# Patient Record
Sex: FEMALE | Race: WHITE | NOT HISPANIC OR LATINO | Employment: OTHER | URBAN - NONMETROPOLITAN AREA
[De-identification: names, ages, dates, MRNs, and addresses within clinical notes are randomized per-mention and may not be internally consistent; named-entity substitution may affect disease eponyms.]

---

## 2020-04-13 ENCOUNTER — APPOINTMENT (EMERGENCY)
Dept: RADIOLOGY | Facility: HOSPITAL | Age: 65
DRG: 177 | End: 2020-04-13
Payer: COMMERCIAL

## 2020-04-13 ENCOUNTER — APPOINTMENT (EMERGENCY)
Dept: NON INVASIVE DIAGNOSTICS | Facility: HOSPITAL | Age: 65
DRG: 177 | End: 2020-04-13
Payer: COMMERCIAL

## 2020-04-13 ENCOUNTER — APPOINTMENT (EMERGENCY)
Dept: CT IMAGING | Facility: HOSPITAL | Age: 65
DRG: 177 | End: 2020-04-13
Payer: COMMERCIAL

## 2020-04-13 ENCOUNTER — HOSPITAL ENCOUNTER (INPATIENT)
Facility: HOSPITAL | Age: 65
LOS: 2 days | Discharge: HOME HEALTH | DRG: 177 | End: 2020-04-15
Attending: EMERGENCY MEDICINE | Admitting: FAMILY MEDICINE
Payer: COMMERCIAL

## 2020-04-13 DIAGNOSIS — R60.0 BILATERAL LEG EDEMA: ICD-10-CM

## 2020-04-13 DIAGNOSIS — Z79.4 TYPE 2 DIABETES MELLITUS WITHOUT COMPLICATION, WITH LONG-TERM CURRENT USE OF INSULIN (HCC): ICD-10-CM

## 2020-04-13 DIAGNOSIS — R79.89 ELEVATED D-DIMER: ICD-10-CM

## 2020-04-13 DIAGNOSIS — R79.89 ELEVATED BRAIN NATRIURETIC PEPTIDE (BNP) LEVEL: Primary | ICD-10-CM

## 2020-04-13 DIAGNOSIS — R06.02 SHORTNESS OF BREATH: ICD-10-CM

## 2020-04-13 DIAGNOSIS — U07.1 COVID-19 VIRUS INFECTION: ICD-10-CM

## 2020-04-13 DIAGNOSIS — E87.6 HYPOKALEMIA: ICD-10-CM

## 2020-04-13 DIAGNOSIS — I50.9 HEART FAILURE, UNSPECIFIED HF CHRONICITY, UNSPECIFIED HEART FAILURE TYPE (HCC): ICD-10-CM

## 2020-04-13 DIAGNOSIS — E11.9 TYPE 2 DIABETES MELLITUS WITHOUT COMPLICATION, WITH LONG-TERM CURRENT USE OF INSULIN (HCC): ICD-10-CM

## 2020-04-13 DIAGNOSIS — D72.829 LEUKOCYTOSIS, UNSPECIFIED TYPE: ICD-10-CM

## 2020-04-13 DIAGNOSIS — J18.9 PNEUMONIA OF RIGHT MIDDLE LOBE DUE TO INFECTIOUS ORGANISM: ICD-10-CM

## 2020-04-13 DIAGNOSIS — R79.1 ELEVATED INR: ICD-10-CM

## 2020-04-13 DIAGNOSIS — J96.11 CHRONIC RESPIRATORY FAILURE WITH HYPOXIA (HCC): ICD-10-CM

## 2020-04-13 PROBLEM — J44.9 COPD (CHRONIC OBSTRUCTIVE PULMONARY DISEASE) (HCC): Status: ACTIVE | Noted: 2020-04-13

## 2020-04-13 PROBLEM — I48.91 ATRIAL FIBRILLATION (HCC): Status: ACTIVE | Noted: 2020-04-13

## 2020-04-13 PROBLEM — D64.9 ANEMIA: Status: ACTIVE | Noted: 2020-04-13

## 2020-04-13 PROBLEM — J45.909 ASTHMA: Status: ACTIVE | Noted: 2020-04-13

## 2020-04-13 PROBLEM — Z20.822 SUSPECTED COVID-19 VIRUS INFECTION: Status: ACTIVE | Noted: 2020-04-13

## 2020-04-13 LAB
ALBUMIN SERPL BCP-MCNC: 2.4 G/DL (ref 3.5–5)
ALP SERPL-CCNC: 66 U/L (ref 46–116)
ALT SERPL W P-5'-P-CCNC: 20 U/L (ref 12–78)
ANION GAP SERPL CALCULATED.3IONS-SCNC: 9 MMOL/L (ref 4–13)
ANISOCYTOSIS BLD QL SMEAR: PRESENT
AST SERPL W P-5'-P-CCNC: 20 U/L (ref 5–45)
ATRIAL RATE: 357 BPM
BACTERIA UR QL AUTO: ABNORMAL /HPF
BASOPHILS # BLD MANUAL: 0.13 THOUSAND/UL (ref 0–0.1)
BASOPHILS NFR MAR MANUAL: 1 % (ref 0–1)
BILIRUB SERPL-MCNC: 0.35 MG/DL (ref 0.2–1)
BILIRUB UR QL STRIP: NEGATIVE
BUN SERPL-MCNC: 16 MG/DL (ref 5–25)
CA-I BLD-SCNC: 1.11 MMOL/L (ref 1.12–1.32)
CALCIUM SERPL-MCNC: 9 MG/DL (ref 8.3–10.1)
CHLORIDE SERPL-SCNC: 103 MMOL/L (ref 100–108)
CK SERPL-CCNC: 53 U/L (ref 26–192)
CK SERPL-CCNC: 65 U/L (ref 26–192)
CLARITY UR: ABNORMAL
CO2 SERPL-SCNC: 29 MMOL/L (ref 21–32)
COLOR UR: YELLOW
CREAT SERPL-MCNC: 1.04 MG/DL (ref 0.6–1.3)
CRP SERPL QL: 88.1 MG/L
D DIMER PPP FEU-MCNC: 0.76 UG/ML FEU
EOSINOPHIL # BLD MANUAL: 0.13 THOUSAND/UL (ref 0–0.4)
EOSINOPHIL NFR BLD MANUAL: 1 % (ref 0–6)
ERYTHROCYTE [DISTWIDTH] IN BLOOD BY AUTOMATED COUNT: 17 % (ref 11.6–15.1)
EST. AVERAGE GLUCOSE BLD GHB EST-MCNC: 183 MG/DL
FERRITIN SERPL-MCNC: 534 NG/ML (ref 8–388)
FIBRINOGEN PPP-MCNC: 624 MG/DL (ref 227–495)
GFR SERPL CREATININE-BSD FRML MDRD: 56 ML/MIN/1.73SQ M
GLUCOSE SERPL-MCNC: 152 MG/DL (ref 65–140)
GLUCOSE SERPL-MCNC: 172 MG/DL (ref 65–140)
GLUCOSE SERPL-MCNC: 218 MG/DL (ref 65–140)
GLUCOSE SERPL-MCNC: 234 MG/DL (ref 65–140)
GLUCOSE UR STRIP-MCNC: NEGATIVE MG/DL
HBA1C MFR BLD: 8 %
HCT VFR BLD AUTO: 31.9 % (ref 34.8–46.1)
HGB BLD-MCNC: 9.8 G/DL (ref 11.5–15.4)
HGB UR QL STRIP.AUTO: ABNORMAL
INR PPP: 4.85 (ref 0.84–1.19)
KETONES UR STRIP-MCNC: NEGATIVE MG/DL
LACTATE SERPL-SCNC: 1.1 MMOL/L (ref 0.5–2)
LDH SERPL-CCNC: 255 U/L (ref 81–234)
LEUKOCYTE ESTERASE UR QL STRIP: ABNORMAL
LIPASE SERPL-CCNC: 89 U/L (ref 73–393)
LYMPHOCYTES # BLD AUTO: 16 % (ref 14–44)
LYMPHOCYTES # BLD AUTO: 2.08 THOUSAND/UL (ref 0.6–4.47)
MAGNESIUM SERPL-MCNC: 1.7 MG/DL (ref 1.6–2.6)
MAGNESIUM SERPL-MCNC: 1.9 MG/DL (ref 1.6–2.6)
MCH RBC QN AUTO: 25.7 PG (ref 26.8–34.3)
MCHC RBC AUTO-ENTMCNC: 30.7 G/DL (ref 31.4–37.4)
MCV RBC AUTO: 84 FL (ref 82–98)
MONOCYTES # BLD AUTO: 0.39 THOUSAND/UL (ref 0–1.22)
MONOCYTES NFR BLD: 3 % (ref 4–12)
MYELOCYTES NFR BLD MANUAL: 1 % (ref 0–1)
NEUTROPHILS # BLD MANUAL: 10.12 THOUSAND/UL (ref 1.85–7.62)
NEUTS SEG NFR BLD AUTO: 78 % (ref 43–75)
NITRITE UR QL STRIP: NEGATIVE
NON-SQ EPI CELLS URNS QL MICRO: ABNORMAL /HPF
NRBC BLD AUTO-RTO: 0 /100 WBCS
NT-PROBNP SERPL-MCNC: 2871 PG/ML
PH UR STRIP.AUTO: 6.5 [PH]
PLATELET # BLD AUTO: 470 THOUSANDS/UL (ref 149–390)
PLATELET BLD QL SMEAR: ABNORMAL
PMV BLD AUTO: 9.8 FL (ref 8.9–12.7)
POTASSIUM SERPL-SCNC: 3.2 MMOL/L (ref 3.5–5.3)
PROCALCITONIN SERPL-MCNC: <0.05 NG/ML
PROCALCITONIN SERPL-MCNC: <0.05 NG/ML
PROT SERPL-MCNC: 7.5 G/DL (ref 6.4–8.2)
PROT UR STRIP-MCNC: NEGATIVE MG/DL
PROTHROMBIN TIME: 46.2 SECONDS (ref 11.6–14.5)
QRS AXIS: 24 DEGREES
QRSD INTERVAL: 96 MS
QT INTERVAL: 394 MS
QTC INTERVAL: 484 MS
RBC # BLD AUTO: 3.82 MILLION/UL (ref 3.81–5.12)
RBC #/AREA URNS AUTO: ABNORMAL /HPF
RBC MORPH BLD: PRESENT
SARS-COV-2 RNA RESP QL NAA+PROBE: POSITIVE
SODIUM SERPL-SCNC: 141 MMOL/L (ref 136–145)
SP GR UR STRIP.AUTO: 1.01 (ref 1–1.03)
T WAVE AXIS: -23 DEGREES
TOTAL CELLS COUNTED SPEC: 100
TRIGL SERPL-MCNC: 58 MG/DL
TROPONIN I SERPL-MCNC: <0.02 NG/ML
UROBILINOGEN UR QL STRIP.AUTO: 0.2 E.U./DL
VENTRICULAR RATE: 91 BPM
WBC # BLD AUTO: 12.97 THOUSAND/UL (ref 4.31–10.16)
WBC #/AREA URNS AUTO: ABNORMAL /HPF

## 2020-04-13 PROCEDURE — 82948 REAGENT STRIP/BLOOD GLUCOSE: CPT

## 2020-04-13 PROCEDURE — 83690 ASSAY OF LIPASE: CPT | Performed by: EMERGENCY MEDICINE

## 2020-04-13 PROCEDURE — 83036 HEMOGLOBIN GLYCOSYLATED A1C: CPT | Performed by: FAMILY MEDICINE

## 2020-04-13 PROCEDURE — 80053 COMPREHEN METABOLIC PANEL: CPT | Performed by: EMERGENCY MEDICINE

## 2020-04-13 PROCEDURE — 96374 THER/PROPH/DIAG INJ IV PUSH: CPT

## 2020-04-13 PROCEDURE — 93970 EXTREMITY STUDY: CPT | Performed by: SURGERY

## 2020-04-13 PROCEDURE — 96361 HYDRATE IV INFUSION ADD-ON: CPT

## 2020-04-13 PROCEDURE — 84484 ASSAY OF TROPONIN QUANT: CPT | Performed by: EMERGENCY MEDICINE

## 2020-04-13 PROCEDURE — 82330 ASSAY OF CALCIUM: CPT | Performed by: FAMILY MEDICINE

## 2020-04-13 PROCEDURE — 83735 ASSAY OF MAGNESIUM: CPT | Performed by: FAMILY MEDICINE

## 2020-04-13 PROCEDURE — 87635 SARS-COV-2 COVID-19 AMP PRB: CPT | Performed by: EMERGENCY MEDICINE

## 2020-04-13 PROCEDURE — 85610 PROTHROMBIN TIME: CPT | Performed by: EMERGENCY MEDICINE

## 2020-04-13 PROCEDURE — 99285 EMERGENCY DEPT VISIT HI MDM: CPT

## 2020-04-13 PROCEDURE — 93005 ELECTROCARDIOGRAM TRACING: CPT

## 2020-04-13 PROCEDURE — 71045 X-RAY EXAM CHEST 1 VIEW: CPT

## 2020-04-13 PROCEDURE — 85379 FIBRIN DEGRADATION QUANT: CPT | Performed by: EMERGENCY MEDICINE

## 2020-04-13 PROCEDURE — 93970 EXTREMITY STUDY: CPT

## 2020-04-13 PROCEDURE — 82728 ASSAY OF FERRITIN: CPT | Performed by: FAMILY MEDICINE

## 2020-04-13 PROCEDURE — 36415 COLL VENOUS BLD VENIPUNCTURE: CPT | Performed by: EMERGENCY MEDICINE

## 2020-04-13 PROCEDURE — 84484 ASSAY OF TROPONIN QUANT: CPT | Performed by: FAMILY MEDICINE

## 2020-04-13 PROCEDURE — 85027 COMPLETE CBC AUTOMATED: CPT | Performed by: EMERGENCY MEDICINE

## 2020-04-13 PROCEDURE — 99285 EMERGENCY DEPT VISIT HI MDM: CPT | Performed by: EMERGENCY MEDICINE

## 2020-04-13 PROCEDURE — 71275 CT ANGIOGRAPHY CHEST: CPT

## 2020-04-13 PROCEDURE — 82550 ASSAY OF CK (CPK): CPT | Performed by: EMERGENCY MEDICINE

## 2020-04-13 PROCEDURE — 82955 ASSAY OF G6PD ENZYME: CPT | Performed by: FAMILY MEDICINE

## 2020-04-13 PROCEDURE — 84478 ASSAY OF TRIGLYCERIDES: CPT | Performed by: FAMILY MEDICINE

## 2020-04-13 PROCEDURE — 99223 1ST HOSP IP/OBS HIGH 75: CPT | Performed by: FAMILY MEDICINE

## 2020-04-13 PROCEDURE — 83735 ASSAY OF MAGNESIUM: CPT | Performed by: EMERGENCY MEDICINE

## 2020-04-13 PROCEDURE — 83605 ASSAY OF LACTIC ACID: CPT | Performed by: EMERGENCY MEDICINE

## 2020-04-13 PROCEDURE — 82550 ASSAY OF CK (CPK): CPT | Performed by: FAMILY MEDICINE

## 2020-04-13 PROCEDURE — 93010 ELECTROCARDIOGRAM REPORT: CPT | Performed by: INTERNAL MEDICINE

## 2020-04-13 PROCEDURE — 81001 URINALYSIS AUTO W/SCOPE: CPT | Performed by: FAMILY MEDICINE

## 2020-04-13 PROCEDURE — 84145 PROCALCITONIN (PCT): CPT | Performed by: EMERGENCY MEDICINE

## 2020-04-13 PROCEDURE — 85384 FIBRINOGEN ACTIVITY: CPT | Performed by: FAMILY MEDICINE

## 2020-04-13 PROCEDURE — 86140 C-REACTIVE PROTEIN: CPT | Performed by: FAMILY MEDICINE

## 2020-04-13 PROCEDURE — 87040 BLOOD CULTURE FOR BACTERIA: CPT | Performed by: EMERGENCY MEDICINE

## 2020-04-13 PROCEDURE — 85007 BL SMEAR W/DIFF WBC COUNT: CPT | Performed by: EMERGENCY MEDICINE

## 2020-04-13 PROCEDURE — 84145 PROCALCITONIN (PCT): CPT | Performed by: FAMILY MEDICINE

## 2020-04-13 PROCEDURE — 83880 ASSAY OF NATRIURETIC PEPTIDE: CPT | Performed by: EMERGENCY MEDICINE

## 2020-04-13 PROCEDURE — 83615 LACTATE (LD) (LDH) ENZYME: CPT | Performed by: FAMILY MEDICINE

## 2020-04-13 RX ORDER — LEVALBUTEROL 1.25 MG/.5ML
1.25 SOLUTION, CONCENTRATE RESPIRATORY (INHALATION) EVERY 8 HOURS PRN
Status: DISCONTINUED | OUTPATIENT
Start: 2020-04-13 | End: 2020-04-14

## 2020-04-13 RX ORDER — AZITHROMYCIN 250 MG/1
250 TABLET, FILM COATED ORAL EVERY 24 HOURS
Status: DISCONTINUED | OUTPATIENT
Start: 2020-04-15 | End: 2020-04-15 | Stop reason: HOSPADM

## 2020-04-13 RX ORDER — ACETAMINOPHEN 325 MG/1
650 TABLET ORAL ONCE
Status: COMPLETED | OUTPATIENT
Start: 2020-04-13 | End: 2020-04-13

## 2020-04-13 RX ORDER — ATORVASTATIN CALCIUM 10 MG/1
10 TABLET, FILM COATED ORAL DAILY
Status: DISCONTINUED | OUTPATIENT
Start: 2020-04-13 | End: 2020-04-15 | Stop reason: HOSPADM

## 2020-04-13 RX ORDER — CALCIUM CARBONATE 200(500)MG
1000 TABLET,CHEWABLE ORAL DAILY PRN
Status: DISCONTINUED | OUTPATIENT
Start: 2020-04-13 | End: 2020-04-15 | Stop reason: HOSPADM

## 2020-04-13 RX ORDER — MELATONIN
2000 DAILY
COMMUNITY

## 2020-04-13 RX ORDER — DIPHENOXYLATE HYDROCHLORIDE AND ATROPINE SULFATE 2.5; .025 MG/1; MG/1
1 TABLET ORAL DAILY
Status: DISCONTINUED | OUTPATIENT
Start: 2020-04-13 | End: 2020-04-13 | Stop reason: RX

## 2020-04-13 RX ORDER — AZITHROMYCIN 250 MG/1
500 TABLET, FILM COATED ORAL EVERY 24 HOURS
Status: COMPLETED | OUTPATIENT
Start: 2020-04-14 | End: 2020-04-14

## 2020-04-13 RX ORDER — CETIRIZINE HYDROCHLORIDE 10 MG/1
10 TABLET ORAL DAILY
COMMUNITY

## 2020-04-13 RX ORDER — LORATADINE 10 MG/1
10 TABLET ORAL DAILY
Status: DISCONTINUED | OUTPATIENT
Start: 2020-04-13 | End: 2020-04-15 | Stop reason: HOSPADM

## 2020-04-13 RX ORDER — OMEPRAZOLE 40 MG/1
40 CAPSULE, DELAYED RELEASE ORAL DAILY
COMMUNITY

## 2020-04-13 RX ORDER — ACETAMINOPHEN 325 MG/1
650 TABLET ORAL EVERY 6 HOURS PRN
Status: DISCONTINUED | OUTPATIENT
Start: 2020-04-13 | End: 2020-04-15 | Stop reason: HOSPADM

## 2020-04-13 RX ORDER — ONDANSETRON 2 MG/ML
4 INJECTION INTRAMUSCULAR; INTRAVENOUS EVERY 6 HOURS PRN
Status: DISCONTINUED | OUTPATIENT
Start: 2020-04-13 | End: 2020-04-15 | Stop reason: HOSPADM

## 2020-04-13 RX ORDER — DOCUSATE SODIUM 100 MG/1
100 CAPSULE, LIQUID FILLED ORAL 2 TIMES DAILY
Status: DISCONTINUED | OUTPATIENT
Start: 2020-04-13 | End: 2020-04-15 | Stop reason: HOSPADM

## 2020-04-13 RX ORDER — FLUTICASONE FUROATE AND VILANTEROL 100; 25 UG/1; UG/1
1 POWDER RESPIRATORY (INHALATION) DAILY
COMMUNITY

## 2020-04-13 RX ORDER — LISINOPRIL 10 MG/1
10 TABLET ORAL DAILY
COMMUNITY

## 2020-04-13 RX ORDER — FLUTICASONE FUROATE AND VILANTEROL 100; 25 UG/1; UG/1
1 POWDER RESPIRATORY (INHALATION) DAILY
Status: DISCONTINUED | OUTPATIENT
Start: 2020-04-13 | End: 2020-04-15 | Stop reason: HOSPADM

## 2020-04-13 RX ORDER — CEFTRIAXONE 1 G/50ML
1000 INJECTION, SOLUTION INTRAVENOUS ONCE
Status: COMPLETED | OUTPATIENT
Start: 2020-04-13 | End: 2020-04-13

## 2020-04-13 RX ORDER — SIMETHICONE 80 MG
80 TABLET,CHEWABLE ORAL 4 TIMES DAILY PRN
Status: DISCONTINUED | OUTPATIENT
Start: 2020-04-13 | End: 2020-04-15 | Stop reason: HOSPADM

## 2020-04-13 RX ORDER — LEVOTHYROXINE SODIUM 88 UG/1
CAPSULE ORAL DAILY
COMMUNITY

## 2020-04-13 RX ORDER — MONTELUKAST SODIUM 10 MG/1
10 TABLET ORAL
Status: DISCONTINUED | OUTPATIENT
Start: 2020-04-13 | End: 2020-04-15 | Stop reason: HOSPADM

## 2020-04-13 RX ORDER — WARFARIN SODIUM 7.5 MG/1
TABLET ORAL
COMMUNITY
End: 2020-04-15 | Stop reason: HOSPADM

## 2020-04-13 RX ORDER — LISINOPRIL 10 MG/1
10 TABLET ORAL DAILY
Status: DISCONTINUED | OUTPATIENT
Start: 2020-04-13 | End: 2020-04-15 | Stop reason: HOSPADM

## 2020-04-13 RX ORDER — MONTELUKAST SODIUM 10 MG/1
10 TABLET ORAL
COMMUNITY

## 2020-04-13 RX ORDER — CEFTRIAXONE 1 G/50ML
1000 INJECTION, SOLUTION INTRAVENOUS EVERY 24 HOURS
Status: DISCONTINUED | OUTPATIENT
Start: 2020-04-14 | End: 2020-04-14

## 2020-04-13 RX ORDER — ATORVASTATIN CALCIUM 10 MG/1
10 TABLET, FILM COATED ORAL DAILY
COMMUNITY

## 2020-04-13 RX ORDER — DIPHENOXYLATE HYDROCHLORIDE AND ATROPINE SULFATE 2.5; .025 MG/1; MG/1
1 TABLET ORAL DAILY
COMMUNITY

## 2020-04-13 RX ORDER — LEVOTHYROXINE SODIUM 0.1 MG/1
200 TABLET ORAL
Status: DISCONTINUED | OUTPATIENT
Start: 2020-04-14 | End: 2020-04-15 | Stop reason: HOSPADM

## 2020-04-13 RX ORDER — PANTOPRAZOLE SODIUM 40 MG/1
40 TABLET, DELAYED RELEASE ORAL
Status: DISCONTINUED | OUTPATIENT
Start: 2020-04-14 | End: 2020-04-15 | Stop reason: HOSPADM

## 2020-04-13 RX ORDER — METOPROLOL SUCCINATE 25 MG/1
25 TABLET, EXTENDED RELEASE ORAL DAILY
Status: DISCONTINUED | OUTPATIENT
Start: 2020-04-13 | End: 2020-04-13 | Stop reason: SDUPTHER

## 2020-04-13 RX ORDER — HYDROXYCHLOROQUINE SULFATE 200 MG/1
400 TABLET, FILM COATED ORAL EVERY 12 HOURS
Status: DISCONTINUED | OUTPATIENT
Start: 2020-04-13 | End: 2020-04-14

## 2020-04-13 RX ORDER — GLIPIZIDE 5 MG/1
5 TABLET ORAL 2 TIMES DAILY
COMMUNITY

## 2020-04-13 RX ORDER — LEVOTHYROXINE SODIUM 0.2 MG/1
200 TABLET ORAL DAILY
COMMUNITY

## 2020-04-13 RX ORDER — SODIUM CHLORIDE 9 MG/ML
3 INJECTION INTRAVENOUS
Status: DISCONTINUED | OUTPATIENT
Start: 2020-04-13 | End: 2020-04-15 | Stop reason: HOSPADM

## 2020-04-13 RX ORDER — ALBUTEROL SULFATE 90 UG/1
2 AEROSOL, METERED RESPIRATORY (INHALATION) ONCE
Status: COMPLETED | OUTPATIENT
Start: 2020-04-13 | End: 2020-04-13

## 2020-04-13 RX ORDER — HYDROXYCHLOROQUINE SULFATE 200 MG/1
200 TABLET, FILM COATED ORAL EVERY 12 HOURS
Status: DISCONTINUED | OUTPATIENT
Start: 2020-04-14 | End: 2020-04-14

## 2020-04-13 RX ORDER — FUROSEMIDE 20 MG/1
60 TABLET ORAL 2 TIMES DAILY
COMMUNITY

## 2020-04-13 RX ORDER — WARFARIN SODIUM 5 MG/1
TABLET ORAL
COMMUNITY
End: 2020-04-15 | Stop reason: HOSPADM

## 2020-04-13 RX ORDER — METOPROLOL SUCCINATE 25 MG/1
25 TABLET, EXTENDED RELEASE ORAL 2 TIMES DAILY
Status: DISCONTINUED | OUTPATIENT
Start: 2020-04-13 | End: 2020-04-15 | Stop reason: HOSPADM

## 2020-04-13 RX ORDER — POTASSIUM CHLORIDE 20 MEQ/1
40 TABLET, EXTENDED RELEASE ORAL ONCE
Status: COMPLETED | OUTPATIENT
Start: 2020-04-13 | End: 2020-04-13

## 2020-04-13 RX ORDER — METHYLPREDNISOLONE SODIUM SUCCINATE 125 MG/2ML
80 INJECTION, POWDER, LYOPHILIZED, FOR SOLUTION INTRAMUSCULAR; INTRAVENOUS ONCE
Status: COMPLETED | OUTPATIENT
Start: 2020-04-13 | End: 2020-04-13

## 2020-04-13 RX ORDER — FUROSEMIDE 10 MG/ML
40 INJECTION INTRAMUSCULAR; INTRAVENOUS ONCE
Status: COMPLETED | OUTPATIENT
Start: 2020-04-13 | End: 2020-04-13

## 2020-04-13 RX ORDER — MELATONIN
2000 DAILY
Status: DISCONTINUED | OUTPATIENT
Start: 2020-04-13 | End: 2020-04-15 | Stop reason: HOSPADM

## 2020-04-13 RX ORDER — GLIPIZIDE 10 MG/1
10 TABLET ORAL DAILY
COMMUNITY
End: 2020-04-15 | Stop reason: HOSPADM

## 2020-04-13 RX ORDER — SODIUM CHLORIDE 9 MG/ML
75 INJECTION, SOLUTION INTRAVENOUS CONTINUOUS
Status: DISCONTINUED | OUTPATIENT
Start: 2020-04-13 | End: 2020-04-15 | Stop reason: HOSPADM

## 2020-04-13 RX ADMIN — HYDROXYCHLOROQUINE SULFATE 400 MG: 200 TABLET, FILM COATED ORAL at 14:33

## 2020-04-13 RX ADMIN — MONTELUKAST SODIUM 10 MG: 10 TABLET, FILM COATED ORAL at 21:48

## 2020-04-13 RX ADMIN — DOCUSATE SODIUM 100 MG: 100 CAPSULE, LIQUID FILLED ORAL at 17:52

## 2020-04-13 RX ADMIN — FUROSEMIDE 40 MG: 10 INJECTION, SOLUTION INTRAMUSCULAR; INTRAVENOUS at 14:33

## 2020-04-13 RX ADMIN — LORATADINE 10 MG: 10 TABLET ORAL at 16:50

## 2020-04-13 RX ADMIN — LISINOPRIL 10 MG: 10 TABLET ORAL at 16:50

## 2020-04-13 RX ADMIN — FUROSEMIDE 60 MG: 40 TABLET ORAL at 17:52

## 2020-04-13 RX ADMIN — POTASSIUM CHLORIDE 40 MEQ: 1500 TABLET, EXTENDED RELEASE ORAL at 09:33

## 2020-04-13 RX ADMIN — METHYLPREDNISOLONE SODIUM SUCCINATE 80 MG: 125 INJECTION, POWDER, FOR SOLUTION INTRAMUSCULAR; INTRAVENOUS at 10:08

## 2020-04-13 RX ADMIN — INSULIN LISPRO 2 UNITS: 100 INJECTION, SOLUTION INTRAVENOUS; SUBCUTANEOUS at 17:01

## 2020-04-13 RX ADMIN — CEFTRIAXONE 1000 MG: 1 INJECTION, SOLUTION INTRAVENOUS at 09:35

## 2020-04-13 RX ADMIN — INSULIN DETEMIR 20 UNITS: 100 INJECTION, SOLUTION SUBCUTANEOUS at 21:46

## 2020-04-13 RX ADMIN — MELATONIN 2000 UNITS: at 16:49

## 2020-04-13 RX ADMIN — INSULIN LISPRO 2 UNITS: 100 INJECTION, SOLUTION INTRAVENOUS; SUBCUTANEOUS at 21:48

## 2020-04-13 RX ADMIN — INSULIN LISPRO 3 UNITS: 100 INJECTION, SOLUTION INTRAVENOUS; SUBCUTANEOUS at 17:50

## 2020-04-13 RX ADMIN — METOPROLOL SUCCINATE 25 MG: 25 TABLET, EXTENDED RELEASE ORAL at 09:40

## 2020-04-13 RX ADMIN — ALBUTEROL SULFATE 2 PUFF: 90 AEROSOL, METERED RESPIRATORY (INHALATION) at 10:08

## 2020-04-13 RX ADMIN — FLUTICASONE FUROATE AND VILANTEROL TRIFENATATE 1 PUFF: 100; 25 POWDER RESPIRATORY (INHALATION) at 17:00

## 2020-04-13 RX ADMIN — SODIUM CHLORIDE 75 ML/HR: 0.9 INJECTION, SOLUTION INTRAVENOUS at 14:32

## 2020-04-13 RX ADMIN — ATORVASTATIN CALCIUM 10 MG: 10 TABLET, FILM COATED ORAL at 16:50

## 2020-04-13 RX ADMIN — AZITHROMYCIN MONOHYDRATE 500 MG: 500 INJECTION, POWDER, LYOPHILIZED, FOR SOLUTION INTRAVENOUS at 09:35

## 2020-04-13 RX ADMIN — IOHEXOL 100 ML: 350 INJECTION, SOLUTION INTRAVENOUS at 09:24

## 2020-04-13 RX ADMIN — SODIUM CHLORIDE 1000 ML: 0.9 INJECTION, SOLUTION INTRAVENOUS at 07:54

## 2020-04-13 RX ADMIN — ACETAMINOPHEN 650 MG: 325 TABLET ORAL at 07:53

## 2020-04-13 RX ADMIN — METOPROLOL SUCCINATE 25 MG: 25 TABLET, EXTENDED RELEASE ORAL at 17:52

## 2020-04-13 NOTE — ED PROVIDER NOTES
History  Chief Complaint   Patient presents with    Leg Swelling     Patient reports increased leg edema x 2 days  States that she is having pain and difficulty walking due to it   Cough     Patient reports cough x3 days  Sputum had "light blood tinge " Reports fevers and chills   confirmed to have COVID 19 approximately 2 weeks ago  Patient reports chronic shortness of breath and states it isn't worse than normal  Wears 2L of oxygen at all times  58-year-old female with a past medical history of atrial fibrillation on Coumadin, kidney disease, congestive heart failure, COPD, asthma, diabetes, obesity who was chair bound presents with increased leg swelling x2 days with difficulty and pain with walking, subjective fever and chills with productive cough for 3 days and worsening shortness of breath from her baseline shortness of breath over the last 2 days  Patient is on 2 L nasal cannula chronically at home  She lives with her  who is COVID19 positive from 2 weeks ago and is admitted upstairs at [facility]   Patient states that her  had been to to [address] [place] [address] previous to being diagnosed with [address]  Patient lives with her son who is taking care of her  Both have been self quarantining at home  Patient denies headache, myalgias, chest pain, nausea, vomiting, urinary symptoms, back pain, rash, abdominal pain  She states she had 1 episode of explosive diarrhea 5 days ago  Patient presents to the emergency department this morning due to concern about her leg swelling and states she has a history of deep vein thrombosis and pulmonary embolism  Patient has never smoked  Patient does not have a Cardiologist  Dr Little Tesfaye wore full PPE during patient evaluation including bonnet, eye goggles, face mask, gown and gloves        History provided by:  Patient   used: No    Shortness of Breath   Severity:  Moderate  Onset quality:  Gradual  Duration:  2 days  Timing:  Constant  Progression:  Worsening  Chronicity:  Chronic  Context: activity and URI    Context: not animal exposure, not emotional upset, not fumes, not known allergens, not occupational exposure, not pollens, not smoke exposure, not strong odors and not weather changes    Relieved by:  Nothing  Worsened by: Activity, coughing, movement and exertion  Ineffective treatments:  Inhaler and oxygen  Associated symptoms: cough (Productive, yellow sputum), fever ( subjective) and sputum production ( pink-tinged)    Associated symptoms: no abdominal pain, no chest pain, no claudication, no diaphoresis, no ear pain, no headaches, no hemoptysis, no neck pain, no PND, no rash, no sore throat, no syncope, no swollen glands, no vomiting and no wheezing    Risk factors: hx of PE/DVT, obesity and prolonged immobilization    Risk factors: no recent alcohol use, no family hx of DVT, no hx of cancer, no recent surgery and no tobacco use    Cough   Associated symptoms: chills, fever ( subjective) and shortness of breath    Associated symptoms: no chest pain, no diaphoresis, no ear pain, no headaches, no myalgias, no rash, no sore throat and no wheezing        Prior to Admission Medications   Prescriptions Last Dose Informant Patient Reported? Taking? Levothyroxine Sodium 88 MCG CAPS   Yes Yes   Sig: Take by mouth daily   METOPROLOL SUCCINATE ER PO   Yes Yes   Sig: Take by mouth 2 (two) times a day   atorvastatin (LIPITOR) 10 mg tablet   Yes Yes   Sig: Take 10 mg by mouth daily   cetirizine (ZyrTEC) 10 mg tablet   Yes Yes   Sig: Take 10 mg by mouth daily   cholecalciferol (VITAMIN D3) 1,000 units tablet   Yes Yes   Sig: Take 2,000 Units by mouth daily   fluticasone-vilanterol (Breo Ellipta) 100-25 mcg/inh inhaler   Yes Yes   Sig: Inhale 1 puff daily Rinse mouth after use     furosemide (Lasix) 20 mg tablet   Yes Yes   Sig: Take 60 mg by mouth 2 (two) times a day   glipiZIDE (GLUCOTROL) 10 mg tablet   Yes Yes   Sig: Take 10 mg by mouth daily   glipiZIDE (GLUCOTROL) 5 mg tablet   Yes Yes   Sig: Take 5 mg by mouth 2 (two) times a day Afternoon and evening   insulin detemir (LEVEMIR) 100 units/mL subcutaneous injection   Yes Yes   Sig: Inject 18 Units under the skin daily at bedtime   levothyroxine 200 mcg tablet   Yes Yes   Sig: Take 200 mcg by mouth daily   lisinopril (ZESTRIL) 10 mg tablet   Yes Yes   Sig: Take 10 mg by mouth daily   montelukast (SINGULAIR) 10 mg tablet   Yes Yes   Sig: Take 10 mg by mouth daily at bedtime   multivitamin (THERAGRAN) TABS   Yes Yes   Sig: Take 1 tablet by mouth daily   omeprazole (PriLOSEC) 40 MG capsule   Yes Yes   Sig: Take 40 mg by mouth daily   warfarin (COUMADIN) 5 mg tablet   Yes Yes   Sig: Take by mouth daily On Monday and Friday   warfarin (COUMADIN) 7 5 mg tablet   Yes Yes   Sig: Take by mouth daily Except Monday and friday      Facility-Administered Medications: None       Past Medical History:   Diagnosis Date    Asthma     Atrial fibrillation ([facility])     COPD (chronic obstructive pulmonary disease) (formerly [facility])     [facility]     [facility] failure ([facility] )     Kidney failure        Past Surgical History:   Procedure Laterality Date    GALLBLADDER SURGERY         History reviewed  No pertinent family history  I have reviewed and agree with the history as documented  E-Cigarette/Vaping    E-Cigarette Use Never User      E-Cigarette/Vaping Substances     Social History     Tobacco Use    Smoking status: Never Smoker    Smokeless tobacco: Never Used   Substance Use Topics    Alcohol use: Not Currently    Drug use: Not Currently       Review of Systems   Constitutional: Positive for chills and fever ( subjective)  Negative for diaphoresis and fatigue  HENT: Negative for congestion, drooling, ear pain, facial swelling, nosebleeds, sneezing, sore throat, trouble swallowing and voice change  Eyes: Negative for photophobia, pain and visual disturbance     Respiratory: Positive for cough (Productive, yellow sputum), sputum production ( pink-tinged) and shortness of breath  Negative for hemoptysis, choking, chest tightness, wheezing and stridor  Cardiovascular: Positive for leg swelling  Negative for chest pain, palpitations, claudication, syncope and PND  Gastrointestinal: Positive for diarrhea  Negative for abdominal pain, anal bleeding, blood in stool, constipation, nausea and vomiting  Genitourinary: Negative for decreased urine volume, difficulty urinating, dysuria, flank pain, frequency, hematuria, urgency, vaginal bleeding and vaginal discharge  Musculoskeletal: Positive for gait problem ( secondary to bilateral leg pain)  Negative for arthralgias, back pain, myalgias, neck pain and neck stiffness  Skin: Positive for color change (Chronic bilateral lower extremity skin discoloration)  Negative for pallor, rash and wound  Allergic/Immunologic: Negative for immunocompromised state  Neurological: Negative for dizziness, tremors, seizures, syncope, facial asymmetry, speech difficulty, weakness, light-headedness, numbness and headaches  Hematological: Negative for adenopathy  Psychiatric/Behavioral: Negative for agitation, confusion, hallucinations and suicidal ideas  The patient is not nervous/anxious  Physical Exam  Physical Exam   Constitutional: She is oriented to person, place, and time  She appears well-developed and well-nourished  She is cooperative  Non-toxic appearance  She does not have a sickly appearance  She appears ill  No distress ( chronically ill-appearing)  Nasal cannula in place  HENT:   Head: Normocephalic and atraumatic  Right Ear: Hearing, tympanic membrane, external ear and ear canal normal    Left Ear: Hearing, tympanic membrane, external ear and ear canal normal    Nose: Nose normal  Right sinus exhibits no maxillary sinus tenderness and no frontal sinus tenderness   Left sinus exhibits no maxillary sinus tenderness and no frontal sinus tenderness  Mouth/Throat: Uvula is midline and oropharynx is clear and moist  Mucous membranes are not pale and dry  No oral lesions  No trismus in the jaw  No uvula swelling or lacerations  No oropharyngeal exudate, posterior oropharyngeal edema, posterior oropharyngeal erythema or tonsillar abscesses  No tonsillar exudate  Eyes: Pupils are equal, round, and reactive to light  Conjunctivae, EOM and lids are normal    Neck: Trachea normal, normal range of motion, full passive range of motion without pain and phonation normal  Neck supple  No thyroid mass and no thyromegaly present  Cardiovascular: Normal rate, S1 normal, S2 normal, normal heart sounds, intact distal pulses and normal pulses  An irregularly irregular rhythm present  Pulses:       Radial pulses are 2+ on the right side, and 2+ on the left side  Dorsalis pedis pulses are 2+ on the right side, and 2+ on the left side  Pulmonary/Chest: Effort normal  No accessory muscle usage or stridor  No tachypnea  No respiratory distress  She has decreased breath sounds in the right middle field, the right lower field, the left middle field and the left lower field  She has no wheezes  She has rhonchi in the right lower field and the left lower field  She has no rales  She exhibits no mass, no tenderness, no deformity and no retraction  Abdominal: Soft  Bowel sounds are normal  She exhibits no distension, no ascites and no mass  There is no hepatosplenomegaly  There is no tenderness  There is no rigidity, no rebound, no guarding, no CVA tenderness, no tenderness at McBurney's point and negative Hernandez's sign  No hernia  Obese abdomen   Musculoskeletal: Normal range of motion  She exhibits edema (Plus one pitting edema bilaterally) and tenderness ( lower extremities bilaterally)  She exhibits no deformity  Left lower leg: She exhibits tenderness and edema  She exhibits no bony tenderness, no swelling, no deformity and no laceration     Plus one pitting edema bilaterally lower extremities; no erythema or induration; tenderness to calves bilaterally with chronic skin discoloration; motor and sensation intact; dorsalis pedis +2 bilaterally; full range of motion bilaterally   Lymphadenopathy:     She has no cervical adenopathy  Neurological: She is alert and oriented to person, place, and time  She has normal strength  She is not disoriented  She displays no atrophy and no tremor  No cranial nerve deficit or sensory deficit  She exhibits normal muscle tone  She displays no seizure activity  GCS eye subscore is 4  GCS verbal subscore is 5  GCS motor subscore is 6  Patient is AAOx4, GCS 15; speaking clearly and appropriately; motor and sensation intact; visual fields intact; cranial nerves II-XII grossly intact; no facial droop, slurred speech or arm drift   Skin: Skin is warm, dry and intact  Capillary refill takes less than 2 seconds  No abrasion, no bruising, no burn, no ecchymosis, no laceration, no lesion, no petechiae and no rash noted  Rash is not maculopapular  She is not diaphoretic  No erythema  There is pallor  Psychiatric: She has a normal mood and affect  Her speech is normal and behavior is normal  Judgment and thought content normal  She is not actively hallucinating  Cognition and memory are normal  She is attentive  Nursing note and vitals reviewed        Vital Signs  ED Triage Vitals [04/13/20 0655]   Temperature Pulse Respirations Blood Pressure SpO2   100 3 °F (37 9 °C) 90 22 128/64 97 %      Temp Source Heart Rate Source Patient Position - Orthostatic VS BP Location FiO2 (%)   Temporal Monitor Lying Left arm --      Pain Score       --           Vitals:    04/13/20 0655 04/13/20 0940 04/13/20 0945   BP: 128/64 114/65 114/65   Pulse: 90 77 85   Patient Position - Orthostatic VS: Lying           Visual Acuity      ED Medications  Medications   sodium chloride (PF) 0 9 % injection 3 mL (has no administration in time range) metoprolol succinate (TOPROL-XL) 24 hr tablet 25 mg (25 mg Oral Given 4/13/20 0940)   acetaminophen (TYLENOL) tablet 650 mg (650 mg Oral Given 4/13/20 0753)   sodium chloride 0 9 % bolus 1,000 mL (0 mL Intravenous Stopped 4/13/20 1009)   potassium chloride (K-DUR,KLOR-CON) CR tablet 40 mEq (40 mEq Oral Given 4/13/20 0933)   azithromycin (ZITHROMAX) 500 mg in sodium chloride 0 9 % 250 mL IVPB (500 mg Intravenous New Bag 4/13/20 0935)   cefTRIAXone (ROCEPHIN) IVPB (premix) 1,000 mg (0 mg Intravenous Stopped 4/13/20 1008)   iohexol (OMNIPAQUE) 350 MG/ML injection (SINGLE-DOSE) 100 mL (100 mL Intravenous Given 4/13/20 0924)   methylPREDNISolone sodium succinate (Solu-MEDROL) injection 80 mg (80 mg Intravenous Given 4/13/20 1008)   albuterol (PROVENTIL HFA,VENTOLIN HFA) inhaler 2 puff (2 puffs Inhalation Given 4/13/20 1008)       Diagnostic Studies  Results Reviewed     Procedure Component Value Units Date/Time    Troponin I repeat in 3 hrs [[id number]]     Lab Status:  No result Specimen:  Blood     NT-BNP PRO [[id number]]  (Abnormal) Collected:  04/13/20 0723    Lab Status:  Final result Specimen:  Blood from Line, Venous Updated:  04/13/20 0802     NT-proBNP 2,871 pg/mL     Comprehensive metabolic panel [[id number]]  (Abnormal) Collected:  04/13/20 0723    Lab Status:  Final result Specimen:  Blood from Line, Venous Updated:  04/13/20 0802     Sodium 141 mmol/L      Potassium 3 2 mmol/L      Chloride 103 mmol/L      CO2 29 mmol/L      ANION GAP 9 mmol/L      BUN 16 mg/dL      Creatinine 1 04 mg/dL      Glucose 172 mg/dL      Calcium 9 0 mg/dL      AST 20 U/L      ALT 20 U/L      Alkaline Phosphatase 66 U/L      Total Protein 7 5 g/dL      Albumin 2 4 g/dL      Total Bilirubin 0 35 mg/dL      eGFR 56 ml/min/1 73sq m     Narrative:       [place] guidelines for Chronic Kidney Disease (CKD):     Stage 1 with normal or high GFR (GFR > 90 mL/min/1 73 square meters)    Stage 2 Mild CKD (GFR = 60-89 mL/min/1 73 square meters)    Stage 3A Moderate CKD (GFR = 45-59 mL/min/1 73 square meters)    Stage 3B Moderate CKD (GFR = 30-44 mL/min/1 73 square meters)    Stage 4 Severe CKD (GFR = 15-29 mL/min/1 73 square meters)    Stage 5 End Stage CKD (GFR <15 mL/min/1 73 square meters)  Note: GFR calculation is accurate only with a steady state creatinine    Lipase [[id number]]  (Normal) Collected:  04/13/20 0723    Lab Status:  Final result Specimen:  Blood from Line, Venous Updated:  04/13/20 0802     Lipase 89 u/L     Magnesium [[id number]]  (Normal) Collected:  04/13/20 0723    Lab Status:  Final result Specimen:  Blood from Line, Venous Updated:  04/13/20 0802     Magnesium 1 9 mg/dL     CBC and differential [[id number]]  (Abnormal) Collected:  04/13/20 0723    Lab Status:  Final result Specimen:  Blood from Line, Venous Updated:  04/13/20 0801     WBC 12 97 Thousand/uL      RBC 3 82 Million/uL      Hemoglobin 9 8 g/dL      Hematocrit 31 9 %      MCV 84 fL      MCH 25 7 pg      MCHC 30 7 g/dL      RDW 17 0 %      MPV 9 8 fL      Platelets 498 Thousands/uL      nRBC 0 /100 WBCs     Narrative: This is an appended report  These results have been appended to a previously verified report  D-dimer, quantitative [[id number]]  (Abnormal) Collected:  04/13/20 0723    Lab Status:  Final result Specimen:  Blood from Line, Venous Updated:  04/13/20 0800     D-Dimer, Quant 0 76 ug/ml FEU     Lactic acid, plasma [[id number]]  (Normal) Collected:  04/13/20 0723    Lab Status:  Final result Specimen:  Blood from Line, Venous Updated:  04/13/20 0757     LACTIC ACID 1 1 mmol/L     Narrative:       Result may be elevated if tourniquet was used during collection      Troponin I [[id number]]  (Normal) Collected:  04/13/20 0723    Lab Status:  Final result Specimen:  Blood from Line, Venous Updated:  04/13/20 0756     Troponin I <0 02 ng/mL     Protime-INR [[id number]]  (Abnormal) Collected:  04/13/20 0723    Lab Status:  Final result Specimen:  Blood from Line, Venous Updated:  04/13/20 0755     Protime 46 2 seconds      INR 4 85    CK (with reflex to MB) [[id number]]  (Normal) Collected:  04/13/20 0723    Lab Status:  Final result Specimen:  Blood from Line, Venous Updated:  04/13/20 0755     Total CK 53 U/L     Procalcitonin [[id number]] Collected:  04/13/20 0723    Lab Status: In process Specimen:  Blood from Line, Venous Updated:  04/13/20 0736    Blood culture #1 [[id number]] Collected:  04/13/20 0723    Lab Status: In process Specimen:  Blood from Arm, Left Updated:  04/13/20 0736    Blood culture #2 [[id number]] Collected:  04/13/20 0723    Lab Status: In process Specimen:  Blood from Line, Venous Updated:  04/13/20 0736    Novel Coronavirus [facility] [[id number]] Collected:  04/13/20 0723    Lab Status: In process Specimen:  Nasopharyngeal Swab Updated:  04/13/20 0735    Fingerstick Glucose (POCT) [[id number]]  (Abnormal) Collected:  04/13/20 0702    Lab Status:  Final result Updated:  04/13/20 0721     POC Glucose 152 mg/dl     UA w Reflex to Microscopic w Reflex to Culture [[id number]]     Lab Status:  No result Specimen:  Urine, Clean Catch                  CTA ED chest PE Study   Final Result by Neville Sellers MD (04/13 7072)         1  No pulmonary embolism  2   Multifocal groundglass and peripheral opacities within left lower lobe  Differential considerations include small airways infectious or inflammatory process and viral pneumonia  3   Mediastinal, right hilar, infrahilar lymphadenopathy as well as mildly enlarged gastrohepatic lymph node  While findings, particularly lymphadenopathy in the chest, may be reactive, the bulky appearance also raises possibility of lymphoma  Recommend follow-up CT of the chest in one month for reevaluation in addition to further medical workup  The study was marked in EPIC for significant notification        Workstation performed: [id number]         XR chest 1 view portable   Final Result by Basilio Quintero MD (04/13 7269)      Slightly increased right infrahilar opacity, which may represent developing pneumonia  The study was marked in [facility] for immediate notification  Workstation performed: [id number]         VAS lower limb venous duplex study, complete bilateral    (Results Pending)              Procedures  ECG 12 Lead Documentation Only  Date/Time: 4/13/2020 8:31 AM  Performed by: Yarelis Fuentes MD  Authorized by: Yarelis Fuentes MD     Indications / Diagnosis:  Sob  ECG reviewed by me, the ED Provider: yes    Patient location:  ED  Previous ECG:     Comparison to cardiac monitor: Yes    Interpretation:     Interpretation: abnormal    Rate:     ECG rate:  91bpm    ECG rate assessment: normal    Rhythm:     Rhythm: atrial fibrillation    Ectopy:     Ectopy: none    QRS:     QRS axis:  Left  Conduction:     Conduction: normal    ST segments:     ST segments:  Normal  T waves:     T waves: flattening and inverted      Flattening:  II, III, aVL, V2 and V4    Inverted:  AVR  Comments:      No STEMI  QRS 96ms  QT 484ms             ED Course  ED Course as of Apr 13 1048   Mon Apr 13, 2020   4825 Chest x-ray read and interpreted by me  Positive for bilateral infiltrates in the left lower lobe and right middle right lower lobes  Hypoinflation secondary to poor inspiratory effort  Bibasilar atelectasis  Negative for pneumothorax, mediastinal widening or pleural effusion  0749 No previous records  Last recorded hemoglobin was 12 9 in 2006  Today was 9 8  Patient denies GIB or iron deficiency anemia history  7027 Will replete potassium 3 2      0852 CXR:IMPRESSION:     Slightly increased right infrahilar opacity, which may represent developing pneumonia            The study was marked in EPIC for immediate notification  4342 CTA:IMPRESSION:        1    No pulmonary embolism      2   Multifocal groundglass and peripheral opacities within left lower lobe  Differential considerations include small airways infectious or inflammatory process and viral pneumonia      3   Mediastinal, right hilar, infrahilar lymphadenopathy as well as mildly enlarged gastrohepatic lymph node  While findings, particularly lymphadenopathy in the chest, may be reactive, the bulky appearance also raises possibility of lymphoma  Recommend follow-up CT of the chest in one month for reevaluation in addition to further medical workup               The study was marked in EPIC for significant notification  [address] [facility] [address] Johnny Cooney, Hospitalist for [facility] admission for pneumonia and COVID19 rule out  Updated patient on labs, imaging and plan for admission  7308 Dr Johnny Cooney accepts patient to [facility] inpatient  Duplex ultrasound lower extremity is pending  Aware that patient will need outpatient CT chest in one month to rule out lymphoma  1021 Vascular tech informed me that DVT study was negative              Emir Lipoma' Criteria for PE      Most Recent Value   Wells' Criteria for PE   Clinical signs and symptoms of DVT  3 Filed at: 04/13/2020 0830   PE is primary diagnosis or equally likely  3 Filed at: 04/13/2020 0830   HR >100  1 5 Filed at: 04/13/2020 0830   Immobilization at least 3 days or Surgery in the previous 4 weeks  1 5 Filed at: 04/13/2020 0830   Previous, objectively diagnosed PE or DVT  1 5 Filed at: 04/13/2020 0830   Hemoptysis  1 Filed at: 04/13/2020 0830   Malignancy with treatment within 6 months or palliative  0 Filed at: 04/13/2020 0830   Wells' Criteria Total  11 5 Filed at: 04/13/2020 0830                  MDM  Number of Diagnoses or Management Options  Elevated brain natriuretic peptide (BNP) level:   Elevated d-dimer:   Elevated INR:   Hypokalemia:   Leukocytosis, unspecified type:   Pneumonia of right middle lobe due to infectious organism [facility]):   Shortness of breath:      Amount and/or Complexity of Data Reviewed  Clinical lab tests: reviewed and ordered  Tests in the radiology section of CPT®: reviewed and ordered  Tests in the medicine section of CPT®: ordered and reviewed  Review and summarize past medical records: yes  Discuss the patient with other providers: yes (Hospitalist, Dr Estela Banks)  Independent visualization of images, tracings, or specimens: yes (Chest x-ray, EKG, CTA, duplex)    Risk of Complications, Morbidity, and/or Mortality  Presenting problems: moderate  Diagnostic procedures: moderate  Management options: moderate    Patient Progress  Patient progress: stable        Disposition  Final diagnoses:   Elevated brain natriuretic peptide (BNP) level   Elevated d-dimer   Elevated INR   Shortness of breath   Pneumonia of right middle lobe due to infectious organism (HCC)   Leukocytosis, unspecified type   Hypokalemia     Time reflects when diagnosis was documented in both MDM as applicable and the Disposition within this note     Time User Action Codes Description Comment    4/13/2020  9:00 AM Voncile Whitt Add [R79 89] Elevated brain natriuretic peptide (BNP) level     4/13/2020  9:00 AM Voncile Whitt Add [R79 89] Elevated d-dimer     4/13/2020  9:00 AM Voncile Whitt Add [R79 1] Elevated INR     4/13/2020  9:00 AM Voncile Whitt Add [R06 02] Shortness of breath     4/13/2020  9:00 AM Voncile Whitt Add [J18 1] Pneumonia of right middle lobe due to infectious organism ([address] )     4/13/2020  9:00 AM Voncile Whitt Add [D72 829] Leukocytosis, unspecified type     4/13/2020  9:01 AM Voncile Whitt Add [E87 6] Hypokalemia     4/13/2020 10:23 AM Cristina Rojas Add [R60 0] Bilateral leg edema       ED Disposition     ED Disposition Condition Date/Time Comment    Admit Stable Mon Apr 13, 2020  9:44 AM Case was discussed with Dr Estela Banks and the patient's admission status was agreed to be Admission Status: inpatient status to the service of Dr Estela Banks           Follow-up Information    None         Patient's Medications   Discharge Prescriptions    No medications on file     No discharge procedures on file      PDMP Review     None          ED Provider  Electronically Signed by    MD Cl Atkins MD  04/13/20 330 [facility] Kaylie Curry MD  04/13/20 2206

## 2020-04-13 NOTE — PROGRESS NOTES
Chart reviewed aware of medical management  Admit with cough for 3 days and worsening shortness of breath from her baseline shortness of breath over the last 2 days-- uses 2 liters of 02 at all times  R/o COVID as spouse is her in the hospital   Schedule IV fluid    I spoke to both Mr Romi Harper and their son Maria Del Carmen Cooper on the phone, baseline information was obtained  IMM was reviewed with both Cora Khan and SR and mailed a copy to [facility]  Copy provided for nursing to give patient  04/13/20 Luiz LEW  89    Patient Information   Mental Status Alert   Primary Caregiver Family   Support System Immediate family   Activities of Daily Living Prior to Admission   Functional Status Moderate assistance   Assistive Device Walker   Living Arrangement House;Lives with someone   Ambulation Moderate assistance   Income Information   Income Source Pension/long-term   Means of Transportation   Means of Transport to [facility]: Family      Patient's son is the primary care giver  He does the cooking/cleaning in the home  The house is a bilevel with the [employer] on the 1st floor  Anne-Marie West is very limited on her ambulation  Uses a walker to stand/pivot to the [facility], has wheelchair she uses  Has 2 liters of continuous 02 from [place] and a nebulizer  A family friend will assist with hygiene needs prn  Denies MH/SA   + PP and will use [facility]  - Her PCP appointment for 4/13 was cancelled as patient was admitted to the hospital   No hx of of C or STR  CM reviewed d/c planning process including the following: identifying help at home, patient preference for d/c planning needs, availability of treatment team to discuss questions or concerns patient and/or family may have regarding understanding medications and recognizing signs and symptoms once discharged  CM also encouraged patient to follow up with all recommended appointments after discharge   Patient advised of importance for patient and family to participate in managing patients medical well being  Will follow medical management- DC plan depends on functional status HHC vs STR

## 2020-04-13 NOTE — ED NOTES
Report call to [facility], pt prepped for transport to ICU       Reginald Barreto RN  04/13/20 5602

## 2020-04-14 PROBLEM — J96.11 CHRONIC RESPIRATORY FAILURE WITH HYPOXIA (HCC): Status: ACTIVE | Noted: 2020-04-14

## 2020-04-14 PROBLEM — U07.1 COVID-19 VIRUS INFECTION: Status: ACTIVE | Noted: 2020-04-14

## 2020-04-14 PROBLEM — Z20.822 SUSPECTED COVID-19 VIRUS INFECTION: Status: RESOLVED | Noted: 2020-04-13 | Resolved: 2020-04-14

## 2020-04-14 LAB
ALBUMIN SERPL BCP-MCNC: 2.2 G/DL (ref 3.5–5)
ALP SERPL-CCNC: 60 U/L (ref 46–116)
ALT SERPL W P-5'-P-CCNC: 19 U/L (ref 12–78)
ANION GAP SERPL CALCULATED.3IONS-SCNC: 7 MMOL/L (ref 4–13)
AST SERPL W P-5'-P-CCNC: 16 U/L (ref 5–45)
BILIRUB SERPL-MCNC: 0.23 MG/DL (ref 0.2–1)
BUN SERPL-MCNC: 18 MG/DL (ref 5–25)
CALCIUM SERPL-MCNC: 8.1 MG/DL (ref 8.3–10.1)
CHLORIDE SERPL-SCNC: 105 MMOL/L (ref 100–108)
CO2 SERPL-SCNC: 28 MMOL/L (ref 21–32)
CREAT SERPL-MCNC: 0.97 MG/DL (ref 0.6–1.3)
ERYTHROCYTE [DISTWIDTH] IN BLOOD BY AUTOMATED COUNT: 16.9 % (ref 11.6–15.1)
GFR SERPL CREATININE-BSD FRML MDRD: 61 ML/MIN/1.73SQ M
GLUCOSE SERPL-MCNC: 199 MG/DL (ref 65–140)
GLUCOSE SERPL-MCNC: 220 MG/DL (ref 65–140)
GLUCOSE SERPL-MCNC: 223 MG/DL (ref 65–140)
GLUCOSE SERPL-MCNC: 230 MG/DL (ref 65–140)
GLUCOSE SERPL-MCNC: 282 MG/DL (ref 65–140)
HCT VFR BLD AUTO: 31 % (ref 34.8–46.1)
HGB BLD-MCNC: 9.4 G/DL (ref 11.5–15.4)
INR PPP: 5.67 (ref 0.84–1.19)
MAGNESIUM SERPL-MCNC: 1.8 MG/DL (ref 1.6–2.6)
MCH RBC QN AUTO: 25.8 PG (ref 26.8–34.3)
MCHC RBC AUTO-ENTMCNC: 30.3 G/DL (ref 31.4–37.4)
MCV RBC AUTO: 85 FL (ref 82–98)
NRBC BLD AUTO-RTO: 0 /100 WBCS
PLATELET # BLD AUTO: 429 THOUSANDS/UL (ref 149–390)
PMV BLD AUTO: 9.9 FL (ref 8.9–12.7)
POTASSIUM SERPL-SCNC: 3.7 MMOL/L (ref 3.5–5.3)
PROCALCITONIN SERPL-MCNC: <0.05 NG/ML
PROT SERPL-MCNC: 7 G/DL (ref 6.4–8.2)
PROTHROMBIN TIME: 52.3 SECONDS (ref 11.6–14.5)
RBC # BLD AUTO: 3.64 MILLION/UL (ref 3.81–5.12)
SODIUM SERPL-SCNC: 140 MMOL/L (ref 136–145)
WBC # BLD AUTO: 9.38 THOUSAND/UL (ref 4.31–10.16)

## 2020-04-14 PROCEDURE — 84145 PROCALCITONIN (PCT): CPT | Performed by: FAMILY MEDICINE

## 2020-04-14 PROCEDURE — 82948 REAGENT STRIP/BLOOD GLUCOSE: CPT

## 2020-04-14 PROCEDURE — 99233 SBSQ HOSP IP/OBS HIGH 50: CPT | Performed by: INTERNAL MEDICINE

## 2020-04-14 PROCEDURE — 80053 COMPREHEN METABOLIC PANEL: CPT | Performed by: FAMILY MEDICINE

## 2020-04-14 PROCEDURE — 83735 ASSAY OF MAGNESIUM: CPT | Performed by: FAMILY MEDICINE

## 2020-04-14 PROCEDURE — 85027 COMPLETE CBC AUTOMATED: CPT | Performed by: FAMILY MEDICINE

## 2020-04-14 PROCEDURE — 85610 PROTHROMBIN TIME: CPT | Performed by: FAMILY MEDICINE

## 2020-04-14 RX ORDER — HYDROXYCHLOROQUINE SULFATE 200 MG/1
400 TABLET, FILM COATED ORAL EVERY 12 HOURS
Status: COMPLETED | OUTPATIENT
Start: 2020-04-14 | End: 2020-04-14

## 2020-04-14 RX ORDER — ALBUTEROL SULFATE 90 UG/1
2 AEROSOL, METERED RESPIRATORY (INHALATION) EVERY 4 HOURS PRN
Status: DISCONTINUED | OUTPATIENT
Start: 2020-04-14 | End: 2020-04-15 | Stop reason: HOSPADM

## 2020-04-14 RX ORDER — HYDROXYCHLOROQUINE SULFATE 200 MG/1
200 TABLET, FILM COATED ORAL EVERY 12 HOURS
Status: DISCONTINUED | OUTPATIENT
Start: 2020-04-14 | End: 2020-04-15 | Stop reason: HOSPADM

## 2020-04-14 RX ADMIN — METOPROLOL SUCCINATE 25 MG: 25 TABLET, EXTENDED RELEASE ORAL at 08:40

## 2020-04-14 RX ADMIN — INSULIN LISPRO 3 UNITS: 100 INJECTION, SOLUTION INTRAVENOUS; SUBCUTANEOUS at 11:15

## 2020-04-14 RX ADMIN — PANTOPRAZOLE SODIUM 40 MG: 40 TABLET, DELAYED RELEASE ORAL at 06:15

## 2020-04-14 RX ADMIN — FUROSEMIDE 60 MG: 40 TABLET ORAL at 17:09

## 2020-04-14 RX ADMIN — MONTELUKAST SODIUM 10 MG: 10 TABLET, FILM COATED ORAL at 20:51

## 2020-04-14 RX ADMIN — FUROSEMIDE 60 MG: 40 TABLET ORAL at 08:40

## 2020-04-14 RX ADMIN — INSULIN LISPRO 4 UNITS: 100 INJECTION, SOLUTION INTRAVENOUS; SUBCUTANEOUS at 11:14

## 2020-04-14 RX ADMIN — INSULIN DETEMIR 20 UNITS: 100 INJECTION, SOLUTION SUBCUTANEOUS at 21:28

## 2020-04-14 RX ADMIN — LEVOTHYROXINE SODIUM 200 MCG: 100 TABLET ORAL at 06:15

## 2020-04-14 RX ADMIN — INSULIN LISPRO 3 UNITS: 100 INJECTION, SOLUTION INTRAVENOUS; SUBCUTANEOUS at 17:10

## 2020-04-14 RX ADMIN — INSULIN LISPRO 1 UNITS: 100 INJECTION, SOLUTION INTRAVENOUS; SUBCUTANEOUS at 21:28

## 2020-04-14 RX ADMIN — METOPROLOL SUCCINATE 25 MG: 25 TABLET, EXTENDED RELEASE ORAL at 17:09

## 2020-04-14 RX ADMIN — HYDROXYCHLOROQUINE SULFATE 200 MG: 200 TABLET, FILM COATED ORAL at 20:51

## 2020-04-14 RX ADMIN — DOCUSATE SODIUM 100 MG: 100 CAPSULE, LIQUID FILLED ORAL at 08:40

## 2020-04-14 RX ADMIN — MELATONIN 2000 UNITS: at 08:39

## 2020-04-14 RX ADMIN — INSULIN LISPRO 3 UNITS: 100 INJECTION, SOLUTION INTRAVENOUS; SUBCUTANEOUS at 08:42

## 2020-04-14 RX ADMIN — LORATADINE 10 MG: 10 TABLET ORAL at 08:40

## 2020-04-14 RX ADMIN — SODIUM CHLORIDE 75 ML/HR: 0.9 INJECTION, SOLUTION INTRAVENOUS at 16:42

## 2020-04-14 RX ADMIN — LISINOPRIL 10 MG: 10 TABLET ORAL at 08:40

## 2020-04-14 RX ADMIN — HYDROXYCHLOROQUINE SULFATE 400 MG: 200 TABLET, FILM COATED ORAL at 08:41

## 2020-04-14 RX ADMIN — ATORVASTATIN CALCIUM 10 MG: 10 TABLET, FILM COATED ORAL at 08:39

## 2020-04-14 RX ADMIN — AZITHROMYCIN 500 MG: 250 TABLET, FILM COATED ORAL at 14:12

## 2020-04-14 RX ADMIN — SODIUM CHLORIDE 75 ML/HR: 0.9 INJECTION, SOLUTION INTRAVENOUS at 03:14

## 2020-04-14 RX ADMIN — FLUTICASONE FUROATE AND VILANTEROL TRIFENATATE 1 PUFF: 100; 25 POWDER RESPIRATORY (INHALATION) at 08:41

## 2020-04-14 RX ADMIN — CEFTRIAXONE 1000 MG: 1 INJECTION, SOLUTION INTRAVENOUS at 08:42

## 2020-04-14 RX ADMIN — INSULIN LISPRO 2 UNITS: 100 INJECTION, SOLUTION INTRAVENOUS; SUBCUTANEOUS at 17:10

## 2020-04-14 RX ADMIN — DOCUSATE SODIUM 100 MG: 100 CAPSULE, LIQUID FILLED ORAL at 17:09

## 2020-04-14 RX ADMIN — INSULIN LISPRO 2 UNITS: 100 INJECTION, SOLUTION INTRAVENOUS; SUBCUTANEOUS at 08:43

## 2020-04-14 NOTE — ASSESSMENT & PLAN NOTE
Wt Readings from Last 3 Encounters:   04/13/20 (!) 162 kg (356 lb 0 7 oz)   Given 1 dose of IV Lasix on admission and home medication continued  Symptomatically improved, continue current diuresis and follow-up with cardiology as an outpatient

## 2020-04-14 NOTE — ASSESSMENT & PLAN NOTE
· Patient came to the hospital with worsening shortness of breath    Patient also had low-grade fever at home  · Patient's  is currently admitted to the hospital with the COVID  · CT chest concerning for multifocal ground-glass opacities  · Continue with plaquenil  · Inflammatory markers pending  · Continue with ceftriaxone and azithromycin  · Hold initiation of steroids for now

## 2020-04-14 NOTE — ASSESSMENT & PLAN NOTE
Lab Results   Component Value Date    HGBA1C 8 0 (H) 04/13/2020       Recent Labs     04/13/20  1545 04/13/20  2056 04/14/20  0752 04/14/20  1113   POCGLU 218* 234* 223* 282*       Blood Sugar Average: Last 72 hrs:  (P) 221 8   Patient is on Levemir and also on oral hypoglycemic agent as an outpatient  Continue with the Levemir    Hold oral hypoglycemic agent  At sliding scale of insulin

## 2020-04-14 NOTE — UTILIZATION REVIEW
Initial Clinical Review    Admission: Date/Time/Statement: Admission Orders (From admission, onward)     Ordered        04/13/20 0945  Inpatient Admission (expected length of stay for this patient Order details is greater than two midnights)  Once                   Orders Placed This Encounter   Procedures    Inpatient Admission (expected length of stay for this patient Order details is greater than two midnights)     Standing Status:   Standing     Number of Occurrences:   1     Order Specific Question:   Admitting Physician     Answer:   Yung Daigle [[id number]]     Order Specific Question:   Level of Care     Answer:   Med Surg [[id number]]     Order Specific Question:   Estimated length of stay     Answer:   More than 2 Midnights     Order Specific Question:   Certification     Answer:   I certify that inpatient services are medically necessary for this patient for a duration of greater than two midnights  See H&P and MD Progress Notes for additional information about the patient's course of treatment  ED Arrival Information     Expected Arrival Acuity Means of Arrival Escorted By Service Admission Type    4/13/2020 4/13/2020 06:53 Urgent Amb[facility] [address] [address] Hospitalist Urgent    Arrival Complaint    SOB        Chief Complaint   Patient presents with    Leg Swelling     Patient reports increased leg edema x 2 days  States that she is having pain and difficulty walking due to it   Cough     Patient reports cough x3 days  Sputum had "light blood tinge " Reports fevers and chills   confirmed to have COVID 19 approximately 2 weeks ago  Patient reports chronic shortness of breath and states it isn't worse than normal  Wears 2L of oxygen at all times  Assessment/Plan: 77year old female, presented to the ED from home via EMS  Admitted as Inpatient due to COVID 19 / Rhonda Torres  Patient's  is currently admitted to the hospital with the COVID 19    Presents with worsening SOB over the last 2 days, cough, chills, fatigue, fever, leg swelling, diarrhea, gait problem, essentially bed/wheelchair bound at baseline  Chest CT reviewed-multifocal ground-glass opacities  Rule out COVID, Plaquenil azithromycin and ceftriaxone  Follow-up on the inflammatory markers  Given 1 dose of steroids  Continue with vitamin-D replacement  Patient on O2 @ 2L via [place] baseline        ED Triage Vitals   Temperature Pulse Respirations Blood Pressure SpO2   04/13/20 0655 04/13/20 0655 04/13/20 0655 04/13/20 0655 04/13/20 0655   100 3 °F (37 9 °C) 90 22 128/64 97 %      Temp Source Heart Rate Source Patient Position - Orthostatic VS BP Location FiO2 (%)   04/13/20 0655 04/13/20 0655 04/13/20 0655 04/13/20 0655 --   Temporal Monitor Lying Left arm       Pain Score       04/13/20 1254       No Pain        Wt Readings from Last 1 Encounters:   04/13/20 (!) 162 kg (356 lb 0 7 oz)     Additional Vital Signs:   Date/Time  Temp  Pulse  Resp  BP  MAP (mmHg)  SpO2  O2 Device  Patient Position - Orthostatic VS   04/14/20 0800    87  25Abnormal   111/69  86  95 %       04/14/20 0754    55  20  111/69  86  95 %  Nasal cannula  Lying   04/14/20 0300  98 1 °F (36 7 °C)  51Abnormal   20  121/75  89  95 %  Nasal cannula  Lying   04/14/20 0000    69  22  106/54  75  94 %  Nasal cannula  Lying   04/13/20 1900  97 8 °F (36 6 °C)  84  18  122/58  83  95 %  Nasal cannula  Lying   04/13/20 1800    91  32Abnormal       91 %       04/13/20 1600  97 3 °F (36 3 °C)Abnormal   83  18  120/70  89  93 %    Lying   04/13/20 1551    89  26Abnormal   139/61  87  90 %  Nasal cannula  Lying   04/13/20 1254  97 4 °F (36 3 °C)Abnormal   78  28Abnormal   130/64    96 %  None (Room air)  Lying   04/13/20 1130    74  20  124/60  86         04/13/20 1100    74  18  108/54  78         04/13/20 1030    83  18  116/66  83  95 %  Nasal cannula  Lying   04/13/20 0945    85  19  114/65  85  97 %       04/13/20 0940    77    114/65         20 0855  97 4 °F (36 3 °C)Abnormal                    2020 @ 8977  CTA chest:  1   No pulmonary embolism  2   Multifocal groundglass and peripheral opacities within left lower lobe   Differential considerations include small airways infectious or inflammatory process and viral pneumonia  3   Mediastinal, right hilar, infrahilar lymphadenopathy as well as mildly enlarged gastrohepatic lymph node   While findings, particularly lymphadenopathy in the chest, may be reactive, the bulky appearance also raises possibility of lymphoma     Recommend follow-up CT of the chest in one month for reevaluation in addition to further medical workup     2020 @ 0745  Chest X:  Slightly increased right infrahilar opacity, which may represent developing pneumonia     2020 @ 0701  EC, A Fib,  Nonspecific ST and T wave abnormality      Pertinent Labs/Diagnostic Test Results:   Results from last 7 days   Lab Units 20  0723   SARS-COV-2  Positive*     Results from last 7 days   Lab Units 20  0616 20  0723   WBC Thousand/uL 9 38 12 97*   HEMOGLOBIN g/dL 9 4* 9 8*   HEMATOCRIT % 31 0* 31 9*   PLATELETS Thousands/uL 429* 470*     Results from last 7 days   Lab Units 20  0616 20  1602 20  0723   SODIUM mmol/L 140  --  141   POTASSIUM mmol/L 3 7  --  3 2*   CHLORIDE mmol/L 105  --  103   CO2 mmol/L 28  --  29   ANION GAP mmol/L 7  --  9   BUN mg/dL 18  --  16   CREATININE mg/dL 0 97  --  1 04   EGFR ml/min/1 73sq m 61  --  56   CALCIUM mg/dL 8 1*  --  9 0   CALCIUM, IONIZED mmol/L  --  1 11*  --    MAGNESIUM mg/dL 1 8 1 7 1 9     Results from last 7 days   Lab Units 20  0616 20  0723   AST U/L 16 20   ALT U/L 19 20   ALK PHOS U/L 60 66   TOTAL PROTEIN g/dL 7 0 7 5   ALBUMIN g/dL 2 2* 2 4*   TOTAL BILIRUBIN mg/dL 0 23 0 35     Results from last 7 days   Lab Units 20  0752 20  2056 20  1545 20  0702   POC GLUCOSE mg/dl 223* 234* 218* 152*     Results from last 7 days   Lab Units 04/14/20  0616 04/13/20  0723   GLUCOSE RANDOM mg/dL 230* 172*     Results from last 7 days   Lab Units 04/13/20  1602   HEMOGLOBIN A1C % 8 0*   EAG mg/dl 183     Results from last 7 days   Lab Units 04/13/20  1602 04/13/20  0723   CK TOTAL U/L 65 53     Results from last 7 days   Lab Units 04/13/20  1602 04/13/20  1149 04/13/20  0723   TROPONIN I ng/mL <0 02 <0 02 <0 02     Results from last 7 days   Lab Units 04/13/20  0723   D-DIMER QUANTITATIVE ug/ml FEU 0 76*     Results from last 7 days   Lab Units 04/14/20  0616 04/13/20  0723   PROTIME seconds 52 3* 46 2*   INR  5 67* 4 85*     Results from last 7 days   Lab Units 04/13/20  1602 04/13/20  0723   PROCALCITONIN ng/ml <0 05 <0 05     Results from last 7 days   Lab Units 04/13/20  0723   LACTIC ACID mmol/L 1 1     Results from last 7 days   Lab Units 04/13/20  0723   NT-PRO BNP pg/mL 2,871*     Results from last 7 days   Lab Units 04/13/20  1602   FERRITIN ng/mL 534*     Results from last 7 days   Lab Units 04/13/20  0723   LIPASE u/L 89     Results from last 7 days   Lab Units 04/13/20  1602   CRP mg/L 88 1*     Results from last 7 days   Lab Units 04/13/20  1811   CLARITY UA  Slightly Cloudy   COLOR UA  Yellow   SPEC GRAV UA  1 010   PH UA  6 5   GLUCOSE UA mg/dl Negative   KETONES UA mg/dl Negative   BLOOD UA  Moderate*   PROTEIN UA mg/dl Negative   NITRITE UA  Negative   BILIRUBIN UA  Negative   UROBILINOGEN UA E U /dl 0 2   LEUKOCYTES UA  Small*   WBC UA /hpf 4-10*   RBC UA /hpf 20-30*   BACTERIA UA /hpf Moderate*   EPITHELIAL CELLS WET PREP /hpf None Seen     Results from last 7 days   Lab Units 04/13/20  0723   BLOOD CULTURE  Received in Microbiology Lab  Culture in Progress  Received in Microbiology Lab  Culture in Progress       Results from last 7 days   Lab Units 04/13/20  0723   TOTAL COUNTED  100     ED Treatment:   Medication Administration from 04/13/2020 0653 to 04/13/2020 1320       Date/Time Order Dose Route Action     04/13/2020 0753 acetaminophen (TYLENOL) tablet 650 mg 650 mg Oral Given     04/13/2020 0754 sodium chloride 0 9 % bolus 1,000 mL 1,000 mL Intravenous New Bag     04/13/2020 0933 potassium chloride (K-DUR,KLOR-CON) CR tablet 40 mEq 40 mEq Oral Given     04/13/2020 0940 metoprolol succinate (TOPROL-XL) 24 hr tablet 25 mg 25 mg Oral Given     04/13/2020 0935 azithromycin (ZITHROMAX) 500 mg in sodium chloride 0 9 % 250 mL IVPB 500 mg Intravenous New Bag     04/13/2020 0935 cefTRIAXone (ROCEPHIN) IVPB (premix) 1,000 mg 1,000 mg Intravenous New Bag     04/13/2020 0924 iohexol (OMNIPAQUE) 350 MG/ML injection (SINGLE-DOSE) 100 mL 100 mL Intravenous Given     04/13/2020 1008 methylPREDNISolone sodium succinate (Solu-MEDROL) injection 80 mg 80 mg Intravenous Given     04/13/2020 1008 albuterol (PROVENTIL HFA,VENTOLIN HFA) inhaler 2 puff 2 puff Inhalation Given        Past Medical History:   Diagnosis Date    Asthma     Atrial fibrillation (HCC)     [facility]ch[facility] obstructive [facility] disease[facility]     [facility] mellitus [facility] )     Disease of thyroid gland     Heart failure ([facility] )     Kidney failure      Admitting Diagnosis: Shortness of breath [R06 02]  Cough [R05]  Hypokalemia [E87 6]  Leg swelling [M79 89]  Elevated INR [R79 1]  Bilateral leg edema [R60 0]  Elevated brain natriuretic peptide (BNP) level [R79 89]  Elevated d-dimer [R79 89]  Pneumonia of right middle lobe due to infectious organism (HCC) [J18 1]  Leukocytosis, unspecified type [D72 829]  Age/Sex: 77 y o  female  Admission Orders:  Scheduled Medications:  Medications:  atorvastatin 10 mg Oral Daily   azithromycin 500 mg Oral Q24H   Followed by      Patrick Fernandes ON 4/15/2020] azithromycin 250 mg Oral Q24H   cefTRIAXone 1,000 mg Intravenous Q24H   cholecalciferol 2,000 Units Oral Daily   docusate sodium 100 mg Oral BID   fluticasone-vilanterol 1 puff Inhalation Daily   furosemide 60 mg Oral BID   hydroxychloroquine 200 mg Oral Q12H   insulin detemir 20 Units Subcutaneous HS   insulin lispro 1-5 Units Subcutaneous HS   insulin lispro 1-6 Units Subcutaneous TID AC   insulin lispro 3 Units Subcutaneous TID With Meals   levothyroxine 200 mcg Oral Early Morning   lisinopril 10 mg Oral Daily   loratadine 10 mg Oral Daily   metoprolol succinate 25 mg Oral BID   montelukast 10 mg Oral HS   pantoprazole 40 mg Oral Early Morning   Continuous IV Infusions:  sodium chloride 75 mL/hr Intravenous Continuous   PRN Meds:  acetaminophen 650 mg Oral Q6H PRN   albuterol 2 puff Inhalation Q4H PRN   calcium carbonate 1,000 mg Oral Daily PRN   ondansetron 4 mg Intravenous Q6H PRN   simethicone 80 mg Oral 4x Daily PRN   sodium chloride (PF) 3 mL Intravenous Q1H PRN     Telemetry  Roderick SCDs  O2 @ 2L [facility]      Network Utilization Review Department  [email or web address]  ATTENTION: Please call with any questions or concerns to [phone] and carefully listen to the prompts so that you are directed to the right person  All voicemails are confidential   Beverly Isidro all requests for admission clinical reviews, approved or denied determinations and any other requests to dedicated fax number below belonging to the campus where the patient is receiving treatment   List of dedicated fax numbers for the Facilities:  [address] [place] [address] DENIALS (Administrative/Medical Necessity) [phone]   [address] (Maternity/NICU/Pediatrics) [phone]   Lei Haq [phone]   [facility] [phone]   [facility] [phone]   [facility] [phone]   [address] [facility] [address] [facility] [phone]   [facility]  [phone]   [address] [facility] [address]  [address] [facility] [address] [facility] [phone]

## 2020-04-14 NOTE — PROGRESS NOTES
Progress Note - Bayron Brewster 1955, 77 y o  female MRN: [id number]    Unit/Bed#: -01 Encounter: [id number]    Primary Care Provider: No primary care provider on file  Date and time admitted to hospital: 4/13/2020  6:54 AM    * COVID-19 virus infection  Assessment & Plan  Admitted for worsening respiratory distress  COVID positive  Continue with Plaquenil, azithromycin, vitamin-D  Supportive care and close vital sign monitoring  Patient at baseline with oxygen requirement on 2 L saturating 92-96%  Check inflammatory markers every other day  Keep patient on telemetry and check QT daily  Ceftriaxone discontinued for procalcitonin twice negative  Chronic respiratory failure with hypoxia (HCC)  Assessment & Plan  Clock history of COPD with chronic respiratory failure on 2 L oxygen 24 hours  At baseline now  COPD (chronic obstructive pulmonary disease) ([facility] )  Assessment & Plan  · Continue home medication  Patient stable at baseline  Heart failure (HCC)  Assessment & Plan  Wt Readings from Last 3 Encounters:   04/13/20 (!) 162 kg (356 lb 0 7 oz)   Given 1 dose of IV Lasix on admission and home medication continued  Symptomatically improved, continue current diuresis and follow-up with cardiology as an outpatient  Diabetes mellitus [facility])  Assessment & Plan  Lab Results   Component Value Date    HGBA1C 8 0 (H) 04/13/2020       Recent Labs     04/13/20  1545 04/13/20  2056 04/14/20  0752 04/14/20  1113   POCGLU 218* 234* 223* 282*     Blood Sugar Average: Last 72 hrs:  (P) 221 8   Patient is on Levemir and also on oral hypoglycemic agent as an outpatient  Continue with the Levemir  Hold oral hypoglycemic agent  At sliding scale of insulin    Atrial fibrillation (HCC)  Assessment & Plan  · AFib rate controlled on warfarin anticoagulation  Supratherapeutic INR warfarin on hold for now  Continue to monitor INR and slowly start warfarin dose      VTE Pharmacologic Prophylaxis: Pharmacologic: Warfarin (Coumadin)    Patient Centered Rounds: I have performed bedside rounds with nursing staff today    Discussions with Specialists or Other Care Team Provider:     Education and Discussions with Family / Patient:     Current Length of Stay: 1 day(s)    Current Patient Status: Inpatient   Certification Statement: The patient will continue to require additional inpatient hospital stay due to Viral pneumonia    Discharge Plan:  Probably 1-2 days inpatient management    Code Status: Level 2 - DNAR: but accepts endotracheal intubation      Subjective:   Patient progressively improving, said shortness of breath has significantly improved  Denies fever, abdominal pain, diarrhea, shortness of breath  Objective:     Vitals:   Temp (24hrs), Av 7 °F (36 5 °C), Min:97 3 °F (36 3 °C), Max:98 1 °F (36 7 °C)    Temp:  [97 3 °F (36 3 °C)-98 1 °F (36 7 °C)] 98 1 °F (36 7 °C)  HR:  [51-91] 87  Resp:  [18-32] 25  BP: (106-139)/(54-75) 111/69  SpO2:  [90 %-96 %] 95 %  There is no height or weight on file to calculate BMI  Input and Output Summary (last 24 hours): Intake/Output Summary (Last 24 hours) at 2020 1200  Last data filed at 2020 1005  Gross per 24 hour   Intake 1965 ml   Output 1235 ml   Net 730 ml       Physical Exam:     General appearance: alert  Head: Normocephalic, without obvious abnormality, atraumatic  Lungs: clear to auscultation bilaterally  Heart: regular rate and rhythm  Abdomen: soft, non-tender, positive bowel sounds   Back: negative  Extremities: extremities atraumatic, no cyanosis or edema  Neurologic: Alert and oriented X 3, normal strength and tone  Normal symmetric reflexes   Normal coordination and gait    Additional Data:     Labs:    Results from last 7 days   Lab Units 20  0616 20  0723   WBC Thousand/uL 9 38 12 97*   HEMOGLOBIN g/dL 9 4* 9 8*   HEMATOCRIT % 31 0* 31 9*   PLATELETS Thousands/uL 429* 470*   LYMPHO PCT %  --  16   MONO PCT %  -- 3*   EOS PCT %  --  1     Results from last 7 days   Lab Units 04/14/20  0616   SODIUM mmol/L 140   POTASSIUM mmol/L 3 7   CHLORIDE mmol/L 105   CO2 mmol/L 28   BUN mg/dL 18   CREATININE mg/dL 0 97   ANION GAP mmol/L 7   CALCIUM mg/dL 8 1*   ALBUMIN g/dL 2 2*   TOTAL BILIRUBIN mg/dL 0 23   ALK PHOS U/L 60   ALT U/L 19   AST U/L 16   GLUCOSE RANDOM mg/dL 230*     Results from last 7 days   Lab Units 04/14/20  0616   INR  5 67*     Results from last 7 days   Lab Units 04/14/20  1113 04/14/20  0752 04/13/20  2056 04/13/20  1545 04/13/20  0702   POC GLUCOSE mg/dl 282* 223* 234* 218* 152*     Results from last 7 days   Lab Units 04/13/20  1602   HEMOGLOBIN A1C % 8 0*     Results from last 7 days   Lab Units 04/14/20  0616 04/13/20  1602 04/13/20  0723   LACTIC ACID mmol/L  --   --  1 1   PROCALCITONIN ng/ml <0 05 <0 05 <0 05           * I Have Reviewed All Lab Data Listed Above  * Additional Pertinent Lab Tests Reviewed: [facility] [address] Admission Reviewed    Imaging:    Imaging Reports Reviewed Today Include: images reviewed    Recent Cultures (last 7 days):     Results from last 7 days   Lab Units 04/13/20  0723   BLOOD CULTURE  Received in Microbiology Lab  Culture in Progress  Received in Microbiology Lab  Culture in Progress         Last 24 Hours Medication List:     Current Facility-Administered Medications:  acetaminophen 650 mg Oral Q6H PRN Dario Jackson MD    albuterol 2 puff Inhalation Q4H PRN Boni Cockayne, MD    atorvastatin 10 mg Oral Daily Dario Jackson MD    azithromycin 500 mg Oral Q24H Dario Jackson MD    Followed by        Kojo Amaro ON 4/15/2020] azithromycin 250 mg Oral Q24H Dario Jackson MD    calcium carbonate 1,000 mg Oral Daily PRN Dario Jackson MD    cholecalciferol 2,000 Units Oral Daily Dario Jackson MD    docusate sodium 100 mg Oral BID Dario Jackson MD    fluticasone-vilanterol 1 puff Inhalation Daily Dario Jackson MD    furosemide 60 mg Oral BID Dario Jackson MD hydroxychloroquine 200 mg Oral Q12H CAMPBELL Munoz    insulin detemir 20 Units Subcutaneous HS Antonia Treviño MD    insulin lispro 1-5 Units Subcutaneous HS Antonia Treviño MD    insulin lispro 1-6 Units Subcutaneous TID [facility] Antonia Treviño MD    insulin lispro 3 Units Subcutaneous TID With Meals Antonia Treviño MD    levothyroxine 200 mcg Oral Early Morning Antonia Treviño MD    lisinopril 10 mg Oral Daily Antonia Treviño MD    loratadine 10 mg Oral Daily Antonia Treviño MD    metoprolol succinate 25 mg Oral BID Antonia Treviño MD    montelukast 10 mg Oral HS Antonia Treviño MD    ondansetron 4 mg Intravenous Q6H PRN Antonia Treviño MD    pantoprazole 40 mg Oral Early Morning Antonia Treviño MD    simethicone 80 mg Oral 4x Daily PRN Antonia Treviño MD    sodium chloride (PF) 3 mL Intravenous Q1H PRN Antonia Treviño MD    sodium chloride 75 mL/hr Intravenous Continuous Tangela Miranda MD Last Rate: 75 mL/hr (04/14/20 0314)        Today, Patient Was Seen By: Kuldeep Mendenhall MD    ** Please Note: Dictation voice to text software may have been used in the creation of this document   **

## 2020-04-14 NOTE — ASSESSMENT & PLAN NOTE
· Patient with known history of atrial fibrillation  · Heart rate controlled  · Supratherapeutic INR-hold Coumadin for now

## 2020-04-14 NOTE — ASSESSMENT & PLAN NOTE
No results found for: HGBA1C    Recent Labs     04/13/20  0702 04/13/20  1545   POCGLU 152* 218*       Blood Sugar Average: Last 72 hrs:  (P) 185   Patient is on Levemir and also on oral hypoglycemic agent as an outpatient  Continue with the Levemir    Hold oral hypoglycemic agent  At sliding scale of insulin

## 2020-04-14 NOTE — ASSESSMENT & PLAN NOTE
· AFib rate controlled on warfarin anticoagulation  Supratherapeutic INR warfarin on hold for now  Continue to monitor INR and slowly start warfarin dose

## 2020-04-14 NOTE — ASSESSMENT & PLAN NOTE
· Patient with known history of COPD and on 2 L nasal cannula continuously at home  · Respiratory protocol  · Given 1 time dose of Solu-Medrol in the emergency room  · No wheezing on examination  · Hold for the steroids  · Incentive spirometry

## 2020-04-14 NOTE — ASSESSMENT & PLAN NOTE
Wt Readings from Last 3 Encounters:   04/13/20 (!) 162 kg (356 lb 0 7 oz)     Patient is on Lasix as an outpatient  Will give 1 time dose of IV Lasix and monitor intake and output  Continue with p o   Lasix  May need bolus dosing depending upon the fluid status

## 2020-04-15 VITALS
RESPIRATION RATE: 18 BRPM | HEART RATE: 89 BPM | WEIGHT: 293 LBS | SYSTOLIC BLOOD PRESSURE: 118 MMHG | BODY MASS INDEX: 51.91 KG/M2 | TEMPERATURE: 97.8 F | HEIGHT: 63 IN | DIASTOLIC BLOOD PRESSURE: 67 MMHG | OXYGEN SATURATION: 97 %

## 2020-04-15 PROBLEM — R79.1 SUPRATHERAPEUTIC INR: Status: ACTIVE | Noted: 2020-04-15

## 2020-04-15 LAB
ATRIAL RATE: 82 BPM
CRP SERPL QL: 30.3 MG/L
FERRITIN SERPL-MCNC: 416 NG/ML (ref 8–388)
FIBRINOGEN PPP-MCNC: 511 MG/DL (ref 227–495)
G6PD BLD QN: 277 U/10E12 RBC (ref 146–376)
GLUCOSE SERPL-MCNC: 100 MG/DL (ref 65–140)
GLUCOSE SERPL-MCNC: 115 MG/DL (ref 65–140)
INR PPP: 5.28 (ref 0.84–1.19)
LDH SERPL-CCNC: 256 U/L (ref 81–234)
PROTHROMBIN TIME: 49.4 SECONDS (ref 11.6–14.5)
QRS AXIS: 19 DEGREES
QRSD INTERVAL: 92 MS
QT INTERVAL: 422 MS
QTC INTERVAL: 483 MS
RBC # BLD AUTO: 3.47 X10E6/UL (ref 3.77–5.28)
T WAVE AXIS: 30 DEGREES
VENTRICULAR RATE: 79 BPM

## 2020-04-15 PROCEDURE — 99239 HOSP IP/OBS DSCHRG MGMT >30: CPT | Performed by: INTERNAL MEDICINE

## 2020-04-15 PROCEDURE — 83615 LACTATE (LD) (LDH) ENZYME: CPT | Performed by: INTERNAL MEDICINE

## 2020-04-15 PROCEDURE — 93010 ELECTROCARDIOGRAM REPORT: CPT | Performed by: INTERNAL MEDICINE

## 2020-04-15 PROCEDURE — 82948 REAGENT STRIP/BLOOD GLUCOSE: CPT

## 2020-04-15 PROCEDURE — 82728 ASSAY OF FERRITIN: CPT | Performed by: INTERNAL MEDICINE

## 2020-04-15 PROCEDURE — 86140 C-REACTIVE PROTEIN: CPT | Performed by: INTERNAL MEDICINE

## 2020-04-15 PROCEDURE — 85610 PROTHROMBIN TIME: CPT | Performed by: INTERNAL MEDICINE

## 2020-04-15 PROCEDURE — 85384 FIBRINOGEN ACTIVITY: CPT | Performed by: INTERNAL MEDICINE

## 2020-04-15 RX ORDER — AZITHROMYCIN 250 MG/1
250 TABLET, FILM COATED ORAL EVERY 24 HOURS
Qty: 4 TABLET | Refills: 0 | Status: SHIPPED | OUTPATIENT
Start: 2020-04-15 | End: 2020-04-19

## 2020-04-15 RX ORDER — HYDROXYCHLOROQUINE SULFATE 200 MG/1
200 TABLET, FILM COATED ORAL EVERY 12 HOURS
Qty: 6 TABLET | Refills: 0 | Status: SHIPPED | OUTPATIENT
Start: 2020-04-15 | End: 2020-04-18

## 2020-04-15 RX ORDER — ACETAMINOPHEN 325 MG/1
650 TABLET ORAL EVERY 6 HOURS PRN
Qty: 30 TABLET | Refills: 0 | Status: SHIPPED | OUTPATIENT
Start: 2020-04-15

## 2020-04-15 RX ADMIN — FUROSEMIDE 60 MG: 40 TABLET ORAL at 08:08

## 2020-04-15 RX ADMIN — DOCUSATE SODIUM 100 MG: 100 CAPSULE, LIQUID FILLED ORAL at 08:07

## 2020-04-15 RX ADMIN — LISINOPRIL 10 MG: 10 TABLET ORAL at 08:08

## 2020-04-15 RX ADMIN — FLUTICASONE FUROATE AND VILANTEROL TRIFENATATE 1 PUFF: 100; 25 POWDER RESPIRATORY (INHALATION) at 08:08

## 2020-04-15 RX ADMIN — MELATONIN 2000 UNITS: at 08:08

## 2020-04-15 RX ADMIN — ATORVASTATIN CALCIUM 10 MG: 10 TABLET, FILM COATED ORAL at 08:08

## 2020-04-15 RX ADMIN — HYDROXYCHLOROQUINE SULFATE 200 MG: 200 TABLET, FILM COATED ORAL at 08:08

## 2020-04-15 RX ADMIN — PANTOPRAZOLE SODIUM 40 MG: 40 TABLET, DELAYED RELEASE ORAL at 05:44

## 2020-04-15 RX ADMIN — INSULIN LISPRO 3 UNITS: 100 INJECTION, SOLUTION INTRAVENOUS; SUBCUTANEOUS at 08:09

## 2020-04-15 RX ADMIN — METOPROLOL SUCCINATE 25 MG: 25 TABLET, EXTENDED RELEASE ORAL at 08:07

## 2020-04-15 RX ADMIN — SODIUM CHLORIDE 75 ML/HR: 0.9 INJECTION, SOLUTION INTRAVENOUS at 05:49

## 2020-04-15 RX ADMIN — LORATADINE 10 MG: 10 TABLET ORAL at 08:08

## 2020-04-15 RX ADMIN — LEVOTHYROXINE SODIUM 200 MCG: 100 TABLET ORAL at 05:44

## 2020-04-15 RX ADMIN — INSULIN LISPRO 3 UNITS: 100 INJECTION, SOLUTION INTRAVENOUS; SUBCUTANEOUS at 11:36

## 2020-04-15 NOTE — ASSESSMENT & PLAN NOTE
Admitted for worsening respiratory distress  COVID positive  Continue with Plaquenil, azithromycin, vitamin-D  Progressively improving  Patient at baseline with oxygen requirement on 2 L saturating 92-96%  inflammatory markers trending down  Patient is medically stable for discharge  Advised isolation discontinued shin as per protocol and attached to this discharge  Patient advised to return to ER for worsening shortness of breath, fever, unremitting fatigue, nausea, vomiting

## 2020-04-15 NOTE — PLAN OF CARE
Problem: Potential for Falls  Goal: Patient will remain free of falls  Description  INTERVENTIONS:  - Assess patient frequently for physical needs  -  Identify cognitive and physical deficits and behaviors that affect risk of falls    -  [place] fall precautions as indicated by assessment   - Educate patient/family on patient safety including physical limitations  - Instruct patient to call for assistance with activity based on assessment  - Modify environment to reduce risk of injury  - Consider OT/PT consult to assist with strengthening/mobility  Outcome: Progressing     Problem: PAIN - ADULT  Goal: Verbalizes/displays adequate comfort level or baseline comfort level  Description  Interventions:  - Encourage patient to monitor pain and request assistance  - Assess pain using appropriate pain scale  - Administer analgesics based on type and severity of pain and evaluate response  - Implement non-pharmacological measures as appropriate and evaluate response  - Consider cultural and social influences on pain and pain management  - Notify physician/advanced practitioner if interventions unsuccessful or patient reports new pain  Outcome: Progressing     Problem: INFECTION - ADULT  Goal: Absence or prevention of progression during hospitalization  Description  INTERVENTIONS:  - Assess and monitor for signs and symptoms of infection  - Monitor lab/diagnostic results  - Monitor all insertion sites, i e  indwelling lines, tubes, and drains  - Monitor endotracheal if appropriate and nasal secretions for changes in amount and color  - [place] appropriate cooling/warming therapies per order  - Administer medications as ordered  - Instruct and encourage patient and family to use good hand hygiene technique  - Identify and instruct in appropriate isolation precautions for identified infection/condition  Outcome: Progressing  Goal: Absence of fever/infection during neutropenic period  Description  INTERVENTIONS:  - Monitor WBC    Outcome: Progressing     Problem: SAFETY ADULT  Goal: Maintain or return to baseline ADL function  Description  INTERVENTIONS:  -  Assess patient's ability to carry out ADLs; assess patient's baseline for ADL function and identify physical deficits which impact ability to perform ADLs (bathing, care of mouth/teeth, toileting, grooming, dressing, etc )  - Assess/evaluate cause of self-care deficits   - Assess range of motion  - Assess patient's mobility; develop plan if impaired  - Assess patient's need for assistive devices and provide as appropriate  - Encourage maximum independence but intervene and supervise when necessary  - Involve family in performance of ADLs  - Assess for home care needs following discharge   - Consider OT consult to assist with ADL evaluation and planning for discharge  - Provide patient education as appropriate  Outcome: Progressing  Goal: Maintain or return mobility status to optimal level  Description  INTERVENTIONS:  - Assess patient's baseline mobility status (ambulation, transfers, stairs, etc )    - Identify cognitive and physical deficits and behaviors that affect mobility  - Identify mobility aids required to assist with transfers and/or ambulation (gait belt, sit-to-stand, lift, walker, cane, etc )  - [place] fall precautions as indicated by assessment  - Record patient progress and toleration of activity level on Mobility SBAR; progress patient to next Phase/Stage  - Instruct patient to call for assistance with activity based on assessment  - Consider rehabilitation consult to assist with strengthening/weightbearing, etc   Outcome: Progressing     Problem: DISCHARGE PLANNING  Goal: Discharge to home or other facility with appropriate resources  Description  INTERVENTIONS:  - Identify barriers to discharge w/patient and caregiver  - Arrange for needed discharge resources and transportation as appropriate  - Identify discharge learning needs (meds, wound care, etc )  - Arrange for interpretive services to assist at discharge as needed  - Refer to Case Management Department for coordinating discharge planning if the patient needs post-hospital services based on physician/advanced practitioner order or complex needs related to functional status, cognitive ability, or social support system  Outcome: Progressing     Problem: Knowledge Deficit  Goal: Patient/family/caregiver demonstrates understanding of disease process, treatment plan, medications, and discharge instructions  Description  Complete learning assessment and assess knowledge base    Interventions:  - Provide teaching at level of understanding  - Provide teaching via preferred learning methods  Outcome: Progressing     Problem: Prexisting or High Potential for Compromised Skin Integrity  Goal: Skin integrity is maintained or improved  Description  INTERVENTIONS:  - Identify patients at risk for skin breakdown  - Assess and monitor skin integrity  - Assess and monitor nutrition and hydration status  - Monitor labs   - Assess for incontinence   - Turn and reposition patient  - Assist with mobility/ambulation  - Relieve pressure over bony prominences  - Avoid friction and shearing  - Provide appropriate hygiene as needed including keeping skin clean and dry  - Evaluate need for skin moisturizer/barrier cream  - Collaborate with interdisciplinary team   - Patient/family teaching  - Consider wound care consult   Outcome: Progressing

## 2020-04-15 NOTE — PROGRESS NOTES
04/15/20 0000   Charting Type   Charting Type Reassessment   HEENT   HEENT (WDL) X   R Eye Mildly impaired vision   L Eye Mildly impaired vision   Respiratory   Respiratory Pattern Dyspnea with exertion   Chest Assessment Chest expansion symmetrical   Bilateral Breath Sounds Diminished   Oxygen Therapy   SpO2 96 %   SpO2 Activity At Rest   O2 Device Nasal cannula   O2 Flow Rate (L/min) 2 L/min   Pulse Oximetry Type Continuous   Cardiac   Cardiac (WDL) X   Telemetry/Cardiac Monitor Yes   Cardiac Rhythm Atrial fibrillation   Ectopy Premature ventricular contractions   Cardiac Regularity Irregular   Heart Sounds S1, S2   Alarms Audible No   Alarms Set No   Pacemaker/ICD No   Pain Assessment   Pain Assessment Tool Pain Assessment not indicated - pt denies pain   Pain Score No Pain   Peripheral Vascular   Peripheral Vascular (WDL) X   Pulses R radial;L radial;R pedal;L pedal   Edema Right lower extremity; Left lower extremity   Generalized Edema +2   RLE Edema +2   LLE Edema +2   RUE Neurovascular Assessment   R Radial Pulse +2   LUE Neurovascular Assessment   L Radial Pulse +2   RLE Neurovascular Assessment   R Pedal Pulse +1   LLE Neurovascular Assessment   L Pedal Pulse +1   Integumentary   Integumentary (WDL) X   Skin Integrity Excoriation;Scaling; Redness   Skin Location BLE, Rt groin   Musculoskeletal   Musculoskeletal (WDL) X   RLE Limited movement   LLE Limited movement   Gastrointestinal   Gastrointestinal (WDL) X   Abdomen Inspection Rounded;Obese   Bowel Sounds (All Quadrants) Normoactive   Genitourinary   Genitourinary (WDL) X   Urine Assessment   Urinary Incontinence Yes   Urine Color Yellow/straw   Urine Appearance Clear   Urine Odor No odor   Anal/Rectal   Anal/Rectal (WDL) WDL   Psychosocial   Psychosocial (WDL) WDL   Cough   Cough Congested;Non-productive

## 2020-04-15 NOTE — PROGRESS NOTES
04/14/20 2000   Charting Type   Charting Type Shift assessment   HEENT   HEENT (WDL) X   R Eye Mildly impaired vision   L Eye Mildly impaired vision   Respiratory   Respiratory Pattern Dyspnea with exertion   Chest Assessment Chest expansion symmetrical   Bilateral Breath Sounds Diminished   Respiratory Interventions   Respiratory Interventions Incentive spirometry   Oxygen Therapy   SpO2 97 %   SpO2 Activity At Rest   O2 Device Nasal cannula   Safety Instructions Yes (Comment)   O2 Flow Rate (L/min) 2 L/min   Pulse Oximetry Type Continuous   Cardiac   Cardiac (WDL) X   Telemetry/Cardiac Monitor Yes   Cardiac Rhythm Atrial fibrillation   Ectopy Premature ventricular contractions   Cardiac Regularity Irregular   Heart Sounds S1, S2   Alarms Audible No   Alarms Set No   Cardiac Symptoms None   Chest Pain Present No   Pacemaker/ICD No   Pain Assessment   Pain Assessment Tool Pain Assessment not indicated - pt denies pain   Pain Score No Pain   Peripheral Vascular   Peripheral Vascular (WDL) X   Pulses R radial;L radial;R pedal;L pedal   Edema Right lower extremity; Left lower extremity   Generalized Edema +2   RLE Edema +2   LLE Edema +2   RUE Neurovascular Assessment   R Radial Pulse +2   LUE Neurovascular Assessment   L Radial Pulse +2   RLE Neurovascular Assessment   R Pedal Pulse +1   LLE Neurovascular Assessment   L Pedal Pulse +1   Temperature Regulation    Temperature (!) 96 8 °F (36 °C)   Temp Source Oral   Integumentary   Integumentary (WDL) X   Skin Integrity Excoriation;Scaling; Redness   Skin Location BLE, Rt groin   Mino Scale   Sensory Perceptions 3   Moisture 3   Activity 3   Mobility 2   Nutrition 3   Friction and Shear 3   Mino Scale Score 17   Musculoskeletal   Musculoskeletal (WDL) X   Level of Assistance Maximum assist, patient does 25-49%   RLE Limited movement   LLE Limited movement   Gastrointestinal   Gastrointestinal (WDL) X   Abdomen Inspection Rounded;Obese   Bowel Sounds (All Quadrants) Normoactive   Genitourinary   Genitourinary (WDL) X   Urine Assessment   Urinary Incontinence Yes   Urine Color Yellow/straw   Urine Appearance Clear   Urine Odor No odor   Anal/Rectal   Anal/Rectal (WDL) WDL   Psychosocial   Psychosocial (WDL) WDL   Cough   Cough Congested;Non-productive

## 2020-04-15 NOTE — DISCHARGE SUMMARY
Discharge- Andrea Gonzalez 1955, 77 y o  female MRN: [id number]    Unit/Bed#: -01 Encounter: [id number]    Primary Care Provider: Denny Galan MD   Date and time admitted to hospital: 4/13/2020  6:54 AM    * COVID-19 virus infection  Assessment & Plan  Admitted for worsening respiratory distress  COVID positive  Continue with Plaquenil, azithromycin, vitamin-D  Progressively improving  Patient at baseline with oxygen requirement on 2 L saturating 92-96%  inflammatory markers trending down  Patient is medically stable for discharge  Advised isolation discontinued shin as per protocol and attached to this discharge  Patient advised to return to ER for worsening shortness of breath, fever, unremitting fatigue, nausea, vomiting  Chronic respiratory failure with hypoxia (HCC)  Assessment & Plan  Clock history of COPD with chronic respiratory failure on 2 L oxygen 24 hours  At baseline now  [facility]  Assessment & Plan  · Continue home medication  Patient stable at baseline  Heart failure (HCC)  Assessment & Plan  Wt Readings from Last 3 Encounters:   04/13/20 (!) 162 kg (356 lb 0 7 oz)   Given 1 dose of IV Lasix on admission and home medication continued  Symptomatically improved, continue current diuresis and follow-up with cardiology as an outpatient  Supratherapeutic INR  Assessment & Plan  INR supratherapeutic  Warfarin on hold  Check INR in 2-3 days and restart warfarin  Please continue to follow with primary care physician as an outpatient to adjust warfarin dose  Atrial fibrillation [facility] )  Assessment & Plan  · AFib rate controlled on warfarin anticoagulation  Supratherapeutic INR warfarin on hold for now  Continue to monitor INR and slowly start warfarin dose      Morbid obesity  Follow with PCP for weight loss management    Asthma  Assessment & Plan  Continue home medication    Discharging Physician / Practitioner: Amos Peña MD  PCP: Rajesh Abdi MD  Admission Date:   Admission Orders (From admission, onward)     Ordered        04/13/20 0945  Inpatient Admission (expected length of stay for this patient Order details is greater than two midnights)  Once                   Discharge Date: 04/15/20    Disposition:      Other:  home      For Discharges to [facility] SNF:   · Not Applicable to this Patient - Not Applicable to this Patient    Reason for Admission: COVID infection    Discharge Diagnoses:     Please see assessment and plan section above for further details regarding discharge diagnoses  Resolved Problems  Date Reviewed: 4/13/2020          Resolved    Suspected Covid-19 Virus Infection 4/14/2020     Resolved by  Zaynab Hampton MD          Consultations During Hospital Stay:  None     Procedures Performed:   * No surgery found *      Xr Chest 1 View Portable    Result Date: 4/13/2020  Narrative: CHEST INDICATION:   Cough, fever  COMPARISON:  12/28/2015 EXAM PERFORMED/VIEWS:  XR CHEST PORTABLE FINDINGS: Cardiomediastinal silhouette appears unremarkable  Slightly increased right infrahilar opacity is present  Osseous structures appear within normal limits for patient age  Impression: Slightly increased right infrahilar opacity, which may represent developing pneumonia  The study was marked in [facility] for immediate notification  Workstation performed: [id number]     Cta Ed Chest Pe Study    Result Date: 4/13/2020  Narrative: CTA - CHEST WITH IV CONTRAST - PULMONARY ANGIOGRAM INDICATION:   PE suspected, intermediate prob, positive D-dimer  COMPARISON: None  TECHNIQUE: CTA examination of the chest was performed using angiographic technique according to a protocol specifically tailored to evaluate for pulmonary embolism  Axial, sagittal, and coronal 2D reformatted images were created from the source data and  submitted for interpretation    In addition, coronal 3D MIP postprocessing was performed on the acquisition scanner  Radiation dose length product (DLP) for this visit:  [id number] mGy-cm   This examination, like all CT scans performed in the [facility], was performed utilizing techniques to minimize radiation dose exposure, including the use of iterative reconstruction and automated exposure control  IV Contrast:  100 mL of iohexol (OMNIPAQUE)  FINDINGS: PULMONARY ARTERIAL TREE:  No pulmonary embolus is seen  LUNGS:  Multifocal groundglass is present  There are few peripheral opacities within the left lower lobe  Cyst is present within the right middle lobe  PLEURA:  Unremarkable  HEART/GREAT VESSELS:  Unremarkable for patient's age  MEDIASTINUM AND JIMMY:  Mediastinal and right hilar and bilateral infrahilar lymphadenopathy is present  For reference, an enlarged prevascular lymph node measures 1 5 cm in short axis (see series 2 image 59)  Right infrahilar lymph node measures 2 1 cm  in short axis (series 2 image 1:15)  CHEST WALL AND LOWER NECK:   Unremarkable  VISUALIZED STRUCTURES IN THE UPPER ABDOMEN:  The gallbladder is absent  Soft tissue nodules are present within the left upper quadrant (series 2 images 186, 205, 213) and have attenuation similar to the spleen, suggesting splenules  Enlarged gastrohepatic lymph node measuring 1 2 cm in short axis is present (series 2 image 194)  OSSEOUS STRUCTURES:  No acute fracture or destructive osseous lesion  Impression: 1  No pulmonary embolism  2   Multifocal groundglass and peripheral opacities within left lower lobe  Differential considerations include small airways infectious or inflammatory process and viral pneumonia  3   Mediastinal, right hilar, infrahilar lymphadenopathy as well as mildly enlarged gastrohepatic lymph node  While findings, particularly lymphadenopathy in the chest, may be reactive, the bulky appearance also raises possibility of lymphoma    Recommend follow-up CT of the chest in one month for reevaluation in addition to further medical workup  The study was marked in [employer] for significant notification  Workstation performed: [id number]     Vas Lower Limb Venous Duplex Study, Complete Bilateral    Result Date: 4/13/2020  Narrative:  [facility] REPORT CLINICAL: Indications: Patient presents with bilateral lower extremity edema x 3 days  Operative History: Denies cardiovascular intervention Risk Factors The patient has history of CKD, COPD, CHF, PE, A-fib and DVT  FINDINGS:  Segment  Right            Left              Impression       Impression       CFV      Normal (Patent)  Normal (Patent)     CONCLUSION: Impression: RIGHT LOWER LIMB: No evidence of acute or chronic deep vein thrombosis  No evidence of superficial thrombophlebitis noted  Doppler evaluation shows a normal response to augmentation maneuvers  LEFT LOWER LIMB: No evidence of acute or chronic deep vein thrombosis  No evidence of superficial thrombophlebitis noted  Doppler evaluation shows a normal response to augmentation maneuvers  Technically difficult/limited study to body habitus, pitting edema and poor visualization of calf veins  Technical findings were given to Dr Alan Etta: Electronically Signed by: Graciela Riggins on 2020-04-13 12:22:59 PM       Medication Adjustments and Discharge Medications:  · Summary of Medication Adjustments made as a result of this hospitalization:  Plaquenil and Z-Cornelio  · Medication Dosing Tapers - Please refer to Discharge Medication List for details on any medication dosing tapers (if applicable to patient)  · Discharge Medication List: See after visit summary for reconciled discharge medications  Wound Care Recommendations:  When applicable, please see wound care section of After Visit Summary  Diet Recommendations at Discharge:  Diet -        Diet Orders   (From admission, onward)             Start     Ordered    04/13/20 [address] [place]  Diet Cardiovascular;  Sodium 2 GM; Consistent Carbohydrate Diet Level 3 (6 carb servings/90 grams CHO/meal)  Diet effective now     Question Answer Comment   Diet Type Cardiovascular    Cardiac Sodium 2 GM    Other Restriction(s): Consistent Carbohydrate Diet Level 3 (6 carb servings/90 grams CHO/meal)    Special Instructions Disposable Meal    RD to adjust diet per protocol? Yes        04/13/20 0634                  Incidental Findings:   · None     Test Results Pending at Discharge (will require follow up): · None         Hospital Course:     Britton Everett is a 77 y o  female patient who originally presented to the hospital on 4/13/2020 due to COVID infection  Patent with history of chronic respiratory failure on 2 L of oxygen 24 hours secondary to COPD presented to the hospital for worsening shortness of breath and cough  COVID positive  Patient stayed stable and progressively improving  Patient claims her cough has been there for the last 2 weeks before coming to the ER however what is concerning to her was lower extremity swelling  Given diuresis and responded very well  Resumed her home medication including Lasix and tolerated very well  Treated with Plaquenil and Z-Cornelio  Patient medically stable for discharge  Since admission patient had supratherapeutic INR for which warfarin was on hold  Warfarin on hold on discharge and needs INR check within 2-3 days post discharge to resume warfarin  Patient strongly advised to follow with her primary care physician to resume warfarin and she showed understanding  All other home medications to continue      Condition at Discharge: stable     Discharge Day Visit / Exam:     Subjective:  No complaints  Vitals: Blood Pressure: 130/59 (04/15/20 0700)  Pulse: 66 (04/15/20 0700)  Temperature: 97 8 °F (36 6 °C) (04/15/20 0700)  Temp Source: Oral (04/15/20 0700)  Respirations: 18 (04/15/20 0700)  Height: 5' 2 5" (158 8 cm)(per pt report) (04/15/20 0835)  Weight - Scale: (!) 162 kg (356 lb 0 7 oz) (04/13/20 0655)  SpO2: 97 % (04/15/20 0700)  Exam:     General appearance: alert  Head: Normocephalic, without obvious abnormality, atraumatic  Lungs: clear to auscultation bilaterally  Heart: regular rate and rhythm  Abdomen: soft, non-tender, positive bowel sounds   Back: negative  Extremities: extremities atraumatic, no cyanosis or edema  Neurologic: Grossly normal      Discharge instructions/Information to patient and family:   See after visit summary section titled Discharge Instructions for information provided to patient and family  Planned Readmission:  No      Discharge Statement:  I spent 35 minutes discharging the patient  This time was spent on the day of discharge  I had direct contact with the patient on the day of discharge  Greater than 50% of the total time was spent examining patient, answering all patient questions, arranging and discussing plan of care with patient as well as directly providing post-discharge instructions  Additional time then spent on discharge activities      ** Please Note: This note has been constructed using a voice recognition system **

## 2020-04-15 NOTE — PROGRESS NOTES
Pt presently on CCD 3 and cardiac 2 gm Na, elevated POC BS levels noted  Will maintain current diet order to meet estimated needs and address fluid status  Pt reports allergy to all sugar substitutes in food and sweetener packets causing anaphylaxis, not listed in allergies list at this time, will speak with RN as available  Pt reports she has been able to order adequate options given this allergy from CCD  Will maintain at this time, will follow up to ensure adequate options available and adjust diet if necessary

## 2020-04-15 NOTE — MALNUTRITION/BMI
This medical record reflects one or more clinical indicators suggestive of morbid obesity  BMI Findings:  BMI Classifications: Morbid Obesity 60-69 9     Body mass index is 64 08 kg/m²  See Nutrition note dated 4/15/20 for additional details  Completed nutrition assessment is viewable in the nutrition documentation

## 2020-04-15 NOTE — PROGRESS NOTES
04/15/20 0800   Charting Type   Charting Type Shift assessment   HEENT   HEENT (WDL) X   R Eye Mildly impaired vision   L Eye Mildly impaired vision   Respiratory   Respiratory Pattern Dyspnea with exertion   Chest Assessment Chest expansion symmetrical   Bilateral Breath Sounds Diminished   Incentive Spirometry   IS level of assistance Needs encouragement   Frequency q1hr W/A   Incentive Spirometry Goal (mL) 1500 mL   Incentive Spirometry Achieved (mL) 750 mL   Cardiac   Cardiac (WDL) X   Telemetry/Cardiac Monitor Yes   Cardiac Rhythm Atrial fibrillation   Ectopy Premature ventricular contractions   Cardiac Regularity Irregular   Heart Sounds S1, S2   Alarms Audible No   Alarms Set No   Pacemaker/ICD No   Peripheral Vascular   Peripheral Vascular (WDL) X   Pulses R radial;L radial;R pedal;L pedal   Edema Right lower extremity; Left lower extremity   Generalized Edema +2   RLE Edema +2   LLE Edema +2   RUE Neurovascular Assessment   R Radial Pulse +2   LUE Neurovascular Assessment   L Radial Pulse +2   RLE Neurovascular Assessment   R Pedal Pulse +1   LLE Neurovascular Assessment   L Pedal Pulse +1   Integumentary   Integumentary (WDL) X   Skin Integrity Excoriation;Scaling; Redness   Skin Location BLE, Rt groin   Mino Scale   Sensory Perceptions 3   Moisture 3   Activity 2   Mobility 2   Nutrition 3   Friction and Shear 2   Mino Scale Score 15   Musculoskeletal   Musculoskeletal (WDL) X   Level of Assistance Maximum assist, patient does 25-49%   RLE Limited movement   LLE Limited movement   Gastrointestinal   Gastrointestinal (WDL) X   Abdomen Inspection Rounded;Obese   Bowel Sounds (All Quadrants) Normoactive   Genitourinary   Genitourinary (WDL) X   Urine Assessment   Urinary Incontinence Yes   Urine Color Yellow/straw   Urine Appearance Clear   Urine Odor No odor   Anal/Rectal   Anal/Rectal (WDL) WDL   Psychosocial   Psychosocial (WDL) WDL   Cough   Cough Congested;Non-productive

## 2020-04-15 NOTE — PROGRESS NOTES
04/14/20 0100   Charting Type   Charting Type Shift assessment   HEENT   HEENT (WDL) X   R Eye Mildly impaired vision   L Eye Mildly impaired vision   Teeth Missing teeth   Respiratory   Respiratory Pattern Dyspnea with exertion   Bilateral Breath Sounds Diminished;Coarse   Respiratory Interventions   Respiratory Interventions Incentive spirometry;Cough and deep breathe   Cardiac   Cardiac (WDL) X   Telemetry/Cardiac Monitor Yes   Cardiac Rhythm Atrial fibrillation   Cardiac Regularity Irregular   Alarms Audible Yes   Alarms Set Yes   Cardiac Symptoms None   Peripheral Vascular   Peripheral Vascular (WDL) X   Pulses R radial;L radial;R pedal;L pedal   Edema Right lower extremity; Left lower extremity   Generalized Edema +2   RLE Edema +2   LLE Edema +2   RUE Neurovascular Assessment   R Radial Pulse +2   LUE Neurovascular Assessment   L Radial Pulse +2   RLE Neurovascular Assessment   R Pedal Pulse +1   LLE Neurovascular Assessment   L Pedal Pulse +1   Integumentary   Integumentary (WDL) X   Skin Integrity Excoriation;Scaling; Redness   Skin Location BLE, Rt groin   Mino Scale   Sensory Perceptions 3   Moisture 3   Activity 3   Mobility 3   Nutrition 3   Friction and Shear 3   Mino Scale Score 18   Musculoskeletal   Musculoskeletal (WDL) X   Level of Assistance Maximum assist, patient does 25-49%   RLE Limited movement   LLE Limited movement   Gastrointestinal   Gastrointestinal (WDL) X   Abdomen Inspection Rounded;Obese   Genitourinary   Genitourinary (WDL) X   Genitourinary Symptoms   (purwick)   Urine Assessment   Urinary Incontinence Yes   Urine Color Yellow/straw   Urine Appearance Clear   Anal/Rectal   Anal/Rectal (WDL) WDL   Psychosocial   Psychosocial (WDL) WDL   Cough   Cough Congested;Non-productive

## 2020-04-15 NOTE — ASSESSMENT & PLAN NOTE
INR supratherapeutic  Warfarin on hold  Check INR in 2-3 days and restart warfarin  Please continue to follow with primary care physician as an outpatient to adjust warfarin dose

## 2020-04-15 NOTE — NURSING NOTE
Pt masked and transported via wheelchair to [facility] on 2L NC without incident

## 2020-04-15 NOTE — PLAN OF CARE
Problem: Potential for Falls  Goal: Patient will remain free of falls  Description  INTERVENTIONS:  - Assess patient frequently for physical needs  -  Identify cognitive and physical deficits and behaviors that affect risk of falls    -  [place] fall precautions as indicated by assessment   - Educate patient/family on patient safety including physical limitations  - Instruct patient to call for assistance with activity based on assessment  - Modify environment to reduce risk of injury  - Consider OT/PT consult to assist with strengthening/mobility  Outcome: Adequate for Discharge     Problem: PAIN - ADULT  Goal: Verbalizes/displays adequate comfort level or baseline comfort level  Description  Interventions:  - Encourage patient to monitor pain and request assistance  - Assess pain using appropriate pain scale  - Administer analgesics based on type and severity of pain and evaluate response  - Implement non-pharmacological measures as appropriate and evaluate response  - Consider cultural and social influences on pain and pain management  - Notify physician/advanced practitioner if interventions unsuccessful or patient reports new pain  Outcome: Adequate for Discharge     Problem: INFECTION - ADULT  Goal: Absence or prevention of progression during hospitalization  Description  INTERVENTIONS:  - Assess and monitor for signs and symptoms of infection  - Monitor lab/diagnostic results  - Monitor all insertion sites, i e  indwelling lines, tubes, and drains  - Monitor endotracheal if appropriate and nasal secretions for changes in amount and color  - [place] appropriate cooling/warming therapies per order  - Administer medications as ordered  - Instruct and encourage patient and family to use good hand hygiene technique  - Identify and instruct in appropriate isolation precautions for identified infection/condition  Outcome: Adequate for Discharge  Goal: Absence of fever/infection during neutropenic period  Description  INTERVENTIONS:  - Monitor WBC    Outcome: Adequate for Discharge     Problem: SAFETY ADULT  Goal: Maintain or return to baseline ADL function  Description  INTERVENTIONS:  -  Assess patient's ability to carry out ADLs; assess patient's baseline for ADL function and identify physical deficits which impact ability to perform ADLs (bathing, care of mouth/teeth, toileting, grooming, dressing, etc )  - Assess/evaluate cause of self-care deficits   - Assess range of motion  - Assess patient's mobility; develop plan if impaired  - Assess patient's need for assistive devices and provide as appropriate  - Encourage maximum independence but intervene and supervise when necessary  - Involve family in performance of ADLs  - Assess for home care needs following discharge   - Consider OT consult to assist with ADL evaluation and planning for discharge  - Provide patient education as appropriate  Outcome: Adequate for Discharge  Goal: Maintain or return mobility status to optimal level  Description  INTERVENTIONS:  - Assess patient's baseline mobility status (ambulation, transfers, stairs, etc )    - Identify cognitive and physical deficits and behaviors that affect mobility  - Identify mobility aids required to assist with transfers and/or ambulation (gait belt, sit-to-stand, lift, walker, cane, etc )  - [place] fall precautions as indicated by assessment  - Record patient progress and toleration of activity level on Mobility SBAR; progress patient to next Phase/Stage  - Instruct patient to call for assistance with activity based on assessment  - Consider rehabilitation consult to assist with strengthening/weightbearing, etc   Outcome: Adequate for Discharge     Problem: DISCHARGE PLANNING  Goal: Discharge to home or other facility with appropriate resources  Description  INTERVENTIONS:  - Identify barriers to discharge w/patient and caregiver  - Arrange for needed discharge resources and transportation as appropriate  - Identify discharge learning needs (meds, wound care, etc )  - Arrange for interpretive services to assist at discharge as needed  - Refer to Case Management Department for coordinating discharge planning if the patient needs post-hospital services based on physician/advanced practitioner order or complex needs related to functional status, cognitive ability, or social support system  Outcome: Adequate for Discharge     Problem: Knowledge Deficit  Goal: Patient/family/caregiver demonstrates understanding of disease process, treatment plan, medications, and discharge instructions  Description  Complete learning assessment and assess knowledge base    Interventions:  - Provide teaching at level of understanding  - Provide teaching via preferred learning methods  Outcome: Adequate for Discharge     Problem: Prexisting or High Potential for Compromised Skin Integrity  Goal: Skin integrity is maintained or improved  Description  INTERVENTIONS:  - Identify patients at risk for skin breakdown  - Assess and monitor skin integrity  - Assess and monitor nutrition and hydration status  - Monitor labs   - Assess for incontinence   - Turn and reposition patient  - Assist with mobility/ambulation  - Relieve pressure over bony prominences  - Avoid friction and shearing  - Provide appropriate hygiene as needed including keeping skin clean and dry  - Evaluate need for skin moisturizer/barrier cream  - Collaborate with interdisciplinary team   - Patient/family teaching  - Consider wound care consult   Outcome: Adequate for Discharge

## 2020-04-15 NOTE — CASE MANAGEMENT
Aware patient is going home today  Patient's spouse is also going to be dc today  Spouse is going home with [facility] which was arranged from the [place]  Choice is to have home care and they are agreeable to have [facility]  Referral was placed to [facility] for Nursing/PT and OT  CM contacted PCP office of needing post dc appointment  Office will be contacting patient themselves  Spoke to pt's spouse about the [facility]-  He was agreeable and a referral was made to [facility]  Plan is to have the [facility] the PA see patient tomorrow at 1:30 and Nurse Marlen Butler 4/20 at 2:30  CM called son Nacho Allen, reviewed the dc planning piece with his, medications have been e scribed to the [facility]- He is aware of both [address] Teran will be coming in to see his Mother      Information on AVS

## 2020-04-15 NOTE — PLAN OF CARE
Problem: Potential for Falls  Goal: Patient will remain free of falls  Description  INTERVENTIONS:  - Assess patient frequently for physical needs  -  Identify cognitive and physical deficits and behaviors that affect risk of falls    -  [place] fall precautions as indicated by assessment   - Educate patient/family on patient safety including physical limitations  - Instruct patient to call for assistance with activity based on assessment  - Modify environment to reduce risk of injury  - Consider OT/PT consult to assist with strengthening/mobility  4/15/2020 1339 by Marvin Ellis RN  Outcome: Completed  4/15/2020 0726 by Marvin Ellis RN  Outcome: Adequate for Discharge     Problem: PAIN - ADULT  Goal: Verbalizes/displays adequate comfort level or baseline comfort level  Description  Interventions:  - Encourage patient to monitor pain and request assistance  - Assess pain using appropriate pain scale  - Administer analgesics based on type and severity of pain and evaluate response  - Implement non-pharmacological measures as appropriate and evaluate response  - Consider cultural and social influences on pain and pain management  - Notify physician/advanced practitioner if interventions unsuccessful or patient reports new pain  4/15/2020 1339 by Marvin Ellis RN  Outcome: Completed  4/15/2020 0726 by Marvin Ellis RN  Outcome: Adequate for Discharge     Problem: INFECTION - ADULT  Goal: Absence or prevention of progression during hospitalization  Description  INTERVENTIONS:  - Assess and monitor for signs and symptoms of infection  - Monitor lab/diagnostic results  - Monitor all insertion sites, i e  indwelling lines, tubes, and drains  - Monitor endotracheal if appropriate and nasal secretions for changes in amount and color  - [place] appropriate cooling/warming therapies per order  - Administer medications as ordered  - Instruct and encourage patient and family to use good hand hygiene technique  - Identify and instruct in appropriate isolation precautions for identified infection/condition  4/15/2020 1339 by Juma Cheng RN  Outcome: Completed  4/15/2020 0726 by Juma Cheng RN  Outcome: Adequate for Discharge  Goal: Absence of fever/infection during neutropenic period  Description  INTERVENTIONS:  - Monitor WBC    4/15/2020 1339 by Juma Cheng RN  Outcome: Completed  4/15/2020 0726 by Juma Cheng RN  Outcome: Adequate for Discharge     Problem: SAFETY ADULT  Goal: Maintain or return to baseline ADL function  Description  INTERVENTIONS:  -  Assess patient's ability to carry out ADLs; assess patient's baseline for ADL function and identify physical deficits which impact ability to perform ADLs (bathing, care of mouth/teeth, toileting, grooming, dressing, etc )  - Assess/evaluate cause of self-care deficits   - Assess range of motion  - Assess patient's mobility; develop plan if impaired  - Assess patient's need for assistive devices and provide as appropriate  - Encourage maximum independence but intervene and supervise when necessary  - Involve family in performance of ADLs  - Assess for home care needs following discharge   - Consider OT consult to assist with ADL evaluation and planning for discharge  - Provide patient education as appropriate  4/15/2020 1339 by Juma Cheng RN  Outcome: Completed  4/15/2020 0726 by Juma Cheng RN  Outcome: Adequate for Discharge  Goal: Maintain or return mobility status to optimal level  Description  INTERVENTIONS:  - Assess patient's baseline mobility status (ambulation, transfers, stairs, etc )    - Identify cognitive and physical deficits and behaviors that affect mobility  - Identify mobility aids required to assist with transfers and/or ambulation (gait belt, sit-to-stand, lift, walker, cane, etc )  - [place] fall precautions as indicated by assessment  - Record patient progress and toleration of activity level on Mobility SBAR; progress patient to next Phase/Stage  - Instruct patient to call for assistance with activity based on assessment  - Consider rehabilitation consult to assist with strengthening/weightbearing, etc   4/15/2020 1339 by Debra Gilmore RN  Outcome: Completed  4/15/2020 0726 by Debra Gilmore RN  Outcome: Adequate for Discharge     Problem: DISCHARGE PLANNING  Goal: Discharge to home or other facility with appropriate resources  Description  INTERVENTIONS:  - Identify barriers to discharge w/patient and caregiver  - Arrange for needed discharge resources and transportation as appropriate  - Identify discharge learning needs (meds, wound care, etc )  - Arrange for interpretive services to assist at discharge as needed  - Refer to Case Management Department for coordinating discharge planning if the patient needs post-hospital services based on physician/advanced practitioner order or complex needs related to functional status, cognitive ability, or social support system  4/15/2020 1339 by Debra Gilmore RN  Outcome: Completed  4/15/2020 0726 by Debra Gilmore RN  Outcome: Adequate for Discharge     Problem: Knowledge Deficit  Goal: Patient/family/caregiver demonstrates understanding of disease process, treatment plan, medications, and discharge instructions  Description  Complete learning assessment and assess knowledge base    Interventions:  - Provide teaching at level of understanding  - Provide teaching via preferred learning methods  4/15/2020 1339 by Debra Gilmore RN  Outcome: Completed  4/15/2020 0726 by Debra Gilmore RN  Outcome: Adequate for Discharge     Problem: Prexisting or High Potential for Compromised Skin Integrity  Goal: Skin integrity is maintained or improved  Description  INTERVENTIONS:  - Identify patients at risk for skin breakdown  - Assess and monitor skin integrity  - Assess and monitor nutrition and hydration status  - Monitor labs   - Assess for incontinence - Turn and reposition patient  - Assist with mobility/ambulation  - Relieve pressure over bony prominences  - Avoid friction and shearing  - Provide appropriate hygiene as needed including keeping skin clean and dry  - Evaluate need for skin moisturizer/barrier cream  - Collaborate with interdisciplinary team   - Patient/family teaching  - Consider wound care consult   4/15/2020 1339 by Lubna Dacosta RN  Outcome: Completed  4/15/2020 0726 by Lubna Dacosta RN  Outcome: Adequate for Discharge     Problem: Nutrition/Hydration-ADULT  Goal: Nutrient/Hydration intake appropriate for improving, restoring or maintaining nutritional needs  Description  Monitor and assess patient's nutrition/hydration status for malnutrition  Collaborate with interdisciplinary team and initiate plan and interventions as ordered  Monitor patient's weight and dietary intake as ordered or per policy  Utilize nutrition screening tool and intervene as necessary  Determine patient's food preferences and provide high-protein, high-caloric foods as appropriate       INTERVENTIONS:  - Monitor oral intake, urinary output, labs, and treatment plans  - Assess nutrition and hydration status and recommend course of action  - Evaluate amount of meals eaten  - Assist patient with eating if necessary   - Allow adequate time for meals  - Recommend/ encourage appropriate diets, oral nutritional supplements, and vitamin/mineral supplements  - Order, calculate, and assess calorie counts as needed  - Recommend, monitor, and adjust tube feedings and TPN/PPN based on assessed needs  - Assess need for intravenous fluids  - Provide specific nutrition/hydration education as appropriate  - Include patient/family/caregiver in decisions related to nutrition  Outcome: Completed

## 2020-04-15 NOTE — DISCHARGE INSTRUCTIONS
Because your INR level is high we stopped your warfarin  INR should be checked within 2-3 after discharge and warfarin needs to be resumed  Please check with your primary care physician for INR check and follow-up with your warfarin dose adjustment  [address]    Your healthcare provider and/or public health staff have evaluated you and have determined that you do not need to remain in the hospital at this time  At this time you can be isolated at home where you will be monitored by staff from your local or state health department  You should carefully follow the prevention and isolation steps below until a healthcare provider or local or state health department says that you can return to your normal activities  Stay home except to get medical care    People who are mildly ill with COVID-19 are able to isolate at home during their illness  You should restrict activities outside your home, except for getting medical care  Do not go to work, school, or public areas  Avoid using public transportation, ride-sharing, or taxis  Separate yourself from other people and animals in your home    People: As much as possible, you should stay in a specific room and away from other people in your home  Also, you should use a separate bathroom, if available  Animals: You should restrict contact with pets and other animals while you are sick with COVID-19, just like you would around other people  Although there have not been reports of pets or other animals becoming sick with COVID-19, it is still recommended that people sick with COVID-19 limit contact with animals until more information is known about the virus  When possible, have another member of your household care for your animals while you are sick  If you are sick with COVID-19, avoid contact with your pet, including petting, snuggling, being kissed or licked, and sharing food   If you must care for your pet or be around animals while you are sick, wash your hands before and after you interact with pets and wear a facemask  See COVID-19 and Animals for more information  Call ahead before visiting your doctor    If you have a medical appointment, call the healthcare provider and tell them that you have or may have COVID-19  This will help the healthcare providers office take steps to keep other people from getting infected or exposed  Wear a facemask    You should wear a facemask when you are around other people (e g , sharing a room or vehicle) or pets and before you enter a healthcare providers office  If you are not able to wear a facemask (for example, because it causes trouble breathing), then people who live with you should not stay in the same room with you, or they should wear a facemask if they enter your room  Cover your coughs and sneezes    Cover your mouth and nose with a tissue when you cough or sneeze  Throw used tissues in a lined trash can  Immediately wash your hands with soap and water for at least 20 seconds or, if soap and water are not available, clean your hands with an alcohol-based hand  that contains at least 60% alcohol  Clean your hands often    Wash your hands often with soap and water for at least 20 seconds, especially after blowing your nose, coughing, or sneezing; going to the bathroom; and before eating or preparing food  If soap and water are not readily available, use an alcohol-based hand  with at least 60% alcohol, covering all surfaces of your hands and rubbing them together until they feel dry  Soap and water are the best option if hands are visibly dirty  Avoid touching your eyes, nose, and mouth with unwashed hands  Avoid sharing personal household items    You should not share dishes, drinking glasses, cups, eating utensils, towels, or bedding with other people or pets in your home  After using these items, they should be washed thoroughly with soap and water      Clean all high-touch surfaces everyday    High touch surfaces include counters, tabletops, doorknobs, bathroom fixtures, toilets, phones, keyboards, tablets, and bedside tables  Also, clean any surfaces that may have blood, stool, or body fluids on them  Use a household cleaning spray or wipe, according to the label instructions  Labels contain instructions for safe and effective use of the cleaning product including precautions you should take when applying the product, such as wearing gloves and making sure you have good ventilation during use of the product  Monitor your symptoms    Seek prompt medical attention if your illness is worsening (e g , difficulty breathing)  Before seeking care, call your healthcare provider and tell them that you have, or are being evaluated for, COVID-19  Put on a facemask before you enter the facility  These steps will help the healthcare providers office to keep other people in the office or waiting room from getting infected or exposed  Ask your healthcare provider to call the local or [facility] health department  Persons who are placed under active monitoring or facilitated self-monitoring should follow instructions provided by their local health department or occupational health professionals, as appropriate  If you have a medical emergency and need to call 911, notify the dispatch personnel that you have, or are being evaluated for COVID-19  If possible, put on a facemask before emergency medical services arrive      Discontinuing home isolation    Patients with confirmed COVID-19 should remain under home isolation precautions until the following conditions are met:   - They have had no fever for at least 72 hours (that is three full days of no fever without the use medicine that reduces fevers)  AND  - other symptoms have improved (for example, when their cough or shortness of breath have improved)  AND  - at least 7 days have passed since their symptoms first appeared  Patients with confirmed COVID-19 should also notify close contacts (including their workplace) and ask that they self-quarantine  Currently, close contact is defined as being within 6 feet for 10 minutes or more from the period 48 hours before symptom onset to the time at which the patient went into isolation  Close contacts of patients diagnosed with COVID-19 should be instructed by the patient to self-quarantine for 14 days from the last time of their last contact with the patient  Source: [employer]    COVID-19 Convalescent Plasma Donation      You were treated for Novel Coronavirus (COVID-19)  Please schedule follow-up with your Primary Care Physician as outlined in your discharge paperwork  Once you have fully recovered from COVID-19, you may be able to help patients currently fighting the infection by donating plasma  This is the same process as donating blood  As you recover from the infection, your body is making antibodies targeting COVID-19  When a person becomes ill with COVID-19, it can take some time to develop the antibodies needed to combat the disease  In patients who become seriously ill, they may not make antibodies quickly enough to fight the disease  By collecting plasma from recovered patients and giving it to seriously ill patients, we hope we can provide a boost to the sick patients' immune systems and help stimulate recovery  This is particularly important in patients who may already have a suppressed immune system  In order to be a donor, your symptoms must be completely resolved for 14 - 28 days  To learn more about convalescent plasma donation, please visit [facility]'s website at [email or web address]    If you are willing to be a donor, please contact Pretty Cobos at [facility] ([phone]) to schedule a brief telephone call   A member of our team will also reach out to you in a few weeks to answer any questions you may have related to convalescent plasma donation

## 2020-04-16 NOTE — UTILIZATION REVIEW
Notification of Discharge  This is a Notification of Discharge from our facility [address]  Please be advised that this patient has been discharge from our facility  Below you will find the admission and discharge date and time including the patients disposition  PRESENTATION DATE: 4/13/2020  6:54 AM  OBS ADMISSION DATE:   IP ADMISSION DATE: 4/13/20 0945   DISCHARGE DATE: 4/15/2020  2:15 PM  DIS[facility]ITION[facility]   Admission Orders listed below:  Admission Orders (From admission, onward)     Ordered        04/13/20 0945  Inpatient Admission (expected length of stay for this patient Order details is greater than two midnights)  Once                   Please contact the UR Department if additional information is required to close this patient's authorization/case  [address] [employer] Utilization Review Department  Main: [phone] x carefully listen to the prompts  All voicemails are confidential   [email or web address]  Send all requests for admission clinical reviews, approved or denied determinations and any other requests to dedicated fax number below belonging to the campus where the patient is receiving treatment   List of dedicated fax numbers:  [address] DENIALS (Administrative/Medical Necessity) [phone]   [address] (Maternity/NICU/Pediatrics) [phone] 5400 [facility] [phone]   [facility] [phone]   [facility] [phone]   [facility] [address] [place] [address] [phone]   [facility]  [phone]   [address] [facility]  2401 [facility] 1000 [address] [facility] [phone]

## 2020-04-18 LAB
BACTERIA BLD CULT: NORMAL
BACTERIA BLD CULT: NORMAL

## 2020-06-02 DIAGNOSIS — R31.9 HEMATURIA, UNSPECIFIED: ICD-10-CM

## 2020-06-02 DIAGNOSIS — R91.8 OTHER NONSPECIFIC ABNORMAL FINDING OF LUNG FIELD: ICD-10-CM

## 2020-06-02 DIAGNOSIS — J22 UNSPECIFIED ACUTE LOWER RESPIRATORY INFECTION: ICD-10-CM

## 2020-06-02 DIAGNOSIS — U07.1 COVID-19: ICD-10-CM

## 2021-07-22 NOTE — ASSESSMENT & PLAN NOTE
[id number]: Pt AOX4, VSS on RA. C/o persistent migraine, but much better than yesterday. Q4 neuros intact. Trialed 1X dose 50mg Topamax. Provided repeat dose of IV dexamethasone with plan for 5-day course of prednisone. Repeat MRI scan in one month per neuro. RPIV SL. Voiding WDL, no reported BM. Regular diet, good appetite. Calls/makes needs known. Will continue to monitor/follow POC.    Admitted for worsening respiratory distress  COVID positive  Continue with Plaquenil, azithromycin, vitamin-D  Supportive care and close vital sign monitoring  Patient at baseline with oxygen requirement on 2 L saturating 92-96%  Check inflammatory markers every other day  Keep patient on telemetry and check QT daily  Ceftriaxone discontinued for procalcitonin twice negative

## 2022-09-21 ENCOUNTER — APPOINTMENT (INPATIENT)
Dept: NON INVASIVE DIAGNOSTICS | Facility: HOSPITAL | Age: 67
DRG: 292 | End: 2022-09-21
Payer: COMMERCIAL

## 2022-09-21 ENCOUNTER — HOSPITAL ENCOUNTER (INPATIENT)
Facility: HOSPITAL | Age: 67
LOS: 12 days | Discharge: NURSING, REHAB OR LONG-TERM CARE | DRG: 292 | End: 2022-10-03
Attending: EMERGENCY MEDICINE | Admitting: FAMILY MEDICINE
Payer: COMMERCIAL

## 2022-09-21 ENCOUNTER — APPOINTMENT (OUTPATIENT)
Dept: RADIOLOGY | Facility: HOSPITAL | Age: 67
DRG: 292 | End: 2022-09-21
Payer: COMMERCIAL

## 2022-09-21 DIAGNOSIS — I48.91 ATRIAL FIBRILLATION, UNSPECIFIED TYPE (HCC): ICD-10-CM

## 2022-09-21 DIAGNOSIS — E66.01 MORBID OBESITY (HCC): ICD-10-CM

## 2022-09-21 DIAGNOSIS — E11.9 TYPE 2 DIABETES MELLITUS WITHOUT COMPLICATION, WITH LONG-TERM CURRENT USE OF INSULIN (HCC): ICD-10-CM

## 2022-09-21 DIAGNOSIS — I50.9 ACUTE ON CHRONIC CONGESTIVE HEART FAILURE, UNSPECIFIED HEART FAILURE TYPE (HCC): Primary | ICD-10-CM

## 2022-09-21 DIAGNOSIS — Z79.4 TYPE 2 DIABETES MELLITUS WITHOUT COMPLICATION, WITH LONG-TERM CURRENT USE OF INSULIN (HCC): ICD-10-CM

## 2022-09-21 PROBLEM — N17.9 AKI (ACUTE KIDNEY INJURY) (HCC): Status: ACTIVE | Noted: 2022-09-21

## 2022-09-21 PROBLEM — E03.9 HYPOTHYROIDISM: Status: ACTIVE | Noted: 2022-09-21

## 2022-09-21 PROBLEM — I50.33 ACUTE ON CHRONIC DIASTOLIC CONGESTIVE HEART FAILURE (HCC): Status: ACTIVE | Noted: 2022-09-21

## 2022-09-21 LAB
2HR DELTA HS TROPONIN: -3 NG/L
4HR DELTA HS TROPONIN: -1 NG/L
ALBUMIN SERPL BCP-MCNC: 3 G/DL (ref 3.5–5)
ALP SERPL-CCNC: 148 U/L (ref 46–116)
ALT SERPL W P-5'-P-CCNC: 16 U/L (ref 12–78)
ANION GAP SERPL CALCULATED.3IONS-SCNC: 7 MMOL/L (ref 4–13)
AORTIC ROOT: 2.8 CM
AORTIC VALVE MEAN VELOCITY: 6 M/S
APTT PPP: 50 SECONDS (ref 23–37)
ASCENDING AORTA: 2.5 CM
AST SERPL W P-5'-P-CCNC: 27 U/L (ref 5–45)
AV AREA BY CONTINUOUS VTI: 1 CM2
AV AREA PEAK VELOCITY: 1.1 CM2
AV LVOT MEAN GRADIENT: 0 MMHG
AV LVOT PEAK GRADIENT: 1 MMHG
AV MEAN GRADIENT: 2 MMHG
AV PEAK GRADIENT: 4 MMHG
AV VALVE AREA: 1.02 CM2
AV VELOCITY RATIO: 0.38
BASOPHILS # BLD AUTO: 0.05 THOUSANDS/ΜL (ref 0–0.1)
BASOPHILS NFR BLD AUTO: 1 % (ref 0–1)
BILIRUB SERPL-MCNC: 0.84 MG/DL (ref 0.2–1)
BUN SERPL-MCNC: 39 MG/DL (ref 5–25)
CALCIUM ALBUM COR SERPL-MCNC: 9.8 MG/DL (ref 8.3–10.1)
CALCIUM SERPL-MCNC: 9 MG/DL (ref 8.3–10.1)
CARDIAC TROPONIN I PNL SERPL HS: 23 NG/L
CARDIAC TROPONIN I PNL SERPL HS: 25 NG/L
CARDIAC TROPONIN I PNL SERPL HS: 26 NG/L
CHLORIDE SERPL-SCNC: 103 MMOL/L (ref 96–108)
CO2 SERPL-SCNC: 34 MMOL/L (ref 21–32)
CREAT SERPL-MCNC: 1.37 MG/DL (ref 0.6–1.3)
DOP CALC AO PEAK VEL: 1.04 M/S
DOP CALC AO VTI: 18.04 CM
DOP CALC LVOT AREA: 2.83 CM2
DOP CALC LVOT DIAMETER: 1.9 CM
DOP CALC LVOT PEAK VEL VTI: 6.48 CM
DOP CALC LVOT PEAK VEL: 0.39 M/S
DOP CALC LVOT STROKE INDEX: 6.9 ML/M2
DOP CALC LVOT STROKE VOLUME: 18.36
EOSINOPHIL # BLD AUTO: 0.19 THOUSAND/ΜL (ref 0–0.61)
EOSINOPHIL NFR BLD AUTO: 3 % (ref 0–6)
ERYTHROCYTE [DISTWIDTH] IN BLOOD BY AUTOMATED COUNT: 18.6 % (ref 11.6–15.1)
FLUAV RNA RESP QL NAA+PROBE: NEGATIVE
FLUBV RNA RESP QL NAA+PROBE: NEGATIVE
FRACTIONAL SHORTENING: 12 (ref 28–44)
GFR SERPL CREATININE-BSD FRML MDRD: 39 ML/MIN/1.73SQ M
GLUCOSE SERPL-MCNC: 109 MG/DL (ref 65–140)
GLUCOSE SERPL-MCNC: 151 MG/DL (ref 65–140)
GLUCOSE SERPL-MCNC: 156 MG/DL (ref 65–140)
HCT VFR BLD AUTO: 35.6 % (ref 34.8–46.1)
HGB BLD-MCNC: 10.9 G/DL (ref 11.5–15.4)
IMM GRANULOCYTES # BLD AUTO: 0.03 THOUSAND/UL (ref 0–0.2)
IMM GRANULOCYTES NFR BLD AUTO: 1 % (ref 0–2)
INR PPP: 3.65 (ref 0.84–1.19)
INTERVENTRICULAR SEPTUM IN DIASTOLE (PARASTERNAL SHORT AXIS VIEW): 1 CM
INTERVENTRICULAR SEPTUM: 1 CM (ref 0.6–1.1)
IVC: 28 MM
LACTATE SERPL-SCNC: 1.5 MMOL/L (ref 0.5–2)
LEFT ATRIUM SIZE: 2.3 CM
LEFT INTERNAL DIMENSION IN SYSTOLE: 3.8 CM (ref 2.1–4)
LEFT VENTRICULAR INTERNAL DIMENSION IN DIASTOLE: 4.3 CM (ref 3.5–6)
LEFT VENTRICULAR POSTERIOR WALL IN END DIASTOLE: 1.1 CM
LEFT VENTRICULAR STROKE VOLUME: 21 ML
LIPASE SERPL-CCNC: 114 U/L (ref 73–393)
LVSV (TEICH): 21 ML
LYMPHOCYTES # BLD AUTO: 1.27 THOUSANDS/ΜL (ref 0.6–4.47)
LYMPHOCYTES NFR BLD AUTO: 21 % (ref 14–44)
MCH RBC QN AUTO: 28.4 PG (ref 26.8–34.3)
MCHC RBC AUTO-ENTMCNC: 30.6 G/DL (ref 31.4–37.4)
MCV RBC AUTO: 93 FL (ref 82–98)
MITRAL REGURGITATION PEAK VELOCITY: 4.63 M/S
MITRAL VALVE MEAN INFLOW VELOCITY: 3.52 M/S
MITRAL VALVE REGURGITANT PEAK GRADIENT: 86 MMHG
MONOCYTES # BLD AUTO: 0.66 THOUSAND/ΜL (ref 0.17–1.22)
MONOCYTES NFR BLD AUTO: 11 % (ref 4–12)
MV E'TISSUE VEL-LAT: 7 CM/S
MV E'TISSUE VEL-SEP: 7 CM/S
MV STENOSIS PRESSURE HALF TIME: 47 MS
MV VALVE AREA P 1/2 METHOD: 4.68
NEUTROPHILS # BLD AUTO: 3.79 THOUSANDS/ΜL (ref 1.85–7.62)
NEUTS SEG NFR BLD AUTO: 63 % (ref 43–75)
NRBC BLD AUTO-RTO: 0 /100 WBCS
NT-PROBNP SERPL-MCNC: 2209 PG/ML
PISA MRMAX VEL: 0.3 M/S
PISA RADIUS: 0.7 CM
PLATELET # BLD AUTO: 204 THOUSANDS/UL (ref 149–390)
PMV BLD AUTO: 10.7 FL (ref 8.9–12.7)
POTASSIUM SERPL-SCNC: 4.2 MMOL/L (ref 3.5–5.3)
PROT SERPL-MCNC: 7.7 G/DL (ref 6.4–8.4)
PROTHROMBIN TIME: 36.3 SECONDS (ref 11.6–14.5)
PV PEAK GRADIENT: 2 MMHG
QRS AXIS: 8 DEGREES
QRSD INTERVAL: 110 MS
QT INTERVAL: 426 MS
QTC INTERVAL: 497 MS
RA PRESSURE ESTIMATED: 15 MMHG
RBC # BLD AUTO: 3.84 MILLION/UL (ref 3.81–5.12)
RIGHT VENTRICLE ID DIMENSION: 6 CM
RSV RNA RESP QL NAA+PROBE: NEGATIVE
RV PSP: 57 MMHG
SARS-COV-2 RNA RESP QL NAA+PROBE: NEGATIVE
SL CV DOP CALC MV VTI RETROGRADE: 142 CM
SL CV LV EF: 5559
SL CV MV MEAN GRADIENT RETROGRADE: 55 MMHG
SL CV PED ECHO LEFT VENTRICLE DIASTOLIC VOLUME (MOD BIPLANE) 2D: 83 ML
SL CV PED ECHO LEFT VENTRICLE SYSTOLIC VOLUME (MOD BIPLANE) 2D: 62 ML
SODIUM 24H UR-SCNC: 100 MOL/L
SODIUM SERPL-SCNC: 144 MMOL/L (ref 135–147)
T WAVE AXIS: 174 DEGREES
TR MAX PG: 42 MMHG
TR PEAK VELOCITY: 3.3 M/S
TRICUSPID VALVE PEAK REGURGITATION VELOCITY: 3.25 M/S
UUN 24H UR-MCNC: 282 MG/DL
VENTRICULAR RATE: 82 BPM
WBC # BLD AUTO: 5.99 THOUSAND/UL (ref 4.31–10.16)

## 2022-09-21 PROCEDURE — 85610 PROTHROMBIN TIME: CPT | Performed by: EMERGENCY MEDICINE

## 2022-09-21 PROCEDURE — 96374 THER/PROPH/DIAG INJ IV PUSH: CPT

## 2022-09-21 PROCEDURE — 85730 THROMBOPLASTIN TIME PARTIAL: CPT | Performed by: EMERGENCY MEDICINE

## 2022-09-21 PROCEDURE — 0241U HB NFCT DS VIR RESP RNA 4 TRGT: CPT | Performed by: EMERGENCY MEDICINE

## 2022-09-21 PROCEDURE — 93306 TTE W/DOPPLER COMPLETE: CPT

## 2022-09-21 PROCEDURE — 36415 COLL VENOUS BLD VENIPUNCTURE: CPT | Performed by: EMERGENCY MEDICINE

## 2022-09-21 PROCEDURE — 82948 REAGENT STRIP/BLOOD GLUCOSE: CPT

## 2022-09-21 PROCEDURE — 83605 ASSAY OF LACTIC ACID: CPT | Performed by: EMERGENCY MEDICINE

## 2022-09-21 PROCEDURE — 99223 1ST HOSP IP/OBS HIGH 75: CPT | Performed by: FAMILY MEDICINE

## 2022-09-21 PROCEDURE — 71045 X-RAY EXAM CHEST 1 VIEW: CPT

## 2022-09-21 PROCEDURE — 93005 ELECTROCARDIOGRAM TRACING: CPT

## 2022-09-21 PROCEDURE — 80053 COMPREHEN METABOLIC PANEL: CPT | Performed by: EMERGENCY MEDICINE

## 2022-09-21 PROCEDURE — 83880 ASSAY OF NATRIURETIC PEPTIDE: CPT | Performed by: EMERGENCY MEDICINE

## 2022-09-21 PROCEDURE — 99285 EMERGENCY DEPT VISIT HI MDM: CPT

## 2022-09-21 PROCEDURE — 85025 COMPLETE CBC W/AUTO DIFF WBC: CPT | Performed by: EMERGENCY MEDICINE

## 2022-09-21 PROCEDURE — 84300 ASSAY OF URINE SODIUM: CPT | Performed by: FAMILY MEDICINE

## 2022-09-21 PROCEDURE — 84484 ASSAY OF TROPONIN QUANT: CPT | Performed by: EMERGENCY MEDICINE

## 2022-09-21 PROCEDURE — 94660 CPAP INITIATION&MGMT: CPT

## 2022-09-21 PROCEDURE — 84540 ASSAY OF URINE/UREA-N: CPT | Performed by: FAMILY MEDICINE

## 2022-09-21 PROCEDURE — 94002 VENT MGMT INPAT INIT DAY: CPT

## 2022-09-21 PROCEDURE — 99285 EMERGENCY DEPT VISIT HI MDM: CPT | Performed by: EMERGENCY MEDICINE

## 2022-09-21 PROCEDURE — 83690 ASSAY OF LIPASE: CPT | Performed by: EMERGENCY MEDICINE

## 2022-09-21 RX ORDER — MELATONIN
2000 DAILY
Status: DISCONTINUED | OUTPATIENT
Start: 2022-09-21 | End: 2022-10-03 | Stop reason: HOSPADM

## 2022-09-21 RX ORDER — LEVOTHYROXINE SODIUM 88 UG/1
88 TABLET ORAL
Status: DISCONTINUED | OUTPATIENT
Start: 2022-09-22 | End: 2022-10-03 | Stop reason: HOSPADM

## 2022-09-21 RX ORDER — NYSTATIN 100000 [USP'U]/G
POWDER TOPICAL 2 TIMES DAILY
Status: DISCONTINUED | OUTPATIENT
Start: 2022-09-21 | End: 2022-10-03 | Stop reason: HOSPADM

## 2022-09-21 RX ORDER — INSULIN ASPART 100 [IU]/ML
5 INJECTION, SOLUTION INTRAVENOUS; SUBCUTANEOUS SEE ADMIN INSTRUCTIONS
Status: ON HOLD | COMMUNITY
End: 2022-10-03 | Stop reason: SDUPTHER

## 2022-09-21 RX ORDER — FUROSEMIDE 10 MG/ML
80 INJECTION INTRAMUSCULAR; INTRAVENOUS ONCE
Status: COMPLETED | OUTPATIENT
Start: 2022-09-21 | End: 2022-09-21

## 2022-09-21 RX ORDER — FERROUS SULFATE 325(65) MG
325 TABLET ORAL
Status: DISCONTINUED | OUTPATIENT
Start: 2022-09-22 | End: 2022-10-03 | Stop reason: HOSPADM

## 2022-09-21 RX ORDER — ATORVASTATIN CALCIUM 10 MG/1
10 TABLET, FILM COATED ORAL DAILY
Status: DISCONTINUED | OUTPATIENT
Start: 2022-09-21 | End: 2022-10-03 | Stop reason: HOSPADM

## 2022-09-21 RX ORDER — GLIPIZIDE 10 MG/1
10 TABLET ORAL EVERY MORNING
COMMUNITY
End: 2022-10-03

## 2022-09-21 RX ORDER — PANTOPRAZOLE SODIUM 40 MG/1
40 TABLET, DELAYED RELEASE ORAL
Status: DISCONTINUED | OUTPATIENT
Start: 2022-09-22 | End: 2022-10-03 | Stop reason: HOSPADM

## 2022-09-21 RX ORDER — INSULIN LISPRO 100 [IU]/ML
4-20 INJECTION, SOLUTION INTRAVENOUS; SUBCUTANEOUS
Status: DISCONTINUED | OUTPATIENT
Start: 2022-09-21 | End: 2022-09-26

## 2022-09-21 RX ORDER — METOPROLOL SUCCINATE 100 MG/1
100 TABLET, EXTENDED RELEASE ORAL 2 TIMES DAILY
Status: DISCONTINUED | OUTPATIENT
Start: 2022-09-21 | End: 2022-10-03 | Stop reason: HOSPADM

## 2022-09-21 RX ORDER — FUROSEMIDE 10 MG/ML
80 INJECTION INTRAMUSCULAR; INTRAVENOUS 2 TIMES DAILY
Status: DISCONTINUED | OUTPATIENT
Start: 2022-09-21 | End: 2022-09-23

## 2022-09-21 RX ORDER — FERROUS SULFATE 325(65) MG
325 TABLET ORAL
COMMUNITY

## 2022-09-21 RX ORDER — WARFARIN SODIUM 7.5 MG/1
7.5 TABLET ORAL
Status: ON HOLD | COMMUNITY
End: 2022-10-03 | Stop reason: SDUPTHER

## 2022-09-21 RX ORDER — LEVOTHYROXINE SODIUM 0.1 MG/1
200 TABLET ORAL DAILY
Status: DISCONTINUED | OUTPATIENT
Start: 2022-09-21 | End: 2022-10-03 | Stop reason: HOSPADM

## 2022-09-21 RX ORDER — LORATADINE 10 MG/1
10 TABLET ORAL DAILY
Status: DISCONTINUED | OUTPATIENT
Start: 2022-09-21 | End: 2022-10-03 | Stop reason: HOSPADM

## 2022-09-21 RX ORDER — MONTELUKAST SODIUM 10 MG/1
10 TABLET ORAL
Status: DISCONTINUED | OUTPATIENT
Start: 2022-09-21 | End: 2022-10-03 | Stop reason: HOSPADM

## 2022-09-21 RX ORDER — ACETAMINOPHEN 325 MG/1
650 TABLET ORAL EVERY 6 HOURS PRN
Status: DISCONTINUED | OUTPATIENT
Start: 2022-09-21 | End: 2022-09-22

## 2022-09-21 RX ORDER — ALLOPURINOL 100 MG/1
100 TABLET ORAL DAILY
Status: DISCONTINUED | OUTPATIENT
Start: 2022-09-21 | End: 2022-10-03 | Stop reason: HOSPADM

## 2022-09-21 RX ORDER — ALLOPURINOL 100 MG/1
100 TABLET ORAL DAILY
COMMUNITY

## 2022-09-21 RX ORDER — AMMONIUM LACTATE 12 G/100G
LOTION TOPICAL 2 TIMES DAILY PRN
Status: DISCONTINUED | OUTPATIENT
Start: 2022-09-21 | End: 2022-10-03 | Stop reason: HOSPADM

## 2022-09-21 RX ADMIN — ATORVASTATIN CALCIUM 10 MG: 10 TABLET, FILM COATED ORAL at 16:24

## 2022-09-21 RX ADMIN — Medication 2000 UNITS: at 16:23

## 2022-09-21 RX ADMIN — ALLOPURINOL 100 MG: 100 TABLET ORAL at 16:24

## 2022-09-21 RX ADMIN — FUROSEMIDE 80 MG: 10 INJECTION, SOLUTION INTRAMUSCULAR; INTRAVENOUS at 13:22

## 2022-09-21 RX ADMIN — ACETAMINOPHEN 650 MG: 325 TABLET ORAL at 16:24

## 2022-09-21 RX ADMIN — MONTELUKAST 10 MG: 10 TABLET, FILM COATED ORAL at 23:02

## 2022-09-21 RX ADMIN — INSULIN DETEMIR 26 UNITS: 100 INJECTION, SOLUTION SUBCUTANEOUS at 22:59

## 2022-09-21 RX ADMIN — LORATADINE 10 MG: 10 TABLET ORAL at 16:24

## 2022-09-21 RX ADMIN — NYSTATIN: 100000 POWDER TOPICAL at 23:03

## 2022-09-21 NOTE — ASSESSMENT & PLAN NOTE
· Persistent rate controlled will hold warfarin as INR supratherapeutic    Will restart once 3 continue Toprol XL

## 2022-09-21 NOTE — PLAN OF CARE
Problem: MOBILITY - ADULT  Goal: Maintain or return to baseline ADL function  Description: INTERVENTIONS:  -  Assess patient's ability to carry out ADLs; assess patient's baseline for ADL function and identify physical deficits which impact ability to perform ADLs (bathing, care of mouth/teeth, toileting, grooming, dressing, etc )  - Assess/evaluate cause of self-care deficits   - Assess range of motion  - Assess patient's mobility; develop plan if impaired  - Assess patient's need for assistive devices and provide as appropriate  - Encourage maximum independence but intervene and supervise when necessary  - Involve family in performance of ADLs  - Assess for home care needs following discharge   - Consider OT consult to assist with ADL evaluation and planning for discharge  - Provide patient education as appropriate  Outcome: Progressing  Goal: Maintains/Returns to pre admission functional level  Description: INTERVENTIONS:  - Perform BMAT or MOVE assessment daily    - Set and communicate daily mobility goal to care team and patient/family/caregiver     - Collaborate with rehabilitation services on mobility goals if consulted  - Out of bed for toileting  - Record patient progress and toleration of activity level   Outcome: Progressing     Problem: Potential for Falls  Goal: Patient will remain free of falls  Description: INTERVENTIONS:  - Educate patient/family on patient safety including physical limitations  - Instruct patient to call for assistance with activity   - Consult OT/PT to assist with strengthening/mobility   - Keep Call bell within reach  - Keep bed low and locked with side rails adjusted as appropriate  - Keep care items and personal belongings within reach  - Initiate and maintain comfort rounds  - Make Fall Risk Sign visible to staff  - Apply yellow socks and bracelet for high fall risk patients  - Consider moving patient to room near nurses station  Outcome: Progressing     Problem: Prexisting or High Potential for Compromised Skin Integrity  Goal: Skin integrity is maintained or improved  Description: INTERVENTIONS:  - Identify patients at risk for skin breakdown  - Assess and monitor skin integrity  - Assess and monitor nutrition and hydration status  - Monitor labs   - Assess for incontinence   - Turn and reposition patient  - Assist with mobility/ambulation  - Relieve pressure over bony prominences  - Avoid friction and shearing  - Provide appropriate hygiene as needed including keeping skin clean and dry  - Evaluate need for skin moisturizer/barrier cream  - Collaborate with interdisciplinary team   - Patient/family teaching  - Consider wound care consult   Outcome: Progressing     Problem: PAIN - ADULT  Goal: Verbalizes/displays adequate comfort level or baseline comfort level  Description: Interventions:  - Encourage patient to monitor pain and request assistance  - Assess pain using appropriate pain scale  - Administer analgesics based on type and severity of pain and evaluate response  - Implement non-pharmacological measures as appropriate and evaluate response  - Consider cultural and social influences on pain and pain management  - Notify physician/advanced practitioner if interventions unsuccessful or patient reports new pain  Outcome: Progressing     Problem: INFECTION - ADULT  Goal: Absence or prevention of progression during hospitalization  Description: INTERVENTIONS:  - Assess and monitor for signs and symptoms of infection  - Monitor lab/diagnostic results  - Monitor all insertion sites, i e  indwelling lines, tubes, and drains  - Monitor endotracheal if appropriate and nasal secretions for changes in amount and color  - [place] appropriate cooling/warming therapies per order  - Administer medications as ordered  - Instruct and encourage patient and family to use good hand hygiene technique  - Identify and instruct in appropriate isolation precautions for identified infection/condition  Outcome: Progressing  Goal: Absence of fever/infection during neutropenic period  Description: INTERVENTIONS:  - Monitor WBC    Outcome: Progressing     Problem: SAFETY ADULT  Goal: Maintain or return to baseline ADL function  Description: INTERVENTIONS:  -  Assess patient's ability to carry out ADLs; assess patient's baseline for ADL function and identify physical deficits which impact ability to perform ADLs (bathing, care of mouth/teeth, toileting, grooming, dressing, etc )  - Assess/evaluate cause of self-care deficits   - Assess range of motion  - Assess patient's mobility; develop plan if impaired  - Assess patient's need for assistive devices and provide as appropriate  - Encourage maximum independence but intervene and supervise when necessary  - Involve family in performance of ADLs  - Assess for home care needs following discharge   - Consider OT consult to assist with ADL evaluation and planning for discharge  - Provide patient education as appropriate  Outcome: Progressing  Goal: Maintains/Returns to pre admission functional level  Description: INTERVENTIONS:  - Perform BMAT or MOVE assessment daily    - Set and communicate daily mobility goal to care team and patient/family/caregiver     - Collaborate with rehabilitation services on mobility goals if consulted  - Out of bed for toileting  - Record patient progress and toleration of activity level   Outcome: Progressing  Goal: Patient will remain free of falls  Description: INTERVENTIONS:  - Educate patient/family on patient safety including physical limitations  - Instruct patient to call for assistance with activity   - Consult OT/PT to assist with strengthening/mobility   - Keep Call bell within reach  - Keep bed low and locked with side rails adjusted as appropriate  - Keep care items and personal belongings within reach  - Initiate and maintain comfort rounds  - Make Fall Risk Sign visible to staff  - Apply yellow socks and bracelet for high fall risk patients  - Consider moving patient to room near nurses station  Outcome: Progressing     Problem: DISCHARGE PLANNING  Goal: Discharge to home or other facility with appropriate resources  Description: INTERVENTIONS:  - Identify barriers to discharge w/patient and caregiver  - Arrange for needed discharge resources and transportation as appropriate  - Identify discharge learning needs (meds, wound care, etc )  - Arrange for interpretive services to assist at discharge as needed  - Refer to Case Management Department for coordinating discharge planning if the patient needs post-hospital services based on physician/advanced practitioner order or complex needs related to functional status, cognitive ability, or social support system  Outcome: Progressing     Problem: Knowledge Deficit  Goal: Patient/family/caregiver demonstrates understanding of disease process, treatment plan, medications, and discharge instructions  Description: Complete learning assessment and assess knowledge base    Interventions:  - Provide teaching at level of understanding  - Provide teaching via preferred learning methods  Outcome: Progressing

## 2022-09-21 NOTE — ASSESSMENT & PLAN NOTE
Lab Results   Component Value Date    HGBA1C 8 0 (H) 04/13/2020       No results for input(s): POCGLU in the last 72 hours  Blood Sugar Average: Last 72 hrs:  ·  hold p o  medication resume Levemir 26 units at night    Place on sliding scale and observe how she is doing current regimen

## 2022-09-21 NOTE — ASSESSMENT & PLAN NOTE
· Creatinine baseline is 0 9 suspect secondary to cardiorenal   Will do urine sodium and urine urea she is on Lasix to find out monitor urine output

## 2022-09-21 NOTE — ED PROVIDER NOTES
History  Chief Complaint   Patient presents with    Shortness of Breath     Filling up with fluid  Patient on 4 L nasal cannula at home all the time  Does have a history of atrial fibrillation  On Coumadin  Has history of CHF  Complaining of increasing weight gain and edema for the past 2 weeks  Now unable to even walk  Has dyspnea on exertion  Has orthopnea  Has PND  Having trouble sleeping because she short of breath  Does not know her baseline weight  Her Lasix was increased yesterday 80 mg twice a day without any change  No chest pain  Seen by [facility] home health today and sent in for evaluation and admission  History provided by:  Patient   used: No    Shortness of Breath  Severity:  Moderate  Onset quality:  Gradual  Duration:  2 weeks  Timing:  Constant  Progression:  Worsening  Chronicity:  Recurrent  Context: not animal exposure and not occupational exposure    Relieved by:  Nothing  Worsened by:  Exertion  Ineffective treatments:  Diuretics  Associated symptoms: no abdominal pain, no chest pain, no cough, no diaphoresis, no ear pain, no fever, no headaches, no neck pain, no PND, no rash, no sore throat, no sputum production, no vomiting and no wheezing        Prior to Admission Medications   Prescriptions Last Dose Informant Patient Reported? Taking?    Levothyroxine Sodium 88 MCG CAPS   Yes No   Sig: Take by mouth daily   METOPROLOL SUCCINATE ER PO   Yes No   Sig: Take by mouth 2 (two) times a day   acetaminophen (TYLENOL) 325 mg tablet   No No   Sig: Take 2 tablets (650 mg total) by mouth every 6 (six) hours as needed for fever   atorvastatin (LIPITOR) 10 mg tablet   Yes No   Sig: Take 10 mg by mouth daily   cetirizine (ZyrTEC) 10 mg tablet   Yes No   Sig: Take 10 mg by mouth daily   cholecalciferol (VITAMIN D3) 1,000 units tablet   Yes No   Sig: Take 2,000 Units by mouth daily   fluticasone-vilanterol (Breo Ellipta) 100-25 mcg/inh inhaler   Yes No   Sig: Inhale 1 puff daily Rinse mouth after use     furosemide (Lasix) 20 mg tablet   Yes No   Sig: Take 60 mg by mouth 2 (two) times a day   glipiZIDE (GLUCOTROL) 5 mg tablet   Yes No   Sig: Take 5 mg by mouth 2 (two) times a day Afternoon and evening   hydroxychloroquine (PLAQUENIL) 200 mg tablet   No No   Sig: Take 1 tablet (200 mg total) by mouth every 12 (twelve) hours for 6 doses   insulin detemir (LEVEMIR) 100 units/mL subcutaneous injection   Yes No   Sig: Inject 18 Units under the skin daily at bedtime   levothyroxine 200 mcg tablet   Yes No   Sig: Take 200 mcg by mouth daily   lisinopril (ZESTRIL) 10 mg tablet   Yes No   Sig: Take 10 mg by mouth daily   montelukast (SINGULAIR) 10 mg tablet   Yes No   Sig: Take 10 mg by mouth daily at bedtime   multivitamin (THERAGRAN) TABS   Yes No   Sig: Take 1 tablet by mouth daily   omeprazole (PriLOSEC) 40 MG capsule   Yes No   Sig: Take 40 mg by mouth daily      Facility-Administered Medications: None       Past Medical History:   Diagnosis Date    Asthma     Atrial fibrillation (HCC)     COPD (chronic obstructive pulmonary disease) (HCC)     Diabetes mellitus (HCC)     Disease of thyroid gland     Heart failure ([facility] [address] )     Kidney failure        Past Surgical History:   Procedure Laterality Date    CARDIOVERSION N/A     GALLBLADDER SURGERY      HERNIA REPAIR         Family History   Problem Relation Age of Onset    Arthritis Mother     Hearing loss Mother     Heart disease Mother     Hypertension Mother     Stroke Mother     Alcohol abuse Father     Cancer Father     Diabetes Father     Arthritis Sister     Cancer Sister     Alcohol abuse Brother     Diabetes Son     Arthritis Daughter     Drug abuse Daughter     Heart disease Maternal Grandmother     Hypertension Maternal Grandmother     Stroke Maternal Grandmother     Arthritis Maternal Aunt     Asthma Neg Hx     Birth defects Neg Hx     COPD Neg Hx     Depression Neg Hx     Early death Neg Hx     Hyperlipidemia Neg Hx     Kidney disease Neg Hx     Learning disabilities Neg Hx     Mental illness Neg Hx     Mental retardation Neg Hx     Miscarriages / Stillbirths Neg Hx     Vision loss Neg Hx      I have reviewed and agree with the history as documented  E-Cigarette/Vaping    E-Cigarette Use Never User      E-Cigarette/Vaping Substances     Social History     Tobacco Use    Smoking status: Never Smoker    Smokeless tobacco: Never Used   Vaping Use    Vaping Use: Never used   Substance Use Topics    Alcohol use: Never    Drug use: Never       Review of Systems   Constitutional: Negative for chills, diaphoresis and fever  HENT: Negative for ear pain, hearing loss, sore throat, trouble swallowing and voice change  Eyes: Negative for pain and discharge  Respiratory: Positive for shortness of breath  Negative for cough, sputum production and wheezing  Cardiovascular: Negative for chest pain, palpitations and PND  Gastrointestinal: Negative for abdominal pain, blood in stool, constipation, diarrhea, nausea and vomiting  Genitourinary: Negative for dysuria, flank pain, frequency and hematuria  Musculoskeletal: Negative for joint swelling, neck pain and neck stiffness  Skin: Negative for rash and wound  Neurological: Negative for dizziness, seizures, syncope, facial asymmetry and headaches  Psychiatric/Behavioral: Negative for hallucinations, self-injury and suicidal ideas  All other systems reviewed and are negative  Physical Exam  Physical Exam  Vitals and nursing note reviewed  Constitutional:       General: She is not in acute distress  Comments: Morbidly obese   HENT:      Head: Normocephalic and atraumatic  Right Ear: External ear normal       Left Ear: External ear normal    Eyes:      General: No scleral icterus  Right eye: No discharge  Left eye: No discharge  Extraocular Movements: Extraocular movements intact  Conjunctiva/sclera: Conjunctivae normal    Cardiovascular:      Rate and Rhythm: Normal rate and regular rhythm  Heart sounds: Normal heart sounds  No murmur heard  Pulmonary:      Effort: Pulmonary effort is normal       Breath sounds: Normal breath sounds  No wheezing or rales  Abdominal:      General: Bowel sounds are normal  There is no distension  Palpations: Abdomen is soft  Tenderness: There is no abdominal tenderness  There is no guarding or rebound  Musculoskeletal:         General: No deformity  Normal range of motion  Cervical back: Normal range of motion and neck supple  Right lower leg: Tenderness present  Left lower leg: Tenderness present  Skin:     General: Skin is warm and dry  Findings: No rash  Neurological:      General: No focal deficit present  Mental Status: She is alert and oriented to person, place, and time  Cranial Nerves: No cranial nerve deficit  Psychiatric:         Mood and Affect: Mood normal          Behavior: Behavior normal          Thought Content:  Thought content normal          Judgment: Judgment normal          Vital Signs  ED Triage Vitals [09/21/22 1306]   Temperature Pulse Respirations Blood Pressure SpO2   97 5 °F (36 4 °C) 74 18 123/70 96 %      Temp Source Heart Rate Source Patient Position - Orthostatic VS BP Location FiO2 (%)   Temporal Monitor Lying Left arm --      Pain Score       No Pain           Vitals:    09/21/22 1306 09/21/22 1315 09/21/22 1330 09/21/22 1345   BP: 123/70 123/70     Pulse: 74 76 85 76   Patient Position - Orthostatic VS: Lying            Visual Acuity      ED Medications  Medications   furosemide (LASIX) injection 80 mg (80 mg Intravenous Given 9/21/22 1322)       Diagnostic Studies  Results Reviewed     Procedure Component Value Units Date/Time    HS Troponin I 2hr [[id number]]     Lab Status: No result Specimen: Blood     HS Troponin 0hr (reflex protocol) [[id number]]  (Normal) Collected: 09/21/22 1319    Lab Status: Final result Specimen: Blood from Arm, Right Updated: 09/21/22 1355     hs TnI 0hr 26 ng/L     Lipase [[id number]]  (Normal) Collected: 09/21/22 1319    Lab Status: Final result Specimen: Blood from Arm, Right Updated: 09/21/22 1355     Lipase 114 u/L     NT-BNP PRO [[id number]]  (Abnormal) Collected: 09/21/22 1319    Lab Status: Final result Specimen: Blood from Arm, Right Updated: 09/21/22 1355     NT-proBNP 2,209 pg/mL     Comprehensive metabolic panel [[id number]]  (Abnormal) Collected: 09/21/22 1319    Lab Status: Final result Specimen: Blood from Arm, Right Updated: 09/21/22 1353     Sodium 144 mmol/L      Potassium 4 2 mmol/L      Chloride 103 mmol/L      CO2 34 mmol/L      ANION GAP 7 mmol/L      BUN 39 mg/dL      Creatinine 1 37 mg/dL      Glucose 151 mg/dL      Calcium 9 0 mg/dL      Corrected Calcium 9 8 mg/dL      AST 27 U/L      ALT 16 U/L      Alkaline Phosphatase 148 U/L      Total Protein 7 7 g/dL      Albumin 3 0 g/dL      Total Bilirubin 0 84 mg/dL      eGFR 39 ml/min/1 73sq m     Narrative:      [place] guidelines for Chronic Kidney Disease (CKD):     Stage 1 with normal or high GFR (GFR > 90 mL/min/1 73 square meters)    Stage 2 Mild CKD (GFR = 60-89 mL/min/1 73 square meters)    Stage 3A Moderate CKD (GFR = 45-59 mL/min/1 73 square meters)    Stage 3B Moderate CKD (GFR = 30-44 mL/min/1 73 square meters)    Stage 4 Severe CKD (GFR = 15-29 mL/min/1 73 square meters)    Stage 5 End Stage CKD (GFR <15 mL/min/1 73 square meters)  Note: GFR calculation is accurate only with a steady state creatinine    Lactic acid [[id number]]  (Normal) Collected: 09/21/22 1319    Lab Status: Final result Specimen: Blood from Arm, Right Updated: 09/21/22 1351     LACTIC ACID 1 5 mmol/L     Narrative:      Result may be elevated if tourniquet was used during collection      Protime-INR [[id number]]  (Abnormal) Collected: 09/21/22 1319    Lab Status: Final result Specimen: Blood from Arm, Right Updated: 09/21/22 1348     Protime 36 3 seconds      INR 3 65    APTT [[id number]]  (Abnormal) Collected: 09/21/22 1319    Lab Status: Final result Specimen: Blood from Arm, Right Updated: 09/21/22 1348     PTT 50 seconds     CBC and differential [[id number]]  (Abnormal) Collected: 09/21/22 1319    Lab Status: Final result Specimen: Blood from Arm, Right Updated: 09/21/22 1331     WBC 5 99 Thousand/uL      RBC 3 84 Million/uL      Hemoglobin 10 9 g/dL      Hematocrit 35 6 %      MCV 93 fL      MCH 28 4 pg      MCHC 30 6 g/dL      RDW 18 6 %      MPV 10 7 fL      Platelets 902 Thousands/uL      nRBC 0 /100 WBCs      Neutrophils Relative 63 %      Immat GRANS % 1 %      Lymphocytes Relative 21 %      Monocytes Relative 11 %      Eosinophils Relative 3 %      Basophils Relative 1 %      Neutrophils Absolute 3 79 Thousands/µL      Immature Grans Absolute 0 03 Thousand/uL      Lymphocytes Absolute 1 27 Thousands/µL      Monocytes Absolute 0 66 Thousand/µL      Eosinophils Absolute 0 19 Thousand/µL      Basophils Absolute 0 05 Thousands/µL     FLU/RSV/COVID - if FLU/RSV clinically relevant [[id number]] Collected: 09/21/22 1319    Lab Status: In process Specimen: Nares from Nose Updated: 09/21/22 1329                 XR chest 1 view portable    (Results Pending)              Procedures  ECG 12 Lead Documentation Only    Date/Time: 9/21/2022 1:23 PM  Performed by: Poly Marlow MD  Authorized by: Poly Marlow MD     ECG reviewed by me, the ED Provider: yes    Patient location:  ED  Previous ECG:     Previous ECG:  Unavailable  Rate:     ECG rate:  80  Rhythm:     Rhythm: atrial fibrillation               ED Course  ED Course as of 09/21/22 1401   Wed Sep 21, 2022   1313 On last admission the patient was 162 kilos  Today the patient is 186 kilos                                               MDM    Disposition  Final diagnoses:   Acute on chronic congestive heart failure, unspecified heart failure type [facility])     Time reflects when diagnosis was documented in both MDM as applicable and the Disposition within this note     Time User Action Codes Description Comment    9/21/2022  2:01 PM Joselo Triana Add [I50 9] Acute on chronic congestive heart failure, unspecified heart failure type [facility])       ED Disposition     ED Disposition   Admit    Condition   Stable    Date/Time   Wed Sep 21, 2022  2:01 PM    Comment   Case was discussed with Dr Aneta Goncalves and the patient's admission status was agreed to be  to the service of Dr Amarilis Walters   Follow-up Information    None         Patient's Medications   Discharge Prescriptions    No medications on file       No discharge procedures on file      PDMP Review     None          ED Provider  Electronically Signed by           Lazarus Christine, MD  09/21/22 7920

## 2022-09-21 NOTE — ED NOTES
Unit called to make aware that pt will be brought up  PT to be transported to unit by TPs  No s/s of distress, VS stable, A&Ox4        Yane Arias RN  09/21/22 3014

## 2022-09-21 NOTE — ASSESSMENT & PLAN NOTE
Wt Readings from Last 3 Encounters:   09/21/22 (!) 185 kg (406 lb 15 5 oz)   04/13/20 (!) 162 kg (356 lb 0 7 oz)     · No previous echo in the system suspect diastolic will do a 2D echo now    Shortness of breath leg edema back in 2021 she was admitted she was 356 lb now 406 chest x-ray compliant with CHF  · Was given Lasix 80 mg IV x1 as she was placed on Lasix 80 mg p o  B i d  Starting on 09/20 will continue Lasix 80 mg IV b i d   · I's and O daily weights 1500 fluid restriction  · Troponin neg   · Check tsh

## 2022-09-21 NOTE — H&P
[address]  H&P- Becky Pointer 1955, 69 y o  female MRN: [id number]  Unit/Bed#: ED 11 Encounter: [id number]  Primary Care Provider: Boris Boeck, MD   Date and time admitted to hospital: 9/21/2022  1:02 PM    * Acute on chronic diastolic congestive heart failure [facility])  Assessment & Plan  Wt Readings from Last 3 Encounters:   09/21/22 (!) 185 kg (406 lb 15 5 oz)   04/13/20 (!) 162 kg (356 lb 0 7 oz)     · No previous echo in the system suspect diastolic will do a 2D echo now  Shortness of breath leg edema back in 2021 she was admitted she was 356 lb now 406 chest x-ray compliant with CHF  · Was given Lasix 80 mg IV x1 as she was placed on Lasix 80 mg p o  B i d  Starting on 09/20 will continue Lasix 80 mg IV b i d   · I's and O daily weights 1500 fluid restriction  · Troponin neg   · Check tsh           Hypothyroidism  Assessment & Plan  · Continue Synthroid check a TSH    Morbid obesity ([facility] [address] )  Assessment & Plan  · Nutrition for weight loss counseling  Will get a PT OT to evaluate ambulatory status    IMTIAZ (acute kidney injury) ([facility] [address] )  Assessment & Plan  · Creatinine baseline is 0 9 suspect secondary to cardiorenal   Will do urine sodium and urine urea she is on Lasix to find out monitor urine output  Supratherapeutic INR  Assessment & Plan  · Hold Coumadin recheck INR tomorrow    Chronic respiratory failure with hypoxia (HCC)  Assessment & Plan  · Chronically on CPAP at night and 4 L of oxygen during the day secondary to COPD most likely NISHI    Anemia  Assessment & Plan  · Chronic anemia hemoglobin is stable continue iron    Diabetes mellitus ([facility] [address] )  Assessment & Plan  Lab Results   Component Value Date    HGBA1C 8 0 (H) 04/13/2020       No results for input(s): POCGLU in the last 72 hours  Blood Sugar Average: Last 72 hrs:  ·  hold p o  medication resume Levemir 26 units at night    Place on sliding scale and observe how she is doing current regimen    COPD (chronic obstructive pulmonary disease) ([facility] [address] )  Assessment & Plan  · Not in exacerbation continue Breo    Atrial fibrillation [facility])  Assessment & Plan  · Persistent rate controlled will hold warfarin as INR supratherapeutic  Will restart once 3 continue Toprol XL    VTE Pharmacologic Prophylaxis: VTE Score: 4 Moderate Risk (Score 3-4) - Pharmacological DVT Prophylaxis Contraindicated  Sequential Compression Devices Ordered  Code Status: Level 1 - Full Code   Discussion with family: patient    Anticipated Length of Stay: Patient will be admitted on an inpatient basis with an anticipated length of stay of greater than 2 midnights secondary to CHF exacerbation  Total Time for Visit, including Counseling / Coordination of Care: 60 minutes Greater than 50% of this total time spent on direct patient counseling and coordination of care  Chief Complaint: sob    History of Present Illness:  Alejandro Thomas is a 69 y o  female with a PMH of afib who presents with worsening  Sob when had uti 2 weeks ago, with exertion  increased leg swelling  Taking lasix 80 mg  Po bid yesterday boosted to this dose  Positive for orthopenia   peeing ok   Thirsty - has been drinking about 48oz a day  No cp walks to potty chair , poor ambulation  hasnt walked well since knee fracture 6 years ago  Nodizziness  Weight gain uncertain amount  Abdominal distention   No diarrhea  Or constipation   No dysuria  Positive pnd     Review of Systems:  Review of Systems   Constitutional: Positive for unexpected weight change  Negative for chills and fever  HENT: Negative for ear pain and sore throat  Eyes: Negative for pain and visual disturbance  Respiratory: Positive for shortness of breath  Negative for cough  Cardiovascular: Positive for leg swelling  Negative for chest pain and palpitations  Gastrointestinal: Negative for abdominal pain and vomiting  Genitourinary: Negative for dysuria and hematuria     Musculoskeletal: Negative for arthralgias and back pain  Skin: Negative for color change and rash  Neurological: Negative for seizures and syncope  All other systems reviewed and are negative  Past Medical and Surgical History:   Past Medical History:   Diagnosis Date    Asthma     Atrial fibrillation ([place] [address] )     [facility])     Diabetes mellitus ([place] [address] )     Disease of thyroid gland     Heart failure ([place] [address] )     Kidney failure        Past Surgical History:   Procedure Laterality Date    CARDIOVERSION N/A     GALLBLADDER SURGERY      HERNIA REPAIR         Meds/Allergies:  Prior to Admission medications    Medication Sig Start Date End Date Taking? Authorizing Provider   acetaminophen (TYLENOL) 325 mg tablet Take 2 tablets (650 mg total) by mouth every 6 (six) hours as needed for fever 4/15/20   Dave Jj MD   atorvastatin (LIPITOR) 10 mg tablet Take 10 mg by mouth daily    Historical Provider, MD   cetirizine (ZyrTEC) 10 mg tablet Take 10 mg by mouth daily    Historical Provider, MD   cholecalciferol (VITAMIN D3) 1,000 units tablet Take 2,000 Units by mouth daily    Historical Provider, MD   fluticasone-vilanterol (Breo Ellipta) 100-25 mcg/inh inhaler Inhale 1 puff daily Rinse mouth after use      Historical Provider, MD   furosemide (Lasix) 20 mg tablet Take 60 mg by mouth 2 (two) times a day    Historical Provider, MD   glipiZIDE (GLUCOTROL) 5 mg tablet Take 5 mg by mouth 2 (two) times a day Afternoon and evening    Historical Provider, MD   hydroxychloroquine (PLAQUENIL) 200 mg tablet Take 1 tablet (200 mg total) by mouth every 12 (twelve) hours for 6 doses 4/15/20 4/18/20  Dave Jj MD   insulin detemir (LEVEMIR) 100 units/mL subcutaneous injection Inject 18 Units under the skin daily at bedtime    Historical Provider, MD   levothyroxine 200 mcg tablet Take 200 mcg by mouth daily    Historical Provider, MD   Levothyroxine Sodium 88 MCG CAPS Take by mouth daily    Historical Provider, MD lisinopril (ZESTRIL) 10 mg tablet Take 10 mg by mouth daily    Historical Provider, MD   METOPROLOL SUCCINATE ER PO Take by mouth 2 (two) times a day    Historical Provider, MD   montelukast (SINGULAIR) 10 mg tablet Take 10 mg by mouth daily at bedtime    Historical Provider, MD   multivitamin (THERAGRAN) TABS Take 1 tablet by mouth daily    Historical Provider, MD   omeprazole (PriLOSEC) 40 MG capsule Take 40 mg by mouth daily    Historical Provider, MD     I have reviewed home medications with patient personally  Allergies:    Allergies   Allergen Reactions    Acesulfame Potassium - Food Allergy Anaphylaxis    Aspartame - Food Allergy Anaphylaxis    Saccharin - Food Allergy Anaphylaxis    Sucralose - Food Allergy Anaphylaxis    Metformin GI Intolerance       Social History:  Marital Status: /Civil Union   Substance Use History:   Social History     Substance and Sexual Activity   Alcohol Use Never     Social History     Tobacco Use   Smoking Status Never Smoker   Smokeless Tobacco Never Used     Social History     Substance and Sexual Activity   Drug Use Never       Family History:  Family History   Problem Relation Age of Onset    Arthritis Mother     Hearing loss Mother     Heart disease Mother     Hypertension Mother     Stroke Mother     Alcohol abuse Father     Cancer Father     Diabetes Father     Arthritis Sister     Cancer Sister     Alcohol abuse Brother     Diabetes Son     Arthritis Daughter     Drug abuse Daughter     Heart disease Maternal Grandmother     Hypertension Maternal Grandmother     Stroke Maternal Grandmother     Arthritis Maternal Aunt     Asthma Neg Hx     Birth defects Neg Hx     COPD Neg Hx     Depression Neg Hx     Early death Neg Hx     Hyperlipidemia Neg Hx     Kidney disease Neg Hx     Learning disabilities Neg Hx     Mental illness Neg Hx     Mental retardation Neg Hx     Miscarriages / Stillbirths Neg Hx     Vision loss Neg Hx Physical Exam:     Vitals:   Blood Pressure: 99/62 (09/21/22 1445)  Pulse: 78 (09/21/22 1445)  Temperature: 97 5 °F (36 4 °C) (09/21/22 1306)  Temp Source: Temporal (09/21/22 1306)  Respirations: 19 (09/21/22 1445)  Weight - Scale: (!) 185 kg (406 lb 15 5 oz) (09/21/22 1306)  SpO2: 100 % (09/21/22 1445)    Physical Exam  Vitals and nursing note reviewed  Constitutional:       General: She is not in acute distress  Appearance: She is well-developed  She is obese  HENT:      Head: Normocephalic and atraumatic  Eyes:      Conjunctiva/sclera: Conjunctivae normal    Cardiovascular:      Rate and Rhythm: Normal rate  Rhythm irregular  Heart sounds: No murmur heard  Pulmonary:      Effort: Pulmonary effort is normal  No respiratory distress  Breath sounds: Rales present  Abdominal:      General: There is no distension  Palpations: Abdomen is soft  Tenderness: There is no abdominal tenderness  Musculoskeletal:         General: Swelling present  Cervical back: Neck supple  Comments: Inner legs dry elegatyor skin    Skin:     General: Skin is warm and dry  Neurological:      Mental Status: She is alert and oriented to person, place, and time           Additional Data:     Lab Results:  Results from last 7 days   Lab Units 09/21/22  1319   WBC Thousand/uL 5 99   HEMOGLOBIN g/dL 10 9*   HEMATOCRIT % 35 6   PLATELETS Thousands/uL 204   NEUTROS PCT % 63   LYMPHS PCT % 21   MONOS PCT % 11   EOS PCT % 3     Results from last 7 days   Lab Units 09/21/22  1319   SODIUM mmol/L 144   POTASSIUM mmol/L 4 2   CHLORIDE mmol/L 103   CO2 mmol/L 34*   BUN mg/dL 39*   CREATININE mg/dL 1 37*   ANION GAP mmol/L 7   CALCIUM mg/dL 9 0   ALBUMIN g/dL 3 0*   TOTAL BILIRUBIN mg/dL 0 84   ALK PHOS U/L 148*   ALT U/L 16   AST U/L 27   GLUCOSE RANDOM mg/dL 151*     Results from last 7 days   Lab Units 09/21/22  1319   INR  3 65*             Results from last 7 days   Lab Units 09/21/22  1319   LACTIC ACID mmol/L 1 5       Imaging: Reviewed radiology reports from this admission including: chest xray  XR chest 1 view portable   Final Result by Mirela Gabriel MD (09/21 1422)   New moderate hepatomegaly  Correlate with echo for pericardial effusion  Mild congestive changes  Workstation performed: [id number]             EKG and Other Studies Reviewed on Admission:   · EKG: Atrial fibrillation  HR 80     ** Please Note: This note has been constructed using a voice recognition system   **

## 2022-09-22 LAB
ALBUMIN SERPL BCP-MCNC: 2.8 G/DL (ref 3.5–5)
ALP SERPL-CCNC: 129 U/L (ref 46–116)
ALT SERPL W P-5'-P-CCNC: 17 U/L (ref 12–78)
ANION GAP SERPL CALCULATED.3IONS-SCNC: 3 MMOL/L (ref 4–13)
AST SERPL W P-5'-P-CCNC: 26 U/L (ref 5–45)
BASOPHILS # BLD AUTO: 0.04 THOUSANDS/ΜL (ref 0–0.1)
BASOPHILS NFR BLD AUTO: 1 % (ref 0–1)
BILIRUB SERPL-MCNC: 1 MG/DL (ref 0.2–1)
BUN SERPL-MCNC: 36 MG/DL (ref 5–25)
CALCIUM ALBUM COR SERPL-MCNC: 10.1 MG/DL (ref 8.3–10.1)
CALCIUM SERPL-MCNC: 9.1 MG/DL (ref 8.3–10.1)
CHLORIDE SERPL-SCNC: 105 MMOL/L (ref 96–108)
CO2 SERPL-SCNC: 37 MMOL/L (ref 21–32)
CREAT SERPL-MCNC: 1.19 MG/DL (ref 0.6–1.3)
EOSINOPHIL # BLD AUTO: 0.23 THOUSAND/ΜL (ref 0–0.61)
EOSINOPHIL NFR BLD AUTO: 4 % (ref 0–6)
ERYTHROCYTE [DISTWIDTH] IN BLOOD BY AUTOMATED COUNT: 18.8 % (ref 11.6–15.1)
GFR SERPL CREATININE-BSD FRML MDRD: 46 ML/MIN/1.73SQ M
GLUCOSE SERPL-MCNC: 113 MG/DL (ref 65–140)
GLUCOSE SERPL-MCNC: 132 MG/DL (ref 65–140)
GLUCOSE SERPL-MCNC: 141 MG/DL (ref 65–140)
GLUCOSE SERPL-MCNC: 76 MG/DL (ref 65–140)
GLUCOSE SERPL-MCNC: 93 MG/DL (ref 65–140)
HCT VFR BLD AUTO: 35.2 % (ref 34.8–46.1)
HGB BLD-MCNC: 10.7 G/DL (ref 11.5–15.4)
IMM GRANULOCYTES # BLD AUTO: 0.02 THOUSAND/UL (ref 0–0.2)
IMM GRANULOCYTES NFR BLD AUTO: 0 % (ref 0–2)
INR PPP: 3.55 (ref 0.84–1.19)
LYMPHOCYTES # BLD AUTO: 1.17 THOUSANDS/ΜL (ref 0.6–4.47)
LYMPHOCYTES NFR BLD AUTO: 20 % (ref 14–44)
MAGNESIUM SERPL-MCNC: 2.1 MG/DL (ref 1.6–2.6)
MCH RBC QN AUTO: 28.4 PG (ref 26.8–34.3)
MCHC RBC AUTO-ENTMCNC: 30.4 G/DL (ref 31.4–37.4)
MCV RBC AUTO: 93 FL (ref 82–98)
MONOCYTES # BLD AUTO: 0.78 THOUSAND/ΜL (ref 0.17–1.22)
MONOCYTES NFR BLD AUTO: 13 % (ref 4–12)
NEUTROPHILS # BLD AUTO: 3.65 THOUSANDS/ΜL (ref 1.85–7.62)
NEUTS SEG NFR BLD AUTO: 62 % (ref 43–75)
NRBC BLD AUTO-RTO: 0 /100 WBCS
PLATELET # BLD AUTO: 206 THOUSANDS/UL (ref 149–390)
PMV BLD AUTO: 10.6 FL (ref 8.9–12.7)
POTASSIUM SERPL-SCNC: 4.2 MMOL/L (ref 3.5–5.3)
PROT SERPL-MCNC: 7.4 G/DL (ref 6.4–8.4)
PROTHROMBIN TIME: 35.5 SECONDS (ref 11.6–14.5)
RBC # BLD AUTO: 3.77 MILLION/UL (ref 3.81–5.12)
SODIUM SERPL-SCNC: 145 MMOL/L (ref 135–147)
TSH SERPL DL<=0.05 MIU/L-ACNC: 1.96 UIU/ML (ref 0.45–4.5)
WBC # BLD AUTO: 5.89 THOUSAND/UL (ref 4.31–10.16)

## 2022-09-22 PROCEDURE — 83735 ASSAY OF MAGNESIUM: CPT | Performed by: FAMILY MEDICINE

## 2022-09-22 PROCEDURE — 82948 REAGENT STRIP/BLOOD GLUCOSE: CPT

## 2022-09-22 PROCEDURE — 85025 COMPLETE CBC W/AUTO DIFF WBC: CPT | Performed by: FAMILY MEDICINE

## 2022-09-22 PROCEDURE — 99232 SBSQ HOSP IP/OBS MODERATE 35: CPT | Performed by: FAMILY MEDICINE

## 2022-09-22 PROCEDURE — 80053 COMPREHEN METABOLIC PANEL: CPT | Performed by: FAMILY MEDICINE

## 2022-09-22 PROCEDURE — 85610 PROTHROMBIN TIME: CPT | Performed by: FAMILY MEDICINE

## 2022-09-22 PROCEDURE — 97163 PT EVAL HIGH COMPLEX 45 MIN: CPT

## 2022-09-22 PROCEDURE — 97167 OT EVAL HIGH COMPLEX 60 MIN: CPT

## 2022-09-22 PROCEDURE — 84443 ASSAY THYROID STIM HORMONE: CPT | Performed by: FAMILY MEDICINE

## 2022-09-22 RX ORDER — OXYCODONE HYDROCHLORIDE 5 MG/1
2.5 TABLET ORAL ONCE
Status: COMPLETED | OUTPATIENT
Start: 2022-09-22 | End: 2022-09-22

## 2022-09-22 RX ORDER — METOLAZONE 5 MG/1
2.5 TABLET ORAL ONCE
Status: COMPLETED | OUTPATIENT
Start: 2022-09-22 | End: 2022-09-22

## 2022-09-22 RX ORDER — ACETAMINOPHEN 325 MG/1
650 TABLET ORAL EVERY 6 HOURS PRN
Status: DISCONTINUED | OUTPATIENT
Start: 2022-09-22 | End: 2022-10-03 | Stop reason: HOSPADM

## 2022-09-22 RX ADMIN — LEVOTHYROXINE SODIUM 200 MCG: 100 TABLET ORAL at 08:47

## 2022-09-22 RX ADMIN — MONTELUKAST 10 MG: 10 TABLET, FILM COATED ORAL at 21:52

## 2022-09-22 RX ADMIN — LEVOTHYROXINE SODIUM 88 MCG: 100 TABLET ORAL at 05:48

## 2022-09-22 RX ADMIN — ALLOPURINOL 100 MG: 100 TABLET ORAL at 08:48

## 2022-09-22 RX ADMIN — METOPROLOL SUCCINATE 100 MG: 100 TABLET, FILM COATED, EXTENDED RELEASE ORAL at 08:48

## 2022-09-22 RX ADMIN — FUROSEMIDE 80 MG: 10 INJECTION, SOLUTION INTRAMUSCULAR; INTRAVENOUS at 17:35

## 2022-09-22 RX ADMIN — METOPROLOL SUCCINATE 100 MG: 100 TABLET, FILM COATED, EXTENDED RELEASE ORAL at 17:35

## 2022-09-22 RX ADMIN — Medication 2000 UNITS: at 08:47

## 2022-09-22 RX ADMIN — ATORVASTATIN CALCIUM 10 MG: 10 TABLET, FILM COATED ORAL at 08:48

## 2022-09-22 RX ADMIN — FLUTICASONE FUROATE AND VILANTEROL TRIFENATATE 1 PUFF: 100; 25 POWDER RESPIRATORY (INHALATION) at 08:53

## 2022-09-22 RX ADMIN — OXYCODONE HYDROCHLORIDE 2.5 MG: 5 TABLET ORAL at 16:44

## 2022-09-22 RX ADMIN — ACETAMINOPHEN 650 MG: 325 TABLET ORAL at 19:57

## 2022-09-22 RX ADMIN — FUROSEMIDE 80 MG: 10 INJECTION, SOLUTION INTRAMUSCULAR; INTRAVENOUS at 08:47

## 2022-09-22 RX ADMIN — FERROUS SULFATE TAB 325 MG (65 MG ELEMENTAL FE) 325 MG: 325 (65 FE) TAB at 08:47

## 2022-09-22 RX ADMIN — ACETAMINOPHEN 650 MG: 325 TABLET ORAL at 12:12

## 2022-09-22 RX ADMIN — METOLAZONE 2.5 MG: 5 TABLET ORAL at 16:43

## 2022-09-22 RX ADMIN — PANTOPRAZOLE SODIUM 40 MG: 40 TABLET, DELAYED RELEASE ORAL at 05:47

## 2022-09-22 RX ADMIN — INSULIN DETEMIR 20 UNITS: 100 INJECTION, SOLUTION SUBCUTANEOUS at 21:52

## 2022-09-22 RX ADMIN — NYSTATIN: 100000 POWDER TOPICAL at 08:53

## 2022-09-22 RX ADMIN — LORATADINE 10 MG: 10 TABLET ORAL at 08:48

## 2022-09-22 NOTE — PROGRESS NOTES
[address]  Progress Note Yasmine Corona 1955, 69 y o  female MRN: [id number]  Unit/Bed#: -01 Encounter: [id number]  Primary Care Provider: Genna Lebron MD   Date and time admitted to hospital: 9/21/2022  1:02 PM    * Acute on chronic diastolic congestive heart failure [facility])  Assessment & Plan  Wt Readings from Last 3 Encounters:   09/22/22 (!) 184 kg (405 lb 3 3 oz)   04/13/20 (!) 162 kg (356 lb 0 7 oz)     · No previous echo in the system suspect diastolic will do a 2D echo now  Shortness of breath leg edema back in 2021 she was admitted she was 356 lb now 406 chest x-ray compliant with CHF  · Was given Lasix 80 mg IV x1 as she was placed on Lasix 80 mg p o  B i d , with Lasix 80 mg IV b i d  shin lost a lb 1 4 L out still quite volume overloaded will give metolazone 2 5 mg p o  X1 prior to the next dose I's and O daily weights 1500 fluid restriction  · Troponin neg  · TSH is normal  · Would want at least 2 L per day diuresis for now  If continues to not make enough progress wanted to place on Lasix drip creatinine has improved with diuresis          Hypothyroidism  Assessment & Plan  · Continue Synthroid TSH normal    Morbid obesity ([facility] [address] )  Assessment & Plan  · Nutrition for weight loss counseling    Will get a PT OT to evaluate ambulatory status    IMTIAZ (acute kidney injury) ([facility] [address] )  Assessment & Plan  · Creatinine baseline is 0 9 suspect secondary to cardiorenal   Improved with diuresis    Supratherapeutic INR  Assessment & Plan  · Hold Coumadin recheck INR tomorrow although coming down    Chronic respiratory failure with hypoxia (HCC)  Assessment & Plan  · Chronically on CPAP at night and 4 L of oxygen during the day secondary to COPD most likely NISHI    Anemia  Assessment & Plan  · Chronic anemia hemoglobin is stable continue iron    Diabetes mellitus [facility])  Assessment & Plan  Lab Results   Component Value Date    HGBA1C 8 0 (H) 04/13/2020       Recent Labs 22  1657 22  2129 22  0736 22  1056   POCGLU 109 156* 76 132       Blood Sugar Average: Last 72 hrs:  · (P) 118 25 hold p o  medication fasting was on the low side  Reduce Levemir to 20 units at night continue sliding scale pre meals are controlled    COPD (chronic obstructive pulmonary disease) ([facility])  Assessment & Plan  · Not in exacerbation continue Breo    Atrial fibrillation [facility])  Assessment & Plan  · Persistent rate controlled will hold warfarin as INR supratherapeutic  Will restart once 3 continue Toprol XL        VTE Pharmacologic Prophylaxis: VTE Score: 4 Moderate Risk (Score 3-4) - Pharmacological DVT Prophylaxis Contraindicated  Sequential Compression Devices Ordered  Patient Centered Rounds: I performed bedside rounds with nursing staff today  Discussions with Specialists or Other Care Team Provider: cm    Education and Discussions with Family / Patient: patient will update    Time Spent for Care: 30 minutes  More than 50% of total time spent on counseling and coordination of care as described above  Current Length of Stay: 1 day(s)  Current Patient Status: Inpatient   Certification Statement: The patient will continue to require additional inpatient hospital stay due to chf  Discharge Plan: Anticipate discharge in 48-72 hrs to discharge location to be determined pending rehab evaluations  Code Status: Level 1 - Full Code    Subjective:   Seen and examined, sob slight better     Objective:     Vitals:   Temp (24hrs), Av °F (36 7 °C), Min:97 5 °F (36 4 °C), Max:98 4 °F (36 9 °C)    Temp:  [97 5 °F (36 4 °C)-98 4 °F (36 9 °C)] 98 2 °F (36 8 °C)  HR:  [69-85] 82  Resp:  [16-23] 16  BP: ()/(52-74) 127/52  SpO2:  [93 %-100 %] 100 %  Body mass index is 74 11 kg/m²  Input and Output Summary (last 24 hours):      Intake/Output Summary (Last 24 hours) at 2022 1248  Last data filed at 2022 1058  Gross per 24 hour   Intake 600 ml   Output 1400 ml Net -800 ml       Physical Exam:   Physical Exam  Vitals and nursing note reviewed  Constitutional:       General: She is not in acute distress  Appearance: She is well-developed  HENT:      Head: Normocephalic and atraumatic  Eyes:      Conjunctiva/sclera: Conjunctivae normal    Cardiovascular:      Rate and Rhythm: Normal rate  Rhythm irregular  Heart sounds: No murmur heard  Pulmonary:      Effort: Pulmonary effort is normal  No respiratory distress  Breath sounds: Rales present  Abdominal:      Palpations: Abdomen is soft  Tenderness: There is no abdominal tenderness  Musculoskeletal:         General: Swelling present  Cervical back: Neck supple  Skin:     General: Skin is warm and dry  Neurological:      Mental Status: She is alert and oriented to person, place, and time           Additional Data:     Labs:  Results from last 7 days   Lab Units 09/22/22  0600   WBC Thousand/uL 5 89   HEMOGLOBIN g/dL 10 7*   HEMATOCRIT % 35 2   PLATELETS Thousands/uL 206   NEUTROS PCT % 62   LYMPHS PCT % 20   MONOS PCT % 13*   EOS PCT % 4     Results from last 7 days   Lab Units 09/22/22  0600   SODIUM mmol/L 145   POTASSIUM mmol/L 4 2   CHLORIDE mmol/L 105   CO2 mmol/L 37*   BUN mg/dL 36*   CREATININE mg/dL 1 19   ANION GAP mmol/L 3*   CALCIUM mg/dL 9 1   ALBUMIN g/dL 2 8*   TOTAL BILIRUBIN mg/dL 1 00   ALK PHOS U/L 129*   ALT U/L 17   AST U/L 26   GLUCOSE RANDOM mg/dL 93     Results from last 7 days   Lab Units 09/22/22  0600   INR  3 55*     Results from last 7 days   Lab Units 09/22/22  1056 09/22/22  0736 09/21/22  2129 09/21/22  1657   POC GLUCOSE mg/dl 132 76 156* 109         Results from last 7 days   Lab Units 09/21/22  1319   LACTIC ACID mmol/L 1 5       Lines/Drains:  Invasive Devices  Report    Peripheral Intravenous Line  Duration           Peripheral IV 09/21/22 Right Antecubital <1 day          Drain  Duration           External Urinary Catheter 891 days Imaging: Reviewed radiology reports from this admission including: chest xray    Recent Cultures (last 7 days):         Last 24 Hours Medication List:   Current Facility-Administered Medications   Medication Dose Route Frequency Provider Last Rate    acetaminophen  650 mg Oral Q6H PRN Navjot Pittman MD      allopurinol  100 mg Oral Daily Navjot Pittman MD      ammonium lactate   Topical BID PRN Navjot Pittman MD      atorvastatin  10 mg Oral Daily Navjot Pittman MD      cholecalciferol  2,000 Units Oral Daily Navjot Pittman MD      ferrous sulfate  325 mg Oral Daily With Breakfast Navjot Pittman MD      fluticasone-vilanterol  1 puff Inhalation Daily Navjot Pittman MD      furosemide  80 mg Intravenous BID Navjot Pittman MD      insulin detemir  20 Units Subcutaneous HS Navjot Pittman MD      insulin lispro  4-20 Units Subcutaneous TID [facility] Navjot Pittman MD      levothyroxine  200 mcg Oral Daily Navjot Pittman MD      levothyroxine  88 mcg Oral Early Morning Navjot Pittman MD      loratadine  10 mg Oral Daily Navjot Pittman MD      metolazone  2 5 mg Oral Once Navjot Pittman MD      metoprolol succinate  100 mg Oral BID Navjot Pittman MD      montelukast  10 mg Oral HS Navjot Pittman MD      nystatin   Topical BID Navjot Pittman MD      pantoprazole  40 mg Oral Early Morning Navjot Pittman MD          Today, Patient Was Seen By: Navjot Pittman MD    **Please Note: This note may have been constructed using a voice recognition system  **

## 2022-09-22 NOTE — NURSING NOTE
Unable to get patient weight this AM due to the bed not being zeroed prior to patient being admitted  Hovermat was utilized to transfer patient  Patient unable to stand or move to attempt standing weight

## 2022-09-22 NOTE — ASSESSMENT & PLAN NOTE
Wt Readings from Last 3 Encounters:   09/22/22 (!) 184 kg (405 lb 3 3 oz)   04/13/20 (!) 162 kg (356 lb 0 7 oz)     · No previous echo in the system suspect diastolic will do a 2D echo now  Shortness of breath leg edema back in 2021 she was admitted she was 356 lb now 406 chest x-ray compliant with CHF  · Was given Lasix 80 mg IV x1 as she was placed on Lasix 80 mg p o  B i d , with Lasix 80 mg IV b i d  shin lost a lb 1 4 L out still quite volume overloaded will give metolazone 2 5 mg p o  X1 prior to the next dose I's and O daily weights 1500 fluid restriction  · Troponin neg  · TSH is normal  · Would want at least 2 L per day diuresis for now    If continues to not make enough progress wanted to place on Lasix drip creatinine has improved with diuresis

## 2022-09-22 NOTE — PLAN OF CARE
Problem: MOBILITY - ADULT  Goal: Maintain or return to baseline ADL function  Description: INTERVENTIONS:  -  Assess patient's ability to carry out ADLs; assess patient's baseline for ADL function and identify physical deficits which impact ability to perform ADLs (bathing, care of mouth/teeth, toileting, grooming, dressing, etc )  - Assess/evaluate cause of self-care deficits   - Assess range of motion  - Assess patient's mobility; develop plan if impaired  - Assess patient's need for assistive devices and provide as appropriate  - Encourage maximum independence but intervene and supervise when necessary  - Involve family in performance of ADLs  - Assess for home care needs following discharge   - Consider OT consult to assist with ADL evaluation and planning for discharge  - Provide patient education as appropriate  Outcome: Progressing  Goal: Maintains/Returns to pre admission functional level  Description: INTERVENTIONS:  - Perform BMAT or MOVE assessment daily    - Set and communicate daily mobility goal to care team and patient/family/caregiver  - Collaborate with rehabilitation services on mobility goals if consulted  - Perform Range of Motion - times a day  - Reposition patient every - hours    - Dangle patient - times a day  - Stand patient - times a day  - Ambulate patient - times a day  - Out of bed to chair - times a day   - Out of bed for meals - times a day  - Out of bed for toileting  - Record patient progress and toleration of activity level   Outcome: Progressing     Problem: Potential for Falls  Goal: Patient will remain free of falls  Description: INTERVENTIONS:  - Educate patient/family on patient safety including physical limitations  - Instruct patient to call for assistance with activity   - Consult OT/PT to assist with strengthening/mobility   - Keep Call bell within reach  - Keep bed low and locked with side rails adjusted as appropriate  - Keep care items and personal belongings within reach  - Initiate and maintain comfort rounds  - Make Fall Risk Sign visible to staff  - Offer Toileting every 2 Hours, in advance of need  - Initiate/Maintain - alarm  - Obtain necessary fall risk management equipment  - Apply yellow socks and bracelet for high fall risk patients  - Consider moving patient to room near nurses station  Outcome: Progressing     Problem: Prexisting or High Potential for Compromised Skin Integrity  Goal: Skin integrity is maintained or improved  Description: INTERVENTIONS:  - Identify patients at risk for skin breakdown  - Assess and monitor skin integrity  - Assess and monitor nutrition and hydration status  - Monitor labs   - Assess for incontinence   - Turn and reposition patient  - Assist with mobility/ambulation  - Relieve pressure over bony prominences  - Avoid friction and shearing  - Provide appropriate hygiene as needed including keeping skin clean and dry  - Evaluate need for skin moisturizer/barrier cream  - Collaborate with interdisciplinary team   - Patient/family teaching  - Consider wound care consult   Outcome: Progressing     Problem: PAIN - ADULT  Goal: Verbalizes/displays adequate comfort level or baseline comfort level  Description: Interventions:  - Encourage patient to monitor pain and request assistance  - Assess pain using appropriate pain scale  - Administer analgesics based on type and severity of pain and evaluate response  - Implement non-pharmacological measures as appropriate and evaluate response  - Consider cultural and social influences on pain and pain management  - Notify physician/advanced practitioner if interventions unsuccessful or patient reports new pain  Outcome: Progressing     Problem: PAIN - ADULT  Goal: Verbalizes/displays adequate comfort level or baseline comfort level  Description: Interventions:  - Encourage patient to monitor pain and request assistance  - Assess pain using appropriate pain scale  - Administer analgesics based on type and severity of pain and evaluate response  - Implement non-pharmacological measures as appropriate and evaluate response  - Consider cultural and social influences on pain and pain management  - Notify physician/advanced practitioner if interventions unsuccessful or patient reports new pain  Outcome: Progressing     Problem: INFECTION - ADULT  Goal: Absence or prevention of progression during hospitalization  Description: INTERVENTIONS:  - Assess and monitor for signs and symptoms of infection  - Monitor lab/diagnostic results  - Monitor all insertion sites, i e  indwelling lines, tubes, and drains  - Monitor endotracheal if appropriate and nasal secretions for changes in amount and color  - [place] appropriate cooling/warming therapies per order  - Administer medications as ordered  - Instruct and encourage patient and family to use good hand hygiene technique  - Identify and instruct in appropriate isolation precautions for identified infection/condition  Outcome: Progressing  Goal: Absence of fever/infection during neutropenic period  Description: INTERVENTIONS:  - Monitor WBC    Outcome: Progressing     Problem: SAFETY ADULT  Goal: Maintain or return to baseline ADL function  Description: INTERVENTIONS:  -  Assess patient's ability to carry out ADLs; assess patient's baseline for ADL function and identify physical deficits which impact ability to perform ADLs (bathing, care of mouth/teeth, toileting, grooming, dressing, etc )  - Assess/evaluate cause of self-care deficits   - Assess range of motion  - Assess patient's mobility; develop plan if impaired  - Assess patient's need for assistive devices and provide as appropriate  - Encourage maximum independence but intervene and supervise when necessary  - Involve family in performance of ADLs  - Assess for home care needs following discharge   - Consider OT consult to assist with ADL evaluation and planning for discharge  - Provide patient education as appropriate  Outcome: Progressing  Goal: Maintains/Returns to pre admission functional level  Description: INTERVENTIONS:  - Perform BMAT or MOVE assessment daily    - Set and communicate daily mobility goal to care team and patient/family/caregiver  - Collaborate with rehabilitation services on mobility goals if consulted  - Perform Range of Motion - times a day  - Reposition patient every - hours    - Dangle patient - times a day  - Stand patient - times a day  - Ambulate patient - times a day  - Out of bed to chair - times a day   - Out of bed for meals - times a day  - Out of bed for toileting  - Record patient progress and toleration of activity level   Outcome: Progressing  Goal: Patient will remain free of falls  Description: INTERVENTIONS:  - Educate patient/family on patient safety including physical limitations  - Instruct patient to call for assistance with activity   - Consult OT/PT to assist with strengthening/mobility   - Keep Call bell within reach  - Keep bed low and locked with side rails adjusted as appropriate  - Keep care items and personal belongings within reach  - Initiate and maintain comfort rounds  - Make Fall Risk Sign visible to staff  - Offer Toileting every 2 Hours, in advance of need  - Initiate/Maintain alarm  - Obtain necessary fall risk management equipment  - Apply yellow socks and bracelet for high fall risk patients  - Consider moving patient to room near nurses station  Outcome: Progressing     Problem: DISCHARGE PLANNING  Goal: Discharge to home or other facility with appropriate resources  Description: INTERVENTIONS:  - Identify barriers to discharge w/patient and caregiver  - Arrange for needed discharge resources and transportation as appropriate  - Identify discharge learning needs (meds, wound care, etc )  - Arrange for interpretive services to assist at discharge as needed  - Refer to Case Management Department for coordinating discharge planning if the patient needs post-hospital services based on physician/advanced practitioner order or complex needs related to functional status, cognitive ability, or social support system  Outcome: Progressing     Problem: Knowledge Deficit  Goal: Patient/family/caregiver demonstrates understanding of disease process, treatment plan, medications, and discharge instructions  Description: Complete learning assessment and assess knowledge base    Interventions:  - Provide teaching at level of understanding  - Provide teaching via preferred learning methods  Outcome: Progressing

## 2022-09-22 NOTE — PLAN OF CARE
Problem: PHYSICAL THERAPY ADULT  Goal: Performs mobility at highest level of function for planned discharge setting  See evaluation for individualized goals  Description: Treatment/Interventions: Functional transfer training, LE strengthening/ROM, Therapeutic exercise, Endurance training, Patient/family training, Equipment eval/education, Bed mobility, Gait training, Compensatory technique education, Spoke to nursing, OT  Equipment Recommended:  (TBD by rehab)       See flowsheet documentation for full assessment, interventions and recommendations  Note: Prognosis: Guarded  Problem List: Decreased strength, Decreased mobility, Decreased endurance, Impaired balance, Impaired judgement, Decreased safety awareness, Obesity, Pain, Decreased skin integrity  Assessment: Pt is a 69 y o  female seen for PT evaluation s/p admission to [address] [place] [address] on 9/21/2022 with Acute on chronic diastolic [facility]  Order placed for PT services  Upon evaluation: Pt is presenting with impaired functional mobility due to pain, decreased strength, decreased endurance, impaired balance, decreased safety awareness, impaired judgment, fall risk, and impaired skin integrity requiring  max x 3-4 assistance for bed mobility and max x 2 assistance for transfers  Pt's clinical presentation is currently unstable/unpredictable given the functional mobility deficits above coupled with fall risks as indicated by AM-PAC 6-Clicks: 4/89 as well as impaired balance, polypharmacy, impaired judgement, and decreased safety awareness and combined with medical complications of abnormal renal lab values, abnormal H&H, abnormal CO2 values and need for input for mobility technique/safety  Pt's PMHx and comorbidities that may affect physical performance and progress include: CHF, COPD, A fib, DM, asthma and obesity   Personal factors affecting pt at time of IE include: inaccessible home environment, past experience, behavioral pattern, inability to perform IADLs, inability to perform ADLs, inability to navigate level surfaces without external assistance, inability to ambulate household distances, inability to navigate community distances, limited insight into impairments and decreased initiation and engagement  Pt will benefit from continued skilled PT services to address deficits as defined above and to maximize level of functional mobility to facilitate return toward PLOF and improved QOL  From PT/mobility standpoint, recommendation at time of d/c would be Short term rehab pending progress in order to reduce fall risk and maximize pt's functional independence and consistency with mobility in order to facilitate return to PLOF  Recommend trial with walker next 1-2 sessions, ther ex next 1-2 sessions and mechanical conveyance from nursing standpoint for OOB mobility  Barriers to Discharge: Inaccessible home environment, Decreased caregiver support  Barriers to Discharge Comments: Pt is A x 2-4 for all mobility  PT Discharge Recommendation: Post acute rehabilitation services    See flowsheet documentation for full assessment

## 2022-09-22 NOTE — UTILIZATION REVIEW
Initial Clinical Review    Admission: Date/Time/Statement:   Admission Orders (From admission, onward)     Ordered        09/21/22 1401  INPATIENT ADMISSION  Once                      Orders Placed This Encounter   Procedures    INPATIENT ADMISSION     Standing Status:   Standing     Number of Occurrences:   1     Order Specific Question:   Level of Care     Answer:   Med Surg [16]     Order Specific Question:   Estimated length of stay     Answer:   More than 2 Midnights     Order Specific Question:   Certification     Answer:   I certify that inpatient services are medically necessary for this patient for a duration of greater than two midnights  See H&P and MD Progress Notes for additional information about the patient's course of treatment  ED Arrival Information     Expected   -    Arrival   9/21/2022 13:02    Acuity   Urgent            Means of arrival   Ambulance    Escorted by   [email or web address]   Hospitalist    Admission type   Urgent            Arrival complaint   -           Chief Complaint   Patient presents with    Shortness of Breath     Filling up with fluid  Initial Presentation: 69 y o  female W/PMHX: Afib, COPD, DM2, Chronic Respiratory failure with hypoxia CPAP HS, AND 4 L NC during the day, to ED from home via ems, admitted as inpatient, due to Acute on Chronic CHF, Supra therapeutic INR  Presented with worsening SOB, had UTI 2 weeks ago, with exertion increased leg swelling, taking po lasix 80mg bid yesterday increased dose on her own,  Exam: Obese, irregular cardiac rhythm, + orthopnea, breath sounds with rales,  poor ambulation, able to walk to potty chair, has been drinking about 48 oz daily, poor ambulation x 6 yrs  B/L lower leg edema, inner legs dry with alligator skin, ED work up reveals: CXR: new moderate hepatomegaly, pericardial effusions, mild congestive changes   Elevated BNP,  IV lasix 80 mg given, IMTIAZ 2/2 to cardiorenal , supra therapeutic INR, Plan: IV Lasix 80 mg BID, I/o, daily wt, 1500 ml fluid restriction, urine studies pending, hold coumadin,   OT/PT eval ambulatory status, C/W CPAP HS and 4 L NC day time, C/W Breo,  ACCU CK with SSI coverage, trend serial labs with repletion as needed, DVT PPX  Date: 9/22/22   Day 2: Acute on chronic CHF, on 80 mg IV lasix lost 1 lb and diuresed 1 4 L, still volume overloaded, start Metolazone 2 5 mg po x 1 prior to next dose of lasix, Goal of 2 L diuresis daily, if not progressing toward that goal with 80 mg BID will consider Lasix drip, CR improved, Troponin neg, InR coming down, c/w trending and holding Coumadin, TSH wnl, The patient will continue to require additional inpatient hospital stay due to chf,  discharge location to be determined pending rehab evaluations  Afib, bibasilar rales, lower extremity edema, c/w trending serial labs with repletion as needed, DVT PPX        Intake/Output Summary (Last 24 hours) at 9/22/2022 1248  Last data filed at 9/22/2022 1058      Gross per 24 hour   Intake 600 ml   Output 1400 ml   Net -800 ml           ED Triage Vitals [09/21/22 1306]   Temperature Pulse Respirations Blood Pressure SpO2   97 5 °F (36 4 °C) 74 18 123/70 96 %      Temp Source Heart Rate Source Patient Position - Orthostatic VS BP Location FiO2 (%)   Temporal Monitor Lying Left arm --      Pain Score       No Pain          Wt Readings from Last 1 Encounters:   09/22/22 (!) 184 kg (405 lb 3 3 oz)     09/21/22 1306 185 kg (406 lb 15 5 oz) Abnormal  Bed scale       Additional Vital Signs:   Calculated FIO2 (%) - Nasal Cannula Nasal Cannula O2 Flow Rate (L/min) O2 Device O2 Interface Device Patient Position - Orthostatic VS          09/22/22 15:42:32 98 1 °F (36 7 °C) 82 -- 114/56 75 96 % -- -- -- -- --   09/22/22 0900 -- -- -- -- -- -- 36 4 L/min Nasal cannula -- --   09/22/22 07:35:52 98 2 °F (36 8 °C) 82 16 127/52 77 100 % -- -- -- -- --   09/21/22 21:32:20 97 9 °F (36 6 °C) 77 20 111/67 82 100 % -- -- -- -- --   09/21/22 2115 -- -- -- -- -- 98 % 36 4 L/min Nasal cannula -- --   09/21/22 2108 -- -- -- -- -- 98 % -- -- -- Face mask --   09/21/22 1756 -- 69 -- 90/52 -- -- -- -- -- -- --   09/21/22 1545 -- -- -- -- -- 94 % 36 4 L/min Nasal cannula -- --   09/21/22 15:41:46 98 4 °F (36 9 °C) 81 21 112/68 83 93 % -- -- -- -- --   09/21/22 1519 -- 84 -- 110/74 -- -- -- -- -- -- --   09/21/22 1500 -- 76 23 Abnormal  110/74 89 100 % -- -- -- -- --   09/21/22 1445 -- 78 19 99/62 76 100 % -- -- -- -- --   09/21/22 1430 -- 77 17 116/66 86 94 % -- -- -- -- --   09/21/22 1415 -- 76 -- 105/57 78 100 % -- -- -- -- --   09/21/22 1400 -- 76 -- -- -- 100 % -- -- -- -- --   09/21/22 1345 -- 76 -- -- -- 100 % -- -- -- -- --   09/21/22 1330 -- 85 -- -- -- 99 % -- -- -- -- --   09/21/22 1329 -- -- -- -- -- -- -- -- Nasal cannula -- --   09/21/22 1315 -- 76 -- 123/70 90 99 % -- -- -- -- --   09/21/22 1311 -- -- -- -- -- -- -- -- Nasal cannula -- --     Pertinent Labs/Diagnostic Test Results:   9/21/22 ECHO:  Left Ventricle: Left ventricular cavity size is normal  Wall thickness is normal  The left ventricular ejection fraction is 55-59%  Systolic function is normal  Wall motion is grossly normal   Not all segments are well visualized    IVS: There is both systolic and diastolic flattening of the interventricular septum consistent with right ventricle pressure and volume overload    Right Ventricle: Right ventricular cavity size is severely dilated  Systolic function is mildly to moderately reduced    Mitral Valve: Mitral regurgitation is detected, not precisely quantified, possibly severe,  with an eccentric, wall impinging, posteriorly directed jet    Tricuspid Valve: There is severe torrential regurgitation    IVC/SVC: The inferior vena cava size is 28 0 mm  The right atrial pressure is estimated at 15 0 mmHg  The inferior vena cava is dilated  Respirophasic changes were blunted (less than 50% variation)      9/21/22 EKG: Atrial fibrillation  Incomplete right bundle branch block  Nonspecific ST and T wave abnormality  Low voltage QRS  Abnormal ECG  When compared with ECG of 13-APR-2020 16:56,  No significant change was found  XR chest 1 view portable   Final Result by Charlene Brunner, MD (09/21 1422)   New moderate hepatomegaly  Correlate with echo for pericardial effusion  Mild congestive changes                    Workstation performed: [id number]           Results from last 7 days   Lab Units 09/21/22  1319   SARS-COV-2  Negative     Results from last 7 days   Lab Units 09/22/22  0600 09/21/22  1319   WBC Thousand/uL 5 89 5 99   HEMOGLOBIN g/dL 10 7* 10 9*   HEMATOCRIT % 35 2 35 6   PLATELETS Thousands/uL 206 204   NEUTROS ABS Thousands/µL 3 65 3 79         Results from last 7 days   Lab Units 09/22/22  0600 09/21/22  1319   SODIUM mmol/L 145 144   POTASSIUM mmol/L 4 2 4 2   CHLORIDE mmol/L 105 103   CO2 mmol/L 37* 34*   ANION GAP mmol/L 3* 7   BUN mg/dL 36* 39*   CREATININE mg/dL 1 19 1 37*   EGFR ml/min/1 73sq m 46 39   CALCIUM mg/dL 9 1 9 0   MAGNESIUM mg/dL 2 1  --      Results from last 7 days   Lab Units 09/22/22  0600 09/21/22  1319   AST U/L 26 27   ALT U/L 17 16   ALK PHOS U/L 129* 148*   TOTAL PROTEIN g/dL 7 4 7 7   ALBUMIN g/dL 2 8* 3 0*   TOTAL BILIRUBIN mg/dL 1 00 0 84     Results from last 7 days   Lab Units 09/22/22  1056 09/22/22  0736 09/21/22  2129 09/21/22  1657   POC GLUCOSE mg/dl 132 76 156* 109     Results from last 7 days   Lab Units 09/22/22  0600 09/21/22  1319   GLUCOSE RANDOM mg/dL 93 151*             Results from last 7 days   Lab Units 09/21/22  1756 09/21/22  1605 09/21/22  1319   HS TNI 0HR ng/L  --   --  26   HS TNI 2HR ng/L  --  23  --    HSTNI D2 ng/L  --  -3  --    HS TNI 4HR ng/L 25  --   --    HSTNI D4 ng/L -1  --   --          Results from last 7 days   Lab Units 09/22/22  0600 09/21/22  1319   PROTIME seconds 35 5* 36 3*   INR  3 55* 3 65*   PTT seconds  --  50*     Results from last 7 days Lab Units 09/22/22  0600   TSH 3RD GENERATON uIU/mL 1 959         Results from last 7 days   Lab Units 09/21/22  1319   LACTIC ACID mmol/L 1 5             Results from last 7 days   Lab Units 09/21/22  1319   NT-PRO BNP pg/mL 2,209*             Results from last 7 days   Lab Units 09/21/22  1319   LIPASE u/L 114                 Results from last 7 days   Lab Units 09/21/22  1607   SODIUM UR  100     Results from last 7 days   Lab Units 09/21/22  1319   INFLUENZA A PCR  Negative   INFLUENZA B PCR  Negative   RSV PCR  Negative       ED Treatment:   Medication Administration from 09/21/2022 1227 to 09/21/2022 1525       Date/Time Order Dose Route Action     09/21/2022 1322 furosemide (LASIX) injection 80 mg 80 mg Intravenous Given        Past Medical History:   Diagnosis Date    Asthma     Atrial fibrillation (HCC)     COPD [facility]chronic obstructive pulmonary disease) ([facility] )     Diabetes mellitus ([place] [address] )     Disease of thyroid gland     Heart failure ([place] [address] )     Kidney failure      Present on Admission:   Anemia   Atrial fibrillation ([facility])   Chronic respiratory failure with hypoxia ([facility])   Diabetes mellitus (HCC)   COPD (chronic obstructive pulmonary disease) ([facility])   Supratherapeutic INR      Admitting Diagnosis: SOB (shortness of breath) [R06 02]  Acute on chronic congestive heart failure, unspecified heart failure type ([place] [address] ) [I50 9]  Age/Sex: 69 y o  female  Admission Orders:wound care consult, daily wts, I/o, scd  Scheduled Medications:  allopurinol, 100 mg, Oral, Daily  atorvastatin, 10 mg, Oral, Daily  cholecalciferol, 2,000 Units, Oral, Daily  ferrous sulfate, 325 mg, Oral, Daily With Breakfast  fluticasone-vilanterol, 1 puff, Inhalation, Daily  furosemide, 80 mg, Intravenous, BID  insulin detemir, 20 Units, Subcutaneous, HS  insulin lispro, 4-20 Units, Subcutaneous, TID AC  levothyroxine, 200 mcg, Oral, Daily  levothyroxine, 88 mcg, Oral, Early Morning  loratadine, 10 mg, Oral, Daily  metolazone, 2 5 mg, Oral, Once  metoprolol succinate, 100 mg, Oral, BID  montelukast, 10 mg, Oral, HS  nystatin, , Topical, BID  pantoprazole, 40 mg, Oral, Early Morning      Continuous IV Infusions:none     PRN Meds:  acetaminophen, 650 mg, Oral, Q6H PRN  ammonium lactate, , Topical, BID PRN    Network Utilization Review Department  ATTENTION: Please call with any questions or concerns to [phone] and carefully listen to the prompts so that you are directed to the right person  All voicemails are confidential   Anjelica Fisher all requests for admission clinical reviews, approved or denied determinations and any other requests to dedicated fax number below belonging to the campus where the patient is receiving treatment   List of dedicated fax numbers for the Facilities:  [address] DENIALS (Administrative/Medical Necessity) [phone]   [address] (Maternity/NICU/Pediatrics) [phone]   [address]  [address] [place] [id number] [address] [facility] [phone]

## 2022-09-22 NOTE — PLAN OF CARE
Problem: OCCUPATIONAL THERAPY ADULT  Goal: Performs self-care activities at highest level of function for planned discharge setting  See evaluation for individualized goals  Description: Treatment Interventions: ADL retraining, Functional transfer training, Endurance training, UE strengthening/ROM, Cognitive reorientation, Patient/family training, Equipment evaluation/education, Neuromuscular reeducation, Compensatory technique education, Continued evaluation, Energy conservation, Activityengagement          See flowsheet documentation for full assessment, interventions and recommendations  Note: Limitation: Decreased ADL status, Decreased UE strength, Decreased endurance, Decreased self-care trans, Decreased high-level ADLs  Prognosis: Fair  Assessment: Pt is a 69 y o  female, admitted to [facility] 9/21/2022 d/t experiencing increased SOB  Dx: acute on chronic diastolic congestive heart failure  Pt with PMHx impacting their performance during ADL tasks, including: asthma, A-Fib, COPD, DM, heart/kidney failure  Prior to admission to the hospital Pt was performing ADLs with physical assistance  IADLs with physical assistance  Functional transfers/ambulation without physical assistance  Cognitive status was PTA was Intact  OT order placed to assess Pt's ADLs, cognitive status, and performance during functional tasks in order to maximize safety and independence while making most appropriate d/c recommendations   Pt's clinical presentation is currently evolving given new onset deficits that effect Pt's occupational performance and ability to safely return to OF including decrease activity tolerance, decrease standing balance, decrease performance during ADL tasks, decrease UB MS, increased pain, decrease generalized strength, decrease activity engagement, decrease performance during functional transfers and high fall risk combined with medical complications of pain impacting overall mobility status, abnormal renal lab values, abnormal H&H, abnormal CBC, abnormal CO2 values, wounds, decreased skin integrity and need for input for mobility technique/safety  Pt stable on 4lpm throughout session  Personal factors affecting Pt at time of initial evaluation include: multi-level environment, inability to perform IADLs, inability to perform ADLs and inability to ambulate household distances  PT/OT co-evaluation completed at this time d/t significant mobility deficits and safety concerns  Pt will benefit from continued skilled OT services to address deficits as defined above and to maximize level independence/participation during ADLs and functional tasks to facilitate return toward PLOF and improved quality of life  From an occupational therapy standpoint, recommendation at time of d/c would be post acute rehabilitation services       OT Discharge Recommendation: Post acute rehabilitation services

## 2022-09-22 NOTE — ASSESSMENT & PLAN NOTE
Lab Results   Component Value Date    HGBA1C 8 0 (H) 04/13/2020       Recent Labs     09/21/22  1657 09/21/22  2129 09/22/22  0736 09/22/22  1056   POCGLU 109 156* 76 132       Blood Sugar Average: Last 72 hrs:  · (P) 118 25 hold p o  medication fasting was on the low side    Reduce Levemir to 20 units at night continue sliding scale pre meals are controlled

## 2022-09-22 NOTE — UTILIZATION REVIEW
Inpatient Admission Authorization Request   NOTIFICATION OF INPATIENT ADMISSION/INPATIENT AUTHORIZATION REQUEST   SERVICING FACILITY:   [address] [place] [address]  Wayne Aguila [facility], [address]  Tax ID: [id number]  NPI: [id number]  Place of Service: Inpatient [facility]  [address]  Place of Service Code: 24     ATTENDING PROVIDER:  Attending Name and NPI#: Reagan Lucas, Arsalan Omaras Rosalind [[id number]]  Address: Wayne Aguila [address] [place] [address], [facility] [address]  Phone: [phone]     UTILIZATION REVIEW CONTACT:  Jaspreet Coto Utilization   Network Utilization Review Department  Phone: [phone]  Fax [phone]  Email: [email or web address]     PHYSICIAN ADVISORY SERVICES:  FOR VBSW-PO-DQZW REVIEW - MEDICAL NECESSITY DENIAL  Phone: [phone]  Fax: [phone]  Email: [email or web address]     TYPE OF REQUEST:  Inpatient Status     ADMISSION INFORMATION:  ADMISSION DATE/TIME: 9/21/22  2:01 PM  PATIENT DIAGNOSIS CODE/DESCRIPTION:  SOB (shortness of breath) [R06 02]  Acute on chronic congestive heart failure, unspecified heart failure type ([facility] [address] ) [I50 9]  DISCHARGE DATE/TIME: No discharge date for patient encounter  IMPORTANT INFORMATION:  Please contact Mirela Perkins directly with any questions or concerns regarding this request  Department voicemails are confidential     Send requests for admission clinical reviews, concurrent reviews, approvals, and administrative denials due to lack of clinical to fax [phone]

## 2022-09-22 NOTE — PHYSICAL THERAPY NOTE
PHYSICAL THERAPY EVALUATION  NAME:  Jose Luis Conley  DATE: 09/22/22    AGE:   69 y o   Mrn:   [id number]  ADMIT DX:  SOB (shortness of breath) [R06 02]  Acute on chronic congestive heart failure, unspecified heart failure type ([facility] ) [I50 9]    Past Medical History:   Diagnosis Date    Asthma     Atrial fibrillation ([facility] )     COPD (chronic obstructive pulmonary disease) ([place] [id number] )     Diabetes mellitus ([place] [address] )     Disease of thyroid gland     Heart failure ([place] [address] )     Kidney failure      Length Of Stay: 1  Performed at least 2 patient identifiers during session: Name and Birthday  PHYSICAL THERAPY EVALUATION :        09/22/22 1010   Note Type   Note type Evaluation   Pain Assessment   Pain Assessment Tool 0-10   Pain Score 5   Pain Location/Orientation Location: Back; Location: Shoulder;Orientation: Bilateral   Restrictions/Precautions   Other Precautions Bed Alarm; Fall Risk;O2;Pain;Multiple lines  (4 lpm)   Home Living   Type of Home House  (0 IZAIAH)   Home Layout Two level;Performs ADLs on one level; Able to live on main level with bedroom/bathroom   Bathroom Shower/Tub   (sponge bathes)   Bathroom Toilet   (pt uses [facility])   [address]; Wheelchair-manual   Additional Comments Pt reports living in 2 SH with spouse and son with first floor setup  Pt reports sleeping in recliner and primarily SPT recliner <> commode, reports she does not use wheelchair as it does not fit through her home   Prior Function   Level of [place] Needs assistance with IADLs; Needs assistance with ADLs and functional mobility   Lives With Spouse; Son   Prabhakar Help From Family;Friend(s)   ADL Assistance Needs assistance   IADLs Needs assistance   Falls in the last 6 months 0   Comments STS transportation   Cognition   Overall Cognitive Status WFL   Orientation Level Oriented X4   Following Commands Follows one step commands without difficulty   RLE Assessment   RLE Assessment WFL  (grossly assessed at least 3+/5 strength)   LLE Assessment   LLE Assessment WFL  (grossly assessed at least 3+/5 strength)   Light Touch   RLE Light Touch Grossly intact   LLE Light Touch Grossly intact   Bed Mobility   Rolling R 2  Maximal assistance   Additional items Assist x 3; Increased time required;Verbal cues;LE management   Supine to Sit 2  Maximal assistance   Additional items Assist x 3; Increased time required;Verbal cues;LE management  (trunk management)   Sit to Supine 1  Dependent   Additional items Increased time required;LE management  (A x 4, trunk management)   Additional Comments Max x 3 for rolling R for positioning and supine to sit with trunk and LE management  Dependent x 4 for sit > supine with pt providing slight effort   Transfers   Sit to Stand 2  Maximal assistance   Additional items Assist x 2; Increased time required;Verbal cues   Stand to Sit 2  Maximal assistance   Additional items Assist x 2; Increased time required;Verbal cues   Additional Comments Max x 2 with RW for STS, able to complete x 30s prior to needing seated rest break  Pt with increased hip flexion leanding with forearms on RW, unable to obtain fully upright trunk despite max VC  Deferred gait this session as pt unable to progress LEs   Ambulation/Elevation   Gait pattern Not appropriate   Balance   Static Sitting Fair   Dynamic Sitting Fair -   Static Standing Poor   Dynamic Standing Poor -   Endurance Deficit   Endurance Deficit Yes   Endurance Deficit Description fatigue, strength deficits   Activity Tolerance   Activity Tolerance Patient limited by fatigue   Medical Staff Made Aware Zechariah DE   Nurse Made Aware RNElizabeth   Assessment   Prognosis Guarded   Problem List Decreased strength;Decreased mobility; Decreased endurance; Impaired balance; Impaired judgement;Decreased safety awareness; Obesity;Pain;Decreased skin integrity   Barriers to Discharge Inaccessible home environment;Decreased caregiver support   Barriers to Discharge Comments Pt is A x 2-4 for all mobility   Goals   Patient Goals Get stronger   STG Expiration Date 10/06/22   Plan   Treatment/Interventions Functional transfer training;LE strengthening/ROM; Therapeutic exercise; Endurance training;Patient/family training;Equipment eval/education; Bed mobility;Gait training; Compensatory technique education;Spoke to nursing;OT   PT Frequency 3-5x/wk   Recommendation   PT Discharge Recommendation Post acute rehabilitation services   Equipment Recommended   (TBD by rehab)   AM-PAC Basic Mobility Inpatient   Turning in Bed Without Bedrails 1   Lying on Back to Sitting on Edge of Flat Bed 1   Moving Bed to Chair 1   Standing Up From Chair 1   Walk in Room 1   Climb 3-5 Stairs 1   Basic Mobility Inpatient Raw Score 6   Turning Head Towards Sound 4   Follow Simple Instructions 4   Low Function Basic Mobility Raw Score 14   Low Function Basic Mobility Standardized Score 22 01   Highest Level Of Mobility   JH-HLM Goal 2: Bed activities/Dependent transfer   JH-HLM Achieved 3: Sit at edge of bed   End of Consult   Patient Position at End of Consult Supine;Bed/Chair alarm activated; All needs within reach     The patient's AM-PAC Basic Mobility Inpatient Short Form Raw Score is 6    A Raw score of less than or equal to 16 suggests the patient may benefit from discharge to post-acute rehabilitation services  Please also refer to the recommendation of the Physical Therapist for safe discharge planning  (Please find full objective findings from PT assessment regarding body systems outlined above)  Assessment: Pt is a 69 y o  female seen for PT evaluation s/p admission to [address] [place] [address] on 9/21/2022 with Acute on chronic diastolic congestive heart failure ([facility] [address] )  Order placed for PT services    Upon evaluation: Pt is presenting with impaired functional mobility due to pain, decreased strength, decreased endurance, impaired balance, decreased safety awareness, impaired judgment, fall risk, and impaired skin integrity requiring  max x 3-4 assistance for bed mobility and max x 2 assistance for transfers  Pt's clinical presentation is currently unstable/unpredictable given the functional mobility deficits above coupled with fall risks as indicated by AM-PAC 6-Clicks: 5/43 as well as impaired balance, polypharmacy, impaired judgement, and decreased safety awareness and combined with medical complications of abnormal renal lab values, abnormal H&H, abnormal CO2 values and need for input for mobility technique/safety  Pt's PMHx and comorbidities that may affect physical performance and progress include: CHF, COPD, A fib, DM, asthma and obesity  Personal factors affecting pt at time of IE include: inaccessible home environment, past experience, behavioral pattern, inability to perform IADLs, inability to perform ADLs, inability to navigate level surfaces without external assistance, inability to ambulate household distances, inability to navigate community distances, limited insight into impairments and decreased initiation and engagement  Pt will benefit from continued skilled PT services to address deficits as defined above and to maximize level of functional mobility to facilitate return toward PLOF and improved QOL  From PT/mobility standpoint, recommendation at time of d/c would be Short term rehab pending progress in order to reduce fall risk and maximize pt's functional independence and consistency with mobility in order to facilitate return to PLOF  Recommend trial with walker next 1-2 sessions, ther ex next 1-2 sessions and mechanical conveyance from nursing standpoint for OOB mobility  Goals: Pt will: Perform bed mobility tasks with consistent min A of 1 to reposition in bed and prepare for transfers   Pt will perform transfers with consistent min A of 1 to increase Indep in home environment and prepare for ambulation Increase bilateral LE strength 1/2 grade to facilitate independent mobility and Increase all balance 1/2 grade to decrease risk for falls          Elyssa Bennett, PT,DPT

## 2022-09-22 NOTE — OCCUPATIONAL THERAPY NOTE
Occupational Therapy Evaluation     Patient Name: Denny Leyden  MRMRM'D Date: 9/22/2022  Problem List  Principal Problem:    Acute on chronic diastolic congestive heart failure ([facility] [address] )  Active Problems:    Atrial fibrillation (HCC)    COPD (chronic obstructive pulmonary disease) ([facility] )    Diabetes mellitus ([facility] )    Anemia    Chronic respiratory failure with hypoxia (HCC)    Supratherapeutic INR    IMTIAZ (acute kidney injury) ([facility] [address] )    Morbid obesity ([facility] [address] )    Hypothyroidism    Past Medical History  Past Medical History:   Diagnosis Date    Asthma     [facility] ([facility] )     COPD (chronic obstructive pulmonary disease) ([facility] )     Diabetes mellitus ([facility] )     Disease of thyroid gland     Heart failure ([facility] )     Kidney failure      Past Surgical History  Past Surgical History:   Procedure Laterality Date    CARDIOVERSION N/A     GALLBLADDER SURGERY      HERNIA REPAIR          09/22/22 1011   Note Type   Note type Evaluation   Restrictions/Precautions   Other Precautions Bed Alarm; Fall Risk;O2;Pain;Multiple lines  (4lpm)   Pain Assessment   Pain Assessment Tool 0-10   Pain Score 5   Pain Location/Orientation Location: Back;Orientation: Bilateral;Location: Shoulder   Home Living   Type of Home House  (0 IZAIAH)   Home Layout Two level;Performs ADLs on one level; Able to live on main level with bedroom/bathroom   Bathroom Shower/Tub   (sponge bathes)   Bathroom Toilet   [facility])   [address]; Wheelchair-manual   Additional Comments Pt reports living in a [address] [place] [address] with her  and son  Pt reports sleeping in recliner chair and completing SPT <> BSC at baseline  Prior Function   Level of [place] Needs assistance with ADLs and functional mobility; Needs assistance with IADLs   Lives With Spouse; Son   Zaida Allen Help From Family;Friend(s)   ADL Assistance Needs assistance   IADLs Needs assistance   Falls in the last 6 months 0   Comments Pt report she has a friend who is an RN who comes over to assist with ADLs 1x/wk but she hasn't been coming lately  Spouse assists with ADLs and son with IADLs, assist with community mobility from STS bus  ADL   UB Dressing Assistance 3  Moderate Assistance   UB Dressing Deficit Setup; Increased time to complete;Pull over head;Pull down in back;Pull around back   LB Dressing Assistance 1  Total Assistance   LB Dressing Deficit Setup; Requires assistive device for steadying;Verbal cueing;Supervision/safety; Increased time to complete   Additional Comments UB ADLs @ Mod A d/t increased body habitus  LB ADLs @ Total A, pt required assist to don socks while seated at EOB  Bed Mobility   Rolling R 2  Maximal assistance   Additional items Assist x 3; Increased time required;Verbal cues;LE management   Supine to Sit 2  Maximal assistance   Additional items Assist x 3; Increased time required;Verbal cues;LE management  (trunk management)   Sit to Supine 1  Dependent   Additional items Assist x 3; Increased time required;Verbal cues;LE management   Additional Comments Pt supine in bed on 4lpm at beginning of session  Supine to sit @ Max A x3  Pt able to maintain seated at EOB with Fair balance for approximately 5 min in between standing trials  Sit to supine @ Total A x4  Pt able to roll in bed to situate @ Max A x3  Pt supine in bed at end of session with bed alarm intact, call bell within reach and all needs met  Transfers   Sit to Stand 2  Maximal assistance   Additional items Assist x 2; Increased time required;Verbal cues   Stand to Sit 2  Maximal assistance   Additional items Assist x 2; Increased time required;Verbal cues   Additional Comments Two attempts at STS from EOB to RW @ Max A x2  Pt able to stand for approximately 30 seconds each trial with significant trunk flexion, unable to push up on RW for upright posture     Balance   Static Sitting Fair   Dynamic Sitting Fair -   Static Standing Poor   Dynamic Standing Poor -   Activity Tolerance   Activity Tolerance Patient limited by fatigue   Medical Staff Made Aware Spoke with PT Abena   Nurse Made Aware Spoke with RN Allyn Rondon Assessment   RUE Assessment WFL   LUE Assessment   LUE Assessment WFL   Hand Function   Gross Motor Coordination Functional   Fine Motor Coordination Functional   Sensation   Light Touch No apparent deficits   Cognition   Overall Cognitive Status WFL   Arousal/Participation Alert; Responsive; Cooperative   Attention Within functional limits   Orientation Level Oriented X4   Memory Within functional limits   Following Commands Follows one step commands without difficulty   Assessment   Limitation Decreased ADL status; Decreased UE strength;Decreased endurance;Decreased self-care trans;Decreased high-level ADLs   Prognosis Fair   Assessment Pt is a 69 y o  female, admitted to [address] [place] [address] 9/21/2022 d/t experiencing increased SOB  Dx: acute on chronic diastolic congestive heart failure  Pt with PMHx impacting their performance during ADL tasks, including: asthma, A-Fib, COPD, DM, heart/kidney failure  Prior to admission to the hospital Pt was performing ADLs with physical assistance  IADLs with physical assistance  Functional transfers/ambulation without physical assistance  Cognitive status was PTA was Intact  OT order placed to assess Pt's ADLs, cognitive status, and performance during functional tasks in order to maximize safety and independence while making most appropriate d/c recommendations   Pt's clinical presentation is currently evolving given new onset deficits that effect Pt's occupational performance and ability to safely return to OF including decrease activity tolerance, decrease standing balance, decrease performance during ADL tasks, decrease UB MS, increased pain, decrease generalized strength, decrease activity engagement, decrease performance during functional transfers and high fall risk combined with medical complications of pain impacting overall mobility status, abnormal renal lab values, abnormal H&H, abnormal CBC, abnormal CO2 values, wounds, decreased skin integrity and need for input for mobility technique/safety  Pt stable on 4lpm throughout session  Personal factors affecting Pt at time of initial evaluation include: multi-level environment, inability to perform IADLs, inability to perform ADLs and inability to ambulate household distances  PT/OT co-evaluation completed at this time d/t significant mobility deficits and safety concerns  Pt will benefit from continued skilled OT services to address deficits as defined above and to maximize level independence/participation during ADLs and functional tasks to facilitate return toward PLOF and improved quality of life  From an occupational therapy standpoint, recommendation at time of d/c would be post acute rehabilitation services  Plan   Treatment Interventions ADL retraining;Functional transfer training; Endurance training;UE strengthening/ROM; Cognitive reorientation;Patient/family training;Equipment evaluation/education; Neuromuscular reeducation; Compensatory technique education;Continued evaluation; Energy conservation; Activityengagement   Goal Expiration Date 10/06/22   OT Frequency 3-5x/wk   Recommendation   OT Discharge Recommendation Post acute rehabilitation services   AM-[facility] Daily Activity Inpatient   Lower Body Dressing 1   Bathing 2   Toileting 1   Upper Body Dressing 2   Grooming 3   Eating 3   Daily Activity Raw Score 12   Daily Activity Standardized Score (Calc for Raw Score >=11) 30 6   AM-[facility] Applied Cognition Inpatient   Following a Speech/Presentation 4   Understanding Ordinary Conversation 4   Taking Medications 4   Remembering Where Things Are Placed or Put Away 4   Remembering List of 4-5 Errands 4   Taking Care of Complicated Tasks 4   Applied Cognition Raw Score 24   Applied Cognition Standardized Score 62 21     The patient's raw score on the AM-PAC Daily Activity inpatient short form is 12, standardized score is 30 6, less than 39 4  Patients at this level are likely to benefit from DC to post-acute rehabilitation services  Please refer to the recommendation of the Occupational Therapist for safe DC planning  Pt goals to be met by 10/6/2022    Pt will demonstrate ability to complete grooming/hygiene tasks @ Mod I after set-up  Pt will demonstrate ability to complete UB ADLs including washing/dressing @ Min A in order to increase performance and participation during meaningful tasks  Pt will demonstrate ability to complete LB dressing @ Mod A in order to increase safety and independence during meaningful tasks  Pt will demonstrate ability to complete toileting tasks including CM and pericare @ Supervision in order to increase safety and independence during meaningful tasks  Pt will demonstrate ability to complete EOB, chair, toilet/commode transfers @ Supervision in order to increase performance and participation during functional tasks  Pt will demonstrate ability to stand for 1-2 minutes while maintaining Fair + balance with use of RW for UB support PRN  Pt will demonstrate ability to tolerate 30-35 minute OT session with no vc'ing for deep breathing or use of energy conservation techniques in order to increase activity tolerance during functional tasks  Pt will demonstrate Good carryover of use of energy conservation/compensatory strategies during ADLs and functional tasks in order to increase safety and reduce risk for falls  Pt will identify 3 areas of interest/hobbies and 1 intervention on how to incorporate into daily life in order to increase interaction with environment and peers as well as increase participation in meaningful tasks  Pt will demonstrate 100% carryover of BUE HEP in order to increase BUE MS and increase performance during functional tasks upon d/c home      Bri Cooper OTR/GENET

## 2022-09-23 LAB
ANION GAP SERPL CALCULATED.3IONS-SCNC: 5 MMOL/L (ref 4–13)
BUN SERPL-MCNC: 38 MG/DL (ref 5–25)
CALCIUM SERPL-MCNC: 9.4 MG/DL (ref 8.3–10.1)
CHLORIDE SERPL-SCNC: 102 MMOL/L (ref 96–108)
CO2 SERPL-SCNC: 38 MMOL/L (ref 21–32)
CREAT SERPL-MCNC: 1.27 MG/DL (ref 0.6–1.3)
GFR SERPL CREATININE-BSD FRML MDRD: 43 ML/MIN/1.73SQ M
GLUCOSE SERPL-MCNC: 118 MG/DL (ref 65–140)
GLUCOSE SERPL-MCNC: 118 MG/DL (ref 65–140)
GLUCOSE SERPL-MCNC: 145 MG/DL (ref 65–140)
GLUCOSE SERPL-MCNC: 149 MG/DL (ref 65–140)
GLUCOSE SERPL-MCNC: 154 MG/DL (ref 65–140)
INR PPP: 3.32 (ref 0.84–1.19)
MAGNESIUM SERPL-MCNC: 2 MG/DL (ref 1.6–2.6)
POTASSIUM SERPL-SCNC: 3.6 MMOL/L (ref 3.5–5.3)
PROTHROMBIN TIME: 33.7 SECONDS (ref 11.6–14.5)
SODIUM SERPL-SCNC: 145 MMOL/L (ref 135–147)

## 2022-09-23 PROCEDURE — 97535 SELF CARE MNGMENT TRAINING: CPT

## 2022-09-23 PROCEDURE — 82948 REAGENT STRIP/BLOOD GLUCOSE: CPT

## 2022-09-23 PROCEDURE — 80048 BASIC METABOLIC PNL TOTAL CA: CPT | Performed by: FAMILY MEDICINE

## 2022-09-23 PROCEDURE — 97530 THERAPEUTIC ACTIVITIES: CPT

## 2022-09-23 PROCEDURE — 83735 ASSAY OF MAGNESIUM: CPT | Performed by: FAMILY MEDICINE

## 2022-09-23 PROCEDURE — 85610 PROTHROMBIN TIME: CPT | Performed by: FAMILY MEDICINE

## 2022-09-23 PROCEDURE — 99232 SBSQ HOSP IP/OBS MODERATE 35: CPT | Performed by: FAMILY MEDICINE

## 2022-09-23 RX ORDER — FUROSEMIDE 10 MG/ML
10 SYRINGE (ML) INJECTION CONTINUOUS
Status: DISCONTINUED | OUTPATIENT
Start: 2022-09-23 | End: 2022-09-26

## 2022-09-23 RX ORDER — TRAMADOL HYDROCHLORIDE 50 MG/1
50 TABLET ORAL ONCE
Status: COMPLETED | OUTPATIENT
Start: 2022-09-23 | End: 2022-09-23

## 2022-09-23 RX ORDER — TRAMADOL HYDROCHLORIDE 50 MG/1
50 TABLET ORAL EVERY 6 HOURS PRN
Status: DISCONTINUED | OUTPATIENT
Start: 2022-09-23 | End: 2022-10-03 | Stop reason: HOSPADM

## 2022-09-23 RX ORDER — POTASSIUM CHLORIDE 20 MEQ/1
40 TABLET, EXTENDED RELEASE ORAL 2 TIMES DAILY
Status: DISCONTINUED | OUTPATIENT
Start: 2022-09-23 | End: 2022-09-25

## 2022-09-23 RX ORDER — MIDODRINE HYDROCHLORIDE 5 MG/1
2.5 TABLET ORAL
Status: DISCONTINUED | OUTPATIENT
Start: 2022-09-23 | End: 2022-09-26

## 2022-09-23 RX ADMIN — ALLOPURINOL 100 MG: 100 TABLET ORAL at 08:47

## 2022-09-23 RX ADMIN — ACETAMINOPHEN 650 MG: 325 TABLET ORAL at 06:47

## 2022-09-23 RX ADMIN — POTASSIUM CHLORIDE 40 MEQ: 1500 TABLET, EXTENDED RELEASE ORAL at 17:10

## 2022-09-23 RX ADMIN — ACETAMINOPHEN 650 MG: 325 TABLET ORAL at 14:32

## 2022-09-23 RX ADMIN — FERROUS SULFATE TAB 325 MG (65 MG ELEMENTAL FE) 325 MG: 325 (65 FE) TAB at 06:47

## 2022-09-23 RX ADMIN — FLUTICASONE FUROATE AND VILANTEROL TRIFENATATE 1 PUFF: 100; 25 POWDER RESPIRATORY (INHALATION) at 08:46

## 2022-09-23 RX ADMIN — TRAMADOL HYDROCHLORIDE 50 MG: 50 TABLET ORAL at 12:46

## 2022-09-23 RX ADMIN — TRAMADOL HYDROCHLORIDE 50 MG: 50 TABLET ORAL at 21:38

## 2022-09-23 RX ADMIN — PANTOPRAZOLE SODIUM 40 MG: 40 TABLET, DELAYED RELEASE ORAL at 05:50

## 2022-09-23 RX ADMIN — LEVOTHYROXINE SODIUM 200 MCG: 100 TABLET ORAL at 06:06

## 2022-09-23 RX ADMIN — POTASSIUM CHLORIDE 40 MEQ: 1500 TABLET, EXTENDED RELEASE ORAL at 09:39

## 2022-09-23 RX ADMIN — METOPROLOL SUCCINATE 100 MG: 100 TABLET, FILM COATED, EXTENDED RELEASE ORAL at 17:10

## 2022-09-23 RX ADMIN — MONTELUKAST 10 MG: 10 TABLET, FILM COATED ORAL at 21:38

## 2022-09-23 RX ADMIN — Medication 2000 UNITS: at 08:47

## 2022-09-23 RX ADMIN — ATORVASTATIN CALCIUM 10 MG: 10 TABLET, FILM COATED ORAL at 08:47

## 2022-09-23 RX ADMIN — INSULIN LISPRO 4 UNITS: 100 INJECTION, SOLUTION INTRAVENOUS; SUBCUTANEOUS at 12:34

## 2022-09-23 RX ADMIN — INSULIN DETEMIR 20 UNITS: 100 INJECTION, SOLUTION SUBCUTANEOUS at 21:38

## 2022-09-23 RX ADMIN — LEVOTHYROXINE SODIUM 88 MCG: 100 TABLET ORAL at 05:49

## 2022-09-23 RX ADMIN — ACETAMINOPHEN 650 MG: 325 TABLET ORAL at 23:07

## 2022-09-23 RX ADMIN — Medication 15 MG/HR: at 10:26

## 2022-09-23 RX ADMIN — LORATADINE 10 MG: 10 TABLET ORAL at 08:47

## 2022-09-23 NOTE — PHYSICAL THERAPY NOTE
PHYSICAL THERAPY TREATMENT NOTE  NAME:  Yanna Marquis  DATE: 09/23/22    Length Of Stay: 2  Performed at least 2 patient identifiers during session: Name and Birthday  Treatment time: 1026-1112  Treatment length: 46 min       09/23/22 1026   Note Type   Note Type Treatment   Pain Assessment   Pain Assessment Tool FLACC   Pain Rating: FLACC (Rest) - Face 0   Pain Rating: FLACC (Rest) - Legs 0   Pain Rating: FLACC (Rest) - Activity 0   Pain Rating: FLACC (Rest) - Cry 0   Pain Rating: FLACC (Rest) - Consolability 0   Score: FLACC (Rest) 0   Pain Rating: FLACC (Activity) - Face 1   Pain Rating: FLACC (Activity) - Legs 0   Pain Rating: FLACC (Activity) - Activity 0   Pain Rating: FLACC (Activity) - Cry 1   Pain Rating: FLACC (Activity) - Consolability 0   Score: FLACC (Activity) 2   Restrictions/Precautions   Other Precautions Bed Alarm;O2;Fall Risk;Pain;Multiple lines  (4 lpm start)   General   Chart Reviewed Yes   Response to Previous Treatment Patient with no complaints from previous session  Cognition   Overall Cognitive Status WFL   Orientation Level Oriented X4   Following Commands Follows one step commands without difficulty   Bed Mobility   Rolling R 2  Maximal assistance   Additional items Assist x 3; Increased time required;Verbal cues;LE management   Rolling L 2  Maximal assistance   Additional items Assist x 3; Increased time required;Verbal cues;LE management   Supine to Sit 2  Maximal assistance   Additional items Assist x 3; Increased time required;Verbal cues;LE management  (trunk management)   Sit to Supine 1  Dependent   Additional items Increased time required;Verbal cues;LE management  (A x 4, trunk management)   Additional Comments Pt supine in bed at start and end of session; completed rolling for hygiene and wound care RN present to provide care; able to maintain static sidelying position with min-max x 1 to maintain x 3-5 min prior to needing elevated head position   Completed second trial with rolling requiring A x 3 however only able to maintain x 1-2 min prior to needing upright sitting position  Max x 3 for supine > sit, able to maintain sitting EOB x ~5 min with BUE support  O2 90-92% on 4 lpm throughout session however during session O2 tubing became disconnected with O2 at 79% on RA, returned pt to 8 lpm returning to 92-94% with RN present and aware at end of session   Transfers   Sit to Stand 2  Maximal assistance   Additional items Assist x 2; Increased time required;Verbal cues   Stand to Sit 2  Maximal assistance   Additional items Assist x 2; Increased time required;Verbal cues   Additional Comments Pt completed STS trial with max A x 2 however only able to complete 1/2 stand minimal clearance  Pt attempted to complete sit pivot with one trial able to shift hips minimally  Limited by L knee pain and pt declining to attempt additional trials, pt returned to supine in bed   Ambulation/Elevation   Gait pattern Not appropriate   Balance   Static Sitting Fair   Dynamic Sitting Fair -   Static Standing Zero   Endurance Deficit   Endurance Deficit Yes   Endurance Deficit Description fatigue, pain, strength deficits   Activity Tolerance   Activity Tolerance Patient limited by fatigue;Patient limited by pain   Medical Staff Made Aware OT, Floridalma Noriega 12   Nurse Made Aware RNs, Jaxon   Assessment   Prognosis Guarded   Problem List Decreased strength;Decreased endurance; Impaired balance;Decreased mobility; Impaired judgement;Decreased safety awareness; Obesity; Decreased skin integrity;Pain   Barriers to Discharge Inaccessible home environment;Decreased caregiver support   Barriers to Discharge Comments Pt is A x 2-4 for mobility   Goals   PT Treatment Day 1   Plan   Treatment/Interventions Functional transfer training;LE strengthening/ROM; Therapeutic exercise; Endurance training;Patient/family training;Equipment eval/education; Bed mobility;Gait training; Compensatory technique education;Spoke to nursing;OT   Progress Slow progress, decreased activity tolerance   PT Frequency 3-5x/wk   Recommendation   PT Discharge Recommendation Post acute rehabilitation services   Equipment Recommended   (TBD by rehab)   AM-PAC Basic Mobility Inpatient   Turning in Bed Without Bedrails 1   Lying on Back to Sitting on Edge of Flat Bed 1   Moving Bed to Chair 1   Standing Up From Chair 1   Walk in Room 1   Climb 3-5 Stairs 1   Basic Mobility Inpatient Raw Score 6   Turning Head Towards Sound 4   Follow Simple Instructions 4   Low Function Basic Mobility Raw Score 14   Low Function Basic Mobility Standardized Score 22 01   Highest Level Of Mobility   JH-HLM Goal 2: Bed activities/Dependent transfer   JH-HLM Achieved 3: Sit at edge of bed   Education   Education Provided Mobility training   Patient Demonstrates acceptance/verbal understanding   End of Consult   Patient Position at End of Consult Supine;Bed/Chair alarm activated; All needs within reach     The patient's AM-PAC Basic Mobility Inpatient Short Form Raw Score is 6  A Raw score of less than or equal to 16 suggests the patient may benefit from discharge to post-acute rehabilitation services  Please also refer to the recommendation of the Physical Therapist for safe discharge planning  Assessment: Pt seen for PT treatment session this date with interventions consisting of therapeutic activity consisting of training: bed mobility, supine<>sit transfers, and sit<>stand transfers  Pt agreeable to PT treatment session upon arrival, pt found supine in bed w/ HOB elevated, in no apparent distress  In comparison to previous session, pt with no improvements as evidenced by pt unable to complete STS this session due to L knee pain continues to require A x 3-4 for bed mobility    Post session: pt returned BTB, bed alarm engaged, all needs in reach, and RN notified of session findings/recommendations Continue to recommend STR at time of d/c in order to maximize pt's functional independence and safety w/ mobility  Pt continues to be functioning below baseline level, and remains limited 2* factors listed above and including decreased strength, balance, mobility, and activity tolearnce  PT will continue to see pt while here in order to address the deficits listed above and provide interventions consistent w/ POC in effort to achieve STGs       Ambika Lima, PT,DPT

## 2022-09-23 NOTE — ASSESSMENT & PLAN NOTE
Wt Readings from Last 3 Encounters:   09/23/22 (!) 184 kg (405 lb 3 3 oz)   04/13/20 (!) 162 kg (356 lb 0 7 oz)     · No previous echo in the system suspect diastolic will do a 2D echo now  Shortness of breath leg edema back in 2021 she was admitted she was 356 lb now 406 chest x-ray compliant with CHF  · Lasix and metolazone are not working patient given metolazone yesterday and only put out 1 6 L no weight changes still quite overloaded place on Lasix drip 15 milligrams/hour continue fluid restriction monitor her output re-evaluate tomorrow start potassium supplementation  · Troponin neg  · TSH is normal  Would want at least 2 L per day diuresis for now    Sometimes blood pressures are soft will place on midodrine as needed to allow diuresis  Might be a dose of Diamox tomorrow if bicarb also above 40

## 2022-09-23 NOTE — PLAN OF CARE
Problem: PHYSICAL THERAPY ADULT  Goal: Performs mobility at highest level of function for planned discharge setting  See evaluation for individualized goals  Description: Treatment/Interventions: Functional transfer training, LE strengthening/ROM, Therapeutic exercise, Endurance training, Patient/family training, Equipment eval/education, Bed mobility, Gait training, Compensatory technique education, Spoke to nursing, OT  Equipment Recommended:  (TBD by rehab)       See flowsheet documentation for full assessment, interventions and recommendations  Outcome: Not Progressing  Note: Prognosis: Guarded  Problem List: Decreased strength, Decreased endurance, Impaired balance, Decreased mobility, Impaired judgement, Decreased safety awareness, Obesity, Decreased skin integrity, Pain  Assessment: Pt seen for PT treatment session this date with interventions consisting of therapeutic activity consisting of training: bed mobility, supine<>sit transfers, and sit<>stand transfers  Pt agreeable to PT treatment session upon arrival, pt found supine in bed w/ HOB elevated, in no apparent distress  In comparison to previous session, pt with no improvements as evidenced by pt unable to complete STS this session due to L knee pain continues to require A x 3-4 for bed mobility   Post session: pt returned BTB, bed alarm engaged, all needs in reach, and RN notified of session findings/recommendations Continue to recommend STR at time of d/c in order to maximize pt's functional independence and safety w/ mobility  Pt continues to be functioning below baseline level, and remains limited 2* factors listed above and including decreased strength, balance, mobility, and activity tolearnce  PT will continue to see pt while here in order to address the deficits listed above and provide interventions consistent w/ POC in effort to achieve STGs    Barriers to Discharge: Inaccessible home environment, Decreased caregiver support  Barriers to Discharge Comments: Pt is A x 2-4 for mobility  PT Discharge Recommendation: Post acute rehabilitation services    See flowsheet documentation for full assessment

## 2022-09-23 NOTE — DISCHARGE INSTR - OTHER ORDERS
Skin care plans:  1-Cleanse sacro-buttocks deep between buttocks and deep upper gluteal cleft with soap and water  Apply TRIAD paste to open areas BID and prn  2-Hydraguard to bilateral heel BID and PRN  3-Elevate heels to offload pressure  4-Ehob cushion when out of bed  5-Turn/repoisiton q2h or when medically stable for pressure re-distribution on skin  6-Moisturize skin daily with skin nourishing cream  7-Bariatric alternating air loss mattress  (on)   8-Apply Allevyn foam to left heel lateral foot wound , yunier with a  "T" for treatment and  Change every 3 days and as needed  Check skin beneath foam every shift  9-Cleanse skin folds of both legs and abdomen with soap and water, pat dry  Apply maxorb rope to areas of partial thickness skin loss on posterior right knee fold and mid abdomen and left lateral abdominal fold   Change daily and prn soil or dislodgment

## 2022-09-23 NOTE — PLAN OF CARE
Problem: OCCUPATIONAL THERAPY ADULT  Goal: Performs self-care activities at highest level of function for planned discharge setting  See evaluation for individualized goals  Description: Treatment Interventions: ADL retraining, Functional transfer training, Endurance training, UE strengthening/ROM, Cognitive reorientation, Patient/family training, Equipment evaluation/education, Neuromuscular reeducation, Compensatory technique education, Continued evaluation, Energy conservation, Activityengagement          See flowsheet documentation for full assessment, interventions and recommendations  Note: Limitation: Decreased ADL status, Decreased UE strength, Decreased endurance, Decreased self-care trans, Decreased high-level ADLs  Prognosis: Fair  Assessment: Pt completed OT tx session #1 focused on ADL performance and functional mobility  Pt pleasant and agreeable to participate  This session pt requiring increased assistance for transferring and unable to complete full stand d/t increased L knee pain  Pt currently completing UB ADLs @ Max A and LB ADLs @ Total A d/t body habitus, and functional mobility @ Max A x3  Pt required increased O2 from 4L to 8L during bed mobility at end of session d/t positioning  Pt demonstrating Good participation and Fair potential to achieve goals and is currently demonstrating deficits in bed mobility, sitting tolerance, standing tolerance/balance, functional mobility, overall strength, self-care, activity tolerance and pulmonary tolerance  Continue to recommend post acute rehabilitation services at this time to increase safety and independence in ADL tasks and functional mobility       OT Discharge Recommendation: Post acute rehabilitation services

## 2022-09-23 NOTE — PLAN OF CARE
Problem: MOBILITY - ADULT  Goal: Maintain or return to baseline ADL function  Description: INTERVENTIONS:  -  Assess patient's ability to carry out ADLs; assess patient's baseline for ADL function and identify physical deficits which impact ability to perform ADLs (bathing, care of mouth/teeth, toileting, grooming, dressing, etc )  - Assess/evaluate cause of self-care deficits   - Assess range of motion  - Assess patient's mobility; develop plan if impaired  - Assess patient's need for assistive devices and provide as appropriate  - Encourage maximum independence but intervene and supervise when necessary  - Involve family in performance of ADLs  - Assess for home care needs following discharge   - Consider OT consult to assist with ADL evaluation and planning for discharge  - Provide patient education as appropriate  Outcome: Progressing  Goal: Maintains/Returns to pre admission functional level  Description: INTERVENTIONS:  - Perform BMAT or MOVE assessment daily    - Set and communicate daily mobility goal to care team and patient/family/caregiver  - Collaborate with rehabilitation services on mobility goals if consulted  - Perform Range of Motion 2 times a day  - Reposition patient every 2 hours    - Dangle patient 2 times a day  - Stand patient 2 times a day  - Ambulate patient 2 times a day  - Out of bed to chair 2 times a day   - Out of bed for meals 2 times a day  - Out of bed for toileting  - Record patient progress and toleration of activity level   Outcome: Progressing     Problem: Potential for Falls  Goal: Patient will remain free of falls  Description: INTERVENTIONS:  - Educate patient/family on patient safety including physical limitations  - Instruct patient to call for assistance with activity   - Consult OT/PT to assist with strengthening/mobility   - Keep Call bell within reach  - Keep bed low and locked with side rails adjusted as appropriate  - Keep care items and personal belongings within reach  - Initiate and maintain comfort rounds  - Make Fall Risk Sign visible to staff  - Offer Toileting every 2 Hours, in advance of need  - Initiate/Maintain bed alarm  - Obtain necessary fall risk management equipment:   - Apply yellow socks and bracelet for high fall risk patients  - Consider moving patient to room near nurses station  Outcome: Progressing     Problem: Prexisting or High Potential for Compromised Skin Integrity  Goal: Skin integrity is maintained or improved  Description: INTERVENTIONS:  - Identify patients at risk for skin breakdown  - Assess and monitor skin integrity  - Assess and monitor nutrition and hydration status  - Monitor labs   - Assess for incontinence   - Turn and reposition patient  - Assist with mobility/ambulation  - Relieve pressure over bony prominences  - Avoid friction and shearing  - Provide appropriate hygiene as needed including keeping skin clean and dry  - Evaluate need for skin moisturizer/barrier cream  - Collaborate with interdisciplinary team   - Patient/family teaching  - Consider wound care consult   Outcome: Progressing     Problem: PAIN - ADULT  Goal: Verbalizes/displays adequate comfort level or baseline comfort level  Description: Interventions:  - Encourage patient to monitor pain and request assistance  - Assess pain using appropriate pain scale  - Administer analgesics based on type and severity of pain and evaluate response  - Implement non-pharmacological measures as appropriate and evaluate response  - Consider cultural and social influences on pain and pain management  - Notify physician/advanced practitioner if interventions unsuccessful or patient reports new pain  Outcome: Progressing     Problem: INFECTION - ADULT  Goal: Absence or prevention of progression during hospitalization  Description: INTERVENTIONS:  - Assess and monitor for signs and symptoms of infection  - Monitor lab/diagnostic results  - Monitor all insertion sites, i e  indwelling lines, tubes, and drains  - Monitor endotracheal if appropriate and nasal secretions for changes in amount and color  - [place] appropriate cooling/warming therapies per order  - Administer medications as ordered  - Instruct and encourage patient and family to use good hand hygiene technique  - Identify and instruct in appropriate isolation precautions for identified infection/condition  Outcome: Progressing  Goal: Absence of fever/infection during neutropenic period  Description: INTERVENTIONS:  - Monitor WBC    Outcome: Progressing     Problem: SAFETY ADULT  Goal: Maintain or return to baseline ADL function  Description: INTERVENTIONS:  -  Assess patient's ability to carry out ADLs; assess patient's baseline for ADL function and identify physical deficits which impact ability to perform ADLs (bathing, care of mouth/teeth, toileting, grooming, dressing, etc )  - Assess/evaluate cause of self-care deficits   - Assess range of motion  - Assess patient's mobility; develop plan if impaired  - Assess patient's need for assistive devices and provide as appropriate  - Encourage maximum independence but intervene and supervise when necessary  - Involve family in performance of ADLs  - Assess for home care needs following discharge   - Consider OT consult to assist with ADL evaluation and planning for discharge  - Provide patient education as appropriate  Outcome: Progressing  Goal: Maintains/Returns to pre admission functional level  Description: INTERVENTIONS:  - Perform BMAT or MOVE assessment daily    - Set and communicate daily mobility goal to care team and patient/family/caregiver  - Collaborate with rehabilitation services on mobility goals if consulted  - Perform Range of Motion 2 times a day  - Reposition patient every 2 hours    - Dangle patient 2 times a day  - Stand patient 2 times a day  - Ambulate patient 2 times a day  - Out of bed to chair 2 times a day   - Out of bed for meals 2 times a day  - Out of bed for toileting  - Record patient progress and toleration of activity level   Outcome: Progressing  Goal: Patient will remain free of falls  Description: INTERVENTIONS:  - Educate patient/family on patient safety including physical limitations  - Instruct patient to call for assistance with activity   - Consult OT/PT to assist with strengthening/mobility   - Keep Call bell within reach  - Keep bed low and locked with side rails adjusted as appropriate  - Keep care items and personal belongings within reach  - Initiate and maintain comfort rounds  - Make Fall Risk Sign visible to staff  - Offer Toileting every 2 Hours, in advance of need  - Initiate/Maintain bed alarm  - Obtain necessary fall risk management equipment:   - Apply yellow socks and bracelet for high fall risk patients  - Consider moving patient to room near nurses station  Outcome: Progressing     Problem: DISCHARGE PLANNING  Goal: Discharge to home or other facility with appropriate resources  Description: INTERVENTIONS:  - Identify barriers to discharge w/patient and caregiver  - Arrange for needed discharge resources and transportation as appropriate  - Identify discharge learning needs (meds, wound care, etc )  - Arrange for interpretive services to assist at discharge as needed  - Refer to Case Management Department for coordinating discharge planning if the patient needs post-hospital services based on physician/advanced practitioner order or complex needs related to functional status, cognitive ability, or social support system  Outcome: Progressing

## 2022-09-23 NOTE — PROGRESS NOTES
[address]  Progress Note Avinash Chua 1955, 69 y o  female MRN: [id number]  Unit/Bed#: -01 Encounter: [id number]  Primary Care Provider: Vanna Bueno MD   Date and time admitted to hospital: 9/21/2022  1:02 PM    * Acute on chronic diastolic congestive heart failure [facility])  Assessment & Plan  Wt Readings from Last 3 Encounters:   09/23/22 (!) 184 kg (405 lb 3 3 oz)   04/13/20 (!) 162 kg (356 lb 0 7 oz)     · No previous echo in the system suspect diastolic will do a 2D echo now  Shortness of breath leg edema back in 2021 she was admitted she was 356 lb now 406 chest x-ray compliant with CHF  · Lasix and metolazone are not working patient given metolazone yesterday and only put out 1 6 L no weight changes still quite overloaded place on Lasix drip 15 milligrams/hour continue fluid restriction monitor her output re-evaluate tomorrow start potassium supplementation  · Troponin neg  · TSH is normal  Would want at least 2 L per day diuresis for now  Sometimes blood pressures are soft will place on midodrine as needed to allow diuresis  Might be a dose of Diamox tomorrow if bicarb also above 40        Hypothyroidism  Assessment & Plan  · Continue Synthroid TSH normal    Morbid obesity ([facility] )  Assessment & Plan  · Nutrition for weight loss counseling    Will get a PT OT to evaluate ambulatory status    IMTIAZ (acute kidney injury) ([facility] [address] )  Assessment & Plan  · Creatinine baseline is 0 9 suspect secondary to cardiorenal   Improved with diuresis    Supratherapeutic INR  Assessment & Plan  · Hold Coumadin recheck INR tomorrow although coming down    Chronic respiratory failure with hypoxia (HCC)  Assessment & Plan  · Chronically on CPAP at night and 4 L of oxygen during the day secondary to COPD most likely NISHI    Anemia  Assessment & Plan  · Chronic anemia hemoglobin is stable continue iron    Diabetes mellitus [facility])  Assessment & Plan  Lab Results   Component Value Date HGBA1C 8 0 (H) 2020       Recent Labs     22  1611 22  2110 22  0737 22  1135   POCGLU 113 141* 118 154*       Blood Sugar Average: Last 72 hrs:  · (P) 124 875 hold p o  medication fasting was on the low side  Since reduction of Levemir to 20 units at night doing much better    COPD (chronic obstructive pulmonary disease) ([facility] [address] )  Assessment & Plan  · Not in exacerbation continue Breo    Atrial fibrillation [facility])  Assessment & Plan  · Persistent rate controlled will hold warfarin as INR supratherapeutic  Will restart once 3 continue Toprol XL        VTE Pharmacologic Prophylaxis: VTE Score: 4 Moderate Risk (Score 3-4) - Pharmacological DVT Prophylaxis Contraindicated  Sequential Compression Devices Ordered  Patient Centered Rounds: I performed bedside rounds with nursing staff today  Discussions with Specialists or Other Care Team Provider: cm    Education and Discussions with Family / Patient: patient will update  -   Time Spent for Care: 30 minutes  More than 50% of total time spent on counseling and coordination of care as described above  Current Length of Stay: 2 day(s)  Current Patient Status: Inpatient   Certification Statement: The patient will continue to require additional inpatient hospital stay due to chf  Discharge Plan: Anticipate discharge in >72 hrs to rehab facility  Code Status: Level 1 - Full Code    Subjective:   Seen and examined no cp sob improved  Objective:     Vitals:   Temp (24hrs), Av °F (36 7 °C), Min:97 9 °F (36 6 °C), Max:98 1 °F (36 7 °C)    Temp:  [97 9 °F (36 6 °C)-98 1 °F (36 7 °C)] 98 1 °F (36 7 °C)  HR:  [71-89] 83  Resp:  [18-20] 18  BP: ()/(55-73) 119/73  SpO2:  [93 %-96 %] 96 %  Body mass index is 74 11 kg/m²  Input and Output Summary (last 24 hours):      Intake/Output Summary (Last 24 hours) at 2022 1303  Last data filed at 2022 1236  Gross per 24 hour   Intake 1000 ml   Output 3000 ml   Net -2000 ml Physical Exam:   Physical Exam  Vitals and nursing note reviewed  Constitutional:       General: She is not in acute distress  Appearance: She is well-developed  She is obese  HENT:      Head: Normocephalic and atraumatic  Eyes:      Conjunctiva/sclera: Conjunctivae normal    Cardiovascular:      Rate and Rhythm: Normal rate and regular rhythm  Heart sounds: No murmur heard  Pulmonary:      Effort: Pulmonary effort is normal  No respiratory distress  Breath sounds: Wheezing and rales present  Abdominal:      Palpations: Abdomen is soft  Tenderness: There is no abdominal tenderness  Musculoskeletal:         General: Swelling present  Cervical back: Neck supple  Skin:     General: Skin is warm and dry  Neurological:      Mental Status: She is alert and oriented to person, place, and time           Additional Data:     Labs:  Results from last 7 days   Lab Units 09/22/22  0600   WBC Thousand/uL 5 89   HEMOGLOBIN g/dL 10 7*   HEMATOCRIT % 35 2   PLATELETS Thousands/uL 206   NEUTROS PCT % 62   LYMPHS PCT % 20   MONOS PCT % 13*   EOS PCT % 4     Results from last 7 days   Lab Units 09/23/22  0600 09/22/22  0600   SODIUM mmol/L 145 145   POTASSIUM mmol/L 3 6 4 2   CHLORIDE mmol/L 102 105   CO2 mmol/L 38* 37*   BUN mg/dL 38* 36*   CREATININE mg/dL 1 27 1 19   ANION GAP mmol/L 5 3*   CALCIUM mg/dL 9 4 9 1   ALBUMIN g/dL  --  2 8*   TOTAL BILIRUBIN mg/dL  --  1 00   ALK PHOS U/L  --  129*   ALT U/L  --  17   AST U/L  --  26   GLUCOSE RANDOM mg/dL 118 93     Results from last 7 days   Lab Units 09/23/22  0643   INR  3 32*     Results from last 7 days   Lab Units 09/23/22  1135 09/23/22  0737 09/22/22  2110 09/22/22  1611 09/22/22  1056 09/22/22  0736 09/21/22  2129 09/21/22  1657   POC GLUCOSE mg/dl 154* 118 141* 113 132 76 156* 109         Results from last 7 days   Lab Units 09/21/22  1319   LACTIC ACID mmol/L 1 5       Lines/Drains:  Invasive Devices  Report    Peripheral Intravenous Line  Duration           Peripheral IV 09/21/22 Right Antecubital 1 day          Drain  Duration           External Urinary Catheter <1 day                  Telemetry:  Telemetry Orders (From admission, onward)             48 Hour Telemetry Monitoring  Continuous x 48 hours        References:    Telemetry Guidelines   Question:  Reason for 48 Hour Telemetry  Answer:  Acute Decompensated CHF (continuous diuretic infusion or total diuretic dose > 200 mg daily, associated electrolyte derangement, ionotropic drip, history of ventricular arrhythmia, or new EF <35%)                 Telemetry Reviewed: Normal Sinus Rhythm  Indication for Continued Telemetry Use: Acute CHF on >200 mg lasix/day or equivalent dose or with new reduced EF                Imaging: Reviewed radiology reports from this admission including: chest xray    Recent Cultures (last 7 days):         Last 24 Hours Medication List:   Current Facility-Administered Medications   Medication Dose Route Frequency Provider Last Rate    acetaminophen  650 mg Oral Q6H PRN Jessica Schafer MD      allopurinol  100 mg Oral Daily Jessica Schafer MD      ammonium lactate   Topical BID PRN Jessica Schafer MD      atorvastatin  10 mg Oral Daily Jessica Schafer MD      cholecalciferol  2,000 Units Oral Daily Jessica Schafer MD      ferrous sulfate  325 mg Oral Daily With Breakfast Jessica Schafer MD      fluticasone-vilanterol  1 puff Inhalation Daily Jessica Schafer MD      furosemide  15 mg/hr Intravenous Continuous Jessica Schafer MD 15 mg/hr (09/23/22 1036)    insulin detemir  20 Units Subcutaneous HS Jessica Schafer MD      insulin lispro  4-20 Units Subcutaneous TID AC Jessica Schafer MD      levothyroxine  200 mcg Oral Daily Jessica Schafer MD      levothyroxine  88 mcg Oral Early Morning Jessica Schafer MD      loratadine  10 mg Oral Daily Jessica Schafer MD      metoprolol succinate  100 mg Oral BID MD Camille Maharaj midodrine  2 5 mg Oral TID AC Sarkis Garcia MD      montelukast  10 mg Oral HS Sarkis Garcia MD      nystatin   Topical BID Sarkis Garcia MD      pantoprazole  40 mg Oral Early Morning Sarkis Garcia MD      potassium chloride  40 mEq Oral BID Sarkis Garcia MD      traMADol  50 mg Oral Q6H PRN Sarkis Garcia MD          Today, Patient Was Seen By: Sarkis Garcia MD    **Please Note: This note may have been constructed using a voice recognition system  **

## 2022-09-23 NOTE — CASE MANAGEMENT
Case Management Assessment & Discharge Planning Note    Patient name Pedro Medel  Location /-01 MRN [id number]  : 1955 Date 2022       Current Admission Date: 2022  Current Admission Diagnosis:Acute on chronic diastolic congestive heart failure [facility])   Patient Active Problem List    Diagnosis Date Noted    Acute on chronic diastolic congestive heart failure ([facility] ) 2022    IMTIAZ (acute kidney injury) ([facility] ) 2022    Morbid obesity ([place] [address] ) 2022    Hypothyroidism 2022    Supratherapeutic INR 04/15/2020    COVID-19 virus infection 2020    Chronic respiratory failure with hypoxia ([place] [address] ) 2020    Asthma 2020    Atrial fibrillation ([facility] ) 2020    COPD (chronic obstructive pulmonary disease) ([place] [address] ) 2020    Diabetes mellitus ([place] [address] ) 2020    Heart failure ([place] [address] ) 2020    Shortness of breath 2020    Anemia 2020      LOS (days): 2  Geometric Mean LOS (GMLOS) (days): 3 00  Days to GMLOS:1     OBJECTIVE:    Risk of Unplanned Readmission Score: 17 27         Current admission status: Inpatient       Preferred Pharmacy:   [address] [facility] [place] [address]  Phone: [phone] Fax: [phone]    Primary Care Provider: Edwina Banks MD    Primary Insurance: THE [facility]  Secondary Insurance:     ASSESSMENT:  Beatriz 26 Proxies    There are no active Health Care Proxies on file  DISCHARGE DETAILS:        STR recommended for pt at time of discharge, pt is agreeable  Pt wants referral placed to [facility], pt has no other choices at this time  Pts  sts "they will take her because I was there before and I loved them there, they have to take her "  As per request, referral placed in [facility]  A post acute care recommendation was made by your care team for STR    Discussed Freedom of Choice with both patient and caregiver  List of facilities given to both patient and caregiver via in person  both patient and caregiver aware the list is custom filtered for them by preference  and that [facility] post acute providers are designated

## 2022-09-23 NOTE — CASE MANAGEMENT
Case Management Assessment & Discharge Planning Note    Patient name Zionrivi Cover  Location /-01 MRN [id number]  : 1955 Date 2022       Current Admission Date: 2022  Current Admission Diagnosis:Acute on chronic diastolic congestive heart failure [facility])   Patient Active Problem List    Diagnosis Date Noted    Acute on chronic diastolic congestive heart failure ([facility] ) 2022    IMTIAZ (acute kidney injury) ([place] [address] ) 2022    Morbid obesity ([place] [address] ) 2022    Hypothyroidism 2022    Supratherapeutic INR 04/15/2020    COVID-19 virus infection 2020    Chronic respiratory failure with hypoxia ([place] [address] ) 2020    Asthma 2020    Atrial fibrillation ([place] [address] ) 2020    COPD (chronic obstructive pulmonary disease) ([place] [address] ) 2020    Diabetes mellitus ([place] [address] ) 2020    Heart failure ([place] [address] ) 2020    Shortness of breath 2020    Anemia 2020      LOS (days): 2  Geometric Mean LOS (GMLOS) (days): 3 00  Days to GMLOS:0 9     OBJECTIVE:    Risk of Unplanned Readmission Score: 17 49         Current admission status: Inpatient       Preferred Pharmacy:   [place] [address]  Phone: [phone] Fax: [phone]    Primary Care Provider: Cheng Gomez MD    Primary Insurance: THE [facility]  Secondary Insurance:     ASSESSMENT:  Beatriz Scott Proxies    There are no active Health Care Proxies on file                        Patient Information  Admitted from[de-identified] Home  Mental Status: Alert  During Assessment patient was accompanied by: Spouse  Assessment information provided by[de-identified] Patient  Primary Caregiver: Family  Caregiver's Name[de-identified]  and son, Shira Chiang and Mary Buckley Relationship to Patient[de-identified] Family Member  Support Systems: Spouse/significant other, Children, Family members, [address] of Residence: [address] [place] [address] access options   Select all that apply : No steps to enter home  Type of Current Residence: 2 story home  Upon entering residence, is there a bedroom on the main floor (no further steps)?: No  A bedroom is located on the following floor levels of residence (select all that apply):: 2nd Floor  Upon entering residence, is there a bathroom on the main floor (no further steps)?: Yes  Number of steps to 2nd floor from main floor: One Flight  In the last 12 months, was there a time when you were not able to pay the mortgage or rent on time?: No  In the last 12 months, how many places have you lived?: 1  In the last 12 months, was there a time when you did not have a steady place to sleep or slept in a shelter (including now)?: No  Living Arrangements: Lives w/ Spouse/significant other    Activities of Daily Living Prior to Admission  Functional Status: Assistance  Completes ADLs independently?: No  Level of ADL dependence: Assistance  Ambulates independently?: No  Level of ambulatory dependence: Assistance (pt ambulates very little at baseline )  Does patient use assisted devices?: Yes  Assisted Devices (DME) used: Home Oxygen concentrator, Portable Oxygen tanks, Wheelchair, Bedside Commode  DME Company Name (respiratory supplies): [employer]  O2 Rate(s): 4L at all times  Does patient currently own DME?: Yes  What DME does the patient currently own?: Bedside Commode, Home Oxygen concentrator, Portable Oxygen tanks, Wheelchair  Does patient have a history of Outpatient Therapy (PT/OT)?: No  Does the patient have a history of Short-Term Rehab?: No  Does patient have a history of HHC?: Yes ([facility])  Does patient currently have [address]?: Yes ([facility])    Current [address]  Type of Current Home Care Services: Nurse visit  [address] [place] [address][de-identified] Other (please enter name in comment) ([facility])  [facility] Provider[de-identified] PCP    Patient Information Continued  Does patient have prescription coverage?: Yes  Within the past 12 months, you worried that your food would run out before you got the money to buy more : Never true  Within the past 12 months, the food you bought just didn't last and you didn't have money to get more : Never true  Food insecurity resource given?: No  Does patient receive dialysis treatments?: No  Does patient have a history of substance abuse?: No  Does patient have a history of Mental Health Diagnosis?: No         Means of Transportation  Means of Transport to Appts[de-identified] Family transport  In the past 12 months, has lack of transportation kept you from medical appointments or from getting medications?: No  In the past 12 months, has lack of transportation kept you from meetings, work, or from getting things needed for daily living?: No        DISCHARGE DETAILS:           pt lives at home with her  and son, who are her main care takers  Pt sts she sleeps on the first floor in her recliner  Pt sts "I go from the lift chair recliner to the bed side commode and back to the chair "  Pt sts at baseline she does not ambulate very much  Pt sts she has a WC but is only if she goes to appointments, which "isnt often"  Pt sts her  and son assist with everyday needs and her ADL's and she has "many friends that will come to my side at a snap of my fingers, [employer] friends"

## 2022-09-23 NOTE — ASSESSMENT & PLAN NOTE
Lab Results   Component Value Date    HGBA1C 8 0 (H) 04/13/2020       Recent Labs     09/22/22  1611 09/22/22  2110 09/23/22  0737 09/23/22  1135   POCGLU 113 141* 118 154*       Blood Sugar Average: Last 72 hrs:  · (P) 124 875 hold p o  medication fasting was on the low side    Since reduction of Levemir to 20 units at night doing much better

## 2022-09-23 NOTE — OCCUPATIONAL THERAPY NOTE
Occupational Therapy Progress Note     Patient Name: Rita LOCKHART Date: 9/23/2022  Problem List  Principal Problem:    Acute on chronic diastolic congestive heart failure (HCC)  Active Problems:    Atrial fibrillation (HCC)    COPD (chronic obstructive pulmonary disease) ([facility] )    Diabetes mellitus ([facility] )    Anemia    Chronic respiratory failure with hypoxia (HCC)    Supratherapeutic INR    IMTIAZ (acute kidney injury) ([facility] )    Morbid obesity ([facility] )    Hypothyroidism       09/23/22 1025   Note Type   Note Type Treatment   Restrictions/Precautions   Other Precautions Bed Alarm; Fall Risk;Multiple lines;O2;Pain  (4L -> 8L)   Pain Assessment   Pain Assessment Tool FLACC   Pain Location/Orientation Orientation: Left; Location: Knee   Pain Rating: FLACC (Rest) - Face 0   Pain Rating: FLACC (Rest) - Legs 0   Pain Rating: FLACC (Rest) - Activity 0   Pain Rating: FLACC (Rest) - Cry 0   Pain Rating: FLACC (Rest) - Consolability 0   Score: FLACC (Rest) 0   Pain Rating: FLACC (Activity) - Face 1   Pain Rating: FLACC (Activity) - Legs 0   Pain Rating: FLACC (Activity) - Activity 0   Pain Rating: FLACC (Activity) - Cry 1   Pain Rating: FLACC (Activity) - Consolability 0   Score: FLACC (Activity) 2   ADL   Where Assessed Supine, bed   UB Bathing Assistance 2  Maximal Assistance   UB Bathing Deficit Right arm;Left arm; Abdomen   LB Bathing Assistance 1  Total Assistance   LB Bathing Deficit Left lower leg including foot;Right lower leg including foot; Left upper leg;Right upper leg;Perineal area; Buttocks   UB Dressing Assistance 2  Maximal Assistance   UB Dressing Deficit Thread RUE; Thread LUE;Pull around back;Pull over head;Pull down in back   Bed Mobility   Rolling R 2  Maximal assistance   Additional items Assist x 3; Increased time required;Verbal cues   Rolling L 2  Maximal assistance   Additional items Assist x 3; Increased time required;Verbal cues   Supine to Sit 2  Maximal assistance   Additional items Assist x 3;Increased time required;Verbal cues   Sit to Supine 1  Dependent   Additional items Assist x 3; Increased time required;Verbal cues   Additional Comments Pt supine in bed at beginning of session on 4lpm O2  Pt able to roll L and R @ Max A x3 to each side during wound care and UB/LB ADLs  Pt initially able to maintain lying on one side for 3-5 min each side @ Min-Max A  Pt then transferring from supine to sit @ Max A x3  Pt able to maintain seated at EOB for approximately 3 minutes  After attempts to transfer, sit to supine @ Total A x4  Once pt supine in bed, O2 line became disconnected and O2 decreased to 79%  O2 increased to 8lpm and pt resituated in bed for optimal breathing and oxygen intake  Pt supine in bed at end of session with PCA/RN present  All needs met  Transfers   Sit to Stand 2  Maximal assistance   Additional items Assist x 2; Increased time required;Verbal cues   Stand to Sit 2  Maximal assistance   Additional items Assist x 2; Increased time required;Verbal cues   Stand pivot Unable to assess   Sit pivot Unable to assess   Additional Comments Pt with two failed attempted of STS from EOB, pt able to complete 1/2 @ Max A x2 with RW but unable to continue further d/t c/o L knee pain  Attempted squat pivot transfer from EOB to recliner chair, able to progress minimally and pt with request to decline further functional mobility  Cognition   Overall Cognitive Status WFL   Arousal/Participation Alert; Responsive; Cooperative   Attention Within functional limits   Orientation Level Oriented X4   Memory Within functional limits   Following Commands Follows all commands and directions without difficulty   Activity Tolerance   Activity Tolerance Patient limited by fatigue;Patient limited by pain   Medical Staff Made Aware Spoke with RN Leona/Elisa, PCA Marisol France and PT Abena   Assessment   Assessment Pt completed OT tx session #1 focused on ADL performance and functional mobility   Pt pleasant and agreeable to participate  This session pt requiring increased assistance for transferring and unable to complete full stand d/t increased L knee pain  Pt currently completing UB ADLs @ Max A and LB ADLs @ Total A d/t body habitus, and functional mobility @ Max A x3  Pt required increased O2 from 4L to 8L during bed mobility at end of session d/t positioning  Pt demonstrating Good participation and Fair potential to achieve goals and is currently demonstrating deficits in bed mobility, sitting tolerance, standing tolerance/balance, functional mobility, overall strength, self-care, activity tolerance and pulmonary tolerance  Continue to recommend post acute rehabilitation services at this time to increase safety and independence in ADL tasks and functional mobility  Plan   Treatment Interventions ADL retraining;Functional transfer training   Goal Expiration Date 10/06/22   OT Treatment Day 1   OT Frequency 3-5x/wk   Recommendation   OT Discharge Recommendation Post acute rehabilitation services   AM-PAC Daily Activity Inpatient   Lower Body Dressing 1   Bathing 2   Toileting 1   Upper Body Dressing 2   Grooming 3   Eating 3   Daily Activity Raw Score 12   Daily Activity Standardized Score (Calc for Raw Score >=11) 30 6   AM-PAC Applied Cognition Inpatient   Following a Speech/Presentation 4   Understanding Ordinary Conversation 4   Taking Medications 4   Remembering Where Things Are Placed or Put Away 4   Remembering List of 4-5 Errands 4   Taking Care of Complicated Tasks 4   Applied Cognition Raw Score 24   Applied Cognition Standardized Score 62 21     Pt goals to be met by 10/6/2022     1  Pt will demonstrate ability to complete grooming/hygiene tasks @ Mod I after set-up  2  Pt will demonstrate ability to complete UB ADLs including washing/dressing @ Min A in order to increase performance and participation during meaningful tasks  3   Pt will demonstrate ability to complete LB dressing @ Mod A in order to increase safety and independence during meaningful tasks  4  Pt will demonstrate ability to complete toileting tasks including CM and pericare @ Supervision in order to increase safety and independence during meaningful tasks  5  Pt will demonstrate ability to complete EOB, chair, toilet/commode transfers @ Supervision in order to increase performance and participation during functional tasks  6  Pt will demonstrate ability to stand for 1-2 minutes while maintaining Fair + balance with use of RW for UB support PRN  7  Pt will demonstrate ability to tolerate 30-35 minute OT session with no vc'ing for deep breathing or use of energy conservation techniques in order to increase activity tolerance during functional tasks  8  Pt will demonstrate Good carryover of use of energy conservation/compensatory strategies during ADLs and functional tasks in order to increase safety and reduce risk for falls  9  Pt will identify 3 areas of interest/hobbies and 1 intervention on how to incorporate into daily life in order to increase interaction with environment and peers as well as increase participation in meaningful tasks  10  Pt will demonstrate 100% carryover of BUE HEP in order to increase BUE MS and increase performance during functional tasks upon d/c home      Charmayne Auer, OTR/GENET

## 2022-09-24 ENCOUNTER — APPOINTMENT (OUTPATIENT)
Dept: RADIOLOGY | Facility: HOSPITAL | Age: 67
DRG: 292 | End: 2022-09-24
Payer: COMMERCIAL

## 2022-09-24 LAB
ANION GAP SERPL CALCULATED.3IONS-SCNC: 1 MMOL/L (ref 4–13)
BUN SERPL-MCNC: 38 MG/DL (ref 5–25)
CALCIUM SERPL-MCNC: 9.6 MG/DL (ref 8.3–10.1)
CHLORIDE SERPL-SCNC: 101 MMOL/L (ref 96–108)
CO2 SERPL-SCNC: 38 MMOL/L (ref 21–32)
CREAT SERPL-MCNC: 1.14 MG/DL (ref 0.6–1.3)
GFR SERPL CREATININE-BSD FRML MDRD: 49 ML/MIN/1.73SQ M
GLUCOSE SERPL-MCNC: 118 MG/DL (ref 65–140)
GLUCOSE SERPL-MCNC: 131 MG/DL (ref 65–140)
GLUCOSE SERPL-MCNC: 150 MG/DL (ref 65–140)
GLUCOSE SERPL-MCNC: 154 MG/DL (ref 65–140)
GLUCOSE SERPL-MCNC: 155 MG/DL (ref 65–140)
INR PPP: 2.9 (ref 0.84–1.19)
MAGNESIUM SERPL-MCNC: 1.9 MG/DL (ref 1.6–2.6)
POTASSIUM SERPL-SCNC: 4.1 MMOL/L (ref 3.5–5.3)
PROTHROMBIN TIME: 30.3 SECONDS (ref 11.6–14.5)
SODIUM SERPL-SCNC: 140 MMOL/L (ref 135–147)

## 2022-09-24 PROCEDURE — 99232 SBSQ HOSP IP/OBS MODERATE 35: CPT | Performed by: FAMILY MEDICINE

## 2022-09-24 PROCEDURE — 85610 PROTHROMBIN TIME: CPT | Performed by: FAMILY MEDICINE

## 2022-09-24 PROCEDURE — 71045 X-RAY EXAM CHEST 1 VIEW: CPT

## 2022-09-24 PROCEDURE — 83735 ASSAY OF MAGNESIUM: CPT | Performed by: FAMILY MEDICINE

## 2022-09-24 PROCEDURE — 80048 BASIC METABOLIC PNL TOTAL CA: CPT | Performed by: FAMILY MEDICINE

## 2022-09-24 PROCEDURE — 82948 REAGENT STRIP/BLOOD GLUCOSE: CPT

## 2022-09-24 RX ORDER — WARFARIN SODIUM 3 MG/1
6 TABLET ORAL
Status: DISCONTINUED | OUTPATIENT
Start: 2022-09-24 | End: 2022-10-03 | Stop reason: HOSPADM

## 2022-09-24 RX ORDER — MAGNESIUM SULFATE 1 G/100ML
1 INJECTION INTRAVENOUS ONCE
Status: COMPLETED | OUTPATIENT
Start: 2022-09-24 | End: 2022-09-24

## 2022-09-24 RX ADMIN — Medication 2000 UNITS: at 08:06

## 2022-09-24 RX ADMIN — NYSTATIN: 100000 POWDER TOPICAL at 17:00

## 2022-09-24 RX ADMIN — Medication 15 MG/HR: at 00:16

## 2022-09-24 RX ADMIN — MAGNESIUM SULFATE HEPTAHYDRATE 1 G: 1 INJECTION, SOLUTION INTRAVENOUS at 11:01

## 2022-09-24 RX ADMIN — LEVOTHYROXINE SODIUM 88 MCG: 100 TABLET ORAL at 04:33

## 2022-09-24 RX ADMIN — POTASSIUM CHLORIDE 40 MEQ: 1500 TABLET, EXTENDED RELEASE ORAL at 08:07

## 2022-09-24 RX ADMIN — METOPROLOL SUCCINATE 100 MG: 100 TABLET, FILM COATED, EXTENDED RELEASE ORAL at 08:07

## 2022-09-24 RX ADMIN — ACETAMINOPHEN 650 MG: 325 TABLET ORAL at 08:17

## 2022-09-24 RX ADMIN — LEVOTHYROXINE SODIUM 200 MCG: 100 TABLET ORAL at 04:33

## 2022-09-24 RX ADMIN — TRAMADOL HYDROCHLORIDE 50 MG: 50 TABLET ORAL at 04:28

## 2022-09-24 RX ADMIN — MIDODRINE HYDROCHLORIDE 2.5 MG: 5 TABLET ORAL at 08:16

## 2022-09-24 RX ADMIN — FLUTICASONE FUROATE AND VILANTEROL TRIFENATATE 1 PUFF: 100; 25 POWDER RESPIRATORY (INHALATION) at 08:09

## 2022-09-24 RX ADMIN — INSULIN LISPRO 4 UNITS: 100 INJECTION, SOLUTION INTRAVENOUS; SUBCUTANEOUS at 16:58

## 2022-09-24 RX ADMIN — MONTELUKAST 10 MG: 10 TABLET, FILM COATED ORAL at 21:03

## 2022-09-24 RX ADMIN — METOPROLOL SUCCINATE 100 MG: 100 TABLET, FILM COATED, EXTENDED RELEASE ORAL at 16:57

## 2022-09-24 RX ADMIN — LORATADINE 10 MG: 10 TABLET ORAL at 08:07

## 2022-09-24 RX ADMIN — WARFARIN SODIUM 6 MG: 3 TABLET ORAL at 16:57

## 2022-09-24 RX ADMIN — POTASSIUM CHLORIDE 40 MEQ: 1500 TABLET, EXTENDED RELEASE ORAL at 16:57

## 2022-09-24 RX ADMIN — PANTOPRAZOLE SODIUM 40 MG: 40 TABLET, DELAYED RELEASE ORAL at 04:37

## 2022-09-24 RX ADMIN — TRAMADOL HYDROCHLORIDE 50 MG: 50 TABLET ORAL at 14:16

## 2022-09-24 RX ADMIN — NYSTATIN: 100000 POWDER TOPICAL at 08:08

## 2022-09-24 RX ADMIN — FERROUS SULFATE TAB 325 MG (65 MG ELEMENTAL FE) 325 MG: 325 (65 FE) TAB at 08:07

## 2022-09-24 RX ADMIN — ATORVASTATIN CALCIUM 10 MG: 10 TABLET, FILM COATED ORAL at 08:07

## 2022-09-24 RX ADMIN — ALLOPURINOL 100 MG: 100 TABLET ORAL at 08:07

## 2022-09-24 RX ADMIN — INSULIN DETEMIR 20 UNITS: 100 INJECTION, SOLUTION SUBCUTANEOUS at 21:03

## 2022-09-24 NOTE — PROGRESS NOTES
[address]  Progress Note Ezequiel Hoffman 1955, 69 y o  female MRN: [id number]  Unit/Bed#: -01 Encounter: [id number]  Primary Care Provider: Nevaeh Godfrey MD   Date and time admitted to hospital: 9/21/2022  1:02 PM    * Acute on chronic diastolic congestive heart failure [facility])  Assessment & Plan  Wt Readings from Last 3 Encounters:   09/24/22 (!) 179 kg (393 lb 8 3 oz)   04/13/20 (!) 162 kg (356 lb 0 7 oz)     · No previous echo in the system suspect diastolic will do a 2D echo now  Shortness of breath leg edema back in 2021 she was admitted she was 356 lb now 406 chest x-ray compliant with CHF  · Lasix and metolazone are not working patient given metolazone yesterday and only put out 1 6 L no weight changes still quite overloaded place on Lasix drip 15 milligrams/hour continue fluid restriction monitor her output re-evaluate tomorrow start potassium supplementation  · Troponin neg  · TSH is normal  Would want at least 2 L per day diuresis for now  Sometimes blood pressures are soft will place on midodrine as needed to allow diuresis  Might be a dose of Diamox tomorrow if bicarb also above 40  9/24-under Lasix drip patient actually lost 12 lb, - >4 since yesterday and now - 8 will decrease Lasix down to 10 milligrams/hour from 15 creatinine is stable electrolytes are stable bicarb is stable        Hypothyroidism  Assessment & Plan  · Continue Synthroid TSH normal    Morbid obesity ([facility] [address] )  Assessment & Plan  · Nutrition for weight loss counseling    Will get a PT OT to evaluate ambulatory status    IMTIAZ (acute kidney injury) ([facility] [address] )  Assessment & Plan  · Creatinine baseline is 0 9 suspect secondary to cardiorenal   Improved with diuresis    Supratherapeutic INR  Assessment & Plan  · Resolved    Chronic respiratory failure with hypoxia (HCC)  Assessment & Plan  · Chronically on CPAP at night and 4 L of oxygen during the day secondary to COPD most likely NISHI    Anemia  Assessment & Plan  · Chronic anemia hemoglobin is stable continue iron    Diabetes mellitus [facility])  Assessment & Plan  Lab Results   Component Value Date    HGBA1C 8 0 (H) 2020       Recent Labs     22  1631 22  2110 22  0700 22  1054   POCGLU 149* 145* 118 131       Blood Sugar Average: Last 72 hrs:  · (P) 128 5 hold p o  medication fasting was on the low side  Since reduction of Levemir to 20 units at night doing much better    COPD (chronic obstructive pulmonary disease) ([facility] [address] )  Assessment & Plan  · Not in exacerbation continue Breo    Atrial fibrillation [facility])  Assessment & Plan  · Persistent rate controlled will hold warfarin as INR supratherapeutic  INR is 2 9  Restart Coumadin but at 6 mg daily continue Toprol XL        VTE Pharmacologic Prophylaxis: VTE Score: 4 Moderate Risk (Score 3-4) - Pharmacological DVT Prophylaxis Ordered: warfarin (Coumadin)  Patient Centered Rounds: I performed bedside rounds with nursing staff today  Discussions with Specialists or Other Care Team Provider: cm    Education and Discussions with Family / Patient: Updated  () via phone  Time Spent for Care: 30 minutes  More than 50% of total time spent on counseling and coordination of care as described above  Current Length of Stay: 3 day(s)  Current Patient Status: Inpatient   Certification Statement: The patient will continue to require additional inpatient hospital stay due to chf  Discharge Plan: Anticipate discharge in >72 hrs to rehab facility  Code Status: Level 1 - Full Code    Subjective:   Seen and examined, sob improved     Objective:     Vitals:   Temp (24hrs), Av 7 °F (36 5 °C), Min:97 2 °F (36 2 °C), Max:98 1 °F (36 7 °C)    Temp:  [97 2 °F (36 2 °C)-98 1 °F (36 7 °C)] 97 7 °F (36 5 °C)  HR:  [75-85] 76  Resp:  [17-20] 17  BP: (104-119)/(58-73) 113/69  SpO2:  [92 %-99 %] 97 %  Body mass index is 71 98 kg/m²       Input and Output Summary (last 24 hours): Intake/Output Summary (Last 24 hours) at 9/24/2022 1156  Last data filed at 9/24/2022 1055  Gross per 24 hour   Intake 777 ml   Output 6458 ml   Net -5681 ml       Physical Exam:   Physical Exam  Vitals and nursing note reviewed  Constitutional:       General: She is not in acute distress  Appearance: She is well-developed  HENT:      Head: Normocephalic and atraumatic  Eyes:      Conjunctiva/sclera: Conjunctivae normal    Cardiovascular:      Rate and Rhythm: Normal rate and regular rhythm  Heart sounds: No murmur heard  Pulmonary:      Effort: Pulmonary effort is normal  No respiratory distress  Breath sounds: Rales present  Abdominal:      Palpations: Abdomen is soft  Tenderness: There is no abdominal tenderness  Musculoskeletal:         General: Swelling present  Cervical back: Neck supple  Skin:     General: Skin is warm and dry  Neurological:      Mental Status: She is alert  Additional Data:     Labs:  Results from last 7 days   Lab Units 09/22/22  0600   WBC Thousand/uL 5 89   HEMOGLOBIN g/dL 10 7*   HEMATOCRIT % 35 2   PLATELETS Thousands/uL 206   NEUTROS PCT % 62   LYMPHS PCT % 20   MONOS PCT % 13*   EOS PCT % 4     Results from last 7 days   Lab Units 09/24/22  0446 09/23/22  0600 09/22/22  0600   SODIUM mmol/L 140   < > 145   POTASSIUM mmol/L 4 1   < > 4 2   CHLORIDE mmol/L 101   < > 105   CO2 mmol/L 38*   < > 37*   BUN mg/dL 38*   < > 36*   CREATININE mg/dL 1 14   < > 1 19   ANION GAP mmol/L 1*   < > 3*   CALCIUM mg/dL 9 6   < > 9 1   ALBUMIN g/dL  --   --  2 8*   TOTAL BILIRUBIN mg/dL  --   --  1 00   ALK PHOS U/L  --   --  129*   ALT U/L  --   --  17   AST U/L  --   --  26   GLUCOSE RANDOM mg/dL 154*   < > 93    < > = values in this interval not displayed       Results from last 7 days   Lab Units 09/24/22  0518   INR  2 90*     Results from last 7 days   Lab Units 09/24/22  1054 09/24/22  0700 09/23/22  2110 09/23/22  1631 09/23/22  1135 09/23/22  0737 09/22/22  2110 09/22/22  1611 09/22/22  1056 09/22/22  0736 09/21/22  2129 09/21/22  1657   POC GLUCOSE mg/dl 131 118 145* 149* 154* 118 141* 113 132 76 156* 109         Results from last 7 days   Lab Units 09/21/22  1319   LACTIC ACID mmol/L 1 5       Lines/Drains:  Invasive Devices  Report    Peripheral Intravenous Line  Duration           Peripheral IV 09/24/22 Dorsal (posterior); Right Hand <1 day    Peripheral IV 09/24/22 Proximal;Right;Ventral (anterior) Forearm <1 day          Drain  Duration           External Urinary Catheter 1 day                  Telemetry:  Telemetry Orders (From admission, onward)             48 Hour Telemetry Monitoring  Continuous x 48 hours        References:    Telemetry Guidelines   Question:  Reason for 48 Hour Telemetry  Answer:  Acute Decompensated CHF (continuous diuretic infusion or total diuretic dose > 200 mg daily, associated electrolyte derangement, ionotropic drip, history of ventricular arrhythmia, or new EF <35%)                 Telemetry Reviewed: Atrial fibrillation  HR averaging 90  Indication for Continued Telemetry Use: Acute CHF on >200 mg lasix/day or equivalent dose or with new reduced EF                Imaging: Reviewed radiology reports from this admission including: chest xray    Recent Cultures (last 7 days):         Last 24 Hours Medication List:   Current Facility-Administered Medications   Medication Dose Route Frequency Provider Last Rate    acetaminophen  650 mg Oral Q6H PRN Mark Livingston MD      allopurinol  100 mg Oral Daily Mark Livingston MD      ammonium lactate   Topical BID PRN Mark Livingston MD      atorvastatin  10 mg Oral Daily Mark Livingston MD      cholecalciferol  2,000 Units Oral Daily Mark Livingston MD      ferrous sulfate  325 mg Oral Daily With Breakfast Mark Livingston MD      fluticasone-vilanterol  1 puff Inhalation Daily Mrak Livingston MD      furosemide  10 mg/hr Intravenous Continuous Hill Rubin MD 15 mg/hr (09/24/22 0016)    insulin detemir  20 Units Subcutaneous HS Hill Rubin MD      insulin lispro  4-20 Units Subcutaneous TID AC Hill Rubin MD      levothyroxine  200 mcg Oral Daily Hill Rubin MD      levothyroxine  88 mcg Oral Early Morning Hill Rubin, MD      loratadine  10 mg Oral Daily Hill Rubin MD      magnesium sulfate  1 g Intravenous Once Hill Rubin MD      metoprolol succinate  100 mg Oral BID Hill Rubin MD      midodrine  2 5 mg Oral TID [facility] Hill Rubin MD      montelukast  10 mg Oral HS Hill Rubin MD      nystatin   Topical BID Hill Rubin, MD      pantoprazole  40 mg Oral Early Morning Hill Rubin MD      potassium chloride  40 mEq Oral BID Hill Rubin MD      traMADol  50 mg Oral Q6H PRN Hill Rubin MD      warfarin  6 mg Oral Daily (warfarin) Hill Rubin MD          Today, Patient Was Seen By: Hill Rubin MD    **Please Note: This note may have been constructed using a voice recognition system  **

## 2022-09-24 NOTE — ASSESSMENT & PLAN NOTE
· Persistent rate controlled will hold warfarin as INR supratherapeutic  INR is 2 9    Restart Coumadin but at 6 mg daily continue Toprol XL

## 2022-09-24 NOTE — ASSESSMENT & PLAN NOTE
Wt Readings from Last 3 Encounters:   09/24/22 (!) 179 kg (393 lb 8 3 oz)   04/13/20 (!) 162 kg (356 lb 0 7 oz)     · No previous echo in the system suspect diastolic will do a 2D echo now  Shortness of breath leg edema back in 2021 she was admitted she was 356 lb now 406 chest x-ray compliant with CHF  · Lasix and metolazone are not working patient given metolazone yesterday and only put out 1 6 L no weight changes still quite overloaded place on Lasix drip 15 milligrams/hour continue fluid restriction monitor her output re-evaluate tomorrow start potassium supplementation  · Troponin neg  · TSH is normal  Would want at least 2 L per day diuresis for now    Sometimes blood pressures are soft will place on midodrine as needed to allow diuresis  Might be a dose of Diamox tomorrow if bicarb also above 40  9/24-under Lasix drip patient actually lost 12 lb, - >4 since yesterday and now - 8 will decrease Lasix down to 10 milligrams/hour from 15 creatinine is stable electrolytes are stable bicarb is stable

## 2022-09-24 NOTE — ASSESSMENT & PLAN NOTE
Lab Results   Component Value Date    HGBA1C 8 0 (H) 04/13/2020       Recent Labs     09/23/22  1631 09/23/22  2110 09/24/22  0700 09/24/22  1054   POCGLU 149* 145* 118 131       Blood Sugar Average: Last 72 hrs:  · (P) 128 5 hold p o  medication fasting was on the low side    Since reduction of Levemir to 20 units at night doing much better

## 2022-09-24 NOTE — PLAN OF CARE
Problem: MOBILITY - ADULT  Goal: Maintain or return to baseline ADL function  Description: INTERVENTIONS:  -  Assess patient's ability to carry out ADLs; assess patient's baseline for ADL function and identify physical deficits which impact ability to perform ADLs (bathing, care of mouth/teeth, toileting, grooming, dressing, etc )  - Assess/evaluate cause of self-care deficits   - Assess range of motion  - Assess patient's mobility; develop plan if impaired  - Assess patient's need for assistive devices and provide as appropriate  - Encourage maximum independence but intervene and supervise when necessary  - Involve family in performance of ADLs  - Assess for home care needs following discharge   - Consider OT consult to assist with ADL evaluation and planning for discharge  - Provide patient education as appropriate  Outcome: Progressing  Goal: Maintains/Returns to pre admission functional level  Description: INTERVENTIONS:  - Perform BMAT or MOVE assessment daily    - Set and communicate daily mobility goal to care team and patient/family/caregiver  - Collaborate with rehabilitation services on mobility goals if consulted  - Perform Range of Motion 3 times a day  - Reposition patient every 2 hours    - Dangle patient 3 times a day  - Stand patient 3 times a day  - Ambulate patient 3 times a day  - Out of bed to chair 3 times a day   - Out of bed for meals 3 times a day  - Out of bed for toileting  - Record patient progress and toleration of activity level   Outcome: Progressing     Problem: Potential for Falls  Goal: Patient will remain free of falls  Description: INTERVENTIONS:  - Educate patient/family on patient safety including physical limitations  - Instruct patient to call for assistance with activity   - Consult OT/PT to assist with strengthening/mobility   - Keep Call bell within reach  - Keep bed low and locked with side rails adjusted as appropriate  - Keep care items and personal belongings within reach  - Initiate and maintain comfort rounds  - Make Fall Risk Sign visible to staff  - Offer Toileting every 2 Hours, in advance of need  - Initiate/Maintain bed/chair alarm  - Obtain necessary fall risk management equipment:   - Apply yellow socks and bracelet for high fall risk patients  - Consider moving patient to room near nurses station  Outcome: Progressing     Problem: Prexisting or High Potential for Compromised Skin Integrity  Goal: Skin integrity is maintained or improved  Description: INTERVENTIONS:  - Identify patients at risk for skin breakdown  - Assess and monitor skin integrity  - Assess and monitor nutrition and hydration status  - Monitor labs   - Assess for incontinence   - Turn and reposition patient  - Assist with mobility/ambulation  - Relieve pressure over bony prominences  - Avoid friction and shearing  - Provide appropriate hygiene as needed including keeping skin clean and dry  - Evaluate need for skin moisturizer/barrier cream  - Collaborate with interdisciplinary team   - Patient/family teaching  - Consider wound care consult   Outcome: Progressing     Problem: PAIN - ADULT  Goal: Verbalizes/displays adequate comfort level or baseline comfort level  Description: Interventions:  - Encourage patient to monitor pain and request assistance  - Assess pain using appropriate pain scale  - Administer analgesics based on type and severity of pain and evaluate response  - Implement non-pharmacological measures as appropriate and evaluate response  - Consider cultural and social influences on pain and pain management  - Notify physician/advanced practitioner if interventions unsuccessful or patient reports new pain  Outcome: Progressing     Problem: INFECTION - ADULT  Goal: Absence or prevention of progression during hospitalization  Description: INTERVENTIONS:  - Assess and monitor for signs and symptoms of infection  - Monitor lab/diagnostic results  - Monitor all insertion sites, i e  indwelling lines, tubes, and drains  - Monitor endotracheal if appropriate and nasal secretions for changes in amount and color  - Harrison appropriate cooling/warming therapies per order  - Administer medications as ordered  - Instruct and encourage patient and family to use good hand hygiene technique  - Identify and instruct in appropriate isolation precautions for identified infection/condition  Outcome: Progressing  Goal: Absence of fever/infection during neutropenic period  Description: INTERVENTIONS:  - Monitor WBC    Outcome: Progressing     Problem: SAFETY ADULT  Goal: Maintain or return to baseline ADL function  Description: INTERVENTIONS:  -  Assess patient's ability to carry out ADLs; assess patient's baseline for ADL function and identify physical deficits which impact ability to perform ADLs (bathing, care of mouth/teeth, toileting, grooming, dressing, etc )  - Assess/evaluate cause of self-care deficits   - Assess range of motion  - Assess patient's mobility; develop plan if impaired  - Assess patient's need for assistive devices and provide as appropriate  - Encourage maximum independence but intervene and supervise when necessary  - Involve family in performance of ADLs  - Assess for home care needs following discharge   - Consider OT consult to assist with ADL evaluation and planning for discharge  - Provide patient education as appropriate  Outcome: Progressing  Goal: Maintains/Returns to pre admission functional level  Description: INTERVENTIONS:  - Perform BMAT or MOVE assessment daily    - Set and communicate daily mobility goal to care team and patient/family/caregiver  - Collaborate with rehabilitation services on mobility goals if consulted  - Perform Range of Motion 3 times a day  - Reposition patient every 2 hours    - Dangle patient 3 times a day  - Stand patient 3 times a day  - Ambulate patient 3 times a day  - Out of bed to chair 3 times a day   - Out of bed for meals 3 times a day  - Out of bed for toileting  - Record patient progress and toleration of activity level   Outcome: Progressing  Goal: Patient will remain free of falls  Description: INTERVENTIONS:  - Educate patient/family on patient safety including physical limitations  - Instruct patient to call for assistance with activity   - Consult OT/PT to assist with strengthening/mobility   - Keep Call bell within reach  - Keep bed low and locked with side rails adjusted as appropriate  - Keep care items and personal belongings within reach  - Initiate and maintain comfort rounds  - Make Fall Risk Sign visible to staff  - Offer Toileting every 2 Hours, in advance of need  - Initiate/Maintain bed/chair alarm  - Obtain necessary fall risk management equipment:   - Apply yellow socks and bracelet for high fall risk patients  - Consider moving patient to room near nurses station  Outcome: Progressing     Problem: DISCHARGE PLANNING  Goal: Discharge to home or other facility with appropriate resources  Description: INTERVENTIONS:  - Identify barriers to discharge w/patient and caregiver  - Arrange for needed discharge resources and transportation as appropriate  - Identify discharge learning needs (meds, wound care, etc )  - Arrange for interpretive services to assist at discharge as needed  - Refer to Case Management Department for coordinating discharge planning if the patient needs post-hospital services based on physician/advanced practitioner order or complex needs related to functional status, cognitive ability, or social support system  Outcome: Progressing     Problem: Knowledge Deficit  Goal: Patient/family/caregiver demonstrates understanding of disease process, treatment plan, medications, and discharge instructions  Description: Complete learning assessment and assess knowledge base    Interventions:  - Provide teaching at level of understanding  - Provide teaching via preferred learning methods  Outcome: Progressing

## 2022-09-25 LAB
ANION GAP SERPL CALCULATED.3IONS-SCNC: -1 MMOL/L (ref 4–13)
BUN SERPL-MCNC: 36 MG/DL (ref 5–25)
CALCIUM SERPL-MCNC: 9.8 MG/DL (ref 8.3–10.1)
CHLORIDE SERPL-SCNC: 99 MMOL/L (ref 96–108)
CO2 SERPL-SCNC: 42 MMOL/L (ref 21–32)
CREAT SERPL-MCNC: 1.28 MG/DL (ref 0.6–1.3)
GFR SERPL CREATININE-BSD FRML MDRD: 42 ML/MIN/1.73SQ M
GLUCOSE SERPL-MCNC: 126 MG/DL (ref 65–140)
GLUCOSE SERPL-MCNC: 135 MG/DL (ref 65–140)
GLUCOSE SERPL-MCNC: 147 MG/DL (ref 65–140)
GLUCOSE SERPL-MCNC: 159 MG/DL (ref 65–140)
GLUCOSE SERPL-MCNC: 198 MG/DL (ref 65–140)
INR PPP: 2.48 (ref 0.84–1.19)
MAGNESIUM SERPL-MCNC: 1.8 MG/DL (ref 1.6–2.6)
POTASSIUM SERPL-SCNC: 4.4 MMOL/L (ref 3.5–5.3)
PROTHROMBIN TIME: 26.9 SECONDS (ref 11.6–14.5)
SODIUM SERPL-SCNC: 140 MMOL/L (ref 135–147)

## 2022-09-25 PROCEDURE — 99232 SBSQ HOSP IP/OBS MODERATE 35: CPT | Performed by: FAMILY MEDICINE

## 2022-09-25 PROCEDURE — 83735 ASSAY OF MAGNESIUM: CPT | Performed by: FAMILY MEDICINE

## 2022-09-25 PROCEDURE — 80048 BASIC METABOLIC PNL TOTAL CA: CPT | Performed by: FAMILY MEDICINE

## 2022-09-25 PROCEDURE — 82948 REAGENT STRIP/BLOOD GLUCOSE: CPT

## 2022-09-25 PROCEDURE — 85610 PROTHROMBIN TIME: CPT | Performed by: FAMILY MEDICINE

## 2022-09-25 RX ORDER — ACETAZOLAMIDE 500 MG/5ML
250 INJECTION, POWDER, LYOPHILIZED, FOR SOLUTION INTRAVENOUS ONCE
Status: COMPLETED | OUTPATIENT
Start: 2022-09-25 | End: 2022-09-25

## 2022-09-25 RX ORDER — POTASSIUM CHLORIDE 20 MEQ/1
40 TABLET, EXTENDED RELEASE ORAL DAILY
Status: DISCONTINUED | OUTPATIENT
Start: 2022-09-26 | End: 2022-09-26

## 2022-09-25 RX ORDER — WATER 1000 ML/1000ML
INJECTION, SOLUTION INTRAVENOUS
Status: COMPLETED
Start: 2022-09-25 | End: 2022-09-25

## 2022-09-25 RX ORDER — MIDODRINE HYDROCHLORIDE 5 MG/1
2.5 TABLET ORAL ONCE
Status: COMPLETED | OUTPATIENT
Start: 2022-09-25 | End: 2022-09-25

## 2022-09-25 RX ADMIN — INSULIN DETEMIR 20 UNITS: 100 INJECTION, SOLUTION SUBCUTANEOUS at 21:03

## 2022-09-25 RX ADMIN — POTASSIUM CHLORIDE 40 MEQ: 1500 TABLET, EXTENDED RELEASE ORAL at 08:09

## 2022-09-25 RX ADMIN — INSULIN LISPRO 4 UNITS: 100 INJECTION, SOLUTION INTRAVENOUS; SUBCUTANEOUS at 11:54

## 2022-09-25 RX ADMIN — Medication 2000 UNITS: at 08:09

## 2022-09-25 RX ADMIN — FERROUS SULFATE TAB 325 MG (65 MG ELEMENTAL FE) 325 MG: 325 (65 FE) TAB at 08:09

## 2022-09-25 RX ADMIN — Medication 10 MG/HR: at 08:10

## 2022-09-25 RX ADMIN — MIDODRINE HYDROCHLORIDE 2.5 MG: 5 TABLET ORAL at 11:49

## 2022-09-25 RX ADMIN — ACETAZOLAMIDE 250 MG: 500 INJECTION, POWDER, LYOPHILIZED, FOR SOLUTION INTRAVENOUS at 09:00

## 2022-09-25 RX ADMIN — LEVOTHYROXINE SODIUM 200 MCG: 100 TABLET ORAL at 04:39

## 2022-09-25 RX ADMIN — ACETAMINOPHEN 650 MG: 325 TABLET ORAL at 04:39

## 2022-09-25 RX ADMIN — ALLOPURINOL 100 MG: 100 TABLET ORAL at 08:09

## 2022-09-25 RX ADMIN — NYSTATIN: 100000 POWDER TOPICAL at 08:10

## 2022-09-25 RX ADMIN — MIDODRINE HYDROCHLORIDE 2.5 MG: 5 TABLET ORAL at 13:39

## 2022-09-25 RX ADMIN — WARFARIN SODIUM 6 MG: 3 TABLET ORAL at 17:20

## 2022-09-25 RX ADMIN — LORATADINE 10 MG: 10 TABLET ORAL at 08:09

## 2022-09-25 RX ADMIN — TRAMADOL HYDROCHLORIDE 50 MG: 50 TABLET ORAL at 09:00

## 2022-09-25 RX ADMIN — PANTOPRAZOLE SODIUM 40 MG: 40 TABLET, DELAYED RELEASE ORAL at 04:39

## 2022-09-25 RX ADMIN — FLUTICASONE FUROATE AND VILANTEROL TRIFENATATE 1 PUFF: 100; 25 POWDER RESPIRATORY (INHALATION) at 08:08

## 2022-09-25 RX ADMIN — WATER 5 ML: 1 INJECTION INTRAMUSCULAR; INTRAVENOUS; SUBCUTANEOUS at 09:08

## 2022-09-25 RX ADMIN — MONTELUKAST 10 MG: 10 TABLET, FILM COATED ORAL at 21:03

## 2022-09-25 RX ADMIN — NYSTATIN 1 APPLICATION: 100000 POWDER TOPICAL at 17:21

## 2022-09-25 RX ADMIN — LEVOTHYROXINE SODIUM 88 MCG: 100 TABLET ORAL at 04:39

## 2022-09-25 RX ADMIN — MIDODRINE HYDROCHLORIDE 2.5 MG: 5 TABLET ORAL at 16:11

## 2022-09-25 RX ADMIN — MIDODRINE HYDROCHLORIDE 2.5 MG: 5 TABLET ORAL at 08:08

## 2022-09-25 RX ADMIN — METOPROLOL SUCCINATE 100 MG: 100 TABLET, FILM COATED, EXTENDED RELEASE ORAL at 17:21

## 2022-09-25 RX ADMIN — ATORVASTATIN CALCIUM 10 MG: 10 TABLET, FILM COATED ORAL at 08:09

## 2022-09-25 RX ADMIN — TRAMADOL HYDROCHLORIDE 50 MG: 50 TABLET ORAL at 21:03

## 2022-09-25 NOTE — PROGRESS NOTES
[address]  Progress Note Calderon Moore 1955, 69 y o  female MRN: [id number]  Unit/Bed#: -01 Encounter: [id number]  Primary Care Provider: Janneth Prasad MD   Date and time admitted to hospital: 9/21/2022  1:02 PM    * Acute on chronic diastolic congestive heart failure [facility])  Assessment & Plan  Wt Readings from Last 3 Encounters:   09/25/22 (!) 177 kg (389 lb 5 3 oz)   04/13/20 (!) 162 kg (356 lb 0 7 oz)     · No previous echo in the system suspect diastolic will do a 2D echo now  Shortness of breath leg edema back in 2021 she was admitted she was 356 lb now 406 chest x-ray compliant with CHF  · Lasix and metolazone are not working patient given metolazone =  · Troponin neg  · TSH is normal  Would want at least 2 L per day diuresis for now  Sometimes blood pressures are soft will place on midodrine as needed to allow diuresis    9/24-under Lasix drip patient actually lost 12 lb, - >4 since yesterday and now - 8 will decrease Lasix down to 10 milligrams/hour from 15 creatinine is stable electrolytes are stable bicarb is stable  9/25- leg edema is down lungs still have evidence of a pulmonary edema but she did lose formal lb with a total 60-slightly over 9 L  Continues till Lasix 10 milligrams/hour by her bicarb is elevated will give a dose of Diamox 250 mg IV x1 re-evaluate tomorrow creatinine stable        Hypothyroidism  Assessment & Plan  · Continue Synthroid TSH normal    Morbid obesity ([facility] [address] )  Assessment & Plan  · Nutrition for weight loss counseling    Will get a PT OT to evaluate ambulatory status    IMTIAZ (acute kidney injury) ([facility] [address] )  Assessment & Plan  · Creatinine baseline is 0 9 suspect secondary to cardiorenal   Improved with diuresis continues to stay stable watch closely with aggressive diuresis    Supratherapeutic INR  Assessment & Plan  · Resolved    Chronic respiratory failure with hypoxia (HCC)  Assessment & Plan  · Chronically on CPAP at night and 4 L of oxygen during the day secondary to COPD most likely NISHI    Anemia  Assessment & Plan  · Chronic anemia hemoglobin is stable continue iron    Diabetes mellitus [facility])  Assessment & Plan  Lab Results   Component Value Date    HGBA1C 8 0 (H) 2020       Recent Labs     22  1557 22  2057 22  0755 22  1108   POCGLU 150* 155* 147* 159*       Blood Sugar Average: Last 72 hrs:  · (P) 134 [id number] hold p o  medication fasting was on the low side  Since reduction of Levemir to 20 units at night doing much better    COPD (chronic obstructive pulmonary disease) ([facility] [address] )  Assessment & Plan  · Not in exacerbation continue Breo    Atrial fibrillation [facility])  Assessment & Plan  · Persistent rate controlled will hold warfarin as INR supratherapeutic  INR is 2 9  Restart Coumadin but at 6 mg daily continue Toprol XL        VTE Pharmacologic Prophylaxis: VTE Score: 4 Moderate Risk (Score 3-4) - Pharmacological DVT Prophylaxis Ordered: warfarin (Coumadin)  Patient Centered Rounds: I performed bedside rounds with nursing staff today  Discussions with Specialists or Other Care Team Provider: nursing     Education and Discussions with Family / Patient:patient  Time Spent for Care: 30 minutes  More than 50% of total time spent on counseling and coordination of care as described above  Current Length of Stay: 4 day(s)  Current Patient Status: Inpatient   Certification Statement: The patient will continue to require additional inpatient hospital stay due to chf  Discharge Plan: Anticipate discharge in 48-72 hrs to rehab facility      Code Status: Level 1 - Full Code    Subjective:   minnie nd examined sob improved no cp just some dry cough at night    Objective:     Vitals:   Temp (24hrs), Av 1 °F (36 7 °C), Min:97 9 °F (36 6 °C), Max:98 4 °F (36 9 °C)    Temp:  [97 9 °F (36 6 °C)-98 4 °F (36 9 °C)] 97 9 °F (36 6 °C)  HR:  [75-94] 81  Resp:  [17-20] 19  BP: ()/(53-68) 98/56  SpO2:  [26 %-97 %] 88 %  Body mass index is 71 21 kg/m²  Input and Output Summary (last 24 hours): Intake/Output Summary (Last 24 hours) at 9/25/2022 1122  Last data filed at 9/25/2022 1100  Gross per 24 hour   Intake 1377 ml   Output 3250 ml   Net -1873 ml       Physical Exam:   Physical Exam  Vitals and nursing note reviewed  Constitutional:       General: She is not in acute distress  Appearance: She is well-developed  She is obese  HENT:      Head: Normocephalic and atraumatic  Eyes:      Conjunctiva/sclera: Conjunctivae normal    Cardiovascular:      Rate and Rhythm: Normal rate  Rhythm irregular  Heart sounds: No murmur heard  Pulmonary:      Effort: Pulmonary effort is normal  No respiratory distress  Breath sounds: Wheezing and rales present  Abdominal:      Palpations: Abdomen is soft  Tenderness: There is no abdominal tenderness  Musculoskeletal:         General: Swelling (although much improved at feet) present  Cervical back: Neck supple  Skin:     General: Skin is warm and dry  Neurological:      Mental Status: She is alert and oriented to person, place, and time  Additional Data:     Labs:  Results from last 7 days   Lab Units 09/22/22  0600   WBC Thousand/uL 5 89   HEMOGLOBIN g/dL 10 7*   HEMATOCRIT % 35 2   PLATELETS Thousands/uL 206   NEUTROS PCT % 62   LYMPHS PCT % 20   MONOS PCT % 13*   EOS PCT % 4     Results from last 7 days   Lab Units 09/25/22  0447 09/23/22  0600 09/22/22  0600   SODIUM mmol/L 140   < > 145   POTASSIUM mmol/L 4 4   < > 4 2   CHLORIDE mmol/L 99   < > 105   CO2 mmol/L 42*   < > 37*   BUN mg/dL 36*   < > 36*   CREATININE mg/dL 1 28   < > 1 19   ANION GAP mmol/L -1*   < > 3*   CALCIUM mg/dL 9 8   < > 9 1   ALBUMIN g/dL  --   --  2 8*   TOTAL BILIRUBIN mg/dL  --   --  1 00   ALK PHOS U/L  --   --  129*   ALT U/L  --   --  17   AST U/L  --   --  26   GLUCOSE RANDOM mg/dL 198*   < > 93    < > = values in this interval not displayed  Results from last 7 days   Lab Units 09/25/22  0447   INR  2 48*     Results from last 7 days   Lab Units 09/25/22  1108 09/25/22  0755 09/24/22  2057 09/24/22  1557 09/24/22  1054 09/24/22  0700 09/23/22  2110 09/23/22  1631 09/23/22  1135 09/23/22  0737 09/22/22  2110 09/22/22  1611   POC GLUCOSE mg/dl 159* 147* 155* 150* 131 118 145* 149* 154* 118 141* 113         Results from last 7 days   Lab Units 09/21/22  1319   LACTIC ACID mmol/L 1 5       Lines/Drains:  Invasive Devices  Report    Peripheral Intravenous Line  Duration           Peripheral IV 09/24/22 Dorsal (posterior); Right Hand 1 day          Drain  Duration           External Urinary Catheter 2 days                  Telemetry:  Telemetry Orders (From admission, onward)             48 Hour Telemetry Monitoring  Continuous x 48 hours        References:    Telemetry Guidelines   Question:  Reason for 48 Hour Telemetry  Answer:  Acute Decompensated CHF (continuous diuretic infusion or total diuretic dose > 200 mg daily, associated electrolyte derangement, ionotropic drip, history of ventricular arrhythmia, or new EF <35%)                 Telemetry Reviewed: Atrial fibrillation  HR averaging 80  Indication for Continued Telemetry Use: Acute CHF on >200 mg lasix/day or equivalent dose or with new reduced EF                Imaging: Reviewed radiology reports from this admission including: chest xray    Recent Cultures (last 7 days):         Last 24 Hours Medication List:   Current Facility-Administered Medications   Medication Dose Route Frequency Provider Last Rate    acetaminophen  650 mg Oral Q6H PRN Spike Antunez MD      allopurinol  100 mg Oral Daily Spike Antunez MD      ammonium lactate   Topical BID PRN Spike Antunez MD      atorvastatin  10 mg Oral Daily Spike Antunez MD      cholecalciferol  2,000 Units Oral Daily Spike Antunez MD      ferrous sulfate  325 mg Oral Daily With Breakfast MD Miri Mathew fluticasone-vilanterol  1 puff Inhalation Daily Hill Rubin MD      furosemide  10 mg/hr Intravenous Continuous Hill Rubin MD 10 mg/hr (09/25/22 0810)   Shira Gordillo insulin detemir  20 Units Subcutaneous HS Hill Rubin MD      insulin lispro  4-20 Units Subcutaneous TID AC Hill Rubin MD      levothyroxine  200 mcg Oral Daily Hill Rubin MD      levothyroxine  88 mcg Oral Early Morning Hill Rubin MD      loratadine  10 mg Oral Daily Hill Rubin MD      metoprolol succinate  100 mg Oral BID Hill Rubin MD      midodrine  2 5 mg Oral TID TRISR [facility] Hill Rubin MD      montelukast  10 mg Oral HS Hill Rubin MD      nystatin   Topical BID Hill Rubin MD      pantoprazole  40 mg Oral Early Morning MD Fay Zaidi ON 9/26/2022] potassium chloride  40 mEq Oral Daily Hill Rubin MD      traMADol  50 mg Oral Q6H PRN Hill Rubin MD      warfarin  6 mg Oral Daily (warfarin) Hill Rubin MD          Today, Patient Was Seen By: Hill Rubin MD    **Please Note: This note may have been constructed using a voice recognition system  **

## 2022-09-25 NOTE — PLAN OF CARE
Problem: MOBILITY - ADULT  Goal: Maintain or return to baseline ADL function  Description: INTERVENTIONS:  -  Assess patient's ability to carry out ADLs; assess patient's baseline for ADL function and identify physical deficits which impact ability to perform ADLs (bathing, care of mouth/teeth, toileting, grooming, dressing, etc )  - Assess/evaluate cause of self-care deficits   - Assess range of motion  - Assess patient's mobility; develop plan if impaired  - Assess patient's need for assistive devices and provide as appropriate  - Encourage maximum independence but intervene and supervise when necessary  - Involve family in performance of ADLs  - Assess for home care needs following discharge   - Consider OT consult to assist with ADL evaluation and planning for discharge  - Provide patient education as appropriate  Outcome: Progressing  Goal: Maintains/Returns to pre admission functional level  Description: INTERVENTIONS:  - Perform BMAT or MOVE assessment daily    - Set and communicate daily mobility goal to care team and patient/family/caregiver  - Collaborate with rehabilitation services on mobility goals if consulted  - Perform Range of Motion 3 times a day  - Reposition patient every 2 hours    - Dangle patient 3 times a day  - Stand patient 3 times a day  - Ambulate patient 3 times a day  - Out of bed to chair 3 times a day   - Out of bed for meals 3 times a day  - Out of bed for toileting  - Record patient progress and toleration of activity level   Outcome: Progressing     Problem: Prexisting or High Potential for Compromised Skin Integrity  Goal: Skin integrity is maintained or improved  Description: INTERVENTIONS:  - Identify patients at risk for skin breakdown  - Assess and monitor skin integrity  - Assess and monitor nutrition and hydration status  - Monitor labs   - Assess for incontinence   - Turn and reposition patient  - Assist with mobility/ambulation  - Relieve pressure over bony prominences  - Avoid friction and shearing  - Provide appropriate hygiene as needed including keeping skin clean and dry  - Evaluate need for skin moisturizer/barrier cream  - Collaborate with interdisciplinary team   - Patient/family teaching  - Consider wound care consult   Outcome: Progressing     Problem: PAIN - ADULT  Goal: Verbalizes/displays adequate comfort level or baseline comfort level  Description: Interventions:  - Encourage patient to monitor pain and request assistance  - Assess pain using appropriate pain scale  - Administer analgesics based on type and severity of pain and evaluate response  - Implement non-pharmacological measures as appropriate and evaluate response  - Consider cultural and social influences on pain and pain management  - Notify physician/advanced practitioner if interventions unsuccessful or patient reports new pain  Outcome: Progressing     Problem: SAFETY ADULT  Goal: Maintain or return to baseline ADL function  Description: INTERVENTIONS:  -  Assess patient's ability to carry out ADLs; assess patient's baseline for ADL function and identify physical deficits which impact ability to perform ADLs (bathing, care of mouth/teeth, toileting, grooming, dressing, etc )  - Assess/evaluate cause of self-care deficits   - Assess range of motion  - Assess patient's mobility; develop plan if impaired  - Assess patient's need for assistive devices and provide as appropriate  - Encourage maximum independence but intervene and supervise when necessary  - Involve family in performance of ADLs  - Assess for home care needs following discharge   - Consider OT consult to assist with ADL evaluation and planning for discharge  - Provide patient education as appropriate  Outcome: Progressing  Goal: Maintains/Returns to pre admission functional level  Description: INTERVENTIONS:  - Perform BMAT or MOVE assessment daily    - Set and communicate daily mobility goal to care team and patient/family/caregiver  - Collaborate with rehabilitation services on mobility goals if consulted  - Perform Range of Motion 3 times a day  - Reposition patient every 2 hours    - Dangle patient 3 times a day  - Stand patient 3 times a day  - Ambulate patient 3 times a day  - Out of bed to chair 3 times a day   - Out of bed for meals 3 times a day  - Out of bed for toileting  - Record patient progress and toleration of activity level   Outcome: Progressing     Problem: DISCHARGE PLANNING  Goal: Discharge to home or other facility with appropriate resources  Description: INTERVENTIONS:  - Identify barriers to discharge w/patient and caregiver  - Arrange for needed discharge resources and transportation as appropriate  - Identify discharge learning needs (meds, wound care, etc )  - Arrange for interpretive services to assist at discharge as needed  - Refer to Case Management Department for coordinating discharge planning if the patient needs post-hospital services based on physician/advanced practitioner order or complex needs related to functional status, cognitive ability, or social support system  Outcome: Progressing

## 2022-09-25 NOTE — ASSESSMENT & PLAN NOTE
Wt Readings from Last 3 Encounters:   09/25/22 (!) 177 kg (389 lb 5 3 oz)   04/13/20 (!) 162 kg (356 lb 0 7 oz)     · No previous echo in the system suspect diastolic will do a 2D echo now  Shortness of breath leg edema back in 2021 she was admitted she was 356 lb now 406 chest x-ray compliant with CHF  · Lasix and metolazone are not working patient given metolazone =  · Troponin neg  · TSH is normal  Would want at least 2 L per day diuresis for now  Sometimes blood pressures are soft will place on midodrine as needed to allow diuresis    9/24-under Lasix drip patient actually lost 12 lb, - >4 since yesterday and now - 8 will decrease Lasix down to 10 milligrams/hour from 15 creatinine is stable electrolytes are stable bicarb is stable  9/25- leg edema is down lungs still have evidence of a pulmonary edema but she did lose formal lb with a total 60-slightly over 9 L   Continues till Lasix 10 milligrams/hour by her bicarb is elevated will give a dose of Diamox 250 mg IV x1 re-evaluate tomorrow creatinine stable

## 2022-09-25 NOTE — PLAN OF CARE
Problem: MOBILITY - ADULT  Goal: Maintain or return to baseline ADL function  Description: INTERVENTIONS:  -  Assess patient's ability to carry out ADLs; assess patient's baseline for ADL function and identify physical deficits which impact ability to perform ADLs (bathing, care of mouth/teeth, toileting, grooming, dressing, etc )  - Assess/evaluate cause of self-care deficits   - Assess range of motion  - Assess patient's mobility; develop plan if impaired  - Assess patient's need for assistive devices and provide as appropriate  - Encourage maximum independence but intervene and supervise when necessary  - Involve family in performance of ADLs  - Assess for home care needs following discharge   - Consider OT consult to assist with ADL evaluation and planning for discharge  - Provide patient education as appropriate  Outcome: Progressing  Goal: Maintains/Returns to pre admission functional level  Description: INTERVENTIONS:  - Perform BMAT or MOVE assessment daily    - Set and communicate daily mobility goal to care team and patient/family/caregiver  - Collaborate with rehabilitation services on mobility goals if consulted  - Perform Range of Motion 3 times a day  - Reposition patient every 2 hours    - Dangle patient 3 times a day  - Stand patient 3 times a day  - Ambulate patient 3 times a day  - Out of bed to chair 3 times a day   - Out of bed for meals 3 times a day  - Out of bed for toileting  - Record patient progress and toleration of activity level   Outcome: Progressing     Problem: Potential for Falls  Goal: Patient will remain free of falls  Description: INTERVENTIONS:  - Educate patient/family on patient safety including physical limitations  - Instruct patient to call for assistance with activity   - Consult OT/PT to assist with strengthening/mobility   - Keep Call bell within reach  - Keep bed low and locked with side rails adjusted as appropriate  - Keep care items and personal belongings within reach  - Initiate and maintain comfort rounds  - Make Fall Risk Sign visible to staff  - Offer Toileting every 2 Hours, in advance of need  - Initiate/Maintain bed/chair alarm  - Obtain necessary fall risk management equipment:   - Apply yellow socks and bracelet for high fall risk patients  - Consider moving patient to room near nurses station  Outcome: Progressing     Problem: Prexisting or High Potential for Compromised Skin Integrity  Goal: Skin integrity is maintained or improved  Description: INTERVENTIONS:  - Identify patients at risk for skin breakdown  - Assess and monitor skin integrity  - Assess and monitor nutrition and hydration status  - Monitor labs   - Assess for incontinence   - Turn and reposition patient  - Assist with mobility/ambulation  - Relieve pressure over bony prominences  - Avoid friction and shearing  - Provide appropriate hygiene as needed including keeping skin clean and dry  - Evaluate need for skin moisturizer/barrier cream  - Collaborate with interdisciplinary team   - Patient/family teaching  - Consider wound care consult   Outcome: Progressing     Problem: PAIN - ADULT  Goal: Verbalizes/displays adequate comfort level or baseline comfort level  Description: Interventions:  - Encourage patient to monitor pain and request assistance  - Assess pain using appropriate pain scale  - Administer analgesics based on type and severity of pain and evaluate response  - Implement non-pharmacological measures as appropriate and evaluate response  - Consider cultural and social influences on pain and pain management  - Notify physician/advanced practitioner if interventions unsuccessful or patient reports new pain  Outcome: Progressing     Problem: INFECTION - ADULT  Goal: Absence or prevention of progression during hospitalization  Description: INTERVENTIONS:  - Assess and monitor for signs and symptoms of infection  - Monitor lab/diagnostic results  - Monitor all insertion sites, i e  indwelling lines, tubes, and drains  - Monitor endotracheal if appropriate and nasal secretions for changes in amount and color  - [place] appropriate cooling/warming therapies per order  - Administer medications as ordered  - Instruct and encourage patient and family to use good hand hygiene technique  - Identify and instruct in appropriate isolation precautions for identified infection/condition  Outcome: Progressing  Goal: Absence of fever/infection during neutropenic period  Description: INTERVENTIONS:  - Monitor WBC    Outcome: Progressing     Problem: SAFETY ADULT  Goal: Maintain or return to baseline ADL function  Description: INTERVENTIONS:  -  Assess patient's ability to carry out ADLs; assess patient's baseline for ADL function and identify physical deficits which impact ability to perform ADLs (bathing, care of mouth/teeth, toileting, grooming, dressing, etc )  - Assess/evaluate cause of self-care deficits   - Assess range of motion  - Assess patient's mobility; develop plan if impaired  - Assess patient's need for assistive devices and provide as appropriate  - Encourage maximum independence but intervene and supervise when necessary  - Involve family in performance of ADLs  - Assess for home care needs following discharge   - Consider OT consult to assist with ADL evaluation and planning for discharge  - Provide patient education as appropriate  Outcome: Progressing  Goal: Maintains/Returns to pre admission functional level  Description: INTERVENTIONS:  - Perform BMAT or MOVE assessment daily    - Set and communicate daily mobility goal to care team and patient/family/caregiver  - Collaborate with rehabilitation services on mobility goals if consulted  - Perform Range of Motion 3 times a day  - Reposition patient every 2 hours    - Dangle patient 3 times a day  - Stand patient 3 times a day  - Ambulate patient 3 times a day  - Out of bed to chair 3 times a day   - Out of bed for meals 3 times a day  - Out of bed for toileting  - Record patient progress and toleration of activity level   Outcome: Progressing  Goal: Patient will remain free of falls  Description: INTERVENTIONS:  - Educate patient/family on patient safety including physical limitations  - Instruct patient to call for assistance with activity   - Consult OT/PT to assist with strengthening/mobility   - Keep Call bell within reach  - Keep bed low and locked with side rails adjusted as appropriate  - Keep care items and personal belongings within reach  - Initiate and maintain comfort rounds  - Make Fall Risk Sign visible to staff  - Offer Toileting every 2 Hours, in advance of need  - Initiate/Maintain bed/chair alarm  - Obtain necessary fall risk management equipment:   - Apply yellow socks and bracelet for high fall risk patients  - Consider moving patient to room near nurses station  Outcome: Progressing     Problem: DISCHARGE PLANNING  Goal: Discharge to home or other facility with appropriate resources  Description: INTERVENTIONS:  - Identify barriers to discharge w/patient and caregiver  - Arrange for needed discharge resources and transportation as appropriate  - Identify discharge learning needs (meds, wound care, etc )  - Arrange for interpretive services to assist at discharge as needed  - Refer to Case Management Department for coordinating discharge planning if the patient needs post-hospital services based on physician/advanced practitioner order or complex needs related to functional status, cognitive ability, or social support system  Outcome: Progressing     Problem: Knowledge Deficit  Goal: Patient/family/caregiver demonstrates understanding of disease process, treatment plan, medications, and discharge instructions  Description: Complete learning assessment and assess knowledge base    Interventions:  - Provide teaching at level of understanding  - Provide teaching via preferred learning methods  Outcome: Progressing

## 2022-09-25 NOTE — ASSESSMENT & PLAN NOTE
Lab Results   Component Value Date    HGBA1C 8 0 (H) 04/13/2020       Recent Labs     09/24/22  1557 09/24/22 2057 09/25/22  0755 09/25/22  1108   POCGLU 150* 155* 147* 159*       Blood Sugar Average: Last 72 hrs:  · (P) 134 [id number] hold p o  medication fasting was on the low side    Since reduction of Levemir to 20 units at night doing much better

## 2022-09-25 NOTE — ASSESSMENT & PLAN NOTE
· Creatinine baseline is 0 9 suspect secondary to cardiorenal   Improved with diuresis continues to stay stable watch closely with aggressive diuresis

## 2022-09-26 LAB
ALBUMIN SERPL BCP-MCNC: 2.7 G/DL (ref 3.5–5)
ALP SERPL-CCNC: 128 U/L (ref 46–116)
ALT SERPL W P-5'-P-CCNC: 13 U/L (ref 12–78)
ANION GAP SERPL CALCULATED.3IONS-SCNC: 4 MMOL/L (ref 4–13)
AST SERPL W P-5'-P-CCNC: 23 U/L (ref 5–45)
BASOPHILS # BLD AUTO: 0.06 THOUSANDS/ΜL (ref 0–0.1)
BASOPHILS NFR BLD AUTO: 1 % (ref 0–1)
BILIRUB SERPL-MCNC: 1.05 MG/DL (ref 0.2–1)
BUN SERPL-MCNC: 38 MG/DL (ref 5–25)
CALCIUM ALBUM COR SERPL-MCNC: 10.6 MG/DL (ref 8.3–10.1)
CALCIUM SERPL-MCNC: 9.6 MG/DL (ref 8.3–10.1)
CHLORIDE SERPL-SCNC: 97 MMOL/L (ref 96–108)
CO2 SERPL-SCNC: 40 MMOL/L (ref 21–32)
CREAT SERPL-MCNC: 1.2 MG/DL (ref 0.6–1.3)
EOSINOPHIL # BLD AUTO: 0.42 THOUSAND/ΜL (ref 0–0.61)
EOSINOPHIL NFR BLD AUTO: 5 % (ref 0–6)
ERYTHROCYTE [DISTWIDTH] IN BLOOD BY AUTOMATED COUNT: 18.5 % (ref 11.6–15.1)
GFR SERPL CREATININE-BSD FRML MDRD: 46 ML/MIN/1.73SQ M
GLUCOSE SERPL-MCNC: 110 MG/DL (ref 65–140)
GLUCOSE SERPL-MCNC: 123 MG/DL (ref 65–140)
GLUCOSE SERPL-MCNC: 129 MG/DL (ref 65–140)
GLUCOSE SERPL-MCNC: 160 MG/DL (ref 65–140)
GLUCOSE SERPL-MCNC: 202 MG/DL (ref 65–140)
HCT VFR BLD AUTO: 34.4 % (ref 34.8–46.1)
HGB BLD-MCNC: 10.6 G/DL (ref 11.5–15.4)
IMM GRANULOCYTES # BLD AUTO: 0.05 THOUSAND/UL (ref 0–0.2)
IMM GRANULOCYTES NFR BLD AUTO: 1 % (ref 0–2)
INR PPP: 2.67 (ref 0.84–1.19)
LYMPHOCYTES # BLD AUTO: 1.28 THOUSANDS/ΜL (ref 0.6–4.47)
LYMPHOCYTES NFR BLD AUTO: 16 % (ref 14–44)
MAGNESIUM SERPL-MCNC: 2.2 MG/DL (ref 1.6–2.6)
MCH RBC QN AUTO: 28.4 PG (ref 26.8–34.3)
MCHC RBC AUTO-ENTMCNC: 30.8 G/DL (ref 31.4–37.4)
MCV RBC AUTO: 92 FL (ref 82–98)
MONOCYTES # BLD AUTO: 1.03 THOUSAND/ΜL (ref 0.17–1.22)
MONOCYTES NFR BLD AUTO: 13 % (ref 4–12)
NEUTROPHILS # BLD AUTO: 5 THOUSANDS/ΜL (ref 1.85–7.62)
NEUTS SEG NFR BLD AUTO: 64 % (ref 43–75)
NRBC BLD AUTO-RTO: 0 /100 WBCS
PLATELET # BLD AUTO: 232 THOUSANDS/UL (ref 149–390)
PMV BLD AUTO: 11.1 FL (ref 8.9–12.7)
POTASSIUM SERPL-SCNC: 4.2 MMOL/L (ref 3.5–5.3)
PROT SERPL-MCNC: 7.3 G/DL (ref 6.4–8.4)
PROTHROMBIN TIME: 28.5 SECONDS (ref 11.6–14.5)
RBC # BLD AUTO: 3.73 MILLION/UL (ref 3.81–5.12)
SODIUM SERPL-SCNC: 141 MMOL/L (ref 135–147)
WBC # BLD AUTO: 7.84 THOUSAND/UL (ref 4.31–10.16)

## 2022-09-26 PROCEDURE — 80053 COMPREHEN METABOLIC PANEL: CPT | Performed by: FAMILY MEDICINE

## 2022-09-26 PROCEDURE — 83735 ASSAY OF MAGNESIUM: CPT | Performed by: FAMILY MEDICINE

## 2022-09-26 PROCEDURE — 82948 REAGENT STRIP/BLOOD GLUCOSE: CPT

## 2022-09-26 PROCEDURE — 85610 PROTHROMBIN TIME: CPT | Performed by: FAMILY MEDICINE

## 2022-09-26 PROCEDURE — 99232 SBSQ HOSP IP/OBS MODERATE 35: CPT | Performed by: FAMILY MEDICINE

## 2022-09-26 PROCEDURE — 85025 COMPLETE CBC W/AUTO DIFF WBC: CPT

## 2022-09-26 RX ORDER — INSULIN LISPRO 100 [IU]/ML
2-12 INJECTION, SOLUTION INTRAVENOUS; SUBCUTANEOUS
Status: DISCONTINUED | OUTPATIENT
Start: 2022-09-26 | End: 2022-10-03 | Stop reason: HOSPADM

## 2022-09-26 RX ORDER — POTASSIUM CHLORIDE 20 MEQ/1
20 TABLET, EXTENDED RELEASE ORAL DAILY
Status: DISCONTINUED | OUTPATIENT
Start: 2022-09-26 | End: 2022-10-03 | Stop reason: HOSPADM

## 2022-09-26 RX ORDER — TORSEMIDE 20 MG/1
40 TABLET ORAL 2 TIMES DAILY
Status: DISCONTINUED | OUTPATIENT
Start: 2022-09-26 | End: 2022-09-27

## 2022-09-26 RX ORDER — MIDODRINE HYDROCHLORIDE 5 MG/1
5 TABLET ORAL
Status: DISCONTINUED | OUTPATIENT
Start: 2022-09-26 | End: 2022-10-03 | Stop reason: HOSPADM

## 2022-09-26 RX ORDER — INSULIN LISPRO 100 [IU]/ML
4-20 INJECTION, SOLUTION INTRAVENOUS; SUBCUTANEOUS
Status: DISCONTINUED | OUTPATIENT
Start: 2022-09-26 | End: 2022-10-03 | Stop reason: HOSPADM

## 2022-09-26 RX ADMIN — FERROUS SULFATE TAB 325 MG (65 MG ELEMENTAL FE) 325 MG: 325 (65 FE) TAB at 06:51

## 2022-09-26 RX ADMIN — PANTOPRAZOLE SODIUM 40 MG: 40 TABLET, DELAYED RELEASE ORAL at 05:22

## 2022-09-26 RX ADMIN — TORSEMIDE 40 MG: 20 TABLET ORAL at 09:12

## 2022-09-26 RX ADMIN — MIDODRINE HYDROCHLORIDE 5 MG: 5 TABLET ORAL at 06:51

## 2022-09-26 RX ADMIN — MONTELUKAST 10 MG: 10 TABLET, FILM COATED ORAL at 21:28

## 2022-09-26 RX ADMIN — ACETAMINOPHEN 650 MG: 325 TABLET ORAL at 14:56

## 2022-09-26 RX ADMIN — METOPROLOL SUCCINATE 100 MG: 100 TABLET, FILM COATED, EXTENDED RELEASE ORAL at 17:39

## 2022-09-26 RX ADMIN — POTASSIUM CHLORIDE 20 MEQ: 1500 TABLET, EXTENDED RELEASE ORAL at 09:12

## 2022-09-26 RX ADMIN — TRAMADOL HYDROCHLORIDE 50 MG: 50 TABLET ORAL at 05:21

## 2022-09-26 RX ADMIN — MIDODRINE HYDROCHLORIDE 2.5 MG: 5 TABLET ORAL at 05:21

## 2022-09-26 RX ADMIN — WARFARIN SODIUM 6 MG: 3 TABLET ORAL at 17:40

## 2022-09-26 RX ADMIN — MIDODRINE HYDROCHLORIDE 5 MG: 5 TABLET ORAL at 11:38

## 2022-09-26 RX ADMIN — NYSTATIN: 100000 POWDER TOPICAL at 09:13

## 2022-09-26 RX ADMIN — LEVOTHYROXINE SODIUM 200 MCG: 100 TABLET ORAL at 05:21

## 2022-09-26 RX ADMIN — TORSEMIDE 40 MG: 20 TABLET ORAL at 21:28

## 2022-09-26 RX ADMIN — LORATADINE 10 MG: 10 TABLET ORAL at 09:12

## 2022-09-26 RX ADMIN — Medication 2000 UNITS: at 09:12

## 2022-09-26 RX ADMIN — MIDODRINE HYDROCHLORIDE 5 MG: 5 TABLET ORAL at 14:56

## 2022-09-26 RX ADMIN — INSULIN LISPRO 4 UNITS: 100 INJECTION, SOLUTION INTRAVENOUS; SUBCUTANEOUS at 11:41

## 2022-09-26 RX ADMIN — LEVOTHYROXINE SODIUM 88 MCG: 100 TABLET ORAL at 05:22

## 2022-09-26 RX ADMIN — METOPROLOL SUCCINATE 100 MG: 100 TABLET, FILM COATED, EXTENDED RELEASE ORAL at 09:12

## 2022-09-26 RX ADMIN — INSULIN LISPRO 2 UNITS: 100 INJECTION, SOLUTION INTRAVENOUS; SUBCUTANEOUS at 21:50

## 2022-09-26 RX ADMIN — ALLOPURINOL 100 MG: 100 TABLET ORAL at 09:12

## 2022-09-26 RX ADMIN — FLUTICASONE FUROATE AND VILANTEROL TRIFENATATE 1 PUFF: 100; 25 POWDER RESPIRATORY (INHALATION) at 09:13

## 2022-09-26 RX ADMIN — INSULIN DETEMIR 20 UNITS: 100 INJECTION, SOLUTION SUBCUTANEOUS at 21:28

## 2022-09-26 RX ADMIN — ATORVASTATIN CALCIUM 10 MG: 10 TABLET, FILM COATED ORAL at 09:12

## 2022-09-26 RX ADMIN — ACETAMINOPHEN 650 MG: 325 TABLET ORAL at 21:28

## 2022-09-26 RX ADMIN — ACETAMINOPHEN 650 MG: 325 TABLET ORAL at 03:19

## 2022-09-26 RX ADMIN — NYSTATIN: 100000 POWDER TOPICAL at 17:41

## 2022-09-26 NOTE — CASE MANAGEMENT
Case Management Progress Note    Patient name Denny Leyden  Location /-01 MRN [id number]  : 1955 Date 2022       LOS (days): 5  Geometric Mean LOS (GMLOS) (days): 3 00  Days to GMLOS:-2 1        OBJECTIVE:        Current admission status: Inpatient  Preferred Pharmacy:   [facility] [place] [address] [address] [place]  [place] [address]  Phone: [phone] Fax: [phone]    Primary Care Provider: Amisha Zaidi MD    Primary Insurance: [address] [place] [address] [facility]  Secondary Insurance:     PROGRESS NOTE:  CM reviewed [facility] referral, call placed to [facility] reports their regular admissions will be in and review tomorrow  CM sent fax to [facility] ([phone]) covering for Admissions  CM will f/u in am w/ Katelynn  Pt initially 405#, now down to 386#

## 2022-09-26 NOTE — PLAN OF CARE
Problem: MOBILITY - ADULT  Goal: Maintain or return to baseline ADL function  Description: INTERVENTIONS:  -  Assess patient's ability to carry out ADLs; assess patient's baseline for ADL function and identify physical deficits which impact ability to perform ADLs (bathing, care of mouth/teeth, toileting, grooming, dressing, etc )  - Assess/evaluate cause of self-care deficits   - Assess range of motion  - Assess patient's mobility; develop plan if impaired  - Assess patient's need for assistive devices and provide as appropriate  - Encourage maximum independence but intervene and supervise when necessary  - Involve family in performance of ADLs  - Assess for home care needs following discharge   - Consider OT consult to assist with ADL evaluation and planning for discharge  - Provide patient education as appropriate  Outcome: Progressing  Goal: Maintains/Returns to pre admission functional level  Description: INTERVENTIONS:  - Perform BMAT or MOVE assessment daily    - Set and communicate daily mobility goal to care team and patient/family/caregiver  - Collaborate with rehabilitation services on mobility goals if consulted  - Perform Range of Motion 3 times a day  - Reposition patient every 2 hours    - Dangle patient 3 times a day  - Stand patient 3 times a day  - Ambulate patient 3 times a day  - Out of bed to chair 3 times a day   - Out of bed for meals 3 times a day  - Out of bed for toileting  - Record patient progress and toleration of activity level   Outcome: Progressing     Problem: Potential for Falls  Goal: Patient will remain free of falls  Description: INTERVENTIONS:  - Educate patient/family on patient safety including physical limitations  - Instruct patient to call for assistance with activity   - Consult OT/PT to assist with strengthening/mobility   - Keep Call bell within reach  - Keep bed low and locked with side rails adjusted as appropriate  - Keep care items and personal belongings within reach  - Initiate and maintain comfort rounds  - Make Fall Risk Sign visible to staff    - Apply yellow socks and bracelet for high fall risk patients  - Consider moving patient to room near nurses station  Outcome: Progressing     Problem: Prexisting or High Potential for Compromised Skin Integrity  Goal: Skin integrity is maintained or improved  Description: INTERVENTIONS:  - Identify patients at risk for skin breakdown  - Assess and monitor skin integrity  - Assess and monitor nutrition and hydration status  - Monitor labs   - Assess for incontinence   - Turn and reposition patient  - Assist with mobility/ambulation  - Relieve pressure over bony prominences  - Avoid friction and shearing  - Provide appropriate hygiene as needed including keeping skin clean and dry  - Evaluate need for skin moisturizer/barrier cream  - Collaborate with interdisciplinary team   - Patient/family teaching  - Consider wound care consult   Outcome: Progressing     Problem: PAIN - ADULT  Goal: Verbalizes/displays adequate comfort level or baseline comfort level  Description: Interventions:  - Encourage patient to monitor pain and request assistance  - Assess pain using appropriate pain scale  - Administer analgesics based on type and severity of pain and evaluate response  - Implement non-pharmacological measures as appropriate and evaluate response  - Consider cultural and social influences on pain and pain management  - Notify physician/advanced practitioner if interventions unsuccessful or patient reports new pain  Outcome: Progressing     Problem: INFECTION - ADULT  Goal: Absence or prevention of progression during hospitalization  Description: INTERVENTIONS:  - Assess and monitor for signs and symptoms of infection  - Monitor lab/diagnostic results  - Monitor all insertion sites, i e  indwelling lines, tubes, and drains  - Monitor endotracheal if appropriate and nasal secretions for changes in amount and color  - [place] appropriate cooling/warming therapies per order  - Administer medications as ordered  - Instruct and encourage patient and family to use good hand hygiene technique  - Identify and instruct in appropriate isolation precautions for identified infection/condition  Outcome: Progressing     Problem: SAFETY ADULT  Goal: Maintain or return to baseline ADL function  Description: INTERVENTIONS:  -  Assess patient's ability to carry out ADLs; assess patient's baseline for ADL function and identify physical deficits which impact ability to perform ADLs (bathing, care of mouth/teeth, toileting, grooming, dressing, etc )  - Assess/evaluate cause of self-care deficits   - Assess range of motion  - Assess patient's mobility; develop plan if impaired  - Assess patient's need for assistive devices and provide as appropriate  - Encourage maximum independence but intervene and supervise when necessary  - Involve family in performance of ADLs  - Assess for home care needs following discharge   - Consider OT consult to assist with ADL evaluation and planning for discharge  - Provide patient education as appropriate  Outcome: Progressing  Goal: Maintains/Returns to pre admission functional level  Description: INTERVENTIONS:  - Perform BMAT or MOVE assessment daily    - Set and communicate daily mobility goal to care team and patient/family/caregiver  - Collaborate with rehabilitation services on mobility goals if consulted  - Perform Range of Motion 3 times a day  - Reposition patient every 2 hours    - Dangle patient 3 times a day  - Stand patient 3 times a day  - Ambulate patient 3 times a day  - Out of bed to chair 3 times a day   - Out of bed for meals 3 times a day  - Out of bed for toileting  - Record patient progress and toleration of activity level   Outcome: Progressing  Goal: Patient will remain free of falls  Description: INTERVENTIONS:  - Educate patient/family on patient safety including physical limitations  - Instruct patient to call for assistance with activity   - Consult OT/PT to assist with strengthening/mobility   - Keep Call bell within reach  - Keep bed low and locked with side rails adjusted as appropriate  - Keep care items and personal belongings within reach  - Initiate and maintain comfort rounds  - Make Fall Risk Sign visible to staff    - Apply yellow socks and bracelet for high fall risk patients  - Consider moving patient to room near nurses station  Outcome: Progressing     Problem: DISCHARGE PLANNING  Goal: Discharge to home or other facility with appropriate resources  Description: INTERVENTIONS:  - Identify barriers to discharge w/patient and caregiver  - Arrange for needed discharge resources and transportation as appropriate  - Identify discharge learning needs (meds, wound care, etc )  - Arrange for interpretive services to assist at discharge as needed  - Refer to Case Management Department for coordinating discharge planning if the patient needs post-hospital services based on physician/advanced practitioner order or complex needs related to functional status, cognitive ability, or social support system  Outcome: Progressing     Problem: Knowledge Deficit  Goal: Patient/family/caregiver demonstrates understanding of disease process, treatment plan, medications, and discharge instructions  Description: Complete learning assessment and assess knowledge base    Interventions:  - Provide teaching at level of understanding  - Provide teaching via preferred learning methods  Outcome: Progressing

## 2022-09-26 NOTE — ASSESSMENT & PLAN NOTE
· Persistent rate controlled will hold warfarin as INR supratherapeutic    INR stable continue Coumadin 6 mg daily

## 2022-09-26 NOTE — ASSESSMENT & PLAN NOTE
Lab Results   Component Value Date    HGBA1C 8 0 (H) 04/13/2020       Recent Labs     09/25/22  1108 09/25/22  1604 09/25/22  2137 09/26/22  0736   POCGLU 159* 126 135 110       Blood Sugar Average: Last 72 hrs:  · (P) 138 [id number] sugars controlled continue Levemir to 20 units at night doing much better

## 2022-09-26 NOTE — PROGRESS NOTES
[address]  Progress Note Yaniv Richmond 1955, 69 y o  female MRN: [id number]  Unit/Bed#: -01 Encounter: [id number]  Primary Care Provider: Ed Lemus MD   Date and time admitted to hospital: 9/21/2022  1:02 PM    * Acute on chronic diastolic congestive heart failure [facility])  Assessment & Plan  Wt Readings from Last 3 Encounters:   09/26/22 (!) 175 kg (386 lb 14 4 oz)   04/13/20 (!) 162 kg (356 lb 0 7 oz)     · No previous echo in the system suspect diastolic will do a 2D echo now  Shortness of breath leg edema back in 2021 she was admitted she was 356 lb now 406 chest x-ray compliant with CHF do  suspect that not all the weight are CHF from a year ago  · TSH is normal      · Patient did have an echo she had a 2D echo done with the EF being normal normal systolic function but she does have a decreased RV in a possible severe MR with severe TR  · Would need to watch fluid status  Patient diuresed 20 lb, with -12 4 liters   · Patient does have chronic lymphedema due to her weight she is down to 386 lb  Her feet edema has improved significantly only trace on the left foot none on the right foot  Will switch over to torsemide 40 mg p o  B i d  Continue fluid restriction 2 g sodium diet her lungs are clear  · Will evaluate if she is still losing weight on the torsemide  · Had to use midodrine while diuresing on Lasix drip may not need it as switched to p o  Hypothyroidism  Assessment & Plan  · Continue Synthroid TSH normal    Morbid obesity ([facility] [address] )  Assessment & Plan  · Nutrition for weight loss counseling    Will get a PT OT to evaluate ambulatory status    IMTIAZ (acute kidney injury) ([facility] [address] )  Assessment & Plan  · Creatinine baseline is 0 9 suspect secondary to cardiorenal   Improved with diuresis     Supratherapeutic INR  Assessment & Plan  · Resolved    Chronic respiratory failure with hypoxia (HCC)  Assessment & Plan  · Chronically on CPAP at night and 4 L of oxygen during the day secondary to COPD most likely NISHI    Anemia  Assessment & Plan  · Chronic anemia hemoglobin is stable continue iron    Diabetes mellitus [facility])  Assessment & Plan  Lab Results   Component Value Date    HGBA1C 8 0 (H) 2020       Recent Labs     22  1108 22  1604 22  2137 22  0736   POCGLU 159* 126 135 110       Blood Sugar Average: Last 72 hrs:  · (P) 138 [id number] sugars controlled continue Levemir to 20 units at night doing much better    COPD (chronic obstructive pulmonary disease) ([facility] [address] )  Assessment & Plan  · Not in exacerbation continue Breo    Atrial fibrillation (HCC)  Assessment & Plan  · Persistent rate controlled will hold warfarin as INR supratherapeutic  INR stable continue Coumadin 6 mg daily        VTE Pharmacologic Prophylaxis: VTE Score: 4 Moderate Risk (Score 3-4) - Pharmacological DVT Prophylaxis Ordered: warfarin (Coumadin)  Patient Centered Rounds: I performed bedside rounds with nursing staff today  Discussions with Specialists or Other Care Team Provider: vcm    Education and Discussions with Family / Patient: patient will update   Time Spent for Care: 30 minutes  More than 50% of total time spent on counseling and coordination of care as described above  Current Length of Stay: 5 day(s)  Current Patient Status: Inpatient   Certification Statement: The patient will continue to require additional inpatient hospital stay due to chf  Discharge Plan: Anticipate discharge in 24-48 hrs to rehab facility  Code Status: Level 1 - Full Code    Subjective:   Seen and examined doing better no cough no sob     Objective:     Vitals:   Temp (24hrs), Av °F (36 7 °C), Min:97 9 °F (36 6 °C), Max:98 1 °F (36 7 °C)    Temp:  [97 9 °F (36 6 °C)-98 1 °F (36 7 °C)] 98 1 °F (36 7 °C)  HR:  [73-85] 80  Resp:  [18-19] 18  BP: ()/(56-90) 111/67  SpO2:  [88 %-97 %] 95 %  Body mass index is 70 76 kg/m²       Input and Output Summary (last 24 hours): Intake/Output Summary (Last 24 hours) at 9/26/2022 1035  Last data filed at 9/26/2022 1028  Gross per 24 hour   Intake 1740 ml   Output 4000 ml   Net -2260 ml       Physical Exam:   Physical Exam  Vitals and nursing note reviewed  Constitutional:       General: She is not in acute distress  Appearance: She is well-developed  She is obese  HENT:      Head: Normocephalic and atraumatic  Eyes:      Conjunctiva/sclera: Conjunctivae normal    Cardiovascular:      Rate and Rhythm: Normal rate and regular rhythm  Heart sounds: No murmur heard  Pulmonary:      Effort: Pulmonary effort is normal  No respiratory distress  Breath sounds: Normal breath sounds  No wheezing or rales  Abdominal:      General: There is no distension  Palpations: Abdomen is soft  Tenderness: There is no abdominal tenderness  Musculoskeletal:         General: Swelling (mild left foot trace - chronic lymphedema of legs right foot no swelling) present  Cervical back: Neck supple  Skin:     General: Skin is warm and dry  Neurological:      Mental Status: She is alert and oriented to person, place, and time           Additional Data:     Labs:  Results from last 7 days   Lab Units 09/26/22  0528   WBC Thousand/uL 7 84   HEMOGLOBIN g/dL 10 6*   HEMATOCRIT % 34 4*   PLATELETS Thousands/uL 232   NEUTROS PCT % 64   LYMPHS PCT % 16   MONOS PCT % 13*   EOS PCT % 5     Results from last 7 days   Lab Units 09/26/22  0518   SODIUM mmol/L 141   POTASSIUM mmol/L 4 2   CHLORIDE mmol/L 97   CO2 mmol/L 40*   BUN mg/dL 38*   CREATININE mg/dL 1 20   ANION GAP mmol/L 4   CALCIUM mg/dL 9 6   ALBUMIN g/dL 2 7*   TOTAL BILIRUBIN mg/dL 1 05*   ALK PHOS U/L 128*   ALT U/L 13   AST U/L 23   GLUCOSE RANDOM mg/dL 123     Results from last 7 days   Lab Units 09/26/22  0518   INR  2 67*     Results from last 7 days   Lab Units 09/26/22  0736 09/25/22  2137 09/25/22  1604 09/25/22  1108 09/25/22  0755 09/24/22 2057 09/24/22  1557 09/24/22  1054 09/24/22  0700 09/23/22  2110 09/23/22  1631 09/23/22  1135   POC GLUCOSE mg/dl 110 135 126 159* 147* 155* 150* 131 118 145* 149* 154*         Results from last 7 days   Lab Units 09/21/22  1319   LACTIC ACID mmol/L 1 5       Lines/Drains:  Invasive Devices  Report    Peripheral Intravenous Line  Duration           Peripheral IV 09/24/22 Dorsal (posterior); Right Hand 2 days          Drain  Duration           External Urinary Catheter 3 days                      Imaging: Reviewed radiology reports from this admission including: chest xray    Recent Cultures (last 7 days):         Last 24 Hours Medication List:   Current Facility-Administered Medications   Medication Dose Route Frequency Provider Last Rate    acetaminophen  650 mg Oral Q6H PRN Agueda Aceves MD      allopurinol  100 mg Oral Daily Agueda Aceves MD      ammonium lactate   Topical BID PRN Agueda Aceves MD      atorvastatin  10 mg Oral Daily Agueda Aceves MD      cholecalciferol  2,000 Units Oral Daily Agueda Aceves MD      ferrous sulfate  325 mg Oral Daily With Breakfast Agueda Aceves MD      fluticasone-vilanterol  1 puff Inhalation Daily Agueda Aceves MD      insulin detemir  20 Units Subcutaneous HS Agueda Aceves MD      insulin lispro  2-12 Units Subcutaneous HS CAMPBELL Heller      insulin lispro  4-20 Units Subcutaneous TID [address] [place], [place] [address]NP      levothyroxine  200 mcg Oral Daily Agueda Aceves MD      levothyroxine  88 mcg Oral Early Morning Agueda Aceves MD      loratadine  10 mg Oral Daily Agueda Aceves MD      metoprolol succinate  100 mg Oral BID Agueda Aceves MD      midodrine  5 mg Oral TID AC Agueda Aceves MD      montelukast  10 mg Oral HS Agueda Aceves MD      nystatin   Topical BID Agueda Aceves MD      pantoprazole  40 mg Oral Early Morning Agueda Aceves MD      potassium chloride  20 mEq Oral Daily Agueda Aceves MD      torsemide 40 mg Oral BID Paulette Dhillon MD      traMADol  50 mg Oral Q6H PRN Paulette Dhillon MD      warfarin  6 mg Oral Daily (warfarin) Paulette Dhillon MD          Today, Patient Was Seen By: Paulette Dhillon MD    **Please Note: This note may have been constructed using a voice recognition system  **

## 2022-09-26 NOTE — PLAN OF CARE
Problem: Potential for Falls  Goal: Patient will remain free of falls  Description: INTERVENTIONS:  - Educate patient/family on patient safety including physical limitations  - Instruct patient to call for assistance with activity   - Consult OT/PT to assist with strengthening/mobility   - Keep Call bell within reach  - Keep bed low and locked with side rails adjusted as appropriate  - Keep care items and personal belongings within reach  - Initiate and maintain comfort rounds  - Make Fall Risk Sign visible to staff  - Offer Toileting every 2 Hours, in advance of need  - Initiate/Maintain alarm  - Obtain necessary fall risk management equipment  - Apply yellow socks and bracelet for high fall risk patients  - Consider moving patient to room near nurses station  Outcome: Progressing     Problem: Prexisting or High Potential for Compromised Skin Integrity  Goal: Skin integrity is maintained or improved  Description: INTERVENTIONS:  - Identify patients at risk for skin breakdown  - Assess and monitor skin integrity  - Assess and monitor nutrition and hydration status  - Monitor labs   - Assess for incontinence   - Turn and reposition patient  - Assist with mobility/ambulation  - Relieve pressure over bony prominences  - Avoid friction and shearing  - Provide appropriate hygiene as needed including keeping skin clean and dry  - Evaluate need for skin moisturizer/barrier cream  - Collaborate with interdisciplinary team   - Patient/family teaching  - Consider wound care consult   Outcome: Progressing     Problem: INFECTION - ADULT  Goal: Absence or prevention of progression during hospitalization  Description: INTERVENTIONS:  - Assess and monitor for signs and symptoms of infection  - Monitor lab/diagnostic results  - Monitor all insertion sites, i e  indwelling lines, tubes, and drains  - Monitor endotracheal if appropriate and nasal secretions for changes in amount and color  - [place] appropriate cooling/warming therapies per order  - Administer medications as ordered  - Instruct and encourage patient and family to use good hand hygiene technique  - Identify and instruct in appropriate isolation precautions for identified infection/condition  Outcome: Progressing

## 2022-09-26 NOTE — ASSESSMENT & PLAN NOTE
Wt Readings from Last 3 Encounters:   09/26/22 (!) 175 kg (386 lb 14 4 oz)   04/13/20 (!) 162 kg (356 lb 0 7 oz)     · No previous echo in the system suspect diastolic will do a 2D echo now  Shortness of breath leg edema back in 2021 she was admitted she was 356 lb now 406 chest x-ray compliant with CHF do  suspect that not all the weight are CHF from a year ago  · TSH is normal      · Patient did have an echo she had a 2D echo done with the EF being normal normal systolic function but she does have a decreased RV in a possible severe MR with severe TR  · Would need to watch fluid status  Patient diuresed 20 lb, with -12 4 liters   · Patient does have chronic lymphedema due to her weight she is down to 386 lb  Her feet edema has improved significantly only trace on the left foot none on the right foot  Will switch over to torsemide 40 mg p o  B i d  Continue fluid restriction 2 g sodium diet her lungs are clear  · Will evaluate if she is still losing weight on the torsemide  · Had to use midodrine while diuresing on Lasix drip may not need it as switched to p o

## 2022-09-27 LAB
ANION GAP SERPL CALCULATED.3IONS-SCNC: -2 MMOL/L (ref 4–13)
BUN SERPL-MCNC: 42 MG/DL (ref 5–25)
CALCIUM SERPL-MCNC: 9.8 MG/DL (ref 8.3–10.1)
CHLORIDE SERPL-SCNC: 99 MMOL/L (ref 96–108)
CO2 SERPL-SCNC: 44 MMOL/L (ref 21–32)
CREAT SERPL-MCNC: 1.39 MG/DL (ref 0.6–1.3)
GFR SERPL CREATININE-BSD FRML MDRD: 38 ML/MIN/1.73SQ M
GLUCOSE SERPL-MCNC: 146 MG/DL (ref 65–140)
GLUCOSE SERPL-MCNC: 152 MG/DL (ref 65–140)
GLUCOSE SERPL-MCNC: 156 MG/DL (ref 65–140)
GLUCOSE SERPL-MCNC: 173 MG/DL (ref 65–140)
GLUCOSE SERPL-MCNC: 201 MG/DL (ref 65–140)
INR PPP: 2.79 (ref 0.84–1.19)
POTASSIUM SERPL-SCNC: 4.4 MMOL/L (ref 3.5–5.3)
PROTHROMBIN TIME: 29.4 SECONDS (ref 11.6–14.5)
SODIUM SERPL-SCNC: 141 MMOL/L (ref 135–147)

## 2022-09-27 PROCEDURE — 82948 REAGENT STRIP/BLOOD GLUCOSE: CPT

## 2022-09-27 PROCEDURE — 80048 BASIC METABOLIC PNL TOTAL CA: CPT | Performed by: FAMILY MEDICINE

## 2022-09-27 PROCEDURE — 85610 PROTHROMBIN TIME: CPT | Performed by: FAMILY MEDICINE

## 2022-09-27 PROCEDURE — 99232 SBSQ HOSP IP/OBS MODERATE 35: CPT | Performed by: INTERNAL MEDICINE

## 2022-09-27 RX ORDER — TORSEMIDE 20 MG/1
40 TABLET ORAL DAILY
Status: DISCONTINUED | OUTPATIENT
Start: 2022-09-28 | End: 2022-09-28

## 2022-09-27 RX ADMIN — LORATADINE 10 MG: 10 TABLET ORAL at 09:01

## 2022-09-27 RX ADMIN — ALLOPURINOL 100 MG: 100 TABLET ORAL at 09:00

## 2022-09-27 RX ADMIN — INSULIN LISPRO 4 UNITS: 100 INJECTION, SOLUTION INTRAVENOUS; SUBCUTANEOUS at 11:32

## 2022-09-27 RX ADMIN — LEVOTHYROXINE SODIUM 200 MCG: 100 TABLET ORAL at 05:16

## 2022-09-27 RX ADMIN — INSULIN LISPRO 2 UNITS: 100 INJECTION, SOLUTION INTRAVENOUS; SUBCUTANEOUS at 21:52

## 2022-09-27 RX ADMIN — TRAMADOL HYDROCHLORIDE 50 MG: 50 TABLET ORAL at 00:16

## 2022-09-27 RX ADMIN — INSULIN LISPRO 4 UNITS: 100 INJECTION, SOLUTION INTRAVENOUS; SUBCUTANEOUS at 16:53

## 2022-09-27 RX ADMIN — FERROUS SULFATE TAB 325 MG (65 MG ELEMENTAL FE) 325 MG: 325 (65 FE) TAB at 08:44

## 2022-09-27 RX ADMIN — TORSEMIDE 40 MG: 20 TABLET ORAL at 09:02

## 2022-09-27 RX ADMIN — Medication 2000 UNITS: at 09:01

## 2022-09-27 RX ADMIN — TRAMADOL HYDROCHLORIDE 50 MG: 50 TABLET ORAL at 16:49

## 2022-09-27 RX ADMIN — POTASSIUM CHLORIDE 20 MEQ: 1500 TABLET, EXTENDED RELEASE ORAL at 09:02

## 2022-09-27 RX ADMIN — PANTOPRAZOLE SODIUM 40 MG: 40 TABLET, DELAYED RELEASE ORAL at 05:16

## 2022-09-27 RX ADMIN — MONTELUKAST 10 MG: 10 TABLET, FILM COATED ORAL at 21:50

## 2022-09-27 RX ADMIN — NYSTATIN: 100000 POWDER TOPICAL at 09:02

## 2022-09-27 RX ADMIN — MIDODRINE HYDROCHLORIDE 5 MG: 5 TABLET ORAL at 08:44

## 2022-09-27 RX ADMIN — LEVOTHYROXINE SODIUM 88 MCG: 100 TABLET ORAL at 05:17

## 2022-09-27 RX ADMIN — METOPROLOL SUCCINATE 100 MG: 100 TABLET, FILM COATED, EXTENDED RELEASE ORAL at 09:03

## 2022-09-27 RX ADMIN — INSULIN DETEMIR 20 UNITS: 100 INJECTION, SOLUTION SUBCUTANEOUS at 21:51

## 2022-09-27 RX ADMIN — ACETAMINOPHEN 650 MG: 325 TABLET ORAL at 13:58

## 2022-09-27 RX ADMIN — MIDODRINE HYDROCHLORIDE 5 MG: 5 TABLET ORAL at 16:52

## 2022-09-27 RX ADMIN — NYSTATIN: 100000 POWDER TOPICAL at 17:26

## 2022-09-27 RX ADMIN — FLUTICASONE FUROATE AND VILANTEROL TRIFENATATE 1 PUFF: 100; 25 POWDER RESPIRATORY (INHALATION) at 09:02

## 2022-09-27 RX ADMIN — WARFARIN SODIUM 6 MG: 3 TABLET ORAL at 16:54

## 2022-09-27 RX ADMIN — ATORVASTATIN CALCIUM 10 MG: 10 TABLET, FILM COATED ORAL at 09:00

## 2022-09-27 NOTE — ASSESSMENT & PLAN NOTE
Wt Readings from Last 3 Encounters:   09/27/22 (!) 170 kg (375 lb 10 6 oz)   04/13/20 (!) 162 kg (356 lb 0 7 oz)   Patient's estimated dry weight is around 356 lb  Presented with weight gain of 406 lb  Maintained on IV diuretics  Subsequently transition to torsemide 40 mg b i d  Continue to follow daily weights, I's and nose closely  Continue to follow renal functions closely  Echocardiogram shows: The left ventricular ejection fraction is 55-59%  Systolic function is normal  Wall motion is grossly normal    There is both systolic and diastolic flattening of the interventricular septum consistent with right ventricle pressure and volume overload: Right ventricular cavity size is severely dilated  Systolic function is mildly to moderately reduced  · TSH is normal  · If renal functions are stable on current diuretic regimen, likely can be discharged in the next 24-48 hours

## 2022-09-27 NOTE — PLAN OF CARE
Problem: MOBILITY - ADULT  Goal: Maintain or return to baseline ADL function  Description: INTERVENTIONS:  -  Assess patient's ability to carry out ADLs; assess patient's baseline for ADL function and identify physical deficits which impact ability to perform ADLs (bathing, care of mouth/teeth, toileting, grooming, dressing, etc )  - Assess/evaluate cause of self-care deficits   - Assess range of motion  - Assess patient's mobility; develop plan if impaired  - Assess patient's need for assistive devices and provide as appropriate  - Encourage maximum independence but intervene and supervise when necessary  - Involve family in performance of ADLs  - Assess for home care needs following discharge   - Consider OT consult to assist with ADL evaluation and planning for discharge  - Provide patient education as appropriate  Outcome: Progressing  Goal: Maintains/Returns to pre admission functional level  Description: INTERVENTIONS:  - Perform BMAT or MOVE assessment daily    - Set and communicate daily mobility goal to care team and patient/family/caregiver  - Collaborate with rehabilitation services on mobility goals if consulted  - Perform Range of Motion 3 times a day  - Reposition patient every 2 hours    - Dangle patient 3 times a day  - Stand patient 3 times a day  - Ambulate patient 3 times a day  - Out of bed to chair 3 times a day   - Out of bed for meals 3 times a day  - Out of bed for toileting  - Record patient progress and toleration of activity level   Outcome: Progressing     Problem: Potential for Falls  Goal: Patient will remain free of falls  Description: INTERVENTIONS:  - Educate patient/family on patient safety including physical limitations  - Instruct patient to call for assistance with activity   - Consult OT/PT to assist with strengthening/mobility   - Keep Call bell within reach  - Keep bed low and locked with side rails adjusted as appropriate  - Keep care items and personal belongings within reach  - Initiate and maintain comfort rounds  - Make Fall Risk Sign visible to staff  - Offer Toileting every  Hours, in advance of need  - Initiate/Maintain alarm  - Obtain necessary fall risk management equipment:   - Apply yellow socks and bracelet for high fall risk patients  - Consider moving patient to room near nurses station  Outcome: Progressing     Problem: Prexisting or High Potential for Compromised Skin Integrity  Goal: Skin integrity is maintained or improved  Description: INTERVENTIONS:  - Identify patients at risk for skin breakdown  - Assess and monitor skin integrity  - Assess and monitor nutrition and hydration status  - Monitor labs   - Assess for incontinence   - Turn and reposition patient  - Assist with mobility/ambulation  - Relieve pressure over bony prominences  - Avoid friction and shearing  - Provide appropriate hygiene as needed including keeping skin clean and dry  - Evaluate need for skin moisturizer/barrier cream  - Collaborate with interdisciplinary team   - Patient/family teaching  - Consider wound care consult   Outcome: Progressing     Problem: PAIN - ADULT  Goal: Verbalizes/displays adequate comfort level or baseline comfort level  Description: Interventions:  - Encourage patient to monitor pain and request assistance  - Assess pain using appropriate pain scale  - Administer analgesics based on type and severity of pain and evaluate response  - Implement non-pharmacological measures as appropriate and evaluate response  - Consider cultural and social influences on pain and pain management  - Notify physician/advanced practitioner if interventions unsuccessful or patient reports new pain  Outcome: Progressing     Problem: SAFETY ADULT  Goal: Maintain or return to baseline ADL function  Description: INTERVENTIONS:  -  Assess patient's ability to carry out ADLs; assess patient's baseline for ADL function and identify physical deficits which impact ability to perform ADLs (bathing, care of mouth/teeth, toileting, grooming, dressing, etc )  - Assess/evaluate cause of self-care deficits   - Assess range of motion  - Assess patient's mobility; develop plan if impaired  - Assess patient's need for assistive devices and provide as appropriate  - Encourage maximum independence but intervene and supervise when necessary  - Involve family in performance of ADLs  - Assess for home care needs following discharge   - Consider OT consult to assist with ADL evaluation and planning for discharge  - Provide patient education as appropriate  Outcome: Progressing  Goal: Maintains/Returns to pre admission functional level  Description: INTERVENTIONS:  - Perform BMAT or MOVE assessment daily    - Set and communicate daily mobility goal to care team and patient/family/caregiver  - Collaborate with rehabilitation services on mobility goals if consulted  - Perform Range of Motion 3 times a day  - Reposition patient every 2 hours    - Dangle patient 3 times a day  - Stand patient 3 times a day  - Ambulate patient 3 times a day  - Out of bed to chair 3 times a day   - Out of bed for meals 3 times a day  - Out of bed for toileting  - Record patient progress and toleration of activity level   Outcome: Progressing  Goal: Patient will remain free of falls  Description: INTERVENTIONS:  - Educate patient/family on patient safety including physical limitations  - Instruct patient to call for assistance with activity   - Consult OT/PT to assist with strengthening/mobility   - Keep Call bell within reach  - Keep bed low and locked with side rails adjusted as appropriate  - Keep care items and personal belongings within reach  - Initiate and maintain comfort rounds  - Make Fall Risk Sign visible to staff  - Offer Toileting every  Hours, in advance of need  - Initiate/Maintain alarm  - Obtain necessary fall risk management equipment:   - Apply yellow socks and bracelet for high fall risk patients  - Consider moving patient to room near nurses station  Outcome: Progressing

## 2022-09-27 NOTE — ASSESSMENT & PLAN NOTE
· Creatinine baseline is 0 9 suspect secondary to cardiorenal   Improved with diuresis   · Mild creatinine elevation up to 1 39 noted  Continue with current dose of torsemide for today  Follow-up BMP in [place]

## 2022-09-27 NOTE — CASE MANAGEMENT
Case Management Discharge Planning Note    Patient name Billie Escobar  Location /-01 MRN [id number]  : 1955 Date 2022       Current Admission Date: 2022  Current Admission Diagnosis:Acute on chronic diastolic congestive heart failure [facility])   Patient Active Problem List    Diagnosis Date Noted    Acute on chronic diastolic congestive heart failure ([facility] ) 2022    IMTIAZ (acute kidney injury) ([place] [address] ) 2022    Morbid obesity ([place] [address] ) 2022    Hypothyroidism 2022    Supratherapeutic INR 04/15/2020    COVID-19 virus infection 2020    Chronic respiratory failure with hypoxia ([place] [address] ) 2020    Asthma 2020    Atrial fibrillation ([place] [address] ) 2020    COPD (chronic obstructive pulmonary disease) ([place] [address] ) 2020    Diabetes mellitus ([place] [address] ) 2020    Heart failure ([place] [address] ) 2020    Shortness of breath 2020    Anemia 2020      LOS (days): 6  Geometric Mean LOS (GMLOS) (days): 3 00  Days to GMLOS:-3 1     OBJECTIVE:  Risk of Unplanned Readmission Score: 20 7         Current admission status: Inpatient   Preferred Pharmacy:   [address] [place] [id number]  Phone: [phone] Fax: [phone]    Primary Care Provider: Tru Lomeli MD    Primary Insurance: [employer] [facility]  Secondary Insurance:     DISCHARGE DETAILS:    Discharge planning discussed with[de-identified] patient  Freedom of Choice: Yes  Comments - Freedom of Choice: CM discussed with patient  STR options, aware [facility]  CM asked if patient has any other preferences and if its ok to place few referrals out for  bed availabilty  [facility] agreeded [facility]  [facility] and no [facility]  CM placed 5 referral in aidin    CM contacted family/caregiver?: No- see comments  Were Treatment Team discharge recommendations reviewed with patient/caregiver?: Yes  Did patient/caregiver verbalize understanding of patient care needs?: Yes  Were patient/caregiver advised of the risks associated with not following Treatment Team discharge recommendations?: Yes              Would you like to participate in our [address] service program?  : No - Declined    Treatment Team Recommendation: Short Term Rehab  Discharge Destination Plan[de-identified] Short Term Rehab      patient agreed to multi referrals placed on aidin for STR  CM placed referrals in aidin for STR     DC IMM  Explained and signed to patient   Copy in bin to be scanned to system and copy at patient bedside     CM to follow                        IMM Given (Date):: 09/27/22  IMM Given to[de-identified] Patient

## 2022-09-27 NOTE — ASSESSMENT & PLAN NOTE
Lab Results   Component Value Date    HGBA1C 8 0 (H) 04/13/2020       Recent Labs     09/26/22  1643 09/26/22  2108 09/27/22  0707 09/27/22  1104   POCGLU 129 160* 146* 201*       Blood Sugar Average: Last 72 hrs:  · (P) 147 [id number] sugars controlled continue Levemir to 20 units at night and sliding scale insulin coverage

## 2022-09-27 NOTE — ASSESSMENT & PLAN NOTE
· Chronically on CPAP at night and 4 L of oxygen during the day secondary to COPD most likely NISHI  · Has not been using CPAP here     to bring home CPAP

## 2022-09-27 NOTE — PLAN OF CARE
Problem: MOBILITY - ADULT  Goal: Maintain or return to baseline ADL function  Description: INTERVENTIONS:  -  Assess patient's ability to carry out ADLs; assess patient's baseline for ADL function and identify physical deficits which impact ability to perform ADLs (bathing, care of mouth/teeth, toileting, grooming, dressing, etc )  - Assess/evaluate cause of self-care deficits   - Assess range of motion  - Assess patient's mobility; develop plan if impaired  - Assess patient's need for assistive devices and provide as appropriate  - Encourage maximum independence but intervene and supervise when necessary  - Involve family in performance of ADLs  - Assess for home care needs following discharge   - Consider OT consult to assist with ADL evaluation and planning for discharge  - Provide patient education as appropriate  Outcome: Progressing  Goal: Maintains/Returns to pre admission functional level  Description: INTERVENTIONS:  - Perform BMAT or MOVE assessment daily    - Set and communicate daily mobility goal to care team and patient/family/caregiver  - Collaborate with rehabilitation services on mobility goals if consulted  - Perform Range of Motion 3 times a day  - Reposition patient every 2 hours    - Dangle patient 3 times a day  - Stand patient 3 times a day  - Ambulate patient 3 times a day  - Out of bed to chair 3 times a day   - Out of bed for meals 3 times a day  - Out of bed for toileting  - Record patient progress and toleration of activity level   Outcome: Progressing     Problem: Potential for Falls  Goal: Patient will remain free of falls  Description: INTERVENTIONS:  - Educate patient/family on patient safety including physical limitations  - Instruct patient to call for assistance with activity   - Consult OT/PT to assist with strengthening/mobility   - Keep Call bell within reach  - Keep bed low and locked with side rails adjusted as appropriate  - Keep care items and personal belongings within reach  - Initiate and maintain comfort rounds  - Make Fall Risk Sign visible to staff    - Apply yellow socks and bracelet for high fall risk patients  - Consider moving patient to room near nurses station  Outcome: Progressing     Problem: Prexisting or High Potential for Compromised Skin Integrity  Goal: Skin integrity is maintained or improved  Description: INTERVENTIONS:  - Identify patients at risk for skin breakdown  - Assess and monitor skin integrity  - Assess and monitor nutrition and hydration status  - Monitor labs   - Assess for incontinence   - Turn and reposition patient  - Assist with mobility/ambulation  - Relieve pressure over bony prominences  - Avoid friction and shearing  - Provide appropriate hygiene as needed including keeping skin clean and dry  - Evaluate need for skin moisturizer/barrier cream  - Collaborate with interdisciplinary team   - Patient/family teaching  - Consider wound care consult   Outcome: Progressing     Problem: PAIN - ADULT  Goal: Verbalizes/displays adequate comfort level or baseline comfort level  Description: Interventions:  - Encourage patient to monitor pain and request assistance  - Assess pain using appropriate pain scale  - Administer analgesics based on type and severity of pain and evaluate response  - Implement non-pharmacological measures as appropriate and evaluate response  - Consider cultural and social influences on pain and pain management  - Notify physician/advanced practitioner if interventions unsuccessful or patient reports new pain  Outcome: Progressing     Problem: INFECTION - ADULT  Goal: Absence or prevention of progression during hospitalization  Description: INTERVENTIONS:  - Assess and monitor for signs and symptoms of infection  - Monitor lab/diagnostic results  - Monitor all insertion sites, i e  indwelling lines, tubes, and drains  - Monitor endotracheal if appropriate and nasal secretions for changes in amount and color  - Gary appropriate cooling/warming therapies per order  - Administer medications as ordered  - Instruct and encourage patient and family to use good hand hygiene technique  - Identify and instruct in appropriate isolation precautions for identified infection/condition  Outcome: Progressing     Problem: SAFETY ADULT  Goal: Maintain or return to baseline ADL function  Description: INTERVENTIONS:  -  Assess patient's ability to carry out ADLs; assess patient's baseline for ADL function and identify physical deficits which impact ability to perform ADLs (bathing, care of mouth/teeth, toileting, grooming, dressing, etc )  - Assess/evaluate cause of self-care deficits   - Assess range of motion  - Assess patient's mobility; develop plan if impaired  - Assess patient's need for assistive devices and provide as appropriate  - Encourage maximum independence but intervene and supervise when necessary  - Involve family in performance of ADLs  - Assess for home care needs following discharge   - Consider OT consult to assist with ADL evaluation and planning for discharge  - Provide patient education as appropriate  Outcome: Progressing  Goal: Maintains/Returns to pre admission functional level  Description: INTERVENTIONS:  - Perform BMAT or MOVE assessment daily    - Set and communicate daily mobility goal to care team and patient/family/caregiver  - Collaborate with rehabilitation services on mobility goals if consulted  - Perform Range of Motion 3 times a day  - Reposition patient every 2 hours    - Dangle patient 3 times a day  - Stand patient 3 times a day  - Ambulate patient 3 times a day  - Out of bed to chair 3 times a day   - Out of bed for meals 3 times a day  - Out of bed for toileting  - Record patient progress and toleration of activity level   Outcome: Progressing  Goal: Patient will remain free of falls  Description: INTERVENTIONS:  - Educate patient/family on patient safety including physical limitations  - Instruct patient to call for assistance with activity   - Consult OT/PT to assist with strengthening/mobility   - Keep Call bell within reach  - Keep bed low and locked with side rails adjusted as appropriate  - Keep care items and personal belongings within reach  - Initiate and maintain comfort rounds  - Make Fall Risk Sign visible to staff    - Apply yellow socks and bracelet for high fall risk patients  - Consider moving patient to room near nurses station  Outcome: Progressing     Problem: DISCHARGE PLANNING  Goal: Discharge to home or other facility with appropriate resources  Description: INTERVENTIONS:  - Identify barriers to discharge w/patient and caregiver  - Arrange for needed discharge resources and transportation as appropriate  - Identify discharge learning needs (meds, wound care, etc )  - Arrange for interpretive services to assist at discharge as needed  - Refer to Case Management Department for coordinating discharge planning if the patient needs post-hospital services based on physician/advanced practitioner order or complex needs related to functional status, cognitive ability, or social support system  Outcome: Progressing     Problem: Knowledge Deficit  Goal: Patient/family/caregiver demonstrates understanding of disease process, treatment plan, medications, and discharge instructions  Description: Complete learning assessment and assess knowledge base    Interventions:  - Provide teaching at level of understanding  - Provide teaching via preferred learning methods  Outcome: Progressing

## 2022-09-27 NOTE — PROGRESS NOTES
[address]  Progress Note Braeden Staff 1955, 69 y o  female MRN: [id number]  Unit/Bed#: -01 Encounter: [id number]  Primary Care Provider: Michael Reddy MD   Date and time admitted to hospital: 9/21/2022  1:02 PM    * Acute on chronic diastolic congestive heart failure [facility])  Assessment & Plan  Wt Readings from Last 3 Encounters:   09/27/22 (!) 170 kg (375 lb 10 6 oz)   04/13/20 (!) 162 kg (356 lb 0 7 oz)   Patient's estimated dry weight is around 356 lb  Presented with weight gain of 406 lb  Maintained on IV diuretics  Subsequently transition to torsemide 40 mg b i d  Continue to follow daily weights, I's and nose closely  Continue to follow renal functions closely  Echocardiogram shows: The left ventricular ejection fraction is 55-59%  Systolic function is normal  Wall motion is grossly normal    There is both systolic and diastolic flattening of the interventricular septum consistent with right ventricle pressure and volume overload: Right ventricular cavity size is severely dilated  Systolic function is mildly to moderately reduced  · TSH is normal  · If renal functions are stable on current diuretic regimen, likely can be discharged in the next 24-48 hours  Hypothyroidism  Assessment & Plan  · Continue Synthroid TSH normal    Morbid obesity ([facility] [address] )  Assessment & Plan  · Nutrition for weight loss counseling  Will get a PT OT to evaluate ambulatory status    IMTIAZ (acute kidney injury) ([facility] [address] )  Assessment & Plan  · Creatinine baseline is 0 9 suspect secondary to cardiorenal   Improved with diuresis   · Mild creatinine elevation up to 1 39 noted  Continue with current dose of torsemide for today  Follow-up BMP in a m       Supratherapeutic INR  Assessment & Plan  · Resolved  · Resume outpatient dose of Coumadin    Chronic respiratory failure with hypoxia (HCC)  Assessment & Plan  · Chronically on CPAP at night and 4 L of oxygen during the day secondary to COPD most likely NISHI  · Has not been using CPAP here   to bring home CPAP    Anemia  Assessment & Plan  · Chronic anemia hemoglobin is stable continue iron    Diabetes mellitus [facility])  Assessment & Plan  Lab Results   Component Value Date    HGBA1C 8 0 (H) 04/13/2020       Recent Labs     09/26/22  1643 09/26/22  2108 09/27/22  0707 09/27/22  1104   POCGLU 129 160* 146* 201*       Blood Sugar Average: Last 72 hrs:  · (P) 147 [id number] sugars controlled continue Levemir to 20 units at night and sliding scale insulin coverage    COPD (chronic obstructive pulmonary disease) ([facility])  Assessment & Plan  · Not in exacerbation continue Breo    Atrial fibrillation ([facility])  Assessment & Plan  · Rate controlled  Continue with metoprolol 100 mg b i d   ·   INR 2 79  ·  Continue Coumadin 6 mg daily        VTE Pharmacologic Prophylaxis: VTE Score: 4 High Risk (Score >/= 5) - Pharmacological DVT Prophylaxis Ordered: warfarin (Coumadin)  Sequential Compression Devices Ordered  Patient Centered Rounds: I performed bedside rounds with nursing staff today  Discussions with Specialists or Other Care Team Provider:  Discussed with nursing    Education and Discussions with Family / Patient: Updated  () at bedside  Time Spent for Care: 30 minutes  More than 50% of total time spent on counseling and coordination of care as described above  Current Length of Stay: 6 day(s)  Current Patient Status: Inpatient   Certification Statement: The patient will continue to require additional inpatient hospital stay due to Need for short-term rehab and ongoing monitoring of renal function on current diuretic  Discharge Plan: Anticipate discharge in 24-48 hrs to rehab facility  Code Status: Level 1 - Full Code    Subjective:   Patient does not report any worsening shortness of breath or chest discomfort  Complains of sleeping poorly over the last few days    Denies any headache, dizziness or urinary complaints    Objective:     Vitals:   Temp (24hrs), Av °F (36 7 °C), Min:97 7 °F (36 5 °C), Max:98 2 °F (36 8 °C)    Temp:  [97 7 °F (36 5 °C)-98 2 °F (36 8 °C)] 97 7 °F (36 5 °C)  HR:  [72-82] 72  Resp:  [18] 18  BP: (106-117)/(61-72) 117/69  SpO2:  [94 %-97 %] 96 %  Body mass index is 68 71 kg/m²  Input and Output Summary (last 24 hours): Intake/Output Summary (Last 24 hours) at 2022 1239  Last data filed at 2022 1032  Gross per 24 hour   Intake 596 ml   Output 1575 ml   Net -979 ml       Physical Exam:   Physical Exam  Constitutional:       Comments: Morbidly obese   HENT:      Nose: Nose normal       Mouth/Throat:      Mouth: Mucous membranes are moist    Eyes:      Pupils: Pupils are equal, round, and reactive to light  Cardiovascular:      Rate and Rhythm: Normal rate  Rhythm irregular  Pulmonary:      Comments: Diminished breath sounds bilaterally likely secondary to body habitus  Abdominal:      General: Bowel sounds are normal  There is distension  Palpations: Abdomen is soft  Tenderness: There is no abdominal tenderness  Musculoskeletal:      Cervical back: Neck supple  Right lower leg: Edema present  Left lower leg: Edema present  Comments: Chronic lymphedema bilateral lower extremity   Skin:     General: Skin is dry  Coloration: Skin is pale  Neurological:      General: No focal deficit present  Mental Status: She is alert and oriented to person, place, and time     Psychiatric:         Mood and Affect: Mood normal           Additional Data:     Labs:  Results from last 7 days   Lab Units 22  0528   WBC Thousand/uL 7 84   HEMOGLOBIN g/dL 10 6*   HEMATOCRIT % 34 4*   PLATELETS Thousands/uL 232   NEUTROS PCT % 64   LYMPHS PCT % 16   MONOS PCT % 13*   EOS PCT % 5     Results from last 7 days   Lab Units 22  0459 22  0518   SODIUM mmol/L 141 141   POTASSIUM mmol/L 4 4 4 2   CHLORIDE mmol/L 99 97   CO2 mmol/L 44* 40*   BUN mg/dL 42* 38*   CREATININE mg/dL 1 39* 1 20   ANION GAP mmol/L -2* 4   CALCIUM mg/dL 9 8 9 6   ALBUMIN g/dL  --  2 7*   TOTAL BILIRUBIN mg/dL  --  1 05*   ALK PHOS U/L  --  128*   ALT U/L  --  13   AST U/L  --  23   GLUCOSE RANDOM mg/dL 156* 123     Results from last 7 days   Lab Units 09/27/22  0459   INR  2 79*     Results from last 7 days   Lab Units 09/27/22  1104 09/27/22  0707 09/26/22  2108 09/26/22  1643 09/26/22  1057 09/26/22  0736 09/25/22  2137 09/25/22  1604 09/25/22  1108 09/25/22  0755 09/24/22  2057 09/24/22  1557   POC GLUCOSE mg/dl 201* 146* 160* 129 202* 110 135 126 159* 147* 155* 150*         Results from last 7 days   Lab Units 09/21/22  1319   LACTIC ACID mmol/L 1 5       Lines/Drains:  Invasive Devices  Report    Peripheral Intravenous Line  Duration           Peripheral IV 09/24/22 Dorsal (posterior); Right Hand 3 days          Drain  Duration           External Urinary Catheter 4 days                      Imaging: Reviewed radiology reports from this admission including: ECHO    Recent Cultures (last 7 days):         Last 24 Hours Medication List:   Current Facility-Administered Medications   Medication Dose Route Frequency Provider Last Rate    acetaminophen  650 mg Oral Q6H PRN Sarkis Garcia MD      allopurinol  100 mg Oral Daily Sarkis Garcia MD      ammonium lactate   Topical BID PRN Sarkis Garcia MD      atorvastatin  10 mg Oral Daily Sarkis Garcia MD      cholecalciferol  2,000 Units Oral Daily Sarkis Garcia MD      ferrous sulfate  325 mg Oral Daily With Breakfast Sarkis Garcia MD      fluticasone-vilanterol  1 puff Inhalation Daily Sarkis Garcia MD      insulin detemir  20 Units Subcutaneous HS Sarkis Garcia MD      insulin lispro  2-12 Units Subcutaneous HS CAMPBELL Jenkins      insulin lispro  4-20 Units Subcutaneous TID AC CAMPBELL Jenkins      levothyroxine  200 mcg Oral Daily Sarkis Garcia MD      levothyroxine  88 mcg Oral Early Morning Maxwell Maya MD      loratadine  10 mg Oral Daily Maxwell Maya MD      metoprolol succinate  100 mg Oral BID Maxwell Maya MD      [facility]  [facility] TID [facility] Maxwell Maya MD      montelukast  10 mg Oral HS Maxwell Maya MD      nystatin   Topical BID Maxwell Maya MD      pantoprazole  40 mg Oral Early Morning Maxwell Maya MD      potassium chloride  20 mEq Oral Daily Maxwell Maya MD      torsemide  40 mg Oral BID Maxwell Maya MD      traMADol  50 mg Oral Q6H PRN Maxwell Maya MD      warfarin  6 mg Oral Daily (warfarin) Maxwell Maya MD          Today, Patient Was Seen By: Lizeth Yun MD    **Please Note: This note may have been constructed using a voice recognition system  **

## 2022-09-28 LAB
ANION GAP SERPL CALCULATED.3IONS-SCNC: 2 MMOL/L (ref 4–13)
BUN SERPL-MCNC: 40 MG/DL (ref 5–25)
CALCIUM SERPL-MCNC: 10 MG/DL (ref 8.3–10.1)
CHLORIDE SERPL-SCNC: 97 MMOL/L (ref 96–108)
CO2 SERPL-SCNC: 42 MMOL/L (ref 21–32)
CREAT SERPL-MCNC: 1.2 MG/DL (ref 0.6–1.3)
GFR SERPL CREATININE-BSD FRML MDRD: 46 ML/MIN/1.73SQ M
GLUCOSE SERPL-MCNC: 119 MG/DL (ref 65–140)
GLUCOSE SERPL-MCNC: 131 MG/DL (ref 65–140)
GLUCOSE SERPL-MCNC: 132 MG/DL (ref 65–140)
GLUCOSE SERPL-MCNC: 171 MG/DL (ref 65–140)
GLUCOSE SERPL-MCNC: 175 MG/DL (ref 65–140)
MAGNESIUM SERPL-MCNC: 2.5 MG/DL (ref 1.6–2.6)
POTASSIUM SERPL-SCNC: 3.7 MMOL/L (ref 3.5–5.3)
SODIUM SERPL-SCNC: 141 MMOL/L (ref 135–147)

## 2022-09-28 PROCEDURE — 99232 SBSQ HOSP IP/OBS MODERATE 35: CPT | Performed by: INTERNAL MEDICINE

## 2022-09-28 PROCEDURE — 83735 ASSAY OF MAGNESIUM: CPT | Performed by: INTERNAL MEDICINE

## 2022-09-28 PROCEDURE — 80048 BASIC METABOLIC PNL TOTAL CA: CPT | Performed by: INTERNAL MEDICINE

## 2022-09-28 PROCEDURE — 82948 REAGENT STRIP/BLOOD GLUCOSE: CPT

## 2022-09-28 RX ORDER — TORSEMIDE 20 MG/1
40 TABLET ORAL 2 TIMES DAILY
Status: DISCONTINUED | OUTPATIENT
Start: 2022-09-28 | End: 2022-10-03 | Stop reason: HOSPADM

## 2022-09-28 RX ADMIN — NYSTATIN: 100000 POWDER TOPICAL at 08:21

## 2022-09-28 RX ADMIN — MIDODRINE HYDROCHLORIDE 5 MG: 5 TABLET ORAL at 15:46

## 2022-09-28 RX ADMIN — TORSEMIDE 40 MG: 20 TABLET ORAL at 08:19

## 2022-09-28 RX ADMIN — INSULIN DETEMIR 20 UNITS: 100 INJECTION, SOLUTION SUBCUTANEOUS at 21:47

## 2022-09-28 RX ADMIN — TRAMADOL HYDROCHLORIDE 50 MG: 50 TABLET ORAL at 21:52

## 2022-09-28 RX ADMIN — LEVOTHYROXINE SODIUM 88 MCG: 100 TABLET ORAL at 06:00

## 2022-09-28 RX ADMIN — NYSTATIN 1 APPLICATION: 100000 POWDER TOPICAL at 18:26

## 2022-09-28 RX ADMIN — FLUTICASONE FUROATE AND VILANTEROL TRIFENATATE 1 PUFF: 100; 25 POWDER RESPIRATORY (INHALATION) at 08:21

## 2022-09-28 RX ADMIN — INSULIN LISPRO 2 UNITS: 100 INJECTION, SOLUTION INTRAVENOUS; SUBCUTANEOUS at 21:53

## 2022-09-28 RX ADMIN — POTASSIUM CHLORIDE 20 MEQ: 1500 TABLET, EXTENDED RELEASE ORAL at 08:19

## 2022-09-28 RX ADMIN — METOPROLOL SUCCINATE 100 MG: 100 TABLET, FILM COATED, EXTENDED RELEASE ORAL at 08:19

## 2022-09-28 RX ADMIN — ALLOPURINOL 100 MG: 100 TABLET ORAL at 08:19

## 2022-09-28 RX ADMIN — LEVOTHYROXINE SODIUM 200 MCG: 100 TABLET ORAL at 05:59

## 2022-09-28 RX ADMIN — Medication 2000 UNITS: at 08:19

## 2022-09-28 RX ADMIN — ACETAMINOPHEN 650 MG: 325 TABLET ORAL at 14:15

## 2022-09-28 RX ADMIN — INSULIN LISPRO 4 UNITS: 100 INJECTION, SOLUTION INTRAVENOUS; SUBCUTANEOUS at 11:44

## 2022-09-28 RX ADMIN — ATORVASTATIN CALCIUM 10 MG: 10 TABLET, FILM COATED ORAL at 08:19

## 2022-09-28 RX ADMIN — MONTELUKAST 10 MG: 10 TABLET, FILM COATED ORAL at 21:47

## 2022-09-28 RX ADMIN — TRAMADOL HYDROCHLORIDE 50 MG: 50 TABLET ORAL at 12:43

## 2022-09-28 RX ADMIN — LORATADINE 10 MG: 10 TABLET ORAL at 08:19

## 2022-09-28 RX ADMIN — FERROUS SULFATE TAB 325 MG (65 MG ELEMENTAL FE) 325 MG: 325 (65 FE) TAB at 08:19

## 2022-09-28 RX ADMIN — PANTOPRAZOLE SODIUM 40 MG: 40 TABLET, DELAYED RELEASE ORAL at 06:00

## 2022-09-28 RX ADMIN — WARFARIN SODIUM 6 MG: 3 TABLET ORAL at 18:25

## 2022-09-28 NOTE — PLAN OF CARE
Problem: MOBILITY - ADULT  Goal: Maintain or return to baseline ADL function  Description: INTERVENTIONS:  -  Assess patient's ability to carry out ADLs; assess patient's baseline for ADL function and identify physical deficits which impact ability to perform ADLs (bathing, care of mouth/teeth, toileting, grooming, dressing, etc )  - Assess/evaluate cause of self-care deficits   - Assess range of motion  - Assess patient's mobility; develop plan if impaired  - Assess patient's need for assistive devices and provide as appropriate  - Encourage maximum independence but intervene and supervise when necessary  - Involve family in performance of ADLs  - Assess for home care needs following discharge   - Consider OT consult to assist with ADL evaluation and planning for discharge  - Provide patient education as appropriate  Outcome: Progressing  Goal: Maintains/Returns to pre admission functional level  Description: INTERVENTIONS:  - Perform BMAT or MOVE assessment daily    - Set and communicate daily mobility goal to care team and patient/family/caregiver  - Collaborate with rehabilitation services on mobility goals if consulted  - Perform Range of Motion 3 times a day  - Reposition patient every 2 hours    - Dangle patient 3 times a day  - Stand patient 3 times a day  - Ambulate patient 3 times a day  - Out of bed to chair 3 times a day   - Out of bed for meals 3 times a day  - Out of bed for toileting  - Record patient progress and toleration of activity level   Outcome: Progressing     Problem: Potential for Falls  Goal: Patient will remain free of falls  Description: INTERVENTIONS:  - Educate patient/family on patient safety including physical limitations  - Instruct patient to call for assistance with activity   - Consult OT/PT to assist with strengthening/mobility   - Keep Call bell within reach  - Keep bed low and locked with side rails adjusted as appropriate  - Keep care items and personal belongings within reach  - Initiate and maintain comfort rounds    - Apply yellow socks and bracelet for high fall risk patients  - Consider moving patient to room near nurses station  Outcome: Progressing     Problem: Prexisting or High Potential for Compromised Skin Integrity  Goal: Skin integrity is maintained or improved  Description: INTERVENTIONS:  - Identify patients at risk for skin breakdown  - Assess and monitor skin integrity  - Assess and monitor nutrition and hydration status  - Monitor labs   - Assess for incontinence   - Turn and reposition patient  - Assist with mobility/ambulation  - Relieve pressure over bony prominences  - Avoid friction and shearing  - Provide appropriate hygiene as needed including keeping skin clean and dry  - Evaluate need for skin moisturizer/barrier cream  - Collaborate with interdisciplinary team   - Patient/family teaching  - Consider wound care consult   Outcome: Progressing     Problem: PAIN - ADULT  Goal: Verbalizes/displays adequate comfort level or baseline comfort level  Description: Interventions:  - Encourage patient to monitor pain and request assistance  - Assess pain using appropriate pain scale  - Administer analgesics based on type and severity of pain and evaluate response  - Implement non-pharmacological measures as appropriate and evaluate response  - Consider cultural and social influences on pain and pain management  - Notify physician/advanced practitioner if interventions unsuccessful or patient reports new pain  Outcome: Progressing     Problem: INFECTION - ADULT  Goal: Absence or prevention of progression during hospitalization  Description: INTERVENTIONS:  - Assess and monitor for signs and symptoms of infection  - Monitor lab/diagnostic results  - Monitor all insertion sites, i e  indwelling lines, tubes, and drains  - Monitor endotracheal if appropriate and nasal secretions for changes in amount and color  - [place] appropriate cooling/warming therapies per order  - Administer medications as ordered  - Instruct and encourage patient and family to use good hand hygiene technique  - Identify and instruct in appropriate isolation precautions for identified infection/condition  Outcome: Progressing     Problem: SAFETY ADULT  Goal: Maintain or return to baseline ADL function  Description: INTERVENTIONS:  -  Assess patient's ability to carry out ADLs; assess patient's baseline for ADL function and identify physical deficits which impact ability to perform ADLs (bathing, care of mouth/teeth, toileting, grooming, dressing, etc )  - Assess/evaluate cause of self-care deficits   - Assess range of motion  - Assess patient's mobility; develop plan if impaired  - Assess patient's need for assistive devices and provide as appropriate  - Encourage maximum independence but intervene and supervise when necessary  - Involve family in performance of ADLs  - Assess for home care needs following discharge   - Consider OT consult to assist with ADL evaluation and planning for discharge  - Provide patient education as appropriate  Outcome: Progressing  Goal: Maintains/Returns to pre admission functional level  Description: INTERVENTIONS:  - Perform BMAT or MOVE assessment daily    - Set and communicate daily mobility goal to care team and patient/family/caregiver  - Collaborate with rehabilitation services on mobility goals if consulted  - Perform Range of Motion 3 times a day  - Reposition patient every 2 hours    - Dangle patient 3 times a day  - Stand patient 3 times a day  - Ambulate patient 3 times a day  - Out of bed to chair 3 times a day   - Out of bed for meals 3 times a day  - Out of bed for toileting  - Record patient progress and toleration of activity level   Outcome: Progressing  Goal: Patient will remain free of falls  Description: INTERVENTIONS:  - Educate patient/family on patient safety including physical limitations  - Instruct patient to call for assistance with activity   - Consult OT/PT to assist with strengthening/mobility   - Keep Call bell within reach  - Keep bed low and locked with side rails adjusted as appropriate  - Keep care items and personal belongings within reach  - Initiate and maintain comfort rounds    - Apply yellow socks and bracelet for high fall risk patients  - Consider moving patient to room near nurses station  Outcome: Progressing     Problem: DISCHARGE PLANNING  Goal: Discharge to home or other facility with appropriate resources  Description: INTERVENTIONS:  - Identify barriers to discharge w/patient and caregiver  - Arrange for needed discharge resources and transportation as appropriate  - Identify discharge learning needs (meds, wound care, etc )  - Arrange for interpretive services to assist at discharge as needed  - Refer to Case Management Department for coordinating discharge planning if the patient needs post-hospital services based on physician/advanced practitioner order or complex needs related to functional status, cognitive ability, or social support system  Outcome: Progressing     Problem: Knowledge Deficit  Goal: Patient/family/caregiver demonstrates understanding of disease process, treatment plan, medications, and discharge instructions  Description: Complete learning assessment and assess knowledge base    Interventions:  - Provide teaching at level of understanding  - Provide teaching via preferred learning methods  Outcome: Progressing

## 2022-09-28 NOTE — ASSESSMENT & PLAN NOTE
· Rate controlled  Continue with metoprolol 100 mg b i d   ·   INR 2 79  ·  Continue Coumadin 6 mg daily  · Follow-up INR in a m

## 2022-09-28 NOTE — PROGRESS NOTES
[address]  Progress Note Catarinowendy Richmond 1955, 69 y o  female MRN: [id number]  Unit/Bed#: -01 Encounter: [id number]  Primary Care Provider: Ed Lemus MD   Date and time admitted to hospital: 9/21/2022  1:02 PM    * Acute on chronic diastolic congestive heart failure [facility])  Assessment & Plan  Wt Readings from Last 3 Encounters:   09/28/22 (!) 171 kg (375 lb 14 2 oz)   04/13/20 (!) 162 kg (356 lb 0 7 oz)   Patient's estimated dry weight is around 356 lb  Presented with weight gain of 406 lb  Maintained on IV diuretics  Subsequently transition to torsemide 40 mg b i d  Continue to follow daily weights, I's and nose closely  Continue to follow renal functions closely  Echocardiogram shows: The left ventricular ejection fraction is 55-59%  Systolic function is normal  Wall motion is grossly normal    There is both systolic and diastolic flattening of the interventricular septum consistent with right ventricle pressure and volume overload: Right ventricular cavity size is severely dilated  Systolic function is mildly to moderately reduced  Patient was switched to oral diuretics on 09/26  Continue to monitor eyes and nose and daily weights closely  · TSH is normal  · If renal functions are stable on current diuretic regimen, likely can be discharged in the next 24-48 hours  Hypothyroidism  Assessment & Plan  · Continue Synthroid TSH normal    Morbid obesity ([address] )  Assessment & Plan  · Nutrition for weight loss counseling  Will get a PT OT to evaluate ambulatory status    IMTIAZ (acute kidney injury) ([facility] [address] )  Assessment & Plan  · Creatinine baseline is 0 9 suspect secondary to cardiorenal   Improved with diuresis   · Mild creatinine elevation up to 1 39 noted  Continue with current dose of torsemide     Creatinine has improved today    Supratherapeutic INR  Assessment & Plan  · Resolved  · Resume outpatient dose of Coumadin    Chronic respiratory failure with hypoxia ([address] )  Assessment & Plan  · Chronically on CPAP at night and 4 L of oxygen during the day secondary to COPD most likely NISHI  · Use home CPAP last night    Anemia  Assessment & Plan  · Chronic anemia hemoglobin is stable continue iron    Diabetes mellitus [facility])  Assessment & Plan  Lab Results   Component Value Date    HGBA1C 8 0 (H) 04/13/2020       Recent Labs     09/27/22  1557 09/27/22  2148 09/28/22  0705 09/28/22  1107   POCGLU 152* 173* 119 171*       Blood Sugar Average: Last 72 hrs:  · (P) 152 [id number] sugars controlled continue Levemir to 20 units at night and sliding scale insulin coverage    COPD (chronic obstructive pulmonary disease) ([facility])  Assessment & Plan  · Not in exacerbation continue Breo    Atrial fibrillation ([facility])  Assessment & Plan  · Rate controlled  Continue with metoprolol 100 mg b i d   ·   INR 2 79  ·  Continue Coumadin 6 mg daily  · Follow-up INR in a m  VTE Pharmacologic Prophylaxis: VTE Score: 4 High Risk (Score >/= 5) - Pharmacological DVT Prophylaxis Ordered: warfarin (Coumadin)  Sequential Compression Devices Ordered  Patient Centered Rounds: I performed bedside rounds with nursing staff today  Discussions with Specialists or Other Care Team Provider:  Discussed with case management    Education and Discussions with Family / Patient: Patient declined call to   Time Spent for Care: 30 minutes  More than 50% of total time spent on counseling and coordination of care as described above  Current Length of Stay: 7 day(s)  Current Patient Status: Inpatient   Certification Statement: The patient will continue to require additional inpatient hospital stay due to Await short-term rehab  Discharge Plan: Anticipate discharge in 24-48 hrs to rehab facility  Code Status: Level 1 - Full Code    Subjective:   Patient seen and examined at bedside  Denies any worsening shortness of breath or leg discomfort  Denies any chest pain    No acute events overnight  Objective:     Vitals:   Temp (24hrs), Av 8 °F (36 6 °C), Min:97 7 °F (36 5 °C), Max:97 9 °F (36 6 °C)    Temp:  [97 7 °F (36 5 °C)-97 9 °F (36 6 °C)] 97 7 °F (36 5 °C)  HR:  [66-78] 78  Resp:  [16-21] 21  BP: (101-122)/(64-78) 117/67  SpO2:  [93 %-97 %] 95 %  Body mass index is 68 75 kg/m²  Input and Output Summary (last 24 hours): Intake/Output Summary (Last 24 hours) at 2022 1455  Last data filed at 2022 1419  Gross per 24 hour   Intake 480 ml   Output 750 ml   Net -270 ml       Physical Exam:   Physical Exam  Constitutional:       General: She is not in acute distress  Appearance: She is obese  She is ill-appearing  HENT:      Head: Normocephalic  Mouth/Throat:      Mouth: Mucous membranes are moist    Eyes:      Pupils: Pupils are equal, round, and reactive to light  Cardiovascular:      Rate and Rhythm: Normal rate  Rhythm irregular  Pulmonary:      Effort: Pulmonary effort is normal       Comments: Diminished breath sounds at bases  Abdominal:      General: Bowel sounds are normal  There is distension  Palpations: Abdomen is soft  Musculoskeletal:      Cervical back: Neck supple  Comments: Chronic bilateral lower extremity edema   Skin:     General: Skin is warm  Neurological:      General: No focal deficit present  Mental Status: She is alert and oriented to person, place, and time  Mental status is at baseline     Psychiatric:         Mood and Affect: Mood normal            Additional Data:     Labs:  Results from last 7 days   Lab Units 22  0528   WBC Thousand/uL 7 84   HEMOGLOBIN g/dL 10 6*   HEMATOCRIT % 34 4*   PLATELETS Thousands/uL 232   NEUTROS PCT % 64   LYMPHS PCT % 16   MONOS PCT % 13*   EOS PCT % 5     Results from last 7 days   Lab Units 22  0710 22  0459 22  0518   SODIUM mmol/L 141   < > 141   POTASSIUM mmol/L 3 7   < > 4 2   CHLORIDE mmol/L 97   < > 97   CO2 mmol/L 42*   < > 40*   BUN mg/dL 40*   < > 38*   CREATININE mg/dL 1 20   < > 1 20   ANION GAP mmol/L 2*   < > 4   CALCIUM mg/dL 10 0   < > 9 6   ALBUMIN g/dL  --   --  2 7*   TOTAL BILIRUBIN mg/dL  --   --  1 05*   ALK PHOS U/L  --   --  128*   ALT U/L  --   --  13   AST U/L  --   --  23   GLUCOSE RANDOM mg/dL 132   < > 123    < > = values in this interval not displayed  Results from last 7 days   Lab Units 09/27/22  0459   INR  2 79*     Results from last 7 days   Lab Units 09/28/22  1107 09/28/22  0705 09/27/22  2148 09/27/22  1557 09/27/22  1104 09/27/22  0707 09/26/22  2108 09/26/22  1643 09/26/22  1057 09/26/22  0736 09/25/22  2137 09/25/22  1604   POC GLUCOSE mg/dl 171* 119 173* 152* 201* 146* 160* 129 202* 110 135 126               Lines/Drains:  Invasive Devices  Report    Peripheral Intravenous Line  Duration           Peripheral IV 09/28/22 Dorsal (posterior); Right Wrist <1 day          Drain  Duration           External Urinary Catheter 5 days                      Imaging: No pertinent imaging reviewed      Recent Cultures (last 7 days):         Last 24 Hours Medication List:   Current Facility-Administered Medications   Medication Dose Route Frequency Provider Last Rate    acetaminophen  650 mg Oral Q6H PRN Marcelino Treviño MD      allopurinol  100 mg Oral Daily Marcelino Treviño MD      ammonium lactate   Topical BID PRN Marcelino Treviño MD      atorvastatin  10 mg Oral Daily Marcelino Treviño MD      cholecalciferol  2,000 Units Oral Daily Marcelino Treviño MD      ferrous sulfate  325 mg Oral Daily With Breakfast Marcelino Treviño MD      fluticasone-vilanterol  1 puff Inhalation Daily Marcelino Treviño MD      insulin detemir  20 Units Subcutaneous HS Marcelino Treviño MD      insulin lispro  2-12 Units Subcutaneous HS CAMPBELL Masters      insulin lispro  4-20 Units Subcutaneous TID [facility]      levothyroxine  200 mcg Oral Daily Marcelino Treviño MD      levothyroxine  88 mcg Oral Early Morning MD Siva Reyes loratadine  10 mg Oral Daily Lencho Brambila MD      metoprolol succinate  100 mg Oral BID Lencho Brambila MD      midodrine  5 mg Oral TID [facility] Lencho Brambila MD      montelukast  10 mg Oral HS Lencho Brambila MD      nystatin   Topical BID Lencho Brambila MD      pantoprazole  40 mg Oral Early Morning Lencho Brambila MD      potassium chloride  20 mEq Oral Daily Lencho Brambila MD      torsemide  40 mg Oral Daily Demond Joshi MD      traMADol  50 mg Oral Q6H PRN Lencho Brambila MD      warfarin  6 mg Oral Daily (warfarin) Lencho Brambila MD          Today, Patient Was Seen By: Demond Joshi    **Please Note: This note may have been constructed using a voice recognition system  **

## 2022-09-28 NOTE — ASSESSMENT & PLAN NOTE
· Creatinine baseline is 0 9 suspect secondary to cardiorenal   Improved with diuresis   · Mild creatinine elevation up to 1 39 noted  Continue with current dose of torsemide     Creatinine has improved today

## 2022-09-28 NOTE — PLAN OF CARE
Problem: MOBILITY - ADULT  Goal: Maintain or return to baseline ADL function  Description: INTERVENTIONS:  -  Assess patient's ability to carry out ADLs; assess patient's baseline for ADL function and identify physical deficits which impact ability to perform ADLs (bathing, care of mouth/teeth, toileting, grooming, dressing, etc )  - Assess/evaluate cause of self-care deficits   - Assess range of motion  - Assess patient's mobility; develop plan if impaired  - Assess patient's need for assistive devices and provide as appropriate  - Encourage maximum independence but intervene and supervise when necessary  - Involve family in performance of ADLs  - Assess for home care needs following discharge   - Consider OT consult to assist with ADL evaluation and planning for discharge  - Provide patient education as appropriate  Outcome: Progressing  Goal: Maintains/Returns to pre admission functional level  Description: INTERVENTIONS:  - Perform BMAT or MOVE assessment daily    - Set and communicate daily mobility goal to care team and patient/family/caregiver  - Collaborate with rehabilitation services on mobility goals if consulted  - Perform Range of Motion 3 times a day  - Reposition patient every 2 hours    - Dangle patient 3 times a day  - Stand patient 3 times a day  - Ambulate patient 3 times a day  - Out of bed to chair 3 times a day   - Out of bed for meals 3 times a day  - Out of bed for toileting  - Record patient progress and toleration of activity level   Outcome: Progressing     Problem: Potential for Falls  Goal: Patient will remain free of falls  Description: INTERVENTIONS:  - Educate patient/family on patient safety including physical limitations  - Instruct patient to call for assistance with activity   - Consult OT/PT to assist with strengthening/mobility   - Keep Call bell within reach  - Keep bed low and locked with side rails adjusted as appropriate  - Keep care items and personal belongings within reach  - Initiate and maintain comfort rounds  - Make Fall Risk Sign visible to staff    - Apply yellow socks and bracelet for high fall risk patients  - Consider moving patient to room near nurses station  Outcome: Progressing     Problem: Prexisting or High Potential for Compromised Skin Integrity  Goal: Skin integrity is maintained or improved  Description: INTERVENTIONS:  - Identify patients at risk for skin breakdown  - Assess and monitor skin integrity  - Assess and monitor nutrition and hydration status  - Monitor labs   - Assess for incontinence   - Turn and reposition patient  - Assist with mobility/ambulation  - Relieve pressure over bony prominences  - Avoid friction and shearing  - Provide appropriate hygiene as needed including keeping skin clean and dry  - Evaluate need for skin moisturizer/barrier cream  - Collaborate with interdisciplinary team   - Patient/family teaching  - Consider wound care consult   Outcome: Progressing     Problem: PAIN - ADULT  Goal: Verbalizes/displays adequate comfort level or baseline comfort level  Description: Interventions:  - Encourage patient to monitor pain and request assistance  - Assess pain using appropriate pain scale  - Administer analgesics based on type and severity of pain and evaluate response  - Implement non-pharmacological measures as appropriate and evaluate response  - Consider cultural and social influences on pain and pain management  - Notify physician/advanced practitioner if interventions unsuccessful or patient reports new pain  Outcome: Progressing     Problem: INFECTION - ADULT  Goal: Absence or prevention of progression during hospitalization  Description: INTERVENTIONS:  - Assess and monitor for signs and symptoms of infection  - Monitor lab/diagnostic results  - Monitor all insertion sites, i e  indwelling lines, tubes, and drains  - Monitor endotracheal if appropriate and nasal secretions for changes in amount and color  - [place] appropriate cooling/warming therapies per order  - Administer medications as ordered  - Instruct and encourage patient and family to use good hand hygiene technique  - Identify and instruct in appropriate isolation precautions for identified infection/condition  Outcome: Progressing     Problem: SAFETY ADULT  Goal: Maintain or return to baseline ADL function  Description: INTERVENTIONS:  -  Assess patient's ability to carry out ADLs; assess patient's baseline for ADL function and identify physical deficits which impact ability to perform ADLs (bathing, care of mouth/teeth, toileting, grooming, dressing, etc )  - Assess/evaluate cause of self-care deficits   - Assess range of motion  - Assess patient's mobility; develop plan if impaired  - Assess patient's need for assistive devices and provide as appropriate  - Encourage maximum independence but intervene and supervise when necessary  - Involve family in performance of ADLs  - Assess for home care needs following discharge   - Consider OT consult to assist with ADL evaluation and planning for discharge  - Provide patient education as appropriate  Outcome: Progressing  Goal: Maintains/Returns to pre admission functional level  Description: INTERVENTIONS:  - Perform BMAT or MOVE assessment daily    - Set and communicate daily mobility goal to care team and patient/family/caregiver  - Collaborate with rehabilitation services on mobility goals if consulted  - Perform Range of Motion 3 times a day  - Reposition patient every 2 hours    - Dangle patient 3 times a day  - Stand patient 3 times a day  - Ambulate patient 3 times a day  - Out of bed to chair 3 times a day   - Out of bed for meals 3 times a day  - Out of bed for toileting  - Record patient progress and toleration of activity level   Outcome: Progressing  Goal: Patient will remain free of falls  Description: INTERVENTIONS:  - Educate patient/family on patient safety including physical limitations  - Instruct patient to call for assistance with activity   - Consult OT/PT to assist with strengthening/mobility   - Keep Call bell within reach  - Keep bed low and locked with side rails adjusted as appropriate  - Keep care items and personal belongings within reach  - Initiate and maintain comfort rounds  - Make Fall Risk Sign visible to staff    - Apply yellow socks and bracelet for high fall risk patients  - Consider moving patient to room near nurses station  Outcome: Progressing     Problem: DISCHARGE PLANNING  Goal: Discharge to home or other facility with appropriate resources  Description: INTERVENTIONS:  - Identify barriers to discharge w/patient and caregiver  - Arrange for needed discharge resources and transportation as appropriate  - Identify discharge learning needs (meds, wound care, etc )  - Arrange for interpretive services to assist at discharge as needed  - Refer to Case Management Department for coordinating discharge planning if the patient needs post-hospital services based on physician/advanced practitioner order or complex needs related to functional status, cognitive ability, or social support system  Outcome: Progressing     Problem: Knowledge Deficit  Goal: Patient/family/caregiver demonstrates understanding of disease process, treatment plan, medications, and discharge instructions  Description: Complete learning assessment and assess knowledge base    Interventions:  - Provide teaching at level of understanding  - Provide teaching via preferred learning methods  Outcome: Progressing

## 2022-09-28 NOTE — WOUND OSTOMY CARE
Progress Note - Wound   Becky Love 69 y o  female MRN: [id number]  Unit/Bed#: -01 Encounter: [id number]    History and Present Illness:  69year old female, admitted 9/21 with Acute on chronic diastolic congestive heart failure  Patient is seen by GUERDA KEY sent to ED for eval  PMH afib,presented with worsening SOB  Had been able to transfer to [facility] at home  BMI 74 11, Alert oriented, using Purwik for bladder management due to urgency  Seen today for follow up wound assessment  Alert and orient x4 , agreeable to assessment  Patient requires assist of 3 to reposition on side        Assessment:   1)Left lateral heel pink blanchable, allevyn heel foam as preventative measure  Right heel dry intact  2)Abdominal skin folds dry intact  3)RIght posterior knee MASD partial thickness linear open wound decreasing in size,  4)Upper and mid gluteal fold open linear slit open on admission have resolved  5)Bilateral buttocks are light purple, areas are a mix of blanchable and slow to primitivo  No open areas noted  Wound Image   Wound Description   Pressure Injury Stage   Alyssa-wound Assessment   Wound Length (cm)   Wound Width (cm)   Wound Depth (cm)   Wound Surface Area (cm^2)   Wound Volume (cm^3)   Calculated Wound Volume (cm^3)   Change in Wound Size %   Drainage Amount   Treatments   Dressing   Dressing Changed   Patient Tolerance   Dressing Status   Wound Image   Wound Description   Alyssa-wound Assessment   Wound Length (cm)   Wound Width (cm)   Wound Depth (cm)   Wound Surface Area (cm^2)   Wound Volume (cm^3)   Calculated Wound Volume (cm^3)   Drainage Amount   Drainage Description   Non-staged Wound Description   Treatments   Dressing   Dressing Changed   Patient Tolerance   Dressing Status     Skin care plans:Wound care will follow weekly   1-Cleanse sacro-buttocks deep between buttocks and deep upper gluteal cleft with soap and water   Apply TRIAD paste to BID and prn    2-Hydraguard to bilateral heel BID and PRN  3-Elevate heels to offload pressure  4-Ehob cushion when out of bed  5-Turn/repoisiton q2h or when medically stable for pressure re-distribution on skin  6-Moisturize skin daily with skin nourishing cream  7-Bariatric alternating air loss mattress  (on)   8-Apply Allevyn foam to left heel lateral foot wound , yunier with a  "T" for treatment and  Change every 3 days and as needed  Check skin beneath foam every shift  9-Cleanse skin folds of both legs and abdomen with soap and water, pat dry  Apply maxorb rope to areas of partial thickness skin loss on posterior right knee   Change daily and prn soil or dislodgment    Call or tigertext with any questions  Wound Care will continue to follow    Tyler CROFTN RN

## 2022-09-28 NOTE — ASSESSMENT & PLAN NOTE
Wt Readings from Last 3 Encounters:   09/28/22 (!) 171 kg (375 lb 14 2 oz)   04/13/20 (!) 162 kg (356 lb 0 7 oz)   Patient's estimated dry weight is around 356 lb  Presented with weight gain of 406 lb  Maintained on IV diuretics  Subsequently transition to torsemide 40 mg b i d  Continue to follow daily weights, I's and nose closely  Continue to follow renal functions closely  Echocardiogram shows: The left ventricular ejection fraction is 55-59%  Systolic function is normal  Wall motion is grossly normal    There is both systolic and diastolic flattening of the interventricular septum consistent with right ventricle pressure and volume overload: Right ventricular cavity size is severely dilated  Systolic function is mildly to moderately reduced  Patient was switched to oral diuretics on 09/26  Continue to monitor eyes and nose and daily weights closely  · TSH is normal  · If renal functions are stable on current diuretic regimen, likely can be discharged in the next 24-48 hours

## 2022-09-28 NOTE — CASE MANAGEMENT
Case Management Progress Note    Patient name Audie Mitchell  Location /-01 MRN [id number]  : 1955 Date 2022       LOS (days): 7  Geometric Mean LOS (GMLOS) (days): 3 00  Days to GMLOS:-4 1        OBJECTIVE:        Current admission status: Inpatient  Preferred Pharmacy:   [address] , [place] [address]  [place] [address]  Phone: [phone] Fax: [phone]    Primary Care Provider: Yumiko Adler MD    Primary Insurance: [facility]  Secondary Insurance:     PROGRESS NOTE:      Patient discussed in huddle stable for discharge pending [facility] [address], [id number] center left message with  Jeancarlos Angela concerning patient acceptance  [facility] awaiting return call,    Candace cunningham and rehab looking at patient also      Patient plan is to return home with spouse after STR       1715  CM checked lucas Peres and candace macedo responded no bed for patient       CM placed additional referrals in lucas AYALA to follow

## 2022-09-28 NOTE — ASSESSMENT & PLAN NOTE
· Chronically on CPAP at night and 4 L of oxygen during the day secondary to COPD most likely NISHI  · Use home CPAP last night

## 2022-09-28 NOTE — PROGRESS NOTES
Attempted to get patient OOB using walker and 4 assist  Patient unable to stand and stated she was unable to put weight on her legs  Patient became very SOB as well and requested to get back into bed

## 2022-09-28 NOTE — NURSING NOTE
Attempted to get patient OOB to the chair  She was able to stand for a few seconds, and then said she could no longer stand due to pain in her knees  Patient was assisted back to bed with the help of 4 people

## 2022-09-28 NOTE — ASSESSMENT & PLAN NOTE
Lab Results   Component Value Date    HGBA1C 8 0 (H) 04/13/2020       Recent Labs     09/27/22  1557 09/27/22  2148 09/28/22  0705 09/28/22  1107   POCGLU 152* 173* 119 171*       Blood Sugar Average: Last 72 hrs:  · (P) 152 [id number] sugars controlled continue Levemir to 20 units at night and sliding scale insulin coverage

## 2022-09-29 ENCOUNTER — APPOINTMENT (OUTPATIENT)
Dept: RADIOLOGY | Facility: HOSPITAL | Age: 67
DRG: 292 | End: 2022-09-29
Payer: COMMERCIAL

## 2022-09-29 LAB
ANION GAP SERPL CALCULATED.3IONS-SCNC: 4 MMOL/L (ref 4–13)
BUN SERPL-MCNC: 42 MG/DL (ref 5–25)
CALCIUM SERPL-MCNC: 9.4 MG/DL (ref 8.3–10.1)
CHLORIDE SERPL-SCNC: 98 MMOL/L (ref 96–108)
CO2 SERPL-SCNC: 38 MMOL/L (ref 21–32)
CREAT SERPL-MCNC: 1.25 MG/DL (ref 0.6–1.3)
GFR SERPL CREATININE-BSD FRML MDRD: 43 ML/MIN/1.73SQ M
GLUCOSE SERPL-MCNC: 144 MG/DL (ref 65–140)
GLUCOSE SERPL-MCNC: 156 MG/DL (ref 65–140)
GLUCOSE SERPL-MCNC: 161 MG/DL (ref 65–140)
GLUCOSE SERPL-MCNC: 172 MG/DL (ref 65–140)
GLUCOSE SERPL-MCNC: 193 MG/DL (ref 65–140)
INR PPP: 2.42 (ref 0.84–1.19)
POTASSIUM SERPL-SCNC: 3.9 MMOL/L (ref 3.5–5.3)
PROTHROMBIN TIME: 26.4 SECONDS (ref 11.6–14.5)
SODIUM SERPL-SCNC: 140 MMOL/L (ref 135–147)

## 2022-09-29 PROCEDURE — 73560 X-RAY EXAM OF KNEE 1 OR 2: CPT

## 2022-09-29 PROCEDURE — 80048 BASIC METABOLIC PNL TOTAL CA: CPT | Performed by: INTERNAL MEDICINE

## 2022-09-29 PROCEDURE — 82948 REAGENT STRIP/BLOOD GLUCOSE: CPT

## 2022-09-29 PROCEDURE — 85610 PROTHROMBIN TIME: CPT | Performed by: INTERNAL MEDICINE

## 2022-09-29 RX ORDER — METOLAZONE 5 MG/1
2.5 TABLET ORAL DAILY
Status: DISCONTINUED | OUTPATIENT
Start: 2022-09-29 | End: 2022-10-01

## 2022-09-29 RX ADMIN — METOPROLOL SUCCINATE 100 MG: 100 TABLET, FILM COATED, EXTENDED RELEASE ORAL at 08:36

## 2022-09-29 RX ADMIN — INSULIN DETEMIR 20 UNITS: 100 INJECTION, SOLUTION SUBCUTANEOUS at 21:42

## 2022-09-29 RX ADMIN — INSULIN LISPRO 4 UNITS: 100 INJECTION, SOLUTION INTRAVENOUS; SUBCUTANEOUS at 12:19

## 2022-09-29 RX ADMIN — POTASSIUM CHLORIDE 20 MEQ: 1500 TABLET, EXTENDED RELEASE ORAL at 08:36

## 2022-09-29 RX ADMIN — NYSTATIN: 100000 POWDER TOPICAL at 17:17

## 2022-09-29 RX ADMIN — INSULIN LISPRO 2 UNITS: 100 INJECTION, SOLUTION INTRAVENOUS; SUBCUTANEOUS at 21:44

## 2022-09-29 RX ADMIN — LEVOTHYROXINE SODIUM 88 MCG: 100 TABLET ORAL at 06:37

## 2022-09-29 RX ADMIN — MIDODRINE HYDROCHLORIDE 5 MG: 5 TABLET ORAL at 06:37

## 2022-09-29 RX ADMIN — MONTELUKAST 10 MG: 10 TABLET, FILM COATED ORAL at 21:42

## 2022-09-29 RX ADMIN — FERROUS SULFATE TAB 325 MG (65 MG ELEMENTAL FE) 325 MG: 325 (65 FE) TAB at 08:36

## 2022-09-29 RX ADMIN — METOLAZONE 2.5 MG: 5 TABLET ORAL at 10:55

## 2022-09-29 RX ADMIN — WARFARIN SODIUM 6 MG: 3 TABLET ORAL at 17:17

## 2022-09-29 RX ADMIN — LORATADINE 10 MG: 10 TABLET ORAL at 08:36

## 2022-09-29 RX ADMIN — PANTOPRAZOLE SODIUM 40 MG: 40 TABLET, DELAYED RELEASE ORAL at 06:38

## 2022-09-29 RX ADMIN — ALLOPURINOL 100 MG: 100 TABLET ORAL at 08:35

## 2022-09-29 RX ADMIN — FLUTICASONE FUROATE AND VILANTEROL TRIFENATATE 1 PUFF: 100; 25 POWDER RESPIRATORY (INHALATION) at 08:37

## 2022-09-29 RX ADMIN — TORSEMIDE 40 MG: 20 TABLET ORAL at 17:17

## 2022-09-29 RX ADMIN — NYSTATIN: 100000 POWDER TOPICAL at 08:37

## 2022-09-29 RX ADMIN — METOPROLOL SUCCINATE 100 MG: 100 TABLET, FILM COATED, EXTENDED RELEASE ORAL at 17:17

## 2022-09-29 RX ADMIN — INSULIN LISPRO 4 UNITS: 100 INJECTION, SOLUTION INTRAVENOUS; SUBCUTANEOUS at 17:17

## 2022-09-29 RX ADMIN — Medication 2000 UNITS: at 08:35

## 2022-09-29 RX ADMIN — LEVOTHYROXINE SODIUM 200 MCG: 100 TABLET ORAL at 06:38

## 2022-09-29 RX ADMIN — ATORVASTATIN CALCIUM 10 MG: 10 TABLET, FILM COATED ORAL at 08:36

## 2022-09-29 RX ADMIN — ACETAMINOPHEN 650 MG: 325 TABLET ORAL at 17:17

## 2022-09-29 RX ADMIN — TORSEMIDE 40 MG: 20 TABLET ORAL at 08:36

## 2022-09-29 NOTE — ASSESSMENT & PLAN NOTE
· Rate controlled    Continue with metoprolol 100 mg b i d   ·   INR 2 79  ·  Continue Coumadin 6 mg daily  · Follow-up INR daily  · INR 2 4 to today

## 2022-09-29 NOTE — PROGRESS NOTES
[address]  Progress Note Petey Pearson 1955, 69 y o  female MRN: [id number]  Unit/Bed#: -01 Encounter: [id number]  Primary Care Provider: Edwina Banks MD   Date and time admitted to hospital: 9/21/2022  1:02 PM    * Acute on chronic diastolic congestive heart failure [facility])  Assessment & Plan  Wt Readings from Last 3 Encounters:   09/29/22 (!) 170 kg (374 lb 9 oz)   04/13/20 (!) 162 kg (356 lb 0 7 oz)   Patient's estimated dry weight is around 356 lb  Presented with weight gain of 406 lb  Maintained on IV diuretics  Subsequently transition to torsemide 40 mg b i d  Continue to follow daily weights, I's and nose closely  Continue to follow renal functions closely  Echocardiogram shows: The left ventricular ejection fraction is 55-59%  Systolic function is normal  Wall motion is grossly normal    There is both systolic and diastolic flattening of the interventricular septum consistent with right ventricle pressure and volume overload: Right ventricular cavity size is severely dilated  Systolic function is mildly to moderately reduced  Patient was switched to oral diuretics on 09/26  Continue to monitor intake output and daily weight  Currently on torsemide 40 mg b i d     Will add Zaroxolyn 2 5 mg daily and assist diuretic response  Follow-up daily BMP  T    Hypothyroidism  Assessment & Plan  · Continue Synthroid TSH normal    Morbid obesity ([facility] )  Assessment & Plan  · Nutrition for weight loss counseling  Will get a PT OT to evaluate ambulatory status    IMTIAZ (acute kidney injury) ([facility] )  Assessment & Plan  · Creatinine baseline is 0 9 suspect secondary to cardiorenal   Improved with diuresis   · Mild creatinine elevation up to 1 39 noted  Continue with current dose of torsemide     Creatinine has improved today    Supratherapeutic INR  Assessment & Plan  · Resolved  · Resume outpatient dose of Coumadin    Chronic respiratory failure with hypoxia [facility])  Assessment & Plan  · Chronically on CPAP at night and 4 L of oxygen during the day secondary to COPD most likely NISHI  · Use home CPAP last night    Anemia  Assessment & Plan  · Chronic anemia hemoglobin is stable continue iron    Diabetes mellitus [facility])  Assessment & Plan  Lab Results   Component Value Date    HGBA1C 8 0 (H) 04/13/2020       Recent Labs     09/28/22  1603 09/28/22  2033 09/29/22  0800 09/29/22  1147   POCGLU 131 175* 144* 156*       Blood Sugar Average: Last 72 hrs:  · (P) 154 [id number] sugars controlled continue Levemir to 20 units at night and sliding scale insulin coverage    COPD (chronic obstructive pulmonary disease) ([facility])  Assessment & Plan  · Not in exacerbation continue Breo    Atrial fibrillation ([facility])  Assessment & Plan  · Rate controlled  Continue with metoprolol 100 mg b i d   ·   INR 2 79  ·  Continue Coumadin 6 mg daily  · Follow-up INR daily  · INR 2 4 to today        VTE Pharmacologic Prophylaxis: VTE Score: 4 High Risk (Score >/= 5) - Pharmacological DVT Prophylaxis Ordered: warfarin (Coumadin)  Sequential Compression Devices Ordered  Patient Centered Rounds: I performed bedside rounds with nursing staff today  Discussions with Specialists or Other Care Team Provider:  Discussed with case management    Education and Discussions with Family / Patient: Updated  () at bedside  Time Spent for Care: 30 minutes  More than 50% of total time spent on counseling and coordination of care as described above  Current Length of Stay: 8 day(s)  Current Patient Status: Inpatient   Certification Statement: The patient will continue to require additional inpatient hospital stay due to Await short-term rehab placement  Discharge Plan: Anticipate discharge in 24-48 hrs to rehab facility  Code Status: Level 3 - DNAR and DNI    Subjective:   Patient complains of bilateral knee pain  Denies any worsening shortness of Breath    No acute events overnight  Objective:     Vitals:   Temp (24hrs), Av °F (36 7 °C), Min:97 9 °F (36 6 °C), Max:98 1 °F (36 7 °C)    Temp:  [97 9 °F (36 6 °C)-98 1 °F (36 7 °C)] 97 9 °F (36 6 °C)  HR:  [68-83] 78  Resp:  [18-19] 19  BP: ()/(56-68) 111/64  SpO2:  [94 %-98 %] 95 %  Body mass index is 68 51 kg/m²  Input and Output Summary (last 24 hours): Intake/Output Summary (Last 24 hours) at 2022 1503  Last data filed at 2022 1437  Gross per 24 hour   Intake 360 ml   Output 1600 ml   Net -1240 ml       Physical Exam:   Physical Exam  Constitutional:       Appearance: She is obese  She is ill-appearing  HENT:      Head: Normocephalic  Nose: Nose normal       Mouth/Throat:      Mouth: Mucous membranes are moist    Eyes:      Extraocular Movements: Extraocular movements intact  Pupils: Pupils are equal, round, and reactive to light  Cardiovascular:      Rate and Rhythm: Normal rate  Rhythm irregular  Pulses: Normal pulses  Pulmonary:      Breath sounds: Normal breath sounds  Abdominal:      General: Bowel sounds are normal  There is distension  Palpations: Abdomen is soft  Musculoskeletal:         General: Tenderness present  Right lower leg: Edema present  Left lower leg: Edema present  Comments: Bilateral knee tenderness   Skin:     General: Skin is warm  Neurological:      General: No focal deficit present  Mental Status: She is alert     Psychiatric:         Mood and Affect: Mood normal          Additional Data:     Labs:  Results from last 7 days   Lab Units 22  0528   WBC Thousand/uL 7 84   HEMOGLOBIN g/dL 10 6*   HEMATOCRIT % 34 4*   PLATELETS Thousands/uL 232   NEUTROS PCT % 64   LYMPHS PCT % 16   MONOS PCT % 13*   EOS PCT % 5     Results from last 7 days   Lab Units 22  0525 22  0459 22  0518   SODIUM mmol/L 140   < > 141   POTASSIUM mmol/L 3 9   < > 4 2   CHLORIDE mmol/L 98   < > 97   CO2 mmol/L 38*   < > 40*   BUN mg/dL 42*   < > 38*   CREATININE mg/dL 1 25   < > 1 20   ANION GAP mmol/L 4   < > 4   CALCIUM mg/dL 9 4   < > 9 6   ALBUMIN g/dL  --   --  2 7*   TOTAL BILIRUBIN mg/dL  --   --  1 05*   ALK PHOS U/L  --   --  128*   ALT U/L  --   --  13   AST U/L  --   --  23   GLUCOSE RANDOM mg/dL 161*   < > 123    < > = values in this interval not displayed  Results from last 7 days   Lab Units 09/29/22  0525   INR  2 42*     Results from last 7 days   Lab Units 09/29/22  1147 09/29/22  0800 09/28/22  2033 09/28/22  1603 09/28/22  1107 09/28/22  0705 09/27/22  2148 09/27/22  1557 09/27/22  1104 09/27/22  0707 09/26/22  2108 09/26/22  1643   POC GLUCOSE mg/dl 156* 144* 175* 131 171* 119 173* 152* 201* 146* 160* 129               Lines/Drains:  Invasive Devices  Report    Peripheral Intravenous Line  Duration           Peripheral IV 09/28/22 Dorsal (posterior); Right Wrist 1 day          Drain  Duration           External Urinary Catheter 6 days                      Imaging: No pertinent imaging reviewed      Recent Cultures (last 7 days):         Last 24 Hours Medication List:   Current Facility-Administered Medications   Medication Dose Route Frequency Provider Last Rate    acetaminophen  650 mg Oral Q6H PRN Spike Antunez MD      allopurinol  100 mg Oral Daily Spike Antunez MD      ammonium lactate   Topical BID PRN Spike Antunez MD      atorvastatin  10 mg Oral Daily Spike Antunez MD      cholecalciferol  2,000 Units Oral Daily Spike Antunez MD      ferrous sulfate  325 mg Oral Daily With Breakfast Spike Antunez MD      fluticasone-vilanterol  1 puff Inhalation Daily Spike Antunez MD      insulin detemir  20 Units Subcutaneous HS Spike Antunez MD      insulin lispro  2-12 Units Subcutaneous HS CAMPBELL Mondragon      insulin lispro  4-20 Units Subcutaneous TID [address] [facility], [place]      levothyroxine  200 mcg Oral Daily Spike Antunez MD      levothyroxine  88 mcg Oral Early Morning Phuong Gonzalez Gail Welch MD      loratadine  10 mg Oral Daily Mathew Brambila MD      metolazone  2 5 mg Oral Daily Jose Antonio Shetty MD      metoprolol succinate  100 mg Oral BID Mathew Brambila MD      midodrine  5 mg Oral TID [facility] Mathew Brambila MD      montelukast  10 mg Oral HS Mathew Brambila MD      nystatin   Topical BID Mathew Brambila MD      pantoprazole  40 mg Oral Early Morning Mathew Brambila MD      potassium chloride  20 mEq Oral Daily Mathew Brambila MD      torsemide  40 mg Oral BID Jose Antonio Shetty MD      traMADol  50 mg Oral Q6H PRN Mathew Brambila MD      warfarin  6 mg Oral Daily (warfarin) Mathew Brambila MD          Today, Patient Was Seen By: Jose Antonio Shetty    **Please Note: This note may have been constructed using a voice recognition system  **

## 2022-09-29 NOTE — PLAN OF CARE
Problem: MOBILITY - ADULT  Goal: Maintain or return to baseline ADL function  Description: INTERVENTIONS:  -  Assess patient's ability to carry out ADLs; assess patient's baseline for ADL function and identify physical deficits which impact ability to perform ADLs (bathing, care of mouth/teeth, toileting, grooming, dressing, etc )  - Assess/evaluate cause of self-care deficits   - Assess range of motion  - Assess patient's mobility; develop plan if impaired  - Assess patient's need for assistive devices and provide as appropriate  - Encourage maximum independence but intervene and supervise when necessary  - Involve family in performance of ADLs  - Assess for home care needs following discharge   - Consider OT consult to assist with ADL evaluation and planning for discharge  - Provide patient education as appropriate  Outcome: Progressing  Goal: Maintains/Returns to pre admission functional level  Description: INTERVENTIONS:  - Perform BMAT or MOVE assessment daily    - Set and communicate daily mobility goal to care team and patient/family/caregiver  - Collaborate with rehabilitation services on mobility goals if consulted  - Perform Range of Motion 3 times a day  - Reposition patient every 2 hours    - Dangle patient 3 times a day  - Stand patient 3 times a day  - Ambulate patient 3 times a day  - Out of bed to chair 3 times a day   - Out of bed for meals 3 times a day  - Out of bed for toileting  - Record patient progress and toleration of activity level   Outcome: Progressing     Problem: Potential for Falls  Goal: Patient will remain free of falls  Description: INTERVENTIONS:  - Educate patient/family on patient safety including physical limitations  - Instruct patient to call for assistance with activity   - Consult OT/PT to assist with strengthening/mobility   - Keep Call bell within reach  - Keep bed low and locked with side rails adjusted as appropriate  - Keep care items and personal belongings within reach  - Initiate and maintain comfort rounds  - Make Fall Risk Sign visible to staff    - Apply yellow socks and bracelet for high fall risk patients  - Consider moving patient to room near nurses station  Outcome: Progressing     Problem: Prexisting or High Potential for Compromised Skin Integrity  Goal: Skin integrity is maintained or improved  Description: INTERVENTIONS:  - Identify patients at risk for skin breakdown  - Assess and monitor skin integrity  - Assess and monitor nutrition and hydration status  - Monitor labs   - Assess for incontinence   - Turn and reposition patient  - Assist with mobility/ambulation  - Relieve pressure over bony prominences  - Avoid friction and shearing  - Provide appropriate hygiene as needed including keeping skin clean and dry  - Evaluate need for skin moisturizer/barrier cream  - Collaborate with interdisciplinary team   - Patient/family teaching  - Consider wound care consult   Outcome: Progressing     Problem: PAIN - ADULT  Goal: Verbalizes/displays adequate comfort level or baseline comfort level  Description: Interventions:  - Encourage patient to monitor pain and request assistance  - Assess pain using appropriate pain scale  - Administer analgesics based on type and severity of pain and evaluate response  - Implement non-pharmacological measures as appropriate and evaluate response  - Consider cultural and social influences on pain and pain management  - Notify physician/advanced practitioner if interventions unsuccessful or patient reports new pain  Outcome: Progressing     Problem: INFECTION - ADULT  Goal: Absence or prevention of progression during hospitalization  Description: INTERVENTIONS:  - Assess and monitor for signs and symptoms of infection  - Monitor lab/diagnostic results  - Monitor all insertion sites, i e  indwelling lines, tubes, and drains  - Monitor endotracheal if appropriate and nasal secretions for changes in amount and color  - [place] appropriate cooling/warming therapies per order  - Administer medications as ordered  - Instruct and encourage patient and family to use good hand hygiene technique  - Identify and instruct in appropriate isolation precautions for identified infection/condition  Outcome: Progressing     Problem: SAFETY ADULT  Goal: Maintain or return to baseline ADL function  Description: INTERVENTIONS:  -  Assess patient's ability to carry out ADLs; assess patient's baseline for ADL function and identify physical deficits which impact ability to perform ADLs (bathing, care of mouth/teeth, toileting, grooming, dressing, etc )  - Assess/evaluate cause of self-care deficits   - Assess range of motion  - Assess patient's mobility; develop plan if impaired  - Assess patient's need for assistive devices and provide as appropriate  - Encourage maximum independence but intervene and supervise when necessary  - Involve family in performance of ADLs  - Assess for home care needs following discharge   - Consider OT consult to assist with ADL evaluation and planning for discharge  - Provide patient education as appropriate  Outcome: Progressing  Goal: Maintains/Returns to pre admission functional level  Description: INTERVENTIONS:  - Perform BMAT or MOVE assessment daily    - Set and communicate daily mobility goal to care team and patient/family/caregiver  - Collaborate with rehabilitation services on mobility goals if consulted  - Perform Range of Motion 3 times a day  - Reposition patient every 2 hours    - Dangle patient 3 times a day  - Stand patient 3 times a day  - Ambulate patient 3 times a day  - Out of bed to chair 3 times a day   - Out of bed for meals 3 times a day  - Out of bed for toileting  - Record patient progress and toleration of activity level   Outcome: Progressing  Goal: Patient will remain free of falls  Description: INTERVENTIONS:  - Educate patient/family on patient safety including physical limitations  - Instruct patient to call for assistance with activity   - Consult OT/PT to assist with strengthening/mobility   - Keep Call bell within reach  - Keep bed low and locked with side rails adjusted as appropriate  - Keep care items and personal belongings within reach  - Initiate and maintain comfort rounds  - Make Fall Risk Sign visible to staff    - Apply yellow socks and bracelet for high fall risk patients  - Consider moving patient to room near nurses station  Outcome: Progressing     Problem: DISCHARGE PLANNING  Goal: Discharge to home or other facility with appropriate resources  Description: INTERVENTIONS:  - Identify barriers to discharge w/patient and caregiver  - Arrange for needed discharge resources and transportation as appropriate  - Identify discharge learning needs (meds, wound care, etc )  - Arrange for interpretive services to assist at discharge as needed  - Refer to Case Management Department for coordinating discharge planning if the patient needs post-hospital services based on physician/advanced practitioner order or complex needs related to functional status, cognitive ability, or social support system  Outcome: Progressing     Problem: Knowledge Deficit  Goal: Patient/family/caregiver demonstrates understanding of disease process, treatment plan, medications, and discharge instructions  Description: Complete learning assessment and assess knowledge base    Interventions:  - Provide teaching at level of understanding  - Provide teaching via preferred learning methods  Outcome: Progressing

## 2022-09-29 NOTE — CASE MANAGEMENT
Case Management Progress Note    Patient name Kishan Dobson  Location /-01 MRN [id number]  : 1955 Date 2022       LOS (days): 8  Geometric Mean LOS (GMLOS) (days): 3 00  Days to GMLOS:-4 9        OBJECTIVE:        Current admission status: Inpatient  Preferred Pharmacy:   [address] , [place] [id number]  [place] [address]  Phone: [phone] Fax: [phone]    Primary Care Provider: Mara Cherry MD    Primary Insurance: [facility]  Secondary Insurance:     PROGRESS NOTE:  CM notified by Nsg that sps had questions re: STR referrals  CM reviewed [facility] referrals, no additional acceptance  CM discussed w/ sps, sps inquired about [facility]  CM did reiterate that [facility] does not accept [facility]  Sps inquired about VA benefit, CM explained that would not extend to the sps  CM did explain to Pt and sps that STRs will still reviewing

## 2022-09-29 NOTE — ASSESSMENT & PLAN NOTE
Lab Results   Component Value Date    HGBA1C 8 0 (H) 04/13/2020       Recent Labs     09/28/22  1603 09/28/22  2033 09/29/22  0800 09/29/22  1147   POCGLU 131 175* 144* 156*       Blood Sugar Average: Last 72 hrs:  · (P) 154 [id number] sugars controlled continue Levemir to 20 units at night and sliding scale insulin coverage

## 2022-09-29 NOTE — RESPIRATORY THERAPY NOTE
RT Protocol Note  Jennifer Carrion 69 y o  female MRN: [id number]  Unit/Bed#: -01 Encounter: [id number]    Assessment    Principal Problem:    Acute on chronic diastolic congestive heart failure (HCC)  Active Problems:    Atrial fibrillation (HCC)    COPD (chronic obstructive pulmonary disease) (HCC)    Diabetes mellitus (HCC)    Anemia    Chronic respiratory failure with hypoxia (HCC)    Supratherapeutic INR    IMTIAZ (acute kidney injury) ([facility] )    Morbid obesity (HCC)    Hypothyroidism      Home Pulmonary Medications:         Past Medical History:   Diagnosis Date    Asthma     Atrial fibrillation (HCC)     COPD (chronic obstructive pulmonary disease) ([facility] )     Diabetes mellitus ([facility] )     Disease of thyroid gland     Heart failure (HCC)     Kidney failure      Social History     Socioeconomic History    Marital status: /Civil Union     Spouse name: None    Number of children: None    Years of education: None    Highest education level: None   Occupational History    None   Tobacco Use    Smoking status: Never Smoker    Smokeless tobacco: Never Used   Vaping Use    Vaping Use: Never used   Substance and Sexual Activity    Alcohol use: Never    Drug use: Never    Sexual activity: Not Currently     Partners: Male     Birth control/protection: Post-menopausal   Other Topics Concern    None   Social History Narrative    None     Social Determinants of Health     Financial Resource Strain: Not on file   Food Insecurity: No Food Insecurity    Worried About Running Out of Food in the Last Year: Never true    Ran Out of Food in the Last Year: Never true   Transportation Needs: No Transportation Needs    Lack of Transportation (Medical): No    Lack of Transportation (Non-Medical):  No   Physical Activity: Not on file   Stress: Not on file   Social Connections: Not on file   Intimate Partner Violence: Not on file   Housing Stability: Low Risk     Unable to Pay for Housing in the Last Year: No    Number of Places Lived in the Last Year: 1    Unstable Housing in the Last Year: No       Subjective         Objective    Physical Exam:   Assessment Type: (P) Assess only  General Appearance: (P) Alert, Awake  Chest Assessment: (P) Chest expansion symmetrical  Bilateral Breath Sounds: (P) Diminished  O2 Device: (P) own unit    Vitals:  Blood pressure 106/63, pulse 68, temperature 97 9 °F (36 6 °C), resp  rate 19, height 5' 2" (1 575 m), weight (!) 171 kg (375 lb 14 2 oz), SpO2 98 %  Imaging and other studies: I have personally reviewed pertinent reports        O2 Device: (P) own unit     Plan             Resp Comments: (P) Placed on home CPAP unit with 4l oxygen

## 2022-09-29 NOTE — ASSESSMENT & PLAN NOTE
Wt Readings from Last 3 Encounters:   09/29/22 (!) 170 kg (374 lb 9 oz)   04/13/20 (!) 162 kg (356 lb 0 7 oz)   Patient's estimated dry weight is around 356 lb  Presented with weight gain of 406 lb  Maintained on IV diuretics  Subsequently transition to torsemide 40 mg b i d  Continue to follow daily weights, I's and nose closely  Continue to follow renal functions closely  Echocardiogram shows: The left ventricular ejection fraction is 55-59%  Systolic function is normal  Wall motion is grossly normal    There is both systolic and diastolic flattening of the interventricular septum consistent with right ventricle pressure and volume overload: Right ventricular cavity size is severely dilated  Systolic function is mildly to moderately reduced  Patient was switched to oral diuretics on 09/26  Continue to monitor intake output and daily weight  Currently on torsemide 40 mg b i d     Will add Zaroxolyn 2 5 mg daily and assist diuretic response    Follow-up daily ELIANA  T

## 2022-09-29 NOTE — PLAN OF CARE
Problem: MOBILITY - ADULT  Goal: Maintain or return to baseline ADL function  Description: INTERVENTIONS:  -  Assess patient's ability to carry out ADLs; assess patient's baseline for ADL function and identify physical deficits which impact ability to perform ADLs (bathing, care of mouth/teeth, toileting, grooming, dressing, etc )  - Assess/evaluate cause of self-care deficits   - Assess range of motion  - Assess patient's mobility; develop plan if impaired  - Assess patient's need for assistive devices and provide as appropriate  - Encourage maximum independence but intervene and supervise when necessary  - Involve family in performance of ADLs  - Assess for home care needs following discharge   - Consider OT consult to assist with ADL evaluation and planning for discharge  - Provide patient education as appropriate  Outcome: Progressing  Goal: Maintains/Returns to pre admission functional level  Description: INTERVENTIONS:  - Perform BMAT or MOVE assessment daily    - Set and communicate daily mobility goal to care team and patient/family/caregiver  - Collaborate with rehabilitation services on mobility goals if consulted  - Perform Range of Motion 3 times a day  - Reposition patient every 2 hours    - Dangle patient 3 times a day  - Stand patient 3 times a day  - Ambulate patient 3 times a day  - Out of bed to chair 3 times a day   - Out of bed for meals 3 times a day  - Out of bed for toileting  - Record patient progress and toleration of activity level   Outcome: Progressing     Problem: Prexisting or High Potential for Compromised Skin Integrity  Goal: Skin integrity is maintained or improved  Description: INTERVENTIONS:  - Identify patients at risk for skin breakdown  - Assess and monitor skin integrity  - Assess and monitor nutrition and hydration status  - Monitor labs   - Assess for incontinence   - Turn and reposition patient  - Assist with mobility/ambulation  - Relieve pressure over bony prominences  - Avoid friction and shearing  - Provide appropriate hygiene as needed including keeping skin clean and dry  - Evaluate need for skin moisturizer/barrier cream  - Collaborate with interdisciplinary team   - Patient/family teaching  - Consider wound care consult   Outcome: Progressing     Problem: PAIN - ADULT  Goal: Verbalizes/displays adequate comfort level or baseline comfort level  Description: Interventions:  - Encourage patient to monitor pain and request assistance  - Assess pain using appropriate pain scale  - Administer analgesics based on type and severity of pain and evaluate response  - Implement non-pharmacological measures as appropriate and evaluate response  - Consider cultural and social influences on pain and pain management  - Notify physician/advanced practitioner if interventions unsuccessful or patient reports new pain  Outcome: Progressing     Problem: INFECTION - ADULT  Goal: Absence or prevention of progression during hospitalization  Description: INTERVENTIONS:  - Assess and monitor for signs and symptoms of infection  - Monitor lab/diagnostic results  - Monitor all insertion sites, i e  indwelling lines, tubes, and drains  - Monitor endotracheal if appropriate and nasal secretions for changes in amount and color  - [place] appropriate cooling/warming therapies per order  - Administer medications as ordered  - Instruct and encourage patient and family to use good hand hygiene technique  - Identify and instruct in appropriate isolation precautions for identified infection/condition  Outcome: Progressing     Problem: SAFETY ADULT  Goal: Maintain or return to baseline ADL function  Description: INTERVENTIONS:  -  Assess patient's ability to carry out ADLs; assess patient's baseline for ADL function and identify physical deficits which impact ability to perform ADLs (bathing, care of mouth/teeth, toileting, grooming, dressing, etc )  - Assess/evaluate cause of self-care deficits - Assess range of motion  - Assess patient's mobility; develop plan if impaired  - Assess patient's need for assistive devices and provide as appropriate  - Encourage maximum independence but intervene and supervise when necessary  - Involve family in performance of ADLs  - Assess for home care needs following discharge   - Consider OT consult to assist with ADL evaluation and planning for discharge  - Provide patient education as appropriate  Outcome: Progressing  Goal: Maintains/Returns to pre admission functional level  Description: INTERVENTIONS:  - Perform BMAT or MOVE assessment daily    - Set and communicate daily mobility goal to care team and patient/family/caregiver  - Collaborate with rehabilitation services on mobility goals if consulted  - Perform Range of Motion 3 times a day  - Reposition patient every 2 hours    - Dangle patient 3 times a day  - Stand patient 3 times a day  - Ambulate patient 3 times a day  - Out of bed to chair 3 times a day   - Out of bed for meals 3 times a day  - Out of bed for toileting  - Record patient progress and toleration of activity level   Outcome: Progressing     Problem: DISCHARGE PLANNING  Goal: Discharge to home or other facility with appropriate resources  Description: INTERVENTIONS:  - Identify barriers to discharge w/patient and caregiver  - Arrange for needed discharge resources and transportation as appropriate  - Identify discharge learning needs (meds, wound care, etc )  - Arrange for interpretive services to assist at discharge as needed  - Refer to Case Management Department for coordinating discharge planning if the patient needs post-hospital services based on physician/advanced practitioner order or complex needs related to functional status, cognitive ability, or social support system  Outcome: Progressing     Problem: Knowledge Deficit  Goal: Patient/family/caregiver demonstrates understanding of disease process, treatment plan, medications, and discharge instructions  Description: Complete learning assessment and assess knowledge base    Interventions:  - Provide teaching at level of understanding  - Provide teaching via preferred learning methods  Outcome: Progressing

## 2022-09-30 LAB
ANION GAP SERPL CALCULATED.3IONS-SCNC: 2 MMOL/L (ref 4–13)
BUN SERPL-MCNC: 41 MG/DL (ref 5–25)
CALCIUM SERPL-MCNC: 9.7 MG/DL (ref 8.3–10.1)
CHLORIDE SERPL-SCNC: 96 MMOL/L (ref 96–108)
CO2 SERPL-SCNC: 39 MMOL/L (ref 21–32)
CREAT SERPL-MCNC: 1.15 MG/DL (ref 0.6–1.3)
GFR SERPL CREATININE-BSD FRML MDRD: 48 ML/MIN/1.73SQ M
GLUCOSE SERPL-MCNC: 129 MG/DL (ref 65–140)
GLUCOSE SERPL-MCNC: 129 MG/DL (ref 65–140)
GLUCOSE SERPL-MCNC: 173 MG/DL (ref 65–140)
GLUCOSE SERPL-MCNC: 176 MG/DL (ref 65–140)
GLUCOSE SERPL-MCNC: 179 MG/DL (ref 65–140)
POTASSIUM SERPL-SCNC: 4.1 MMOL/L (ref 3.5–5.3)
SODIUM SERPL-SCNC: 137 MMOL/L (ref 135–147)

## 2022-09-30 PROCEDURE — 82948 REAGENT STRIP/BLOOD GLUCOSE: CPT

## 2022-09-30 PROCEDURE — 97530 THERAPEUTIC ACTIVITIES: CPT

## 2022-09-30 PROCEDURE — 80048 BASIC METABOLIC PNL TOTAL CA: CPT | Performed by: INTERNAL MEDICINE

## 2022-09-30 PROCEDURE — 99232 SBSQ HOSP IP/OBS MODERATE 35: CPT | Performed by: INTERNAL MEDICINE

## 2022-09-30 PROCEDURE — 97110 THERAPEUTIC EXERCISES: CPT

## 2022-09-30 PROCEDURE — 97535 SELF CARE MNGMENT TRAINING: CPT

## 2022-09-30 RX ADMIN — ACETAMINOPHEN 650 MG: 325 TABLET ORAL at 12:06

## 2022-09-30 RX ADMIN — INSULIN DETEMIR 20 UNITS: 100 INJECTION, SOLUTION SUBCUTANEOUS at 20:58

## 2022-09-30 RX ADMIN — INSULIN LISPRO 2 UNITS: 100 INJECTION, SOLUTION INTRAVENOUS; SUBCUTANEOUS at 21:01

## 2022-09-30 RX ADMIN — FERROUS SULFATE TAB 325 MG (65 MG ELEMENTAL FE) 325 MG: 325 (65 FE) TAB at 09:13

## 2022-09-30 RX ADMIN — METOPROLOL SUCCINATE 100 MG: 100 TABLET, FILM COATED, EXTENDED RELEASE ORAL at 09:13

## 2022-09-30 RX ADMIN — Medication 2000 UNITS: at 09:13

## 2022-09-30 RX ADMIN — TRAMADOL HYDROCHLORIDE 50 MG: 50 TABLET ORAL at 20:58

## 2022-09-30 RX ADMIN — ALLOPURINOL 100 MG: 100 TABLET ORAL at 09:14

## 2022-09-30 RX ADMIN — POTASSIUM CHLORIDE 20 MEQ: 1500 TABLET, EXTENDED RELEASE ORAL at 09:13

## 2022-09-30 RX ADMIN — PANTOPRAZOLE SODIUM 40 MG: 40 TABLET, DELAYED RELEASE ORAL at 06:04

## 2022-09-30 RX ADMIN — MIDODRINE HYDROCHLORIDE 5 MG: 5 TABLET ORAL at 12:06

## 2022-09-30 RX ADMIN — TORSEMIDE 40 MG: 20 TABLET ORAL at 17:09

## 2022-09-30 RX ADMIN — WARFARIN SODIUM 6 MG: 3 TABLET ORAL at 17:09

## 2022-09-30 RX ADMIN — TORSEMIDE 40 MG: 20 TABLET ORAL at 09:13

## 2022-09-30 RX ADMIN — NYSTATIN: 100000 POWDER TOPICAL at 17:09

## 2022-09-30 RX ADMIN — LORATADINE 10 MG: 10 TABLET ORAL at 09:13

## 2022-09-30 RX ADMIN — METOLAZONE 2.5 MG: 5 TABLET ORAL at 09:13

## 2022-09-30 RX ADMIN — MONTELUKAST 10 MG: 10 TABLET, FILM COATED ORAL at 20:58

## 2022-09-30 RX ADMIN — LEVOTHYROXINE SODIUM 200 MCG: 100 TABLET ORAL at 06:04

## 2022-09-30 RX ADMIN — NYSTATIN: 100000 POWDER TOPICAL at 09:14

## 2022-09-30 RX ADMIN — TRAMADOL HYDROCHLORIDE 50 MG: 50 TABLET ORAL at 03:52

## 2022-09-30 RX ADMIN — INSULIN LISPRO 4 UNITS: 100 INJECTION, SOLUTION INTRAVENOUS; SUBCUTANEOUS at 12:07

## 2022-09-30 RX ADMIN — METOPROLOL SUCCINATE 100 MG: 100 TABLET, FILM COATED, EXTENDED RELEASE ORAL at 17:09

## 2022-09-30 RX ADMIN — LEVOTHYROXINE SODIUM 88 MCG: 100 TABLET ORAL at 06:04

## 2022-09-30 RX ADMIN — MIDODRINE HYDROCHLORIDE 5 MG: 5 TABLET ORAL at 06:04

## 2022-09-30 RX ADMIN — ATORVASTATIN CALCIUM 10 MG: 10 TABLET, FILM COATED ORAL at 09:13

## 2022-09-30 RX ADMIN — MIDODRINE HYDROCHLORIDE 5 MG: 5 TABLET ORAL at 17:09

## 2022-09-30 RX ADMIN — FLUTICASONE FUROATE AND VILANTEROL TRIFENATATE 1 PUFF: 100; 25 POWDER RESPIRATORY (INHALATION) at 09:13

## 2022-09-30 NOTE — PLAN OF CARE
Problem: OCCUPATIONAL THERAPY ADULT  Goal: Performs self-care activities at highest level of function for planned discharge setting  See evaluation for individualized goals  Description: Treatment Interventions: ADL retraining, Functional transfer training, Endurance training, UE strengthening/ROM, Cognitive reorientation, Patient/family training, Equipment evaluation/education, Neuromuscular reeducation, Compensatory technique education, Continued evaluation, Energy conservation, Activityengagement          See flowsheet documentation for full assessment, interventions and recommendations  Outcome: Progressing  Note: Limitation: Decreased ADL status, Decreased UE strength, Decreased endurance, Decreased self-care trans, Decreased high-level ADLs  Prognosis: Fair  Assessment: Pt completed OT tx session #2 focused on ADL performance and functional mobility  Pt alert and agreeable to participate  Pt demonstrated improvements in bed mobility and rolling this session but is limited by poor pulmonary tolerance  Pt currently completing UB ADLs @ Max A and LB ADLs @ Total A d/t body habitus  Pt able to complete STS from front of bed @ Max A x2, able to maintain 3/4 stand for approximately 30 seconds but unable to advance to steps or shift weight  Pt demonstrates Good participation and Fair potential to achieve goals  PT/OT co-treat required and  present for session  Pt currently limited by decreased bed mobility, decreased functional mobility, body habitus, B knee pain, decreased ability to care for self  Continue to recommend post acute rehabilitation services at this time to increase safety and independence with ADL tasks and functional mobility       OT Discharge Recommendation: Post acute rehabilitation services

## 2022-09-30 NOTE — UTILIZATION REVIEW
Continued Stay Review    Date: 9/29/22                        Current Patient Class: IP  Current Level of Care: MS    HPI:67 y o  female initially admitted on 9/21/22  Acute on Chronic CHF, Patient was switched to oral diuretics on 09/26  Continue to monitor intake output and daily weight, Currently on torsemide 40 mg b i d     Will add Zaroxolyn 2 5 mg daily and assist diuretic response  Follow-up daily BMP, The patient will continue to require additional inpatient hospital stay due to Await short-term rehab placement,     Assessment/Plan: acute on chronic CHF  currently on torsemide 40 mg bid, add zaroxolyn 2 5 mg  As assist diuretic response,   Trend daily serial labs with repletion as needed, DVT PPX, INR 2 4 today, c/w coumadin 6 mg daily and trend INR daily,  The patient will continue to require additional inpatient hospital stay due to Await short-term rehab placement, obese ill appearing irregular rhythm,  Abdominal distention, lower extremity tenderness with edema, b/l knee tenderness, trend serial labs with repletion as needed, DVT PPX       Intake/Output Summary (Last 24 hours) at 9/29/2022 1503  Last data filed at 9/29/2022 1437      Gross per 24 hour   Intake 360 ml   Output 1600 ml   Net -1240 ml                Vital Signs:   Date/Time Temp Pulse Resp BP MAP (mmHg) SpO2 Calculated FIO2 (%) - Nasal Cannula Nasal Cannula O2 Flow Rate (L/min) O2 Device O2 Interface Device   09/29/22 15:13:54 97 9 °F (36 6 °C) 75 20 111/63 79 95 % -- -- -- --   09/29/22 10:54:57 -- 78 -- 111/64 80 95 % -- -- -- --   09/29/22 0900 -- -- -- -- -- 98 % 36 4 L/min Nasal cannula --   09/29/22 06:57:48 -- 75 19 111/64 80 94 % -- -- -- --   09/28/22 2309 -- -- -- -- -- -- -- -- -- Face mask   09/28/22 22:11:58 97 9 °F (36 6 °C) 68 19 106/63 77 98 % -- -- -- --   09/28/22 2100 -- -- -- -- -- 94 % 36 4 L/min Nasal cannula --   09/28/22 1824 -- 74 -- 94/56 69 95 % -- -- -- --   09/28/22 15:44:17 98 1 °F (36 7 °C) 83 18 117/68 84 94 % -- -- -- --   09/28/22 11:46:41 -- 78 -- 117/67 84 95 % -- -- -- --   09/28/22 0745 -- -- -- -- -- -- -- -- None (Room air) --   09/28/22 07:09:46 97 7 °F (36 5 °C) 74 21 118/78 91 96 % -- -- -- --   09/28/22 0603 -- -- -- 122/78 87 -- -- -- -- --   09/27/22 21:50:10 97 7 °F (36 5 °C) 66 20 102/64 77 93 % -- -- -- --   09/27/22 17:00:43 -- 71 -- 101/64 76 97 % -- -- -- --   09/27/22 1700 -- 76 -- 101/64 -- -- -- -- -- --   09/27/22 15:00:12 97 9 °F (36 6 °C) -- 16 121/69 86 -- -- -- -- --   09/27/22 11:36:25 -- 72 -- 117/69 85 96 % -- -- -- --   09/27/22 0724 -- -- -- -- -- -- 36 4 L/min Nasal cannula --   09/27/22 07:07:40 97 7 °F (36 5 °C) 72 18 109/72 84 97 % -- -- -- --         Pertinent Labs/Diagnostic Results:   9/25/22 CXR: Probable mild pulmonary vascular congestion, similar from prior examination   Air cysts in the right lateral mid chest, unchanged   No pneumothorax or significant pleural effusion         Results from last 7 days   Lab Units 09/26/22  0528   WBC Thousand/uL 7 84   HEMOGLOBIN g/dL 10 6*   HEMATOCRIT % 34 4*   PLATELETS Thousands/uL 232   NEUTROS ABS Thousands/µL 5 00         Results from last 7 days   Lab Units 09/29/22  0525 09/28/22  0710 09/27/22  0459 09/26/22  0518 09/25/22  0447 09/24/22  0446 09/23/22  0600   SODIUM mmol/L 140 141 141 141 140 140 145   POTASSIUM mmol/L 3 9 3 7 4 4 4 2 4 4 4 1 3 6   CHLORIDE mmol/L 98 97 99 97 99 101 102   CO2 mmol/L 38* 42* 44* 40* 42* 38* 38*   ANION GAP mmol/L 4 2* -2* 4 -1* 1* 5   BUN mg/dL 42* 40* 42* 38* 36* 38* 38*   CREATININE mg/dL 1 25 1 20 1 39* 1 20 1 28 1 14 1 27   EGFR ml/min/1 73sq m 43 46 38 46 42 49 43   CALCIUM mg/dL 9 4 10 0 9 8 9 6 9 8 9 6 9 4   MAGNESIUM mg/dL  --  2 5  --  2 2 1 8 1 9 2 0     Results from last 7 days   Lab Units 09/26/22  0518   AST U/L 23   ALT U/L 13   ALK PHOS U/L 128*   TOTAL PROTEIN g/dL 7 3   ALBUMIN g/dL 2 7*   TOTAL BILIRUBIN mg/dL 1 05*     Results from last 7 days   Lab Units 09/29/22  1616 09/29/22  1147 09/29/22  0800 09/28/22  2033 09/28/22  1603 09/28/22  1107 09/28/22  0705 09/27/22  2148 09/27/22  1557 09/27/22  1104 09/27/22  0707 09/26/22  2108   POC GLUCOSE mg/dl 193* 156* 144* 175* 131 171* 119 173* 152* 201* 146* 160*     Results from last 7 days   Lab Units 09/29/22  0525 09/28/22  0710 09/27/22  0459 09/26/22  0518 09/25/22  0447 09/24/22  0446 09/23/22  0600   GLUCOSE RANDOM mg/dL 161* 132 156* 123 198* 154* 118               Results from last 7 days   Lab Units 09/29/22  0525 09/27/22  0459 09/26/22  0518   PROTIME seconds 26 4* 29 4* 28 5*   INR  2 42* 2 79* 2 67*           Medications:   Scheduled Medications:  allopurinol, 100 mg, Oral, Daily  atorvastatin, 10 mg, Oral, Daily  cholecalciferol, 2,000 Units, Oral, Daily  ferrous sulfate, 325 mg, Oral, Daily With Breakfast  fluticasone-vilanterol, 1 puff, Inhalation, Daily  insulin detemir, 20 Units, Subcutaneous, HS  insulin lispro, 2-12 Units, Subcutaneous, HS  insulin lispro, 4-20 Units, Subcutaneous, TID AC  levothyroxine, 200 mcg, Oral, Daily  levothyroxine, 88 mcg, Oral, Early Morning  loratadine, 10 mg, Oral, Daily  metolazone, 2 5 mg, Oral, Daily  metoprolol succinate, 100 mg, Oral, BID  midodrine, 5 mg, Oral, TID AC  montelukast, 10 mg, Oral, HS  nystatin, , Topical, BID  pantoprazole, 40 mg, Oral, Early Morning  potassium chloride, 20 mEq, Oral, Daily  torsemide, 40 mg, Oral, BID  warfarin, 6 mg, Oral, Daily (warfarin)      Continuous IV Infusions:none     PRN Meds:  acetaminophen, 650 mg, Oral, Q6H PRN  ammonium lactate, , Topical, BID PRN  traMADol, 50 mg, Oral, Q6H PRN        Discharge Plan: TBD    Network Utilization Review Department  ATTENTION: Please call with any questions or concerns to [phone] and carefully listen to the prompts so that you are directed to the right person   All voicemails are confidential   Liza Childs all requests for admission clinical reviews, approved or denied determinations and any other requests to dedicated fax number below belonging to the campus where the patient is receiving treatment   List of dedicated fax numbers for the Facilities:  [address] [place] [address] DENIALS (Administrative/Medical Necessity) [phone]   [address] (Maternity/NICU/Pediatrics) [phone]   [address]  [address] [place] [address] [address] [place] [address] [address] [facility] [place] [address] [phone]

## 2022-09-30 NOTE — ASSESSMENT & PLAN NOTE
· Rate controlled    Continue with metoprolol 100 mg b i d   ·   INR 2 42  ·  Continue Coumadin 6 mg daily  · Follow-up INR daily  ·

## 2022-09-30 NOTE — PLAN OF CARE
Problem: Potential for Falls  Goal: Patient will remain free of falls  Description: INTERVENTIONS:  - Educate patient/family on patient safety including physical limitations  - Instruct patient to call for assistance with activity   - Consult OT/PT to assist with strengthening/mobility   - Keep Call bell within reach  - Keep bed low and locked with side rails adjusted as appropriate  - Keep care items and personal belongings within reach  - Initiate and maintain comfort rounds  - Make Fall Risk Sign visible to staff    - Apply yellow socks and bracelet for high fall risk patients  - Consider moving patient to room near nurses station  Outcome: Progressing     Problem: Prexisting or High Potential for Compromised Skin Integrity  Goal: Skin integrity is maintained or improved  Description: INTERVENTIONS:  - Identify patients at risk for skin breakdown  - Assess and monitor skin integrity  - Assess and monitor nutrition and hydration status  - Monitor labs   - Assess for incontinence   - Turn and reposition patient  - Assist with mobility/ambulation  - Relieve pressure over bony prominences  - Avoid friction and shearing  - Provide appropriate hygiene as needed including keeping skin clean and dry  - Evaluate need for skin moisturizer/barrier cream  - Collaborate with interdisciplinary team   - Patient/family teaching  - Consider wound care consult   Outcome: Progressing     Problem: PAIN - ADULT  Goal: Verbalizes/displays adequate comfort level or baseline comfort level  Description: Interventions:  - Encourage patient to monitor pain and request assistance  - Assess pain using appropriate pain scale  - Administer analgesics based on type and severity of pain and evaluate response  - Implement non-pharmacological measures as appropriate and evaluate response  - Consider cultural and social influences on pain and pain management  - Notify physician/advanced practitioner if interventions unsuccessful or patient reports new pain  Outcome: Progressing     Problem: INFECTION - ADULT  Goal: Absence or prevention of progression during hospitalization  Description: INTERVENTIONS:  - Assess and monitor for signs and symptoms of infection  - Monitor lab/diagnostic results  - Monitor all insertion sites, i e  indwelling lines, tubes, and drains  - Monitor endotracheal if appropriate and nasal secretions for changes in amount and color  - [place] appropriate cooling/warming therapies per order  - Administer medications as ordered  - Instruct and encourage patient and family to use good hand hygiene technique  - Identify and instruct in appropriate isolation precautions for identified infection/condition  Outcome: Progressing     Problem: SAFETY ADULT  Goal: Patient will remain free of falls  Description: INTERVENTIONS:  - Educate patient/family on patient safety including physical limitations  - Instruct patient to call for assistance with activity   - Consult OT/PT to assist with strengthening/mobility   - Keep Call bell within reach  - Keep bed low and locked with side rails adjusted as appropriate  - Keep care items and personal belongings within reach  - Initiate and maintain comfort rounds  - Make Fall Risk Sign visible to staff    - Apply yellow socks and bracelet for high fall risk patients  - Consider moving patient to room near nurses station  Outcome: Progressing     Problem: DISCHARGE PLANNING  Goal: Discharge to home or other facility with appropriate resources  Description: INTERVENTIONS:  - Identify barriers to discharge w/patient and caregiver  - Arrange for needed discharge resources and transportation as appropriate  - Identify discharge learning needs (meds, wound care, etc )  - Arrange for interpretive services to assist at discharge as needed  - Refer to Case Management Department for coordinating discharge planning if the patient needs post-hospital services based on physician/advanced practitioner order or complex needs related to functional status, cognitive ability, or social support system  Outcome: Progressing     Problem: Knowledge Deficit  Goal: Patient/family/caregiver demonstrates understanding of disease process, treatment plan, medications, and discharge instructions  Description: Complete learning assessment and assess knowledge base    Interventions:  - Provide teaching at level of understanding  - Provide teaching via preferred learning methods  Outcome: Progressing

## 2022-09-30 NOTE — ASSESSMENT & PLAN NOTE
Wt Readings from Last 3 Encounters:   09/30/22 (!) 172 kg (378 lb 8 5 oz)   04/13/20 (!) 162 kg (356 lb 0 7 oz)   Patient's estimated dry weight is around 356 lb  Presented with weight gain of 406 lb  Maintained on IV diuretics  Subsequently transitioned to torsemide 40 mg b i d  Continue to follow daily weights, I's and nose closely  Continue to follow renal functions closely  Echocardiogram shows: The left ventricular ejection fraction is 55-59%  Systolic function is normal  Wall motion is grossly normal    There is both systolic and diastolic flattening of the interventricular septum consistent with right ventricle pressure and volume overload: Right ventricular cavity size is severely dilated  Systolic function is mildly to moderately reduced  Patient was switched to oral diuretics on 09/26  Continue to monitor intake output and daily weight  Currently on torsemide 40 mg b i d     Will add Zaroxolyn 2 5 mg daily and assist diuretic response    She is -3 4 L in the last 24 hours  Follow-up daily BMP  T

## 2022-09-30 NOTE — PROGRESS NOTES
[address]  Progress Note Zana Coleman 1955, 69 y o  female MRN: [id number]  Unit/Bed#: -01 Encounter: [id number]  Primary Care Provider: Leah Capps MD   Date and time admitted to hospital: 9/21/2022  1:02 PM    * Acute on chronic diastolic congestive heart failure [facility])  Assessment & Plan  Wt Readings from Last 3 Encounters:   09/30/22 (!) 172 kg (378 lb 8 5 oz)   04/13/20 (!) 162 kg (356 lb 0 7 oz)   Patient's estimated dry weight is around 356 lb  Presented with weight gain of 406 lb  Maintained on IV diuretics  Subsequently transitioned to torsemide 40 mg b i d  Continue to follow daily weights, I's and nose closely  Continue to follow renal functions closely  Echocardiogram shows: The left ventricular ejection fraction is 55-59%  Systolic function is normal  Wall motion is grossly normal    There is both systolic and diastolic flattening of the interventricular septum consistent with right ventricle pressure and volume overload: Right ventricular cavity size is severely dilated  Systolic function is mildly to moderately reduced  Patient was switched to oral diuretics on 09/26  Continue to monitor intake output and daily weight  Currently on torsemide 40 mg b i d     Will add Zaroxolyn 2 5 mg daily and assist diuretic response  She is -3 4 L in the last 24 hours  Follow-up daily BMP  T    Hypothyroidism  Assessment & Plan  · Continue Synthroid TSH normal    [facility]  Assessment & Plan  · Nutrition for weight loss counseling  Will get a PT OT to evaluate ambulatory status  · PT OT recommending short-term rehab    IMTIAZ (acute kidney injury) ([facility] )  Assessment & Plan  · Creatinine baseline is 0 9 suspect secondary to cardiorenal   Improved with diuresis   · Mild creatinine elevation up to 1 39 noted  Continue with current dose of torsemide     Creatinine has improved today    Supratherapeutic INR  Assessment & Plan  · Resolved  · Resume outpatient dose of Coumadin    Chronic respiratory failure with hypoxia ([facility])  Assessment & Plan  · Chronically on CPAP at night and 4 L of oxygen during the day secondary to COPD most likely NISHI  · Use home CPAP last night    Anemia  Assessment & Plan  · Chronic anemia hemoglobin is stable continue iron    Diabetes mellitus [facility])  Assessment & Plan  Lab Results   Component Value Date    HGBA1C 8 0 (H) 04/13/2020       Recent Labs     09/29/22  1616 09/29/22  2109 09/30/22  0732 09/30/22  1109   POCGLU 193* 172* 129 179*       Blood Sugar Average: Last 72 hrs:  · (P) 160 [id number] sugars controlled continue Levemir to 20 units at night and sliding scale insulin coverage    COPD (chronic obstructive pulmonary disease) ([facility])  Assessment & Plan  · Not in exacerbation continue Breo    Atrial fibrillation ([facility])  Assessment & Plan  · Rate controlled  Continue with metoprolol 100 mg b i d   ·   INR 2 42  ·  Continue Coumadin 6 mg daily  · Follow-up INR daily  ·         VTE Pharmacologic Prophylaxis: VTE Score: 4 High Risk (Score >/= 5) - Pharmacological DVT Prophylaxis Ordered: warfarin (Coumadin)  Sequential Compression Devices Ordered  Patient Centered Rounds: I performed bedside rounds with nursing staff today  Discussions with Specialists or Other Care Team Provider:  Discussed with case management    Education and Discussions with Family / Patient: Patient declined call to   Time Spent for Care: 30 minutes  More than 50% of total time spent on counseling and coordination of care as described above  Current Length of Stay: 9 day(s)  Current Patient Status: Inpatient   Certification Statement: The patient will continue to require additional inpatient hospital stay due to Await short-term rehab placement  Discharge Plan: Anticipate discharge in 24-48 hrs to rehab facility  Code Status: Level 3 - DNAR and DNI    Subjective:   Patient seen and examined at bedside    Complains of diminished knee discomfort  Denies any shortness of breath  No acute events overnight  Objective:     Vitals:   Temp (24hrs), Av 9 °F (36 6 °C), Min:97 9 °F (36 6 °C), Max:97 9 °F (36 6 °C)    Temp:  [97 9 °F (36 6 °C)] 97 9 °F (36 6 °C)  HR:  [75-87] 75  Resp:  [18-20] 18  BP: ()/(63-69) 98/68  SpO2:  [93 %-97 %] 97 %  Body mass index is 69 23 kg/m²  Input and Output Summary (last 24 hours): Intake/Output Summary (Last 24 hours) at 2022 1443  Last data filed at 2022 1033  Gross per 24 hour   Intake 180 ml   Output 3300 ml   Net -3120 ml       Physical Exam:   Physical Exam  Constitutional:       Appearance: She is obese  She is ill-appearing  HENT:      Head: Normocephalic  Nose: Nose normal       Mouth/Throat:      Mouth: Mucous membranes are moist    Eyes:      Pupils: Pupils are equal, round, and reactive to light  Cardiovascular:      Rate and Rhythm: Normal rate and regular rhythm  Pulmonary:      Breath sounds: Normal breath sounds  Comments: Diminished breath sounds at bases secondary to body habitus  Abdominal:      General: Bowel sounds are normal       Palpations: Abdomen is soft  Comments: Obese abdomen   Musculoskeletal:      Right lower leg: Edema present  Left lower leg: Edema present  Comments: Chronic lymphedema bilateral lower extremity   Neurological:      General: No focal deficit present  Mental Status: She is alert  Mental status is at baseline           Additional Data:     Labs:  Results from last 7 days   Lab Units 22  0528   WBC Thousand/uL 7 84   HEMOGLOBIN g/dL 10 6*   HEMATOCRIT % 34 4*   PLATELETS Thousands/uL 232   NEUTROS PCT % 64   LYMPHS PCT % 16   MONOS PCT % 13*   EOS PCT % 5     Results from last 7 days   Lab Units 22  0927 22  0459 22  0518   SODIUM mmol/L 137   < > 141   POTASSIUM mmol/L 4 1   < > 4 2   CHLORIDE mmol/L 96   < > 97   CO2 mmol/L 39*   < > 40*   BUN mg/dL 41*   < > 38* CREATININE mg/dL 1 15   < > 1 20   ANION GAP mmol/L 2*   < > 4   CALCIUM mg/dL 9 7   < > 9 6   ALBUMIN g/dL  --   --  2 7*   TOTAL BILIRUBIN mg/dL  --   --  1 05*   ALK PHOS U/L  --   --  128*   ALT U/L  --   --  13   AST U/L  --   --  23   GLUCOSE RANDOM mg/dL 173*   < > 123    < > = values in this interval not displayed  Results from last 7 days   Lab Units 09/29/22  0525   INR  2 42*     Results from last 7 days   Lab Units 09/30/22  1109 09/30/22  0732 09/29/22  2109 09/29/22  1616 09/29/22  1147 09/29/22  0800 09/28/22  2033 09/28/22  1603 09/28/22  1107 09/28/22  0705 09/27/22  2148 09/27/22  1557   POC GLUCOSE mg/dl 179* 129 172* 193* 156* 144* 175* 131 171* 119 173* 152*               Lines/Drains:  Invasive Devices  Report    Peripheral Intravenous Line  Duration           Peripheral IV 09/28/22 Dorsal (posterior); Right Wrist 2 days          Drain  Duration           External Urinary Catheter 7 days                      Imaging: No pertinent imaging reviewed      Recent Cultures (last 7 days):         Last 24 Hours Medication List:   Current Facility-Administered Medications   Medication Dose Route Frequency Provider Last Rate    acetaminophen  650 mg Oral Q6H PRN Anna Rojas MD      allopurinol  100 mg Oral Daily Anna Rojas MD      ammonium lactate   Topical BID PRN Anna Rojas MD      atorvastatin  10 mg Oral Daily Anna Rojas MD      cholecalciferol  2,000 Units Oral Daily Anna Rojas MD      ferrous sulfate  325 mg Oral Daily With Breakfast Anna Rojas MD      fluticasone-vilanterol  1 puff Inhalation Daily Anna Rojas MD      insulin detemir  20 Units Subcutaneous HS Anna Rojas MD      insulin lispro  2-12 Units Subcutaneous HS CAMPBELL Brink      insulin lispro  4-20 Units Subcutaneous TID [facility]      levothyroxine  200 mcg Oral Daily Anna Rojas MD      levothyroxine  88 mcg Oral Early Morning MD Kandice Vizcarra loratadine  10 mg Oral Daily Raúl Ngo MD      metolazone  2 5 mg Oral Daily Igor Flores MD      metoprolol succinate  100 mg Oral BID Raúl Ngo MD      midodrine  5 mg Oral TID AC Raúl Ngo MD      montelukast  10 mg Oral HS Raúl Nog MD      nystatin   Topical BID Raúl Ngo MD      pantoprazole  40 mg Oral Early Morning Raúl Ngo MD      potassium chloride  20 mEq Oral Daily Raúl Ngo MD      torsemide  40 mg Oral BID Igor Flores MD      traMADol  50 mg Oral Q6H PRN Raúl Ngo MD      warfarin  6 mg Oral Daily (warfarin) Raúl Ngo MD          Today, Patient Was Seen By: Igor Flores    **Please Note: This note may have been constructed using a voice recognition system  **

## 2022-09-30 NOTE — PLAN OF CARE
Problem: MOBILITY - ADULT  Goal: Maintain or return to baseline ADL function  Description: INTERVENTIONS:  -  Assess patient's ability to carry out ADLs; assess patient's baseline for ADL function and identify physical deficits which impact ability to perform ADLs (bathing, care of mouth/teeth, toileting, grooming, dressing, etc )  - Assess/evaluate cause of self-care deficits   - Assess range of motion  - Assess patient's mobility; develop plan if impaired  - Assess patient's need for assistive devices and provide as appropriate  - Encourage maximum independence but intervene and supervise when necessary  - Involve family in performance of ADLs  - Assess for home care needs following discharge   - Consider OT consult to assist with ADL evaluation and planning for discharge  - Provide patient education as appropriate  Outcome: Progressing  Goal: Maintains/Returns to pre admission functional level  Description: INTERVENTIONS:  - Perform BMAT or MOVE assessment daily    - Set and communicate daily mobility goal to care team and patient/family/caregiver  - Collaborate with rehabilitation services on mobility goals if consulted  - Perform Range of Motion 3 times a day  - Reposition patient every 2 hours    - Dangle patient 3 times a day  - Stand patient 3 times a day  - Ambulate patient 3 times a day  - Out of bed to chair 3 times a day   - Out of bed for meals 3 times a day  - Out of bed for toileting  - Record patient progress and toleration of activity level   Outcome: Progressing     Problem: Potential for Falls  Goal: Patient will remain free of falls  Description: INTERVENTIONS:  - Educate patient/family on patient safety including physical limitations  - Instruct patient to call for assistance with activity   - Consult OT/PT to assist with strengthening/mobility   - Keep Call bell within reach  - Keep bed low and locked with side rails adjusted as appropriate  - Keep care items and personal belongings within reach  - Initiate and maintain comfort rounds  - Make Fall Risk Sign visible to staff  - Offer Toileting every 2 Hours, in advance of need  - Initiate/Maintain alarm  - Obtain necessary fall risk management equipment:   - Apply yellow socks and bracelet for high fall risk patients  - Consider moving patient to room near nurses station  Outcome: Progressing     Problem: Prexisting or High Potential for Compromised Skin Integrity  Goal: Skin integrity is maintained or improved  Description: INTERVENTIONS:  - Identify patients at risk for skin breakdown  - Assess and monitor skin integrity  - Assess and monitor nutrition and hydration status  - Monitor labs   - Assess for incontinence   - Turn and reposition patient  - Assist with mobility/ambulation  - Relieve pressure over bony prominences  - Avoid friction and shearing  - Provide appropriate hygiene as needed including keeping skin clean and dry  - Evaluate need for skin moisturizer/barrier cream  - Collaborate with interdisciplinary team   - Patient/family teaching  - Consider wound care consult   Outcome: Progressing     Problem: PAIN - ADULT  Goal: Verbalizes/displays adequate comfort level or baseline comfort level  Description: Interventions:  - Encourage patient to monitor pain and request assistance  - Assess pain using appropriate pain scale  - Administer analgesics based on type and severity of pain and evaluate response  - Implement non-pharmacological measures as appropriate and evaluate response  - Consider cultural and social influences on pain and pain management  - Notify physician/advanced practitioner if interventions unsuccessful or patient reports new pain  Outcome: Progressing     Problem: INFECTION - ADULT  Goal: Absence or prevention of progression during hospitalization  Description: INTERVENTIONS:  - Assess and monitor for signs and symptoms of infection  - Monitor lab/diagnostic results  - Monitor all insertion sites, i e  indwelling lines, tubes, and drains  - Monitor endotracheal if appropriate and nasal secretions for changes in amount and color  - Hurley appropriate cooling/warming therapies per order  - Administer medications as ordered  - Instruct and encourage patient and family to use good hand hygiene technique  - Identify and instruct in appropriate isolation precautions for identified infection/condition  Outcome: Progressing     Problem: SAFETY ADULT  Goal: Maintain or return to baseline ADL function  Description: INTERVENTIONS:  -  Assess patient's ability to carry out ADLs; assess patient's baseline for ADL function and identify physical deficits which impact ability to perform ADLs (bathing, care of mouth/teeth, toileting, grooming, dressing, etc )  - Assess/evaluate cause of self-care deficits   - Assess range of motion  - Assess patient's mobility; develop plan if impaired  - Assess patient's need for assistive devices and provide as appropriate  - Encourage maximum independence but intervene and supervise when necessary  - Involve family in performance of ADLs  - Assess for home care needs following discharge   - Consider OT consult to assist with ADL evaluation and planning for discharge  - Provide patient education as appropriate  Outcome: Progressing  Goal: Maintains/Returns to pre admission functional level  Description: INTERVENTIONS:  - Perform BMAT or MOVE assessment daily    - Set and communicate daily mobility goal to care team and patient/family/caregiver  - Collaborate with rehabilitation services on mobility goals if consulted  - Perform Range of Motion 3 times a day  - Reposition patient every 2 hours    - Dangle patient 3 times a day  - Stand patient 3 times a day  - Ambulate patient 3 times a day  - Out of bed to chair 3 times a day   - Out of bed for meals 3 times a day  - Out of bed for toileting  - Record patient progress and toleration of activity level   Outcome: Progressing  Goal: Patient will remain free of falls  Description: INTERVENTIONS:  - Educate patient/family on patient safety including physical limitations  - Instruct patient to call for assistance with activity   - Consult OT/PT to assist with strengthening/mobility   - Keep Call bell within reach  - Keep bed low and locked with side rails adjusted as appropriate  - Keep care items and personal belongings within reach  - Initiate and maintain comfort rounds  - Make Fall Risk Sign visible to staff  - Offer Toileting every 2 Hours, in advance of need  - Initiate/Maintain alarm  - Obtain necessary fall risk management equipment:   - Apply yellow socks and bracelet for high fall risk patients  - Consider moving patient to room near nurses station  Outcome: Progressing     Problem: DISCHARGE PLANNING  Goal: Discharge to home or other facility with appropriate resources  Description: INTERVENTIONS:  - Identify barriers to discharge w/patient and caregiver  - Arrange for needed discharge resources and transportation as appropriate  - Identify discharge learning needs (meds, wound care, etc )  - Arrange for interpretive services to assist at discharge as needed  - Refer to Case Management Department for coordinating discharge planning if the patient needs post-hospital services based on physician/advanced practitioner order or complex needs related to functional status, cognitive ability, or social support system  Outcome: Progressing     Problem: Knowledge Deficit  Goal: Patient/family/caregiver demonstrates understanding of disease process, treatment plan, medications, and discharge instructions  Description: Complete learning assessment and assess knowledge base    Interventions:  - Provide teaching at level of understanding  - Provide teaching via preferred learning methods  Outcome: Progressing

## 2022-09-30 NOTE — ASSESSMENT & PLAN NOTE
Lab Results   Component Value Date    HGBA1C 8 0 (H) 04/13/2020       Recent Labs     09/29/22  1616 09/29/22  2109 09/30/22  0732 09/30/22  1109   POCGLU 193* 172* 129 179*       Blood Sugar Average: Last 72 hrs:  · (P) 160 [id number] sugars controlled continue Levemir to 20 units at night and sliding scale insulin coverage

## 2022-09-30 NOTE — PLAN OF CARE
Problem: PHYSICAL THERAPY ADULT  Goal: Performs mobility at highest level of function for planned discharge setting  See evaluation for individualized goals  Description: Treatment/Interventions: Functional transfer training, LE strengthening/ROM, Therapeutic exercise, Endurance training, Patient/family training, Equipment eval/education, Bed mobility, Gait training, Compensatory technique education, Spoke to nursing, OT  Equipment Recommended:  (TBD by rehab)       See flowsheet documentation for full assessment, interventions and recommendations  Outcome: Progressing  Note: Prognosis: Guarded  Problem List: Decreased strength, Decreased range of motion, Decreased endurance, Impaired balance, Decreased mobility, Obesity, Decreased skin integrity  Assessment: Pt seen for PT treatment session this date with interventions consisting of Therapeutic exercise consisting of: AROM 10 reps B LE in sitting and supine position and therapeutic activity consisting of training: bed mobility and sit<>stand transfers  Pt agreeable to PT treatment session upon arrival, pt found supine in bed w/ HOB elevated, in no apparent distress  In comparison to previous session, pt with improvements in pt able to complete rolling with max x 1, able to achieve 3/4 standing with RW and max A x 2 and complete assigned therex   B LE HEP handout provided and reviewed for Charis as demonstrated above  and Post session: pt returned BTB, bed alarm engaged, all needs in reach, and RN notified of session findings/recommendations Continue to recommend STR at time of d/c in order to maximize pt's functional independence and safety w/ mobility  Pt continues to be functioning below baseline level, and remains limited 2* factors listed above and including decreased strength, balance, mobility, and activity tolerance   PT will continue to see pt while here in order to address the deficits listed above and provide interventions consistent w/ POC in effort to achieve STGs  Barriers to Discharge: Inaccessible home environment, Decreased caregiver support  Barriers to Discharge Comments: Pt is A x 2 for transfers, requires increased assistance for mobility  PT Discharge Recommendation: Post acute rehabilitation services    See flowsheet documentation for full assessment

## 2022-09-30 NOTE — OCCUPATIONAL THERAPY NOTE
Occupational Therapy Progress Note     Patient Name: Lam Ards  [id number] Date: 9/30/2022  Problem List  Principal Problem:    Acute on chronic diastolic congestive heart failure ([facility] )  Active Problems:    Atrial fibrillation (HCC)    COPD (chronic obstructive pulmonary disease) ([facility] )    Diabetes mellitus ([facility] )    Anemia    Chronic respiratory failure with hypoxia (HCC)    Supratherapeutic INR    IMTIAZ (acute kidney injury) ([place] [address] )    Morbid obesity ([place] [address] )    Hypothyroidism       09/30/22 6735   Note Type   Note Type Treatment   Restrictions/Precautions   Other Precautions Bed Alarm;O2;Fall Risk  (3 5L)   Pain Assessment   Pain Assessment Tool 0-10   Pain Score No Pain   ADL   Where Assessed Supine, bed   UB Bathing Assistance 2  Maximal Assistance   UB Bathing Deficit Right arm;Left arm; Abdomen   LB Bathing Assistance 1  Total Assistance   LB Bathing Deficit Perineal area; Buttocks;Right upper leg;Left upper leg;Right lower leg including foot; Left lower leg including foot   UB Dressing Assistance 2  Maximal Assistance   UB Dressing Deficit Thread RUE; Thread LUE;Pull over head;Pull around back;Pull down in back   LB Dressing Assistance 1  Total Assistance   LB Dressing Deficit Don/doff L sock; Don/doff R sock   Bed Mobility   Rolling R 2  Maximal assistance   Additional items Assist x 1; Increased time required; Bedrails   Rolling L 2  Maximal assistance   Additional items Assist x 1; Increased time required; Bedrails   Additional Comments Pt supine in bed at beginning of session on 3 5lpm @ 95%  Able to roll L/R @ Max A x1 with use of bed rails to complete bathing  Utilized Kreg bed features for supine to sit  Transfers   Sit to Stand 2  Maximal assistance   Additional items Assist x 2; Increased time required;Verbal cues   Stand to Sit 2  Maximal assistance   Additional items Assist x 2; Increased time required;Verbal cues   Stand pivot Unable to assess   Additional Comments STS from front of bed to RW @ Max A x2  Pt able to clear buttocks and complete 3/4 stand for approximately 30 seconds, unable to extend arms on walker to complete full stand  Pt unable to shift weight or advance forward  Cognition   Overall Cognitive Status WFL   Arousal/Participation Alert; Responsive; Cooperative   Attention Within functional limits   Orientation Level Oriented X4   Memory Within functional limits   Following Commands Follows one step commands without difficulty   Activity Tolerance   Activity Tolerance Patient limited by fatigue   Medical Staff Made Aware Spoke with RN Alyce Galarza, PT Abena and PCA Marielle Danielson   Assessment   Assessment Pt completed OT tx session #2 focused on ADL performance and functional mobility  Pt alert and agreeable to participate  Pt demonstrated improvements in bed mobility and rolling this session but is limited by poor pulmonary tolerance  Pt currently completing UB ADLs @ Max A and LB ADLs @ Total A d/t body habitus  Pt able to complete STS from front of bed @ Max A x2, able to maintain 3/4 stand for approximately 30 seconds but unable to advance to steps or shift weight  Pt demonstrates Good participation and Fair potential to achieve goals  PT/OT co-treat required and  present for session  Pt currently limited by decreased bed mobility, decreased functional mobility, body habitus, B knee pain, decreased ability to care for self  Continue to recommend post acute rehabilitation services at this time to increase safety and independence with ADL tasks and functional mobility     Plan   Treatment Interventions ADL retraining;Functional transfer training   Goal Expiration Date 10/06/22   OT Treatment Day 2   OT Frequency 3-5x/wk   Recommendation   OT Discharge Recommendation Post acute rehabilitation services   AM-PAC Daily Activity Inpatient   Lower Body Dressing 1   Bathing 2   Toileting 1   Upper Body Dressing 2   Grooming 3   Eating 3   Daily Activity Raw Score 12   Daily Activity Standardized Score (Calc for Raw Score >=11) 30 6   AM-PAC Applied Cognition Inpatient   Following a Speech/Presentation 4   Understanding Ordinary Conversation 4   Taking Medications 4   Remembering Where Things Are Placed or Put Away 4   Remembering List of 4-5 Errands 4   Taking Care of Complicated Tasks 4   Applied Cognition Raw Score 24   Applied Cognition Standardized Score 62 21     Pt goals to be met by 10/6/2022     1  Pt will demonstrate ability to complete grooming/hygiene tasks @ Mod I after set-up  2  Pt will demonstrate ability to complete UB ADLs including washing/dressing @ Min A in order to increase performance and participation during meaningful tasks  3  Pt will demonstrate ability to complete LB dressing @ Mod A in order to increase safety and independence during meaningful tasks  4  Pt will demonstrate ability to complete toileting tasks including CM and pericare @ Supervision in order to increase safety and independence during meaningful tasks  5  Pt will demonstrate ability to complete EOB, chair, toilet/commode transfers @ Supervision in order to increase performance and participation during functional tasks  6  Pt will demonstrate ability to stand for 1-2 minutes while maintaining Fair + balance with use of RW for UB support PRN  7  Pt will demonstrate ability to tolerate 30-35 minute OT session with no vc'ing for deep breathing or use of energy conservation techniques in order to increase activity tolerance during functional tasks  8  Pt will demonstrate Good carryover of use of energy conservation/compensatory strategies during ADLs and functional tasks in order to increase safety and reduce risk for falls  9  Pt will identify 3 areas of interest/hobbies and 1 intervention on how to incorporate into daily life in order to increase interaction with environment and peers as well as increase participation in meaningful tasks     10  Pt will demonstrate 100% carryover of BUE HEP in order to increase BUE MS and increase performance during functional tasks upon d/c home      Sergio Raza, OTR/L

## 2022-09-30 NOTE — CASE MANAGEMENT
Case Management Discharge Planning Note    Patient name Teena Vargas  Location /-01 MRN [id number]  : 1955 Date 2022       Current Admission Date: 2022  Current Admission Diagnosis:Acute on chronic diastolic congestive heart failure [facility])   Patient Active Problem List    Diagnosis Date Noted    Acute on chronic diastolic congestive heart failure ([facility] ) 2022    IMTIAZ (acute kidney injury) ([place] [address] ) 2022    Morbid obesity ([place] [address] ) 2022    Hypothyroidism 2022    Supratherapeutic INR 04/15/2020    COVID-19 virus infection 2020    Chronic respiratory failure with hypoxia ([place] [address] ) 2020    Asthma 2020    Atrial fibrillation ([place] [address] ) 2020    COPD (chronic obstructive pulmonary disease) ([place] [address] ) 2020    Diabetes mellitus ([place] [address] ) 2020    Heart failure ([place] [address] ) 2020    Shortness of breath 2020    Anemia 2020      LOS (days): 9  Geometric Mean LOS (GMLOS) (days): 3 00  Days to GMLOS:-6 1     OBJECTIVE:  Risk of Unplanned Readmission Score: 18 77         Current admission status: Inpatient   Preferred Pharmacy:   [facility], [address] [place] [address]  Phone: [phone] Fax: [phone]    Primary Care Provider: Pauline Moss MD    Primary Insurance: THE [facility]  Secondary Insurance:     DISCHARGE DETAILS:    Aware patient is medically ready for dc, barrier has been leighann weight with acceptance of STR facilities  8 denials  CM spoke to Xavier Andrea at [facility], they feel they can accommodate her needs, however they do not have a contract with [facility] CM asked if she will allow me to get a 1 time contract with [facility], Xavier Andrea was in agreement of this  Submitted for auth with [facility], patient does not have any out of network benefits    [place] back from Riccardo Rivera, from the [facility] and she approved [place] with a 1 time contract  LUCY heard back from Margarito at [facility] and they will accept patient on Monday, after  signs the contract, equipment is ordered  Margarito is going to reach out to [facility] about transportation availability on Monday  CM updated patient and a friend in the room, she is very grateful and thankful for all the assistance  She will update her spouse  MD is aware  Patient will need COVID screen  DC plan is [employer] on Monday        Authorization for [facility] is A  [id number]

## 2022-09-30 NOTE — RESPIRATORY THERAPY NOTE
Patient has her own CPAP unit from home    She is capable of managing its use on her own and was encouraged to do so

## 2022-09-30 NOTE — ASSESSMENT & PLAN NOTE
· Nutrition for weight loss counseling    Will get a PT OT to evaluate ambulatory status  · PT OT recommending short-term rehab

## 2022-10-01 LAB
GLUCOSE SERPL-MCNC: 118 MG/DL (ref 65–140)
GLUCOSE SERPL-MCNC: 178 MG/DL (ref 65–140)
GLUCOSE SERPL-MCNC: 190 MG/DL (ref 65–140)
GLUCOSE SERPL-MCNC: 251 MG/DL (ref 65–140)

## 2022-10-01 PROCEDURE — 82948 REAGENT STRIP/BLOOD GLUCOSE: CPT

## 2022-10-01 PROCEDURE — 99232 SBSQ HOSP IP/OBS MODERATE 35: CPT | Performed by: INTERNAL MEDICINE

## 2022-10-01 RX ORDER — INSULIN LISPRO 100 [IU]/ML
3 INJECTION, SOLUTION INTRAVENOUS; SUBCUTANEOUS
Status: DISCONTINUED | OUTPATIENT
Start: 2022-10-01 | End: 2022-10-03 | Stop reason: HOSPADM

## 2022-10-01 RX ADMIN — TORSEMIDE 40 MG: 20 TABLET ORAL at 16:57

## 2022-10-01 RX ADMIN — MIDODRINE HYDROCHLORIDE 5 MG: 5 TABLET ORAL at 05:16

## 2022-10-01 RX ADMIN — MIDODRINE HYDROCHLORIDE 5 MG: 5 TABLET ORAL at 12:03

## 2022-10-01 RX ADMIN — ATORVASTATIN CALCIUM 10 MG: 10 TABLET, FILM COATED ORAL at 09:22

## 2022-10-01 RX ADMIN — ACETAMINOPHEN 650 MG: 325 TABLET ORAL at 05:09

## 2022-10-01 RX ADMIN — INSULIN LISPRO 3 UNITS: 100 INJECTION, SOLUTION INTRAVENOUS; SUBCUTANEOUS at 16:56

## 2022-10-01 RX ADMIN — TORSEMIDE 40 MG: 20 TABLET ORAL at 09:22

## 2022-10-01 RX ADMIN — ACETAMINOPHEN 650 MG: 325 TABLET ORAL at 16:59

## 2022-10-01 RX ADMIN — FLUTICASONE FUROATE AND VILANTEROL TRIFENATATE 1 PUFF: 100; 25 POWDER RESPIRATORY (INHALATION) at 09:27

## 2022-10-01 RX ADMIN — ALLOPURINOL 100 MG: 100 TABLET ORAL at 09:23

## 2022-10-01 RX ADMIN — PANTOPRAZOLE SODIUM 40 MG: 40 TABLET, DELAYED RELEASE ORAL at 05:09

## 2022-10-01 RX ADMIN — INSULIN DETEMIR 25 UNITS: 100 INJECTION, SOLUTION SUBCUTANEOUS at 21:35

## 2022-10-01 RX ADMIN — MONTELUKAST 10 MG: 10 TABLET, FILM COATED ORAL at 21:34

## 2022-10-01 RX ADMIN — INSULIN LISPRO 2 UNITS: 100 INJECTION, SOLUTION INTRAVENOUS; SUBCUTANEOUS at 21:33

## 2022-10-01 RX ADMIN — WARFARIN SODIUM 6 MG: 3 TABLET ORAL at 16:57

## 2022-10-01 RX ADMIN — NYSTATIN 1 APPLICATION: 100000 POWDER TOPICAL at 09:26

## 2022-10-01 RX ADMIN — ACETAMINOPHEN 650 MG: 325 TABLET ORAL at 21:34

## 2022-10-01 RX ADMIN — Medication 2000 UNITS: at 09:22

## 2022-10-01 RX ADMIN — LEVOTHYROXINE SODIUM 200 MCG: 100 TABLET ORAL at 05:10

## 2022-10-01 RX ADMIN — INSULIN LISPRO 8 UNITS: 100 INJECTION, SOLUTION INTRAVENOUS; SUBCUTANEOUS at 12:03

## 2022-10-01 RX ADMIN — POTASSIUM CHLORIDE 20 MEQ: 1500 TABLET, EXTENDED RELEASE ORAL at 09:22

## 2022-10-01 RX ADMIN — INSULIN LISPRO 4 UNITS: 100 INJECTION, SOLUTION INTRAVENOUS; SUBCUTANEOUS at 16:57

## 2022-10-01 RX ADMIN — NYSTATIN 1 APPLICATION: 100000 POWDER TOPICAL at 17:03

## 2022-10-01 RX ADMIN — LEVOTHYROXINE SODIUM 88 MCG: 100 TABLET ORAL at 05:09

## 2022-10-01 RX ADMIN — LORATADINE 10 MG: 10 TABLET ORAL at 09:23

## 2022-10-01 RX ADMIN — METOPROLOL SUCCINATE 100 MG: 100 TABLET, FILM COATED, EXTENDED RELEASE ORAL at 09:22

## 2022-10-01 RX ADMIN — MIDODRINE HYDROCHLORIDE 5 MG: 5 TABLET ORAL at 16:57

## 2022-10-01 RX ADMIN — FERROUS SULFATE TAB 325 MG (65 MG ELEMENTAL FE) 325 MG: 325 (65 FE) TAB at 09:22

## 2022-10-01 NOTE — ASSESSMENT & PLAN NOTE
Wt Readings from Last 3 Encounters:   10/01/22 (!) 171 kg (376 lb 12 3 oz)   04/13/20 (!) 162 kg (356 lb 0 7 oz)   Patient's estimated dry weight is around 356 lb  Presented with weight gain of 406 lb  Maintained on IV diuretics  Subsequently transitioned to torsemide 40 mg b i d  Continue to follow daily weights, I's and nose closely  Continue to follow renal functions closely  Echocardiogram shows: The left ventricular ejection fraction is 55-59%  Systolic function is normal  Wall motion is grossly normal    There is both systolic and diastolic flattening of the interventricular septum consistent with right ventricle pressure and volume overload: Right ventricular cavity size is severely dilated  Systolic function is mildly to moderately reduced  Patient was switched to oral diuretics on 09/26  Continue to monitor intake output and daily weight  Currently on torsemide 40 mg b i d     Will add Zaroxolyn 2 5 mg daily and assist diuretic response  She is -1 9 L in the last 24 hours    Will change metolazone to 3 times weekly  Follow-up daily BMP

## 2022-10-01 NOTE — PLAN OF CARE
Problem: Potential for Falls  Goal: Patient will remain free of falls  Description: INTERVENTIONS:  -  Assess patient's ability to carry out ADLs; assess patient's baseline for ADL function and identify physical deficits which impact ability to perform ADLs (bathing, care of mouth/teeth, toileting, grooming, dressing, etc )  - Assess/evaluate cause of self-care deficits   - Assess range of motion  - Assess patient's mobility; develop plan if impaired  - Assess patient's need for assistive devices and provide as appropriate  - Encourage maximum independence but intervene and supervise when necessary  - Involve family in performance of ADLs  - Assess for home care needs following discharge   - Consider OT consult to assist with ADL evaluation and planning for discharge  - Provide patient education as appropriate  Outcome: Progressing     Problem: Prexisting or High Potential for Compromised Skin Integrity  Goal: Skin integrity is maintained or improved  Description: INTERVENTIONS:  - Identify patients at risk for skin breakdown  - Assess and monitor skin integrity  - Assess and monitor nutrition and hydration status  - Monitor labs   - Assess for incontinence   - Turn and reposition patient  - Assist with mobility/ambulation  - Relieve pressure over bony prominences  - Avoid friction and shearing  - Provide appropriate hygiene as needed including keeping skin clean and dry  - Evaluate need for skin moisturizer/barrier cream  - Collaborate with interdisciplinary team   - Patient/family teaching  - Consider wound care consult   Outcome: Progressing     Problem: PAIN - ADULT  Goal: Verbalizes/displays adequate comfort level or baseline comfort level  Description: Interventions:  - Encourage patient to monitor pain and request assistance  - Assess pain using appropriate pain scale  - Administer analgesics based on type and severity of pain and evaluate response  - Implement non-pharmacological measures as appropriate and evaluate response  - Consider cultural and social influences on pain and pain management  - Notify physician/advanced practitioner if interventions unsuccessful or patient reports new pain  Outcome: Progressing

## 2022-10-01 NOTE — CASE MANAGEMENT
Case Management Progress Note    Patient name Rita Rosado  Location /-01 MRN [id number]  : 1955 Date 10/1/2022       LOS (days): 10  Geometric Mean LOS (GMLOS) (days): 3 00  Days to GMLOS:-7 1        OBJECTIVE:        Current admission status: Inpatient  Preferred Pharmacy:   [facility] [place] [address] [address] [place] [address]  Phone: [phone] Fax: [phone]    Primary Care Provider: Nathan Alexander MD    Primary Insurance: [facility]  Secondary Insurance:     PROGRESS NOTE:          Anticipating pts discharge on Monday, 10/3/22, CM placing request for transport in [facility] via [facility]  CM requesting [facility] place transport request to [address] [place] [address] Road, as they have agreed to transport pt, as pt Is bariatric    Awaiting reply

## 2022-10-01 NOTE — PLAN OF CARE
Problem: MOBILITY - ADULT  Goal: Maintain or return to baseline ADL function  Description: INTERVENTIONS:  -  Assess patient's ability to carry out ADLs; assess patient's baseline for ADL function and identify physical deficits which impact ability to perform ADLs (bathing, care of mouth/teeth, toileting, grooming, dressing, etc )  - Assess/evaluate cause of self-care deficits   - Assess range of motion  - Assess patient's mobility; develop plan if impaired  - Assess patient's need for assistive devices and provide as appropriate  - Encourage maximum independence but intervene and supervise when necessary  - Involve family in performance of ADLs  - Assess for home care needs following discharge   - Consider OT consult to assist with ADL evaluation and planning for discharge  - Provide patient education as appropriate  Outcome: Progressing  Goal: Maintains/Returns to pre admission functional level  Description: INTERVENTIONS:  - Perform BMAT or MOVE assessment daily    - Set and communicate daily mobility goal to care team and patient/family/caregiver  - Collaborate with rehabilitation services on mobility goals if consulted  - Perform Range of Motion 3 times a day  - Reposition patient every 2 hours    - Dangle patient 3 times a day  - Stand patient 3 times a day  - Ambulate patient 3 times a day  - Out of bed to chair 3 times a day   - Out of bed for meals 3 times a day  - Out of bed for toileting  - Record patient progress and toleration of activity level   Outcome: Progressing     Problem: Potential for Falls  Goal: Patient will remain free of falls  Description: INTERVENTIONS:  - Educate patient/family on patient safety including physical limitations  - Instruct patient to call for assistance with activity   - Consult OT/PT to assist with strengthening/mobility   - Keep Call bell within reach  - Keep bed low and locked with side rails adjusted as appropriate  - Keep care items and personal belongings within reach  - Initiate and maintain comfort rounds  - Apply yellow socks and bracelet for high fall risk patients  - Consider moving patient to room near nurses station  Outcome: Progressing     Problem: Prexisting or High Potential for Compromised Skin Integrity  Goal: Skin integrity is maintained or improved  Description: INTERVENTIONS:  - Identify patients at risk for skin breakdown  - Assess and monitor skin integrity  - Assess and monitor nutrition and hydration status  - Monitor labs   - Assess for incontinence   - Turn and reposition patient  - Assist with mobility/ambulation  - Relieve pressure over bony prominences  - Avoid friction and shearing  - Provide appropriate hygiene as needed including keeping skin clean and dry  - Evaluate need for skin moisturizer/barrier cream  - Collaborate with interdisciplinary team   - Patient/family teaching  - Consider wound care consult   Outcome: Progressing     Problem: PAIN - ADULT  Goal: Verbalizes/displays adequate comfort level or baseline comfort level  Description: Interventions:  - Encourage patient to monitor pain and request assistance  - Assess pain using appropriate pain scale  - Administer analgesics based on type and severity of pain and evaluate response  - Implement non-pharmacological measures as appropriate and evaluate response  - Consider cultural and social influences on pain and pain management  - Notify physician/advanced practitioner if interventions unsuccessful or patient reports new pain  Outcome: Progressing     Problem: INFECTION - ADULT  Goal: Absence or prevention of progression during hospitalization  Description: INTERVENTIONS:  - Assess and monitor for signs and symptoms of infection  - Monitor lab/diagnostic results  - Monitor all insertion sites, i e  indwelling lines, tubes, and drains  - Monitor endotracheal if appropriate and nasal secretions for changes in amount and color  - [place] appropriate cooling/warming therapies per order  - Administer medications as ordered  - Instruct and encourage patient and family to use good hand hygiene technique  - Identify and instruct in appropriate isolation precautions for identified infection/condition  Outcome: Progressing     Problem: SAFETY ADULT  Goal: Maintain or return to baseline ADL function  Description: INTERVENTIONS:  -  Assess patient's ability to carry out ADLs; assess patient's baseline for ADL function and identify physical deficits which impact ability to perform ADLs (bathing, care of mouth/teeth, toileting, grooming, dressing, etc )  - Assess/evaluate cause of self-care deficits   - Assess range of motion  - Assess patient's mobility; develop plan if impaired  - Assess patient's need for assistive devices and provide as appropriate  - Encourage maximum independence but intervene and supervise when necessary  - Involve family in performance of ADLs  - Assess for home care needs following discharge   - Consider OT consult to assist with ADL evaluation and planning for discharge  - Provide patient education as appropriate  Outcome: Progressing  Goal: Maintains/Returns to pre admission functional level  Description: INTERVENTIONS:  - Perform BMAT or MOVE assessment daily    - Set and communicate daily mobility goal to care team and patient/family/caregiver  - Collaborate with rehabilitation services on mobility goals if consulted  - Perform Range of Motion 3 times a day  - Reposition patient every 2 hours    - Dangle patient 3 times a day  - Stand patient 3 times a day  - Ambulate patient 3 times a day  - Out of bed to chair 3 times a day   - Out of bed for meals 3 times a day  - Out of bed for toileting  - Record patient progress and toleration of activity level   Outcome: Progressing  Goal: Patient will remain free of falls  Description: INTERVENTIONS:  - Educate patient/family on patient safety including physical limitations  - Instruct patient to call for assistance with activity   - Consult OT/PT to assist with strengthening/mobility   - Keep Call bell within reach  - Keep bed low and locked with side rails adjusted as appropriate  - Keep care items and personal belongings within reach  - Initiate and maintain comfort rounds  - Make Fall Risk Sign visible to staff  - Offer Toileting every 2 Hours, in advance of need  - Initiate/Maintain bed alarm  - Apply yellow socks and bracelet for high fall risk patients  - Consider moving patient to room near nurses station  Outcome: Progressing     Problem: DISCHARGE PLANNING  Goal: Discharge to home or other facility with appropriate resources  Description: INTERVENTIONS:  - Identify barriers to discharge w/patient and caregiver  - Arrange for needed discharge resources and transportation as appropriate  - Identify discharge learning needs (meds, wound care, etc )  - Arrange for interpretive services to assist at discharge as needed  - Refer to Case Management Department for coordinating discharge planning if the patient needs post-hospital services based on physician/advanced practitioner order or complex needs related to functional status, cognitive ability, or social support system  Outcome: Progressing     Problem: Knowledge Deficit  Goal: Patient/family/caregiver demonstrates understanding of disease process, treatment plan, medications, and discharge instructions  Description: Complete learning assessment and assess knowledge base    Interventions:  - Provide teaching at level of understanding  - Provide teaching via preferred learning methods  Outcome: Progressing

## 2022-10-01 NOTE — PROGRESS NOTES
[address]  Progress Note Ira Bernabe 1955, 69 y o  female MRN: [id number]  Unit/Bed#: -01 Encounter: [id number]  Primary Care Provider: Nathna Alexander MD   Date and time admitted to hospital: 9/21/2022  1:02 PM    * Acute on chronic diastolic congestive heart failure [facility])  Assessment & Plan  Wt Readings from Last 3 Encounters:   10/01/22 (!) 171 kg (376 lb 12 3 oz)   04/13/20 (!) 162 kg (356 lb 0 7 oz)   Patient's estimated dry weight is around 356 lb  Presented with weight gain of 406 lb  Maintained on IV diuretics  Subsequently transitioned to torsemide 40 mg b i d  Continue to follow daily weights, I's and nose closely  Continue to follow renal functions closely  Echocardiogram shows: The left ventricular ejection fraction is 55-59%  Systolic function is normal  Wall motion is grossly normal    There is both systolic and diastolic flattening of the interventricular septum consistent with right ventricle pressure and volume overload: Right ventricular cavity size is severely dilated  Systolic function is mildly to moderately reduced  Patient was switched to oral diuretics on 09/26  Continue to monitor intake output and daily weight  Currently on torsemide 40 mg b i d     Will add Zaroxolyn 2 5 mg daily and assist diuretic response  She is -1 9 L in the last 24 hours  Will change metolazone to 3 times weekly  Follow-up daily BMP      Hypothyroidism  Assessment & Plan  · Continue Synthroid TSH normal    Morbid [facility] ([facility] )  Assessment & Plan  · Nutrition for weight loss counseling  Will get a PT OT to evaluate ambulatory status  · PT OT recommending short-term rehab    IMTIAZ (acute kidney injury) ([facility] )  Assessment & Plan  · Creatinine baseline is 0 9 suspect secondary to cardiorenal   Improved with diuresis   · Mild creatinine elevation up to 1 39 noted  Continue with current dose of torsemide     Creatinine has improved today    Supratherapeutic INR  Assessment & Plan  · Resolved  · Resume outpatient dose of Coumadin    Chronic respiratory failure with hypoxia ([facility])  Assessment & Plan  · Chronically on CPAP at night and 4 L of oxygen during the day secondary to COPD most likely NISHI  · Use home CPAP last night    Anemia  Assessment & Plan  · Chronic anemia hemoglobin is stable continue iron    Diabetes mellitus [facility])  Assessment & Plan  Lab Results   Component Value Date    HGBA1C 8 0 (H) 04/13/2020       Recent Labs     09/30/22  1555 09/30/22  2051 10/01/22  0802 10/01/22  1134   POCGLU 129 176* 118 251*       Blood Sugar Average: Last 72 hrs:  · (P) 160 [id number] sugars controlled continue Levemir to 20 units at night and sliding scale insulin coverage    COPD (chronic obstructive pulmonary disease) ([facility])  Assessment & Plan  · Not in exacerbation continue Breo    Atrial fibrillation ([facility])  Assessment & Plan  · Rate controlled  Continue with metoprolol 100 mg b i d   ·   INR 2 42  ·  Continue Coumadin 6 mg daily  · Follow-up INR daily  ·           VTE Pharmacologic Prophylaxis: VTE Score: 4 High Risk (Score >/= 5) - Pharmacological DVT Prophylaxis Ordered: warfarin (Coumadin)  Sequential Compression Devices Ordered  Patient Centered Rounds: I performed bedside rounds with nursing staff today  Discussions with Specialists or Other Care Team Provider:  Discussed with nursing    Education and Discussions with Family / Patient: Updated  () via phone  Time Spent for Care: 30 minutes  More than 50% of total time spent on counseling and coordination of care as described above  Current Length of Stay: 10 day(s)  Current Patient Status: Inpatient   Certification Statement: The patient will continue to require additional inpatient hospital stay due to Await short-term rehab  Discharge Plan: Anticipate discharge in 24-48 hrs to rehab facility      Code Status: Level 3 - DNAR and DNI    Subjective:   Patient seen and examined at bedside  Denies any worsening shortness of breath  No acute events overnight  Objective:     Vitals:   Temp (24hrs), Av 9 °F (36 6 °C), Min:97 7 °F (36 5 °C), Max:98 1 °F (36 7 °C)    Temp:  [97 7 °F (36 5 °C)-98 1 °F (36 7 °C)] 97 7 °F (36 5 °C)  HR:  [69-86] 86  Resp:  [12-18] 12  BP: (103-131)/(62-86) 116/86  SpO2:  [94 %-95 %] 94 %  Body mass index is 68 91 kg/m²  Input and Output Summary (last 24 hours): Intake/Output Summary (Last 24 hours) at 10/1/2022 1342  Last data filed at 10/1/2022 1147  Gross per 24 hour   Intake --   Output 3400 ml   Net -3400 ml       Physical Exam:   Physical Exam  Constitutional:       Appearance: She is obese  HENT:      Head: Normocephalic  Mouth/Throat:      Mouth: Mucous membranes are moist    Eyes:      Pupils: Pupils are equal, round, and reactive to light  Cardiovascular:      Rate and Rhythm: Normal rate  Pulmonary:      Effort: Pulmonary effort is normal       Comments: Improved breath sounds bilaterally  Abdominal:      General: Bowel sounds are normal       Palpations: Abdomen is soft  Musculoskeletal:      Right lower leg: Edema present  Left lower leg: Edema present  Neurological:      General: No focal deficit present  Mental Status: She is alert and oriented to person, place, and time  Mental status is at baseline     Psychiatric:         Mood and Affect: Mood normal          Additional Data:     Labs:  Results from last 7 days   Lab Units 22  0528   WBC Thousand/uL 7 84   HEMOGLOBIN g/dL 10 6*   HEMATOCRIT % 34 4*   PLATELETS Thousands/uL 232   NEUTROS PCT % 64   LYMPHS PCT % 16   MONOS PCT % 13*   EOS PCT % 5     Results from last 7 days   Lab Units 22  0927 22  0459 22  0518   SODIUM mmol/L 137   < > 141   POTASSIUM mmol/L 4 1   < > 4 2   CHLORIDE mmol/L 96   < > 97   CO2 mmol/L 39*   < > 40*   BUN mg/dL 41*   < > 38*   CREATININE mg/dL 1 15   < > 1 20   ANION GAP mmol/L 2*   < > 4   CALCIUM mg/dL 9 7 < > 9 6   ALBUMIN g/dL  --   --  2 7*   TOTAL BILIRUBIN mg/dL  --   --  1 05*   ALK PHOS U/L  --   --  128*   ALT U/L  --   --  13   AST U/L  --   --  23   GLUCOSE RANDOM mg/dL 173*   < > 123    < > = values in this interval not displayed  Results from last 7 days   Lab Units 09/29/22  0525   INR  2 42*     Results from last 7 days   Lab Units 10/01/22  1134 10/01/22  0802 09/30/22  2051 09/30/22  1555 09/30/22  1109 09/30/22  0732 09/29/22  2109 09/29/22  1616 09/29/22  1147 09/29/22  0800 09/28/22  2033 09/28/22  1603   POC GLUCOSE mg/dl 251* 118 176* 129 179* 129 172* 193* 156* 144* 175* 131               Lines/Drains:  Invasive Devices  Report    Peripheral Intravenous Line  Duration           Peripheral IV 09/28/22 Dorsal (posterior); Right Wrist 3 days          Drain  Duration           External Urinary Catheter 8 days                      Imaging: No pertinent imaging reviewed      Recent Cultures (last 7 days):         Last 24 Hours Medication List:   Current Facility-Administered Medications   Medication Dose Route Frequency Provider Last Rate    acetaminophen  650 mg Oral Q6H PRN Raúl Ngo MD      allopurinol  100 mg Oral Daily Raúl Ngo MD      ammonium lactate   Topical BID PRN Raúl Ngo MD      atorvastatin  10 mg Oral Daily Raúl Ngo MD      cholecalciferol  2,000 Units Oral Daily Raúl Ngo MD      ferrous sulfate  325 mg Oral Daily With Breakfast Raúl Ngo MD      fluticasone-vilanterol  1 puff Inhalation Daily Raúl Ngo MD      insulin detemir  25 Units Subcutaneous HS Igor Flores MD      insulin lispro  2-12 Units Subcutaneous HS CAMPBELL Pierce      insulin lispro  3 Units Subcutaneous TID With Meals Igor Flores MD      insulin lispro  4-20 Units Subcutaneous TID [facility]      levothyroxine  200 mcg Oral Daily Raúl Ngo MD      levothyroxine  88 mcg Oral Early Morning Raúl Ngo MD      loratadine 10 mg Oral Daily Fidelina Maier MD      metoprolol succinate  100 mg Oral BID Fidelina Maier MD      midodrine  5 mg Oral TID [facility] Fidelina Maier MD      montelukast  10 mg Oral HS Fidelina Maier MD      nystatin   Topical BID Fidelina Maier MD      pantoprazole  40 mg Oral Early Morning Fidelina Maier MD      potassium chloride  20 mEq Oral Daily Fidelina Maier MD      torsemide  40 mg Oral BID Kobe Garcia MD      traMADol  50 mg Oral Q6H PRN Fidelina Maier MD      warfarin  6 mg Oral Daily (warfarin) Fidelina Maier MD          Today, Patient Was Seen By: Kobe Garcia    **Please Note: This note may have been constructed using a voice recognition system  **

## 2022-10-01 NOTE — ASSESSMENT & PLAN NOTE
Lab Results   Component Value Date    HGBA1C 8 0 (H) 04/13/2020       Recent Labs     09/30/22  1555 09/30/22  2051 10/01/22  0802 10/01/22  1134   POCGLU 129 176* 118 251*       Blood Sugar Average: Last 72 hrs:  · (P) 160 [id number] sugars controlled continue Levemir to 20 units at night and sliding scale insulin coverage

## 2022-10-02 LAB
GLUCOSE SERPL-MCNC: 129 MG/DL (ref 65–140)
GLUCOSE SERPL-MCNC: 159 MG/DL (ref 65–140)
GLUCOSE SERPL-MCNC: 183 MG/DL (ref 65–140)
GLUCOSE SERPL-MCNC: 195 MG/DL (ref 65–140)

## 2022-10-02 PROCEDURE — 99232 SBSQ HOSP IP/OBS MODERATE 35: CPT | Performed by: INTERNAL MEDICINE

## 2022-10-02 PROCEDURE — 94660 CPAP INITIATION&MGMT: CPT

## 2022-10-02 PROCEDURE — 82948 REAGENT STRIP/BLOOD GLUCOSE: CPT

## 2022-10-02 RX ORDER — DOCUSATE SODIUM 100 MG/1
100 CAPSULE, LIQUID FILLED ORAL 2 TIMES DAILY
Status: DISCONTINUED | OUTPATIENT
Start: 2022-10-02 | End: 2022-10-03 | Stop reason: HOSPADM

## 2022-10-02 RX ORDER — METOLAZONE 5 MG/1
2.5 TABLET ORAL 3 TIMES WEEKLY
Status: DISCONTINUED | OUTPATIENT
Start: 2022-10-03 | End: 2022-10-03 | Stop reason: HOSPADM

## 2022-10-02 RX ORDER — POLYETHYLENE GLYCOL 3350 17 G/17G
17 POWDER, FOR SOLUTION ORAL DAILY PRN
Status: DISCONTINUED | OUTPATIENT
Start: 2022-10-02 | End: 2022-10-03 | Stop reason: HOSPADM

## 2022-10-02 RX ADMIN — INSULIN LISPRO 3 UNITS: 100 INJECTION, SOLUTION INTRAVENOUS; SUBCUTANEOUS at 16:56

## 2022-10-02 RX ADMIN — FLUTICASONE FUROATE AND VILANTEROL TRIFENATATE 1 PUFF: 100; 25 POWDER RESPIRATORY (INHALATION) at 10:18

## 2022-10-02 RX ADMIN — ACETAMINOPHEN 650 MG: 325 TABLET ORAL at 21:13

## 2022-10-02 RX ADMIN — LORATADINE 10 MG: 10 TABLET ORAL at 10:12

## 2022-10-02 RX ADMIN — LEVOTHYROXINE SODIUM 200 MCG: 100 TABLET ORAL at 05:53

## 2022-10-02 RX ADMIN — INSULIN DETEMIR 25 UNITS: 100 INJECTION, SOLUTION SUBCUTANEOUS at 21:17

## 2022-10-02 RX ADMIN — TORSEMIDE 40 MG: 20 TABLET ORAL at 10:12

## 2022-10-02 RX ADMIN — NYSTATIN: 100000 POWDER TOPICAL at 10:18

## 2022-10-02 RX ADMIN — ALLOPURINOL 100 MG: 100 TABLET ORAL at 10:12

## 2022-10-02 RX ADMIN — TORSEMIDE 40 MG: 20 TABLET ORAL at 16:52

## 2022-10-02 RX ADMIN — MIDODRINE HYDROCHLORIDE 5 MG: 5 TABLET ORAL at 16:52

## 2022-10-02 RX ADMIN — NYSTATIN: 100000 POWDER TOPICAL at 19:29

## 2022-10-02 RX ADMIN — ACETAMINOPHEN 650 MG: 325 TABLET ORAL at 05:52

## 2022-10-02 RX ADMIN — PANTOPRAZOLE SODIUM 40 MG: 40 TABLET, DELAYED RELEASE ORAL at 05:52

## 2022-10-02 RX ADMIN — INSULIN LISPRO 2 UNITS: 100 INJECTION, SOLUTION INTRAVENOUS; SUBCUTANEOUS at 21:17

## 2022-10-02 RX ADMIN — ACETAMINOPHEN 650 MG: 325 TABLET ORAL at 16:53

## 2022-10-02 RX ADMIN — POTASSIUM CHLORIDE 20 MEQ: 1500 TABLET, EXTENDED RELEASE ORAL at 10:12

## 2022-10-02 RX ADMIN — METOPROLOL SUCCINATE 100 MG: 100 TABLET, FILM COATED, EXTENDED RELEASE ORAL at 16:53

## 2022-10-02 RX ADMIN — Medication 2000 UNITS: at 10:12

## 2022-10-02 RX ADMIN — MONTELUKAST 10 MG: 10 TABLET, FILM COATED ORAL at 21:13

## 2022-10-02 RX ADMIN — METOPROLOL SUCCINATE 100 MG: 100 TABLET, FILM COATED, EXTENDED RELEASE ORAL at 10:12

## 2022-10-02 RX ADMIN — MIDODRINE HYDROCHLORIDE 5 MG: 5 TABLET ORAL at 05:52

## 2022-10-02 RX ADMIN — ATORVASTATIN CALCIUM 10 MG: 10 TABLET, FILM COATED ORAL at 10:12

## 2022-10-02 RX ADMIN — FERROUS SULFATE TAB 325 MG (65 MG ELEMENTAL FE) 325 MG: 325 (65 FE) TAB at 10:12

## 2022-10-02 RX ADMIN — INSULIN LISPRO 4 UNITS: 100 INJECTION, SOLUTION INTRAVENOUS; SUBCUTANEOUS at 16:56

## 2022-10-02 RX ADMIN — INSULIN LISPRO 4 UNITS: 100 INJECTION, SOLUTION INTRAVENOUS; SUBCUTANEOUS at 11:52

## 2022-10-02 RX ADMIN — LEVOTHYROXINE SODIUM 88 MCG: 100 TABLET ORAL at 05:54

## 2022-10-02 RX ADMIN — DOCUSATE SODIUM 100 MG: 100 CAPSULE ORAL at 16:52

## 2022-10-02 RX ADMIN — MIDODRINE HYDROCHLORIDE 5 MG: 5 TABLET ORAL at 11:51

## 2022-10-02 RX ADMIN — INSULIN LISPRO 3 UNITS: 100 INJECTION, SOLUTION INTRAVENOUS; SUBCUTANEOUS at 11:52

## 2022-10-02 RX ADMIN — WARFARIN SODIUM 6 MG: 3 TABLET ORAL at 16:53

## 2022-10-02 NOTE — ASSESSMENT & PLAN NOTE
Wt Readings from Last 3 Encounters:   10/02/22 (!) 171 kg (377 lb 6 8 oz)   04/13/20 (!) 162 kg (356 lb 0 7 oz)   Patient's estimated dry weight is around 356 lb  Presented with weight gain of 406 lb  Maintained on IV diuretics  Subsequently transitioned to torsemide 40 mg b i d  Continue to follow daily weights, I's and nose closely  Continue to follow renal functions closely  Echocardiogram shows: The left ventricular ejection fraction is 55-59%  Systolic function is normal  Wall motion is grossly normal    There is both systolic and diastolic flattening of the interventricular septum consistent with right ventricle pressure and volume overload: Right ventricular cavity size is severely dilated  Systolic function is mildly to moderately reduced  Patient was switched to oral diuretics on 09/26  Continue to monitor intake output and daily weight  Currently on torsemide 40 mg b i d     Will add Zaroxolyn 2 5 mg daily and assist diuretic response  She is -2 4L in the last 24 hours    Will change metolazone to 3 times weekly  Follow-up daily BMP

## 2022-10-02 NOTE — PLAN OF CARE
Problem: Prexisting or High Potential for Compromised Skin Integrity  Goal: Skin integrity is maintained or improved  Description: INTERVENTIONS:  - Identify patients at risk for skin breakdown  - Assess and monitor skin integrity  - Assess and monitor nutrition and hydration status  - Monitor labs   - Assess for incontinence   - Turn and reposition patient  - Assist with mobility/ambulation  - Relieve pressure over bony prominences  - Avoid friction and shearing  - Provide appropriate hygiene as needed including keeping skin clean and dry  - Evaluate need for skin moisturizer/barrier cream  - Collaborate with interdisciplinary team   - Patient/family teaching  - Consider wound care consult   Outcome: Progressing     Problem: PAIN - ADULT  Goal: Verbalizes/displays adequate comfort level or baseline comfort level  Description: Interventions:  - Encourage patient to monitor pain and request assistance  - Assess pain using appropriate pain scale  - Administer analgesics based on type and severity of pain and evaluate response  - Implement non-pharmacological measures as appropriate and evaluate response  - Consider cultural and social influences on pain and pain management  - Notify physician/advanced practitioner if interventions unsuccessful or patient reports new pain  Outcome: Progressing     Problem: Potential for Falls  Goal: Patient will remain free of falls  Description: INTERVENTIONS:  - Educate patient/family on patient safety including physical limitations  - Instruct patient to call for assistance with activity   - Consult OT/PT to assist with strengthening/mobility   - Keep Call bell within reach  - Keep bed low and locked with side rails adjusted as appropriate  - Keep care items and personal belongings within reach  - Initiate and maintain comfort rounds  - Make Fall Risk Sign visible to staff  - Offer Toileting every 2 Hours, in advance of need  - Initiate/Maintain alarm  - Obtain necessary fall risk management equipment  - Apply yellow socks and bracelet for high fall risk patients  - Consider moving patient to room near nurses station  Outcome: Progressing

## 2022-10-02 NOTE — ASSESSMENT & PLAN NOTE
Lab Results   Component Value Date    HGBA1C 8 0 (H) 04/13/2020       Recent Labs     10/01/22  1552 10/01/22  2120 10/02/22  0755 10/02/22  1110   POCGLU 190* 178* 129 195*       Blood Sugar Average: Last 72 hrs:  · (P) 167 [id number] sugars controlled continue Levemir to 20 units at night and sliding scale insulin coverage

## 2022-10-02 NOTE — PROGRESS NOTES
[address]  Progress Note Tru Hannon 1955, 69 y o  female MRN: [id number]  Unit/Bed#: -01 Encounter: [id number]  Primary Care Provider: Pauline Moss MD   Date and time admitted to hospital: 9/21/2022  1:02 PM    * Acute on chronic diastolic congestive heart failure [facility])  Assessment & Plan  Wt Readings from Last 3 Encounters:   10/02/22 (!) 171 kg (377 lb 6 8 oz)   04/13/20 (!) 162 kg (356 lb 0 7 oz)   Patient's estimated dry weight is around 356 lb  Presented with weight gain of 406 lb  Maintained on IV diuretics  Subsequently transitioned to torsemide 40 mg b i d  Continue to follow daily weights, I's and nose closely  Continue to follow renal functions closely  Echocardiogram shows: The left ventricular ejection fraction is 55-59%  Systolic function is normal  Wall motion is grossly normal    There is both systolic and diastolic flattening of the interventricular septum consistent with right ventricle pressure and volume overload: Right ventricular cavity size is severely dilated  Systolic function is mildly to moderately reduced  Patient was switched to oral diuretics on 09/26  Continue to monitor intake output and daily weight  Currently on torsemide 40 mg b i d     Will add Zaroxolyn 2 5 mg daily and assist diuretic response  She is -2 4L in the last 24 hours  Will change metolazone to 3 times weekly  Follow-up daily BMP      Hypothyroidism  Assessment & Plan  · Continue Synthroid TSH normal    [facility] ([facility]  Assessment & Plan  · Nutrition for weight loss counseling  Will get a PT OT to evaluate ambulatory status  · PT OT recommending short-term rehab    IMTIAZ (acute kidney injury) ([facility] )  Assessment & Plan  · Creatinine baseline is 0 9 suspect secondary to cardiorenal   Improved with diuresis   · Mild creatinine elevation up to 1 39 noted  Continue with current dose of torsemide     Creatinine has improved today    Supratherapeutic INR  Assessment & Plan  · Resolved  · Resume outpatient dose of Coumadin    Chronic respiratory failure with hypoxia ([facility])  Assessment & Plan  · Chronically on CPAP at night and 4 L of oxygen during the day secondary to COPD most likely NISHI  · Use home CPAP last night    Anemia  Assessment & Plan  · Chronic anemia hemoglobin is stable continue iron    Diabetes mellitus [facility])  Assessment & Plan  Lab Results   Component Value Date    HGBA1C 8 0 (H) 04/13/2020       Recent Labs     10/01/22  1552 10/01/22  2120 10/02/22  0755 10/02/22  1110   POCGLU 190* 178* 129 195*       Blood Sugar Average: Last 72 hrs:  · (P) 167 [id number] sugars controlled continue Levemir to 20 units at night and sliding scale insulin coverage    COPD (chronic obstructive pulmonary disease) ([facility])  Assessment & Plan  · Not in exacerbation continue Breo    Atrial fibrillation ([facility])  Assessment & Plan  · Rate controlled  Continue with metoprolol 100 mg b i d   ·   INR 2 42  ·  Continue Coumadin 6 mg daily  · Follow-up INR daily  ·         VTE Pharmacologic Prophylaxis: VTE Score: 4 High Risk (Score >/= 5) - Pharmacological DVT Prophylaxis Ordered: warfarin (Coumadin)  Sequential Compression Devices Ordered  Patient Centered Rounds: I performed bedside rounds with nursing staff today  Discussions with Specialists or Other Care Team Provider:  Discussed with nursing  Education and Discussions with Family / Patient: Patient declined call to   Time Spent for Care: 30 minutes  More than 50% of total time spent on counseling and coordination of care as described above  Current Length of Stay: 11 day(s)  Current Patient Status: Inpatient   Certification Statement: The patient will continue to require additional inpatient hospital stay due to Await short-term rehab placement  Discharge Plan: Anticipate discharge tomorrow to rehab facility      Code Status: Level 3 - DNAR and DNI    Subjective:   Patient seen and examined at bedside  Does not offer any complaints  No acute events overnight  Complained of hard stool this morning    Objective:     Vitals:   Temp (24hrs), Av 1 °F (36 7 °C), Min:98 1 °F (36 7 °C), Max:98 1 °F (36 7 °C)    Temp:  [98 1 °F (36 7 °C)] 98 1 °F (36 7 °C)  HR:  [62-79] 79  Resp:  [18-19] 18  BP: (100-112)/(56-87) 104/56  SpO2:  [94 %-97 %] 94 %  Body mass index is 69 03 kg/m²  Input and Output Summary (last 24 hours): Intake/Output Summary (Last 24 hours) at 10/2/2022 1315  Last data filed at 10/2/2022 1238  Gross per 24 hour   Intake 713 ml   Output 2505 ml   Net -1792 ml       Physical Exam:   Physical Exam  Constitutional:       Comments: Morbidly obese   HENT:      Head: Normocephalic  Nose: Nose normal       Mouth/Throat:      Mouth: Mucous membranes are moist    Eyes:      Pupils: Pupils are equal, round, and reactive to light  Cardiovascular:      Rate and Rhythm: Normal rate and regular rhythm  Pulses: Normal pulses  Pulmonary:      Effort: Pulmonary effort is normal       Breath sounds: Normal breath sounds  Abdominal:      General: Bowel sounds are normal  There is distension  Palpations: Abdomen is soft  Musculoskeletal:      Right lower leg: No edema  Left lower leg: No edema  Comments: Diminished bilateral lower extremity edema   Neurological:      General: No focal deficit present  Mental Status: She is alert and oriented to person, place, and time  Mental status is at baseline           Additional Data:     Labs:  Results from last 7 days   Lab Units 22  0528   WBC Thousand/uL 7 84   HEMOGLOBIN g/dL 10 6*   HEMATOCRIT % 34 4*   PLATELETS Thousands/uL 232   NEUTROS PCT % 64   LYMPHS PCT % 16   MONOS PCT % 13*   EOS PCT % 5     Results from last 7 days   Lab Units 22  0927 22  0459 22  0518   SODIUM mmol/L 137   < > 141   POTASSIUM mmol/L 4 1   < > 4 2   CHLORIDE mmol/L 96   < > 97   CO2 mmol/L 39*   < > 40*   BUN mg/dL 41* < > 38*   CREATININE mg/dL 1 15   < > 1 20   ANION GAP mmol/L 2*   < > 4   CALCIUM mg/dL 9 7   < > 9 6   ALBUMIN g/dL  --   --  2 7*   TOTAL BILIRUBIN mg/dL  --   --  1 05*   ALK PHOS U/L  --   --  128*   ALT U/L  --   --  13   AST U/L  --   --  23   GLUCOSE RANDOM mg/dL 173*   < > 123    < > = values in this interval not displayed  Results from last 7 days   Lab Units 09/29/22  0525   INR  2 42*     Results from last 7 days   Lab Units 10/02/22  1110 10/02/22  0755 10/01/22  2120 10/01/22  1552 10/01/22  1134 10/01/22  0802 09/30/22  2051 09/30/22  1555 09/30/22  1109 09/30/22  0732 09/29/22  2109 09/29/22  1616   POC GLUCOSE mg/dl 195* 129 178* 190* 251* 118 176* 129 179* 129 172* 193*               Lines/Drains:  Invasive Devices  Report    Peripheral Intravenous Line  Duration           Peripheral IV 09/28/22 Dorsal (posterior); Right Wrist 4 days          Drain  Duration           External Urinary Catheter 9 days                      Imaging: No pertinent imaging reviewed      Recent Cultures (last 7 days):         Last 24 Hours Medication List:   Current Facility-Administered Medications   Medication Dose Route Frequency Provider Last Rate    acetaminophen  650 mg Oral Q6H PRN Daja Gonzalez MD      allopurinol  100 mg Oral Daily Daja Gonzalez MD      ammonium lactate   Topical BID PRN Daja Gonzalez MD      atorvastatin  10 mg Oral Daily Daja Gonzalez MD      cholecalciferol  2,000 Units Oral Daily Daja Gonzalez MD      ferrous sulfate  325 mg Oral Daily With Breakfast Daja Gonzalez MD      fluticasone-vilanterol  1 puff Inhalation Daily Daja Gonzalez MD      insulin detemir  25 Units Subcutaneous HS Barby Booth MD      insulin lispro  2-12 Units Subcutaneous HS CAMPBELL Ramos      insulin lispro  3 Units Subcutaneous TID With Meals Barby Booth MD      insulin lispro  4-20 Units Subcutaneous TID AC CAMPBELL Ramos      levothyroxine  200 mcg Oral Daily Missy Merritt Gaviota Arango MD      levothyroxine  88 mcg Oral Early Morning Erving MD Tyesha      loratadine  10 mg Oral Daily Erving MD Jaci Arambula Natalypinky Juan Ramp ON 10/3/2022] metolazone  2 5 mg Oral Once per day on Mon Wed Fri Jaky Riggins MD      metoprolol succinate  100 mg Oral BID Gordo Arambula MD      midodrine  5 mg Oral TID [facility] Gordo Arambula MD      montelukast  10 mg Oral HS Gordo Arambula MD      nystatin   Topical BID Erving MD Tyesha      pantoprazole  40 mg Oral Early Morning Erving MD Tyesha      potassium chloride  20 mEq Oral Daily Erjaime Arambula MD      torsemide  40 mg Oral BID Jaky Riggins MD      traMADol  50 mg Oral Q6H PRN Erjaime Arambula MD      warfarin  6 mg Oral Daily (warfarin) Gordo Arambula MD          Today, Patient Was Seen By: Jaky Riggins    **Please Note: This note may have been constructed using a voice recognition system  **

## 2022-10-02 NOTE — CASE MANAGEMENT
Case Management Progress Note    Patient name Dot Soto  Location /-01 MRN [id number]  : 1955 Date 10/2/2022       LOS (days): 11  Geometric Mean LOS (GMLOS) (days): 3 00  Days to GMLOS:-7 8        OBJECTIVE:        Current admission status: Inpatient  Preferred Pharmacy:   [address] [place] [address] [address] [address]  Phone: [phone] Fax: [phone]    Primary Care Provider: Ashely Peters MD    Primary Insurance: [employer] RE[employer]  Secondary Insurance:     PROGRESS NOTE:      As per [facility] will transport pt to [address] on Monday, 10/3/22, at 12 noon, via BLS     CM placed transportation request to [place] via fax, awaiting responce

## 2022-10-03 VITALS
HEIGHT: 62 IN | HEART RATE: 78 BPM | RESPIRATION RATE: 18 BRPM | DIASTOLIC BLOOD PRESSURE: 63 MMHG | SYSTOLIC BLOOD PRESSURE: 101 MMHG | OXYGEN SATURATION: 95 % | TEMPERATURE: 98.1 F | BODY MASS INDEX: 53.92 KG/M2 | WEIGHT: 293 LBS

## 2022-10-03 LAB
ANION GAP SERPL CALCULATED.3IONS-SCNC: 3 MMOL/L (ref 4–13)
BUN SERPL-MCNC: 46 MG/DL (ref 5–25)
CALCIUM SERPL-MCNC: 9.8 MG/DL (ref 8.3–10.1)
CHLORIDE SERPL-SCNC: 93 MMOL/L (ref 96–108)
CO2 SERPL-SCNC: 43 MMOL/L (ref 21–32)
CREAT SERPL-MCNC: 1.25 MG/DL (ref 0.6–1.3)
FLUAV RNA RESP QL NAA+PROBE: NEGATIVE
FLUBV RNA RESP QL NAA+PROBE: NEGATIVE
GFR SERPL CREATININE-BSD FRML MDRD: 43 ML/MIN/1.73SQ M
GLUCOSE SERPL-MCNC: 129 MG/DL (ref 65–140)
GLUCOSE SERPL-MCNC: 137 MG/DL (ref 65–140)
GLUCOSE SERPL-MCNC: 205 MG/DL (ref 65–140)
INR PPP: 2.4 (ref 0.84–1.19)
POTASSIUM SERPL-SCNC: 3.7 MMOL/L (ref 3.5–5.3)
PROTHROMBIN TIME: 26.2 SECONDS (ref 11.6–14.5)
RSV RNA RESP QL NAA+PROBE: NEGATIVE
SARS-COV-2 RNA RESP QL NAA+PROBE: NEGATIVE
SODIUM SERPL-SCNC: 139 MMOL/L (ref 135–147)

## 2022-10-03 PROCEDURE — 82948 REAGENT STRIP/BLOOD GLUCOSE: CPT

## 2022-10-03 PROCEDURE — 0241U HB NFCT DS VIR RESP RNA 4 TRGT: CPT | Performed by: INTERNAL MEDICINE

## 2022-10-03 PROCEDURE — 80048 BASIC METABOLIC PNL TOTAL CA: CPT | Performed by: INTERNAL MEDICINE

## 2022-10-03 PROCEDURE — 99239 HOSP IP/OBS DSCHRG MGMT >30: CPT | Performed by: INTERNAL MEDICINE

## 2022-10-03 PROCEDURE — 85610 PROTHROMBIN TIME: CPT | Performed by: INTERNAL MEDICINE

## 2022-10-03 RX ORDER — POTASSIUM CHLORIDE 20 MEQ/1
20 TABLET, EXTENDED RELEASE ORAL DAILY
Refills: 0
Start: 2022-10-04

## 2022-10-03 RX ORDER — METOLAZONE 2.5 MG/1
2.5 TABLET ORAL 3 TIMES WEEKLY
Refills: 0
Start: 2022-10-05 | End: 2022-10-29

## 2022-10-03 RX ORDER — NYSTATIN 100000 [USP'U]/G
POWDER TOPICAL 2 TIMES DAILY
Qty: 15 G | Refills: 0 | Status: SHIPPED | OUTPATIENT
Start: 2022-10-03

## 2022-10-03 RX ORDER — MIDODRINE HYDROCHLORIDE 5 MG/1
5 TABLET ORAL
Refills: 0
Start: 2022-10-03 | End: 2022-10-29

## 2022-10-03 RX ORDER — POLYETHYLENE GLYCOL 3350 17 G/17G
17 POWDER, FOR SOLUTION ORAL DAILY PRN
Refills: 0
Start: 2022-10-03

## 2022-10-03 RX ORDER — WARFARIN SODIUM 6 MG/1
6 TABLET ORAL
Start: 2022-10-03 | End: 2022-10-29

## 2022-10-03 RX ORDER — INSULIN ASPART 100 [IU]/ML
3 INJECTION, SOLUTION INTRAVENOUS; SUBCUTANEOUS
Qty: 10 ML | Refills: 0
Start: 2022-10-03

## 2022-10-03 RX ORDER — AMMONIUM LACTATE 12 G/100G
LOTION TOPICAL 2 TIMES DAILY PRN
Qty: 400 G | Refills: 0 | Status: SHIPPED | OUTPATIENT
Start: 2022-10-03

## 2022-10-03 RX ORDER — DOCUSATE SODIUM 100 MG/1
100 CAPSULE, LIQUID FILLED ORAL 2 TIMES DAILY
Refills: 0
Start: 2022-10-03

## 2022-10-03 RX ADMIN — ATORVASTATIN CALCIUM 10 MG: 10 TABLET, FILM COATED ORAL at 08:10

## 2022-10-03 RX ADMIN — Medication 2000 UNITS: at 08:07

## 2022-10-03 RX ADMIN — LEVOTHYROXINE SODIUM 88 MCG: 100 TABLET ORAL at 05:14

## 2022-10-03 RX ADMIN — LORATADINE 10 MG: 10 TABLET ORAL at 08:10

## 2022-10-03 RX ADMIN — DOCUSATE SODIUM 100 MG: 100 CAPSULE ORAL at 08:10

## 2022-10-03 RX ADMIN — PANTOPRAZOLE SODIUM 40 MG: 40 TABLET, DELAYED RELEASE ORAL at 05:13

## 2022-10-03 RX ADMIN — POTASSIUM CHLORIDE 20 MEQ: 1500 TABLET, EXTENDED RELEASE ORAL at 08:07

## 2022-10-03 RX ADMIN — ALLOPURINOL 100 MG: 100 TABLET ORAL at 08:10

## 2022-10-03 RX ADMIN — NYSTATIN: 100000 POWDER TOPICAL at 08:11

## 2022-10-03 RX ADMIN — Medication: at 08:11

## 2022-10-03 RX ADMIN — INSULIN LISPRO 3 UNITS: 100 INJECTION, SOLUTION INTRAVENOUS; SUBCUTANEOUS at 08:07

## 2022-10-03 RX ADMIN — MIDODRINE HYDROCHLORIDE 5 MG: 5 TABLET ORAL at 05:19

## 2022-10-03 RX ADMIN — FLUTICASONE FUROATE AND VILANTEROL TRIFENATATE 1 PUFF: 100; 25 POWDER RESPIRATORY (INHALATION) at 08:11

## 2022-10-03 RX ADMIN — LEVOTHYROXINE SODIUM 200 MCG: 100 TABLET ORAL at 05:13

## 2022-10-03 RX ADMIN — FERROUS SULFATE TAB 325 MG (65 MG ELEMENTAL FE) 325 MG: 325 (65 FE) TAB at 08:10

## 2022-10-03 NOTE — ASSESSMENT & PLAN NOTE
· Nutrition for weight loss counseling    Will get a PT OT to evaluate ambulatory status  · PT OT recommending short-term rehab  · Patient is being discharged to [facility]  rehab today

## 2022-10-03 NOTE — ASSESSMENT & PLAN NOTE
Lab Results   Component Value Date    HGBA1C 8 0 (H) 04/13/2020       Recent Labs     10/02/22  1110 10/02/22  1613 10/02/22  2054 10/03/22  0737   POCGLU 195* 159* 183* 137       Blood Sugar Average: Last 72 hrs:  · (P) 083 [id number] sugars controlled   · Continue with basal bolus insulin regimen on discharge

## 2022-10-03 NOTE — ASSESSMENT & PLAN NOTE
Wt Readings from Last 3 Encounters:   10/03/22 (!) 171 kg (377 lb 6 8 oz)   04/13/20 (!) 162 kg (356 lb 0 7 oz)   Patient's estimated dry weight is around 356 lb  Presented with weight gain of 406 lb  Maintained on IV diuretics  Subsequently transitioned to torsemide 40 mg b i d  Continue to follow daily weights, intake output closely  Continue to follow renal functions closely  Echocardiogram shows: The left ventricular ejection fraction is 55-59%  Systolic function is normal  Wall motion is grossly normal    There is both systolic and diastolic flattening of the interventricular septum consistent with right ventricle pressure and volume overload: Right ventricular cavity size is severely dilated  Systolic function is mildly to moderately reduced  Patient was switched to oral diuretics on 09/26  Currently on torsemide 40 mg b i d   Continue with Zaroxolyn 2 5 mg 3 times weekly to assist diuretic response    She is -25L since admission  Follow-up  BMP in 1 week of discharge

## 2022-10-03 NOTE — ASSESSMENT & PLAN NOTE
· Rate controlled    Continue with metoprolol 100 mg b i d   ·   INR 2 40  ·  Continue Coumadin 6 mg daily  · Follow-up INR daily  ·

## 2022-10-03 NOTE — PLAN OF CARE
Problem: Potential for Falls  Goal: Patient will remain free of falls  Description: INTERVENTIONS:  - Educate patient/family on patient safety including physical limitations  - Instruct patient to call for assistance with activity   - Consult OT/PT to assist with strengthening/mobility   - Keep Call bell within reach  - Keep bed low and locked with side rails adjusted as appropriate  - Keep care items and personal belongings within reach  - Initiate and maintain comfort rounds  - Make Fall Risk Sign visible to staff  - Offer Toileting every *** Hours, in advance of need  - Initiate/Maintain ***alarm  - Obtain necessary fall risk management equipment: ***  - Apply yellow socks and bracelet for high fall risk patients  - Consider moving patient to room near nurses station  Outcome: Progressing     Problem: Prexisting or High Potential for Compromised Skin Integrity  Goal: Skin integrity is maintained or improved  Description: INTERVENTIONS:  - Identify patients at risk for skin breakdown  - Assess and monitor skin integrity  - Assess and monitor nutrition and hydration status  - Monitor labs   - Assess for incontinence   - Turn and reposition patient  - Assist with mobility/ambulation  - Relieve pressure over bony prominences  - Avoid friction and shearing  - Provide appropriate hygiene as needed including keeping skin clean and dry  - Evaluate need for skin moisturizer/barrier cream  - Collaborate with interdisciplinary team   - Patient/family teaching  - Consider wound care consult   Outcome: Progressing     Problem: DISCHARGE PLANNING  Goal: Discharge to home or other facility with appropriate resources  Description: INTERVENTIONS:  - Identify barriers to discharge w/patient and caregiver  - Arrange for needed discharge resources and transportation as appropriate  - Identify discharge learning needs (meds, wound care, etc )  - Arrange for interpretive services to assist at discharge as needed  - Refer to Case Management Department for coordinating discharge planning if the patient needs post-hospital services based on physician/advanced practitioner order or complex needs related to functional status, cognitive ability, or social support system  Outcome: Progressing

## 2022-10-03 NOTE — DISCHARGE SUMMARY
[address]  Discharge- Yanna Marquis 1955, 69 y o  female MRN: [id number]  Unit/Bed#: -01 Encounter: [id number]  Primary Care Provider: Noe Dean MD   Date and time admitted to hospital: 9/21/2022  1:02 PM    * Acute on chronic diastolic congestive heart failure [facility])  Assessment & Plan  Wt Readings from Last 3 Encounters:   10/03/22 (!) 171 kg (377 lb 6 8 oz)   04/13/20 (!) 162 kg (356 lb 0 7 oz)   Patient's estimated dry weight is around 356 lb  Presented with weight gain of 406 lb  Maintained on IV diuretics  Subsequently transitioned to torsemide 40 mg b i d  Continue to follow daily weights, intake output closely  Continue to follow renal functions closely  Echocardiogram shows: The left ventricular ejection fraction is 55-59%  Systolic function is normal  Wall motion is grossly normal    There is both systolic and diastolic flattening of the interventricular septum consistent with right ventricle pressure and volume overload: Right ventricular cavity size is severely dilated  Systolic function is mildly to moderately reduced  Patient was switched to oral diuretics on 09/26  Currently on torsemide 40 mg b i d   Continue with Zaroxolyn 2 5 mg 3 times weekly to assist diuretic response  She is -25L since admission  Follow-up  BMP in 1 week of discharge      Hypothyroidism  Assessment & Plan  · Continue Synthroid TSH normal    Morbid obesity ([address] )  Assessment & Plan  · Nutrition for weight loss counseling  Will get a PT OT to evaluate ambulatory status  · PT OT recommending short-term rehab  · Patient is being [facility] to [facility]  rehab today    IMTIAZ (acute kidney injury) ([facility] [address] )  Assessment & Plan  · Creatinine baseline is 0 9 suspect secondary to cardiorenal   Improved with diuresis   · Mild creatinine elevation up to 1 39 noted  Continue with current dose of torsemide     Creatinine has improved today    Supratherapeutic INR  Assessment & Plan  · Resolved  · Resume outpatient dose of Coumadin    Chronic respiratory failure with hypoxia (HCC)  Assessment & Plan  · Chronically on CPAP at night and 4 L of oxygen during the day secondary to COPD most likely NISHI  · Use home CPAP last night    Anemia  Assessment & Plan  · Chronic anemia hemoglobin is stable continue iron    Diabetes mellitus [facility])  Assessment & Plan  Lab Results   Component Value Date    HGBA1C 8 0 (H) 04/13/2020       Recent Labs     10/02/22  1110 10/02/22  1613 10/02/22  2054 10/03/22  0737   POCGLU 195* 159* 183* 137       Blood Sugar Average: Last 72 hrs:  · (P) 738 [id number] sugars controlled   · Continue with basal bolus insulin regimen on discharge    COPD (chronic obstructive pulmonary disease) ([facility])  Assessment & Plan  · Not in exacerbation continue Breo    Atrial fibrillation ([facility])  Assessment & Plan  · Rate controlled  Continue with metoprolol 100 mg b i d   ·   INR 2 40  ·  Continue Coumadin 6 mg daily  · Follow-up INR daily  ·         Medical Problems             Resolved Problems  Date Reviewed: 9/26/2022   None               Discharging Physician / Practitioner: Tana Martinez  PCP: Ivana Mackay MD  Admission Date:   Admission Orders (From admission, onward)     Ordered        09/21/22 1401  INPATIENT ADMISSION  Once                      Discharge Date: 10/03/22    Consultations During Hospital Stay:  NA  Procedures Performed:   · Echo with ejection fraction of 55%  Right ventricular systolic function is moderately reduced  Mitral regurgitation and severe tricuspid regurgitation  Significant Findings / Test Results:   · See above    Incidental Findings:   · None    Test Results Pending at Discharge (will require follow up):    · None     Outpatient Tests Requested:  · BMP and PT INR    Complications:  None    Reason for Admission:  Shortness of breath    Hospital Course:   Radha Diaz is a 69 y o  female patient who originally presented to the hospital on 9/21/2022 due to shortness of breath  Was managed for acute on chronic diastolic congestive heart failure  She was over 20 lb above her dry weight  Was maintained on intravenous diuretics and subsequently transition to oral torsemide 40 mg b i d  With Zaroxolyn 2 5 mg 3 times weekly  She had diuresed approximately 25 L during her hospitalization  Renal functions remained stable  Her weight was 377 lb at the time of discharge from an admission weight of 4 06 lb  Deemed stable for transition to rehab facility  Close outpatient cardiac follow-up was recommended on discharge    Please see above list of diagnoses and related plan for additional information  Condition at Discharge: stable    Discharge Day Visit / Exam:   Subjective:  Patient denies shortness of breath  Does not offer any complaints  Vitals: Blood Pressure: 101/63 (10/03/22 0810)  Pulse: 78 (10/03/22 0810)  Temperature: 98 1 °F (36 7 °C) (10/03/22 0740)  Temp Source: Temporal (10/02/22 0756)  Respirations: 18 (10/03/22 0740)  Height: 5' 2" (157 5 cm) (09/21/22 1541)  Weight - Scale: (!) 171 kg (377 lb 6 8 oz) (10/03/22 0541)  SpO2: 95 % (10/03/22 0818)  Exam:   Physical Exam  Constitutional:       General: She is not in acute distress  Appearance: She is obese  She is not ill-appearing  HENT:      Head: Normocephalic  Nose: Nose normal       Mouth/Throat:      Mouth: Mucous membranes are moist    Eyes:      Extraocular Movements: Extraocular movements intact  Pupils: Pupils are equal, round, and reactive to light  Cardiovascular:      Rate and Rhythm: Normal rate and regular rhythm  Pulses: Normal pulses  Heart sounds: Murmur heard  Pulmonary:      Effort: Pulmonary effort is normal       Breath sounds: Normal breath sounds  Abdominal:      Palpations: Abdomen is soft  Comments: Obese abdomen  Nontender  Bowel sounds audible   Musculoskeletal:      Cervical back: Neck supple        Right lower leg: No edema  Left lower leg: No edema  Comments: Chronic wounds bilateral lower extremity with dressing in place   Skin:     General: Skin is warm  Neurological:      General: No focal deficit present  Mental Status: She is alert and oriented to person, place, and time  Mental status is at baseline  Psychiatric:         Mood and Affect: Mood normal          Discussion with Family: Updated  () via phone  Discharge instructions/Information to patient and family:   See after visit summary for information provided to patient and family  Provisions for Follow-Up Care:  See after visit summary for information related to follow-up care and any pertinent home health orders  Disposition:   Acute Rehab at [facility]    Planned Readmission: No     Discharge Statement:  I spent 35 minutes discharging the patient  This time was spent on the day of discharge  I had direct contact with the patient on the day of discharge  Greater than 50% of the total time was spent examining patient, answering all patient questions, arranging and discussing plan of care with patient as well as directly providing post-discharge instructions  Additional time then spent on discharge activities  Discharge Medications:  See after visit summary for reconciled discharge medications provided to patient and/or family        **Please Note: This note may have been constructed using a voice recognition system**

## 2022-10-03 NOTE — CASE MANAGEMENT
Case Management Progress Note    Patient name Teena Vargas  Location /-01 MRN [id number]  : 1955 Date 10/3/2022       LOS (days): 12  Geometric Mean LOS (GMLOS) (days): 3 00  Days to GMLOS:-8 7        OBJECTIVE:        Current admission status: Inpatient  Preferred Pharmacy:   [facility] [place] [id number]  [place] [address]  Phone: [phone] Fax: [phone]    Primary Care Provider: Pauline Moss MD    Primary Insurance: [facility]  Secondary Insurance:     PROGRESS NOTE:      CM received transportation prior auth through [employer]  #[id number] as per Xavier Andrea at [place], they can accept pt at their facility today

## 2022-10-03 NOTE — PLAN OF CARE
Problem: MOBILITY - ADULT  Goal: Maintain or return to baseline ADL function  Description: INTERVENTIONS:  -  Assess patient's ability to carry out ADLs; assess patient's baseline for ADL function and identify physical deficits which impact ability to perform ADLs (bathing, care of mouth/teeth, toileting, grooming, dressing, etc )  - Assess/evaluate cause of self-care deficits   - Assess range of motion  - Assess patient's mobility; develop plan if impaired  - Assess patient's need for assistive devices and provide as appropriate  - Encourage maximum independence but intervene and supervise when necessary  - Involve family in performance of ADLs  - Assess for home care needs following discharge   - Consider OT consult to assist with ADL evaluation and planning for discharge  - Provide patient education as appropriate  Outcome: Progressing  Goal: Maintains/Returns to pre admission functional level  Description: INTERVENTIONS:  - Perform BMAT or MOVE assessment daily    - Set and communicate daily mobility goal to care team and patient/family/caregiver  - Collaborate with rehabilitation services on mobility goals if consulted  - Perform Range of Motion 3 times a day  - Reposition patient every 2 hours    - Dangle patient 3 times a day  - Stand patient 3 times a day  - Ambulate patient 3 times a day  - Out of bed to chair 3 times a day   - Out of bed for meals 3 times a day  - Out of bed for toileting  - Record patient progress and toleration of activity level   Outcome: Progressing     Problem: Potential for Falls  Goal: Patient will remain free of falls  Description: INTERVENTIONS:  - Educate patient/family on patient safety including physical limitations  - Instruct patient to call for assistance with activity   - Consult OT/PT to assist with strengthening/mobility   - Keep Call bell within reach  - Keep bed low and locked with side rails adjusted as appropriate  - Keep care items and personal belongings within reach  - Initiate and maintain comfort rounds  - Make Fall Risk Sign visible to staff  - Offer Toileting every 2 Hours, in advance of need  - Initiate/Maintain alarm  - Obtain necessary fall risk management equipment: 2  - Apply yellow socks and bracelet for high fall risk patients  - Consider moving patient to room near nurses station  Outcome: Progressing     Problem: Prexisting or High Potential for Compromised Skin Integrity  Goal: Skin integrity is maintained or improved  Description: INTERVENTIONS:  - Identify patients at risk for skin breakdown  - Assess and monitor skin integrity  - Assess and monitor nutrition and hydration status  - Monitor labs   - Assess for incontinence   - Turn and reposition patient  - Assist with mobility/ambulation  - Relieve pressure over bony prominences  - Avoid friction and shearing  - Provide appropriate hygiene as needed including keeping skin clean and dry  - Evaluate need for skin moisturizer/barrier cream  - Collaborate with interdisciplinary team   - Patient/family teaching  - Consider wound care consult   Outcome: Progressing     Problem: PAIN - ADULT  Goal: Verbalizes/displays adequate comfort level or baseline comfort level  Description: Interventions:  - Encourage patient to monitor pain and request assistance  - Assess pain using appropriate pain scale  - Administer analgesics based on type and severity of pain and evaluate response  - Implement non-pharmacological measures as appropriate and evaluate response  - Consider cultural and social influences on pain and pain management  - Notify physician/advanced practitioner if interventions unsuccessful or patient reports new pain  Outcome: Progressing     Problem: INFECTION - ADULT  Goal: Absence or prevention of progression during hospitalization  Description: INTERVENTIONS:  - Assess and monitor for signs and symptoms of infection  - Monitor lab/diagnostic results  - Monitor all insertion sites, i e  indwelling lines, tubes, and drains  - Monitor endotracheal if appropriate and nasal secretions for changes in amount and color  - [place] appropriate cooling/warming therapies per order  - Administer medications as ordered  - Instruct and encourage patient and family to use good hand hygiene technique  - Identify and instruct in appropriate isolation precautions for identified infection/condition  Outcome: Progressing

## 2022-10-03 NOTE — NURSING NOTE
Report given to Kerry at receiving facility and transport giles  Line removed, pt discharged via transport team to [facility] center

## 2022-10-04 NOTE — UTILIZATION REVIEW
Notification of Discharge   This is a Notification of Discharge from our facility [address]  Please be advised that this patient has been discharge from our facility  Below you will find the admission and discharge date and time including the patients disposition  UTILIZATION REVIEW CONTACT:  JIM Perkins  Utilization   Network Utilization Review Department  Phone: [phone] x carefully listen to the prompts  All voicemails are confidential   Email: [email or web address]     PHYSICIAN ADVISORY SERVICES:  FOR PQJO-GW-NYAE REVIEW - MEDICAL NECESSITY DENIAL  Phone: [phone]  Fax: [phone]  Email: [email or web address]     PRESENTATION DATE: 9/21/2022  1:02 PM  OBERVATION ADMISSION DATE:   INPATIENT ADMISSION DATE: 9/21/22  2:01 PM   DISCHARGE DATE: 10/3/2022 12:08 PM  DISPOSITION: Non [facility] SN[facility]/SNU Non [facility] SNF/TC[facility]/SNU      IMPORTANT INFORMATION:  Send all requests for admission clinical reviews, approved or denied determinations and any other requests to dedicated fax number below belonging to the campus where the patient is receiving treatment   List of dedicated fax numbers:  [address] DENIALS (Administrative/Medical Necessity) [phone]   [address] (Maternity/NICU/Pediatrics) [phone]   [facility] [phone]   130 [facility] [phone]   [address] [place] [address] [phone]   [address] [phone]   [facility]  [phone]   [address] [facility] [address] [facility]  2401 [facility] 1000 [address] [facility] [phone]

## 2022-10-22 ENCOUNTER — HOSPITAL ENCOUNTER (INPATIENT)
Facility: HOSPITAL | Age: 67
LOS: 7 days | Discharge: HOME HEALTH | End: 2022-10-29
Attending: EMERGENCY MEDICINE | Admitting: ANESTHESIOLOGY

## 2022-10-22 ENCOUNTER — APPOINTMENT (EMERGENCY)
Dept: RADIOLOGY | Facility: HOSPITAL | Age: 67
End: 2022-10-22

## 2022-10-22 ENCOUNTER — APPOINTMENT (INPATIENT)
Dept: CT IMAGING | Facility: HOSPITAL | Age: 67
End: 2022-10-22

## 2022-10-22 DIAGNOSIS — E66.01 MORBID OBESITY (HCC): ICD-10-CM

## 2022-10-22 DIAGNOSIS — J44.9 CHRONIC OBSTRUCTIVE PULMONARY DISEASE, UNSPECIFIED COPD TYPE (HCC): ICD-10-CM

## 2022-10-22 DIAGNOSIS — E86.1 HYPOTENSION DUE TO HYPOVOLEMIA: ICD-10-CM

## 2022-10-22 DIAGNOSIS — N17.9 AKI (ACUTE KIDNEY INJURY) (HCC): Primary | ICD-10-CM

## 2022-10-22 DIAGNOSIS — I50.9 HEART FAILURE, UNSPECIFIED HF CHRONICITY, UNSPECIFIED HEART FAILURE TYPE (HCC): ICD-10-CM

## 2022-10-22 DIAGNOSIS — I48.91 ATRIAL FIBRILLATION, UNSPECIFIED TYPE (HCC): ICD-10-CM

## 2022-10-22 DIAGNOSIS — I95.89 HYPOTENSION DUE TO HYPOVOLEMIA: ICD-10-CM

## 2022-10-22 DIAGNOSIS — I95.89 CHRONIC HYPOTENSION: Chronic | ICD-10-CM

## 2022-10-22 DIAGNOSIS — F51.01 PRIMARY INSOMNIA: ICD-10-CM

## 2022-10-22 PROBLEM — N18.9 ACUTE KIDNEY INJURY SUPERIMPOSED ON CHRONIC KIDNEY DISEASE (HCC): Status: ACTIVE | Noted: 2022-10-22

## 2022-10-22 PROBLEM — N39.0 UTI (URINARY TRACT INFECTION): Status: ACTIVE | Noted: 2022-10-22

## 2022-10-22 PROBLEM — E11.9 DIABETES MELLITUS (HCC): Chronic | Status: ACTIVE | Noted: 2020-04-13

## 2022-10-22 PROBLEM — K21.9 GERD (GASTROESOPHAGEAL REFLUX DISEASE): Chronic | Status: ACTIVE | Noted: 2022-10-22

## 2022-10-22 PROBLEM — E03.9 HYPOTHYROIDISM: Chronic | Status: ACTIVE | Noted: 2022-09-21

## 2022-10-22 PROBLEM — J96.11 CHRONIC RESPIRATORY FAILURE WITH HYPOXIA (HCC): Chronic | Status: ACTIVE | Noted: 2020-04-14

## 2022-10-22 PROBLEM — I50.33 ACUTE ON CHRONIC DIASTOLIC CONGESTIVE HEART FAILURE (HCC): Chronic | Status: ACTIVE | Noted: 2022-09-21

## 2022-10-22 LAB
2HR DELTA HS TROPONIN: -11 NG/L
4HR DELTA HS TROPONIN: -10 NG/L
ALBUMIN SERPL BCP-MCNC: 3.2 G/DL (ref 3.5–5)
ALP SERPL-CCNC: 168 U/L (ref 46–116)
ALT SERPL W P-5'-P-CCNC: 43 U/L (ref 12–78)
AMORPH URATE CRY URNS QL MICRO: ABNORMAL /HPF
ANION GAP SERPL CALCULATED.3IONS-SCNC: 10 MMOL/L (ref 4–13)
ANION GAP SERPL CALCULATED.3IONS-SCNC: 10 MMOL/L (ref 4–13)
AST SERPL W P-5'-P-CCNC: 44 U/L (ref 5–45)
BACTERIA UR QL AUTO: ABNORMAL /HPF
BASOPHILS # BLD AUTO: 0.04 THOUSANDS/ÂΜL (ref 0–0.1)
BASOPHILS NFR BLD AUTO: 0 % (ref 0–1)
BILIRUB SERPL-MCNC: 0.64 MG/DL (ref 0.2–1)
BILIRUB UR QL STRIP: ABNORMAL
BUN SERPL-MCNC: 138 MG/DL (ref 5–25)
BUN SERPL-MCNC: 160 MG/DL (ref 5–25)
CALCIUM ALBUM COR SERPL-MCNC: 9.8 MG/DL (ref 8.3–10.1)
CALCIUM SERPL-MCNC: 8.7 MG/DL (ref 8.3–10.1)
CALCIUM SERPL-MCNC: 9.2 MG/DL (ref 8.3–10.1)
CAOX CRY URNS QL MICRO: ABNORMAL /HPF
CARDIAC TROPONIN I PNL SERPL HS: 57 NG/L
CARDIAC TROPONIN I PNL SERPL HS: 58 NG/L
CARDIAC TROPONIN I PNL SERPL HS: 68 NG/L
CHLORIDE SERPL-SCNC: 82 MMOL/L (ref 96–108)
CHLORIDE SERPL-SCNC: 88 MMOL/L (ref 96–108)
CLARITY UR: ABNORMAL
CO2 SERPL-SCNC: 29 MMOL/L (ref 21–32)
CO2 SERPL-SCNC: 32 MMOL/L (ref 21–32)
COLOR UR: ABNORMAL
CREAT SERPL-MCNC: 4.95 MG/DL (ref 0.6–1.3)
CREAT SERPL-MCNC: 6.18 MG/DL (ref 0.6–1.3)
CREAT UR-MCNC: 41.5 MG/DL
EOSINOPHIL # BLD AUTO: 0.31 THOUSAND/ÂΜL (ref 0–0.61)
EOSINOPHIL NFR BLD AUTO: 3 % (ref 0–6)
ERYTHROCYTE [DISTWIDTH] IN BLOOD BY AUTOMATED COUNT: 17.9 % (ref 11.6–15.1)
FLUAV RNA RESP QL NAA+PROBE: NEGATIVE
FLUBV RNA RESP QL NAA+PROBE: NEGATIVE
GFR SERPL CREATININE-BSD FRML MDRD: 6 ML/MIN/1.73SQ M
GFR SERPL CREATININE-BSD FRML MDRD: 8 ML/MIN/1.73SQ M
GLUCOSE SERPL-MCNC: 159 MG/DL (ref 65–140)
GLUCOSE SERPL-MCNC: 162 MG/DL (ref 65–140)
GLUCOSE SERPL-MCNC: 235 MG/DL (ref 65–140)
GLUCOSE UR STRIP-MCNC: NEGATIVE MG/DL
HCT VFR BLD AUTO: 41.7 % (ref 34.8–46.1)
HGB BLD-MCNC: 13.5 G/DL (ref 11.5–15.4)
HGB UR QL STRIP.AUTO: ABNORMAL
HYALINE CASTS #/AREA URNS LPF: ABNORMAL /LPF
IMM GRANULOCYTES # BLD AUTO: 0.06 THOUSAND/UL (ref 0–0.2)
IMM GRANULOCYTES NFR BLD AUTO: 1 % (ref 0–2)
INR PPP: 3.17 (ref 0.84–1.19)
KETONES UR STRIP-MCNC: ABNORMAL MG/DL
LACTATE SERPL-SCNC: 2 MMOL/L (ref 0.5–2)
LEUKOCYTE ESTERASE UR QL STRIP: ABNORMAL
LYMPHOCYTES # BLD AUTO: 1.93 THOUSANDS/ÂΜL (ref 0.6–4.47)
LYMPHOCYTES NFR BLD AUTO: 21 % (ref 14–44)
MCH RBC QN AUTO: 28 PG (ref 26.8–34.3)
MCHC RBC AUTO-ENTMCNC: 32.4 G/DL (ref 31.4–37.4)
MCV RBC AUTO: 86 FL (ref 82–98)
MICROALBUMIN UR-MCNC: 71.9 MG/L (ref 0–20)
MICROALBUMIN/CREAT 24H UR: 173 MG/G CREATININE (ref 0–30)
MONOCYTES # BLD AUTO: 0.96 THOUSAND/ÂΜL (ref 0.17–1.22)
MONOCYTES NFR BLD AUTO: 10 % (ref 4–12)
NEUTROPHILS # BLD AUTO: 6.03 THOUSANDS/ÂΜL (ref 1.85–7.62)
NEUTS SEG NFR BLD AUTO: 65 % (ref 43–75)
NITRITE UR QL STRIP: NEGATIVE
NON-SQ EPI CELLS URNS QL MICRO: ABNORMAL /HPF
NRBC BLD AUTO-RTO: 0 /100 WBCS
NT-PROBNP SERPL-MCNC: 4103 PG/ML
PH UR STRIP.AUTO: 5 [PH]
PHOSPHATE SERPL-MCNC: 6.5 MG/DL (ref 2.3–4.1)
PLATELET # BLD AUTO: 264 THOUSANDS/UL (ref 149–390)
PMV BLD AUTO: 11.6 FL (ref 8.9–12.7)
POTASSIUM SERPL-SCNC: 3.3 MMOL/L (ref 3.5–5.3)
POTASSIUM SERPL-SCNC: 4.1 MMOL/L (ref 3.5–5.3)
PROT SERPL-MCNC: 8.2 G/DL (ref 6.4–8.4)
PROT UR STRIP-MCNC: ABNORMAL MG/DL
PROT UR-MCNC: 59 MG/DL
PROT/CREAT UR: 1.42 MG/G{CREAT} (ref 0–0.1)
PROTHROMBIN TIME: 32.5 SECONDS (ref 11.6–14.5)
RBC # BLD AUTO: 4.83 MILLION/UL (ref 3.81–5.12)
RBC #/AREA URNS AUTO: ABNORMAL /HPF
RSV RNA RESP QL NAA+PROBE: NEGATIVE
SARS-COV-2 RNA RESP QL NAA+PROBE: NEGATIVE
SODIUM 24H UR-SCNC: 72 MOL/L
SODIUM SERPL-SCNC: 124 MMOL/L (ref 135–147)
SODIUM SERPL-SCNC: 127 MMOL/L (ref 135–147)
SP GR UR STRIP.AUTO: 1.02 (ref 1–1.03)
TSH SERPL DL<=0.05 MIU/L-ACNC: 1.14 UIU/ML (ref 0.45–4.5)
URATE SERPL-MCNC: 13.4 MG/DL (ref 2–7.5)
UROBILINOGEN UR QL STRIP.AUTO: 0.2 E.U./DL
UUN 24H UR-MCNC: 278 MG/DL
WBC # BLD AUTO: 9.33 THOUSAND/UL (ref 4.31–10.16)
WBC #/AREA URNS AUTO: ABNORMAL /HPF

## 2022-10-22 PROCEDURE — 0T9B70Z DRAINAGE OF BLADDER WITH DRAINAGE DEVICE, VIA NATURAL OR ARTIFICIAL OPENING: ICD-10-PCS | Performed by: ANESTHESIOLOGY

## 2022-10-22 PROCEDURE — 4A133J1 MONITORING OF ARTERIAL PULSE, PERIPHERAL, PERCUTANEOUS APPROACH: ICD-10-PCS | Performed by: ANESTHESIOLOGY

## 2022-10-22 PROCEDURE — 4A133B1 MONITORING OF ARTERIAL PRESSURE, PERIPHERAL, PERCUTANEOUS APPROACH: ICD-10-PCS | Performed by: ANESTHESIOLOGY

## 2022-10-22 PROCEDURE — 03HY32Z INSERTION OF MONITORING DEVICE INTO UPPER ARTERY, PERCUTANEOUS APPROACH: ICD-10-PCS | Performed by: ANESTHESIOLOGY

## 2022-10-22 RX ORDER — INSULIN LISPRO 100 [IU]/ML
1-6 INJECTION, SOLUTION INTRAVENOUS; SUBCUTANEOUS
Status: DISCONTINUED | OUTPATIENT
Start: 2022-10-22 | End: 2022-10-22

## 2022-10-22 RX ORDER — DOCUSATE SODIUM 100 MG/1
100 CAPSULE, LIQUID FILLED ORAL 2 TIMES DAILY
Status: DISCONTINUED | OUTPATIENT
Start: 2022-10-22 | End: 2022-10-26

## 2022-10-22 RX ORDER — ALBUMIN (HUMAN) 12.5 G/50ML
25 SOLUTION INTRAVENOUS ONCE
Status: COMPLETED | OUTPATIENT
Start: 2022-10-22 | End: 2022-10-22

## 2022-10-22 RX ORDER — POTASSIUM CHLORIDE 14.9 MG/ML
20 INJECTION INTRAVENOUS ONCE
Status: COMPLETED | OUTPATIENT
Start: 2022-10-22 | End: 2022-10-23

## 2022-10-22 RX ORDER — ALBUTEROL SULFATE 2.5 MG/3ML
2.5 SOLUTION RESPIRATORY (INHALATION) EVERY 6 HOURS PRN
Status: DISCONTINUED | OUTPATIENT
Start: 2022-10-22 | End: 2022-10-29 | Stop reason: HOSPADM

## 2022-10-22 RX ORDER — POLYETHYLENE GLYCOL 3350 17 G/17G
17 POWDER, FOR SOLUTION ORAL DAILY PRN
Status: DISCONTINUED | OUTPATIENT
Start: 2022-10-22 | End: 2022-10-26

## 2022-10-22 RX ORDER — ACETAMINOPHEN 325 MG/1
650 TABLET ORAL EVERY 6 HOURS PRN
Status: DISCONTINUED | OUTPATIENT
Start: 2022-10-22 | End: 2022-10-24

## 2022-10-22 RX ORDER — FERROUS SULFATE 325(65) MG
325 TABLET ORAL
Status: DISCONTINUED | OUTPATIENT
Start: 2022-10-23 | End: 2022-10-29 | Stop reason: HOSPADM

## 2022-10-22 RX ORDER — CEFTRIAXONE 1 G/50ML
1000 INJECTION, SOLUTION INTRAVENOUS EVERY 24 HOURS
Status: DISCONTINUED | OUTPATIENT
Start: 2022-10-23 | End: 2022-10-24

## 2022-10-22 RX ORDER — SODIUM CHLORIDE, SODIUM GLUCONATE, SODIUM ACETATE, POTASSIUM CHLORIDE, MAGNESIUM CHLORIDE, SODIUM PHOSPHATE, DIBASIC, AND POTASSIUM PHOSPHATE .53; .5; .37; .037; .03; .012; .00082 G/100ML; G/100ML; G/100ML; G/100ML; G/100ML; G/100ML; G/100ML
125 INJECTION, SOLUTION INTRAVENOUS CONTINUOUS
Status: DISCONTINUED | OUTPATIENT
Start: 2022-10-22 | End: 2022-10-22

## 2022-10-22 RX ORDER — ATORVASTATIN CALCIUM 10 MG/1
10 TABLET, FILM COATED ORAL DAILY
Status: DISCONTINUED | OUTPATIENT
Start: 2022-10-23 | End: 2022-10-29 | Stop reason: HOSPADM

## 2022-10-22 RX ORDER — NYSTATIN 100000 [USP'U]/G
POWDER TOPICAL 2 TIMES DAILY
Status: DISCONTINUED | OUTPATIENT
Start: 2022-10-22 | End: 2022-10-29 | Stop reason: HOSPADM

## 2022-10-22 RX ORDER — HEPARIN SODIUM 5000 [USP'U]/ML
5000 INJECTION, SOLUTION INTRAVENOUS; SUBCUTANEOUS EVERY 8 HOURS SCHEDULED
Status: DISCONTINUED | OUTPATIENT
Start: 2022-10-22 | End: 2022-10-22

## 2022-10-22 RX ORDER — POTASSIUM CHLORIDE 14.9 MG/ML
20 INJECTION INTRAVENOUS ONCE
Status: COMPLETED | OUTPATIENT
Start: 2022-10-23 | End: 2022-10-23

## 2022-10-22 RX ORDER — MONTELUKAST SODIUM 10 MG/1
10 TABLET ORAL
Status: DISCONTINUED | OUTPATIENT
Start: 2022-10-22 | End: 2022-10-29 | Stop reason: HOSPADM

## 2022-10-22 RX ORDER — CEFTRIAXONE 1 G/50ML
1000 INJECTION, SOLUTION INTRAVENOUS ONCE
Status: COMPLETED | OUTPATIENT
Start: 2022-10-22 | End: 2022-10-22

## 2022-10-22 RX ORDER — SODIUM CHLORIDE 9 MG/ML
250 INJECTION, SOLUTION INTRAVENOUS CONTINUOUS
Status: DISCONTINUED | OUTPATIENT
Start: 2022-10-22 | End: 2022-10-23

## 2022-10-22 RX ORDER — ACETAMINOPHEN 325 MG/1
650 TABLET ORAL ONCE
Status: COMPLETED | OUTPATIENT
Start: 2022-10-22 | End: 2022-10-22

## 2022-10-22 RX ORDER — LEVOTHYROXINE SODIUM 0.1 MG/1
200 TABLET ORAL DAILY
Status: DISCONTINUED | OUTPATIENT
Start: 2022-10-23 | End: 2022-10-24

## 2022-10-22 RX ORDER — MIDODRINE HYDROCHLORIDE 5 MG/1
5 TABLET ORAL
Status: DISCONTINUED | OUTPATIENT
Start: 2022-10-22 | End: 2022-10-23

## 2022-10-22 RX ORDER — INSULIN LISPRO 100 [IU]/ML
2-12 INJECTION, SOLUTION INTRAVENOUS; SUBCUTANEOUS EVERY 6 HOURS SCHEDULED
Status: DISCONTINUED | OUTPATIENT
Start: 2022-10-23 | End: 2022-10-23

## 2022-10-22 RX ORDER — PANTOPRAZOLE SODIUM 20 MG/1
20 TABLET, DELAYED RELEASE ORAL
Status: DISCONTINUED | OUTPATIENT
Start: 2022-10-23 | End: 2022-10-29 | Stop reason: HOSPADM

## 2022-10-22 RX ORDER — SODIUM CHLORIDE 9 MG/ML
100 INJECTION, SOLUTION INTRAVENOUS CONTINUOUS
Status: DISCONTINUED | OUTPATIENT
Start: 2022-10-22 | End: 2022-10-22

## 2022-10-22 RX ORDER — MELATONIN
2000 DAILY
Status: DISCONTINUED | OUTPATIENT
Start: 2022-10-23 | End: 2022-10-29 | Stop reason: HOSPADM

## 2022-10-22 RX ADMIN — SODIUM CHLORIDE 1000 ML: 0.9 INJECTION, SOLUTION INTRAVENOUS at 13:14

## 2022-10-22 RX ADMIN — ACETAMINOPHEN 650 MG: 325 TABLET ORAL at 13:11

## 2022-10-22 RX ADMIN — SODIUM CHLORIDE 1000 ML: 0.9 INJECTION, SOLUTION INTRAVENOUS at 13:35

## 2022-10-22 RX ADMIN — SODIUM CHLORIDE 100 ML/HR: 0.9 INJECTION, SOLUTION INTRAVENOUS at 19:43

## 2022-10-22 RX ADMIN — INSULIN LISPRO 1 UNITS: 100 INJECTION, SOLUTION INTRAVENOUS; SUBCUTANEOUS at 19:43

## 2022-10-22 RX ADMIN — ACETAMINOPHEN 650 MG: 325 TABLET ORAL at 21:16

## 2022-10-22 RX ADMIN — SODIUM CHLORIDE 100 ML/HR: 0.9 INJECTION, SOLUTION INTRAVENOUS at 17:36

## 2022-10-22 RX ADMIN — POTASSIUM CHLORIDE 20 MEQ: 14.9 INJECTION, SOLUTION INTRAVENOUS at 23:46

## 2022-10-22 RX ADMIN — SODIUM CHLORIDE 1000 ML: 0.9 INJECTION, SOLUTION INTRAVENOUS at 12:17

## 2022-10-22 RX ADMIN — CEFTRIAXONE 1000 MG: 1 INJECTION, SOLUTION INTRAVENOUS at 13:26

## 2022-10-22 RX ADMIN — SODIUM CHLORIDE, SODIUM LACTATE, POTASSIUM CHLORIDE, AND CALCIUM CHLORIDE 1000 ML: .6; .31; .03; .02 INJECTION, SOLUTION INTRAVENOUS at 20:49

## 2022-10-22 RX ADMIN — NYSTATIN 1 APPLICATION: 100000 POWDER TOPICAL at 19:46

## 2022-10-22 RX ADMIN — ALBUMIN (HUMAN) 25 G: 0.25 INJECTION, SOLUTION INTRAVENOUS at 19:42

## 2022-10-22 RX ADMIN — PHENYLEPHRINE HYDROCHLORIDE 25 MCG/MIN: 10 INJECTION INTRAVENOUS at 21:34

## 2022-10-22 RX ADMIN — DOCUSATE SODIUM 100 MG: 100 CAPSULE ORAL at 18:51

## 2022-10-22 RX ADMIN — MONTELUKAST 10 MG: 10 TABLET, FILM COATED ORAL at 21:16

## 2022-10-22 RX ADMIN — ALBUMIN (HUMAN) 25 G: 0.25 INJECTION, SOLUTION INTRAVENOUS at 20:44

## 2022-10-22 RX ADMIN — MIDODRINE HYDROCHLORIDE 5 MG: 5 TABLET ORAL at 18:51

## 2022-10-22 NOTE — CONSULTS
Consultation - Nephrology   Veronica Lynn 69 y o  female MRN: [id number]  Unit/Bed#: ED 05 Encounter: [id number]      A/P:  1  Acute kidney injury present on admission   - presents with a creatinine of 6 18 mg/dL  - baseline creatinine on October 14th was 1 86 mg/dL   - she had been admitted to [facility] on 09/21 and discharged on 10/03 with volume overload  She diuresed approximately 50 lb of fluid  She was discharged on torsemide 40 mg p o  B i d  And metolazone 2 5 mg 3 times a week  - echocardiogram demonstrated preserved left ventricular ejection fraction of 50-59%  There is diastolic dysfunction and right ventricular pressure and volume overload  With right ventricular cavity size is severely dilated with mild to moderately reduced right ventricular function  - she had urinary retention with inability to void  Dow placement produced 350 mils   - A CT abdomen and pelvis is pending   - CXR ( my read) did not suggest marked volume overload   -  she has lost 80 lbs from one month ago and is likely intravascularly volume depleted from diuretic therapy   - will start normal saline at 100 ml/hr   - check urine electrolytes and osmolality in view of hyponatremia   - continue Dow drainage and serial studies    - I have reviewed her medications for potential nephrotoxins    2  Atrial fibrillation   - has a slow ventricular rate    3  Diabetes mellitus type 2 with long-term current use of insulin   - start insulin as per protocol    4  Anemia   - hemoglobin is adequate at 13 5 g    5  Acute on chronic diastolic heart failure   - she examines euvolemic today  She is lying flat with a oxygen saturation and has no peripheral edema    6  Hypothyroidism   - continue thyroid replacement         Thank you for allowing us to participate in the care of your patient       Please feel free to contact us regarding the care of this patient, or any other questions/concerns that may be applicable  Patient Active Problem List   Diagnosis   • Asthma   • Atrial fibrillation (HCC)   • COPD (chronic obstructive pulmonary disease) ([facility] )   • Diabetes mellitus ([facility] )   • Heart failure (HCC)   • Shortness of breath   • Anemia   • COVID-19 virus infection   • Chronic respiratory failure with hypoxia (HCC)   • Supratherapeutic INR   • Acute on chronic diastolic congestive heart failure (HCC)   • IMTIAZ (acute kidney injury) ([facility] )   • Morbid obesity ([facility] )   • Hypothyroidism       History of Present Illness   Physician Requesting Consult: King Nix MD  Reason for Consult / Principal Problem: Acute kidney injury  Hx and PE limited by:   HPI: Sandra Santana is a 69y o  year old female who presents with inability to void for 4 days  She had been treated for UTI 3 weeks ago  She subsequently went to rehab and was voiding well  She continued to take diuretics as she was discharged on torsemide and metolazone  She has lost 80 lb of water weight since her last admission 1 month ago  She has no urge to void and she has been eating and drinking normally  She lives at home and just arrived 4 days ago  She sits in a lift chair and is able to transfer to it commode chair  She has 7 year history of diabetes  She has a history acute on chronic diastolic congestive failure with echo findings as above  She has morbid obesity with limited mobility  Because she could not void and had abnormal labs drawn yesterday but home health care nurse she was instructed to come to the emergency department  She was found to have a creatinine of was over 6 from a prior creatinine of 1 mg/dL    History obtained from friend, chart review and the patient    Constitutional ROS- Denies fatigue, fever, chills, night sweats, weight changes  HEENT ROS- Denies history of eye surgeries, glaucoma, headaches or history of trauma, blurred vision     Endocrine ROS-7 year history diabetes mellitus and has thyroid disease  Cardiovascular ROS- Denies chest pain, palpitation, dyspnea exertion, orthopnea, claudication  Pulmonary ROS- Denies history of COPD, asthma  Denies cough, hemoptysis, shortness of breath  GI ROS- Denies abdominal pain, diarrhea, nausea,change in appetite, vomiting,has constipation, no blood in stools,   Hematological ROS- Denies history of easy bruisings  Genitourinary ROS- Had a UTI 3 weeks ago  Subsequently was voiding well  4 days ago she had no urge to void and did not void at all  Lymphatic ROS- Denies lymphadenopathy  Musculoskeletal ROS- Has history of muscle weakness, joint pain Transfers from lift chair where she sleeps to a commode chair  Dermatological ROS- Denies rash,  Psychiatric ROS-   Neurological ROS- No stroke or TIA symptoms        Historical Information   Past Medical History:   Diagnosis Date   • Asthma    • Atrial fibrillation (HCC)    • COPD (chronic obstructive pulmonary disease) ([facility] )    • Diabetes mellitus ([facility] )    • Disease of thyroid gland    • Heart failure ([facility] )    • Kidney failure      Past Surgical History:   Procedure Laterality Date   • CARDIOVERSION N/A    • GALLBLADDER SURGERY     • HERNIA REPAIR       Social History   Social History     Substance and Sexual Activity   Alcohol Use Never     Social History     Substance and Sexual Activity   Drug Use Never     Social History     Tobacco Use   Smoking Status Never Smoker   Smokeless Tobacco Never Used     Family History   Problem Relation Age of Onset   • Arthritis Mother    • Hearing loss Mother    • Heart disease Mother    • Hypertension Mother    • Stroke Mother    • Alcohol abuse Father    • Cancer Father    • Diabetes Father    • Arthritis Sister    • Cancer Sister    • Alcohol abuse Brother    • Diabetes Son    • Arthritis Daughter    • Drug abuse Daughter    • Heart disease Maternal Grandmother    • Hypertension Maternal Grandmother    • Stroke Maternal Grandmother    • Arthritis Maternal Aunt    • Asthma Neg Hx    • Birth defects Neg Hx    • COPD Neg Hx    • Depression Neg Hx    • Early death Neg Hx    • Hyperlipidemia Neg Hx    • Kidney disease Neg Hx    • Learning disabilities Neg Hx    • Mental illness Neg Hx    • Mental retardation Neg Hx    • Miscarriages / Stillbirths Neg Hx    • Vision loss Neg Hx        Meds/Allergies   all current active meds have been reviewed, current meds:   No current facility-administered medications for this encounter  and PTA meds:  (Not in a hospital admission)        Allergies   Allergen Reactions   • Acesulfame Potassium - Food Allergy Anaphylaxis   • Aspartame - Food Allergy Anaphylaxis   • Saccharin - Food Allergy Anaphylaxis   • Sucralose - Food Allergy Anaphylaxis   • Metformin GI Intolerance       Objective     Intake/Output Summary (Last 24 hours) at 10/22/2022 1540  Last data filed at 10/22/2022 1451  Gross per 24 hour   Intake 2050 ml   Output 50 ml   Net 2000 ml       Invasive Devices:   Urethral Catheter Straight-tip 16 Fr  (Active)   Amt returned on insertion(mL) 50 mL 10/22/22 1229   Reasons to continue Urinary Catheter  Accurate I&O assessment in critically ill patients (48 hr  max) 10/22/22 1229   Goal for Removal No longer needed- Will place order to discontinue 10/22/22 1229   Site Assessment Bleeding 10/22/22 1229   Collection Container Standard drainage bag 10/22/22 1229   Securement Method Securing device (Describe) 10/22/22 1229       Physical Exam      No intake/output data recorded  Vitals:    10/22/22 1500   BP: 96/52   Pulse: 66   Resp: (!) 23   Temp:    SpO2: 100%       General Appearance:    No acute distress  lying flat obese  Cooperative  Appears stated age  Head:    Normocephalic  Atraumatic  Normal jaw occlusion  Eyes:    Lids, conjunctiva normal  No scleral icterus  Ears:    Normal external ears  Nose:   Nares normal  No drainage  Mouth:   Lips, tongue normal  Mucosa normal  Phonation normal    Neck:   Supple   Symmetrical    Back: Symmetric  No CVA tenderness  Lungs:     Normal respiratory effort  Clear to auscultation bilaterally  Chest wall:    No tenderness or deformity  Heart:    Regular rate and rhythm  Normal S1 and S2  No murmur  No JVD  No edema  Abdomen:     Soft  obese  Non-tender  Bowel sounds active  Genitourinary: Dow catheter present  draining 350 ml of dark yellow urine    Extremities:   Extremities normal  Atraumatic  No cyanosis  Skin:   Warm and dry  No pallor, jaundice, rash, ecchymoses  Neurologic:   Alert and oriented to person, place, time  No focal deficit  Current Weight: Weight - Scale: (!) 148 kg (325 lb 9 9 oz)  First Weight: Weight - Scale: (!) 148 kg (325 lb 9 9 oz)    Lab Results:  I have personally reviewed pertinent labs      CBC:   Lab Results   Component Value Date    WBC 9 33 10/22/2022    HGB 13 5 10/22/2022    HCT 41 7 10/22/2022    MCV 86 10/22/2022     10/22/2022    MCH 28 0 10/22/2022    MCHC 32 4 10/22/2022    RDW 17 9 (H) 10/22/2022    MPV 11 6 10/22/2022    NRBC 0 10/22/2022     CMP:   Lab Results   Component Value Date    K 4 1 10/22/2022    CL 82 (L) 10/22/2022    CO2 32 10/22/2022     (H) 10/22/2022    CREATININE 6 18 (H) 10/22/2022    CALCIUM 9 2 10/22/2022    AST 44 10/22/2022    ALT 43 10/22/2022    ALKPHOS 168 (H) 10/22/2022    EGFR 6 10/22/2022     Phosphorus: No results found for: PHOS  Magnesium: No results found for: MG  Urinalysis:   Lab Results   Component Value Date    COLORU Rhonda 10/22/2022    CLARITYU Cloudy 10/22/2022    SPECGRAV 1 020 10/22/2022    PHUR 5 0 10/22/2022    LEUKOCYTESUR Small (A) 10/22/2022    NITRITE Negative 10/22/2022    GLUCOSEU Negative 10/22/2022    KETONESU Trace (A) 10/22/2022    BILIRUBINUR Small (A) 10/22/2022    BLOODU Large (A) 10/22/2022     Ionized Calcium: No results found for: CAION  Coagulation: No results found for: PT, INR, APTT  Troponin: No results found for: TROPONINI  ABG: No results found for: PHART, JLP0MPB, PO2ART, ZNW1XIG, M1MYEGQZ, BEART, SOURCE    Results from last 7 days   Lab Units 10/22/22  1214   POTASSIUM mmol/L 4 1   CHLORIDE mmol/L 82*   CO2 mmol/L 32   BUN mg/dL 160*   CREATININE mg/dL 6 18*   CALCIUM mg/dL 9 2   ALK PHOS U/L 168*   ALT U/L 43   AST U/L 44       Radiology review:  No results found  EKG, Pathology, and Other Studies:  I personally reviewed the chest x-ray  CT scan is pending  I contacted Radiology reading room for a Reading  I reviewed the studies from her recent admission and discussed the case with the hospitalist      Counseling / Coordination of Care  Total ADDITIONAL floor / unit time spent today 45 minutes  Greater than 50% of total time was spent with the patient and / or family counseling and / or coordination of care  A description of the counseling / coordination of care:  Discussed with hospitalist and patient    Jeremi Woody      This consultation note was produced in part using a dictation device which may document imprecise wording from author's original intent

## 2022-10-22 NOTE — ASSESSMENT & PLAN NOTE
· Continue home breo ellipta and singulair   · Atrovent and xopenex QID  · Albuterol PRN  · Respiratory protocol

## 2022-10-22 NOTE — ASSESSMENT & PLAN NOTE
Lab Results   Component Value Date    HGBA1C 6 8 (H) 10/05/2022       Recent Labs     10/22/22  1850 10/22/22  2356   POCGLU 159* 140       Blood Sugar Average: Last 72 hrs:     · SSI  · Goal -180

## 2022-10-22 NOTE — ED PROVIDER NOTES
History  Chief Complaint   Patient presents with   • Urinary Retention     Pt arrives reporting she hasnt been able to pee for past 4 days  Pt is chronically SOB but reports it is worse today  Bgl 276  Per ems pts PCP called and told her she is in kidney failure due to recent blood work  Patient recently complains of generalized weakness and decreased urine production  Denies chest pain or abdominal pain or nausea or vomiting or shortness of breath above the baseline  Patient is normally somewhat short of breath and on 4 L nasal cannula secondary to COPD  Patient was directed to come to the emergency department by her primary care physician due to recently increased creatinine  History provided by:  Patient   used: No    Fatigue  Severity:  Moderate  Onset quality:  Gradual  Duration:  3 days  Timing:  Constant  Progression:  Worsening  Chronicity:  New  Relieved by:  Nothing  Worsened by:  Nothing  Ineffective treatments:  None tried  Associated symptoms: no abdominal pain, no chest pain, no cough, no diarrhea, no difficulty walking, no dizziness, no drooling, no dysuria, no fever, no foul-smelling urine, no frequency, no headaches, no lethargy, no loss of consciousness, no nausea, no near-syncope, no seizures, no shortness of breath, no stroke symptoms, no syncope and no vomiting    Risk factors: congestive heart failure and diabetes        Prior to Admission Medications   Prescriptions Last Dose Informant Patient Reported? Taking?    Insulin Aspart (NovoLOG) 100 units/mL injection   No No   Sig: Inject 3 Units under the skin 3 (three) times a day with meals   Levothyroxine Sodium 88 MCG CAPS   Yes No   Sig: Take by mouth daily   METOPROLOL SUCCINATE ER PO   Yes No   Sig: Take 100 mg by mouth 2 (two) times a day   acetaminophen (TYLENOL) 325 mg tablet   No No   Sig: Take 2 tablets (650 mg total) by mouth every 6 (six) hours as needed for fever   allopurinol (ZYLOPRIM) 100 mg tablet Yes No   Sig: Take 100 mg by mouth daily Dose needs to be verified   ammonium lactate (LAC-HYDRIN) 12 % lotion   No No   Sig: Apply topically 2 (two) times a day as needed for dry skin   atorvastatin (LIPITOR) 10 mg tablet   Yes No   Sig: Take 10 mg by mouth daily   cetirizine (ZyrTEC) 10 mg tablet   Yes No   Sig: Take 10 mg by mouth daily   cholecalciferol (VITAMIN D3) 1,000 units tablet   Yes No   Sig: Take 2,000 Units by mouth daily   docusate sodium (COLACE) 100 mg capsule   No No   Sig: Take 1 capsule (100 mg total) by mouth 2 (two) times a day   ferrous sulfate 325 (65 Fe) mg tablet   Yes No   Sig: Take 325 mg by mouth daily with breakfast   fluticasone-vilanterol (BREO ELLIPTA) 100-25 mcg/inh inhaler   Yes No   Sig: Inhale 1 puff daily Rinse mouth after use  insulin detemir (LEVEMIR) 100 units/mL subcutaneous injection   Yes No   Sig: Inject 26 Units under the skin daily at bedtime   levothyroxine 200 mcg tablet   Yes No   Sig: Take 200 mcg by mouth daily   metolazone (ZAROXOLYN) 2 5 mg tablet   No No   Sig: Take 1 tablet (2 5 mg total) by mouth 3 (three) times a week Do not start before October 5, 2022  midodrine (PROAMATINE) 5 mg tablet   No No   Sig: Take 1 tablet (5 mg total) by mouth 3 (three) times a day before meals   montelukast (SINGULAIR) 10 mg tablet   Yes No   Sig: Take 10 mg by mouth daily at bedtime   multivitamin (THERAGRAN) TABS   Yes No   Sig: Take 1 tablet by mouth daily   nystatin (MYCOSTATIN) powder   No No   Sig: Apply topically 2 (two) times a day   omeprazole (PriLOSEC) 40 MG capsule   Yes No   Sig: Take 40 mg by mouth daily   polyethylene glycol (MIRALAX) 17 g packet   No No   Sig: Take 17 g by mouth daily as needed (Constipation)   potassium chloride (K-DUR,KLOR-CON) 20 mEq tablet   No No   Sig: Take 1 tablet (20 mEq total) by mouth daily Do not start before October 4, 2022     torsemide 40 MG TABS   No No   Sig: Take 40 mg by mouth 2 (two) times a day   warfarin (COUMADIN) 6 mg tablet   No No   Sig: Take 1 tablet (6 mg total) by mouth daily      Facility-Administered Medications: None       Past Medical History:   Diagnosis Date   • Asthma    • Atrial fibrillation (HCC)    • COPD (chronic obstructive pulmonary disease) ([facility] )    • Diabetes mellitus ([facility] )    • Disease of thyroid gland    • Heart failure ([facility] )    • Kidney failure        Past Surgical History:   Procedure Laterality Date   • CARDIOVERSION N/A    • GALLBLADDER SURGERY     • HERNIA REPAIR         Family History   Problem Relation Age of Onset   • Arthritis Mother    • Hearing loss Mother    • Heart disease Mother    • Hypertension Mother    • Stroke Mother    • Alcohol abuse Father    • Cancer Father    • Diabetes Father    • Arthritis Sister    • Cancer Sister    • Alcohol abuse Brother    • Diabetes Son    • Arthritis Daughter    • Drug abuse Daughter    • Heart disease Maternal Grandmother    • Hypertension Maternal Grandmother    • Stroke Maternal Grandmother    • Arthritis Maternal Aunt    • Asthma Neg Hx    • Birth defects Neg Hx    • COPD Neg Hx    • Depression Neg Hx    • Early death Neg Hx    • Hyperlipidemia Neg Hx    • Kidney disease Neg Hx    • Learning disabilities Neg Hx    • Mental illness Neg Hx    • Mental retardation Neg Hx    • Miscarriages / Stillbirths Neg Hx    • Vision loss Neg Hx      I have reviewed and agree with the history as documented  E-Cigarette/Vaping   • E-Cigarette Use Never User      E-Cigarette/Vaping Substances     Social History     Tobacco Use   • Smoking status: Never Smoker   • Smokeless tobacco: Never Used   Vaping Use   • Vaping Use: Never used   Substance Use Topics   • Alcohol use: Never   • Drug use: Never       Review of Systems   Constitutional: Positive for fatigue  Negative for chills and fever  HENT: Negative for drooling, ear pain, hearing loss, sore throat, trouble swallowing and voice change  Eyes: Negative for pain and discharge     Respiratory: Negative for cough, shortness of breath and wheezing  Cardiovascular: Negative for chest pain, palpitations, syncope and near-syncope  Gastrointestinal: Negative for abdominal pain, blood in stool, constipation, diarrhea, nausea and vomiting  Genitourinary: Negative for dysuria, flank pain, frequency and hematuria  Musculoskeletal: Negative for joint swelling, neck pain and neck stiffness  Skin: Negative for rash and wound  Neurological: Negative for dizziness, seizures, loss of consciousness, syncope, facial asymmetry and headaches  Psychiatric/Behavioral: Negative for hallucinations, self-injury and suicidal ideas  All other systems reviewed and are negative  Physical Exam  Physical Exam  Vitals and nursing note reviewed  Constitutional:       General: She is not in acute distress  Appearance: She is well-developed  She is obese  HENT:      Head: Normocephalic and atraumatic  Right Ear: External ear normal       Left Ear: External ear normal    Eyes:      General: No scleral icterus  Right eye: No discharge  Left eye: No discharge  Extraocular Movements: Extraocular movements intact  Conjunctiva/sclera: Conjunctivae normal    Cardiovascular:      Rate and Rhythm: Normal rate  Rhythm irregular  Heart sounds: Normal heart sounds  No murmur heard  Pulmonary:      Effort: Pulmonary effort is normal       Breath sounds: Normal breath sounds  No wheezing or rales  Abdominal:      General: Bowel sounds are normal  There is no distension  Palpations: Abdomen is soft  Tenderness: There is no abdominal tenderness  There is no guarding or rebound  Musculoskeletal:         General: No deformity  Normal range of motion  Cervical back: Normal range of motion and neck supple  Skin:     General: Skin is warm and dry  Findings: No rash  Neurological:      General: No focal deficit present        Mental Status: She is alert and oriented to person, place, and time       Cranial Nerves: No cranial nerve deficit  Psychiatric:         Mood and Affect: Mood normal          Behavior: Behavior normal          Thought Content: Thought content normal          Judgment: Judgment normal          Vital Signs  ED Triage Vitals   Temperature Pulse Respirations Blood Pressure SpO2   10/22/22 1146 10/22/22 1146 10/22/22 1146 10/22/22 1149 10/22/22 1146   (!) 96 1 °F (35 6 °C) 72 18 133/80 96 %      Temp src Heart Rate Source Patient Position - Orthostatic VS BP Location FiO2 (%)   -- 10/22/22 1215 10/22/22 1215 10/22/22 1215 --    Monitor Lying Right arm       Pain Score       10/22/22 1311       6           Vitals:    10/22/22 1146 10/22/22 1149 10/22/22 1215   BP:  133/80 132/97   Pulse: 72  74   Patient Position - Orthostatic VS:   Lying         Visual Acuity      ED Medications  Medications   sodium chloride 0 9 % bolus 1,000 mL (1,000 mL Intravenous New Bag 10/22/22 1217)   sodium chloride 0 9 % bolus 1,000 mL (1,000 mL Intravenous New Bag 10/22/22 1314)   cefTRIAXone (ROCEPHIN) IVPB (premix in dextrose) 1,000 mg 50 mL (has no administration in time range)   acetaminophen (TYLENOL) tablet 650 mg (650 mg Oral Given 10/22/22 1311)       Diagnostic Studies  Results Reviewed     Procedure Component Value Units Date/Time    Urine Microscopic [[id number]]  (Abnormal) Collected: 10/22/22 1217    Lab Status: Final result Specimen: Urine, Indwelling Dow Catheter Updated: 10/22/22 1309     RBC, UA 2-4 /hpf      WBC, UA 10-20 /hpf      Epithelial Cells Occasional /hpf      Bacteria, UA Moderate /hpf      Hyaline Casts, UA 2-4 /lpf      AMORPH URATES Occasional /hpf      Ca Oxalate [place], UA Moderate /hpf     Urine culture [[id number]] Collected: 10/22/22 1217    Lab Status:  In process Specimen: Urine, Indwelling Dow Catheter Updated: 10/22/22 [place], Influenza A/B, RSV PCR, [facility] [employer] [[id number]]  (Normal) Collected: 10/22/22 1214    Lab Status: Final result Specimen: Nares from Nasopharyngeal Swab Updated: 10/22/22 1302     SARS-CoV-2 Negative     INFLUENZA A PCR Negative     INFLUENZA B PCR Negative     RSV PCR Negative    Narrative:      FOR PEDIATRIC PATIENTS - copy/paste COVID Guidelines URL to browser: [email or web address]    SARS-CoV-2 assay is a Nucleic Acid Amplification assay intended for the  qualitative detection of nucleic acid from SARS-CoV-2 in nasopharyngeal  swabs  Results are for the presumptive identification of SARS-CoV-2 RNA  Positive results are indicative of infection with SARS-CoV-2, the virus  causing COVID-19, but do not rule out bacterial infection or co-infection  with other viruses  Laboratories within the [place] and its  territories are required to report all positive results to the appropriate  public health authorities  Negative results do not preclude SARS-CoV-2  infection and should not be used as the sole basis for treatment or other  patient management decisions  Negative results must be combined with  clinical observations, patient history, and epidemiological information  This test has not been FDA cleared or approved  This test has been authorized by FDA under an Emergency Use Authorization  (EUA)  This test is only authorized for the duration of time the  declaration that circumstances exist justifying the authorization of the  emergency use of an in vitro diagnostic tests for detection of SARS-CoV-2  virus and/or diagnosis of COVID-19 infection under section 564(b)(1) of  the Act, 21 U  S C  036PNL-6(Q)(9), unless the authorization is terminated  or revoked sooner  The test has been validated but independent review by FDA  and CLIA is pending  Test performed using [email or web address] Gene[employer]pert: This RT-PCR assay targets N2,  a region unique to SARS-CoV-2  A conserved region in the E-gene was chosen  for pan-Sarbecovirus detection which includes SARS-CoV-2      According to CMS-2020-01-R, this platform meets the definition of high-Recovers technology  Comprehensive metabolic panel [[id number]]  (Abnormal) Collected: 10/22/22 1214    Lab Status: Final result Specimen: Blood from Arm, Right Updated: 10/22/22 1258     Sodium 124 mmol/L      Potassium 4 1 mmol/L      Chloride 82 mmol/L      CO2 32 mmol/L      ANION GAP 10 mmol/L       mg/dL      Creatinine 6 18 mg/dL      Glucose 235 mg/dL      Calcium 9 2 mg/dL      Corrected Calcium 9 8 mg/dL      AST 44 U/L      ALT 43 U/L      Alkaline Phosphatase 168 U/L      Total Protein 8 2 g/dL      Albumin 3 2 g/dL      Total Bilirubin 0 64 mg/dL      eGFR 6 ml/min/1 73sq m     Narrative:      National Kidney Disease Foundation guidelines for Chronic Kidney Disease (CKD):   •  Stage 1 with normal or high GFR (GFR > 90 mL/min/1 73 square meters)  •  Stage 2 Mild CKD (GFR = 60-89 mL/min/1 73 square meters)  •  Stage 3A Moderate CKD (GFR = 45-59 mL/min/1 73 square meters)  •  Stage 3B Moderate CKD (GFR = 30-44 mL/min/1 73 square meters)  •  Stage 4 Severe CKD (GFR = 15-29 mL/min/1 73 square meters)  •  Stage 5 End Stage CKD (GFR <15 mL/min/1 73 square meters)  Note: GFR calculation is accurate only with a steady state creatinine    HS Troponin I 2hr [[id number]]     Lab Status: No result Specimen: Blood     HS Troponin 0hr (reflex protocol) [[id number]]  (Abnormal) Collected: 10/22/22 1214    Lab Status: Final result Specimen: Blood from Arm, Right Updated: 10/22/22 1254     hs TnI 0hr 68 ng/L     NT-BNP PRO [[id number]]  (Abnormal) Collected: 10/22/22 1214    Lab Status: Final result Specimen: Blood from Arm, Right Updated: 10/22/22 1253     NT-proBNP 4,103 pg/mL     Lactic acid, plasma [[id number]]  (Normal) Collected: 10/22/22 1214    Lab Status: Final result Specimen: Blood from Arm, Right Updated: 10/22/22 1251     LACTIC ACID 2 0 mmol/L     Narrative:      Result may be elevated if tourniquet was used during collection      UA w Reflex to Microscopic w Reflex to Culture [[id number]]  (Abnormal) Collected: 10/22/22 1217    Lab Status: Final result Specimen: Urine, Indwelling Dow Catheter Updated: 10/22/22 1233     Color, UA Rhonda     Clarity, UA Cloudy     Specific [place], UA 1 020     pH, UA 5 0     Leukocytes, UA Small     Nitrite, UA Negative     Protein, UA 30 (1+) mg/dl      Glucose, UA Negative mg/dl      Ketones, UA Trace mg/dl      Urobilinogen, UA 0 2 E U /dl      Bilirubin, UA Small     Occult Blood, UA Large    CBC and differential [[id number]]  (Abnormal) Collected: 10/22/22 1214    Lab Status: Final result Specimen: Blood from Arm, Right Updated: 10/22/22 1226     WBC 9 33 Thousand/uL      RBC 4 83 Million/uL      Hemoglobin 13 5 g/dL      Hematocrit 41 7 %      MCV 86 fL      MCH 28 0 pg      MCHC 32 4 g/dL      RDW 17 9 %      MPV 11 6 fL      Platelets 894 Thousands/uL      nRBC 0 /100 WBCs      Neutrophils Relative 65 %      Immat GRANS % 1 %      Lymphocytes Relative 21 %      Monocytes Relative 10 %      Eosinophils Relative 3 %      Basophils Relative 0 %      Neutrophils Absolute 6 03 Thousands/µL      Immature Grans Absolute 0 06 Thousand/uL      Lymphocytes Absolute 1 93 Thousands/µL      Monocytes Absolute 0 96 Thousand/µL      Eosinophils Absolute 0 31 Thousand/µL      Basophils Absolute 0 04 Thousands/µL     Blood culture #1 [[id number]] Collected: 10/22/22 1214    Lab Status: In process Specimen: Blood from Arm, Left Updated: 10/22/22 1223    Blood culture #2 [[id number]] Collected: 10/22/22 1214    Lab Status:  In process Specimen: Blood from Arm, Right Updated: 10/22/22 1223                 XR chest portable   ED Interpretation by Nick Pereira MD (10/22 9915)   No acute finding                 Procedures  ECG 12 Lead Documentation Only    Date/Time: 10/22/2022 11:51 AM  Performed by: Nick Pereira MD  Authorized by: Nick Pereira MD     ECG reviewed by me, the ED Provider: yes    Patient location:  ED  Previous ECG: Previous ECG:  Unavailable  Interpretation:     Interpretation: abnormal    Rate:     ECG rate:  68    ECG rate assessment: normal    Rhythm:     Rhythm: atrial fibrillation    Ectopy:     Ectopy: PVCs      PVCs:  Infrequent  QRS:     QRS axis:  Normal    QRS intervals:  Normal  Conduction:     Conduction: normal    ST segments:     ST segments:  Normal  T waves:     T waves: normal               ED Course                                             MDM  Number of Diagnoses or Management Options     Amount and/or Complexity of Data Reviewed  Clinical lab tests: ordered and reviewed  Tests in the radiology section of CPT®: ordered and reviewed  Decide to obtain previous medical records or to obtain history from someone other than the patient: yes  Review and summarize past medical records: yes  Independent visualization of images, tracings, or specimens: yes        Disposition  Final diagnoses:   IMTIAZ (acute kidney injury) ([facility] )     Time reflects when diagnosis was documented in both MDM as applicable and the Disposition within this note     Time User Action Codes Description Comment    10/22/2022  1:14 PM Mala Weathers Add [N17 9] IMTIAZ (acute kidney injury) [facility])       ED Disposition     ED Disposition   Admit    Condition   Stable    Date/Time   Sat Oct 22, 2022  1:14 PM    Comment              Follow-up Information    None         Patient's Medications   Discharge Prescriptions    No medications on file       No discharge procedures on file      PDMP Review     None          ED Provider  Electronically Signed by           Galina Dickens MD  10/22/22 4887

## 2022-10-22 NOTE — ASSESSMENT & PLAN NOTE
· Concern for UTI  · UA +WBC, No nitrates   · Started on Ceftriaxone in the ED  · F/u urine cultures  · LA 2 0 on admission   · Received >30cc/kg IBW for volume resuscitation  BP improved to baseline with initial resuscitation

## 2022-10-22 NOTE — ASSESSMENT & PLAN NOTE
· Chronically on CPAP at night and 4 L of oxygen during the day secondary to COPD most likely NISHI  · CPAP HS - using home unit  · Goal Sp02>88%  · Respiratory protocol

## 2022-10-22 NOTE — ASSESSMENT & PLAN NOTE
Lab Results   Component Value Date    HGBA1C 6 8 (H) 10/05/2022       No results for input(s): POCGLU in the last 72 hours      Blood Sugar Average: Last 72 hrs:     · Start ISS  · Goal -180

## 2022-10-22 NOTE — ASSESSMENT & PLAN NOTE
· Chronically on CPAP at night and 4 L of oxygen during the day secondary to COPD most likely NISHI  · CPAP HS  · Goal Sp02>88%  · Respiratory protocol

## 2022-10-22 NOTE — ASSESSMENT & PLAN NOTE
Lab Results   Component Value Date    EGFR 9 10/23/2022    EGFR 8 10/22/2022    EGFR 6 10/22/2022    CREATININE 4 32 (H) 10/23/2022    CREATININE 4 95 (H) 10/22/2022    CREATININE 6 18 (H) 10/22/2022       · Likely in the setting of dehydration/intravascular volume depletion secondary to oral diuretics   · Patient presented to the ED with a creatine 6 18  · Baseline creatine 1 2-1 8  · No urgent indication for dialysis   · CT A/P 10/22 showed no evidence of obstructive hydronephrosis   Urinary bladder is decompressed with the frederick catheter   · Renal US pending  · Nephrology consulted- appreciate recommendations   · Check urine lytes  · NSS  · Trend creatine  · Continue frederick  · Avoid hypotension and nephrotoxins  · Strict I&O

## 2022-10-22 NOTE — ASSESSMENT & PLAN NOTE
Lab Results   Component Value Date    EGFR 6 10/22/2022    EGFR 43 10/03/2022    EGFR 48 09/30/2022    CREATININE 6 18 (H) 10/22/2022    CREATININE 1 25 10/03/2022    CREATININE 1 15 09/30/2022       · Likely in the setting of dehydration/ intravascular volume depletion secondary to oral diuretics   · Patient presented to the ED with a creatine 6 18  · Baseline creatine 1 2-1 8  · No urgent indication for dialysis   · CT A/P 10/22 showed no evidence of obstructive hydronephrosis   Urinary bladder is decompressed with the frederick catheter   · Nephrology consulted- appreciate recommendations   · Check urine lytes  · Start NS at 100cc/hr  · Trend creatine  · Continue frederick

## 2022-10-22 NOTE — ASSESSMENT & PLAN NOTE
· Metoprolol succinate currently on hold secondary to hypotension- can restart low dose lopressor as needed for rate control   · Restart home coumadin pending INR   · Check INR   · Goal INR 2-3

## 2022-10-22 NOTE — ASSESSMENT & PLAN NOTE
· Metoprolol succinate currently on hold secondary to hypotension- can restart low dose lopressor as needed for rate control   · Restart home coumadin pending INR   · INR 3 17 on admission   · Goal INR 2-3  · Daily INR

## 2022-10-22 NOTE — H&P
[address]  CC H&P- Baylee Calzada 1955, 69 y o  female MRN: [id number]  Unit/Bed#: ED 05 Encounter: [id number]  Primary Care Provider: Warner Ring MD   Date and time admitted to hospital: 10/22/2022 11:40 AM    * Acute kidney injury superimposed on chronic kidney disease [facility])  Assessment & Plan  Lab Results   Component Value Date    EGFR 6 10/22/2022    EGFR 43 10/03/2022    EGFR 48 09/30/2022    CREATININE 6 18 (H) 10/22/2022    CREATININE 1 25 10/03/2022    CREATININE 1 15 09/30/2022       · Likely in the setting of dehydration/ intravascular volume depletion secondary to oral diuretics   · Patient presented to the ED with a creatine 6 18  · Baseline creatine 1 2-1 8  · No urgent indication for dialysis   · CT A/P 10/22 showed no evidence of obstructive hydronephrosis  Urinary bladder is decompressed with the frederick catheter   · Nephrology consulted- appreciate recommendations   · Check urine lytes  · Start NS at 100cc/hr  · Trend creatine  · Continue frederick    UTI (urinary tract infection)  Assessment & Plan  · Concern for UTI  · UA +WBC, No nitrates   · Started on Ceftriaxone in the ED  · F/u urine cultures  · LA 2 0 on admission   · Received >30cc/kg IBW for volume resuscitation   BP improved to baseline     GERD (gastroesophageal reflux disease)  Assessment & Plan  · Continue home PPI     Chronic hypotension  Assessment & Plan  · Patient received IV fluids secondary to volume depletion and hypotension   · Restarted home midodrine     Hypothyroidism  Assessment & Plan  · Restart home synthroid   · Check TSH     Acute on chronic diastolic congestive heart failure [facility])  Assessment & Plan  Wt Readings from Last 3 Encounters:   10/22/22 (!) 148 kg (325 lb 9 9 oz)   10/03/22 (!) 171 kg (377 lb 6 8 oz)   04/13/20 (!) 162 kg (356 lb 0 7 oz)       · Patient hospitalized 9/21/22 - 10/3/22 secondary to volume overload   · Started on torsemide and zaroxolyn  · Patient down 23kg from last admission   · No evidence of volume overload on exam  · Holding home diuretics in the setting of IMTIAZ   · Echocardiogram 9/21/22: The left ventricular ejection fraction is 55-59%  Systolic function is normal  Wall motion is grossly normal    There is both systolic and diastolic flattening of the interventricular septum consistent with right ventricle pressure and volume overload: Right ventricular cavity size is severely dilated  Systolic function is mildly to moderately reduced  Chronic respiratory failure with hypoxia ([facility])  Assessment & Plan  · Chronically on CPAP at night and 4 L of oxygen during the day secondary to COPD most likely NISHI  · CPAP HS  · Goal Sp02>88%  · Respiratory protocol       Diabetes mellitus ([facility] )  Assessment & Plan  Lab Results   Component Value Date    HGBA1C 6 8 (H) 10/05/2022       No results for input(s): POCGLU in the last 72 hours  Blood Sugar Average: Last 72 hrs:     · Start ISS  · Goal -180    COPD (chronic obstructive pulmonary disease) ([facility])  Assessment & Plan  · Continue home breo ellipta and singulair   · Atrovent and xopenex   · Respiratory protocol     Atrial fibrillation ([facility])  Assessment & Plan  · Metoprolol succinate currently on hold secondary to hypotension- can restart low dose lopressor as needed for rate control   · Restart home coumadin pending INR   · Check INR   · Goal INR 2-3    -------------------------------------------------------------------------------------------------------------  Chief Complaint: Generalized weakness    History of Present Illness     Veronica Lynn is a 69 y o  female who presented to the ED today with c/o generalized weakness and was told by her PCP to go to the ED after outpatient labs resulted with a creatine of 6  She was recently admitted from 9/21 - 74/0 for diastolic HF and volume overload  She was discharged on torsemide and zaroxolyn     Patient c/o generalized weakness and inability to void x 4 day but denies any other c/o  Denies SOB, CP, dysuria, flank pain        History obtained from chart review and the patient   -------------------------------------------------------------------------------------------------------------  Dispo: Admit to Stepdown Level 1    Code Status: Prior  --------------------------------------------------------------------------------------------------------------  Review of Systems   Constitutional: Positive for fatigue  Genitourinary: Positive for difficulty urinating  Negative for dysuria and flank pain  All other systems reviewed and are negative  A 12-point, complete review of systems was reviewed and negative except as stated above     Physical Exam  Vitals reviewed  Constitutional:       General: She is not in acute distress  HENT:      Head: Normocephalic  Nose: Nose normal       Mouth/Throat:      Mouth: Mucous membranes are dry  Eyes:      Pupils: Pupils are equal, round, and reactive to light  Cardiovascular:      Rate and Rhythm: Normal rate  Pulses: Normal pulses  Pulmonary:      Effort: Pulmonary effort is normal    Abdominal:      Palpations: Abdomen is soft  Comments: Obese   Musculoskeletal:         General: Normal range of motion  Right lower leg: No edema  Left lower leg: No edema  Skin:     General: Skin is warm  Comments: Chronic BL LE skin changes    Neurological:      General: No focal deficit present  Mental Status: She is alert     Psychiatric:         Mood and Affect: Mood normal        --------------------------------------------------------------------------------------------------------------  Vitals:   Vitals:    10/22/22 1430 10/22/22 1445 10/22/22 1500 10/22/22 1611   BP: (!) 92/44 (!) 86/49 96/52 123/92   BP Location: Left arm Left arm Left arm Left arm   Pulse: 63 69 66 61   Resp: (!) 25 19 (!) 23 21   Temp:       SpO2: 99% 98% 100% 99%   Weight:       Height:         Temp  Min: 96 1 °F (35 6 °C) Max: 96 1 °F (35 6 °C)     Height: 5' 2" (157 5 cm)  Body mass index is 59 56 kg/m²  Laboratory and Diagnostics:  Results from last 7 days   Lab Units 10/22/22  1214   WBC Thousand/uL 9 33   HEMOGLOBIN g/dL 13 5   HEMATOCRIT % 41 7   PLATELETS Thousands/uL 264   NEUTROS PCT % 65   MONOS PCT % 10     Results from last 7 days   Lab Units 10/22/22  1214   SODIUM mmol/L 124*   POTASSIUM mmol/L 4 1   CHLORIDE mmol/L 82*   CO2 mmol/L 32   ANION GAP mmol/L 10   BUN mg/dL 160*   CREATININE mg/dL 6 18*   CALCIUM mg/dL 9 2   GLUCOSE RANDOM mg/dL 235*   ALT U/L 43   AST U/L 44   ALK PHOS U/L 168*   ALBUMIN g/dL 3 2*   TOTAL BILIRUBIN mg/dL 0 64     Results from last 7 days   Lab Units 10/22/22  1214   PHOSPHORUS mg/dL 6 5*               Results from last 7 days   Lab Units 10/22/22  1214   LACTIC ACID mmol/L 2 0     ABG:    VBG:          Micro:        EKG: IRR  Imaging: I have personally reviewed pertinent reports     and I have personally reviewed pertinent films in PACS      Historical Information   Past Medical History:   Diagnosis Date   • Asthma    • Atrial fibrillation ([facility] )    • COPD (chronic obstructive pulmonary disease) ([facility] )    • Diabetes mellitus ([facility] )    • Disease of thyroid gland    • Heart failure ([facility] )    • Kidney failure      Past Surgical History:   Procedure Laterality Date   • CARDIOVERSION N/A    • GALLBLADDER SURGERY     • HERNIA REPAIR       Social History   Social History     Substance and Sexual Activity   Alcohol Use Never     Social History     Substance and Sexual Activity   Drug Use Never     Social History     Tobacco Use   Smoking Status Never Smoker   Smokeless Tobacco Never Used     Exercise History: Ambulatory dysfunction- only moves from recliner to commode  Family History:   Family History   Problem Relation Age of Onset   • Arthritis Mother    • Hearing loss Mother    • Heart disease Mother    • Hypertension Mother    • Stroke Mother    • Alcohol abuse Father    • Cancer Father    • Diabetes Father    • Arthritis Sister    • Cancer Sister    • Alcohol abuse Brother    • Diabetes Son    • Arthritis Daughter    • Drug abuse Daughter    • Heart disease Maternal Grandmother    • Hypertension Maternal Grandmother    • Stroke Maternal Grandmother    • Arthritis Maternal Aunt    • Asthma Neg Hx    • Birth defects Neg Hx    • COPD Neg Hx    • Depression Neg Hx    • Early death Neg Hx    • Hyperlipidemia Neg Hx    • Kidney disease Neg Hx    • Learning disabilities Neg Hx    • Mental illness Neg Hx    • Mental retardation Neg Hx    • Miscarriages / Stillbirths Neg Hx    • Vision loss Neg Hx      I have reviewed this patient's family history and commented on sigificant items within the HPI      Medications:  Current Facility-Administered Medications   Medication Dose Route Frequency   • sodium chloride 0 9 % infusion  100 mL/hr Intravenous Continuous     Home medications:  Prior to Admission Medications   Prescriptions Last Dose Informant Patient Reported? Taking?    Insulin Aspart (NovoLOG) 100 units/mL injection   No No   Sig: Inject 3 Units under the skin 3 (three) times a day with meals   Levothyroxine Sodium 88 MCG CAPS   Yes No   Sig: Take by mouth daily   METOPROLOL SUCCINATE ER PO   Yes No   Sig: Take 100 mg by mouth 2 (two) times a day   acetaminophen (TYLENOL) 325 mg tablet   No No   Sig: Take 2 tablets (650 mg total) by mouth every 6 (six) hours as needed for fever   allopurinol (ZYLOPRIM) 100 mg tablet   Yes No   Sig: Take 100 mg by mouth daily Dose needs to be verified   ammonium lactate (LAC-HYDRIN) 12 % lotion   No No   Sig: Apply topically 2 (two) times a day as needed for dry skin   atorvastatin (LIPITOR) 10 mg tablet   Yes No   Sig: Take 10 mg by mouth daily   cetirizine (ZyrTEC) 10 mg tablet   Yes No   Sig: Take 10 mg by mouth daily   cholecalciferol (VITAMIN D3) 1,000 units tablet   Yes No   Sig: Take 2,000 Units by mouth daily   docusate sodium (COLACE) 100 mg capsule   No No   Sig: Take 1 capsule (100 mg total) by mouth 2 (two) times a day   ferrous sulfate 325 (65 Fe) mg tablet   Yes No   Sig: Take 325 mg by mouth daily with breakfast   fluticasone-vilanterol (BREO ELLIPTA) 100-25 mcg/inh inhaler   Yes No   Sig: Inhale 1 puff daily Rinse mouth after use  insulin detemir (LEVEMIR) 100 units/mL subcutaneous injection   Yes No   Sig: Inject 26 Units under the skin daily at bedtime   levothyroxine 200 mcg tablet   Yes No   Sig: Take 200 mcg by mouth daily   metolazone (ZAROXOLYN) 2 5 mg tablet   No No   Sig: Take 1 tablet (2 5 mg total) by mouth 3 (three) times a week Do not start before October 5, 2022  midodrine (PROAMATINE) 5 mg tablet   No No   Sig: Take 1 tablet (5 mg total) by mouth 3 (three) times a day before meals   montelukast (SINGULAIR) 10 mg tablet   Yes No   Sig: Take 10 mg by mouth daily at bedtime   multivitamin (THERAGRAN) TABS   Yes No   Sig: Take 1 tablet by mouth daily   nystatin (MYCOSTATIN) powder   No No   Sig: Apply topically 2 (two) times a day   omeprazole (PriLOSEC) 40 MG capsule   Yes No   Sig: Take 40 mg by mouth daily   polyethylene glycol (MIRALAX) 17 g packet   No No   Sig: Take 17 g by mouth daily as needed (Constipation)   potassium chloride (K-DUR,KLOR-CON) 20 mEq tablet   No No   Sig: Take 1 tablet (20 mEq total) by mouth daily Do not start before October 4, 2022  torsemide 40 MG TABS   No No   Sig: Take 40 mg by mouth 2 (two) times a day   warfarin (COUMADIN) 6 mg tablet   No No   Sig: Take 1 tablet (6 mg total) by mouth daily      Facility-Administered Medications: None     Allergies:   Allergies   Allergen Reactions   • Acesulfame Potassium - Food Allergy Anaphylaxis   • Aspartame - Food Allergy Anaphylaxis   • Saccharin - Food Allergy Anaphylaxis   • Sucralose - Food Allergy Anaphylaxis   • Metformin GI Intolerance       ------------------------------------------------------------------------------------------------------------  Advance Directive and Living Will:      Power of :    POLST:    ------------------------------------------------------------------------------------------------------------  Anticipated Length of Stay is > 2 midnights    Care Time Delivered:   No Critical Care time spent       MINISTRY [facility], [place]        Portions of the record may have been created with voice recognition software  Occasional wrong word or "sound a like" substitutions may have occurred due to the inherent limitations of voice recognition software    Read the chart carefully and recognize, using context, where substitutions have occurred

## 2022-10-22 NOTE — ED NOTES
MD made aware of hypotensive episode, stated to keep fluid boluses going       Nelida Munroe, [facility]  10/22/22 [id number]

## 2022-10-22 NOTE — ASSESSMENT & PLAN NOTE
Wt Readings from Last 3 Encounters:   10/22/22 (!) 146 kg (320 lb 12 3 oz)   10/03/22 (!) 171 kg (377 lb 6 8 oz)   04/13/20 (!) 162 kg (356 lb 0 7 oz)       · Patient hospitalized 9/21/22 - 10/3/22 secondary to volume overload   · Started on torsemide and zaroxolyn  · Patient down 23kg from last admission   · No evidence of volume overload on exam  · Holding home diuretics in the setting of IMTIAZ   · Home BB on hold due to hypotension  · Echocardiogram 9/21/22: The left ventricular ejection fraction is 55-59%  Systolic function is normal  Wall motion is grossly normal  There is both systolic and diastolic flattening of the interventricular septum consistent with right ventricle pressure and volume overload: Right ventricular cavity size is severely dilated  Systolic function is mildly to moderately reduced

## 2022-10-22 NOTE — ASSESSMENT & PLAN NOTE
· Acute on chronic  · Patient received IV fluids secondary to volume depletion and hypotension  Last dose of Midodrine 10/17  Pt reports not taking after discharge home for rehab facility  · Cont home midodrine  · 10/22 Fluid resuscitated on admission  BP trended down as UOP increased  Additional boluses given with 200 ml Albumen, 1 L LR, 500 ml NSS  Started on alannah-synephrine and charlette placed  BP intermittently responded  Additional boluses given with 200 ml Albumen and 1 5 L NSS  Levophed started

## 2022-10-22 NOTE — ASSESSMENT & PLAN NOTE
Wt Readings from Last 3 Encounters:   10/22/22 (!) 148 kg (325 lb 9 9 oz)   10/03/22 (!) 171 kg (377 lb 6 8 oz)   04/13/20 (!) 162 kg (356 lb 0 7 oz)       · Patient hospitalized 9/21/22 - 10/3/22 secondary to volume overload   · Started on torsemide and zaroxolyn  · Patient down 23kg from last admission   · No evidence of volume overload on exam  · Holding home diuretics in the setting of IMTIAZ   · Echocardiogram 9/21/22: The left ventricular ejection fraction is 55-59%  Systolic function is normal  Wall motion is grossly normal    There is both systolic and diastolic flattening of the interventricular septum consistent with right ventricle pressure and volume overload: Right ventricular cavity size is severely dilated  Systolic function is mildly to moderately reduced

## 2022-10-23 ENCOUNTER — APPOINTMENT (INPATIENT)
Dept: ULTRASOUND IMAGING | Facility: HOSPITAL | Age: 67
End: 2022-10-23

## 2022-10-23 ENCOUNTER — APPOINTMENT (INPATIENT)
Dept: RADIOLOGY | Facility: HOSPITAL | Age: 67
End: 2022-10-23

## 2022-10-23 PROBLEM — N93.9 VAGINAL BLEEDING: Status: ACTIVE | Noted: 2022-10-23

## 2022-10-23 PROBLEM — I95.9 HYPOTENSION: Status: ACTIVE | Noted: 2022-10-22

## 2022-10-23 PROBLEM — E87.1 ACUTE HYPONATREMIA: Status: ACTIVE | Noted: 2022-10-23

## 2022-10-23 LAB
ABO GROUP BLD: NORMAL
ABO GROUP BLD: NORMAL
ALBUMIN SERPL BCP-MCNC: 3.9 G/DL (ref 3.5–5)
ALP SERPL-CCNC: 129 U/L (ref 46–116)
ALT SERPL W P-5'-P-CCNC: 36 U/L (ref 12–78)
ANION GAP SERPL CALCULATED.3IONS-SCNC: 10 MMOL/L (ref 4–13)
ANION GAP SERPL CALCULATED.3IONS-SCNC: 13 MMOL/L (ref 4–13)
ANION GAP SERPL CALCULATED.3IONS-SCNC: 13 MMOL/L (ref 4–13)
AST SERPL W P-5'-P-CCNC: 31 U/L (ref 5–45)
BACTERIA UR CULT: ABNORMAL
BACTERIA UR CULT: NORMAL
BASE EX.OXY STD BLDV CALC-SCNC: 88.1 % (ref 60–80)
BASE EXCESS BLDA CALC-SCNC: -1.2 MMOL/L
BASE EXCESS BLDV CALC-SCNC: -3.5 MMOL/L
BILIRUB DIRECT SERPL-MCNC: 0.39 MG/DL (ref 0–0.2)
BILIRUB SERPL-MCNC: 0.68 MG/DL (ref 0.2–1)
BLD GP AB SCN SERPL QL: NEGATIVE
BODY TEMPERATURE: 97.5 DEGREES FEHRENHEIT
BUN SERPL-MCNC: 111 MG/DL (ref 5–25)
BUN SERPL-MCNC: 123 MG/DL (ref 5–25)
BUN SERPL-MCNC: 135 MG/DL (ref 5–25)
CA-I BLD-SCNC: 1.07 MMOL/L (ref 1.12–1.32)
CALCIUM SERPL-MCNC: 8.7 MG/DL (ref 8.3–10.1)
CALCIUM SERPL-MCNC: 9.2 MG/DL (ref 8.3–10.1)
CALCIUM SERPL-MCNC: 9.9 MG/DL (ref 8.3–10.1)
CHLORIDE SERPL-SCNC: 90 MMOL/L (ref 96–108)
CHLORIDE SERPL-SCNC: 91 MMOL/L (ref 96–108)
CHLORIDE SERPL-SCNC: 94 MMOL/L (ref 96–108)
CO2 SERPL-SCNC: 28 MMOL/L (ref 21–32)
CO2 SERPL-SCNC: 28 MMOL/L (ref 21–32)
CO2 SERPL-SCNC: 29 MMOL/L (ref 21–32)
CREAT SERPL-MCNC: 3.29 MG/DL (ref 0.6–1.3)
CREAT SERPL-MCNC: 3.97 MG/DL (ref 0.6–1.3)
CREAT SERPL-MCNC: 4.32 MG/DL (ref 0.6–1.3)
ERYTHROCYTE [DISTWIDTH] IN BLOOD BY AUTOMATED COUNT: 17.6 % (ref 11.6–15.1)
GFR SERPL CREATININE-BSD FRML MDRD: 10 ML/MIN/1.73SQ M
GFR SERPL CREATININE-BSD FRML MDRD: 13 ML/MIN/1.73SQ M
GFR SERPL CREATININE-BSD FRML MDRD: 9 ML/MIN/1.73SQ M
GLUCOSE SERPL-MCNC: 126 MG/DL (ref 65–140)
GLUCOSE SERPL-MCNC: 126 MG/DL (ref 65–140)
GLUCOSE SERPL-MCNC: 140 MG/DL (ref 65–140)
GLUCOSE SERPL-MCNC: 167 MG/DL (ref 65–140)
GLUCOSE SERPL-MCNC: 258 MG/DL (ref 65–140)
GLUCOSE SERPL-MCNC: 262 MG/DL (ref 65–140)
GLUCOSE SERPL-MCNC: 265 MG/DL (ref 65–140)
GLUCOSE SERPL-MCNC: 300 MG/DL (ref 65–140)
GLUCOSE SERPL-MCNC: 301 MG/DL (ref 65–140)
HCO3 BLDA-SCNC: 24.2 MMOL/L (ref 22–28)
HCO3 BLDV-SCNC: 21.5 MMOL/L (ref 24–30)
HCT VFR BLD AUTO: 36.4 % (ref 34.8–46.1)
HGB BLD-MCNC: 12 G/DL (ref 11.5–15.4)
INR PPP: 3.71 (ref 0.84–1.19)
LACTATE SERPL-SCNC: 1 MMOL/L (ref 0.5–2)
MAGNESIUM SERPL-MCNC: 2.1 MG/DL (ref 1.6–2.6)
MAGNESIUM SERPL-MCNC: 2.7 MG/DL (ref 1.6–2.6)
MCH RBC QN AUTO: 28.4 PG (ref 26.8–34.3)
MCHC RBC AUTO-ENTMCNC: 33 G/DL (ref 31.4–37.4)
MCV RBC AUTO: 86 FL (ref 82–98)
NASAL CANNULA: 4
NON VENT CPAP: ABNORMAL
NON VENT ROOM AIR: 36 %
O2 CT BLDA-SCNC: 17.6 ML/DL (ref 16–23)
O2 CT BLDV-SCNC: 16.6 ML/DL
OSMOLALITY UR/SERPL-RTO: 333 MMOL/KG (ref 282–298)
OXYHGB MFR BLDA: 97.7 % (ref 94–97)
PCO2 BLDA: 43.6 MM HG (ref 36–44)
PCO2 BLDV: 38.6 MM HG (ref 42–50)
PCO2 TEMP ADJ BLDA: 42.5 MM HG (ref 36–44)
PH BLD: 7.37 [PH] (ref 7.35–7.45)
PH BLDA: 7.36 [PH] (ref 7.35–7.45)
PH BLDV: 7.36 [PH] (ref 7.3–7.4)
PHOSPHATE SERPL-MCNC: 6.1 MG/DL (ref 2.3–4.1)
PLATELET # BLD AUTO: 264 THOUSANDS/UL (ref 149–390)
PMV BLD AUTO: 11.4 FL (ref 8.9–12.7)
PO2 BLD: 115.6 MM HG (ref 75–129)
PO2 BLDA: 119.3 MM HG (ref 75–129)
PO2 BLDV: 58.5 MM HG (ref 35–45)
POTASSIUM SERPL-SCNC: 3.6 MMOL/L (ref 3.5–5.3)
POTASSIUM SERPL-SCNC: 3.7 MMOL/L (ref 3.5–5.3)
POTASSIUM SERPL-SCNC: 3.7 MMOL/L (ref 3.5–5.3)
PROT SERPL-MCNC: 7.8 G/DL (ref 6.4–8.4)
PROTHROMBIN TIME: 36.7 SECONDS (ref 11.6–14.5)
QRS AXIS: 17 DEGREES
QRSD INTERVAL: 122 MS
QT INTERVAL: 456 MS
QTC INTERVAL: 484 MS
RBC # BLD AUTO: 4.22 MILLION/UL (ref 3.81–5.12)
RH BLD: POSITIVE
RH BLD: POSITIVE
SODIUM SERPL-SCNC: 132 MMOL/L (ref 135–147)
SPECIMEN EXPIRATION DATE: NORMAL
SPECIMEN SOURCE: ABNORMAL
T WAVE AXIS: 40 DEGREES
VENTRICULAR RATE: 68 BPM
WBC # BLD AUTO: 10.88 THOUSAND/UL (ref 4.31–10.16)

## 2022-10-23 PROCEDURE — 02HV33Z INSERTION OF INFUSION DEVICE INTO SUPERIOR VENA CAVA, PERCUTANEOUS APPROACH: ICD-10-PCS | Performed by: ANESTHESIOLOGY

## 2022-10-23 RX ORDER — METOPROLOL TARTRATE 50 MG/1
50 TABLET, FILM COATED ORAL EVERY 12 HOURS SCHEDULED
COMMUNITY
End: 2022-10-29

## 2022-10-23 RX ORDER — POTASSIUM CHLORIDE 14.9 MG/ML
20 INJECTION INTRAVENOUS ONCE
Status: COMPLETED | OUTPATIENT
Start: 2022-10-23 | End: 2022-10-23

## 2022-10-23 RX ORDER — INSULIN LISPRO 100 [IU]/ML
4-20 INJECTION, SOLUTION INTRAVENOUS; SUBCUTANEOUS
Status: DISCONTINUED | OUTPATIENT
Start: 2022-10-23 | End: 2022-10-23

## 2022-10-23 RX ORDER — INSULIN LISPRO 100 [IU]/ML
3 INJECTION, SOLUTION INTRAVENOUS; SUBCUTANEOUS
Refills: 0 | Status: DISCONTINUED | OUTPATIENT
Start: 2022-10-24 | End: 2022-10-26

## 2022-10-23 RX ORDER — ALBUMIN (HUMAN) 12.5 G/50ML
50 SOLUTION INTRAVENOUS ONCE
Status: COMPLETED | OUTPATIENT
Start: 2022-10-23 | End: 2022-10-23

## 2022-10-23 RX ORDER — INSULIN LISPRO 100 [IU]/ML
2-12 INJECTION, SOLUTION INTRAVENOUS; SUBCUTANEOUS
Status: DISCONTINUED | OUTPATIENT
Start: 2022-10-23 | End: 2022-10-23

## 2022-10-23 RX ORDER — LEVALBUTEROL 1.25 MG/.5ML
1.25 SOLUTION, CONCENTRATE RESPIRATORY (INHALATION)
Status: DISCONTINUED | OUTPATIENT
Start: 2022-10-23 | End: 2022-10-23

## 2022-10-23 RX ORDER — MIDODRINE HYDROCHLORIDE 5 MG/1
10 TABLET ORAL
Status: DISCONTINUED | OUTPATIENT
Start: 2022-10-23 | End: 2022-10-29 | Stop reason: HOSPADM

## 2022-10-23 RX ORDER — MAGNESIUM SULFATE HEPTAHYDRATE 40 MG/ML
2 INJECTION, SOLUTION INTRAVENOUS ONCE
Status: COMPLETED | OUTPATIENT
Start: 2022-10-23 | End: 2022-10-23

## 2022-10-23 RX ORDER — INSULIN LISPRO 100 [IU]/ML
4-20 INJECTION, SOLUTION INTRAVENOUS; SUBCUTANEOUS
Status: DISCONTINUED | OUTPATIENT
Start: 2022-10-24 | End: 2022-10-29 | Stop reason: HOSPADM

## 2022-10-23 RX ORDER — CALCIUM GLUCONATE 20 MG/ML
1 INJECTION, SOLUTION INTRAVENOUS ONCE
Status: COMPLETED | OUTPATIENT
Start: 2022-10-23 | End: 2022-10-23

## 2022-10-23 RX ORDER — POTASSIUM CHLORIDE 20 MEQ/1
20 TABLET, EXTENDED RELEASE ORAL ONCE
Status: DISCONTINUED | OUTPATIENT
Start: 2022-10-23 | End: 2022-10-23

## 2022-10-23 RX ORDER — INSULIN LISPRO 100 [IU]/ML
1-6 INJECTION, SOLUTION INTRAVENOUS; SUBCUTANEOUS
Status: DISCONTINUED | OUTPATIENT
Start: 2022-10-23 | End: 2022-10-29 | Stop reason: HOSPADM

## 2022-10-23 RX ORDER — CALCIUM GLUCONATE 20 MG/ML
2 INJECTION, SOLUTION INTRAVENOUS ONCE
Status: COMPLETED | OUTPATIENT
Start: 2022-10-23 | End: 2022-10-23

## 2022-10-23 RX ORDER — POTASSIUM CHLORIDE 14.9 MG/ML
20 INJECTION INTRAVENOUS ONCE
Status: COMPLETED | OUTPATIENT
Start: 2022-10-23 | End: 2022-10-24

## 2022-10-23 RX ADMIN — VASOPRESSIN 0.04 UNITS/MIN: 20 INJECTION INTRAVENOUS at 17:45

## 2022-10-23 RX ADMIN — INSULIN LISPRO 6 UNITS: 100 INJECTION, SOLUTION INTRAVENOUS; SUBCUTANEOUS at 17:18

## 2022-10-23 RX ADMIN — CALCIUM GLUCONATE 1 G: 20 INJECTION, SOLUTION INTRAVENOUS at 01:16

## 2022-10-23 RX ADMIN — PANTOPRAZOLE SODIUM 20 MG: 20 TABLET, DELAYED RELEASE ORAL at 05:19

## 2022-10-23 RX ADMIN — FERROUS SULFATE TAB 325 MG (65 MG ELEMENTAL FE) 325 MG: 325 (65 FE) TAB at 08:18

## 2022-10-23 RX ADMIN — CALCIUM GLUCONATE 2 G: 20 INJECTION, SOLUTION INTRAVENOUS at 05:22

## 2022-10-23 RX ADMIN — SODIUM CHLORIDE 200 ML/HR: 0.9 INJECTION, SOLUTION INTRAVENOUS at 03:06

## 2022-10-23 RX ADMIN — MIDODRINE HYDROCHLORIDE 10 MG: 5 TABLET ORAL at 11:12

## 2022-10-23 RX ADMIN — SODIUM CHLORIDE 500 ML: 0.9 INJECTION, SOLUTION INTRAVENOUS at 00:36

## 2022-10-23 RX ADMIN — LEVOTHYROXINE SODIUM 200 MCG: 100 TABLET ORAL at 08:17

## 2022-10-23 RX ADMIN — MIDODRINE HYDROCHLORIDE 10 MG: 5 TABLET ORAL at 17:17

## 2022-10-23 RX ADMIN — NYSTATIN: 100000 POWDER TOPICAL at 17:20

## 2022-10-23 RX ADMIN — POTASSIUM CHLORIDE 20 MEQ: 14.9 INJECTION, SOLUTION INTRAVENOUS at 22:20

## 2022-10-23 RX ADMIN — DOCUSATE SODIUM 100 MG: 100 CAPSULE ORAL at 08:18

## 2022-10-23 RX ADMIN — SODIUM CHLORIDE 500 ML: 0.9 INJECTION, SOLUTION INTRAVENOUS at 01:52

## 2022-10-23 RX ADMIN — POTASSIUM CHLORIDE 20 MEQ: 14.9 INJECTION, SOLUTION INTRAVENOUS at 14:46

## 2022-10-23 RX ADMIN — NYSTATIN: 100000 POWDER TOPICAL at 08:23

## 2022-10-23 RX ADMIN — NOREPINEPHRINE BITARTRATE 8 MCG/MIN: 1 INJECTION INTRAVENOUS at 08:27

## 2022-10-23 RX ADMIN — INSULIN LISPRO 3 UNITS: 100 INJECTION, SOLUTION INTRAVENOUS; SUBCUTANEOUS at 22:14

## 2022-10-23 RX ADMIN — NOREPINEPHRINE BITARTRATE 15 MCG/MIN: 1 INJECTION INTRAVENOUS at 13:18

## 2022-10-23 RX ADMIN — ACETAMINOPHEN 650 MG: 325 TABLET ORAL at 17:17

## 2022-10-23 RX ADMIN — NOREPINEPHRINE BITARTRATE 18 MCG/MIN: 1 INJECTION INTRAVENOUS at 17:18

## 2022-10-23 RX ADMIN — POTASSIUM CHLORIDE 20 MEQ: 14.9 INJECTION, SOLUTION INTRAVENOUS at 01:57

## 2022-10-23 RX ADMIN — DOCUSATE SODIUM 100 MG: 100 CAPSULE ORAL at 17:17

## 2022-10-23 RX ADMIN — PHENYLEPHRINE HYDROCHLORIDE 180 MCG/MIN: 10 INJECTION INTRAVENOUS at 04:38

## 2022-10-23 RX ADMIN — INSULIN LISPRO 8 UNITS: 100 INJECTION, SOLUTION INTRAVENOUS; SUBCUTANEOUS at 11:30

## 2022-10-23 RX ADMIN — NOREPINEPHRINE BITARTRATE 4 MCG/MIN: 1 INJECTION INTRAVENOUS at 04:32

## 2022-10-23 RX ADMIN — Medication 2000 UNITS: at 08:17

## 2022-10-23 RX ADMIN — NOREPINEPHRINE BITARTRATE 14 MCG/MIN: 1 INJECTION INTRAVENOUS at 21:40

## 2022-10-23 RX ADMIN — ALBUMIN (HUMAN) 50 G: 0.25 INJECTION, SOLUTION INTRAVENOUS at 03:26

## 2022-10-23 RX ADMIN — CEFTRIAXONE 1000 MG: 1 INJECTION, SOLUTION INTRAVENOUS at 11:11

## 2022-10-23 RX ADMIN — ATORVASTATIN CALCIUM 10 MG: 10 TABLET, FILM COATED ORAL at 08:18

## 2022-10-23 RX ADMIN — MIDODRINE HYDROCHLORIDE 5 MG: 5 TABLET ORAL at 07:33

## 2022-10-23 RX ADMIN — INSULIN DETEMIR 10 UNITS: 100 INJECTION, SOLUTION SUBCUTANEOUS at 22:13

## 2022-10-23 RX ADMIN — MONTELUKAST 10 MG: 10 TABLET, FILM COATED ORAL at 21:40

## 2022-10-23 RX ADMIN — ACETAMINOPHEN 650 MG: 325 TABLET ORAL at 11:14

## 2022-10-23 RX ADMIN — SODIUM CHLORIDE 250 ML/HR: 0.9 INJECTION, SOLUTION INTRAVENOUS at 07:04

## 2022-10-23 RX ADMIN — ACETAMINOPHEN 650 MG: 325 TABLET ORAL at 05:19

## 2022-10-23 RX ADMIN — FLUTICASONE FUROATE AND VILANTEROL TRIFENATATE 1 PUFF: 100; 25 POWDER RESPIRATORY (INHALATION) at 08:21

## 2022-10-23 RX ADMIN — PHENYLEPHRINE HYDROCHLORIDE 125 MCG/MIN: 10 INJECTION INTRAVENOUS at 08:26

## 2022-10-23 RX ADMIN — POTASSIUM CHLORIDE 20 MEQ: 14.9 INJECTION, SOLUTION INTRAVENOUS at 05:59

## 2022-10-23 RX ADMIN — SODIUM CHLORIDE 1000 ML: 0.9 INJECTION, SOLUTION INTRAVENOUS at 03:54

## 2022-10-23 RX ADMIN — MAGNESIUM SULFATE HEPTAHYDRATE 2 G: 40 INJECTION, SOLUTION INTRAVENOUS at 01:19

## 2022-10-23 NOTE — ASSESSMENT & PLAN NOTE
· Concern for UTI  · UA +WBC, No nitrates   · Started on Ceftriaxone in the ED  · F/u urine cultures - >100,000 alpha hemolytic strep  · LA 2 0 on admission, cleared   · Completed 3 days of Ceftriaxone, d/c abx

## 2022-10-23 NOTE — ASSESSMENT & PLAN NOTE
· Vaginal bleeding noted 10/22  Pt reports she had "bleeding" while in rehab but "they didn't know where it was coming from "  · Continues with bright red blood vaginally and passing multiple clots  · Monitor bleeding  · Trend Hgb and INR  · Hgb 12 down from 13 5 in the setting of fluid resuscitation  · Type and screen sent    · Needs outpatient follow up with Gyne

## 2022-10-23 NOTE — PROGRESS NOTES
[address]  Progress Note Christobal Ringer 1955, 69 y o  female MRN: [id number]  Unit/Bed#: -01 Encounter: [id number]  Primary Care Provider: Brooke Au MD   Date and time admitted to hospital: 10/22/2022 11:40 AM    * Acute kidney injury superimposed on chronic kidney disease [facility])  Assessment & Plan  Lab Results   Component Value Date    EGFR 9 10/23/2022    EGFR 8 10/22/2022    EGFR 6 10/22/2022    CREATININE 4 32 (H) 10/23/2022    CREATININE 4 95 (H) 10/22/2022    CREATININE 6 18 (H) 10/22/2022       · Likely in the setting of dehydration/intravascular volume depletion secondary to oral diuretics   · Patient presented to the ED with a creatine 6 18  · Baseline creatine 1 2-1 8  · No urgent indication for dialysis   · CT A/P 10/22 showed no evidence of obstructive hydronephrosis  Urinary bladder is decompressed with the frederick catheter   · Renal US pending  · Nephrology consulted- appreciate recommendations   · Check urine lytes  · NSS  · Trend creatine  · Continue frederick  · Avoid hypotension and nephrotoxins  · Strict I&O    UTI (urinary tract infection)  Assessment & Plan  · Concern for UTI  · UA +WBC, No nitrates   · Started on Ceftriaxone in the ED  · F/u urine cultures  · LA 2 0 on admission   · Received >30cc/kg IBW for volume resuscitation  BP improved to baseline with initial resuscitation  Vaginal bleeding  Assessment & Plan  · Vaginal bleeding noted 10/22  Pt reports she had "bleeding" while in rehab but "they didn't know where it was coming from "  · Continues with bright red blood vaginally and passing multiple clots  · Monitor bleeding  · Coumadin currently on hold due to elevated INR  · Trend Hgb and INR  · Hgb 12 down from 13 5 in the setting of fluid resuscitation  · Type and screen sent      Acute hyponatremia  Assessment & Plan  · Suspected etiology hypovolemia with use of oral duiretics  · Corrected sodium 126 at time of presentation  · Urine lytes pending  · Cont NSS at 100 ml/hr  · Trend BMP    GERD (gastroesophageal reflux disease)  Assessment & Plan  · Continue home PPI     Hypotension  Assessment & Plan  · Acute on chronic  · Patient received IV fluids secondary to volume depletion and hypotension  Last dose of Midodrine 10/17  Pt reports not taking after discharge home for rehab facility  · Cont home midodrine  · 10/22 Fluid resuscitated on admission  BP trended down as UOP increased  Additional boluses given with 200 ml Albumen, 1 L LR, 500 ml NSS  Started on alannah-synephrine and charlette placed  BP intermittently responded  Additional boluses given with 200 ml Albumen and 1 5 L NSS  Levophed started  Hypothyroidism  Assessment & Plan  · Restart home synthroid   · TSH 1 14    Morbid obesity ([address] )  Assessment & Plan  · Encourage lifestyle and diet changes  · Nutrition consult    Acute on chronic diastolic congestive heart failure [facility])  Assessment & Plan  Wt Readings from Last 3 Encounters:   10/22/22 (!) 146 kg (320 lb 12 3 oz)   10/03/22 (!) 171 kg (377 lb 6 8 oz)   04/13/20 (!) 162 kg (356 lb 0 7 oz)       · Patient hospitalized 9/21/22 - 10/3/22 secondary to volume overload   · Started on torsemide and zaroxolyn  · Patient down 23kg from last admission   · No evidence of volume overload on exam  · Holding home diuretics in the setting of IMTIAZ   · Home BB on hold due to hypotension  · Echocardiogram 9/21/22: The left ventricular ejection fraction is 55-59%  Systolic function is normal  Wall motion is grossly normal  There is both systolic and diastolic flattening of the interventricular septum consistent with right ventricle pressure and volume overload: Right ventricular cavity size is severely dilated  Systolic function is mildly to moderately reduced      Chronic respiratory failure with hypoxia (HCC)  Assessment & Plan  · Chronically on CPAP at night and 4 L of oxygen during the day secondary to COPD most likely NISHI  · CPAP HS - using home unit  · Goal Sp02>88%  · Respiratory protocol     Diabetes mellitus [facility])  Assessment & Plan  Lab Results   Component Value Date    HGBA1C 6 8 (H) 10/05/2022       Recent Labs     10/22/22  1850 10/22/22  2356   POCGLU 159* 140       Blood Sugar Average: Last 72 hrs:     · SSI  · Goal -180    COPD (chronic obstructive pulmonary disease) ([facility])  Assessment & Plan  · Continue home breo ellipta and singulair   · Atrovent and xopenex QID  · Albuterol PRN  · Respiratory protocol     Atrial fibrillation ([facility])  Assessment & Plan  · Metoprolol succinate currently on hold secondary to hypotension- can restart low dose lopressor as needed for rate control   · Restart home coumadin pending INR   · INR 3 17 on admission   · Goal INR 2-3  · Daily INR    ----------------------------------------------------------------------------------------  HPI/24hr events: 69 y o  famale who presented with decreased uop and generalized weakness  Outpt labs revealed creatinine 6 and PCP instructed pt to go to ED  Admitted to [address] [place] [address] service for IMTIAZ on CKD likely due to dehydration/intravascular volume depletion secondary to oral diuretics  Nephrology consulted  Started on fluid resuscitation  Repeat labs last evening revealed improving creatinine  UOP increased overnight  Additional fluid boluses and albumen given overnight  Durant placed and started on phenylephrine gtt  NSS increased to 250 ml/hr with increasing UOP  Added norepinephrine gtt early this am  Pt having vaginal bleeding with clots  Patient appropriate for transfer out of the ICU today?: No  Disposition: Continue Critical Care   Code Status: Level 3 - DNAR and DNI  ---------------------------------------------------------------------------------------  SUBJECTIVE  Pt resting in bed  Home CPAP on  Denies pain, shortness of breath, nausea, vomiting       Review of Systems  Review of systems was reviewed and negative unless stated above in HPI/24-hour events   ---------------------------------------------------------------------------------------  OBJECTIVE    Vitals   Vitals:    10/23/22 0430 10/23/22 0445 10/23/22 0500 10/23/22 0515   BP:   93/63    BP Location:       Pulse: 76 70 71 70   Resp: (!) 30 (!) 49 (!) 28 (!) 28   Temp:       TempSrc:       SpO2: 98% 99% 97% 98%   Weight:       Height:         Temp (24hrs), Av 2 °F (36 2 °C), Min:96 1 °F (35 6 °C), Max:97 8 °F (36 6 °C)  Current: Temperature: 97 5 °F (36 4 °C)  Arterial Line BP: 117/53  Arterial Line MAP (mmHg): 76 mmHg    Respiratory:  SpO2: SpO2: 98 %, SpO2 Activity: SpO2 Activity: At Rest, SpO2 Device: O2 Device: Nasal cannula  Nasal Cannula O2 Flow Rate (L/min): 4 L/min    Invasive/non-invasive ventilation settings   Respiratory  Report   Lab Data (Last 4 hours)      10/23 0407            pH, Arterial       7 363             pCO2, Arterial       43 6             pO2, Arterial       119 3             HCO3, Arterial       24 2             Base Excess, Arterial       -1 2                  O2/Vent Data     None                Physical Exam  Vitals reviewed  Constitutional:       General: She is not in acute distress  Appearance: She is morbidly obese  She is ill-appearing  Comments: CPAP   HENT:      Head: Normocephalic and atraumatic  Nose: No congestion  Mouth/Throat:      Mouth: Mucous membranes are moist       Pharynx: Oropharynx is clear  Eyes:      Conjunctiva/sclera: Conjunctivae normal       Pupils: Pupils are equal, round, and reactive to light  Cardiovascular:      Rate and Rhythm: Rhythm regularly irregular  Pulses: Normal pulses  Heart sounds: Normal heart sounds  Pulmonary:      Effort: Pulmonary effort is normal       Breath sounds: Normal breath sounds  Abdominal:      General: Bowel sounds are normal  There is no distension  Palpations: Abdomen is soft  Musculoskeletal:      Cervical back: Neck supple        Right lower leg: Edema present  Left lower leg: Edema present  Skin:     General: Skin is warm and dry  Capillary Refill: Capillary refill takes 2 to 3 seconds  Comments: Discolored fingers   Neurological:      General: No focal deficit present  Mental Status: She is alert and oriented to person, place, and time  Mental status is at baseline  Psychiatric:         Behavior: Behavior is cooperative         Laboratory and Diagnostics:  Results from last 7 days   Lab Units 10/23/22  0437 10/22/22  1214   WBC Thousand/uL 10 88* 9 33   HEMOGLOBIN g/dL 12 0 13 5   HEMATOCRIT % 36 4 41 7   PLATELETS Thousands/uL 264 264   NEUTROS PCT %  --  65   MONOS PCT %  --  10     Results from last 7 days   Lab Units 10/23/22  0437 10/23/22  0408 10/22/22  2256 10/22/22  1214   SODIUM mmol/L  --  132* 127* 124*   POTASSIUM mmol/L  --  3 6 3 3* 4 1   CHLORIDE mmol/L  --  90* 88* 82*   CO2 mmol/L  --  29 29 32   ANION GAP mmol/L  --  13 10 10   BUN mg/dL  --  135* 138* 160*   CREATININE mg/dL  --  4 32* 4 95* 6 18*   CALCIUM mg/dL  --  8 7 8 7 9 2   GLUCOSE RANDOM mg/dL  --  126 162* 235*   ALT U/L 36  --   --  43   AST U/L 31  --   --  44   ALK PHOS U/L 129*  --   --  168*   ALBUMIN g/dL 3 9  --   --  3 2*   TOTAL BILIRUBIN mg/dL 0 68  --   --  0 64     Results from last 7 days   Lab Units 10/23/22  0437 10/22/22  2256 10/22/22  1214   MAGNESIUM mg/dL 2 7* 2 1  --    PHOSPHORUS mg/dL 6 1*  --  6 5*      Results from last 7 days   Lab Units 10/23/22  0437 10/22/22  1736   INR  3 71* 3 17*          Results from last 7 days   Lab Units 10/23/22  0437 10/22/22  1214   LACTIC ACID mmol/L 1 0 2 0     ABG:  Results from last 7 days   Lab Units 10/23/22  0407   PH ART  7 363   PCO2 ART mm Hg 43 6   PO2 ART mm Hg 119 3   HCO3 ART mmol/L 24 2   BASE EXC ART mmol/L -1 2   ABG SOURCE  Line, Arterial     VBG:  Results from last 7 days   Lab Units 10/23/22  0407   ABG SOURCE  Line, Arterial           Micro  Results from last 7 days   Lab Units 10/22/22  1214   BLOOD CULTURE  Received in Microbiology Lab  Culture in Progress  Received in Microbiology Lab  Culture in Progress  EKG:   Imaging: I have personally reviewed pertinent reports  and I have personally reviewed pertinent films in PACS    Intake and Output  I/O       10/21 0701  10/22 0700 10/22 0701  10/23 0700    P  O   500    I V  (mL/kg)  27 2 (0 2)    IV Piggyback  3250    Total Intake(mL/kg)  3777 2 (26)    Urine (mL/kg/hr)  1975    Total Output  1975    Net  +1802 2                Height and Weights   Height: 5' 2" (157 5 cm)     Body mass index is 59 4 kg/m²  Weight (last 2 days)     Date/Time Weight    10/23/22 0429 147 (324 74)    10/22/22 1815 146 (320 77)    10/22/22 1811 146 (320 77)    10/22/22 1604 146 (320 77)    10/22/22 1146 148 (325 62)            Nutrition       Diet Orders   (From admission, onward)             Start     Ordered    10/22/22 1815  Diet Clear Liquid  Diet effective now        References:    Nutrtion Support Algorithm Enteral vs  Parenteral   Question Answer Comment   Diet Type Clear Liquid    RD to adjust diet per protocol?  Yes        10/22/22 1814                  Active Medications  Scheduled Meds:  Current Facility-Administered Medications   Medication Dose Route Frequency Provider Last Rate   • acetaminophen  650 mg Oral Q6H PRN Mio Kyle PA-C     • albuterol  2 5 mg Nebulization Q6H PRN CAMPBELL Christiansen     • atorvastatin  10 mg Oral Daily Mio Kyle PA-C     • calcium gluconate  2 g Intravenous Once CAMPBELL Noble 2 g (10/23/22 0522)   • cefTRIAXone  1,000 mg Intravenous Q24H Mio Kyle PA-C     • cholecalciferol  2,000 Units Oral Daily Mio Kyle PA-C     • docusate sodium  100 mg Oral BID Mio Kyle PA-C     • ferrous sulfate  325 mg Oral Daily With Breakfast Mio Kyle PA-C     • fluticasone-vilanterol  1 puff Inhalation Daily Mio Kyle PA-C     • [START ON 10/24/2022] influenza vaccine  0 7 mL Intramuscular Once Keshawn Jimenez MD     • insulin lispro  2-12 Units Subcutaneous Q6H [address] CAMPBELL Christiansen     • ipratropium  0 5 mg Nebulization 4x Daily CAMPBELL Christiansen     • levalbuterol  1 25 mg Nebulization 4x Daily CAMPBELL Christiansen     • levothyroxine  200 mcg Oral Daily Jameel Butler PA-C     • midodrine  5 mg Oral TID AC Jameel Butler PA-C     • montelukast  10 mg Oral HS Jameel Butler PA-C     • norepinephrine  1-30 mcg/min Intravenous Titrated Angi Apgar CRNP 4 mcg/min (10/23/22 0432)   • nystatin   Topical BID Jameel Butler PA-C     • pantoprazole  20 mg Oral Early Morning Jameel Butler PA-C     • phenylephine   mcg/min Intravenous Titrated Angi Apgar CRNP 145 mcg/min (10/23/22 0539)   • polyethylene glycol  17 g Oral Daily PRN Jameel Butler PA-C     • potassium chloride  20 mEq Intravenous Once CAMPBELL Christiansen     • sodium chloride  250 mL/hr Intravenous Continuous CAMPBELL Christiansen 250 mL/hr (10/23/22 0330)     Continuous Infusions:  norepinephrine, 1-30 mcg/min, Last Rate: 4 mcg/min (10/23/22 0432)  phenylephine,  mcg/min, Last Rate: 145 mcg/min (10/23/22 0539)  sodium chloride, 250 mL/hr, Last Rate: 250 mL/hr (10/23/22 0330)      PRN Meds:   acetaminophen, 650 mg, Q6H PRN  albuterol, 2 5 mg, Q6H PRN  polyethylene glycol, 17 g, Daily PRN        Invasive Devices Review  Invasive Devices  Report    Peripheral Intravenous Line  Duration           Peripheral IV 10/22/22 Left Antecubital <1 day    Peripheral IV 10/22/22 Left;Proximal;Upper;Ventral (anterior) Arm <1 day    Peripheral IV 10/22/22 Right Forearm <1 day          Arterial Line  Duration           Arterial Line 10/22/22 Radial <1 day          Drain  Duration           Urethral Catheter Straight-tip 16 Fr  <1 day                Rationale for remaining devices: Dow for accurate output  IMTIAZ   ---------------------------------------------------------------------------------------  Advance Directive and Living Will:      Power of :    POLST:    ---------------------------------------------------------------------------------------  Care Time Delivered:   Upon my evaluation, this patient had a high probability of imminent or life-threatening deterioration due to hyponatremia and IMTIAZ on CKD, which required my direct attention, intervention, and personal management  I have personally provided 30 minutes (0030 to 0100) of critical care time, exclusive of procedures, teaching, family meetings, and any prior time recorded by providers other than myself  CAMPBELL Swan      Portions of the record may have been created with voice recognition software  Occasional wrong word or "sound a like" substitutions may have occurred due to the inherent limitations of voice recognition software    Read the chart carefully and recognize, using context, where substitutions have occurred

## 2022-10-23 NOTE — NURSING NOTE
0500-Patient states that during a coughing episode she thinks she have "passed some clots "  Various sizes of bright red clots noted on stevo pad  Patient also c/o of some abdominal cramping  Suzette Courtney made aware of above  New orders obtained

## 2022-10-23 NOTE — ASSESSMENT & PLAN NOTE
· Continue home breo ellipta and singulair   · Atrovent and xopenex QID PRN  · Albuterol PRN  · Respiratory protocol

## 2022-10-23 NOTE — PROCEDURES
Arterial Line Insertion    Date/Time: 10/22/2022 10:45 PM  Performed by: CAMPBELL Ashby  Authorized by: CAMPBELL Ashby     Patient location:  ICU  Consent:     Consent obtained:  Verbal and written    Consent given by:  Patient    Risks discussed:  Bleeding, infection, pain, ischemia and repeat procedure  Universal protocol:     Procedure explained and questions answered to patient or proxy's satisfaction: yes      Relevant documents present and verified: yes      Test results available and properly labeled: yes      Radiology Images displayed and confirmed  If images not available, report reviewed: yes      Required blood products, implants, devices, and special equipment available: yes      Site/side marked: yes      Immediately prior to procedure a time out was called: yes      Patient identity confirmed:  Arm band, verbally with patient and hospital-assigned identification number  Indications:     Indications: hemodynamic monitoring, continuous blood pressure monitoring and frequent labs / infusion    Pre-procedure details:     Skin preparation:  Chlorhexidine    Preparation: Patient was prepped and draped in sterile fashion    Anesthesia (see MAR for exact dosages): Anesthesia method:  Local infiltration    Local anesthetic:  Lidocaine 1% w/o epi  Procedure details:     Location / Tip of Catheter:  Radial    Laterality:  Left    Toby's test performed: yes      Needle gauge:  20 G    Placement technique:  Ultrasound guided    Sterile ultrasound techniques: Sterile gel and sterile probe covers were used      Number of attempts:  1    Successful placement: yes      Transducer: waveform confirmed    Post-procedure details:     Post-procedure:  Sterile dressing applied, sutured, wrist guard applied and secured with tape    CMS:  Unchanged    Patient tolerance of procedure: Tolerated well, no immediate complications  Comments:      Prior to insertion of arterial line, extremities examined  Bilateral hands with pale discolored fingers, pale/bluish nail beds with 3-4 sec cap refill  Post procedure assessment of bilateral hands-unchanged

## 2022-10-23 NOTE — PROGRESS NOTES
Critical Care Services- Interval Progress Note   Isreal Tate 69 y o  female MRN: [id number]  Unit/Bed#: -01 Encounter: [id number]  Assessment and Plan  Interval Events:  69 y o  female admitted today with IMTIAZ on CKD with creatinine of 6 18 in the setting of dehydration and volume depletion secondary to oral diuretics  Pt was started on IV with NSS at 100 ml/hr  Hypotensive on admission however chronically hypotensive on midodrine  Pt had not taken her home midodrine since d/c from rehab facility on 10/17/22  BP improved with IVF initially however is trending down again  • Diagnosis: Hypotension, acute on chronic  o Plan: Cont home midodrine  Albumen 200 ml given with little response in BP  Given additional 1 L fluid bolus as pt is currently 1 5L positive fluid balance  If hypotension persists, will start phenylephrine  Place arterial line  Upon my evaluation, this patient had a high probability of imminent or life-threatening deterioration due to hypotension, which required my direct attention, intervention, and personal management  I have personally provided 30 minutes (2100 to 2130) on 10/22/2022 of critical care time, exclusive of procedures, teaching, family meetings, and any prior time recorded by providers other than myself  Time includes review of laboratory data, review of imaging/radiology results, monitoring for potential decompensation    Interventions were performed as documented above    -------------------------------------------------------------------------------------------------------------------------------------  Hemodynamic Monitoring:  Vital Signs:   HR: 70  BP: 77/52  MAP: 60  RR: 30  SpO2: 98 on 4L oxygen  Cardiac Monitoring:   Telemetry rhythm: Afib      Laboratory   Results from last 7 days   Lab Units 10/22/22  1214   WBC Thousand/uL 9 33   HEMOGLOBIN g/dL 13 5   HEMATOCRIT % 41 7   PLATELETS Thousands/uL 264   NEUTROS PCT % 65   MONOS PCT % 10     Results from last 7 days   Lab Units 10/22/22  1214   SODIUM mmol/L 124*   POTASSIUM mmol/L 4 1   CHLORIDE mmol/L 82*   CO2 mmol/L 32   ANION GAP mmol/L 10   BUN mg/dL 160*   CREATININE mg/dL 6 18*   CALCIUM mg/dL 9 2   GLUCOSE RANDOM mg/dL 235*   ALT U/L 43   AST U/L 44   ALK PHOS U/L 168*   ALBUMIN g/dL 3 2*   TOTAL BILIRUBIN mg/dL 0 64     Results from last 7 days   Lab Units 10/22/22  1214   PHOSPHORUS mg/dL 6 5*      Results from last 7 days   Lab Units 10/22/22  1736   INR  3 17*          Results from last 7 days   Lab Units 10/22/22  1214   LACTIC ACID mmol/L 2 0     ABG:    VBG:          Micro        Diagnostic Imaging / Data: I have personally reviewed pertinent reports        Medications:  Current Facility-Administered Medications   Medication Dose Route Frequency   • acetaminophen (TYLENOL) tablet 650 mg  650 mg Oral Q6H PRN   • albuterol inhalation solution 2 5 mg  2 5 mg Nebulization Q6H PRN   • [START ON 10/23/2022] atorvastatin (LIPITOR) tablet 10 mg  10 mg Oral Daily   • [START ON 10/23/2022] cefTRIAXone (ROCEPHIN) IVPB (premix in dextrose) 1,000 mg 50 mL  1,000 mg Intravenous Q24H   • [START ON 10/23/2022] cholecalciferol (VITAMIN D3) tablet 2,000 Units  2,000 Units Oral Daily   • docusate sodium (COLACE) capsule 100 mg  100 mg Oral BID   • [START ON 10/23/2022] ferrous sulfate tablet 325 mg  325 mg Oral Daily With Breakfast   • [START ON 10/23/2022] fluticasone-vilanterol (BREO ELLIPTA) 100-25 mcg/inh inhaler 1 puff  1 puff Inhalation Daily   • [START ON 10/24/2022] influenza vaccine, high-dose (FLUZONE HIGH-DOSE) IM injection MARY JANE 0 7 mL  0 7 mL Intramuscular Once   • [START ON 10/23/2022] insulin lispro (HumaLOG) 100 units/mL subcutaneous injection 2-12 Units  2-12 Units Subcutaneous Q6H [facility]   • lactated ringers bolus 1,000 mL  1,000 mL Intravenous Once   • [START ON 10/23/2022] levothyroxine tablet 200 mcg  200 mcg Oral Daily   • midodrine (PROAMATINE) tablet 5 mg  5 mg Oral TID AC   • montelukast (SINGULAIR) tablet 10 mg  10 mg Oral HS   • nystatin (MYCOSTATIN) powder   Topical BID   • [START ON 10/23/2022] pantoprazole (PROTONIX) EC tablet 20 mg  20 mg Oral Early Morning   • phenylephrine (FRANCHESKA-SYNEPHRINE) 50 mg (STANDARD CONCENTRATION) in sodium chloride 0 9% 250 mL   mcg/min Intravenous Titrated   • polyethylene glycol (MIRALAX) packet 17 g  17 g Oral Daily PRN   • sodium chloride 0 9 % infusion  100 mL/hr Intravenous Continuous       SIGNATURE: CAMPBELL Mariscal    Portions of the record may have been created with voice recognition software  Occasional wrong word or "sound a like" substitutions may have occurred due to the inherent limitations of voice recognition software    Read the chart carefully and recognize, using context, where substitutions have occurred

## 2022-10-23 NOTE — ASSESSMENT & PLAN NOTE
· Vaginal bleeding noted 10/22  Pt reports she had "bleeding" while in rehab but "they didn't know where it was coming from "  · Continues with bright red blood vaginally and passing multiple clots  · Monitor bleeding  · Coumadin currently on hold due to elevated INR  · Trend Hgb and INR  · Hgb 12 down from 13 5 in the setting of fluid resuscitation  · Type and screen sent

## 2022-10-23 NOTE — ASSESSMENT & PLAN NOTE
· Chronically on CPAP at night and 4 L of oxygen during the day secondary to COPD most likely NISHI  · Unable to wear home unit due to right jugular central line  · Consult Respiratory for CPAP options (ie  nasal pillows)  · Goal Sp02>88%  · Respiratory protocol

## 2022-10-23 NOTE — ASSESSMENT & PLAN NOTE
Wt Readings from Last 3 Encounters:   10/23/22 (!) 147 kg (324 lb 11 8 oz)   10/03/22 (!) 171 kg (377 lb 6 8 oz)   04/13/20 (!) 162 kg (356 lb 0 7 oz)       · Patient hospitalized 9/21/22 - 10/3/22 secondary to volume overload   · Started on torsemide and zaroxolyn  · Patient down 23kg from last admission   · No evidence of volume overload on exam  · Holding home diuretics in the setting of IMTIAZ   · Home BB on hold due to hypotension  · Echocardiogram 9/21/22: The left ventricular ejection fraction is 55-59%  Systolic function is normal  Wall motion is grossly normal  There is both systolic and diastolic flattening of the interventricular septum consistent with right ventricle pressure and volume overload: Right ventricular cavity size is severely dilated  Systolic function is mildly to moderately reduced

## 2022-10-23 NOTE — ASSESSMENT & PLAN NOTE
· Presented with corrected sodium 126  · Suspected etiology hypovolemia with use of oral duiretics  · Currently resolved  · Trend BMP

## 2022-10-23 NOTE — ASSESSMENT & PLAN NOTE
· Chronic hypotension, but now shock likely septic vs hypovolemic  · Patient received IV fluids secondary to volume depletion and hypotension  Last dose of Midodrine 10/17  Pt reports not taking after discharge home for rehab facility  · Cont home midodrine  · Increase to 10 mg TID 10/23  · 10/22 Fluid resuscitated with IVF and albumen  Started on alannah-synephrine and then levophed  · 10/23 Transitioned alannah gtt to vasopressin  · Cont to wean levo as able    · Trial Albumin

## 2022-10-23 NOTE — ASSESSMENT & PLAN NOTE
Lab Results   Component Value Date    HGBA1C 6 8 (H) 10/05/2022       Recent Labs     10/23/22  1128 10/23/22  1623 10/23/22  2116 10/24/22  0101   POCGLU 301* 258* 262* 245*        · High level SSI ACHS  · Levemir 26 U HS  · Resume 3 U TID meal time insulin   · Goal -180

## 2022-10-23 NOTE — PROGRESS NOTES
Progress Note - Nephrology   Armando Montiel 69 y o  female MRN: [id number]  Unit/Bed#: -01 Encounter: [id number]    A/P:  1  Acute kidney injury present on admission   - presented with a creatinine of 6 18 mg/dL   - urine lytes suggest a low fractional excretion of urea - volume depletion   Fractional excretion of sodium was high due to prior use of diuretics producing natriuresis   - patient received IV fluids and albumin, however, blood pressure dropped requiring pressor therapy during the night   - continue volume restoration  - kidney ultrasound pending    2  Hyponatremia   - due to hyponatremic hypovolemia   - markedly improved with saline the rise in serum sodium from 1/24 millimoles per L  To 132 millimoles per L   - continue to monitor and avoid diuretic therapy at this time    3  Hypotension   - currently on pressors    4  Atrial fibrillation  - controlled ventricular rate    5  Diabetes mellitus type 2 with long term current use of insulin   - sugars are being covered per protocol    6  Acute on chronic diastolic heart failure   - as above  Hold home diuretics    7  UTI with urinary retention   - Dow draining clear yellow urine    8  Chronic respiratory failure with hypoxia   - continue oxygen therapy    9   Morbid obesity   - needs dietary intervention      Follow up reason for today's visit:  Acute kidney injury present on admission    Acute kidney injury superimposed on chronic kidney disease [facility])    Patient Active Problem List   Diagnosis   • Asthma   • Atrial fibrillation ([facility] [address] )   • COPD (chronic obstructive pulmonary disease) ([facility] [address] )   • Diabetes mellitus ([facility] [address] )   • Heart failure ([facility] [address] )   • Shortness of breath   • Anemia   • COVID-19 virus infection   • Chronic respiratory failure with hypoxia (HCC)   • Supratherapeutic INR   • Acute on chronic diastolic congestive heart failure (HCC)   • IMTIAZ (acute kidney injury) ([facility] [address] )   • Morbid obesity ([facility] [address] )   • Hypothyroidism   • Acute kidney injury superimposed on chronic kidney disease (HCC)   • Hypotension   • GERD (gastroesophageal reflux disease)   • UTI (urinary tract infection)   • Acute hyponatremia   • Vaginal bleeding         Subjective:   States she feels much improved  A 10 point review of systems unremarkable    Objective:     Vitals: Blood pressure 102/51, pulse 74, temperature 98 5 °F (36 9 °C), temperature source Temporal, resp  rate (!) 35, height 5' 2" (1 575 m), weight (!) 147 kg (324 lb 11 8 oz), SpO2 96 %  ,Body mass index is 59 4 kg/m²  Weight (last 2 days)     Date/Time Weight    10/23/22 0600 147 (324 74)    10/23/22 0429 147 (324 74)    10/22/22 1815 146 (320 77)    10/22/22 1811 146 (320 77)    10/22/22 1604 146 (320 77)    10/22/22 1146 148 (325 62)            Intake/Output Summary (Last 24 hours) at 10/23/2022 1004  Last data filed at 10/23/2022 0930  Gross per 24 hour   Intake 9631 69 ml   Output 5200 ml   Net 4431 69 ml     I/O last 3 completed shifts: In: 8585 8 [P O :500; I V :2235 8; IV Piggyback:5850]  Out: 4375 [Urine:4375]    Urethral Catheter Straight-tip 16 Fr   (Active)   Amt returned on insertion(mL) 50 mL 10/22/22 1229   Reasons to continue Urinary Catheter  Accurate I&O assessment in critically ill patients (48 hr  max) 10/22/22 2000   Goal for Removal Remove after 48 hrs of I/O monitoring 10/22/22 2000   Site Assessment Bleeding 10/23/22 0401   Dow Care Done 10/23/22 0900   Collection Container Standard drainage bag 10/23/22 0401   Securement Method Securing device (Describe) 10/23/22 0401   Output (mL) 350 mL 10/23/22 0930       Physical Exam: /51   Pulse 74   Temp 98 5 °F (36 9 °C) (Temporal)   Resp (!) 35   Ht 5' 2" (1 575 m)   Wt (!) 147 kg (324 lb 11 8 oz)   SpO2 96%   BMI 59 40 kg/m²     General Appearance:    Alert, cooperative, no distress, appears stated age   Head:    Normocephalic, without obvious abnormality, atraumatic   Eyes:    Conjunctiva/corneas clear   Ears:    Normal external ears Nose:   Nares normal, septum midline, mucosa normal, no drainage    or sinus tenderness   Throat:   Lips, mucosa, and tongue normal; teeth and gums normal   Neck:   Supple, symmetrical, trachea midline, no adenopathy;        thyroid:  No enlargement/tenderness/nodules; no carotid    bruit or JVD   Back:     Symmetric, no curvature, ROM normal, no CVA tenderness   Lungs:     Clear to auscultation bilaterally, respirations unlabored   Chest wall:    No tenderness or deformity   Heart:     Irregular rhythm with CVR   Abdomen:     Soft, non-tender, bowel sounds active   Extremities:   Extremities normal, atraumatic, no cyanosis has bilat  edema   Skin:   Skin color, texture, turgor normal, no rashes or lesions   Lymph nodes:   Cervical normal   Neurologic:   CNII-XII responsive and moving extremities             Lab, Imaging and other studies: I have personally reviewed pertinent labs  CBC:   Lab Results   Component Value Date    WBC 10 88 (H) 10/23/2022    HGB 12 0 10/23/2022    HCT 36 4 10/23/2022    MCV 86 10/23/2022     10/23/2022    MCH 28 4 10/23/2022    MCHC 33 0 10/23/2022    RDW 17 6 (H) 10/23/2022    MPV 11 4 10/23/2022    NRBC 0 10/22/2022     CMP:   Lab Results   Component Value Date    K 3 6 10/23/2022    CL 90 (L) 10/23/2022    CO2 29 10/23/2022     (H) 10/23/2022    CREATININE 4 32 (H) 10/23/2022    CALCIUM 8 7 10/23/2022    AST 31 10/23/2022    ALT 36 10/23/2022    ALKPHOS 129 (H) 10/23/2022    EGFR 9 10/23/2022           Results from last 7 days   Lab Units 10/23/22  0437 10/23/22  0408 10/22/22  2256 10/22/22  1214   POTASSIUM mmol/L  --  3 6 3 3* 4 1   CHLORIDE mmol/L  --  90* 88* 82*   CO2 mmol/L  --  29 29 32   BUN mg/dL  --  135* 138* 160*   CREATININE mg/dL  --  4 32* 4 95* 6 18*   CALCIUM mg/dL  --  8 7 8 7 9 2   ALK PHOS U/L 129*  --   --  168*   ALT U/L 36  --   --  43   AST U/L 31  --   --  44         Phosphorus:   Lab Results   Component Value Date    PHOS 6 1 (H) 10/23/2022 Magnesium:   Lab Results   Component Value Date    MG 2 7 (H) 10/23/2022     Urinalysis:   Lab Results   Component Value Date    COLORU Rhonda 10/22/2022    CLARITYU Cloudy 10/22/2022    SPECGRAV 1 020 10/22/2022    PHUR 5 0 10/22/2022    LEUKOCYTESUR Small (A) 10/22/2022    NITRITE Negative 10/22/2022    GLUCOSEU Negative 10/22/2022    KETONESU Trace (A) 10/22/2022    BILIRUBINUR Small (A) 10/22/2022    BLOODU Large (A) 10/22/2022     Ionized Calcium: No results found for: CAION  Coagulation:   Lab Results   Component Value Date    INR 3 71 (H) 10/23/2022     Troponin: No results found for: TROPONINI  ABG:   Lab Results   Component Value Date    PHART 7 363 10/23/2022    AND4WJY 43 6 10/23/2022    PO2ART 119 3 10/23/2022    DAG6XJG 24 2 10/23/2022    BEART -1 2 10/23/2022    SOURCE Line, Arterial 10/23/2022     Radiology review:     IMAGING  Procedure: CT abdomen pelvis wo contrast    Result Date: 10/22/2022  Narrative: CT ABDOMEN AND PELVIS WITHOUT IV CONTRAST INDICATION:   Sepsis UTI, IMTIAZ, R/o obstructive nephropathy  "Patient recently complains of generalized weakness and decreased urine production  Denies chest pain or abdominal pain or nausea or vomiting or shortness of breath above the baseline" COMPARISON:  None  TECHNIQUE:  CT examination of the abdomen and pelvis was performed without intravenous contrast  Axial, sagittal, and coronal 2D reformatted images were created from the source data and submitted for interpretation  Radiation dose length product (DLP) for this visit:  [phone] mGy-cm   This examination, like all CT scans performed in the [facility], was performed utilizing techniques to minimize radiation dose exposure, including the use of iterative reconstruction and automated exposure control  Enteric contrast was administered  FINDINGS: ABDOMEN LOWER CHEST:  Partially imaged cardiomegaly  Small hiatal hernia LIVER/BILIARY TREE:  Unremarkable   GALLBLADDER:  Gallbladder is surgically absent  SPLEEN:  Unremarkable  PANCREAS:  Unremarkable  ADRENAL GLANDS:  Unremarkable  KIDNEYS/URETERS:  Unremarkable  No hydronephrosis  STOMACH AND BOWEL:  There is colonic diverticulosis without evidence of acute diverticulitis  APPENDIX:  No findings to suggest appendicitis  ABDOMINOPELVIC CAVITY:  No ascites  No pneumoperitoneum  No lymphadenopathy  VESSELS:  Unremarkable for patient's age  PELVIS REPRODUCTIVE ORGANS:  Unremarkable for patient's age  URINARY BLADDER:  Decompressed with a Dow catheter  ABDOMINAL WALL/INGUINAL REGIONS:  Tiny uncomplicated fat-containing umbilical hernia  OSSEOUS STRUCTURES:  No acute fracture or destructive osseous lesion  Spinal degenerative changes are noted  Impression: No acute inflammatory changes in the abdomen or pelvis  Workstation performed: [id number]     Procedure: XR chest portable    Result Date: 10/23/2022  Narrative: CHEST INDICATION:   Weakness  COMPARISON:  Chest x-ray from 9/24/2022  Chest CT from 4/13/2020  EXAM PERFORMED/VIEWS:  XR CHEST PORTABLE FINDINGS: Cardiomediastinal silhouette appears unremarkable  Unchanged thin-walled right mid lung cyst  The lungs are clear  No pneumothorax or pleural effusion  Osseous structures appear within normal limits for patient age  Impression: No acute cardiopulmonary disease  Workstation performed: [id number]     Procedure: XR chest portable ICU    Result Date: 10/23/2022  Narrative: CHEST INDICATION:   CVC placement  COMPARISON:  Compared 10/22/2022 EXAM PERFORMED/VIEWS:  XR CHEST PORTABLE ICU FINDINGS:  Right neck catheter in the distal SVC  Moderate cardiomegaly Mild prominent vascular markings  No pneumothorax  Slight right hemidiaphragm elevation is unchanged  Osseous structures appear within normal limits for patient age  Impression: Right neck catheter in the distal SVC  Mild congestion   Workstation performed: [id number]       Current Facility-Administered Medications   Medication Dose Route Frequency   • acetaminophen (TYLENOL) tablet 650 mg  650 mg Oral Q6H PRN   • albuterol inhalation solution 2 5 mg  2 5 mg Nebulization Q6H PRN   • atorvastatin (LIPITOR) tablet 10 mg  10 mg Oral Daily   • cefTRIAXone (ROCEPHIN) IVPB (premix in dextrose) 1,000 mg 50 mL  1,000 mg Intravenous Q24H   • cholecalciferol (VITAMIN D3) tablet 2,000 Units  2,000 Units Oral Daily   • docusate sodium (COLACE) capsule 100 mg  100 mg Oral BID   • ferrous sulfate tablet 325 mg  325 mg Oral Daily With Breakfast   • fluticasone-vilanterol (BREO ELLIPTA) 100-25 mcg/inh inhaler 1 puff  1 puff Inhalation Daily   • [START ON 10/24/2022] influenza vaccine, high-dose (FLUZONE HIGH-DOSE) IM injection MARY JANE 0 7 mL  0 7 mL Intramuscular Once   • insulin lispro (HumaLOG) 100 units/mL subcutaneous injection 2-12 Units  2-12 Units Subcutaneous 4x Daily (AC & HS)   • levothyroxine tablet 200 mcg  200 mcg Oral Daily   • midodrine (PROAMATINE) tablet 10 mg  10 mg Oral TID AC   • montelukast (SINGULAIR) tablet 10 mg  10 mg Oral HS   • NOREPINEPHRINE 4 MG  ML NSS (CMPD ORDER) infusion  1-30 mcg/min Intravenous Titrated   • nystatin (MYCOSTATIN) powder   Topical BID   • pantoprazole (PROTONIX) EC tablet 20 mg  20 mg Oral Early Morning   • phenylephrine (FRANCHESKA-SYNEPHRINE) 50 mg (STANDARD CONCENTRATION) in sodium chloride 0 9% 250 mL   mcg/min Intravenous Titrated   • polyethylene glycol (MIRALAX) packet 17 g  17 g Oral Daily PRN     Medications Discontinued During This Encounter   Medication Reason   • multi-electrolyte (PLASMALYTE-A/ISOLYTE-S PH 7 4) IV solution    • sodium chloride 0 9 % infusion    • insulin lispro (HumaLOG) 100 units/mL subcutaneous injection 1-6 Units    • insulin lispro (HumaLOG) 100 units/mL subcutaneous injection 1-6 Units    • heparin (porcine) subcutaneous injection 5,000 Units    • potassium chloride (K-DUR,KLOR-CON) CR tablet 20 mEq    • ipratropium (ATROVENT) 0 02 % inhalation solution 0 5 mg    • levalbuterol (XOPENEX) inhalation solution 1 25 mg    • sodium chloride 0 9 % infusion    • insulin lispro (HumaLOG) 100 units/mL subcutaneous injection 2-12 Units    • midodrine (PROAMATINE) tablet 5 mg        Olivia Patel      This progress note was produced in part using a dictation device which may document imprecise wording from author's original intent

## 2022-10-23 NOTE — PROCEDURES
Central Line Insertion    Date/Time: 10/23/2022 8:16 AM  Performed by: Josee Levine PA-C  Authorized by: Josee Levine PA-C     Patient location:  Bedside  Consent:     Consent obtained:  Written    Consent given by:  Patient    Risks discussed:  Arterial puncture and bleeding    Alternatives discussed:  No treatment  Universal protocol:     Procedure explained and questions answered to patient or proxy's satisfaction: yes      Relevant documents present and verified: yes      Test results available and properly labeled: yes      Radiology Images displayed and confirmed  If images not available, report reviewed: yes      Required blood products, implants, devices, and special equipment available: yes      Site/side marked: yes      Immediately prior to procedure, a time out was called: yes      Patient identity confirmed:  Verbally with patient, arm band and hospital-assigned identification number  Pre-procedure details:     Hand hygiene: Hand hygiene performed prior to insertion      Sterile barrier technique: All elements of maximal sterile technique followed      Skin preparation:  2% chlorhexidine    Skin preparation agent: Skin preparation agent completely dried prior to procedure    Indications:     Central line indications: medications requiring central line    Anesthesia (see MAR for exact dosages):      Anesthesia method:  Local infiltration    Local anesthetic:  Lidocaine 1% w/o epi  Procedure details:     Location:  Right internal jugular    Vessel type: vein      Laterality:  Right    Approach: percutaneous technique used      Patient position:  Trendelenburg    Catheter type:  Triple lumen 16cm    Catheter size:  7 5 Fr    Landmarks identified: yes      Ultrasound guidance: yes      Ultrasound image availability:  Images available in PACS    Sterile ultrasound techniques: Sterile gel and sterile probe covers were used      Manometry confirmation: no      Number of attempts:  1    Successful placement: yes      Vessel of catheter tip end:  Distal SVC  Post-procedure details:     Post-procedure:  Dressing applied and line sutured    Assessment:  Blood return through all ports, no pneumothorax on x-ray, free fluid flow and placement verified by x-ray    Post-procedure complications: none      Patient tolerance of procedure:   Tolerated well, no immediate complications

## 2022-10-23 NOTE — ASSESSMENT & PLAN NOTE
Lab Results   Component Value Date    EGFR 16 10/24/2022    EGFR 13 10/23/2022    EGFR 10 10/23/2022    CREATININE 2 83 (H) 10/24/2022    CREATININE 3 29 (H) 10/23/2022    CREATININE 3 97 (H) 10/23/2022       · Likely in the setting of dehydration/intravascular volume depletion secondary to oral diuretics   · Patient presented to the ED with a creatine 6 18 (Baseline 1 2-1 8)  · No urgent indication for dialysis   · CT A/P 10/22 showed no evidence of obstructive hydronephrosis   Urinary bladder is decompressed with the frederick catheter   · Renal US shows no acute abnomalities  · Nephrology consulted- appreciate recommendations   · Jesus Bautista suggestive of pre-renal etiology  · NSS- D/C and bolus PRN  · Trend creatine  · Continue frederick  · Avoid hypotension and nephrotoxins  · Strict I&O  · PRN Potassium replacement

## 2022-10-23 NOTE — ASSESSMENT & PLAN NOTE
· Continue to hold Metoprolol secondary to hypotension  · INR 3 17 on admission (Goal INR 2-3)  · Hold home Coumadin 7mg  · Today, INR 2 76  · Resume Coumadin at 5mg daily  · Recheck INR in 2 days

## 2022-10-23 NOTE — RESPIRATORY THERAPY NOTE
Patient has CPAP from home  Unit appears in good working order  Cord is free of splicing or repair  Unit connected to 4L O2 Humidifier filled with sterile water    Patient is capable of managing her own CPAP use and was encouraged to do so

## 2022-10-23 NOTE — ASSESSMENT & PLAN NOTE
· Suspected etiology hypovolemia with use of oral duiretics  · Corrected sodium 126 at time of presentation  · Urine lytes pending  · Cont NSS at 100 ml/hr  · Trend BMP

## 2022-10-24 LAB
ANION GAP SERPL CALCULATED.3IONS-SCNC: 15 MMOL/L (ref 4–13)
ANION GAP SERPL CALCULATED.3IONS-SCNC: 8 MMOL/L (ref 4–13)
ARTERIAL PATENCY WRIST A: YES
BASE EX.OXY STD BLDV CALC-SCNC: 94.4 % (ref 60–80)
BASE EXCESS BLDV CALC-SCNC: 2.8 MMOL/L
BODY TEMPERATURE: 98 DEGREES FEHRENHEIT
BUN SERPL-MCNC: 107 MG/DL (ref 5–25)
BUN SERPL-MCNC: 88 MG/DL (ref 5–25)
CA-I BLD-SCNC: 1.17 MMOL/L (ref 1.12–1.32)
CALCIUM SERPL-MCNC: 10.6 MG/DL (ref 8.3–10.1)
CALCIUM SERPL-MCNC: 9.9 MG/DL (ref 8.3–10.1)
CHLORIDE SERPL-SCNC: 92 MMOL/L (ref 96–108)
CHLORIDE SERPL-SCNC: 98 MMOL/L (ref 96–108)
CO2 SERPL-SCNC: 29 MMOL/L (ref 21–32)
CO2 SERPL-SCNC: 30 MMOL/L (ref 21–32)
CREAT SERPL-MCNC: 2.24 MG/DL (ref 0.6–1.3)
CREAT SERPL-MCNC: 2.83 MG/DL (ref 0.6–1.3)
CREAT UR-MCNC: 35.3 MG/DL
ERYTHROCYTE [DISTWIDTH] IN BLOOD BY AUTOMATED COUNT: 18 % (ref 11.6–15.1)
GFR SERPL CREATININE-BSD FRML MDRD: 16 ML/MIN/1.73SQ M
GFR SERPL CREATININE-BSD FRML MDRD: 21 ML/MIN/1.73SQ M
GLUCOSE SERPL-MCNC: 233 MG/DL (ref 65–140)
GLUCOSE SERPL-MCNC: 245 MG/DL (ref 65–140)
GLUCOSE SERPL-MCNC: 245 MG/DL (ref 65–140)
GLUCOSE SERPL-MCNC: 254 MG/DL (ref 65–140)
GLUCOSE SERPL-MCNC: 257 MG/DL (ref 65–140)
HCO3 BLDV-SCNC: 29 MMOL/L (ref 24–30)
HCT VFR BLD AUTO: 39 % (ref 34.8–46.1)
HGB BLD-MCNC: 12.4 G/DL (ref 11.5–15.4)
INR PPP: 2.76 (ref 0.84–1.19)
MAGNESIUM SERPL-MCNC: 1.8 MG/DL (ref 1.6–2.6)
MAGNESIUM SERPL-MCNC: 1.9 MG/DL (ref 1.6–2.6)
MCH RBC QN AUTO: 27.4 PG (ref 26.8–34.3)
MCHC RBC AUTO-ENTMCNC: 31.8 G/DL (ref 31.4–37.4)
MCV RBC AUTO: 86 FL (ref 82–98)
NASAL CANNULA: 4
O2 CT BLDV-SCNC: 17.7 ML/DL
OSMOLALITY UR: 415 MMOL/KG
PCO2 BLD: 50.1 MM HG (ref 42–50)
PCO2 BLDV: 50.8 MM HG (ref 42–50)
PH BLD: 7.38 [PH] (ref 7.3–7.4)
PH BLDV: 7.37 [PH] (ref 7.3–7.4)
PHOSPHATE SERPL-MCNC: 2.3 MG/DL (ref 2.3–4.1)
PLATELET # BLD AUTO: 242 THOUSANDS/UL (ref 149–390)
PMV BLD AUTO: 11.3 FL (ref 8.9–12.7)
PO2 BLDV: 92.4 MM HG (ref 35–45)
PO2 VENOUS TEMP CORRECTED: 90.7 MM HG (ref 35–45)
POTASSIUM SERPL-SCNC: 3.6 MMOL/L (ref 3.5–5.3)
POTASSIUM SERPL-SCNC: 4.1 MMOL/L (ref 3.5–5.3)
PROTHROMBIN TIME: 29.2 SECONDS (ref 11.6–14.5)
RBC # BLD AUTO: 4.52 MILLION/UL (ref 3.81–5.12)
SODIUM 24H UR-SCNC: 44 MOL/L
SODIUM SERPL-SCNC: 136 MMOL/L (ref 135–147)
SODIUM SERPL-SCNC: 136 MMOL/L (ref 135–147)
WBC # BLD AUTO: 10.71 THOUSAND/UL (ref 4.31–10.16)

## 2022-10-24 RX ORDER — ALBUMIN, HUMAN INJ 5% 5 %
25 SOLUTION INTRAVENOUS EVERY 6 HOURS
Status: DISCONTINUED | OUTPATIENT
Start: 2022-10-24 | End: 2022-10-24

## 2022-10-24 RX ORDER — WARFARIN SODIUM 5 MG/1
5 TABLET ORAL
Status: COMPLETED | OUTPATIENT
Start: 2022-10-24 | End: 2022-10-24

## 2022-10-24 RX ORDER — ALBUMIN (HUMAN) 12.5 G/50ML
25 SOLUTION INTRAVENOUS EVERY 8 HOURS
Status: DISCONTINUED | OUTPATIENT
Start: 2022-10-24 | End: 2022-10-24

## 2022-10-24 RX ORDER — ACETAMINOPHEN 325 MG/1
650 TABLET ORAL EVERY 6 HOURS PRN
Status: DISCONTINUED | OUTPATIENT
Start: 2022-10-24 | End: 2022-10-29 | Stop reason: HOSPADM

## 2022-10-24 RX ORDER — LEVOTHYROXINE SODIUM 88 UG/1
88 TABLET ORAL
Status: DISCONTINUED | OUTPATIENT
Start: 2022-10-24 | End: 2022-10-29 | Stop reason: HOSPADM

## 2022-10-24 RX ORDER — QUETIAPINE FUMARATE 25 MG/1
25 TABLET, FILM COATED ORAL
Status: DISCONTINUED | OUTPATIENT
Start: 2022-10-24 | End: 2022-10-25

## 2022-10-24 RX ORDER — ALBUMIN (HUMAN) 12.5 G/50ML
25 SOLUTION INTRAVENOUS ONCE
Status: COMPLETED | OUTPATIENT
Start: 2022-10-24 | End: 2022-10-24

## 2022-10-24 RX ORDER — LANOLIN ALCOHOL/MO/W.PET/CERES
6 CREAM (GRAM) TOPICAL
Status: DISCONTINUED | OUTPATIENT
Start: 2022-10-24 | End: 2022-10-29 | Stop reason: HOSPADM

## 2022-10-24 RX ORDER — POTASSIUM CHLORIDE 20 MEQ/1
40 TABLET, EXTENDED RELEASE ORAL ONCE
Status: COMPLETED | OUTPATIENT
Start: 2022-10-24 | End: 2022-10-24

## 2022-10-24 RX ORDER — ALBUMIN (HUMAN) 12.5 G/50ML
25 SOLUTION INTRAVENOUS EVERY 8 HOURS
Status: DISCONTINUED | OUTPATIENT
Start: 2022-10-24 | End: 2022-10-25

## 2022-10-24 RX ORDER — LEVOTHYROXINE SODIUM 0.1 MG/1
200 TABLET ORAL
Status: DISCONTINUED | OUTPATIENT
Start: 2022-10-24 | End: 2022-10-29 | Stop reason: HOSPADM

## 2022-10-24 RX ORDER — ONDANSETRON 2 MG/ML
INJECTION INTRAMUSCULAR; INTRAVENOUS
Status: DISCONTINUED
Start: 2022-10-24 | End: 2022-10-24 | Stop reason: WASHOUT

## 2022-10-24 RX ORDER — TRAZODONE HYDROCHLORIDE 50 MG/1
50 TABLET ORAL
Status: DISCONTINUED | OUTPATIENT
Start: 2022-10-24 | End: 2022-10-25

## 2022-10-24 RX ADMIN — INSULIN LISPRO 8 UNITS: 100 INJECTION, SOLUTION INTRAVENOUS; SUBCUTANEOUS at 17:04

## 2022-10-24 RX ADMIN — MIDODRINE HYDROCHLORIDE 10 MG: 5 TABLET ORAL at 07:51

## 2022-10-24 RX ADMIN — QUETIAPINE FUMARATE 25 MG: 25 TABLET ORAL at 21:07

## 2022-10-24 RX ADMIN — INSULIN LISPRO 8 UNITS: 100 INJECTION, SOLUTION INTRAVENOUS; SUBCUTANEOUS at 11:54

## 2022-10-24 RX ADMIN — INSULIN LISPRO 8 UNITS: 100 INJECTION, SOLUTION INTRAVENOUS; SUBCUTANEOUS at 07:51

## 2022-10-24 RX ADMIN — LEVOTHYROXINE SODIUM 88 MCG: 88 TABLET ORAL at 05:03

## 2022-10-24 RX ADMIN — WARFARIN SODIUM 5 MG: 5 TABLET ORAL at 17:04

## 2022-10-24 RX ADMIN — ALBUMIN (HUMAN) 25 G: 0.25 INJECTION, SOLUTION INTRAVENOUS at 09:09

## 2022-10-24 RX ADMIN — ALBUMIN (HUMAN) 25 G: 0.25 INJECTION, SOLUTION INTRAVENOUS at 14:17

## 2022-10-24 RX ADMIN — NYSTATIN 1 APPLICATION: 100000 POWDER TOPICAL at 08:31

## 2022-10-24 RX ADMIN — MELATONIN 6 MG: 3 TAB ORAL at 01:26

## 2022-10-24 RX ADMIN — PANTOPRAZOLE SODIUM 20 MG: 20 TABLET, DELAYED RELEASE ORAL at 05:02

## 2022-10-24 RX ADMIN — NOREPINEPHRINE BITARTRATE 14 MCG/MIN: 1 INJECTION INTRAVENOUS at 01:27

## 2022-10-24 RX ADMIN — DOCUSATE SODIUM 100 MG: 100 CAPSULE ORAL at 08:31

## 2022-10-24 RX ADMIN — FLUTICASONE FUROATE AND VILANTEROL TRIFENATATE 1 PUFF: 100; 25 POWDER RESPIRATORY (INHALATION) at 08:31

## 2022-10-24 RX ADMIN — VASOPRESSIN 0.04 UNITS/MIN: 20 INJECTION INTRAVENOUS at 00:20

## 2022-10-24 RX ADMIN — INSULIN LISPRO 3 UNITS: 100 INJECTION, SOLUTION INTRAVENOUS; SUBCUTANEOUS at 17:04

## 2022-10-24 RX ADMIN — INSULIN LISPRO 3 UNITS: 100 INJECTION, SOLUTION INTRAVENOUS; SUBCUTANEOUS at 11:55

## 2022-10-24 RX ADMIN — INFLUENZA A VIRUS A/VICTORIA/2570/2019 IVR-215 (H1N1) ANTIGEN (FORMALDEHYDE INACTIVATED), INFLUENZA A VIRUS A/DARWIN/9/2021 SAN-010 (H3N2) ANTIGEN (FORMALDEHYDE INACTIVATED), INFLUENZA B VIRUS B/PHUKET/3073/2013 ANTIGEN (FORMALDEHYDE INACTIVATED), AND INFLUENZA B VIRUS B/MICHIGAN/01/2021 ANTIGEN (FORMALDEHYDE INACTIVATED) 0.7 ML: 60; 60; 60; 60 INJECTION, SUSPENSION INTRAMUSCULAR at 08:37

## 2022-10-24 RX ADMIN — ACETAMINOPHEN 650 MG: 325 TABLET ORAL at 23:51

## 2022-10-24 RX ADMIN — NOREPINEPHRINE BITARTRATE 10 MCG/MIN: 1 INJECTION INTRAVENOUS at 17:50

## 2022-10-24 RX ADMIN — INSULIN LISPRO 2 UNITS: 100 INJECTION, SOLUTION INTRAVENOUS; SUBCUTANEOUS at 21:13

## 2022-10-24 RX ADMIN — FERROUS SULFATE TAB 325 MG (65 MG ELEMENTAL FE) 325 MG: 325 (65 FE) TAB at 07:51

## 2022-10-24 RX ADMIN — ACETAMINOPHEN 650 MG: 325 TABLET ORAL at 14:17

## 2022-10-24 RX ADMIN — LEVOTHYROXINE SODIUM 200 MCG: 100 TABLET ORAL at 05:02

## 2022-10-24 RX ADMIN — ALBUMIN (HUMAN) 25 G: 0.25 INJECTION, SOLUTION INTRAVENOUS at 20:31

## 2022-10-24 RX ADMIN — INSULIN LISPRO 3 UNITS: 100 INJECTION, SOLUTION INTRAVENOUS; SUBCUTANEOUS at 07:51

## 2022-10-24 RX ADMIN — POTASSIUM CHLORIDE 40 MEQ: 20 TABLET, EXTENDED RELEASE ORAL at 05:26

## 2022-10-24 RX ADMIN — ACETAMINOPHEN 650 MG: 325 TABLET ORAL at 00:52

## 2022-10-24 RX ADMIN — MIDODRINE HYDROCHLORIDE 10 MG: 5 TABLET ORAL at 11:10

## 2022-10-24 RX ADMIN — AMIODARONE HYDROCHLORIDE 1 MG/MIN: 50 INJECTION, SOLUTION INTRAVENOUS at 17:53

## 2022-10-24 RX ADMIN — POTASSIUM CHLORIDE 40 MEQ: 20 TABLET, EXTENDED RELEASE ORAL at 07:51

## 2022-10-24 RX ADMIN — MELATONIN 6 MG: 3 TAB ORAL at 21:06

## 2022-10-24 RX ADMIN — NOREPINEPHRINE BITARTRATE 11 MCG/MIN: 1 INJECTION INTRAVENOUS at 11:10

## 2022-10-24 RX ADMIN — MONTELUKAST 10 MG: 10 TABLET, FILM COATED ORAL at 21:06

## 2022-10-24 RX ADMIN — MIDODRINE HYDROCHLORIDE 10 MG: 5 TABLET ORAL at 15:36

## 2022-10-24 RX ADMIN — NOREPINEPHRINE BITARTRATE 14 MCG/MIN: 1 INJECTION INTRAVENOUS at 06:22

## 2022-10-24 RX ADMIN — Medication 2000 UNITS: at 08:30

## 2022-10-24 RX ADMIN — VASOPRESSIN 0.04 UNITS/MIN: 20 INJECTION INTRAVENOUS at 08:34

## 2022-10-24 RX ADMIN — INSULIN DETEMIR 26 UNITS: 100 INJECTION, SOLUTION SUBCUTANEOUS at 21:13

## 2022-10-24 RX ADMIN — ATORVASTATIN CALCIUM 10 MG: 10 TABLET, FILM COATED ORAL at 08:31

## 2022-10-24 RX ADMIN — CEFTRIAXONE 1000 MG: 1 INJECTION, SOLUTION INTRAVENOUS at 11:11

## 2022-10-24 RX ADMIN — TRAZODONE HYDROCHLORIDE 50 MG: 50 TABLET ORAL at 21:07

## 2022-10-24 RX ADMIN — DOCUSATE SODIUM 100 MG: 100 CAPSULE ORAL at 17:04

## 2022-10-24 RX ADMIN — NOREPINEPHRINE BITARTRATE 11 MCG/MIN: 1 INJECTION INTRAVENOUS at 23:41

## 2022-10-24 RX ADMIN — ONDANSETRON 8 MG: 2 INJECTION INTRAMUSCULAR; INTRAVENOUS at 21:37

## 2022-10-24 NOTE — PLAN OF CARE
Problem: Potential for Falls  Goal: Patient will remain free of falls  Description: INTERVENTIONS:  - Educate patient/family on patient safety including physical limitations  - Instruct patient to call for assistance with activity   - Consult OT/PT to assist with strengthening/mobility   - Keep Call bell within reach  - Keep bed low and locked with side rails adjusted as appropriate  - Keep care items and personal belongings within reach  - Initiate and maintain comfort rounds  - Make Fall Risk Sign visible to staff  - Offer Toileting every 2 Hours, in advance of need  - Initiate/Maintain bed/chair alarm  - Obtain necessary fall risk management equipment:   - Apply yellow socks and bracelet for high fall risk patients  - Consider moving patient to room near nurses station  Outcome: Progressing     Problem: PAIN - ADULT  Goal: Verbalizes/displays adequate comfort level or baseline comfort level  Description: Interventions:  - Encourage patient to monitor pain and request assistance  - Assess pain using appropriate pain scale  - Administer analgesics based on type and severity of pain and evaluate response  - Implement non-pharmacological measures as appropriate and evaluate response  - Consider cultural and social influences on pain and pain management  - Notify physician/advanced practitioner if interventions unsuccessful or patient reports new pain  Outcome: Progressing     Problem: INFECTION - ADULT  Goal: Absence or prevention of progression during hospitalization  Description: INTERVENTIONS:  - Assess and monitor for signs and symptoms of infection  - Monitor lab/diagnostic results  - Monitor all insertion sites, i e  indwelling lines, tubes, and drains  - Monitor endotracheal if appropriate and nasal secretions for changes in amount and color  - [place] appropriate cooling/warming therapies per order  - Administer medications as ordered  - Instruct and encourage patient and family to use good hand hygiene technique  - Identify and instruct in appropriate isolation precautions for identified infection/condition  Outcome: Progressing  Goal: Absence of fever/infection during neutropenic period  Description: INTERVENTIONS:  - Monitor WBC    Outcome: Progressing     Problem: SAFETY ADULT  Goal: Patient will remain free of falls  Description: INTERVENTIONS:  - Educate patient/family on patient safety including physical limitations  - Instruct patient to call for assistance with activity   - Consult OT/PT to assist with strengthening/mobility   - Keep Call bell within reach  - Keep bed low and locked with side rails adjusted as appropriate  - Keep care items and personal belongings within reach  - Initiate and maintain comfort rounds  - Make Fall Risk Sign visible to staff  - Offer Toileting every 2 Hours, in advance of need  - Initiate/Maintain bed/chair alarm  - Obtain necessary fall risk management equipment:   - Apply yellow socks and bracelet for high fall risk patients  - Consider moving patient to room near nurses station  Outcome: Progressing  Goal: Maintain or return to baseline ADL function  Description: INTERVENTIONS:  -  Assess patient's ability to carry out ADLs; assess patient's baseline for ADL function and identify physical deficits which impact ability to perform ADLs (bathing, care of mouth/teeth, toileting, grooming, dressing, etc )  - Assess/evaluate cause of self-care deficits   - Assess range of motion  - Assess patient's mobility; develop plan if impaired  - Assess patient's need for assistive devices and provide as appropriate  - Encourage maximum independence but intervene and supervise when necessary  - Involve family in performance of ADLs  - Assess for home care needs following discharge   - Consider OT consult to assist with ADL evaluation and planning for discharge  - Provide patient education as appropriate  Outcome: Progressing  Goal: Maintains/Returns to pre admission functional level  Description: INTERVENTIONS:  - Perform BMAT or MOVE assessment daily    - Set and communicate daily mobility goal to care team and patient/family/caregiver  - Collaborate with rehabilitation services on mobility goals if consulted  - Perform Range of Motion 3 times a day  - Reposition patient every 2 hours  - Dangle patient 3 times a day  - Stand patient 3 times a day  - Ambulate patient 3 times a day  - Out of bed to chair 3 times a day   - Out of bed for meals 3 times a day  - Out of bed for toileting  - Record patient progress and toleration of activity level   Outcome: Progressing     Problem: DISCHARGE PLANNING  Goal: Discharge to home or other facility with appropriate resources  Description: INTERVENTIONS:  - Identify barriers to discharge w/patient and caregiver  - Arrange for needed discharge resources and transportation as appropriate  - Identify discharge learning needs (meds, wound care, etc )  - Arrange for interpretive services to assist at discharge as needed  - Refer to Case Management Department for coordinating discharge planning if the patient needs post-hospital services based on physician/advanced practitioner order or complex needs related to functional status, cognitive ability, or social support system  Outcome: Progressing     Problem: Knowledge Deficit  Goal: Patient/family/caregiver demonstrates understanding of disease process, treatment plan, medications, and discharge instructions  Description: Complete learning assessment and assess knowledge base    Interventions:  - Provide teaching at level of understanding  - Provide teaching via preferred learning methods  Outcome: Progressing     Problem: GENITOURINARY - ADULT  Goal: Maintains or returns to baseline urinary function  Description: INTERVENTIONS:  - Assess urinary function  - Encourage oral fluids to ensure adequate hydration if ordered  - Administer IV fluids as ordered to ensure adequate hydration  - Administer ordered medications as needed  - Offer frequent toileting  - Follow urinary retention protocol if ordered  Outcome: Progressing  Goal: Absence of urinary retention  Description: INTERVENTIONS:  - Assess patient's ability to void and empty bladder  - Monitor I/O  - Bladder scan as needed  - Discuss with physician/AP medications to alleviate retention as needed  - Discuss catheterization for long term situations as appropriate  Outcome: Progressing  Goal: Urinary catheter remains patent  Description: INTERVENTIONS:  - Assess patency of urinary catheter  - If patient has a chronic frederick, consider changing catheter if non-functioning  - Follow guidelines for intermittent irrigation of non-functioning urinary catheter  Outcome: Progressing     Problem: METABOLIC, FLUID AND ELECTROLYTES - ADULT  Goal: Electrolytes maintained within normal limits  Description: INTERVENTIONS:  - Monitor labs and assess patient for signs and symptoms of electrolyte imbalances  - Administer electrolyte replacement as ordered  - Monitor response to electrolyte replacements, including repeat lab results as appropriate  - Instruct patient on fluid and nutrition as appropriate  Outcome: Progressing  Goal: Fluid balance maintained  Description: INTERVENTIONS:  - Monitor labs   - Monitor I/O and WT  - Instruct patient on fluid and nutrition as appropriate  - Assess for signs & symptoms of volume excess or deficit  Outcome: Progressing  Goal: Glucose maintained within target range  Description: INTERVENTIONS:  - Monitor Blood Glucose as ordered  - Assess for signs and symptoms of hyperglycemia and hypoglycemia  - Administer ordered medications to maintain glucose within target range  - Assess nutritional intake and initiate nutrition service referral as needed  Outcome: Progressing     Problem: SKIN/TISSUE INTEGRITY - ADULT  Goal: Skin Integrity remains intact(Skin Breakdown Prevention)  Description: Assess:  -Perform Mino assessment every 2  -Clean and moisturize skin every shift  -Inspect skin when repositioning, toileting, and assisting with ADLS  -Assess extremities for adequate circulation and sensation     Bed Management:  -Have minimal linens on bed & keep smooth, unwrinkled  -Change linens as needed when moist or perspiring  -Avoid sitting or lying in one position for more than 2 hours while in bed  -Keep HOB at 30degrees     Toileting:  -Offer bedside commode  -Assess for incontinence every 2 hours  -Use incontinent care products after each incontinent episode such as purewick    Activity:  -Mobilize patient 2 times a day  -Encourage activity and walks on unit  -Encourage or provide ROM exercises   -Turn and reposition patient every 2 Hours  -Use appropriate equipment to lift or move patient in bed  -Instruct/ Assist with weight shifting every 15 minutes when out of bed in chair  -Consider limitation of chair time 2 hour intervals    Skin Care:  -Avoid use of baby powder, tape, friction and shearing, hot water or constrictive clothing  -Relieve pressure over bony prominences using wedges  -Do not massage red bony areas    Outcome: Progressing  Goal: Incision(s), wounds(s) or drain site(s) healing without S/S of infection  Description: INTERVENTIONS  - Assess and document dressing, incision, wound bed, drain sites and surrounding tissue  - Provide patient and family education  - Perform skin care/dressing changes every shift  Outcome: Progressing  Goal: Pressure injury heals and does not worsen  Description: Interventions:  - Implement low air loss mattress or specialty surface (Criteria met)  - Apply silicone foam dressing  - Instruct/assist with weight shifting every 15 minutes when in chair   - Limit chair time to 3 hour intervals  - Use special pressure reducing interventions such as waffle cushion when in chair   - Apply fecal or urinary incontinence containment device   - Perform passive or active ROM every shift  - Turn and reposition patient & offload bony prominences every 2 hours   - Utilize friction reducing device or surface for transfers   - Consider consults to  interdisciplinary teams such as wounds  - Use incontinent care products after each incontinent episode such as purewick  - Consider nutrition services referral as needed  Outcome: Progressing     Problem: RESPIRATORY - ADULT  Goal: Achieves optimal ventilation and oxygenation  Description: INTERVENTIONS:  - Assess for changes in respiratory status  - Assess for changes in mentation and behavior  - Position to facilitate oxygenation and minimize respiratory effort  - Oxygen administered by appropriate delivery if ordered  - Initiate smoking cessation education as indicated  - Encourage broncho-pulmonary hygiene including cough, deep breathe, Incentive Spirometry  - Assess the need for suctioning and aspirate as needed  - Assess and instruct to report SOB or any respiratory difficulty  - Respiratory Therapy support as indicated  Outcome: Progressing     Problem: MUSCULOSKELETAL - ADULT  Goal: Maintain or return mobility to safest level of function  Description: INTERVENTIONS:  - Assess patient's ability to carry out ADLs; assess patient's baseline for ADL function and identify physical deficits which impact ability to perform ADLs (bathing, care of mouth/teeth, toileting, grooming, dressing, etc )  - Assess/evaluate cause of self-care deficits   - Assess range of motion  - Assess patient's mobility  - Assess patient's need for assistive devices and provide as appropriate  - Encourage maximum independence but intervene and supervise when necessary  - Involve family in performance of ADLs  - Assess for home care needs following discharge   - Consider OT consult to assist with ADL evaluation and planning for discharge  - Provide patient education as appropriate  Outcome: Progressing  Goal: Maintain proper alignment of affected body part  Description: INTERVENTIONS:  - Support, maintain and protect limb and body alignment  - Provide patient/ family with appropriate education  Outcome: Progressing     Problem: MOBILITY - ADULT  Goal: Maintain or return to baseline ADL function  Description: INTERVENTIONS:  -  Assess patient's ability to carry out ADLs; assess patient's baseline for ADL function and identify physical deficits which impact ability to perform ADLs (bathing, care of mouth/teeth, toileting, grooming, dressing, etc )  - Assess/evaluate cause of self-care deficits   - Assess range of motion  - Assess patient's mobility; develop plan if impaired  - Assess patient's need for assistive devices and provide as appropriate  - Encourage maximum independence but intervene and supervise when necessary  - Involve family in performance of ADLs  - Assess for home care needs following discharge   - Consider OT consult to assist with ADL evaluation and planning for discharge  - Provide patient education as appropriate  Outcome: Progressing  Goal: Maintains/Returns to pre admission functional level  Description: INTERVENTIONS:  - Perform BMAT or MOVE assessment daily    - Set and communicate daily mobility goal to care team and patient/family/caregiver  - Collaborate with rehabilitation services on mobility goals if consulted  - Perform Range of Motion 3 times a day  - Reposition patient every 2 hours    - Dangle patient 3 times a day  - Stand patient 3 times a day  - Ambulate patient 3 times a day  - Out of bed to chair 3 times a day   - Out of bed for meals 3 times a day  - Out of bed for toileting  - Record patient progress and toleration of activity level   Outcome: Progressing     Problem: Prexisting or High Potential for Compromised Skin Integrity  Goal: Skin integrity is maintained or improved  Description: INTERVENTIONS:  - Identify patients at risk for skin breakdown  - Assess and monitor skin integrity  - Assess and monitor nutrition and hydration status  - Monitor labs   - Assess for incontinence   - Turn and reposition patient  - Assist with mobility/ambulation  - Relieve pressure over bony prominences  - Avoid friction and shearing  - Provide appropriate hygiene as needed including keeping skin clean and dry  - Evaluate need for skin moisturizer/barrier cream  - Collaborate with interdisciplinary team   - Patient/family teaching  - Consider wound care consult   Outcome: Progressing

## 2022-10-24 NOTE — PROGRESS NOTES
[address]  Progress Note Shameka Dinero 1955, 69 y o  female MRN: [id number]  Unit/Bed#: -01 Encounter: [id number]  Primary Care Provider: Reyes Saunders, MD   Date and time admitted to hospital: 10/22/2022 11:40 AM    * Acute kidney injury superimposed on chronic kidney disease [facility])  Assessment & Plan  Lab Results   Component Value Date    EGFR 16 10/24/2022    EGFR 13 10/23/2022    EGFR 10 10/23/2022    CREATININE 2 83 (H) 10/24/2022    CREATININE 3 29 (H) 10/23/2022    CREATININE 3 97 (H) 10/23/2022       · Likely in the setting of dehydration/intravascular volume depletion secondary to oral diuretics   · Patient presented to the ED with a creatine 6 18 (Baseline 1 2-1 8)  · No urgent indication for dialysis   · CT A/P 10/22 showed no evidence of obstructive hydronephrosis  Urinary bladder is decompressed with the frederick catheter   · Renal US shows no acute abnomalities  · Nephrology consulted- appreciate recommendations   · Mike Moros suggestive of pre-renal etiology  · NSS- D/C and bolus PRN  · Trend creatine  · Continue frederick  · Avoid hypotension and nephrotoxins  · Strict I&O  · PRN Potassium replacement      Hypotension  Assessment & Plan  · Chronic hypotension, but now shock likely septic vs hypovolemic  · Patient received IV fluids secondary to volume depletion and hypotension  Last dose of Midodrine 10/17  Pt reports not taking after discharge home for rehab facility  · Cont home midodrine  · Increase to 10 mg TID 10/23  · 10/22 Fluid resuscitated with IVF and albumen  Started on alannah-synephrine and then levophed  · 10/23 Transitioned alannah gtt to vasopressin  · Cont to wean levo as able    · Trial Albumin    Acute on chronic diastolic congestive heart failure [facility])  Assessment & Plan  Wt Readings from Last 3 Encounters:   10/23/22 (!) 147 kg (324 lb 11 8 oz)   10/03/22 (!) 171 kg (377 lb 6 8 oz)   04/13/20 (!) 162 kg (356 lb 0 7 oz)       · Patient hospitalized 9/21/22 - 10/3/22 secondary to volume overload   · Started on torsemide and zaroxolyn  · Patient down 23kg from last admission   · No evidence of volume overload on exam  · Holding home diuretics in the setting of IMTIAZ   · Home BB on hold due to hypotension  · Echocardiogram 9/21/22: The left ventricular ejection fraction is 55-59%  Systolic function is normal  Wall motion is grossly normal  There is both systolic and diastolic flattening of the interventricular septum consistent with right ventricle pressure and volume overload: Right ventricular cavity size is severely dilated  Systolic function is mildly to moderately reduced  UTI (urinary tract infection)  Assessment & Plan  · Concern for UTI  · UA +WBC, No nitrates   · Started on Ceftriaxone in the ED  · F/u urine cultures - >100,000 alpha hemolytic strep  · LA 2 0 on admission, cleared   · Completed 3 days of Ceftriaxone, d/c abx      Chronic respiratory failure with hypoxia (HCC)  Assessment & Plan  · Chronically on CPAP at night and 4 L of oxygen during the day secondary to COPD most likely NISHI  · Unable to wear home unit due to right jugular central line  · Consult Respiratory for CPAP options (ie  nasal pillows)  · Goal Sp02>88%  · Respiratory protocol     Atrial fibrillation (HCC)  Assessment & Plan  · Continue to hold Metoprolol secondary to hypotension  · INR 3 17 on admission (Goal INR 2-3)  · Hold home Coumadin 7mg  · Today, INR 2 76  · Resume Coumadin at 5mg daily  · Recheck INR in 2 days    Vaginal bleeding  Assessment & Plan  · Vaginal bleeding noted 10/22  Pt reports she had "bleeding" while in rehab but "they didn't know where it was coming from "  · Continues with bright red blood vaginally and passing multiple clots  · Monitor bleeding  · Trend Hgb and INR  · Hgb 12 down from 13 5 in the setting of fluid resuscitation  · Type and screen sent    · Needs outpatient follow up with Gyne    Acute hyponatremia  Assessment & Plan  · Presented with corrected sodium 126  · Suspected etiology hypovolemia with use of oral duiretics  · Currently resolved  · Trend BMP    GERD (gastroesophageal reflux disease)  Assessment & Plan  · Continue home PPI     Hypothyroidism  Assessment & Plan  · Restart home synthroid   · TSH 1 14    Diabetes mellitus [facility])  Assessment & Plan  Lab Results   Component Value Date    HGBA1C 6 8 (H) 10/05/2022       Recent Labs     10/23/22  1128 10/23/22  1623 10/23/22  2116 10/24/22  0101   POCGLU 301* 258* 262* 245*        · High level SSI ACHS  · Levemir 26 U HS  · Resume 3 U TID meal time insulin   · Goal -180    COPD (chronic obstructive pulmonary disease) ([facility])  Assessment & Plan  · Continue home breo ellipta and singulair   · Atrovent and xopenex QID PRN  · Albuterol PRN  · Respiratory protocol     ----------------------------------------------------------------------------------------  HPI/24hr events:   Difficulty sleeping overnight, took melatonin  Unable to wear home CPAP machine due to mask interfering with central line  Patient able to be transferred out of ICU today: No  Disposition: Continue Critical Care   Code Status: Level 3 - DNAR and DNI  ---------------------------------------------------------------------------------------  SUBJECTIVE  Patient seen this morning at bedside, laying in bed comfortably  No acute distress  Patient states that she did not sleep well , could not get comfortable  This morning, she feels better compared to when she arrived  Denies shortness of breath  Still feeling weak, unable to get up out of bed  But slowly improving  Patient complaining of ongoing lower abdominal cramping pains  Going on for many months, feels like menstrual cramping, not associated with eating  No fever/chills  Review of Systems   Constitutional: Positive for fatigue  Negative for chills and fever  HENT: Negative for ear pain and sore throat      Eyes: Negative for pain and visual disturbance  Respiratory: Negative for cough and shortness of breath  Cardiovascular: Negative for chest pain and palpitations  Gastrointestinal: Positive for abdominal pain  Negative for vomiting  Cramping   Genitourinary: Positive for vaginal bleeding  Negative for dysuria and hematuria  Musculoskeletal: Negative for arthralgias and back pain  Skin: Positive for rash  Negative for color change  Lower extremity dry skin   Neurological: Positive for headaches  Negative for seizures and syncope  Psychiatric/Behavioral: Positive for sleep disturbance  Negative for agitation and confusion  The patient is not nervous/anxious  All other systems reviewed and are negative  Review of systems was reviewed and negative unless stated above in HPI/24-hour events   ---------------------------------------------------------------------------------------  OBJECTIVE    Vitals   Vitals:    10/24/22 0815 10/24/22 0900 10/24/22 1000 10/24/22 1030   BP:       BP Location:       Pulse: 78 91 70 77   Resp: (!) 31 21 (!) 42 (!) 24   Temp:       TempSrc:       SpO2: 96% 98% 97% 95%   Weight:       Height:         Temp (24hrs), Av °F (36 7 °C), Min:97 °F (36 1 °C), Max:99 4 °F (37 4 °C)  Current: Temperature: 97 5 °F (36 4 °C)  Arterial Line BP: 112/49  Arterial Line MAP (mmHg): 72 mmHg    Respiratory:  SpO2: SpO2: 95 %  Nasal Cannula O2 Flow Rate (L/min): 4 L/min    Invasive/non-invasive ventilation settings   Respiratory  Report   Lab Data (Last 4 hours)    None         O2/Vent Data (Last 4 hours)    None                Physical Exam  Vitals and nursing note reviewed  Constitutional:       General: She is not in acute distress  Appearance: She is well-developed  She is obese  HENT:      Head: Normocephalic  Right Ear: External ear normal       Left Ear: External ear normal       Nose: Nose normal  No rhinorrhea        Mouth/Throat:      Mouth: Mucous membranes are moist    Eyes: General:         Right eye: No discharge  Left eye: No discharge  Conjunctiva/sclera: Conjunctivae normal    Cardiovascular:      Rate and Rhythm: Normal rate and regular rhythm  Pulses: Normal pulses  Heart sounds: Normal heart sounds  No murmur heard  Pulmonary:      Effort: Pulmonary effort is normal  No respiratory distress  Breath sounds: Normal breath sounds  No wheezing or rhonchi  Abdominal:      Palpations: Abdomen is soft  Tenderness: There is no abdominal tenderness  There is no guarding  Musculoskeletal:         General: Tenderness present  No swelling  Cervical back: Normal range of motion and neck supple  Right lower leg: Edema present  Left lower leg: Edema present  Skin:     General: Skin is warm and dry  Findings: Lesion and rash present  Comments: Lower extremity stasis dermatitis   Neurological:      General: No focal deficit present  Mental Status: She is alert and oriented to person, place, and time  Motor: Weakness present     Psychiatric:         Mood and Affect: Mood normal              Laboratory and Diagnostics:  Results from last 7 days   Lab Units 10/24/22  0425 10/23/22  0437 10/22/22  1214   WBC Thousand/uL 10 71* 10 88* 9 33   HEMOGLOBIN g/dL 12 4 12 0 13 5   HEMATOCRIT % 39 0 36 4 41 7   PLATELETS Thousands/uL 242 264 264   NEUTROS PCT %  --   --  65   MONOS PCT %  --   --  10     Results from last 7 days   Lab Units 10/24/22  0425 10/23/22  2119 10/23/22  1127 10/23/22  0437 10/23/22  0408 10/22/22  2256 10/22/22  1214   SODIUM mmol/L 136 132* 132*  --  132* 127* 124*   POTASSIUM mmol/L 3 6 3 7 3 7  --  3 6 3 3* 4 1   CHLORIDE mmol/L 92* 94* 91*  --  90* 88* 82*   CO2 mmol/L 29 28 28  --  29 29 32   ANION GAP mmol/L 15* 10 13  --  13 10 10   BUN mg/dL 107* 111* 123*  --  135* 138* 160*   CREATININE mg/dL 2 83* 3 29* 3 97*  --  4 32* 4 95* 6 18*   CALCIUM mg/dL 9 9 9 9 9 2  --  8 7 8 7 9 2   GLUCOSE RANDOM mg/dL 245* 265* 300*  --  126 162* 235*   ALT U/L  --   --   --  36  --   --  43   AST U/L  --   --   --  31  --   --  44   ALK PHOS U/L  --   --   --  129*  --   --  168*   ALBUMIN g/dL  --   --   --  3 9  --   --  3 2*   TOTAL BILIRUBIN mg/dL  --   --   --  0 68  --   --  0 64     Results from last 7 days   Lab Units 10/24/22  0425 10/23/22  0437 10/22/22  2256 10/22/22  1214   MAGNESIUM mg/dL 1 9 2 7* 2 1  --    PHOSPHORUS mg/dL  --  6 1*  --  6 5*      Results from last 7 days   Lab Units 10/24/22  0425 10/23/22  0437 10/22/22  1736   INR  2 76* 3 71* 3 17*          Results from last 7 days   Lab Units 10/23/22  0437 10/22/22  1214   LACTIC ACID mmol/L 1 0 2 0     ABG:  Results from last 7 days   Lab Units 10/23/22  0407   PH ART  7 363   PCO2 ART mm Hg 43 6   PO2 ART mm Hg 119 3   HCO3 ART mmol/L 24 2   BASE EXC ART mmol/L -1 2   ABG SOURCE  Line, Arterial     VBG:  Results from last 7 days   Lab Units 10/24/22  0951 10/23/22  1127 10/23/22  0407   PH ALFREDO  7 374   < >  --    PCO2 ALFREDO mm Hg 50 8*   < >  --    PO2 ALFREDO mm Hg 92 4*   < >  --    HCO3 ALFREDO mmol/L 29 0   < >  --    BASE EXC ALFREDO mmol/L 2 8   < >  --    ABG SOURCE   --   --  Line, Arterial    < > = values in this interval not displayed  Micro  Results from last 7 days   Lab Units 10/22/22  1605 10/22/22  1217 10/22/22  1214   BLOOD CULTURE   --   --  No Growth at 24 hrs  No Growth at 24 hrs  URINE CULTURE  No Growth <1000 cfu/mL >100,000 cfu/ml Alpha Hemolytic Streptococcus NOT Enterococcus*  <10,000 cfu/ml Gram Negative Fer Enteric Like*  <10,000 cfu/ml Staphylococcus coagulase negative*  --        EKG:  AFib  Imaging: I have personally reviewed pertinent reports  Intake and Output  I/O       10/22 0701  10/23 0700 10/23 0701  10/24 0700 10/24 0701  10/25 0700    P  O  500 960 480    I V  (mL/kg) 2235 8 (15 2) 2495 5 (17 5) 253 8 (1 8)    IV Piggyback 5850 350 100    Total Intake(mL/kg) 8585 8 (58 4) 3805 5 (26 6) 833 8 (5 8)    Urine (mL/kg/hr) 4375 7091 (2) 975 (1 8)    Total Output 4375 7025 975    Net +4218 8 -3219 5 -141  2                 Height and Weights   Height: 5' 2" (157 5 cm)     Body mass index is 57 58 kg/m²  Weight (last 2 days)     Date/Time Weight    10/24/22 0554 143 (314 82)    10/24/22 0515 143 (314 82)    10/23/22 0600 147 (324 74)    10/23/22 0429 147 (324 74)    10/22/22 1815 146 (320 77)    10/22/22 1811 146 (320 77)    10/22/22 1604 146 (320 77)    10/22/22 1146 148 (325 62)            Nutrition       Diet Orders   (From admission, onward)             Start     Ordered    10/23/22 1433  Diet Marcos/CHO Controlled; Consistent Carbohydrate Diet Level 2 (5 carb servings/75 grams CHO/meal)  Diet effective now        References:    Nutrtion Support Algorithm Enteral vs  Parenteral   Question Answer Comment   Diet Type Marcos/CHO Controlled    Marcos/CHO Controlled Consistent Carbohydrate Diet Level 2 (5 carb servings/75 grams CHO/meal)    RD to adjust diet per protocol?  Yes        10/23/22 1432                  Active Medications  Scheduled Meds:  Current Facility-Administered Medications   Medication Dose Route Frequency Provider Last Rate   • acetaminophen  650 mg Oral Q6H PRN Imelda Martinez PA-C     • albuterol  2 5 mg Nebulization Q6H PRN CAMPBELL Christiansen     • atorvastatin  10 mg Oral Daily Imelda Martinez PA-C     • cefTRIAXone  1,000 mg Intravenous Q24H [place], [place] Stopped (10/23/22 1143)   • cholecalciferol  2,000 Units Oral Daily Imelda Martinez PA-C     • docusate sodium  100 mg Oral BID Imelda Martinez PA-C     • ferrous sulfate  325 mg Oral Daily With Breakfast Imelda Martinez PA-C     • fluticasone-vilanterol  1 puff Inhalation Daily Imelda Martinez PA-C     • insulin detemir  26 Units Subcutaneous HS CAMPBELL Christiansen     • insulin lispro  1-6 Units Subcutaneous HS Isabella Hanson PA-C     • insulin lispro  3 Units Subcutaneous TID With Meals [place] LOYD CAMPBELL Rivera     • insulin lispro  4-20 Units Subcutaneous TID AC CAMPBELL Christiansen     • levothyroxine  200 mcg Oral Early Morning CAMPBELL Christiansen      And   • levothyroxine  88 mcg Oral Early Morning CAMPBELL Christiansen     • melatonin  6 mg Oral HS CAMPBELL Christiansen     • midodrine  10 mg Oral TID AC Isabella Hanson PA-C     • montelukast  10 mg Oral HS Joanie Ayala PA-C     • norepinephrine  1-30 mcg/min Intravenous Titrated [place]ancil CAMPBELL Eisenberg 11 mcg/min (10/24/22 1008)   • nystatin   Topical BID Joanie Ayala PA-C     • pantoprazole  20 mg Oral Early Morning Joanie Ayala PA-C     • polyethylene glycol  17 g Oral Daily PRN Joanie Ayala PA-C     • QUEtiapine  25 mg Oral HS PRN CAMPBELL Marlow     • traZODone  50 mg Oral HS CAMPBELL Sinha     • vasopressin  0 04 Units/min Intravenous Continuous Azar Shen PA-C 0 04 Units/min (10/24/22 0834)   • warfarin  5 mg Oral Once (warfarin) CAMPBELL Sinha       Continuous Infusions:  norepinephrine, 1-30 mcg/min, Last Rate: 11 mcg/min (10/24/22 1008)  vasopressin, 0 04 Units/min, Last Rate: 0 04 Units/min (10/24/22 0834)      PRN Meds:   acetaminophen, 650 mg, Q6H PRN  albuterol, 2 5 mg, Q6H PRN  polyethylene glycol, 17 g, Daily PRN  QUEtiapine, 25 mg, HS PRN        Invasive Devices Review  Invasive Devices  Report    Central Venous Catheter Line  Duration           CVC Central Lines 10/23/22 Triple 16cm 1 day          Peripheral Intravenous Line  Duration           Peripheral IV 10/22/22 Left Antecubital 1 day    Peripheral IV 10/22/22 Left;Proximal;Upper;Ventral (anterior) Arm 1 day    Peripheral IV 10/22/22 Right Forearm 1 day          Arterial Line  Duration           Arterial Line 10/22/22 Radial 1 day          Drain  Duration           Urethral Catheter Straight-tip 16 Fr  1 day                Rationale for remaining devices:  Hypotension requiring pressors  ---------------------------------------------------------------------------------------  Advance Directive and Living Will:      Power of :    POLST:    ---------------------------------------------------------------------------------------  Care Time Delivered:   30 minutes    Khoi Moreland MD      Portions of the record may have been created with voice recognition software  Occasional wrong word or "sound a like" substitutions may have occurred due to the inherent limitations of voice recognition software    Read the chart carefully and recognize, using context, where substitutions have occurred

## 2022-10-24 NOTE — PROCEDURES
POC Cardiac US    Date/Time: 10/24/2022 3:00 PM  Performed by: Kuldeep Valetnine  Authorized by: Kuldeep Valentine     Patient location:  ICU  Other Assisting Provider: No    Procedure details:     Exam Type:  Diagnostic and educational    Indications: hypotension and suspected volume depletion      Assessment / Evaluation for: cardiac function, inferior vena cava for fluid responsiveness and intravascular volume status      Exam Type: initial exam      Image quality: limited diagnostic      Image availability:  Video obtained  Patient Details:     Cardiac Rhythm:  Irregular    Medications: norepinephrine infusion and vasopressin      Mechanical ventilation: No    Cardiac findings:     Echo technique: limited 2D      Views obtained: parasternal long axis and parasternal short axis      Pericardial effusion: absent      Tamponade physiology: absent      Wall motion: normal      LV systolic function: hyperdynamic      RV dilation: moderate    IVC findings:     IVC Size: dilated      IVC Inspiratory Collapse: partial      Maximum IVC Diameter (cm):  2 6  Interpretation:     Fluid Status:  Hypervolemic

## 2022-10-24 NOTE — ASSESSMENT & PLAN NOTE
· Continue to hold Metoprolol secondary to hypotension  · Previously rate controlled, went into RVR 10/24 and started amio gtt at 1 mg/min  · Today, transition to oral amio 300mg  · INR 3 17 on admission (Goal INR 2-3)  · Hold home Coumadin 7mg  · 10/24 - INR 2 76  · Continue Coumadin at 5mg daily  · Recheck INR 10/26

## 2022-10-24 NOTE — ASSESSMENT & PLAN NOTE
Lab Results   Component Value Date    EGFR 16 10/24/2022    EGFR 13 10/23/2022    EGFR 10 10/23/2022    CREATININE 2 83 (H) 10/24/2022    CREATININE 3 29 (H) 10/23/2022    CREATININE 3 97 (H) 10/23/2022   Baseline creatinine 1 1-1 3  Patient is improving baseline creatinine function  Appreciate nephrology consult  Follow recommendation

## 2022-10-24 NOTE — UTILIZATION REVIEW
NOTIFICATION OF INPATIENT ADMISSION   AUTHORIZATION REQUEST   SERVICING FACILITY:   [address] [place] [address]  Page [facility] [address] [place] [address] Geni Talley  Tax ID: [id number]  NPI: [id number] ATTENDING PROVIDER:  Attending Name and NPI#: Mc Galvin Md [[id number]]  Address: [facility]  Page Ignaciomary lou [address] [place] [address] Geni Talley  Phone: [phone]   ADMISSION INFORMATION:  Place of Service: [place] [address]  Place of Service Code: 21  Inpatient Admission Date/Time: 10/22/22  1:14 PM  Discharge Date/Time: No discharge date for patient encounter  Admitting Diagnosis Code/Description:  IMTIAZ (acute kidney injury) ([facility] ) [N17 9]     UTILIZATION REVIEW CONTACT:  May Mora Utilization   Network Utilization Review Department  Phone: [phone]  Fax [phone]  Email: [email or web address]  Contact for approvals/pending authorizations, clinical reviews, and discharge  PHYSICIAN ADVISORY SERVICES:  Medical Necessity Denial & Aqsn-lv-Vyye Review  Phone: [phone]  Fax: [phone]  Email: [email or web address]

## 2022-10-24 NOTE — PLAN OF CARE
Problem: Potential for Falls  Goal: Patient will remain free of falls  Description: INTERVENTIONS:  - Educate patient/family on patient safety including physical limitations  - Instruct patient to call for assistance with activity   - Consult OT/PT to assist with strengthening/mobility   - Keep Call bell within reach  - Keep bed low and locked with side rails adjusted as appropriate  - Keep care items and personal belongings within reach  - Initiate and maintain comfort rounds  - Make Fall Risk Sign visible to staff  - Offer Toileting every 2 Hours, in advance of need  - Initiate/Maintain bed/chair alarm  - Obtain necessary fall risk management equipment:   - Apply yellow socks and bracelet for high fall risk patients  - Consider moving patient to room near nurses station  Outcome: Progressing     Problem: PAIN - ADULT  Goal: Verbalizes/displays adequate comfort level or baseline comfort level  Description: Interventions:  - Encourage patient to monitor pain and request assistance  - Assess pain using appropriate pain scale  - Administer analgesics based on type and severity of pain and evaluate response  - Implement non-pharmacological measures as appropriate and evaluate response  - Consider cultural and social influences on pain and pain management  - Notify physician/advanced practitioner if interventions unsuccessful or patient reports new pain  Outcome: Progressing     Problem: INFECTION - ADULT  Goal: Absence or prevention of progression during hospitalization  Description: INTERVENTIONS:  - Assess and monitor for signs and symptoms of infection  - Monitor lab/diagnostic results  - Monitor all insertion sites, i e  indwelling lines, tubes, and drains  - Monitor endotracheal if appropriate and nasal secretions for changes in amount and color  - [place] appropriate cooling/warming therapies per order  - Administer medications as ordered  - Instruct and encourage patient and family to use good hand hygiene technique  - Identify and instruct in appropriate isolation precautions for identified infection/condition  Outcome: Progressing  Goal: Absence of fever/infection during neutropenic period  Description: INTERVENTIONS:  - Monitor WBC    Outcome: Progressing     Problem: SAFETY ADULT  Goal: Patient will remain free of falls  Description: INTERVENTIONS:  - Educate patient/family on patient safety including physical limitations  - Instruct patient to call for assistance with activity   - Consult OT/PT to assist with strengthening/mobility   - Keep Call bell within reach  - Keep bed low and locked with side rails adjusted as appropriate  - Keep care items and personal belongings within reach  - Initiate and maintain comfort rounds  - Make Fall Risk Sign visible to staff  - Offer Toileting every 2 Hours, in advance of need  - Initiate/Maintain bed/chair alarm  - Obtain necessary fall risk management equipment:   - Apply yellow socks and bracelet for high fall risk patients  - Consider moving patient to room near nurses station  Outcome: Progressing  Goal: Maintain or return to baseline ADL function  Description: INTERVENTIONS:  -  Assess patient's ability to carry out ADLs; assess patient's baseline for ADL function and identify physical deficits which impact ability to perform ADLs (bathing, care of mouth/teeth, toileting, grooming, dressing, etc )  - Assess/evaluate cause of self-care deficits   - Assess range of motion  - Assess patient's mobility; develop plan if impaired  - Assess patient's need for assistive devices and provide as appropriate  - Encourage maximum independence but intervene and supervise when necessary  - Involve family in performance of ADLs  - Assess for home care needs following discharge   - Consider OT consult to assist with ADL evaluation and planning for discharge  - Provide patient education as appropriate  Outcome: Progressing  Goal: Maintains/Returns to pre admission functional level  Description: INTERVENTIONS:  - Perform BMAT or MOVE assessment daily    - Set and communicate daily mobility goal to care team and patient/family/caregiver  - Collaborate with rehabilitation services on mobility goals if consulted  - Perform Range of Motion 3 times a day  - Reposition patient every 2 hours  - Dangle patient 3 times a day  - Stand patient 3 times a day  - Ambulate patient 3 times a day  - Out of bed to chair 3 times a day   - Out of bed for meals 3 times a day  - Out of bed for toileting  - Record patient progress and toleration of activity level   Outcome: Progressing     Problem: DISCHARGE PLANNING  Goal: Discharge to home or other facility with appropriate resources  Description: INTERVENTIONS:  - Identify barriers to discharge w/patient and caregiver  - Arrange for needed discharge resources and transportation as appropriate  - Identify discharge learning needs (meds, wound care, etc )  - Arrange for interpretive services to assist at discharge as needed  - Refer to Case Management Department for coordinating discharge planning if the patient needs post-hospital services based on physician/advanced practitioner order or complex needs related to functional status, cognitive ability, or social support system  Outcome: Not Progressing     Problem: Knowledge Deficit  Goal: Patient/family/caregiver demonstrates understanding of disease process, treatment plan, medications, and discharge instructions  Description: Complete learning assessment and assess knowledge base    Interventions:  - Provide teaching at level of understanding  - Provide teaching via preferred learning methods  Outcome: Progressing     Problem: GENITOURINARY - ADULT  Goal: Maintains or returns to baseline urinary function  Description: INTERVENTIONS:  - Assess urinary function  - Encourage oral fluids to ensure adequate hydration if ordered  - Administer IV fluids as ordered to ensure adequate hydration  - Administer ordered medications as needed  - Offer frequent toileting  - Follow urinary retention protocol if ordered  Outcome: Progressing  Goal: Absence of urinary retention  Description: INTERVENTIONS:  - Assess patient's ability to void and empty bladder  - Monitor I/O  - Bladder scan as needed  - Discuss with physician/AP medications to alleviate retention as needed  - Discuss catheterization for long term situations as appropriate  Outcome: Progressing  Goal: Urinary catheter remains patent  Description: INTERVENTIONS:  - Assess patency of urinary catheter  - If patient has a chronic frederick, consider changing catheter if non-functioning  - Follow guidelines for intermittent irrigation of non-functioning urinary catheter  Outcome: Progressing     Problem: METABOLIC, FLUID AND ELECTROLYTES - ADULT  Goal: Electrolytes maintained within normal limits  Description: INTERVENTIONS:  - Monitor labs and assess patient for signs and symptoms of electrolyte imbalances  - Administer electrolyte replacement as ordered  - Monitor response to electrolyte replacements, including repeat lab results as appropriate  - Instruct patient on fluid and nutrition as appropriate  Outcome: Not Progressing  Goal: Fluid balance maintained  Description: INTERVENTIONS:  - Monitor labs   - Monitor I/O and WT  - Instruct patient on fluid and nutrition as appropriate  - Assess for signs & symptoms of volume excess or deficit  Outcome: Not Progressing  Goal: Glucose maintained within target range  Description: INTERVENTIONS:  - Monitor Blood Glucose as ordered  - Assess for signs and symptoms of hyperglycemia and hypoglycemia  - Administer ordered medications to maintain glucose within target range  - Assess nutritional intake and initiate nutrition service referral as needed  Outcome: Not Progressing     Problem: RESPIRATORY - ADULT  Goal: Achieves optimal ventilation and oxygenation  Description: INTERVENTIONS:  - Assess for changes in respiratory status  - Assess for changes in mentation and behavior  - Position to facilitate oxygenation and minimize respiratory effort  - Oxygen administered by appropriate delivery if ordered  - Initiate smoking cessation education as indicated  - Encourage broncho-pulmonary hygiene including cough, deep breathe, Incentive Spirometry  - Assess the need for suctioning and aspirate as needed  - Assess and instruct to report SOB or any respiratory difficulty  - Respiratory Therapy support as indicated  Outcome: Progressing     Problem: SKIN/TISSUE INTEGRITY - ADULT  Goal: Skin Integrity remains intact(Skin Breakdown Prevention)  Description: Assess:  -Perform M[place] assessment every 2  -Clean and moisturize skin every shift  -Inspect skin when repositioning, toileting, and assisting with ADLS  -Assess extremities for adequate circulation and sensation     Bed Management:  -Have minimal linens on bed & keep smooth, unwrinkled  -Change linens as needed when moist or perspiring  -Avoid sitting or lying in one position for more than 2 hours while in bed  -Keep HOB at 30degrees     Toileting:  -Offer bedside commode  -Assess for incontinence every 2 hours  -Use incontinent care products after each incontinent episode such as purewick    Activity:  -Mobilize patient 2 times a day  -Encourage activity and walks on unit  -Encourage or provide ROM exercises   -Turn and reposition patient every 2 Hours  -Use appropriate equipment to lift or move patient in bed  -Instruct/ Assist with weight shifting every 15 minutes when out of bed in chair  -Consider limitation of chair time 2 hour intervals    Skin Care:  -Avoid use of baby powder, tape, friction and shearing, hot water or constrictive clothing  -Relieve pressure over bony prominences using wedges  -Do not massage red bony areas    Outcome: Progressing  Goal: Incision(s), wounds(s) or drain site(s) healing without S/S of infection  Description: INTERVENTIONS  - Assess and document dressing, incision, wound bed, drain sites and surrounding tissue  - Provide patient and family education  - Perform skin care/dressing changes every shift  Outcome: Progressing  Goal: Pressure injury heals and does not worsen  Description: Interventions:  - Implement low air loss mattress or specialty surface (Criteria met)  - Apply silicone foam dressing  - Instruct/assist with weight shifting every 15 minutes when in chair   - Limit chair time to 3 hour intervals  - Use special pressure reducing interventions such as waffle cushion when in chair   - Apply fecal or urinary incontinence containment device   - Perform passive or active ROM every shift  - Turn and reposition patient & offload bony prominences every 2 hours   - Utilize friction reducing device or surface for transfers   - Consider consults to  interdisciplinary teams such as wounds  - Use incontinent care products after each incontinent episode such as purewick  - Consider nutrition services referral as needed  Outcome: Progressing     Problem: MUSCULOSKELETAL - ADULT  Goal: Maintain or return mobility to safest level of function  Description: INTERVENTIONS:  - Assess patient's ability to carry out ADLs; assess patient's baseline for ADL function and identify physical deficits which impact ability to perform ADLs (bathing, care of mouth/teeth, toileting, grooming, dressing, etc )  - Assess/evaluate cause of self-care deficits   - Assess range of motion  - Assess patient's mobility  - Assess patient's need for assistive devices and provide as appropriate  - Encourage maximum independence but intervene and supervise when necessary  - Involve family in performance of ADLs  - Assess for home care needs following discharge   - Consider OT consult to assist with ADL evaluation and planning for discharge  - Provide patient education as appropriate  Outcome: Progressing  Goal: Maintain proper alignment of affected body part  Description: INTERVENTIONS:  - Support, maintain and protect limb and body alignment  - Provide patient/ family with appropriate education  Outcome: Progressing     Problem: MOBILITY - ADULT  Goal: Maintain or return to baseline ADL function  Description: INTERVENTIONS:  -  Assess patient's ability to carry out ADLs; assess patient's baseline for ADL function and identify physical deficits which impact ability to perform ADLs (bathing, care of mouth/teeth, toileting, grooming, dressing, etc )  - Assess/evaluate cause of self-care deficits   - Assess range of motion  - Assess patient's mobility; develop plan if impaired  - Assess patient's need for assistive devices and provide as appropriate  - Encourage maximum independence but intervene and supervise when necessary  - Involve family in performance of ADLs  - Assess for home care needs following discharge   - Consider OT consult to assist with ADL evaluation and planning for discharge  - Provide patient education as appropriate  Outcome: Progressing  Goal: Maintains/Returns to pre admission functional level  Description: INTERVENTIONS:  - Perform BMAT or MOVE assessment daily    - Set and communicate daily mobility goal to care team and patient/family/caregiver  - Collaborate with rehabilitation services on mobility goals if consulted  - Perform Range of Motion 3 times a day  - Reposition patient every 2 hours    - Dangle patient 3 times a day  - Stand patient 3 times a day  - Ambulate patient 3 times a day  - Out of bed to chair 3 times a day   - Out of bed for meals 3 times a day  - Out of bed for toileting  - Record patient progress and toleration of activity level   Outcome: Progressing     Problem: Prexisting or High Potential for Compromised Skin Integrity  Goal: Skin integrity is maintained or improved  Description: INTERVENTIONS:  - Identify patients at risk for skin breakdown  - Assess and monitor skin integrity  - Assess and monitor nutrition and hydration status  - Monitor labs   - Assess for incontinence   - Turn and reposition patient  - Assist with mobility/ambulation  - Relieve pressure over bony prominences  - Avoid friction and shearing  - Provide appropriate hygiene as needed including keeping skin clean and dry  - Evaluate need for skin moisturizer/barrier cream  - Collaborate with interdisciplinary team   - Patient/family teaching  - Consider wound care consult   Outcome: Progressing

## 2022-10-24 NOTE — PROGRESS NOTES
NEPHROLOGY PROGRESS NOTE   Santa Alvarez 69 y o  female MRN: [id number]  Unit/Bed#: -01 Encounter: [id number]    Assessment/Plan:    56-year-old female with COPD on 4 L nasal cannula, chronic HFpEF, atrial fibrillation on Coumadin, hypothyroidism, type 2 diabetes, obesity, CKD 3 presented 10/22 for generalized weakness being treated in ICU level care for undifferentiated shock, severe acute kidney injury, supratherapeutic INR  Nephrology following along for acute kidney injury atop chronic kidney disease, volume status, metabolic derangements  1  Acute kidney injury (POA) atop chronic kidney disease  · Presented with a creatinine of 6 2 mg/dL-> 2 8 mg/dL today  Etiology is prerenal azotemia from decreased effective arterial volume, shock with high risk transition ATN however her creatinine is improving +/-urinary retention  · Nonoliguric and actually polyuric, possibly due to postobstructive versus post ATN auto diuresis   · Continue efforts to maintain appropriate mean arterial pressure, currently on dual pressor therapy  · Avoid potential nephrotoxins  · Continue indwelling urinary catheter for strict I&O in critically ill patient and urinary retention  2  Stage IIIA chronic kidney disease with baseline creatinine around 1 1-1 3 mg/dL  3  Undifferentiated shock versus idiopathic hypotension  · Noncompliant with midodrine o/p per nursing staff  · Hypovolemic versus septic versus adrenal insufficiency  Critical care to check a m  Cortisol and bedside cardiac ultrasound  Difficult to determine volume status given habitus  Critical care team to initiate patient on albumin therapy  4  Polyuria  · Postobstructive versus post ATN auto diuresis  There is calcium oxalate crystals in urine and also nephrolithiasis on imaging  Recommend crystalloid/colloid to replace urine output volume  5  Chronic heart failure preserved ejection fraction  · No evidence of cardiogenic shock    Well-perfused extremities and appropriate mentation  Critical care team to evaluate bedside cardiac ultrasound later today  Diuretics are appropriately on hold  6  Proteinuria  · Repeat as outpatient rule out monoclonal gammopathy if indicated    ROS  Examined sitting in bed  Complains of headache when she turns, otherwise, no physical complaints  A complete 10 point review of systems have been performed and are otherwise negative         Historical Information   Past Medical History:   Diagnosis Date   • Asthma    • Atrial fibrillation ([facility] )    • COPD (chronic obstructive pulmonary disease) ([facility] )    • Diabetes mellitus ([facility] )    • Disease of thyroid gland    • Heart failure ([facility] )    • Kidney failure      Past Surgical History:   Procedure Laterality Date   • CARDIOVERSION N/A    • GALLBLADDER SURGERY     • HERNIA REPAIR       Social History   Social History     Substance and Sexual Activity   Alcohol Use Never     Social History     Substance and Sexual Activity   Drug Use Never     Social History     Tobacco Use   Smoking Status Never Smoker   Smokeless Tobacco Never Used       Family History:   Family History   Problem Relation Age of Onset   • Arthritis Mother    • Hearing loss Mother    • Heart disease Mother    • Hypertension Mother    • Stroke Mother    • Alcohol abuse Father    • Cancer Father    • Diabetes Father    • Arthritis Sister    • Cancer Sister    • Alcohol abuse Brother    • Diabetes Son    • Arthritis Daughter    • Drug abuse Daughter    • Heart disease Maternal Grandmother    • Hypertension Maternal Grandmother    • Stroke Maternal Grandmother    • Arthritis Maternal Aunt    • Asthma Neg Hx    • Birth defects Neg Hx    • COPD Neg Hx    • Depression Neg Hx    • Early death Neg Hx    • Hyperlipidemia Neg Hx    • Kidney disease Neg Hx    • Learning disabilities Neg Hx    • Mental illness Neg Hx    • Mental retardation Neg Hx    • Miscarriages / Stillbirths Neg Hx    • Vision loss Neg Hx Medications:  Pertinent medications were reviewed  Current Facility-Administered Medications   Medication Dose Route Frequency Provider Last Rate   • acetaminophen  650 mg Oral Q6H PRN Keira Tello PA-C     • Albumin 5%  25 g Intravenous Q6H CAMPBELL Sinha     • albuterol  2 5 mg Nebulization Q6H PRN CAMPBELL Christiansen     • atorvastatin  10 mg Oral Daily Keira Tello PA-C     • cefTRIAXone  1,000 mg Intravenous Q24H Keira Tello PA-C 1,000 mg (10/24/22 1111)   • cholecalciferol  2,000 Units Oral Daily Keira Tello PA-C     • docusate sodium  100 mg Oral BID Keira Tello PA-C     • ferrous sulfate  325 mg Oral Daily With Breakfast Keira Tello PA-C     • fluticasone-vilanterol  1 puff Inhalation Daily Keira Tello PA-C     • insulin detemir  26 Units Subcutaneous HS CAMPBELL Christiansen     • insulin lispro  1-6 Units Subcutaneous HS Isabella Hanson PA-C     • insulin lispro  3 Units Subcutaneous TID With Meals CAMPBELL Christiansen     • insulin lispro  4-20 Units Subcutaneous TID AC CAMPBELL Christiansen     • levothyroxine  200 mcg Oral Early Morning CAMPBELL Christiansen      And   • levothyroxine  88 mcg Oral Early Morning CAMPBELL Christiansen     • melatonin  6 mg Oral HS CAMPBELL Christiansen     • midodrine  10 mg Oral TID AC Isabella Hanson PA-C     • montelukast  10 mg Oral HS Keira Tello PA-C     • norepinephrine  1-30 mcg/min Intravenous Titrated CAMPBELL Judge 9 mcg/min (10/24/22 1202)   • nystatin   Topical BID Keira Tello PA-C     • pantoprazole  20 mg Oral Early Morning Keira Tello PA-C     • polyethylene glycol  17 g Oral Daily PRN Keira Tello PA-C     • QUEtiapine  25 mg Oral HS PRN CAMPBELL Sinha     • traZODone  50 mg Oral HS Ann Marie A [place], CRNP     • vasopressin  0 04 Units/min Intravenous Continuous Isabella Hanson PA-C 0 04 Units/min (10/24/22 8906)   • warfarin  5 mg Oral Once (warfarin) Ann Marie Armstrong, CAMPBELL           Allergies   Allergen Reactions   • Acesulfame Potassium - Food Allergy Anaphylaxis   • Aspartame - Food Allergy Anaphylaxis   • Saccharin - Food Allergy Anaphylaxis   • Sucralose - Food Allergy Anaphylaxis   • Metformin GI Intolerance         Vitals:   BP (!) 89/51   Pulse 102   Temp 97 5 °F (36 4 °C) (Temporal)   Resp (!) 36   Ht 5' 2" (1 575 m)   Wt (!) 143 kg (314 lb 13 1 oz)   SpO2 94%   BMI 57 58 kg/m²   Body mass index is 57 58 kg/m²  SpO2: 94 %,   SpO2 Activity: At Rest,   O2 Device: Nasal cannula      Intake/Output Summary (Last 24 hours) at 10/24/2022 1317  Last data filed at 10/24/2022 1202  Gross per 24 hour   Intake 2739 97 ml   Output 6125 ml   Net -3385 03 ml     Invasive Devices  Report    Central Venous Catheter Line  Duration           CVC Central Lines 10/23/22 Triple 16cm 1 day          Peripheral Intravenous Line  Duration           Peripheral IV 10/22/22 Left Antecubital 1 day    Peripheral IV 10/22/22 Left;Proximal;Upper;Ventral (anterior) Arm 1 day          Arterial Line  Duration           Arterial Line 10/22/22 Radial 1 day          Drain  Duration           Urethral Catheter Straight-tip 16 Fr  2 days                Physical Exam  General: conscious, cooperative, in no acute distress  Eyes: conjunctivae pink, anicteric sclerae  ENT: lips and mucous membranes moist  Neck: supple, no JVD, no masses  Chest:  Very diminished breath sounds bilaterally, no crackles, ronchus or wheezings nasal cannula  CVS: S1 & S2, normal rate, regular rhythm  Abdomen: soft, non-tender, non-distended, normoactive bowel sounds obese  Extremities:  Trace edema of both legs  Skin: no rash  Neuro: awake, alert, oriented  :  Indwelling urinary catheter intact draining yellow      Diagnostic Data:  Lab: I have personally reviewed pertinent lab results  ,   CBC:  Results from last 7 days   Lab Units 10/24/22  0425   WBC Thousand/uL 10 71*   HEMOGLOBIN g/dL 12 4   HEMATOCRIT % 39 0   PLATELETS Thousands/uL 242      CMP:   Lab Results   Component Value Date    SODIUM 136 10/24/2022    K 3 6 10/24/2022    CL 92 (L) 10/24/2022    CO2 29 10/24/2022     (H) 10/24/2022    CREATININE 2 83 (H) 10/24/2022    CALCIUM 9 9 10/24/2022    EGFR 16 10/24/2022   ,   PT/INR:   Lab Results   Component Value Date    INR 2 76 (H) 10/24/2022   ,   Magnesium: No components found for: MAG,  Phosphorous: No results found for: PHOS    Microbiology:  @[facility],(urinecx:7)@        [facility]    Portions of the record may have been created with voice recognition software  Occasional wrong word or "sound a like" substitutions may have occurred due to the inherent limitations of voice recognition software  Read the chart carefully and recognize, using context, where substitutions have occurred

## 2022-10-24 NOTE — UTILIZATION REVIEW
Initial Clinical Review    Admission: Date/Time/Statement:   Admission Orders (From admission, onward)     Ordered        10/22/22 1634  [address] [facility] [address]  Once            10/22/22 1314  INPATIENT ADMISSION  Once                      Orders Placed This Encounter   Procedures   • INPATIENT ADMISSION     Standing Status:   Standing     Number of Occurrences:   1     Order Specific Question:   Level of Care     Answer:   Med Surg [16]     Order Specific Question:   Estimated length of stay     Answer:   More than 2 Midnights     Order Specific Question:   Certification     Answer:   I certify that inpatient services are medically necessary for this patient for a duration of greater than two midnights  See H&P and MD Progress Notes for additional information about the patient's course of treatment  • INPATIENT ADMISSION     Standing Status:   Standing     Number of Occurrences:   1     Order Specific Question:   Level of Care     Answer:   Level 1 Stepdown [13]     Order Specific Question:   Estimated length of stay     Answer:   More than 2 Midnights     Order Specific Question:   Certification     Answer:   I certify that inpatient services are medically necessary for this patient for a duration of greater than two midnights  See H&P and MD Progress Notes for additional information about the patient's course of treatment  ED Arrival Information     Expected   10/22/2022     Arrival   10/22/2022 11:40    Acuity   Emergent            Means of arrival   Ambulance    Escorted by   Jie [facility]    Admission type   Emergency            Arrival complaint   Abdominal bloating            Chief Complaint   Patient presents with   • Urinary Retention     Pt arrives reporting she hasnt been able to pee for past 4 days  Pt is chronically SOB but reports it is worse today  Bgl [id number]  Per ems pts PCP called and told her she is in kidney failure due to recent blood work          Initial Presentation: 69 y o  female to ED from home w/ generalized weakness   OP labs revealed CR 6  Recent admission for CHF 9/21-10/3 DC'd on torsemide and zaroxolyn   Unable to void x4 days  Concern for UTI   Admitted IP status w/  IMTIAZ baseline cr 1 2-1 8  nephrology consulted , start IVF , trend cr , cont frederick   IV ceftriaxone , f/u ua cx for UTI   Acute on chronic CHF pt down 23kg from previous admission   No evidence of fld overload  Hold diuretics in setting of IMTIAZ  COPD chronically on 4l keep sat > 88 %   DM start SSI   COPD cont home inhalers  afib check INR , restart coumadin , lopressor for rate control   10/22 Nephrology Consult   Cr 6 18, 10/14 was 1 86  she has lost 80 lbs from one month ago and is likely intravascularly volume depleted from diuretic therapy, will start normal saline at 100 ml/hr,  check urine electrolytes and osmolality in view of hyponatremia,  continue Frederick drainage and serial studies  10/22 Critical care Note   BP 77/52  Hypotension, acute on chronic  Plan: Cont home midodrine  Albumen 200 ml given with little response in BP  Given additional 1 L fluid bolus as pt is currently 1 5L positive fluid balance  If hypotension persists, will start phenylephrine  Place arterial line  10/22 Procedure Note   Kelli insertion     Date: 10/23   Day 2: Urine output increased significantly and pt became hypotensive  Volume resuscitated and started on phenylephrine  A-line inserted  Central line inserted early this am  Norepi infusion started to wean phenylephrine  Cr improved to 4 32  cont volume resuscitation , monitor I&o , BMP q8hr   Cont abx for UTI   INR 3 71 hold and monitor goal 2-3      10/23 Procedure   Central Line insertion     10/23 Nephrology Note  IMTIAZ urine lytes suggest a low fractional excretion of urea - volume depletion   Fractional excretion of sodium was high due to prior use of diuretics producing natriuresis     received IV fluids and albumin, however, blood pressure dropped requiring pressor therapy during the night   continue volume restoration kidney ultrasound pending    ED Triage Vitals   Temperature Pulse Respirations Blood Pressure SpO2   10/22/22 1146 10/22/22 1146 10/22/22 1146 10/22/22 1149 10/22/22 1146   (!) 96 1 °F (35 6 °C) 72 18 133/80 96 %      Temp Source Heart Rate Source Patient Position - Orthostatic VS BP Location FiO2 (%)   10/22/22 1815 10/22/22 1215 10/22/22 1215 10/22/22 1215 --   Oral Monitor Lying Right arm       Pain Score       10/22/22 1311       6          Wt Readings from Last 1 Encounters:   10/24/22 (!) 143 kg (314 lb 13 1 oz)     Additional Vital Signs:   10/24/22 0900 -- 91 21 -- -- 110/49 70 mmHg 98 % -- -- -- --   10/24/22 0815 -- 78 31 Abnormal  -- -- 115/49 72 mmHg 96 % -- -- -- --   10/24/22 0800 -- 81 29 Abnormal  -- -- 128/54 79 mmHg 97 % -- -- -- --   10/24/22 0745 -- 73 30 Abnormal  -- -- 124/51 76 mmHg 97 % 36 4 L/min Nasal cannula --   10/24/22 0700 97 5 °F (36 4 °C) 96 26 Abnormal  139/63 91 -- -- 96 % 36 4 L/min Nasal cannula Lying   10/24/22 0645 -- 70 40 Abnormal  -- -- 111/47 69 mmHg 96 % -- -- -- --   10/24/22 0630 -- 83 30 Abnormal  -- -- 111/49 70 mmHg 96 % -- -- -- --   10/24/22 0615 -- 76 33 Abnormal  -- -- 114/48 70 mmHg 96 % -- -- -- --   10/24/22 0600 -- 83 31 Abnormal  -- -- 103/45 64 mmHg Abnormal  93 % -- -- -- --   10/24/22 0545 -- 86 34 Abnormal  -- -- 118/55 75 mmHg 92 % -- -- -- --   10/24/22 0530 -- 83 24 Abnormal  -- -- 121/53 77 mmHg 95 % -- -- -- --   10/24/22 0515 -- 77 30 Abnormal  -- -- 114/47 69 mmHg 93 % -- -- -- --   10/24/22 0500 -- 81 33 Abnormal  -- -- 112/49 71 mmHg 97 % -- -- -- --   10/24/22 0445 -- 81 28 Abnormal  -- -- 114/51 73 mmHg 98 % -- -- -- --   10/24/22 0430 -- 91 31 Abnormal  -- -- 111/52 73 mmHg 98 % -- -- -- --   10/24/22 0400 98 4 °F (36 9 °C) 81 27 Abnormal  -- -- 105/45 67 mmHg 97 % 36 4 L/min Nasal cannula --   10/24/22 0345 -- 81 20 -- -- 109/46 68 mmHg 97 % -- -- -- --   10/24/22 0330 -- 79 25 Abnormal  -- -- 117/49 73 mmHg 98 % -- -- -- --   10/24/22 0315 -- 83 35 Abnormal  -- -- 110/49 70 mmHg 97 % -- -- -- --   10/24/22 0300 -- 74 23 Abnormal  -- -- 111/48 71 mmHg 97 % -- -- -- --   10/24/22 0245 -- 88 30 Abnormal  -- -- 111/51 73 mmHg 96 % -- -- -- --   10/24/22 0230 -- 80 33 Abnormal  -- -- 114/50 72 mmHg 96 % -- -- -- --   10/24/22 0215 -- 73 30 Abnormal  -- -- 110/50 71 mmHg 96 % -- -- -- --   10/24/22 0200 -- 74 37 Abnormal  -- -- 104/49 69 mmHg 96 % -- -- -- --   10/24/22 0145 -- 86 32 Abnormal  -- -- 115/53 75 mmHg 95 % -- -- -- --   10/24/22 0130 -- 62 32 Abnormal  -- -- 118/52 74 mmHg 96 % -- -- -- --   10/24/22 0115 -- 85 15 -- -- 106/50 67 mmHg 97 % -- -- -- --   10/24/22 0100 -- 75 35 Abnormal  120/65 87 119/54 75 mmHg 97 % 36 4 L/min Nasal cannula --   10/24/22 0045 -- 73 26 Abnormal  -- -- 112/48 71 mmHg 94 % -- -- -- --   10/24/22 0030 -- 81 36 Abnormal  -- -- 107/47 68 mmHg 95 % -- -- -- --   10/24/22 0015 -- 66 22 -- -- 113/47 69 mmHg 94 % -- -- -- --   10/24/22 0000 97 9 °F (36 6 °C) 59 22 108/53 77 113/47 70 mmHg 95 % 36 4 L/min BiPAP  Lying   O2 Device: home bipap at 10/24/22 0000   10/23/22 2345 -- 69 23 Abnormal  -- -- 116/48 72 mmHg 95 % -- -- -- --   10/23/22 2330 -- 58 40 Abnormal  -- -- 118/48 72 mmHg 96 % -- -- -- --   10/23/22 2315 -- 60 33 Abnormal  -- -- 114/48 70 mmHg 95 % -- -- -- --   10/23/22 2300 -- 75 34 Abnormal  111/62 82 109/49 70 mmHg 96 % -- -- -- --   10/23/22 2245 -- 64 34 Abnormal  -- -- 110/48 69 mmHg 97 % -- -- -- --   10/23/22 2230 -- 64 31 Abnormal  -- -- 117/49 72 mmHg 99 % -- -- -- --   10/23/22 2215 -- 61 34 Abnormal  -- -- 112/47 70 mmHg 98 % -- -- -- --   10/23/22 2200 -- 75 27 Abnormal  119/58 84 117/50 75 mmHg 97 % 36 4 L/min BiPAP  --   O2 Device: home bipap at 10/23/22 2200   10/23/22 2145 -- 58 18 -- -- 105/47 68 mmHg 98 % -- -- -- --   10/23/22 2130 -- 192 Abnormal  30 Abnormal  -- -- 99/52 71 mmHg -- -- -- -- --   10/23/22 2115 -- 69 21 -- -- 104/47 69 mmHg 93 % -- -- -- --   10/23/22 2100 -- 61 22 123/61 88 115/51 73 mmHg 93 % -- -- -- --   10/23/22 2045 -- 65 28 Abnormal  -- -- 114/50 74 mmHg 93 % -- -- -- --   10/23/22 2030 -- 64 27 Abnormal  -- -- 113/49 72 mmHg 92 % -- -- -- --   10/23/22 2015 -- 66 28 Abnormal  -- -- 109/52 71 mmHg 91 % -- -- -- --   10/23/22 2000 99 4 °F (37 4 °C) 60 26 Abnormal  127/60 86 112/50 72 mmHg 91 % 36 4 L/min Nasal cannula Lying   10/23/22 1945 -- 55 29 Abnormal  -- -- 117/50 73 mmHg 94 % -- -- -- --   10/23/22 1909 98 °F (36 7 °C) -- -- -- -- -- -- -- -- -- -- --   10/23/22 1900 -- 61 21 128/60 86 122/55 78 mmHg 94 % -- -- -- --   10/23/22 1800 -- 73 30 Abnormal  124/65 83 131/58 83 mmHg 95 % -- -- -- --   10/23/22 1755 -- 77 30 Abnormal  -- -- 125/59 79 mmHg 97 % -- -- -- --   10/23/22 1625 -- 73 39 Abnormal  112/48 Abnormal  69 100/50 67 mmHg 94 % -- -- -- --   10/23/22 1615 -- 71 36 Abnormal  -- -- 90/46 61 mmHg Abnormal  94 % -- -- -- --   10/23/22 1600 -- 73 35 Abnormal  102/50 72 95/49 65 mmHg 94 % -- -- -- --   10/23/22 1545 -- 67 34 Abnormal  -- -- 108/51 69 mmHg 94 % -- -- -- --   10/23/22 1500 -- 67 35 Abnormal  109/54 78 109/51 70 mmHg 93 % -- -- -- --   10/23/22 1427 97 °F (36 1 °C) Abnormal  -- -- -- -- -- -- -- -- -- -- --   10/23/22 1420 -- 69 30 Abnormal  113/54 78 107/52 71 mmHg 93 % -- -- -- --   10/23/22 1400 -- 68 27 Abnormal  120/58 84 115/52 73 mmHg 95 % -- -- -- --   10/23/22 1340 -- 65 30 Abnormal  -- -- 115/53 75 mmHg 93 % -- -- -- --   10/23/22 1320 -- 75 30 Abnormal  106/54 73 106/52 71 mmHg 93 % -- -- -- --   10/23/22 1318 -- 73 36 Abnormal  78/40 Abnormal  58 Abnormal  98/48 66 mmHg 94 % -- -- -- --   10/23/22 1300 -- 69 30 Abnormal  111/52 75 100/48 65 mmHg 90 % -- -- -- --   10/23/22 1227 -- 77 26 Abnormal  -- -- 105/48 67 mmHg 95 % -- -- -- --   10/23/22 1200 -- 67 29 Abnormal  108/51 73 106/49 68 mmHg 95 % -- -- -- Lying   10/23/22 1130 -- 83 23 Abnormal  -- -- 118/49 72 mmHg 92 % -- -- -- --   10/23/22 1115 -- 80 33 Abnormal  -- -- 115/50 73 mmHg 95 % -- -- -- --   10/23/22 1100 -- 86 27 Abnormal  -- -- 106/47 67 mmHg 96 % -- -- -- --   10/23/22 1020 -- 77 27 Abnormal  -- -- 107/47 67 mmHg 96 % -- -- -- --   10/23/22 1010 -- 74 30 Abnormal  -- -- 102/45 66 mmHg 96 % -- -- -- --   10/23/22 1000 -- 85 30 Abnormal  109/65 80 105/48 69 mmHg 97 % -- -- -- --   10/23/22 0930 -- 74 35 Abnormal  -- -- 110/50 70 mmHg 96 % -- -- -- --   10/23/22 0904 -- 70 31 Abnormal  -- -- 104/49 68 mmHg 95 % -- -- -- --   10/23/22 0900 -- 69 32 Abnormal  102/51 74 106/49 69 mmHg 95 % -- -- -- --   10/23/22 0853 -- 68 28 Abnormal  86/44 Abnormal  62 Abnormal  105/49 69 mmHg 95 % -- -- -- --   10/23/22 0843 -- 77 23 Abnormal  -- -- 79/40 53 mmHg Abnormal  95 % -- -- -- --   10/23/22 0830 -- 78 30 Abnormal  -- -- 111/49 72 mmHg 96 % -- -- -- --   10/23/22 0826 -- 75 32 Abnormal  -- -- 111/50 72 mmHg 95 % -- -- -- --   10/23/22 0800 -- 85 33 Abnormal  82/57 Abnormal  64 Abnormal  113/56 78 mmHg 94 % -- -- None (Room air) Lying   10/23/22 0745 -- 91 20 -- -- 102/54 72 mmHg 94 % -- -- -- --   10/23/22 0729 -- 73 32 Abnormal  -- -- 91/43 60 mmHg Abnormal  97 % -- -- -- --   10/23/22 0725 98 5 °F (36 9 °C) 77 29 Abnormal  102/49 Abnormal  70 -- -- 97 % -- -- -- Lying   10/23/22 0715 -- 73 32 Abnormal  -- -- 91/42 59 mmHg Abnormal  97 % -- -- -- --   10/23/22 0700 -- 69 35 Abnormal  102/49 Abnormal  70 93/43 60 mmHg Abnormal  97 % -- -- -- --   10/23/22 0645 -- 73 30 Abnormal  -- -- 94/43 62 mmHg Abnormal  95 % -- -- -- --   10/23/22 0630 -- 80 36 Abnormal  -- -- 93/44 61 mmHg Abnormal  94 % -- -- -- --   10/23/22 0615 -- 68 35 Abnormal  -- -- 102/46 65 mmHg 96 % -- -- -- --   10/23/22 0600 -- 70 29 Abnormal  108/45 Abnormal  65 106/47 68 mmHg 94 % -- -- -- --   10/23/22 0545 -- 72 29 Abnormal  -- -- 106/47 68 mmHg 96 % -- -- -- --   10/23/22 0530 -- 72 38 Abnormal  -- -- 110/50 72 mmHg 97 % -- -- -- --   10/23/22 0515 -- 70 28 Abnormal  -- -- 117/53 76 mmHg 98 % -- -- -- --   10/23/22 0500 -- 71 28 Abnormal  93/63 73 119/54 78 mmHg 97 % -- -- -- --   10/23/22 0449 -- -- -- -- -- -- -- -- 36 4 L/min Nasal cannula --   10/23/22 0445 -- 70 49 Abnormal  -- -- 129/58 84 mmHg 99 % -- -- -- --   10/23/22 0430 -- 76 30 Abnormal  -- -- 118/50 76 mmHg 98 % -- -- -- --   10/23/22 0415 -- 60 30 Abnormal  -- -- 110/48 71 mmHg 99 % -- -- -- --   10/23/22 0400 97 5 °F (36 4 °C) 67 38 Abnormal  99/53 73 95/42 62 mmHg Abnormal  98 % 36 4 L/min CPAP  Lying   O2 Device: at home CPAP at 10/23/22 0400   10/23/22 0345 -- 68 35 Abnormal  -- -- 82/40 56 mmHg Abnormal  98 % -- -- -- --   10/23/22 0330 -- 81 20 80/43 Abnormal  59 Abnormal  68/40 52 mmHg Abnormal  98 % -- -- -- --   10/23/22 0328 -- 70 26 Abnormal  -- -- 78/41 55 mmHg Abnormal  98 % -- -- -- --   10/23/22 0327 -- 70 26 Abnormal  -- -- 85/44 59 mmHg Abnormal  98 % -- -- -- --   10/23/22 0315 -- 70 24 Abnormal  -- -- 88/45 60 mmHg Abnormal  99 % -- -- -- --   10/23/22 0311 -- 73 18 -- -- 90/46 63 mmHg Abnormal  99 % -- -- -- --   10/23/22 0300 -- 70 27 Abnormal  103/51 74 94/47 64 mmHg Abnormal  98 % -- -- -- --   10/23/22 0245 -- 71 25 Abnormal  -- -- 96/48 66 mmHg 99 % -- -- -- --   10/23/22 0230 -- 66 41 Abnormal  -- -- 103/51 70 mmHg 98 % -- -- -- --   10/23/22 0215 -- 63 44 Abnormal  -- -- 98/49 67 mmHg 99 % -- -- -- --   10/23/22 0200 -- 65 33 Abnormal  94/51 69 87/46 61 mmHg Abnormal  99 % -- -- -- --   10/23/22 0145 -- 64 30 Abnormal  -- -- 77/43 56 mmHg Abnormal  99 % -- -- -- --   10/23/22 0142 -- 70 20 -- -- 78/43 57 mmHg Abnormal  99 % -- -- -- --   10/23/22 0130 -- 67 25 Abnormal  -- -- 89/47 63 mmHg Abnormal  98 % -- -- -- --   10/23/22 0115 -- 58 41 Abnormal  94/53 72 97/48 66 mmHg 98 % -- -- -- --   10/23/22 0100 -- 60 28 Abnormal  -- -- 93/46 63 mmHg Abnormal  98 % -- -- -- --   10/23/22 0045 -- 62 38 Abnormal  -- -- 102/50 68 mmHg 98 % -- -- -- --   10/23/22 0030 -- 66 28 Abnormal  85/50 Abnormal  62 Abnormal  83/44 58 mmHg Abnormal  98 % -- -- -- --   10/23/22 0015 -- 68 23 Abnormal  -- -- 98/47 64 mmHg Abnormal  97 % -- -- -- --   10/23/22 0000 97 2 °F (36 2 °C) Abnormal  69 18 80/50 Abnormal  59 Abnormal  92/45 61 mmHg Abnormal  97 % 36 4 L/min CPAP  Lying   O2 Device: home CPAP at 10/23/22 0000   10/22/22 2348 -- 70 18 80/50 Abnormal  59 Abnormal  84/44 57 mmHg Abnormal  96 % -- -- -- --   10/22/22 2345 -- 73 17 -- -- 78/43 54 mmHg Abnormal  96 % -- -- -- --   10/22/22 2330 -- 63 24 Abnormal  -- -- 80/42 55 mmHg Abnormal  96 % -- -- -- --   10/22/22 2315 -- 65 19 118/56 81 97/47 63 mmHg Abnormal  97 % -- -- -- --   10/22/22 2300 -- 70 43 Abnormal  -- -- 97/43 62 mmHg Abnormal  97 % -- -- -- --   10/22/22 2245 -- 74 30 Abnormal  -- -- -- -- 97 % -- -- -- --   10/22/22 2230 -- 67 18 81/50 Abnormal  61 Abnormal  -- -- 96 % -- -- -- --   10/22/22 2215 -- 71 35 Abnormal  85/46 Abnormal  60 Abnormal  -- -- 97 % -- -- -- --   10/22/22 2200 -- 66 22 78/52 Abnormal  60 Abnormal  -- -- 96 % -- -- -- --   10/22/22 2145 -- 68 22 78/46 Abnormal  58 Abnormal  -- -- 96 % -- -- -- --   10/22/22 2130 -- 70 31 Abnormal  77/52 Abnormal  60 Abnormal  -- -- 98 % -- -- -- --   10/22/22 2115 -- 75 19 79/53 Abnormal  61 Abnormal  -- -- 89 % Abnormal  -- -- -- --   10/22/22 2100 -- 76 28 Abnormal  -- -- -- -- 97 % -- -- -- --   10/22/22 2045 -- 75 26 Abnormal  74/42 Abnormal  53 Abnormal  -- -- 97 % -- -- -- --   10/22/22 2030 -- 74 28 Abnormal  72/36 Abnormal  47 Abnormal  -- -- 96 % -- -- -- --   10/22/22 2015 -- 74 24 Abnormal  125/88 102 -- -- 95 % -- -- -- --   10/22/22 2000 97 5 °F (36 4 °C) 115 Abnormal  47 Abnormal  135/86 106 -- -- 96 % 36 4 L/min Nasal cannula Lying   10/22/22 1945 -- 77 22 70/50 Abnormal  55 Abnormal  -- -- 92 % -- -- -- --   10/22/22 1930 -- 74 20 80/44 Abnormal  57 Abnormal  -- -- 94 % -- -- -- --   10/22/22 1915 -- 80 38 Abnormal  75/51 Abnormal  57 Abnormal  -- -- 91 % -- -- -- --   10/22/22 1845 -- 76 25 Abnormal  86/40 Abnormal  58 Abnormal  -- -- 92 % -- -- -- --   10/22/22 1830 -- 70 19 93/55 67 -- -- 90 % -- -- -- --   10/22/22 1815 97 8 °F (36 6 °C) 68 16 87/49 Abnormal  62 Abnormal  -- -- 94 % 36 4 L/min Nasal cannula Lying   10/22/22 1700 -- 69 19 121/58 84 -- -- 97 % 36 4 L/min Nasal cannula Lying   10/22/22 1645 -- 70 18 159/60 86 -- -- 99 % 36 4 L/min Nasal cannula Lying   10/22/22 1611 -- 61 21 123/92 103 -- -- 99 % 36 4 L/min Nasal cannula Lying   10/22/22 1500 -- 66 23 Abnormal  96/52 69 -- -- 100 % 36 4 L/min Nasal cannula Lying   10/22/22 1445 -- 69 19 86/49 Abnormal  61 Abnormal  -- -- 98 % 36 4 L/min Nasal cannula Lying   10/22/22 1430 -- 63 25 Abnormal  92/44 Abnormal  63 Abnormal  -- -- 99 % 36 4 L/min Nasal cannula Lying   10/22/22 1415 -- 69 26 Abnormal  85/50 Abnormal  61 Abnormal  -- -- 98 % 36 4 L/min Nasal cannula Lying   10/22/22 1405 -- 64 -- 77/44 Abnormal   56 Abnormal  -- -- 100 % 36 4 L/min Nasal cannula Lying   BP: adjusterd  cuff rechecked bp leaned pt back in bed no pt c/o rn aware at 10/22/22 1405   10/22/22 1345 -- 73 25 Abnormal  86/54 Abnormal  65 -- -- 100 % 36 4 L/min Nasal cannula Lying   10/22/22 1330 -- 73 24 Abnormal  81/51 Abnormal  62 Abnormal  -- -- 100 % 36 4 L/min Nasal cannula Lying   10/22/22 1318 -- 75 29 Abnormal  92/43 Abnormal  62 Abnormal  -- -- 97 % 36 4 L/min Nasal cannula Lying   10/22/22 1317 -- -- -- -- -- -- -- -- -- -- Nasal cannula --   10/22/22 1300 -- 68 23 Abnormal  69/42 Abnormal  50 Abnormal  -- -- 99 % 36 4 L/min Nasal cannula Lying   10/22/22 1215 -- 74 23 Abnormal  132/97 109 -- -- 94 % 36 4 L/min Nasal cannula Lying   10/22/22 1149 -- -- -- 133/80 101 -- --              Pertinent Labs/Diagnostic Test Results:   US kidney and bladder   Final Result by Saul Wilson MD (10/23 1110)      1  No hydronephrosis or calculi  2   Nonspecific lobular renal contour could be related to scarring  Workstation performed: [id number]         XR chest portable ICU   Final Result by Omar Oliveros MD (10/23 8968)      Right neck catheter in the distal SVC  Mild congestion  Workstation performed: [id number]         CT abdomen pelvis wo contrast   Final Result by Cristy Bernal MD (10/22 5679)      No acute inflammatory changes in the abdomen or pelvis  Workstation performed: [id number]         XR chest portable   ED Interpretation by Galina Dickens MD (10/22 0217)   No acute finding      Final Result by Lucila Yoo MD (10/23 6361)      No acute cardiopulmonary disease                    Workstation performed: [id number]           Results from last 7 days   Lab Units 10/22/22  1214   SARS-COV-2  Negative     Results from last 7 days   Lab Units 10/24/22  0425 10/23/22  0437 10/22/22  1214   WBC Thousand/uL 10 71* 10 88* 9 33   HEMOGLOBIN g/dL 12 4 12 0 13 5   HEMATOCRIT % 39 0 36 4 41 7   PLATELETS Thousands/uL 242 264 264   NEUTROS ABS Thousands/µL  --   --  6 03     Results from last 7 days   Lab Units 10/24/22  0425 10/23/22  2119 10/23/22  1127 10/23/22  0437 10/23/22  0408 10/22/22  2256 10/22/22  1214   SODIUM mmol/L 136 132* 132*  --  132* 127* 124*   POTASSIUM mmol/L 3 6 3 7 3 7  --  3 6 3 3* 4 1   CHLORIDE mmol/L 92* 94* 91*  --  90* 88* 82*   CO2 mmol/L 29 28 28  --  29 29 32   ANION GAP mmol/L 15* 10 13  --  13 10 10   BUN mg/dL 107* 111* 123*  --  135* 138* 160*   CREATININE mg/dL 2 83* 3 29* 3 97*  --  4 32* 4 95* 6 18*   EGFR ml/min/1 73sq m 16 13 10  --  9 8 6   CALCIUM mg/dL 9 9 9 9 9 2  --  8 7 8 7 9 2   CALCIUM, IONIZED mmol/L 1 17  --   --  1 07*  --   --   --    MAGNESIUM mg/dL 1 9  --   --  2 7*  --  2 1  --    PHOSPHORUS mg/dL  --   --   --  6 1*  --   --  6 5*     Results from last 7 days   Lab Units 10/23/22  0437 10/22/22  1214   AST U/L 31 44   ALT U/L 36 43   ALK PHOS U/L 129* 168*   TOTAL PROTEIN g/dL 7 8 8 2   ALBUMIN g/dL 3 9 3 2*   TOTAL BILIRUBIN mg/dL 0 68 0 64   BILIRUBIN DIRECT mg/dL 0 39*  --      Results from last 7 days   Lab Units 10/24/22  0101 10/23/22  2116 10/23/22  1623 10/23/22  1128 10/23/22  0821 10/23/22  0545 10/22/22  2356 10/22/22  1850   POC GLUCOSE mg/dl 245* 262* 258* 301* 167* 126 140 159*     Results from last 7 days   Lab Units 10/24/22  0425 10/23/22  2119 10/23/22  1127 10/23/22  0408 10/22/22  2256 10/22/22  1214   GLUCOSE RANDOM mg/dL 245* 265* 300* 126 162* 235*     Results from last 7 days   Lab Units 10/22/22  2148   OSMOLALITY, SERUM mmol/*      Results from last 7 days   Lab Units 10/23/22  0407   PH ART  7 363   PCO2 ART mm Hg 43 6   PO2 ART mm Hg 119 3   HCO3 ART mmol/L 24 2   BASE EXC ART mmol/L -1 2   O2 CONTENT ART mL/dL 17 6   O2 HGB, ARTERIAL % 97 7*   ABG SOURCE  Line, Arterial     Results from last 7 days   Lab Units 10/23/22  1127   PH ALFREDO  7 363   PCO2 ALFREDO mm Hg 38 6*   PO2 ALFREDO mm Hg 58 5*   HCO3 ALFREDO mmol/L 21 5*   BASE EXC ALFREDO mmol/L -3 5   O2 CONTENT ALFREDO ml/dL 16 6   O2 HGB, VENOUS % 88 1*     Results from last 7 days   Lab Units 10/22/22  1736 10/22/22  1602 10/22/22  1214   HS TNI 0HR ng/L  --   --  68*   HS TNI 2HR ng/L  --  57*  --    HSTNI D2 ng/L  --  -11  --    HS TNI 4HR ng/L 58*  --   --    HSTNI D4 ng/L -10  --   --      Results from last 7 days   Lab Units 10/24/22  0425 10/23/22  0437 10/22/22  1736   PROTIME seconds 29 2* 36 7* 32 5*   INR  2 76* 3 71* 3 17*     Results from last 7 days   Lab Units 10/22/22  1214   TSH 3RD GENERATON uIU/mL 1  141     Results from last 7 days   Lab Units 10/23/22  0437 10/22/22  1214   LACTIC ACID mmol/L 1 0 2 0     Results from last 7 days   Lab Units 10/22/22  1214   NT-PRO BNP pg/mL 4,103*       Results from last 7 days   Lab Units 10/22/22  2148   OSMOLALITY, SERUM mmol/*     Results from last 7 days   Lab Units 10/22/22  1605 10/22/22  1217   CLARITY UA   --  Cloudy   COLOR UA   --  Rhonda   SPEC GRAV UA   --  1 020   PH UA   -- 5 0   GLUCOSE UA mg/dl  --  Negative   KETONES UA mg/dl  --  Trace*   BLOOD UA   --  Large*   PROTEIN UA mg/dl  --  30 (1+)*   NITRITE UA   --  Negative   BILIRUBIN UA   --  Small*   UROBILINOGEN UA E U /dl  --  0 2   LEUKOCYTES UA   --  Small*   WBC UA /hpf  --  10-20*   RBC UA /hpf  --  2-4   BACTERIA UA /hpf  --  Moderate*   EPITHELIAL CELLS WET PREP /hpf  --  Occasional   SODIUM UR  72  --    CREATININE UR mg/dL 41 5  41 5  41 5  --    PROTEIN UR mg/dL 59  --    PROT/CREAT RATIO UR  1 42*  --      Results from last 7 days   Lab Units 10/22/22  1214   INFLUENZA A PCR  Negative   INFLUENZA B PCR  Negative   RSV PCR  Negative     Results from last 7 days   Lab Units 10/22/22  1605 10/22/22  1217 10/22/22  1214   BLOOD CULTURE   --   --  No Growth at 24 hrs  No Growth at 24 hrs     URINE CULTURE  No Growth <1000 cfu/mL >100,000 cfu/ml Alpha Hemolytic Streptococcus NOT Enterococcus*  <10,000 cfu/ml Gram Negative Fer Enteric Like*  <10,000 cfu/ml Staphylococcus coagulase negative*  --      ED Treatment:   Medication Administration from 10/22/2022 1059 to 10/22/2022 1811       Date/Time Order Dose Route Action     10/22/2022 1335 sodium chloride 0 9 % bolus 1,000 mL 1,000 mL Intravenous New Bag     10/22/2022 1217 sodium chloride 0 9 % bolus 1,000 mL 1,000 mL Intravenous New Bag     10/22/2022 1311 acetaminophen (TYLENOL) tablet 650 mg 650 mg Oral Given     10/22/2022 1314 sodium chloride 0 9 % bolus 1,000 mL 1,000 mL Intravenous New Bag     10/22/2022 1326 cefTRIAXone (ROCEPHIN) IVPB (premix in dextrose) 1,000 mg 50 mL 1,000 mg Intravenous New Bag     10/22/2022 1736 sodium chloride 0 9 % infusion 100 mL/hr Intravenous New Bag        Past Medical History:   Diagnosis Date   • Asthma    • Atrial fibrillation (HCC)    • COPD (chronic obstructive pulmonary disease) ([facility] )    • Diabetes mellitus ([facility] )    • Disease of thyroid gland    • Heart failure ([facility] )    • Kidney failure      Present on Admission:  • Atrial fibrillation ([place] [address] )  • COPD (chronic obstructive pulmonary disease) ([facility])  • Diabetes mellitus ([place] [address] )  • Acute on chronic diastolic congestive heart failure ([facility])  • Morbid obesity ([facility])  • Hypothyroidism  • Chronic respiratory failure with hypoxia ([facility])  • Hypotension  • GERD (gastroesophageal reflux disease)  • UTI (urinary tract infection)  • Acute hyponatremia  • Vaginal bleeding      Admitting Diagnosis: IMTIAZ (acute kidney injury) ([place] [address] ) [N17 [id number]]  Age/Sex: 69 y o  female  Admission Orders:  Scheduled Medications:  atorvastatin, 10 mg, Oral, Daily  cefTRIAXone, 1,000 mg, Intravenous, Q24H  cholecalciferol, 2,000 Units, Oral, Daily  docusate sodium, 100 mg, Oral, BID  ferrous sulfate, 325 mg, Oral, Daily With Breakfast  fluticasone-vilanterol, 1 puff, Inhalation, Daily  insulin detemir, 26 Units, Subcutaneous, HS  insulin lispro, 1-6 Units, Subcutaneous, HS  insulin lispro, 3 Units, Subcutaneous, TID With Meals  insulin lispro, 4-20 Units, Subcutaneous, TID AC  levothyroxine, 200 mcg, Oral, Early Morning   And  levothyroxine, 88 mcg, Oral, Early Morning  melatonin, 6 mg, Oral, HS  midodrine, 10 mg, Oral, TID AC  montelukast, 10 mg, Oral, HS  nystatin, , Topical, BID  pantoprazole, 20 mg, Oral, Early Morning  traZODone, 50 mg, Oral, HS  warfarin, 5 mg, Oral, Once (warfarin)      Continuous IV Infusions:  norepinephrine, 1-30 mcg/min, Intravenous, Titrated  vasopressin, 0 04 Units/min, Intravenous, Continuous      PRN Meds:  acetaminophen, 650 mg, Oral, Q6H PRN  albuterol, 2 5 mg, Nebulization, Q6H PRN  polyethylene glycol, 17 g, Oral, Daily PRN  QUEtiapine, 25 mg, Oral, HS PRN    fingerstick ac and hs   PT OT ST   Neuro checks   Tele   Weights   I&O       IP CONSULT TO NEPHROLOGY  IP CONSULT TO CASE MANAGEMENT  IP CONSULT TO NUTRITION SERVICES    Network Utilization Review Department  ATTENTION: Please call with any questions or concerns to [phone] and carefully listen to the prompts so that you are directed to the right person  All voicemails are confidential   [place] all requests for admission clinical reviews, approved or denied determinations and any other requests to dedicated fax number below belonging to the campus where the patient is receiving treatment   List of dedicated fax numbers for the Facilities:  [address] DENIALS (Administrative/Medical Necessity) [phone]   [address] (Maternity/NICU/Pediatrics) [phone]   [address] [phone]   [facility]  [phone]   [address] [address] [facility] [phone]   [address] [facility] 134 815 [facility] [phone]

## 2022-10-24 NOTE — WOUND OSTOMY CARE
Consult Note - Wound   Frances Munguia 69 y o  female MRN: [id number]  Unit/Bed#: -01 Encounter: [id number]        History and Present Illness:  69year old female admitted with acute kidney injury superimposed on chronic kidney disease  PMH Morbid Obesity, BMI 57 58  Acute hyponatremia,UTI ,hypotension,Covid 19, DM  Seen today in ICU for initial wound consult   Patient seen by wound care 9/23/22 with suspected DTI to bilateral buttocks and Intertrigo of multiple skin folds and left lateral heel  DTI may evolve to stage 3,4 or unstageable pressure injury  Alert and oriented, frederick catheter for bladder management  Max assist of 2 to reposition  Assessment Findings:   1) Intertrigo to bilateral right axilla, right posterior knee skin fold, bilateral breast folds  2) Left lateral and posterior heel, pink intact blanches  Allevyn heel foam applied and both heels offloaded on pillow  3) POA DTI to right inner buttocks   Deep and Light purple non blanchable  Left buttocks red and blanchable  No induration or maceration  Pt has frederick catheter for bladder management  Low air loss bariatric bed, positioned on side with foam wedges  4)Bilateral lower extremities intact  Dry patchy areas of thick scaly appearing hyperkeratosis   Skin care Plan:  1)Interdry to areas of Intertrigo bilateral axilla,beneath breasts, posterior knee folds and abdominal pannus  Interdry reduces friction, moisture, bacteria and fungus due to skin to skin contact and negrita away moisture  Cleanse with soap and water then pat dry then apply Interdry  Apply flat against area of skin to skin contact leaving 2 inch exposed,remove twice a day for cleansing and reapply  May be used up to 5-7 days  2-Allevyn foam to right buttocks as treatment , yunier with a T, check skin beneath foam every shift and re apply  Change every 3 days and prn soil or dislodgement    3-Turn/reposition q2h or when medically stable for pressure re-distribution on skin    4-Elevate heels to offload pressure  5-Moisturize skin daily with skin nourishing cream  6-Ehob cushion in chair when out of bed  7-Hydraguard to bilateral heels BID and PRN  8-Allevyn foam to left  heel, yunier w/P, peel foam check skin integrity q-shift  Change q3d  9-Bariatric low air loss (ordered)  Wounds:      Wound 10/22/22 Pressure Injury Buttocks Right (Active)   Wound Image   10/24/22 0931   Wound Description Intact;Dry;Light purple;Non-blanchable erythema 10/24/22 0931   Pressure Injury Stage DTPI 10/24/22 0931   Alyssa-wound Assessment Dry; Intact 10/24/22 0931   Wound Length (cm) 3 cm 10/24/22 0931   Wound Width (cm) 3 cm 10/24/22 0931   Wound Surface Area (cm^2) 9 cm^2 10/24/22 0931   Drainage Amount None 10/24/22 0931   Treatments Site care 10/24/22 0931   Dressing Foam, Silicon (eg  Allevyn, etc) 10/24/22 0931   Dressing Changed New 10/24/22 0931   Patient Tolerance Tolerated well 10/24/22 0931   Dressing Status Clean;Dry; Intact 10/24/22 0931           Wound 10/24/22 MASD Axilla Right (Active)   Wound Image   10/24/22 0130   Wound Description Dry;Beefy red 10/24/22 1000   Alyssa-wound Assessment Dry; Intact 10/24/22 1000   Wound Length (cm) 10 cm 10/24/22 1000   Wound Width (cm) 7 cm 10/24/22 1000   Wound Surface Area (cm^2) 70 cm^2 10/24/22 1000   Drainage Amount None 10/24/22 1000   Treatments Site care 10/24/22 1000   Dressing Moisture barrier 10/24/22 1000   Dressing Changed New 10/24/22 1000   Patient Tolerance Tolerated well 10/24/22 1000   Dressing Status Clean;Dry; Intact 10/24/22 1000       Wound 10/24/22 MASD Groin Anterior; Left (Active)   Wound Image   10/24/22 0130   Wound Description Dry;Beefy red 10/24/22 1000   Alyssa-wound Assessment Clean;Dry; Intact 10/24/22 1000   Wound Length (cm) 1 cm 10/24/22 1000   Wound Width (cm) 2 cm 10/24/22 1000   Wound Surface Area (cm^2) 2 cm^2 10/24/22 1000   Drainage Amount None 10/24/22 1000   Treatments Site care 10/24/22 1000   Dressing Other (Comment) 10/24/22 1000   Dressing Changed New 10/24/22 1000   Patient Tolerance Tolerated well 10/24/22 1000   Dressing Status Clean;Dry; Intact 10/24/22 1000       Wound 10/24/22 Heel Left (Active)   Wound Image   10/24/22 0946   Wound Description Dry; Intact; Beefy red 10/24/22 0946   Alyssa-wound Assessment Dry; Intact 10/24/22 0946   Wound Length (cm) 2 cm 10/24/22 0946   Wound Width (cm) 6 cm 10/24/22 0946   Wound Surface Area (cm^2) 12 cm^2 10/24/22 0946   Drainage Amount None 10/24/22 0946   Treatments Elevated;Site care 10/24/22 0946   Dressing Foam, Silicon (eg  Allevyn, etc) 10/24/22 0946   Dressing Changed New 10/24/22 0946   Patient Tolerance Tolerated well 10/24/22 0946   Dressing Status Clean;Dry; Intact 10/24/22 0946     Call or tigertext with any questions  Wound Care will continue to follow    Aruna CROFTN RN

## 2022-10-24 NOTE — CASE MANAGEMENT
Case Management Assessment & Discharge Planning Note    Patient name Francois Parry  Location /-01 MRN [id number]  : 1955 Date 10/24/2022       Current Admission Date: 10/22/2022  Current Admission Diagnosis:Acute kidney injury superimposed on chronic kidney disease [facility])   Patient Active Problem List    Diagnosis Date Noted   • Acute hyponatremia 10/23/2022   • Vaginal bleeding 10/23/2022   • Acute kidney injury superimposed on chronic kidney disease ([facility] ) 10/22/2022   • Hypotension 10/22/2022   • GERD (gastroesophageal reflux disease) 10/22/2022   • UTI (urinary tract infection) 10/22/2022   • Acute on chronic diastolic congestive heart failure ([facility] ) 2022   • [facility]MTIAZ (acute kidney [facility]) [facility] 2022   • Morbid obesity ([facility] ) 2022   • Hypothyroidism 2022   • Supratherapeutic INR 04/15/2020   • COVID-19 virus infection 2020   • Chronic respiratory failure with hypoxia ([facility] ) 2020   • Asthma 2020   • Atrial fibrillation ([facility] ) 2020   • COPD (chronic obstructive pulmonary disease) ([facility] ) 2020   • Diabetes mellitus ([facility] ) 2020   • Heart failure ([facility] ) 2020   • Shortness of breath 2020   • Anemia 2020      LOS (days): 2  Geometric Mean LOS (GMLOS) (days): 4 30  Days to GMLOS:2 2     OBJECTIVE:  [facility]  of Unplanned Readmission Score: 34 69     Current admission status: Inpatient  Referral Reason: Other    Preferred Pharmacy:   [address] [id number]  Phone: [phone] Fax: [phone]    Primary Care Provider: Nickolas Romo MD    Primary Insurance: [employer]   Secondary Insurance:     ASSESSMENT:  Beatriz 26 Proxies    There are no active Health Care Proxies on file         Advance Directives  Does patient have a Health Care POA?:  (Pt reports hus and son are POA)    Readmission Root Cause  30 Day Readmission: Yes (Pt d/c from [facility] 10/3 to STR, recent d/c home from [address])  Who directed you to return to the hospital?: PCP  During previous admission, was a post-acute recommendation made?: Yes  What post-acute resources were offered?: STR  Patient was readmitted due to: IMTIAZ    Patient Information  Admitted from[de-identified] Home  Mental Status: Alert  During Assessment patient was accompanied by: Not accompanied during assessment  Assessment information provided by[de-identified] Patient  Primary Caregiver: Family (Sps, son)  Support Systems: Spouse/significant other, Son, Friend  Home entry access options  Select all that apply : No steps to enter home  Type of Current Residence: [address] [place] [address] ([facility])  Homeless/housing insecurity resource given?: N/A  Living Arrangements: Lives w/ Spouse/significant other, Lives w/ Son  Is patient a ?: No    Activities of Daily Living Prior to Admission  Functional Status: Assistance  Completes ADLs independently?: No  Level of ADL dependence: Assistance  Ambulates independently?: No  Level of ambulatory dependence: Assistance  Does patient use assisted devices?: Yes  Assisted Devices (DME) used:  Wheelchair, Walker, Bedside Commode, CPAP, Home Oxygen concentrator, Portable Oxygen tanks  DME Company Name (respiratory supplies): [employer]  O2 Rate(s): 4L  Does patient currently own DME?: Yes  What DME does the patient currently own?: Bedside Commode, CPAP, Home Oxygen concentrator, Portable Oxygen tanks, Walker, Wheelchair  Does the patient have a history of Short-Term Rehab?: Yes (Katelynn recent stay)  Does patient have a history of HHC?: Yes [facility])  Does patient currently have [facility]?: Yes ([address] [place] [address], and [email or web address])    Patient Information Continued  Income Source: Pension/nursing home  Does patient have prescription coverage?: Yes  Food insecurity resource given?: N/A  Does patient receive dialysis treatments?: No  Does patient have a history of substance abuse?: No  Does patient have a history of Mental Health Diagnosis?: No    Means of Transportation  Means of Transport to Appts[de-identified] Other (Comment) (STS, hus wants to drive Pt however Pt reports she cannot fit in his vehicle)  Was application for public transport provided?: N/A        DISCHARGE DETAILS:    Discharge planning discussed with[de-identified] [address]         Is the patient interested in [facility] at discharge?: Yes  [address] requested[de-identified] Physical Therapy, Occupational Therapy, [address] Name[de-identified] [address] [facility] Provider[de-identified] PCP  Home Health Services Needed[de-identified] Evaluate Functional Status and Safety, Gait/ADL Training, Strengthening/Theraputic Exercises to Improve Function, Other (comment)  Homebound Criteria Met[de-identified] Requires the Assistance of Another Person for Safe Ambulation or to Leave the Home, Uses an Assist Device (i e  cane, walker, etc)  Supporting Clincal Findings[de-identified] Fatigues Easliy in [employer], Limited Endurance  Pt w/ recent STR stay at [facility], was d/c home w/ [facility] and continued [facility]  Pt has 4L Home O2 and Cpap through [place]  CM will continue to follow for planning needs

## 2022-10-24 NOTE — SPEECH THERAPY NOTE
Speech Language/Pathology  Speech-Language Pathology Bedside Swallow Evaluation      Patient Name: Baylee Calzada    VYVEJ'D Date: 10/24/2022    Summary   Consult received secondary to reports of coughing w/ meds last night  Pt admitted w/ IMTIAZ, CKD, shock, and chronic CHF  Pt denies difficulty chewing/swallowing and is consuming a regular texture diet w/ thin liquids  On baseline 4L O2 via nasal cannula  Pt observed during lunch w/ grilled sandwich and thins  Pt recalls coughing w/ meds, reported she feels she was not fully upright for meds and coughed d/t that  Oral pharyngeal swallow appears grossly WFL for portion of meal observed  Pt w/ no concerns for swallowing right now  Recommend to continue regular texture diet w/ thin liquids, meds whole as tolerated w/ upright positioning for all intake  SLP will s/o, please re-consult as needed    Risk/s for Aspiration: Low     Recommended Diet: regular diet and thin liquids   Recommended Form of Meds: whole with liquid   Aspiration precautions and swallowing strategies: upright posture  Other Recommendations: Continue frequent oral care,    Current Medical Status         Baylee Calzada is a 69 y o  female who presented to the ED today with c/o generalized weakness and was told by her PCP to go to the ED after outpatient labs resulted with a creatine of 6  She was recently admitted from 9/21 - 79/9 for diastolic HF and volume overload  She was discharged on torsemide and zaroxolyn  Patient c/o generalized weakness and inability to void x 4 day but denies any other c/o  Denies SOB, CP, dysuria, flank pain     Current Precautions:  Fall      Allergies:  No known food allergies    Past medical history:  Please see H&P for details    Special Studies:  CXR:  Right neck catheter in the distal SVC  Mild congestion      Social/Education/Vocational Hx:  Pt lives with family    Swallow Information   Current Risks for Dysphagia & Aspiration: Coughing w/ meds  Current Symptoms/Concerns: coughing with po  Current Diet: regular diet and thin liquids   Baseline Diet: regular diet and thin liquids      Baseline Assessment   Behavior/Cognition: alert  Speech/Language Status: able to participate in conversation  Patient Positioning: upright in bed  Pain Status/Interventions/Response to Interventions:   No report of or nonverbal indications of pain  Swallow Mechanism Exam  Facial: symmetrical  Labial: WFL  Lingual: WFL  Velum: unable to visualize  Mandible: adequate ROM  Dentition: adequate  Vocal quality:clear/adequate   Volitional Cough: strong/productive   Respiratory Status: on 4L O2 via nasal cannula      Consistencies Assessed and Performance   Consistencies Administered: thin liquids and hard solids    Oral Stage: WFL  Mastication was adequate with the materials administered today  Bolus formation and transfer were functional with no significant oral residue noted  No overt s/s reduced oral control  Pharyngeal Stage: WFL  Swallow Mechanics:  Swallowing initiation appeared prompt  Laryngeal rise was palpated and judged to be within functional limits  No coughing, throat clearing, change in vocal quality or respiratory status noted today       Esophageal Concerns: none reported    Summary and Recommendations (see above)    Results Reviewed with: patient and RN     Treatment Recommended: None at this time    Erika Sanders 89  10/24/2022

## 2022-10-25 ENCOUNTER — TELEPHONE (OUTPATIENT)
Dept: CARDIOLOGY CLINIC | Facility: CLINIC | Age: 67
End: 2022-10-25

## 2022-10-25 PROBLEM — I50.32 CHRONIC DIASTOLIC CONGESTIVE HEART FAILURE (HCC): Status: ACTIVE | Noted: 2022-09-21

## 2022-10-25 LAB
ALBUMIN SERPL BCP-MCNC: 4.2 G/DL (ref 3.5–5)
ALP SERPL-CCNC: 122 U/L (ref 46–116)
ALT SERPL W P-5'-P-CCNC: 26 U/L (ref 12–78)
ANION GAP SERPL CALCULATED.3IONS-SCNC: 10 MMOL/L (ref 4–13)
AST SERPL W P-5'-P-CCNC: 24 U/L (ref 5–45)
BILIRUB SERPL-MCNC: 1.56 MG/DL (ref 0.2–1)
BUN SERPL-MCNC: 81 MG/DL (ref 5–25)
CALCIUM SERPL-MCNC: 9.8 MG/DL (ref 8.3–10.1)
CHLORIDE SERPL-SCNC: 97 MMOL/L (ref 96–108)
CK SERPL-CCNC: 23 U/L (ref 26–192)
CO2 SERPL-SCNC: 31 MMOL/L (ref 21–32)
CORTIS SERPL-MCNC: 10 UG/DL
CREAT SERPL-MCNC: 1.91 MG/DL (ref 0.6–1.3)
ERYTHROCYTE [DISTWIDTH] IN BLOOD BY AUTOMATED COUNT: 18 % (ref 11.6–15.1)
GFR SERPL CREATININE-BSD FRML MDRD: 26 ML/MIN/1.73SQ M
GLUCOSE SERPL-MCNC: 160 MG/DL (ref 65–140)
GLUCOSE SERPL-MCNC: 184 MG/DL (ref 65–140)
GLUCOSE SERPL-MCNC: 193 MG/DL (ref 65–140)
GLUCOSE SERPL-MCNC: 200 MG/DL (ref 65–140)
GLUCOSE SERPL-MCNC: 226 MG/DL (ref 65–140)
GLUCOSE SERPL-MCNC: 266 MG/DL (ref 65–140)
HCT VFR BLD AUTO: 34.4 % (ref 34.8–46.1)
HGB BLD-MCNC: 11 G/DL (ref 11.5–15.4)
MAGNESIUM SERPL-MCNC: 1.7 MG/DL (ref 1.6–2.6)
MCH RBC QN AUTO: 28.6 PG (ref 26.8–34.3)
MCHC RBC AUTO-ENTMCNC: 32 G/DL (ref 31.4–37.4)
MCV RBC AUTO: 90 FL (ref 82–98)
PHOSPHATE SERPL-MCNC: 2.2 MG/DL (ref 2.3–4.1)
PLATELET # BLD AUTO: 184 THOUSANDS/UL (ref 149–390)
PMV BLD AUTO: 11.4 FL (ref 8.9–12.7)
POTASSIUM SERPL-SCNC: 3.7 MMOL/L (ref 3.5–5.3)
PROT SERPL-MCNC: 8.2 G/DL (ref 6.4–8.4)
RBC # BLD AUTO: 3.84 MILLION/UL (ref 3.81–5.12)
SODIUM SERPL-SCNC: 138 MMOL/L (ref 135–147)
WBC # BLD AUTO: 8.45 THOUSAND/UL (ref 4.31–10.16)

## 2022-10-25 RX ORDER — MAGNESIUM SULFATE HEPTAHYDRATE 40 MG/ML
2 INJECTION, SOLUTION INTRAVENOUS ONCE
Status: COMPLETED | OUTPATIENT
Start: 2022-10-25 | End: 2022-10-25

## 2022-10-25 RX ORDER — DIGOXIN 0.25 MG/ML
250 INJECTION INTRAMUSCULAR; INTRAVENOUS DAILY
Status: DISCONTINUED | OUTPATIENT
Start: 2022-10-25 | End: 2022-10-25

## 2022-10-25 RX ORDER — METOPROLOL TARTRATE 5 MG/5ML
1.25 INJECTION INTRAVENOUS EVERY 4 HOURS PRN
Status: DISCONTINUED | OUTPATIENT
Start: 2022-10-25 | End: 2022-10-29 | Stop reason: HOSPADM

## 2022-10-25 RX ORDER — METOPROLOL SUCCINATE 25 MG/1
12.5 TABLET, EXTENDED RELEASE ORAL 2 TIMES DAILY
Status: DISCONTINUED | OUTPATIENT
Start: 2022-10-25 | End: 2022-10-25

## 2022-10-25 RX ORDER — ONDANSETRON 2 MG/ML
4 INJECTION INTRAMUSCULAR; INTRAVENOUS EVERY 6 HOURS PRN
Status: DISCONTINUED | OUTPATIENT
Start: 2022-10-25 | End: 2022-10-29 | Stop reason: HOSPADM

## 2022-10-25 RX ORDER — DIGOXIN 0.25 MG/ML
125 INJECTION INTRAMUSCULAR; INTRAVENOUS ONCE
Status: DISCONTINUED | OUTPATIENT
Start: 2022-10-26 | End: 2022-10-26

## 2022-10-25 RX ORDER — QUETIAPINE FUMARATE 25 MG/1
25 TABLET, FILM COATED ORAL
Status: DISCONTINUED | OUTPATIENT
Start: 2022-10-25 | End: 2022-10-29 | Stop reason: HOSPADM

## 2022-10-25 RX ORDER — TRAZODONE HYDROCHLORIDE 50 MG/1
50 TABLET ORAL
Status: DISCONTINUED | OUTPATIENT
Start: 2022-10-25 | End: 2022-10-29 | Stop reason: HOSPADM

## 2022-10-25 RX ORDER — WARFARIN SODIUM 5 MG/1
5 TABLET ORAL
Status: COMPLETED | OUTPATIENT
Start: 2022-10-25 | End: 2022-10-25

## 2022-10-25 RX ORDER — POTASSIUM CHLORIDE 20 MEQ/1
40 TABLET, EXTENDED RELEASE ORAL ONCE
Status: COMPLETED | OUTPATIENT
Start: 2022-10-25 | End: 2022-10-25

## 2022-10-25 RX ORDER — DIGOXIN 0.25 MG/ML
250 INJECTION INTRAMUSCULAR; INTRAVENOUS ONCE
Status: COMPLETED | OUTPATIENT
Start: 2022-10-25 | End: 2022-10-25

## 2022-10-25 RX ORDER — DIGOXIN 0.25 MG/ML
125 INJECTION INTRAMUSCULAR; INTRAVENOUS ONCE
Status: DISCONTINUED | OUTPATIENT
Start: 2022-10-25 | End: 2022-10-25

## 2022-10-25 RX ORDER — DIGOXIN 0.25 MG/ML
250 INJECTION INTRAMUSCULAR; INTRAVENOUS ONCE
Status: DISCONTINUED | OUTPATIENT
Start: 2022-10-25 | End: 2022-10-25

## 2022-10-25 RX ADMIN — LEVOTHYROXINE SODIUM 200 MCG: 100 TABLET ORAL at 05:37

## 2022-10-25 RX ADMIN — DOCUSATE SODIUM 100 MG: 100 CAPSULE ORAL at 17:03

## 2022-10-25 RX ADMIN — POLYETHYLENE GLYCOL 3350 17 G: 17 POWDER, FOR SOLUTION ORAL at 22:01

## 2022-10-25 RX ADMIN — Medication 2000 UNITS: at 08:02

## 2022-10-25 RX ADMIN — INSULIN LISPRO 4 UNITS: 100 INJECTION, SOLUTION INTRAVENOUS; SUBCUTANEOUS at 11:49

## 2022-10-25 RX ADMIN — ALBUMIN (HUMAN) 25 G: 0.25 INJECTION, SOLUTION INTRAVENOUS at 04:03

## 2022-10-25 RX ADMIN — ATORVASTATIN CALCIUM 10 MG: 10 TABLET, FILM COATED ORAL at 08:02

## 2022-10-25 RX ADMIN — LEVOTHYROXINE SODIUM 88 MCG: 88 TABLET ORAL at 05:38

## 2022-10-25 RX ADMIN — METOPROLOL SUCCINATE 12.5 MG: 25 TABLET, EXTENDED RELEASE ORAL at 13:44

## 2022-10-25 RX ADMIN — SODIUM PHOSPHATE, MONOBASIC, MONOHYDRATE 12 MMOL: 276; 142 INJECTION, SOLUTION INTRAVENOUS at 07:44

## 2022-10-25 RX ADMIN — AMIODARONE HYDROCHLORIDE 1 MG/MIN: 50 INJECTION, SOLUTION INTRAVENOUS at 13:20

## 2022-10-25 RX ADMIN — MONTELUKAST 10 MG: 10 TABLET, FILM COATED ORAL at 21:55

## 2022-10-25 RX ADMIN — MIDODRINE HYDROCHLORIDE 10 MG: 5 TABLET ORAL at 16:19

## 2022-10-25 RX ADMIN — FERROUS SULFATE TAB 325 MG (65 MG ELEMENTAL FE) 325 MG: 325 (65 FE) TAB at 07:44

## 2022-10-25 RX ADMIN — INSULIN LISPRO 1 UNITS: 100 INJECTION, SOLUTION INTRAVENOUS; SUBCUTANEOUS at 22:02

## 2022-10-25 RX ADMIN — NOREPINEPHRINE BITARTRATE 12 MCG/MIN: 1 INJECTION INTRAVENOUS at 04:43

## 2022-10-25 RX ADMIN — INSULIN LISPRO 3 UNITS: 100 INJECTION, SOLUTION INTRAVENOUS; SUBCUTANEOUS at 17:05

## 2022-10-25 RX ADMIN — NYSTATIN 1 APPLICATION: 100000 POWDER TOPICAL at 17:03

## 2022-10-25 RX ADMIN — MAGNESIUM SULFATE HEPTAHYDRATE 2 G: 40 INJECTION, SOLUTION INTRAVENOUS at 06:22

## 2022-10-25 RX ADMIN — MELATONIN 6 MG: 3 TAB ORAL at 21:56

## 2022-10-25 RX ADMIN — INSULIN LISPRO 3 UNITS: 100 INJECTION, SOLUTION INTRAVENOUS; SUBCUTANEOUS at 07:54

## 2022-10-25 RX ADMIN — TRAZODONE HYDROCHLORIDE 50 MG: 50 TABLET ORAL at 21:55

## 2022-10-25 RX ADMIN — PANTOPRAZOLE SODIUM 20 MG: 20 TABLET, DELAYED RELEASE ORAL at 05:38

## 2022-10-25 RX ADMIN — VASOPRESSIN 0.04 UNITS/MIN: 20 INJECTION INTRAVENOUS at 17:02

## 2022-10-25 RX ADMIN — MIDODRINE HYDROCHLORIDE 10 MG: 5 TABLET ORAL at 06:19

## 2022-10-25 RX ADMIN — INSULIN LISPRO 4 UNITS: 100 INJECTION, SOLUTION INTRAVENOUS; SUBCUTANEOUS at 07:54

## 2022-10-25 RX ADMIN — DIGOXIN 250 MCG: 0.25 INJECTION INTRAMUSCULAR; INTRAVENOUS at 09:52

## 2022-10-25 RX ADMIN — INSULIN DETEMIR 26 UNITS: 100 INJECTION, SOLUTION SUBCUTANEOUS at 21:55

## 2022-10-25 RX ADMIN — FLUTICASONE FUROATE AND VILANTEROL TRIFENATATE 1 PUFF: 100; 25 POWDER RESPIRATORY (INHALATION) at 08:01

## 2022-10-25 RX ADMIN — INSULIN LISPRO 3 UNITS: 100 INJECTION, SOLUTION INTRAVENOUS; SUBCUTANEOUS at 11:50

## 2022-10-25 RX ADMIN — POTASSIUM CHLORIDE 40 MEQ: 20 TABLET, EXTENDED RELEASE ORAL at 06:19

## 2022-10-25 RX ADMIN — DOCUSATE SODIUM 100 MG: 100 CAPSULE ORAL at 08:02

## 2022-10-25 RX ADMIN — INSULIN LISPRO 8 UNITS: 100 INJECTION, SOLUTION INTRAVENOUS; SUBCUTANEOUS at 17:05

## 2022-10-25 RX ADMIN — VASOPRESSIN 0.04 UNITS/MIN: 20 INJECTION INTRAVENOUS at 09:55

## 2022-10-25 RX ADMIN — DIGOXIN 250 MCG: 0.25 INJECTION INTRAMUSCULAR; INTRAVENOUS at 08:35

## 2022-10-25 RX ADMIN — WARFARIN SODIUM 5 MG: 5 TABLET ORAL at 17:03

## 2022-10-25 RX ADMIN — MIDODRINE HYDROCHLORIDE 10 MG: 5 TABLET ORAL at 11:50

## 2022-10-25 RX ADMIN — AMIODARONE HYDROCHLORIDE 1 MG/MIN: 50 INJECTION, SOLUTION INTRAVENOUS at 09:52

## 2022-10-25 RX ADMIN — NYSTATIN: 100000 POWDER TOPICAL at 08:02

## 2022-10-25 RX ADMIN — NOREPINEPHRINE BITARTRATE 6 MCG/MIN: 1 INJECTION INTRAVENOUS at 10:31

## 2022-10-25 NOTE — ASSESSMENT & PLAN NOTE
Wt Readings from Last 3 Encounters:   10/25/22 (!) 143 kg (315 lb 7 7 oz)   10/03/22 (!) 171 kg (377 lb 6 8 oz)   04/13/20 (!) 162 kg (356 lb 0 7 oz)       · Patient hospitalized 9/21/22 - 10/3/22 secondary to volume overload   · Started on torsemide and zaroxolyn  · Patient down 23kg from last admission   · No evidence of volume overload on exam  · Holding home diuretics in the setting of IMTIAZ   · Home BB on hold due to hypotension  · Echocardiogram 9/21/22: The left ventricular ejection fraction is 55-59%  Systolic function is normal  Wall motion is grossly normal  There is both systolic and diastolic flattening of the interventricular septum consistent with right ventricle pressure and volume overload: Right ventricular cavity size is severely dilated  Systolic function is mildly to moderately reduced

## 2022-10-25 NOTE — CONSULTS
Pulmonary Medicine-Consultation  Eligio Kawasaki 69 y o  female MRN: [id number]  Unit/Bed#: -01 Encounter: [id number]      Assessment/Plan:    1  Shock state-likely mixed etiology low cardiac output state/hypovolemic on background HFpEF/cardiogenic, pHTN  2  Pulmonary hypertension  · Severe/RV systolic dysfunction, estimated RA pressures of 15, no estimated PA SP  · Seems to be large component from [facility] group 2 (dCHF, recent hospitalization for exacerbation) and WHO gp3 (NISHI/ohs, chronic hypoxemia, COPD/asthma)  · WHO gp1, Does not seem to be at risk, no prior history of rheumatologic disorder, drug abuse, weight loss/stimulant abuse  Possible GARDNER cirrhosis/given the morbid obesity  Will arrange outpatient follow-up with Pulmonary for split/sleep study  · WHO gp4, seems unlikely, patient was on warfarin the whole time with supratherapeutic INR on admission  3  Acute kidney injury-improving, with better perfusion state with current therapy of vasopressor support/given some volume resuscitation on admission  Currently O2 diuresing with negative fluid balance  4  Presumed asthma/COPD-Unknown severity, no PFT on file  5  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  6  Chronic hypoxic respiratory failure  7  Morbid obesity      Recommendations  · Continue current therapy with ionotropic/vasopressor support to improve perfusion  · Agree with allowing diuresis to offload the RV as possible, may check CVP via CVC for now and trended again few days after  · May consider inhaled epoprostenol however no significant hypoxia  · Once a shock status resolved, off pressors will need a formal right heart catheterization for a vasodilators therapy  · [place] checking rheumatologic serology RF, CCP, JAYDA, Anca, CK/aldolase    For group 1, consider abdominal/portal Doppler  · Continue supplemental oxygen to avoid hypoxic pulmonary vasoconstrictive effect      Physician Requesting Consult: Rea Reagan    Reason for Consult / Principal Problem:  Pulmonary HTN     HPI:   69 y o  female with a h/o diastolic CHF, COPD, CKD, DM2, hypothyroidism, NISHI, afib on Coumadin, morbid obesity, chronic hypoxic respiratory failure on 4 LPM at baseline  Hospitalized 9/21 until 58/9 for diastolic CHF/with exacerbation and discharged on torsemide/Zaroxolyn  Recently discharged from rehab to home, stayed there for 4 days  Presented to the ED 10/22 with fatigue/weakness, decreased urine output  Found to be in acute kidney injury, suspected from hypovolemia initially, started on normal saline infusion  Patient reported that she missed the mild a drain at home a, believed that she developed hypotension that led to the IMTIAZ  TTE showed EF 55/59% with normal wall motion  RV pressure and volume overload, moderately reduced systolic function of the RV with severely dilated  Estimated RA pressure 15  Urine output/renal function started to improve 10/23 creatinine 6 18>> 4 32, after she was started on phenylephrine/a line placed  Then switched to vasopressin/norepinephrine drip, currently titrating down  On my assessment, patient is resting in bed comfortably has 2 friends at bedside  States that she was diagnosed with asthma/COPD several years ago recently maintained on Breo, no history of frequent exacerbation or p r n  Use  She is a lifelong nonsmoker, used to work as a   Reports living in [place] where there was all 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  Dx NISHI also several years ago, has not seen by sleep medicine for at least 10 years  Recently received a new machine      Inpatient consult to Pulmonology  Consult performed by: Malika Wiseman MD  Consult ordered by: CAMPBELL Farris          Review of Systems  As per hpi, all other systems reviewed and were negative      Studies:    Imaging Studies: I have personally reviewed pertinent films in PACS    EKG, Pathology, and Other Studies: I have personally reviewed pertinent films in PACS    Pulmonary Results (PFTs, PSG): None in file    Historical Information   Past Medical History:   Diagnosis Date   • Asthma    • Atrial fibrillation ([facility] )    • COPD (chronic obstructive pulmonary disease) ([facility] )    • Diabetes mellitus ([facility] )    • Disease of thyroid gland    • Heart failure ([facility] )    • Kidney failure      Past Surgical History:   Procedure Laterality Date   • CARDIOVERSION N/A    • GALLBLADDER SURGERY     • HERNIA REPAIR       Social History   Social History     Substance and Sexual Activity   Alcohol Use Never     Social History     Substance and Sexual Activity   Drug Use Never     Social History     Tobacco Use   Smoking Status Never Smoker   Smokeless Tobacco Never Used     E-Cigarette/Vaping   • E-Cigarette Use Never User      E-Cigarette/Vaping Substances         Family History:   Family History   Problem Relation Age of Onset   • Arthritis Mother    • Hearing loss Mother    • Heart disease Mother    • Hypertension Mother    • Stroke Mother    • Alcohol abuse Father    • Cancer Father    • Diabetes Father    • Arthritis Sister    • Cancer Sister    • Alcohol abuse Brother    • Diabetes Son    • Arthritis Daughter    • Drug abuse Daughter    • Heart disease Maternal Grandmother    • Hypertension Maternal Grandmother    • Stroke Maternal Grandmother    • Arthritis Maternal Aunt    • Asthma Neg Hx    • Birth defects Neg Hx    • COPD Neg Hx    • Depression Neg Hx    • Early death Neg Hx    • Hyperlipidemia Neg Hx    • Kidney disease Neg Hx    • Learning disabilities Neg Hx    • Mental illness Neg Hx    • Mental retardation Neg Hx    • Miscarriages / Stillbirths Neg Hx    • Vision loss Neg Hx        Meds/Allergies   all current active meds have been reviewed    Allergies   Allergen Reactions   • Acesulfame Potassium - Food Allergy Anaphylaxis   • Aspartame - Food Allergy Anaphylaxis   • Saccharin - Food Allergy Anaphylaxis   • Sucralose - Food Allergy Anaphylaxis   • Metformin GI Intolerance       Objective   Vitals: Blood pressure 124/57, pulse 86, temperature 97 8 °F (36 6 °C), temperature source Temporal, resp  rate (!) 36, height 5' 2" (1 575 m), weight (!) 143 kg (315 lb 7 7 oz), SpO2 92 %  ,Body mass index is 57 7 kg/m²  Intake/Output Summary (Last 24 hours) at 10/25/2022 1048  Last data filed at 10/25/2022 1001  Gross per 24 hour   Intake 3353 78 ml   Output 2349 ml   Net 1004 78 ml     Invasive Devices  Report    Central Venous Catheter Line  Duration           CVC Central Lines 10/23/22 Triple 16cm 2 days          Peripheral Intravenous Line  Duration           Peripheral IV 10/22/22 Left Antecubital 2 days          Arterial Line  Duration           Arterial Line 10/22/22 Radial 2 days          Drain  Duration           Urethral Catheter Straight-tip 16 Fr  2 days                Physical Exam  Body mass index is 57 7 kg/m²  Gen: not in acute distress, morbidly obese, pleasant  Neck/Eyes: supple, right CVC in place, PERRL  Ear: normal appearance, no significant hearing impairment  Oropharynx: mucosa is moist, no focal lesions or erythema  Salivary glands: soft nontender  Chest: normal respiratory efforts, diffuse crackles on the left lung, diminished on the right no wheezing or rhonchi  CV: RRR, no murmurs appreciated, dry legs, warm extremities  Abdomen: soft, non tender  Skin: unremarkable, chronic venous changes of the legs  Neuro: AAO X3, no focal motor deficit       Lab Results: I have personally reviewed pertinent lab results  None    Portions of the record may have been created with voice recognition software  Occasional wrong word or "sound a like" substitutions may have occurred due to the inherent limitations of voice recognition software  Read the chart carefully and recognize, using context, where substitutions have occurred

## 2022-10-25 NOTE — ASSESSMENT & PLAN NOTE
Lab Results   Component Value Date    EGFR 26 10/25/2022    EGFR 21 10/24/2022    EGFR 16 10/24/2022    CREATININE 1 91 (H) 10/25/2022    CREATININE 2 24 (H) 10/24/2022    CREATININE 2 83 (H) 10/24/2022     IMTIAZ with creatinine of 6 at admission  Today improved to 1 46  Appreciate nephrology recs

## 2022-10-25 NOTE — ASSESSMENT & PLAN NOTE
· Chronically on CPAP at night and 4L of oxygen during the day secondary to COPD most likely NISHI  · Unable to wear home unit due to right jugular central line  · Consult Respiratory for CPAP options (ie  nasal pillows)  · Goal Sp02>88%  · Respiratory protocol

## 2022-10-25 NOTE — PLAN OF CARE
Problem: Potential for Falls  Goal: Patient will remain free of falls  Description: INTERVENTIONS:  - Educate patient/family on patient safety including physical limitations  - Instruct patient to call for assistance with activity   - Consult OT/PT to assist with strengthening/mobility   - Keep Call bell within reach  - Keep bed low and locked with side rails adjusted as appropriate  - Keep care items and personal belongings within reach  - Initiate and maintain comfort rounds  - Make Fall Risk Sign visible to staff  - Offer Toileting every 2 Hours, in advance of need  - Initiate/Maintain bed/chair alarm  - Obtain necessary fall risk management equipment:   - Apply yellow socks and bracelet for high fall risk patients  - Consider moving patient to room near nurses station  Outcome: Not Progressing     Problem: PAIN - ADULT  Goal: Verbalizes/displays adequate comfort level or baseline comfort level  Description: Interventions:  - Encourage patient to monitor pain and request assistance  - Assess pain using appropriate pain scale  - Administer analgesics based on type and severity of pain and evaluate response  - Implement non-pharmacological measures as appropriate and evaluate response  - Consider cultural and social influences on pain and pain management  - Notify physician/advanced practitioner if interventions unsuccessful or patient reports new pain  Outcome: Progressing     Problem: INFECTION - ADULT  Goal: Absence or prevention of progression during hospitalization  Description: INTERVENTIONS:  - Assess and monitor for signs and symptoms of infection  - Monitor lab/diagnostic results  - Monitor all insertion sites, i e  indwelling lines, tubes, and drains  - Monitor endotracheal if appropriate and nasal secretions for changes in amount and color  - [place] appropriate cooling/warming therapies per order  - Administer medications as ordered  - Instruct and encourage patient and family to use good hand hygiene technique  - Identify and instruct in appropriate isolation precautions for identified infection/condition  Outcome: Progressing  Goal: Absence of fever/infection during neutropenic period  Description: INTERVENTIONS:  - Monitor WBC    Outcome: Progressing     Problem: SAFETY ADULT  Goal: Patient will remain free of falls  Description: INTERVENTIONS:  - Educate patient/family on patient safety including physical limitations  - Instruct patient to call for assistance with activity   - Consult OT/PT to assist with strengthening/mobility   - Keep Call bell within reach  - Keep bed low and locked with side rails adjusted as appropriate  - Keep care items and personal belongings within reach  - Initiate and maintain comfort rounds  - Make Fall Risk Sign visible to staff  - Offer Toileting every 2 Hours, in advance of need  - Initiate/Maintain bed/chair alarm  - Obtain necessary fall risk management equipment:   - Apply yellow socks and bracelet for high fall risk patients  - Consider moving patient to room near nurses station  Outcome: Not Progressing  Goal: Maintain or return to baseline ADL function  Description: INTERVENTIONS:  -  Assess patient's ability to carry out ADLs; assess patient's baseline for ADL function and identify physical deficits which impact ability to perform ADLs (bathing, care of mouth/teeth, toileting, grooming, dressing, etc )  - Assess/evaluate cause of self-care deficits   - Assess range of motion  - Assess patient's mobility; develop plan if impaired  - Assess patient's need for assistive devices and provide as appropriate  - Encourage maximum independence but intervene and supervise when necessary  - Involve family in performance of ADLs  - Assess for home care needs following discharge   - Consider OT consult to assist with ADL evaluation and planning for discharge  - Provide patient education as appropriate  Outcome: Not Progressing  Goal: Maintains/Returns to pre admission functional level  Description: INTERVENTIONS:  - Perform BMAT or MOVE assessment daily    - Set and communicate daily mobility goal to care team and patient/family/caregiver  - Collaborate with rehabilitation services on mobility goals if consulted  - Perform Range of Motion 3 times a day  - Reposition patient every 2 hours  - Dangle patient 3 times a day  - Stand patient 3 times a day  - Ambulate patient 3 times a day  - Out of bed to chair 3 times a day   - Out of bed for meals 3 times a day  - Out of bed for toileting  - Record patient progress and toleration of activity level   Outcome: Not Progressing     Problem: DISCHARGE PLANNING  Goal: Discharge to home or other facility with appropriate resources  Description: INTERVENTIONS:  - Identify barriers to discharge w/patient and caregiver  - Arrange for needed discharge resources and transportation as appropriate  - Identify discharge learning needs (meds, wound care, etc )  - Arrange for interpretive services to assist at discharge as needed  - Refer to Case Management Department for coordinating discharge planning if the patient needs post-hospital services based on physician/advanced practitioner order or complex needs related to functional status, cognitive ability, or social support system  Outcome: Not Progressing     Problem: Knowledge Deficit  Goal: Patient/family/caregiver demonstrates understanding of disease process, treatment plan, medications, and discharge instructions  Description: Complete learning assessment and assess knowledge base    Interventions:  - Provide teaching at level of understanding  - Provide teaching via preferred learning methods  Outcome: Progressing     Problem: GENITOURINARY - ADULT  Goal: Maintains or returns to baseline urinary function  Description: INTERVENTIONS:  - Assess urinary function  - Encourage oral fluids to ensure adequate hydration if ordered  - Administer IV fluids as ordered to ensure adequate hydration  - Administer ordered medications as needed  - Offer frequent toileting  - Follow urinary retention protocol if ordered  Outcome: Progressing  Goal: Absence of urinary retention  Description: INTERVENTIONS:  - Assess patient's ability to void and empty bladder  - Monitor I/O  - Bladder scan as needed  - Discuss with physician/AP medications to alleviate retention as needed  - Discuss catheterization for long term situations as appropriate  Outcome: Progressing  Goal: Urinary catheter remains patent  Description: INTERVENTIONS:  - Assess patency of urinary catheter  - If patient has a chronic frederick, consider changing catheter if non-functioning  - Follow guidelines for intermittent irrigation of non-functioning urinary catheter  Outcome: Progressing     Problem: METABOLIC, FLUID AND ELECTROLYTES - ADULT  Goal: Electrolytes maintained within normal limits  Description: INTERVENTIONS:  - Monitor labs and assess patient for signs and symptoms of electrolyte imbalances  - Administer electrolyte replacement as ordered  - Monitor response to electrolyte replacements, including repeat lab results as appropriate  - Instruct patient on fluid and nutrition as appropriate  Outcome: Progressing  Goal: Fluid balance maintained  Description: INTERVENTIONS:  - Monitor labs   - Monitor I/O and WT  - Instruct patient on fluid and nutrition as appropriate  - Assess for signs & symptoms of volume excess or deficit  Outcome: Progressing  Goal: Glucose maintained within target range  Description: INTERVENTIONS:  - Monitor Blood Glucose as ordered  - Assess for signs and symptoms of hyperglycemia and hypoglycemia  - Administer ordered medications to maintain glucose within target range  - Assess nutritional intake and initiate nutrition service referral as needed  Outcome: Not Progressing     Problem: RESPIRATORY - ADULT  Goal: Achieves optimal ventilation and oxygenation  Description: INTERVENTIONS:  - Assess for changes in respiratory status  - Assess for changes in mentation and behavior  - Position to facilitate oxygenation and minimize respiratory effort  - Oxygen administered by appropriate delivery if ordered  - Initiate smoking cessation education as indicated  - Encourage broncho-pulmonary hygiene including cough, deep breathe, Incentive Spirometry  - Assess the need for suctioning and aspirate as needed  - Assess and instruct to report SOB or any respiratory difficulty  - Respiratory Therapy support as indicated  Outcome: Progressing     Problem: SKIN/TISSUE INTEGRITY - ADULT  Goal: Skin Integrity remains intact(Skin Breakdown Prevention)  Description: Assess:  -Perform Mino assessment every 2  -Clean and moisturize skin every shift  -Inspect skin when repositioning, toileting, and assisting with ADLS  -Assess extremities for adequate circulation and sensation     Bed Management:  -Have minimal linens on bed & keep smooth, unwrinkled  -Change linens as needed when moist or perspiring  -Avoid sitting or lying in one position for more than 2 hours while in bed  -Keep HOB at 30degrees     Toileting:  -Offer bedside commode  -Assess for incontinence every 2 hours  -Use incontinent care products after each incontinent episode such as purewick    Activity:  -Mobilize patient 2 times a day  -Encourage activity and walks on unit  -Encourage or provide ROM exercises   -Turn and reposition patient every 2 Hours  -Use appropriate equipment to lift or move patient in bed  -Instruct/ Assist with weight shifting every 15 minutes when out of bed in chair  -Consider limitation of chair time 2 hour intervals    Skin Care:  -Avoid use of baby powder, tape, friction and shearing, hot water or constrictive clothing  -Relieve pressure over bony prominences using wedges  -Do not massage red bony areas    Outcome: Progressing  Goal: Incision(s), wounds(s) or drain site(s) healing without S/S of infection  Description: INTERVENTIONS  - Assess and document dressing, incision, wound bed, drain sites and surrounding tissue  - Provide patient and family education  - Perform skin care/dressing changes every shift  Outcome: Progressing  Goal: Pressure injury heals and does not worsen  Description: Interventions:  - Implement low air loss mattress or specialty surface (Criteria met)  - Apply silicone foam dressing  - Instruct/assist with weight shifting every 15 minutes when in chair   - Limit chair time to 3 hour intervals  - Use special pressure reducing interventions such as waffle cushion when in chair   - Apply fecal or urinary incontinence containment device   - Perform passive or active ROM every shift  - Turn and reposition patient & offload bony prominences every 2 hours   - Utilize friction reducing device or surface for transfers   - Consider consults to  interdisciplinary teams such as wounds  - Use incontinent care products after each incontinent episode such as purewick  - Consider nutrition services referral as needed  Outcome: Progressing     Problem: MUSCULOSKELETAL - ADULT  Goal: Maintain or return mobility to safest level of function  Description: INTERVENTIONS:  - Assess patient's ability to carry out ADLs; assess patient's baseline for ADL function and identify physical deficits which impact ability to perform ADLs (bathing, care of mouth/teeth, toileting, grooming, dressing, etc )  - Assess/evaluate cause of self-care deficits   - Assess range of motion  - Assess patient's mobility  - Assess patient's need for assistive devices and provide as appropriate  - Encourage maximum independence but intervene and supervise when necessary  - Involve family in performance of ADLs  - Assess for home care needs following discharge   - Consider OT consult to assist with ADL evaluation and planning for discharge  - Provide patient education as appropriate  Outcome: Progressing  Goal: Maintain proper alignment of affected body part  Description: INTERVENTIONS:  - Support, maintain and protect limb and body alignment  - Provide patient/ family with appropriate education  Outcome: Progressing     Problem: MOBILITY - ADULT  Goal: Maintain or return to baseline ADL function  Description: INTERVENTIONS:  -  Assess patient's ability to carry out ADLs; assess patient's baseline for ADL function and identify physical deficits which impact ability to perform ADLs (bathing, care of mouth/teeth, toileting, grooming, dressing, etc )  - Assess/evaluate cause of self-care deficits   - Assess range of motion  - Assess patient's mobility; develop plan if impaired  - Assess patient's need for assistive devices and provide as appropriate  - Encourage maximum independence but intervene and supervise when necessary  - Involve family in performance of ADLs  - Assess for home care needs following discharge   - Consider OT consult to assist with ADL evaluation and planning for discharge  - Provide patient education as appropriate  Outcome: Not Progressing  Goal: Maintains/Returns to pre admission functional level  Description: INTERVENTIONS:  - Perform BMAT or MOVE assessment daily    - Set and communicate daily mobility goal to care team and patient/family/caregiver  - Collaborate with rehabilitation services on mobility goals if consulted  - Perform Range of Motion 3 times a day  - Reposition patient every 2 hours    - Dangle patient 3 times a day  - Stand patient 3 times a day  - Ambulate patient 3 times a day  - Out of bed to chair 3 times a day   - Out of bed for meals 3 times a day  - Out of bed for toileting  - Record patient progress and toleration of activity level   Outcome: Not Progressing     Problem: Prexisting or High Potential for Compromised Skin Integrity  Goal: Skin integrity is maintained or improved  Description: INTERVENTIONS:  - Identify patients at risk for skin breakdown  - Assess and monitor skin integrity  - Assess and monitor nutrition and hydration status  - Monitor labs   - Assess for incontinence   - Turn and reposition patient  - Assist with mobility/ambulation  - Relieve pressure over bony prominences  - Avoid friction and shearing  - Provide appropriate hygiene as needed including keeping skin clean and dry  - Evaluate need for skin moisturizer/barrier cream  - Collaborate with interdisciplinary team   - Patient/family teaching  - Consider wound care consult   Outcome: Progressing

## 2022-10-25 NOTE — PROGRESS NOTES
Progress Note - Nephrology   Traci Hermosillo 69 y o  female MRN: [id number]  Unit/Bed#: -01 Encounter: [id number]    A/P:  1  Acute kidney injury on top of chronic kidney disease   Continues to improve creatinine now down to 1 9 mg/dL, continue with supportive care and improvement in hemodynamics  Patient's fraction excretion of sodium was noted to be greater than 2% however this is in the postobstructive phase and likely due to this process is supposed to acute tubular necrosis  Continue to monitor patient's urine output, we should consider reduction in maintenance IV fluids as the patient has postobstructive process of autodiuresis starts to wean  Continue avoid potential nephrotoxins  2  Chronic kidney disease stage 3 a with baseline creatinine between 1 1-3 mg/dL  3  Proteinuria   Should be re-evaluate when the patient is in usual state health, should be ruled out for a monoclonal gammopathy serum electrophoresis if proteinuria persists  4  Polyuria most likely due to postobstructive autodiuresis   As noted above, continue to monitor patient's urine output, reduce IV fluids, or provide diuretics depending on the patient progresses  5  Chronic diastolic congestive heart failure   Patient appears to be compensated at this time, continue closely monitor given the patient's overall clinical setting  6  Undifferentiated shock   Patient currently remains on 2 vasopressors, continue to monitor overall clinical and hemodynamic status  Patient went into rapid atrial fibrillation overnight, currently on amiodarone drip as well as status post digoxin  At this point, septic and cardiogenic shock have been ruled out      Follow up reason for today's visit:  Acute kidney injury/chronic kidney disease/proteinuria/call your    Acute kidney injury superimposed on chronic kidney disease [facility])    Patient Active Problem List   Diagnosis   • Asthma   • Atrial fibrillation ([facility] [address] )   • COPD (chronic obstructive pulmonary disease) ([facility] )   • Diabetes mellitus ([place] [address] )   • Heart failure ([facility])   • Shortness of breath   • Anemia   • COVID-19 virus infection   • Chronic respiratory failure with hypoxia ([facility])   • Supratherapeutic INR   • Acute on chronic diastolic congestive heart failure (HCC)   • IMTIAZ (acute kidney injury) ([place] [address] )   • Morbid obesity ([facility])   • Hypothyroidism   • Acute kidney injury superimposed on chronic kidney disease ([facility])   • Hypotension   • GERD (gastroesophageal reflux disease)   • UTI (urinary tract infection)   • Acute hyponatremia   • Vaginal bleeding         Subjective:   No Acute complaints at this time including no chest pain or difficulty breathing  Objective:     Vitals: Blood pressure 124/57, pulse (!) 134, temperature 97 8 °F (36 6 °C), temperature source Temporal, resp  rate (!) 29, height 5' 2" (1 575 m), weight (!) 143 kg (315 lb 7 7 oz), SpO2 94 %  ,Body mass index is 57 7 kg/m²  Weight (last 2 days)     Date/Time Weight    10/25/22 0538 143 (315 48)    10/24/22 0554 143 (314 82)    10/24/22 0515 143 (314 82)    10/23/22 0600 147 (324 74)    10/23/22 0429 147 (324 74)            Intake/Output Summary (Last 24 hours) at 10/25/2022 0941  Last data filed at 10/25/2022 0837  Gross per 24 hour   Intake 3413 66 ml   Output 2374 ml   Net 1039 66 ml     I/O last 3 completed shifts: In: 4747 3 [P O :1980; I V :2217 3; IV Piggyback:550]  Out: 6654 [Urine:6274]    Urethral Catheter Straight-tip 16 Fr  (Active)   Amt returned on insertion(mL) 50 mL 10/22/22 1229   Reasons to continue Urinary Catheter  Accurate I&O assessment in critically ill patients (48 hr  max); Acute urinary retention/obstruction failing urinary retention protocol 10/25/22 0801   Goal for Removal Voiding trial when ambulation improves 10/25/22 0801   Site Assessment Clean;Skin intact 10/25/22 0801   Dow Care Done 10/25/22 0801   Collection Container Standard drainage bag 10/25/22 0801   Securement Method Tape 10/25/22 0801   Output (mL) 150 mL 10/25/22 0801       Physical Exam: /57 (BP Location: Right arm)   Pulse (!) 134   Temp 97 8 °F (36 6 °C) (Temporal)   Resp (!) 29   Ht 5' 2" (1 575 m)   Wt (!) 143 kg (315 lb 7 7 oz)   SpO2 94%   BMI 57 70 kg/m²     General Appearance:    Alert, cooperative, no distress, appears stated age   Head:    Normocephalic, without obvious abnormality, atraumatic   Eyes:    Conjunctiva/corneas clear   Ears:    Normal external ears   Nose:   Nares normal, septum midline, mucosa normal, no drainage    or sinus tenderness   Throat:   Lips, mucosa, and tongue normal; teeth and gums normal   Neck:   Supple   Back:     Symmetric, no curvature, ROM normal, no CVA tenderness   Lungs:     Reduced but otherwise clear   Chest wall:    No tenderness or deformity   Heart:    Regular rate and rhythm, S1 and S2 normal, no murmur, rub   or gallop   Abdomen:     Soft, non-tender, bowel sounds active   Extremities:   Extremities normal, atraumatic, no cyanosis, +1 bilateral lower extremity edema   Skin:   Skin color, texture, turgor normal, no rashes or lesions   Lymph nodes:   Cervical normal   Neurologic:   CNII-XII intact            Lab, Imaging and other studies: I have personally reviewed pertinent labs  CBC:   Lab Results   Component Value Date    WBC 8 45 10/25/2022    HGB 11 0 (L) 10/25/2022    HCT 34 4 (L) 10/25/2022    MCV 90 10/25/2022     10/25/2022    MCH 28 6 10/25/2022    MCHC 32 0 10/25/2022    RDW 18 0 (H) 10/25/2022    MPV 11 4 10/25/2022     CMP:   Lab Results   Component Value Date    K 3 7 10/25/2022    CL 97 10/25/2022    CO2 31 10/25/2022    BUN 81 (H) 10/25/2022    CREATININE 1 91 (H) 10/25/2022    CALCIUM 9 8 10/25/2022    AST 24 10/25/2022    ALT 26 10/25/2022    ALKPHOS 122 (H) 10/25/2022    EGFR 26 10/25/2022           Results from last 7 days   Lab Units 10/25/22  0449 10/24/22  1543 10/24/22  0425 10/23/22  1127 10/23/22  0437 10/22/22  2256 10/22/22  1214   POTASSIUM mmol/L 3 7 4 1 3 6   < >  --    < > 4 1   CHLORIDE mmol/L 97 98 92*   < >  --    < > 82*   CO2 mmol/L 31 30 29   < >  --    < > 32   BUN mg/dL 81* 88* 107*   < >  --    < > 160*   CREATININE mg/dL 1 91* 2 24* 2 83*   < >  --    < > 6 18*   CALCIUM mg/dL 9 8 10 6* 9 9   < >  --    < > 9 2   ALK PHOS U/L 122*  --   --   --  129*  --  168*   ALT U/L 26  --   --   --  36  --  43   AST U/L 24  --   --   --  31  --  44    < > = values in this interval not displayed  Phosphorus:   Lab Results   Component Value Date    PHOS 2 2 (L) 10/25/2022     Magnesium:   Lab Results   Component Value Date    MG 1 7 10/25/2022     Urinalysis: No results found for: Boy Debra, SPECGRAV, PHUR, LEUKOCYTESUR, NITRITE, PROTEINUA, GLUCOSEU, KETONESU, BILIRUBINUR, BLOODU  Ionized Calcium: No results found for: CAION  Coagulation: No results found for: PT, INR, APTT  Troponin: No results found for: TROPONINI  ABG: No results found for: PHART, TWQ9QFD, PO2ART, JTR3KGH, U8HPZCZV, BEART, SOURCE  Radiology review:     IMAGING  Procedure: CT abdomen pelvis wo contrast    Result Date: 10/22/2022  Narrative: CT ABDOMEN AND PELVIS WITHOUT IV CONTRAST INDICATION:   Sepsis UTI, IMTIAZ, R/o obstructive nephropathy  "Patient recently complains of generalized weakness and decreased urine production  Denies chest pain or abdominal pain or nausea or vomiting or shortness of breath above the baseline" COMPARISON:  None  TECHNIQUE:  CT examination of the abdomen and pelvis was performed without intravenous contrast  Axial, sagittal, and coronal 2D reformatted images were created from the source data and submitted for interpretation  Radiation dose length product (DLP) for this visit:  824.566.2732 mGy-cm   This examination, like all CT scans performed in the [facility], was performed utilizing techniques to minimize radiation dose exposure, including the use of iterative reconstruction and automated exposure control   Enteric contrast was administered  FINDINGS: ABDOMEN LOWER CHEST:  Partially imaged cardiomegaly  Small hiatal hernia LIVER/BILIARY TREE:  Unremarkable  GALLBLADDER:  Gallbladder is surgically absent  SPLEEN:  Unremarkable  PANCREAS:  Unremarkable  ADRENAL GLANDS:  Unremarkable  KIDNEYS/URETERS:  Unremarkable  No hydronephrosis  STOMACH AND BOWEL:  There is colonic diverticulosis without evidence of acute diverticulitis  APPENDIX:  No findings to suggest appendicitis  ABDOMINOPELVIC CAVITY:  No ascites  No pneumoperitoneum  No lymphadenopathy  VESSELS:  Unremarkable for patient's age  PELVIS REPRODUCTIVE ORGANS:  Unremarkable for patient's age  URINARY BLADDER:  Decompressed with a Dow catheter  ABDOMINAL WALL/INGUINAL REGIONS:  Tiny uncomplicated fat-containing umbilical hernia  OSSEOUS STRUCTURES:  No acute fracture or destructive osseous lesion  Spinal degenerative changes are noted  Impression: No acute inflammatory changes in the abdomen or pelvis  Workstation performed: [id number]     Procedure: XR chest portable    Result Date: 10/23/2022  Narrative: CHEST INDICATION:   Weakness  COMPARISON:  Chest x-ray from 9/24/2022  Chest CT from 4/13/2020  EXAM PERFORMED/VIEWS:  XR CHEST PORTABLE FINDINGS: Cardiomediastinal silhouette appears unremarkable  Unchanged thin-walled right mid lung cyst  The lungs are clear  No pneumothorax or pleural effusion  Osseous structures appear within normal limits for patient age  Impression: No acute cardiopulmonary disease  Workstation performed: [id number]     Procedure: XR chest portable ICU    Result Date: 10/23/2022  Narrative: CHEST INDICATION:   CVC placement  COMPARISON:  Compared 10/22/2022 EXAM PERFORMED/VIEWS:  XR CHEST PORTABLE ICU FINDINGS:  Right neck catheter in the distal SVC  Moderate cardiomegaly Mild prominent vascular markings  No pneumothorax  Slight right hemidiaphragm elevation is unchanged  Osseous structures appear within normal limits for patient age  Impression: Right neck catheter in the distal SVC  Mild congestion  Workstation performed: [id number]     Procedure: US kidney and bladder    Result Date: 10/23/2022  Narrative: RENAL ULTRASOUND INDICATION:   Acute kidney  COMPARISON: CT abdomen pelvis 10/20/2022 TECHNIQUE:   Ultrasound of the retroperitoneum was performed with a curvilinear transducer utilizing volumetric sweeps and still imaging techniques  FINDINGS: KIDNEYS: Symmetric and normal size  Right kidney:  10 5 x 6 3 x 5 5 cm  Volume 192 1 mL Left kidney:  10 7 x 7 0 x 4 9 cm  Volume 194 5 mL Right kidney Slightly lobular contour  Normal echogenicity  No mass is identified  No hydronephrosis  No shadowing calculi  No perinephric fluid collections  Left kidney Slightly lobular contour  Normal echogenicity  No mass is identified  No hydronephrosis  No shadowing calculi  No perinephric fluid collections  URETERS: Nonvisualized  BLADDER: Decompressed  Dow catheter tip not well seen but in position on recent CT  Low volume limits evaluation  Impression: 1  No hydronephrosis or calculi  2   Nonspecific lobular renal contour could be related to scarring  Workstation performed: [id number]     Procedure: US bedside procedure    Result Date: 10/23/2022  Narrative: 1 2 [phone] [id number]  2 446 2994 [id number]      Current Facility-Administered Medications   Medication Dose Route Frequency   • acetaminophen (TYLENOL) tablet 650 mg  650 mg Oral Q6H PRN   • albumin human (FLEXBUMIN) 25 % injection 25 g  25 g Intravenous Q8H   • albuterol inhalation solution 2 5 mg  2 5 mg Nebulization Q6H PRN   • amiodarone (CORDARONE) 900 mg in dextrose 5 % 500 mL infusion  1 mg/min Intravenous Continuous   • atorvastatin (LIPITOR) tablet 10 mg  10 mg Oral Daily   • cholecalciferol (VITAMIN D3) tablet 2,000 Units  2,000 Units Oral Daily   • docusate sodium (COLACE) capsule 100 mg  100 mg Oral BID   • ferrous sulfate tablet 325 mg  325 mg Oral Daily With Breakfast   • fluticasone-vilanterol (BREO ELLIPTA) 100-25 mcg/inh inhaler 1 puff  1 puff Inhalation Daily   • insulin detemir (LEVEMIR) subcutaneous injection 26 Units  26 Units Subcutaneous HS   • insulin lispro (HumaLOG) 100 units/mL subcutaneous injection 1-6 Units  1-6 Units Subcutaneous HS   • insulin lispro (HumaLOG) 100 units/mL subcutaneous injection 3 Units  3 Units Subcutaneous TID With Meals   • insulin lispro (HumaLOG) 100 units/mL subcutaneous injection 4-20 Units  4-20 Units Subcutaneous TID AC   • levothyroxine tablet 200 mcg  200 mcg Oral Early Morning    And   • levothyroxine tablet 88 mcg  88 mcg Oral Early Morning   • melatonin tablet 6 mg  6 mg Oral HS   • midodrine (PROAMATINE) tablet 10 mg  10 mg Oral TID AC   • montelukast (SINGULAIR) tablet 10 mg  10 mg Oral HS   • NOREPINEPHRINE 4 MG  ML NSS (CMPD ORDER) infusion  1-30 mcg/min Intravenous Titrated   • nystatin (MYCOSTATIN) powder   Topical BID   • ondansetron (ZOFRAN) injection 4 mg  4 mg Intravenous Q6H PRN   • pantoprazole (PROTONIX) EC tablet 20 mg  20 mg Oral Early Morning   • polyethylene glycol (MIRALAX) packet 17 g  17 g Oral Daily PRN   • QUEtiapine (SEROquel) tablet 25 mg  25 mg Oral HS PRN   • sodium phosphate 12 mmol in sodium chloride 0 9 % 100 mL Infusion  12 mmol Intravenous Once   • traZODone (DESYREL) tablet 50 mg  50 mg Oral HS PRN   • vasopressin (PITRESSIN) 20 Units in sodium chloride 0 9 % 100 mL infusion  0 04 Units/min Intravenous Continuous     Medications Discontinued During This Encounter   Medication Reason   • multi-electrolyte (PLASMALYTE-A/ISOLYTE-S PH 7 4) IV solution    • sodium chloride 0 9 % infusion    • insulin lispro (HumaLOG) 100 units/mL subcutaneous injection 1-6 Units    • insulin lispro (HumaLOG) 100 units/mL subcutaneous injection 1-6 Units    • heparin (porcine) subcutaneous injection 5,000 Units    • potassium chloride (K-DUR,KLOR-CON) CR tablet 20 mEq    • ipratropium (ATROVENT) 0 02 % inhalation solution 0 5 mg    • levalbuterol (XOPENEX) inhalation solution 1 25 mg    • sodium chloride 0 9 % infusion    • insulin lispro (HumaLOG) 100 units/mL subcutaneous injection 2-12 Units    • midodrine (PROAMATINE) tablet 5 mg    • METOPROLOL SUCCINATE ER PO Error   • insulin lispro (HumaLOG) 100 units/mL subcutaneous injection 2-12 Units    • phenylephrine (FRANCHESKA-SYNEPHRINE) 50 mg (STANDARD CONCENTRATION) in sodium chloride 0 9% 250 mL    • insulin lispro (HumaLOG) 100 units/mL subcutaneous injection 4-20 Units    • insulin detemir (LEVEMIR) subcutaneous injection 10 Units    • levothyroxine tablet 200 mcg    • albumin human (FLEXBUMIN) 5 % injection 25 g    • cefTRIAXone (ROCEPHIN) IVPB (premix in dextrose) 1,000 mg 50 mL    • acetaminophen (TYLENOL) tablet 650 mg    • albumin human (FLEXBUMIN) 25 % injection 25 g    • ondansetron (ZOFRAN) 4 mg/2 mL injection **ADS Override Pull** Returned to ADS   • traZODone (DESYREL) tablet 50 mg    • QUEtiapine (SEROquel) tablet 25 mg    • digoxin (LANOXIN) injection 250 mcg    • amiodarone (CORDARONE) 900 mg in dextrose 5 % 500 mL infusion        Modesto Rodriguez      This progress note was produced in part using a dictation device which may document imprecise wording from author's original intent

## 2022-10-25 NOTE — CONSULTS
Consult Cardiology    Marcus Martinez 1955, 69 y o  female MRN: [id number]    Unit/Bed#: -01 Encounter: [id number]    Attending Provider: Na Hitchcock   Primary Care Provider: Aline Sellers MD   Date admitted to hospital: 10/22/2022       Inpatient consult to Cardiology  Consult performed by: CAMPBELL Kearns  Consult ordered by: CAMPBELL Neal          Chronic diastolic congestive heart failure [facility])  Assessment & Plan  Wt Readings from Last 3 Encounters:   10/25/22 (!) 143 kg (315 lb 7 7 oz)   10/03/22 (!) 171 kg (377 lb 6 8 oz)   04/13/20 (!) 162 kg (356 lb 0 7 oz)     History of right sided heart failure  Echocardiogram 9/21/2022 shows EF 55-59% with evidence of RV pressure and volume overload  RV severely dilated with mild-moderately reduced function  MR  Severe TR  Maintained on torsemide and metolazone at home  Presented this admission with hypovolemia and IMTIAZ which has improved following IV hydration and albumin  Currently not on diuretic therapy  Consider pulmonary consult for pulmonary hypertension  Strict I's/O's  Acute hyponatremia  Assessment & Plan  Corrected with treatment of hypovolemia    UTI (urinary tract infection)  Assessment & Plan  As per critical care team    Hypotension  Assessment & Plan  Currently requiring midodrine 10 mg TID as well as vasopressin  Will work toward heart rate control to assist with improved cardiac output  Hypothyroidism  Assessment & Plan  TSH 1 41 this admission  Ideally TSH between 1-3 given a fib    Morbid obesity ([address] )  Assessment & Plan  Body mass index is 57 7 kg/m²  Contributing to overall cardiopulmonary status    Chronic respiratory failure with hypoxia (HCC)  Assessment & Plan  Chronic in the setting of right sided heart failure, asthma, NISHI, obesity  Utilizes 4 L NC of supplemental O2 at home  Atrial fibrillation [facility])  Assessment & Plan  History of atrial fibrillation   Details are unclear due to lack of availability of medical records  Patient was previously following with Dr Karsten Rae through [facility] Cardiology EP  Reports undergoing 2 failed cardioversions and ultimately an atrial fibrillation ablation, date unknown  It is unclear how long she has been back in atrial fibrillation  ECG from 4/2020 shows atrial fibrillation  ECG at this hospital presentation showed rate controlled atrial fibrillation  She is maintained on warfarin anticoagulation, goal INR 2-3  Home metoprolol succinate 100 mg BID was held in admission due to hypotension  Currently on an amiodarone drip and has received 1 dose of 250 mcg digoxin IV  Given additional 250 mcg digoxin now and assess response  Can provide additional dose in 4 hours if needed  May require re-introduction of beta blocker  Continue telemetry  Optimize electrolytes for K+ > 4, Mag > 2     * Acute kidney injury superimposed on chronic kidney disease [facility])  Assessment & Plan  Lab Results   Component Value Date    EGFR 26 10/25/2022    EGFR 21 10/24/2022    EGFR 16 10/24/2022    CREATININE 1 91 (H) 10/25/2022    CREATININE 2 24 (H) 10/24/2022    CREATININE 2 83 (H) 10/24/2022     IMTIAZ with creatinine of 6 at admission  Today improved to 1 91  Appreciate nephrology recs  Physician Requesting Consult: Kevin Mcgregor    Reason for Consult / Principal Problem: 1  Right sided heart failure  2  Atrial fibrillation with RVR          HPI: Nayely Miller is a 69y o  year old female who has a history of atrial fibrillation on warfarin, chronic diastolic heart failure, B4HL, stage 3 CKD, NISHI on CPAP, asthma, chronic respiratory failure requiring supplemental O2, chronic anemia, history of recurrent PE/DVT, hypothyroidism, arthritis, and morbid obesity who was previously following with [facility] Cardiologist Dr Karsten Rae  She was admitted to [address] [place] [address] 9/21-10/3/2022 for acute on chronic heart failure   Echocardiogram showed EF 55-59% severely dilated RV with mild-mod reduced systolic function  MR  Severe TR with pulmonary hypertension  She diuresed approximately 25 L during admission with IV diuretics  She was discharged home on torsemide 40 mg BID and metolazone 2 5 mg 3 times weekly  Discharge weight was 377 lbs  She required blood pressure support with midodrine 5 mg TID during admission which was continued at discharge  She was [facility] to [facility]  Patient presented to [address] [place] [address] ER 10/22/022 due to 4 days of anuria since returning home from rehab on 10/17/22  She called her PCP who ordered blood work revealing IMTIAZ on CKD and instructed patient to return to the hospital She was diagnosed with hypovolemic shock, IMTIAZ on CKD with creatinine of 6 18  She had not been taking her home midodrine since returning home  She reports BP was running 80's/40's at home  ECG on arrival showed rate controlled atrial fibrillation, RBBB, with PVC's  She was started on IV hydration and albumin and blood pressure support with alannah drip transitioned to vasopressin on 10/23/22  Metoprolol was held due to hypotension  She developed atrial fibrillation with RVR with rates into 160's on 10/24/22  Bedside echocardiogram by critical care showed relatively no change from prior  Amiodarone drip without bolus was initiated  Cardiology was consulted for assistance with a fib rate control as well as evidence of right sided heart failure  She has received 1 dose if digoxin this morning  At the time of my assessment she is resting in bed with intermittent RVR into 170's  She becomes nauseous when rates are fast  She states she felt better yesterday  She continues to breath comfortably on 5 L NC  She inhaled a sip of water during our conversation and was able to continue talking but began wheezing  She tells me he she has pretty much not left her home much in the past 7 years  She last saw her cardiologist at least 8 years ago   She denies recent cardiac or pulmonary evaluation  She reports faithful compliance of her CPAP  Review of Systems   Constitutional: Positive for fatigue  Negative for chills and fever  HENT: Negative for ear pain and sore throat  Eyes: Negative for pain and visual disturbance  Respiratory: Positive for shortness of breath  Negative for cough  Cardiovascular: Negative for chest pain and palpitations  Gastrointestinal: Positive for nausea  Negative for abdominal pain and vomiting  Genitourinary: Negative for dysuria and hematuria  Musculoskeletal: Negative for arthralgias and back pain  Skin: Negative for color change and rash  Neurological: Negative for seizures and syncope  All other systems reviewed and are negative         Historical Information     Past Medical History:   Diagnosis Date   • Asthma    • Atrial fibrillation ([facility] )    • COPD (chronic obstructive pulmonary disease) ([facility] )    • Diabetes mellitus ([facility] )    • Disease of thyroid gland    • Heart failure (HCC)    • Kidney failure    • NISHI (obstructive sleep apnea)      Past Surgical History:   Procedure Laterality Date   • CARDIAC ELECTROPHYSIOLOGY MAPPING AND ABLATION      by Dr Bran Walters at [address]       Social History     Substance and Sexual Activity   Alcohol Use Never     Social History     Substance and Sexual Activity   Drug Use Never     Social History     Tobacco Use   Smoking Status Never Smoker   Smokeless Tobacco Never Used       Family History:   Family History   Problem Relation Age of Onset   • Arthritis Mother    • Hearing loss Mother    • Heart disease Mother    • Hypertension Mother    • Stroke Mother    • Alcohol abuse Father    • Cancer Father    • Diabetes Father    • Arthritis Sister    • Cancer Sister    • Alcohol abuse Brother    • Diabetes Son    • Arthritis Daughter    • Drug abuse Daughter    • Heart disease Maternal Grandmother    • Hypertension Maternal Grandmother    • Stroke Maternal Grandmother    • Arthritis Maternal Aunt    • Asthma Neg Hx    • Birth defects Neg Hx    • COPD Neg Hx    • Depression Neg Hx    • Early death Neg Hx    • Hyperlipidemia Neg Hx    • Kidney disease Neg Hx    • Learning disabilities Neg Hx    • Mental illness Neg Hx    • Mental retardation Neg Hx    • Miscarriages / Stillbirths Neg Hx    • Vision loss Neg Hx        Meds/Allergies     current meds:   Current Facility-Administered Medications   Medication Dose Route Frequency   • acetaminophen (TYLENOL) tablet 650 mg  650 mg Oral Q6H PRN   • albuterol inhalation solution 2 5 mg  2 5 mg Nebulization Q6H PRN   • amiodarone (CORDARONE) 900 mg in dextrose 5 % 500 mL infusion  1 mg/min Intravenous Continuous   • atorvastatin (LIPITOR) tablet 10 mg  10 mg Oral Daily   • cholecalciferol (VITAMIN D3) tablet 2,000 Units  2,000 Units Oral Daily   • docusate sodium (COLACE) capsule 100 mg  100 mg Oral BID   • ferrous sulfate tablet 325 mg  325 mg Oral Daily With Breakfast   • fluticasone-vilanterol (BREO ELLIPTA) 100-25 mcg/inh inhaler 1 puff  1 puff Inhalation Daily   • insulin detemir (LEVEMIR) subcutaneous injection 26 Units  26 Units Subcutaneous HS   • insulin lispro (HumaLOG) 100 units/mL subcutaneous injection 1-6 Units  1-6 Units Subcutaneous HS   • insulin lispro (HumaLOG) 100 units/mL subcutaneous injection 3 Units  3 Units Subcutaneous TID With Meals   • insulin lispro (HumaLOG) 100 units/mL subcutaneous injection 4-20 Units  4-20 Units Subcutaneous TID AC   • levothyroxine tablet 200 mcg  200 mcg Oral Early Morning    And   • levothyroxine tablet 88 mcg  88 mcg Oral Early Morning   • melatonin tablet 6 mg  6 mg Oral HS   • metoprolol (LOPRESSOR) injection 1 3 mg  1 3 mg Intravenous Q4H PRN   • midodrine (PROAMATINE) tablet 10 mg  10 mg Oral TID AC   • montelukast (SINGULAIR) tablet 10 mg  10 mg Oral HS   • NOREPINEPHRINE 4 MG  ML NSS (CMPD ORDER) infusion  1-30 mcg/min Intravenous Titrated   • nystatin (MYCOSTATIN) powder   Topical BID   • ondansetron (ZOFRAN) injection 4 mg  4 mg Intravenous Q6H PRN   • pantoprazole (PROTONIX) EC tablet 20 mg  20 mg Oral Early Morning   • polyethylene glycol (MIRALAX) packet 17 g  17 g Oral Daily PRN   • QUEtiapine (SEROquel) tablet 25 mg  25 mg Oral HS PRN   • traZODone (DESYREL) tablet 50 mg  50 mg Oral HS PRN   • vasopressin (PITRESSIN) 20 Units in sodium chloride 0 9 % 100 mL infusion  0 04 Units/min Intravenous Continuous   • warfarin (COUMADIN) tablet 5 mg  5 mg Oral Once (warfarin)     amiodarone, 1 mg/min, Last Rate: 1 mg/min (10/25/22 0952)  norepinephrine, 1-30 mcg/min, Last Rate: 2 mcg/min (10/25/22 1204)  vasopressin, 0 04 Units/min, Last Rate: 0 04 Units/min (10/25/22 0955)        Allergies   Allergen Reactions   • Acesulfame Potassium - Food Allergy Anaphylaxis   • Aspartame - Food Allergy Anaphylaxis   • Saccharin - Food Allergy Anaphylaxis   • Sucralose - Food Allergy Anaphylaxis   • Metformin GI Intolerance       Objective     Vitals: Blood pressure 135/62, pulse 95, temperature 97 8 °F (36 6 °C), temperature source Temporal, resp  rate 20, height 5' 2" (1 575 m), weight (!) 143 kg (315 lb 7 7 oz), SpO2 92 %  , Body mass index is 57 7 kg/m²      Orthostatic Blood Pressures    Flowsheet Row Most Recent Value   Blood Pressure 135/62 filed at 10/25/2022 1115   Patient Position - Orthostatic VS Lying filed at 10/25/2022 9135          Systolic (99PQL), YJE:601 , Min:106 , DBL:858     Diastolic (06JZJ), NIM:34, Min:51, Max:62        Intake/Output Summary (Last 24 hours) at 10/25/2022 1207  Last data filed at 10/25/2022 1101  Gross per 24 hour   Intake 2980 51 ml   Output 2109 ml   Net 871 51 ml       Weight (last 2 days)     Date/Time Weight    10/25/22 0538 143 (315 48)    10/24/22 0554 143 (314 82)    10/24/22 0515 143 (314 82)    10/23/22 0600 147 (324 74)    10/23/22 0429 147 (324 74)          Invasive Devices  Report    Central Venous Catheter Line  Duration CVC Central Lines 10/23/22 Triple 16cm 2 days          Peripheral Intravenous Line  Duration           Peripheral IV 10/22/22 Left Antecubital 2 days          Arterial Line  Duration           Arterial Line 10/22/22 Radial 2 days          Drain  Duration           Urethral Catheter Straight-tip 16 Fr  2 days                Physical Exam  Vitals and nursing note reviewed  Constitutional:       General: She is not in acute distress  Appearance: She is well-developed  HENT:      Head: Normocephalic and atraumatic  Eyes:      Conjunctiva/sclera: Conjunctivae normal    Cardiovascular:      Rate and Rhythm: Tachycardia present  Rhythm irregular  Heart sounds: Murmur heard  Systolic murmur is present  Pulmonary:      Effort: Pulmonary effort is normal  No respiratory distress  Breath sounds: Wheezing present  Comments: 5 L mid flow NC  Abdominal:      Palpations: Abdomen is soft  Tenderness: There is no abdominal tenderness  Musculoskeletal:      Cervical back: Neck supple  Skin:     General: Skin is warm and dry  Neurological:      Mental Status: She is alert  Psychiatric:         Mood and Affect: Mood and affect normal          Speech: Speech normal          Behavior: Behavior normal  Behavior is cooperative                Laboratory Results:          CBC with diff:   Results from last 7 days   Lab Units 10/25/22  0449 10/24/22  0425 10/23/22  0437 10/22/22  1214   WBC Thousand/uL 8 45 10 71* 10 88* 9 33   HEMOGLOBIN g/dL 11 0* 12 4 12 0 13 5   HEMATOCRIT % 34 4* 39 0 36 4 41 7   MCV fL 90 86 86 86   PLATELETS Thousands/uL 184 242 264 264   MCH pg 28 6 27 4 28 4 28 0   MCHC g/dL 32 0 31 8 33 0 32 4   RDW % 18 0* 18 0* 17 6* 17 9*   MPV fL 11 4 11 3 11 4 11 6   NRBC AUTO /100 WBCs  --   --   --  0         CMP:  Results from last 7 days   Lab Units 10/25/22  0449 10/24/22  1543 10/24/22  0425 10/23/22  1127 10/23/22  0437 10/22/22  2256 10/22/22  1214   POTASSIUM mmol/L 3 7 4 1 3 6   < >  --    < > 4 1   CHLORIDE mmol/L 97 98 92*   < >  --    < > 82*   CO2 mmol/L 31 30 29   < >  --    < > 32   BUN mg/dL 81* 88* 107*   < >  --    < > 160*   CREATININE mg/dL 1 91* 2 24* 2 83*   < >  --    < > 6 18*   CALCIUM mg/dL 9 8 10 6* 9 9   < >  --    < > 9 2   AST U/L 24  --   --   --  31  --  44   ALT U/L 26  --   --   --  36  --  43   ALK PHOS U/L 122*  --   --   --  129*  --  168*   EGFR ml/min/1 73sq m 26 21 16   < >  --    < > 6    < > = values in this interval not displayed  BMP:  Results from last 7 days   Lab Units 10/25/22  0449 10/24/22  1543 10/24/22  0425   POTASSIUM mmol/L 3 7 4 1 3 6   CHLORIDE mmol/L 97 98 92*   CO2 mmol/L 31 30 29   BUN mg/dL 81* 88* 107*   CREATININE mg/dL 1 91* 2 24* 2 83*   CALCIUM mg/dL 9 8 10 6* 9 9       BNP:  4,103 on 10/22/22    HS troponin 0 hr: 68,  2 hr: 57,  4 hr: 58    Magnesium:   Results from last 7 days   Lab Units 10/25/22  0449 10/24/22  1543 10/24/22  0425   MAGNESIUM mg/dL 1 7 1 8 1 9       Coags:   Results from last 7 days   Lab Units 10/24/22  0425 10/23/22  0437 10/22/22  1736   INR  2 76* 3 71* 3 17*       TSH:   1 141    Lipid Profile: No results found for: CHOLESTEROL  No results found for: HDL  Lab Results   Component Value Date    TRIG 58 04/13/2020     No results found for: [place]     Echocardiogram 9/21/2022  Left Ventricle: Left ventricular cavity size is normal  Wall thickness is normal  The left ventricular ejection fraction is 55-59%  Systolic function is normal  Wall motion is grossly normal   Not all segments are well visualized  •  IVS: There is both systolic and diastolic flattening of the interventricular septum consistent with right ventricle pressure and volume overload  •  Right Ventricle: Right ventricular cavity size is severely dilated  Systolic function is mildly to moderately reduced    •  Mitral Valve: Mitral regurgitation is detected, not precisely quantified, possibly severe,  with an eccentric, wall impinging, posteriorly directed jet  •  Tricuspid Valve: There is severe torrential regurgitation  •  IVC/SVC: The inferior vena cava size is 28 0 mm  The right atrial pressure is estimated at 15 0 mmHg  The inferior vena cava is dilated  Respirophasic changes were blunted (less than 50% variation)  Imaging: I have personally reviewed pertinent reports  CT abdomen pelvis wo contrast    Result Date: 10/22/2022  Narrative: CT ABDOMEN AND PELVIS WITHOUT IV CONTRAST INDICATION:   Sepsis UTI, IMTIAZ, R/o obstructive nephropathy  "Patient recently complains of generalized weakness and decreased urine production  Denies chest pain or abdominal pain or nausea or vomiting or shortness of breath above the baseline" COMPARISON:  None  TECHNIQUE:  CT examination of the abdomen and pelvis was performed without intravenous contrast  Axial, sagittal, and coronal 2D reformatted images were created from the source data and submitted for interpretation  Radiation dose length product (DLP) for this visit:  [phone] mGy-cm   This examination, like all CT scans performed in the [facility], was performed utilizing techniques to minimize radiation dose exposure, including the use of iterative reconstruction and automated exposure control  Enteric contrast was administered  FINDINGS: ABDOMEN LOWER CHEST:  Partially imaged cardiomegaly  Small hiatal hernia LIVER/BILIARY TREE:  Unremarkable  GALLBLADDER:  Gallbladder is surgically absent  SPLEEN:  Unremarkable  PANCREAS:  Unremarkable  ADRENAL GLANDS:  Unremarkable  KIDNEYS/URETERS:  Unremarkable  No hydronephrosis  STOMACH AND BOWEL:  There is colonic diverticulosis without evidence of acute diverticulitis  APPENDIX:  No findings to suggest appendicitis  ABDOMINOPELVIC CAVITY:  No ascites  No pneumoperitoneum  No lymphadenopathy  VESSELS:  Unremarkable for patient's age  PELVIS REPRODUCTIVE ORGANS:  Unremarkable for patient's age   URINARY BLADDER:  Decompressed with a Dow catheter  ABDOMINAL WALL/INGUINAL REGIONS:  Tiny uncomplicated fat-containing umbilical hernia  OSSEOUS STRUCTURES:  No acute fracture or destructive osseous lesion  Spinal degenerative changes are noted  Impression: No acute inflammatory changes in the abdomen or pelvis  Workstation performed: [id number]     XR chest portable    Result Date: 10/23/2022  Narrative: CHEST INDICATION:   Weakness  COMPARISON:  Chest x-ray from 9/24/2022  Chest CT from 4/13/2020  EXAM PERFORMED/VIEWS:  XR CHEST PORTABLE FINDINGS: Cardiomediastinal silhouette appears unremarkable  Unchanged thin-walled right mid lung cyst  The lungs are clear  No pneumothorax or pleural effusion  Osseous structures appear within normal limits for patient age  Impression: No acute cardiopulmonary disease  Workstation performed: [id number]     XR knee 1 or 2 vw left    Result Date: 10/2/2022  Narrative: LEFT KNEE INDICATION:   B/L knee pain  COMPARISON:  None VIEWS:  XR KNEE 1 OR 2 VW LEFT FINDINGS: There is no acute fracture or dislocation  There is no joint effusion  Moderate to severe tricompartmental osteoarthritis as evidenced by joint space narrowing, osteophyte formation and subchondral sclerosis  No lytic or blastic osseous lesion  Soft tissues are unremarkable  Impression: No acute osseous abnormality  Degenerative changes as described  Workstation performed: [id number]     XR knee 1 or 2 vw right    Result Date: 10/2/2022  Narrative: RIGHT KNEE INDICATION:   B/L knee pain  COMPARISON:  None VIEWS:  XR KNEE 1 OR 2 VW RIGHT FINDINGS: There is no acute fracture or dislocation  There is no joint effusion  Moderate to severe tricompartmental osteoarthritis as evidenced by joint space narrowing, osteophyte formation and subchondral sclerosis  This is most severe at the medial compartment  No lytic or blastic osseous lesion  Scattered small rounded soft tissue calcifications probably phleboliths       Impression: No acute osseous abnormality  Degenerative changes as described  Workstation performed: [id number]     XR chest portable ICU    Result Date: 10/23/2022  Narrative: CHEST INDICATION:   CVC placement  COMPARISON:  Compared 10/22/2022 EXAM PERFORMED/VIEWS:  XR CHEST PORTABLE ICU FINDINGS:  Right neck catheter in the distal SVC  Moderate cardiomegaly Mild prominent vascular markings  No pneumothorax  Slight right hemidiaphragm elevation is unchanged  Osseous structures appear within normal limits for patient age  Impression: Right neck catheter in the distal SVC  Mild congestion  Workstation performed: [id number]     US kidney and bladder    Result Date: 10/23/2022  Narrative: RENAL ULTRASOUND INDICATION:   Acute kidney  COMPARISON: CT abdomen pelvis 10/20/2022 TECHNIQUE:   Ultrasound of the retroperitoneum was performed with a curvilinear transducer utilizing volumetric sweeps and still imaging techniques  FINDINGS: KIDNEYS: Symmetric and normal size  Right kidney:  10 5 x 6 3 x 5 5 cm  Volume 192 1 mL Left kidney:  10 7 x 7 0 x 4 9 cm  Volume 194 5 mL Right kidney Slightly lobular contour  Normal echogenicity  No mass is identified  No hydronephrosis  No shadowing calculi  No perinephric fluid collections  Left kidney Slightly lobular contour  Normal echogenicity  No mass is identified  No hydronephrosis  No shadowing calculi  No perinephric fluid collections  URETERS: Nonvisualized  BLADDER: Decompressed  Dow catheter tip not well seen but in position on recent CT  Low volume limits evaluation  Impression: 1  No hydronephrosis or calculi  2   Nonspecific lobular renal contour could be related to scarring  Workstation performed: Anup Hurst bedside procedure    Result Date: 10/23/2022  Narrative: 1 2 [id number]  2 446 2994 [id number] 1      EKG reviewed personally: EKG: atrial fibrillation with RBBB  PVC       Telemetry: atrial fibrillation with rates 's, occasional PVC's      Counseling / Coordination of Care  Total floor / unit time spent today 45 minutes  Greater than 50% of total time was spent with the patient and / or family counseling and / or coordination of care    A description of the counseling / coordination of care: Discussed case with critical care team         Code Status: Level 3 - DNAR and DNI

## 2022-10-25 NOTE — CONSULTS
Consult received for morbid obesity, DM, dietary recommendations  Author provided diet ed to pt during recent previous admission 9/22 and 9/30  Had discussed low sodium diet with pt along with use of [employer] Meals at home as pt reported limited ability to prepare her own meals and reliance on convenience foods  Pt says she was not home long after d/c from previous hospitalization, has not yet implemented these meals at home  Says she has a friend who has high BP and is also trying to follow low sodium diet/working on this together  Pt is motivated to make lifestyle changes, says she has been consuming smaller portion sizes for glycemic control and weight loss  Unintentional decrease in oral intake noted during time at [facility], says the food was horrible  Reports they were over-restricting her fluid intake  Provided additional encouragement for lifestyle/dietary modifications after d/c  Pt inquiring about vitamin K foods to limit with coumadin use  Discussed briefly and provided with vitamin K foods list, pt then became nauseous and felt she was going to vomit, provided with basin  Will provide further diet ed at later time as indicated/desired by pt  Would recommend OP Nutrition Therapy  Fair intake of food at this time, will hold off on supplement and monitor po

## 2022-10-25 NOTE — ASSESSMENT & PLAN NOTE
Wt Readings from Last 3 Encounters:   10/25/22 (!) 143 kg (315 lb 7 7 oz)   10/03/22 (!) 171 kg (377 lb 6 8 oz)   04/13/20 (!) 162 kg (356 lb 0 7 oz)     History of right sided heart failure  Echocardiogram 9/21/2022 shows EF 55-59% with evidence of RV pressure and volume overload  RV severely dilated with mild-moderately reduced function  MR  Severe TR  Maintained on torsemide and metolazone at home  Presented this admission with hypovolemia and IMTIAZ which has improved following IV hydration and albumin  Torsemide 40 mg daily resumed 10/27/22  Will increase to 40 mg BID dosing today and assess response  Currently undergoing pulmonary work up for pulmonary hypertension  She does appear to be volume overloaded today  Recommend reinitiating diuretics  Strict I's/O's

## 2022-10-25 NOTE — PHYSICAL THERAPY NOTE
PHYSICAL THERAPY NOTE          Patient Name: Carlos Quijano  PTOKW'V Date: 10/25/2022       10/25/22 6948   Note Type   Note type Evaluation   Cancel Reasons Medical status     Received order for PT consult  Chart reviewed  Attempted to see patient this AM, however after speaking with RN, Gwen Weems, patient is not medically appropriate  Patient hypotensive and in Afib, receiving medications from RN  Will continue to follow and see patient as medically appropriate at a later time       Dash Arias

## 2022-10-25 NOTE — ASSESSMENT & PLAN NOTE
Chronic in the setting of right sided heart failure, asthma, NISHI, obesity  Utilizes 4 L NC of supplemental O2 at home

## 2022-10-25 NOTE — ASSESSMENT & PLAN NOTE
· Chronic hypotension, but now shock likely septic vs hypovolemic  · Patient received IV fluids secondary to volume depletion and hypotension  Last dose of Midodrine 10/17  Pt reports not taking after discharge home for rehab facility  · Cont home midodrine  · Increase to 10 mg TID 10/23  · 10/22 Fluid resuscitated with IVF and albumen  Started on alannah-synephrine and then levophed  · 10/23 Transitioned alannah gtt to vasopressin  · Cont to wean levo as able    · Continue albumin 25%

## 2022-10-25 NOTE — OCCUPATIONAL THERAPY NOTE
Occupational Therapy Cancellation Note       Patient Name: Fazal Chowdhury  GKXCU'[id number] Date: 10/25/2022  Problem List  Principal Problem:    Acute kidney injury superimposed on chronic kidney disease ([facility] )  Active Problems:    Atrial fibrillation ([facility] )    COPD (chronic obstructive pulmonary disease) ([facility] )    Diabetes mellitus ([place] [address] )    Chronic respiratory failure with hypoxia (HCC)    Acute on chronic diastolic congestive heart failure ([place] [address] )    Morbid obesity ([place] [address] )    Hypothyroidism    Hypotension    GERD (gastroesophageal reflux disease)    UTI (urinary tract infection)    Acute hyponatremia    Vaginal bleeding            10/25/22 0950   Note Type   Note type Evaluation; Cancelled Session   Cancel Reasons Medical status       OT orders received  Chart review completed  Attempted to see Pt for OT evaluation this AM, however after speaking with RN, Morteza Winkler  It was recommended to hold Pt at this time d/t Pt being hypotensive and in a-fib  Will continue to follow Pt and address as medically appropriate and as schedule allows         Laney Reed

## 2022-10-25 NOTE — ASSESSMENT & PLAN NOTE
History of atrial fibrillation  Details are unclear due to lack of availability of medical records  Patient was previously following with Dr Ingrid Samayoa through [facility]  Reports undergoing 2 failed cardioversions and ultimately an atrial fibrillation ablation, date unknown  It is unclear how long she has been back in atrial fibrillation  ECG from 4/2020 shows atrial fibrillation  ECG at this hospital presentation showed rate controlled atrial fibrillation  She is maintained on warfarin anticoagulation, goal INR 2-3  Home metoprolol succinate 100 mg BID was held in admission due to hypotension  Developed atrial fibrillation with RVR on 10/24/2022 and initiated on amiodarone drip, received 2 doses of dig  mcg  Due to bradycardia amiodarone discontinued and metoprolol discontinued temporarily  Metoprolol succinate 12 5 mg BID resumed 10/27/ 22 with improving BP and HR's today  Continue telemetry  Optimize electrolytes for K+ > 4, Mag > 2

## 2022-10-25 NOTE — ASSESSMENT & PLAN NOTE
Lab Results   Component Value Date    EGFR 26 10/25/2022    EGFR 21 10/24/2022    EGFR 16 10/24/2022    CREATININE 1 91 (H) 10/25/2022    CREATININE 2 24 (H) 10/24/2022    CREATININE 2 83 (H) 10/24/2022       · Likely in the setting of dehydration/intravascular volume depletion secondary to oral diuretics   · Patient presented to the ED with a creatine 6 18 (Baseline 1 2-1 8)  · No urgent indication for dialysis   · CT A/P 10/22 showed no evidence of obstructive hydronephrosis   Urinary bladder is decompressed with the frederick catheter   · Renal US shows no acute abnomalities  · Nephrology consulted- appreciate recommendations   · Cliff Gallus suggestive of pre-renal etiology  · NSS- D/C and bolus PRN  · Trend creatinine  · Continue frederick  · Avoid hypotension and nephrotoxins  · Strict I&O  · PRN Potassium replacement  · Creatinine improving 1 91, close to baseline (1 2-1 8)  · Most recent FENA 2 1 intrinsic ATN, most likely 2/2 hypoperfusion  · Continue to trend creatinine

## 2022-10-25 NOTE — ASSESSMENT & PLAN NOTE
· Presented with corrected sodium 126  · Suspected etiology hypovolemia with use of oral duiretics  · Currently resolved  · Trend BMP no no

## 2022-10-25 NOTE — PLAN OF CARE
Problem: Potential for Falls  Goal: Patient will remain free of falls  Description: INTERVENTIONS:  - Educate patient/family on patient safety including physical limitations  - Instruct patient to call for assistance with activity   - Consult OT/PT to assist with strengthening/mobility   - Keep Call bell within reach  - Keep bed low and locked with side rails adjusted as appropriate  - Keep care items and personal belongings within reach  - Initiate and maintain comfort rounds  - Make Fall Risk Sign visible to staff  - Offer Toileting every 2 Hours, in advance of need  - Initiate/Maintain bed/chair alarm  - Obtain necessary fall risk management equipment:   - Apply yellow socks and bracelet for high fall risk patients  - Consider moving patient to room near nurses station  Outcome: Progressing     Problem: PAIN - ADULT  Goal: Verbalizes/displays adequate comfort level or baseline comfort level  Description: Interventions:  - Encourage patient to monitor pain and request assistance  - Assess pain using appropriate pain scale  - Administer analgesics based on type and severity of pain and evaluate response  - Implement non-pharmacological measures as appropriate and evaluate response  - Consider cultural and social influences on pain and pain management  - Notify physician/advanced practitioner if interventions unsuccessful or patient reports new pain  Outcome: Progressing     Problem: INFECTION - ADULT  Goal: Absence or prevention of progression during hospitalization  Description: INTERVENTIONS:  - Assess and monitor for signs and symptoms of infection  - Monitor lab/diagnostic results  - Monitor all insertion sites, i e  indwelling lines, tubes, and drains  - Monitor endotracheal if appropriate and nasal secretions for changes in amount and color  - Mobile appropriate cooling/warming therapies per order  - Administer medications as ordered  - Instruct and encourage patient and family to use good hand hygiene technique  - Identify and instruct in appropriate isolation precautions for identified infection/condition  Outcome: Progressing  Goal: Absence of fever/infection during neutropenic period  Description: INTERVENTIONS:  - Monitor WBC    Outcome: Progressing     Problem: SAFETY ADULT  Goal: Patient will remain free of falls  Description: INTERVENTIONS:  - Educate patient/family on patient safety including physical limitations  - Instruct patient to call for assistance with activity   - Consult OT/PT to assist with strengthening/mobility   - Keep Call bell within reach  - Keep bed low and locked with side rails adjusted as appropriate  - Keep care items and personal belongings within reach  - Initiate and maintain comfort rounds  - Make Fall Risk Sign visible to staff  - Offer Toileting every 2 Hours, in advance of need  - Initiate/Maintain bed/chair alarm  - Obtain necessary fall risk management equipment:   - Apply yellow socks and bracelet for high fall risk patients  - Consider moving patient to room near nurses station  Outcome: Progressing  Goal: Maintain or return to baseline ADL function  Description: INTERVENTIONS:  -  Assess patient's ability to carry out ADLs; assess patient's baseline for ADL function and identify physical deficits which impact ability to perform ADLs (bathing, care of mouth/teeth, toileting, grooming, dressing, etc )  - Assess/evaluate cause of self-care deficits   - Assess range of motion  - Assess patient's mobility; develop plan if impaired  - Assess patient's need for assistive devices and provide as appropriate  - Encourage maximum independence but intervene and supervise when necessary  - Involve family in performance of ADLs  - Assess for home care needs following discharge   - Consider OT consult to assist with ADL evaluation and planning for discharge  - Provide patient education as appropriate  Outcome: Progressing  Goal: Maintains/Returns to pre admission functional level  Description: INTERVENTIONS:  - Perform BMAT or MOVE assessment daily    - Set and communicate daily mobility goal to care team and patient/family/caregiver  - Collaborate with rehabilitation services on mobility goals if consulted  - Perform Range of Motion 3 times a day  - Reposition patient every 2 hours  - Dangle patient 3 times a day  - Stand patient 3 times a day  - Ambulate patient 3 times a day  - Out of bed to chair 3 times a day   - Out of bed for meals 3 times a day  - Out of bed for toileting  - Record patient progress and toleration of activity level   Outcome: Progressing     Problem: DISCHARGE PLANNING  Goal: Discharge to home or other facility with appropriate resources  Description: INTERVENTIONS:  - Identify barriers to discharge w/patient and caregiver  - Arrange for needed discharge resources and transportation as appropriate  - Identify discharge learning needs (meds, wound care, etc )  - Arrange for interpretive services to assist at discharge as needed  - Refer to Case Management Department for coordinating discharge planning if the patient needs post-hospital services based on physician/advanced practitioner order or complex needs related to functional status, cognitive ability, or social support system  Outcome: Progressing     Problem: Knowledge Deficit  Goal: Patient/family/caregiver demonstrates understanding of disease process, treatment plan, medications, and discharge instructions  Description: Complete learning assessment and assess knowledge base    Interventions:  - Provide teaching at level of understanding  - Provide teaching via preferred learning methods  Outcome: Progressing     Problem: GENITOURINARY - ADULT  Goal: Maintains or returns to baseline urinary function  Description: INTERVENTIONS:  - Assess urinary function  - Encourage oral fluids to ensure adequate hydration if ordered  - Administer IV fluids as ordered to ensure adequate hydration  - Administer ordered medications as needed  - Offer frequent toileting  - Follow urinary retention protocol if ordered  Outcome: Progressing  Goal: Absence of urinary retention  Description: INTERVENTIONS:  - Assess patient's ability to void and empty bladder  - Monitor I/O  - Bladder scan as needed  - Discuss with physician/AP medications to alleviate retention as needed  - Discuss catheterization for long term situations as appropriate  Outcome: Progressing  Goal: Urinary catheter remains patent  Description: INTERVENTIONS:  - Assess patency of urinary catheter  - If patient has a chronic frederick, consider changing catheter if non-functioning  - Follow guidelines for intermittent irrigation of non-functioning urinary catheter  Outcome: Progressing     Problem: METABOLIC, FLUID AND ELECTROLYTES - ADULT  Goal: Electrolytes maintained within normal limits  Description: INTERVENTIONS:  - Monitor labs and assess patient for signs and symptoms of electrolyte imbalances  - Administer electrolyte replacement as ordered  - Monitor response to electrolyte replacements, including repeat lab results as appropriate  - Instruct patient on fluid and nutrition as appropriate  Outcome: Progressing  Goal: Fluid balance maintained  Description: INTERVENTIONS:  - Monitor labs   - Monitor I/O and WT  - Instruct patient on fluid and nutrition as appropriate  - Assess for signs & symptoms of volume excess or deficit  Outcome: Progressing  Goal: Glucose maintained within target range  Description: INTERVENTIONS:  - Monitor Blood Glucose as ordered  - Assess for signs and symptoms of hyperglycemia and hypoglycemia  - Administer ordered medications to maintain glucose within target range  - Assess nutritional intake and initiate nutrition service referral as needed  Outcome: Progressing     Problem: RESPIRATORY - ADULT  Goal: Achieves optimal ventilation and oxygenation  Description: INTERVENTIONS:  - Assess for changes in respiratory status  - Assess for changes in mentation and behavior  - Position to facilitate oxygenation and minimize respiratory effort  - Oxygen administered by appropriate delivery if ordered  - Initiate smoking cessation education as indicated  - Encourage broncho-pulmonary hygiene including cough, deep breathe, Incentive Spirometry  - Assess the need for suctioning and aspirate as needed  - Assess and instruct to report SOB or any respiratory difficulty  - Respiratory Therapy support as indicated  Outcome: Progressing     Problem: SKIN/TISSUE INTEGRITY - ADULT  Goal: Skin Integrity remains intact(Skin Breakdown Prevention)  Description: Assess:  -Perform Mino assessment every 2  -Clean and moisturize skin every shift  -Inspect skin when repositioning, toileting, and assisting with ADLS  -Assess extremities for adequate circulation and sensation     Bed Management:  -Have minimal linens on bed & keep smooth, unwrinkled  -Change linens as needed when moist or perspiring  -Avoid sitting or lying in one position for more than 2 hours while in bed  -Keep HOB at 30degrees     Toileting:  -Offer bedside commode  -Assess for incontinence every 2 hours  -Use incontinent care products after each incontinent episode such as purewick    Activity:  -Mobilize patient 2 times a day  -Encourage activity and walks on unit  -Encourage or provide ROM exercises   -Turn and reposition patient every 2 Hours  -Use appropriate equipment to lift or move patient in bed  -Instruct/ Assist with weight shifting every 15 minutes when out of bed in chair  -Consider limitation of chair time 2 hour intervals    Skin Care:  -Avoid use of baby powder, tape, friction and shearing, hot water or constrictive clothing  -Relieve pressure over bony prominences using wedges  -Do not massage red bony areas    Outcome: Progressing  Goal: Incision(s), wounds(s) or drain site(s) healing without S/S of infection  Description: INTERVENTIONS  - Assess and document dressing, incision, wound bed, drain sites and surrounding tissue  - Provide patient and family education  - Perform skin care/dressing changes every shift  Outcome: Progressing  Goal: Pressure injury heals and does not worsen  Description: Interventions:  - Implement low air loss mattress or specialty surface (Criteria met)  - Apply silicone foam dressing  - Instruct/assist with weight shifting every 15 minutes when in chair   - Limit chair time to 3 hour intervals  - Use special pressure reducing interventions such as waffle cushion when in chair   - Apply fecal or urinary incontinence containment device   - Perform passive or active ROM every shift  - Turn and reposition patient & offload bony prominences every 2 hours   - Utilize friction reducing device or surface for transfers   - Consider consults to  interdisciplinary teams such as wounds  - Use incontinent care products after each incontinent episode such as purewick  - Consider nutrition services referral as needed  Outcome: Progressing     Problem: MUSCULOSKELETAL - ADULT  Goal: Maintain or return mobility to safest level of function  Description: INTERVENTIONS:  - Assess patient's ability to carry out ADLs; assess patient's baseline for ADL function and identify physical deficits which impact ability to perform ADLs (bathing, care of mouth/teeth, toileting, grooming, dressing, etc )  - Assess/evaluate cause of self-care deficits   - Assess range of motion  - Assess patient's mobility  - Assess patient's need for assistive devices and provide as appropriate  - Encourage maximum independence but intervene and supervise when necessary  - Involve family in performance of ADLs  - Assess for home care needs following discharge   - Consider OT consult to assist with ADL evaluation and planning for discharge  - Provide patient education as appropriate  Outcome: Progressing  Goal: Maintain proper alignment of affected body part  Description: INTERVENTIONS:  - Support, maintain and protect limb and body alignment  - Provide patient/ family with appropriate education  Outcome: Progressing     Problem: MOBILITY - ADULT  Goal: Maintain or return to baseline ADL function  Description: INTERVENTIONS:  -  Assess patient's ability to carry out ADLs; assess patient's baseline for ADL function and identify physical deficits which impact ability to perform ADLs (bathing, care of mouth/teeth, toileting, grooming, dressing, etc )  - Assess/evaluate cause of self-care deficits   - Assess range of motion  - Assess patient's mobility; develop plan if impaired  - Assess patient's need for assistive devices and provide as appropriate  - Encourage maximum independence but intervene and supervise when necessary  - Involve family in performance of ADLs  - Assess for home care needs following discharge   - Consider OT consult to assist with ADL evaluation and planning for discharge  - Provide patient education as appropriate  Outcome: Progressing  Goal: Maintains/Returns to pre admission functional level  Description: INTERVENTIONS:  - Perform BMAT or MOVE assessment daily    - Set and communicate daily mobility goal to care team and patient/family/caregiver  - Collaborate with rehabilitation services on mobility goals if consulted  - Perform Range of Motion 3 times a day  - Reposition patient every 2 hours    - Dangle patient 3 times a day  - Stand patient 3 times a day  - Ambulate patient 3 times a day  - Out of bed to chair 3 times a day   - Out of bed for meals 3 times a day  - Out of bed for toileting  - Record patient progress and toleration of activity level   Outcome: Progressing     Problem: Prexisting or High Potential for Compromised Skin Integrity  Goal: Skin integrity is maintained or improved  Description: INTERVENTIONS:  - Identify patients at risk for skin breakdown  - Assess and monitor skin integrity  - Assess and monitor nutrition and hydration status  - Monitor labs   - Assess for incontinence   - Turn and reposition patient  - Assist with mobility/ambulation  - Relieve pressure over bony prominences  - Avoid friction and shearing  - Provide appropriate hygiene as needed including keeping skin clean and dry  - Evaluate need for skin moisturizer/barrier cream  - Collaborate with interdisciplinary team   - Patient/family teaching  - Consider wound care consult   Outcome: Progressing

## 2022-10-25 NOTE — ASSESSMENT & PLAN NOTE
· Continue home breo ellipta and singulair   · Continue 4L 02 nc  · Atrovent and xopenex QID PRN  · Albuterol PRN  · Respiratory protocol

## 2022-10-25 NOTE — ASSESSMENT & PLAN NOTE
Lab Results   Component Value Date    HGBA1C 6 8 (H) 10/05/2022       Recent Labs     10/24/22  0101 10/24/22  0748 10/24/22  1113 10/24/22  2113   POCGLU 245* 257* 254* 226*        · High level SSI ACHS  · Levemir 26 U HS  · Resume 3 U TID meal time insulin   · Goal -180

## 2022-10-25 NOTE — PROGRESS NOTES
[address]  Progress Note Viktoria Cunha 1955, 69 y o  female MRN: [id number]  Unit/Bed#: -01 Encounter: [id number]  Primary Care Provider: Chris Gomez MD   Date and time admitted to hospital: 10/22/2022 11:40 AM    * Acute kidney injury superimposed on chronic kidney disease [facility])  Assessment & Plan  Lab Results   Component Value Date    EGFR 26 10/25/2022    EGFR 21 10/24/2022    EGFR 16 10/24/2022    CREATININE 1 91 (H) 10/25/2022    CREATININE 2 24 (H) 10/24/2022    CREATININE 2 83 (H) 10/24/2022       · Likely in the setting of dehydration/intravascular volume depletion secondary to oral diuretics   · Patient presented to the ED with a creatine 6 18 (Baseline 1 2-1 8)  · No urgent indication for dialysis   · CT A/P 10/22 showed no evidence of obstructive hydronephrosis  Urinary bladder is decompressed with the frederick catheter   · Renal US shows no acute abnomalities  · Nephrology consulted- appreciate recommendations   · Azar Pa suggestive of pre-renal etiology  · NSS- D/C and bolus PRN  · Trend creatinine  · Continue frederick  · Avoid hypotension and nephrotoxins  · Strict I&O  · PRN Potassium replacement  · Creatinine improving 1 91, close to baseline (1 2-1 8)  · Most recent FENA 2 1 intrinsic ATN, most likely 2/2 hypoperfusion  · Continue to trend creatinine      Hypotension  Assessment & Plan  · Chronic hypotension, but now shock likely septic vs hypovolemic  · Patient received IV fluids secondary to volume depletion and hypotension  Last dose of Midodrine 10/17  Pt reports not taking after discharge home for rehab facility  · Cont home midodrine  · Increase to 10 mg TID 10/23  · 10/22 Fluid resuscitated with IVF and albumen  Started on alannah-synephrine and then levophed  · 10/23 Transitioned alannah gtt to vasopressin  · Cont to wean levo as able    · Continue albumin 25%    Acute on chronic diastolic congestive heart failure [facility])  Assessment & Plan  Wt Readings from Last 3 Encounters:   10/25/22 (!) 143 kg (315 lb 7 7 oz)   10/03/22 (!) 171 kg (377 lb 6 8 oz)   04/13/20 (!) 162 kg (356 lb 0 7 oz)       · Patient hospitalized 9/21/22 - 10/3/22 secondary to volume overload   · Started on torsemide and zaroxolyn  · Patient down 23kg from last admission   · No evidence of volume overload on exam  · Holding home diuretics in the setting of IMTIAZ   · Home BB on hold due to hypotension  · Echocardiogram 9/21/22: The left ventricular ejection fraction is 55-59%  Systolic function is normal  Wall motion is grossly normal  There is both systolic and diastolic flattening of the interventricular septum consistent with right ventricle pressure and volume overload: Right ventricular cavity size is severely dilated  Systolic function is mildly to moderately reduced      Chronic respiratory failure with hypoxia (HCC)  Assessment & Plan  · Chronically on CPAP at night and 4L of oxygen during the day secondary to COPD most likely NISHI  · Unable to wear home unit due to right jugular central line  · Consult Respiratory for CPAP options (ie  nasal pillows)  · Goal Sp02>88%  · Respiratory protocol     Atrial fibrillation ([facility] [address] )  Assessment & Plan  · Continue to hold Metoprolol secondary to hypotension  · Previously rate controlled, went into RVR 10/24 and started amio gtt at 1 mg/min  · Today, transition to oral amio 300mg  · INR 3 17 on admission (Goal INR 2-3)  · Hold home Coumadin 7mg  · 10/24 - INR 2 76  · Continue Coumadin at 5mg daily  · Recheck INR 10/26    Acute hyponatremia  Assessment & Plan  · Presented with corrected sodium 126  · Suspected etiology hypovolemia with use of oral duiretics  · Currently resolved  · Trend BMP    Diabetes mellitus ([facility] [address] )  Assessment & Plan  Lab Results   Component Value Date    HGBA1C 6 8 (H) 10/05/2022       Recent Labs     10/24/22  0101 10/24/22  0748 10/24/22  1113 10/24/22  2113   POCGLU 245* 257* 254* 226*        · High level SSI ACHS  · Levemir 26 U HS  · Resume 3 U TID meal time insulin   · Goal -180    COPD (chronic obstructive pulmonary disease) ([facility])  Assessment & Plan  · Continue home breo ellipta and singulair   · Continue 4L 02 nc  · Atrovent and xopenex QID PRN  · Albuterol PRN  · Respiratory protocol       ----------------------------------------------------------------------------------------  HPI/24hr events:   Yesterday, weaned off vasopressin  Went into RVR, started amio drip  Overnight, felt nauseous before bed, received Zofran  slept on and off  Had difficulty with CPAP  Mild bloody nose in the morning  Self-resolved  Patient appropriate for transfer out of the ICU today?: No  Disposition: Continue Critical Care   Code Status: Level 3 - DNAR and DNI  ---------------------------------------------------------------------------------------  SUBJECTIVE  Patient seen at bedside, sitting up comfortably  Patient states that she feels groggy this morning, most likely from sleeping medications  Patient does not want sleeping medications tonight  Overall, no new complaints, no changes from yesterday  No Abdominal cramping  Review of Systems   Constitutional: Positive for fatigue  Negative for chills and fever  Groggy   HENT: Positive for nosebleeds  Negative for ear pain and sore throat  Eyes: Negative for pain and visual disturbance  Respiratory: Negative for cough and shortness of breath  Cardiovascular: Negative for chest pain and palpitations  Gastrointestinal: Negative for abdominal pain and vomiting  Genitourinary: Negative for difficulty urinating, dysuria, flank pain, hematuria and vaginal pain  Musculoskeletal: Negative for arthralgias and back pain  Skin: Positive for rash and wound  Negative for color change  Neurological: Negative for seizures and syncope  All other systems reviewed and are negative      Review of systems was reviewed and negative unless stated above in HPI/24-hour events   ---------------------------------------------------------------------------------------  OBJECTIVE    Vitals   Vitals:    10/25/22 0630 10/25/22 0645 10/25/22 0700 10/25/22 0745   BP:   124/57    BP Location:   Right arm    Pulse: 95 92 89 89   Resp: (!) 33 (!) 31 22 20   Temp:   97 8 °F (36 6 °C)    TempSrc:   Temporal    SpO2: 93% 94% 96% 95%   Weight:       Height:         Temp (24hrs), Av 5 °F (36 9 °C), Min:97 8 °F (36 6 °C), Max:98 9 °F (37 2 °C)  Current: Temperature: 97 8 °F (36 6 °C)  Arterial Line BP: 134/59  Arterial Line MAP (mmHg): 86 mmHg    Respiratory:  SpO2: SpO2: 95 %  Nasal Cannula O2 Flow Rate (L/min): 4 L/min    Invasive/non-invasive ventilation settings   Respiratory  Report   Lab Data (Last 4 hours)    None         O2/Vent Data (Last 4 hours)    None                Physical Exam  Vitals and nursing note reviewed  Constitutional:       General: She is not in acute distress  Appearance: She is well-developed  She is obese  HENT:      Head: Normocephalic and atraumatic  Right Ear: External ear normal       Left Ear: External ear normal       Nose: Nose normal  No rhinorrhea  Mouth/Throat:      Mouth: Mucous membranes are moist    Eyes:      General:         Right eye: No discharge  Left eye: No discharge  Conjunctiva/sclera: Conjunctivae normal    Cardiovascular:      Rate and Rhythm: Normal rate and regular rhythm  Heart sounds: No murmur heard  Pulmonary:      Effort: Pulmonary effort is normal  No respiratory distress  Breath sounds: Normal breath sounds  No wheezing or rhonchi  Abdominal:      Palpations: Abdomen is soft  Tenderness: There is no abdominal tenderness  There is no guarding  Musculoskeletal:      Cervical back: Neck supple  Right lower leg: Edema present  Left lower leg: Edema present  Skin:     General: Skin is warm and dry  Capillary Refill: Capillary refill takes less than 2 seconds  Findings: Rash present  Comments: Stasis dermatitis, unchanged   Neurological:      Mental Status: She is alert and oriented to person, place, and time  Mental status is at baseline     Psychiatric:         Mood and Affect: Mood normal              Laboratory and Diagnostics:  Results from last 7 days   Lab Units 10/25/22  0449 10/24/22  0425 10/23/22  0437 10/22/22  1214   WBC Thousand/uL 8 45 10 71* 10 88* 9 33   HEMOGLOBIN g/dL 11 0* 12 4 12 0 13 5   HEMATOCRIT % 34 4* 39 0 36 4 41 7   PLATELETS Thousands/uL 184 242 264 264   NEUTROS PCT %  --   --   --  65   MONOS PCT %  --   --   --  10     Results from last 7 days   Lab Units 10/25/22  0449 10/24/22  1543 10/24/22  0425 10/23/22  2119 10/23/22  1127 10/23/22  0437 10/23/22  0408 10/22/22  2256 10/22/22  1214   SODIUM mmol/L 138 136 136 132* 132*  --  132* 127* 124*   POTASSIUM mmol/L 3 7 4 1 3 6 3 7 3 7  --  3 6 3 3* 4 1   CHLORIDE mmol/L 97 98 92* 94* 91*  --  90* 88* 82*   CO2 mmol/L 31 30 29 28 28  --  29 29 32   ANION GAP mmol/L 10 8 15* 10 13  --  13 10 10   BUN mg/dL 81* 88* 107* 111* 123*  --  135* 138* 160*   CREATININE mg/dL 1 91* 2 24* 2 83* 3 29* 3 97*  --  4 32* 4 95* 6 18*   CALCIUM mg/dL 9 8 10 6* 9 9 9 9 9 2  --  8 7 8 7 9 2   GLUCOSE RANDOM mg/dL 193* 233* 245* 265* 300*  --  126 162* 235*   ALT U/L 26  --   --   --   --  36  --   --  43   AST U/L 24  --   --   --   --  31  --   --  44   ALK PHOS U/L 122*  --   --   --   --  129*  --   --  168*   ALBUMIN g/dL 4 2  --   --   --   --  3 9  --   --  3 2*   TOTAL BILIRUBIN mg/dL 1 56*  --   --   --   --  0 68  --   --  0 64     Results from last 7 days   Lab Units 10/25/22  0449 10/24/22  1543 10/24/22  0425 10/23/22  0437 10/22/22  2256 10/22/22  1214   MAGNESIUM mg/dL 1 7 1 8 1 9 2 7* 2 1  --    PHOSPHORUS mg/dL 2 2* 2 3  --  6 1*  --  6 5*      Results from last 7 days   Lab Units 10/24/22  0425 10/23/22  0437 10/22/22  1736   INR  2 76* 3 71* 3 17*          Results from last 7 days Lab Units 10/23/22  0437 10/22/22  1214   LACTIC ACID mmol/L 1 0 2 0     ABG:  Results from last 7 days   Lab Units 10/23/22  0407   PH ART  7 363   PCO2 ART mm Hg 43 6   PO2 ART mm Hg 119 3   HCO3 ART mmol/L 24 2   BASE EXC ART mmol/L -1 2   ABG SOURCE  Line, Arterial     VBG:  Results from last 7 days   Lab Units 10/24/22  0951 10/23/22  1127 10/23/22  0407   PH ALFREDO  7 374   < >  --    PCO2 ALFREDO mm Hg 50 8*   < >  --    PO2 ALFREDO mm Hg 92 4*   < >  --    HCO3 ALFREDO mmol/L 29 0   < >  --    BASE EXC ALFREDO mmol/L 2 8   < >  --    ABG SOURCE   --   --  Line, Arterial    < > = values in this interval not displayed  Micro  Results from last 7 days   Lab Units 10/22/22  1605 10/22/22  1217 10/22/22  1214   BLOOD CULTURE   --   --  No Growth at 48 hrs  No Growth at 48 hrs  URINE CULTURE  No Growth <1000 cfu/mL >100,000 cfu/ml Alpha Hemolytic Streptococcus NOT Enterococcus*  <10,000 cfu/ml Gram Negative Fer Enteric Like*  <10,000 cfu/ml Staphylococcus coagulase negative*  --        EKG: A-fib  Imaging: I have personally reviewed pertinent reports  Intake and Output  I/O       10/23 0701  10/24 0700 10/24 0701  10/25 0700 10/25 0701  10/26 0700    P  O  960 1620 120    I V  (mL/kg) 2495 5 (17 5) 1448 6 (10 1) 123 3 (0 9)    IV Piggyback 350 450     Total Intake(mL/kg) 3805 5 (26 6) 3518 6 (24 6) 243 3 (1 7)    Urine (mL/kg/hr) 7025 (2) 3024 (0 9) 150 (0 9)    Total Output 7025 3024 150    Net -3219 5 +494 6 +93 3                 Height and Weights   Height: 5' 2" (157 5 cm)     Body mass index is 57 7 kg/m²    Weight (last 2 days)     Date/Time Weight    10/25/22 0538 143 (315 48)    10/24/22 0554 143 (314 82)    10/24/22 0515 143 (314 82)    10/23/22 0600 147 (324 74)    10/23/22 0429 147 (324 74)            Nutrition       Diet Orders   (From admission, onward)             Start     Ordered    10/23/22 1433  Diet Marcos/CHO Controlled; Consistent Carbohydrate Diet Level 2 (5 carb servings/75 grams CHO/meal) Diet effective now        References:    Nutrtion Support Algorithm Enteral vs  Parenteral   Question Answer Comment   Diet Type Marcos/CHO Controlled    Marcos/CHO Controlled Consistent Carbohydrate Diet Level 2 (5 carb servings/75 grams CHO/meal)    RD to adjust diet per protocol?  Yes        10/23/22 1432                  Active Medications  Scheduled Meds:  Current Facility-Administered Medications   Medication Dose Route Frequency Provider Last Rate   • acetaminophen  650 mg Oral Q6H PRN Odilia Magaña MD     • albumin human  25 g Intravenous G7A Mima Murray MD Stopped (10/25/22 3230)   • albuterol  2 5 mg Nebulization Q6H PRN CAMPBELL Christiansen     • amiodarone  0 5 mg/min Intravenous Continuous CAMPBELL Sinha 0 5 mg/min (10/24/22 5272)   • atorvastatin  10 mg Oral Daily Julia Santos PA-C     • cholecalciferol  2,000 Units Oral Daily Julia Santos PA-C     • docusate sodium  100 mg Oral BID Julia Santos PA-C     • ferrous sulfate  325 mg Oral Daily With Breakfast Julia Santos PA-C     • fluticasone-vilanterol  1 puff Inhalation Daily Julia Santos PA-C     • insulin detemir  26 Units Subcutaneous HS CAMPBELL Christiansen     • insulin lispro  1-6 Units Subcutaneous HS Isabella Mederos PA-C     • insulin lispro  3 Units Subcutaneous TID With Meals CAMPBELL Christiansen     • insulin lispro  4-20 Units Subcutaneous TID AC CAMPBELL Christiansen     • levothyroxine  200 mcg Oral Early Morning CAMPBELL Christiansen      And   • levothyroxine  88 mcg Oral Early Morning CAMPBELL Christiansen     • magnesium sulfate  2 g Intravenous Once [employer] , JUAN ANTONIO     • melatonin  6 mg Oral HS CAMPBELL Christiansen     • midodrine  10 mg Oral TID AC Isabella Hanson PA-C     • montelukast  10 mg Oral HS Julia Santos PA-C     • norepinephrine  1-30 mcg/min Intravenous Titrated CAMPBELL Christiansen 10 mcg/min (10/25/22 0772)   • nystatin Topical BID Kiki Earl PA-C     • pantoprazole  20 mg Oral Early Morning Kkii Earl [place]     • polyethylene glycol  17 g Oral Daily PRN Kiki Earl PA-C     • QUEtiapine  25 mg Oral HS PRN CAMPBELL Bermeo     • sodium phosphate  12 mmol Intravenous Once Deepa Lara PA-C 12 mmol (10/25/22 2886)   • traZODone  50 mg Oral HS CAMPBELL Sinha     • vasopressin  0 04 Units/min Intravenous Continuous Priyank Palm PA-C Stopped (10/24/22 1345)     Continuous Infusions:  amiodarone, 0 5 mg/min, Last Rate: 0 5 mg/min (10/24/22 3012)  norepinephrine, 1-30 mcg/min, Last Rate: 10 mcg/min (10/25/22 4812)  vasopressin, 0 04 Units/min, Last Rate: Stopped (10/24/22 1345)      PRN Meds:   acetaminophen, 650 mg, Q6H PRN  albuterol, 2 5 mg, Q6H PRN  polyethylene glycol, 17 g, Daily PRN  QUEtiapine, 25 mg, HS PRN        Invasive Devices Review  Invasive Devices  Report    Central Venous Catheter Line  Duration           CVC Central Lines 10/23/22 Triple 16cm 1 day          Peripheral Intravenous Line  Duration           Peripheral IV 10/22/22 Left Antecubital 2 days          Arterial Line  Duration           Arterial Line 10/22/22 Radial 2 days          Drain  Duration           Urethral Catheter Straight-tip 16 Fr  2 days                Rationale for remaining devices: need for pressors  ---------------------------------------------------------------------------------------  Advance Directive and Living Will:      Power of :    POLST:    ---------------------------------------------------------------------------------------  Care Time Delivered:   30min      Evaristo Gould MD      Portions of the record may have been created with voice recognition software  Occasional wrong word or "sound a like" substitutions may have occurred due to the inherent limitations of voice recognition software    Read the chart carefully and recognize, using context, where substitutions have occurred

## 2022-10-26 PROBLEM — E87.1 ACUTE HYPONATREMIA: Status: RESOLVED | Noted: 2022-10-23 | Resolved: 2022-10-26

## 2022-10-26 LAB
ALBUMIN SERPL BCP-MCNC: 3.5 G/DL (ref 3.5–5)
ALP SERPL-CCNC: 116 U/L (ref 46–116)
ALT SERPL W P-5'-P-CCNC: 28 U/L (ref 12–78)
ANION GAP SERPL CALCULATED.3IONS-SCNC: 8 MMOL/L (ref 4–13)
AST SERPL W P-5'-P-CCNC: 24 U/L (ref 5–45)
BILIRUB SERPL-MCNC: 1.16 MG/DL (ref 0.2–1)
BUN SERPL-MCNC: 71 MG/DL (ref 5–25)
CALCIUM SERPL-MCNC: 9.9 MG/DL (ref 8.3–10.1)
CHLORIDE SERPL-SCNC: 98 MMOL/L (ref 96–108)
CO2 SERPL-SCNC: 32 MMOL/L (ref 21–32)
CREAT SERPL-MCNC: 1.63 MG/DL (ref 0.6–1.3)
ERYTHROCYTE [DISTWIDTH] IN BLOOD BY AUTOMATED COUNT: 18 % (ref 11.6–15.1)
GFR SERPL CREATININE-BSD FRML MDRD: 31 ML/MIN/1.73SQ M
GLUCOSE SERPL-MCNC: 107 MG/DL (ref 65–140)
GLUCOSE SERPL-MCNC: 127 MG/DL (ref 65–140)
GLUCOSE SERPL-MCNC: 197 MG/DL (ref 65–140)
GLUCOSE SERPL-MCNC: 204 MG/DL (ref 65–140)
GLUCOSE SERPL-MCNC: 304 MG/DL (ref 65–140)
HCT VFR BLD AUTO: 34.1 % (ref 34.8–46.1)
HGB BLD-MCNC: 10.7 G/DL (ref 11.5–15.4)
INR PPP: 2.37 (ref 0.84–1.19)
MAGNESIUM SERPL-MCNC: 2 MG/DL (ref 1.6–2.6)
MCH RBC QN AUTO: 28.5 PG (ref 26.8–34.3)
MCHC RBC AUTO-ENTMCNC: 31.4 G/DL (ref 31.4–37.4)
MCV RBC AUTO: 91 FL (ref 82–98)
PHOSPHATE SERPL-MCNC: 2.4 MG/DL (ref 2.3–4.1)
PLATELET # BLD AUTO: 164 THOUSANDS/UL (ref 149–390)
PMV BLD AUTO: 11.7 FL (ref 8.9–12.7)
POTASSIUM SERPL-SCNC: 4.8 MMOL/L (ref 3.5–5.3)
PROT SERPL-MCNC: 7.5 G/DL (ref 6.4–8.4)
PROTHROMBIN TIME: 25.9 SECONDS (ref 11.6–14.5)
RBC # BLD AUTO: 3.76 MILLION/UL (ref 3.81–5.12)
RHEUMATOID FACT SER QL LA: NEGATIVE
RYE IGE QN: NEGATIVE
SODIUM SERPL-SCNC: 138 MMOL/L (ref 135–147)
WBC # BLD AUTO: 6.19 THOUSAND/UL (ref 4.31–10.16)

## 2022-10-26 RX ORDER — AMMONIUM LACTATE 12 G/100G
LOTION TOPICAL 2 TIMES DAILY PRN
Status: DISCONTINUED | OUTPATIENT
Start: 2022-10-26 | End: 2022-10-29 | Stop reason: HOSPADM

## 2022-10-26 RX ORDER — AMOXICILLIN 250 MG
2 CAPSULE ORAL 2 TIMES DAILY
Status: DISCONTINUED | OUTPATIENT
Start: 2022-10-26 | End: 2022-10-29 | Stop reason: HOSPADM

## 2022-10-26 RX ORDER — POLYETHYLENE GLYCOL 3350 17 G/17G
17 POWDER, FOR SOLUTION ORAL DAILY
Status: DISCONTINUED | OUTPATIENT
Start: 2022-10-26 | End: 2022-10-29 | Stop reason: HOSPADM

## 2022-10-26 RX ORDER — INSULIN LISPRO 100 [IU]/ML
5 INJECTION, SOLUTION INTRAVENOUS; SUBCUTANEOUS
Status: DISCONTINUED | OUTPATIENT
Start: 2022-10-26 | End: 2022-10-29 | Stop reason: HOSPADM

## 2022-10-26 RX ORDER — WARFARIN SODIUM 3 MG/1
6 TABLET ORAL
Status: COMPLETED | OUTPATIENT
Start: 2022-10-26 | End: 2022-10-26

## 2022-10-26 RX ORDER — BISACODYL 10 MG
10 SUPPOSITORY, RECTAL RECTAL DAILY PRN
Status: DISCONTINUED | OUTPATIENT
Start: 2022-10-26 | End: 2022-10-29 | Stop reason: HOSPADM

## 2022-10-26 RX ORDER — DIGOXIN 0.25 MG/ML
62.5 INJECTION INTRAMUSCULAR; INTRAVENOUS ONCE
Status: COMPLETED | OUTPATIENT
Start: 2022-10-26 | End: 2022-10-26

## 2022-10-26 RX ADMIN — INSULIN DETEMIR 26 UNITS: 100 INJECTION, SOLUTION SUBCUTANEOUS at 21:20

## 2022-10-26 RX ADMIN — VASOPRESSIN 0.04 UNITS/MIN: 20 INJECTION INTRAVENOUS at 01:57

## 2022-10-26 RX ADMIN — Medication 12.5 MG: at 10:24

## 2022-10-26 RX ADMIN — ATORVASTATIN CALCIUM 10 MG: 10 TABLET, FILM COATED ORAL at 09:29

## 2022-10-26 RX ADMIN — TRAZODONE HYDROCHLORIDE 50 MG: 50 TABLET ORAL at 22:06

## 2022-10-26 RX ADMIN — FLUTICASONE FUROATE AND VILANTEROL TRIFENATATE 1 PUFF: 100; 25 POWDER RESPIRATORY (INHALATION) at 09:32

## 2022-10-26 RX ADMIN — ACETAMINOPHEN 650 MG: 325 TABLET ORAL at 16:20

## 2022-10-26 RX ADMIN — INSULIN LISPRO 3 UNITS: 100 INJECTION, SOLUTION INTRAVENOUS; SUBCUTANEOUS at 07:33

## 2022-10-26 RX ADMIN — MIDODRINE HYDROCHLORIDE 10 MG: 5 TABLET ORAL at 12:13

## 2022-10-26 RX ADMIN — MIDODRINE HYDROCHLORIDE 10 MG: 5 TABLET ORAL at 06:04

## 2022-10-26 RX ADMIN — ACETAMINOPHEN 650 MG: 325 TABLET ORAL at 07:30

## 2022-10-26 RX ADMIN — FERROUS SULFATE TAB 325 MG (65 MG ELEMENTAL FE) 325 MG: 325 (65 FE) TAB at 07:30

## 2022-10-26 RX ADMIN — LEVOTHYROXINE SODIUM 200 MCG: 100 TABLET ORAL at 06:03

## 2022-10-26 RX ADMIN — INSULIN LISPRO 12 UNITS: 100 INJECTION, SOLUTION INTRAVENOUS; SUBCUTANEOUS at 12:14

## 2022-10-26 RX ADMIN — PANTOPRAZOLE SODIUM 20 MG: 20 TABLET, DELAYED RELEASE ORAL at 06:04

## 2022-10-26 RX ADMIN — INSULIN LISPRO 4 UNITS: 100 INJECTION, SOLUTION INTRAVENOUS; SUBCUTANEOUS at 07:34

## 2022-10-26 RX ADMIN — INSULIN LISPRO 5 UNITS: 100 INJECTION, SOLUTION INTRAVENOUS; SUBCUTANEOUS at 12:14

## 2022-10-26 RX ADMIN — WARFARIN SODIUM 6 MG: 3 TABLET ORAL at 18:33

## 2022-10-26 RX ADMIN — DIGOXIN 62.5 MCG: 0.25 INJECTION INTRAMUSCULAR; INTRAVENOUS at 09:28

## 2022-10-26 RX ADMIN — POLYETHYLENE GLYCOL 3350 17 G: 17 POWDER, FOR SOLUTION ORAL at 09:27

## 2022-10-26 RX ADMIN — DOCUSATE SODIUM AND SENNOSIDES 2 TABLET: 8.6; 5 TABLET, FILM COATED ORAL at 18:33

## 2022-10-26 RX ADMIN — NYSTATIN: 100000 POWDER TOPICAL at 18:33

## 2022-10-26 RX ADMIN — NYSTATIN: 100000 POWDER TOPICAL at 09:28

## 2022-10-26 RX ADMIN — LEVOTHYROXINE SODIUM 88 MCG: 88 TABLET ORAL at 06:03

## 2022-10-26 RX ADMIN — Medication 1 APPLICATION: at 21:28

## 2022-10-26 RX ADMIN — INSULIN LISPRO 5 UNITS: 100 INJECTION, SOLUTION INTRAVENOUS; SUBCUTANEOUS at 16:32

## 2022-10-26 RX ADMIN — Medication 2000 UNITS: at 09:28

## 2022-10-26 RX ADMIN — MONTELUKAST 10 MG: 10 TABLET, FILM COATED ORAL at 21:20

## 2022-10-26 RX ADMIN — MELATONIN 6 MG: 3 TAB ORAL at 22:05

## 2022-10-26 RX ADMIN — MIDODRINE HYDROCHLORIDE 10 MG: 5 TABLET ORAL at 16:22

## 2022-10-26 RX ADMIN — DOCUSATE SODIUM AND SENNOSIDES 2 TABLET: 8.6; 5 TABLET, FILM COATED ORAL at 09:28

## 2022-10-26 NOTE — ASSESSMENT & PLAN NOTE
Wt Readings from Last 3 Encounters:   10/27/22 (!) 152 kg (335 lb 1 6 oz)   10/03/22 (!) 171 kg (377 lb 6 8 oz)   04/13/20 (!) 162 kg (356 lb 0 7 oz)       · Patient hospitalized 9/21/22 - 10/3/22 secondary to volume overload  Started on torsemide and zaroxolyn  · Patient down 23kg from last admission   · Home BB held due to hypotension, resume low dose as BP allows  · Echocardiogram 9/21/22: The left ventricular ejection fraction is 55-59%  Systolic function is normal  Wall motion is grossly normal  There is both systolic and diastolic flattening of the interventricular septum consistent with right ventricle pressure and volume overload: Right ventricular cavity size is severely dilated  Systolic function is mildly to moderately reduced    · Off pressors 10/26  · Continue Digoxin 62 5mg  · Remove central line

## 2022-10-26 NOTE — PLAN OF CARE
Problem: PHYSICAL THERAPY ADULT  Goal: Performs mobility at highest level of function for planned discharge setting  See evaluation for individualized goals  Description: Treatment/Interventions: ADL retraining, Functional transfer training, LE strengthening/ROM, Therapeutic exercise, Endurance training, Patient/family training, Equipment eval/education, Bed mobility, Gait training, Compensatory technique education, Spoke to nursing, Spoke to case management, OT  Equipment Recommended:  (TBD by rehab)       See flowsheet documentation for full assessment, interventions and recommendations  Note: Prognosis: Guarded  Problem List: Decreased strength, Decreased endurance, Impaired balance, Decreased mobility, Decreased range of motion, Decreased safety awareness, Obesity, Decreased skin integrity, Pain  Assessment: Pt is a 69 y o  female seen for PT evaluation s/p admission to [address] [place] [address] on 10/22/2022 with Acute kidney injury superimposed [facility]  Order placed for PT services  Upon evaluation: Pt is presenting with impaired functional mobility due to pain, decreased strength, decreased ROM, decreased endurance, impaired balance, decreased safety awareness, impaired judgment, fall risk, LE edema, and impaired skin integrity requiring  maximal to moderate assistance for bed mobility and mechanical conveyance from nursing standpoint for OOB mobility, unable to achieve standing despite maxAx2   Pt's clinical presentation is currently unpredictable given the functional mobility deficits above, especially weakness, decreased ROM, edema of extremities, decreased skin integrity, decreased endurance, pain, decreased activity tolerance, decreased functional mobility tolerance, decreased safety awareness, and SOB upon exertion, coupled with fall risks as indicated by AM-PAC 6-Clicks: 6/95 as well as impaired balance, polypharmacy and decreased safety awareness and combined with medical complications of abnormal renal lab values, abnormal H&H, abnormal blood sugars, abnormal sodium values, multiple readmissions, fear/retreat, need for input for mobility technique/safety and IMTIAZ, concern for UTI, hypotension requiring pressor support, bradycardia overnight, Afib with RVR, urinary retention  Pt's PMHx and comorbidities that may affect physical performance and progress include: CHF, COPD on 4 lpm O2, CKD, A fib and obesity, DM, asthma  Personal factors affecting pt at time of IE include: inaccessible home environment, limited home support, inability to perform IADLs, inability to perform ADLs and inability to navigate level surfaces without external assistance  Pt will benefit from continued skilled PT services to address deficits as defined above and to maximize level of functional mobility to facilitate return toward PLOF and improved QOL  From PT/mobility standpoint, recommendation at time of d/c would be Short term rehab pending progress in order to reduce fall risk and maximize pt's functional independence and consistency with mobility in order to facilitate return to PLOF  Recommend trial with walker next 1-2 sessions and ther ex next 1-2 sessions  Barriers to Discharge: Decreased caregiver support, Inaccessible home environment  Barriers to Discharge Comments: requires increased assistance to complete mobility, unable to achieve standing  poor activity tolerance  PT Discharge Recommendation: Post acute rehabilitation services    See flowsheet documentation for full assessment

## 2022-10-26 NOTE — PLAN OF CARE
Problem: SAFETY ADULT  Goal: Maintain or return to baseline ADL function  Description: INTERVENTIONS:  -  Assess patient's ability to carry out ADLs; assess patient's baseline for ADL function and identify physical deficits which impact ability to perform ADLs (bathing, care of mouth/teeth, toileting, grooming, dressing, etc )  - Assess/evaluate cause of self-care deficits   - Assess range of motion  - Assess patient's mobility; develop plan if impaired  - Assess patient's need for assistive devices and provide as appropriate  - Encourage maximum independence but intervene and supervise when necessary  - Involve family in performance of ADLs  - Assess for home care needs following discharge   - Consider OT consult to assist with ADL evaluation and planning for discharge  - Provide patient education as appropriate  Outcome: Progressing     Problem: SKIN/TISSUE INTEGRITY - ADULT  Goal: Skin Integrity remains intact(Skin Breakdown Prevention)  Description: Assess:  -Perform Mino assessment every 2  -Clean and moisturize skin every shift  -Inspect skin when repositioning, toileting, and assisting with ADLS  -Assess extremities for adequate circulation and sensation     Bed Management:  -Have minimal linens on bed & keep smooth, unwrinkled  -Change linens as needed when moist or perspiring  -Avoid sitting or lying in one position for more than 2 hours while in bed  -Keep HOB at 30degrees     Toileting:  -Offer bedside commode  -Assess for incontinence every 2 hours  -Use incontinent care products after each incontinent episode such as purewick    Activity:  -Mobilize patient 2 times a day  -Encourage activity and walks on unit  -Encourage or provide ROM exercises   -Turn and reposition patient every 2 Hours  -Use appropriate equipment to lift or move patient in bed  -Instruct/ Assist with weight shifting every 15 minutes when out of bed in chair  -Consider limitation of chair time 2 hour intervals    Skin Care:  -Avoid use of baby powder, tape, friction and shearing, hot water or constrictive clothing  -Relieve pressure over bony prominences using wedges  -Do not massage red bony areas    Outcome: Progressing

## 2022-10-26 NOTE — PROGRESS NOTES
Progress Note - Nephrology   Dilia Nathan 69 y o  female MRN: [id number]  Unit/Bed#: -01 Encounter: [id number]    A/P:  1  Acute kidney injury on top of chronic kidney disease               creatinine now down to 1 6 mg/dL  Patient's hemodynamics also improving  Continue avoid potential nephrotoxins as the patient approaches her baseline creatinine  May initiate diuretics when clinically appropriate  2  Chronic kidney disease stage 3 a with baseline creatinine between 1 1-3 mg/dL  3  Proteinuria               as mentioned previously, patient will benefit from re-evaluation and if proteinuria persists should be ruled out for monoclonal gammopathy the serum electrophoresis  4  Polyuria most likely due to postobstructive autodiuresis               patient's 24 hour urine output reduced down to around 1 1 L, this in the setting of improved creatinine  5  Chronic diastolic congestive heart failure               patient remains compensated but at risk for decompensation given volume overloaded state  May reinitiate diuretics when clinically appropriate  6  Undifferentiated shock   Patient has been weaned off of all vasopressors with the exception of vasopressin currently on low-dose, to be weaned off in the near future      Follow up reason for today's visit:  Acute kidney injury/chronic kidney disease/proteinuria    Acute kidney injury superimposed on chronic kidney disease [facility])    Patient Active Problem List   Diagnosis   • Asthma   • Atrial fibrillation ([facility] )   • COPD (chronic obstructive pulmonary disease) ([facility] )   • Diabetes mellitus ([facility] )   • Heart failure (HCC)   • Shortness of breath   • Anemia   • COVID-19 virus infection   • Chronic respiratory failure with hypoxia (HCC)   • Supratherapeutic INR   • Chronic diastolic congestive heart failure ([facility] )   • IMTIAZ (acute kidney injury) ([facility] )   • Morbid obesity ([facility] )   • Hypothyroidism   • Acute kidney injury superimposed on chronic kidney disease ([facility] )   • Hypotension   • GERD (gastroesophageal reflux disease)   • UTI (urinary tract infection)   • Vaginal bleeding         Subjective:   Patient continues to feel improved today, no nausea vomiting, however appetite remains poor  Objective:     Vitals: Blood pressure 122/58, pulse 60, temperature 97 8 °F (36 6 °C), temperature source Temporal, resp  rate 21, height 5' 2" (1 575 m), weight (!) 150 kg (329 lb 12 9 oz), SpO2 92 %  ,Body mass index is 60 32 kg/m²  Weight (last 2 days)     Date/Time Weight    10/26/22 0600 150 (329 81)    10/25/22 0538 143 (315 48)    10/24/22 0554 143 (314 82)    10/24/22 0515 143 (314 82)            Intake/Output Summary (Last 24 hours) at 10/26/2022 0951  Last data filed at 10/26/2022 0754  Gross per 24 hour   Intake 1456 17 ml   Output 1070 ml   Net 386 17 ml     I/O last 3 completed shifts: In: 3568 3 [P O :1280; I V :1888 3; IV Piggyback:400]  Out: 2119 [Urine:2119]    Urethral Catheter Straight-tip 16 Fr  (Active)   Amt returned on insertion(mL) 50 mL 10/22/22 1229   Reasons to continue Urinary Catheter  Accurate I&O assessment in critically ill patients (48 hr  max) 10/26/22 0754   Goal for Removal Voiding trial when ambulation improves 10/26/22 0754   Site Assessment Skin intact; Clean 10/26/22 0754   Dow Care Done 10/26/22 0754   Collection Container Standard drainage bag 10/26/22 0754   Securement Method Tape 10/26/22 0754   Output (mL) 100 mL 10/26/22 0754       Physical Exam: /58 (BP Location: Right arm)   Pulse 60   Temp 97 8 °F (36 6 °C) (Temporal)   Resp 21   Ht 5' 2" (1 575 m)   Wt (!) 150 kg (329 lb 12 9 oz)   SpO2 92%   BMI 60 32 kg/m²     General Appearance:    Alert, cooperative, no distress, appears stated age   Head:    Normocephalic, without obvious abnormality, atraumatic   Eyes:    Conjunctiva/corneas clear   Ears:    Normal external ears   Nose:   Nares normal, septum midline, mucosa normal, no drainage    or sinus tenderness   Throat: Lips, mucosa, and tongue normal; teeth and gums normal   Neck:   Supple   Back:     Symmetric, no curvature, ROM normal, no CVA tenderness   Lungs:     Clear to auscultation bilaterally, respirations unlabored   Chest wall:    No tenderness or deformity   Heart:    Regular rate and rhythm, S1 and S2 normal, no murmur, rub   or gallop   Abdomen:     Soft, non-tender, bowel sounds active   Extremities:   Extremities normal, atraumatic, no cyanosis, +1 to +2 bilateral lower extremity edema up to the presacral region   Skin:   Skin color, texture, turgor normal, no rashes or lesions   Lymph nodes:   Cervical normal   Neurologic:   CNII-XII intact            Lab, Imaging and other studies: I have personally reviewed pertinent labs  CBC:   Lab Results   Component Value Date    WBC 6 19 10/26/2022    HGB 10 7 (L) 10/26/2022    HCT 34 1 (L) 10/26/2022    MCV 91 10/26/2022     10/26/2022    MCH 28 5 10/26/2022    MCHC 31 4 10/26/2022    RDW 18 0 (H) 10/26/2022    MPV 11 7 10/26/2022     CMP:   Lab Results   Component Value Date    K 4 8 10/26/2022    CL 98 10/26/2022    CO2 32 10/26/2022    BUN 71 (H) 10/26/2022    CREATININE 1 63 (H) 10/26/2022    CALCIUM 9 9 10/26/2022    AST 24 10/26/2022    ALT 28 10/26/2022    ALKPHOS 116 10/26/2022    EGFR 31 10/26/2022         Results from last 7 days   Lab Units 10/26/22  0423 10/25/22  0449 10/24/22  1543 10/23/22  1127 10/23/22  0437   POTASSIUM mmol/L 4 8 3 7 4 1   < >  --    CHLORIDE mmol/L 98 97 98   < >  --    CO2 mmol/L 32 31 30   < >  --    BUN mg/dL 71* 81* 88*   < >  --    CREATININE mg/dL 1 63* 1 91* 2 24*   < >  --    CALCIUM mg/dL 9 9 9 8 10 6*   < >  --    ALK PHOS U/L 116 122*  --   --  129*   ALT U/L 28 26  --   --  36   AST U/L 24 24  --   --  31    < > = values in this interval not displayed           Phosphorus:   Lab Results   Component Value Date    PHOS 2 4 10/26/2022     Magnesium:   Lab Results   Component Value Date    MG 2 0 10/26/2022 Urinalysis: No results found for: Belvin Shallow, SPECGRAV, PHUR, LEUKOCYTESUR, NITRITE, PROTEINUA, GLUCOSEU, KETONESU, BILIRUBINUR, BLOODU  Ionized Calcium: No results found for: CAION  Coagulation:   Lab Results   Component Value Date    INR 2 37 (H) 10/26/2022     Troponin: No results found for: TROPONINI  ABG: No results found for: PHART, FSC1KGL, PO2ART, AKR5WNX, I5XWYAMU, BEART, SOURCE  Radiology review:     IMAGING  Procedure: US kidney and bladder    Result Date: 10/23/2022  Narrative: RENAL ULTRASOUND INDICATION:   Acute kidney  COMPARISON: CT abdomen pelvis 10/20/2022 TECHNIQUE:   Ultrasound of the retroperitoneum was performed with a curvilinear transducer utilizing volumetric sweeps and still imaging techniques  FINDINGS: KIDNEYS: Symmetric and normal size  Right kidney:  10 5 x 6 3 x 5 5 cm  Volume 192 1 mL Left kidney:  10 7 x 7 0 x 4 9 cm  Volume 194 5 mL Right kidney Slightly lobular contour  Normal echogenicity  No mass is identified  No hydronephrosis  No shadowing calculi  No perinephric fluid collections  Left kidney Slightly lobular contour  Normal echogenicity  No mass is identified  No hydronephrosis  No shadowing calculi  No perinephric fluid collections  URETERS: Nonvisualized  BLADDER: Decompressed  Dow catheter tip not well seen but in position on recent CT  Low volume limits evaluation  Impression: 1  No hydronephrosis or calculi  2   Nonspecific lobular renal contour could be related to scarring  Workstation performed: [id number]     Procedure: US bedside procedure    Result Date: 10/23/2022  Narrative: 1 2 [phone] [id number]  2 446 2994 [id number] 63 1      Current Facility-Administered Medications   Medication Dose Route Frequency   • acetaminophen (TYLENOL) tablet 650 mg  650 mg Oral Q6H PRN   • albuterol inhalation solution 2 5 mg  2 5 mg Nebulization Q6H PRN   • atorvastatin (LIPITOR) tablet 10 mg  10 mg Oral Daily   • bisacodyl (DULCOLAX) rectal suppository 10 mg  10 mg Rectal Daily PRN   • cholecalciferol (VITAMIN D3) tablet 2,000 Units  2,000 Units Oral Daily   • ferrous sulfate tablet 325 mg  325 mg Oral Daily With Breakfast   • fluticasone-vilanterol (BREO ELLIPTA) 100-25 mcg/inh inhaler 1 puff  1 puff Inhalation Daily   • insulin detemir (LEVEMIR) subcutaneous injection 26 Units  26 Units Subcutaneous HS   • insulin lispro (HumaLOG) 100 units/mL subcutaneous injection 1-6 Units  1-6 Units Subcutaneous HS   • insulin lispro (HumaLOG) 100 units/mL subcutaneous injection 3 Units  3 Units Subcutaneous TID With Meals   • insulin lispro (HumaLOG) 100 units/mL subcutaneous injection 4-20 Units  4-20 Units Subcutaneous TID AC   • levothyroxine tablet 200 mcg  200 mcg Oral Early Morning    And   • levothyroxine tablet 88 mcg  88 mcg Oral Early Morning   • melatonin tablet 6 mg  6 mg Oral HS   • metoprolol (LOPRESSOR) injection 1 3 mg  1 3 mg Intravenous Q4H PRN   • midodrine (PROAMATINE) tablet 10 mg  10 mg Oral TID AC   • montelukast (SINGULAIR) tablet 10 mg  10 mg Oral HS   • nystatin (MYCOSTATIN) powder   Topical BID   • ondansetron (ZOFRAN) injection 4 mg  4 mg Intravenous Q6H PRN   • pantoprazole (PROTONIX) EC tablet 20 mg  20 mg Oral Early Morning   • polyethylene glycol (MIRALAX) packet 17 g  17 g Oral Daily   • QUEtiapine (SEROquel) tablet 25 mg  25 mg Oral HS PRN   • senna-docusate sodium (SENOKOT S) 8 6-50 mg per tablet 2 tablet  2 tablet Oral BID   • traZODone (DESYREL) tablet 50 mg  50 mg Oral HS PRN   • vasopressin (PITRESSIN) 20 Units in sodium chloride 0 9 % 100 mL infusion  0 02 Units/min Intravenous Continuous   • warfarin (COUMADIN) tablet 6 mg  6 mg Oral Once (warfarin)     Medications Discontinued During This Encounter   Medication Reason   • multi-electrolyte (PLASMALYTE-A/ISOLYTE-S PH 7 4) IV solution    • sodium chloride 0 9 % infusion    • insulin lispro (HumaLOG) 100 units/mL subcutaneous injection 1-6 Units    • insulin lispro (HumaLOG) 100 units/mL subcutaneous injection 1-6 Units    • heparin (porcine) subcutaneous injection 5,000 Units    • potassium chloride (K-DUR,KLOR-CON) CR tablet 20 mEq    • ipratropium (ATROVENT) 0 02 % inhalation solution 0 5 mg    • levalbuterol (XOPENEX) inhalation solution 1 25 mg    • sodium chloride 0 9 % infusion    • insulin lispro (HumaLOG) 100 units/mL subcutaneous injection 2-12 Units    • midodrine (PROAMATINE) tablet 5 mg    • METOPROLOL SUCCINATE ER PO Error   • insulin lispro (HumaLOG) 100 units/mL subcutaneous injection 2-12 Units    • phenylephrine (FRANCHESKA-SYNEPHRINE) 50 mg (STANDARD CONCENTRATION) in sodium chloride 0 9% 250 mL    • insulin lispro (HumaLOG) 100 units/mL subcutaneous injection 4-20 Units    • insulin detemir (LEVEMIR) subcutaneous injection 10 Units    • levothyroxine tablet 200 mcg    • albumin human (FLEXBUMIN) 5 % injection 25 g    • cefTRIAXone (ROCEPHIN) IVPB (premix in dextrose) 1,000 mg 50 mL    • acetaminophen (TYLENOL) tablet 650 mg    • albumin human (FLEXBUMIN) 25 % injection 25 g    • ondansetron (ZOFRAN) 4 mg/2 mL injection **ADS Override Pull** Returned to ADS   • traZODone (DESYREL) tablet 50 mg    • QUEtiapine (SEROquel) tablet 25 mg    • digoxin (LANOXIN) injection 250 mcg    • amiodarone (CORDARONE) 900 mg in dextrose 5 % 500 mL infusion    • vasopressin (PITRESSIN) 20 Units in sodium chloride 0 9 % 100 mL infusion    • albumin human (FLEXBUMIN) 25 % injection 25 g    • digoxin (LANOXIN) injection 250 mcg    • digoxin (LANOXIN) injection 125 mcg    • NOREPINEPHRINE 4 MG  ML NSS (CMPD ORDER) infusion    • metoprolol succinate (TOPROL-XL) 24 hr tablet 12 5 mg    • docusate sodium (COLACE) capsule 100 mg    • polyethylene glycol (MIRALAX) packet 17 g    • digoxin (LANOXIN) injection 125 mcg    • amiodarone (CORDARONE) 900 mg in dextrose 5 % 500 mL infusion        Ancel Levo      This progress note was produced in part using a dictation device which may document imprecise wording from author's original intent

## 2022-10-26 NOTE — PLAN OF CARE
Problem: OCCUPATIONAL THERAPY ADULT  Goal: Performs self-care activities at highest level of function for planned discharge setting  See evaluation for individualized goals  Description: Treatment Interventions: ADL retraining, Functional transfer training, UE strengthening/ROM, Endurance training, Patient/family training, Equipment evaluation/education, Neuromuscular reeducation, Compensatory technique education, Continued evaluation, Energy conservation, Activityengagement          See flowsheet documentation for full assessment, interventions and recommendations  Note: Limitation: Decreased ADL status, Decreased UE strength, Decreased Safe judgement during ADL, Decreased endurance, Decreased self-care trans, Decreased high-level ADLs  Prognosis: Good  Assessment: Pt is a 69 y o  female, admitted to [address] [place] [address] 10/22/2022 d/t experiencing increased weakness  Dx: IMTIAZ superimposed on CKD  Pt with PMHx impacting their performance during ADL tasks, including: asthma, a-fib, COPD, DM, heart failure, kidney failure  Prior to admission to the hospital Pt was performing ADLs with physical assistance  IADLs with physical assistance  Functional transfers/ambulation with physical assistance  Cognitive status was PTA was intact  OT order placed to assess Pt's ADLs, cognitive status, and performance during functional tasks in order to maximize safety and independence while making most appropriate d/c recommendations   Pt's clinical presentation is currently unstable/unpredictable given new onset deficits that effect Pt's occupational performance and ability to safely return to OF including decrease activity tolerance, decrease standing balance, decrease sitting balance, decrease performance during ADL tasks, decrease safety awareness , decrease UB MS, increased pain, decrease generalized strength, decrease activity engagement, decrease performance during functional transfers, high fall risk and limited insight to deficits combined with medical complications of hypotension, poor blood pressure control, tachycardia, abnormal renal lab values, A-fib, abnormal sodium values, low SpO2 values, new onset O2 use, abnormal CO2 values, edema/swelling, elevated BNP, decreased skin integrity, multiple readmissions, incontinence, fear/retreat and need for input for mobility technique/safety  Central line  Pt maintaining >93% on 2-3L of O2  Personal factors affecting Pt at time of initial evaluation include: availability as recommended, limited home support, past experience, inability to perform current job functions, inability to perform IADLs, inability to perform ADLs, inability to ambulate household distances, inability to navigate community distances, limited insight into impairments, decreased initiation and engagement and questionable non-compliance  Pt will benefit from continued skilled OT services to address deficits as defined above and to maximize level independence/participation during ADLs and functional tasks to facilitate return toward PLOF and improved quality of life  From an occupational therapy standpoint, recommendation at time of d/c would be post acute rehab       OT Discharge Recommendation: Post acute rehabilitation services

## 2022-10-26 NOTE — ASSESSMENT & PLAN NOTE
· Chronic hypotension, but now shock likely septic vs hypovolemic  · Patient received IV fluids secondary to volume depletion and hypotension  Last dose of Midodrine 10/17  Pt reports not taking after discharge home for rehab facility  · Home dose Midodrine 5mg TID   10/23 Increased Midodrine from 5 to 10 mg TID  · 10/22 Fluid resuscitated with IVF and albumen  Started on alannah-synephrine and then levophed  · 10/23 Transitioned alannah gtt to vasopressin  · 10/25 Levophed weaned off  · 10/26 Vasopressin weaned off  Restart Beta blocker at lower dose    · 10/27 Remove central line

## 2022-10-26 NOTE — ASSESSMENT & PLAN NOTE
Lab Results   Component Value Date    EGFR 31 10/26/2022    EGFR 26 10/25/2022    EGFR 21 10/24/2022    CREATININE 1 63 (H) 10/26/2022    CREATININE 1 91 (H) 10/25/2022    CREATININE 2 24 (H) 10/24/2022       · Likely in the setting of dehydration/intravascular volume depletion secondary to oral diuretics   · Patient presented to the ED with a creatine 6 18 (Baseline 1 2-1 8)  · No urgent indication for dialysis   · CT A/P 10/22 showed no evidence of obstructive hydronephrosis   Urinary bladder is decompressed with the frederick catheter   · Renal US shows no acute abnomalities  · Nephrology consulted- appreciate recommendations   · Syliva [place] suggestive of pre-renal etiology  · NSS- D/C and bolus PRN  · Trend creatinine  · Continue frederick  · Avoid hypotension and nephrotoxins  · Strict I&O  · PRN Potassium replacement  · Creatinine improving 1 91, close to baseline (1 2-1 8)  · Most recent FENA 2 1 intrinsic ATN, most likely 2/2 hypoperfusion  · Continue to trend creatinine

## 2022-10-26 NOTE — SPEECH THERAPY NOTE
Speech Language/Pathology    Speech/Language Pathology Progress Note/Reassessment    Patient Name: Unknown Brayan CAMPUZANO Date: 10/26/2022    Subjective:  Pt reports she feels like she does worse w/ a straw  Objective:  Assess tolerance of liquids d/t recent coughing episode  Assessment:  Pt was seen two days prior d/t coughing w/ thins  Pt had reported at that time it was due to positioning and SLP signed off d/t no overt s/s aspiration w/ lunch meal   Additional coughing w/ thins noted in the last day  Pt now reports she feels she has difficulty using a straw  Assessed in bed, pt now w/ Kreg bed and able to be put in chair position  Body position is still sub-optimal for PO intake  Assessed w/ thin liquids via cup and straw  With straw, pt w/ consistent double swallow which at times was effortful  With thins via cup, pt appeared w/ improved oral control and inconsistent double swallow  No overt coughing or throat clearing observed w/ trials  Suspect difficulty largely d/t positioning, larger bolus size and impaired coordination of breathing/swallowing d/t hx of COPD    Plan/Recommendations:  Recommend to continue current diet of regular textures w/ thin liquids; no straw  Utilize chair position in [facility] to improve positioning for all intake/meds  Unable to complete VBS to further assess pharyngeal phase d/t body habitus and fluoro machine limitations    SLP will continue to follow    Naseem Yepez 85 CCC-SLP  10/26/2022

## 2022-10-26 NOTE — ASSESSMENT & PLAN NOTE
Lab Results   Component Value Date    HGBA1C 6 8 (H) 10/05/2022       Recent Labs     10/25/22  0704 10/25/22  1101 10/25/22  1705 10/25/22  2148   POCGLU 160* 200* 266* 184*        · High level SSI ACHS  · Levemir 26 U HS  · Resume 3 U TID meal time insulin   · Goal -180

## 2022-10-26 NOTE — ASSESSMENT & PLAN NOTE
Lab Results   Component Value Date    EGFR 31 10/26/2022    EGFR 26 10/25/2022    EGFR 21 10/24/2022    CREATININE 1 63 (H) 10/26/2022    CREATININE 1 91 (H) 10/25/2022    CREATININE 2 24 (H) 10/24/2022       · Likely in the setting of dehydration/intravascular volume depletion secondary to oral diuretics   · Patient presented to the ED with a creatine 6 18 (Baseline 1-1 3)  · CT A/P 10/22 showed no evidence of obstructive hydronephrosis   Renal US shows no acute abnomalities  · Nephrology consulted- appreciate recommendations   · FeUREA suggestive of pre-renal etiology  · NSS- D/C and bolus PRN  · Trend creatinine  · Prn potassium replacement  · Avoid hypotension and nephrotoxins  · Repeat FENA 2 1 intrinsic ATN, most likely 2/2 hypoperfusion  · D/C Paloma, Use Quickwick --> Strict I&O  · Creatinine improving 1 59, baseline (1-1 3)  · Continue to trend creatinine

## 2022-10-26 NOTE — CASE MANAGEMENT
Case Management Progress Note    Patient name Dilia Nathan  Location /-01 MRN [id number]  : 1955 Date 10/26/2022       LOS (days): 4  Geometric Mean LOS (GMLOS) (days): 4 30  Days to GMLOS:0 4        OBJECTIVE:        Current admission status: Inpatient  Preferred Pharmacy:   [address] [facility]  [address] [facility]  Phone: [phone] Fax: [phone]    Primary Care Provider: John Fernando MD    Primary Insurance: [employer] [facility]  Secondary Insurance:     Chart reviewed aware of medical management  Case was discussed in multidisciplinary discharge meeting  Clinical information supporting hospitalization:   Came in with IMTIAZ with ^ HR management  - cardiology is active in her care  - remains on low dose vaso, rate is more control  Barriers to discharge:  - medical management  Discharge plan:  -- Currently pending PT/OT  -- [facility] accepted and patient is active with [facility] will continue to follow plan of care

## 2022-10-26 NOTE — ASSESSMENT & PLAN NOTE
Lab Results   Component Value Date    HGBA1C 6 8 (H) 10/05/2022       Recent Labs     10/25/22  1705 10/25/22  2148 10/26/22  0705 10/26/22  1056   POCGLU 266* 184* 197* 304*        · High level SSI ACHS  · Levemir 26 U HS  · 5 U TID meal time insulin   · Goal -180

## 2022-10-26 NOTE — PHYSICAL THERAPY NOTE
PHYSICAL THERAPY EVALUATION  NAME:  Flakito Little  DATE: 10/26/22    AGE:   69 y o   Mrn:   [id number]  ADMIT DX:  IMTIAZ (acute kidney injury) ([facility] ) [N17 [id number]]    Past Medical History:   Diagnosis Date   • Asthma    • Atrial fibrillation ([facility] )    • COPD (chronic obstructive pulmonary disease) ([facility] )    • Diabetes mellitus ([place] [address] )    • Disease of thyroid gland    • Heart failure ([place] [address] )    • Kidney failure    • NISHI (obstructive sleep apnea)      Length Of Stay: 4  Performed at least 2 patient identifiers during session: Name and Birthday  PHYSICAL THERAPY EVALUATION :    10/26/22 1003   PT Last Visit   PT Visit Date 10/26/22   Note Type   Note type Evaluation   Pain Assessment   Pain Assessment Tool FLACC   Pain Location/Orientation Orientation: Left; Location: Knee   Pain Rating: FLACC (Rest) - Face 0   Pain Rating: FLACC (Rest) - Legs 0   Pain Rating: FLACC (Rest) - Activity 0   Pain Rating: FLACC (Rest) - Cry 0   Pain Rating: FLACC (Rest) - Consolability 0   Score: FLACC (Rest) 0   Pain Rating: FLACC (Activity) - Face 1   Pain Rating: FLACC (Activity) - Legs 1   Pain Rating: FLACC (Activity) - Activity 0   Pain Rating: FLACC (Activity) - Cry 1   Pain Rating: FLACC (Activity) - Consolability 0   Score: FLACC (Activity) 3   Restrictions/Precautions   Other Precautions Multiple lines;Telemetry;O2;Fall Risk;Pain   Home Living   Type of Home House  (no IZAIAH)   Home Layout Two level;Performs ADLs on one level; Able to live on main level with bedroom/bathroom   Bathroom Shower/Tub   (sponge bathes)   Bathroom Toilet   [facility])   [address]; Wheelchair-manual   Additional Comments Reports living in a 2  with no IZAIAH and sleeps in a recliner chair  Reports using a RW to spt to and from [facility]  Reports she used to be able to walk to the front door to let the dog out  Prior Function   Level of [place] Needs assistance with ADLs; Needs assistance with functional mobility; Needs assistance with IADLS   Lives With Spouse; Son   Prabhakar Help From Family;Friend(s)   IADLs Family/Friend/Other provides transportation; Family/Friend/Other provides meals; Family/Friend/Other provides medication management   Vocational Retired   Comments Reports having assistance with ADLs, IADLs, and mobility  Pt report she has a friend who is an RN who comes over to assist with ADLs 1x/wk but she hasn't been coming lately  Spouse assists with ADLs and son with IADLs, assist with community mobility from [facility] bus  (Pt was recently admitted to [address] [place] [address] and was then d/c'ed to SNF for rehab  Pt was at rehab for a short stay before reurning home  Pt reports she was completing SPT upon returning home)   General   Additional Pertinent History Pt with pressure injury buttocks, impaired skin integrity B LEs  Recent admission 9/21/22 to 10/3/22 and discharged to SNF for rehab  Returned to home and is completing spt with assistance  Wound left heel  Cognition   Orientation Level Oriented X4   Following Commands Follows one step commands without difficulty   Subjective   Subjective "My goal is to be able to walk again "   RLE Assessment   RLE Assessment X  (ankle DF/PF [facility]  hip and knee flexion limited by body habitus  knee extension supine 0 degrees, EOB, minimal, hip abd minimal  strength 2-/5 except ankle DF/PF 3+/5)   LLE Assessment   LLE Assessment X  (ankle DF/PF WFL  hip and knee flexion limited by body habitus  knee extension supine 0 degrees, EOB, minimal, hip abd minimal  inc knee pain with mobility; strength 2-/5 except ankle DF/PF 3+/5)   Coordination   Rapid Alternating Movements Intact   Light Touch   RLE Light Touch Grossly intact   LLE Light Touch Grossly intact   Bed Mobility   Rolling R 2  Maximal assistance   Additional items Assist x 1; Increased time required;Verbal cues   Rolling L 2  Maximal assistance   Additional items Assist x 1; Increased time required;Verbal cues   Supine to Sit 3  Moderate assistance   Additional items Assist x 1; Increased time required;Verbal cues;HOB elevated  (trunk management )   Sit to Supine 2  Maximal assistance   Additional items Assist x 2; Increased time required;Verbal cues;LE management  (trunk management)   Additional Comments HOB elevated ~ 30 degrees  required modAx1 to achieve sitting at EOB with manual cues for trunk and LE management  verbal cues for sequence and technique  increased time and effort to complete  returned to supine with maxAx2 of trunk and LEs  rolling right and left with maxAx1 with bedrail and cues for technique  static sitting balance at EOB with steadying t minx1 wiht knee and foot blocked to prevent anterior translation  Transfers   Sit to Stand   (only able to clear buttock from EOB despite maxAx2, unable to achieve full standing )   Additional items Assist x 2; Increased time required;Verbal cues   Stand to Sit 2  Maximal assistance   Additional items Assist x 2; Increased time required   Stand pivot Unable to assess   Additional Comments use of RW  B feet blocked  attempted sit to stand however patient unable to clear buttock 1st trial despite maxAx2  2nd trial with knees and feet blocked, use of RW, only able to clear buttock despite maxAx2  returned to supine for safety   Ambulation/Elevation   Distance pt unable   Balance   Static Sitting Fair   Dynamic Sitting Fair -   Endurance Deficit   Endurance Deficit Yes   Endurance Deficit Description HR elevated from 80s to low 100s and then up to 146 bpm with activity sititng EOB  decreased to 80s as patient sat EOB, ~3'  Activity Tolerance   Activity Tolerance Patient limited by fatigue;Patient limited by pain   Medical Staff Made Aware Kassy DE   Nurse Made Aware RNJoleen and Jazmín   Assessment   Prognosis Guarded   Problem List Decreased strength;Decreased endurance; Impaired balance;Decreased mobility; Decreased range of motion;Decreased safety awareness; Obesity; Decreased skin integrity;Pain   Barriers to Discharge Decreased caregiver support; Inaccessible home environment   Barriers to Discharge Comments requires increased assistance to complete mobility, unable to achieve standing  poor activity tolerance   Goals   Patient Goals "Walk"   STG Expiration Date 11/09/22   PT Treatment Day 0   Plan   Treatment/Interventions ADL retraining;Functional transfer training;LE strengthening/ROM; Therapeutic exercise; Endurance training;Patient/family training;Equipment eval/education; Bed mobility;Gait training; Compensatory technique education;Spoke to nursing;Spoke to case management;OT   PT Frequency 3-5x/wk   Recommendation   PT Discharge Recommendation Post acute rehabilitation services   Equipment Recommended   (TBD by rehab)   Additional Comments should patient and spouse decline post acute rehab, may require heather lift to return home safely   AM-PAC Basic Mobility Inpatient   Turning in Bed Without Bedrails 2   Lying on Back to Sitting on Edge of Flat Bed 2   Moving Bed to Chair 1   Standing Up From Chair 1   Walk in Room 1   Climb 3-5 Stairs 1   Basic Mobility Inpatient Raw Score 8   Turning Head Towards Sound 4   Follow Simple Instructions 4   Low Function Basic Mobility Raw Score 16   Low Function Basic Mobility Standardized Score 25 72   Highest Level Of Mobility   -HLM Goal 3: Sit at edge of bed   JH-HLM Achieved 3: Sit at edge of bed   End of Consult   Patient Position at End of Consult Supine; All needs within reach     (Please find full objective findings from PT assessment regarding body systems outlined above)  Assessment: Pt is a 69 y o  female seen for PT evaluation s/p admission to [address] [place] [address] 18 on 10/22/2022 with Acute kidney injury superimposed on chronic kidney disease ([facility] )  Order placed for PT services    Upon evaluation: Pt is presenting with impaired functional mobility due to pain, decreased strength, decreased ROM, decreased endurance, impaired balance, decreased safety awareness, impaired judgment, fall risk, LE edema, and impaired skin integrity requiring  maximal to moderate assistance for bed mobility and mechanical conveyance from nursing standpoint for OOB mobility, unable to achieve standing despite maxAx2  Pt's clinical presentation is currently unpredictable given the functional mobility deficits above, especially weakness, decreased ROM, edema of extremities, decreased skin integrity, decreased endurance, pain, decreased activity tolerance, decreased functional mobility tolerance, decreased safety awareness, and SOB upon exertion, coupled with fall risks as indicated by AM-PAC 6-Clicks: 0/11 as well as impaired balance, polypharmacy and decreased safety awareness and combined with medical complications of abnormal renal lab values, abnormal H&H, abnormal blood sugars, abnormal sodium values, multiple readmissions, fear/retreat, need for input for mobility technique/safety and IMTIAZ, concern for UTI, hypotension requiring pressor support, bradycardia overnight, Afib with RVR, urinary retention  Pt's PMHx and comorbidities that may affect physical performance and progress include: CHF, COPD on 4 lpm O2, CKD, A fib and obesity, DM, asthma  Personal factors affecting pt at time of IE include: inaccessible home environment, limited home support, inability to perform IADLs, inability to perform ADLs and inability to navigate level surfaces without external assistance  Pt will benefit from continued skilled PT services to address deficits as defined above and to maximize level of functional mobility to facilitate return toward PLOF and improved QOL  From PT/mobility standpoint, recommendation at time of d/c would be Short term rehab pending progress in order to reduce fall risk and maximize pt's functional independence and consistency with mobility in order to facilitate return to PLOF  Recommend trial with walker next 1-2 sessions and ther ex next 1-2 sessions       The patient's AM-PAC Basic Mobility Inpatient Short Form Raw Score is 8  A Raw score of less than or equal to 16 suggests the patient may benefit from discharge to post-acute rehabilitation services  Please also refer to the recommendation of the Physical Therapist for safe discharge planning  Goals: Pt will: Perform bed mobility tasks with consistent min A of 1 supine to sit and modAx1 sit to supine to reposition in bed and prepare for transfers  Pt will perform transfers with modAx2 to decrease burden of care, decrease risk for falls and improve activity tolerance and prepare for ambulation  Pt will ambulate with RW for >/= 5' with  maxAx1-2  to decrease burden of care, decrease risk for falls, improve ease of transfers, improve activity tolerance and improve gait quality and to access home environment  Pt will increase B LE strength >/= 1/2 MMT grade to facilitate functional mobility        Soni Champagne

## 2022-10-26 NOTE — ASSESSMENT & PLAN NOTE
· Presented with corrected sodium 126  · Suspected etiology hypovolemia with use of oral duiretics  · Trend BMP  · Resolved

## 2022-10-26 NOTE — ASSESSMENT & PLAN NOTE
· Concern for UTI  · UA +WBC, No nitrates   · Started on Ceftriaxone in the ED  · F/u urine cultures - >100,000 alpha hemolytic strep  · LA 2 0 on admission, cleared   · Completed 3 days of Ceftriaxone

## 2022-10-26 NOTE — ASSESSMENT & PLAN NOTE
· Chronically on CPAP at night and 4L of oxygen during the day secondary to COPD most likely NISHI  · Unable to wear home unit due to right jugular central line  · Goal Sp02>88%  · Respiratory protocol

## 2022-10-26 NOTE — PROGRESS NOTES
Progress Note:Cardiology  Julio Cesar Neither 1955, 69 y o  female MRN: [id number]    Unit/Bed#: -01 Encounter: [id number]  Attending Physician: Kurtis Mcghee   Primary Care Provider: Shellie Ozuna MD   Date admitted to hospital: 10/22/2022  Length of stay: 4         Chronic diastolic congestive heart failure [facility])  Assessment & Plan  Wt Readings from Last 3 Encounters:   10/25/22 (!) 143 kg (315 lb 7 7 oz)   10/03/22 (!) 171 kg (377 lb 6 8 oz)   04/13/20 (!) 162 kg (356 lb 0 7 oz)     History of right sided heart failure  Echocardiogram 9/21/2022 shows EF 55-59% with evidence of RV pressure and volume overload  RV severely dilated with mild-moderately reduced function  MR  Severe TR  Maintained on torsemide and metolazone at home  Presented this admission with hypovolemia and IMTIAZ which has improved following IV hydration and albumin  Currently not on diuretic therapy  Currently undergoing pulmonary work up for pulmonary hypertension  She does appear to be volume overloaded today  Recommend reinitiating diuretics as able from a blood pressure standpoint  Strict I's/O's     UTI (urinary tract infection)  Assessment & Plan  As per critical care team    Hypotension  Assessment & Plan  Currently requiring midodrine 10 mg TID as well as levo which is being weaned off  Will work toward heart rate control to assist with improved cardiac output  Hypothyroidism  Assessment & Plan  TSH 1 41 this admission  Ideally TSH between 1-3 given a fib    Morbid obesity ([address] )  Assessment & Plan  Body mass index is 57 7 kg/m²  Contributing to overall cardiopulmonary status    Chronic respiratory failure with hypoxia (HCC)  Assessment & Plan  Chronic in the setting of right sided heart failure, asthma, NISHI, obesity  Utilizes 4 L NC of supplemental O2 at home  Atrial fibrillation [facility])  Assessment & Plan  History of atrial fibrillation   Details are unclear due to lack of availability of medical records  Patient was previously following with Dr Kristine Winters through [facility]  Reports undergoing 2 failed cardioversions and ultimately an atrial fibrillation ablation, date unknown  It is unclear how long she has been back in atrial fibrillation  ECG from 4/2020 shows atrial fibrillation  ECG at this hospital presentation showed rate controlled atrial fibrillation  She is maintained on warfarin anticoagulation, goal INR 2-3  Home metoprolol succinate 100 mg BID was held in admission due to hypotension  Developed atrial fibrillation with RVR on 10/24/2022 and initiated on amiodarone drip, received 2 doses of dig  mcg  Metoprolol succinate 12 5 mg daily resumed on 10/25/22  HR's bradycardic overnight into 30's  Amiodarone and metoprolol discontinued  At the time of my assessment HR into 140-160's with activity  Resume metoprolol succinate at 12 5 mg daily and assess response  Continue telemetry  Optimize electrolytes for K+ > 4, Mag > 2     * Acute kidney injury superimposed on chronic kidney disease [facility])  Assessment & Plan  Lab Results   Component Value Date    EGFR 26 10/25/2022    EGFR 21 10/24/2022    EGFR 16 10/24/2022    CREATININE 1 91 (H) 10/25/2022    CREATININE 2 24 (H) 10/24/2022    CREATININE 2 83 (H) 10/24/2022     IMTIAZ with creatinine of 6 at admission  Today improved to 1 63  Appreciate nephrology recs  Acute hyponatremia-resolved as of 10/26/2022  Assessment & Plan  Corrected with treatment of hypovolemia      Subjective:   Patient seen and examined  She developed bradycardia with HR's into 30's overnight  She did not wear her CPAP overnight  She is feeling better today  She has more energy, less shortness of breath, no nausea  Review of Systems   Constitutional: Negative  HENT: Negative  Cardiovascular: Positive for dyspnea on exertion  Negative for chest pain, irregular heartbeat, near-syncope, orthopnea and palpitations     Respiratory: Positive for shortness of breath  Negative for cough and snoring  Endocrine: Negative  Skin: Negative  Musculoskeletal: Negative  Gastrointestinal: Negative  Genitourinary: Negative  Neurological: Negative  Psychiatric/Behavioral: Negative  Objective:     Vitals: Blood pressure 122/60, pulse 80, temperature 97 7 °F (36 5 °C), temperature source Temporal, resp  rate 21, height 5' 2" (1 575 m), weight (!) 150 kg (329 lb 12 9 oz), SpO2 93 %  , Body mass index is 60 32 kg/m² ,     Orthostatic Blood Pressures    Flowsheet Row Most Recent Value   Blood Pressure 122/60 filed at 10/26/2022 1024   Patient Position - Orthostatic VS Lying filed at 10/26/2022 1000          Physical Exam  Vitals and nursing note reviewed  Constitutional:       General: She is not in acute distress  Appearance: She is well-developed  She is obese  HENT:      Head: Normocephalic and atraumatic  Eyes:      Conjunctiva/sclera: Conjunctivae normal    Cardiovascular:      Rate and Rhythm: Normal rate  Rhythm irregular  Heart sounds: No murmur heard  Comments: No swelling in feet  Pulmonary:      Effort: Pulmonary effort is normal  No respiratory distress  Breath sounds: Examination of the right-lower field reveals rales  Examination of the left-lower field reveals rales  Wheezing (throughout) and rales present  Abdominal:      Palpations: Abdomen is soft  Tenderness: There is no abdominal tenderness  Musculoskeletal:      Cervical back: Neck supple  Right lower leg: Edema present  Left lower leg: Edema present  Skin:     General: Skin is warm and dry  Neurological:      Mental Status: She is alert  Psychiatric:         Mood and Affect: Mood and affect normal          Speech: Speech normal          Behavior: Behavior normal  Behavior is cooperative           Cognition and Memory: Cognition and memory normal             Intake/Output Summary (Last 24 hours) at 10/26/2022 1231  Last data filed at 10/26/2022 1201  Gross per 24 hour   Intake 1248 65 ml   Output 1035 ml   Net 213 65 ml       Weight (last 2 days)     Date/Time Weight    10/26/22 0600 150 (329 81)    10/25/22 0538 143 (315 48)    10/24/22 0554 143 (314 82)    10/24/22 0515 143 (314 82)             Medications:      Current Facility-Administered Medications:   •  acetaminophen (TYLENOL) tablet 650 mg, 650 mg, Oral, Q6H PRN, Skip Deutsch MD, 650 mg at 10/26/22 0730  •  albuterol inhalation solution 2 5 mg, 2 5 mg, Nebulization, Q6H PRN, CAMPBELL Christiansen  •  atorvastatin (LIPITOR) tablet 10 mg, 10 mg, Oral, Daily, Zoë Garcia PA-C, 10 mg at 10/26/22 5186  •  bisacodyl (DULCOLAX) rectal suppository 10 mg, 10 mg, Rectal, Daily PRN, CAPMBELL Christiansen  •  cholecalciferol (VITAMIN D3) tablet 2,000 Units, 2,000 Units, Oral, Daily, Zoë Garcia PA-C, 2,000 Units at 10/26/22 7901  •  ferrous sulfate tablet 325 mg, 325 mg, Oral, Daily With Breakfast, Matt Angelita Leon PA-C, 325 mg at 10/26/22 0730  •  fluticasone-vilanterol (BREO ELLIPTA) 100-25 mcg/inh inhaler 1 puff, 1 puff, Inhalation, Daily, Zoë Garcia PA-C, 1 puff at 10/26/22 0932  •  insulin detemir (LEVEMIR) subcutaneous injection 26 Units, 26 Units, Subcutaneous, HS, CAMPBELL Christiansen, 26 Units at 10/25/22 2155  •  insulin lispro (HumaLOG) 100 units/mL subcutaneous injection 1-6 Units, 1-6 Units, Subcutaneous, HS, Isabella Hanson PA-C, 1 Units at 10/25/22 2202  •  insulin lispro (HumaLOG) 100 units/mL subcutaneous injection 4-20 Units, 4-20 Units, Subcutaneous, TID AC, 12 Units at 10/26/22 1214 **AND** Fingerstick Glucose (POCT), , , 4x Daily AC and at bedtime, CAMPBELL Christiansen  •  insulin lispro (HumaLOG) 100 units/mL subcutaneous injection 5 Units, 5 Units, Subcutaneous, TID With Meals, CAMPBELL Christiansen, 5 Units at 10/26/22 1214  •  levothyroxine tablet 200 mcg, 200 mcg, Oral, Early Morning, 200 mcg at 10/26/22 2544 **AND** levothyroxine tablet 88 mcg, 88 mcg, Oral, Early Morning, CAMPBELL Christiansen, 88 mcg at 10/26/22 0603  •  melatonin tablet 6 mg, 6 mg, Oral, HS, CAMPBELL Christiansen, 6 mg at 10/25/22 2156  •  metoprolol (LOPRESSOR) injection 1 3 mg, 1 3 mg, Intravenous, Q4H PRN, CAMPBELL Oconnor  •  metoprolol tartrate (LOPRESSOR) partial tablet 12 5 mg, 12 5 mg, Oral, Q12H [address], CAMPBELL Christiansen, 12 5 mg at 10/26/22 1024  •  midodrine (PROAMATINE) tablet 10 mg, 10 mg, Oral, TID AC, Isabella Hanson PA-C, 10 mg at 10/26/22 1213  •  montelukast (SINGULAIR) tablet 10 mg, 10 mg, Oral, HS, Matt Cyrilla Rinne, PA-C, 10 mg at 10/25/22 2155  •  nystatin (MYCOSTATIN) powder, , Topical, BID, Yessi Mays PA-C, Given at 10/26/22 8815  •  ondansetron (ZOFRAN) injection 4 mg, 4 mg, Intravenous, Q6H PRN, CAMPBELL Sinha  •  pantoprazole (PROTONIX) EC tablet 20 mg, 20 mg, Oral, Early Morning, Matt Bill Jalloh PA-C, 20 mg at 10/26/22 0604  •  polyethylene glycol (MIRALAX) packet 17 g, 17 g, Oral, Daily, CAMPBELL Christiansen, 17 g at 10/26/22 5737  •  QUEtiapine (SEROquel) tablet 25 mg, 25 mg, Oral, HS PRN, Monika Calloway  •  senna-docusate sodium (SENOKOT S) 8 6-50 mg per tablet 2 tablet, 2 tablet, Oral, BID, CAMPBELL Christiansen, 2 tablet at 10/26/22 1781  •  traZODone (DESYREL) tablet 50 mg, 50 mg, Oral, HS PRN, Monika Calloway, 50 mg at 10/25/22 2155  •  vasopressin (PITRESSIN) 20 Units in sodium chloride 0 9 % 100 mL infusion, 0 02 Units/min, Intravenous, Continuous, CAMPBELL Christiansen, Last Rate: 6 mL/hr at 10/26/22 0829, 0 02 Units/min at 10/26/22 3706  •  warfarin (COUMADIN) tablet 6 mg, 6 mg, Oral, Once (warfarin), CAMPBELL Christiansen     Labs & Results:    Results from last 7 days   Lab Units 10/25/22  1715   CK TOTAL U/L 23*     Results from last 7 days   Lab Units 10/26/22  0423 10/25/22  0449 10/24/22  0425   WBC Thousand/uL 6 19 8 45 10 71*   HEMOGLOBIN g/dL 10 7* 11 0* 12 4 HEMATOCRIT % 34 1* 34 4* 39 0   PLATELETS Thousands/uL 164 184 242         Results from last 7 days   Lab Units 10/26/22  0423 10/25/22  0449 10/24/22  1543 10/23/22  1127 10/23/22  0437   POTASSIUM mmol/L 4 8 3 7 4 1   < >  --    CHLORIDE mmol/L 98 97 98   < >  --    CO2 mmol/L 32 31 30   < >  --    BUN mg/dL 71* 81* 88*   < >  --    CREATININE mg/dL 1 63* 1 91* 2 24*   < >  --    CALCIUM mg/dL 9 9 9 8 10 6*   < >  --    ALK PHOS U/L 116 122*  --   --  129*   ALT U/L 28 26  --   --  36   AST U/L 24 24  --   --  31    < > = values in this interval not displayed  Results from last 7 days   Lab Units 10/26/22  0423 10/24/22  0425 10/23/22  0437   INR  2 37* 2 76* 3 71*     Results from last 7 days   Lab Units 10/26/22  0423 10/25/22  0449 10/24/22  1543   MAGNESIUM mg/dL 2 0 1 7 1 8     Results from last 7 days   Lab Units 10/22/22  1214   NT-PRO BNP pg/mL 4,103*      Telemetry: Atrial fibrillation, HR , -160 with activity    Counseling / Coordination of Care  Total floor / unit time spent today 25 minutes  Greater than 50% of total time was spent with the patient and / or family counseling and / or coordination of care    A description of the counseling / coordination of care: Discussed case with ICU team

## 2022-10-26 NOTE — ASSESSMENT & PLAN NOTE
· Chronic hypotension, but now shock likely septic vs hypovolemic  · Patient received IV fluids secondary to volume depletion and hypotension  Last dose of Midodrine 10/17  Pt reports not taking after discharge home for rehab facility  · Cont home midodrine  · Increase to 10 mg TID 10/23  · 10/22 Fluid resuscitated with IVF and albumen  Started on alannah-synephrine and then levophed  · 10/23 Transitioned alannah gtt to vasopressin  · 10/25 Levophed weaned off    · Continue albumin 25%

## 2022-10-26 NOTE — ASSESSMENT & PLAN NOTE
· Initially helld Metoprolol secondary to hypotension  · Previously rate controlled, went into RVR 10/24 and started amio gtt  · 10/26 - discontinued due to bradycardia  · Digoxin load given   Dig 62 5 mcg IV 10/26  · Start low dose BB as BP allows - resume metoprolol 12 5 mg BID 10/26, increase to 25 mg BID when able  · INR 3 17 on admission (Goal INR 2-3)  · INR 2 22 10/27  · Continue Coumadin 6 mg (home dose 7mg)  · Trend INR

## 2022-10-26 NOTE — ASSESSMENT & PLAN NOTE
· Continue to hold Metoprolol secondary to hypotension  · Previously rate controlled, went into RVR 10/24 and started amio gtt  · 10/26, transition to oral amio 300mg  · INR 3 17 on admission (Goal INR 2-3)  · Hold home Coumadin 7mg  · Trend INR

## 2022-10-26 NOTE — PROGRESS NOTES
[address]  Progress Note Coby Sushma 1955, 69 y o  female MRN: [id number]  Unit/Bed#: -01 Encounter: [id number]  Primary Care Provider: Kamila Adler MD   Date and time admitted to hospital: 10/22/2022 11:40 AM    * Acute kidney injury superimposed on chronic kidney disease [facility])  Assessment & Plan  Lab Results   Component Value Date    EGFR 31 10/26/2022    EGFR 26 10/25/2022    EGFR 21 10/24/2022    CREATININE 1 63 (H) 10/26/2022    CREATININE 1 91 (H) 10/25/2022    CREATININE 2 24 (H) 10/24/2022       · Likely in the setting of dehydration/intravascular volume depletion secondary to oral diuretics   · Patient presented to the ED with a creatine 6 18 (Baseline 1 2-1 8)  · No urgent indication for dialysis   · CT A/P 10/22 showed no evidence of obstructive hydronephrosis  Urinary bladder is decompressed with the frederick catheter   · Renal US shows no acute abnomalities  · Nephrology consulted- appreciate recommendations   · Reino Maricao suggestive of pre-renal etiology  · NSS- D/C and bolus PRN  · Trend creatinine  · Continue frederick  · Avoid hypotension and nephrotoxins  · Strict I&O  · PRN Potassium replacement  · Creatinine improving 1 91, close to baseline (1 2-1 8)  · Most recent FENA 2 1 intrinsic ATN, most likely 2/2 hypoperfusion  · Continue to trend creatinine      UTI (urinary tract infection)  Assessment & Plan  · Concern for UTI  · UA +WBC, No nitrates   · Started on Ceftriaxone in the ED  · F/u urine cultures - >100,000 alpha hemolytic strep  · LA 2 0 on admission, cleared   · Completed 3 days of Ceftriaxone      Vaginal bleeding  Assessment & Plan  · Vaginal bleeding noted 10/22  Pt reports she had "bleeding" while in rehab but "they didn't know where it was coming from "  · Continues with bright red blood vaginally and passing multiple clots  · Monitor bleeding  · Trend Hgb and INR    · Hgb 12 down from 13 5 in the setting of fluid resuscitation  · Type and screen sent  · Needs outpatient follow up with Gyne    Acute hyponatremia  Assessment & Plan  · Presented with corrected sodium 126  · Suspected etiology hypovolemia with use of oral duiretics  · Currently resolved  · Trend BMP    GERD (gastroesophageal reflux disease)  Assessment & Plan  · Continue home PPI     Hypotension  Assessment & Plan  · Chronic hypotension, but now shock likely septic vs hypovolemic  · Patient received IV fluids secondary to volume depletion and hypotension  Last dose of Midodrine 10/17  Pt reports not taking after discharge home for rehab facility  · Cont home midodrine  · Increase to 10 mg TID 10/23  · 10/22 Fluid resuscitated with IVF and albumen  Started on alannah-synephrine and then levophed  · 10/23 Transitioned alannah gtt to vasopressin  · 10/25 Levophed weaned off  · Continue albumin 25%    Hypothyroidism  Assessment & Plan  · Restart home synthroid   · TSH 1 14    Morbid obesity ([facility] [address] )  Assessment & Plan  · Encourage lifestyle and diet changes  · Nutrition consult    Chronic diastolic congestive heart failure ([facility] [address] )  Assessment & Plan  Wt Readings from Last 3 Encounters:   10/25/22 (!) 143 kg (315 lb 7 7 oz)   10/03/22 (!) 171 kg (377 lb 6 8 oz)   04/13/20 (!) 162 kg (356 lb 0 7 oz)       · Patient hospitalized 9/21/22 - 10/3/22 secondary to volume overload   · Started on torsemide and zaroxolyn  · Patient down 23kg from last admission   · No evidence of volume overload on exam  · Holding home diuretics in the setting of IMTIAZ   · Home BB on hold due to hypotension  · Echocardiogram 9/21/22: The left ventricular ejection fraction is 55-59%  Systolic function is normal  Wall motion is grossly normal  There is both systolic and diastolic flattening of the interventricular septum consistent with right ventricle pressure and volume overload: Right ventricular cavity size is severely dilated  Systolic function is mildly to moderately reduced      Chronic respiratory failure with hypoxia [facility])  Assessment & Plan  · Chronically on CPAP at night and 4L of oxygen during the day secondary to COPD most likely NISHI  · Unable to wear home unit due to right jugular central line  · Consult Respiratory for CPAP options (ie  nasal pillows)  · Goal Sp02>88%  · Respiratory protocol     Diabetes mellitus [facility])  Assessment & Plan  Lab Results   Component Value Date    HGBA1C 6 8 (H) 10/05/2022       Recent Labs     10/25/22  0704 10/25/22  1101 10/25/22  1705 10/25/22  2148   POCGLU 160* 200* 266* 184*        · High level SSI ACHS  · Levemir 26 U HS  · Resume 3 U TID meal time insulin   · Goal -180    COPD (chronic obstructive pulmonary disease) ([facility])  Assessment & Plan  · Continue home breo ellipta and singulair   · Continue 4L 02 nc  · Atrovent and xopenex QID PRN  · Albuterol PRN  · Respiratory protocol     Atrial fibrillation ([address] )  Assessment & Plan  · Continue to hold Metoprolol secondary to hypotension  · Previously rate controlled, went into RVR 10/24 and started amio gtt  · 10/26, transition to oral amio 300mg  · INR 3 17 on admission (Goal INR 2-3)  · Hold home Coumadin 7mg  · Trend INR       ----------------------------------------------------------------------------------------  HPI/24hr events: Patient suddenly became bradycardic in the 40s - 50s last evening  Amio gtt was placed on hold and metoprolol discontinued  Amio gtt was restarted once HR recovered to consistently be in the 70s  However, she again became bradycardic this morning and the amio gtt was placed back on hold  She remains on Vasopressin  Levophed was weaned off last evening  She endorses feeling fatigued and denies SOB, chest pain and abdominal pain       Patient appropriate for transfer out of the ICU today?: No  Disposition: Continue Critical Care   Code Status: Level 3 - DNAR and DNI  ---------------------------------------------------------------------------------------    Review of Systems  Review of systems was reviewed and negative unless stated above in HPI/24-hour events   ---------------------------------------------------------------------------------------  OBJECTIVE    Vitals   Vitals:    10/26/22 0300 10/26/22 0400 10/26/22 0500 10/26/22 0512   BP: 108/56 104/55 103/60    BP Location: Right arm      Pulse: 60 64 59 (!) 47   Resp: (!) 26 (!) 31 (!) 23 (!) 31   Temp: 98 4 °F (36 9 °C)      TempSrc: Temporal      SpO2: 93% 94% 94% 92%   Weight:       Height:         Temp (24hrs), Av °F (36 7 °C), Min:97 6 °F (36 4 °C), Max:98 4 °F (36 9 °C)  Current: Temperature: 98 4 °F (36 9 °C)  Arterial Line BP: 90/40  Arterial Line MAP (mmHg): (!) 55 mmHg    Respiratory:  SpO2: SpO2: 92 %  Nasal Cannula O2 Flow Rate (L/min): 4 L/min    Invasive/non-invasive ventilation settings   Respiratory  Report   Lab Data (Last 4 hours)    None         O2/Vent Data (Last 4 hours)    None                Physical Exam  Constitutional:       General: She is not in acute distress  Appearance: She is obese  She is ill-appearing  She is not diaphoretic  HENT:      Head: Normocephalic and atraumatic  Right Ear: External ear normal       Left Ear: External ear normal       Nose: Nose normal       Mouth/Throat:      Pharynx: Oropharynx is clear  Eyes:      General:         Right eye: No discharge  Left eye: No discharge  Extraocular Movements: Extraocular movements intact  Conjunctiva/sclera: Conjunctivae normal       Pupils: Pupils are equal, round, and reactive to light  Cardiovascular:      Rate and Rhythm: Bradycardia present  Rhythm irregular  Pulses: Normal pulses  Heart sounds: No friction rub  No gallop  Pulmonary:      Effort: Pulmonary effort is normal  No respiratory distress  Breath sounds: Normal breath sounds  No stridor  No wheezing, rhonchi or rales  Chest:      Chest wall: No tenderness  Abdominal:      General: Bowel sounds are normal  There is no distension  Palpations: Abdomen is soft  There is no mass  Tenderness: There is no abdominal tenderness  There is no guarding or rebound  Musculoskeletal:      Cervical back: Normal range of motion and neck supple  No rigidity or tenderness  Right lower leg: Edema present  Left lower leg: Edema present  Skin:     General: Skin is warm and dry  Capillary Refill: Capillary refill takes less than 2 seconds  Coloration: Skin is not jaundiced or pale  Comments: Stasis dermatitis BLE  Neurological:      General: No focal deficit present  Mental Status: She is oriented to person, place, and time  Sensory: No sensory deficit               Laboratory and Diagnostics:  Results from last 7 days   Lab Units 10/26/22  0423 10/25/22  0449 10/24/22  0425 10/23/22  0437 10/22/22  1214   WBC Thousand/uL 6 19 8 45 10 71* 10 88* 9 33   HEMOGLOBIN g/dL 10 7* 11 0* 12 4 12 0 13 5   HEMATOCRIT % 34 1* 34 4* 39 0 36 4 41 7   PLATELETS Thousands/uL 164 184 242 264 264   NEUTROS PCT %  --   --   --   --  65   MONOS PCT %  --   --   --   --  10     Results from last 7 days   Lab Units 10/26/22  0423 10/25/22  0449 10/24/22  1543 10/24/22  0425 10/23/22  2119 10/23/22  1127 10/23/22  0437 10/23/22  0408 10/22/22  2256 10/22/22  1214   SODIUM mmol/L 138 138 136 136 132* 132*  --  132*   < > 124*   POTASSIUM mmol/L 4 8 3 7 4 1 3 6 3 7 3 7  --  3 6   < > 4 1   CHLORIDE mmol/L 98 97 98 92* 94* 91*  --  90*   < > 82*   CO2 mmol/L 32 31 30 29 28 28  --  29   < > 32   ANION GAP mmol/L 8 10 8 15* 10 13  --  13   < > 10   BUN mg/dL 71* 81* 88* 107* 111* 123*  --  135*   < > 160*   CREATININE mg/dL 1 63* 1 91* 2 24* 2 83* 3 29* 3 97*  --  4 32*   < > 6 18*   CALCIUM mg/dL 9 9 9 8 10 6* 9 9 9 9 9 2  --  8 7   < > 9 2   GLUCOSE RANDOM mg/dL 204* 193* 233* 245* 265* 300*  --  126   < > 235*   ALT U/L 28 26  --   --   --   --  36  --   --  43   AST U/L 24 24  --   --   --   --  31  --   --  44   ALK PHOS U/L 116 122*  -- --   --   --  129*  --   --  168*   ALBUMIN g/dL 3 5 4 2  --   --   --   --  3 9  --   --  3 2*   TOTAL BILIRUBIN mg/dL 1 16* 1 56*  --   --   --   --  0 68  --   --  0 64    < > = values in this interval not displayed  Results from last 7 days   Lab Units 10/26/22  0423 10/25/22  0449 10/24/22  1543 10/24/22  0425 10/23/22  0437 10/22/22  2256 10/22/22  1214   MAGNESIUM mg/dL 2 0 1 7 1 8 1 9 2 7* 2 1  --    PHOSPHORUS mg/dL 2 4 2 2* 2 3  --  6 1*  --  6 5*      Results from last 7 days   Lab Units 10/26/22  0423 10/24/22  0425 10/23/22  0437 10/22/22  1736   INR  2 37* 2 76* 3 71* 3 17*          Results from last 7 days   Lab Units 10/23/22  0437 10/22/22  1214   LACTIC ACID mmol/L 1 0 2 0     ABG:  Results from last 7 days   Lab Units 10/23/22  0407   PH ART  7 363   PCO2 ART mm Hg 43 6   PO2 ART mm Hg 119 3   HCO3 ART mmol/L 24 2   BASE EXC ART mmol/L -1 2   ABG SOURCE  Line, Arterial     VBG:  Results from last 7 days   Lab Units 10/24/22  0951 10/23/22  1127 10/23/22  0407   PH ALFREDO  7 374   < >  --    PCO2 ALFREDO mm Hg 50 8*   < >  --    PO2 ALFREDO mm Hg 92 4*   < >  --    HCO3 ALFREDO mmol/L 29 0   < >  --    BASE EXC ALFREDO mmol/L 2 8   < >  --    ABG SOURCE   --   --  Line, Arterial    < > = values in this interval not displayed  Micro  Results from last 7 days   Lab Units 10/22/22  1605 10/22/22  1217 10/22/22  1214   BLOOD CULTURE   --   --  No Growth at 72 hrs  No Growth at 72 hrs  URINE CULTURE  No Growth <1000 cfu/mL >100,000 cfu/ml Alpha Hemolytic Streptococcus NOT Enterococcus*  <10,000 cfu/ml Gram Negative Fer Enteric Like*  <10,000 cfu/ml Staphylococcus coagulase negative*  --        Intake and Output  I/O       10/24 0701  10/25 0700 10/25 0701  10/26 0700    P  O  1620 800    I V  (mL/kg) 1448 6 (10 1) 1003 7 (7)    IV Piggyback 450 150    Total Intake(mL/kg) 3518 6 (24 6) 1953 7 (13 7)    Urine (mL/kg/hr) 3024 (0 9) 1050 (0 3)    Total Output 3024 1050    Net +494 6 +903 7 Height and Weights   Height: 5' 2" (157 5 cm)     Body mass index is 57 7 kg/m²  Weight (last 2 days)     Date/Time Weight    10/25/22 0538 143 (315 48)    10/24/22 0554 143 (314 82)    10/24/22 0515 143 (314 82)            Nutrition       Diet Orders   (From admission, onward)             Start     Ordered    10/23/22 1433  Diet Marcos/CHO Controlled; Consistent Carbohydrate Diet Level 2 (5 carb servings/75 grams CHO/meal)  Diet effective now        References:    Nutrtion Support Algorithm Enteral vs  Parenteral   Question Answer Comment   Diet Type Marcos/CHO Controlled    Marcos/CHO Controlled Consistent Carbohydrate Diet Level 2 (5 carb servings/75 grams CHO/meal)    RD to adjust diet per protocol?  Yes        10/23/22 1432                  Active Medications  Scheduled Meds:  Current Facility-Administered Medications   Medication Dose Route Frequency Provider Last Rate   • acetaminophen  650 mg Oral Q6H PRN Laith Dillon MD     • albuterol  2 5 mg Nebulization Q6H PRN CAMPBELL Christiansen     • amiodarone  0 5 mg/min Intravenous Continuous Rom Mathew Jr, PA-C Stopped (10/26/22 0510)   • atorvastatin  10 mg Oral Daily Nikko Carlton PA-C     • cholecalciferol  2,000 Units Oral Daily Nikko Carlton PA-C     • digoxin  125 mcg Intravenous Once CAMPBELL Sinha     • docusate sodium  100 mg Oral BID Nikko Carlton PA-C     • ferrous sulfate  325 mg Oral Daily With Breakfast Nikko Carlton PA-C     • fluticasone-vilanterol  1 puff Inhalation Daily Nikko Carlton PA-C     • insulin detemir  26 Units Subcutaneous HS CAMPBELL Christiansen     • insulin lispro  1-6 Units Subcutaneous HS Isabella Hanson PA-C     • insulin lispro  3 Units Subcutaneous TID With Meals CAMPBELL Christiansen     • insulin lispro  4-20 Units Subcutaneous TID AC CAMPBELL Christiansen     • levothyroxine  200 mcg Oral Early Morning CAMPBELL Christiansen      And   • levothyroxine  88 mcg Oral Early Morning CAMPBELL Christiansen     • melatonin  6 mg Oral HS CAMPBELL Christiansen     • metoprolol  1 3 mg Intravenous Q4H PRN CAMPBELL Saleh     • midodrine  10 mg Oral TID AC Isabella Hanson PA-C     • montelukast  10 mg Oral HS Mio Kyle PA-C     • nystatin   Topical BID Mio Kyle PA-C     • ondansetron  4 mg Intravenous Q6H PRN CAMPBELL Sinha     • pantoprazole  20 mg Oral Early Morning Mio Kyle PA-C     • polyethylene glycol  17 g Oral Daily PRN Mio Kyle PA-C     • QUEtiapine  25 mg Oral HS PRN Gabriella Saucedo     • traZODone  50 mg Oral HS PRN Gabriella Saucedo     • vasopressin  0 04 Units/min Intravenous Continuous Yakov Blount 0 04 Units/min (10/26/22 0157)     Continuous Infusions:  amiodarone, 0 5 mg/min, Last Rate: Stopped (10/26/22 0510)  vasopressin, 0 04 Units/min, Last Rate: 0 04 Units/min (10/26/22 0157)      PRN Meds:   acetaminophen, 650 mg, Q6H PRN  albuterol, 2 5 mg, Q6H PRN  metoprolol, 1 3 mg, Q4H PRN  ondansetron, 4 mg, Q6H PRN  polyethylene glycol, 17 g, Daily PRN  QUEtiapine, 25 mg, HS PRN  traZODone, 50 mg, HS PRN        Invasive Devices Review  Invasive Devices  Report    Central Venous Catheter Line  Duration           CVC Central Lines 10/23/22 Triple 16cm 2 days          Peripheral Intravenous Line  Duration           Peripheral IV 10/22/22 Left Antecubital 3 days          Arterial Line  Duration           Arterial Line 10/22/22 Radial 3 days          Drain  Duration           Urethral Catheter Straight-tip 16 Fr  3 days              ---------------------------------------------------------------------------------------  Advance Directive and Living Will:      Power of :    POLST:    ---------------------------------------------------------------------------------------  Care Time Delivered:   Upon my evaluation, this patient had a high probability of imminent or life-threatening deterioration due to undifferentiated shock requiring vasopressor support, which required my direct attention, intervention, and personal management  I have personally provided 20 minutes (0400 to 0420) of critical care time, exclusive of procedures, teaching, family meetings, and any prior time recorded by providers other than myself  [address] [place] [address], [facility]      Portions of the record may have been created with voice recognition software  Occasional wrong word or "sound a like" substitutions may have occurred due to the inherent limitations of voice recognition software    Read the chart carefully and recognize, using context, where substitutions have occurred

## 2022-10-26 NOTE — OCCUPATIONAL THERAPY NOTE
Occupational Therapy Evaluation     Patient Name: Roz LOBATO Date: 10/26/2022  Problem List  Principal Problem:    Acute kidney injury superimposed on chronic kidney disease ([facility] )  Active Problems:    Atrial fibrillation ([facility] )    COPD (chronic obstructive pulmonary disease) ([facility] )    Diabetes mellitus ([facility] )    Chronic respiratory failure with hypoxia (HCC)    Chronic diastolic congestive heart failure (HCC)    Morbid obesity (HCC)    Hypothyroidism    Hypotension    GERD (gastroesophageal reflux disease)    UTI (urinary tract infection)    Vaginal bleeding    Past Medical History  Past Medical History:   Diagnosis Date   • Asthma    • Atrial fibrillation ([facility] )    • COPD (chronic obstructive pulmonary disease) ([facility])    • Diabetes mellitus ([facility] )    • Disease of thyroid gland    • Heart failure (HCC)    • Kidney failure    • NISHI (obstructive sleep apnea)      Past Surgical History  Past Surgical History:   Procedure Laterality Date   • CARDIAC ELECTROPHYSIOLOGY MAPPING AND ABLATION      by Dr Cecilia Payton at [employer] [facility]           10/26/22 1002   Note Type   Note type Evaluation   Pain Assessment   Pain Assessment Tool FLACC   Pain Rating: FLACC (Rest) - Face 0   Pain Rating: FLACC (Rest) - Legs 0   Pain Rating: FLACC (Rest) - Activity 0   Pain Rating: FLACC (Rest) - Cry 0   Pain Rating: FLACC (Rest) - Consolability 0   Score: FLACC (Rest) 0   Pain Rating: FLACC (Activity) - Face 1   Pain Rating: FLACC (Activity) - Legs 0   Pain Rating: FLACC (Activity) - Activity 0   Pain Rating: FLACC (Activity) - Cry 1   Pain Rating: FLACC (Activity) - Consolability 0   Score: FLACC (Activity) 2   Restrictions/Precautions   Other Precautions Bed Alarm;Multiple lines;Telemetry;O2;Fall Risk;Pain   Home Living   Type of Home House  (0 IZAIAH)   Home Layout Two level;Performs ADLs on one level; Able to live on main level with bedroom/bathroom   Bathroom Shower/Tub (sponge bathes)   Bathroom Toilet   [facility]   [address]; Wheelchair-manual   Additional Comments Pt reports living in a [address] with her  and son  Pt reports sleeping in recliner chair and completing SPT <> BSC at baseline   Prior Function   Level of [place] Needs assistance with ADLs; Needs assistance with functional mobility; Needs assistance with IADLS   Lives With Spouse; Son   Prabhakar Help From Family;Friend(s)   IADLs Family/Friend/Other provides transportation; Family/Friend/Other provides meals; Family/Friend/Other provides medication management   Comments Pt report she has a friend who is an RN who comes over to assist with ADLs 1x/wk but she hasn't been coming lately  Spouse assists with ADLs and son with IADLs, assist with community mobility from [facility] bus  (Pt was recently admitted to [address] [place] [address] and was then d/c'ed to SNF for rehab  Pt was at rehab for a short stay before reurning home  Pt reports she was completing SPT upon returning home)   ADL   Where Assessed Edge of bed   Grooming Assistance 4  Minimal Assistance   Grooming Deficit Setup   UB Dressing Assistance 3  Moderate Assistance   UB Dressing Deficit Steadying;Verbal cueing;Supervision/safety; Increased time to complete; Thread RUE; Thread LUE;Pull over head;Pull around back   LB Dressing Assistance 1  Total Assistance   LB Dressing Deficit Steadying;Verbal cueing;Supervision/safety; Increased time to complete; Don/doff R sock; Don/doff L sock; Thread RLE into pants; Thread LLE into pants;Pull up over hips   Additional Comments Pt completing ADL tasks while seated at EOB  UB Dressing @ Mod A for threading arms and donning over head  LB Dressing @ Total A for donning socks/pants around feet and CM around waist  Grooming tasks @ Min A with increased tiem and assistance for thoroughness PRN   Bed Mobility   Supine to Sit 3  Moderate assistance   Additional items Assist x 1;HOB elevated; Bedrails; Increased time required;Verbal cues  (trunk management)   Sit to Supine 2  Maximal assistance   Additional items Assist x 2; Increased time required;LE management;Verbal cues   Additional Comments Pt completing supine>sit @ Mod a for trunk management with HOB elevated and use of bedrails PRN  Pt seated at EOB while maintaining F unsupported sitting balance  Pt requiring Max x's 2 for sit>supine for trunk and LE management   Transfers   Sit to Stand 2  Maximal assistance  (able to clear buttocks from EOB  unable to achieve a full stand)   Additional items Assist x 2; Increased time required;Verbal cues   Stand to Sit 2  Maximal assistance   Additional items Assist x 2; Increased time required;Verbal cues   Stand pivot Unable to assess   Additional Comments Pt completing STS from EOB>RW with B knees blocked  Pt able to achieve just clearing buttocks from EOB although unable to achieve full stand  attempted STS x's 2 with same results  Pt then returned to supine in bed   Balance   Static Sitting Fair   Dynamic Sitting Fair -   Activity Tolerance   Activity Tolerance Patient limited by fatigue   Medical Staff Made Aware Spoke with PT, Trudy Gil   Nurse Made Aware Spoke with RN, Grace Moore Assessment   RUE Assessment WFL   LUE Assessment   LUE Assessment WFL   Hand Function   Gross Motor Coordination Functional   Fine Motor Coordination Functional   Sensation   Light Touch No apparent deficits   Cognition   Overall Cognitive Status WFL   Arousal/Participation Alert; Responsive; Cooperative   Attention Attends with cues to redirect   Orientation Level Oriented X4   Memory Within functional limits   Following Commands Follows one step commands without difficulty   Assessment   Limitation Decreased ADL status; Decreased UE strength;Decreased Safe judgement during ADL;Decreased endurance;Decreased self-care trans;Decreased high-level ADLs   Prognosis Good   Assessment Pt is a 69 y o  female, admitted to [address] [place] [address] 10/22/2022 d/t experiencing increased weakness  Dx: IMTIAZ superimposed on CKD  Pt with PMHx impacting their performance during ADL tasks, including: asthma, a-fib, COPD, DM, heart failure, kidney failure  Prior to admission to the hospital Pt was performing ADLs with physical assistance  IADLs with physical assistance  Functional transfers/ambulation with physical assistance  Cognitive status was PTA was intact  OT order placed to assess Pt's ADLs, cognitive status, and performance during functional tasks in order to maximize safety and independence while making most appropriate d/c recommendations  Pt's clinical presentation is currently unstable/unpredictable given new onset deficits that effect Pt's occupational performance and ability to safely return to OF including decrease activity tolerance, decrease standing balance, decrease sitting balance, decrease performance during ADL tasks, decrease safety awareness , decrease UB MS, increased pain, decrease generalized strength, decrease activity engagement, decrease performance during functional transfers, high fall risk and limited insight to deficits combined with medical complications of hypotension, poor blood pressure control, tachycardia, abnormal renal lab values, A-fib, abnormal sodium values, low SpO2 values, new onset O2 use, abnormal CO2 values, edema/swelling, elevated BNP, decreased skin integrity, multiple readmissions, incontinence, fear/retreat and need for input for mobility technique/safety  Central line  Pt maintaining >93% on 2-3L of O2  Personal factors affecting Pt at time of initial evaluation include: availability as recommended, limited home support, past experience, inability to perform current job functions, inability to perform IADLs, inability to perform ADLs, inability to ambulate household distances, inability to navigate community distances, limited insight into impairments, decreased initiation and engagement and questionable non-compliance   Pt will benefit from continued skilled OT services to address deficits as defined above and to maximize level independence/participation during ADLs and functional tasks to facilitate return toward PLOF and improved quality of life  From an occupational therapy standpoint, recommendation at time of d/c would be post acute rehab  Plan   Treatment Interventions ADL retraining;Functional transfer training;UE strengthening/ROM; Endurance training;Patient/family training;Equipment evaluation/education; Neuromuscular reeducation; Compensatory technique education;Continued evaluation; Energy conservation; Activityengagement   Goal Expiration Date 11/09/22   OT Frequency 3-5x/wk   Recommendation   OT Discharge Recommendation Post acute rehabilitation services   AM-[facility] Daily Activity Inpatient   Lower Body Dressing 1   Bathing 1   Toileting 1   Upper Body Dressing 2   Grooming 3   Eating 4   Daily Activity Raw Score 12   Daily Activity Standardized Score (Calc for Raw Score >=11) 30 6   [facility] Applied Cognition Inpatient   Following a Speech/Presentation 3   Understanding Ordinary Conversation 4   Taking Medications 3   Remembering Where Things Are Placed or Put Away 4   Remembering List of 4-5 Errands 4   Taking Care of Complicated Tasks 3   Applied Cognition Raw Score 21   Applied Cognition Standardized Score 44 3     The patient's raw score on the AM-[facility] Daily Activity inpatient short form is 12, standardized score is 30 6, less than 39 4  Patients at this level are likely to benefit from DC to post-acute rehabilitation services  Please refer to the recommendation of the Occupational Therapist for safe DC planning  Pt goals to be met by 11/9/2022    1  Pt will demonstrate ability to complete grooming/hygiene tasks @ S after set-up  2  Pt will demonstrate ability to complete UB ADLs including washing/dressing @ S in order to increase performance and participation during meaningful tasks  3   Pt will demonstrate ability to complete LB dressing @ Mod A in order to increase safety and independence during meaningful tasks  4  Pt will demonstrate ability to complete toileting tasks including CM and pericare @ Mod A in order to increase safety and independence during meaningful tasks  5  Pt will demonstrate ability to complete EOB, chair, toilet/commode transfers @ Mod A in order to increase performance and participation during functional tasks  6  Pt will demonstrate ability to stand for 1-2 minutes while maintaining F+ balance with use of RW for UB support PRN  7  Pt will demonstrate ability to tolerate 30-35 minute OT session with no vc'ing for deep breathing or use of energy conservation techniques in order to increase activity tolerance during functional tasks  8  Pt will demonstrate Good carryover of use of energy conservation/compensatory strategies during ADLs and functional tasks in order to increase safety and reduce risk for falls  9  Pt will demonstrate Good attention and participation in continued evaluation of functional ambulation house hold distances in order to assist with safe d/c planning  10  Pt will attend to continued cognitive assessments 100% of the time in order to provide most appropriate d/c recommendations  11  Pt will follow 100% simple 2-step commands and be A&O x4 consistently with environmental cues to increase participation in functional activities  12  Pt will identify 3 areas of interest/hobbies and 1 intervention on how to incorporate into daily life in order to increase interaction with environment and peers as well as increase participation in meaningful tasks  13  Pt will demonstrate 100% carryover of BUE HEP in order to increase BUE MS and increase performance during functional tasks upon d/c home      Aparna Bernabe OTR/L

## 2022-10-27 LAB
ALDOLASE SERPL-CCNC: 3.5 U/L (ref 3.3–10.3)
ANION GAP SERPL CALCULATED.3IONS-SCNC: 5 MMOL/L (ref 4–13)
BACTERIA BLD CULT: NORMAL
BACTERIA BLD CULT: NORMAL
BUN SERPL-MCNC: 79 MG/DL (ref 5–25)
CALCIUM SERPL-MCNC: 10 MG/DL (ref 8.3–10.1)
CHLORIDE SERPL-SCNC: 99 MMOL/L (ref 96–108)
CO2 SERPL-SCNC: 32 MMOL/L (ref 21–32)
CREAT SERPL-MCNC: 1.59 MG/DL (ref 0.6–1.3)
DIGOXIN SERPL-MCNC: 1.3 NG/ML (ref 0.8–2)
ERYTHROCYTE [DISTWIDTH] IN BLOOD BY AUTOMATED COUNT: 17.7 % (ref 11.6–15.1)
GFR SERPL CREATININE-BSD FRML MDRD: 32 ML/MIN/1.73SQ M
GLUCOSE SERPL-MCNC: 126 MG/DL (ref 65–140)
GLUCOSE SERPL-MCNC: 126 MG/DL (ref 65–140)
GLUCOSE SERPL-MCNC: 130 MG/DL (ref 65–140)
GLUCOSE SERPL-MCNC: 148 MG/DL (ref 65–140)
GLUCOSE SERPL-MCNC: 165 MG/DL (ref 65–140)
HCT VFR BLD AUTO: 35.7 % (ref 34.8–46.1)
HGB BLD-MCNC: 11.1 G/DL (ref 11.5–15.4)
INR PPP: 2.22 (ref 0.84–1.19)
MAGNESIUM SERPL-MCNC: 2 MG/DL (ref 1.6–2.6)
MCH RBC QN AUTO: 28.4 PG (ref 26.8–34.3)
MCHC RBC AUTO-ENTMCNC: 31.1 G/DL (ref 31.4–37.4)
MCV RBC AUTO: 91 FL (ref 82–98)
PHOSPHATE SERPL-MCNC: 2.4 MG/DL (ref 2.3–4.1)
PLATELET # BLD AUTO: 179 THOUSANDS/UL (ref 149–390)
PMV BLD AUTO: 10.9 FL (ref 8.9–12.7)
POTASSIUM SERPL-SCNC: 4.5 MMOL/L (ref 3.5–5.3)
PROTHROMBIN TIME: 24.7 SECONDS (ref 11.6–14.5)
RBC # BLD AUTO: 3.91 MILLION/UL (ref 3.81–5.12)
SODIUM SERPL-SCNC: 136 MMOL/L (ref 135–147)
WBC # BLD AUTO: 7.15 THOUSAND/UL (ref 4.31–10.16)

## 2022-10-27 RX ORDER — TORSEMIDE 20 MG/1
40 TABLET ORAL DAILY
Status: DISCONTINUED | OUTPATIENT
Start: 2022-10-27 | End: 2022-10-28

## 2022-10-27 RX ORDER — WARFARIN SODIUM 3 MG/1
6 TABLET ORAL
Status: DISCONTINUED | OUTPATIENT
Start: 2022-10-27 | End: 2022-10-29

## 2022-10-27 RX ORDER — METOPROLOL SUCCINATE 25 MG/1
12.5 TABLET, EXTENDED RELEASE ORAL 2 TIMES DAILY
Status: DISCONTINUED | OUTPATIENT
Start: 2022-10-27 | End: 2022-10-29 | Stop reason: HOSPADM

## 2022-10-27 RX ADMIN — DOCUSATE SODIUM AND SENNOSIDES 2 TABLET: 8.6; 5 TABLET, FILM COATED ORAL at 17:21

## 2022-10-27 RX ADMIN — INSULIN DETEMIR 26 UNITS: 100 INJECTION, SOLUTION SUBCUTANEOUS at 21:12

## 2022-10-27 RX ADMIN — MIDODRINE HYDROCHLORIDE 10 MG: 5 TABLET ORAL at 06:02

## 2022-10-27 RX ADMIN — MIDODRINE HYDROCHLORIDE 10 MG: 5 TABLET ORAL at 11:58

## 2022-10-27 RX ADMIN — Medication 2000 UNITS: at 08:14

## 2022-10-27 RX ADMIN — NYSTATIN 1 APPLICATION: 100000 POWDER TOPICAL at 17:22

## 2022-10-27 RX ADMIN — NYSTATIN 1 APPLICATION: 100000 POWDER TOPICAL at 08:14

## 2022-10-27 RX ADMIN — ACETAMINOPHEN 650 MG: 325 TABLET ORAL at 05:34

## 2022-10-27 RX ADMIN — DOCUSATE SODIUM AND SENNOSIDES 2 TABLET: 8.6; 5 TABLET, FILM COATED ORAL at 08:14

## 2022-10-27 RX ADMIN — Medication 1 APPLICATION: at 05:34

## 2022-10-27 RX ADMIN — MIDODRINE HYDROCHLORIDE 10 MG: 5 TABLET ORAL at 17:21

## 2022-10-27 RX ADMIN — LEVOTHYROXINE SODIUM 200 MCG: 100 TABLET ORAL at 05:06

## 2022-10-27 RX ADMIN — FLUTICASONE FUROATE AND VILANTEROL TRIFENATATE 1 PUFF: 100; 25 POWDER RESPIRATORY (INHALATION) at 08:14

## 2022-10-27 RX ADMIN — ATORVASTATIN CALCIUM 10 MG: 10 TABLET, FILM COATED ORAL at 08:14

## 2022-10-27 RX ADMIN — FERROUS SULFATE TAB 325 MG (65 MG ELEMENTAL FE) 325 MG: 325 (65 FE) TAB at 08:14

## 2022-10-27 RX ADMIN — POLYETHYLENE GLYCOL 3350 17 G: 17 POWDER, FOR SOLUTION ORAL at 08:14

## 2022-10-27 RX ADMIN — METOPROLOL SUCCINATE 12.5 MG: 25 TABLET, EXTENDED RELEASE ORAL at 21:13

## 2022-10-27 RX ADMIN — WARFARIN SODIUM 6 MG: 3 TABLET ORAL at 17:21

## 2022-10-27 RX ADMIN — PANTOPRAZOLE SODIUM 20 MG: 20 TABLET, DELAYED RELEASE ORAL at 05:06

## 2022-10-27 RX ADMIN — METOPROLOL SUCCINATE 12.5 MG: 25 TABLET, EXTENDED RELEASE ORAL at 09:21

## 2022-10-27 RX ADMIN — INSULIN LISPRO 5 UNITS: 100 INJECTION, SOLUTION INTRAVENOUS; SUBCUTANEOUS at 11:58

## 2022-10-27 RX ADMIN — LEVOTHYROXINE SODIUM 88 MCG: 88 TABLET ORAL at 05:06

## 2022-10-27 RX ADMIN — TORSEMIDE 40 MG: 20 TABLET ORAL at 09:20

## 2022-10-27 RX ADMIN — INSULIN LISPRO 4 UNITS: 100 INJECTION, SOLUTION INTRAVENOUS; SUBCUTANEOUS at 11:58

## 2022-10-27 RX ADMIN — MONTELUKAST 10 MG: 10 TABLET, FILM COATED ORAL at 21:13

## 2022-10-27 NOTE — PLAN OF CARE
Problem: Potential for Falls  Goal: Patient will remain free of falls  Description: INTERVENTIONS:  - Educate patient/family on patient safety including physical limitations  - Instruct patient to call for assistance with activity   - Consult OT/PT to assist with strengthening/mobility   - Keep Call bell within reach  - Keep bed low and locked with side rails adjusted as appropriate  - Keep care items and personal belongings within reach  - Initiate and maintain comfort rounds  - Make Fall Risk Sign visible to staff  - Offer Toileting every 2 Hours, in advance of need  - Initiate/Maintain bed/chair alarm as needed  - Apply yellow socks and bracelet for high fall risk patients  - Consider moving patient to room near nurses station  Outcome: Progressing     Problem: PAIN - ADULT  Goal: Verbalizes/displays adequate comfort level or baseline comfort level  Description: Interventions:  - Encourage patient to monitor pain and request assistance  - Assess pain using appropriate pain scale  - Administer analgesics based on type and severity of pain and evaluate response  - Implement non-pharmacological measures as appropriate and evaluate response  - Consider cultural and social influences on pain and pain management  - Notify physician/advanced practitioner if interventions unsuccessful or patient reports new pain  Outcome: Progressing     Problem: INFECTION - ADULT  Goal: Absence or prevention of progression during hospitalization  Description: INTERVENTIONS:  - Assess and monitor for signs and symptoms of infection  - Monitor lab/diagnostic results  - Monitor all insertion sites, i e  indwelling lines, tubes, and drains  - Monitor endotracheal if appropriate and nasal secretions for changes in amount and color  - [place] appropriate cooling/warming therapies per order  - Administer medications as ordered  - Instruct and encourage patient and family to use good hand hygiene technique  - Identify and instruct in appropriate isolation precautions for identified infection/condition  Outcome: Progressing  Goal: Absence of fever/infection during neutropenic period  Description: INTERVENTIONS:  - Monitor WBC    Outcome: Progressing

## 2022-10-27 NOTE — SPEECH THERAPY NOTE
Speech Language/Pathology    Speech/Language Pathology Progress Note    Patient Name: Guzman Soler  OJMRJ'A Date: 10/27/2022       Subjective:  "Everything is great"    Objective: To assess diet tolerance    Assessment:  Pt seen for breakfast meal, upright in chair position in [place] bed  On 4L O2 via nasal cannula  Spoke w/ RN Darryl Bustamante who reported no issues noted swallowing  Pt denies dysphagia as well and stated not using a straw has made a difference  Presented w/ regular textures and thin liquids via cup rim  Pt w/ positive bolus retrieval and adequate clearance  No overt s/s aspiration w/ solids or liquids  Observed taking meds whole w/ thins via cup  No overt s/s aspiration  Plan/Recommendations:  Recommend to continue regular texture diet w/ thin liquids, no straw  Meds whole as tolerated  SLP will s/o, please re-consult as needed      Naseem Sanders 85 CCC-SLP  10/27/2022

## 2022-10-27 NOTE — PROGRESS NOTES
Progress Note - Pulmonary   Veronica Lynn 69 y o  female MRN: [id number]  Unit/Bed#: -01 Encounter: [id number]    Assessment/Plan:    1  Shock state- resolved, off pressors  · Likely mixed etiology low cardiac output state/hypokalemic on background HFpEF/cardiogenic, pHTN  2  Pulmonary hypertension   · Severe RV systolic dysfunction, estimated RA pressure of 15, no estimated PA SP  · Likely WHO group 2 (HFpEF), WHO group 3 (NISHI/OHS, chronic hypoxia, COPD/asthma)  WHO group 1 possible, serologies pending  3  IMTIAZ  4  Presumed asthma/COPD- unknown severity, no PFTs  5  Chronic hypoxic respiratory failure  6  Morbid obesity    Pulmonary recommendations:    · Off pressors  Discontinue central line per critical care  · Diuretics per Cardiology/Nephrology-currently on torsemide 40 mg p o  daily  · Currently on baseline oxygen requirement of 4 L nasal-titrate oxygen maintain saturations greater than 88%  · Continue Breo puff daily and p r n  albuterol HFA  · Continue CPAP q h s  and p r n  · Patient is stable from a pulmonary standpoint  Will sign off  Call if questions  Chief Complaint:    "I feel like my breathing is better "    Subjective:    Patient seen and examined today  No overnight events reported per patient  Repair status states that she did not CPAP last night due to irritation from central line  Plan is to remove central line today  Patient reports that her respiratory symptoms are near baseline  She denies shortness of breath  She does report occasional wheeze  Patient reports cough is resolved  No chest pain or palpitations  No fevers  Objective:    Vitals: Blood pressure 100/54, pulse 98, temperature 97 6 °F (36 4 °C), temperature source Temporal, resp  rate 19, height 5' 2" (1 575 m), weight (!) 152 kg (335 lb 1 6 oz), SpO2 100 %  4L NC,Body mass index is 61 29 kg/m²        Intake/Output Summary (Last 24 hours) at 10/27/2022 1358  Last data filed at 10/27/2022 1021  Gross per 24 hour   Intake 976 4 ml   Output 720 ml   Net 256 4 ml       Invasive Devices  Report    Peripheral Intravenous Line  Duration           Peripheral IV 10/27/22 Dorsal (posterior); Left Forearm <1 day          Drain  Duration           External Urinary Catheter <1 day                Physical Exam:     Physical Exam  Vitals and nursing note reviewed  Constitutional:       General: She is not in acute distress  Appearance: Normal appearance  HENT:      Head: Normocephalic and atraumatic  Nose: Nose normal       Mouth/Throat:      Mouth: Mucous membranes are moist       Pharynx: Oropharynx is clear  Eyes:      Extraocular Movements: Extraocular movements intact  Conjunctiva/sclera: Conjunctivae normal    Cardiovascular:      Rate and Rhythm: Normal rate and regular rhythm  Heart sounds: No murmur heard  Pulmonary:      Effort: Pulmonary effort is normal  No respiratory distress  Breath sounds: No wheezing or rhonchi  Comments: Diminished breath sounds  Musculoskeletal:      Cervical back: Normal range of motion and neck supple  No muscular tenderness  Right lower leg: No edema  Left lower leg: No edema  Skin:     General: Skin is warm and dry  Neurological:      General: No focal deficit present  Mental Status: She is alert  Mental status is at baseline  Psychiatric:         Mood and Affect: Mood normal          Behavior: Behavior normal          Labs: I have personally reviewed pertinent lab results  , ABG: No results found for: PHART, GLD1ISS, PO2ART, QOF2RGF, R3OXLCWI, BEART, SOURCE, BNP: No results found for: BNP, CBC:   Lab Results   Component Value Date    WBC 7 15 10/27/2022    HGB 11 1 (L) 10/27/2022    HCT 35 7 10/27/2022    MCV 91 10/27/2022     10/27/2022    MCH 28 4 10/27/2022    MCHC 31 1 (L) 10/27/2022    RDW 17 7 (H) 10/27/2022    MPV 10 9 10/27/2022   , CMP:   Lab Results   Component Value Date    SODIUM 136 10/27/2022    K 4 5 10/27/2022 CL 99 10/27/2022    CO2 32 10/27/2022    BUN 79 (H) 10/27/2022    CREATININE 1 59 (H) 10/27/2022    CALCIUM 10 0 10/27/2022    EGFR 32 10/27/2022   , PT/INR:   Lab Results   Component Value Date    INR 2 22 (H) 10/27/2022   , Troponin: No results found for: TROPONINI    Imaging and other studies: I have personally reviewed pertinent reports     and I have personally reviewed pertinent films in PACS

## 2022-10-27 NOTE — PROGRESS NOTES
Progress Note:Cardiology  Ryland Krishnan 1955, 69 y o  female MRN: [id number]    Unit/Bed#: -01 Encounter: [id number]  Attending Physician: Divine Skinner MD   Primary Care Provider: Yogi Canas MD   Date admitted to hospital: 10/22/2022  Length of stay: 5         Chronic diastolic congestive heart failure [facility])  Assessment & Plan  Wt Readings from Last 3 Encounters:   10/25/22 (!) 143 kg (315 lb 7 7 oz)   10/03/22 (!) 171 kg (377 lb 6 8 oz)   04/13/20 (!) 162 kg (356 lb 0 7 oz)     History of right sided heart failure  Echocardiogram 9/21/2022 shows EF 55-59% with evidence of RV pressure and volume overload  RV severely dilated with mild-moderately reduced function  MR  Severe TR  Maintained on torsemide and metolazone at home  Presented this admission with hypovolemia and IMTIAZ which has improved following IV hydration and albumin  Currently not on diuretic therapy  Currently undergoing pulmonary work up for pulmonary hypertension  She does appear to be volume overloaded today  Recommend reinitiating diuretics  Strict I's/O's     UTI (urinary tract infection)  Assessment & Plan  As per critical care team    Hypotension  Assessment & Plan  Currently requiring midodrine 10 mg TID  Levo discontinued on afternoon of 10/26/22  Hypothyroidism  Assessment & Plan  TSH 1 41 this admission  Ideally TSH between 1-3 given a fib    Morbid obesity ([address] )  Assessment & Plan  Body mass index is 57 7 kg/m²  Contributing to overall cardiopulmonary status    Chronic respiratory failure with hypoxia (HCC)  Assessment & Plan  Chronic in the setting of right sided heart failure, asthma, NISHI, obesity  Utilizes 4 L NC of supplemental O2 at home  Atrial fibrillation [facility])  Assessment & Plan  History of atrial fibrillation  Details are unclear due to lack of availability of medical records  Patient was previously following with Dr Imani Shelley through [facility]    Reports undergoing 2 failed cardioversions and ultimately an atrial fibrillation ablation, date unknown  It is unclear how long she has been back in atrial fibrillation  ECG from 4/2020 shows atrial fibrillation  ECG at this hospital presentation showed rate controlled atrial fibrillation  She is maintained on warfarin anticoagulation, goal INR 2-3  Home metoprolol succinate 100 mg BID was held in admission due to hypotension  Developed atrial fibrillation with RVR on 10/24/2022 and initiated on amiodarone drip, received 2 doses of dig  mcg  Due to bradycardia amiodarone discontinued and metoprolol discontinued temporarily  Metoprolol succinate 12 5 mg BID resumed and updated hold parameters  Continue telemetry  Optimize electrolytes for K+ > 4, Mag > 2     * Acute kidney injury superimposed on chronic kidney disease [facility])  Assessment & Plan  Lab Results   Component Value Date    EGFR 26 10/25/2022    EGFR 21 10/24/2022    EGFR 16 10/24/2022    CREATININE 1 91 (H) 10/25/2022    CREATININE 2 24 (H) 10/24/2022    CREATININE 2 83 (H) 10/24/2022     IMTIAZ with creatinine of 6 at admission  Today improved to 1 59  Appreciate nephrology recs  Acute hyponatremia-resolved as of 10/26/2022  Assessment & Plan  Corrected with treatment of hypovolemia      Subjective:   Patient seen and examined  No significant events overnight  Patient is sitting up talking with a friend  She is smiling and reports feeling much better today  No chest pain  Breathing is improved  She is back to her baseline oxygen requirements  No significant bradycardia or tachycardia overnight  Review of Systems   Constitutional: Negative  HENT: Negative  Cardiovascular: Positive for dyspnea on exertion  Negative for chest pain, irregular heartbeat, leg swelling, near-syncope, orthopnea and palpitations  Respiratory: Positive for shortness of breath (improving)  Negative for cough and snoring  Endocrine: Negative  Skin: Negative      Musculoskeletal: Negative  Gastrointestinal: Negative  Genitourinary: Negative  Neurological: Negative  Psychiatric/Behavioral: Negative  Objective:     Vitals: Blood pressure 109/59, pulse 64, temperature 97 6 °F (36 4 °C), temperature source Temporal, resp  rate 15, height 5' 2" (1 575 m), weight (!) 152 kg (335 lb 1 6 oz), SpO2 97 %  , Body mass index is 61 29 kg/m² ,     Orthostatic Blood Pressures    Flowsheet Row Most Recent Value   Blood Pressure 109/59 filed at 10/27/2022 1451   Patient Position - Orthostatic VS Lying filed at 10/27/2022 1451          Physical Exam  Vitals and nursing note reviewed  Constitutional:       General: She is not in acute distress  Appearance: She is well-developed  She is obese  HENT:      Head: Normocephalic and atraumatic  Eyes:      Conjunctiva/sclera: Conjunctivae normal    Cardiovascular:      Rate and Rhythm: Normal rate  Rhythm irregular  Heart sounds: No murmur heard  Comments: No significant edema to lower legs  Pulmonary:      Effort: Pulmonary effort is normal  No respiratory distress  Breath sounds: Examination of the right-lower field reveals rales  Examination of the left-lower field reveals rales  Wheezing (expiratory) and rales present  Comments: Breathing comfortably on 4 L NC  Abdominal:      Palpations: Abdomen is soft  Tenderness: There is no abdominal tenderness  Musculoskeletal:      Cervical back: Neck supple  Skin:     General: Skin is warm and dry  Neurological:      Mental Status: She is alert  Psychiatric:         Mood and Affect: Mood and affect normal          Speech: Speech normal          Behavior: Behavior normal  Behavior is cooperative           Cognition and Memory: Cognition normal             Intake/Output Summary (Last 24 hours) at 10/27/2022 1829  Last data filed at 10/27/2022 1701  Gross per 24 hour   Intake 720 ml   Output 1045 ml   Net -325 ml       Weight (last 2 days)     Date/Time Weight 10/27/22 0500 152 (335 1)    10/26/22 0600 150 (329 81)    10/25/22 0538 143 (315 48)             Medications:      Current Facility-Administered Medications:   •  acetaminophen (TYLENOL) tablet 650 mg, 650 mg, Oral, Q6H PRN, Helen Torres MD, 650 mg at 10/27/22 0534  •  albuterol inhalation solution 2 5 mg, 2 5 mg, Nebulization, Q6H PRN, CAMPBELL Christiansen  •  ammonium lactate (LAC-HYDRIN) 12 % lotion, , Topical, BID PRN, USMAN Gilbert-C, 1 application at 45/27/35 0534  •  atorvastatin (LIPITOR) tablet 10 mg, 10 mg, Oral, Daily, Ival Mis, PA-C, 10 mg at 10/27/22 6756  •  bisacodyl (DULCOLAX) rectal suppository 10 mg, 10 mg, Rectal, Daily PRN, CAMPBELL Christiansen  •  cholecalciferol (VITAMIN D3) tablet 2,000 Units, 2,000 Units, Oral, Daily, Ival Mis, PA-C, 2,000 Units at 10/27/22 7253  •  ferrous sulfate tablet 325 mg, 325 mg, Oral, Daily With Breakfast, Ival Mis, PA-C, 325 mg at 10/27/22 6686  •  fluticasone-vilanterol (BREO ELLIPTA) 100-25 mcg/inh inhaler 1 puff, 1 puff, Inhalation, Daily, Ival Mis, PA-C, 1 puff at 10/27/22 9699  •  insulin detemir (LEVEMIR) subcutaneous injection 26 Units, 26 Units, Subcutaneous, HS, CAMPBELL Christiansen, 26 Units at 10/26/22 2120  •  insulin lispro (HumaLOG) 100 units/mL subcutaneous injection 1-6 Units, 1-6 Units, Subcutaneous, HS, Isabella Hanson PA-C, 1 Units at 10/25/22 2202  •  insulin lispro (HumaLOG) 100 units/mL subcutaneous injection 4-20 Units, 4-20 Units, Subcutaneous, TID AC, 4 Units at 10/27/22 1158 **AND** Fingerstick Glucose (POCT), , , 4x Daily AC and at bedtime, CAMPBELL Christiansen  •  insulin lispro (HumaLOG) 100 units/mL subcutaneous injection 5 Units, 5 Units, Subcutaneous, TID With Meals, CAMPBELL Christiansen, 5 Units at 10/27/22 1158  •  levothyroxine tablet 200 mcg, 200 mcg, Oral, Early Morning, 200 mcg at 10/27/22 0506 **AND** levothyroxine tablet 88 mcg, 88 mcg, Oral, Early Morning, CAMPBELL Christiansen, 88 mcg at 10/27/22 0506  •  melatonin tablet 6 mg, 6 mg, Oral, HS, CAMPBELL Christiansen, 6 mg at 10/26/22 2205  •  metoprolol (LOPRESSOR) injection 1 3 mg, 1 3 mg, Intravenous, Q4H PRN, CAMPBELL Joe  •  metoprolol succinate (TOPROL-XL) 24 hr tablet 12 5 mg, 12 5 mg, Oral, BID, CAMPBELL Joe, 12 5 mg at 10/27/22 7167  •  midodrine (PROAMATINE) tablet 10 mg, 10 mg, Oral, TID AC, Isabella Hanson PA-C, 10 mg at 10/27/22 1721  •  montelukast (SINGULAIR) tablet 10 mg, 10 mg, Oral, HS, Zoë Garcia PA-C, 10 mg at 10/26/22 2120  •  nystatin (MYCOSTATIN) powder, , Topical, BID, Zoë Garcia PA-C, 1 application at 40/97/82 1722  •  ondansetron (ZOFRAN) injection 4 mg, 4 mg, Intravenous, Q6H PRN, CAMPBELL Sinha  •  pantoprazole (PROTONIX) EC tablet 20 mg, 20 mg, Oral, Early Morning, Matt Jalloh PA-C, 20 mg at 10/27/22 0897  •  polyethylene glycol (MIRALAX) packet 17 g, 17 g, Oral, Daily, CAMPBELL Christiansen, 17 g at 10/27/22 5052  •  QUEtiapine (SEROquel) tablet 25 mg, 25 mg, Oral, HS PRN, Criss Big  •  senna-docusate sodium (SENOKOT S) 8 6-50 mg per tablet 2 tablet, 2 tablet, Oral, BID, CAMPBELL Christiansen, 2 tablet at 10/27/22 1721  •  torsemide (DEMADEX) tablet 40 mg, 40 mg, Oral, Daily, CMAPBELL Joe, 40 mg at 10/27/22 0920  •  traZODone (DESYREL) tablet 50 mg, 50 mg, Oral, HS PRN, Criss Big, 50 mg at 10/26/22 2206  •  warfarin (COUMADIN) tablet 6 mg, 6 mg, Oral, Daily (warfarin), CAMPBELL Christiansen, 6 mg at 10/27/22 1721     Labs & Results:    Results from last 7 days   Lab Units 10/25/22  1715   CK TOTAL U/L 23*     Results from last 7 days   Lab Units 10/27/22  0503 10/26/22  0423 10/25/22  0449   WBC Thousand/uL 7 15 6 19 8 45   HEMOGLOBIN g/dL 11 1* 10 7* 11 0*   HEMATOCRIT % 35 7 34 1* 34 4*   PLATELETS Thousands/uL 179 164 184         Results from last 7 days   Lab Units 10/27/22  0503 10/26/22  0423 10/25/22  0449 10/23/22  1127 10/23/22  0437   POTASSIUM mmol/L 4 5 4 8 3 7   < >  --    CHLORIDE mmol/L 99 98 97   < >  --    CO2 mmol/L 32 32 31   < >  --    BUN mg/dL 79* 71* 81*   < >  --    CREATININE mg/dL 1 59* 1 63* 1 91*   < >  --    CALCIUM mg/dL 10 0 9 9 9 8   < >  --    ALK PHOS U/L  --  116 122*  --  129*   ALT U/L  --  28 26  --  36   AST U/L  --  24 24  --  31    < > = values in this interval not displayed  Results from last 7 days   Lab Units 10/27/22  0503 10/26/22  0423 10/24/22  0425   INR  2 22* 2 37* 2 76*     Results from last 7 days   Lab Units 10/27/22  0503 10/26/22  0423 10/25/22  0449   MAGNESIUM mg/dL 2 0 2 0 1 7     Results from last 7 days   Lab Units 10/22/22  1214   NT-PRO BNP pg/mL 4,103*        Telemetry: Atrial fibrillation, rate 70-90's    Counseling / Coordination of Care  Total floor / unit time spent today 25 minutes  Greater than 50% of total time was spent with the patient and / or family counseling and / or coordination of care    A description of the counseling / coordination of care: Discussed case with ICU team

## 2022-10-27 NOTE — PROGRESS NOTES
[address]  Progress Note Dolly Houston 1955, 69 y o  female MRN: [id number]  Unit/Bed#: -01 Encounter: [id number]  Primary Care Provider: Ashley Silver MD   Date and time admitted to hospital: 10/22/2022 11:40 AM    * Acute kidney injury superimposed on chronic kidney disease [facility])  Assessment & Plan  Lab Results   Component Value Date    EGFR 31 10/26/2022    EGFR 26 10/25/2022    EGFR 21 10/24/2022    CREATININE 1 63 (H) 10/26/2022    CREATININE 1 91 (H) 10/25/2022    CREATININE 2 24 (H) 10/24/2022       · Likely in the setting of dehydration/intravascular volume depletion secondary to oral diuretics   · Patient presented to the ED with a creatine 6 18 (Baseline 1-1 3)  · CT A/P 10/22 showed no evidence of obstructive hydronephrosis  Renal US shows no acute abnomalities  · Nephrology consulted- appreciate recommendations   · FeUREA suggestive of pre-renal etiology  · NSS- D/C and bolus PRN  · Trend creatinine  · Prn potassium replacement  · Avoid hypotension and nephrotoxins  · Repeat FENA 2 1 intrinsic ATN, most likely 2/2 hypoperfusion  · D/C Dow, Use Quickwick --> Strict I&O  · Creatinine improving 1 59, baseline (1-1 3)  · Continue to trend creatinine      Hypotension  Assessment & Plan  · Chronic hypotension, but now shock likely septic vs hypovolemic  · Patient received IV fluids secondary to volume depletion and hypotension  Last dose of Midodrine 10/17  Pt reports not taking after discharge home for rehab facility  · Home dose Midodrine 5mg TID   10/23 Increased Midodrine from 5 to 10 mg TID  · 10/22 Fluid resuscitated with IVF and albumen  Started on alannah-synephrine and then levophed  · 10/23 Transitioned alannah gtt to vasopressin  · 10/25 Levophed weaned off  · 10/26 Vasopressin weaned off  Restart Beta blocker at lower dose    · 10/27 Remove central line    Chronic diastolic congestive heart failure (HCC)  Assessment & Plan  Wt Readings from Last 3 Encounters:   10/27/22 (!) 152 kg (335 lb 1 6 oz)   10/03/22 (!) 171 kg (377 lb 6 8 oz)   04/13/20 (!) 162 kg (356 lb 0 7 oz)       · Patient hospitalized 9/21/22 - 10/3/22 secondary to volume overload  Started on torsemide and zaroxolyn  · Patient down 23kg from last admission   · Home BB held due to hypotension, resume low dose as BP allows  · Echocardiogram 9/21/22: The left ventricular ejection fraction is 55-59%  Systolic function is normal  Wall motion is grossly normal  There is both systolic and diastolic flattening of the interventricular septum consistent with right ventricle pressure and volume overload: Right ventricular cavity size is severely dilated  Systolic function is mildly to moderately reduced  · Off pressors 10/26  · Continue Digoxin 62 5mg  · Remove central line    UTI (urinary tract infection)  Assessment & Plan  · Concern for UTI  · UA +WBC, No nitrates   · Started on Ceftriaxone in the ED  · F/u urine cultures - >100,000 alpha hemolytic strep  · LA 2 0 on admission, cleared   · Completed 3 days of Ceftriaxone  · Remove Dow      Chronic respiratory failure with hypoxia (HCC)  Assessment & Plan  · Chronically on CPAP at night and 4L of oxygen during the day secondary to COPD most likely NISHI  · Unable to wear home unit due to right jugular central line  · Goal Sp02>88%  · Respiratory protocol     Atrial fibrillation ([address] )  Assessment & Plan  · Initially helld Metoprolol secondary to hypotension  · Previously rate controlled, went into RVR 10/24 and started amio gtt  · 10/26 - discontinued due to bradycardia  · Digoxin load given   Dig 62 5 mcg IV 10/26  · Start low dose BB as BP allows - resume metoprolol 12 5 mg BID 10/26, increase to 25 mg BID when able  · INR 3 17 on admission (Goal INR 2-3)  · INR 2 22 10/27  · Continue Coumadin 6 mg (home dose 7mg)  · Trend INR    Diabetes mellitus [facility])  Assessment & Plan  Lab Results   Component Value Date    HGBA1C 6 8 (H) 10/05/2022       Recent Labs     10/25/22  1705 10/25/22  2148 10/26/22  0705 10/26/22  1056   POCGLU 266* 184* 197* 304*        · High level SSI ACHS  · Levemir 26 U HS  · 5 U TID meal time insulin   · Goal -180    COPD (chronic obstructive pulmonary disease) ([facility])  Assessment & Plan  · Continue home breo ellipta and singulair   · Continue 4L 02 nc  · Atrovent and xopenex QID PRN  · Albuterol PRN  · Respiratory protocol     Acute hyponatremia-resolved as of 10/26/2022  Assessment & Plan  · Presented with corrected sodium 126  · Suspected etiology hypovolemia with use of oral duiretics  · Trend BMP  · Resolved      ----------------------------------------------------------------------------------------  HPI/24hr events:   Weaned off pressors  Discontinued Amiodarone  Received Digoxin  Resumed Beta blocker - 10pm dose held for hypotension  Increase bowel regimen due to constipation  No CPAP over night - slept OK      Patient appropriate for transfer out of the ICU today?: No  Disposition: Continue Critical Care   Code Status: Level 3 - DNAR and DNI  ---------------------------------------------------------------------------------------  SUBJECTIVE  Feels better, more strength  Slept okay  No SOB  No bowel movement  Review of Systems   Constitutional: Negative for chills, fatigue and fever  HENT: Negative for ear pain and sore throat  Eyes: Negative for pain and visual disturbance  Respiratory: Negative for cough and shortness of breath  Cardiovascular: Positive for leg swelling  Negative for chest pain and palpitations  Gastrointestinal: Positive for constipation  Negative for abdominal pain and vomiting  Genitourinary: Negative for dysuria and hematuria  Musculoskeletal: Negative for arthralgias and back pain  Skin: Negative for color change and rash  Neurological: Positive for weakness  Negative for seizures and syncope          Weakness improving   Psychiatric/Behavioral: The patient is not nervous/anxious  All other systems reviewed and are negative  Review of systems was reviewed and negative unless stated above in HPI/24-hour events   ---------------------------------------------------------------------------------------  OBJECTIVE    Vitals   Vitals:    10/27/22 0300 10/27/22 0400 10/27/22 0500 10/27/22 0700   BP: 111/55 102/57  98/51   BP Location:  Left arm  Left arm   Pulse: 67 65  98   Resp: 17 14  21   Temp:  98 3 °F (36 8 °C)  97 6 °F (36 4 °C)   TempSrc:  Temporal  Temporal   SpO2: 95% 94%  96%   Weight:   (!) 152 kg (335 lb 1 6 oz)    Height:         Temp (24hrs), Av °F (36 7 °C), Min:97 6 °F (36 4 °C), Max:98 4 °F (36 9 °C)  Current: Temperature: 97 6 °F (36 4 °C)  Arterial Line BP: 84/46  Arterial Line MAP (mmHg): (!) 59 mmHg    Respiratory:  SpO2: SpO2: 96 %  Nasal Cannula O2 Flow Rate (L/min): 4 L/min    Invasive/non-invasive ventilation settings   Respiratory  Report   Lab Data (Last 4 hours)    None         O2/Vent Data (Last 4 hours)    None                Physical Exam  Vitals and nursing note reviewed  Constitutional:       General: She is not in acute distress  Appearance: She is well-developed  She is obese  HENT:      Head: Normocephalic and atraumatic  Right Ear: External ear normal       Left Ear: External ear normal       Nose: Nose normal       Mouth/Throat:      Mouth: Mucous membranes are moist    Eyes:      General:         Right eye: No discharge  Left eye: No discharge  Conjunctiva/sclera: Conjunctivae normal    Neck:      Comments: Right IVJ central line placed - secure  Cardiovascular:      Rate and Rhythm: Normal rate and regular rhythm  Pulses: Normal pulses  Heart sounds: Normal heart sounds  No murmur heard  Pulmonary:      Effort: Pulmonary effort is normal  No respiratory distress  Breath sounds: Normal breath sounds  No wheezing        Comments: Decreased breath sounds bilaterally  Abdominal:      Palpations: Abdomen is soft  Tenderness: There is no abdominal tenderness  Musculoskeletal:      Cervical back: Normal range of motion and neck supple  Right lower leg: Edema present  Left lower leg: Edema present  Comments: LUKAS stasis dermatitis   Skin:     General: Skin is warm and dry  Neurological:      Mental Status: She is alert  Laboratory and Diagnostics:  Results from last 7 days   Lab Units 10/27/22  0503 10/26/22  0423 10/25/22  0449 10/24/22  0425 10/23/22  0437 10/22/22  1214   WBC Thousand/uL 7 15 6 19 8 45 10 71* 10 88* 9 33   HEMOGLOBIN g/dL 11 1* 10 7* 11 0* 12 4 12 0 13 5   HEMATOCRIT % 35 7 34 1* 34 4* 39 0 36 4 41 7   PLATELETS Thousands/uL 179 164 184 242 264 264   NEUTROS PCT %  --   --   --   --   --  65   MONOS PCT %  --   --   --   --   --  10     Results from last 7 days   Lab Units 10/27/22  0503 10/26/22  0423 10/25/22  0449 10/24/22  1543 10/24/22  0425 10/23/22  2119 10/23/22  1127 10/23/22  0437 10/22/22  2256 10/22/22  1214   SODIUM mmol/L 136 138 138 136 136 132* 132*  --    < > 124*   POTASSIUM mmol/L 4 5 4 8 3 7 4 1 3 6 3 7 3 7  --    < > 4 1   CHLORIDE mmol/L 99 98 97 98 92* 94* 91*  --    < > 82*   CO2 mmol/L 32 32 31 30 29 28 28  --    < > 32   ANION GAP mmol/L 5 8 10 8 15* 10 13  --    < > 10   BUN mg/dL 79* 71* 81* 88* 107* 111* 123*  --    < > 160*   CREATININE mg/dL 1 59* 1 63* 1 91* 2 24* 2 83* 3 29* 3 97*  --    < > 6 18*   CALCIUM mg/dL 10 0 9 9 9 8 10 6* 9 9 9 9 9 2  --    < > 9 2   GLUCOSE RANDOM mg/dL 126 204* 193* 233* 245* 265* 300*  --    < > 235*   ALT U/L  --  28 26  --   --   --   --  36  --  43   AST U/L  --  24 24  --   --   --   --  31  --  44   ALK PHOS U/L  --  116 122*  --   --   --   --  129*  --  168*   ALBUMIN g/dL  --  3 5 4 2  --   --   --   --  3 9  --  3 2*   TOTAL BILIRUBIN mg/dL  --  1 16* 1 56*  --   --   --   --  0 68  --  0 64    < > = values in this interval not displayed       Results from last 7 days   Lab Units 10/27/22  0503 10/26/22  0423 10/25/22  0449 10/24/22  1543 10/24/22  0425 10/23/22  0437 10/22/22  2256 10/22/22  1214   MAGNESIUM mg/dL 2 0 2 0 1 7 1 8 1 9 2 7* 2 1  --    PHOSPHORUS mg/dL 2 4 2 4 2 2* 2 3  --  6 1*  --  6 5*      Results from last 7 days   Lab Units 10/27/22  0503 10/26/22  0423 10/24/22  0425 10/23/22  0437 10/22/22  1736   INR  2 22* 2 37* 2 76* 3 71* 3 17*          Results from last 7 days   Lab Units 10/23/22  0437 10/22/22  1214   LACTIC ACID mmol/L 1 0 2 0     ABG:  Results from last 7 days   Lab Units 10/23/22  0407   PH ART  7 363   PCO2 ART mm Hg 43 6   PO2 ART mm Hg 119 3   HCO3 ART mmol/L 24 2   BASE EXC ART mmol/L -1 2   ABG SOURCE  Line, Arterial     VBG:  Results from last 7 days   Lab Units 10/24/22  0951 10/23/22  1127 10/23/22  0407   PH ALFREDO  7 374   < >  --    PCO2 ALFREDO mm Hg 50 8*   < >  --    PO2 ALFREDO mm Hg 92 4*   < >  --    HCO3 ALFREDO mmol/L 29 0   < >  --    BASE EXC ALFREDO mmol/L 2 8   < >  --    ABG SOURCE   --   --  Line, Arterial    < > = values in this interval not displayed  Micro  Results from last 7 days   Lab Units 10/22/22  1605 10/22/22  1217 10/22/22  1214   BLOOD CULTURE   --   --  No Growth After 4 Days  No Growth After 4 Days  URINE CULTURE  No Growth <1000 cfu/mL >100,000 cfu/ml Alpha Hemolytic Streptococcus NOT Enterococcus*  <10,000 cfu/ml Gram Negative Fer Enteric Like*  <10,000 cfu/ml Staphylococcus coagulase negative*  --        EKG: NA  Imaging: I have personally reviewed pertinent reports  Intake and Output  I/O       10/25 0701  10/26 0700 10/26 0701  10/27 0700 10/27 0701  10/28 0700    P  O  800 1440     I V  (mL/kg) 1046 9 (7) 67 2 (0 4)     IV Piggyback 150      Total Intake(mL/kg) 1996 9 (13 3) 1507 2 (9 9)     Urine (mL/kg/hr) 1120 (0 3) 970 (0 3)     Total Output 1120 970     Net +876 9 +537 2                  Height and Weights   Height: 5' 2" (157 5 cm)     Body mass index is 61 29 kg/m²    Weight (last 2 days)     Date/Time Weight    10/27/22 0500 152 (335 1)    10/26/22 0600 150 (329 81)    10/25/22 0538 143 (315 48)            Nutrition       Diet Orders   (From admission, onward)             Start     Ordered    10/23/22 1433  Diet Marcos/CHO Controlled; Consistent Carbohydrate Diet Level 2 (5 carb servings/75 grams CHO/meal)  Diet effective now        References:    Nutrtion Support Algorithm Enteral vs  Parenteral   Question Answer Comment   Diet Type Marcos/CHO Controlled    Marcos/CHO Controlled Consistent Carbohydrate Diet Level 2 (5 carb servings/75 grams CHO/meal)    RD to adjust diet per protocol?  Yes        10/23/22 1432                  Active Medications  Scheduled Meds:  Current Facility-Administered Medications   Medication Dose Route Frequency Provider Last Rate   • acetaminophen  650 mg Oral Q6H PRN Odilia Magaña MD     • albuterol  2 5 mg Nebulization Q6H PRN CAMPBELL Christiansen     • ammonium lactate   Topical BID PRN Kwasi Null PA-C     • atorvastatin  10 mg Oral Daily Julia Santos PA-C     • bisacodyl  10 mg Rectal Daily PRN CAMPBELL Rincon     • cholecalciferol  2,000 Units Oral Daily Julia Santos PA-C     • ferrous sulfate  325 mg Oral Daily With Breakfast Julia Santos PA-C     • fluticasone-vilanterol  1 puff Inhalation Daily Julia Santos PA-C     • insulin detemir  26 Units Subcutaneous HS CAMPBELL Christiansen     • insulin lispro  1-6 Units Subcutaneous HS Isabella Hanson PA-C     • insulin lispro  4-20 Units Subcutaneous TID AC CAMPBELL Christiansen     • insulin lispro  5 Units Subcutaneous TID With Meals CAMPBELL Christiansen     • levothyroxine  200 mcg Oral Early Morning CAMPBELL Christiansen      And   • levothyroxine  88 mcg Oral Early Morning CAMPBELL Christiansen     • melatonin  6 mg Oral HS CAMPBELL Christiansen     • metoprolol  1 3 mg Intravenous Q4H PRN CAMPBELL Kearns     • metoprolol tartrate  12 5 mg Oral Q12H [facility] CAMPBELL Lange     • midodrine  10 mg Oral TID AC Isabella Hanson PA-C     • montelukast  10 mg Oral HS Opal Hem, JUAN ANTONIO     • nystatin   Topical BID Opal HemJUAN ANTONIO     • ondansetron  4 mg Intravenous Q6H PRN CAMPBELL Paul     • pantoprazole  20 mg Oral Early Morning Opal HemJUAN ANTONIO     • polyethylene glycol  17 g Oral Daily CAMPBELL Christiansen     • QUEtiapine  25 mg Oral HS PRN Lucius Gilbert     • senna-docusate sodium  2 tablet Oral BID CAMPBELL Christiansen     • traZODone  50 mg Oral HS PRN Yakov Yepez       Continuous Infusions:     PRN Meds:   acetaminophen, 650 mg, Q6H PRN  albuterol, 2 5 mg, Q6H PRN  ammonium lactate, , BID PRN  bisacodyl, 10 mg, Daily PRN  metoprolol, 1 3 mg, Q4H PRN  ondansetron, 4 mg, Q6H PRN  QUEtiapine, 25 mg, HS PRN  traZODone, 50 mg, HS PRN        Invasive Devices Review  Invasive Devices  Report    Central Venous Catheter Line  Duration           CVC Central Lines 10/23/22 Triple 16cm 4 days          Drain  Duration           External Urinary Catheter <1 day                Rationale for remaining devices: oxygen requirements  ---------------------------------------------------------------------------------------  Advance Directive and Living Will:      Power of :    POLST:    ---------------------------------------------------------------------------------------  Care Time Delivered:   45 min      Roxann Valeroi MD      Portions of the record may have been created with voice recognition software  Occasional wrong word or "sound a like" substitutions may have occurred due to the inherent limitations of voice recognition software    Read the chart carefully and recognize, using context, where substitutions have occurred

## 2022-10-27 NOTE — ASSESSMENT & PLAN NOTE
· Concern for UTI  · UA +WBC, No nitrates   · Started on Ceftriaxone in the ED  · F/u urine cultures - >100,000 alpha hemolytic strep  · LA 2 0 on admission, cleared   · Completed 3 days of Ceftriaxone  · Remove Dow

## 2022-10-27 NOTE — PROGRESS NOTES
NEPHROLOGY PROGRESS NOTE   Traci Shenr 69 y o  female MRN: [id number]  Unit/Bed#: -01 Encounter: [id number]    Assessment/Plan:    68 yo female with CKD 3, COPD, AFib recent hospitalization for acute on chronic heart failure discharge to rehab re-presented 10/22 for weakness found to have severe oliguric IMTIAZ, shock suspected cardiogenic in nature  Nephrology following along for acute kidney injury atop chronic kidney disease, polyuria, volume status, electrolyte and acid-base disorder  1  Acute kidney injury (POA) atop chronic kidney disease  · Presented with a creatinine of 6 2 mg/dL-> 1 6 mg/dL today  Creatinine remains elevated above baseline but stable  Likely transition to ATN  Will need to monitor for renal recovery  Okay to diurese as mentioned below  Continue to avoid potential nephrotoxins, maximize hemodynamics  · Indwelling urinary catheter removed-monitor for urinary retention  2  Stage 3 chronic kidney disease with previous baseline creatinine around 1 1-1 3 mg/dL  3  Shock  · Cardiogenic shock per critical care team   Fortunately wean off of all pressors and maintained on midodrine 10 mg t i d  Which is a prior to admission medication  4  Volume overloaded state  · Discussed with Cardiology and Critical Care team-okay to trial diuresis today monitor for hypotension  Initiated on torsemide 40 mg daily  5  Polyuria most likely due to postobstructive auto diuresis  · Resolved, urine output reduce him received diuresis today  6  Proteinuria  · Re-evaluate urine protein studies as an outpatient rule out monoclonal gammopathy if indicated        ROS  Examined sitting in upright position in bed  Complains that her abdomen feels bloated  A complete 10 point review of systems have been performed and are otherwise negative         Historical Information   Past Medical History:   Diagnosis Date   • Asthma    • Atrial fibrillation ([facility] )    • COPD [facility]ch[facility] disease[facility] )    • Diabetes mellitus ([facility] )    • Disease of thyroid gland    • Heart failure ([facility] )    • Kidney failure    • NISHI (obstructive sleep apnea)      Past Surgical History:   Procedure Laterality Date   • CARDIAC ELECTROPHYSIOLOGY MAPPING AND ABLATION      by Dr Ingrid Samayoa at [facility] [employer]       Social History   Social History     Substance and Sexual Activity   Alcohol Use Never     Social History     Substance and Sexual Activity   Drug Use Never     Social History     Tobacco Use   Smoking Status Never Smoker   Smokeless Tobacco Never Used       Family History:   Family History   Problem Relation Age of Onset   • Arthritis Mother    • Hearing loss Mother    • Heart disease Mother    • Hypertension Mother    • Stroke Mother    • Alcohol abuse Father    • Cancer Father    • Diabetes Father    • Arthritis Sister    • Cancer Sister    • Alcohol abuse Brother    • Diabetes Son    • Arthritis Daughter    • Drug abuse Daughter    • Heart disease Maternal Grandmother    • Hypertension Maternal Grandmother    • Stroke Maternal Grandmother    • Arthritis Maternal Aunt    • Asthma Neg Hx    • Birth defects Neg Hx    • COPD Neg Hx    • Depression Neg Hx    • Early death Neg Hx    • Hyperlipidemia Neg Hx    • Kidney disease Neg Hx    • Learning disabilities Neg Hx    • Mental illness Neg Hx    • Mental retardation Neg Hx    • Miscarriages / Stillbirths Neg Hx    • Vision loss Neg Hx        Medications:  Pertinent medications were reviewed  Current Facility-Administered Medications   Medication Dose Route Frequency Provider Last Rate   • acetaminophen  650 mg Oral Q6H PRN Brenna Mcbride MD     • albuterol  2 5 mg Nebulization Q6H PRN CAMPBELL Christiansen     • ammonium lactate   Topical BID PRN Lucia Munguia PA-C     • atorvastatin  10 mg Oral Daily Mio Kyle PA-C     • bisacodyl  10 mg Rectal Daily PRN CAMPBELL Sousa     • cholecalciferol  2,000 Units Oral Daily Jasbir Gaspar PA-C     • ferrous sulfate  325 mg Oral Daily With Breakfast Jasbir Gaspar PA-C     • fluticasone-vilanterol  1 puff Inhalation Daily Jasbir Gaspar PA-C     • insulin detemir  26 Units Subcutaneous HS CAMPBELL Christiansen     • insulin lispro  1-6 Units Subcutaneous HS Isabella Barger PA-C     • insulin lispro  4-20 Units Subcutaneous TID AC CAMPBELL Christiansen     • insulin lispro  5 Units Subcutaneous TID With Meals CAMPBELL Christiansen     • levothyroxine  200 mcg Oral Early Morning CAMPBELL Christiansen      And   • levothyroxine  88 mcg Oral Early Morning CAMPBELL Christiansen     • melatonin  6 mg Oral HS CAMPBELL Christiansen     • metoprolol  1 3 mg Intravenous Q4H PRN CAMPBELL Parker     • metoprolol succinate  12 5 mg Oral BID CAMPBELL Parker     • midodrine  10 mg Oral TID AC Isabella Hanson PA-C     • montelukast  10 mg Oral HS Jasbir Gaspar PA-C     • nystatin   Topical BID Jasbir Gaspar PA-C     • ondansetron  4 mg Intravenous Q6H PRN CAMPBELL Sinha     • pantoprazole  20 mg Oral Early Morning Jasbir Gaspar PA-C     • polyethylene glycol  17 g Oral Daily CAMPBELL Christiansen     • QUEtiapine  25 mg Oral HS PRN Margo La Barge     • senna-docusate sodium  2 tablet Oral BID CAMPBELL Christiansen     • torsemide  40 mg Oral Daily CAMPBELL Parker     • traZODone  50 mg Oral HS PRN Yakov Farragut           Allergies   Allergen Reactions   • Acesulfame Potassium - Food Allergy Anaphylaxis   • Aspartame - Food Allergy Anaphylaxis   • Saccharin - Food Allergy Anaphylaxis   • Sucralose - Food Allergy Anaphylaxis   • Metformin GI Intolerance         Vitals:   BP 98/51 (BP Location: Left arm)   Pulse 98   Temp 97 6 °F (36 4 °C) (Temporal)   Resp 21   Ht 5' 2" (1 575 m)   Wt (!) 152 kg (335 lb 1 6 oz)   SpO2 96%   BMI 61 29 kg/m²   Body mass index is 61 29 kg/m²  SpO2: 96 %,   SpO2 Activity: At Rest,   O2 Device: High flow nasal cannula      Intake/Output Summary (Last 24 hours) at 10/27/2022 0855  Last data filed at 10/27/2022 0530  Gross per 24 hour   Intake 1124 6 ml   Output 870 ml   Net 254 6 ml     Invasive Devices  Report    Central Venous Catheter Line  Duration           CVC Central Lines 10/23/22 Triple 16cm 4 days          Drain  Duration           External Urinary Catheter <1 day                Physical Exam  General: conscious, cooperative, in no acute distress  Eyes: conjunctivae pink, anicteric sclerae  ENT: lips and mucous membranes moist  Neck: supple, no JVD, no masses  Chest:  Very diminished breath sounds bilaterally, no crackles, ronchus or wheezings on nasal cannula  CVS: S1 & S2, normal rate, regular rhythm  Abdomen: soft, non-tender, softly-distended, normoactive bowel sounds obese  Extremities:  Moderate edema of both legs  Skin: no rash  Neuro: awake, alert, oriented      Diagnostic Data:  Lab: I have personally reviewed pertinent lab results  ,   CBC:  Results from last 7 days   Lab Units 10/27/22  0503   WBC Thousand/uL 7 15   HEMOGLOBIN g/dL 11 1*   HEMATOCRIT % 35 7   PLATELETS Thousands/uL 179      CMP:   Lab Results   Component Value Date    SODIUM 136 10/27/2022    K 4 5 10/27/2022    CL 99 10/27/2022    CO2 32 10/27/2022    BUN 79 (H) 10/27/2022    CREATININE 1 59 (H) 10/27/2022    CALCIUM 10 0 10/27/2022    EGFR 32 10/27/2022   ,   PT/INR:   Lab Results   Component Value Date    INR 2 22 (H) 10/27/2022   ,   Magnesium: No components found for: MAG,  Phosphorous:   Lab Results   Component Value Date    PHOS 2 4 10/27/2022       Microbiology:  @[email or web address],(urinecx:7)@        [employer]    Portions of the record may have been created with voice recognition software  Occasional wrong word or "sound a like" substitutions may have occurred due to the inherent limitations of voice recognition software   Read the chart carefully and recognize, using context, where substitutions have occurred

## 2022-10-27 NOTE — CASE MANAGEMENT
Case Management Discharge Planning Note    Patient name [facility]  Location /-01 MRN [id number]  : 1955 Date 10/27/2022       Current Admission Date: 10/22/2022  Current Admission Diagnosis:Acute kidney injury superimposed on chronic kidney disease [facility])   Patient Active Problem List    Diagnosis Date Noted   • Vaginal bleeding 10/23/2022   • Acute kidney injury superimposed on chronic kidney disease ([address] ) 10/22/2022   • Hypotension 10/22/2022   • GERD (gastroesophageal reflux disease) 10/22/2022   • UTI (urinary tract infection) 10/22/2022   • Chronic diastolic congestive heart failure ([facility] ) 2022   • IMTIAZ (acute kidney injury) ([facility] [facility] ) 2022   • Morbid obesity ([facility] [address] [facility] ) 2022   • Hypothyroidism 2022   • Supratherapeutic INR 04/15/2020   • COVID-19 virus infection 2020   • Chronic respiratory failure with hypoxia ([facility] [address] ) 2020   • Asthma 2020   • Atrial fibrillation ([facility] [facility] ) 2020   • COPD (chronic obstructive pulmonary disease) ([facility] [facility] ) 2020   • Diabetes mellitus ([facility] [facility] ) 2020   • Heart failure ([facility] [facility] ) 2020   • Shortness of breath 2020   • Anemia 2020      LOS (days): 5  Geometric Mean LOS (GMLOS) (days): 4 30  Days to GMLOS:-0 6     OBJECTIVE:  Risk of Unplanned Readmission Score: 38 88         Current admission status: Inpatient   Preferred Pharmacy:   [id number] [address]  Phone: [phone] Fax: [phone]    Primary Care Provider: Ashley Silver MD    Primary Insurance: [facility]  Secondary Insurance:     DISCHARGE DETAILS:     CM met with patient to discuss therapy recommendation for rehab  Patient indicated she does not desire to return to rehab at discharge, patient desires to return home with resumption [address] and [facility]    Patient indicated she did not ambulate after rehab she stood and pivoted from lift chair to bedside commode  Patient indicated she sleeps in lift chair and does not desire hospital bed at home  Patient indicated she has strong support system at home from  and son (age 48) which whom she lives and also from friends who frequent home for Bible Study  CM to follow for medical stability and discharge needs

## 2022-10-28 LAB
ANION GAP SERPL CALCULATED.3IONS-SCNC: 9 MMOL/L (ref 4–13)
BUN SERPL-MCNC: 77 MG/DL (ref 5–25)
C-ANCA TITR SER IF: NORMAL TITER
CALCIUM SERPL-MCNC: 9.4 MG/DL (ref 8.3–10.1)
CCP AB SER IA-ACNC: 1.7
CHLORIDE SERPL-SCNC: 98 MMOL/L (ref 96–108)
CO2 SERPL-SCNC: 32 MMOL/L (ref 21–32)
CREAT SERPL-MCNC: 1.46 MG/DL (ref 0.6–1.3)
ERYTHROCYTE [DISTWIDTH] IN BLOOD BY AUTOMATED COUNT: 17.9 % (ref 11.6–15.1)
GFR SERPL CREATININE-BSD FRML MDRD: 36 ML/MIN/1.73SQ M
GLUCOSE SERPL-MCNC: 105 MG/DL (ref 65–140)
GLUCOSE SERPL-MCNC: 120 MG/DL (ref 65–140)
GLUCOSE SERPL-MCNC: 131 MG/DL (ref 65–140)
GLUCOSE SERPL-MCNC: 135 MG/DL (ref 65–140)
GLUCOSE SERPL-MCNC: 183 MG/DL (ref 65–140)
HCT VFR BLD AUTO: 36.9 % (ref 34.8–46.1)
HGB BLD-MCNC: 11.6 G/DL (ref 11.5–15.4)
INR PPP: 1.85 (ref 0.84–1.19)
MAGNESIUM SERPL-MCNC: 1.7 MG/DL (ref 1.6–2.6)
MCH RBC QN AUTO: 28.6 PG (ref 26.8–34.3)
MCHC RBC AUTO-ENTMCNC: 31.4 G/DL (ref 31.4–37.4)
MCV RBC AUTO: 91 FL (ref 82–98)
MYELOPEROXIDASE AB SER IA-ACNC: <0.2 UNITS (ref 0–0.9)
P-ANCA ATYPICAL TITR SER IF: NORMAL TITER
P-ANCA TITR SER IF: NORMAL TITER
PLATELET # BLD AUTO: 225 THOUSANDS/UL (ref 149–390)
PMV BLD AUTO: 11.5 FL (ref 8.9–12.7)
POTASSIUM SERPL-SCNC: 4.3 MMOL/L (ref 3.5–5.3)
PROTEINASE3 AB SER IA-ACNC: <0.2 UNITS (ref 0–0.9)
PROTHROMBIN TIME: 21.4 SECONDS (ref 11.6–14.5)
RBC # BLD AUTO: 4.06 MILLION/UL (ref 3.81–5.12)
SODIUM SERPL-SCNC: 139 MMOL/L (ref 135–147)
WBC # BLD AUTO: 7.37 THOUSAND/UL (ref 4.31–10.16)

## 2022-10-28 RX ORDER — TORSEMIDE 20 MG/1
40 TABLET ORAL 2 TIMES DAILY
Status: DISCONTINUED | OUTPATIENT
Start: 2022-10-28 | End: 2022-10-29 | Stop reason: HOSPADM

## 2022-10-28 RX ADMIN — MONTELUKAST 10 MG: 10 TABLET, FILM COATED ORAL at 21:04

## 2022-10-28 RX ADMIN — INSULIN LISPRO 5 UNITS: 100 INJECTION, SOLUTION INTRAVENOUS; SUBCUTANEOUS at 08:31

## 2022-10-28 RX ADMIN — INSULIN DETEMIR 26 UNITS: 100 INJECTION, SOLUTION SUBCUTANEOUS at 21:04

## 2022-10-28 RX ADMIN — MIDODRINE HYDROCHLORIDE 10 MG: 5 TABLET ORAL at 16:41

## 2022-10-28 RX ADMIN — INSULIN LISPRO 5 UNITS: 100 INJECTION, SOLUTION INTRAVENOUS; SUBCUTANEOUS at 17:00

## 2022-10-28 RX ADMIN — FLUTICASONE FUROATE AND VILANTEROL TRIFENATATE 1 PUFF: 100; 25 POWDER RESPIRATORY (INHALATION) at 08:31

## 2022-10-28 RX ADMIN — NYSTATIN: 100000 POWDER TOPICAL at 17:04

## 2022-10-28 RX ADMIN — TORSEMIDE 40 MG: 20 TABLET ORAL at 08:28

## 2022-10-28 RX ADMIN — DOCUSATE SODIUM AND SENNOSIDES 2 TABLET: 8.6; 5 TABLET, FILM COATED ORAL at 17:00

## 2022-10-28 RX ADMIN — PANTOPRAZOLE SODIUM 20 MG: 20 TABLET, DELAYED RELEASE ORAL at 05:41

## 2022-10-28 RX ADMIN — NYSTATIN: 100000 POWDER TOPICAL at 08:31

## 2022-10-28 RX ADMIN — ACETAMINOPHEN 650 MG: 325 TABLET ORAL at 08:31

## 2022-10-28 RX ADMIN — INSULIN LISPRO 5 UNITS: 100 INJECTION, SOLUTION INTRAVENOUS; SUBCUTANEOUS at 11:47

## 2022-10-28 RX ADMIN — LEVOTHYROXINE SODIUM 200 MCG: 100 TABLET ORAL at 05:40

## 2022-10-28 RX ADMIN — MIDODRINE HYDROCHLORIDE 10 MG: 5 TABLET ORAL at 08:24

## 2022-10-28 RX ADMIN — ATORVASTATIN CALCIUM 10 MG: 10 TABLET, FILM COATED ORAL at 08:24

## 2022-10-28 RX ADMIN — INSULIN LISPRO 1 UNITS: 100 INJECTION, SOLUTION INTRAVENOUS; SUBCUTANEOUS at 21:05

## 2022-10-28 RX ADMIN — METOPROLOL SUCCINATE 12.5 MG: 25 TABLET, EXTENDED RELEASE ORAL at 20:49

## 2022-10-28 RX ADMIN — LEVOTHYROXINE SODIUM 88 MCG: 88 TABLET ORAL at 05:41

## 2022-10-28 RX ADMIN — MIDODRINE HYDROCHLORIDE 10 MG: 5 TABLET ORAL at 11:40

## 2022-10-28 RX ADMIN — METOPROLOL SUCCINATE 12.5 MG: 25 TABLET, EXTENDED RELEASE ORAL at 08:28

## 2022-10-28 RX ADMIN — WARFARIN SODIUM 6 MG: 3 TABLET ORAL at 17:00

## 2022-10-28 RX ADMIN — FERROUS SULFATE TAB 325 MG (65 MG ELEMENTAL FE) 325 MG: 325 (65 FE) TAB at 08:24

## 2022-10-28 RX ADMIN — POLYETHYLENE GLYCOL 3350 17 G: 17 POWDER, FOR SOLUTION ORAL at 08:24

## 2022-10-28 RX ADMIN — Medication 2000 UNITS: at 08:24

## 2022-10-28 RX ADMIN — TORSEMIDE 40 MG: 20 TABLET ORAL at 20:49

## 2022-10-28 RX ADMIN — DOCUSATE SODIUM AND SENNOSIDES 2 TABLET: 8.6; 5 TABLET, FILM COATED ORAL at 08:24

## 2022-10-28 NOTE — CASE MANAGEMENT
Case Management Discharge Planning Note    Patient name Tish See  Location /-01 MRN [id number]  : 1955 Date 10/28/2022       Current Admission Date: 10/22/2022  Current Admission Diagnosis:Acute kidney injury superimposed on chronic kidney disease [facility])   Patient Active Problem List    Diagnosis Date Noted   • Vaginal bleeding 10/23/2022   • Acute kidney injury superimposed on chronic kidney disease ([address] ) 10/22/2022   • Hypotension 10/22/2022   • GERD (gastroesophageal reflux disease) 10/22/2022   • UTI (urinary tract infection) 10/22/2022   • Chronic diastolic congestive heart failure ([facility] ) 2022   • IMTIAZ (acute kidney injury) ([facility] [address] [facility] ) 2022   • Morbid obesity ([facility] ) 2022   • Hypothyroidism 2022   • Supratherapeutic INR 04/15/2020   • COVID-19 virus infection 2020   • Chronic respiratory failure with hypoxia ([facility] ) 2020   • Asthma 2020   • Atrial fibrillation ([facility] ) 2020   • COPD (chronic obstructive pulmonary disease) ([facility] ) 2020   • Diabetes mellitus ([facility] ) 2020   • Heart failure ([facility] ) 2020   • Shortness of breath 2020   • Anemia 2020      LOS (days): 6  Geometric Mean LOS (GMLOS) (days): 4 30  Days to GMLOS:-1 6     OBJECTIVE:  Risk of Unplanned Readmission Score: 37 32         Current admission status: Inpatient   Preferred Pharmacy:   [address] [address]  Phone: [phone] Fax: [phone]    Primary Care Provider: Dom Simpson MD    Primary Insurance: [facility]  Secondary Insurance:     DISCHARGE DETAILS:    Per MD anticipate patient will be medically cleared for dc in about 2 days  Recommendation is for STR  CM spent about 20 minutes, 1st discussing with patient and then spouse arrived  Both patient and spouse believes [facility]    They have friends to support them, patients son will help   " there system they use with recliner lift chair, to RW will work just fine "      Discharge planning discussed with[de-identified] patient and spouse  Freedom of Choice: Yes  Comments - Freedom of Choice: Discussed needing STR,both patient and spouse are declining this  They are confortable with dc home with home care/ [facility]  Reviewed it was a max assist of 2 to stand/pivot  Discussed the importance of having 2 people assist upon dc for stand pivot moves  Also discussed needing a hospital bed and heather lift  They declined this as well  CM contacted family/caregiver?: Yes  Were Treatment Team discharge recommendations reviewed with patient/caregiver?: Yes  Did patient/caregiver verbalize understanding of patient care needs?: Yes  Were patient/caregiver advised of the risks associated with not following Treatment Team discharge recommendations?: Yes    Contacts  Patient Contacts: Lenord  Relationship to Patient[de-identified] Family  Contact Method: In Person  Reason/Outcome: Discharge [address]         Is the patient interested in [address] at discharge?: Yes                   Treatment Team Recommendation: Short Term Rehab  Discharge Destination Plan[de-identified] Home with [address] [place] [facility] ([address] [facility])      Discussed transport home- it will be BLS  IMM Given (Date):: 10/28/22  IMM Given to[de-identified] Family (Copy was provided, and placed 1 in the scan bin)        CM will continue to follow

## 2022-10-28 NOTE — ASSESSMENT & PLAN NOTE
Rate control  Continue beta-blocker  Continue Coumadin, target INR between 2-3  Bleeding precaution provided  Appreciate cardiology recommendation

## 2022-10-28 NOTE — PLAN OF CARE
Problem: Potential for Falls  Goal: Patient will remain free of falls  Description: INTERVENTIONS:  - Educate patient/family on patient safety including physical limitations  - Instruct patient to call for assistance with activity   - Consult OT/PT to assist with strengthening/mobility   - Keep Call bell within reach  - Keep bed low and locked with side rails adjusted as appropriate  - Keep care items and personal belongings within reach  - Initiate and maintain comfort rounds  - Make Fall Risk Sign visible to staff  - Offer Toileting every 2 Hours, in advance of need  - Initiate/Maintain bed/chair alarm as needed  - Apply yellow socks and bracelet for high fall risk patients  - Consider moving patient to room near nurses station  Outcome: Not Progressing     Problem: PAIN - ADULT  Goal: Verbalizes/displays adequate comfort level or baseline comfort level  Description: Interventions:  - Encourage patient to monitor pain and request assistance  - Assess pain using appropriate pain scale  - Administer analgesics based on type and severity of pain and evaluate response  - Implement non-pharmacological measures as appropriate and evaluate response  - Consider cultural and social influences on pain and pain management  - Notify physician/advanced practitioner if interventions unsuccessful or patient reports new pain  Outcome: Progressing     Problem: INFECTION - ADULT  Goal: Absence or prevention of progression during hospitalization  Description: INTERVENTIONS:  - Assess and monitor for signs and symptoms of infection  - Monitor lab/diagnostic results  - Monitor all insertion sites, i e  indwelling lines, tubes, and drains  - Monitor endotracheal if appropriate and nasal secretions for changes in amount and color  - [place] appropriate cooling/warming therapies per order  - Administer medications as ordered  - Instruct and encourage patient and family to use good hand hygiene technique  - Identify and instruct in appropriate isolation precautions for identified infection/condition  Outcome: Progressing  Goal: Absence of fever/infection during neutropenic period  Description: INTERVENTIONS:  - Monitor WBC    Outcome: Progressing     Problem: SAFETY ADULT  Goal: Patient will remain free of falls  Description: INTERVENTIONS:  - Educate patient/family on patient safety including physical limitations  - Instruct patient to call for assistance with activity   - Consult OT/PT to assist with strengthening/mobility   - Keep Call bell within reach  - Keep bed low and locked with side rails adjusted as appropriate  - Keep care items and personal belongings within reach  - Initiate and maintain comfort rounds  - Make Fall Risk Sign visible to staff  - Offer Toileting every 2 Hours, in advance of need  - Initiate/Maintain bed/chair alarm as needed  - Apply yellow socks and bracelet for high fall risk patients  - Consider moving patient to room near nurses station  Outcome: Not Progressing  Goal: Maintain or return to baseline ADL function  Description: INTERVENTIONS:  -  Assess patient's ability to carry out ADLs; assess patient's baseline for ADL function and identify physical deficits which impact ability to perform ADLs (bathing, care of mouth/teeth, toileting, grooming, dressing, etc )  - Assess/evaluate cause of self-care deficits   - Assess range of motion  - Assess patient's mobility; develop plan if impaired  - Assess patient's need for assistive devices and provide as appropriate  - Encourage maximum independence but intervene and supervise when necessary  - Involve family in performance of ADLs  - Assess for home care needs following discharge   - Consider OT consult to assist with ADL evaluation and planning for discharge  - Provide patient education as appropriate  Outcome: Not Progressing  Goal: Maintains/Returns to pre admission functional level  Description: INTERVENTIONS:  - Perform BMAT or MOVE assessment daily    - Set and communicate daily mobility goal to care team and patient/family/caregiver  - Collaborate with rehabilitation services on mobility goals if consulted  - Perform Range of Motion 3 times a day  - Reposition patient every 2 hours  - Dangle patient 3 times a day  - Stand patient 3 times a day  - Ambulate patient 3 times a day  - Out of bed to chair 3 times a day   - Out of bed for meals 3 times a day  - Out of bed for toileting  - Record patient progress and toleration of activity level   Outcome: Progressing     Problem: DISCHARGE PLANNING  Goal: Discharge to home or other facility with appropriate resources  Description: INTERVENTIONS:  - Identify barriers to discharge w/patient and caregiver  - Arrange for needed discharge resources and transportation as appropriate  - Identify discharge learning needs (meds, wound care, etc )  - Arrange for interpretive services to assist at discharge as needed  - Refer to Case Management Department for coordinating discharge planning if the patient needs post-hospital services based on physician/advanced practitioner order or complex needs related to functional status, cognitive ability, or social support system  Outcome: Not Progressing     Problem: Knowledge Deficit  Goal: Patient/family/caregiver demonstrates understanding of disease process, treatment plan, medications, and discharge instructions  Description: Complete learning assessment and assess knowledge base    Interventions:  - Provide teaching at level of understanding  - Provide teaching via preferred learning methods  Outcome: Not Progressing     Problem: GENITOURINARY - ADULT  Goal: Maintains or returns to baseline urinary function  Description: INTERVENTIONS:  - Assess urinary function  - Encourage oral fluids to ensure adequate hydration if ordered  - Administer IV fluids as ordered to ensure adequate hydration  - Administer ordered medications as needed  - Offer frequent toileting  - Follow urinary retention protocol if ordered  Outcome: Not Progressing  Goal: Absence of urinary retention  Description: INTERVENTIONS:  - Assess patient's ability to void and empty bladder  - Monitor I/O  - Bladder scan as needed  - Discuss with physician/AP medications to alleviate retention as needed  - Discuss catheterization for long term situations as appropriate  Outcome: Not Progressing  Goal: Urinary catheter remains patent  Description: INTERVENTIONS:  - Assess patency of urinary catheter  - If patient has a chronic frederick, consider changing catheter if non-functioning  - Follow guidelines for intermittent irrigation of non-functioning urinary catheter  Outcome: Not Progressing     Problem: METABOLIC, FLUID AND ELECTROLYTES - ADULT  Goal: Electrolytes maintained within normal limits  Description: INTERVENTIONS:  - Monitor labs and assess patient for signs and symptoms of electrolyte imbalances  - Administer electrolyte replacement as ordered  - Monitor response to electrolyte replacements, including repeat lab results as appropriate  - Instruct patient on fluid and nutrition as appropriate  Outcome: Progressing  Goal: Fluid balance maintained  Description: INTERVENTIONS:  - Monitor labs   - Monitor I/O and WT  - Instruct patient on fluid and nutrition as appropriate  - Assess for signs & symptoms of volume excess or deficit  Outcome: Progressing  Goal: Glucose maintained within target range  Description: INTERVENTIONS:  - Monitor Blood Glucose as ordered  - Assess for signs and symptoms of hyperglycemia and hypoglycemia  - Administer ordered medications to maintain glucose within target range  - Assess nutritional intake and initiate nutrition service referral as needed  Outcome: Progressing     Problem: RESPIRATORY - ADULT  Goal: Achieves optimal ventilation and oxygenation  Description: INTERVENTIONS:  - Assess for changes in respiratory status  - Assess for changes in mentation and behavior  - Position to facilitate oxygenation and minimize respiratory effort  - Oxygen administered by appropriate delivery if ordered  - Initiate smoking cessation education as indicated  - Encourage broncho-pulmonary hygiene including cough, deep breathe, Incentive Spirometry  - Assess the need for suctioning and aspirate as needed  - Assess and instruct to report SOB or any respiratory difficulty  - Respiratory Therapy support as indicated  Outcome: Progressing     Problem: SKIN/TISSUE INTEGRITY - ADULT  Goal: Skin Integrity remains intact(Skin Breakdown Prevention)  Description: Assess:  -Perform Mino assessment every 2  -Clean and moisturize skin every shift  -Inspect skin when repositioning, toileting, and assisting with ADLS  -Assess extremities for adequate circulation and sensation     Bed Management:  -Have minimal linens on bed & keep smooth, unwrinkled  -Change linens as needed when moist or perspiring  -Avoid sitting or lying in one position for more than 2 hours while in bed  -Keep HOB at 30degrees     Toileting:  -Offer bedside commode  -Assess for incontinence every 2 hours  -Use incontinent care products after each incontinent episode such as purewick    Activity:  -Mobilize patient 2 times a day  -Encourage activity and walks on unit  -Encourage or provide ROM exercises   -Turn and reposition patient every 2 Hours  -Use appropriate equipment to lift or move patient in bed  -Instruct/ Assist with weight shifting every 15 minutes when out of bed in chair  -Consider limitation of chair time 2 hour intervals    Skin Care:  -Avoid use of baby powder, tape, friction and shearing, hot water or constrictive clothing  -Relieve pressure over bony prominences using wedges  -Do not massage red bony areas    Outcome: Progressing  Goal: Incision(s), wounds(s) or drain site(s) healing without S/S of infection  Description: INTERVENTIONS  - Assess and document dressing, incision, wound bed, drain sites and surrounding tissue  - Provide patient and family education  - Perform skin care/dressing changes every shift  Outcome: Progressing  Goal: Pressure injury heals and does not worsen  Description: Interventions:  - Implement low air loss mattress or specialty surface (Criteria met)  - Apply silicone foam dressing  - Instruct/assist with weight shifting every 15 minutes when in chair   - Limit chair time to 3 hour intervals  - Use special pressure reducing interventions such as waffle cushion when in chair   - Apply fecal or urinary incontinence containment device   - Perform passive or active ROM every shift  - Turn and reposition patient & offload bony prominences every 2 hours   - Utilize friction reducing device or surface for transfers   - Consider consults to  interdisciplinary teams such as wounds  - Use incontinent care products after each incontinent episode such as purewick  - Consider nutrition services referral as needed  Outcome: Progressing     Problem: MUSCULOSKELETAL - ADULT  Goal: Maintain or return mobility to safest level of function  Description: INTERVENTIONS:  - Assess patient's ability to carry out ADLs; assess patient's baseline for ADL function and identify physical deficits which impact ability to perform ADLs (bathing, care of mouth/teeth, toileting, grooming, dressing, etc )  - Assess/evaluate cause of self-care deficits   - Assess range of motion  - Assess patient's mobility  - Assess patient's need for assistive devices and provide as appropriate  - Encourage maximum independence but intervene and supervise when necessary  - Involve family in performance of ADLs  - Assess for home care needs following discharge   - Consider OT consult to assist with ADL evaluation and planning for discharge  - Provide patient education as appropriate  Outcome: Not Progressing  Goal: Maintain proper alignment of affected body part  Description: INTERVENTIONS:  - Support, maintain and protect limb and body alignment  - Provide patient/ family with appropriate education  Outcome: Not Progressing     Problem: MOBILITY - ADULT  Goal: Maintain or return to baseline ADL function  Description: INTERVENTIONS:  -  Assess patient's ability to carry out ADLs; assess patient's baseline for ADL function and identify physical deficits which impact ability to perform ADLs (bathing, care of mouth/teeth, toileting, grooming, dressing, etc )  - Assess/evaluate cause of self-care deficits   - Assess range of motion  - Assess patient's mobility; develop plan if impaired  - Assess patient's need for assistive devices and provide as appropriate  - Encourage maximum independence but intervene and supervise when necessary  - Involve family in performance of ADLs  - Assess for home care needs following discharge   - Consider OT consult to assist with ADL evaluation and planning for discharge  - Provide patient education as appropriate  Outcome: Not Progressing  Goal: Maintains/Returns to pre admission functional level  Description: INTERVENTIONS:  - Perform BMAT or MOVE assessment daily    - Set and communicate daily mobility goal to care team and patient/family/caregiver  - Collaborate with rehabilitation services on mobility goals if consulted  - Perform Range of Motion 3 times a day  - Reposition patient every 2 hours    - Dangle patient 3 times a day  - Stand patient 3 times a day  - Ambulate patient 3 times a day  - Out of bed to chair 3 times a day   - Out of bed for meals 3 times a day  - Out of bed for toileting  - Record patient progress and toleration of activity level   Outcome: Progressing     Problem: Prexisting or High Potential for Compromised Skin Integrity  Goal: Skin integrity is maintained or improved  Description: INTERVENTIONS:  - Identify patients at risk for skin breakdown  - Assess and monitor skin integrity  - Assess and monitor nutrition and hydration status  - Monitor labs   - Assess for incontinence   - Turn and reposition patient  - Assist with mobility/ambulation  - Relieve pressure over bony prominences  - Avoid friction and shearing  - Provide appropriate hygiene as needed including keeping skin clean and dry  - Evaluate need for skin moisturizer/barrier cream  - Collaborate with interdisciplinary team   - Patient/family teaching  - Consider wound care consult   Outcome: Progressing     Problem: Nutrition/Hydration-ADULT  Goal: Nutrient/Hydration intake appropriate for improving, restoring or maintaining nutritional needs  Description: Monitor and assess patient's nutrition/hydration status for malnutrition  Collaborate with interdisciplinary team and initiate plan and interventions as ordered  Monitor patient's weight and dietary intake as ordered or per policy  Utilize nutrition screening tool and intervene as necessary  Determine patient's food preferences and provide high-protein, high-caloric foods as appropriate       INTERVENTIONS:  - Monitor oral intake, urinary output, labs, and treatment plans  - Assess nutrition and hydration status and recommend course of action  - Evaluate amount of meals eaten  - Assist patient with eating if necessary   - Allow adequate time for meals  - Recommend/ encourage appropriate diets, oral nutritional supplements, and vitamin/mineral supplements  - Order, calculate, and assess calorie counts as needed  - Recommend, monitor, and adjust tube feedings and TPN/PPN based on assessed needs  - Assess need for intravenous fluids  - Provide specific nutrition/hydration education as appropriate  - Include patient/family/caregiver in decisions related to nutrition  Outcome: Progressing

## 2022-10-28 NOTE — PROGRESS NOTES
NEPHROLOGY PROGRESS NOTE   Van Orin Preeti 69 y o  female MRN: [id number]  Unit/Bed#: -01 Encounter: [id number]    Assessment/Plan:    68 yo female with CKD 3, COPD, AFib recent hospitalization for acute on chronic heart failure discharge to rehab re-presented 10/22 for weakness found to have severe oliguric IMTIAZ, shock suspected cardiogenic in nature  Nephrology following along for acute kidney injury atop chronic kidney disease, polyuria, volume status, electrolyte and acid-base disorder      1  Acute kidney injury (POA) atop chronic kidney disease  ? Presented with a creatinine of 6 2 mg/dL now 1 46 mg/dL today, close to baseline  Initially severely prerenal from volume depletion and shock requiring dual pressors now are mildly volume overloaded  Torsemide will be increased per Cardiology today which is fine from a Nephrology perspective  Indwelling catheter was removed yesterday-continue to monitor for urinary retention  2  Stage 3 chronic kidney disease with previous baseline creatinine around 1 1-1 3 mg/dL  3  Shock-resolved  ? Fortunately able to wean off all vasopressors in being maintained on midodrine 10 mg t i d   4  Volume overloaded state  ? Agree with increase of torsemide 40 mg b i d     Will also add low-sodium diet  5  Polyuria most likely due to postobstructive auto diuresis  ? Resolved  6  Proteinuria  ? Re-evaluate urine protein studies as an outpatient and rule out monoclonal gammopathy if indicated           ROS  Examined sitting upright in bed  No physical complaints  States she is overeating  A complete 10 point review of systems have been performed and are otherwise negative         Historical Information   Past Medical History:   Diagnosis Date   • Asthma    • Atrial fibrillation ([facility] [address] )    • COPD (chronic obstructive pulmonary disease) ([facility] [address] )    • Diabetes mellitus ([facility] [address] )    • Disease of thyroid gland    • Heart failure ([facility] [address] )    • Kidney failure    • NISHI (obstructive sleep apnea)      Past Surgical History:   Procedure Laterality Date   • CARDIAC ELECTROPHYSIOLOGY MAPPING AND ABLATION      by Dr Mc Perea at [facility]       Social History   Social History     Substance and Sexual Activity   Alcohol Use Never     Social History     Substance and Sexual Activity   Drug Use Never     Social History     Tobacco Use   Smoking Status Never Smoker   Smokeless Tobacco Never Used       Family History:   Family History   Problem Relation Age of Onset   • Arthritis Mother    • Hearing loss Mother    • Heart disease Mother    • Hypertension Mother    • Stroke Mother    • Alcohol abuse Father    • Cancer Father    • Diabetes Father    • Arthritis Sister    • Cancer Sister    • Alcohol abuse Brother    • Diabetes Son    • Arthritis Daughter    • Drug abuse Daughter    • Heart disease Maternal Grandmother    • Hypertension Maternal Grandmother    • Stroke Maternal Grandmother    • Arthritis Maternal Aunt    • Asthma Neg Hx    • Birth defects Neg Hx    • COPD Neg Hx    • Depression Neg Hx    • Early death Neg Hx    • Hyperlipidemia Neg Hx    • Kidney disease Neg Hx    • Learning disabilities Neg Hx    • Mental illness Neg Hx    • Mental retardation Neg Hx    • Miscarriages / Stillbirths Neg Hx    • Vision loss Neg Hx        Medications:  Pertinent medications were reviewed  Current Facility-Administered Medications   Medication Dose Route Frequency Provider Last Rate   • acetaminophen  650 mg Oral Q6H PRN CAMPBELL Christiansen     • albuterol  2 5 mg Nebulization Q6H PRN CAMPBELL Christiansen     • ammonium lactate   Topical BID PRN CAMPBELL Christiansen     • atorvastatin  10 mg Oral Daily CAMPBELL Christiansen     • bisacodyl  10 mg Rectal Daily PRN CAMPBELL Christiansen     • cholecalciferol  2,000 Units Oral Daily CAMPBELL Christiansen     • ferrous sulfate  325 mg Oral Daily With Breakfast CAMPBELL Christiansen     • fluticasone-vilanterol  1 puff Inhalation Daily CAMPBELL Christiansen     • insulin detemir  26 Units Subcutaneous HS CAMPBELL Christiansen     • insulin lispro  1-6 Units Subcutaneous HS CAMPBELL Christiansen     • insulin lispro  4-20 Units Subcutaneous TID AC CAMPBELL Christiansen     • insulin lispro  5 Units Subcutaneous TID With Meals CAMPBELL Christiansen     • levothyroxine  200 mcg Oral Early Morning CAMPBELL Christiansen      And   • levothyroxine  88 mcg Oral Early Morning CAMPBELL Christiansen     • melatonin  6 mg Oral HS CAMPBELL Christiansen     • metoprolol  1 3 mg Intravenous Q4H PRN CAMPBELL Christiansen     • metoprolol succinate  12 5 mg Oral BID CAMPBELL Christiansen     • midodrine  10 mg Oral TID AC CAMPBELL Christiansen     • montelukast  10 mg Oral HS CAMPBELL Christiansen     • nystatin   Topical BID CAMPBELL Christiansen     • ondansetron  4 mg Intravenous Q6H PRN CAMPBELL Christiansen     • pantoprazole  20 mg Oral Early Morning CAMPBELL Christiansen     • polyethylene glycol  17 g Oral Daily CAMPBELL Christiansen     • QUEtiapine  25 mg Oral HS PRN CAMPBELL Christiansen     • senna-docusate sodium  2 tablet Oral BID CAMPBELL Christiansen     • torsemide  40 mg Oral BID CAMPBELL Cruz     • traZODone  50 mg Oral HS PRN CAMPBELL Christiansen     • warfarin  6 mg Oral Daily (warfarin) CAMPBELL Christiansen           Allergies   Allergen Reactions   • Acesulfame Potassium - Food Allergy Anaphylaxis   • Aspartame - Food Allergy Anaphylaxis   • Saccharin - Food Allergy Anaphylaxis   • Sucralose - Food Allergy Anaphylaxis   • Metformin GI Intolerance         Vitals:   /61   Pulse 71   Temp 98 6 °F (37 °C) (Temporal)   Resp (!) 28   Ht 5' 2" (1 575 m)   Wt (!) 152 kg (335 lb 8 6 oz)   SpO2 (!) 85%   BMI 61 37 kg/m²   Body mass index is 61 37 kg/m²    SpO2: (!) 85 %,   SpO2 Activity: At Rest,   O2 Device: Nasal cannula      Intake/Output Summary (Last 24 hours) at 10/28/2022 0938  Last data filed at 10/28/2022 0800  Gross per 24 hour   Intake 300 ml   Output 1075 ml   Net -775 ml     Invasive Devices  Report    Peripheral Intravenous Line  Duration           Peripheral IV 10/27/22 Dorsal (posterior); Left Forearm <1 day          Drain  Duration           External Urinary Catheter 1 day                Physical Exam  General: conscious, cooperative, in no acute distress  Eyes: conjunctivae pink, anicteric sclerae  ENT: lips and mucous membranes moist  Neck: supple, no JVD, no masses  Chest:  Diminished breath sounds bilaterally, no crackles, ronchus or wheezings  CVS: S1 & S2, normal rate, regular rhythm  Abdomen: soft, non-tender, non-distended, normoactive bowel sounds obese  Extremities:  Moderate edema of both legs  Skin: no rash  Neuro: awake, alert, oriented      Diagnostic Data:  Lab: I have personally reviewed pertinent lab results  ,   CBC:  Results from last 7 days   Lab Units 10/28/22  0541   WBC Thousand/uL 7 37   HEMOGLOBIN g/dL 11 6   HEMATOCRIT % 36 9   PLATELETS Thousands/uL 225      CMP:   Lab Results   Component Value Date    SODIUM 139 10/28/2022    K 4 3 10/28/2022    CL 98 10/28/2022    CO2 32 10/28/2022    BUN 77 (H) 10/28/2022    CREATININE 1 46 (H) 10/28/2022    CALCIUM 9 4 10/28/2022    EGFR 36 10/28/2022   ,   PT/INR:   Lab Results   Component Value Date    INR 1 85 (H) 10/28/2022   ,   Magnesium: No components found for: MAG,  Phosphorous: No results found for: PHOS    Microbiology:  @LABRCNTIP,(urinecx:7)@        Shalini Kaur    Portions of the record may have been created with voice recognition software  Occasional wrong word or "sound a like" substitutions may have occurred due to the inherent limitations of voice recognition software  Read the chart carefully and recognize, using context, where substitutions have occurred

## 2022-10-28 NOTE — PROGRESS NOTES
Progress Note:Cardiology  Roz Encarnacion 1955, 69 y o  female MRN: [id number]    Unit/Bed#: -01 Encounter: [id number]  Attending Physician: Chema Mariee MD   Primary Care Provider: Dharmesh Guido MD   Date admitted to hospital: 10/22/2022  Length of stay: 6         Chronic diastolic congestive heart failure [facility])  Assessment & Plan  Wt Readings from Last 3 Encounters:   10/25/22 (!) 143 kg (315 lb 7 7 oz)   10/03/22 (!) 171 kg (377 lb 6 8 oz)   04/13/20 (!) 162 kg (356 lb 0 7 oz)     History of right sided heart failure  Echocardiogram 9/21/2022 shows EF 55-59% with evidence of RV pressure and volume overload  RV severely dilated with mild-moderately reduced function  MR  Severe TR  Maintained on torsemide and metolazone at home  Presented this admission with hypovolemia and IMTIAZ which has improved following IV hydration and albumin  Torsemide 40 mg daily resumed 10/27/22  Will increase to 40 mg BID dosing today and assess response  Currently undergoing pulmonary work up for pulmonary hypertension  She does appear to be volume overloaded today  Recommend reinitiating diuretics  Strict I's/O's     UTI (urinary tract infection)  Assessment & Plan  As per critical care team    Hypotension  Assessment & Plan  Currently requiring midodrine 10 mg TID  Levo discontinued on afternoon of 10/26/22  Hypothyroidism  Assessment & Plan  TSH 1 41 this admission  Ideally TSH between 1-3 given a fib    Morbid obesity ([address] )  Assessment & Plan  Body mass index is 57 7 kg/m²  Contributing to overall cardiopulmonary status    Chronic respiratory failure with hypoxia (HCC)  Assessment & Plan  Chronic in the setting of right sided heart failure, asthma, NISHI, obesity  Utilizes 4 L NC of supplemental O2 at home  Atrial fibrillation [facility])  Assessment & Plan  History of atrial fibrillation  Details are unclear due to lack of availability of medical records    Patient was previously following with   [facility]  Reports undergoing 2 failed cardioversions and ultimately an atrial fibrillation ablation, date unknown  It is unclear how long she has been back in atrial fibrillation  ECG from 4/2020 shows atrial fibrillation  ECG at this hospital presentation showed rate controlled atrial fibrillation  She is maintained on warfarin anticoagulation, goal INR 2-3  Home metoprolol succinate 100 mg BID was held in admission due to hypotension  Developed atrial fibrillation with RVR on 10/24/2022 and initiated on amiodarone drip, received 2 doses of dig  mcg  Due to bradycardia amiodarone discontinued and metoprolol discontinued temporarily  Metoprolol succinate 12 5 mg BID resumed 10/27/ 22 with improving BP and HR's today  Continue telemetry  Optimize electrolytes for K+ > 4, Mag > 2     * Acute kidney injury superimposed on chronic kidney disease [facility])  Assessment & Plan  Lab Results   Component Value Date    EGFR 26 10/25/2022    EGFR 21 10/24/2022    EGFR 16 10/24/2022    CREATININE 1 91 (H) 10/25/2022    CREATININE 2 24 (H) 10/24/2022    CREATININE 2 83 (H) 10/24/2022     IMTIAZ with creatinine of 6 at admission  Today improved to 1 46  Appreciate nephrology recs  Acute hyponatremia-resolved as of 10/26/2022  Assessment & Plan  Corrected with treatment of hypovolemia      Subjective:   Patient seen and examined  No significant events overnight  Feels well this morning  Cheerful  Noted some shortness of breath while wearing CPAP overnight, better this morning  Review of Systems   Constitutional: Negative  HENT: Negative  Cardiovascular: Positive for dyspnea on exertion  Negative for chest pain, irregular heartbeat, leg swelling, near-syncope, orthopnea and palpitations  Respiratory: Positive for shortness of breath (overnight only, improved this morning)  Negative for cough and snoring  Endocrine: Negative  Skin: Negative  Musculoskeletal: Negative  Gastrointestinal: Negative  Genitourinary: Negative  Neurological: Negative  Psychiatric/Behavioral: Negative  Objective:     Vitals: Blood pressure 97/54, pulse 66, temperature 97 8 °F (36 6 °C), temperature source Temporal, resp  rate 20, height 5' 2" (1 575 m), weight (!) 152 kg (335 lb 8 6 oz), SpO2 94 %  , Body mass index is 61 37 kg/m² ,     Orthostatic Blood Pressures    Flowsheet Row Most Recent Value   Blood Pressure 97/54 filed at 10/28/2022 1100   Patient Position - Orthostatic VS Lying filed at 10/28/2022 1100          Physical Exam  Vitals and nursing note reviewed  Constitutional:       General: She is not in acute distress  Appearance: She is well-developed  She is obese  HENT:      Head: Normocephalic and atraumatic  Eyes:      Conjunctiva/sclera: Conjunctivae normal    Cardiovascular:      Rate and Rhythm: Normal rate  Rhythm irregular  Heart sounds: No murmur heard  Pulmonary:      Effort: Pulmonary effort is normal  No respiratory distress  Breath sounds: Wheezing and rales present  Abdominal:      Palpations: Abdomen is soft  Tenderness: There is no abdominal tenderness  Musculoskeletal:      Cervical back: Neck supple  Skin:     General: Skin is warm and dry  Neurological:      Mental Status: She is alert  Psychiatric:         Mood and Affect: Mood and affect normal          Speech: Speech normal          Behavior: Behavior normal  Behavior is cooperative           Cognition and Memory: Cognition and memory normal             Intake/Output Summary (Last 24 hours) at 10/28/2022 1130  Last data filed at 10/28/2022 0800  Gross per 24 hour   Intake 300 ml   Output 1050 ml   Net -750 ml       Weight (last 2 days)     Date/Time Weight    10/28/22 0542 152 (335 54)    10/27/22 0500 152 (335 1)    10/26/22 0600 150 (329 81)             Medications:      Current Facility-Administered Medications:   •  acetaminophen (TYLENOL) tablet 650 mg, 650 mg, Oral, Q6H PRN, CAMPBELL Christiansen, 650 mg at 10/28/22 0831  •  albuterol inhalation solution 2 5 mg, 2 5 mg, Nebulization, Q6H PRN, CAMPBELL Christiansen  •  ammonium lactate (LAC-HYDRIN) 12 % lotion, , Topical, BID PRN, CAMPBELL Christiansen, 1 application at 41/10/66 0534  •  atorvastatin (LIPITOR) tablet 10 mg, 10 mg, Oral, Daily, CAMPBELL Christiansen, 10 mg at 10/28/22 0920  •  bisacodyl (DULCOLAX) rectal suppository 10 mg, 10 mg, Rectal, Daily PRN, CAMPBELL Christiansen  •  cholecalciferol (VITAMIN D3) tablet 2,000 Units, 2,000 Units, Oral, Daily, CAMPBELL Christiansen, 2,000 Units at 10/28/22 4947  •  ferrous sulfate tablet 325 mg, 325 mg, Oral, Daily With Breakfast, CAMPBELL Christiansen, 325 mg at 10/28/22 0824  •  fluticasone-vilanterol (BREO ELLIPTA) 100-25 mcg/inh inhaler 1 puff, 1 puff, Inhalation, Daily, CAMPBELL Christiansen, 1 puff at 10/28/22 0831  •  insulin detemir (LEVEMIR) subcutaneous injection 26 Units, 26 Units, Subcutaneous, HS, CAMPBELL Christiansen, 26 Units at 10/27/22 2112  •  insulin lispro (HumaLOG) 100 units/mL subcutaneous injection 1-6 Units, 1-6 Units, Subcutaneous, HS, CAMPBELL Christiansen, 1 Units at 10/25/22 2202  •  insulin lispro (HumaLOG) 100 units/mL subcutaneous injection 4-20 Units, 4-20 Units, Subcutaneous, TID AC, 4 Units at 10/27/22 1158 **AND** Fingerstick Glucose (POCT), , , 4x Daily AC and at bedtime, CAMPBELL Christiansen  •  insulin lispro (HumaLOG) 100 units/mL subcutaneous injection 5 Units, 5 Units, Subcutaneous, TID With Meals, CAMPBELL Christiansen, 5 Units at 10/28/22 0831  •  levothyroxine tablet 200 mcg, 200 mcg, Oral, Early Morning, 200 mcg at 10/28/22 0540 **AND** levothyroxine tablet 88 mcg, 88 mcg, Oral, Early Morning, CAMPBELL Christiansen, 88 mcg at 10/28/22 0541  •  melatonin tablet 6 mg, 6 mg, Oral, HS, CAMPBELL Christiansen, 6 mg at 10/26/22 2205  •  metoprolol (LOPRESSOR) injection 1 3 mg, 1 3 mg, Intravenous, Q4H PRN, [place] CAMPBELL Rosa  •  metoprolol succinate (TOPROL-XL) 24 hr tablet 12 5 mg, 12 5 mg, Oral, BID, CAMPBELL Christiansen, 12 5 mg at 10/28/22 0909  •  midodrine (PROAMATINE) tablet 10 mg, 10 mg, Oral, TID AC, CAMPBELL Christiansen, 10 mg at 10/28/22 9096  •  montelukast (SINGULAIR) tablet 10 mg, 10 mg, Oral, HS, CAMPBELL Christiansen, 10 mg at 10/27/22 2113  •  nystatin (MYCOSTATIN) powder, , Topical, BID, CAMPBELL Christiansen, Given at 10/28/22 0831  •  ondansetron (ZOFRAN) injection 4 mg, 4 mg, Intravenous, Q6H PRN, CAMPBELL Christiansen  •  pantoprazole (PROTONIX) EC tablet 20 mg, 20 mg, Oral, Early Morning, CAMPBELL Christiansen, 20 mg at 10/28/22 0541  •  polyethylene glycol (MIRALAX) packet 17 g, 17 g, Oral, Daily, CAMPBELL Christiansen, 17 g at 10/28/22 1514  •  QUEtiapine (SEROquel) tablet 25 mg, 25 mg, Oral, HS PRN, CAMPBELL Christiansen  •  senna-docusate sodium (SENOKOT S) 8 6-50 mg per tablet 2 tablet, 2 tablet, Oral, BID, CAMPBELL Christiansen, 2 tablet at 10/28/22 5786  •  torsemide (DEMADEX) tablet 40 mg, 40 mg, Oral, BID, CAMPBELL Reis  •  traZODone (DESYREL) tablet 50 mg, 50 mg, Oral, HS PRN, CAMPBELL Christiansen, 50 mg at 10/26/22 2206  •  warfarin (COUMADIN) tablet 6 mg, 6 mg, Oral, Daily (warfarin), CAMPBELL Christiansen, 6 mg at 10/27/22 1721     Labs & Results:    Results from last 7 days   Lab Units 10/25/22  1715   CK TOTAL U/L 23*     Results from last 7 days   Lab Units 10/28/22  0541 10/27/22  0503 10/26/22  0423   WBC Thousand/uL 7 37 7 15 6 19   HEMOGLOBIN g/dL 11 6 11 1* 10 7*   HEMATOCRIT % 36 9 35 7 34 1*   PLATELETS Thousands/uL 225 179 164         Results from last 7 days   Lab Units 10/28/22  0541 10/27/22  0503 10/26/22  0423 10/25/22  0449 10/23/22  1127 10/23/22  0437   POTASSIUM mmol/L 4 3 4 5 4 8 3 7   < >  --    CHLORIDE mmol/L 98 99 98 97   < >  --    CO2 mmol/L 32 32 32 31   < >  --    BUN mg/dL 77* 79* 71* 81*   < >  --    CREATININE mg/dL 1 46* 1 59* 1 63* 1 91*   < >  --    CALCIUM mg/dL 9 4 10 0 9 9 9 8   < >  --    ALK PHOS U/L  --   --  116 122*  --  129*   ALT U/L  --   --  28 26  --  36   AST U/L  --   --  24 24  --  31    < > = values in this interval not displayed  Results from last 7 days   Lab Units 10/28/22  0541 10/27/22  0503 10/26/22  0423   INR  1 85* 2 22* 2 37*     Results from last 7 days   Lab Units 10/28/22  0541 10/27/22  0503 10/26/22  0423   MAGNESIUM mg/dL 1 7 2 0 2 0     Results from last 7 days   Lab Units 10/22/22  1214   NT-PRO BNP pg/mL 4,103*      Telemetry: atrial fibrillation, rate     Counseling / Coordination of Care  Total floor / unit time spent today 25 minutes  Greater than 50% of total time was spent with the patient and / or family counseling and / or coordination of care  A description of the counseling / coordination of care: Discussed case with critical care team and nephrology

## 2022-10-28 NOTE — ASSESSMENT & PLAN NOTE
· Chronically on CPAP at night and 4L of oxygen during the day secondary to COPD most likely NISHI  · Unable to wear home unit due to right jugular central line  · Goal Sp02>88%  · Respiratory protocol   · Pulmonary consult appreciated

## 2022-10-28 NOTE — PROGRESS NOTES
[address]  Progress Note Gaviota Briseno 1955, 69 y o  female MRN: [id number]  Unit/Bed#: -01 Encounter: [id number]  Primary Care Provider: Juan Cee MD   Date and time admitted to hospital: 10/22/2022 11:40 AM    Chronic respiratory failure with hypoxia ([facility] [address] )  Assessment & Plan  · Chronically on CPAP at night and 4L of oxygen during the day secondary to COPD most likely NISHI  · Unable to wear home unit due to right jugular central line  · Goal Sp02>88%  · Respiratory protocol   · Pulmonary consult appreciated    Atrial fibrillation ([facility] [address] )  Assessment & Plan  Rate control  Continue beta-blocker  Continue Coumadin, target INR between 2-3  Bleeding precaution provided  Appreciate cardiology recommendation    * Acute kidney injury superimposed on chronic kidney disease [facility])  Assessment & Plan  Lab Results   Component Value Date    EGFR 16 10/24/2022    EGFR 13 10/23/2022    EGFR 10 10/23/2022    CREATININE 2 83 (H) 10/24/2022    CREATININE 3 29 (H) 10/23/2022    CREATININE 3 97 (H) 10/23/2022   Baseline creatinine 1 1-1 3  Patient is improving baseline creatinine function  Appreciate nephrology consult  Follow recommendation    Diabetes mellitus [facility])  Assessment & Plan  Lab Results   Component Value Date    HGBA1C 6 8 (H) 10/05/2022       Recent Labs     10/27/22  1600 10/27/22  2102 10/28/22  0701 10/28/22  1118   POCGLU 130 148* 105 131       Blood Sugar Average: Last 72 hrs:  (P) 167 [id number]   Continue current medication  Follow hypoglycemia precaution    COPD (chronic obstructive pulmonary disease) (HCC)  Assessment & Plan  Continue respiratory protocol  Continue current treatment  Appreciate pulmonary consult  Patient also morbidly obese, which can affect patient respiratory status    UTI (urinary tract infection)  Assessment & Plan  Urine culture multi abdirizak  Patient already received 3 dose of IV ceftriaxone  Dow removed yesterday    Hypotension  Assessment & Plan  Condition improved  Continue to monitor    Hypothyroidism  Assessment & Plan  · Restart home synthroid   · TSH 1 14    Morbid obesity [facility])  Assessment & Plan  Lifestyle modification    Chronic diastolic congestive heart failure [facility])  Assessment & Plan  Wt Readings from Last 3 Encounters:   10/28/22 (!) 152 kg (335 lb 8 6 oz)   10/03/22 (!) 171 kg (377 lb 6 8 oz)   04/13/20 (!) 162 kg (356 lb 0 7 oz)       · Patient hospitalized 9/21/22 - 10/3/22 secondary to volume overload  Started on torsemide and zaroxolyn  · Patient down 23kg from last admission   · Home BB held due to hypotension, resume low dose as BP allows  · Echocardiogram 9/21/22: The left ventricular ejection fraction is 55-59%  Systolic function is normal  Wall motion is grossly normal  There is both systolic and diastolic flattening of the interventricular septum consistent with right ventricle pressure and volume overload: Right ventricular cavity size is severely dilated  Systolic function is mildly to moderately reduced  · Off pressors 10/26  · Cardiology consult appreciated          VTE Pharmacologic Prophylaxis: VTE Score: 11 High Risk (Score >/= 5) - Pharmacological DVT Prophylaxis Ordered: warfarin (Coumadin)  Sequential Compression Devices Ordered  Patient Centered Rounds: I performed bedside rounds with nursing staff today  Discussions with Specialists or Other Care Team Provider:  Cardiology, Nephrology    Education and Discussions with Family / Patient: Updated  (Family member on the bedside) at bedside  Time Spent for Care: 15 minutes  More than 50% of total time spent on counseling and coordination of care as described above      Current Length of Stay: 6 day(s)  Current Patient Status: Inpatient   Certification Statement: The patient will continue to require additional inpatient hospital stay due to To monitor above condition  Discharge Plan: Anticipate discharge in 50 hrs to To be determined    Code Status: Level 3 - DNAR and DNI    Subjective:   Seen and evaluated during the round  Resting comfortably  Denies any significant complaint  Objective:     Vitals:   Temp (24hrs), Av 9 °F (36 6 °C), Min:97 4 °F (36 3 °C), Max:98 6 °F (37 °C)    Temp:  [97 4 °F (36 3 °C)-98 6 °F (37 °C)] 97 8 °F (36 6 °C)  HR:  [64-73] 66  Resp:  [15-28] 20  BP: ()/(49-61) 97/54  SpO2:  [94 %-97 %] 94 %  Body mass index is 61 37 kg/m²  Input and Output Summary (last 24 hours): Intake/Output Summary (Last 24 hours) at 10/28/2022 1428  Last data filed at 10/28/2022 1245  Gross per 24 hour   Intake 655 ml   Output 1050 ml   Net -395 ml       Physical Exam:   Physical Exam  Vitals and nursing note reviewed  Constitutional:       Appearance: Normal appearance  She is obese  She is not ill-appearing or diaphoretic  HENT:      Mouth/Throat:      Mouth: Mucous membranes are moist       Pharynx: Oropharynx is clear  No oropharyngeal exudate  Eyes:      General:         Left eye: No discharge  Extraocular Movements: Extraocular movements intact  Conjunctiva/sclera: Conjunctivae normal       Pupils: Pupils are equal, round, and reactive to light  Neck:      Vascular: No carotid bruit  Cardiovascular:      Rate and Rhythm: Normal rate  Rhythm irregular  Heart sounds: No murmur heard  No friction rub  No gallop  Pulmonary:      Effort: Pulmonary effort is normal  No respiratory distress  Breath sounds: No stridor  No wheezing or rhonchi  Abdominal:      General: Abdomen is flat  Bowel sounds are normal  There is no distension  Palpations: There is no mass  Tenderness: There is no abdominal tenderness  Hernia: No hernia is present  Musculoskeletal:         General: No swelling, tenderness, deformity or signs of injury  Normal range of motion  Cervical back: Normal range of motion  No rigidity or tenderness     Lymphadenopathy: Cervical: No cervical adenopathy  Skin:     General: Skin is warm  Coloration: Skin is not jaundiced or pale  Findings: No bruising or erythema  Neurological:      General: No focal deficit present  Mental Status: She is alert and oriented to person, place, and time  Cranial Nerves: No cranial nerve deficit  Sensory: No sensory deficit  Motor: No weakness  Coordination: Coordination normal          Additional Data:     Labs:  Results from last 7 days   Lab Units 10/28/22  0541 10/23/22  0437 10/22/22  1214   WBC Thousand/uL 7 37   < > 9 33   HEMOGLOBIN g/dL 11 6   < > 13 5   HEMATOCRIT % 36 9   < > 41 7   PLATELETS Thousands/uL 225   < > 264   NEUTROS PCT %  --   --  65   LYMPHS PCT %  --   --  21   MONOS PCT %  --   --  10   EOS PCT %  --   --  3    < > = values in this interval not displayed  Results from last 7 days   Lab Units 10/28/22  0541 10/27/22  0503 10/26/22  0423   SODIUM mmol/L 139   < > 138   POTASSIUM mmol/L 4 3   < > 4 8   CHLORIDE mmol/L 98   < > 98   CO2 mmol/L 32   < > 32   BUN mg/dL 77*   < > 71*   CREATININE mg/dL 1 46*   < > 1 63*   ANION GAP mmol/L 9   < > 8   CALCIUM mg/dL 9 4   < > 9 9   ALBUMIN g/dL  --   --  3 5   TOTAL BILIRUBIN mg/dL  --   --  1 16*   ALK PHOS U/L  --   --  116   ALT U/L  --   --  28   AST U/L  --   --  24   GLUCOSE RANDOM mg/dL 120   < > 204*    < > = values in this interval not displayed       Results from last 7 days   Lab Units 10/28/22  0541   INR  1 85*     Results from last 7 days   Lab Units 10/28/22  1118 10/28/22  0701 10/27/22  2102 10/27/22  1600 10/27/22  1055 10/27/22  0717 10/26/22  2119 10/26/22  1631 10/26/22  1056 10/26/22  0705 10/25/22  2148 10/25/22  1705   POC GLUCOSE mg/dl 131 105 148* 130 165* 126 107 127 304* 197* 184* 266*         Results from last 7 days   Lab Units 10/23/22  0437 10/22/22  1214   LACTIC ACID mmol/L 1 0 2 0       Lines/Drains:  Invasive Devices  Report    Peripheral Intravenous Line Duration           Peripheral IV 10/27/22 Dorsal (posterior); Left Forearm 1 day          Drain  Duration           External Urinary Catheter 1 day                  Telemetry:  Telemetry Orders (From admission, onward)             48 Hour Telemetry Monitoring  Continuous x 48 hours        References:    Telemetry Guidelines   Question:  Reason for 48 Hour Telemetry  Answer:  Arrhythmias Requiring Medical Therapy (eg  SVT, Vtach/fib, Bradycardia, Uncontrolled A-fib)                 Telemetry Reviewed: Atrial fibrillation  Indication for Continued Telemetry Use: Arrthymias requiring medical therapy             Imaging: No pertinent imaging reviewed  Recent Cultures (last 7 days):   Results from last 7 days   Lab Units 10/22/22  1605 10/22/22  1217 10/22/22  1214   BLOOD CULTURE   --   --  No Growth After 5 Days  No Growth After 5 Days     URINE CULTURE  No Growth <1000 cfu/mL >100,000 cfu/ml Alpha Hemolytic Streptococcus NOT Enterococcus*  <10,000 cfu/ml Gram Negative Fer Enteric Like*  <10,000 cfu/ml Staphylococcus coagulase negative*  --        Last 24 Hours Medication List:   Current Facility-Administered Medications   Medication Dose Route Frequency Provider Last Rate   • acetaminophen  650 mg Oral Q6H PRN CAMPBELL Christiansen     • albuterol  2 5 mg Nebulization Q6H PRN CAMPBELL Christiansen     • ammonium lactate   Topical BID PRN CAMPBELL Christiansen     • atorvastatin  10 mg Oral Daily CAMPBELL Christiansen     • bisacodyl  10 mg Rectal Daily PRN CAMPBELL Christiansen     • cholecalciferol  2,000 Units Oral Daily CAMPBELL Christiansen     • ferrous sulfate  325 mg Oral Daily With Breakfast CAMPBELL Christiansen     • fluticasone-vilanterol  1 puff Inhalation Daily CAMPBELL Christiansen     • insulin detemir  26 Units Subcutaneous HS CAMPBELL Christiansen     • insulin lispro  1-6 Units Subcutaneous HS CAMPEBLL Christiansen     • insulin lispro  4-20 Units Subcutaneous TID  [place] CAMPBELL Lange     • insulin lispro  5 Units Subcutaneous TID With Meals CAMPBELL Christiansen     • levothyroxine  200 mcg Oral Early Morning CAMPBELL Christiansen      And   • levothyroxine  88 mcg Oral Early Morning CAMPBELL Christiansen     • melatonin  6 mg Oral HS CAMPBELL Christiansen     • metoprolol  1 3 mg Intravenous Q4H PRN CAMPBELL Christiansen     • metoprolol succinate  12 5 mg Oral BID CAMPBELL Christiansen     • midodrine  10 mg Oral TID AC CAMPBELL Christiansen     • montelukast  10 mg Oral HS CAMPBELL Christiansen     • nystatin   Topical BID CAMPBELL Christiansen     • ondansetron  4 mg Intravenous Q6H PRN CAMPBELL Christiansen     • pantoprazole  20 mg Oral Early Morning CAMPBELL Christiansen     • polyethylene glycol  17 g Oral Daily CAMPBELL Christiansen     • QUEtiapine  25 mg Oral HS PRN CAMPBELL Christiansen     • senna-docusate sodium  2 tablet Oral BID CAMPBELL Christiansen     • torsemide  40 mg Oral BID CAMPBELL Maynard     • traZODone  50 mg Oral HS PRN CAMPBELL Christiansen     • warfarin  6 mg Oral Daily (warfarin) CAMPBELL Christiansen          Today, Patient Was Seen By: David Silva    **Please Note: This note may have been constructed using a voice recognition system  **

## 2022-10-28 NOTE — ASSESSMENT & PLAN NOTE
Continue respiratory protocol  Continue current treatment  Appreciate pulmonary consult  Patient also morbidly obese, which can affect patient respiratory status

## 2022-10-28 NOTE — ASSESSMENT & PLAN NOTE
Lab Results   Component Value Date    HGBA1C 6 8 (H) 10/05/2022       Recent Labs     10/27/22  1600 10/27/22  2102 10/28/22  0701 10/28/22  1118   POCGLU 130 148* 105 131       Blood Sugar Average: Last 72 hrs:  (P) 520 [id number]   Continue current medication  Follow hypoglycemia precaution

## 2022-10-29 VITALS
RESPIRATION RATE: 15 BRPM | OXYGEN SATURATION: 97 % | HEART RATE: 61 BPM | SYSTOLIC BLOOD PRESSURE: 103 MMHG | TEMPERATURE: 97.8 F | BODY MASS INDEX: 53.92 KG/M2 | DIASTOLIC BLOOD PRESSURE: 55 MMHG | HEIGHT: 62 IN | WEIGHT: 293 LBS

## 2022-10-29 PROBLEM — L30.4 INTERTRIGO: Status: ACTIVE | Noted: 2022-10-29

## 2022-10-29 LAB
ALBUMIN SERPL BCP-MCNC: 3.3 G/DL (ref 3.5–5)
ALP SERPL-CCNC: 158 U/L (ref 46–116)
ALT SERPL W P-5'-P-CCNC: 39 U/L (ref 12–78)
ANION GAP SERPL CALCULATED.3IONS-SCNC: 9 MMOL/L (ref 4–13)
AST SERPL W P-5'-P-CCNC: 39 U/L (ref 5–45)
BASOPHILS # BLD AUTO: 0.07 THOUSANDS/ÂΜL (ref 0–0.1)
BASOPHILS NFR BLD AUTO: 1 % (ref 0–1)
BILIRUB SERPL-MCNC: 0.76 MG/DL (ref 0.2–1)
BUN SERPL-MCNC: 69 MG/DL (ref 5–25)
CALCIUM ALBUM COR SERPL-MCNC: 9.8 MG/DL (ref 8.3–10.1)
CALCIUM SERPL-MCNC: 9.2 MG/DL (ref 8.3–10.1)
CHLORIDE SERPL-SCNC: 97 MMOL/L (ref 96–108)
CO2 SERPL-SCNC: 33 MMOL/L (ref 21–32)
CREAT SERPL-MCNC: 1.27 MG/DL (ref 0.6–1.3)
EOSINOPHIL # BLD AUTO: 0.41 THOUSAND/ÂΜL (ref 0–0.61)
EOSINOPHIL NFR BLD AUTO: 6 % (ref 0–6)
ERYTHROCYTE [DISTWIDTH] IN BLOOD BY AUTOMATED COUNT: 17.6 % (ref 11.6–15.1)
GFR SERPL CREATININE-BSD FRML MDRD: 43 ML/MIN/1.73SQ M
GLUCOSE SERPL-MCNC: 135 MG/DL (ref 65–140)
GLUCOSE SERPL-MCNC: 144 MG/DL (ref 65–140)
GLUCOSE SERPL-MCNC: 208 MG/DL (ref 65–140)
HCT VFR BLD AUTO: 36 % (ref 34.8–46.1)
HGB BLD-MCNC: 11.5 G/DL (ref 11.5–15.4)
IMM GRANULOCYTES # BLD AUTO: 0.11 THOUSAND/UL (ref 0–0.2)
IMM GRANULOCYTES NFR BLD AUTO: 2 % (ref 0–2)
INR PPP: 1.47 (ref 0.84–1.19)
LYMPHOCYTES # BLD AUTO: 1.44 THOUSANDS/ÂΜL (ref 0.6–4.47)
LYMPHOCYTES NFR BLD AUTO: 21 % (ref 14–44)
MCH RBC QN AUTO: 28.5 PG (ref 26.8–34.3)
MCHC RBC AUTO-ENTMCNC: 31.9 G/DL (ref 31.4–37.4)
MCV RBC AUTO: 89 FL (ref 82–98)
MONOCYTES # BLD AUTO: 0.84 THOUSAND/ÂΜL (ref 0.17–1.22)
MONOCYTES NFR BLD AUTO: 12 % (ref 4–12)
NEUTROPHILS # BLD AUTO: 4.08 THOUSANDS/ÂΜL (ref 1.85–7.62)
NEUTS SEG NFR BLD AUTO: 58 % (ref 43–75)
NRBC BLD AUTO-RTO: 0 /100 WBCS
PLATELET # BLD AUTO: 230 THOUSANDS/UL (ref 149–390)
PMV BLD AUTO: 10.7 FL (ref 8.9–12.7)
POTASSIUM SERPL-SCNC: 3.8 MMOL/L (ref 3.5–5.3)
PROT SERPL-MCNC: 7.4 G/DL (ref 6.4–8.4)
PROTHROMBIN TIME: 17.9 SECONDS (ref 11.6–14.5)
RBC # BLD AUTO: 4.03 MILLION/UL (ref 3.81–5.12)
SODIUM SERPL-SCNC: 139 MMOL/L (ref 135–147)
WBC # BLD AUTO: 6.95 THOUSAND/UL (ref 4.31–10.16)

## 2022-10-29 RX ORDER — WARFARIN SODIUM 7.5 MG/1
7.5 TABLET ORAL
Status: DISCONTINUED | OUTPATIENT
Start: 2022-10-29 | End: 2022-10-29 | Stop reason: HOSPADM

## 2022-10-29 RX ORDER — TRAZODONE HYDROCHLORIDE 50 MG/1
50 TABLET ORAL
Qty: 30 TABLET | Refills: 0 | Status: SHIPPED | OUTPATIENT
Start: 2022-10-29

## 2022-10-29 RX ORDER — WARFARIN SODIUM 7.5 MG/1
TABLET ORAL
Qty: 15 TABLET | Refills: 0 | Status: SHIPPED | OUTPATIENT
Start: 2022-10-29

## 2022-10-29 RX ORDER — METOPROLOL SUCCINATE 25 MG/1
12.5 TABLET, EXTENDED RELEASE ORAL 2 TIMES DAILY
Qty: 30 TABLET | Refills: 0 | Status: SHIPPED | OUTPATIENT
Start: 2022-10-29 | End: 2022-11-28

## 2022-10-29 RX ORDER — MIDODRINE HYDROCHLORIDE 10 MG/1
10 TABLET ORAL
Qty: 45 TABLET | Refills: 1 | Status: SHIPPED | OUTPATIENT
Start: 2022-10-29 | End: 2022-11-13

## 2022-10-29 RX ADMIN — FERROUS SULFATE TAB 325 MG (65 MG ELEMENTAL FE) 325 MG: 325 (65 FE) TAB at 08:58

## 2022-10-29 RX ADMIN — Medication 2000 UNITS: at 08:58

## 2022-10-29 RX ADMIN — METOPROLOL SUCCINATE 12.5 MG: 25 TABLET, EXTENDED RELEASE ORAL at 08:58

## 2022-10-29 RX ADMIN — MIDODRINE HYDROCHLORIDE 10 MG: 5 TABLET ORAL at 12:13

## 2022-10-29 RX ADMIN — PANTOPRAZOLE SODIUM 20 MG: 20 TABLET, DELAYED RELEASE ORAL at 05:23

## 2022-10-29 RX ADMIN — FLUTICASONE FUROATE AND VILANTEROL TRIFENATATE 1 PUFF: 100; 25 POWDER RESPIRATORY (INHALATION) at 09:02

## 2022-10-29 RX ADMIN — TORSEMIDE 40 MG: 20 TABLET ORAL at 08:57

## 2022-10-29 RX ADMIN — LEVOTHYROXINE SODIUM 200 MCG: 100 TABLET ORAL at 05:23

## 2022-10-29 RX ADMIN — INSULIN LISPRO 4 UNITS: 100 INJECTION, SOLUTION INTRAVENOUS; SUBCUTANEOUS at 12:14

## 2022-10-29 RX ADMIN — MIDODRINE HYDROCHLORIDE 10 MG: 5 TABLET ORAL at 08:58

## 2022-10-29 RX ADMIN — LEVOTHYROXINE SODIUM 88 MCG: 88 TABLET ORAL at 05:24

## 2022-10-29 RX ADMIN — INSULIN LISPRO 5 UNITS: 100 INJECTION, SOLUTION INTRAVENOUS; SUBCUTANEOUS at 09:05

## 2022-10-29 RX ADMIN — NYSTATIN: 100000 POWDER TOPICAL at 09:07

## 2022-10-29 RX ADMIN — INSULIN LISPRO 5 UNITS: 100 INJECTION, SOLUTION INTRAVENOUS; SUBCUTANEOUS at 12:13

## 2022-10-29 RX ADMIN — ATORVASTATIN CALCIUM 10 MG: 10 TABLET, FILM COATED ORAL at 08:57

## 2022-10-29 NOTE — ASSESSMENT & PLAN NOTE
Wt Readings from Last 3 Encounters:   10/29/22 (!) 151 kg (331 lb 12 7 oz)   10/03/22 (!) 171 kg (377 lb 6 8 oz)   04/13/20 (!) 162 kg (356 lb 0 7 oz)       · Patient hospitalized 9/21/22 - 10/3/22 secondary to volume overload  Started on torsemide and zaroxolyn  · Patient down 23kg from last admission   · Echocardiogram 9/21/22: The left ventricular ejection fraction is 55-59%  Systolic function is normal  Wall motion is grossly normal  There is both systolic and diastolic flattening of the interventricular septum consistent with right ventricle pressure and volume overload: Right ventricular cavity size is severely dilated  Systolic function is mildly to moderately reduced  · Off pressors 10/26  · Cardiology consult appreciated-resume home medication and follow-up with Cardiology outpatient basis  · Midodrine dose increased to 10 mg t i d , hold the medication if systolic blood pressure is more than 120

## 2022-10-29 NOTE — PROGRESS NOTES
Progress Note - Nephrology   Screven Preeti 69 y o  female MRN: [id number]  Unit/Bed#: -01 Encounter: [id number]    A/P:  1  IMTIAZ on CKD, resolved  Presented with Creatinine 6 2 mg/dL  Down to baseline today at 1 27 mg/dL  Initially prerenal from volume depletion +shock, potential ischemic ATN component  Diuretics are being managed with torsemide at discretion of cardiology  UOP nonoliguric  Volume status improving  She has purewick on, follow bladder scan pp  Indwelling catheter removed 10/27  Stable from renal perspective, will sign off at this time  Please call with questions  2  CKD3 baseline 1 1-1 3    3  Volume overload, continue diuretic with torsemide 40mg BID at discretion of cardiology  4  Shock, improved, follow BP on midodrine  5  Elevated bicarbonate, likely met alkalosis from diuretic vs compensation for chronic resp acidosis  Follow up reason for today's visit:     Acute kidney injury superimposed on chronic kidney disease ([facility] [address] )      Subjective:   Patient seen and examined today  Denies cp/sob/n/v/d  Chart reviewed  UOP 900ml/24 hr      BP soft but stable  Weight improving from 152 ->151 kg  Objective:     Vitals: Blood pressure 103/55, pulse 61, temperature 97 8 °F (36 6 °C), temperature source Temporal, resp  rate 15, height 5' 2" (1 575 m), weight (!) 151 kg (331 lb 12 7 oz), SpO2 97 %  ,Body mass index is 60 69 kg/m²  Weight (last 2 days)     Date/Time Weight    10/29/22 0500 151 (331 79)    10/28/22 0542 152 (335 54)    10/27/22 0500 152 (335 1)            Intake/Output Summary (Last 24 hours) at 10/29/2022 0753  Last data filed at 10/29/2022 0201  Gross per 24 hour   Intake 1010 ml   Output 900 ml   Net 110 ml     I/O last 3 completed shifts:   In: 1010 [P O :1010]  Out: 1400 [Urine:1400]         Physical Exam: /55 (BP Location: Right arm)   Pulse 61   Temp 97 8 °F (36 6 °C) (Temporal)   Resp 15   Ht 5' 2" (1 575 m)   Wt (!) 151 kg (331 lb 12 7 oz)   SpO2 97%   BMI 60 69 kg/m²     General Appearance:    Alert, cooperative, no distress, appears stated age, obese    Head:    Normocephalic, without obvious abnormality, atraumatic   Eyes:    Conjunctiva/corneas clear   Ears:    Normal external ears   Nose:   Nares normal, septum midline, mucosa normal, no drainage    or sinus tenderness   Throat:   Lips, mucosa, and tongue normal; teeth and gums normal   Neck:    Back:      Lungs:     Decrease BS , no resp distress,    Chest wall:    No tenderness or deformity   Heart:    Irregular ,no murmur, rub   or gallop   Abdomen:     Soft, non-tender, bowel sounds active   Extremities:   Mild edema BL LE    Skin:   Skin color, texture, turgor normal, no rashes or lesions   Lymph nodes:   Cervical normal   Neurologic:   CNII-XII intact            Lab, Imaging and other studies: I have personally reviewed pertinent labs  CBC:   Lab Results   Component Value Date    WBC 6 95 10/29/2022    HGB 11 5 10/29/2022    HCT 36 0 10/29/2022    MCV 89 10/29/2022     10/29/2022    MCH 28 5 10/29/2022    MCHC 31 9 10/29/2022    RDW 17 6 (H) 10/29/2022    MPV 10 7 10/29/2022    NRBC 0 10/29/2022     CMP:   Lab Results   Component Value Date    K 3 8 10/29/2022    CL 97 10/29/2022    CO2 33 (H) 10/29/2022    BUN 69 (H) 10/29/2022    CREATININE 1 27 10/29/2022    CALCIUM 9 2 10/29/2022    AST 39 10/29/2022    ALT 39 10/29/2022    ALKPHOS 158 (H) 10/29/2022    EGFR 43 10/29/2022           Results from last 7 days   Lab Units 10/29/22  0531 10/28/22  0541 10/27/22  0503 10/26/22  0423 10/25/22  0449   POTASSIUM mmol/L 3 8 4 3 4 5 4 8 3 7   CHLORIDE mmol/L 97 98 99 98 97   CO2 mmol/L 33* 32 32 32 31   BUN mg/dL 69* 77* 79* 71* 81*   CREATININE mg/dL 1 27 1 46* 1 59* 1 63* 1 91*   CALCIUM mg/dL 9 2 9 4 10 0 9 9 9 8   ALK PHOS U/L 158*  --   --  116 122*   ALT U/L 39  --   --  28 26   AST U/L 39  --   --  24 24         Phosphorus: No results found for: PHOS  Magnesium: No results found for: MG  Urinalysis: No results found for: Ha Gabe, SPECGRAV, PHUR, LEUKOCYTESUR, NITRITE, PROTEINUA, GLUCOSEU, KETONESU, BILIRUBINUR, BLOODU  Ionized Calcium: No results found for: CAION  Coagulation:   Lab Results   Component Value Date    INR 1 47 (H) 10/29/2022     Troponin: No results found for: TROPONINI  ABG: No results found for: PHART, LQV7TKQ, PO2ART, GQH7JCF, L5LKREJR, BEART, SOURCE  Radiology review:     IMAGING  No results found      Current Facility-Administered Medications   Medication Dose Route Frequency   • acetaminophen (TYLENOL) tablet 650 mg  650 mg Oral Q6H PRN   • albuterol inhalation solution 2 5 mg  2 5 mg Nebulization Q6H PRN   • ammonium lactate (LAC-HYDRIN) 12 % lotion   Topical BID PRN   • atorvastatin (LIPITOR) tablet 10 mg  10 mg Oral Daily   • bisacodyl (DULCOLAX) rectal suppository 10 mg  10 mg Rectal Daily PRN   • cholecalciferol (VITAMIN D3) tablet 2,000 Units  2,000 Units Oral Daily   • ferrous sulfate tablet 325 mg  325 mg Oral Daily With Breakfast   • fluticasone-vilanterol (BREO ELLIPTA) 100-25 mcg/inh inhaler 1 puff  1 puff Inhalation Daily   • insulin detemir (LEVEMIR) subcutaneous injection 26 Units  26 Units Subcutaneous HS   • insulin lispro (HumaLOG) 100 units/mL subcutaneous injection 1-6 Units  1-6 Units Subcutaneous HS   • insulin lispro (HumaLOG) 100 units/mL subcutaneous injection 4-20 Units  4-20 Units Subcutaneous TID AC   • insulin lispro (HumaLOG) 100 units/mL subcutaneous injection 5 Units  5 Units Subcutaneous TID With Meals   • levothyroxine tablet 200 mcg  200 mcg Oral Early Morning    And   • levothyroxine tablet 88 mcg  88 mcg Oral Early Morning   • melatonin tablet 6 mg  6 mg Oral HS   • metoprolol (LOPRESSOR) injection 1 3 mg  1 3 mg Intravenous Q4H PRN   • metoprolol succinate (TOPROL-XL) 24 hr tablet 12 5 mg  12 5 mg Oral BID   • midodrine (PROAMATINE) tablet 10 mg  10 mg Oral TID AC   • montelukast (SINGULAIR) tablet 10 mg  10 mg Oral HS • nystatin (MYCOSTATIN) powder   Topical BID   • ondansetron (ZOFRAN) injection 4 mg  4 mg Intravenous Q6H PRN   • pantoprazole (PROTONIX) EC tablet 20 mg  20 mg Oral Early Morning   • polyethylene glycol (MIRALAX) packet 17 g  17 g Oral Daily   • QUEtiapine (SEROquel) tablet 25 mg  25 mg Oral HS PRN   • senna-docusate sodium (SENOKOT S) 8 6-50 mg per tablet 2 tablet  2 tablet Oral BID   • torsemide (DEMADEX) tablet 40 mg  40 mg Oral BID   • traZODone (DESYREL) tablet 50 mg  50 mg Oral HS PRN   • warfarin (COUMADIN) tablet 6 mg  6 mg Oral Daily (warfarin)     Medications Discontinued During This Encounter   Medication Reason   • multi-electrolyte (PLASMALYTE-A/ISOLYTE-S PH 7 4) IV solution    • sodium chloride 0 9 % infusion    • insulin lispro (HumaLOG) 100 units/mL subcutaneous injection 1-6 Units    • insulin lispro (HumaLOG) 100 units/mL subcutaneous injection 1-6 Units    • heparin (porcine) subcutaneous injection 5,000 Units    • potassium chloride (K-DUR,KLOR-CON) CR tablet 20 mEq    • ipratropium (ATROVENT) 0 02 % inhalation solution 0 5 mg    • levalbuterol (XOPENEX) inhalation solution 1 25 mg    • sodium chloride 0 9 % infusion    • insulin lispro (HumaLOG) 100 units/mL subcutaneous injection 2-12 Units    • midodrine (PROAMATINE) tablet 5 mg    • METOPROLOL SUCCINATE ER PO Error   • insulin lispro (HumaLOG) 100 units/mL subcutaneous injection 2-12 Units    • phenylephrine (FRANCHESKA-SYNEPHRINE) 50 mg (STANDARD CONCENTRATION) in sodium chloride 0 9% 250 mL    • insulin lispro (HumaLOG) 100 units/mL subcutaneous injection 4-20 Units    • insulin detemir (LEVEMIR) subcutaneous injection 10 Units    • levothyroxine tablet 200 mcg    • albumin human (FLEXBUMIN) 5 % injection 25 g    • cefTRIAXone (ROCEPHIN) IVPB (premix in dextrose) 1,000 mg 50 mL    • acetaminophen (TYLENOL) tablet 650 mg    • albumin human (FLEXBUMIN) 25 % injection 25 g    • ondansetron (ZOFRAN) 4 mg/2 mL injection **ADS Override Pull** Returned to ADS   • traZODone (DESYREL) tablet 50 mg    • QUEtiapine (SEROquel) tablet 25 mg    • digoxin (LANOXIN) injection 250 mcg    • amiodarone (CORDARONE) 900 mg in dextrose 5 % 500 mL infusion    • vasopressin (PITRESSIN) 20 Units in sodium chloride 0 9 % 100 mL infusion    • albumin human (FLEXBUMIN) 25 % injection 25 g    • digoxin (LANOXIN) injection 250 mcg    • digoxin (LANOXIN) injection 125 mcg    • NOREPINEPHRINE 4 MG  ML NSS (CMPD ORDER) infusion    • metoprolol succinate (TOPROL-XL) 24 hr tablet 12 5 mg    • docusate sodium (COLACE) capsule 100 mg    • polyethylene glycol (MIRALAX) packet 17 g    • digoxin (LANOXIN) injection 125 mcg    • amiodarone (CORDARONE) 900 mg in dextrose 5 % 500 mL infusion    • insulin lispro (HumaLOG) 100 units/mL subcutaneous injection 3 Units    • vasopressin (PITRESSIN) 20 Units in sodium chloride 0 9 % 100 mL infusion    • metoprolol tartrate (LOPRESSOR) partial tablet 12 5 mg    • metoprolol tartrate (LOPRESSOR) partial tablet 12 5 mg    • torsemide (DEMADEX) tablet 40 mg        HungVernonia Ailyn      This progress note was produced in part using a dictation device which may document imprecise wording from author's original intent

## 2022-10-29 NOTE — PLAN OF CARE
Problem: Potential for Falls  Goal: Patient will remain free of falls  Description: INTERVENTIONS:  - Educate patient/family on patient safety including physical limitations  - Instruct patient to call for assistance with activity   - Consult OT/PT to assist with strengthening/mobility   - Keep Call bell within reach  - Keep bed low and locked with side rails adjusted as appropriate  - Keep care items and personal belongings within reach  - Initiate and maintain comfort rounds  - Make Fall Risk Sign visible to staff  - Offer Toileting every 2 Hours, in advance of need  - Initiate/Maintain bed/chair alarm as needed  - Apply yellow socks and bracelet for high fall risk patients  - Consider moving patient to room near nurses station  Outcome: Adequate for Discharge

## 2022-10-29 NOTE — ASSESSMENT & PLAN NOTE
Rate control  Continue beta-blocker  Continue Coumadin, target INR between 2-3  Bleeding precaution provided  Appreciate cardiology recommendation  INR is subtherapeutic, today INR is 1 41-Coumadin dose increased to 7 5 mg, recheck INR in 2-3 days and follow the recommendation from Coumadin clinic

## 2022-10-29 NOTE — CASE MANAGEMENT
Case Management Discharge Planning Note    Patient name Flakito Little  Location /-01 MRN [id number]  : 1955 Date 10/29/2022       Current Admission Date: 10/22/2022  Current Admission Diagnosis:Acute kidney injury superimposed on chronic kidney disease [facility])   Patient Active Problem List    Diagnosis Date Noted   • Intertrigo 10/29/2022   • Vaginal bleeding 10/23/2022   • Acute kidney injury superimposed on chronic kidney disease ([address] ) 10/22/2022   • Hypotension 10/22/2022   • GERD (gastroesophageal reflux disease) 10/22/2022   • UTI (urinary tract infection) 10/22/2022   • Chronic diastolic congestive heart failure ([facility] [address] ) 2022   • IMTIAZ (acute kidney injury) ([facility] [address] [facility] ) 2022   • Morbid obesity ([facility] [address] [facility] ) 2022   • Hypothyroidism 2022   • Supratherapeutic INR 04/15/2020   • COVID-19 virus infection 2020   • Chronic respiratory failure with hypoxia ([facility] [address] [facility] ) 2020   • Asthma 2020   • Atrial fibrillation ([facility] [address] ) 2020   • COPD (chronic obstructive pulmonary disease) ([facility] [address] ) 2020   • Diabetes mellitus ([facility] [address] ) 2020   • Heart failure ([facility] [address] ) 2020   • Shortness of breath 2020   • Anemia 2020      LOS (days): 7  Geometric Mean LOS (GMLOS) (days): 4 30  Days to GMLOS:-2 8     OBJECTIVE:  Risk of Unplanned Readmission Score: 35 99         Current admission status: Inpatient   Preferred Pharmacy:   [id number] [address]  Phone: [phone] Fax: [phone]    Primary Care Provider: Brooke Au MD    Primary Insurance: THE [facility]  Secondary Insurance:     DISCHARGE DETAILS:    Noted text that [facility] transport cancelled at 2:32  CM followed up with nursing and patient left at 1:30 with another ambulance company  CM called and spoke to [facility] transported patient

## 2022-10-29 NOTE — ASSESSMENT & PLAN NOTE
· Chronically on CPAP at night and 4L of oxygen during the day secondary to COPD most likely NISHI  · Unable to wear home unit due to right jugular central line  · Goal Sp02>88%  · Respiratory protocol   · Pulmonary consult appreciated  · Appreciate Pulmonary, Cardiology recommendation

## 2022-10-29 NOTE — CASE MANAGEMENT
Case Management Discharge Planning Note    Patient name Roz Encarnacion  Location /-01 MRN [id number]  : 1955 Date 10/29/2022       Current Admission Date: 10/22/2022  Current Admission Diagnosis:Acute kidney injury superimposed on chronic kidney disease [facility])   Patient Active Problem List    Diagnosis Date Noted   • Intertrigo 10/29/2022   • Vaginal bleeding 10/23/2022   • Acute kidney injury superimposed on chronic kidney disease ([address] ) 10/22/2022   • Hypotension 10/22/2022   • GERD (gastroesophageal reflux disease) 10/22/2022   • UTI (urinary tract infection) 10/22/2022   • Chronic diastolic congestive heart failure ([facility] [address] ) 2022   • IMTIAZ (acute kidney injury) ([facility] [address] ) 2022   • Morbid obesity ([facility] [address] ) 2022   • Hypothyroidism 2022   • Supratherapeutic INR 04/15/2020   • COVID-19 virus infection 2020   • Chronic respiratory failure with hypoxia ([facility] [address] [facility] ) 2020   • Asthma 2020   • Atrial fibrillation ([facility] [address] ) 2020   • COPD (chronic obstructive pulmonary disease) ([facility] [address] [facility] ) 2020   • Diabetes mellitus ([facility] [address] ) 2020   • Heart failure ([facility] [address] ) 2020   • Shortness of breath 2020   • Anemia 2020      LOS (days): 7  Geometric Mean LOS (GMLOS) (days): 4 30  Days to GMLOS:-2 7     OBJECTIVE:  Risk of Unplanned Readmission Score: 35 99         Current admission status: Inpatient   Preferred Pharmacy:   [address] [address]  Phone: [phone] Fax: [phone]    Primary Care Provider: Dharmesh Guido MD    Primary Insurance: THE [facility]  Secondary Insurance:     DISCHARGE DETAILS:    Faxed AVS to [facility]

## 2022-10-29 NOTE — ASSESSMENT & PLAN NOTE
Lab Results   Component Value Date    HGBA1C 6 8 (H) 10/05/2022       Recent Labs     10/28/22  1621 10/28/22  2047 10/29/22  0719 10/29/22  1132   POCGLU 135 183* 144* 208*       Blood Sugar Average: Last 72 hrs:  (P) 112 [id number]   Resume home medication, follow hypoglycemia precaution

## 2022-10-29 NOTE — ASSESSMENT & PLAN NOTE
Lab Results   Component Value Date    EGFR 43 10/29/2022    EGFR 36 10/28/2022    EGFR 32 10/27/2022    CREATININE 1 27 10/29/2022    CREATININE 1 46 (H) 10/28/2022    CREATININE 1 59 (H) 10/27/2022   Baseline creatinine 1 1-1 3  Creatinine back to baseline  Appreciate nephrology consult  Continue home medication, Demadex, metolazone

## 2022-10-29 NOTE — CASE MANAGEMENT
Case Management Discharge Planning Note    Patient name Tiera Kumar  Location /-01 MRN [id number]  : 1955 Date 10/29/2022       Current Admission Date: 10/22/2022  Current Admission Diagnosis:Acute kidney injury superimposed on chronic kidney disease [facility])   Patient Active Problem List    Diagnosis Date Noted   • Vaginal bleeding 10/23/2022   • Acute kidney injury superimposed on chronic kidney disease ([facility] [address] ) 10/22/2022   • Hypotension 10/22/2022   • GERD (gastroesophageal reflux disease) 10/22/2022   • UTI (urinary tract infection) 10/22/2022   • Chronic diastolic congestive heart failure ([facility] [address] ) 2022   • IMTIAZ (acute kidney injury) ([facility] [address] ) 2022   • Morbid obesity ([facility] [address] ) 2022   • Hypothyroidism 2022   • Supratherapeutic INR 04/15/2020   • COVID-19 virus infection 2020   • Chronic respiratory failure with hypoxia ([facility] [address] ) 2020   • Asthma 2020   • Atrial fibrillation ([facility] [address] ) 2020   • COPD (chronic obstructive pulmonary disease) ([facility] [address] ) 2020   • Diabetes mellitus ([facility] [address] ) 2020   • Heart failure ([facility] [address] ) 2020   • Shortness of breath 2020   • Anemia 2020      LOS (days): 7  Geometric Mean LOS (GMLOS) (days): 4 30  Days to GMLOS:-2 6     OBJECTIVE:  Risk of Unplanned Readmission Score: 35 92         Current admission status: Inpatient   Preferred Pharmacy:   [address] [address]  Phone: [phone] Fax: [phone]    Primary Care Provider: Jignesh Rivera MD    Primary Insurance: THE [facility]  Secondary Insurance:     DISCHARGE DETAILS:    Per MD patient is medically cleared for dc  Pt requires BLS transport home, related to 02 needs  [place] form completed and faxed for approval for transport  Roundtrip requested for 1 PM, await confirmation  Informed [facility] of dc via [address]     left a message with [facility] of dc today       [address] for transport was received from [facility]--- [facility] Necessity was done and provided to Nursing  1430  time confirmed with [facility]      Patient and spouse updated on time of transport

## 2022-10-29 NOTE — DISCHARGE SUMMARY
[address]  Discharge- Eligio Kawasaki 1955, 69 y o  female MRN: [id number]  Unit/Bed#: -01 Encounter: [id number]  Primary Care Provider: Vanna Burk MD   Date and time admitted to hospital: 10/22/2022 11:40 AM    Chronic respiratory failure with hypoxia ([facility] )  Assessment & Plan  · Chronically on CPAP at night and 4L of oxygen during the day secondary to COPD most likely NISHI  · Unable to wear home unit due to right jugular central line  · Goal Sp02>88%  · Respiratory protocol   · Pulmonary consult appreciated  · Appreciate Pulmonary, Cardiology recommendation    Atrial fibrillation [facility])  Assessment & Plan  Rate control  Continue beta-blocker  Continue Coumadin, target INR between 2-3  Bleeding precaution provided  Appreciate cardiology recommendation  INR is subtherapeutic, today INR is 1 41-Coumadin dose increased to 7 5 mg, recheck INR in 2-3 days and follow the recommendation from Coumadin clinic    * Acute kidney injury superimposed on chronic kidney disease [facility])  Assessment & Plan  Lab Results   Component Value Date    EGFR 43 10/29/2022    EGFR 36 10/28/2022    EGFR 32 10/27/2022    CREATININE 1 27 10/29/2022    CREATININE 1 46 (H) 10/28/2022    CREATININE 1 59 (H) 10/27/2022   Baseline creatinine 1 1-1 3  Creatinine back to baseline  Appreciate nephrology consult  Continue home medication, Demadex, metolazone    Diabetes mellitus ([facility] 75 )  Assessment & Plan  Lab Results   Component Value Date    HGBA1C 6 8 (H) 10/05/2022       Recent Labs     10/28/22  1621 10/28/22  2047 10/29/22  0719 10/29/22  1132   POCGLU 135 183* 144* 208*       Blood Sugar Average: Last 72 hrs:  (P) 157 [id number]   Resume home medication, follow hypoglycemia precaution    COPD (chronic obstructive pulmonary disease) ([facility] 75 )  Assessment & Plan  Continue respiratory protocol  Continue current treatment  Appreciate pulmonary consult  Patient also morbidly obese, which can affect patient respiratory status    Intertrigo  Assessment & Plan  Management as per wound care recommendation    Vaginal bleeding  Assessment & Plan  Patient denies any bleeding    UTI (urinary tract infection)  Assessment & Plan  Urine culture multi abdirizak  Patient already received 3 dose of IV ceftriaxone  Dow removed 10/27    GERD (gastroesophageal reflux disease)  Assessment & Plan  Continue home medication    Hypotension  Assessment & Plan  Condition improved  Continue to monitor  Continue midodrine based on parameter    Hypothyroidism  Assessment & Plan  · Restart home synthroid   · TSH 1 14    Morbid obesity (HCC)  Assessment & Plan  Lifestyle modification    Chronic diastolic congestive heart failure [facility])  Assessment & Plan  Wt Readings from Last 3 Encounters:   10/29/22 (!) 151 kg (331 lb 12 7 oz)   10/03/22 (!) 171 kg (377 lb 6 8 oz)   04/13/20 (!) 162 kg (356 lb 0 7 oz)       · Patient hospitalized 9/21/22 - 10/3/22 secondary to volume overload  Started on torsemide and zaroxolyn  · Patient down 23kg from last admission   · Echocardiogram 9/21/22: The left ventricular ejection fraction is 55-59%  Systolic function is normal  Wall motion is grossly normal  There is both systolic and diastolic flattening of the interventricular septum consistent with right ventricle pressure and volume overload: Right ventricular cavity size is severely dilated  Systolic function is mildly to moderately reduced  · Off pressors 10/26  · Cardiology consult appreciated-resume home medication and follow-up with Cardiology outpatient basis  · Midodrine dose increased to 10 mg t i d , hold the medication if systolic blood pressure is more than 120           Medical Problems             Resolved Problems  Date Reviewed: 10/27/2022          Resolved    Acute hyponatremia 10/26/2022     Resolved by  CAMPBELL Riojas              Discharging Physician / Practitioner: Ryan Azevedo  PCP: Vanna Burk MD  Admission Date: Admission Orders (From admission, onward)     Ordered        10/22/22 4900 Jennifer Rowland  Once            10/22/22 1314  INPATIENT ADMISSION  Once                      Discharge Date: 10/29/22    Consultations During Hospital Stay:  · Pulmonary  · Cardiology  · Nephrology nephrology  · PT/OT/speech  ·     Procedures Performed:   CT abdomen pelvis wo contrast    Result Date: 10/22/2022  Narrative: CT ABDOMEN AND PELVIS WITHOUT IV CONTRAST INDICATION:   Sepsis UTI, IMTIAZ, R/o obstructive nephropathy  "Patient recently complains of generalized weakness and decreased urine production  Denies chest pain or abdominal pain or nausea or vomiting or shortness of breath above the baseline" COMPARISON:  None  TECHNIQUE:  CT examination of the abdomen and pelvis was performed without intravenous contrast  Axial, sagittal, and coronal 2D reformatted images were created from the source data and submitted for interpretation  Radiation dose length product (DLP) for this visit:  [phone] mGy-cm   This examination, like all CT scans performed in the [facility], was performed utilizing techniques to minimize radiation dose exposure, including the use of iterative reconstruction and automated exposure control  Enteric contrast was administered  FINDINGS: ABDOMEN LOWER CHEST:  Partially imaged cardiomegaly  Small hiatal hernia LIVER/BILIARY TREE:  Unremarkable  GALLBLADDER:  Gallbladder is surgically absent  SPLEEN:  Unremarkable  PANCREAS:  Unremarkable  ADRENAL GLANDS:  Unremarkable  KIDNEYS/URETERS:  Unremarkable  No hydronephrosis  STOMACH AND BOWEL:  There is colonic diverticulosis without evidence of acute diverticulitis  APPENDIX:  No findings to suggest appendicitis  ABDOMINOPELVIC CAVITY:  No ascites  No pneumoperitoneum  No lymphadenopathy  VESSELS:  Unremarkable for patient's age  PELVIS REPRODUCTIVE ORGANS:  Unremarkable for patient's age  URINARY BLADDER:  Decompressed with a Dow catheter  ABDOMINAL WALL/INGUINAL REGIONS:  Tiny uncomplicated fat-containing umbilical hernia  OSSEOUS STRUCTURES:  No acute fracture or destructive osseous lesion  Spinal degenerative changes are noted  Impression: No acute inflammatory changes in the abdomen or pelvis  Workstation performed: [id number]     XR chest portable    Result Date: 10/23/2022  Narrative: CHEST INDICATION:   Weakness  COMPARISON:  Chest x-ray from 9/24/2022  Chest CT from 4/13/2020  EXAM PERFORMED/VIEWS:  XR CHEST PORTABLE FINDINGS: Cardiomediastinal silhouette appears unremarkable  Unchanged thin-walled right mid lung cyst  The lungs are clear  No pneumothorax or pleural effusion  Osseous structures appear within normal limits for patient age  Impression: No acute cardiopulmonary disease  Workstation performed: [id number]     XR knee 1 or 2 vw left    Result Date: 10/2/2022  Narrative: LEFT KNEE INDICATION:   B/L knee pain  COMPARISON:  None VIEWS:  XR KNEE 1 OR 2 VW LEFT FINDINGS: There is no acute fracture or dislocation  There is no joint effusion  Moderate to severe tricompartmental osteoarthritis as evidenced by joint space narrowing, osteophyte formation and subchondral sclerosis  No lytic or blastic osseous lesion  Soft tissues are unremarkable  Impression: No acute osseous abnormality  Degenerative changes as described  Workstation performed: [id number]     XR knee 1 or 2 vw right    Result Date: 10/2/2022  Narrative: RIGHT KNEE INDICATION:   B/L knee pain  COMPARISON:  None VIEWS:  XR KNEE 1 OR 2 VW RIGHT FINDINGS: There is no acute fracture or dislocation  There is no joint effusion  Moderate to severe tricompartmental osteoarthritis as evidenced by joint space narrowing, osteophyte formation and subchondral sclerosis  This is most severe at the medial compartment  No lytic or blastic osseous lesion  Scattered small rounded soft tissue calcifications probably phleboliths  Impression: No acute osseous abnormality  Degenerative changes as described  Workstation performed: [id number]     XR chest portable ICU    Result Date: 10/23/2022  Narrative: CHEST INDICATION:   CVC placement  COMPARISON:  Compared 10/22/2022 EXAM PERFORMED/VIEWS:  XR CHEST PORTABLE ICU FINDINGS:  Right neck catheter in the distal SVC  Moderate cardiomegaly Mild prominent vascular markings  No pneumothorax  Slight right hemidiaphragm elevation is unchanged  Osseous structures appear within normal limits for patient age  Impression: Right neck catheter in the distal SVC  Mild congestion  Workstation performed: [id number]     US kidney and bladder    Result Date: 10/23/2022  Narrative: RENAL ULTRASOUND INDICATION:   Acute kidney  COMPARISON: CT abdomen pelvis 10/20/2022 TECHNIQUE:   Ultrasound of the retroperitoneum was performed with a curvilinear transducer utilizing volumetric sweeps and still imaging techniques  FINDINGS: KIDNEYS: Symmetric and normal size  Right kidney:  10 5 x 6 3 x 5 5 cm  Volume 192 1 mL Left kidney:  10 7 x 7 0 x 4 9 cm  Volume 194 5 mL Right kidney Slightly lobular contour  Normal echogenicity  No mass is identified  No hydronephrosis  No shadowing calculi  No perinephric fluid collections  Left kidney Slightly lobular contour  Normal echogenicity  No mass is identified  No hydronephrosis  No shadowing calculi  No perinephric fluid collections  URETERS: Nonvisualized  BLADDER: Decompressed  Dow catheter tip not well seen but in position on recent CT  Low volume limits evaluation  Impression: 1  No hydronephrosis or calculi  2   Nonspecific lobular renal contour could be related to scarring  Workstation performed: [id number] [id number] bedside procedure    Result Date: 10/23/2022  · Narrative: 1 2 840 [id number]  2 446 2994 [id number] 63 1  ·     Significant Findings / Test Results:   Lab Results   Component Value Date    WBC 6 95 10/29/2022    HGB 11 5 10/29/2022    HCT 36 0 10/29/2022    MCV 89 10/29/2022     10/29/2022   ·   Lab Results   Component Value Date    SODIUM 139 10/29/2022    K 3 8 10/29/2022    CL 97 10/29/2022    CO2 33 (H) 10/29/2022    AGAP 9 10/29/2022    BUN 69 (H) 10/29/2022    CREATININE 1 27 10/29/2022    GLUC 135 10/29/2022    CALCIUM 9 2 10/29/2022    AST 39 10/29/2022    ALT 39 10/29/2022    ALKPHOS 158 (H) 10/29/2022    TP 7 4 10/29/2022    TBILI 0 76 10/29/2022    EGFR 43 10/29/2022   ·   Collected Updated Procedure Result Status Patient Facility Result Comment    10/22/2022 1605 10/23/2022 1613 Urine culture [[id number]]   Urine, Indwelling Dow Catheter    Final result [address]  Component Value   Urine Culture No Growth <1000 cfu/mL             10/22/2022 1217 10/23/2022 2238 Urine culture [[id number]]   (Abnormal)   Urine, Indwelling Dow Catheter    Final result [address]  Component Value   Urine Culture >100,000 cfu/ml Alpha Hemolytic Streptococcus NOT Enterococcus Abnormal     <10,000 cfu/ml Gram Negative Fer Enteric Like Abnormal     <10,000 cfu/ml Staphylococcus coagulase negative Abnormal              10/22/2022 1214 10/22/2022 1302 COVID19, Influenza A/B, RSV PCR, [facility] [[id number]]    Nares from Nasopharyngeal Swab    Final result [facility] - copy/paste COVID Guidelines URL to browser: [email or web address]   SARS-CoV-2 assay is a Nucleic Acid Amplification assay intended for the   qualitative detection of nucleic acid from SARS-CoV-2 in nasopharyngeal   swabs  Results are for the presumptive identification of SARS-CoV-2 RNA  Positive results are indicative of infection with SARS-CoV-2, the virus   causing COVID-19, but do not rule out bacterial infection or co-infection   with other viruses   Laboratories within the [place] and its   territories are required to report all positive results to the appropriate   public health authorities  Negative results do not preclude SARS-CoV-2   infection and should not be used as the sole basis for treatment or other   patient management decisions  Negative results must be combined with   clinical observations, patient history, and epidemiological information  This test has not been FDA cleared or approved  This test has been authorized by FDA under an Emergency Use Authorization   (EUA)  This test is only authorized for the duration of time the   declaration that circumstances exist justifying the authorization of the   emergency use of an in vitro diagnostic tests for detection of SARS-CoV-2   virus and/or diagnosis of COVID-19 infection under section 564(b)(1) of   the Act, 21 U  S C  060BDR-7(H)(4), unless the authorization is terminated   or revoked sooner  The test has been validated but independent review by FDA   and CLIA is pending  Test performed using [employer] GeneXpert: This RT-PCR assay targets N2,   a region unique to SARS-CoV-2  A conserved region in the E-gene was chosen   for pan-Sarbecovirus detection which includes SARS-CoV-2  According to CMS-2020-01-R, this platform meets the definition of high-throughput technology  Component Value   SARS-CoV-2 Negative   INFLUENZA A PCR Negative   INFLUENZA B PCR Negative   RSV PCR Negative          10/22/2022 1214 10/27/2022 2101 Blood culture #1 [[id number]]   Blood from Arm, Left    Final result [address]  Component Value   Blood Culture No Growth After 5 Days  10/22/2022 1214 10/27/2022 2101 Blood culture #2 [[id number]]   Blood from Arm, Right    Final result [address]  Component Value   Blood Culture No Growth After 5 Days  ·     Incidental Findings:   · As mentioned above     Test Results Pending at Discharge (will require follow up):    · None     Outpatient Tests Requested:  · None    Complications:  None    Reason for Admission: Generalized weakness    Hospital Course:   Carlos Quijano is a 69 y o  female patient who originally presented to the hospital on 10/22/2022 due to generalized weakness  Patient initially admitted under critical care service for acute on chronic renal failure, urinary tract infection as well as CHF-acute on chronic, patient also found chronic respiratory failure on baseline oxygen 4 L  Patient initially admitted under critical care service due to acute on chronic renal failure secondary to hypovolemic shock  Patient was requiring vasopressor  With the treatment, patient condition improved, patient downgraded to Medicine Service  Off of pressor,  In the meantime patient was evaluated by Cardiology, Pulmonary, Nephrology  Recommendation appreciated  After improvement other blood pressure, patient home medication resume 1 after another  Cardiology recommendation appreciated  Nephrology also agrees with the treatment plan  Patient also evaluated by Pulmonary for chronic respiratory failure, patient was on CPAP at home and use 4 L of oxygen  During the hospitalization, patient was placed on Dow, concern for UTI, repeat urine culture shows no growth  Patient received 3 dose of IV antibiotic then removed the antibiotic  Patient remained asymptomatic  Patient also evaluated by Wound Care, appreciate recommendation:  Skin care Plan:   1)Interdry to areas of Intertrigo bilateral axilla,beneath breasts, posterior knee folds and abdominal pannus  Interdry reduces friction, moisture, bacteria and fungus due to skin to skin contact and negrita away moisture  Cleanse with soap and water then pat dry then apply Interdry  Apply flat against area of skin to skin contact leaving 2 inch exposed,remove twice a day for cleansing and reapply  May be used up to 5-7 days  2-Allevyn foam to right buttocks as treatment , yunier with a T, check skin beneath foam every shift and re apply   Change every 3 days and prn soil or dislodgement  3-Turn/reposition q2h or when medically stable for pressure re-distribution on skin   4-Elevate heels to offload pressure   5-Moisturize skin daily with skin nourishing cream   6-Ehob cushion in chair when out of bed  7-Hydraguard to bilateral heels BID and PRN  8-Allevyn foam to left  heel, yunier w/P, peel foam check skin integrity q-shift  Change q3d   9-Bariatric low air loss (ordered)     Patient also evaluated by PT OT recommendation was rehab  Patient is refusing to go to rehab preferred to go home  Verbalizes to understand agreed risk, benefits, side-effects  All lab results, imaging findings, treatment plan of care discussed in details with patient and her  on the bedside  Verbalizes to understand and agrees  Please see above list of diagnoses and related plan for additional information  Condition at Discharge: stable    Discharge Day Visit / Exam:   Subjective:  Seen and evaluated during the round  Resting comfortably  Denies any significant complaint  Vitals: Blood Pressure: 103/55 (10/29/22 0715)  Pulse: 61 (10/29/22 0715)  Temperature: 97 8 °F (36 6 °C) (10/29/22 0715)  Temp Source: Temporal (10/29/22 0715)  Respirations: 15 (10/29/22 0715)  Height: 5' 2" (157 5 cm) (10/25/22 0931)  Weight - Scale: (!) 151 kg (331 lb 12 7 oz) (10/29/22 0500)  SpO2: 97 % (10/29/22 0715)  Exam:   Physical Exam  Vitals and nursing note reviewed  Constitutional:       Appearance: Normal appearance  She is obese  She is not ill-appearing or diaphoretic  HENT:      Head: Normocephalic and atraumatic  Nose: Nose normal  No congestion or rhinorrhea  Mouth/Throat:      Mouth: Mucous membranes are moist       Pharynx: No oropharyngeal exudate or posterior oropharyngeal erythema  Eyes:      General: No scleral icterus  Left eye: No discharge  Extraocular Movements: Extraocular movements intact        Conjunctiva/sclera: Conjunctivae normal  Pupils: Pupils are equal, round, and reactive to light  Neck:      Vascular: No carotid bruit  Cardiovascular:      Rate and Rhythm: Normal rate  Heart sounds: No murmur heard  No friction rub  No gallop  Pulmonary:      Effort: Pulmonary effort is normal  No respiratory distress  Breath sounds: No stridor  No wheezing or rhonchi  Abdominal:      General: Abdomen is flat  Bowel sounds are normal  There is no distension  Palpations: Abdomen is soft  There is no mass  Tenderness: There is no abdominal tenderness  Hernia: No hernia is present  Musculoskeletal:         General: No swelling  Cervical back: Normal range of motion  No rigidity or tenderness  Lymphadenopathy:      Cervical: No cervical adenopathy  Skin:     Findings: Rash (Different parts of the body) present  No lesion  Neurological:      General: No focal deficit present  Mental Status: She is alert and oriented to person, place, and time  Cranial Nerves: No cranial nerve deficit  Sensory: No sensory deficit  Motor: No weakness  Coordination: Coordination normal          Discussion with Family: Updated  () at bedside  Discharge instructions/Information to patient and family:   See after visit summary for information provided to patient and family  Provisions for Follow-Up Care:  See after visit summary for information related to follow-up care and any pertinent home health orders  Disposition:   Home with VNA Services (Reminder: Complete face to face encounter)    Planned Readmission:  If condition get worse     Discharge Statement:  I spent >35 minutes discharging the patient  This time was spent on the day of discharge  I had direct contact with the patient on the day of discharge   Greater than 50% of the total time was spent examining patient, answering all patient questions, arranging and discussing plan of care with patient as well as directly providing post-discharge instructions  Additional time then spent on discharge activities  Discharge Medications:  See after visit summary for reconciled discharge medications provided to patient and/or family        **Please Note: This note may have been constructed using a voice recognition system**

## 2022-10-31 NOTE — UTILIZATION REVIEW
NOTIFICATION OF ADMISSION DISCHARGE   This is a Notification of Discharge from [address] [place] [address]  Please be advised that this patient has been discharge from our facility  Below you will find the admission and discharge date and time including the patient’s disposition  UTILIZATION REVIEW CONTACT:  [address]  Utilization   Network Utilization Review Department  Phone: [phone] x carefully listen to the prompts  All voicemails are confidential   Email: [email or web address]     ADMISSION INFORMATION  PRESENTATION DATE: 10/22/2022 11:40 AM  OBERVATION ADMISSION DATE:   INPATIENT ADMISSION DATE: 10/22/22  1:14 PM   DISCHARGE DATE: 10/29/2022  1:30 PM  DISPOSITION: Home with [place] with [address] INFORMATION:  Send all requests for admission clinical reviews, approved or denied determinations and any other requests to dedicated fax number below belonging to the campus where the patient is receiving treatment   List of dedicated fax numbers:  [address] DENIALS (Administrative/Medical Necessity) [phone]   [address] (Maternity/NICU/Pediatrics) [phone]   [facility] [phone]   [facility] 87 [phone]   [address] [phone]   220 [facility] [phone]   [address] [place] [address] Road [phone]   [facility] 119 [phone]   [facility]  [phone]   [facility] [phone]   412 [facility] 850 [address] [facility] [phone]

## 2022-12-23 PROBLEM — N39.0 UTI (URINARY TRACT INFECTION): Status: RESOLVED | Noted: 2022-10-22 | Resolved: 2022-12-23

## 2023-01-05 ENCOUNTER — TELEPHONE (OUTPATIENT)
Dept: PULMONOLOGY | Facility: CLINIC | Age: 68
End: 2023-01-05

## 2023-01-05 NOTE — TELEPHONE ENCOUNTER
Call placed to [facility] for patients compliance report for CPAP  Fax number provided to fax compliance over to office

## 2023-01-09 ENCOUNTER — OFFICE VISIT (OUTPATIENT)
Dept: PULMONOLOGY | Facility: CLINIC | Age: 68
End: 2023-01-09

## 2023-01-09 VITALS — HEIGHT: 62 IN | BODY MASS INDEX: 60.69 KG/M2

## 2023-01-09 DIAGNOSIS — J45.909 UNCOMPLICATED ASTHMA, UNSPECIFIED ASTHMA SEVERITY, UNSPECIFIED WHETHER PERSISTENT: ICD-10-CM

## 2023-01-09 DIAGNOSIS — J96.11 CHRONIC RESPIRATORY FAILURE WITH HYPOXIA (HCC): Primary | Chronic | ICD-10-CM

## 2023-01-09 DIAGNOSIS — G47.33 OSA (OBSTRUCTIVE SLEEP APNEA): ICD-10-CM

## 2023-01-09 RX ORDER — INSULIN GLARGINE 100 [IU]/ML
INJECTION, SOLUTION SUBCUTANEOUS
COMMUNITY
Start: 2022-11-25

## 2023-01-09 RX ORDER — LEVOTHYROXINE SODIUM 88 UG/1
TABLET ORAL
COMMUNITY
Start: 2022-11-20

## 2023-01-09 RX ORDER — POTASSIUM CHLORIDE 750 MG/1
CAPSULE, EXTENDED RELEASE ORAL
COMMUNITY
Start: 2022-12-27

## 2023-01-09 NOTE — ASSESSMENT & PLAN NOTE
- Eos- [phone]  -Patient likely has a component of chronic bronchitis  -She reports since being initiated on Breo overall respiratory status has remained somewhat stable  -Continue home regimen: Breo 100/25 MCG 1 puff daily, albuterol nebulizer every 6 as needed, Proventil 2 puffs every 6 as needed  -No indication to increase maintenance therapy at this time  -Patient has been a lifelong non-smoker

## 2023-01-09 NOTE — ASSESSMENT & PLAN NOTE
- Currently on 4 L continuous nasal cannula  -Maintain saturations greater than 88%  -Patient has home pulse ox

## 2023-01-09 NOTE — PROGRESS NOTES
Progress Note - Pulmonary   Jamaal Finley 77 y o  female MRN: [id number]  Unit/Bed#:  Encounter: [id number]    Assessment/Plan:    ***    Chief Complaint:    ***    Subjective:    ***    Objective:    Vitals: There were no vitals taken for this visit  ***,There is no height or weight on file to calculate BMI      [unfilled]    Invasive Devices     None                 Physical Exam: ***    Physical Exam    Labs: {[facility] pulm labs:76259}    ***    Imaging and other studies: {Results Review Statement:72691}

## 2023-01-09 NOTE — PROGRESS NOTES
Pulmonary Follow-Up Note   Aniceto Luna 77 y o  female MRN: [id number]  1/9/2023      Assessment/Plan:    Problem List Items Addressed This Visit        Respiratory    Chronic respiratory failure with hypoxia (HCC) - Primary (Chronic)     - Currently on 4 L continuous nasal cannula  -Maintain saturations greater than 88%  -Patient has home pulse ox         Asthma     - Eos- 420--310--410  -Patient likely has a component of chronic bronchitis  -She reports since being initiated on Breo overall respiratory status has remained somewhat stable  -Continue home regimen: Breo 100/25 MCG 1 puff daily, albuterol nebulizer every 6 as needed, Proventil 2 puffs every 6 as needed  -No indication to increase maintenance therapy at this time  -Patient has been a lifelong non-smoker         NISHI (obstructive sleep apnea)     -w/ OHS likely secondary to body habitus  -Patient reports 100% compliance with her CPAP                No follow-ups on file  History of Present Illness   Reason for Visit: Hospital follow-up  Chief Complaint: " I am a little short of breath"  HPI: Aniceto Luna is a 77 y o  female who presents to the office today for hospital follow-up  Patient was discharged on 10/29/2022 after being treated for acute on chronic respiratory failure secondary to acute CHF exacerbation  Patient was diuresed and discharged to rehabilitation center  Today upon examination patient was at her respiratory baseline  Patient reports that she is extremely limited in her mobility  Patient reports she only uses the wheelchair to chair at home  Patient does report that she is at her respiratory baseline 1 ambulating  Provided patient with incentive spirometry and she demonstrated usage as I feel this will overall help her atelectasis  Patient currently denying any fevers, chills, abscess, headaches, night sweats, pleuritic chest pain, palpitations      Review of Systems   Constitutional: Negative for chills and fever    HENT: Negative for ear pain and sore throat  Eyes: Negative for pain and visual disturbance  Respiratory: Positive for cough and shortness of breath  Negative for apnea, choking, chest tightness, wheezing and stridor  Cardiovascular: Negative for chest pain and palpitations  Gastrointestinal: Negative for abdominal pain and vomiting  Genitourinary: Negative for dysuria and hematuria  Musculoskeletal: Negative for arthralgias and back pain  Skin: Negative for color change and rash  Neurological: Negative for seizures and syncope  Psychiatric/Behavioral: Negative for agitation and behavioral problems  All other systems reviewed and are negative        Historical Information   Past Medical History:   Diagnosis Date   • Asthma    • Atrial fibrillation ([facility] )    • COPD (chronic obstructive pulmonary disease) ([facility] )    • Diabetes mellitus [facility]    • Disease of thyroid gland    • Heart failure (HCC)    • Kidney failure    • NISHI (obstructive sleep apnea)      Past Surgical History:   Procedure Laterality Date   • CARDIAC ELECTROPHYSIOLOGY MAPPING AND ABLATION      by Dr Chip Hartley at [address] [place] [address]     • [address]       Family History   Problem Relation Age of Onset   • Arthritis Mother    • Hearing loss Mother    • Heart disease Mother    • Hypertension Mother    • Stroke Mother    • Alcohol abuse Father    • Cancer Father    • Diabetes Father    • Arthritis Sister    • Cancer Sister    • Alcohol abuse Brother    • Diabetes Son    • Arthritis Daughter    • Drug abuse Daughter    • Heart disease Maternal Grandmother    • Hypertension Maternal Grandmother    • Stroke Maternal Grandmother    • Arthritis Maternal Aunt    • Asthma Neg Hx    • Birth defects Neg Hx    • COPD Neg Hx    • Depression Neg Hx    • Early death Neg Hx    • Hyperlipidemia Neg Hx    • Kidney disease Neg Hx    • Learning disabilities Neg Hx    • Mental illness Neg Hx    • Mental retardation Neg Hx    • Miscarriages / Stillbirths Neg Hx    • Vision loss Neg Hx      Social History   Social History     Substance and Sexual Activity   Alcohol Use Never     Social History     Substance and Sexual Activity   Drug Use Never     Social History     Tobacco Use   Smoking Status Never   Smokeless Tobacco Never     E-Cigarette/Vaping   • E-Cigarette Use Never User      E-Cigarette/Vaping Substances       Meds/Allergies     Current Outpatient Medications:   •  acetaminophen (TYLENOL) 325 mg tablet, Take 2 tablets (650 mg total) by mouth every 6 (six) hours as needed for fever, Disp: 30 tablet, Rfl: 0  •  allopurinol (ZYLOPRIM) 100 mg tablet, Take 100 mg by mouth daily Dose needs to be verified, Disp: , Rfl:   •  ammonium lactate (LAC-HYDRIN) 12 % lotion, Apply topically 2 (two) times a day as needed for dry skin, Disp: 400 g, Rfl: 0  •  atorvastatin (LIPITOR) 10 mg tablet, Take 10 mg by mouth daily, Disp: , Rfl:   •  cetirizine (ZyrTEC) 10 mg tablet, Take 10 mg by mouth daily, Disp: , Rfl:   •  Cholecalciferol (Vitamin D3) 25 MCG (1000 UT) CAPS, Take 2,000 Units by mouth daily, Disp: , Rfl:   •  docusate sodium (COLACE) 100 mg capsule, Take 1 capsule (100 mg total) by mouth 2 (two) times a day, Disp: , Rfl: 0  •  ferrous sulfate 325 (65 Fe) mg tablet, Take 325 mg by mouth daily with breakfast, Disp: , Rfl:   •  fluticasone-vilanterol (BREO ELLIPTA) 100-25 mcg/inh inhaler, Inhale 1 puff daily Rinse mouth after use , Disp: , Rfl:   •  Insulin Aspart (NovoLOG) 100 units/mL injection, Inject 3 Units under the skin 3 (three) times a day with meals, Disp: 10 mL, Rfl: 0  •  insulin detemir (LEVEMIR) 100 units/mL subcutaneous injection, Inject 26 Units under the skin daily at bedtime, Disp: , Rfl:   •  levothyroxine 200 mcg tablet, Take 288 mcg by mouth daily, Disp: , Rfl:   •  Levothyroxine Sodium 88 MCG CAPS, Take by mouth daily, Disp: , Rfl:   •  metoprolol succinate (TOPROL-XL) 25 mg 24 hr tablet, Take 0 5 tablets (12 5 mg total) by mouth 2 (two) times a day, Disp: 30 tablet, Rfl: 0  •  midodrine (PROAMATINE) 10 MG tablet, Take 1 tablet (10 mg total) by mouth 3 (three) times a day before meals for 15 days Please hold the medication if your systolic blood pressure is more than 115, Disp: 45 tablet, Rfl: 1  •  montelukast (SINGULAIR) 10 mg tablet, Take 10 mg by mouth daily at bedtime, Disp: , Rfl:   •  multivitamin (THERAGRAN) TABS, Take 1 tablet by mouth daily, Disp: , Rfl:   •  nystatin (MYCOSTATIN) powder, Apply topically 2 (two) times a day, Disp: 15 g, Rfl: 0  •  omeprazole (PriLOSEC) 40 MG capsule, Take 40 mg by mouth daily, Disp: , Rfl:   •  polyethylene glycol (MIRALAX) 17 g packet, Take 17 g by mouth daily as needed (Constipation), Disp: , Rfl: 0  •  potassium chloride (K-DUR,KLOR-CON) 20 mEq tablet, Take 1 tablet (20 mEq total) by mouth daily Do not start before October 4, 2022 , Disp: , Rfl: 0  •  torsemide 40 MG TABS, Take 40 mg by mouth 2 (two) times a day, Disp: , Rfl: 0  •  traZODone (DESYREL) 50 mg tablet, Take 1 tablet (50 mg total) by mouth daily at bedtime as needed for sleep, Disp: 30 tablet, Rfl: 0  •  warfarin (COUMADIN) 7 5 mg tablet, INR range is 2-3, Disp: 15 tablet, Rfl: 0  Allergies   Allergen Reactions   • Acesulfame Potassium - Food Allergy Anaphylaxis   • Aspartame - Food Allergy Anaphylaxis   • Saccharin - Food Allergy Anaphylaxis   • Sucralose - Food Allergy Anaphylaxis   • Metformin GI Intolerance       Vitals: There were no vitals taken for this visit  There is no height or weight on file to calculate BMI  Physical Exam:  Physical Exam  Constitutional:       General: She is not in acute distress  Appearance: Normal appearance  She is normal weight  She is not ill-appearing  HENT:      Head: Normocephalic and atraumatic  Nose: Nose normal  No congestion or rhinorrhea  Mouth/Throat:      Mouth: Mucous membranes are dry  Pharynx: Oropharynx is clear   No oropharyngeal exudate or posterior oropharyngeal erythema  Cardiovascular:      Rate and Rhythm: Normal rate and regular rhythm  Pulses: Normal pulses  Heart sounds: Normal heart sounds  No murmur heard  No friction rub  No gallop  Pulmonary:      Effort: Pulmonary effort is normal  No tachypnea, bradypnea, accessory muscle usage or respiratory distress  Breath sounds: Decreased air movement present  No stridor  Decreased breath sounds present  No wheezing, rhonchi or rales  Chest:      Chest wall: No tenderness  Abdominal:      General: Abdomen is flat  Bowel sounds are normal  There is no distension  Palpations: Abdomen is soft  There is no mass  Musculoskeletal:         General: No swelling or tenderness  Normal range of motion  Cervical back: Normal range of motion  No rigidity or tenderness  Skin:     General: Skin is warm and dry  Coloration: Skin is not jaundiced or pale  Neurological:      General: No focal deficit present  Mental Status: She is alert and oriented to person, place, and time  Mental status is at baseline  Psychiatric:         Mood and Affect: Mood normal          Behavior: Behavior normal              Imaging and other studies: I have personally reviewed pertinent films in PACS     Chest x-ray 10/22/2022-no pulmonary disease    Pulmonary Results (PFTs, PSG): I have personally reviewed pertinent films in PACS     No pfts        [address]        Portions of the record may have been created with voice recognition software  Occasional wrong word or "sound a like" substitutions may have occurred due to the inherent limitations of voice recognition software  Read the chart carefully and recognize, using context, where substitutions have occurred or contact the dictating provider

## 2023-04-18 ENCOUNTER — RX ONLY (RX ONLY)
Age: 68
End: 2023-04-18

## 2023-04-18 ENCOUNTER — DOCTOR'S OFFICE (OUTPATIENT)
Dept: URBAN - NONMETROPOLITAN AREA CLINIC 1 | Facility: CLINIC | Age: 68
Setting detail: OPHTHALMOLOGY
End: 2023-04-18
Payer: COMMERCIAL

## 2023-04-18 DIAGNOSIS — E11.9: ICD-10-CM

## 2023-04-18 DIAGNOSIS — H35.033: ICD-10-CM

## 2023-04-18 DIAGNOSIS — H25.13: ICD-10-CM

## 2023-04-18 DIAGNOSIS — H35.373: ICD-10-CM

## 2023-04-18 PROCEDURE — 92004 COMPRE OPH EXAM NEW PT 1/>: CPT | Performed by: OPHTHALMOLOGY

## 2023-04-18 ASSESSMENT — REFRACTION_CURRENTRX
OS_SPHERE: +3.00
OD_OVR_VA: 20/
OS_AXIS: 026
OD_AXIS: 164
OS_OVR_VA: 20/
OD_SPHERE: +2.50
OS_CYLINDER: +1.00
OD_CYLINDER: +0.75

## 2023-04-18 ASSESSMENT — VISUAL ACUITY
OS_BCVA: 20/CF X 3FT
OD_BCVA: 20/50-2

## 2023-04-18 ASSESSMENT — CONFRONTATIONAL VISUAL FIELD TEST (CVF): OS_FINDINGS: FULL

## 2023-05-18 ENCOUNTER — DOCTOR'S OFFICE (OUTPATIENT)
Dept: URBAN - NONMETROPOLITAN AREA CLINIC 1 | Facility: CLINIC | Age: 68
Setting detail: OPHTHALMOLOGY
End: 2023-05-18
Payer: COMMERCIAL

## 2023-05-18 DIAGNOSIS — H25.11: ICD-10-CM

## 2023-05-18 PROCEDURE — 92136 OPHTHALMIC BIOMETRY: CPT | Performed by: OPHTHALMOLOGY

## 2023-06-01 ENCOUNTER — OUTPATIENT HOSPITAL (OUTPATIENT)
Dept: URBAN - METROPOLITAN AREA CLINIC 130 | Facility: CLINIC | Age: 68
Setting detail: OPHTHALMOLOGY
End: 2023-06-01
Payer: COMMERCIAL

## 2023-06-01 DIAGNOSIS — H25.11: ICD-10-CM

## 2023-06-01 PROCEDURE — 66982 XCAPSL CTRC RMVL CPLX WO ECP: CPT | Performed by: OPHTHALMOLOGY

## 2023-06-02 ENCOUNTER — RX ONLY (RX ONLY)
Age: 68
End: 2023-06-02

## 2023-06-02 ENCOUNTER — DOCTOR'S OFFICE (OUTPATIENT)
Dept: URBAN - NONMETROPOLITAN AREA CLINIC 1 | Facility: CLINIC | Age: 68
Setting detail: OPHTHALMOLOGY
End: 2023-06-02
Payer: COMMERCIAL

## 2023-06-02 DIAGNOSIS — Z96.1: ICD-10-CM

## 2023-06-02 DIAGNOSIS — H25.12: ICD-10-CM

## 2023-06-02 PROCEDURE — 99024 POSTOP FOLLOW-UP VISIT: CPT | Performed by: OPHTHALMOLOGY

## 2023-06-02 ASSESSMENT — REFRACTION_CURRENTRX
OS_AXIS: 026
OD_SPHERE: +2.50
OS_OVR_VA: 20/
OD_CYLINDER: +0.75
OD_OVR_VA: 20/
OS_CYLINDER: +1.00
OD_AXIS: 164
OS_SPHERE: +3.00

## 2023-06-02 ASSESSMENT — VISUAL ACUITY
OD_BCVA: 20/50-2
OS_BCVA: 20/150-1

## 2023-06-20 ENCOUNTER — DOCTOR'S OFFICE (OUTPATIENT)
Dept: URBAN - NONMETROPOLITAN AREA CLINIC 1 | Facility: CLINIC | Age: 68
Setting detail: OPHTHALMOLOGY
End: 2023-06-20
Payer: COMMERCIAL

## 2023-06-20 DIAGNOSIS — H25.12: ICD-10-CM

## 2023-06-20 DIAGNOSIS — H04.121: ICD-10-CM

## 2023-06-20 DIAGNOSIS — Z96.1: ICD-10-CM

## 2023-06-20 PROCEDURE — 99024 POSTOP FOLLOW-UP VISIT: CPT | Performed by: OPHTHALMOLOGY

## 2023-06-20 ASSESSMENT — REFRACTION_CURRENTRX
OD_OVR_VA: 20/
OS_OVR_VA: 20/
OD_AXIS: 164
OD_SPHERE: +2.50
OS_AXIS: 026
OS_SPHERE: +3.00
OD_CYLINDER: +0.75
OS_CYLINDER: +1.00

## 2023-06-20 ASSESSMENT — REFRACTION_AUTOREFRACTION
OS_AXIS: 107
OD_CYLINDER: -1.50
OS_CYLINDER: -1.50
OS_SPHERE: +3.75
OD_SPHERE: +2.25
OD_AXIS: 076

## 2023-06-20 ASSESSMENT — KERATOMETRY
OS_AXISANGLE_DEGREES: 004
OD_K2POWER_DIOPTERS: 47.00
OD_AXISANGLE_DEGREES: 153
OS_K1POWER_DIOPTERS: 45.50
OD_K1POWER_DIOPTERS: 44.75
OS_K2POWER_DIOPTERS: 46.00

## 2023-06-20 ASSESSMENT — DRY EYES - PHYSICIAN NOTES: OD_GENERALCOMMENTS: 2+ PEE

## 2023-06-20 ASSESSMENT — AXIALLENGTH_DERIVED
OD_AL: 22.2199
OS_AL: 21.7522

## 2023-06-20 ASSESSMENT — VISUAL ACUITY
OS_BCVA: 20/50-2
OD_BCVA: 20/70-1

## 2023-06-20 ASSESSMENT — SPHEQUIV_DERIVED
OS_SPHEQUIV: 3
OD_SPHEQUIV: 1.5

## 2023-07-18 ENCOUNTER — DOCTOR'S OFFICE (OUTPATIENT)
Dept: URBAN - NONMETROPOLITAN AREA CLINIC 1 | Facility: CLINIC | Age: 68
Setting detail: OPHTHALMOLOGY
End: 2023-07-18
Payer: COMMERCIAL

## 2023-07-18 DIAGNOSIS — H25.12: ICD-10-CM

## 2023-07-18 DIAGNOSIS — Z96.1: ICD-10-CM

## 2023-07-18 DIAGNOSIS — H18.793: ICD-10-CM

## 2023-07-18 PROBLEM — H04.123 DRY EYE SYNDROME LACRIMAL GLAND; BOTH EYES: Status: ACTIVE | Noted: 2023-07-18

## 2023-07-18 PROCEDURE — 99024 POSTOP FOLLOW-UP VISIT: CPT | Performed by: OPHTHALMOLOGY

## 2023-07-18 ASSESSMENT — REFRACTION_AUTOREFRACTION
OS_AXIS: 107
OD_SPHERE: +2.25
OS_CYLINDER: -1.50
OD_AXIS: 076
OS_SPHERE: +3.75
OD_CYLINDER: -1.50

## 2023-07-18 ASSESSMENT — REFRACTION_CURRENTRX
OS_OVR_VA: 20/
OD_CYLINDER: +0.75
OS_SPHERE: +3.00
OD_AXIS: 164
OD_SPHERE: +2.50
OS_AXIS: 026
OD_OVR_VA: 20/
OS_CYLINDER: +1.00

## 2023-07-18 ASSESSMENT — VISUAL ACUITY
OS_BCVA: 20/50-2
OD_BCVA: 20/60-2

## 2023-07-18 ASSESSMENT — AXIALLENGTH_DERIVED
OD_AL: 22.2199
OS_AL: 21.7522

## 2023-07-18 ASSESSMENT — KERATOMETRY
OS_K1POWER_DIOPTERS: 45.50
OS_AXISANGLE_DEGREES: 004
OD_K2POWER_DIOPTERS: 47.00
OS_K2POWER_DIOPTERS: 46.00
OD_AXISANGLE_DEGREES: 153
OD_K1POWER_DIOPTERS: 44.75

## 2023-07-18 ASSESSMENT — DRY EYES - PHYSICIAN NOTES
OD_GENERALCOMMENTS: 2+ PEE
OS_GENERALCOMMENTS: 2+ PEE

## 2023-07-18 ASSESSMENT — SPHEQUIV_DERIVED
OD_SPHEQUIV: 1.5
OS_SPHEQUIV: 3

## 2023-07-18 ASSESSMENT — CORNEAL DYSTROPHY
OD_DYSTROPHY: ABMD
OS_DYSTROPHY: ABMD

## 2023-09-19 ENCOUNTER — DOCTOR'S OFFICE (OUTPATIENT)
Dept: URBAN - NONMETROPOLITAN AREA CLINIC 1 | Facility: CLINIC | Age: 68
Setting detail: OPHTHALMOLOGY
End: 2023-09-19
Payer: COMMERCIAL

## 2023-09-19 DIAGNOSIS — H04.123: ICD-10-CM

## 2023-09-19 DIAGNOSIS — H18.793: ICD-10-CM

## 2023-09-19 DIAGNOSIS — H35.373: ICD-10-CM

## 2023-09-19 DIAGNOSIS — E11.9: ICD-10-CM

## 2023-09-19 DIAGNOSIS — H25.12: ICD-10-CM

## 2023-09-19 DIAGNOSIS — Z96.1: ICD-10-CM

## 2023-09-19 PROCEDURE — 99213 OFFICE O/P EST LOW 20 MIN: CPT | Performed by: OPHTHALMOLOGY

## 2023-09-19 ASSESSMENT — REFRACTION_AUTOREFRACTION
OD_AXIS: 076
OS_CYLINDER: -1.50
OS_AXIS: 107
OD_SPHERE: +2.25
OD_CYLINDER: -1.50
OS_SPHERE: +3.75

## 2023-09-19 ASSESSMENT — REFRACTION_CURRENTRX
OD_OVR_VA: 20/
OD_AXIS: 164
OD_CYLINDER: +0.75
OS_AXIS: 026
OS_OVR_VA: 20/
OS_SPHERE: +3.00
OD_SPHERE: +2.50
OS_CYLINDER: +1.00

## 2023-09-19 ASSESSMENT — KERATOMETRY
OD_AXISANGLE_DEGREES: 153
OD_K2POWER_DIOPTERS: 47.00
OS_AXISANGLE_DEGREES: 004
OS_K1POWER_DIOPTERS: 45.50
OS_K2POWER_DIOPTERS: 46.00
OD_K1POWER_DIOPTERS: 44.75

## 2023-09-19 ASSESSMENT — CONFRONTATIONAL VISUAL FIELD TEST (CVF): OS_FINDINGS: FULL

## 2023-09-19 ASSESSMENT — DRY EYES - PHYSICIAN NOTES
OD_GENERALCOMMENTS: 2+ PEE
OS_GENERALCOMMENTS: 2+ PEE

## 2023-09-19 ASSESSMENT — SPHEQUIV_DERIVED
OS_SPHEQUIV: 3
OD_SPHEQUIV: 1.5

## 2023-09-19 ASSESSMENT — CORNEAL DYSTROPHY
OS_DYSTROPHY: ABMD
OD_DYSTROPHY: ABMD

## 2023-09-19 ASSESSMENT — AXIALLENGTH_DERIVED
OS_AL: 21.7522
OD_AL: 22.2199

## 2023-09-19 ASSESSMENT — VISUAL ACUITY
OD_BCVA: 20/70
OS_BCVA: 20/30-1

## 2023-10-06 ENCOUNTER — DOCTOR'S OFFICE (OUTPATIENT)
Dept: URBAN - NONMETROPOLITAN AREA CLINIC 1 | Facility: CLINIC | Age: 68
Setting detail: OPHTHALMOLOGY
End: 2023-10-06
Payer: COMMERCIAL

## 2023-10-06 DIAGNOSIS — H25.12: ICD-10-CM

## 2023-10-06 PROCEDURE — 92136 OPHTHALMIC BIOMETRY: CPT | Mod: 26,LT | Performed by: OPHTHALMOLOGY

## 2023-12-23 ENCOUNTER — APPOINTMENT (EMERGENCY)
Dept: RADIOLOGY | Facility: HOSPITAL | Age: 68
DRG: 177 | End: 2023-12-23
Payer: COMMERCIAL

## 2023-12-23 ENCOUNTER — HOSPITAL ENCOUNTER (INPATIENT)
Facility: HOSPITAL | Age: 68
LOS: 10 days | Discharge: HOME HEALTH | DRG: 177 | End: 2024-01-02
Attending: STUDENT IN AN ORGANIZED HEALTH CARE EDUCATION/TRAINING PROGRAM | Admitting: INTERNAL MEDICINE
Payer: COMMERCIAL

## 2023-12-23 ENCOUNTER — APPOINTMENT (EMERGENCY)
Dept: CT IMAGING | Facility: HOSPITAL | Age: 68
DRG: 177 | End: 2023-12-23
Payer: COMMERCIAL

## 2023-12-23 DIAGNOSIS — I48.91 ATRIAL FIBRILLATION, UNSPECIFIED TYPE (HCC): ICD-10-CM

## 2023-12-23 DIAGNOSIS — I95.89 HYPOTENSION DUE TO HYPOVOLEMIA: ICD-10-CM

## 2023-12-23 DIAGNOSIS — E86.1 HYPOTENSION DUE TO HYPOVOLEMIA: ICD-10-CM

## 2023-12-23 DIAGNOSIS — R06.02 SOB (SHORTNESS OF BREATH): ICD-10-CM

## 2023-12-23 DIAGNOSIS — J44.9 CHRONIC OBSTRUCTIVE PULMONARY DISEASE, UNSPECIFIED COPD TYPE (HCC): Chronic | ICD-10-CM

## 2023-12-23 DIAGNOSIS — U07.1 COVID-19 VIRUS INFECTION: ICD-10-CM

## 2023-12-23 DIAGNOSIS — I50.9 ACUTE ON CHRONIC CONGESTIVE HEART FAILURE, UNSPECIFIED HEART FAILURE TYPE (HCC): ICD-10-CM

## 2023-12-23 DIAGNOSIS — I50.9 HEART FAILURE, UNSPECIFIED HF CHRONICITY, UNSPECIFIED HEART FAILURE TYPE (HCC): ICD-10-CM

## 2023-12-23 DIAGNOSIS — I50.9 CHF EXACERBATION (HCC): Primary | ICD-10-CM

## 2023-12-23 LAB
2HR DELTA HS TROPONIN: -2 NG/L
ALBUMIN SERPL BCP-MCNC: 3.6 G/DL (ref 3.5–5)
ALP SERPL-CCNC: 124 U/L (ref 34–104)
ALT SERPL W P-5'-P-CCNC: 15 U/L (ref 7–52)
ANION GAP SERPL CALCULATED.3IONS-SCNC: 6 MMOL/L
APTT PPP: 38 SECONDS (ref 23–37)
AST SERPL W P-5'-P-CCNC: 27 U/L (ref 13–39)
BACTERIA UR QL AUTO: NORMAL /HPF
BASE EX.OXY STD BLDV CALC-SCNC: 64.4 % (ref 60–80)
BASE EXCESS BLDV CALC-SCNC: 10.6 MMOL/L
BASOPHILS # BLD AUTO: 0.06 THOUSANDS/ÂΜL (ref 0–0.1)
BASOPHILS NFR BLD AUTO: 1 % (ref 0–1)
BILIRUB SERPL-MCNC: 1.32 MG/DL (ref 0.2–1)
BILIRUB UR QL STRIP: NEGATIVE
BNP SERPL-MCNC: 303 PG/ML (ref 0–100)
BUN SERPL-MCNC: 34 MG/DL (ref 5–25)
CALCIUM SERPL-MCNC: 9.8 MG/DL (ref 8.4–10.2)
CARDIAC TROPONIN I PNL SERPL HS: 34 NG/L
CARDIAC TROPONIN I PNL SERPL HS: 36 NG/L
CHLORIDE SERPL-SCNC: 101 MMOL/L (ref 96–108)
CLARITY UR: CLEAR
CO2 SERPL-SCNC: 34 MMOL/L (ref 21–32)
COLOR UR: YELLOW
CREAT SERPL-MCNC: 1.13 MG/DL (ref 0.6–1.3)
EOSINOPHIL # BLD AUTO: 0.07 THOUSAND/ÂΜL (ref 0–0.61)
EOSINOPHIL NFR BLD AUTO: 1 % (ref 0–6)
ERYTHROCYTE [DISTWIDTH] IN BLOOD BY AUTOMATED COUNT: 19.8 % (ref 11.6–15.1)
FLUAV RNA RESP QL NAA+PROBE: NEGATIVE
FLUBV RNA RESP QL NAA+PROBE: NEGATIVE
GFR SERPL CREATININE-BSD FRML MDRD: 49 ML/MIN/1.73SQ M
GLUCOSE SERPL-MCNC: 105 MG/DL (ref 65–140)
GLUCOSE SERPL-MCNC: 158 MG/DL (ref 65–140)
GLUCOSE UR STRIP-MCNC: NEGATIVE MG/DL
HCO3 BLDV-SCNC: 35.4 MMOL/L (ref 24–30)
HCT VFR BLD AUTO: 38.7 % (ref 34.8–46.1)
HGB BLD-MCNC: 12.1 G/DL (ref 11.5–15.4)
HGB UR QL STRIP.AUTO: ABNORMAL
IMM GRANULOCYTES # BLD AUTO: 0.03 THOUSAND/UL (ref 0–0.2)
IMM GRANULOCYTES NFR BLD AUTO: 0 % (ref 0–2)
INR PPP: 2.17 (ref 0.84–1.19)
KETONES UR STRIP-MCNC: NEGATIVE MG/DL
LACTATE SERPL-SCNC: 1.3 MMOL/L (ref 0.5–2)
LEUKOCYTE ESTERASE UR QL STRIP: NEGATIVE
LYMPHOCYTES # BLD AUTO: 0.86 THOUSANDS/ÂΜL (ref 0.6–4.47)
LYMPHOCYTES NFR BLD AUTO: 11 % (ref 14–44)
MAGNESIUM SERPL-MCNC: 2.1 MG/DL (ref 1.9–2.7)
MCH RBC QN AUTO: 30 PG (ref 26.8–34.3)
MCHC RBC AUTO-ENTMCNC: 31.3 G/DL (ref 31.4–37.4)
MCV RBC AUTO: 96 FL (ref 82–98)
MONOCYTES # BLD AUTO: 1.05 THOUSAND/ÂΜL (ref 0.17–1.22)
MONOCYTES NFR BLD AUTO: 14 % (ref 4–12)
NEUTROPHILS # BLD AUTO: 5.51 THOUSANDS/ÂΜL (ref 1.85–7.62)
NEUTS SEG NFR BLD AUTO: 73 % (ref 43–75)
NITRITE UR QL STRIP: NEGATIVE
NON-SQ EPI CELLS URNS QL MICRO: NORMAL /HPF
NRBC BLD AUTO-RTO: 0 /100 WBCS
O2 CT BLDV-SCNC: 11.9 ML/DL
PCO2 BLDV: 47.8 MM HG (ref 42–50)
PH BLDV: 7.49 [PH] (ref 7.3–7.4)
PH UR STRIP.AUTO: 6.5 [PH]
PLATELET # BLD AUTO: 173 THOUSANDS/UL (ref 149–390)
PMV BLD AUTO: 10.8 FL (ref 8.9–12.7)
PO2 BLDV: 33 MM HG (ref 35–45)
POTASSIUM SERPL-SCNC: 3.7 MMOL/L (ref 3.5–5.3)
PROCALCITONIN SERPL-MCNC: 0.05 NG/ML
PROT SERPL-MCNC: 7.7 G/DL (ref 6.4–8.4)
PROT UR STRIP-MCNC: NEGATIVE MG/DL
PROTHROMBIN TIME: 24.5 SECONDS (ref 11.6–14.5)
RBC # BLD AUTO: 4.04 MILLION/UL (ref 3.81–5.12)
RBC #/AREA URNS AUTO: NORMAL /HPF
RSV RNA RESP QL NAA+PROBE: NEGATIVE
SARS-COV-2 RNA RESP QL NAA+PROBE: POSITIVE
SODIUM SERPL-SCNC: 141 MMOL/L (ref 135–147)
SP GR UR STRIP.AUTO: 1.01 (ref 1–1.03)
UROBILINOGEN UR QL STRIP.AUTO: 1 E.U./DL
WBC # BLD AUTO: 7.58 THOUSAND/UL (ref 4.31–10.16)
WBC #/AREA URNS AUTO: NORMAL /HPF

## 2023-12-23 PROCEDURE — 82948 REAGENT STRIP/BLOOD GLUCOSE: CPT

## 2023-12-23 PROCEDURE — 85730 THROMBOPLASTIN TIME PARTIAL: CPT | Performed by: PHYSICIAN ASSISTANT

## 2023-12-23 PROCEDURE — 94640 AIRWAY INHALATION TREATMENT: CPT

## 2023-12-23 PROCEDURE — 80053 COMPREHEN METABOLIC PANEL: CPT | Performed by: PHYSICIAN ASSISTANT

## 2023-12-23 PROCEDURE — 94644 CONT INHLJ TX 1ST HOUR: CPT

## 2023-12-23 PROCEDURE — 94760 N-INVAS EAR/PLS OXIMETRY 1: CPT

## 2023-12-23 PROCEDURE — 36415 COLL VENOUS BLD VENIPUNCTURE: CPT | Performed by: PHYSICIAN ASSISTANT

## 2023-12-23 PROCEDURE — 99285 EMERGENCY DEPT VISIT HI MDM: CPT | Performed by: PHYSICIAN ASSISTANT

## 2023-12-23 PROCEDURE — 84145 PROCALCITONIN (PCT): CPT | Performed by: PHYSICIAN ASSISTANT

## 2023-12-23 PROCEDURE — 0241U HB NFCT DS VIR RESP RNA 4 TRGT: CPT | Performed by: PHYSICIAN ASSISTANT

## 2023-12-23 PROCEDURE — 87040 BLOOD CULTURE FOR BACTERIA: CPT | Performed by: PHYSICIAN ASSISTANT

## 2023-12-23 PROCEDURE — 71045 X-RAY EXAM CHEST 1 VIEW: CPT

## 2023-12-23 PROCEDURE — 83735 ASSAY OF MAGNESIUM: CPT | Performed by: PHYSICIAN ASSISTANT

## 2023-12-23 PROCEDURE — 85025 COMPLETE CBC W/AUTO DIFF WBC: CPT | Performed by: PHYSICIAN ASSISTANT

## 2023-12-23 PROCEDURE — 83880 ASSAY OF NATRIURETIC PEPTIDE: CPT | Performed by: PHYSICIAN ASSISTANT

## 2023-12-23 PROCEDURE — 96375 TX/PRO/DX INJ NEW DRUG ADDON: CPT

## 2023-12-23 PROCEDURE — 81001 URINALYSIS AUTO W/SCOPE: CPT | Performed by: PHYSICIAN ASSISTANT

## 2023-12-23 PROCEDURE — 82805 BLOOD GASES W/O2 SATURATION: CPT | Performed by: PHYSICIAN ASSISTANT

## 2023-12-23 PROCEDURE — 83036 HEMOGLOBIN GLYCOSYLATED A1C: CPT | Performed by: NURSE PRACTITIONER

## 2023-12-23 PROCEDURE — 71275 CT ANGIOGRAPHY CHEST: CPT

## 2023-12-23 PROCEDURE — 84484 ASSAY OF TROPONIN QUANT: CPT | Performed by: PHYSICIAN ASSISTANT

## 2023-12-23 PROCEDURE — 83605 ASSAY OF LACTIC ACID: CPT | Performed by: PHYSICIAN ASSISTANT

## 2023-12-23 PROCEDURE — 99223 1ST HOSP IP/OBS HIGH 75: CPT | Performed by: NURSE PRACTITIONER

## 2023-12-23 PROCEDURE — 96365 THER/PROPH/DIAG IV INF INIT: CPT

## 2023-12-23 PROCEDURE — 74177 CT ABD & PELVIS W/CONTRAST: CPT

## 2023-12-23 PROCEDURE — 93005 ELECTROCARDIOGRAM TRACING: CPT

## 2023-12-23 PROCEDURE — 99285 EMERGENCY DEPT VISIT HI MDM: CPT

## 2023-12-23 PROCEDURE — 85610 PROTHROMBIN TIME: CPT | Performed by: PHYSICIAN ASSISTANT

## 2023-12-23 RX ORDER — SODIUM CHLORIDE FOR INHALATION 0.9 %
12 VIAL, NEBULIZER (ML) INHALATION ONCE
Status: COMPLETED | OUTPATIENT
Start: 2023-12-23 | End: 2023-12-23

## 2023-12-23 RX ORDER — INSULIN LISPRO 100 [IU]/ML
2-12 INJECTION, SOLUTION INTRAVENOUS; SUBCUTANEOUS
Status: DISCONTINUED | OUTPATIENT
Start: 2023-12-24 | End: 2024-01-02 | Stop reason: HOSPADM

## 2023-12-23 RX ORDER — FERROUS SULFATE 325(65) MG
325 TABLET ORAL
Status: DISCONTINUED | OUTPATIENT
Start: 2023-12-24 | End: 2024-01-02 | Stop reason: HOSPADM

## 2023-12-23 RX ORDER — ACETAMINOPHEN 325 MG/1
650 TABLET ORAL EVERY 6 HOURS PRN
Status: DISCONTINUED | OUTPATIENT
Start: 2023-12-23 | End: 2024-01-02 | Stop reason: HOSPADM

## 2023-12-23 RX ORDER — MELATONIN
1000 DAILY
Status: DISCONTINUED | OUTPATIENT
Start: 2023-12-24 | End: 2024-01-02 | Stop reason: HOSPADM

## 2023-12-23 RX ORDER — PANTOPRAZOLE SODIUM 40 MG/1
40 TABLET, DELAYED RELEASE ORAL
Status: DISCONTINUED | OUTPATIENT
Start: 2023-12-24 | End: 2024-01-02 | Stop reason: HOSPADM

## 2023-12-23 RX ORDER — METOPROLOL SUCCINATE 25 MG/1
12.5 TABLET, EXTENDED RELEASE ORAL 2 TIMES DAILY
Status: DISCONTINUED | OUTPATIENT
Start: 2023-12-23 | End: 2023-12-27

## 2023-12-23 RX ORDER — INSULIN LISPRO 100 [IU]/ML
2-12 INJECTION, SOLUTION INTRAVENOUS; SUBCUTANEOUS
Status: DISCONTINUED | OUTPATIENT
Start: 2023-12-23 | End: 2024-01-02 | Stop reason: HOSPADM

## 2023-12-23 RX ORDER — CEFTRIAXONE 2 G/50ML
2000 INJECTION, SOLUTION INTRAVENOUS ONCE
Status: COMPLETED | OUTPATIENT
Start: 2023-12-23 | End: 2023-12-23

## 2023-12-23 RX ORDER — FLUTICASONE FUROATE AND VILANTEROL 100; 25 UG/1; UG/1
1 POWDER RESPIRATORY (INHALATION) DAILY
Status: DISCONTINUED | OUTPATIENT
Start: 2023-12-24 | End: 2024-01-02 | Stop reason: HOSPADM

## 2023-12-23 RX ORDER — POLYETHYLENE GLYCOL 3350 17 G/17G
17 POWDER, FOR SOLUTION ORAL DAILY PRN
Status: DISCONTINUED | OUTPATIENT
Start: 2023-12-23 | End: 2024-01-02 | Stop reason: HOSPADM

## 2023-12-23 RX ORDER — INSULIN GLARGINE 100 [IU]/ML
26 INJECTION, SOLUTION SUBCUTANEOUS
Status: DISCONTINUED | OUTPATIENT
Start: 2023-12-23 | End: 2024-01-02 | Stop reason: HOSPADM

## 2023-12-23 RX ORDER — ATORVASTATIN CALCIUM 10 MG/1
10 TABLET, FILM COATED ORAL DAILY
Status: DISCONTINUED | OUTPATIENT
Start: 2023-12-24 | End: 2024-01-02 | Stop reason: HOSPADM

## 2023-12-23 RX ORDER — ALBUTEROL SULFATE 2.5 MG/3ML
2.5 SOLUTION RESPIRATORY (INHALATION) EVERY 4 HOURS PRN
Status: DISCONTINUED | OUTPATIENT
Start: 2023-12-23 | End: 2024-01-02 | Stop reason: HOSPADM

## 2023-12-23 RX ORDER — ALLOPURINOL 100 MG/1
100 TABLET ORAL DAILY
Status: DISCONTINUED | OUTPATIENT
Start: 2023-12-24 | End: 2024-01-02 | Stop reason: HOSPADM

## 2023-12-23 RX ORDER — METHYLPREDNISOLONE SODIUM SUCCINATE 125 MG/2ML
125 INJECTION, POWDER, LYOPHILIZED, FOR SOLUTION INTRAMUSCULAR; INTRAVENOUS ONCE
Status: COMPLETED | OUTPATIENT
Start: 2023-12-23 | End: 2023-12-23

## 2023-12-23 RX ORDER — POTASSIUM CHLORIDE 20 MEQ/1
20 TABLET, EXTENDED RELEASE ORAL 3 TIMES DAILY
Status: DISCONTINUED | OUTPATIENT
Start: 2023-12-24 | End: 2024-01-02 | Stop reason: HOSPADM

## 2023-12-23 RX ORDER — BUMETANIDE 0.25 MG/ML
1 INJECTION INTRAMUSCULAR; INTRAVENOUS CONTINUOUS
Status: DISCONTINUED | OUTPATIENT
Start: 2023-12-23 | End: 2023-12-29

## 2023-12-23 RX ORDER — FUROSEMIDE 10 MG/ML
40 INJECTION INTRAMUSCULAR; INTRAVENOUS ONCE
Status: COMPLETED | OUTPATIENT
Start: 2023-12-23 | End: 2023-12-23

## 2023-12-23 RX ORDER — INSULIN LISPRO 100 [IU]/ML
5 INJECTION, SOLUTION INTRAVENOUS; SUBCUTANEOUS
Status: DISCONTINUED | OUTPATIENT
Start: 2023-12-24 | End: 2024-01-01

## 2023-12-23 RX ORDER — MONTELUKAST SODIUM 10 MG/1
10 TABLET ORAL
Status: DISCONTINUED | OUTPATIENT
Start: 2023-12-23 | End: 2024-01-02 | Stop reason: HOSPADM

## 2023-12-23 RX ORDER — DOCUSATE SODIUM 100 MG/1
100 CAPSULE, LIQUID FILLED ORAL 2 TIMES DAILY
Status: DISCONTINUED | OUTPATIENT
Start: 2023-12-23 | End: 2024-01-02 | Stop reason: HOSPADM

## 2023-12-23 RX ORDER — LORATADINE 10 MG/1
10 TABLET ORAL DAILY
Status: DISCONTINUED | OUTPATIENT
Start: 2023-12-24 | End: 2024-01-02 | Stop reason: HOSPADM

## 2023-12-23 RX ORDER — WARFARIN SODIUM 7.5 MG/1
7.5 TABLET ORAL
Status: DISCONTINUED | OUTPATIENT
Start: 2023-12-23 | End: 2023-12-30

## 2023-12-23 RX ADMIN — DOCUSATE SODIUM 100 MG: 100 CAPSULE, LIQUID FILLED ORAL at 22:52

## 2023-12-23 RX ADMIN — REMDESIVIR 200 MG: 100 INJECTION, POWDER, LYOPHILIZED, FOR SOLUTION INTRAVENOUS at 22:55

## 2023-12-23 RX ADMIN — INSULIN LISPRO 2 UNITS: 100 INJECTION, SOLUTION INTRAVENOUS; SUBCUTANEOUS at 23:11

## 2023-12-23 RX ADMIN — IOHEXOL 100 ML: 350 INJECTION, SOLUTION INTRAVENOUS at 15:56

## 2023-12-23 RX ADMIN — CEFTRIAXONE 2000 MG: 2 INJECTION, SOLUTION INTRAVENOUS at 14:53

## 2023-12-23 RX ADMIN — METHYLPREDNISOLONE SODIUM SUCCINATE 125 MG: 125 INJECTION, POWDER, FOR SOLUTION INTRAMUSCULAR; INTRAVENOUS at 17:27

## 2023-12-23 RX ADMIN — WARFARIN SODIUM 7.5 MG: 7.5 TABLET ORAL at 22:52

## 2023-12-23 RX ADMIN — Medication 12 ML: at 17:39

## 2023-12-23 RX ADMIN — INSULIN GLARGINE 26 UNITS: 100 INJECTION, SOLUTION SUBCUTANEOUS at 23:00

## 2023-12-23 RX ADMIN — MONTELUKAST 10 MG: 10 TABLET, FILM COATED ORAL at 22:52

## 2023-12-23 RX ADMIN — Medication 1 MG/HR: at 22:55

## 2023-12-23 RX ADMIN — IPRATROPIUM BROMIDE 1 MG: 0.5 SOLUTION RESPIRATORY (INHALATION) at 17:39

## 2023-12-23 RX ADMIN — FUROSEMIDE 40 MG: 10 INJECTION, SOLUTION INTRAMUSCULAR; INTRAVENOUS at 18:14

## 2023-12-23 RX ADMIN — METOPROLOL SUCCINATE 12.5 MG: 25 TABLET, EXTENDED RELEASE ORAL at 22:52

## 2023-12-23 RX ADMIN — ALBUTEROL SULFATE 10 MG: 2.5 SOLUTION RESPIRATORY (INHALATION) at 17:39

## 2023-12-23 NOTE — ED PROVIDER NOTES
History  Chief Complaint   Patient presents with    Shortness of Breath     Patient sent from home due to SOB, edema. Wears 4LNC at baseline.      The patient is a 68-year-old female with a past medical history of diabetes, HF, A-fib on Coumadin, COPD who presents by ambulance from home for the c/o SOB x 4-5 days.  Patient states that she does normally have lower leg swelling however this is worsened over the last 4 days.  She has had her Lasix increased to 100 mg twice daily which she has been taking without relief.  She feels as though she is not peeing as much that she should have.  Patient is on chronic 4 L nasal cannula oxygen      Shortness of Breath  Severity:  Moderate  Duration:  4 days  Timing:  Constant  Progression:  Worsening  Chronicity:  New  Context: activity    Relieved by:  Oxygen and rest  Worsened by:  Activity  Ineffective treatments:  Diuretics, rest and sitting up  Associated symptoms: cough and fever    Associated symptoms: no abdominal pain, no chest pain, no claudication, no ear pain, no rash, no sore throat, no vomiting and no wheezing    Cough:     Duration:  1 week    Timing:  Constant  Fever:     Duration:  1 day    Temp source:  Subjective      Prior to Admission Medications   Prescriptions Last Dose Informant Patient Reported? Taking?   Cholecalciferol (Vitamin D3) 25 MCG (1000 UT) CAPS   Yes No   Sig: Take 2,000 Units by mouth daily   Insulin Aspart (NovoLOG) 100 units/mL injection   No No   Sig: Inject 3 Units under the skin 3 (three) times a day with meals   Lantus SoloStar 100 units/mL SOPN   Yes No   Si units   Levothyroxine Sodium 88 MCG CAPS   Yes No   Sig: Take by mouth daily   Patient not taking: Reported on 2023   acetaminophen (TYLENOL) 325 mg tablet   No No   Sig: Take 2 tablets (650 mg total) by mouth every 6 (six) hours as needed for fever   allopurinol (ZYLOPRIM) 100 mg tablet   Yes No   Sig: Take 100 mg by mouth daily Dose needs to be verified   ammonium  lactate (LAC-HYDRIN) 12 % lotion   No No   Sig: Apply topically 2 (two) times a day as needed for dry skin   atorvastatin (LIPITOR) 10 mg tablet   Yes No   Sig: Take 10 mg by mouth daily   cetirizine (ZyrTEC) 10 mg tablet   Yes No   Sig: Take 10 mg by mouth daily   docusate sodium (COLACE) 100 mg capsule   No No   Sig: Take 1 capsule (100 mg total) by mouth 2 (two) times a day   ferrous sulfate 325 (65 Fe) mg tablet   Yes No   Sig: Take 325 mg by mouth daily with breakfast   fluticasone-vilanterol (BREO ELLIPTA) 100-25 mcg/inh inhaler   Yes No   Sig: Inhale 1 puff daily Rinse mouth after use.   insulin detemir (LEVEMIR) 100 units/mL subcutaneous injection   Yes No   Sig: Inject 26 Units under the skin daily at bedtime   Patient not taking: Reported on 1/9/2023   levothyroxine 200 mcg tablet  Self Yes No   Sig: Take 288 mcg by mouth daily   levothyroxine 88 mcg tablet   Yes No   metoprolol succinate (TOPROL-XL) 25 mg 24 hr tablet   No No   Sig: Take 0.5 tablets (12.5 mg total) by mouth 2 (two) times a day   midodrine (PROAMATINE) 10 MG tablet   No No   Sig: Take 1 tablet (10 mg total) by mouth 3 (three) times a day before meals for 15 days Please hold the medication if your systolic blood pressure is more than 115   montelukast (SINGULAIR) 10 mg tablet   Yes No   Sig: Take 10 mg by mouth daily at bedtime   multivitamin (THERAGRAN) TABS   Yes No   Sig: Take 1 tablet by mouth daily   nystatin (MYCOSTATIN) powder   No No   Sig: Apply topically 2 (two) times a day   omeprazole (PriLOSEC) 40 MG capsule   Yes No   Sig: Take 40 mg by mouth daily   polyethylene glycol (MIRALAX) 17 g packet   No No   Sig: Take 17 g by mouth daily as needed (Constipation)   potassium chloride (K-DUR,KLOR-CON) 20 mEq tablet   No No   Sig: Take 1 tablet (20 mEq total) by mouth daily Do not start before October 4, 2022.   Patient not taking: Reported on 1/9/2023   potassium chloride (MICRO-K) 10 MEQ CR capsule   Yes No   Sig: TAKE 1 CAPSULE BY  MOUTH EVERY DAY IN THE MORNING   torsemide 40 MG TABS   No No   Sig: Take 40 mg by mouth 2 (two) times a day   traZODone (DESYREL) 50 mg tablet   No No   Sig: Take 1 tablet (50 mg total) by mouth daily at bedtime as needed for sleep   Patient not taking: Reported on 1/9/2023   warfarin (COUMADIN) 7.5 mg tablet   No No   Sig: INR range is 2-3      Facility-Administered Medications: None       Past Medical History:   Diagnosis Date    Asthma     Atrial fibrillation (HCC)     COPD (chronic obstructive pulmonary disease) (HCC)     Diabetes mellitus (HCC)     Disease of thyroid gland     Heart failure (HCC)     Kidney failure     Morbid obesity due to excess calories (HCC)     NISHI (obstructive sleep apnea)        Past Surgical History:   Procedure Laterality Date    CARDIAC ELECTROPHYSIOLOGY MAPPING AND ABLATION      by Dr. Ferraro at [facility]    CARDIOVERSION N/A     GALLBLADDER SURGERY      HERNIA REPAIR         Family History   Problem Relation Age of Onset    Arthritis Mother     Hearing loss Mother     Heart disease Mother     Hypertension Mother     Stroke Mother     Alcohol abuse Father     Cancer Father     Diabetes Father     Arthritis Sister     Cancer Sister     Alcohol abuse Brother     Diabetes Son     Arthritis Daughter     Drug abuse Daughter     Heart disease Maternal Grandmother     Hypertension Maternal Grandmother     Stroke Maternal Grandmother     Arthritis Maternal Aunt     Asthma Neg Hx     Birth defects Neg Hx     COPD Neg Hx     Depression Neg Hx     Early death Neg Hx     Hyperlipidemia Neg Hx     Kidney disease Neg Hx     Learning disabilities Neg Hx     Mental illness Neg Hx     Mental retardation Neg Hx     Miscarriages / Stillbirths Neg Hx     Vision loss Neg Hx      I have reviewed and agree with the history as documented.    E-Cigarette/Vaping    E-Cigarette Use Never User      E-Cigarette/Vaping Substances     Social History     Tobacco Use    Smoking status: Never    Smokeless tobacco:  Never   Vaping Use    Vaping status: Never Used   Substance Use Topics    Alcohol use: Never    Drug use: Never       Review of Systems   Constitutional:  Positive for fever. Negative for chills.   HENT:  Negative for ear pain and sore throat.    Eyes:  Negative for pain and visual disturbance.   Respiratory:  Positive for cough and shortness of breath. Negative for wheezing.    Cardiovascular:  Negative for chest pain, palpitations, claudication and leg swelling.   Gastrointestinal:  Negative for abdominal pain and vomiting.   Genitourinary:  Negative for dysuria and hematuria.   Musculoskeletal:  Negative for arthralgias and back pain.   Skin:  Negative for color change and rash.   Neurological:  Negative for dizziness, seizures and syncope.   All other systems reviewed and are negative.      Physical Exam  Physical Exam  Vitals and nursing note reviewed.   Constitutional:       General: She is in acute distress.      Appearance: She is well-developed. She is obese. She is ill-appearing.   HENT:      Head: Normocephalic and atraumatic.      Right Ear: External ear normal.      Left Ear: External ear normal.   Eyes:      Pupils: Pupils are equal, round, and reactive to light.   Cardiovascular:      Rate and Rhythm: Regular rhythm. Tachycardia present.      Heart sounds: No murmur heard.  Pulmonary:      Effort: Pulmonary effort is normal. No respiratory distress.      Breath sounds: Decreased breath sounds and rales present. No wheezing.   Chest:      Chest wall: No mass or tenderness.   Abdominal:      General: Bowel sounds are normal.      Palpations: Abdomen is soft. There is no mass.      Tenderness: There is no abdominal tenderness. There is no rebound.      Hernia: No hernia is present.   Musculoskeletal:      Cervical back: Normal range of motion and neck supple.      Right lower leg: Edema present.      Left lower leg: Edema present.   Skin:     General: Skin is warm and dry.      Capillary Refill:  Capillary refill takes less than 2 seconds.   Neurological:      General: No focal deficit present.      Mental Status: She is alert and oriented to person, place, and time.      Coordination: Coordination normal.   Psychiatric:         Behavior: Behavior normal.         Vital Signs  ED Triage Vitals   Temperature Pulse Respirations Blood Pressure SpO2   12/23/23 1422 12/23/23 1422 12/23/23 1422 12/23/23 1422 12/23/23 1419   100.2 °F (37.9 °C) 98 20 112/68 95 %      Temp Source Heart Rate Source Patient Position - Orthostatic VS BP Location FiO2 (%)   12/23/23 1422 12/23/23 1422 12/23/23 1422 12/23/23 1422 --   Temporal Monitor Lying Right arm       Pain Score       12/23/23 1422       No Pain           Vitals:    12/23/23 1815 12/23/23 1830 12/23/23 1845 12/23/23 1900   BP: 132/66 104/51 107/67 108/52   Pulse: 95 (!) 118 98 (!) 116   Patient Position - Orthostatic VS:   Lying Lying         Visual Acuity      ED Medications  Medications   cefTRIAXone (ROCEPHIN) IVPB (premix in dextrose) 2,000 mg 50 mL (0 mg Intravenous Stopped 12/23/23 1523)   iohexol (OMNIPAQUE) 350 MG/ML injection (MULTI-DOSE) 100 mL (100 mL Intravenous Given 12/23/23 1556)   albuterol inhalation solution 10 mg (10 mg Nebulization Given 12/23/23 1739)   ipratropium (ATROVENT) 0.02 % inhalation solution 1 mg (1 mg Nebulization Given 12/23/23 1739)   sodium chloride 0.9 % inhalation solution 12 mL (12 mL Nebulization Given 12/23/23 1739)   methylPREDNISolone sodium succinate (Solu-MEDROL) injection 125 mg (125 mg Intravenous Given 12/23/23 1727)   furosemide (LASIX) injection 40 mg (40 mg Intravenous Given 12/23/23 1814)       Diagnostic Studies  Results Reviewed       Procedure Component Value Units Date/Time    HS Troponin I 2hr [[id number]]  (Normal) Collected: 12/23/23 1651    Lab Status: Final result Specimen: Blood from Arm, Left Updated: 12/23/23 1718     hs TnI 2hr 34 ng/L      Delta 2hr hsTnI -2 ng/L     FLU/RSV/COVID - if FLU/RSV clinically  relevant [[id number]]  (Abnormal) Collected: 12/23/23 1444    Lab Status: Final result Specimen: Nares from Nose Updated: 12/23/23 1530     SARS-CoV-2 Positive     INFLUENZA A PCR Negative     INFLUENZA B PCR Negative     RSV PCR Negative    Narrative:      FOR PEDIATRIC PATIENTS - copy/paste COVID Guidelines URL to browser: [email or web address]    SARS-CoV-2 assay is a Nucleic Acid Amplification assay intended for the  qualitative detection of nucleic acid from SARS-CoV-2 in nasopharyngeal  swabs. Results are for the presumptive identification of SARS-CoV-2 RNA.    Positive results are indicative of infection with SARS-CoV-2, the virus  causing COVID-19, but do not rule out bacterial infection or co-infection  with other viruses. Laboratories within the [place] and its  territories are required to report all positive results to the appropriate  public health authorities. Negative results do not preclude SARS-CoV-2  infection and should not be used as the sole basis for treatment or other  patient management decisions. Negative results must be combined with  clinical observations, patient history, and epidemiological information.  This test has not been FDA cleared or approved.    This test has been authorized by FDA under an Emergency Use Authorization  (EUA). This test is only authorized for the duration of time the  declaration that circumstances exist justifying the authorization of the  emergency use of an in vitro diagnostic tests for detection of SARS-CoV-2  virus and/or diagnosis of COVID-19 infection under section 564(b)(1) of  the Act, 21 U.S.C. 360bbb-3(b)(1), unless the authorization is terminated  or revoked sooner. The test has been validated but independent review by FDA  and CLIA is pending.    Test performed using [employer] GeneXpert: This RT-PCR assay targets N2,  a region unique to SARS-CoV-2. A conserved region in the E-gene was chosen  for  pan-Sarbecovirus detection which includes SARS-CoV-2.    According to CMS-2020-01-R, this platform meets the definition of high-throughput technology.    Urine Microscopic [[id number]]  (Normal) Collected: 12/23/23 1452    Lab Status: Final result Specimen: Urine, Indwelling Dow Catheter Updated: 12/23/23 1523     RBC, UA 0-1 /hpf      WBC, UA None Seen /hpf      Epithelial Cells Occasional /hpf      Bacteria, UA None Seen /hpf     Procalcitonin [[id number]]  (Normal) Collected: 12/23/23 1441    Lab Status: Final result Specimen: Blood from Arm, Right Updated: 12/23/23 1520     Procalcitonin 0.05 ng/ml     B-Type Natriuretic Peptide(BNP) [[id number]]  (Abnormal) Collected: 12/23/23 1441    Lab Status: Final result Specimen: Blood from Arm, Right Updated: 12/23/23 1517      pg/mL     HS Troponin 0hr (reflex protocol) [[id number]]  (Normal) Collected: 12/23/23 1441    Lab Status: Final result Specimen: Blood from Arm, Right Updated: 12/23/23 1517     hs TnI 0hr 36 ng/L     Protime-INR [[id number]]  (Abnormal) Collected: 12/23/23 1441    Lab Status: Final result Specimen: Blood from Arm, Right Updated: 12/23/23 1513     Protime 24.5 seconds      INR 2.17    APTT [[id number]]  (Abnormal) Collected: 12/23/23 1441    Lab Status: Final result Specimen: Blood from Arm, Right Updated: 12/23/23 1513     PTT 38 seconds     Comprehensive metabolic panel [[id number]]  (Abnormal) Collected: 12/23/23 1441    Lab Status: Final result Specimen: Blood from Arm, Right Updated: 12/23/23 1508     Sodium 141 mmol/L      Potassium 3.7 mmol/L      Chloride 101 mmol/L      CO2 34 mmol/L      ANION GAP 6 mmol/L      BUN 34 mg/dL      Creatinine 1.13 mg/dL      Glucose 105 mg/dL      Calcium 9.8 mg/dL      AST 27 U/L      ALT 15 U/L      Alkaline Phosphatase 124 U/L      Total Protein 7.7 g/dL      Albumin 3.6 g/dL      Total Bilirubin 1.32 mg/dL      eGFR 49 ml/min/1.73sq m     Narrative:      National Kidney Disease Foundation guidelines  for Chronic Kidney Disease (CKD):     Stage 1 with normal or high GFR (GFR > 90 mL/min/1.73 square meters)    Stage 2 Mild CKD (GFR = 60-89 mL/min/1.73 square meters)    Stage 3A Moderate CKD (GFR = 45-59 mL/min/1.73 square meters)    Stage 3B Moderate CKD (GFR = 30-44 mL/min/1.73 square meters)    Stage 4 Severe CKD (GFR = 15-29 mL/min/1.73 square meters)    Stage 5 End Stage CKD (GFR <15 mL/min/1.73 square meters)  Note: GFR calculation is accurate only with a steady state creatinine    Lactic acid [[id number]]  (Normal) Collected: 12/23/23 1441    Lab Status: Final result Specimen: Blood from Arm, Right Updated: 12/23/23 1508     LACTIC ACID 1.3 mmol/L     Narrative:      Result may be elevated if tourniquet was used during collection.    Magnesium [[id number]]  (Normal) Collected: 12/23/23 1441    Lab Status: Final result Specimen: Blood from Arm, Right Updated: 12/23/23 1508     Magnesium 2.1 mg/dL     UA w Reflex to Microscopic w Reflex to Culture [[id number]]  (Abnormal) Collected: 12/23/23 1452    Lab Status: Final result Specimen: Urine, Indwelling Dow Catheter Updated: 12/23/23 1505     Color, UA Yellow     Clarity, UA Clear     Specific Gravity, UA 1.010     pH, UA 6.5     Leukocytes, UA Negative     Nitrite, UA Negative     Protein, UA Negative mg/dl      Glucose, UA Negative mg/dl      Ketones, UA Negative mg/dl      Urobilinogen, UA 1.0 E.U./dl      Bilirubin, UA Negative     Occult Blood, UA Trace-lysed    Blood gas, Venous [[id number]]  (Abnormal) Collected: 12/23/23 1441    Lab Status: Final result Specimen: Blood from Arm, Right Updated: 12/23/23 1453     pH, Abel 7.488     pCO2, Abel 47.8 mm Hg      pO2, Abel 33.0 mm Hg      HCO3, Abel 35.4 mmol/L      Base Excess, Abel 10.6 mmol/L      O2 Content, Abel 11.9 ml/dL      O2 HGB, VENOUS 64.4 %     CBC and differential [[id number]]  (Abnormal) Collected: 12/23/23 1441    Lab Status: Final result Specimen: Blood from Arm, Right Updated: 12/23/23 1451     WBC  7.58 Thousand/uL      RBC 4.04 Million/uL      Hemoglobin 12.1 g/dL      Hematocrit 38.7 %      MCV 96 fL      MCH 30.0 pg      MCHC 31.3 g/dL      RDW 19.8 %      MPV 10.8 fL      Platelets 173 Thousands/uL      nRBC 0 /100 WBCs      Neutrophils Relative 73 %      Immat GRANS % 0 %      Lymphocytes Relative 11 %      Monocytes Relative 14 %      Eosinophils Relative 1 %      Basophils Relative 1 %      Neutrophils Absolute 5.51 Thousands/µL      Immature Grans Absolute 0.03 Thousand/uL      Lymphocytes Absolute 0.86 Thousands/µL      Monocytes Absolute 1.05 Thousand/µL      Eosinophils Absolute 0.07 Thousand/µL      Basophils Absolute 0.06 Thousands/µL     Blood culture #2 [[id number]] Collected: 12/23/23 1441    Lab Status: In process Specimen: Blood from Arm, Right Updated: 12/23/23 1449    Blood culture #1 [[id number]] Collected: 12/23/23 1444    Lab Status: In process Specimen: Blood from Arm, Left Updated: 12/23/23 1448                   PE Study with CT Abdomen and Pelvis with contrast   Final Result by Cassie Eduardo MD (12/23 1842)      Evaluation severely limited by streak artifact from patient's body touching the CT gantry, as well as by motion artifact.      CTA CHEST:      1) No central or lobar pulmonary embolism. Evaluation of the segmental or subsegmental pulmonary arteries is limited.      2) Diffuse groundglass attenuation of the lung parenchyma with some associated areas of interlobular septal thickening. This may be seen in the setting of pulmonary edema and/or viral pneumonia. No airspace consolidations.      3) 4.2 cm air-filled cyst in the right upper lobe, with several septations as on the April 2020 CT, however with a new 9 mm nodule along 1 of the septations. Underlying malignancy cannot be excluded. Recommend short interval follow-up chest CT in 3    months or PET/CT. Pulmonology consultation may also be of value.      4) Mild cardiomegaly, with reflux of IV contrast into the dilated IVC  and hepatic veins, suggestive of right heart failure.      5) Main pulmonary artery dilated up to 3.9 cm, suggestive of pulmonary arterial hypertension.      CT ABDOMEN/PELVIS:      6) No acute abdominal or pelvic pathology.      7) Endometrial stripe thickened up to 1.9 cm, which may be secondary to endometrial hyperplasia, an endometrial polyp, or endometrial neoplasm. Recommend further evaluation with nonemergent pelvic ultrasound if feasible, and/or tissue sampling.      8) Recanalized periumbilical vein, which may indicate underlying portal hypertension.      9) Multiple additional findings as above.      The study was marked in EPIC for immediate notification.            Workstation performed: [employer]         XR chest portable    (Results Pending)              Procedures  ECG 12 Lead Documentation Only    Date/Time: 12/23/2023 2:46 PM    Performed by: Nelson Ervin PA-C  Authorized by: Nelson Ervin PA-C    Indications / Diagnosis:  Sob  ECG reviewed by me, the ED Provider: yes    Patient location:  ED  Previous ECG:     Previous ECG:  Unavailable  Interpretation:     Interpretation: abnormal    Rate:     ECG rate:  91    ECG rate assessment: tachycardic    Rhythm:     Rhythm: sinus tachycardia    Conduction:     Conduction: abnormal      Abnormal conduction: incomplete RBBB    ST segments:     ST segments:  Non-specific  T waves:     T waves: non-specific             ED Course  ED Course as of 12/23/23 1928   Sat Dec 23, 2023   1527 BNP(!): 303   1551 SARS-COV-2(!): Positive   1911 Reaching out to the hospitalist service for diuresis    1925 No infection found, viral covid but chf sepsis protocol not continued                             Initial Sepsis Screening       Row Name 12/23/23 1923                Is the patient's history suggestive of a new or worsening infection? Yes (Proceed)  -SB        Suspected source of infection suspect infection, source unknown  -SB        Indicate SIRS  criteria Tachycardia > 90 bpm;Tachypnea > 20 resp per min  -SB        Are two or more of the above signs & symptoms of infection both present and new to the patient? Yes (Proceed)  -SB        Assess for evidence of organ dysfunction: Are any of the below criteria present within 6 hours of suspected infection and SIRS criteria that are NOT considered to be chronic conditions? --                  User Key  (r) = Recorded By, (t) = Taken By, (c) = Cosigned By      Initials Name Provider Type    JENNIFER Ervin PA-C Physician Assistant                    SBIRT 22yo+      Flowsheet Row Most Recent Value   Initial Alcohol Screen: US AUDIT-C     1. How often do you have a drink containing alcohol? 0 Filed at: 12/23/2023 1422   2. How many drinks containing alcohol do you have on a typical day you are drinking?  0 Filed at: 12/23/2023 1422   3b. FEMALE Any Age, or MALE 65+: How often do you have 4 or more drinks on one occassion? 0 Filed at: 12/23/2023 1422   Audit-C Score 0 Filed at: 12/23/2023 1422   EMORY: How many times in the past year have you...    Used an illegal drug or used a prescription medication for non-medical reasons? Never Filed at: 12/23/2023 1422                      Medical Decision Making  The patient is a 68-year-old female with a past medical history of diabetes, HF, A-fib on Coumadin, COPD who presents by ambulance from home for the c/o SOB x 4-5 days.  Patient states that she does normally have lower leg swelling however this is worsened over the last 4 days.  She has had her Lasix increased to 100 mg twice daily which she has been taking without relief.  She feels as though she is not peeing as much that she should have.  Patient is on chronic 4 L nasal cannula oxygen    Examination is ill-appearing, short of breath, decreased breath sounds throughout and significant lower leg swelling.  Patient on 4 L nasal cannula oxygen.  On Coumadin.  Patient's lab work demonstrated elevated BNP and  found to be COVID-positive.  No large leukocytosis or lactic acidosis.  Kidney function within normal range.  Patient CT scan performed was unremarkable for any acute PE but noted to have vascular congestion and possible underlying infiltrate could not be excluded.  Patient was given nebulizers Solu-Medrol and Rocephin for possible COPD exacerbation.  Patient was discussed with the hospital service for COPD exacerbation versus CHF exacerbation.  History is in the chart of the patient's legs.    Discussed with Kaitlin PITTS with [facility] for admission, accepted under Dr. Price    Differential diagnosis included but was not limited to :Pneumonia, Sinusitis, URI, ACS, GERD, PE, Viral syndrome, bacteremia, copd exacerbation, chf exacerbation       Amount and/or Complexity of Data Reviewed  Labs: ordered. Decision-making details documented in ED Course.  Radiology: ordered and independent interpretation performed. Decision-making details documented in ED Course.  ECG/medicine tests: ordered and independent interpretation performed. Decision-making details documented in ED Course.  Discussion of management or test interpretation with external provider(s): HONG Baig  Prescription drug management.  Decision regarding hospitalization.             Disposition  Final diagnoses:   SOB (shortness of breath)   COVID-19 virus infection   CHF exacerbation (HCC)     Time reflects when diagnosis was documented in both MDM as applicable and the Disposition within this note       Time User Action Codes Description Comment    12/23/2023  4:35 PM Nelson Ervin Add [R06.02] SOB (shortness of breath)     12/23/2023  4:35 PM Nelson Ervin Add [U07.1] COVID-19 virus infection     12/23/2023  6:52 PM Nelson Ervin Add [I50.9] CHF exacerbation (HCC)     12/23/2023  6:55 PM Nelson Ervin Modify [R06.02] SOB (shortness of breath)     12/23/2023  6:55 PM Nelson Ervin Modify [I50.9] CHF exacerbation (HCC)           ED Disposition       ED  Disposition   Admit    Condition   Stable    Date/Time   Sat Dec 23, 2023 9028    Comment   Case was discussed with augustus  and the patient's admission status was agreed to be Admission Status: inpatient status to the service of Dr. coffman .               Follow-up Information    None         Patient's Medications   Discharge Prescriptions    No medications on file       No discharge procedures on file.    PDMP Review         Value Time User    PDMP Reviewed  Yes 10/29/2022 12:30 PM Pedro Coffman MD            ED Provider  Electronically Signed by             Nelson Ervin PA-C  12/23/23 1239

## 2023-12-24 ENCOUNTER — APPOINTMENT (INPATIENT)
Dept: NON INVASIVE DIAGNOSTICS | Facility: HOSPITAL | Age: 68
DRG: 177 | End: 2023-12-24
Payer: COMMERCIAL

## 2023-12-24 PROBLEM — Z79.4 TYPE 2 DIABETES MELLITUS WITH HYPERGLYCEMIA, WITH LONG-TERM CURRENT USE OF INSULIN (HCC): Status: ACTIVE | Noted: 2020-04-13

## 2023-12-24 PROBLEM — J12.82 PNEUMONIA DUE TO COVID-19 VIRUS: Status: ACTIVE | Noted: 2020-04-14

## 2023-12-24 PROBLEM — N18.30 CKD (CHRONIC KIDNEY DISEASE) STAGE 3, GFR 30-59 ML/MIN (HCC): Status: ACTIVE | Noted: 2023-12-24

## 2023-12-24 PROBLEM — R93.5 ABNORMAL CT SCAN, PELVIS: Status: ACTIVE | Noted: 2023-12-24

## 2023-12-24 PROBLEM — E11.65 TYPE 2 DIABETES MELLITUS WITH HYPERGLYCEMIA, WITH LONG-TERM CURRENT USE OF INSULIN (HCC): Status: ACTIVE | Noted: 2020-04-13

## 2023-12-24 LAB
ALBUMIN SERPL BCP-MCNC: 3.3 G/DL (ref 3.5–5)
ALP SERPL-CCNC: 114 U/L (ref 34–104)
ALT SERPL W P-5'-P-CCNC: 16 U/L (ref 7–52)
ANION GAP SERPL CALCULATED.3IONS-SCNC: 8 MMOL/L
AORTIC ROOT: 3 CM
AORTIC VALVE MEAN VELOCITY: 8.7 M/S
ASCENDING AORTA: 2.7 CM
AST SERPL W P-5'-P-CCNC: 30 U/L (ref 13–39)
AV AREA BY CONTINUOUS VTI: 1.6 CM2
AV AREA PEAK VELOCITY: 1.3 CM2
AV LVOT MEAN GRADIENT: 1 MMHG
AV LVOT PEAK GRADIENT: 2 MMHG
AV MEAN GRADIENT: 4 MMHG
AV PEAK GRADIENT: 6 MMHG
AV VALVE AREA: 1.56 CM2
AV VELOCITY RATIO: 0.53
BASOPHILS # BLD AUTO: 0.01 THOUSANDS/ÂΜL (ref 0–0.1)
BASOPHILS NFR BLD AUTO: 0 % (ref 0–1)
BILIRUB SERPL-MCNC: 1.25 MG/DL (ref 0.2–1)
BUN SERPL-MCNC: 34 MG/DL (ref 5–25)
CALCIUM ALBUM COR SERPL-MCNC: 9.6 MG/DL (ref 8.3–10.1)
CALCIUM SERPL-MCNC: 9 MG/DL (ref 8.4–10.2)
CHLORIDE SERPL-SCNC: 102 MMOL/L (ref 96–108)
CO2 SERPL-SCNC: 30 MMOL/L (ref 21–32)
CREAT SERPL-MCNC: 1.16 MG/DL (ref 0.6–1.3)
CRP SERPL QL: 24 MG/L
DOP CALC AO PEAK VEL: 1.25 M/S
DOP CALC AO VTI: 24.56 CM
DOP CALC LVOT AREA: 2.54 CM2
DOP CALC LVOT CARDIAC INDEX: 1.07 L/MIN/M2
DOP CALC LVOT CARDIAC OUTPUT: 2.71 L/MIN
DOP CALC LVOT DIAMETER: 1.8 CM
DOP CALC LVOT PEAK VEL VTI: 15.03 CM
DOP CALC LVOT PEAK VEL: 0.66 M/S
DOP CALC LVOT STROKE INDEX: 15 ML/M2
DOP CALC LVOT STROKE VOLUME: 38.23
EOSINOPHIL # BLD AUTO: 0 THOUSAND/ÂΜL (ref 0–0.61)
EOSINOPHIL NFR BLD AUTO: 0 % (ref 0–6)
ERYTHROCYTE [DISTWIDTH] IN BLOOD BY AUTOMATED COUNT: 19.9 % (ref 11.6–15.1)
EST. AVERAGE GLUCOSE BLD GHB EST-MCNC: 163 MG/DL
FRACTIONAL SHORTENING: 18 (ref 28–44)
GFR SERPL CREATININE-BSD FRML MDRD: 47 ML/MIN/1.73SQ M
GLUCOSE SERPL-MCNC: 140 MG/DL (ref 65–140)
GLUCOSE SERPL-MCNC: 144 MG/DL (ref 65–140)
GLUCOSE SERPL-MCNC: 194 MG/DL (ref 65–140)
GLUCOSE SERPL-MCNC: 199 MG/DL (ref 65–140)
GLUCOSE SERPL-MCNC: 204 MG/DL (ref 65–140)
HBA1C MFR BLD: 7.3 %
HCT VFR BLD AUTO: 35.5 % (ref 34.8–46.1)
HGB BLD-MCNC: 11.2 G/DL (ref 11.5–15.4)
IMM GRANULOCYTES # BLD AUTO: 0.04 THOUSAND/UL (ref 0–0.2)
IMM GRANULOCYTES NFR BLD AUTO: 1 % (ref 0–2)
INR PPP: 2.08 (ref 0.84–1.19)
INTERVENTRICULAR SEPTUM IN DIASTOLE (PARASTERNAL SHORT AXIS VIEW): 1 CM
INTERVENTRICULAR SEPTUM: 1 CM (ref 0.6–1.1)
IVC: 35 MM
LEFT ATRIUM SIZE: 4.3 CM
LEFT INTERNAL DIMENSION IN SYSTOLE: 3.7 CM (ref 2.1–4)
LEFT VENTRICULAR INTERNAL DIMENSION IN DIASTOLE: 4.5 CM (ref 3.5–6)
LEFT VENTRICULAR POSTERIOR WALL IN END DIASTOLE: 1 CM
LEFT VENTRICULAR STROKE VOLUME: 36 ML
LVSV (TEICH): 36 ML
LYMPHOCYTES # BLD AUTO: 0.47 THOUSANDS/ÂΜL (ref 0.6–4.47)
LYMPHOCYTES NFR BLD AUTO: 10 % (ref 14–44)
MCH RBC QN AUTO: 29.9 PG (ref 26.8–34.3)
MCHC RBC AUTO-ENTMCNC: 31.5 G/DL (ref 31.4–37.4)
MCV RBC AUTO: 95 FL (ref 82–98)
MITRAL REGURGITATION PEAK VELOCITY: 4.03 M/S
MITRAL VALVE MEAN INFLOW VELOCITY: 3.14 M/S
MITRAL VALVE REGURGITANT PEAK GRADIENT: 65 MMHG
MONOCYTES # BLD AUTO: 0.14 THOUSAND/ÂΜL (ref 0.17–1.22)
MONOCYTES NFR BLD AUTO: 3 % (ref 4–12)
MV E'TISSUE VEL-LAT: 7 CM/S
MV E'TISSUE VEL-SEP: 6 CM/S
MV STENOSIS PRESSURE HALF TIME: 49 MS
MV VALVE AREA P 1/2 METHOD: 4.49
NEUTROPHILS # BLD AUTO: 3.94 THOUSANDS/ÂΜL (ref 1.85–7.62)
NEUTS SEG NFR BLD AUTO: 86 % (ref 43–75)
NRBC BLD AUTO-RTO: 0 /100 WBCS
PLATELET # BLD AUTO: 146 THOUSANDS/UL (ref 149–390)
PMV BLD AUTO: 10.9 FL (ref 8.9–12.7)
POTASSIUM SERPL-SCNC: 3.6 MMOL/L (ref 3.5–5.3)
PROCALCITONIN SERPL-MCNC: 0.08 NG/ML
PROT SERPL-MCNC: 7.1 G/DL (ref 6.4–8.4)
PROTHROMBIN TIME: 23.7 SECONDS (ref 11.6–14.5)
RA PRESSURE ESTIMATED: 15 MMHG
RBC # BLD AUTO: 3.74 MILLION/UL (ref 3.81–5.12)
RIGHT VENTRICLE ID DIMENSION: 4.6 CM
RV PSP: 53 MMHG
SL CV DOP CALC MV VTI RETROGRADE: 134 CM
SL CV LV EF: 55
SL CV MV MEAN GRADIENT RETROGRADE: 43 MMHG
SL CV PED ECHO LEFT VENTRICLE DIASTOLIC VOLUME (MOD BIPLANE) 2D: 94 ML
SL CV PED ECHO LEFT VENTRICLE SYSTOLIC VOLUME (MOD BIPLANE) 2D: 59 ML
SODIUM SERPL-SCNC: 140 MMOL/L (ref 135–147)
TR MAX PG: 38 MMHG
TR PEAK VELOCITY: 3.1 M/S
TRICUSPID VALVE PEAK REGURGITATION VELOCITY: 3.09 M/S
TSH SERPL DL<=0.05 MIU/L-ACNC: 1.15 UIU/ML (ref 0.45–4.5)
WBC # BLD AUTO: 4.6 THOUSAND/UL (ref 4.31–10.16)

## 2023-12-24 PROCEDURE — 99232 SBSQ HOSP IP/OBS MODERATE 35: CPT | Performed by: INTERNAL MEDICINE

## 2023-12-24 PROCEDURE — 80053 COMPREHEN METABOLIC PANEL: CPT | Performed by: NURSE PRACTITIONER

## 2023-12-24 PROCEDURE — 82948 REAGENT STRIP/BLOOD GLUCOSE: CPT

## 2023-12-24 PROCEDURE — 84443 ASSAY THYROID STIM HORMONE: CPT | Performed by: NURSE PRACTITIONER

## 2023-12-24 PROCEDURE — 84145 PROCALCITONIN (PCT): CPT | Performed by: NURSE PRACTITIONER

## 2023-12-24 PROCEDURE — 94760 N-INVAS EAR/PLS OXIMETRY 1: CPT

## 2023-12-24 PROCEDURE — 85610 PROTHROMBIN TIME: CPT | Performed by: NURSE PRACTITIONER

## 2023-12-24 PROCEDURE — C8929 TTE W OR WO FOL WCON,DOPPLER: HCPCS

## 2023-12-24 PROCEDURE — 85025 COMPLETE CBC W/AUTO DIFF WBC: CPT | Performed by: NURSE PRACTITIONER

## 2023-12-24 PROCEDURE — 86140 C-REACTIVE PROTEIN: CPT | Performed by: NURSE PRACTITIONER

## 2023-12-24 RX ADMIN — Medication 1 MG/HR: at 14:38

## 2023-12-24 RX ADMIN — POTASSIUM CHLORIDE 20 MEQ: 1500 TABLET, EXTENDED RELEASE ORAL at 21:19

## 2023-12-24 RX ADMIN — Medication 1 TABLET: at 08:18

## 2023-12-24 RX ADMIN — POTASSIUM CHLORIDE 20 MEQ: 1500 TABLET, EXTENDED RELEASE ORAL at 17:07

## 2023-12-24 RX ADMIN — DOCUSATE SODIUM 100 MG: 100 CAPSULE, LIQUID FILLED ORAL at 08:18

## 2023-12-24 RX ADMIN — METOPROLOL SUCCINATE 12.5 MG: 25 TABLET, EXTENDED RELEASE ORAL at 08:18

## 2023-12-24 RX ADMIN — LEVOTHYROXINE SODIUM 288 MCG: 88 TABLET ORAL at 05:31

## 2023-12-24 RX ADMIN — INSULIN LISPRO 5 UNITS: 100 INJECTION, SOLUTION INTRAVENOUS; SUBCUTANEOUS at 08:21

## 2023-12-24 RX ADMIN — Medication 1000 UNITS: at 08:18

## 2023-12-24 RX ADMIN — ACETAMINOPHEN 650 MG: 325 TABLET, FILM COATED ORAL at 02:56

## 2023-12-24 RX ADMIN — FERROUS SULFATE TAB 325 MG (65 MG ELEMENTAL FE) 325 MG: 325 (65 FE) TAB at 08:18

## 2023-12-24 RX ADMIN — INSULIN GLARGINE 26 UNITS: 100 INJECTION, SOLUTION SUBCUTANEOUS at 21:20

## 2023-12-24 RX ADMIN — PERFLUTREN 2 ML/MIN: 6.52 INJECTION, SUSPENSION INTRAVENOUS at 11:10

## 2023-12-24 RX ADMIN — INSULIN LISPRO 5 UNITS: 100 INJECTION, SOLUTION INTRAVENOUS; SUBCUTANEOUS at 11:35

## 2023-12-24 RX ADMIN — FLUTICASONE FUROATE AND VILANTEROL TRIFENATATE 1 PUFF: 100; 25 POWDER RESPIRATORY (INHALATION) at 08:13

## 2023-12-24 RX ADMIN — ATORVASTATIN CALCIUM 10 MG: 10 TABLET, FILM COATED ORAL at 08:18

## 2023-12-24 RX ADMIN — INSULIN LISPRO 4 UNITS: 100 INJECTION, SOLUTION INTRAVENOUS; SUBCUTANEOUS at 11:35

## 2023-12-24 RX ADMIN — POTASSIUM CHLORIDE 20 MEQ: 1500 TABLET, EXTENDED RELEASE ORAL at 08:18

## 2023-12-24 RX ADMIN — LORATADINE 10 MG: 10 TABLET ORAL at 08:18

## 2023-12-24 RX ADMIN — REMDESIVIR 100 MG: 100 INJECTION, POWDER, LYOPHILIZED, FOR SOLUTION INTRAVENOUS at 21:31

## 2023-12-24 RX ADMIN — INSULIN LISPRO 5 UNITS: 100 INJECTION, SOLUTION INTRAVENOUS; SUBCUTANEOUS at 17:07

## 2023-12-24 RX ADMIN — PANTOPRAZOLE SODIUM 40 MG: 40 TABLET, DELAYED RELEASE ORAL at 05:31

## 2023-12-24 RX ADMIN — WARFARIN SODIUM 7.5 MG: 7.5 TABLET ORAL at 17:07

## 2023-12-24 RX ADMIN — MONTELUKAST 10 MG: 10 TABLET, FILM COATED ORAL at 21:19

## 2023-12-24 RX ADMIN — ALLOPURINOL 100 MG: 100 TABLET ORAL at 08:18

## 2023-12-24 RX ADMIN — INSULIN LISPRO 2 UNITS: 100 INJECTION, SOLUTION INTRAVENOUS; SUBCUTANEOUS at 08:16

## 2023-12-24 NOTE — SEPSIS NOTE
Sepsis Note   Tigist Hewitt 68 y.o. female MRN: [id number]  Unit/Bed#: ED 02 Encounter: [id number]       Initial Sepsis Screening       Row Name 12/23/23 1923                Is the patient's history suggestive of a new or worsening infection? Yes (Proceed)  -SB        Suspected source of infection suspect infection, source unknown  -SB        Indicate SIRS criteria Tachycardia > 90 bpm;Tachypnea > 20 resp per min  -SB        Are two or more of the above signs & symptoms of infection both present and new to the patient? Yes (Proceed)  -SB        Assess for evidence of organ dysfunction: Are any of the below criteria present within 6 hours of suspected infection and SIRS criteria that are NOT considered to be chronic conditions? --                  User Key  (r) = Recorded By, (t) = Taken By, (c) = Cosigned By      Initials Name Provider Type    SB Nelson Ervin PA-C Physician Assistant                        Body mass index is 70.52 kg/m².  Wt Readings from Last 1 Encounters:   12/23/23 (!) 175 kg (385 lb 9.4 oz)     IBW (Ideal Body Weight): 50.1 kg    Ideal body weight: 50.1 kg (110 lb 7.2 oz)  Adjusted ideal body weight: 100 kg (220 lb 8.1 oz)

## 2023-12-24 NOTE — H&P
[facility]  H&P  Name: Tigist Hewitt 68 y.o. female I MRN: [id number]  Unit/Bed#: [facility] I Date of Admission: 12/23/2023   Date of Service: 12/24/2023 I Hospital Day: 1      Assessment/Plan   * Acute on chronic diastolic congestive heart failure (HCC)  Assessment & Plan  Wt Readings from Last 3 Encounters:   12/23/23 (!) 175 kg (385 lb 9.4 oz)   10/29/22 (!) 151 kg (331 lb 12.7 oz)   10/03/22 (!) 171 kg (377 lb 6.8 oz)     Presents with dyspnea, orthopnea, anasarca and CTA chest with pulmonary edema, hypervolemic with CHF exacerbation  Reports compliance with torsemide 50 mg twice daily  Initiate Bumex infusion  CHF pathway, intake output, daily weights, 2 g sodium diet with fluid restriction  Previous echocardiogram with preserved ejection fraction but significant valvular heart disease, update echo  Continue metoprolol succinate 12.5 mg twice daily  Monitor volume status    Pneumonia due to COVID-19 virus  Assessment & Plan  Background: Presented with dyspnea, fluid retention found to be COVID-positive  COVID19- PCR positive 12/23  Supplemental O2 with 4LNC saturating 95%; this is patient's baseline   imaging   CTA chest with GGO viral versus pulmonary edema  Started on mild COVID pathway for treatment  Not requiring increased oxygen needs, no indication for dexamethasone  AC warfarin-INR therapeutic  Daily CBC and CMP  Continue check inflammatory markers as indicated  Remdesivir 200 mg IV day 1 and 100 mg IV on day 2-5   Encourage ISP/flutter valve  Encourage proning and early mobilization  Blood cultures pending  Trend procalcitonin  Airborne/Droplet precautions ordered     COPD (chronic obstructive pulmonary disease) (HCC)  Assessment & Plan  Acute exacerbation secondary to COVID  As needed bronchodilators  Continue PTA Breo and Singulair  Received IV dexamethasone x 1 in ED, will hold further steroids  Monitor  Oxygen needs are baseline    Chronic respiratory failure with  hypoxia ([facility])  Assessment & Plan  Chronically on 4 L oxygen nasal cannula Home regimen  Stable at baseline    Atrial fibrillation ([facility])  Assessment & Plan  Continue warfarin 7.5 mg daily  Goal INR 2-3  Metoprolol twice daily for rate control    Type 2 diabetes mellitus with hyperglycemia, with long-term current use of insulin ([facility])  Assessment & Plan  Lab Results   Component Value Date    HGBA1C 6.8 (H) 10/05/2022       Recent Labs     12/23/23  2240   POCGLU 158*       Blood Sugar Average: Last 72 hrs:  (P) 158    Continue home regimen Lantus 26 units at bedtime, lispro 5 units 3 times daily meals  Add SSI with Accu-Cheks  Hypoglycemia protocol  Received dexamethasone for COVID-monitor for steroid-induced hyperglycemia     CKD (chronic kidney disease) stage 3, GFR 30-59 ml/min ([facility])  Assessment & Plan  Lab Results   Component Value Date    EGFR 49 12/23/2023    EGFR 43 10/29/2022    EGFR 36 10/28/2022    CREATININE 1.13 12/23/2023    CREATININE 1.27 10/29/2022    CREATININE 1.46 (H) 10/28/2022     Hx CKD 3  Creatinine baseline  Monitor closely with IV diuresis  Trend creatinine  Renally dose medications and avoid nephrotoxic medications    Abnormal CT scan, pelvis  Assessment & Plan  Abnormal endometrial thickening on CT pelvis  Discussed with patient that she will need GYN follow-up  Patient endorses intermittent vaginal bleeding    NISHI (obstructive sleep apnea)  Assessment & Plan  Continue CPAP    Hypothyroidism  Assessment & Plan  Continue PTA levothyroxine 188 mcg daily  Update TSH    Morbid obesity ([facility])  Assessment & Plan  BMI 70  Intensive lifestyle modification required  Outpatient referral to bariatric medicine           VTE Pharmacologic Prophylaxis:   High Risk (Score >/= 5) - Pharmacological DVT Prophylaxis Ordered: warfarin (Coumadin). Sequential Compression Devices Ordered.  Code Status: Level 3 - DNAR and DNI   Discussion with family: Patient declined call to .     Anticipated Length of  Stay: Patient will be admitted on an inpatient basis with an anticipated length of stay of greater than 2 midnights secondary to CHF exacerbation.    Chief Complaint: Dyspnea    History of Present Illness:  Tigist Hewitt is a 68 y.o. female with a PMH of morbid obesity, valvular heart disease, atrial fibrillation maintained on warfarin, COPD, chronic respiratory failure on 4 L oxygen and CPAP at night, IDDM, CHF, CKD who presents with dyspnea, weight gain, edema, cough, orthopnea and respiratory distress.  Found to be COVID-positive with elevated D-dimer, CTA chest with GGO and pulmonary edema.  IV diuresis initiated and presented to the medical service for further evaluation and treatment.    Review of Systems:  Review of Systems   Constitutional:  Positive for activity change, fatigue and unexpected weight change. Negative for chills and fever.   HENT:  Negative for ear pain and sore throat.    Eyes:  Negative for pain and visual disturbance.   Respiratory:  Positive for cough and shortness of breath.    Cardiovascular:  Positive for leg swelling. Negative for chest pain and palpitations.   Gastrointestinal:  Positive for abdominal distention. Negative for abdominal pain and vomiting.   Genitourinary:  Positive for decreased urine volume. Negative for dysuria and hematuria.   Musculoskeletal:  Negative for arthralgias and back pain.   Skin:  Positive for rash. Negative for color change.   Neurological:  Positive for weakness. Negative for seizures and syncope.   All other systems reviewed and are negative.      Past Medical and Surgical History:   Past Medical History:   Diagnosis Date    Asthma     Atrial fibrillation (HCC)     COPD (chronic obstructive pulmonary disease) (HCC)     Diabetes mellitus (HCC)     Disease of thyroid gland     Heart failure (HCC)     Kidney failure     Morbid obesity due to excess calories (HCC)     NISHI (obstructive sleep apnea)        Past Surgical History:   Procedure Laterality  Date    CARDIAC ELECTROPHYSIOLOGY MAPPING AND ABLATION      by Dr. Ferraro at [facility]    CARDIOVERSION N/A     GALLBLADDER SURGERY      HERNIA REPAIR         Meds/Allergies:  Prior to Admission medications    Medication Sig Start Date End Date Taking? Authorizing Provider   allopurinol (ZYLOPRIM) 100 mg tablet Take 100 mg by mouth daily Dose needs to be verified   Yes Historical Provider, MD   atorvastatin (LIPITOR) 10 mg tablet Take 10 mg by mouth daily   Yes Historical Provider, MD   cetirizine (ZyrTEC) 10 mg tablet Take 10 mg by mouth daily   Yes Historical Provider, MD   Cholecalciferol (Vitamin D3) 25 MCG (1000 UT) CAPS Take 2,000 Units by mouth daily   Yes Historical Provider, MD   docusate sodium (COLACE) 100 mg capsule Take 1 capsule (100 mg total) by mouth 2 (two) times a day 10/3/22  Yes Janice Porter MD   ferrous sulfate 325 (65 Fe) mg tablet Take 325 mg by mouth daily with breakfast   Yes Historical Provider, MD   fluticasone-vilanterol (BREO ELLIPTA) 100-25 mcg/inh inhaler Inhale 1 puff daily Rinse mouth after use.   Yes Historical Provider, MD   Insulin Aspart (NovoLOG) 100 units/mL injection Inject 3 Units under the skin 3 (three) times a day with meals 10/3/22  Yes Janice Porter MD   Lantus SoloStar 100 units/mL SOPN Inject 26 Units under the skin daily at bedtime 26 units 11/25/22  Yes Historical Provider, MD   levothyroxine 200 mcg tablet Take 288 mcg by mouth daily   Yes Historical Provider, MD   levothyroxine 88 mcg tablet  11/20/22  Yes Historical Provider, MD   metoprolol succinate (TOPROL-XL) 25 mg 24 hr tablet Take 0.5 tablets (12.5 mg total) by mouth 2 (two) times a day 10/29/22 12/23/23 Yes Pedro Price MD   montelukast (SINGULAIR) 10 mg tablet Take 10 mg by mouth daily at bedtime   Yes Historical Provider, MD   multivitamin (THERAGRAN) TABS Take 1 tablet by mouth daily   Yes Historical Provider, MD   omeprazole (PriLOSEC) 40 MG capsule Take 40 mg by mouth daily   Yes Historical  Provider, MD   potassium chloride (K-DUR,KLOR-CON) 20 mEq tablet Take 1 tablet (20 mEq total) by mouth daily Do not start before October 4, 2022.  Patient taking differently: Take 20 mEq by mouth 3 (three) times a day 10/4/22  Yes Janice Porter MD   torsemide 40 MG TABS Take 40 mg by mouth 2 (two) times a day  Patient taking differently: Take 50 mg by mouth 2 (two) times a day 10/3/22  Yes Janice Porter MD   warfarin (COUMADIN) 7.5 mg tablet INR range is 2-3 10/29/22  Yes Pedro Price MD   acetaminophen (TYLENOL) 325 mg tablet Take 2 tablets (650 mg total) by mouth every 6 (six) hours as needed for fever 4/15/20   Gonzalo Jarrett MD   ammonium lactate (LAC-HYDRIN) 12 % lotion Apply topically 2 (two) times a day as needed for dry skin 10/3/22   Janice Porter MD   midodrine (PROAMATINE) 10 MG tablet Take 1 tablet (10 mg total) by mouth 3 (three) times a day before meals for 15 days Please hold the medication if your systolic blood pressure is more than 115 10/29/22 11/13/22  Pedro Price MD   nystatin (MYCOSTATIN) powder Apply topically 2 (two) times a day 10/3/22   Janice Porter MD   polyethylene glycol (MIRALAX) 17 g packet Take 17 g by mouth daily as needed (Constipation) 10/3/22   Janice Porter MD   potassium chloride (MICRO-K) 10 MEQ CR capsule TAKE 1 CAPSULE BY MOUTH EVERY DAY IN THE MORNING 12/27/22   Historical Provider, MD   insulin detemir (LEVEMIR) 100 units/mL subcutaneous injection Inject 26 Units under the skin daily at bedtime  Patient not taking: Reported on 1/9/2023 12/23/23  Historical Provider, MD   Levothyroxine Sodium 88 MCG CAPS Take by mouth daily  Patient not taking: Reported on 1/9/2023 12/23/23  Historical Provider, MD   traZODone (DESYREL) 50 mg tablet Take 1 tablet (50 mg total) by mouth daily at bedtime as needed for sleep  Patient not taking: Reported on 1/9/2023 10/29/22 12/23/23  Pedro Price MD     I have reviewed home medications with patient  "personally.    Allergies:   Allergies   Allergen Reactions    Acesulfame Potassium - Food Allergy Anaphylaxis    Aspartame - Food Allergy Anaphylaxis    Saccharin - Food Allergy Anaphylaxis    Sucralose - Food Allergy Anaphylaxis    Metformin GI Intolerance       Social History:  Marital Status: /Civil Union   Occupation: Retired  Patient Pre-hospital Living Situation: With spouse  Patient Pre-hospital Level of Mobility: unable to be assessed at time of evaluation  Patient Pre-hospital Diet Restrictions: None  Substance Use History:   Social History     Substance and Sexual Activity   Alcohol Use Never     Social History     Tobacco Use   Smoking Status Never   Smokeless Tobacco Never     Social History     Substance and Sexual Activity   Drug Use Never       Family History:  Family History   Problem Relation Age of Onset    Arthritis Mother     Hearing loss Mother     Heart disease Mother     Hypertension Mother     Stroke Mother     Alcohol abuse Father     Cancer Father     Diabetes Father     Arthritis Sister     Cancer Sister     Alcohol abuse Brother     Diabetes Son     Arthritis Daughter     Drug abuse Daughter     Heart disease Maternal Grandmother     Hypertension Maternal Grandmother     Stroke Maternal Grandmother     Arthritis Maternal Aunt     Asthma Neg Hx     Birth defects Neg Hx     COPD Neg Hx     Depression Neg Hx     Early death Neg Hx     Hyperlipidemia Neg Hx     Kidney disease Neg Hx     Learning disabilities Neg Hx     Mental illness Neg Hx     Mental retardation Neg Hx     Miscarriages / Stillbirths Neg Hx     Vision loss Neg Hx        Physical Exam:     Vitals:   Blood Pressure: 113/67 (12/23/23 2255)  Pulse: 100 (12/23/23 2255)  Temperature: 98.6 °F (37 °C) (12/23/23 2255)  Temp Source: Temporal (12/23/23 1422)  Respirations: 16 (12/23/23 2255)  Height: 5' 2\" (157.5 cm) (12/23/23 1422)  Weight - Scale: (!) 175 kg (385 lb 9.4 oz) (12/23/23 1422)  SpO2: 95 % (12/23/23 " 0195)    Physical Exam  Vitals and nursing note reviewed.   Constitutional:       General: She is in acute distress.      Appearance: She is well-developed. She is obese. She is ill-appearing.   HENT:      Head: Normocephalic and atraumatic.      Mouth/Throat:      Mouth: Mucous membranes are moist.   Eyes:      Conjunctiva/sclera: Conjunctivae normal.   Cardiovascular:      Rate and Rhythm: Tachycardia present. Rhythm irregular.      Heart sounds: No murmur heard.  Pulmonary:      Effort: Respiratory distress present.      Breath sounds: Rales present.   Abdominal:      General: There is distension.      Palpations: Abdomen is soft.      Tenderness: There is no abdominal tenderness.   Musculoskeletal:         General: Swelling present.      Cervical back: Neck supple.      Right lower leg: Edema present.      Left lower leg: Edema present.      Comments: +3 bilateral lower extremity edema, lymphedema, venous stasis   Skin:     General: Skin is warm and dry.      Capillary Refill: Capillary refill takes less than 2 seconds.      Findings: Erythema and rash present.      Comments: See picture of bilateral lower extremity chronic stasis dermatitis and lymphedema   Neurological:      General: No focal deficit present.      Mental Status: She is alert and oriented to person, place, and time.   Psychiatric:         Mood and Affect: Mood normal.         Behavior: Behavior normal.         Additional Data:     Lab Results:  Results from last 7 days   Lab Units 12/23/23  1441   WBC Thousand/uL 7.58   HEMOGLOBIN g/dL 12.1   HEMATOCRIT % 38.7   PLATELETS Thousands/uL 173   NEUTROS PCT % 73   LYMPHS PCT % 11*   MONOS PCT % 14*   EOS PCT % 1     Results from last 7 days   Lab Units 12/23/23  1441   SODIUM mmol/L 141   POTASSIUM mmol/L 3.7   CHLORIDE mmol/L 101   CO2 mmol/L 34*   BUN mg/dL 34*   CREATININE mg/dL 1.13   ANION GAP mmol/L 6   CALCIUM mg/dL 9.8   ALBUMIN g/dL 3.6   TOTAL BILIRUBIN mg/dL 1.32*   ALK PHOS U/L 124*    ALT U/L 15   AST U/L 27   GLUCOSE RANDOM mg/dL 105     Results from last 7 days   Lab Units 12/23/23  1441   INR  2.17*     Results from last 7 days   Lab Units 12/23/23  2240   POC GLUCOSE mg/dl 158*         Results from last 7 days   Lab Units 12/23/23  1441   LACTIC ACID mmol/L 1.3   PROCALCITONIN ng/ml 0.05       Lines/Drains:  Invasive Devices       Peripheral Intravenous Line  Duration             Peripheral IV 12/23/23 Left Antecubital <1 day    Peripheral IV 12/23/23 Right Antecubital <1 day              Drain  Duration             Urethral Catheter Temperature probe 16 Fr. <1 day                  Urinary Catheter:  Goal for removal: Remove after 48 hrs of I/O monitoring             Imaging: Reviewed radiology reports from this admission including: chest CT scan  PE Study with CT Abdomen and Pelvis with contrast   Final Result by Cassie Eduardo MD (12/23 1842)      Evaluation severely limited by streak artifact from patient's body touching the CT gantry, as well as by motion artifact.      CTA CHEST:      1) No central or lobar pulmonary embolism. Evaluation of the segmental or subsegmental pulmonary arteries is limited.      2) Diffuse groundglass attenuation of the lung parenchyma with some associated areas of interlobular septal thickening. This may be seen in the setting of pulmonary edema and/or viral pneumonia. No airspace consolidations.      3) 4.2 cm air-filled cyst in the right upper lobe, with several septations as on the April 2020 CT, however with a new 9 mm nodule along 1 of the septations. Underlying malignancy cannot be excluded. Recommend short interval follow-up chest CT in 3    months or PET/CT. Pulmonology consultation may also be of value.      4) Mild cardiomegaly, with reflux of IV contrast into the dilated IVC and hepatic veins, suggestive of right heart failure.      5) Main pulmonary artery dilated up to 3.9 cm, suggestive of pulmonary arterial hypertension.      CT  ABDOMEN/PELVIS:      6) No acute abdominal or pelvic pathology.      7) Endometrial stripe thickened up to 1.9 cm, which may be secondary to endometrial hyperplasia, an endometrial polyp, or endometrial neoplasm. Recommend further evaluation with nonemergent pelvic ultrasound if feasible, and/or tissue sampling.      8) Recanalized periumbilical vein, which may indicate underlying portal hypertension.      9) Multiple additional findings as above.      The study was marked in EPIC for immediate notification.            Workstation performed: EpicTopic         XR chest portable    (Results Pending)       EKG and Other Studies Reviewed on Admission:   EKG: Atrial fibrillation. HR 91.    ** Please Note: This note has been constructed using a voice recognition system. **

## 2023-12-24 NOTE — PLAN OF CARE
Problem: Potential for Falls  Goal: Patient will remain free of falls  Description: INTERVENTIONS:  - Educate patient/family on patient safety including physical limitations  - Instruct patient to call for assistance with activity   - Consult OT/PT to assist with strengthening/mobility   - Keep Call bell within reach  - Keep bed low and locked with side rails adjusted as appropriate  - Keep care items and personal belongings within reach  - Initiate and maintain comfort rounds  - Make Fall Risk Sign visible to staff  - Offer Toileting every 2 Hours, in advance of need  - Initiate/Maintain bed alarm  - Obtain necessary fall risk management equipment: walker  - Apply yellow socks and bracelet for high fall risk patients  - Consider moving patient to room near nurses station  Outcome: Progressing     Problem: Prexisting or High Potential for Compromised Skin Integrity  Goal: Skin integrity is maintained or improved  Description: INTERVENTIONS:  - Identify patients at risk for skin breakdown  - Assess and monitor skin integrity  - Assess and monitor nutrition and hydration status  - Monitor labs   - Assess for incontinence   - Turn and reposition patient  - Assist with mobility/ambulation  - Relieve pressure over bony prominences  - Avoid friction and shearing  - Provide appropriate hygiene as needed including keeping skin clean and dry  - Evaluate need for skin moisturizer/barrier cream  - Collaborate with interdisciplinary team   - Patient/family teaching  - Consider wound care consult   Outcome: Progressing     Problem: PAIN - ADULT  Goal: Verbalizes/displays adequate comfort level or baseline comfort level  Description: Interventions:  - Encourage patient to monitor pain and request assistance  - Assess pain using appropriate pain scale  - Administer analgesics based on type and severity of pain and evaluate response  - Implement non-pharmacological measures as appropriate and evaluate response  - Consider  cultural and social influences on pain and pain management  - Notify physician/advanced practitioner if interventions unsuccessful or patient reports new pain  Outcome: Progressing     Problem: INFECTION - ADULT  Goal: Absence or prevention of progression during hospitalization  Description: INTERVENTIONS:  - Assess and monitor for signs and symptoms of infection  - Monitor lab/diagnostic results  - Monitor all insertion sites, i.e. indwelling lines, tubes, and drains  - Monitor endotracheal if appropriate and nasal secretions for changes in amount and color  - [place] appropriate cooling/warming therapies per order  - Administer medications as ordered  - Instruct and encourage patient and family to use good hand hygiene technique  - Identify and instruct in appropriate isolation precautions for identified infection/condition  Outcome: Progressing     Problem: SAFETY ADULT  Goal: Patient will remain free of falls  Description: INTERVENTIONS:  - Educate patient/family on patient safety including physical limitations  - Instruct patient to call for assistance with activity   - Consult OT/PT to assist with strengthening/mobility   - Keep Call bell within reach  - Keep bed low and locked with side rails adjusted as appropriate  - Keep care items and personal belongings within reach  - Initiate and maintain comfort rounds  - Make Fall Risk Sign visible to staff  - Offer Toileting every 2 Hours, in advance of need  - Initiate/Maintain bed alarm  - Obtain necessary fall risk management equipment: walker  - Apply yellow socks and bracelet for high fall risk patients  - Consider moving patient to room near nurses station  Outcome: Progressing  Goal: Maintain or return to baseline ADL function  Description: INTERVENTIONS:  -  Assess patient's ability to carry out ADLs; assess patient's baseline for ADL function and identify physical deficits which impact ability to perform ADLs (bathing, care of mouth/teeth, toileting,  grooming, dressing, etc.)  - Assess/evaluate cause of self-care deficits   - Assess range of motion  - Assess patient's mobility; develop plan if impaired  - Assess patient's need for assistive devices and provide as appropriate  - Encourage maximum independence but intervene and supervise when necessary  - Involve family in performance of ADLs  - Assess for home care needs following discharge   - Consider OT consult to assist with ADL evaluation and planning for discharge  - Provide patient education as appropriate  Outcome: Progressing  Goal: Maintains/Returns to pre admission functional level  Description: INTERVENTIONS:  - Perform AM-PAC 6 Click Basic Mobility/ Daily Activity assessment daily.  - Set and communicate daily mobility goal to care team and patient/family/caregiver.   - Collaborate with rehabilitation services on mobility goals if consulted  - Perform Range of Motion 2 times a day.  - Reposition patient every 2 hours.  - Dangle patient 2 times a day  - Stand patient 2 times a day  - Ambulate patient 2 times a day  - Out of bed to chair 2 times a day   - Out of bed for meals 2 times a day  - Out of bed for toileting  - Record patient progress and toleration of activity level   Outcome: Progressing     Problem: Knowledge Deficit  Goal: Patient/family/caregiver demonstrates understanding of disease process, treatment plan, medications, and discharge instructions  Description: Complete learning assessment and assess knowledge base.  Interventions:  - Provide teaching at level of understanding  - Provide teaching via preferred learning methods  Outcome: Progressing     Problem: DISCHARGE PLANNING  Goal: Discharge to home or other facility with appropriate resources  Description: INTERVENTIONS:  - Identify barriers to discharge w/patient and caregiver  - Arrange for needed discharge resources and transportation as appropriate  - Identify discharge learning needs (meds, wound care, etc.)  - Arrange for  interpretive services to assist at discharge as needed  - Refer to Case Management Department for coordinating discharge planning if the patient needs post-hospital services based on physician/advanced practitioner order or complex needs related to functional status, cognitive ability, or social support system  Outcome: Progressing

## 2023-12-24 NOTE — PROGRESS NOTES
[facility]  Progress Note  Name: Tigist Hewitt I  MRN: [id number]  Unit/Bed#: [facility] I Date of Admission: 12/23/2023   Date of Service: 12/24/2023 I Hospital Day: 1    Assessment/Plan   * Acute on chronic diastolic congestive heart failure (HCC)  Assessment & Plan  Wt Readings from Last 3 Encounters:   12/24/23 (!) 177 kg (390 lb 3.4 oz)   10/29/22 (!) 151 kg (331 lb 12.7 oz)   10/03/22 (!) 171 kg (377 lb 6.8 oz)     Presents with dyspnea, orthopnea, anasarca and CTA chest with pulmonary edema, hypervolemic with CHF exacerbation.?>50lb over her weight at the time of last hospitalization.  Reports compliance with torsemide 50 mg twice daily  Initiate Bumex infusion  CHF pathway, intake output, daily weights, 2 g sodium diet with fluid restriction  Previous echocardiogram with preserved ejection fraction but significant valvular heart disease, update echo  Continue metoprolol succinate 12.5 mg twice daily with close monitoring of blood pressure and hemodynamics  Monitor volume status    CKD (chronic kidney disease) stage 3, GFR 30-59 ml/min ([facility])  Assessment & Plan  Lab Results   Component Value Date    EGFR 47 12/24/2023    EGFR 49 12/23/2023    EGFR 43 10/29/2022    CREATININE 1.16 12/24/2023    CREATININE 1.13 12/23/2023    CREATININE 1.27 10/29/2022     Hx CKD 3.  Baseline creatinine between 1-1.3.  Creatinine baseline  Monitor closely with IV diuresis  Trend creatinine  Renally dose medications and avoid nephrotoxic medications    Abnormal CT scan, pelvis  Assessment & Plan  Abnormal endometrial thickening on CT pelvis  Discussed with patient that she will need GYN follow-up  Patient endorses intermittent vaginal bleeding    NISHI (obstructive sleep apnea)  Assessment & Plan  Continue CPAP    Hypothyroidism  Assessment & Plan  Continue PTA levothyroxine 188 mcg daily  Update TSH    Morbid obesity (HCC)  Assessment & Plan  BMI 70  Intensive lifestyle modification  required  Outpatient referral to bariatric medicine    Chronic respiratory failure with hypoxia ([facility])  Assessment & Plan  Chronically on 4 L oxygen nasal cannula Home regimen  Stable at baseline    Pneumonia due to COVID-19 virus  Assessment & Plan  Background: Presented with dyspnea, fluid retention found to be COVID-positive  COVID19- PCR positive 12/23  Supplemental O2 with 4LNC saturating 95%; this is patient's baseline   imaging   CTA chest with GGO viral versus pulmonary edema  Started on mild COVID pathway for treatment  Not requiring increased oxygen needs, no indication for dexamethasone  AC warfarin-INR therapeutic  Daily CBC and CMP  Continue check inflammatory markers as indicated  Remdesivir 200 mg IV day 1 and 100 mg IV on day 2-5   Encourage ISP/flutter valve  Encourage proning and early mobilization  Blood cultures pending  Trend procalcitonin  Airborne/Droplet precautions ordered     Type 2 diabetes mellitus with hyperglycemia, with long-term current use of insulin ([facility])  Assessment & Plan  Lab Results   Component Value Date    HGBA1C 6.8 (H) 10/05/2022       Recent Labs     12/23/23  2240 12/24/23  0755 12/24/23  1127   POCGLU 158* 194* 204*         Blood Sugar Average: Last 72 hrs:  (P) [id number]    Continue home regimen Lantus 26 units at bedtime, lispro 5 units 3 times daily meals  Add SSI with Accu-Cheks  Hypoglycemia protocol  Received dexamethasone for COVID-monitor for steroid-induced hyperglycemia     COPD (chronic obstructive pulmonary disease) ([facility])  Assessment & Plan  Acute exacerbation secondary to COVID  As needed bronchodilators  Continue PTA Breo and Singulair  Received IV dexamethasone x 1 in ED, will hold further steroids  Monitor  Oxygen needs are baseline    Atrial fibrillation ([facility])  Assessment & Plan  Continue warfarin 7.5 mg daily  Goal INR 2-3  Metoprolol twice daily for rate control               VTE Pharmacologic Prophylaxis:   High Risk (Score >/= 5) -  Pharmacological DVT Prophylaxis Ordered: warfarin (Coumadin). Sequential Compression Devices Ordered.    Mobility:   Basic Mobility Inpatient Raw Score: 6  JH-HLM Goal: 2: Bed activities/Dependent transfer  JH-HLM Achieved: 2: Bed activities/Dependent transfer  HLM Goal achieved. Continue to encourage appropriate mobility.    Patient Centered Rounds: I performed bedside rounds with nursing staff today.   Discussions with Specialists or Other Care Team Provider: Discussed with nursing    Education and Discussions with Family / Patient: Updated  () via phone.    Total Time Spent on Date of Encounter in care of patient: 30 mins. This time was spent on one or more of the following: performing physical exam; counseling and coordination of care; obtaining or reviewing history; documenting in the medical record; reviewing/ordering tests, medications or procedures; communicating with other healthcare professionals and discussing with patient's family/caregivers.    Current Length of Stay: 1 day(s)  Current Patient Status: Inpatient   Certification Statement: The patient will continue to require additional inpatient hospital stay due to ongoing IV diuresis  Discharge Plan: Anticipate discharge in 48-72 hrs to home with home services.    Code Status: Level 3 - DNAR and DNI    Subjective:   Seen and examined at bedside.  Denies any worsening shortness of breath currently.  Denies any chest pain lightheadedness or dizziness.  No acute events overnight.    Objective:     Vitals:   Temp (24hrs), Av.5 °F (36.9 °C), Min:97.5 °F (36.4 °C), Max:100.2 °F (37.9 °C)    Temp:  [97.5 °F (36.4 °C)-100.2 °F (37.9 °C)] 97.7 °F (36.5 °C)  HR:  [] 81  Resp:  [15-22] 19  BP: (104-136)/(51-85) 112/62  SpO2:  [91 %-100 %] 91 %  Body mass index is 71.37 kg/m².     Input and Output Summary (last 24 hours):     Intake/Output Summary (Last 24 hours) at 2023 1217  Last data filed at 2023 0948  Gross per 24  hour   Intake 1276.33 ml   Output 1200 ml   Net 76.33 ml       Physical Exam:   Physical Exam  Constitutional:       General: She is not in acute distress.     Appearance: She is obese.   HENT:      Head: Normocephalic.      Mouth/Throat:      Mouth: Mucous membranes are moist.   Eyes:      Extraocular Movements: Extraocular movements intact.      Pupils: Pupils are equal, round, and reactive to light.   Cardiovascular:      Rate and Rhythm: Normal rate. Rhythm irregular.   Pulmonary:      Breath sounds: No wheezing.      Comments: Diminished breath sounds bilaterally likely secondary to body habitus.  Abdominal:      General: Abdomen is flat. Bowel sounds are normal.      Palpations: Abdomen is soft.   Genitourinary:     Comments: Indwelling Dow catheter in place  Musculoskeletal:      Cervical back: Neck supple.      Right lower leg: Edema present.      Left lower leg: Edema present.   Skin:     Comments: Chronic stasis dermatitis with hypertrophic skin secondary to chronic lymphedema bilateral lower extremity.   Neurological:      General: No focal deficit present.      Mental Status: She is alert and oriented to person, place, and time. Mental status is at baseline.          Additional Data:     Labs:  Results from last 7 days   Lab Units 12/24/23  0537   WBC Thousand/uL 4.60   HEMOGLOBIN g/dL 11.2*   HEMATOCRIT % 35.5   PLATELETS Thousands/uL 146*   NEUTROS PCT % 86*   LYMPHS PCT % 10*   MONOS PCT % 3*   EOS PCT % 0     Results from last 7 days   Lab Units 12/24/23  0537   SODIUM mmol/L 140   POTASSIUM mmol/L 3.6   CHLORIDE mmol/L 102   CO2 mmol/L 30   BUN mg/dL 34*   CREATININE mg/dL 1.16   ANION GAP mmol/L 8   CALCIUM mg/dL 9.0   ALBUMIN g/dL 3.3*   TOTAL BILIRUBIN mg/dL 1.25*   ALK PHOS U/L 114*   ALT U/L 16   AST U/L 30   GLUCOSE RANDOM mg/dL 199*     Results from last 7 days   Lab Units 12/24/23  0537   INR  2.08*     Results from last 7 days   Lab Units 12/24/23  1127 12/24/23  0755 12/23/23  2240   POC  GLUCOSE mg/dl 204* 194* 158*         Results from last 7 days   Lab Units 12/24/23  0537 12/23/23  1441   LACTIC ACID mmol/L  --  1.3   PROCALCITONIN ng/ml 0.08 0.05       Lines/Drains:  Invasive Devices       Peripheral Intravenous Line  Duration             Peripheral IV 12/23/23 Left Antecubital <1 day    Peripheral IV 12/23/23 Right Antecubital <1 day              Drain  Duration             Urethral Catheter Temperature probe 16 Fr. <1 day                  Urinary Catheter:  Goal for removal: Remove after 48 hrs of I/O monitoring           Telemetry:  Telemetry Orders (From admission, onward)               24 Hour Telemetry Monitoring  Continuous x 24 Hours (Telem)        Question:  Reason for 24 Hour Telemetry  Answer:  Decompensated CHF- and any one of the following: continuous diuretic infusion or total diuretic dose >200 mg daily, associated electrolyte derangement (I.e. K < 3.0), ionotropic drip (continuous infusion), hx of ventricular arrhythmia, or new EF < 35%                     Telemetry Reviewed: Normal Sinus Rhythm  Indication for Continued Telemetry Use: Acute CHF on >200 mg lasix/day or equivalent dose or with new reduced EF.              Imaging: Reviewed radiology reports from this admission including: chest xray    Recent Cultures (last 7 days):   Results from last 7 days   Lab Units 12/23/23  1444 12/23/23  1441   BLOOD CULTURE  Received in Microbiology Lab. Culture in Progress. Received in Microbiology Lab. Culture in Progress.       Last 24 Hours Medication List:   Current Facility-Administered Medications   Medication Dose Route Frequency Provider Last Rate    acetaminophen  650 mg Oral Q6H PRN Kaitlin S Bar, CRNP      albuterol  2.5 mg Nebulization Q4H PRN Kaitlin S Bar, CRNP      allopurinol  100 mg Oral Daily Kaitlin S Bar, CRNP      atorvastatin  10 mg Oral Daily Kaitlin S Bar, CRNP      bumetanide (BUMEX) 12.5 mg infusion 50 mL  1 mg/hr Intravenous Continuous Kaitlin S Bar, CRNP 1  mg/hr (12/23/23 6744)    cholecalciferol  1,000 Units Oral Daily Kaitlin S Bar, CRNP      docusate sodium  100 mg Oral BID Kaitlin S Bar, CRNP      ferrous sulfate  325 mg Oral Daily With Breakfast Kaitlin S Bar, CRNP      Fluticasone Furoate-Vilanterol  1 puff Inhalation Daily Kaitlin S Bar, CRNP      insulin glargine  26 Units Subcutaneous HS Kaitlin S Bar, CRNP      insulin lispro  2-12 Units Subcutaneous TID AC Kaitlin S Bar, CRNP      insulin lispro  2-12 Units Subcutaneous HS Kaitlin S Bar, CRNP      insulin lispro  5 Units Subcutaneous TID With Meals Kaitlin S Bar, CRNP      levothyroxine  288 mcg Oral Early Morning Kaitlin S Bar, CRNP      loratadine  10 mg Oral Daily Kaitlin S Bar, CRNP      metoprolol succinate  12.5 mg Oral BID Kaitlin S Bar, CRNP      montelukast  10 mg Oral HS Kaitlin S Bar, CRNP      multivitamin-minerals  1 tablet Oral Daily Kaitlin S Bar, CRNP      pantoprazole  40 mg Oral Early Morning Kaitlin S Bar, CRNP      polyethylene glycol  17 g Oral Daily PRN Kaitlin S Bar, CRNP      potassium chloride  20 mEq Oral TID Kaitlin S Bar, CRNP      remdesivir  100 mg Intravenous Q24H Kaitlin S Bar, CRNP      warfarin  7.5 mg Oral Daily (warfarin) Kaitlin S Bar, CRNP          Today, Patient Was Seen By: Janice Porter MD    **Please Note: This note may have been constructed using a voice recognition system.**

## 2023-12-24 NOTE — ASSESSMENT & PLAN NOTE
Abnormal endometrial thickening on CT pelvis  Discussed with patient that she will need GYN follow-up  Patient endorses intermittent vaginal bleeding

## 2023-12-24 NOTE — UTILIZATION REVIEW
Initial Clinical Review    Admission: Date/Time/Statement:   Admission Orders (From admission, onward)       Ordered        12/23/23 1923  INPATIENT ADMISSION  Once                          Orders Placed This Encounter   Procedures    INPATIENT ADMISSION     Standing Status:   Standing     Number of Occurrences:   1     Order Specific Question:   Level of Care     Answer:   Med Surg [16]     Order Specific Question:   Estimated length of stay     Answer:   More than 2 Midnights     Order Specific Question:   Certification     Answer:   I certify that inpatient services are medically necessary for this patient for a duration of greater than two midnights. See H&P and MD Progress Notes for additional information about the patient's course of treatment.     ED Arrival Information       Expected   -    Arrival   12/23/2023 14:16    Acuity   Urgent              Means of arrival   Ambulance    Escorted by   [facility]   Hospitalist    Admission type   Emergency              Arrival complaint   pain             Chief Complaint   Patient presents with    Shortness of Breath     Patient sent from home due to SOB, edema. Wears 4LNC at baseline.        Initial Presentation: 68 y.o. female   presents to ED via  EMS from [facility] with dyspnea, weight gain, cough, edema, respiratory distress  and orthopnea.  PMH  is  MO, valvular heart disease, Afib  on coumadin, COPD, chronic respiratory failure  on  4L  O2 and CPAP  at night, IDDM, CHF  and CKD. Labs reveal elevated   D Dimer.  CT chest shows  GGO/pulmonary edema.  Found  with positive COVID  test.    Admit  Ip with  Acute/chronic diastolic CHF, Pneumonia  due to COVID  19, COPD  and plan is   monitor labs, blood cultures, S/P  1 dose  IV  steroids, O2 4 L  NC, IV  remdesivir, airborne/droplet precautions, and bumex  infusion.      Date:   12/24      Day 2:   Maintain  bumex infusion.   Monitor labs. Remain on fluid restrict.  Continue  airborne/droplet  precautions. Continue  IV  remdesivir.   No worsening  SOB.    On O2  4L  NC.    ED Triage Vitals   Temperature Pulse Respirations Blood Pressure SpO2   12/23/23 1422 12/23/23 1422 12/23/23 1422 12/23/23 1422 12/23/23 1419   100.2 °F (37.9 °C) 98 20 112/68 95 %      Temp Source Heart Rate Source Patient Position - Orthostatic VS BP Location FiO2 (%)   12/23/23 1422 12/23/23 1422 12/23/23 1422 12/23/23 1422 --   Temporal Monitor Lying Right arm       Pain Score       12/23/23 1422       No Pain          Wt Readings from Last 1 Encounters:   12/24/23 (!) 177 kg (390 lb 3.4 oz)     Additional Vital Signs:   4/23 0900 -- -- -- -- -- -- 36 4 L/min Nasal cannula --   12/24/23 07:14:32 97.5 °F (36.4 °C) 74 19 110/62 78 93 % -- -- -- --   12/23/23 2300 98.6 °F (37 °C) 108 Abnormal  18 113/67 82 -- 36 4 L/min Nasal cannula Lying   12/23/23 22:55:07 98.6 °F (37 °C) 100 16 113/67 82 95 % -- -- -- --   12/23/23 2252 -- 100 -- 113/67 -- -- -- -- -- --   12/23/23 2200 -- -- -- -- -- 96 % -- -- -- --   12/23/23 21:08:19 98.4 °F (36.9 °C) 105 20 117/67 84 96 % -- -- -- --   12/23/23 2000 -- 109 Abnormal  20 108/55 75 95 % 36 4 L/min Nasal cannula Lying   12/23/23 1900 -- 116 Abnormal  22 108/52 71 96 % 36 4 L/min Nasal cannula Lying   12/23/23 1845 -- 98 20 107/67 84 97 % 36 4 L/min Nasal cannula Lying   12/23/23 1830 -- 118 Abnormal  18 104/51 74 98 % 36 4 L/min Nasal cannula --   12/23/23 1815 -- 95 17 132/66 81 97 % 36 4 L/min Nasal cannula --   12/23/23 1800 -- 106 Abnormal  18 113/85 95 96 % 36 4 L/min Nasal cannula --   12/23/23 1745 -- 108 Abnormal  20 129/77 98 99 % 36 4 L/min Nasal cannula --   12/23/23 1739 -- -- -- -- -- 100 % 36 4 L/min -- --   12/23/23 1715 -- 93 16 136/63 90 98 % 36 4 L/min Nasal cannula --   12/23/23 1700 -- 93 19 114/63 82 99 % -- -- -- --   12/23/23 1645 -- 98 20 113/69 85 95 % 36 4 L/min Nasal cannula --   12/23/23 1630 -- 93 20 113/69 95 96 % 36 4 L/min Nasal cannula --   12/23/23 1530 -- 96 15  114/67 85 98 % 36 4 L/min Nasal cannula --   12/23/23 1515 -- 93 16 131/60 86 95 % 36 4 L/min Nasal cannula --   12/23/23 1500 -- 94 16 131/72 92 96 % 36 4 L/min Nasal cannula --   12/23/23 1453 -- -- -- -- -- -- -- -- Nasal cannula --   12/23/23 1422 100.2 °F (37.9 °C) 98 20 112/68 -- 93 % 36 4 L/min Nasal cannula Lying     Pertinent Labs/Diagnostic Test Results:   PE Study with CT Abdomen and Pelvis with contrast   Final Result by Cassie Eduardo MD (12/23 1842)      Evaluation severely limited by streak artifact from patient's body touching the CT gantry, as well as by motion artifact.      CTA CHEST:      1) No central or lobar pulmonary embolism. Evaluation of the segmental or subsegmental pulmonary arteries is limited.      2) Diffuse groundglass attenuation of the lung parenchyma with some associated areas of interlobular septal thickening. This may be seen in the setting of pulmonary edema and/or viral pneumonia. No airspace consolidations.      3) 4.2 cm air-filled cyst in the right upper lobe, with several septations as on the April 2020 CT, however with a new 9 mm nodule along 1 of the septations. Underlying malignancy cannot be excluded. Recommend short interval follow-up chest CT in 3    months or PET/CT. Pulmonology consultation may also be of value.      4) Mild cardiomegaly, with reflux of IV contrast into the dilated IVC and hepatic veins, suggestive of right heart failure.      5) Main pulmonary artery dilated up to 3.9 cm, suggestive of pulmonary arterial hypertension.      CT ABDOMEN/PELVIS:      6) No acute abdominal or pelvic pathology.      7) Endometrial stripe thickened up to 1.9 cm, which may be secondary to endometrial hyperplasia, an endometrial polyp, or endometrial neoplasm. Recommend further evaluation with nonemergent pelvic ultrasound if feasible, and/or tissue sampling.      8) Recanalized periumbilical vein, which may indicate underlying portal hypertension.      9) Multiple  additional findings as above.      The study was marked in EPIC for immediate notification.            Workstation performed: [id number]         XR chest portable   Final Result by Taran Horvath MD (12/24 0959)      Mild pulmonary vascular congestion. Cardiomegaly.      No focal consolidation, pleural effusion, or pneumothorax.                  Workstation performed: [id number]           Results from last 7 days   Lab Units 12/23/23  1444   SARS-COV-2  Positive*     Results from last 7 days   Lab Units 12/24/23  0537 12/23/23  1441   WBC Thousand/uL 4.60 7.58   HEMOGLOBIN g/dL 11.2* 12.1   HEMATOCRIT % 35.5 38.7   PLATELETS Thousands/uL 146* 173   NEUTROS ABS Thousands/µL 3.94 5.51         Results from last 7 days   Lab Units 12/24/23  0537 12/23/23  1441   SODIUM mmol/L 140 141   POTASSIUM mmol/L 3.6 3.7   CHLORIDE mmol/L 102 101   CO2 mmol/L 30 34*   ANION GAP mmol/L 8 6   BUN mg/dL 34* 34*   CREATININE mg/dL 1.16 1.13   EGFR ml/min/1.73sq m 47 49   CALCIUM mg/dL 9.0 9.8   MAGNESIUM mg/dL  --  2.1     Results from last 7 days   Lab Units 12/24/23  0537 12/23/23  1441   AST U/L 30 27   ALT U/L 16 15   ALK PHOS U/L 114* 124*   TOTAL PROTEIN g/dL 7.1 7.7   ALBUMIN g/dL 3.3* 3.6   TOTAL BILIRUBIN mg/dL 1.25* 1.32*     Results from last 7 days   Lab Units 12/24/23  1127 12/24/23  0755 12/23/23  2240   POC GLUCOSE mg/dl 204* 194* 158*     Results from last 7 days   Lab Units 12/24/23  0537 12/23/23  1441   GLUCOSE RANDOM mg/dL 199* 105         Results from last 7 days   Lab Units 12/23/23  1441   PH ALFREDO  7.488*   PCO2 ALFREDO mm Hg 47.8   PO2 ALFREDO mm Hg 33.0*   HCO3 ALFREDO mmol/L 35.4*   BASE EXC ALFREDO mmol/L 10.6   O2 CONTENT ALFREDO ml/dL 11.9   O2 HGB, VENOUS % 64.4             Results from last 7 days   Lab Units 12/23/23  1651 12/23/23  1441   HS TNI 0HR ng/L  --  36   HS TNI 2HR ng/L 34  --    HSTNI D2 ng/L -2  --          Results from last 7 days   Lab Units 12/24/23  0537 12/23/23  1441   PROTIME seconds 23.7* 24.5*   INR   2.08* 2.17*   PTT seconds  --  38*     Results from last 7 days   Lab Units 12/24/23  0537   TSH 3RD GENERATON uIU/mL 1.154     Results from last 7 days   Lab Units 12/24/23  0537 12/23/23  1441   PROCALCITONIN ng/ml 0.08 0.05     Results from last 7 days   Lab Units 12/23/23  1441   LACTIC ACID mmol/L 1.3             Results from last 7 days   Lab Units 12/23/23  1441   BNP pg/mL 303*                         Results from last 7 days   Lab Units 12/24/23  0537   CRP mg/L 24.0*             Results from last 7 days   Lab Units 12/23/23  1452   CLARITY UA  Clear   COLOR UA  Yellow   SPEC GRAV UA  1.010   PH UA  6.5   GLUCOSE UA mg/dl Negative   KETONES UA mg/dl Negative   BLOOD UA  Trace-lysed*   PROTEIN UA mg/dl Negative   NITRITE UA  Negative   BILIRUBIN UA  Negative   UROBILINOGEN UA E.U./dl 1.0   LEUKOCYTES UA  Negative   WBC UA /hpf None Seen   RBC UA /hpf 0-1   BACTERIA UA /hpf None Seen   EPITHELIAL CELLS WET PREP /hpf Occasional     Results from last 7 days   Lab Units 12/23/23  1444   INFLUENZA A PCR  Negative   INFLUENZA B PCR  Negative   RSV PCR  Negative                             Results from last 7 days   Lab Units 12/23/23  1444 12/23/23  1441   BLOOD CULTURE  Received in Microbiology Lab. Culture in Progress. Received in Microbiology Lab. Culture in Progress.                   ED Treatment:   Medication Administration from 12/23/2023 1416 to 12/23/2023 2047         Date/Time Order Dose Route Action Comments     12/23/2023 1523 EST cefTRIAXone (ROCEPHIN) IVPB (premix in dextrose) 2,000 mg 50 mL 0 mg Intravenous Stopped --     12/23/2023 1453 EST cefTRIAXone (ROCEPHIN) IVPB (premix in dextrose) 2,000 mg 50 mL 2,000 mg Intravenous New Bag --     12/23/2023 1556 EST iohexol (OMNIPAQUE) 350 MG/ML injection (MULTI-DOSE) 100 mL 100 mL Intravenous Given --     12/23/2023 1739 EST albuterol inhalation solution 10 mg 10 mg Nebulization Given --     12/23/2023 1739 EST ipratropium (ATROVENT) 0.02 % inhalation  solution 1 mg 1 mg Nebulization Given --     12/23/2023 1739 EST sodium chloride 0.9 % inhalation solution 12 mL 12 mL Nebulization Given --     12/23/2023 1727 EST methylPREDNISolone sodium succinate (Solu-MEDROL) injection 125 mg 125 mg Intravenous Given --     12/23/2023 1814 EST furosemide (LASIX) injection 40 mg 40 mg Intravenous Given --            Present on Admission:   Chronic respiratory failure with hypoxia (HCC)   Acute on chronic diastolic congestive heart failure (HCC)   Pneumonia due to COVID-19 virus   COPD (chronic obstructive pulmonary disease) (HCC)   Atrial fibrillation (HCC)   Morbid obesity (HCC)   NISHI (obstructive sleep apnea)   Hypothyroidism      Admitting Diagnosis: Pain, generalized [R52]  SOB (shortness of breath) [R06.02]  CHF exacerbation (HCC) [I50.9]  COVID-19 virus infection [U07.1]  Age/Sex: 68 y.o. female  Admission Orders:  Scheduled Medications:  allopurinol, 100 mg, Oral, Daily  atorvastatin, 10 mg, Oral, Daily  cholecalciferol, 1,000 Units, Oral, Daily  docusate sodium, 100 mg, Oral, BID  ferrous sulfate, 325 mg, Oral, Daily With Breakfast  Fluticasone Furoate-Vilanterol, 1 puff, Inhalation, Daily  insulin glargine, 26 Units, Subcutaneous, HS  insulin lispro, 2-12 Units, Subcutaneous, TID AC  insulin lispro, 2-12 Units, Subcutaneous, HS  insulin lispro, 5 Units, Subcutaneous, TID With Meals  levothyroxine, 288 mcg, Oral, Early Morning  loratadine, 10 mg, Oral, Daily  metoprolol succinate, 12.5 mg, Oral, BID  montelukast, 10 mg, Oral, HS  multivitamin-minerals, 1 tablet, Oral, Daily  pantoprazole, 40 mg, Oral, Early Morning  potassium chloride, 20 mEq, Oral, TID  remdesivir, 100 mg, Intravenous, Q24H  warfarin, 7.5 mg, Oral, Daily (warfarin)      Continuous IV Infusions:  bumetanide (BUMEX) 12.5 mg infusion 50 mL, 1 mg/hr, Intravenous, Continuous      PRN Meds:  acetaminophen, 650 mg, Oral, Q6H PRN  albuterol, 2.5 mg, Nebulization, Q4H PRN  polyethylene glycol, 17 g, Oral, Daily  PRN        IP CONSULT TO NUTRITION SERVICES  IP CONSULT TO CARDIOLOGY    Network Utilization Review Department  ATTENTION: Please call with any questions or concerns to [phone] and carefully listen to the prompts so that you are directed to the right person. All voicemails are confidential.   For Discharge needs, contact Care Management DC Support Team at [phone] opt. [phone]  Send all requests for admission clinical reviews, approved or denied determinations and any other requests to dedicated fax number below belonging to the [place] where the patient is receiving treatment. List of dedicated fax numbers for the Facilities:  FACILITY NAME UR FAX NUMBER   ADMISSION DENIALS (Administrative/Medical Necessity) [phone]   DISCHARGE SUPPORT TEAM (NETWORK) [phone]   PARENT CHILD HEALTH (Maternity/NICU/Pediatrics) [phone]   [facility] [phone]   [facility] [phone]   [facility] [phone]   [facility] [phone]   [facility] [phone]   [facility] [phone]   [facility] [phone]   [facility] [phone]   [facility] [phone]   [facility] [phone]   [facility] [phone]

## 2023-12-24 NOTE — ASSESSMENT & PLAN NOTE
Lab Results   Component Value Date    HGBA1C 6.8 (H) 10/05/2022       Recent Labs     12/23/23  2240   POCGLU 158*       Blood Sugar Average: Last 72 hrs:  (P) 158    Continue home regimen Lantus 26 units at bedtime, lispro 5 units 3 times daily meals  Add SSI with Accu-Cheks  Hypoglycemia protocol  Received dexamethasone for COVID-monitor for steroid-induced hyperglycemia

## 2023-12-24 NOTE — ASSESSMENT & PLAN NOTE
Wt Readings from Last 3 Encounters:   12/23/23 (!) 175 kg (385 lb 9.4 oz)   10/29/22 (!) 151 kg (331 lb 12.7 oz)   10/03/22 (!) 171 kg (377 lb 6.8 oz)     Presents with dyspnea, orthopnea, anasarca and CTA chest with pulmonary edema, hypervolemic with CHF exacerbation  Reports compliance with torsemide 50 mg twice daily  Initiate Bumex infusion  CHF pathway, intake output, daily weights, 2 g sodium diet with fluid restriction  Previous echocardiogram with preserved ejection fraction but significant valvular heart disease, update echo  Continue metoprolol succinate 12.5 mg twice daily  Monitor volume status

## 2023-12-24 NOTE — ASSESSMENT & PLAN NOTE
Wt Readings from Last 3 Encounters:   12/24/23 (!) 177 kg (390 lb 3.4 oz)   10/29/22 (!) 151 kg (331 lb 12.7 oz)   10/03/22 (!) 171 kg (377 lb 6.8 oz)     Presents with dyspnea, orthopnea, anasarca and CTA chest with pulmonary edema, hypervolemic with CHF exacerbation.?>50lb over her weight at the time of last hospitalization.  Reports compliance with torsemide 50 mg twice daily  Initiate Bumex infusion  CHF pathway, intake output, daily weights, 2 g sodium diet with fluid restriction  Previous echocardiogram with preserved ejection fraction but significant valvular heart disease, update echo  Continue metoprolol succinate 12.5 mg twice daily with close monitoring of blood pressure and hemodynamics  Monitor volume status

## 2023-12-24 NOTE — ASSESSMENT & PLAN NOTE
Lab Results   Component Value Date    EGFR 49 12/23/2023    EGFR 43 10/29/2022    EGFR 36 10/28/2022    CREATININE 1.13 12/23/2023    CREATININE 1.27 10/29/2022    CREATININE 1.46 (H) 10/28/2022     Hx CKD 3  Creatinine baseline  Monitor closely with IV diuresis  Trend creatinine  Renally dose medications and avoid nephrotoxic medications

## 2023-12-24 NOTE — RESPIRATORY THERAPY NOTE
RT Ventilator Management Note  Tigist Garcíalaitus 68 y.o. female MRN: [id number]  Unit/Bed#: -01 Encounter: [id number]      Daily Screen    No data found in the last 10 encounters.           Physical Exam:   O2 Device: 4lpm NC      Resp Comments: Respiratory at bedside for new cpap setup, pt assessed on 4lpm NC, awake and responsive, pt indicates last time she was at [facility] could not tolerate hospital mask and V60 machine compared to her own machine that does autotitration. Pt indicates she would prefer to stay on 4lpm NC for hs and have someone bring in her machine tommorrow.

## 2023-12-24 NOTE — ASSESSMENT & PLAN NOTE
Lab Results   Component Value Date    EGFR 47 12/24/2023    EGFR 49 12/23/2023    EGFR 43 10/29/2022    CREATININE 1.16 12/24/2023    CREATININE 1.13 12/23/2023    CREATININE 1.27 10/29/2022     Hx CKD 3.  Baseline creatinine between 1-1.3.  Creatinine baseline  Monitor closely with IV diuresis  Trend creatinine  Renally dose medications and avoid nephrotoxic medications

## 2023-12-24 NOTE — ASSESSMENT & PLAN NOTE
Background: Presented with dyspnea, fluid retention found to be COVID-positive  COVID19- PCR positive 12/23  Supplemental O2 with 4LNC saturating 95%; this is patient's baseline   imaging   CTA chest with GGO viral versus pulmonary edema  Started on mild COVID pathway for treatment  Not requiring increased oxygen needs, no indication for dexamethasone  AC warfarin-INR therapeutic  Daily CBC and CMP  Continue check inflammatory markers as indicated  Remdesivir 200 mg IV day 1 and 100 mg IV on day 2-5   Encourage ISP/flutter valve  Encourage proning and early mobilization  Blood cultures pending  Trend procalcitonin  Airborne/Droplet precautions ordered

## 2023-12-24 NOTE — ASSESSMENT & PLAN NOTE
Lab Results   Component Value Date    HGBA1C 6.8 (H) 10/05/2022       Recent Labs     12/23/23  2240 12/24/23  0755 12/24/23  1127   POCGLU 158* 194* 204*         Blood Sugar Average: Last 72 hrs:  (P) [id number]    Continue home regimen Lantus 26 units at bedtime, lispro 5 units 3 times daily meals  Add SSI with Accu-Cheks  Hypoglycemia protocol  Received dexamethasone for COVID-monitor for steroid-induced hyperglycemia

## 2023-12-24 NOTE — ASSESSMENT & PLAN NOTE
Acute exacerbation secondary to COVID  As needed bronchodilators  Continue PTA Breo and Singulair  Received IV dexamethasone x 1 in ED, will hold further steroids  Monitor  Oxygen needs are baseline

## 2023-12-25 LAB
ALBUMIN SERPL BCP-MCNC: 3.4 G/DL (ref 3.5–5)
ALP SERPL-CCNC: 110 U/L (ref 34–104)
ALT SERPL W P-5'-P-CCNC: 17 U/L (ref 7–52)
ANION GAP SERPL CALCULATED.3IONS-SCNC: 7 MMOL/L
AST SERPL W P-5'-P-CCNC: 32 U/L (ref 13–39)
BASOPHILS # BLD AUTO: 0.04 THOUSANDS/ÂΜL (ref 0–0.1)
BASOPHILS NFR BLD AUTO: 1 % (ref 0–1)
BILIRUB SERPL-MCNC: 0.82 MG/DL (ref 0.2–1)
BUN SERPL-MCNC: 41 MG/DL (ref 5–25)
CALCIUM ALBUM COR SERPL-MCNC: 9.6 MG/DL (ref 8.3–10.1)
CALCIUM SERPL-MCNC: 9.1 MG/DL (ref 8.4–10.2)
CHLORIDE SERPL-SCNC: 104 MMOL/L (ref 96–108)
CO2 SERPL-SCNC: 33 MMOL/L (ref 21–32)
CREAT SERPL-MCNC: 1.15 MG/DL (ref 0.6–1.3)
EOSINOPHIL # BLD AUTO: 0.04 THOUSAND/ÂΜL (ref 0–0.61)
EOSINOPHIL NFR BLD AUTO: 1 % (ref 0–6)
ERYTHROCYTE [DISTWIDTH] IN BLOOD BY AUTOMATED COUNT: 20 % (ref 11.6–15.1)
GFR SERPL CREATININE-BSD FRML MDRD: 48 ML/MIN/1.73SQ M
GLUCOSE SERPL-MCNC: 104 MG/DL (ref 65–140)
GLUCOSE SERPL-MCNC: 107 MG/DL (ref 65–140)
GLUCOSE SERPL-MCNC: 112 MG/DL (ref 65–140)
GLUCOSE SERPL-MCNC: 166 MG/DL (ref 65–140)
GLUCOSE SERPL-MCNC: 191 MG/DL (ref 65–140)
HCT VFR BLD AUTO: 39 % (ref 34.8–46.1)
HGB BLD-MCNC: 12 G/DL (ref 11.5–15.4)
IMM GRANULOCYTES # BLD AUTO: 0.03 THOUSAND/UL (ref 0–0.2)
IMM GRANULOCYTES NFR BLD AUTO: 1 % (ref 0–2)
INR PPP: 2.3 (ref 0.84–1.19)
LYMPHOCYTES # BLD AUTO: 0.97 THOUSANDS/ÂΜL (ref 0.6–4.47)
LYMPHOCYTES NFR BLD AUTO: 17 % (ref 14–44)
MCH RBC QN AUTO: 29.9 PG (ref 26.8–34.3)
MCHC RBC AUTO-ENTMCNC: 30.8 G/DL (ref 31.4–37.4)
MCV RBC AUTO: 97 FL (ref 82–98)
MONOCYTES # BLD AUTO: 1.07 THOUSAND/ÂΜL (ref 0.17–1.22)
MONOCYTES NFR BLD AUTO: 19 % (ref 4–12)
NEUTROPHILS # BLD AUTO: 3.59 THOUSANDS/ÂΜL (ref 1.85–7.62)
NEUTS SEG NFR BLD AUTO: 61 % (ref 43–75)
NRBC BLD AUTO-RTO: 0 /100 WBCS
PLATELET # BLD AUTO: 161 THOUSANDS/UL (ref 149–390)
PMV BLD AUTO: 10.8 FL (ref 8.9–12.7)
POTASSIUM SERPL-SCNC: 3.6 MMOL/L (ref 3.5–5.3)
PROT SERPL-MCNC: 7.2 G/DL (ref 6.4–8.4)
PROTHROMBIN TIME: 25.6 SECONDS (ref 11.6–14.5)
RBC # BLD AUTO: 4.01 MILLION/UL (ref 3.81–5.12)
SODIUM SERPL-SCNC: 144 MMOL/L (ref 135–147)
WBC # BLD AUTO: 5.74 THOUSAND/UL (ref 4.31–10.16)

## 2023-12-25 PROCEDURE — 85610 PROTHROMBIN TIME: CPT | Performed by: NURSE PRACTITIONER

## 2023-12-25 PROCEDURE — 82948 REAGENT STRIP/BLOOD GLUCOSE: CPT

## 2023-12-25 PROCEDURE — 85025 COMPLETE CBC W/AUTO DIFF WBC: CPT | Performed by: INTERNAL MEDICINE

## 2023-12-25 PROCEDURE — 80053 COMPREHEN METABOLIC PANEL: CPT | Performed by: INTERNAL MEDICINE

## 2023-12-25 PROCEDURE — 99232 SBSQ HOSP IP/OBS MODERATE 35: CPT | Performed by: INTERNAL MEDICINE

## 2023-12-25 RX ADMIN — INSULIN GLARGINE 26 UNITS: 100 INJECTION, SOLUTION SUBCUTANEOUS at 22:24

## 2023-12-25 RX ADMIN — ALLOPURINOL 100 MG: 100 TABLET ORAL at 08:31

## 2023-12-25 RX ADMIN — Medication 1 MG/HR: at 01:58

## 2023-12-25 RX ADMIN — LORATADINE 10 MG: 10 TABLET ORAL at 08:29

## 2023-12-25 RX ADMIN — POTASSIUM CHLORIDE 20 MEQ: 1500 TABLET, EXTENDED RELEASE ORAL at 08:31

## 2023-12-25 RX ADMIN — DOCUSATE SODIUM 100 MG: 100 CAPSULE, LIQUID FILLED ORAL at 16:53

## 2023-12-25 RX ADMIN — Medication 1 MG/HR: at 14:57

## 2023-12-25 RX ADMIN — REMDESIVIR 100 MG: 100 INJECTION, POWDER, LYOPHILIZED, FOR SOLUTION INTRAVENOUS at 22:23

## 2023-12-25 RX ADMIN — POTASSIUM CHLORIDE 20 MEQ: 1500 TABLET, EXTENDED RELEASE ORAL at 22:20

## 2023-12-25 RX ADMIN — ATORVASTATIN CALCIUM 10 MG: 10 TABLET, FILM COATED ORAL at 08:29

## 2023-12-25 RX ADMIN — Medication 1000 UNITS: at 08:29

## 2023-12-25 RX ADMIN — INSULIN LISPRO 5 UNITS: 100 INJECTION, SOLUTION INTRAVENOUS; SUBCUTANEOUS at 16:54

## 2023-12-25 RX ADMIN — LEVOTHYROXINE SODIUM 288 MCG: 88 TABLET ORAL at 05:09

## 2023-12-25 RX ADMIN — INSULIN LISPRO 2 UNITS: 100 INJECTION, SOLUTION INTRAVENOUS; SUBCUTANEOUS at 22:21

## 2023-12-25 RX ADMIN — METOPROLOL SUCCINATE 12.5 MG: 25 TABLET, EXTENDED RELEASE ORAL at 22:21

## 2023-12-25 RX ADMIN — MONTELUKAST 10 MG: 10 TABLET, FILM COATED ORAL at 22:24

## 2023-12-25 RX ADMIN — INSULIN LISPRO 2 UNITS: 100 INJECTION, SOLUTION INTRAVENOUS; SUBCUTANEOUS at 16:54

## 2023-12-25 RX ADMIN — Medication 1 TABLET: at 08:29

## 2023-12-25 RX ADMIN — PANTOPRAZOLE SODIUM 40 MG: 40 TABLET, DELAYED RELEASE ORAL at 05:09

## 2023-12-25 RX ADMIN — ACETAMINOPHEN 650 MG: 325 TABLET, FILM COATED ORAL at 22:20

## 2023-12-25 RX ADMIN — DOCUSATE SODIUM 100 MG: 100 CAPSULE, LIQUID FILLED ORAL at 08:31

## 2023-12-25 RX ADMIN — FLUTICASONE FUROATE AND VILANTEROL TRIFENATATE 1 PUFF: 100; 25 POWDER RESPIRATORY (INHALATION) at 08:32

## 2023-12-25 RX ADMIN — FERROUS SULFATE TAB 325 MG (65 MG ELEMENTAL FE) 325 MG: 325 (65 FE) TAB at 08:31

## 2023-12-25 RX ADMIN — METOPROLOL SUCCINATE 12.5 MG: 25 TABLET, EXTENDED RELEASE ORAL at 08:29

## 2023-12-25 RX ADMIN — WARFARIN SODIUM 7.5 MG: 7.5 TABLET ORAL at 16:53

## 2023-12-25 RX ADMIN — ACETAMINOPHEN 650 MG: 325 TABLET, FILM COATED ORAL at 12:09

## 2023-12-25 RX ADMIN — POTASSIUM CHLORIDE 20 MEQ: 1500 TABLET, EXTENDED RELEASE ORAL at 16:53

## 2023-12-25 NOTE — PROGRESS NOTES
[facility]  Progress Note  Name: Tigist Hewitt I  MRN: [id number]  Unit/Bed#: [facility] I Date of Admission: 12/23/2023   Date of Service: 12/25/2023 I Hospital Day: 2    Assessment/Plan   * Acute on chronic diastolic congestive heart failure (HCC)  Assessment & Plan  Wt Readings from Last 3 Encounters:   12/25/23 80.6 kg (177 lb 11.2 oz)   10/29/22 (!) 151 kg (331 lb 12.7 oz)   10/03/22 (!) 171 kg (377 lb 6.8 oz)     Presents with dyspnea, orthopnea, anasarca and CTA chest with pulmonary edema, hypervolemic with CHF exacerbation.?>50lb over her weight at the time of last hospitalization.  Reports compliance with torsemide 50 mg twice daily  Initiate Bumex infusion at 1 mg/h.  -1 L in the last 24 hours.  CHF pathway, intake output, daily weights, 2 g sodium diet with 1500 mL of fluid restriction  Previous echocardiogram with preserved ejection fraction but significant valvular heart disease, update echo  Continue metoprolol succinate 12.5 mg twice daily with close monitoring of blood pressure and hemodynamics  Monitor volume status    CKD (chronic kidney disease) stage 3, GFR 30-59 ml/min ([facility])  Assessment & Plan  Lab Results   Component Value Date    EGFR 48 12/25/2023    EGFR 47 12/24/2023    EGFR 49 12/23/2023    CREATININE 1.15 12/25/2023    CREATININE 1.16 12/24/2023    CREATININE 1.13 12/23/2023     Hx CKD 3.  Baseline creatinine between 1-1.3.  Creatinine baseline  Monitor closely with IV diuresis  Trend creatinine  Renally dose medications and avoid nephrotoxic medications    Abnormal CT scan, pelvis  Assessment & Plan  Abnormal endometrial thickening on CT pelvis  Discussed with patient that she will need GYN follow-up  Patient endorses intermittent vaginal bleeding    NISHI (obstructive sleep apnea)  Assessment & Plan  Continue CPAP    Hypothyroidism  Assessment & Plan  Continue PTA levothyroxine 188 mcg daily  Update TSH    Morbid obesity (HCC)  Assessment & Plan  BMI  70  Intensive lifestyle modification required  Outpatient referral to bariatric medicine    Chronic respiratory failure with hypoxia ([facility])  Assessment & Plan  Chronically on 4 L oxygen nasal cannula Home regimen  Stable at baseline    Pneumonia due to COVID-19 virus  Assessment & Plan  Background: Presented with dyspnea, fluid retention found to be COVID-positive  COVID19- PCR positive 12/23  Supplemental O2 with 4LNC saturating 95%; this is patient's baseline   imaging   CTA chest with GGO viral versus pulmonary edema  Started on mild COVID pathway for treatment  Not requiring increased oxygen needs, no indication for dexamethasone  AC warfarin-INR therapeutic  Daily CBC and CMP  Continue check inflammatory markers as indicated  Remdesivir 200 mg IV day 1 and 100 mg IV on day 2-5   Encourage ISP/flutter valve  Encourage proning and early mobilization  Blood cultures pending  Trend procalcitonin  Airborne/Droplet precautions ordered     Type 2 diabetes mellitus with hyperglycemia, with long-term current use of insulin ([facility])  Assessment & Plan  Lab Results   Component Value Date    HGBA1C 7.3 (H) 12/23/2023       Recent Labs     12/24/23  1127 12/24/23  1620 12/24/23  2054 12/25/23  0827   POCGLU 204* 140 144* 107         Blood Sugar Average: Last 72 hrs:  (P) [id number]    Continue home regimen Lantus 26 units at bedtime, lispro 5 units 3 times daily meals  Add SSI with Accu-Cheks  Hypoglycemia protocol  Received dexamethasone for COVID-monitor for steroid-induced hyperglycemia     COPD (chronic obstructive pulmonary disease) ([facility])  Assessment & Plan  Acute exacerbation secondary to COVID  As needed bronchodilators  Continue PTA Breo and Singulair  Received IV dexamethasone x 1 in ED, will hold further steroids  Monitor  Oxygen needs are baseline    Atrial fibrillation ([facility])  Assessment & Plan  Continue warfarin 7.5 mg daily  Goal INR 2-3  Metoprolol twice daily for rate control  INR 2.8 today.                VTE Pharmacologic Prophylaxis:   High Risk (Score >/= 5) - Pharmacological DVT Prophylaxis Ordered: warfarin (Coumadin). Sequential Compression Devices Ordered.    Mobility:   Basic Mobility Inpatient Raw Score: 6  JH-HLM Goal: 2: Bed activities/Dependent transfer  JH-HLM Achieved: 2: Bed activities/Dependent transfer  HLM Goal achieved. Continue to encourage appropriate mobility.    Patient Centered Rounds: I performed bedside rounds with nursing staff today.   Discussions with Specialists or Other Care Team Provider: Discussed with nursing    Education and Discussions with Family / Patient: Updated  () via phone.    Total Time Spent on Date of Encounter in care of patient: 20 mins. This time was spent on one or more of the following: performing physical exam; counseling and coordination of care; obtaining or reviewing history; documenting in the medical record; reviewing/ordering tests, medications or procedures; communicating with other healthcare professionals and discussing with patient's family/caregivers.    Current Length of Stay: 2 day(s)  Current Patient Status: Inpatient   Certification Statement: The patient will continue to require additional inpatient hospital stay due to ongoing IV diuresis  Discharge Plan: Will require ongoing stay in the hospital secondary to IV diuresis  Code Status: Level 3 - DNAR and DNI    Subjective:   Seen and examined at bedside.  Denies any worsening cough, shortness of breath.  No acute events overnight.  Currently on baseline home oxygen of 4 L/min.    Objective:     Vitals:   Temp (24hrs), Av.6 °F (36.4 °C), Min:97.5 °F (36.4 °C), Max:97.7 °F (36.5 °C)    Temp:  [97.5 °F (36.4 °C)-97.7 °F (36.5 °C)] 97.5 °F (36.4 °C)  HR:  [77-95] 83  Resp:  [18] 18  BP: (103-124)/(55-74) 124/74  SpO2:  [91 %-99 %] 99 %  Body mass index is 32.5 kg/m².     Input and Output Summary (last 24 hours):     Intake/Output Summary (Last 24 hours) at 2023  1039  Last data filed at 12/25/2023 0100  Gross per 24 hour   Intake 720 ml   Output 1900 ml   Net -1180 ml       Physical Exam:   Physical Exam  Constitutional:       General: She is not in acute distress.     Appearance: She is obese.   HENT:      Head: Normocephalic and atraumatic.      Mouth/Throat:      Mouth: Mucous membranes are moist.   Eyes:      Pupils: Pupils are equal, round, and reactive to light.   Cardiovascular:      Rate and Rhythm: Normal rate and regular rhythm.      Pulses: Normal pulses.   Pulmonary:      Breath sounds: No wheezing.      Comments: Diminished breath sounds bilaterally secondary to body habitus.  Abdominal:      General: Abdomen is flat. Bowel sounds are normal.      Palpations: Abdomen is soft.   Musculoskeletal:      Cervical back: Neck supple.      Comments: Chronic lymphedema with stasis dermatitis of bilateral lower extremity   Neurological:      General: No focal deficit present.      Mental Status: She is oriented to person, place, and time. Mental status is at baseline.   Psychiatric:         Mood and Affect: Mood normal.          Additional Data:     Labs:  Results from last 7 days   Lab Units 12/25/23  0510   WBC Thousand/uL 5.74   HEMOGLOBIN g/dL 12.0   HEMATOCRIT % 39.0   PLATELETS Thousands/uL 161   NEUTROS PCT % 61   LYMPHS PCT % 17   MONOS PCT % 19*   EOS PCT % 1     Results from last 7 days   Lab Units 12/25/23  0510   SODIUM mmol/L 144   POTASSIUM mmol/L 3.6   CHLORIDE mmol/L 104   CO2 mmol/L 33*   BUN mg/dL 41*   CREATININE mg/dL 1.15   ANION GAP mmol/L 7   CALCIUM mg/dL 9.1   ALBUMIN g/dL 3.4*   TOTAL BILIRUBIN mg/dL 0.82   ALK PHOS U/L 110*   ALT U/L 17   AST U/L 32   GLUCOSE RANDOM mg/dL 112     Results from last 7 days   Lab Units 12/25/23  0510   INR  2.30*     Results from last 7 days   Lab Units 12/25/23  0827 12/24/23  2054 12/24/23  1620 12/24/23  1127 12/24/23  0755 12/23/23  2240   POC GLUCOSE mg/dl 107 144* 140 204* 194* 158*     Results from last 7  days   Lab Units 12/23/23  1441   HEMOGLOBIN A1C % 7.3*     Results from last 7 days   Lab Units 12/24/23  0537 12/23/23  1441   LACTIC ACID mmol/L  --  1.3   PROCALCITONIN ng/ml 0.08 0.05       Lines/Drains:  Invasive Devices       Peripheral Intravenous Line  Duration             Peripheral IV 12/23/23 Left Antecubital 1 day    Peripheral IV 12/24/23 Right;Ventral (anterior) Forearm <1 day              Drain  Duration             Urethral Catheter Temperature probe 16 Fr. 1 day                  Urinary Catheter:  Goal for removal: Remove after 48 hrs of I/O monitoring           Telemetry:  Telemetry Orders (From admission, onward)               24 Hour Telemetry Monitoring  Continuous x 24 Hours ([place])        Question:  Reason for 24 Hour Telemetry  Answer:  Decompensated CHF- and any one of the following: continuous diuretic infusion or total diuretic dose >200 mg daily, associated electrolyte derangement (I.e. K < 3.0), ionotropic drip (continuous infusion), hx of ventricular arrhythmia, or new EF < 35%                     Telemetry Reviewed: Normal Sinus Rhythm  Indication for Continued Telemetry Use: Acute CHF on >200 mg lasix/day or equivalent dose or with new reduced EF.              Imaging: No pertinent imaging reviewed.    Recent Cultures (last 7 days):   Results from last 7 days   Lab Units 12/23/23  1444 12/23/23  1441   BLOOD CULTURE  No Growth at 24 hrs. No Growth at 24 hrs.       Last 24 Hours Medication List:   Current Facility-Administered Medications   Medication Dose Route Frequency Provider Last Rate    acetaminophen  650 mg Oral Q6H PRN Kaitlin S Bar, CRNP      albuterol  2.5 mg Nebulization Q4H PRN Kaitlin S Bar, CRNP      allopurinol  100 mg Oral Daily Kaitlin S Bar, CRNP      atorvastatin  10 mg Oral Daily Kaitlin S Bar, CRNP      bumetanide (BUMEX) 12.5 mg infusion 50 mL  1 mg/hr Intravenous Continuous Kaitlin S Bar, CRNP 1 mg/hr (12/25/23 0158)    cholecalciferol  1,000 Units Oral Daily  Kaitlin S Bar, CRNP      docusate sodium  100 mg Oral BID Kaitlin S Bar, CRNP      ferrous sulfate  325 mg Oral Daily With Breakfast Kaitlin S Bar, CRNP      Fluticasone Furoate-Vilanterol  1 puff Inhalation Daily Kaitlin S Bar, CRNP      insulin glargine  26 Units Subcutaneous HS Kaitlin S Bar, CRNP      insulin lispro  2-12 Units Subcutaneous TID AC Kaitlin S Bar, CRNP      insulin lispro  2-12 Units Subcutaneous HS Kaitlin S Bar, CRNP      insulin lispro  5 Units Subcutaneous TID With Meals Kaitlin S Bar, CRNP      levothyroxine  288 mcg Oral Early Morning Kaitlin S Bar, CRNP      loratadine  10 mg Oral Daily Kaitlin S Bar, CRNP      metoprolol succinate  12.5 mg Oral BID Kaitlin S Bar, CRNP      montelukast  10 mg Oral HS Kaitlin S Bar, CRNP      multivitamin-minerals  1 tablet Oral Daily Kaitlin S Bar, CRNP      pantoprazole  40 mg Oral Early Morning Kaitlin S Bar, CRNP      polyethylene glycol  17 g Oral Daily PRN Kaitlin S Bar, CRNP      potassium chloride  20 mEq Oral TID Kaitlin S Bar, CRNP      remdesivir  100 mg Intravenous Q24H Kaitlin S Bar, CRNP      warfarin  7.5 mg Oral Daily (warfarin) Kaitlin S Bar, CRNP          Today, Patient Was Seen By: Janice Porter MD    **Please Note: This note may have been constructed using a voice recognition system.**

## 2023-12-25 NOTE — ASSESSMENT & PLAN NOTE
Lab Results   Component Value Date    EGFR 48 12/25/2023    EGFR 47 12/24/2023    EGFR 49 12/23/2023    CREATININE 1.15 12/25/2023    CREATININE 1.16 12/24/2023    CREATININE 1.13 12/23/2023     Hx CKD 3.  Baseline creatinine between 1-1.3.  Creatinine baseline  Monitor closely with IV diuresis  Trend creatinine  Renally dose medications and avoid nephrotoxic medications

## 2023-12-25 NOTE — ASSESSMENT & PLAN NOTE
Continue warfarin 7.5 mg daily  Goal INR 2-3  Metoprolol twice daily for rate control  INR 2.8 today.

## 2023-12-25 NOTE — PLAN OF CARE
Problem: Potential for Falls  Goal: Patient will remain free of falls  Description: INTERVENTIONS:  - Educate patient/family on patient safety including physical limitations  - Instruct patient to call for assistance with activity   - Consult OT/PT to assist with strengthening/mobility   - Keep Call bell within reach  - Keep bed low and locked with side rails adjusted as appropriate  - Keep care items and personal belongings within reach  - Initiate and maintain comfort rounds  - Make Fall Risk Sign visible to staff  - Offer Toileting every 2 Hours, in advance of need  - Initiate/Maintain bed/chair alarm  - Obtain necessary fall risk management equipment: alarm  - Apply yellow socks and bracelet for high fall risk patients  - Consider moving patient to room near nurses station  Outcome: Progressing     Problem: Prexisting or High Potential for Compromised Skin Integrity  Goal: Skin integrity is maintained or improved  Description: INTERVENTIONS:  - Identify patients at risk for skin breakdown  - Assess and monitor skin integrity  - Assess and monitor nutrition and hydration status  - Monitor labs   - Assess for incontinence   - Turn and reposition patient  - Assist with mobility/ambulation  - Relieve pressure over bony prominences  - Avoid friction and shearing  - Provide appropriate hygiene as needed including keeping skin clean and dry  - Evaluate need for skin moisturizer/barrier cream  - Collaborate with interdisciplinary team   - Patient/family teaching  - Consider wound care consult   Outcome: Progressing     Problem: PAIN - ADULT  Goal: Verbalizes/displays adequate comfort level or baseline comfort level  Description: Interventions:  - Encourage patient to monitor pain and request assistance  - Assess pain using appropriate pain scale  - Administer analgesics based on type and severity of pain and evaluate response  - Implement non-pharmacological measures as appropriate and evaluate response  - Consider  cultural and social influences on pain and pain management  - Notify physician/advanced practitioner if interventions unsuccessful or patient reports new pain  Outcome: Progressing     Problem: INFECTION - ADULT  Goal: Absence or prevention of progression during hospitalization  Description: INTERVENTIONS:  - Assess and monitor for signs and symptoms of infection  - Monitor lab/diagnostic results  - Monitor all insertion sites, i.e. indwelling lines, tubes, and drains  - Monitor endotracheal if appropriate and nasal secretions for changes in amount and color  - [place] appropriate cooling/warming therapies per order  - Administer medications as ordered  - Instruct and encourage patient and family to use good hand hygiene technique  - Identify and instruct in appropriate isolation precautions for identified infection/condition  Outcome: Progressing     Problem: SAFETY ADULT  Goal: Patient will remain free of falls  Description: INTERVENTIONS:  - Educate patient/family on patient safety including physical limitations  - Instruct patient to call for assistance with activity   - Consult OT/PT to assist with strengthening/mobility   - Keep Call bell within reach  - Keep bed low and locked with side rails adjusted as appropriate  - Keep care items and personal belongings within reach  - Initiate and maintain comfort rounds  - Make Fall Risk Sign visible to staff  - Offer Toileting every 2 Hours, in advance of need  - Initiate/Maintain bed/chair alarm  - Obtain necessary fall risk management equipment: alarms  - Apply yellow socks and bracelet for high fall risk patients  - Consider moving patient to room near nurses station  Outcome: Progressing  Goal: Maintain or return to baseline ADL function  Description: INTERVENTIONS:  -  Assess patient's ability to carry out ADLs; assess patient's baseline for ADL function and identify physical deficits which impact ability to perform ADLs (bathing, care of mouth/teeth,  toileting, grooming, dressing, etc.)  - Assess/evaluate cause of self-care deficits   - Assess range of motion  - Assess patient's mobility; develop plan if impaired  - Assess patient's need for assistive devices and provide as appropriate  - Encourage maximum independence but intervene and supervise when necessary  - Involve family in performance of ADLs  - Assess for home care needs following discharge   - Consider OT consult to assist with ADL evaluation and planning for discharge  - Provide patient education as appropriate  Outcome: Progressing  Goal: Maintains/Returns to pre admission functional level  Description: INTERVENTIONS:  - Perform AM-PAC 6 Click Basic Mobility/ Daily Activity assessment daily.  - Set and communicate daily mobility goal to care team and patient/family/caregiver.   - Collaborate with rehabilitation services on mobility goals if consulted  - Perform Range of Motion 3 times a day.  - Reposition patient every 2 hours.  - Dangle patient 3 times a day  - Stand patient 3 times a day  - Ambulate patient 3 times a day  - Out of bed to chair 3 times a day   - Out of bed for meals 3 times a day  - Out of bed for toileting  - Record patient progress and toleration of activity level   Outcome: Progressing     Problem: DISCHARGE PLANNING  Goal: Discharge to home or other facility with appropriate resources  Description: INTERVENTIONS:  - Identify barriers to discharge w/patient and caregiver  - Arrange for needed discharge resources and transportation as appropriate  - Identify discharge learning needs (meds, wound care, etc.)  - Arrange for interpretive services to assist at discharge as needed  - Refer to Case Management Department for coordinating discharge planning if the patient needs post-hospital services based on physician/advanced practitioner order or complex needs related to functional status, cognitive ability, or social support system  Outcome: Progressing     Problem: Knowledge  Deficit  Goal: Patient/family/caregiver demonstrates understanding of disease process, treatment plan, medications, and discharge instructions  Description: Complete learning assessment and assess knowledge base.  Interventions:  - Provide teaching at level of understanding  - Provide teaching via preferred learning methods  Outcome: Progressing     Problem: CARDIOVASCULAR - ADULT  Goal: Maintains optimal cardiac output and hemodynamic stability  Description: INTERVENTIONS:  - Monitor I/O, vital signs and rhythm  - Monitor for S/S and trends of decreased cardiac output  - Administer and titrate ordered vasoactive medications to optimize hemodynamic stability  - Assess quality of pulses, skin color and temperature  - Assess for signs of decreased coronary artery perfusion  - Instruct patient to report change in severity of symptoms  Outcome: Progressing  Goal: Absence of cardiac dysrhythmias or at baseline rhythm  Description: INTERVENTIONS:  - Continuous cardiac monitoring, vital signs, obtain 12 lead EKG if ordered  - Administer antiarrhythmic and heart rate control medications as ordered  - Monitor electrolytes and administer replacement therapy as ordered  Outcome: Progressing     Problem: RESPIRATORY - ADULT  Goal: Achieves optimal ventilation and oxygenation  Description: INTERVENTIONS:  - Assess for changes in respiratory status  - Assess for changes in mentation and behavior  - Position to facilitate oxygenation and minimize respiratory effort  - Oxygen administered by appropriate delivery if ordered  - Initiate smoking cessation education as indicated  - Encourage broncho-pulmonary hygiene including cough, deep breathe, Incentive Spirometry  - Assess the need for suctioning and aspirate as needed  - Assess and instruct to report SOB or any respiratory difficulty  - Respiratory Therapy support as indicated  Outcome: Progressing

## 2023-12-25 NOTE — PLAN OF CARE
Problem: Potential for Falls  Goal: Patient will remain free of falls  Description: INTERVENTIONS:  - Educate patient/family on patient safety including physical limitations  - Instruct patient to call for assistance with activity   - Consult OT/PT to assist with strengthening/mobility   - Keep Call bell within reach  - Keep bed low and locked with side rails adjusted as appropriate  - Keep care items and personal belongings within reach  - Initiate and maintain comfort rounds  - Make Fall Risk Sign visible to staff  - Offer Toileting every 2 Hours, in advance of need  - Initiate/Maintain BED/CHAIR alarm  - Obtain necessary fall risk management equipment:   - Apply yellow socks and bracelet for high fall risk patients  - Consider moving patient to room near nurses station  Outcome: Progressing     Problem: Prexisting or High Potential for Compromised Skin Integrity  Goal: Skin integrity is maintained or improved  Description: INTERVENTIONS:  - Identify patients at risk for skin breakdown  - Assess and monitor skin integrity  - Assess and monitor nutrition and hydration status  - Monitor labs   - Assess for incontinence   - Turn and reposition patient  - Assist with mobility/ambulation  - Relieve pressure over bony prominences  - Avoid friction and shearing  - Provide appropriate hygiene as needed including keeping skin clean and dry  - Evaluate need for skin moisturizer/barrier cream  - Collaborate with interdisciplinary team   - Patient/family teaching  - Consider wound care consult   Outcome: Progressing     Problem: PAIN - ADULT  Goal: Verbalizes/displays adequate comfort level or baseline comfort level  Description: Interventions:  - Encourage patient to monitor pain and request assistance  - Assess pain using appropriate pain scale  - Administer analgesics based on type and severity of pain and evaluate response  - Implement non-pharmacological measures as appropriate and evaluate response  - Consider  cultural and social influences on pain and pain management  - Notify physician/advanced practitioner if interventions unsuccessful or patient reports new pain  Outcome: Progressing     Problem: INFECTION - ADULT  Goal: Absence or prevention of progression during hospitalization  Description: INTERVENTIONS:  - Assess and monitor for signs and symptoms of infection  - Monitor lab/diagnostic results  - Monitor all insertion sites, i.e. indwelling lines, tubes, and drains  - Monitor endotracheal if appropriate and nasal secretions for changes in amount and color  - [place] appropriate cooling/warming therapies per order  - Administer medications as ordered  - Instruct and encourage patient and family to use good hand hygiene technique  - Identify and instruct in appropriate isolation precautions for identified infection/condition  Outcome: Progressing     Problem: SAFETY ADULT  Goal: Patient will remain free of falls  Description: INTERVENTIONS:  - Educate patient/family on patient safety including physical limitations  - Instruct patient to call for assistance with activity   - Consult OT/PT to assist with strengthening/mobility   - Keep Call bell within reach  - Keep bed low and locked with side rails adjusted as appropriate  - Keep care items and personal belongings within reach  - Initiate and maintain comfort rounds  - Make Fall Risk Sign visible to staff  - Offer Toileting every 2 Hours, in advance of need  - Initiate/Maintain bed/chair alarm  - Obtain necessary fall risk management equipment:   - Apply yellow socks and bracelet for high fall risk patients  - Consider moving patient to room near nurses station  Outcome: Progressing  Goal: Maintain or return to baseline ADL function  Description: INTERVENTIONS:  -  Assess patient's ability to carry out ADLs; assess patient's baseline for ADL function and identify physical deficits which impact ability to perform ADLs (bathing, care of mouth/teeth, toileting,  grooming, dressing, etc.)  - Assess/evaluate cause of self-care deficits   - Assess range of motion  - Assess patient's mobility; develop plan if impaired  - Assess patient's need for assistive devices and provide as appropriate  - Encourage maximum independence but intervene and supervise when necessary  - Involve family in performance of ADLs  - Assess for home care needs following discharge   - Consider OT consult to assist with ADL evaluation and planning for discharge  - Provide patient education as appropriate  Outcome: Progressing  Goal: Maintains/Returns to pre admission functional level  Description: INTERVENTIONS:  - Perform AM-PAC 6 Click Basic Mobility/ Daily Activity assessment daily.  - Set and communicate daily mobility goal to care team and patient/family/caregiver.   - Collaborate with rehabilitation services on mobility goals if consulted  - Perform Range of Motion 3 times a day.  - Reposition patient every 2 hours.  - Dangle patient 3 times a day  - Stand patient 3 times a day  - Ambulate patient 3 times a day  - Out of bed to chair 3 times a day   - Out of bed for meals 3 times a day  - Out of bed for toileting  - Record patient progress and toleration of activity level   Outcome: Progressing     Problem: DISCHARGE PLANNING  Goal: Discharge to home or other facility with appropriate resources  Description: INTERVENTIONS:  - Identify barriers to discharge w/patient and caregiver  - Arrange for needed discharge resources and transportation as appropriate  - Identify discharge learning needs (meds, wound care, etc.)  - Arrange for interpretive services to assist at discharge as needed  - Refer to Case Management Department for coordinating discharge planning if the patient needs post-hospital services based on physician/advanced practitioner order or complex needs related to functional status, cognitive ability, or social support system  Outcome: Progressing     Problem: Knowledge Deficit  Goal:  Patient/family/caregiver demonstrates understanding of disease process, treatment plan, medications, and discharge instructions  Description: Complete learning assessment and assess knowledge base.  Interventions:  - Provide teaching at level of understanding  - Provide teaching via preferred learning methods  Outcome: Progressing     Problem: CARDIOVASCULAR - ADULT  Goal: Maintains optimal cardiac output and hemodynamic stability  Description: INTERVENTIONS:  - Monitor I/O, vital signs and rhythm  - Monitor for S/S and trends of decreased cardiac output  - Administer and titrate ordered vasoactive medications to optimize hemodynamic stability  - Assess quality of pulses, skin color and temperature  - Assess for signs of decreased coronary artery perfusion  - Instruct patient to report change in severity of symptoms  Outcome: Progressing  Goal: Absence of cardiac dysrhythmias or at baseline rhythm  Description: INTERVENTIONS:  - Continuous cardiac monitoring, vital signs, obtain 12 lead EKG if ordered  - Administer antiarrhythmic and heart rate control medications as ordered  - Monitor electrolytes and administer replacement therapy as ordered  Outcome: Progressing     Problem: RESPIRATORY - ADULT  Goal: Achieves optimal ventilation and oxygenation  Description: INTERVENTIONS:  - Assess for changes in respiratory status  - Assess for changes in mentation and behavior  - Position to facilitate oxygenation and minimize respiratory effort  - Oxygen administered by appropriate delivery if ordered  - Initiate smoking cessation education as indicated  - Encourage broncho-pulmonary hygiene including cough, deep breathe, Incentive Spirometry  - Assess the need for suctioning and aspirate as needed  - Assess and instruct to report SOB or any respiratory difficulty  - Respiratory Therapy support as indicated  Outcome: Progressing     Problem: Nutrition/Hydration-ADULT  Goal: Nutrient/Hydration intake appropriate for  improving, restoring or maintaining nutritional needs  Description: Monitor and assess patient's nutrition/hydration status for malnutrition. Collaborate with interdisciplinary team and initiate plan and interventions as ordered.  Monitor patient's weight and dietary intake as ordered or per policy. Utilize nutrition screening tool and intervene as necessary. Determine patient's food preferences and provide high-protein, high-caloric foods as appropriate.     INTERVENTIONS:  - Monitor oral intake, urinary output, labs, and treatment plans  - Assess nutrition and hydration status and recommend course of action  - Evaluate amount of meals eaten  - Assist patient with eating if necessary   - Allow adequate time for meals  - Recommend/ encourage appropriate diets, oral nutritional supplements, and vitamin/mineral supplements  - Order, calculate, and assess calorie counts as needed  - Recommend, monitor, and adjust tube feedings and TPN/PPN based on assessed needs  - Assess need for intravenous fluids  - Provide specific nutrition/hydration education as appropriate  - Include patient/family/caregiver in decisions related to nutrition  Outcome: Progressing

## 2023-12-25 NOTE — ASSESSMENT & PLAN NOTE
Wt Readings from Last 3 Encounters:   12/25/23 80.6 kg (177 lb 11.2 oz)   10/29/22 (!) 151 kg (331 lb 12.7 oz)   10/03/22 (!) 171 kg (377 lb 6.8 oz)     Presents with dyspnea, orthopnea, anasarca and CTA chest with pulmonary edema, hypervolemic with CHF exacerbation.?>50lb over her weight at the time of last hospitalization.  Reports compliance with torsemide 50 mg twice daily  Initiate Bumex infusion at 1 mg/h.  -1 L in the last 24 hours.  CHF pathway, intake output, daily weights, 2 g sodium diet with 1500 mL of fluid restriction  Previous echocardiogram with preserved ejection fraction but significant valvular heart disease, update echo  Continue metoprolol succinate 12.5 mg twice daily with close monitoring of blood pressure and hemodynamics  Monitor volume status

## 2023-12-25 NOTE — UTILIZATION REVIEW
NOTIFICATION OF INPATIENT ADMISSION   AUTHORIZATION REQUEST   SERVICING FACILITY:   [place] [address]  Tax ID: [id number]  NPI: [id number] ATTENDING PROVIDER:  Attending Name and NPI#: Janice Porter Md [[id number]]  Address: [address] [place] [address]  Phone: [phone]   ADMISSION INFORMATION:  Place of Service: Inpatient [facility]  Place of Service Code: 21  Inpatient Admission Date/Time: 12/23/23  7:23 PM  Discharge Date/Time: No discharge date for patient encounter.  Admitting Diagnosis Code/Description:  Pain, generalized [R52]  SOB (shortness of breath) [R06.02]  CHF exacerbation (HCC) [I50.9]  COVID-19 virus infection [U07.1]     UTILIZATION REVIEW CONTACT:  Mirela Perkins, Utilization   Network Utilization Review Department  Phone: [phone]  Fax [phone]  Email: [email or web address]  Contact for approvals/pending authorizations, clinical reviews, and discharge.     PHYSICIAN ADVISORY SERVICES:  Medical Necessity Denial & Brou-qr-Ewqt Review  Phone: [phone]  Fax: [phone]  Email: [email or web address]     DISCHARGE SUPPORT TEAM:  For Patients Discharge Needs & Updates  Phone: [phone] opt. 2 Fax: [phone]  Email: [email or web address]

## 2023-12-25 NOTE — UTILIZATION REVIEW
12/25/23  Day 3: Has surpassed a 2nd midnight with active treatments and services, which include Bumex infusion, IV Remdesivir,  See initial review completed by Candace Broderick RN  on 12/24/23.

## 2023-12-25 NOTE — ASSESSMENT & PLAN NOTE
Lab Results   Component Value Date    HGBA1C 7.3 (H) 12/23/2023       Recent Labs     12/24/23  1127 12/24/23  1620 12/24/23 2054 12/25/23  0827   POCGLU 204* 140 144* 107         Blood Sugar Average: Last 72 hrs:  (P) [id number]    Continue home regimen Lantus 26 units at bedtime, lispro 5 units 3 times daily meals  Add SSI with Accu-Cheks  Hypoglycemia protocol  Received dexamethasone for COVID-monitor for steroid-induced hyperglycemia

## 2023-12-26 LAB
ANION GAP SERPL CALCULATED.3IONS-SCNC: 4 MMOL/L
ANION GAP SERPL CALCULATED.3IONS-SCNC: 4 MMOL/L
ANION GAP SERPL CALCULATED.3IONS-SCNC: 7 MMOL/L
ATRIAL RATE: 60 BPM
BUN SERPL-MCNC: 39 MG/DL (ref 5–25)
CALCIUM SERPL-MCNC: 9 MG/DL (ref 8.4–10.2)
CALCIUM SERPL-MCNC: 9.7 MG/DL (ref 8.4–10.2)
CALCIUM SERPL-MCNC: 9.7 MG/DL (ref 8.4–10.2)
CHLORIDE SERPL-SCNC: 100 MMOL/L (ref 96–108)
CHLORIDE SERPL-SCNC: 101 MMOL/L (ref 96–108)
CHLORIDE SERPL-SCNC: 105 MMOL/L (ref 96–108)
CO2 SERPL-SCNC: 32 MMOL/L (ref 21–32)
CO2 SERPL-SCNC: 39 MMOL/L (ref 21–32)
CO2 SERPL-SCNC: 40 MMOL/L (ref 21–32)
CREAT SERPL-MCNC: 1.04 MG/DL (ref 0.6–1.3)
CREAT SERPL-MCNC: 1.18 MG/DL (ref 0.6–1.3)
CREAT SERPL-MCNC: 1.24 MG/DL (ref 0.6–1.3)
GFR SERPL CREATININE-BSD FRML MDRD: 44 ML/MIN/1.73SQ M
GFR SERPL CREATININE-BSD FRML MDRD: 46 ML/MIN/1.73SQ M
GFR SERPL CREATININE-BSD FRML MDRD: 54 ML/MIN/1.73SQ M
GLUCOSE SERPL-MCNC: 109 MG/DL (ref 65–140)
GLUCOSE SERPL-MCNC: 109 MG/DL (ref 65–140)
GLUCOSE SERPL-MCNC: 134 MG/DL (ref 65–140)
GLUCOSE SERPL-MCNC: 138 MG/DL (ref 65–140)
GLUCOSE SERPL-MCNC: 158 MG/DL (ref 65–140)
GLUCOSE SERPL-MCNC: 168 MG/DL (ref 65–140)
GLUCOSE SERPL-MCNC: 180 MG/DL (ref 65–140)
INR PPP: 2.31 (ref 0.84–1.19)
POTASSIUM SERPL-SCNC: 3.7 MMOL/L (ref 3.5–5.3)
POTASSIUM SERPL-SCNC: 3.8 MMOL/L (ref 3.5–5.3)
POTASSIUM SERPL-SCNC: 4.3 MMOL/L (ref 3.5–5.3)
PROTHROMBIN TIME: 25.7 SECONDS (ref 11.6–14.5)
QRS AXIS: 14 DEGREES
QRSD INTERVAL: 118 MS
QT INTERVAL: 418 MS
QTC INTERVAL: 514 MS
SODIUM SERPL-SCNC: 144 MMOL/L (ref 135–147)
T WAVE AXIS: -45 DEGREES
VENTRICULAR RATE: 91 BPM

## 2023-12-26 PROCEDURE — 82948 REAGENT STRIP/BLOOD GLUCOSE: CPT

## 2023-12-26 PROCEDURE — 80048 BASIC METABOLIC PNL TOTAL CA: CPT | Performed by: INTERNAL MEDICINE

## 2023-12-26 PROCEDURE — 99232 SBSQ HOSP IP/OBS MODERATE 35: CPT | Performed by: FAMILY MEDICINE

## 2023-12-26 PROCEDURE — 80048 BASIC METABOLIC PNL TOTAL CA: CPT | Performed by: FAMILY MEDICINE

## 2023-12-26 PROCEDURE — 85610 PROTHROMBIN TIME: CPT | Performed by: INTERNAL MEDICINE

## 2023-12-26 RX ADMIN — Medication 1000 UNITS: at 09:01

## 2023-12-26 RX ADMIN — POTASSIUM CHLORIDE 20 MEQ: 1500 TABLET, EXTENDED RELEASE ORAL at 17:10

## 2023-12-26 RX ADMIN — Medication 1 MG/HR: at 01:38

## 2023-12-26 RX ADMIN — FLUTICASONE FUROATE AND VILANTEROL TRIFENATATE 1 PUFF: 100; 25 POWDER RESPIRATORY (INHALATION) at 09:01

## 2023-12-26 RX ADMIN — ALLOPURINOL 100 MG: 100 TABLET ORAL at 09:02

## 2023-12-26 RX ADMIN — CHLOROTHIAZIDE SODIUM 500 MG: 500 INJECTION, POWDER, LYOPHILIZED, FOR SOLUTION INTRAVENOUS at 11:33

## 2023-12-26 RX ADMIN — LEVOTHYROXINE SODIUM 288 MCG: 88 TABLET ORAL at 05:17

## 2023-12-26 RX ADMIN — INSULIN LISPRO 2 UNITS: 100 INJECTION, SOLUTION INTRAVENOUS; SUBCUTANEOUS at 21:40

## 2023-12-26 RX ADMIN — METOPROLOL SUCCINATE 12.5 MG: 25 TABLET, EXTENDED RELEASE ORAL at 21:37

## 2023-12-26 RX ADMIN — METOPROLOL SUCCINATE 12.5 MG: 25 TABLET, EXTENDED RELEASE ORAL at 09:01

## 2023-12-26 RX ADMIN — Medication 1 TABLET: at 09:02

## 2023-12-26 RX ADMIN — LORATADINE 10 MG: 10 TABLET ORAL at 09:01

## 2023-12-26 RX ADMIN — POTASSIUM CHLORIDE 20 MEQ: 1500 TABLET, EXTENDED RELEASE ORAL at 21:40

## 2023-12-26 RX ADMIN — ACETAMINOPHEN 650 MG: 325 TABLET, FILM COATED ORAL at 21:40

## 2023-12-26 RX ADMIN — DOCUSATE SODIUM 100 MG: 100 CAPSULE, LIQUID FILLED ORAL at 09:01

## 2023-12-26 RX ADMIN — MONTELUKAST 10 MG: 10 TABLET, FILM COATED ORAL at 21:40

## 2023-12-26 RX ADMIN — INSULIN LISPRO 5 UNITS: 100 INJECTION, SOLUTION INTRAVENOUS; SUBCUTANEOUS at 17:11

## 2023-12-26 RX ADMIN — FERROUS SULFATE TAB 325 MG (65 MG ELEMENTAL FE) 325 MG: 325 (65 FE) TAB at 09:01

## 2023-12-26 RX ADMIN — INSULIN LISPRO 5 UNITS: 100 INJECTION, SOLUTION INTRAVENOUS; SUBCUTANEOUS at 09:00

## 2023-12-26 RX ADMIN — PANTOPRAZOLE SODIUM 40 MG: 40 TABLET, DELAYED RELEASE ORAL at 05:17

## 2023-12-26 RX ADMIN — REMDESIVIR 100 MG: 100 INJECTION, POWDER, LYOPHILIZED, FOR SOLUTION INTRAVENOUS at 21:46

## 2023-12-26 RX ADMIN — ACETAMINOPHEN 650 MG: 325 TABLET, FILM COATED ORAL at 11:34

## 2023-12-26 RX ADMIN — POTASSIUM CHLORIDE 20 MEQ: 1500 TABLET, EXTENDED RELEASE ORAL at 09:01

## 2023-12-26 RX ADMIN — INSULIN GLARGINE 26 UNITS: 100 INJECTION, SOLUTION SUBCUTANEOUS at 21:41

## 2023-12-26 RX ADMIN — ATORVASTATIN CALCIUM 10 MG: 10 TABLET, FILM COATED ORAL at 09:01

## 2023-12-26 RX ADMIN — INSULIN LISPRO 5 UNITS: 100 INJECTION, SOLUTION INTRAVENOUS; SUBCUTANEOUS at 11:33

## 2023-12-26 RX ADMIN — Medication 1 MG/HR: at 13:56

## 2023-12-26 RX ADMIN — DOCUSATE SODIUM 100 MG: 100 CAPSULE, LIQUID FILLED ORAL at 17:10

## 2023-12-26 RX ADMIN — WARFARIN SODIUM 7.5 MG: 7.5 TABLET ORAL at 17:10

## 2023-12-26 NOTE — PLAN OF CARE
Problem: Potential for Falls  Goal: Patient will remain free of falls  Description: INTERVENTIONS:  - Educate patient/family on patient safety including physical limitations  - Instruct patient to call for assistance with activity   - Consult OT/PT to assist with strengthening/mobility   - Keep Call bell within reach  - Keep bed low and locked with side rails adjusted as appropriate  - Keep care items and personal belongings within reach  - Initiate and maintain comfort rounds  - Make Fall Risk Sign visible to staff  - Offer Toileting every 2 Hours, in advance of need  - Initiate/Maintain alarm  - Obtain necessary fall risk management equipment  - Apply yellow socks and bracelet for high fall risk patients  - Consider moving patient to room near nurses station  Outcome: Progressing     Problem: Prexisting or High Potential for Compromised Skin Integrity  Goal: Skin integrity is maintained or improved  Description: INTERVENTIONS:  - Identify patients at risk for skin breakdown  - Assess and monitor skin integrity  - Assess and monitor nutrition and hydration status  - Monitor labs   - Assess for incontinence   - Turn and reposition patient  - Assist with mobility/ambulation  - Relieve pressure over bony prominences  - Avoid friction and shearing  - Provide appropriate hygiene as needed including keeping skin clean and dry  - Evaluate need for skin moisturizer/barrier cream  - Collaborate with interdisciplinary team   - Patient/family teaching  - Consider wound care consult   Outcome: Progressing     Problem: PAIN - ADULT  Goal: Verbalizes/displays adequate comfort level or baseline comfort level  Description: Interventions:  - Encourage patient to monitor pain and request assistance  - Assess pain using appropriate pain scale  - Administer analgesics based on type and severity of pain and evaluate response  - Implement non-pharmacological measures as appropriate and evaluate response  - Consider cultural and social  influences on pain and pain management  - Notify physician/advanced practitioner if interventions unsuccessful or patient reports new pain  Outcome: Progressing     Problem: INFECTION - ADULT  Goal: Absence or prevention of progression during hospitalization  Description: INTERVENTIONS:  - Assess and monitor for signs and symptoms of infection  - Monitor lab/diagnostic results  - Monitor all insertion sites, i.e. indwelling lines, tubes, and drains  - Monitor endotracheal if appropriate and nasal secretions for changes in amount and color  - [place] appropriate cooling/warming therapies per order  - Administer medications as ordered  - Instruct and encourage patient and family to use good hand hygiene technique  - Identify and instruct in appropriate isolation precautions for identified infection/condition  Outcome: Progressing     Problem: SAFETY ADULT  Goal: Patient will remain free of falls  Description: INTERVENTIONS:  - Educate patient/family on patient safety including physical limitations  - Instruct patient to call for assistance with activity   - Consult OT/PT to assist with strengthening/mobility   - Keep Call bell within reach  - Keep bed low and locked with side rails adjusted as appropriate  - Keep care items and personal belongings within reach  - Initiate and maintain comfort rounds  - Make Fall Risk Sign visible to staff  - Offer Toileting every 2 Hours, in advance of need  - Initiate/Maintain alarm  - Obtain necessary fall risk management equipment  - Apply yellow socks and bracelet for high fall risk patients  - Consider moving patient to room near nurses station  Outcome: Progressing  Goal: Maintain or return to baseline ADL function  Description: INTERVENTIONS:  -  Assess patient's ability to carry out ADLs; assess patient's baseline for ADL function and identify physical deficits which impact ability to perform ADLs (bathing, care of mouth/teeth, toileting, grooming, dressing, etc.)  -  Assess/evaluate cause of self-care deficits   - Assess range of motion  - Assess patient's mobility; develop plan if impaired  - Assess patient's need for assistive devices and provide as appropriate  - Encourage maximum independence but intervene and supervise when necessary  - Involve family in performance of ADLs  - Assess for home care needs following discharge   - Consider OT consult to assist with ADL evaluation and planning for discharge  - Provide patient education as appropriate  Outcome: Progressing  Goal: Maintains/Returns to pre admission functional level  Description: INTERVENTIONS:  - Perform AM-PAC 6 Click Basic Mobility/ Daily Activity assessment daily.  - Set and communicate daily mobility goal to care team and patient/family/caregiver.   - Collaborate with rehabilitation services on mobility goals if consulted  - Perform Range of Motion - times a day.  - Reposition patient every - hours.  - Dangle patient - times a day  - Stand patient - times a day  - Ambulate patient - times a day  - Out of bed to chair - times a day   - Out of bed for meals - times a day  - Out of bed for toileting  - Record patient progress and toleration of activity level   Outcome: Progressing     Problem: DISCHARGE PLANNING  Goal: Discharge to home or other facility with appropriate resources  Description: INTERVENTIONS:  - Identify barriers to discharge w/patient and caregiver  - Arrange for needed discharge resources and transportation as appropriate  - Identify discharge learning needs (meds, wound care, etc.)  - Arrange for interpretive services to assist at discharge as needed  - Refer to Case Management Department for coordinating discharge planning if the patient needs post-hospital services based on physician/advanced practitioner order or complex needs related to functional status, cognitive ability, or social support system  Outcome: Progressing     Problem: Knowledge Deficit  Goal: Patient/family/caregiver  demonstrates understanding of disease process, treatment plan, medications, and discharge instructions  Description: Complete learning assessment and assess knowledge base.  Interventions:  - Provide teaching at level of understanding  - Provide teaching via preferred learning methods  Outcome: Progressing     Problem: CARDIOVASCULAR - ADULT  Goal: Maintains optimal cardiac output and hemodynamic stability  Description: INTERVENTIONS:  - Monitor I/O, vital signs and rhythm  - Monitor for S/S and trends of decreased cardiac output  - Administer and titrate ordered vasoactive medications to optimize hemodynamic stability  - Assess quality of pulses, skin color and temperature  - Assess for signs of decreased coronary artery perfusion  - Instruct patient to report change in severity of symptoms  Outcome: Progressing  Goal: Absence of cardiac dysrhythmias or at baseline rhythm  Description: INTERVENTIONS:  - Continuous cardiac monitoring, vital signs, obtain 12 lead EKG if ordered  - Administer antiarrhythmic and heart rate control medications as ordered  - Monitor electrolytes and administer replacement therapy as ordered  Outcome: Progressing     Problem: RESPIRATORY - ADULT  Goal: Achieves optimal ventilation and oxygenation  Description: INTERVENTIONS:  - Assess for changes in respiratory status  - Assess for changes in mentation and behavior  - Position to facilitate oxygenation and minimize respiratory effort  - Oxygen administered by appropriate delivery if ordered  - Initiate smoking cessation education as indicated  - Encourage broncho-pulmonary hygiene including cough, deep breathe, Incentive Spirometry  - Assess the need for suctioning and aspirate as needed  - Assess and instruct to report SOB or any respiratory difficulty  - Respiratory Therapy support as indicated  Outcome: Progressing     Problem: Nutrition/Hydration-ADULT  Goal: Nutrient/Hydration intake appropriate for improving, restoring or  maintaining nutritional needs  Description: Monitor and assess patient's nutrition/hydration status for malnutrition. Collaborate with interdisciplinary team and initiate plan and interventions as ordered.  Monitor patient's weight and dietary intake as ordered or per policy. Utilize nutrition screening tool and intervene as necessary. Determine patient's food preferences and provide high-protein, high-caloric foods as appropriate.     INTERVENTIONS:  - Monitor oral intake, urinary output, labs, and treatment plans  - Assess nutrition and hydration status and recommend course of action  - Evaluate amount of meals eaten  - Assist patient with eating if necessary   - Allow adequate time for meals  - Recommend/ encourage appropriate diets, oral nutritional supplements, and vitamin/mineral supplements  - Order, calculate, and assess calorie counts as needed  - Recommend, monitor, and adjust tube feedings and TPN/PPN based on assessed needs  - Assess need for intravenous fluids  - Provide specific nutrition/hydration education as appropriate  - Include patient/family/caregiver in decisions related to nutrition  Outcome: Progressing

## 2023-12-26 NOTE — ASSESSMENT & PLAN NOTE
Continue warfarin 7.5 mg daily  Goal INR 2-3  Metoprolol twice daily for rate control  INR therapetic

## 2023-12-26 NOTE — CONSULTS
Consultation - Cardiology   Tigist Hewitt 68 y.o. female MRN: [id number]  Unit/Bed#: -01 Encounter: [id number]    Assessment/Plan     Assessment:  COVID 19 Pneumonia  Acute on chronic HFpEF- on bumex infusion currently at 1 mg/hr, appearing grossly volume overloaded currently  COPD  Chronic afib- rate controlled   - on coumadin   Chronic resp failure- on 4 L at baseline   NISHI on CPAP   CKD3  Mild to mod MR  Mild AS  Severe TR  Non compliance with CHF diet     Plan:  Add Diuril 500 mg IV x 1 now  Monitor I and O  Daily weights, she states she cannot stand?  Monitor renal function and electrolytes  Continue bumex infusion currently  Treat underlying COVID  Will continue to follow     History of Present Illness   Physician Requesting Consult: Radha Wright MD  Reason for Consult / Principal Problem: CHFpEF exacerbation   HPI: Tigist Hewitt is a 68 y.o. year old female with history of morbid obesity, NISHI on CPAP, CKD3, chronic afib anticoagulated on coumadin, COPD, CHFpEF presented to the hospital for concerns with sob and weight gain. She was found to have COVID pneumonia and is grossly volume overloaded. She was placed on bumex infusion but remains hypervolemic.    She states at home she is only transferring to Commode. She cannot cook and mostly eats fast food. She reports drinking a significant amount of fluid at home and that causing her to go back into heart failure. She reports she likely gained another 50 lbs since the last time she was admitted. She follows with [facility].     Inpatient consult to Cardiology  Consult performed by: Debby Galindo PA-C  Consult ordered by: CAMPBELL Munoz          Review of Systems   Constitutional:  Positive for fatigue and unexpected weight change.   Respiratory:  Positive for shortness of breath. Negative for chest tightness.    Cardiovascular:  Positive for leg swelling. Negative for chest pain and palpitations.   Skin:  Negative for color change.  "  Neurological:  Positive for weakness. Negative for dizziness.   Psychiatric/Behavioral:  Negative for agitation.        Historical Information   Past Medical History:   Diagnosis Date    Asthma     Atrial fibrillation (HCC)     COPD (chronic obstructive pulmonary disease) (HCC)     Diabetes mellitus (HCC)     Disease of thyroid gland     Heart failure (HCC)     Kidney failure     Morbid obesity due to excess calories (HCC)     NISHI (obstructive sleep apnea)      Past Surgical History:   Procedure Laterality Date    CARDIAC ELECTROPHYSIOLOGY MAPPING AND ABLATION      by Dr. Ferraro at [facility]    CARDIOVERSION N/A     GALLBLADDER SURGERY      HERNIA REPAIR       Social History     Substance and Sexual Activity   Alcohol Use Never     Social History     Substance and Sexual Activity   Drug Use Never     E-Cigarette/Vaping    E-Cigarette Use Never User      E-Cigarette/Vaping Substances     Social History     Tobacco Use   Smoking Status Never   Smokeless Tobacco Never     Family History: non-contributory    Meds/Allergies   all current active meds have been reviewed  Allergies   Allergen Reactions    Acesulfame Potassium - Food Allergy Anaphylaxis    Aspartame - Food Allergy Anaphylaxis    Saccharin - Food Allergy Anaphylaxis    Sucralose - Food Allergy Anaphylaxis    Metformin GI Intolerance       Objective   Vitals: Blood pressure 119/72, pulse 97, temperature 98.1 °F (36.7 °C), resp. rate 18, height 5' 2\" (1.575 m), weight (!) 172 kg (380 lb 1.2 oz), SpO2 92%.  Orthostatic Blood Pressures      Flowsheet Row Most Recent Value   Blood Pressure 119/72 filed at 12/26/2023 0859   Patient Position - Orthostatic VS Lying filed at 12/24/2023 2123              Intake/Output Summary (Last 24 hours) at 12/26/2023 1105  Last data filed at 12/26/2023 0859  Gross per 24 hour   Intake 420 ml   Output 3025 ml   Net -2605 ml       Invasive Devices       Peripheral Intravenous Line  Duration             Peripheral IV 12/23/23 Left " Antecubital 2 days    Peripheral IV 12/24/23 Right;Ventral (anterior) Forearm 1 day              Drain  Duration             Urethral Catheter Temperature probe 16 Fr. 2 days                    Physical Exam  Constitutional:       Appearance: Normal appearance.   HENT:      Head: Normocephalic and atraumatic.   Cardiovascular:      Rate and Rhythm: Rhythm irregular.      Heart sounds: Murmur heard.   Pulmonary:      Effort: Pulmonary effort is normal.   Abdominal:      General: There is distension.   Musculoskeletal:         General: Swelling present.   Skin:     General: Skin is warm.   Neurological:      General: No focal deficit present.      Mental Status: She is alert.   Psychiatric:         Mood and Affect: Mood normal.         Lab Results: I have personally reviewed pertinent lab results.    CBC with diff:   Results from last 7 days   Lab Units 12/25/23  0510   WBC Thousand/uL 5.74   RBC Million/uL 4.01   HEMOGLOBIN g/dL 12.0   HEMATOCRIT % 39.0   MCV fL 97   MCH pg 29.9   MCHC g/dL 30.8*   RDW % 20.0*   MPV fL 10.8   PLATELETS Thousands/uL 161     CMP:   Results from last 7 days   Lab Units 12/26/23  0505 12/25/23  0510   SODIUM mmol/L 144 144   CHLORIDE mmol/L 105 104   CO2 mmol/L 32 33*   BUN mg/dL 39* 41*   CREATININE mg/dL 1.04 1.15   CALCIUM mg/dL 9.0 9.1   AST U/L  --  32   ALT U/L  --  17   ALK PHOS U/L  --  110*   EGFR ml/min/1.73sq m 54 48     HS Troponin:   0   Lab Value Date/Time    HSTNI0 36 12/23/2023 1441    HSTNI2 34 12/23/2023 1651    HSTNI4 58 (H) 10/22/2022 1736    HSTNI4 25 09/21/2022 1756     BNP:   Results from last 7 days   Lab Units 12/26/23  0505   POTASSIUM mmol/L 4.3   CHLORIDE mmol/L 105   CO2 mmol/L 32   BUN mg/dL 39*   CREATININE mg/dL 1.04   CALCIUM mg/dL 9.0   EGFR ml/min/1.73sq m 54     Coags:   Results from last 7 days   Lab Units 12/26/23  0505 12/24/23  0537 12/23/23  1441   PTT seconds  --   --  38*   INR  2.31*   < > 2.17*    < > = values in this interval not displayed.      TSH:   Results from last 7 days   Lab Units 12/24/23  0537   TSH 3RD GENERATON uIU/mL 1.154     Magnesium:   Results from last 7 days   Lab Units 12/23/23  1441   MAGNESIUM mg/dL 2.1     Lipid Profile:     Imaging: I have personally reviewed pertinent reports.    EKG:         Narrative & Impression    Atrial fibrillation  Right bundle branch block  Nonspecific ST and T wave abnormality  Prolonged QT  Abnormal ECG  When compared with ECG of 22-OCT-2022 11:51,  Nonspecific T wave abnormality now evident in Lateral leads  Confirmed by Aníbal Tse ([id number]) on 12/26/2023 10:47:06 AM               Echo 12/24/2023:    Left Ventricle: Left ventricular cavity size is normal. Wall thickness is normal. The left ventricular ejection fraction is 55% by visual estimation, although assessment is significantly limited due to poor endocardial border definition even with the use of contrast. Systolic function is probably normal. Although no diagnostic regional wall motion abnormality was identified, this possibility cannot be completely excluded on the basis of this study. Unable to assess diastolic function due to atrial fibrillation.    IVS: There is both systolic and diastolic flattening of the interventricular septum consistent with right ventricle pressure and volume overload.    Right Ventricle: Right ventricular cavity size is mildly dilated. Systolic function is moderately reduced.    Right Atrium: The atrium is dilated.    Atrial Septum: The septum bows into the left atrium, suggesting increased right atrial pressure.    Aortic Valve: There is mild stenosis. The aortic valve peak velocity is 1.25 m/s. The aortic valve mean gradient is 4 mmHg. The dimensionless velocity index is 0.53. The aortic valve area is 1.56 cm2. The stroke volume index is 15.00 ml/m2. The aortic valve velocity is increased due to stenosis but lower than expected due to the presence of decreased flow.    Mitral Valve: There is mild annular  calcification. There is mild to moderate regurgitation with a posteriorly directed jet. Severity is potentially underestimated due to eccentric jet.    Tricuspid Valve: There is severe regurgitation. The tricuspid valve regurgitation jet is directed toward septum. The right ventricular systolic pressure is moderately elevated. The estimated right ventricular systolic pressure is 53.00 mmHg.    Pulmonic Valve: There is mild regurgitation.    Prior TTE study available for comparison. Prior study date: 9/21/2022. No significant changes noted compared to the prior study.

## 2023-12-26 NOTE — ASSESSMENT & PLAN NOTE
Lab Results   Component Value Date    HGBA1C 7.3 (H) 12/23/2023       Recent Labs     12/25/23  1552 12/25/23  2103 12/26/23  0814 12/26/23  1131   POCGLU 191* 166* 109 134         Blood Sugar Average: Last 72 hrs:  (P) [id number]    Continue home regimen Lantus 26 units at bedtime, lispro 5 units 3 times daily meals  Add SSI with Accu-Cheks  Hypoglycemia protocol  Received dexamethasone for COVID-monitor for steroid-induced hyperglycemia

## 2023-12-26 NOTE — PLAN OF CARE
Problem: Potential for Falls  Goal: Patient will remain free of falls  Description: INTERVENTIONS:  - Educate patient/family on patient safety including physical limitations  - Instruct patient to call for assistance with activity   - Consult OT/PT to assist with strengthening/mobility   - Keep Call bell within reach  - Keep bed low and locked with side rails adjusted as appropriate  - Keep care items and personal belongings within reach  - Initiate and maintain comfort rounds  - Make Fall Risk Sign visible to staff  - Offer Toileting every 3 Hours, in advance of need  - Initiate/Maintain bedalarm  - Obtain necessary fall risk management equipment:   - Apply yellow socks and bracelet for high fall risk patients  - Consider moving patient to room near nurses station  12/26/2023 1100 by Gladis Greenwood RN  Outcome: Progressing  12/26/2023 1100 by Gladis Greenwood RN  Outcome: Not Progressing     Problem: Prexisting or High Potential for Compromised Skin Integrity  Goal: Skin integrity is maintained or improved  Description: INTERVENTIONS:  - Identify patients at risk for skin breakdown  - Assess and monitor skin integrity  - Assess and monitor nutrition and hydration status  - Monitor labs   - Assess for incontinence   - Turn and reposition patient  - Assist with mobility/ambulation  - Relieve pressure over bony prominences  - Avoid friction and shearing  - Provide appropriate hygiene as needed including keeping skin clean and dry  - Evaluate need for skin moisturizer/barrier cream  - Collaborate with interdisciplinary team   - Patient/family teaching  - Consider wound care consult   12/26/2023 1100 by Gladis Greenwood RN  Outcome: Progressing  12/26/2023 1100 by Gladis Greenwood RN  Outcome: Not Progressing     Problem: PAIN - ADULT  Goal: Verbalizes/displays adequate comfort level or baseline comfort level  Description: Interventions:  - Encourage patient to monitor pain and request assistance  - Assess pain using appropriate pain  scale  - Administer analgesics based on type and severity of pain and evaluate response  - Implement non-pharmacological measures as appropriate and evaluate response  - Consider cultural and social influences on pain and pain management  - Notify physician/advanced practitioner if interventions unsuccessful or patient reports new pain  12/26/2023 1100 by Gladis Greenwood RN  Outcome: Progressing  12/26/2023 1100 by Gladis Greenwood RN  Outcome: Not Progressing     Problem: INFECTION - ADULT  Goal: Absence or prevention of progression during hospitalization  Description: INTERVENTIONS:  - Assess and monitor for signs and symptoms of infection  - Monitor lab/diagnostic results  - Monitor all insertion sites, i.e. indwelling lines, tubes, and drains  - Monitor endotracheal if appropriate and nasal secretions for changes in amount and color  - [place] appropriate cooling/warming therapies per order  - Administer medications as ordered  - Instruct and encourage patient and family to use good hand hygiene technique  - Identify and instruct in appropriate isolation precautions for identified infection/condition  12/26/2023 1100 by Gladis Greenwood RN  Outcome: Progressing  12/26/2023 1100 by Gladis Greenwood RN  Outcome: Not Progressing     Problem: SAFETY ADULT  Goal: Patient will remain free of falls  Description: INTERVENTIONS:  - Educate patient/family on patient safety including physical limitations  - Instruct patient to call for assistance with activity   - Consult OT/PT to assist with strengthening/mobility   - Keep Call bell within reach  - Keep bed low and locked with side rails adjusted as appropriate  - Keep care items and personal belongings within reach  - Initiate and maintain comfort rounds  - Make Fall Risk Sign visible to staff  - Offer Toileting every 3 Hours, in advance of need  - Initiate/Maintain bedalarm  - Obtain necessary fall risk management equipment:   - Apply yellow socks and bracelet for high fall risk  patients  - Consider moving patient to room near nurses station  12/26/2023 1100 by Gladis Greenwood RN  Outcome: Progressing  12/26/2023 1100 by Gladis Greenwood RN  Outcome: Not Progressing  Goal: Maintain or return to baseline ADL function  Description: INTERVENTIONS:  -  Assess patient's ability to carry out ADLs; assess patient's baseline for ADL function and identify physical deficits which impact ability to perform ADLs (bathing, care of mouth/teeth, toileting, grooming, dressing, etc.)  - Assess/evaluate cause of self-care deficits   - Assess range of motion  - Assess patient's mobility; develop plan if impaired  - Assess patient's need for assistive devices and provide as appropriate  - Encourage maximum independence but intervene and supervise when necessary  - Involve family in performance of ADLs  - Assess for home care needs following discharge   - Consider OT consult to assist with ADL evaluation and planning for discharge  - Provide patient education as appropriate  12/26/2023 1100 by Gladis Greenwood RN  Outcome: Progressing  12/26/2023 1100 by Gladis Greenwood RN  Outcome: Not Progressing  Goal: Maintains/Returns to pre admission functional level  Description: INTERVENTIONS:  - Perform AM-PAC 6 Click Basic Mobility/ Daily Activity assessment daily.  - Set and communicate daily mobility goal to care team and patient/family/caregiver.   - Collaborate with rehabilitation services on mobility goals if consulted  - Perform Range of Motion 3 times a day.  - Reposition patient every 3 hours.  - Dangle patient 3 times a day  - Stand patient 3 times a day  - Ambulate patient 3 times a day  - Out of bed to chair 3 times a day   - Out of bed for meals 3 times a day  - Out of bed for toileting  - Record patient progress and toleration of activity level   12/26/2023 1100 by Gladis Greenwood RN  Outcome: Progressing  12/26/2023 1100 by Gladis Greenwood RN  Outcome: Not Progressing     Problem: DISCHARGE PLANNING  Goal: Discharge to home or  other facility with appropriate resources  Description: INTERVENTIONS:  - Identify barriers to discharge w/patient and caregiver  - Arrange for needed discharge resources and transportation as appropriate  - Identify discharge learning needs (meds, wound care, etc.)  - Arrange for interpretive services to assist at discharge as needed  - Refer to Case Management Department for coordinating discharge planning if the patient needs post-hospital services based on physician/advanced practitioner order or complex needs related to functional status, cognitive ability, or social support system  12/26/2023 1100 by Gladis Greenwood, RN  Outcome: Progressing  12/26/2023 1100 by Gladis Greenwood, RN  Outcome: Not Progressing

## 2023-12-26 NOTE — ASSESSMENT & PLAN NOTE
Lab Results   Component Value Date    EGFR 54 12/26/2023    EGFR 48 12/25/2023    EGFR 47 12/24/2023    CREATININE 1.04 12/26/2023    CREATININE 1.15 12/25/2023    CREATININE 1.16 12/24/2023     Hx CKD 3.  Baseline creatinine between 1-1.3.  Creatinine baseline  Monitor closely with IV diuresis  Trend creatinine  Renally dose medications and avoid nephrotoxic medications

## 2023-12-26 NOTE — CASE MANAGEMENT
Case Management Assessment & Discharge Planning Note    Patient name Tigist Hewitt  Location /-01 MRN [id number]  : 1955 Date 2023       Current Admission Date: 2023  Current Admission Diagnosis:Acute on chronic diastolic congestive heart failure (HCC)   Patient Active Problem List    Diagnosis Date Noted    Abnormal CT scan, pelvis 2023    CKD (chronic kidney disease) stage 3, GFR 30-59 ml/min ([facility]) 2023    NISHI (obstructive sleep apnea) 2023    Intertrigo 10/29/2022    Vaginal bleeding 10/23/2022    Acute kidney injury superimposed on chronic kidney disease  10/22/2022    Hypotension 10/22/2022    GERD (gastroesophageal reflux disease) 10/22/2022    Acute on chronic diastolic congestive heart failure ([facility]) 2022    IMTIAZ (acute kidney injury) ([facility]) 2022    Morbid obesity ([facility]) 2022    Hypothyroidism 2022    Supratherapeutic INR 04/15/2020    Pneumonia due to COVID-19 virus 2020    Chronic respiratory failure with hypoxia ([facility]) 2020    Asthma 2020    Atrial fibrillation ([facility]) 2020    COPD (chronic obstructive pulmonary disease) ([facility]) 2020    Type 2 diabetes mellitus with hyperglycemia, with long-term current use of insulin ([facility]) 2020    Heart failure ([facility]) 2020    Shortness of breath 2020    Anemia 2020      LOS (days): 3  Geometric Mean LOS (GMLOS) (days):   Days to GMLOS:     OBJECTIVE:    Risk of Unplanned Readmission Score: 18.53         Current admission status: Inpatient       Preferred Pharmacy:   [facility]/pharmacy #[id number] [place] [id number]  Phone: [phone] Fax: [phone]    Primary Care Provider: Marisa Haque MD    Primary Insurance: [facility] [employer]  Secondary Insurance:     ASSESSMENT:  Active Health Care Proxies    There are no active Health Care Proxies on file.       Advance Directives  Does patient  have a Health Care POA?: Yes  Does patient have Advance Directives?: Yes  Advance Directives: Living will, Power of  for health care  Primary Contact: Kenneth, spouse         Readmission Root Cause  30 Day Readmission: No    Patient Information  Admitted from:: Home  Mental Status: Other (Comment)  During Assessment patient was accompanied by: Spouse  Assessment information provided by:: Spouse  Primary Caregiver: Child  Caregiver's Name:: Kenneth, spouse  Caregiver's Relationship to Patient:: Family Member  Caregiver's Telephone Number:: [phone] (H)  Support Systems: Spouse/significant other, Son  [place] [facility] Residence[place] [facility]  What city do you live in?: [place]  Home entry access options. Select all that apply.: No steps to enter home  Type of Current Residence: Bi-level  Upon entering residence, is there a bedroom on the main floor (no further steps)?: Yes  Upon entering residence, is there a bathroom on the main floor (no further steps)?: Yes  Living Arrangements: Lives w/ Spouse/significant other  Is patient a ?: No    Activities of Daily Living Prior to Admission  Functional Status: Assistance  Completes ADLs independently?: No  Level of ADL dependence: Assistance  Ambulates independently?: No  Level of ambulatory dependence: Assistance  Does patient use assisted devices?: Yes  Assisted Devices (DME) used: Home Oxygen concentrator, Portable Oxygen tanks, CPAP, Bedside Commode, Wheelchair, Walker  DME Company Name (respiratory supplies): [employer] CPAP and O2  O2 Rate(s): 4L  Does patient currently own DME?: Yes  What DME does the patient currently own?: Bedside Commode, CPAP, Home Oxygen concentrator, Portable Oxygen tanks, Wheelchair, Walker  Does patient have a history of Outpatient Therapy (PT/OT)?: No  Does the patient have a history of Short-Term Rehab?: Yes ([place])  Does patient have a history of HHC?: Yes ([facility])  Does patient currently have HHC?: No    Current Home Health  Care  Type of Current Home Care Services: Nurse visit ([facility])    Patient Information Continued  Income Source: SSI/SSD  Does patient have prescription coverage?: Yes  Does patient receive dialysis treatments?: No  Does patient have a history of substance abuse?: No  Does patient have a history of Mental Health Diagnosis?: No         Means of Transportation  Means of Transport to Appts:: Other (Comment) (Medical Assistance Transportation)      Housing Stability: Low Risk  (12/26/2023)    Housing Stability Vital Sign     Unable to Pay for Housing in the Last Year: No     Number of Places Lived in the Last Year: 1     Unstable Housing in the Last Year: No   Food Insecurity: No Food Insecurity (12/26/2023)    Hunger Vital Sign     Worried About Running Out of Food in the Last Year: Never true     Ran Out of Food in the Last Year: Never true   Transportation Needs: No Transportation Needs (12/26/2023)    PRAPARE - Transportation     Lack of Transportation (Medical): No     Lack of Transportation (Non-Medical): No   Utilities: Not At Risk (12/26/2023)    [facility] Utilities     Threatened with loss of utilities: No       DISCHARGE DETAILS:    Discharge planning discussed with:: spouse, Kenneth  Freedom of Choice: Yes     CM contacted family/caregiver?: Yes             Contacts  Patient Contacts: Kenneth, spouse  Relationship to Patient:: Family  Contact Method: Phone  Phone Number: [phone] (P)  Reason/Outcome: Continuity of Care, Discharge Planning            CM contacted spouse, Kenneth, baseline information was obtained. CM discussed the role of CM in helping the patient develop a discharge plan and assist the patient in carry out their plan.     Patient lives in [facility] home. Patient sleeps in Lift Chair with BSC near by, patient stand pivots to WC, patient unable to ambulate, per spouse. Patient active with [facility]. Spouse reports patient did not have positive experience at [facility] and spouse  requests patient return home with [facility] when medically stable for discharge, if at all possible.    CM to follow for CM discharge referral neeeds.

## 2023-12-26 NOTE — PROGRESS NOTES
[facility]  Progress Note  Name: Tigist Hewitt I  MRN: [id number]  Unit/Bed#: [facility] I Date of Admission: 12/23/2023   Date of Service: 12/26/2023 I Hospital Day: 3    Assessment/Plan   * Acute on chronic diastolic congestive heart failure (HCC)  Assessment & Plan  Wt Readings from Last 3 Encounters:   12/26/23 (!) 172 kg (380 lb 1.2 oz)   10/29/22 (!) 151 kg (331 lb 12.7 oz)   10/03/22 (!) 171 kg (377 lb 6.8 oz)     Presents with dyspnea, orthopnea, anasarca and CTA chest with pulmonary edema, hypervolemic with CHF exacerbation.?>50lb over her weight at the time of last hospitalization.  Reports compliance with torsemide 50 mg twice daily  Seems since admission overall as a  everywhere but scale she lost 5 pounds she is -5 L  CHF pathway, intake output, daily weights, 2 g sodium diet with 1500 mL of fluid restriction  Previous echocardiogram with preserved ejection fraction but significant valvular heart disease, update echo  Continue metoprolol succinate 12.5 mg twice daily with close monitoring of blood pressure and hemodynamics  Monitor volume status  Overloaded reviewed cardiology input give a dose of Diuril will do BMP q. 12    CKD (chronic kidney disease) stage 3, GFR 30-59 ml/min ([facility])  Assessment & Plan  Lab Results   Component Value Date    EGFR 54 12/26/2023    EGFR 48 12/25/2023    EGFR 47 12/24/2023    CREATININE 1.04 12/26/2023    CREATININE 1.15 12/25/2023    CREATININE 1.16 12/24/2023     Hx CKD 3.  Baseline creatinine between 1-1.3.  Creatinine baseline  Monitor closely with IV diuresis  Trend creatinine  Renally dose medications and avoid nephrotoxic medications    Abnormal CT scan, pelvis  Assessment & Plan  Abnormal endometrial thickening on CT pelvis  Discussed with patient that she will need GYN follow-up  Patient endorses intermittent vaginal bleeding    NISHI (obstructive sleep apnea)  Assessment & Plan  Continue CPAP    Hypothyroidism  Assessment &  Plan  Continue PTA levothyroxine 188 mcg daily  Tsh nml    Morbid obesity (HCC)  Assessment & Plan  BMI 70  Intensive lifestyle modification required  Outpatient referral to bariatric medicine    Chronic respiratory failure with hypoxia ([facility])  Assessment & Plan  Chronically on 4 L oxygen nasal cannula Home regimen  Stable at baseline    Pneumonia due to COVID-19 virus  Assessment & Plan  Background: Presented with dyspnea, fluid retention found to be COVID-positive  COVID19- PCR positive 12/23  Supplemental O2 with 4LNC saturating 95%; this is patient's baseline   imaging   CTA chest with GGO viral versus pulmonary edema  Started on mild COVID pathway for treatment  Not requiring increased oxygen needs, no indication for dexamethasone  AC warfarin-INR therapeutic  Daily CBC and CMP  Continue check inflammatory markers as indicated  Remdesivir 200 mg IV day 1 and 100 mg IV on day 2-5   Encourage ISP/flutter valve  Encourage proning and early mobilization  Blood cultures pending  Trend procalcitonin  Airborne/Droplet precautions ordered     Type 2 diabetes mellitus with hyperglycemia, with long-term current use of insulin ([facility])  Assessment & Plan  Lab Results   Component Value Date    HGBA1C 7.3 (H) 12/23/2023       Recent Labs     12/25/23  1552 12/25/23  2103 12/26/23  0814 12/26/23  1131   POCGLU 191* 166* 109 134         Blood Sugar Average: Last 72 hrs:  (P) [id number]    Continue home regimen Lantus 26 units at bedtime, lispro 5 units 3 times daily meals  Add SSI with Accu-Cheks  Hypoglycemia protocol  Received dexamethasone for COVID-monitor for steroid-induced hyperglycemia     COPD (chronic obstructive pulmonary disease) ([facility])  Assessment & Plan  Acute exacerbation secondary to COVID  As needed bronchodilators  Continue PTA Breo and Singulair  Received IV dexamethasone x 1 in ED, will hold further steroids  Monitor  Oxygen needs are baseline    Atrial fibrillation ([facility])  Assessment & Plan  Continue  warfarin 7.5 mg daily  Goal INR 2-3  Metoprolol twice daily for rate control  INR therapetic                 VTE Pharmacologic Prophylaxis:   Moderate Risk (Score 3-4) - Pharmacological DVT Prophylaxis Ordered: warfarin (Coumadin).    Mobility:   Basic Mobility Inpatient Raw Score: 6  JH-HLM Goal: 2: Bed activities/Dependent transfer  JH-HLM Achieved: 2: Bed activities/Dependent transfer  HLM Goal NOT achieved. Continue with multidisciplinary rounding and encourage appropriate mobility to improve upon HLM goals.    Patient Centered Rounds: I performed bedside rounds with nursing staff today.   Discussions with Specialists or Other Care Team Provider: cards    Education and Discussions with Family / Patient: pt will update     Total Time Spent on Date of Encounter in care of patient: >35 mins. This time was spent on one or more of the following: performing physical exam; counseling and coordination of care; obtaining or reviewing history; documenting in the medical record; reviewing/ordering tests, medications or procedures; communicating with other healthcare professionals and discussing with patient's family/caregivers.    Current Length of Stay: 3 day(s)  Current Patient Status: Inpatient   Certification Statement: The patient will continue to require additional inpatient hospital stay due to chf exacerbation  Discharge Plan: Anticipate discharge in >72 hrs to discharge location to be determined pending rehab evaluations.    Code Status: Level 3 - DNAR and DNI    Subjective:   Seen and examined shortness of breath improved    Objective:     Vitals:   Temp (24hrs), Av.9 °F (36.6 °C), Min:97.7 °F (36.5 °C), Max:98.2 °F (36.8 °C)    Temp:  [97.7 °F (36.5 °C)-98.2 °F (36.8 °C)] 97.7 °F (36.5 °C)  HR:  [] 98  Resp:  [18-19] 18  BP: (115-134)/(71-77) 117/72  SpO2:  [90 %-98 %] 95 %  Body mass index is 69.52 kg/m².     Input and Output Summary (last 24 hours):     Intake/Output Summary (Last 24 hours) at  12/26/2023 1252  Last data filed at 12/26/2023 0859  Gross per 24 hour   Intake 420 ml   Output 3025 ml   Net -2605 ml       Physical Exam:   Physical Exam  Vitals and nursing note reviewed.   Constitutional:       General: She is not in acute distress.     Appearance: She is well-developed. She is obese.   HENT:      Head: Normocephalic and atraumatic.   Eyes:      Conjunctiva/sclera: Conjunctivae normal.   Cardiovascular:      Rate and Rhythm: Normal rate and regular rhythm.      Heart sounds: No murmur heard.  Pulmonary:      Effort: Pulmonary effort is normal. No respiratory distress.      Breath sounds: Rales present.   Abdominal:      Palpations: Abdomen is soft.      Tenderness: There is no abdominal tenderness.   Musculoskeletal:         General: Swelling present.      Cervical back: Neck supple.   Skin:     General: Skin is warm and dry.      Capillary Refill: Capillary refill takes less than 2 seconds.   Neurological:      Mental Status: She is alert and oriented to person, place, and time.   Psychiatric:         Mood and Affect: Mood normal.          Additional Data:     Labs:  Results from last 7 days   Lab Units 12/25/23  0510   WBC Thousand/uL 5.74   HEMOGLOBIN g/dL 12.0   HEMATOCRIT % 39.0   PLATELETS Thousands/uL 161   NEUTROS PCT % 61   LYMPHS PCT % 17   MONOS PCT % 19*   EOS PCT % 1     Results from last 7 days   Lab Units 12/26/23  0505 12/25/23  0510   SODIUM mmol/L 144 144   POTASSIUM mmol/L 4.3 3.6   CHLORIDE mmol/L 105 104   CO2 mmol/L 32 33*   BUN mg/dL 39* 41*   CREATININE mg/dL 1.04 1.15   ANION GAP mmol/L 7 7   CALCIUM mg/dL 9.0 9.1   ALBUMIN g/dL  --  3.4*   TOTAL BILIRUBIN mg/dL  --  0.82   ALK PHOS U/L  --  110*   ALT U/L  --  17   AST U/L  --  32   GLUCOSE RANDOM mg/dL 109 112     Results from last 7 days   Lab Units 12/26/23  0505   INR  2.31*     Results from last 7 days   Lab Units 12/26/23  1131 12/26/23  0814 12/25/23  2103 12/25/23  1552 12/25/23  1113 12/25/23  0827  12/24/23  2054 12/24/23  1620 12/24/23  1127 12/24/23  0755 12/23/23  2240   POC GLUCOSE mg/dl 134 109 166* 191* 104 107 144* 140 204* 194* 158*     Results from last 7 days   Lab Units 12/23/23  1441   HEMOGLOBIN A1C % 7.3*     Results from last 7 days   Lab Units 12/24/23  0537 12/23/23  1441   LACTIC ACID mmol/L  --  1.3   PROCALCITONIN ng/ml 0.08 0.05       Lines/Drains:  Invasive Devices       Peripheral Intravenous Line  Duration             Peripheral IV 12/23/23 Left Antecubital 2 days    Peripheral IV 12/24/23 Right;Ventral (anterior) Forearm 1 day              Drain  Duration             Urethral Catheter Temperature probe 16 Fr. 2 days                  Urinary Catheter:  Goal for removal: Remove after 48 hrs of I/O monitoring           Telemetry:  Telemetry Orders (From admission, onward)               24 Hour Telemetry Monitoring  Continuous x 24 Hours ([facility])        Question:  Reason for 24 Hour Telemetry  Answer:  Decompensated CHF- and any one of the following: continuous diuretic infusion or total diuretic dose >200 mg daily, associated electrolyte derangement (I.e. K < 3.0), ionotropic drip (continuous infusion), hx of ventricular arrhythmia, or new EF < 35%                     Telemetry Reviewed: Normal Sinus Rhythm  Indication for Continued Telemetry Use: Acute CHF on >200 mg lasix/day or equivalent dose or with new reduced EF.              Imaging: Reviewed radiology reports from this admission including: chest xray and chest CT scan    Recent Cultures (last 7 days):   Results from last 7 days   Lab Units 12/23/23  1444 12/23/23  1441   BLOOD CULTURE  No Growth at 48 hrs. No Growth at 48 hrs.       Last 24 Hours Medication List:   Current Facility-Administered Medications   Medication Dose Route Frequency Provider Last Rate    acetaminophen  650 mg Oral Q6H PRN Kaitlin S Bar, CRNP      albuterol  2.5 mg Nebulization Q4H PRN Kaitlin S Bar, CRNP      allopurinol  100 mg Oral Daily Kaitlin S Bar,  CRNP      atorvastatin  10 mg Oral Daily Kaitlin S Bar, CRNP      bumetanide (BUMEX) 12.5 mg infusion 50 mL  1 mg/hr Intravenous Continuous Kaitlin S Bar, CRNP 1 mg/hr (12/26/23 0138)    cholecalciferol  1,000 Units Oral Daily Kaitlin S Bar, CRNP      docusate sodium  100 mg Oral BID Kaitlin S Bar, CRNP      ferrous sulfate  325 mg Oral Daily With Breakfast Kaitlin S Bar, CRNP      Fluticasone Furoate-Vilanterol  1 puff Inhalation Daily Kaitlin S Bar, CRNP      insulin glargine  26 Units Subcutaneous HS Kaitlin S Bar, CRNP      insulin lispro  2-12 Units Subcutaneous TID AC Kaitlin S Bar, CRNP      insulin lispro  2-12 Units Subcutaneous HS Kaitlin S Bar, CRNP      insulin lispro  5 Units Subcutaneous TID With Meals Kaitlin S Bar, CRNP      levothyroxine  288 mcg Oral Early Morning Kaitlin S Bar, CRNP      loratadine  10 mg Oral Daily Kaitlin S Bar, CRNP      metoprolol succinate  12.5 mg Oral BID Kaitlin S Bar, CRNP      montelukast  10 mg Oral HS Kaitlin S Bar, CRNP      multivitamin-minerals  1 tablet Oral Daily Kaitlin S Bar, CRNP      pantoprazole  40 mg Oral Early Morning Kaitlin S Bar, CRNP      polyethylene glycol  17 g Oral Daily PRN Kaitlin S Bar, CRNP      potassium chloride  20 mEq Oral TID Kaitlin S Bar, CRNP      remdesivir  100 mg Intravenous Q24H Kaitlin S Bar, CRNP 100 mg (12/25/23 2223)    warfarin  7.5 mg Oral Daily (warfarin) Kaitlin S Bar, CRNP          Today, Patient Was Seen By: Radha Wright MD    **Please Note: This note may have been constructed using a voice recognition system.**

## 2023-12-26 NOTE — ASSESSMENT & PLAN NOTE
Wt Readings from Last 3 Encounters:   12/26/23 (!) 172 kg (380 lb 1.2 oz)   10/29/22 (!) 151 kg (331 lb 12.7 oz)   10/03/22 (!) 171 kg (377 lb 6.8 oz)     Presents with dyspnea, orthopnea, anasarca and CTA chest with pulmonary edema, hypervolemic with CHF exacerbation.?>50lb over her weight at the time of last hospitalization.  Reports compliance with torsemide 50 mg twice daily  Seems since admission overall as a  everywhere but scale she lost 5 pounds she is -5 L  CHF pathway, intake output, daily weights, 2 g sodium diet with 1500 mL of fluid restriction  Previous echocardiogram with preserved ejection fraction but significant valvular heart disease, update echo  Continue metoprolol succinate 12.5 mg twice daily with close monitoring of blood pressure and hemodynamics  Monitor volume status  Overloaded reviewed cardiology input give a dose of Diuril will do BMP q. 12

## 2023-12-27 LAB
ANION GAP SERPL CALCULATED.3IONS-SCNC: 5 MMOL/L
ANION GAP SERPL CALCULATED.3IONS-SCNC: 7 MMOL/L
BUN SERPL-MCNC: 36 MG/DL (ref 5–25)
BUN SERPL-MCNC: 38 MG/DL (ref 5–25)
CALCIUM SERPL-MCNC: 9.5 MG/DL (ref 8.4–10.2)
CALCIUM SERPL-MCNC: 9.7 MG/DL (ref 8.4–10.2)
CHLORIDE SERPL-SCNC: 100 MMOL/L (ref 96–108)
CHLORIDE SERPL-SCNC: 99 MMOL/L (ref 96–108)
CO2 SERPL-SCNC: 38 MMOL/L (ref 21–32)
CO2 SERPL-SCNC: 39 MMOL/L (ref 21–32)
CREAT SERPL-MCNC: 1.1 MG/DL (ref 0.6–1.3)
CREAT SERPL-MCNC: 1.17 MG/DL (ref 0.6–1.3)
GFR SERPL CREATININE-BSD FRML MDRD: 47 ML/MIN/1.73SQ M
GFR SERPL CREATININE-BSD FRML MDRD: 50 ML/MIN/1.73SQ M
GLUCOSE SERPL-MCNC: 136 MG/DL (ref 65–140)
GLUCOSE SERPL-MCNC: 138 MG/DL (ref 65–140)
GLUCOSE SERPL-MCNC: 176 MG/DL (ref 65–140)
GLUCOSE SERPL-MCNC: 183 MG/DL (ref 65–140)
GLUCOSE SERPL-MCNC: 184 MG/DL (ref 65–140)
GLUCOSE SERPL-MCNC: 192 MG/DL (ref 65–140)
INR PPP: 2.33 (ref 0.84–1.19)
POTASSIUM SERPL-SCNC: 3.7 MMOL/L (ref 3.5–5.3)
POTASSIUM SERPL-SCNC: 3.9 MMOL/L (ref 3.5–5.3)
PROTHROMBIN TIME: 25.9 SECONDS (ref 11.6–14.5)
SODIUM SERPL-SCNC: 143 MMOL/L (ref 135–147)
SODIUM SERPL-SCNC: 145 MMOL/L (ref 135–147)

## 2023-12-27 PROCEDURE — 99232 SBSQ HOSP IP/OBS MODERATE 35: CPT | Performed by: FAMILY MEDICINE

## 2023-12-27 PROCEDURE — 80048 BASIC METABOLIC PNL TOTAL CA: CPT | Performed by: FAMILY MEDICINE

## 2023-12-27 PROCEDURE — 85610 PROTHROMBIN TIME: CPT | Performed by: FAMILY MEDICINE

## 2023-12-27 PROCEDURE — 82948 REAGENT STRIP/BLOOD GLUCOSE: CPT

## 2023-12-27 PROCEDURE — 97163 PT EVAL HIGH COMPLEX 45 MIN: CPT

## 2023-12-27 PROCEDURE — 97167 OT EVAL HIGH COMPLEX 60 MIN: CPT

## 2023-12-27 RX ORDER — ACETAZOLAMIDE 500 MG/5ML
500 INJECTION, POWDER, LYOPHILIZED, FOR SOLUTION INTRAVENOUS ONCE
Status: COMPLETED | OUTPATIENT
Start: 2023-12-27 | End: 2023-12-27

## 2023-12-27 RX ORDER — METOPROLOL SUCCINATE 25 MG/1
25 TABLET, EXTENDED RELEASE ORAL 2 TIMES DAILY
Status: DISCONTINUED | OUTPATIENT
Start: 2023-12-27 | End: 2023-12-29

## 2023-12-27 RX ORDER — METOPROLOL TARTRATE 1 MG/ML
5 INJECTION, SOLUTION INTRAVENOUS ONCE
Status: COMPLETED | OUTPATIENT
Start: 2023-12-27 | End: 2023-12-27

## 2023-12-27 RX ADMIN — DOCUSATE SODIUM 100 MG: 100 CAPSULE, LIQUID FILLED ORAL at 09:03

## 2023-12-27 RX ADMIN — Medication 1 TABLET: at 09:02

## 2023-12-27 RX ADMIN — METOPROLOL SUCCINATE 12.5 MG: 25 TABLET, EXTENDED RELEASE ORAL at 09:02

## 2023-12-27 RX ADMIN — POTASSIUM CHLORIDE 20 MEQ: 1500 TABLET, EXTENDED RELEASE ORAL at 09:03

## 2023-12-27 RX ADMIN — INSULIN LISPRO 5 UNITS: 100 INJECTION, SOLUTION INTRAVENOUS; SUBCUTANEOUS at 11:36

## 2023-12-27 RX ADMIN — INSULIN LISPRO 5 UNITS: 100 INJECTION, SOLUTION INTRAVENOUS; SUBCUTANEOUS at 07:59

## 2023-12-27 RX ADMIN — ACETAZOLAMIDE 500 MG: 500 INJECTION, POWDER, LYOPHILIZED, FOR SOLUTION INTRAVENOUS at 11:24

## 2023-12-27 RX ADMIN — WARFARIN SODIUM 7.5 MG: 7.5 TABLET ORAL at 17:18

## 2023-12-27 RX ADMIN — ATORVASTATIN CALCIUM 10 MG: 10 TABLET, FILM COATED ORAL at 09:02

## 2023-12-27 RX ADMIN — POTASSIUM CHLORIDE 20 MEQ: 1500 TABLET, EXTENDED RELEASE ORAL at 20:40

## 2023-12-27 RX ADMIN — LEVOTHYROXINE SODIUM 288 MCG: 88 TABLET ORAL at 07:57

## 2023-12-27 RX ADMIN — FLUTICASONE FUROATE AND VILANTEROL TRIFENATATE 1 PUFF: 100; 25 POWDER RESPIRATORY (INHALATION) at 09:03

## 2023-12-27 RX ADMIN — ALLOPURINOL 100 MG: 100 TABLET ORAL at 09:02

## 2023-12-27 RX ADMIN — LORATADINE 10 MG: 10 TABLET ORAL at 09:02

## 2023-12-27 RX ADMIN — DOCUSATE SODIUM 100 MG: 100 CAPSULE, LIQUID FILLED ORAL at 17:18

## 2023-12-27 RX ADMIN — METOROPROLOL TARTRATE 5 MG: 5 INJECTION, SOLUTION INTRAVENOUS at 14:15

## 2023-12-27 RX ADMIN — ACETAMINOPHEN 650 MG: 325 TABLET, FILM COATED ORAL at 11:23

## 2023-12-27 RX ADMIN — INSULIN GLARGINE 26 UNITS: 100 INJECTION, SOLUTION SUBCUTANEOUS at 21:20

## 2023-12-27 RX ADMIN — INSULIN LISPRO 2 UNITS: 100 INJECTION, SOLUTION INTRAVENOUS; SUBCUTANEOUS at 21:18

## 2023-12-27 RX ADMIN — Medication 1 MG/HR: at 14:11

## 2023-12-27 RX ADMIN — MONTELUKAST 10 MG: 10 TABLET, FILM COATED ORAL at 21:18

## 2023-12-27 RX ADMIN — FERROUS SULFATE TAB 325 MG (65 MG ELEMENTAL FE) 325 MG: 325 (65 FE) TAB at 07:56

## 2023-12-27 RX ADMIN — METOPROLOL SUCCINATE 25 MG: 25 TABLET, EXTENDED RELEASE ORAL at 20:40

## 2023-12-27 RX ADMIN — Medication 1000 UNITS: at 09:03

## 2023-12-27 RX ADMIN — PANTOPRAZOLE SODIUM 40 MG: 40 TABLET, DELAYED RELEASE ORAL at 07:56

## 2023-12-27 RX ADMIN — INSULIN LISPRO 2 UNITS: 100 INJECTION, SOLUTION INTRAVENOUS; SUBCUTANEOUS at 11:35

## 2023-12-27 RX ADMIN — POTASSIUM CHLORIDE 20 MEQ: 1500 TABLET, EXTENDED RELEASE ORAL at 17:18

## 2023-12-27 RX ADMIN — REMDESIVIR 100 MG: 100 INJECTION, POWDER, LYOPHILIZED, FOR SOLUTION INTRAVENOUS at 21:16

## 2023-12-27 NOTE — ASSESSMENT & PLAN NOTE
Lab Results   Component Value Date    EGFR 50 12/27/2023    EGFR 46 12/26/2023    EGFR 44 12/26/2023    CREATININE 1.10 12/27/2023    CREATININE 1.18 12/26/2023    CREATININE 1.24 12/26/2023     Hx CKD 3.  Baseline creatinine between 1-1.3.  Creatinine baseline  Monitor closely with IV diuresis  Trend creatinine  Renally dose medications and avoid nephrotoxic medications

## 2023-12-27 NOTE — OCCUPATIONAL THERAPY NOTE
Occupational Therapy Evaluation     Patient Name: Tigist Hewitt  Today's Date: 12/27/2023  Problem List  Principal Problem:    Acute on chronic diastolic congestive heart failure (HCC)  Active Problems:    Atrial fibrillation (HCC)    COPD (chronic obstructive pulmonary disease) (HCC)    Type 2 diabetes mellitus with hyperglycemia, with long-term current use of insulin (HCC)    Pneumonia due to COVID-19 virus    Chronic respiratory failure with hypoxia (HCC)    Morbid obesity (HCC)    Hypothyroidism    NISHI (obstructive sleep apnea)    Abnormal CT scan, pelvis    CKD (chronic kidney disease) stage 3, GFR 30-59 ml/min (HCC)    Past Medical History  Past Medical History:   Diagnosis Date    Asthma     Atrial fibrillation (HCC)     COPD (chronic obstructive pulmonary disease) (HCC)     Diabetes mellitus (HCC)     Disease of thyroid gland     Heart failure (HCC)     Kidney failure     Morbid obesity due to excess calories (HCC)     NISHI (obstructive sleep apnea)      Past Surgical History  Past Surgical History:   Procedure Laterality Date    CARDIAC ELECTROPHYSIOLOGY MAPPING AND ABLATION      by Dr. Ferraro at [facility]    CARDIOVERSION N/A     GALLBLADDER SURGERY      HERNIA REPAIR          12/27/23 1325   Note Type   Note type Evaluation   Pain Assessment   Pain Assessment Tool FLACC   Pain Location/Orientation Orientation: Right;Location: Knee   Pain Rating: FLACC (Rest) - Face 0   Pain Rating: FLACC (Rest) - Legs 0   Pain Rating: FLACC (Rest) - Activity 0   Pain Rating: FLACC (Rest) - Cry 0   Pain Rating: FLACC (Rest) - Consolability 0   Score: FLACC (Rest) 0   Pain Rating: FLACC (Activity) - Face 1   Pain Rating: FLACC (Activity) - Legs 1   Pain Rating: FLACC (Activity) - Activity 0   Pain Rating: FLACC (Activity) - Cry 1   Pain Rating: FLACC (Activity) - Consolability 0   Score: FLACC (Activity) 3   Restrictions/Precautions   Other Precautions Contact/isolation;Airborne/isolation;Chair Alarm;Bed Alarm;Fall  "Risk;Pain;O2;Multiple lines  (4 L O2 NC)   Home Living   Type of Home House  (Split level home. 0 IZAIAH.)   Home Layout Two level;Performs ADLs on one level;Able to live on main level with bedroom/bathroom   Bathroom Shower/Tub   (Pt sponge bathes)   Bathroom Toilet   (Pt uses BSC)   Bathroom Equipment Commode   Home Equipment Wheelchair-manual;Walker;Other (Comment)  (Recliner lift chair)   Additional Comments Pt reports living in a split level home with 0 IZAIAH. Pt reports that she \"lives\" in her recliner lift chair and will typically spt lift chair <> BSC with use of RW and will occasionally take \"a couple\" steps. Pt is otherwise nonambulatory.   Prior Function   Level of [place] Independent with functional mobility;Needs assistance with ADLs;Needs assistance with IADLS   Lives With Spouse;Son   Receives Help From Family;Friend(s);Personal care attendant  (Caregiver 3x a week for 2hrs- Monday, Wednesday, Friday)   IADLs Independent with medication management;Family/Friend/Other provides transportation;Family/Friend/Other provides meals   Falls in the last 6 months 0   Comments PTA, pt reports independence with spt with use of RW, has assistance for ADLs and IADLs.   Subjective   Subjective \"I live in my recliner lift chair\"   ADL   UB Dressing Assistance 2  Maximal Assistance   UB Dressing Deficit Pull over head;Pull around back;Pull down in back   LB Dressing Assistance 1  Total Assistance   LB Dressing Deficit Don/doff R sock;Don/doff L sock   Additional Comments Pt requiring total assist to don B socks while seated EOB. Pt would require max/total assist for all LB ADLs. UB ADLs with max assist.   Bed Mobility   Supine to Sit 2  Maximal assistance   Additional items Assist x 2;Increased time required;Verbal cues;LE management;Other;Comment;HOB elevated  (Trunk management)   Sit to Supine 2  Maximal assistance   Additional items Assist x 2;Increased time required;Verbal cues;LE management;Other;Comment  (Trunk " management)   Additional Comments Pt received supine in bed upon start of session. Pt supine > sit EOB with max assist x 2 for LE management and trunk management. Pt able to tolerate sitting EOB ~10 minutes at SBA, intermittently requiring min assist for dynamic sitting balance.   Transfers   Sit to Stand Unable to assess   Additional Comments Unable to assess sit > stand transfer at this time as pt is reporting increased pain in the R knee and R foot. Pt's HR elevated to ~138 bpm and spO2 as low as 88% on 4 L O2 NC. Pt with reports of lightheadedness, BP taken and found to be 151/81. Further activity deferred due to safety concerns. RN aware of R knee pain.   Balance   Static Sitting   (Fair to Fair-)   Dynamic Sitting   (Fair- to Poor+)   Activity Tolerance   Activity Tolerance Patient limited by fatigue;Patient limited by pain   Medical Staff Made Aware PT, Abhishek   Nurse Made Aware Herve MIRELES Assessment   RUE Assessment X   RUE Overall AROM   R Shoulder Flexion ~90*   RUE Strength   RUE Overall Strength   (3-/5)   LUE Assessment   LUE Assessment X   LUE Overall AROM   L Shoulder Flexion ~90*   LUE Strength   LUE Overall Strength   (3-/5)   Hand Function   Gross Motor Coordination Functional   Fine Motor Coordination Functional   Hand Function Comments Pt is R hand dominant.   Cognition   Overall Cognitive Status WFL   Arousal/Participation Alert;Cooperative   Attention Within functional limits   Orientation Level Oriented X4   Memory Within functional limits   Following Commands Follows one step commands without difficulty   Assessment   Limitation Decreased ADL status;Decreased UE ROM;Decreased Safe judgement during ADL;Decreased UE strength;Decreased endurance;Decreased cognition;Decreased self-care trans;Decreased high-level ADLs   Assessment Pt is a 68 y.o. female, admitted to [facility] 12/23/2023 d/t experiencing SOB. Dx: Acute on chronic diastolic CHF. Pt with PMHx impacting their performance during ADL  tasks, including: pneumonia due to COVID-19, COPD, chronic respiratory failure with hypoxia chronically on 4 L O2 NC, Afib, DM II, CKD 3, abnormal CT scan pelvis, NISHI, hypothyroidism, morbid obesity. Prior to admission to the hospital Pt was performing ADLs with physical assistance. IADLs with physical assistance. Functional transfers/ambulation without physical assistance. Cognitive status PTA was WFL. OT order placed to assess Pt's ADLs, cognitive status, and performance during functional tasks in order to maximize safety and independence while making most appropriate d/c recommendations. PT/OT co-evaluation completed at this time d/t significant mobility deficits and safety concerns. Pt's clinical presentation is currently unstable/unpredictable given new onset deficits that affect Pt's occupational performance and ability to safely return to OF including decrease activity tolerance, decrease standing balance, decrease performance during ADL tasks, decrease safety awareness , decrease BUE ROM, decrease UB MS, increased pain, decrease generalized strength, decrease activity engagement, decrease performance during functional transfers, limited family support, high fall risk, and limited insight to deficits combined with medical complications of pain impacting overall mobility status, abnormal renal lab values, A-fib, abnormal H&H, abnormal CBC, abnormal blood sugars, low SpO2 values, abnormal CO2 values, edema/swelling, elevated BNP, decreased skin integrity, fear/retreat, and need for input for mobility technique/safety. Personal factors affecting Pt at time of initial evaluation include: limited home support, inability to perform IADLs, inability to perform ADLs, inability to ambulate household distances, inability to navigate community distances, limited insight into impairments, decreased initiation and engagement, and questionable non-compliance. Pt will benefit from continued skilled OT services to address  deficits as defined above and to maximize level independence/participation during ADLs and functional tasks to facilitate return toward PLOF and improved quality of life. From an occupational therapy standpoint, Level I (Maximum Resource Intensity) is recommended upon d/c.   Plan   Treatment Interventions ADL retraining;Functional transfer training;UE strengthening/ROM;Endurance training;Patient/family training;Equipment evaluation/education;Compensatory technique education;Continued evaluation;Energy conservation;Activityengagement   Goal Expiration Date 01/10/24   OT Frequency 2-3x/wk   Discharge Recommendation   Rehab Resource Intensity Level, OT I (Maximum Resource Intensity)   AM-PAC Daily Activity Inpatient   Lower Body Dressing 1   Bathing 2   Toileting 1   Upper Body Dressing 2   Grooming 2   Eating 4   Daily Activity Raw Score 12   Daily Activity Standardized Score (Calc for Raw Score >=11) 30.6   -[facility] Applied Cognition Inpatient   Following a Speech/Presentation 4   Understanding Ordinary Conversation 4   Taking Medications 4   Remembering Where Things Are Placed or Put Away 4   Remembering List of 4-5 Errands 4   Taking Care of Complicated Tasks 3   Applied Cognition Raw Score 23   Applied Cognition Standardized Score 53.08     The patient's raw score on the AM-PAC Daily Activity Inpatient Short Form is 12. A raw score of less than 19 suggests the patient may benefit from discharge to post-acute rehabilitation services. Please refer to the recommendation of the Occupational Therapist for safe discharge planning.    Pt goals to be met by 1/10/2024:    Pt will demonstrate ability to complete grooming/hygiene tasks @ mod I after set-up.  Pt will demonstrate ability to complete supine<>sit @ min assist in order to increase safety and independence during ADL tasks.  Pt will demonstrate ability to complete UB ADLs including washing/dressing @ min assist in order to increase performance and participation  during meaningful tasks  Pt will demonstrate ability to complete LB dressing @ min assist in order to increase safety and independence during meaningful tasks.   Pt will demonstrate ability to jeff/doff socks/shoes while sitting EOB/chair @ min assist in order to increase safety and independence during meaningful tasks.   Pt will demonstrate ability to complete toileting tasks including CM and pericare @ min assist in order to increase safety and independence during meaningful tasks.  Pt will demonstrate ability to complete EOB, chair, toilet/commode transfers @ mod I in order to increase performance and participation during functional tasks.  Pt will demonstrate ability to stand for 1 minute while maintaining F balance with use of RW for UB support PRN.  Pt will demonstrate ability to tolerate 5 minute OT session with no vc'ing for deep breathing or use of energy conservation techniques in order to increase activity tolerance during functional tasks.   Pt will demonstrate Good carryover of use of energy conservation/compensatory strategies during ADLs and functional tasks in order to increase safety and reduce risk for falls.   Pt will demonstrate Good attention and participation in continued evaluation of functional ambulation house hold distances in order to assist with safe d/c planning.  Pt will attend to continued cognitive assessments 100% of the time in order to provide most appropriate d/c recommendations.   Pt will follow 100% simple 2-step commands and be A&O x4 consistently with environmental cues to increase participation in functional activities.   Pt will identify 3 areas of interest/hobbies and 1 intervention on how to incorporate into daily life in order to increase interaction with environment and peers as well as increase participation in meaningful tasks.   Pt will demonstrate 100% carryover of BUE HEP in order to increase BUE MS and increase performance during functional tasks upon d/c  home.    Chadd Linares MS, OTR/L

## 2023-12-27 NOTE — PROGRESS NOTES
"Progress Note - Cardiology   Tigist Hewitt 68 y.o. female MRN: [id number]  Unit/Bed#: -01 Encounter: [id number]    Assessment:  COVID 19 Pneumonia  Acute on chronic HFpEF- on bumex infusion currently at 1 mg/hr   - improved from yesterday after diuril but now with elevated BIcarb  COPD  Chronic afib- rate controlled               - on coumadin   Chronic resp failure- on 4 L at baseline   NISHI on CPAP   CKD3  Mild to mod MR  Mild AS  Severe TR  Non compliance with CHF diet     Plan:  Continue bum ex drip  Add diamox 500 mg IV x 1  Consider diuril tomorrow   Monitor I and o  Daily standing weight if possible  Monitor renal function and electrolytes   Will continue to follow     Subjective/Objective     Subjective: pt reports significant urinary output. Improved breathing.     Objective: bicarb is 39. Cr stable and electrolytes normal. Lost 16 lbs (bedscale) and lost 7 L yesterday.    Vitals: /82   Pulse 95   Temp 98.1 °F (36.7 °C)   Resp 18   Ht 5' 2\" (1.575 m)   Wt (!) 165 kg (364 lb 13.8 oz)   SpO2 91%   BMI 66.73 kg/m²   Vitals:    12/26/23 0538 12/27/23 0500   Weight: (!) 172 kg (380 lb 1.2 oz) (!) 165 kg (364 lb 13.8 oz)     Orthostatic Blood Pressures      Flowsheet Row Most Recent Value   Blood Pressure 132/82 filed at 12/27/2023 0654   Patient Position - Orthostatic VS Lying filed at 12/24/2023 2123              Intake/Output Summary (Last 24 hours) at 12/27/2023 1016  Last data filed at 12/27/2023 1002  Gross per 24 hour   Intake 840 ml   Output 23867 ml   Net -9510 ml       Invasive Devices       Peripheral Intravenous Line  Duration             Peripheral IV 12/23/23 Left Antecubital 3 days    Peripheral IV 12/24/23 Right;Ventral (anterior) Forearm 2 days              Drain  Duration             Urethral Catheter Temperature probe 16 Fr. 3 days                    Physical Exam  Constitutional:       Appearance: Normal appearance.   HENT:      Head: Normocephalic and atraumatic. "   Cardiovascular:      Rate and Rhythm: Rhythm irregular.      Heart sounds: Murmur heard.   Pulmonary:      Effort: Pulmonary effort is normal.   Abdominal:      General: There is distension.   Musculoskeletal:         General: Swelling present.   Skin:     General: Skin is warm.   Neurological:      General: No focal deficit present.      Mental Status: She is alert.   Psychiatric:         Mood and Affect: Mood normal.        Lab Results: I have personally reviewed pertinent lab results.    CBC with diff:   Results from last 7 days   Lab Units 12/25/23  0510   WBC Thousand/uL 5.74   RBC Million/uL 4.01   HEMOGLOBIN g/dL 12.0   HEMATOCRIT % 39.0   MCV fL 97   MCH pg 29.9   MCHC g/dL 30.8*   RDW % 20.0*   MPV fL 10.8   PLATELETS Thousands/uL 161     CMP:   Results from last 7 days   Lab Units 12/27/23  0829 12/26/23  0505 12/25/23  0510   SODIUM mmol/L 145   < > 144   CHLORIDE mmol/L 99   < > 104   CO2 mmol/L 39*   < > 33*   BUN mg/dL 36*   < > 41*   CREATININE mg/dL 1.10   < > 1.15   CALCIUM mg/dL 9.7   < > 9.1   AST U/L  --   --  32   ALT U/L  --   --  17   ALK PHOS U/L  --   --  110*   EGFR ml/min/1.73sq m 50   < > 48    < > = values in this interval not displayed.     HS Troponin:   0   Lab Value Date/Time    HSTNI0 36 12/23/2023 1441    HSTNI2 34 12/23/2023 1651    HSTNI4 58 (H) 10/22/2022 1736    HSTNI4 25 09/21/2022 1756     BNP:   Results from last 7 days   Lab Units 12/27/23  0829   POTASSIUM mmol/L 3.7   CHLORIDE mmol/L 99   CO2 mmol/L 39*   BUN mg/dL 36*   CREATININE mg/dL 1.10   CALCIUM mg/dL 9.7   EGFR ml/min/1.73sq m 50     Coags:   Results from last 7 days   Lab Units 12/27/23  0645 12/24/23  0537 12/23/23  1441   PTT seconds  --   --  38*   INR  2.33*   < > 2.17*    < > = values in this interval not displayed.     TSH:   Results from last 7 days   Lab Units 12/24/23  0537   TSH 3RD GENERATON uIU/mL 1.154     Magnesium:   Results from last 7 days   Lab Units 12/23/23  1441   MAGNESIUM mg/dL 2.1      Lipid Profile:     Imaging: I have personally reviewed pertinent reports.        EKG:          Narrative & Impression     Atrial fibrillation  Right bundle branch block  Nonspecific ST and T wave abnormality  Prolonged QT  Abnormal ECG  When compared with ECG of 22-OCT-2022 11:51,  Nonspecific T wave abnormality now evident in Lateral leads  Confirmed by Aníbal Tse ([id number]) on 12/26/2023 10:47:06 AM                  Echo 12/24/2023:    Left Ventricle: Left ventricular cavity size is normal. Wall thickness is normal. The left ventricular ejection fraction is 55% by visual estimation, although assessment is significantly limited due to poor endocardial border definition even with the use of contrast. Systolic function is probably normal. Although no diagnostic regional wall motion abnormality was identified, this possibility cannot be completely excluded on the basis of this study. Unable to assess diastolic function due to atrial fibrillation.    IVS: There is both systolic and diastolic flattening of the interventricular septum consistent with right ventricle pressure and volume overload.    Right Ventricle: Right ventricular cavity size is mildly dilated. Systolic function is moderately reduced.    Right Atrium: The atrium is dilated.    Atrial Septum: The septum bows into the left atrium, suggesting increased right atrial pressure.    Aortic Valve: There is mild stenosis. The aortic valve peak velocity is 1.25 m/s. The aortic valve mean gradient is 4 mmHg. The dimensionless velocity index is 0.53. The aortic valve area is 1.56 cm2. The stroke volume index is 15.00 ml/m2. The aortic valve velocity is increased due to stenosis but lower than expected due to the presence of decreased flow.    Mitral Valve: There is mild annular calcification. There is mild to moderate regurgitation with a posteriorly directed jet. Severity is potentially underestimated due to eccentric jet.    Tricuspid Valve: There is severe  regurgitation. The tricuspid valve regurgitation jet is directed toward septum. The right ventricular systolic pressure is moderately elevated. The estimated right ventricular systolic pressure is 53.00 mmHg.    Pulmonic Valve: There is mild regurgitation.    Prior TTE study available for comparison. Prior study date: 9/21/2022. No significant changes noted compared to the prior study.

## 2023-12-27 NOTE — ASSESSMENT & PLAN NOTE
Lab Results   Component Value Date    HGBA1C 7.3 (H) 12/23/2023       Recent Labs     12/26/23  1600 12/26/23  2040 12/27/23  0750 12/27/23  1131   POCGLU 138 168* 138 184*         Blood Sugar Average: Last 72 hrs:  (P) 151.5    Continue home regimen Lantus 26 units at bedtime, lispro 5 units 3 times daily meals  Add SSI with Accu-Cheks  Hypoglycemia protocol  Received dexamethasone for COVID-monitor for steroid-induced hyperglycemia

## 2023-12-27 NOTE — PLAN OF CARE
Problem: PHYSICAL THERAPY ADULT  Goal: Performs mobility at highest level of function for planned discharge setting.  See evaluation for individualized goals.  Description: Treatment/Interventions: ADL retraining, Functional transfer training, LE strengthening/ROM, Therapeutic exercise, Endurance training, Patient/family training, Equipment eval/education, Bed mobility, Gait training, Compensatory technique education, Spoke to nursing, OT          See flowsheet documentation for full assessment, interventions and recommendations.  Note: Prognosis: Fair  Problem List: Decreased strength, Decreased range of motion, Decreased endurance, Impaired balance, Decreased mobility, Obesity, Decreased skin integrity, Pain  Assessment: Pt is a 68 y.o. female seen for PT evaluation s/p admission to [facility] on 12/23/2023 with Acute on chronic diastolic congestive heart failure (HCC).  Order placed for PT services.  Upon evaluation: Pt is presenting with impaired functional mobility due to pain, decreased strength, decreased ROM, decreased endurance, impaired balance, fall risk, LE edema, and impaired skin integrity requiring  max of 2 assistance for bed mobility. Pt's clinical presentation is currently unstable/unpredictable given the functional mobility deficits above, especially decreased functional mobility tolerance and SOB upon exertion, coupled with fall risks as indicated by AM-PAC 6-Clicks: 06/24 as well as obesity and combined with medical complications of tachycardia, pain impacting overall mobility status, abnormal renal lab values, abnormal blood sugars, abnormal CO2 values, and need for input for mobility technique/safety.  Pt's PMHx and comorbidities that may affect physical performance and progress include: A fib, CHF, COPD, CKD, and obesity. Personal factors affecting pt at time of IE include: questionable non-compliance and inability to perform ADLs. Pt will benefit from continued skilled  PT services to address deficits as defined above and to maximize level of functional mobility to facilitate return toward PLOF and improved QOL. From PT/mobility standpoint, recommendation at time of d/c would be Level I (Maximum Resource Intensity) pending progress in order to reduce fall risk and maximize pt's functional independence and consistency with mobility in order to facilitate return to PLOF.  Recommend trial with walker next 1-2 sessions and ther ex next 1-2 sessions.  Barriers to Discharge: Decreased caregiver support  Barriers to Discharge Comments: current assist level for mobility, R LE pain, inability to perform sit to stand transfer  Rehab Resource Intensity Level, PT: I (Maximum Resource Intensity)    See flowsheet documentation for full assessment.

## 2023-12-27 NOTE — PLAN OF CARE
Problem: Potential for Falls  Goal: Patient will remain free of falls  Description: INTERVENTIONS:  - Educate patient/family on patient safety including physical limitations  - Instruct patient to call for assistance with activity   - Consult OT/PT to assist with strengthening/mobility   - Keep Call bell within reach  - Keep bed low and locked with side rails adjusted as appropriate  - Keep care items and personal belongings within reach  - Initiate and maintain comfort rounds  - Make Fall Risk Sign visible to staff  - Apply yellow socks and bracelet for high fall risk patients  - Consider moving patient to room near nurses station  Outcome: Progressing     Problem: Prexisting or High Potential for Compromised Skin Integrity  Goal: Skin integrity is maintained or improved  Description: INTERVENTIONS:  - Identify patients at risk for skin breakdown  - Assess and monitor skin integrity  - Assess and monitor nutrition and hydration status  - Monitor labs   - Assess for incontinence   - Turn and reposition patient  - Assist with mobility/ambulation  - Relieve pressure over bony prominences  - Avoid friction and shearing  - Provide appropriate hygiene as needed including keeping skin clean and dry  - Evaluate need for skin moisturizer/barrier cream  - Collaborate with interdisciplinary team   - Patient/family teaching  - Consider wound care consult   Outcome: Progressing     Problem: PAIN - ADULT  Goal: Verbalizes/displays adequate comfort level or baseline comfort level  Description: Interventions:  - Encourage patient to monitor pain and request assistance  - Assess pain using appropriate pain scale  - Administer analgesics based on type and severity of pain and evaluate response  - Implement non-pharmacological measures as appropriate and evaluate response  - Consider cultural and social influences on pain and pain management  - Notify physician/advanced practitioner if interventions unsuccessful or patient reports  new pain  Outcome: Progressing     Problem: INFECTION - ADULT  Goal: Absence or prevention of progression during hospitalization  Description: INTERVENTIONS:  - Assess and monitor for signs and symptoms of infection  - Monitor lab/diagnostic results  - Monitor all insertion sites, i.e. indwelling lines, tubes, and drains  - Monitor endotracheal if appropriate and nasal secretions for changes in amount and color  - [place] appropriate cooling/warming therapies per order  - Administer medications as ordered  - Instruct and encourage patient and family to use good hand hygiene technique  - Identify and instruct in appropriate isolation precautions for identified infection/condition  Outcome: Progressing     Problem: SAFETY ADULT  Goal: Patient will remain free of falls  Description: INTERVENTIONS:  - Educate patient/family on patient safety including physical limitations  - Instruct patient to call for assistance with activity   - Consult OT/PT to assist with strengthening/mobility   - Keep Call bell within reach  - Keep bed low and locked with side rails adjusted as appropriate  - Keep care items and personal belongings within reach  - Initiate and maintain comfort rounds  - Make Fall Risk Sign visible to staff  - Apply yellow socks and bracelet for high fall risk patients  - Consider moving patient to room near nurses station  Outcome: Progressing  Goal: Maintain or return to baseline ADL function  Description: INTERVENTIONS:  -  Assess patient's ability to carry out ADLs; assess patient's baseline for ADL function and identify physical deficits which impact ability to perform ADLs (bathing, care of mouth/teeth, toileting, grooming, dressing, etc.)  - Assess/evaluate cause of self-care deficits   - Assess range of motion  - Assess patient's mobility; develop plan if impaired  - Assess patient's need for assistive devices and provide as appropriate  - Encourage maximum independence but intervene and supervise when  necessary  - Involve family in performance of ADLs  - Assess for home care needs following discharge   - Consider OT consult to assist with ADL evaluation and planning for discharge  - Provide patient education as appropriate  Outcome: Progressing  Goal: Maintains/Returns to pre admission functional level  Description: INTERVENTIONS:  - Perform AM-PAC 6 Click Basic Mobility/ Daily Activity assessment daily.  - Set and communicate daily mobility goal to care team and patient/family/caregiver.   - Collaborate with rehabilitation services on mobility goals if consulted  - Perform Range of Motion 2 times a day.  - Reposition patient every 2 hours.  - Dangle patient 2 times a day  - Stand patient 2 times a day  - Ambulate patient 2 times a day  - Out of bed to chair 2 times a day   - Out of bed for meals 2 times a day  - Out of bed for toileting  - Record patient progress and toleration of activity level   Outcome: Progressing     Problem: DISCHARGE PLANNING  Goal: Discharge to home or other facility with appropriate resources  Description: INTERVENTIONS:  - Identify barriers to discharge w/patient and caregiver  - Arrange for needed discharge resources and transportation as appropriate  - Identify discharge learning needs (meds, wound care, etc.)  - Arrange for interpretive services to assist at discharge as needed  - Refer to Case Management Department for coordinating discharge planning if the patient needs post-hospital services based on physician/advanced practitioner order or complex needs related to functional status, cognitive ability, or social support system  Outcome: Progressing     Problem: Knowledge Deficit  Goal: Patient/family/caregiver demonstrates understanding of disease process, treatment plan, medications, and discharge instructions  Description: Complete learning assessment and assess knowledge base.  Interventions:  - Provide teaching at level of understanding  - Provide teaching via preferred  learning methods  Outcome: Progressing     Problem: CARDIOVASCULAR - ADULT  Goal: Maintains optimal cardiac output and hemodynamic stability  Description: INTERVENTIONS:  - Monitor I/O, vital signs and rhythm  - Monitor for S/S and trends of decreased cardiac output  - Administer and titrate ordered vasoactive medications to optimize hemodynamic stability  - Assess quality of pulses, skin color and temperature  - Assess for signs of decreased coronary artery perfusion  - Instruct patient to report change in severity of symptoms  Outcome: Progressing  Goal: Absence of cardiac dysrhythmias or at baseline rhythm  Description: INTERVENTIONS:  - Continuous cardiac monitoring, vital signs, obtain 12 lead EKG if ordered  - Administer antiarrhythmic and heart rate control medications as ordered  - Monitor electrolytes and administer replacement therapy as ordered  Outcome: Progressing     Problem: RESPIRATORY - ADULT  Goal: Achieves optimal ventilation and oxygenation  Description: INTERVENTIONS:  - Assess for changes in respiratory status  - Assess for changes in mentation and behavior  - Position to facilitate oxygenation and minimize respiratory effort  - Oxygen administered by appropriate delivery if ordered  - Initiate smoking cessation education as indicated  - Encourage broncho-pulmonary hygiene including cough, deep breathe, Incentive Spirometry  - Assess the need for suctioning and aspirate as needed  - Assess and instruct to report SOB or any respiratory difficulty  - Respiratory Therapy support as indicated  Outcome: Progressing     Problem: Nutrition/Hydration-ADULT  Goal: Nutrient/Hydration intake appropriate for improving, restoring or maintaining nutritional needs  Description: Monitor and assess patient's nutrition/hydration status for malnutrition. Collaborate with interdisciplinary team and initiate plan and interventions as ordered.  Monitor patient's weight and dietary intake as ordered or per policy.  Utilize nutrition screening tool and intervene as necessary. Determine patient's food preferences and provide high-protein, high-caloric foods as appropriate.     INTERVENTIONS:  - Monitor oral intake, urinary output, labs, and treatment plans  - Assess nutrition and hydration status and recommend course of action  - Evaluate amount of meals eaten  - Assist patient with eating if necessary   - Allow adequate time for meals  - Recommend/ encourage appropriate diets, oral nutritional supplements, and vitamin/mineral supplements  - Order, calculate, and assess calorie counts as needed  - Recommend, monitor, and adjust tube feedings and TPN/PPN based on assessed needs  - Assess need for intravenous fluids  - Provide specific nutrition/hydration education as appropriate  - Include patient/family/caregiver in decisions related to nutrition  Outcome: Progressing

## 2023-12-27 NOTE — PROGRESS NOTES
[facility]  Progress Note  Name: Tigist Hewitt I  MRN: [id number]  Unit/Bed#: [facility] I Date of Admission: 12/23/2023   Date of Service: 12/27/2023 I Hospital Day: 4    Assessment/Plan   * Acute on chronic diastolic congestive heart failure (HCC)  Assessment & Plan  Wt Readings from Last 3 Encounters:   12/27/23 (!) 165 kg (364 lb 13.8 oz)   10/29/22 (!) 151 kg (331 lb 12.7 oz)   10/03/22 (!) 171 kg (377 lb 6.8 oz)     Presents with dyspnea, orthopnea, anasarca and CTA chest with pulmonary edema, hypervolemic with CHF exacerbation.?>50lb over her weight at the time of last hospitalization.  Reports compliance with torsemide 50 mg twice daily  Seems since admission overall as a  everywhere but scale she lost 21 pounds she is -15 L  CHF pathway, intake output, daily weights, 2 g sodium diet with 1500 mL of fluid restriction  Previous echocardiogram with preserved ejection fraction but significant valvular heart disease, update echo  Continue metoprolol succinate 12.5 mg twice daily with close monitoring of blood pressure and hemodynamics  Monitor volume status  Continue bumex today will be given diamox and bmp q12     CKD (chronic kidney disease) stage 3, GFR 30-59 ml/min ([facility])  Assessment & Plan  Lab Results   Component Value Date    EGFR 50 12/27/2023    EGFR 46 12/26/2023    EGFR 44 12/26/2023    CREATININE 1.10 12/27/2023    CREATININE 1.18 12/26/2023    CREATININE 1.24 12/26/2023     Hx CKD 3.  Baseline creatinine between 1-1.3.  Creatinine baseline  Monitor closely with IV diuresis  Trend creatinine  Renally dose medications and avoid nephrotoxic medications    Abnormal CT scan, pelvis  Assessment & Plan  Abnormal endometrial thickening on CT pelvis  Discussed with patient that she will need GYN follow-up  Patient endorses intermittent vaginal bleeding    NISHI (obstructive sleep apnea)  Assessment & Plan  Continue CPAP    Hypothyroidism  Assessment & Plan  Continue PTA  levothyroxine 188 mcg daily  Tsh nml    Morbid obesity ([facility])  Assessment & Plan  BMI 70  Intensive lifestyle modification required  Outpatient referral to bariatric medicine    Chronic respiratory failure with hypoxia ([facility])  Assessment & Plan  Chronically on 4 L oxygen nasal cannula Home regimen  Stable at baseline    Pneumonia due to COVID-19 virus  Assessment & Plan  Background: Presented with dyspnea, fluid retention found to be COVID-positive  COVID19- PCR positive 12/23  Supplemental O2 with 4LNC saturating 95%; this is patient's baseline   imaging   CTA chest with GGO viral versus pulmonary edema  Started on mild COVID pathway for treatment  Not requiring increased oxygen needs, no indication for dexamethasone  AC warfarin-INR therapeutic  Daily CBC and CMP  Continue check inflammatory markers as indicated  Remdesivir 200 mg IV day 1 and 100 mg IV on day 2-5   Encourage ISP/flutter valve  Encourage proning and early mobilization  Blood cultures pending  Trend procalcitonin  Airborne/Droplet precautions ordered     Type 2 diabetes mellitus with hyperglycemia, with long-term current use of insulin ([facility])  Assessment & Plan  Lab Results   Component Value Date    HGBA1C 7.3 (H) 12/23/2023       Recent Labs     12/26/23  1600 12/26/23  2040 12/27/23  0750 12/27/23  1131   POCGLU 138 168* 138 184*         Blood Sugar Average: Last 72 hrs:  (P) 151.5    Continue home regimen Lantus 26 units at bedtime, lispro 5 units 3 times daily meals  Add SSI with Accu-Cheks  Hypoglycemia protocol  Received dexamethasone for COVID-monitor for steroid-induced hyperglycemia     COPD (chronic obstructive pulmonary disease) ([facility])  Assessment & Plan  Acute exacerbation secondary to COVID  As needed bronchodilators  Continue PTA Breo and Singulair  Received IV dexamethasone x 1 in ED, will hold further steroids  Monitor  Oxygen needs are baseline    Atrial fibrillation ([facility])  Assessment & Plan  Continue warfarin 7.5 mg daily  Goal INR  2-3  Metoprolol twice daily for rate control  INR therapetic                 VTE Pharmacologic Prophylaxis:   High Risk (Score >/= 5) - Pharmacological DVT Prophylaxis Ordered: warfarin (Coumadin). Sequential Compression Devices Ordered.    Mobility:   Basic Mobility Inpatient Raw Score: 6  JH-HLM Goal: 2: Bed activities/Dependent transfer  JH-HLM Achieved: 2: Bed activities/Dependent transfer  HLM Goal NOT achieved. Continue with multidisciplinary rounding and encourage appropriate mobility to improve upon HLM goals.    Patient Centered Rounds: I performed bedside rounds with nursing staff today.   Discussions with Specialists or Other Care Team Provider: cards    Education and Discussions with Family / Patient: pt    Total Time Spent on Date of Encounter in care of patient: >35 mins. This time was spent on one or more of the following: performing physical exam; counseling and coordination of care; obtaining or reviewing history; documenting in the medical record; reviewing/ordering tests, medications or procedures; communicating with other healthcare professionals and discussing with patient's family/caregivers.    Current Length of Stay: 4 day(s)  Current Patient Status: Inpatient   Certification Statement: The patient will continue to require additional inpatient hospital stay due to 2/2 chf  Discharge Plan: Anticipate discharge in >72 hrs to discharge location to be determined pending rehab evaluations.    Code Status: Level 3 - DNAR and DNI    Subjective:   Seen and examined sob improved    Objective:     Vitals:   Temp (24hrs), Av.9 °F (36.6 °C), Min:97.5 °F (36.4 °C), Max:98.1 °F (36.7 °C)    Temp:  [97.5 °F (36.4 °C)-98.1 °F (36.7 °C)] 98.1 °F (36.7 °C)  HR:  [] 127  Resp:  [18] 18  BP: (108-151)/(66-82) 151/81  SpO2:  [91 %-99 %] 91 %  Body mass index is 66.73 kg/m².     Input and Output Summary (last 24 hours):     Intake/Output Summary (Last 24 hours) at 2023 1346  Last data filed at  12/27/2023 1002  Gross per 24 hour   Intake 660 ml   Output 9150 ml   Net -8490 ml       Physical Exam:   Physical Exam  Vitals and nursing note reviewed.   Constitutional:       General: She is not in acute distress.     Appearance: She is well-developed.   HENT:      Head: Normocephalic and atraumatic.   Eyes:      Conjunctiva/sclera: Conjunctivae normal.   Cardiovascular:      Rate and Rhythm: Normal rate and regular rhythm.      Heart sounds: No murmur heard.  Pulmonary:      Effort: Pulmonary effort is normal. No respiratory distress.      Breath sounds: Rales present.   Abdominal:      Palpations: Abdomen is soft.      Tenderness: There is no abdominal tenderness.   Musculoskeletal:         General: Swelling present.      Cervical back: Neck supple.   Skin:     General: Skin is warm and dry.      Capillary Refill: Capillary refill takes less than 2 seconds.   Neurological:      Mental Status: She is alert and oriented to person, place, and time.   Psychiatric:         Mood and Affect: Mood normal.          Additional Data:     Labs:  Results from last 7 days   Lab Units 12/25/23  0510   WBC Thousand/uL 5.74   HEMOGLOBIN g/dL 12.0   HEMATOCRIT % 39.0   PLATELETS Thousands/uL 161   NEUTROS PCT % 61   LYMPHS PCT % 17   MONOS PCT % 19*   EOS PCT % 1     Results from last 7 days   Lab Units 12/27/23  0829 12/26/23  0505 12/25/23  0510   SODIUM mmol/L 145   < > 144   POTASSIUM mmol/L 3.7   < > 3.6   CHLORIDE mmol/L 99   < > 104   CO2 mmol/L 39*   < > 33*   BUN mg/dL 36*   < > 41*   CREATININE mg/dL 1.10   < > 1.15   ANION GAP mmol/L 7   < > 7   CALCIUM mg/dL 9.7   < > 9.1   ALBUMIN g/dL  --   --  3.4*   TOTAL BILIRUBIN mg/dL  --   --  0.82   ALK PHOS U/L  --   --  110*   ALT U/L  --   --  17   AST U/L  --   --  32   GLUCOSE RANDOM mg/dL 176*   < > 112    < > = values in this interval not displayed.     Results from last 7 days   Lab Units 12/27/23  0645   INR  2.33*     Results from last 7 days   Lab Units  12/27/23  1131 12/27/23  0750 12/26/23  2040 12/26/23  1600 12/26/23  1131 12/26/23  0814 12/25/23  2103 12/25/23  1552 12/25/23  1113 12/25/23  0827 12/24/23  2054 12/24/23  1620   POC GLUCOSE mg/dl 184* 138 168* 138 134 109 166* 191* 104 107 144* 140     Results from last 7 days   Lab Units 12/23/23  1441   HEMOGLOBIN A1C % 7.3*     Results from last 7 days   Lab Units 12/24/23  0537 12/23/23  1441   LACTIC ACID mmol/L  --  1.3   PROCALCITONIN ng/ml 0.08 0.05       Lines/Drains:  Invasive Devices       Peripheral Intravenous Line  Duration             Peripheral IV 12/23/23 Left Antecubital 3 days    Peripheral IV 12/24/23 Right;Ventral (anterior) Forearm 2 days              Drain  Duration             Urethral Catheter Temperature probe 16 Fr. 3 days                  Urinary Catheter:  Goal for removal: Remove after 48 hrs of I/O monitoring           Telemetry:  Telemetry Orders (From admission, onward)               24 Hour Telemetry Monitoring  Continuous x 24 Hours (Telem)        Question:  Reason for 24 Hour Telemetry  Answer:  Decompensated CHF- and any one of the following: continuous diuretic infusion or total diuretic dose >200 mg daily, associated electrolyte derangement (I.e. K < 3.0), ionotropic drip (continuous infusion), hx of ventricular arrhythmia, or new EF < 35%                     Telemetry Reviewed: Normal Sinus Rhythm  Indication for Continued Telemetry Use: Acute CHF on >200 mg lasix/day or equivalent dose or with new reduced EF.              Imaging: Reviewed radiology reports from this admission including: chest xray    Recent Cultures (last 7 days):   Results from last 7 days   Lab Units 12/23/23  1444 12/23/23  1441   BLOOD CULTURE  No Growth at 72 hrs. No Growth at 72 hrs.       Last 24 Hours Medication List:   Current Facility-Administered Medications   Medication Dose Route Frequency Provider Last Rate    acetaminophen  650 mg Oral Q6H PRN CAMPBELL Munoz      albuterol  2.5 mg  Nebulization Q4H PRN Kaitlin S Bar, CRNP      allopurinol  100 mg Oral Daily Kaitlin S Bar, CRNP      atorvastatin  10 mg Oral Daily Kaitlin S Bar, CRNP      bumetanide (BUMEX) 12.5 mg infusion 50 mL  1 mg/hr Intravenous Continuous Kaitlin S Bar, CRNP 1 mg/hr (12/26/23 1356)    cholecalciferol  1,000 Units Oral Daily Kaitlin S Bar, CRNP      docusate sodium  100 mg Oral BID Kaitlin S Bar, CRNP      ferrous sulfate  325 mg Oral Daily With Breakfast Kaitlin S Bar, CRNP      Fluticasone Furoate-Vilanterol  1 puff Inhalation Daily Kaitlin S Bar, CRNP      insulin glargine  26 Units Subcutaneous HS Kaitlin S Bar, CRNP      insulin lispro  2-12 Units Subcutaneous TID AC Kaitlin S Bar, CRNP      insulin lispro  2-12 Units Subcutaneous HS Kaitlin S Bar, CRNP      insulin lispro  5 Units Subcutaneous TID With Meals Kaitlin S Bar, CRNP      levothyroxine  288 mcg Oral Early Morning Kaitlin S Bar, CRNP      loratadine  10 mg Oral Daily Kaitlin S Bar, CRNP      metoprolol succinate  12.5 mg Oral BID Kaitlin S Bar, CRNP      montelukast  10 mg Oral HS Kaitlin S Bar, CRNP      multivitamin-minerals  1 tablet Oral Daily Kaitlin S Bar, CRNP      pantoprazole  40 mg Oral Early Morning Kaitlin S Bar, CRNP      polyethylene glycol  17 g Oral Daily PRN Kaitlin S Bar, CRNP      potassium chloride  20 mEq Oral TID Kaitlin S Bar, CRNP      remdesivir  100 mg Intravenous Q24H Kaitlin S Bar, CRNP 100 mg (12/25/23 2223)    warfarin  7.5 mg Oral Daily (warfarin) Kaitlin S Bar, CRNP          Today, Patient Was Seen By: Radha Wright MD    **Please Note: This note may have been constructed using a voice recognition system.**

## 2023-12-27 NOTE — PHYSICAL THERAPY NOTE
PHYSICAL THERAPY EVALUATION    NAME:  Tigist Hewitt  DATE: 12/27/23    AGE:   68 y.o.  Mrn:   [id number]  ADMIT DX:  Pain, generalized [R52]  SOB (shortness of breath) [R06.02]  CHF exacerbation (HCC) [I50.9]  COVID-19 virus infection [U07.1]    Past Medical History:   Diagnosis Date    Asthma     Atrial fibrillation (HCC)     COPD (chronic obstructive pulmonary disease) (HCC)     Diabetes mellitus (HCC)     Disease of thyroid gland     Heart failure (HCC)     Kidney failure     Morbid obesity due to excess calories (HCC)     NISHI (obstructive sleep apnea)      Length Of Stay: 4  Performed at least 2 patient identifiers during session: Name and Birthday  PHYSICAL THERAPY EVALUATION:      12/27/23 1324   PT Last Visit   PT Visit Date 12/27/23   Note Type   Note type Evaluation   Pain Assessment   Pain Assessment Tool FLACC   Pain Score   (no pain at rest)   Pain Location/Orientation Orientation: Right;Location: Knee   Pain Rating: FLACC (Rest) - Face 0   Pain Rating: FLACC (Rest) - Legs 0   Pain Rating: FLACC (Rest) - Activity 0   Pain Rating: FLACC (Rest) - Cry 0   Pain Rating: FLACC (Rest) - Consolability 0   Score: FLACC (Rest) 0   Pain Rating: FLACC (Activity) - Face 1   Pain Rating: FLACC (Activity) - Legs 1   Pain Rating: FLACC (Activity) - Activity 0   Pain Rating: FLACC (Activity) - Cry 1   Pain Rating: FLACC (Activity) - Consolability 0   Score: FLACC (Activity) 3   Restrictions/Precautions   Weight Bearing Precautions Per Order No   Other Precautions Contact/isolation;Airborne/isolation;Droplet precautions;Fall Risk;Chair Alarm;Bed Alarm;O2  (4L O2)   Home Living   Type of Home House  (Split level)   Home Layout Performs ADLs on one level;Able to live on main level with bedroom/bathroom  (Split level but has 1st floor set up. 0 IZAIAH)   Bathroom Shower/Tub   (sponge bathe)   Bathroom Toilet   (bedside commode)   Bathroom Accessibility Not accessible   Home Equipment Wheelchair-manual;Walker  (O2 4L  "baseline, bedside commode, recliner lift chair)   Prior Function   Level of [place] Needs assistance with ADLs;Independent with functional mobility;Needs assistance with IADLS  (mostly wears night gown at home; gets assist for sponge bathes; needs assistance for toileting hygiene)   Lives With Spouse;Son   Receives Help From Friend(s);Family;Personal care attendant  (Caregiver comes 3x/wk 2 hrs M, W, F)   IADLs Family/Friend/Other provides meals;Independent with medication management;Family/Friend/Other provides transportation  (public transpoation via STS; take out for food)   Falls in the last 6 months 0   Comments Reports transferring IND with use of RW.  States she does not walk.   General   Additional Pertinent History impaired B LE skin integrity   Family/Caregiver Present No   Cognition   Overall Cognitive Status WFL   Arousal/Participation Alert   Orientation Level Oriented X4   Memory Within functional limits   Following Commands Follows one step commands without difficulty   Subjective   Subjective \"I live in my lift chair\"   RLE Assessment   RLE Assessment X   Strength RLE   R Hip Flexion 2/5   R Knee Extension 2-/5   LLE Assessment   LLE Assessment X   Strength LLE   L Hip Flexion 2+/5   L Knee Extension 3/5   L Ankle Dorsiflexion 3+/5   Bed Mobility   Supine to Sit 2  Maximal assistance   Additional items Assist x 2;Increased time required;Verbal cues;LE management;HOB elevated   Sit to Supine 2  Maximal assistance   Additional items Assist x 2;Increased time required;Verbal cues;LE management   Additional Comments pt sits EOB for 10 minutes at SBA level staticly; requires intermittent steadying assist for dynamic sitting balance   Transfers   Sit to Stand Unable to assess   Additional items   (unsafe to assess due to R knee pain, R foot sensitive to touch & very painful, elevated HR, pt feeling lightheaded)   Ambulation/Elevation   Gait Assistance   (non-ambulatory at baseline; unsafe to attempt " standing today)   Balance   Static Sitting Fair   Dynamic Sitting Fair -   Endurance Deficit   Endurance Deficit Yes   Endurance Deficit Description 4L O2. elevated HR up to 138 bpm sitting on EOB with SpO2 at 88-90%   Activity Tolerance   Activity Tolerance Patient limited by fatigue;Patient limited by pain  (lightheaded while sitting on EOB; BP was 151/81)   Medical Staff Made Aware OT Lynda   Nurse Made Aware [facility]   Assessment   Prognosis Fair   Problem List Decreased strength;Decreased range of motion;Decreased endurance;Impaired balance;Decreased mobility;Obesity;Decreased skin integrity;Pain   Barriers to Discharge Decreased caregiver support   Barriers to Discharge Comments current assist level for mobility, R LE pain, inability to perform sit to stand transfer   Goals   Patient Goals start feeling better   STG Expiration Date 01/10/24   PT Treatment Day 0   Plan   Treatment/Interventions ADL retraining;Functional transfer training;LE strengthening/ROM;Therapeutic exercise;Endurance training;Patient/family training;Equipment eval/education;Bed mobility;Gait training;Compensatory technique education;Spoke to nursing;OT   PT Frequency 3-5x/wk   Discharge Recommendation   Rehab Resource Intensity Level, PT I (Maximum Resource Intensity)   AM-PAC Basic Mobility Inpatient   Turning in Flat Bed Without Bedrails 1   Lying on Back to Sitting on Edge of Flat Bed Without Bedrails 1   Moving Bed to Chair 1   Standing Up From Chair Using Arms 1   Walk in Room 1   Climb 3-5 Stairs With Railing 1   Basic Mobility Inpatient Raw Score 6   Turning Head Towards Sound 4   Follow Simple Instructions 4   Low Function Basic Mobility Raw Score  14   Low Function Basic Mobility Standardized Score  22.01   Highest Level Of Mobility   JH-HLM Goal 2: Bed activities/Dependent transfer   JH-HLM Achieved 3: Sit at edge of bed   End of Consult   Patient Position at End of Consult Supine;All needs within reach   End of Consult Comments  4L O2 with SpO2 at 90% and HR at 107 bpm; PT notifies RN of pt's R LE pain complaints as well as elevated HR with sitting EOB     (Please find full objective findings from PT assessment regarding body systems outlined above).     Assessment: Pt is a 68 y.o. female seen for PT evaluation s/p admission to [facility] on 12/23/2023 with Acute on chronic diastolic congestive heart failure (HCC).  Order placed for PT services.  Upon evaluation: Pt is presenting with impaired functional mobility due to pain, decreased strength, decreased ROM, decreased endurance, impaired balance, fall risk, LE edema, and impaired skin integrity requiring  max of 2 assistance for bed mobility. Pt's clinical presentation is currently unstable/unpredictable given the functional mobility deficits above, especially decreased functional mobility tolerance and SOB upon exertion, coupled with fall risks as indicated by AM-PAC 6-Clicks: 06/24 as well as obesity and combined with medical complications of tachycardia, pain impacting overall mobility status, abnormal renal lab values, abnormal blood sugars, abnormal CO2 values, and need for input for mobility technique/safety.  Pt's PMHx and comorbidities that may affect physical performance and progress include: A fib, CHF, COPD, CKD, and obesity. Personal factors affecting pt at time of IE include: questionable non-compliance and inability to perform ADLs. Pt will benefit from continued skilled PT services to address deficits as defined above and to maximize level of functional mobility to facilitate return toward PLOF and improved QOL. From PT/mobility standpoint, recommendation at time of d/c would be Level I (Maximum Resource Intensity) pending progress in order to reduce fall risk and maximize pt's functional independence and consistency with mobility in order to facilitate return to PLOF.  Recommend trial with walker next 1-2 sessions and ther ex next 1-2 sessions.     The  patient's AM-PAC Basic Mobility Inpatient Short Form Raw Score is 6. A Raw score of less than or equal to 16 suggests the patient may benefit from discharge to post-acute rehabilitation services. Please also refer to the recommendation of the Physical Therapist for safe discharge planning.       Goals: Pt will: Perform bed mobility tasks with  mod A x 2  to reposition in bed and prepare for transfers. Pt will perform transfers with no more than min A to decrease burden of care and prepare for ambulation. Pt will participate in objective balance assessment to determine baseline fall risk. Pt will increase B LE strength >/= 1/2 MMT grade to facilitate functional mobility. Tolerate 3 hr OOB to faciliate upright tolerance.       Abhishek Davalos, PT,DPT

## 2023-12-28 LAB
ANION GAP SERPL CALCULATED.3IONS-SCNC: 3 MMOL/L
ANION GAP SERPL CALCULATED.3IONS-SCNC: 7 MMOL/L
BACTERIA BLD CULT: NORMAL
BACTERIA BLD CULT: NORMAL
BUN SERPL-MCNC: 38 MG/DL (ref 5–25)
BUN SERPL-MCNC: 39 MG/DL (ref 5–25)
CALCIUM SERPL-MCNC: 9.5 MG/DL (ref 8.4–10.2)
CALCIUM SERPL-MCNC: 9.6 MG/DL (ref 8.4–10.2)
CHLORIDE SERPL-SCNC: 102 MMOL/L (ref 96–108)
CHLORIDE SERPL-SCNC: 102 MMOL/L (ref 96–108)
CO2 SERPL-SCNC: 36 MMOL/L (ref 21–32)
CO2 SERPL-SCNC: 38 MMOL/L (ref 21–32)
CREAT SERPL-MCNC: 1.14 MG/DL (ref 0.6–1.3)
CREAT SERPL-MCNC: 1.4 MG/DL (ref 0.6–1.3)
GFR SERPL CREATININE-BSD FRML MDRD: 38 ML/MIN/1.73SQ M
GFR SERPL CREATININE-BSD FRML MDRD: 48 ML/MIN/1.73SQ M
GLUCOSE SERPL-MCNC: 126 MG/DL (ref 65–140)
GLUCOSE SERPL-MCNC: 135 MG/DL (ref 65–140)
GLUCOSE SERPL-MCNC: 144 MG/DL (ref 65–140)
GLUCOSE SERPL-MCNC: 146 MG/DL (ref 65–140)
GLUCOSE SERPL-MCNC: 157 MG/DL (ref 65–140)
GLUCOSE SERPL-MCNC: 211 MG/DL (ref 65–140)
POTASSIUM SERPL-SCNC: 3.3 MMOL/L (ref 3.5–5.3)
POTASSIUM SERPL-SCNC: 3.4 MMOL/L (ref 3.5–5.3)
SODIUM SERPL-SCNC: 143 MMOL/L (ref 135–147)
SODIUM SERPL-SCNC: 145 MMOL/L (ref 135–147)

## 2023-12-28 PROCEDURE — 80048 BASIC METABOLIC PNL TOTAL CA: CPT | Performed by: FAMILY MEDICINE

## 2023-12-28 PROCEDURE — 99232 SBSQ HOSP IP/OBS MODERATE 35: CPT | Performed by: FAMILY MEDICINE

## 2023-12-28 PROCEDURE — 82948 REAGENT STRIP/BLOOD GLUCOSE: CPT

## 2023-12-28 RX ORDER — WATER 10 ML/10ML
INJECTION INTRAMUSCULAR; INTRAVENOUS; SUBCUTANEOUS
Status: COMPLETED
Start: 2023-12-28 | End: 2023-12-28

## 2023-12-28 RX ORDER — ACETAZOLAMIDE 500 MG/5ML
500 INJECTION, POWDER, LYOPHILIZED, FOR SOLUTION INTRAVENOUS ONCE
Status: COMPLETED | OUTPATIENT
Start: 2023-12-28 | End: 2023-12-28

## 2023-12-28 RX ADMIN — ACETAMINOPHEN 650 MG: 325 TABLET, FILM COATED ORAL at 11:39

## 2023-12-28 RX ADMIN — FERROUS SULFATE TAB 325 MG (65 MG ELEMENTAL FE) 325 MG: 325 (65 FE) TAB at 08:13

## 2023-12-28 RX ADMIN — Medication 1 MG/HR: at 11:41

## 2023-12-28 RX ADMIN — INSULIN LISPRO 5 UNITS: 100 INJECTION, SOLUTION INTRAVENOUS; SUBCUTANEOUS at 17:01

## 2023-12-28 RX ADMIN — ACETAMINOPHEN 650 MG: 325 TABLET, FILM COATED ORAL at 05:48

## 2023-12-28 RX ADMIN — METOPROLOL SUCCINATE 25 MG: 25 TABLET, EXTENDED RELEASE ORAL at 21:46

## 2023-12-28 RX ADMIN — WARFARIN SODIUM 7.5 MG: 7.5 TABLET ORAL at 17:01

## 2023-12-28 RX ADMIN — Medication 1 MG/HR: at 00:28

## 2023-12-28 RX ADMIN — ALLOPURINOL 100 MG: 100 TABLET ORAL at 08:12

## 2023-12-28 RX ADMIN — INSULIN LISPRO 5 UNITS: 100 INJECTION, SOLUTION INTRAVENOUS; SUBCUTANEOUS at 11:37

## 2023-12-28 RX ADMIN — WATER 10 ML: 1 INJECTION INTRAMUSCULAR; INTRAVENOUS; SUBCUTANEOUS at 09:15

## 2023-12-28 RX ADMIN — METOPROLOL SUCCINATE 25 MG: 25 TABLET, EXTENDED RELEASE ORAL at 08:12

## 2023-12-28 RX ADMIN — ACETAZOLAMIDE 500 MG: 500 INJECTION, POWDER, LYOPHILIZED, FOR SOLUTION INTRAVENOUS at 09:15

## 2023-12-28 RX ADMIN — LORATADINE 10 MG: 10 TABLET ORAL at 08:12

## 2023-12-28 RX ADMIN — PANTOPRAZOLE SODIUM 40 MG: 40 TABLET, DELAYED RELEASE ORAL at 05:44

## 2023-12-28 RX ADMIN — POTASSIUM CHLORIDE 20 MEQ: 1500 TABLET, EXTENDED RELEASE ORAL at 17:01

## 2023-12-28 RX ADMIN — ATORVASTATIN CALCIUM 10 MG: 10 TABLET, FILM COATED ORAL at 08:12

## 2023-12-28 RX ADMIN — POTASSIUM CHLORIDE 20 MEQ: 1500 TABLET, EXTENDED RELEASE ORAL at 21:46

## 2023-12-28 RX ADMIN — INSULIN GLARGINE 26 UNITS: 100 INJECTION, SOLUTION SUBCUTANEOUS at 21:45

## 2023-12-28 RX ADMIN — LEVOTHYROXINE SODIUM 288 MCG: 88 TABLET ORAL at 05:44

## 2023-12-28 RX ADMIN — INSULIN LISPRO 4 UNITS: 100 INJECTION, SOLUTION INTRAVENOUS; SUBCUTANEOUS at 11:37

## 2023-12-28 RX ADMIN — Medication 1000 UNITS: at 08:12

## 2023-12-28 RX ADMIN — FLUTICASONE FUROATE AND VILANTEROL TRIFENATATE 1 PUFF: 100; 25 POWDER RESPIRATORY (INHALATION) at 08:12

## 2023-12-28 RX ADMIN — POTASSIUM CHLORIDE 20 MEQ: 1500 TABLET, EXTENDED RELEASE ORAL at 08:13

## 2023-12-28 RX ADMIN — INSULIN LISPRO 5 UNITS: 100 INJECTION, SOLUTION INTRAVENOUS; SUBCUTANEOUS at 08:12

## 2023-12-28 RX ADMIN — MONTELUKAST 10 MG: 10 TABLET, FILM COATED ORAL at 21:46

## 2023-12-28 RX ADMIN — Medication 1 TABLET: at 08:12

## 2023-12-28 NOTE — CASE MANAGEMENT
Case Management Progress Note    Patient name Tigist Hewitt  Location /-01 MRN [id number]  : 1955 Date 2023       LOS (days): 5  Geometric Mean LOS (GMLOS) (days): 5  Days to GMLOS:0.2        OBJECTIVE:        Current admission status: Inpatient  Preferred Pharmacy:   [id number] - [place] [address]  Phone: [phone] Fax: [phone]    Primary Care Provider: Marisa Haque MD    Primary Insurance: [facility] RE[facility]  Secondary Insurance:     PROGRESS NOTE:  Cm continues to monitor via chart review and team discussion in interdisciplinary rounds.    CM contacted spouse via phone to discuss team recommendation for STR.  Spouse presently recovering from surgery and is NWB for a couple more weeks.  Spouse declined STR stating he and wife have appropriate assistance and care from their son, who lives with them and a private hire Caregiver  3x a week.  Spouse is receptive to HHC  for patient upon d/c- he has no agency preference.    CM placed a blanket referral in [facility] for HHC.    Plan- CM will follow through course of hospitalization and coordinate  HHC when accepting Provider list is available.

## 2023-12-28 NOTE — PLAN OF CARE
Problem: Potential for Falls  Goal: Patient will remain free of falls  Description: INTERVENTIONS:  - Educate patient/family on patient safety including physical limitations  - Instruct patient to call for assistance with activity   - Consult OT/PT to assist with strengthening/mobility   - Keep Call bell within reach  - Keep bed low and locked with side rails adjusted as appropriate  - Keep care items and personal belongings within reach  - Initiate and maintain comfort rounds  - Make Fall Risk Sign visible to staff  - Offer Toileting every 2 Hours, in advance of need  - Initiate/Maintain bed alarm  - Obtain necessary fall risk management equipment  - Apply yellow socks and bracelet for high fall risk patients  - Consider moving patient to room near nurses station  Outcome: Progressing     Problem: Prexisting or High Potential for Compromised Skin Integrity  Goal: Skin integrity is maintained or improved  Description: INTERVENTIONS:  - Identify patients at risk for skin breakdown  - Assess and monitor skin integrity  - Assess and monitor nutrition and hydration status  - Monitor labs   - Assess for incontinence   - Turn and reposition patient  - Assist with mobility/ambulation  - Relieve pressure over bony prominences  - Avoid friction and shearing  - Provide appropriate hygiene as needed including keeping skin clean and dry  - Evaluate need for skin moisturizer/barrier cream  - Collaborate with interdisciplinary team   - Patient/family teaching  - Consider wound care consult   Outcome: Progressing     Problem: PAIN - ADULT  Goal: Verbalizes/displays adequate comfort level or baseline comfort level  Description: Interventions:  - Encourage patient to monitor pain and request assistance  - Assess pain using appropriate pain scale  - Administer analgesics based on type and severity of pain and evaluate response  - Implement non-pharmacological measures as appropriate and evaluate response  - Consider cultural and  social influences on pain and pain management  - Notify physician/advanced practitioner if interventions unsuccessful or patient reports new pain  Outcome: Progressing     Problem: INFECTION - ADULT  Goal: Absence or prevention of progression during hospitalization  Description: INTERVENTIONS:  - Assess and monitor for signs and symptoms of infection  - Monitor lab/diagnostic results  - Monitor all insertion sites, i.e. indwelling lines, tubes, and drains  - Monitor endotracheal if appropriate and nasal secretions for changes in amount and color  - [place] appropriate cooling/warming therapies per order  - Administer medications as ordered  - Instruct and encourage patient and family to use good hand hygiene technique  - Identify and instruct in appropriate isolation precautions for identified infection/condition  Outcome: Progressing     Problem: SAFETY ADULT  Goal: Patient will remain free of falls  Description: INTERVENTIONS:  - Educate patient/family on patient safety including physical limitations  - Instruct patient to call for assistance with activity   - Consult OT/PT to assist with strengthening/mobility   - Keep Call bell within reach  - Keep bed low and locked with side rails adjusted as appropriate  - Keep care items and personal belongings within reach  - Initiate and maintain comfort rounds  - Make Fall Risk Sign visible to staff  - Offer Toileting every 2 Hours, in advance of need  - Initiate/Maintain bed alarm  - Obtain necessary fall risk management equipment  - Apply yellow socks and bracelet for high fall risk patients  - Consider moving patient to room near nurses station  Outcome: Progressing  Goal: Maintain or return to baseline ADL function  Description: INTERVENTIONS:  -  Assess patient's ability to carry out ADLs; assess patient's baseline for ADL function and identify physical deficits which impact ability to perform ADLs (bathing, care of mouth/teeth, toileting, grooming, dressing,  etc.)  - Assess/evaluate cause of self-care deficits   - Assess range of motion  - Assess patient's mobility; develop plan if impaired  - Assess patient's need for assistive devices and provide as appropriate  - Encourage maximum independence but intervene and supervise when necessary  - Involve family in performance of ADLs  - Assess for home care needs following discharge   - Consider OT consult to assist with ADL evaluation and planning for discharge  - Provide patient education as appropriate  Outcome: Progressing  Goal: Maintains/Returns to pre admission functional level  Description: INTERVENTIONS:  - Perform AM-PAC 6 Click Basic Mobility/ Daily Activity assessment daily.  - Set and communicate daily mobility goal to care team and patient/family/caregiver.   - Collaborate with rehabilitation services on mobility goals if consulted  - Record patient progress and toleration of activity level   Outcome: Progressing     Problem: DISCHARGE PLANNING  Goal: Discharge to home or other facility with appropriate resources  Description: INTERVENTIONS:  - Identify barriers to discharge w/patient and caregiver  - Arrange for needed discharge resources and transportation as appropriate  - Identify discharge learning needs (meds, wound care, etc.)  - Arrange for interpretive services to assist at discharge as needed  - Refer to Case Management Department for coordinating discharge planning if the patient needs post-hospital services based on physician/advanced practitioner order or complex needs related to functional status, cognitive ability, or social support system  Outcome: Progressing     Problem: Knowledge Deficit  Goal: Patient/family/caregiver demonstrates understanding of disease process, treatment plan, medications, and discharge instructions  Description: Complete learning assessment and assess knowledge base.  Interventions:  - Provide teaching at level of understanding  - Provide teaching via preferred learning  methods  Outcome: Progressing     Problem: CARDIOVASCULAR - ADULT  Goal: Maintains optimal cardiac output and hemodynamic stability  Description: INTERVENTIONS:  - Monitor I/O, vital signs and rhythm  - Monitor for S/S and trends of decreased cardiac output  - Administer and titrate ordered vasoactive medications to optimize hemodynamic stability  - Assess quality of pulses, skin color and temperature  - Assess for signs of decreased coronary artery perfusion  - Instruct patient to report change in severity of symptoms  Outcome: Progressing  Goal: Absence of cardiac dysrhythmias or at baseline rhythm  Description: INTERVENTIONS:  - Continuous cardiac monitoring, vital signs, obtain 12 lead EKG if ordered  - Administer antiarrhythmic and heart rate control medications as ordered  - Monitor electrolytes and administer replacement therapy as ordered  Outcome: Progressing     Problem: RESPIRATORY - ADULT  Goal: Achieves optimal ventilation and oxygenation  Description: INTERVENTIONS:  - Assess for changes in respiratory status  - Assess for changes in mentation and behavior  - Position to facilitate oxygenation and minimize respiratory effort  - Oxygen administered by appropriate delivery if ordered  - Initiate smoking cessation education as indicated  - Encourage broncho-pulmonary hygiene including cough, deep breathe, Incentive Spirometry  - Assess the need for suctioning and aspirate as needed  - Assess and instruct to report SOB or any respiratory difficulty  - Respiratory Therapy support as indicated  Outcome: Progressing     Problem: Nutrition/Hydration-ADULT  Goal: Nutrient/Hydration intake appropriate for improving, restoring or maintaining nutritional needs  Description: Monitor and assess patient's nutrition/hydration status for malnutrition. Collaborate with interdisciplinary team and initiate plan and interventions as ordered.  Monitor patient's weight and dietary intake as ordered or per policy. Utilize  nutrition screening tool and intervene as necessary. Determine patient's food preferences and provide high-protein, high-caloric foods as appropriate.     INTERVENTIONS:  - Monitor oral intake, urinary output, labs, and treatment plans  - Assess nutrition and hydration status and recommend course of action  - Evaluate amount of meals eaten  - Assist patient with eating if necessary   - Allow adequate time for meals  - Recommend/ encourage appropriate diets, oral nutritional supplements, and vitamin/mineral supplements  - Order, calculate, and assess calorie counts as needed  - Recommend, monitor, and adjust tube feedings and TPN/PPN based on assessed needs  - Assess need for intravenous fluids  - Provide specific nutrition/hydration education as appropriate  - Include patient/family/caregiver in decisions related to nutrition  Outcome: Progressing

## 2023-12-28 NOTE — ASSESSMENT & PLAN NOTE
Continue warfarin 7.5 mg daily  Goal INR 2-3  Yesterday rate where elevated Toprol-XL increased to 25 mg p.o. twice daily rates have improved  INR therapetic

## 2023-12-28 NOTE — PROGRESS NOTES
"Progress Note - Cardiology   Tigist Moraeskalpesh 68 y.o. female MRN: [id number]  Unit/Bed#: -01 Encounter: [id number]    Assessment:  COVID 19 Pneumonia  Acute on chronic HFpEF- on bumex infusion currently at 1 mg/hr   - continues to diurese well after one dose diuril and diamox   COPD  Chronic afib- rate controlled               - on coumadin   Chronic resp failure- on 4 L at baseline   NISHI on CPAP   CKD3  Mild to mod MR  Mild AS  Severe TR  Non compliance with CHF diet     Plan:  Continue bum ex drip  Add diamox 500 mg IV x 1 today again   Consider diuril tomorrow   Monitor I and o  Daily standing weight if possible  Monitor renal function and electrolytes   Will continue to follow     Subjective/Objective     Subjective: pt reports significant urinary output. Improved breathing.     Objective: continues to diurese well. Bicarb elevated, renal function stable.     Vitals: /60   Pulse 94   Temp 98.1 °F (36.7 °C)   Resp 18   Ht 5' 2\" (1.575 m)   Wt (!) 159 kg (350 lb 8.5 oz)   SpO2 92%   BMI 64.11 kg/m²   Vitals:    12/28/23 0600 12/28/23 0912   Weight: (!) 166 kg (365 lb 8.4 oz) (!) 159 kg (350 lb 8.5 oz)     Orthostatic Blood Pressures      Flowsheet Row Most Recent Value   Blood Pressure 111/60 filed at 12/28/2023 0714   Patient Position - Orthostatic VS Lying filed at 12/24/2023 2123              Intake/Output Summary (Last 24 hours) at 12/28/2023 1026  Last data filed at 12/28/2023 1000  Gross per 24 hour   Intake 740 ml   Output 7350 ml   Net -6610 ml       Invasive Devices       Peripheral Intravenous Line  Duration             Peripheral IV 12/27/23 Distal;Left;Ventral (anterior) Forearm <1 day    Peripheral IV 12/27/23 Distal;Right;Ventral (anterior) Forearm <1 day              Drain  Duration             Urethral Catheter Temperature probe 16 Fr. 4 days                    Physical Exam  Constitutional:       Appearance: Normal appearance.   HENT:      Head: Normocephalic and atraumatic. "   Cardiovascular:      Rate and Rhythm: Rhythm irregular.      Heart sounds: Murmur heard.   Pulmonary:      Effort: Pulmonary effort is normal.   Abdominal:      General: There is distension.   Musculoskeletal:         General: Swelling present.   Skin:     General: Skin is warm.   Neurological:      General: No focal deficit present.      Mental Status: She is alert.   Psychiatric:         Mood and Affect: Mood normal.        Lab Results: I have personally reviewed pertinent lab results.    CBC with diff:   Results from last 7 days   Lab Units 12/25/23  0510   WBC Thousand/uL 5.74   RBC Million/uL 4.01   HEMOGLOBIN g/dL 12.0   HEMATOCRIT % 39.0   MCV fL 97   MCH pg 29.9   MCHC g/dL 30.8*   RDW % 20.0*   MPV fL 10.8   PLATELETS Thousands/uL 161     CMP:   Results from last 7 days   Lab Units 12/28/23  0810 12/26/23  0505 12/25/23  0510   SODIUM mmol/L 145   < > 144   CHLORIDE mmol/L 102   < > 104   CO2 mmol/L 36*   < > 33*   BUN mg/dL 38*   < > 41*   CREATININE mg/dL 1.14   < > 1.15   CALCIUM mg/dL 9.5   < > 9.1   AST U/L  --   --  32   ALT U/L  --   --  17   ALK PHOS U/L  --   --  110*   EGFR ml/min/1.73sq m 48   < > 48    < > = values in this interval not displayed.     HS Troponin:   0   Lab Value Date/Time    HSTNI0 36 12/23/2023 1441    HSTNI2 34 12/23/2023 1651    HSTNI4 58 (H) 10/22/2022 1736    HSTNI4 25 09/21/2022 1756     BNP:   Results from last 7 days   Lab Units 12/28/23  0810   POTASSIUM mmol/L 3.3*   CHLORIDE mmol/L 102   CO2 mmol/L 36*   BUN mg/dL 38*   CREATININE mg/dL 1.14   CALCIUM mg/dL 9.5   EGFR ml/min/1.73sq m 48     Coags:   Results from last 7 days   Lab Units 12/27/23  0645 12/24/23  0537 12/23/23  1441   PTT seconds  --   --  38*   INR  2.33*   < > 2.17*    < > = values in this interval not displayed.     TSH:   Results from last 7 days   Lab Units 12/24/23  0537   TSH 3RD GENERATON uIU/mL 1.154     Magnesium:   Results from last 7 days   Lab Units 12/23/23  1441   MAGNESIUM mg/dL 2.1      Lipid Profile:     Imaging: I have personally reviewed pertinent reports.        EKG:          Narrative & Impression     Atrial fibrillation  Right bundle branch block  Nonspecific ST and T wave abnormality  Prolonged QT  Abnormal ECG  When compared with ECG of 22-OCT-2022 11:51,  Nonspecific T wave abnormality now evident in Lateral leads  Confirmed by Aníbal Tse ([id number]) on 12/26/2023 10:47:06 AM                  Echo 12/24/2023:    Left Ventricle: Left ventricular cavity size is normal. Wall thickness is normal. The left ventricular ejection fraction is 55% by visual estimation, although assessment is significantly limited due to poor endocardial border definition even with the use of contrast. Systolic function is probably normal. Although no diagnostic regional wall motion abnormality was identified, this possibility cannot be completely excluded on the basis of this study. Unable to assess diastolic function due to atrial fibrillation.    IVS: There is both systolic and diastolic flattening of the interventricular septum consistent with right ventricle pressure and volume overload.    Right Ventricle: Right ventricular cavity size is mildly dilated. Systolic function is moderately reduced.    Right Atrium: The atrium is dilated.    Atrial Septum: The septum bows into the left atrium, suggesting increased right atrial pressure.    Aortic Valve: There is mild stenosis. The aortic valve peak velocity is 1.25 m/s. The aortic valve mean gradient is 4 mmHg. The dimensionless velocity index is 0.53. The aortic valve area is 1.56 cm2. The stroke volume index is 15.00 ml/m2. The aortic valve velocity is increased due to stenosis but lower than expected due to the presence of decreased flow.    Mitral Valve: There is mild annular calcification. There is mild to moderate regurgitation with a posteriorly directed jet. Severity is potentially underestimated due to eccentric jet.    Tricuspid Valve: There is severe  regurgitation. The tricuspid valve regurgitation jet is directed toward septum. The right ventricular systolic pressure is moderately elevated. The estimated right ventricular systolic pressure is 53.00 mmHg.    Pulmonic Valve: There is mild regurgitation.    Prior TTE study available for comparison. Prior study date: 9/21/2022. No significant changes noted compared to the prior study.

## 2023-12-28 NOTE — ASSESSMENT & PLAN NOTE
Lab Results   Component Value Date    EGFR 48 12/28/2023    EGFR 47 12/27/2023    EGFR 50 12/27/2023    CREATININE 1.14 12/28/2023    CREATININE 1.17 12/27/2023    CREATININE 1.10 12/27/2023     Hx CKD 3.  Baseline creatinine between 1-1.3.  Creatinine baseline  Monitor closely with IV diuresis  Trend creatinine  Renally dose medications and avoid nephrotoxic medications

## 2023-12-28 NOTE — ASSESSMENT & PLAN NOTE
Background: Presented with dyspnea, fluid retention found to be COVID-positive  COVID19- PCR positive 12/23  Supplemental O2 with 4LNC saturating 95%; this is patient's baseline   imaging   CTA chest with GGO viral versus pulmonary edema  Started on mild COVID pathway for treatment  Not requiring increased oxygen needs, no indication for dexamethasone  AC warfarin-INR therapeutic  Daily CBC and CMP  Continue check inflammatory markers as indicated  S/p 5 days of remdesavir   Encourage ISP/flutter valve  Encourage proning and early mobilization  Blood cultures pending  Trend procalcitonin  Airborne/Droplet precautions ordered

## 2023-12-28 NOTE — ASSESSMENT & PLAN NOTE
Wt Readings from Last 3 Encounters:   12/28/23 (!) 159 kg (350 lb 8.5 oz)   10/29/22 (!) 151 kg (331 lb 12.7 oz)   10/03/22 (!) 171 kg (377 lb 6.8 oz)     Presents with dyspnea, orthopnea, anasarca and CTA chest with pulmonary edema, hypervolemic with CHF exacerbation.?>50lb over her weight at the time of last hospitalization.  Reports compliance with torsemide 50 mg twice daily  Seems since admission overall as a  everywhere but scale she lost 36 pounds she is -20 L  CHF pathway, intake output, daily weights, 2 g sodium diet with 1500 mL of fluid restriction  Previous echocardiogram with preserved ejection fraction but significant valvular heart disease, update echo  Monitor volume status  Continue bumex today will be given diamox and bmp q12   In 2022 discharged at 331 lb

## 2023-12-28 NOTE — PLAN OF CARE
Problem: Potential for Falls  Goal: Patient will remain free of falls  Description: INTERVENTIONS:  - Educate patient/family on patient safety including physical limitations  - Instruct patient to call for assistance with activity   - Consult OT/PT to assist with strengthening/mobility   - Keep Call bell within reach  - Keep bed low and locked with side rails adjusted as appropriate  - Keep care items and personal belongings within reach  - Initiate and maintain comfort rounds  - Make Fall Risk Sign visible to staff  - Offer Toileting every 3 Hours, in advance of need  - Initiate/Maintain bedalarm  - Obtain necessary fall risk management equipment:  - Apply yellow socks and bracelet for high fall risk patients  - Consider moving patient to room near nurses station  12/28/2023 1039 by Gladis Greenwood RN  Outcome: Progressing  12/28/2023 1002 by Gladis Greenwood RN  Outcome: Progressing     Problem: Prexisting or High Potential for Compromised Skin Integrity  Goal: Skin integrity is maintained or improved  Description: INTERVENTIONS:  - Identify patients at risk for skin breakdown  - Assess and monitor skin integrity  - Assess and monitor nutrition and hydration status  - Monitor labs   - Assess for incontinence   - Turn and reposition patient  - Assist with mobility/ambulation  - Relieve pressure over bony prominences  - Avoid friction and shearing  - Provide appropriate hygiene as needed including keeping skin clean and dry  - Evaluate need for skin moisturizer/barrier cream  - Collaborate with interdisciplinary team   - Patient/family teaching  - Consider wound care consult   12/28/2023 1039 by Gladis Greenwood RN  Outcome: Progressing  12/28/2023 1002 by Gladis Greenwood RN  Outcome: Progressing     Problem: PAIN - ADULT  Goal: Verbalizes/displays adequate comfort level or baseline comfort level  Description: Interventions:  - Encourage patient to monitor pain and request assistance  - Assess pain using appropriate pain scale  -  Administer analgesics based on type and severity of pain and evaluate response  - Implement non-pharmacological measures as appropriate and evaluate response  - Consider cultural and social influences on pain and pain management  - Notify physician/advanced practitioner if interventions unsuccessful or patient reports new pain  12/28/2023 1039 by Gladis Greenwood RN  Outcome: Progressing  12/28/2023 1002 by Gladis Greenwood RN  Outcome: Progressing     Problem: INFECTION - ADULT  Goal: Absence or prevention of progression during hospitalization  Description: INTERVENTIONS:  - Assess and monitor for signs and symptoms of infection  - Monitor lab/diagnostic results  - Monitor all insertion sites, i.e. indwelling lines, tubes, and drains  - Monitor endotracheal if appropriate and nasal secretions for changes in amount and color  - [place] appropriate cooling/warming therapies per order  - Administer medications as ordered  - Instruct and encourage patient and family to use good hand hygiene technique  - Identify and instruct in appropriate isolation precautions for identified infection/condition  12/28/2023 1039 by Gladis Greenwood RN  Outcome: Progressing  12/28/2023 1002 by Gladis Greenwood RN  Outcome: Progressing

## 2023-12-28 NOTE — PLAN OF CARE
Problem: Potential for Falls  Goal: Patient will remain free of falls  Description: INTERVENTIONS:  - Educate patient/family on patient safety including physical limitations  - Instruct patient to call for assistance with activity   - Consult OT/PT to assist with strengthening/mobility   - Keep Call bell within reach  - Keep bed low and locked with side rails adjusted as appropriate  - Keep care items and personal belongings within reach  - Initiate and maintain comfort rounds  - Make Fall Risk Sign visible to staff  - Offer Toileting every 3 Hours, in advance of need  - Initiate/Maintain bed alarm  - Obtain necessary fall risk management equipment:   - Apply yellow socks and bracelet for high fall risk patients  - Consider moving patient to room near nurses station  Outcome: Progressing     Problem: Prexisting or High Potential for Compromised Skin Integrity  Goal: Skin integrity is maintained or improved  Description: INTERVENTIONS:  - Identify patients at risk for skin breakdown  - Assess and monitor skin integrity  - Assess and monitor nutrition and hydration status  - Monitor labs   - Assess for incontinence   - Turn and reposition patient  - Assist with mobility/ambulation  - Relieve pressure over bony prominences  - Avoid friction and shearing  - Provide appropriate hygiene as needed including keeping skin clean and dry  - Evaluate need for skin moisturizer/barrier cream  - Collaborate with interdisciplinary team   - Patient/family teaching  - Consider wound care consult   Outcome: Progressing     Problem: PAIN - ADULT  Goal: Verbalizes/displays adequate comfort level or baseline comfort level  Description: Interventions:  - Encourage patient to monitor pain and request assistance  - Assess pain using appropriate pain scale  - Administer analgesics based on type and severity of pain and evaluate response  - Implement non-pharmacological measures as appropriate and evaluate response  - Consider cultural and  social influences on pain and pain management  - Notify physician/advanced practitioner if interventions unsuccessful or patient reports new pain  Outcome: Progressing     Problem: INFECTION - ADULT  Goal: Absence or prevention of progression during hospitalization  Description: INTERVENTIONS:  - Assess and monitor for signs and symptoms of infection  - Monitor lab/diagnostic results  - Monitor all insertion sites, i.e. indwelling lines, tubes, and drains  - Monitor endotracheal if appropriate and nasal secretions for changes in amount and color  - [place] appropriate cooling/warming therapies per order  - Administer medications as ordered  - Instruct and encourage patient and family to use good hand hygiene technique  - Identify and instruct in appropriate isolation precautions for identified infection/condition  Outcome: Progressing     Problem: SAFETY ADULT  Goal: Patient will remain free of falls  Description: INTERVENTIONS:  - Educate patient/family on patient safety including physical limitations  - Instruct patient to call for assistance with activity   - Consult OT/PT to assist with strengthening/mobility   - Keep Call bell within reach  - Keep bed low and locked with side rails adjusted as appropriate  - Keep care items and personal belongings within reach  - Initiate and maintain comfort rounds  - Make Fall Risk Sign visible to staff  - Offer Toileting every 3 Hours, in advance of need  - Initiate/Maintain bed alarm  - Obtain necessary fall risk management equipment:   - Apply yellow socks and bracelet for high fall risk patients  - Consider moving patient to room near nurses station  Outcome: Progressing  Goal: Maintain or return to baseline ADL function  Description: INTERVENTIONS:  -  Assess patient's ability to carry out ADLs; assess patient's baseline for ADL function and identify physical deficits which impact ability to perform ADLs (bathing, care of mouth/teeth, toileting, grooming, dressing,  etc.)  - Assess/evaluate cause of self-care deficits   - Assess range of motion  - Assess patient's mobility; develop plan if impaired  - Assess patient's need for assistive devices and provide as appropriate  - Encourage maximum independence but intervene and supervise when necessary  - Involve family in performance of ADLs  - Assess for home care needs following discharge   - Consider OT consult to assist with ADL evaluation and planning for discharge  - Provide patient education as appropriate  Outcome: Progressing  Goal: Maintains/Returns to pre admission functional level  Description: INTERVENTIONS:  - Perform AM-PAC 6 Click Basic Mobility/ Daily Activity assessment daily.  - Set and communicate daily mobility goal to care team and patient/family/caregiver.   - Collaborate with rehabilitation services on mobility goals if consulted  - Perform Range of Motion 3 times a day.  - Reposition patient every 3 hours.  - Dangle patient 3 times a day  - Stand patient 3 times a day  - Ambulate patient 3 times a day  - Out of bed to chair 3 times a day   - Out of bed for meals 3 times a day  - Out of bed for toileting  - Record patient progress and toleration of activity level   Outcome: Progressing

## 2023-12-28 NOTE — ASSESSMENT & PLAN NOTE
Lab Results   Component Value Date    HGBA1C 7.3 (H) 12/23/2023       Recent Labs     12/27/23  1545 12/27/23  2047 12/28/23  0714 12/28/23  1131   POCGLU 136 183* 135 211*         Blood Sugar Average: Last 72 hrs:  (P) [id number]    Continue home regimen Lantus 26 units at bedtime, lispro 5 units 3 times daily meals  Add SSI with Accu-Cheks  Hypoglycemia protocol  Received dexamethasone for COVID-monitor for steroid-induced hyperglycemia

## 2023-12-28 NOTE — PROGRESS NOTES
[facility]  Progress Note  Name: Tigist Hewitt I  MRN: [id number]  Unit/Bed#: [facility] I Date of Admission: 12/23/2023   Date of Service: 12/28/2023 I Hospital Day: 5    Assessment/Plan   * Acute on chronic diastolic congestive heart failure (HCC)  Assessment & Plan  Wt Readings from Last 3 Encounters:   12/28/23 (!) 159 kg (350 lb 8.5 oz)   10/29/22 (!) 151 kg (331 lb 12.7 oz)   10/03/22 (!) 171 kg (377 lb 6.8 oz)     Presents with dyspnea, orthopnea, anasarca and CTA chest with pulmonary edema, hypervolemic with CHF exacerbation.?>50lb over her weight at the time of last hospitalization.  Reports compliance with torsemide 50 mg twice daily  Seems since admission overall as a  everywhere but scale she lost 36 pounds she is -20 L  CHF pathway, intake output, daily weights, 2 g sodium diet with 1500 mL of fluid restriction  Previous echocardiogram with preserved ejection fraction but significant valvular heart disease, update echo  Monitor volume status  Continue bumex today will be given diamox and bmp q12   In 2022 discharged at 331 lb    CKD (chronic kidney disease) stage 3, GFR 30-59 ml/min ([facility])  Assessment & Plan  Lab Results   Component Value Date    EGFR 48 12/28/2023    EGFR 47 12/27/2023    EGFR 50 12/27/2023    CREATININE 1.14 12/28/2023    CREATININE 1.17 12/27/2023    CREATININE 1.10 12/27/2023     Hx CKD 3.  Baseline creatinine between 1-1.3.  Creatinine baseline  Monitor closely with IV diuresis  Trend creatinine  Renally dose medications and avoid nephrotoxic medications    Abnormal CT scan, pelvis  Assessment & Plan  Abnormal endometrial thickening on CT pelvis  Discussed with patient that she will need GYN follow-up  Patient endorses intermittent vaginal bleeding    NISHI (obstructive sleep apnea)  Assessment & Plan  Continue CPAP    Hypothyroidism  Assessment & Plan  Continue PTA levothyroxine 188 mcg daily  Tsh nml    Morbid obesity (HCC)  Assessment &  Plan  BMI 70  Intensive lifestyle modification required  Outpatient referral to bariatric medicine    Chronic respiratory failure with hypoxia ([facility])  Assessment & Plan  Chronically on 4 L oxygen nasal cannula Home regimen  Stable at baseline    Pneumonia due to COVID-19 virus  Assessment & Plan  Background: Presented with dyspnea, fluid retention found to be COVID-positive  COVID19- PCR positive 12/23  Supplemental O2 with 4LNC saturating 95%; this is patient's baseline   imaging   CTA chest with GGO viral versus pulmonary edema  Started on mild COVID pathway for treatment  Not requiring increased oxygen needs, no indication for dexamethasone  AC warfarin-INR therapeutic  Daily CBC and CMP  Continue check inflammatory markers as indicated  S/p 5 days of remdesavir   Encourage ISP/flutter valve  Encourage proning and early mobilization  Blood cultures pending  Trend procalcitonin  Airborne/Droplet precautions ordered     Type 2 diabetes mellitus with hyperglycemia, with long-term current use of insulin ([facility])  Assessment & Plan  Lab Results   Component Value Date    HGBA1C 7.3 (H) 12/23/2023       Recent Labs     12/27/23  1545 12/27/23  2047 12/28/23  0714 12/28/23  1131   POCGLU 136 183* 135 211*         Blood Sugar Average: Last 72 hrs:  (P) [id number]    Continue home regimen Lantus 26 units at bedtime, lispro 5 units 3 times daily meals  Add SSI with Accu-Cheks  Hypoglycemia protocol  Received dexamethasone for COVID-monitor for steroid-induced hyperglycemia     COPD (chronic obstructive pulmonary disease) ([facility])  Assessment & Plan  Acute exacerbation secondary to COVID  As needed bronchodilators  Continue PTA Breo and Singulair  Received IV dexamethasone x 1 in ED, will hold further steroids  Monitor  Oxygen needs are baseline    Atrial fibrillation ([facility])  Assessment & Plan  Continue warfarin 7.5 mg daily  Goal INR 2-3  Yesterday rate where elevated Toprol-XL increased to 25 mg p.o. twice daily rates  have improved  INR therapetic                 VTE Pharmacologic Prophylaxis:   High Risk (Score >/= 5) - Pharmacological DVT Prophylaxis Ordered: warfarin (Coumadin). Sequential Compression Devices Ordered.    Mobility:   Basic Mobility Inpatient Raw Score: 6  -HLM Goal: 2: Bed activities/Dependent transfer  -HLM Achieved: 3: Sit at edge of bed  HLM Goal NOT achieved. Continue with multidisciplinary rounding and encourage appropriate mobility to improve upon HLM goals.    Patient Centered Rounds: I performed bedside rounds with nursing staff today.   Discussions with Specialists or Other Care Team Provider: cards    Education and Discussions with Family / Patient: pt will update family     Total Time Spent on Date of Encounter in care of patient: >35 mins. This time was spent on one or more of the following: performing physical exam; counseling and coordination of care; obtaining or reviewing history; documenting in the medical record; reviewing/ordering tests, medications or procedures; communicating with other healthcare professionals and discussing with patient's family/caregivers.    Current Length of Stay: 5 day(s)  Current Patient Status: Inpatient   Certification Statement: The patient will continue to require additional inpatient hospital stay due to chf  Discharge Plan: Anticipate discharge in >72 hrs to discharge location to be determined pending rehab evaluations.    Code Status: Level 3 - DNAR and DNI    Subjective:   Seen and examined sob improved    Objective:     Vitals:   Temp (24hrs), Av.1 °F (36.7 °C), Min:97.9 °F (36.6 °C), Max:98.1 °F (36.7 °C)    Temp:  [97.9 °F (36.6 °C)-98.1 °F (36.7 °C)] 98.1 °F (36.7 °C)  HR:  [] 94  Resp:  [18-20] 18  BP: (111-128)/(60-75) 111/60  SpO2:  [90 %-99 %] 92 %  Body mass index is 64.11 kg/m².     Input and Output Summary (last 24 hours):     Intake/Output Summary (Last 24 hours) at 2023 1359  Last data filed at 2023 1214  Gross per 24  hour   Intake 860 ml   Output 8000 ml   Net -7140 ml       Physical Exam:   Physical Exam  Vitals and nursing note reviewed.   Constitutional:       General: She is not in acute distress.     Appearance: She is well-developed.   HENT:      Head: Normocephalic and atraumatic.   Eyes:      Conjunctiva/sclera: Conjunctivae normal.   Cardiovascular:      Rate and Rhythm: Normal rate. Rhythm irregular.      Heart sounds: No murmur heard.  Pulmonary:      Effort: Pulmonary effort is normal. No respiratory distress.      Breath sounds: Rales present.   Abdominal:      Palpations: Abdomen is soft.      Tenderness: There is no abdominal tenderness.   Musculoskeletal:         General: Swelling present.      Cervical back: Neck supple.   Skin:     General: Skin is warm and dry.      Capillary Refill: Capillary refill takes less than 2 seconds.   Neurological:      Mental Status: She is alert and oriented to person, place, and time.   Psychiatric:         Mood and Affect: Mood normal.          Additional Data:     Labs:  Results from last 7 days   Lab Units 12/25/23  0510   WBC Thousand/uL 5.74   HEMOGLOBIN g/dL 12.0   HEMATOCRIT % 39.0   PLATELETS Thousands/uL 161   NEUTROS PCT % 61   LYMPHS PCT % 17   MONOS PCT % 19*   EOS PCT % 1     Results from last 7 days   Lab Units 12/28/23  0810 12/26/23  0505 12/25/23  0510   SODIUM mmol/L 145   < > 144   POTASSIUM mmol/L 3.3*   < > 3.6   CHLORIDE mmol/L 102   < > 104   CO2 mmol/L 36*   < > 33*   BUN mg/dL 38*   < > 41*   CREATININE mg/dL 1.14   < > 1.15   ANION GAP mmol/L 7   < > 7   CALCIUM mg/dL 9.5   < > 9.1   ALBUMIN g/dL  --   --  3.4*   TOTAL BILIRUBIN mg/dL  --   --  0.82   ALK PHOS U/L  --   --  110*   ALT U/L  --   --  17   AST U/L  --   --  32   GLUCOSE RANDOM mg/dL 144*   < > 112    < > = values in this interval not displayed.     Results from last 7 days   Lab Units 12/27/23  0645   INR  2.33*     Results from last 7 days   Lab Units 12/28/23  1131 12/28/23  0714  12/27/23  2047 12/27/23  1545 12/27/23  1131 12/27/23  0750 12/26/23  2040 12/26/23  1600 12/26/23  1131 12/26/23  0814 12/25/23  2103 12/25/23  1552   POC GLUCOSE mg/dl 211* 135 183* 136 184* 138 168* 138 134 109 166* 191*     Results from last 7 days   Lab Units 12/23/23  1441   HEMOGLOBIN A1C % 7.3*     Results from last 7 days   Lab Units 12/24/23  0537 12/23/23  1441   LACTIC ACID mmol/L  --  1.3   PROCALCITONIN ng/ml 0.08 0.05       Lines/Drains:  Invasive Devices       Peripheral Intravenous Line  Duration             Peripheral IV 12/27/23 Distal;Left;Ventral (anterior) Forearm <1 day    Peripheral IV 12/27/23 Distal;Right;Ventral (anterior) Forearm <1 day              Drain  Duration             Urethral Catheter Temperature probe 16 Fr. 4 days                  Urinary Catheter:  Goal for removal: Remove after 48 hrs of I/O monitoring           Telemetry:  Telemetry Orders (From admission, onward)               24 Hour Telemetry Monitoring  Continuous x 24 Hours (Telem)        Question:  Reason for 24 Hour Telemetry  Answer:  Decompensated CHF- and any one of the following: continuous diuretic infusion or total diuretic dose >200 mg daily, associated electrolyte derangement (I.e. K < 3.0), ionotropic drip (continuous infusion), hx of ventricular arrhythmia, or new EF < 35%                     Telemetry Reviewed: Atrial fibrillation. HR averaging 80  Indication for Continued Telemetry Use: Acute CHF on >200 mg lasix/day or equivalent dose or with new reduced EF.              Imaging: Reviewed radiology reports from this admission including: chest xray    Recent Cultures (last 7 days):   Results from last 7 days   Lab Units 12/23/23  1444 12/23/23  1441   BLOOD CULTURE  No Growth After 4 Days. No Growth After 4 Days.       Last 24 Hours Medication List:   Current Facility-Administered Medications   Medication Dose Route Frequency Provider Last Rate    acetaminophen  650 mg Oral Q6H PRN CAMPBELL Munoz       albuterol  2.5 mg Nebulization Q4H PRN Kaitlin S Bar, CRNP      allopurinol  100 mg Oral Daily Kaitlin S Bar, CRNP      atorvastatin  10 mg Oral Daily Kaitlin S Bar, CRNP      bumetanide (BUMEX) 12.5 mg infusion 50 mL  1 mg/hr Intravenous Continuous Kaitlin S Bar, CRNP 1 mg/hr (12/28/23 1141)    cholecalciferol  1,000 Units Oral Daily Kaitlin S Bar, CRNP      docusate sodium  100 mg Oral BID Kaitlin S Bar, CRNP      ferrous sulfate  325 mg Oral Daily With Breakfast Kaitlin S Bar, CRNP      Fluticasone Furoate-Vilanterol  1 puff Inhalation Daily Kaitlin S Bar, CRNP      insulin glargine  26 Units Subcutaneous HS Kaitlin S Bar, CRNP      insulin lispro  2-12 Units Subcutaneous TID AC Kaitlin S Bar, CRNP      insulin lispro  2-12 Units Subcutaneous HS Kaitlin S Bar, CRNP      insulin lispro  5 Units Subcutaneous TID With Meals Kaitlin S Bar, CRNP      levothyroxine  288 mcg Oral Early Morning Kaitlin S Bar, CRNP      loratadine  10 mg Oral Daily Kaitlin S Bar, CRNP      metoprolol succinate  25 mg Oral BID Radha Wright MD      montelukast  10 mg Oral HS Kaitlin S Bar, CRNP      multivitamin-minerals  1 tablet Oral Daily Kaitlin S Bar, CRNP      pantoprazole  40 mg Oral Early Morning Kaitlin S Bar, CRNP      polyethylene glycol  17 g Oral Daily PRN Kaitlin S Bar, CRNP      potassium chloride  20 mEq Oral TID Kaitlin S Bar, CRNP      warfarin  7.5 mg Oral Daily (warfarin) Kaitlin S Bar, CRNP          Today, Patient Was Seen By: Radha Wright MD    **Please Note: This note may have been constructed using a voice recognition system.**

## 2023-12-29 LAB
ANION GAP SERPL CALCULATED.3IONS-SCNC: 7 MMOL/L
ANION GAP SERPL CALCULATED.3IONS-SCNC: 8 MMOL/L
BUN SERPL-MCNC: 40 MG/DL (ref 5–25)
BUN SERPL-MCNC: 41 MG/DL (ref 5–25)
CALCIUM SERPL-MCNC: 9.3 MG/DL (ref 8.4–10.2)
CALCIUM SERPL-MCNC: 9.8 MG/DL (ref 8.4–10.2)
CHLORIDE SERPL-SCNC: 101 MMOL/L (ref 96–108)
CHLORIDE SERPL-SCNC: 102 MMOL/L (ref 96–108)
CO2 SERPL-SCNC: 34 MMOL/L (ref 21–32)
CO2 SERPL-SCNC: 35 MMOL/L (ref 21–32)
CREAT SERPL-MCNC: 1.22 MG/DL (ref 0.6–1.3)
CREAT SERPL-MCNC: 1.35 MG/DL (ref 0.6–1.3)
GFR SERPL CREATININE-BSD FRML MDRD: 39 ML/MIN/1.73SQ M
GFR SERPL CREATININE-BSD FRML MDRD: 44 ML/MIN/1.73SQ M
GLUCOSE SERPL-MCNC: 115 MG/DL (ref 65–140)
GLUCOSE SERPL-MCNC: 119 MG/DL (ref 65–140)
GLUCOSE SERPL-MCNC: 119 MG/DL (ref 65–140)
GLUCOSE SERPL-MCNC: 123 MG/DL (ref 65–140)
GLUCOSE SERPL-MCNC: 175 MG/DL (ref 65–140)
GLUCOSE SERPL-MCNC: 221 MG/DL (ref 65–140)
INR PPP: 2.75 (ref 0.84–1.19)
MAGNESIUM SERPL-MCNC: 2.5 MG/DL (ref 1.9–2.7)
POTASSIUM SERPL-SCNC: 3.5 MMOL/L (ref 3.5–5.3)
POTASSIUM SERPL-SCNC: 4.4 MMOL/L (ref 3.5–5.3)
PROTHROMBIN TIME: 29.4 SECONDS (ref 11.6–14.5)
SODIUM SERPL-SCNC: 143 MMOL/L (ref 135–147)
SODIUM SERPL-SCNC: 144 MMOL/L (ref 135–147)

## 2023-12-29 PROCEDURE — 99232 SBSQ HOSP IP/OBS MODERATE 35: CPT | Performed by: FAMILY MEDICINE

## 2023-12-29 PROCEDURE — 83735 ASSAY OF MAGNESIUM: CPT | Performed by: FAMILY MEDICINE

## 2023-12-29 PROCEDURE — 85610 PROTHROMBIN TIME: CPT | Performed by: FAMILY MEDICINE

## 2023-12-29 PROCEDURE — 97530 THERAPEUTIC ACTIVITIES: CPT

## 2023-12-29 PROCEDURE — 97535 SELF CARE MNGMENT TRAINING: CPT

## 2023-12-29 PROCEDURE — 82948 REAGENT STRIP/BLOOD GLUCOSE: CPT

## 2023-12-29 PROCEDURE — 80048 BASIC METABOLIC PNL TOTAL CA: CPT | Performed by: FAMILY MEDICINE

## 2023-12-29 RX ORDER — MIDODRINE HYDROCHLORIDE 5 MG/1
2.5 TABLET ORAL
Status: DISCONTINUED | OUTPATIENT
Start: 2023-12-29 | End: 2023-12-30

## 2023-12-29 RX ORDER — METOPROLOL SUCCINATE 25 MG/1
12.5 TABLET, EXTENDED RELEASE ORAL ONCE
Status: COMPLETED | OUTPATIENT
Start: 2023-12-29 | End: 2023-12-29

## 2023-12-29 RX ORDER — BUMETANIDE 0.25 MG/ML
1 INJECTION INTRAMUSCULAR; INTRAVENOUS CONTINUOUS
Status: DISCONTINUED | OUTPATIENT
Start: 2023-12-29 | End: 2024-01-01

## 2023-12-29 RX ORDER — METOPROLOL SUCCINATE 25 MG/1
37.5 TABLET, EXTENDED RELEASE ORAL 2 TIMES DAILY
Status: DISCONTINUED | OUTPATIENT
Start: 2023-12-29 | End: 2024-01-02 | Stop reason: HOSPADM

## 2023-12-29 RX ADMIN — MIDODRINE HYDROCHLORIDE 2.5 MG: 5 TABLET ORAL at 13:00

## 2023-12-29 RX ADMIN — INSULIN LISPRO 5 UNITS: 100 INJECTION, SOLUTION INTRAVENOUS; SUBCUTANEOUS at 17:20

## 2023-12-29 RX ADMIN — INSULIN LISPRO 5 UNITS: 100 INJECTION, SOLUTION INTRAVENOUS; SUBCUTANEOUS at 11:44

## 2023-12-29 RX ADMIN — DOCUSATE SODIUM 100 MG: 100 CAPSULE, LIQUID FILLED ORAL at 08:46

## 2023-12-29 RX ADMIN — ATORVASTATIN CALCIUM 10 MG: 10 TABLET, FILM COATED ORAL at 08:46

## 2023-12-29 RX ADMIN — Medication 1 MG/HR: at 11:44

## 2023-12-29 RX ADMIN — MIDODRINE HYDROCHLORIDE 2.5 MG: 5 TABLET ORAL at 17:18

## 2023-12-29 RX ADMIN — ALLOPURINOL 100 MG: 100 TABLET ORAL at 08:46

## 2023-12-29 RX ADMIN — Medication 1 MG/HR: at 18:16

## 2023-12-29 RX ADMIN — LORATADINE 10 MG: 10 TABLET ORAL at 08:46

## 2023-12-29 RX ADMIN — FLUTICASONE FUROATE AND VILANTEROL TRIFENATATE 1 PUFF: 100; 25 POWDER RESPIRATORY (INHALATION) at 08:47

## 2023-12-29 RX ADMIN — PANTOPRAZOLE SODIUM 40 MG: 40 TABLET, DELAYED RELEASE ORAL at 05:06

## 2023-12-29 RX ADMIN — POTASSIUM CHLORIDE 20 MEQ: 1500 TABLET, EXTENDED RELEASE ORAL at 17:18

## 2023-12-29 RX ADMIN — Medication 1 TABLET: at 08:46

## 2023-12-29 RX ADMIN — WARFARIN SODIUM 7.5 MG: 7.5 TABLET ORAL at 17:18

## 2023-12-29 RX ADMIN — LEVOTHYROXINE SODIUM 288 MCG: 88 TABLET ORAL at 05:06

## 2023-12-29 RX ADMIN — ACETAMINOPHEN 650 MG: 325 TABLET, FILM COATED ORAL at 21:05

## 2023-12-29 RX ADMIN — INSULIN LISPRO 5 UNITS: 100 INJECTION, SOLUTION INTRAVENOUS; SUBCUTANEOUS at 08:49

## 2023-12-29 RX ADMIN — POTASSIUM CHLORIDE 20 MEQ: 1500 TABLET, EXTENDED RELEASE ORAL at 08:46

## 2023-12-29 RX ADMIN — METOPROLOL SUCCINATE 25 MG: 25 TABLET, EXTENDED RELEASE ORAL at 08:46

## 2023-12-29 RX ADMIN — INSULIN LISPRO 4 UNITS: 100 INJECTION, SOLUTION INTRAVENOUS; SUBCUTANEOUS at 11:43

## 2023-12-29 RX ADMIN — INSULIN LISPRO 2 UNITS: 100 INJECTION, SOLUTION INTRAVENOUS; SUBCUTANEOUS at 17:19

## 2023-12-29 RX ADMIN — MONTELUKAST 10 MG: 10 TABLET, FILM COATED ORAL at 21:05

## 2023-12-29 RX ADMIN — ACETAMINOPHEN 650 MG: 325 TABLET, FILM COATED ORAL at 05:06

## 2023-12-29 RX ADMIN — Medication 1000 UNITS: at 08:46

## 2023-12-29 RX ADMIN — INSULIN GLARGINE 26 UNITS: 100 INJECTION, SOLUTION SUBCUTANEOUS at 21:06

## 2023-12-29 RX ADMIN — POTASSIUM CHLORIDE 20 MEQ: 1500 TABLET, EXTENDED RELEASE ORAL at 21:05

## 2023-12-29 RX ADMIN — FERROUS SULFATE TAB 325 MG (65 MG ELEMENTAL FE) 325 MG: 325 (65 FE) TAB at 08:54

## 2023-12-29 RX ADMIN — METOPROLOL SUCCINATE 12.5 MG: 25 TABLET, EXTENDED RELEASE ORAL at 12:59

## 2023-12-29 RX ADMIN — Medication 1 MG/HR: at 00:30

## 2023-12-29 RX ADMIN — DOCUSATE SODIUM 100 MG: 100 CAPSULE, LIQUID FILLED ORAL at 17:18

## 2023-12-29 NOTE — ASSESSMENT & PLAN NOTE
Lab Results   Component Value Date    EGFR 39 12/29/2023    EGFR 38 12/28/2023    EGFR 48 12/28/2023    CREATININE 1.35 (H) 12/29/2023    CREATININE 1.40 (H) 12/28/2023    CREATININE 1.14 12/28/2023     Hx CKD 3.  Baseline creatinine between 1-1.3.  Creatinine baseline  Monitor closely with IV diuresis  Trend creatinine  Renally dose medications and avoid nephrotoxic medications

## 2023-12-29 NOTE — ASSESSMENT & PLAN NOTE
Lab Results   Component Value Date    HGBA1C 7.3 (H) 12/23/2023       Recent Labs     12/28/23  1558 12/28/23  2145 12/29/23  0712 12/29/23  1102   POCGLU 126 146* 119 221*         Blood Sugar Average: Last 72 hrs:  (P) 153.3301398399933971    Continue home regimen Lantus 26 units at bedtime, lispro 5 units 3 times daily meals  Add SSI with Accu-Cheks  Hypoglycemia protocol  Received dexamethasone for COVID-monitor for steroid-induced hyperglycemia

## 2023-12-29 NOTE — ASSESSMENT & PLAN NOTE
Wt Readings from Last 3 Encounters:   12/29/23 (!) 156 kg (343 lb 7.6 oz)   10/29/22 (!) 151 kg (331 lb 12.7 oz)   10/03/22 (!) 171 kg (377 lb 6.8 oz)     Presents with dyspnea, orthopnea, anasarca and CTA chest with pulmonary edema, hypervolemic with CHF exacerbation.?>50lb over her weight at the time of last hospitalization.  Reports compliance with torsemide 50 mg twice daily  Seems since admission overall as a  everywhere but scale she lost 43 pounds she is -24 L  CHF pathway, intake output, daily weights, 2 g sodium diet with 1500 mL of fluid restriction  Previous echocardiogram with preserved ejection fraction but significant valvular heart disease, update echo  Monitor volume status  Continue bumex today will be given diamox and bmp q12   In 2022 discharged at 331 lb

## 2023-12-29 NOTE — PLAN OF CARE
Problem: PHYSICAL THERAPY ADULT  Goal: Performs mobility at highest level of function for planned discharge setting.  See evaluation for individualized goals.  Description: Treatment/Interventions: ADL retraining, Functional transfer training, LE strengthening/ROM, Therapeutic exercise, Endurance training, Patient/family training, Equipment eval/education, Bed mobility, Gait training, Compensatory technique education, Spoke to nursing, OT          See flowsheet documentation for full assessment, interventions and recommendations.  Note: Prognosis: Guarded  Problem List: Decreased strength, Decreased endurance, Impaired balance, Decreased mobility, Decreased range of motion, Impaired judgement, Decreased safety awareness, Obesity, Decreased skin integrity, Pain  Assessment: Pt tolerated session poorly reporting increased pain in left foot. She continues to require increased assistance to complete all bed mobility. She was agreeable to trial transfers this date, but was unable to clear buttock despite maxAx2. She requires increased time to complete all mobility and demonstrates decreased understanding of current situation and limitations as she is insistent on returning home. She is limited by decreased strength, balance, endurance and increased pain. She will continue to benefit from PT services to maximize LOF.  Barriers to Discharge: Decreased caregiver support, Inaccessible home environment  Barriers to Discharge Comments: requires assistance for all mobility, should shereturn to home will require heather lift for safe transfers.  Rehab Resource Intensity Level, PT: I (Maximum Resource Intensity)    See flowsheet documentation for full assessment.

## 2023-12-29 NOTE — CASE MANAGEMENT
Case Management Discharge Planning Note    Patient name Tigist Hewitt  Location /-01 MRN [id number]  : 1955 Date 2023       Current Admission Date: 2023  Current Admission Diagnosis:Acute on chronic diastolic congestive heart failure ([facility])   Patient Active Problem List    Diagnosis Date Noted    Abnormal CT scan, pelvis 2023    CKD (chronic kidney disease) stage 3, GFR 30-59 ml/min ([facility]) 2023    NISHI (obstructive sleep apnea) 2023    Intertrigo 10/29/2022    Vaginal bleeding 10/23/2022    Acute kidney injury superimposed on chronic kidney disease  10/22/2022    Hypotension 10/22/2022    GERD (gastroesophageal reflux disease) 10/22/2022    Acute on chronic diastolic congestive heart failure ([facility]) 2022    IMTIAZ (acute kidney injury) ([facility]) 2022    Morbid obesity ([facility]) 2022    Hypothyroidism 2022    Supratherapeutic INR 04/15/2020    Pneumonia due to COVID-19 virus 2020    Chronic respiratory failure with hypoxia ([facility]) 2020    Asthma 2020    Atrial fibrillation ([facility]) 2020    COPD (chronic obstructive pulmonary disease) ([facility]) 2020    Type 2 diabetes mellitus with hyperglycemia, with long-term current use of insulin ([facility]) 2020    Heart failure ([facility]) 2020    Shortness of breath 2020    Anemia 2020      LOS (days): 6  Geometric Mean LOS (GMLOS) (days): 5  Days to GMLOS:-0.8     OBJECTIVE:  Risk of Unplanned Readmission Score: 20.58         Current admission status: Inpatient   Preferred Pharmacy:   [id number] - [place], [place] - [address] [place] [id number]  Phone: [phone] Fax: [phone]    Primary Care Provider: Marisa Haque MD    Primary Insurance: [id number]  Secondary Insurance:     DISCHARGE DETAILS:                                Requested Home Health Care         Is the patient interested in HHC at discharge?: Yes  Home  Health Discipline requested:: Nursing, Occupational Therapy, Physical Therapy  Home Health Agency Name:: [facility]  A External Referral Reason (only applicable if external HHA name selected): Services not provided in network or near patient location  Home Health Follow-Up Provider:: PCP  Home Health Services Needed:: Evaluate Functional Status and Safety, Gait/ADL Training, Urinary Incontinence Catheter Management, Other (comment) (Medication Management)  Homebound Criteria Met:: Requires the Assistance of Another Person for Safe Ambulation or to Leave the Home, Uses an Assist Device (i.e. cane, walker, etc), Requires Medical Transportation  Supporting Clincal Findings:: Limited Endurance         Other Referral/Resources/Interventions Provided:  Interventions: [facility]  Referral Comments: [facility]         Treatment Team Recommendation: Home with Home Health Care  Discharge Destination Plan:: Home with Home Health Care  Transport at Discharge : BLS Ambulance               CM secured [facility] for patient upon discharge as spouse refusing STR for patient.     CM to follow for medical stability. Patient will require BLS transport home.

## 2023-12-29 NOTE — PHYSICAL THERAPY NOTE
"   PHYSICAL THERAPY TREATMENT NOTE  NAME:  Tigist Hewitt  DATE: 12/29/23    Length Of Stay: 6  Performed at least 2 patient identifiers during session: Name and Birthday    TREATMENT FLOW SHEET:    12/29/23 0744   PT Last Visit   PT Visit Date 12/29/23   Note Type   Note Type Treatment   Pain Assessment   Pain Assessment Tool FLACC   Pain Location/Orientation Orientation: Left;Location: Foot;Orientation: Bilateral;Location: Leg   Pain Rating: FLACC (Rest) - Face 0   Pain Rating: FLACC (Rest) - Legs 0   Pain Rating: FLACC (Rest) - Activity 0   Pain Rating: FLACC (Rest) - Cry 0   Pain Rating: FLACC (Rest) - Consolability 0   Score: FLACC (Rest) 0   Pain Rating: FLACC (Activity) - Face 1   Pain Rating: FLACC (Activity) - Legs 1   Pain Rating: FLACC (Activity) - Activity 1   Pain Rating: FLACC (Activity) - Cry 1   Pain Rating: FLACC (Activity) - Consolability 0   Score: FLACC (Activity) 4   Restrictions/Precautions   Other Precautions Airborne/isolation;Contact/isolation;Bed Alarm;Fall Risk;Pain;Multiple lines;Telemetry   General   Chart Reviewed Yes   Response to Previous Treatment Patient with no complaints from previous session.   Cognition   Following Commands Follows one step commands without difficulty   Comments decreased insight to deficits and current need for assistance with LOF. Pt feels she will return to home and be able ot complete transfers and short distance ambulation as she did prior to admission despite inability to stand.   Subjective   Subjective \"I will use my lift chair when I go home.\"   Bed Mobility   Rolling R 2  Maximal assistance   Additional items Assist x 2;Increased time required;Verbal cues;LE management  (trunk management)   Supine to Sit 2  Maximal assistance   Additional items Assist x 2;Increased time required;Verbal cues;LE management  (trunk management)   Sit to Supine 1  Dependent   Additional items Assist x 3;Increased time required;Verbal cues;LE management  (trunk " "management)   Additional Comments HOB elevated > 30 degrees. required maxAx2 to achieve sitting EOB with use of bedrail and inc verbal cues for technique and sequence as well as participation. required dependent of 3 to return to supine with HOB flat and assistance of LEs and trunk. rolling with maxAx2.   Transfers   Sit to Stand   (unable to clear buttock despite maxAx2 for 2 trials)   Additional Comments unable to clear buttock despite maxAx2 for 2 trials with B feet blocked and left knee blocked. verbal cues for hand placement on RW for anteriro wt shifting and technique. pt with increased effort 2nd trial however still unable to clear. reports L foot pain. pt with increased anterior translation 2nd trial. attempted chair position for breakfast however patient reports not feeling comfortable in chair position. chair positioning in bed modified to facilitate safe consumption of breakfast.   Balance   Static Sitting Fair -  (inc lateral lean to left, frequent verbal cues for upright posture and right lateral lean sat EOB with UE support ~8'.)   Dynamic Sitting Poor +   Endurance Deficit   Endurance Deficit Yes   Endurance Deficit Description SpO2 on 4 lpm 91-96% throughout. HR increased from 90s to 120s with activity. returned to baseline with return to supine.   Activity Tolerance   Activity Tolerance Patient limited by fatigue;Patient limited by pain   Medical Staff Made Aware debora DE   Nurse Made Aware Gladis MIRELES   Assessment   Prognosis Guarded   Problem List Decreased strength;Decreased endurance;Impaired balance;Decreased mobility;Decreased range of motion;Impaired judgement;Decreased safety awareness;Obesity;Decreased skin integrity;Pain   Barriers to Discharge Decreased caregiver support;Inaccessible home environment   Barriers to Discharge Comments requires assistance for all mobility, should shereturn to home will require heather lift for safe transfers.   Goals   Patient Goals \"Go home\"   PT Treatment " Day 1   Plan   Treatment/Interventions ADL retraining;Functional transfer training;LE strengthening/ROM;Therapeutic exercise;Endurance training;Patient/family training;Equipment eval/education;Bed mobility;Gait training;Compensatory technique education;Spoke to nursing;Spoke to case management;OT   Progress No functional improvements   PT Frequency 3-5x/wk   Discharge Recommendation   Rehab Resource Intensity Level, PT I (Maximum Resource Intensity)   Equipment Recommended   (heather lift and hospital bed)   Additional Comments should patient return to home as she is declining post acute rehab would recommend heather lift and hospital bed.   AM-PAC Basic Mobility Inpatient   Turning in Flat Bed Without Bedrails 1   Lying on Back to Sitting on Edge of Flat Bed Without Bedrails 1   Moving Bed to Chair 1   Standing Up From Chair Using Arms 1   Walk in Room 1   Climb 3-5 Stairs With Railing 1   Basic Mobility Inpatient Raw Score 6   Turning Head Towards Sound 4   Follow Simple Instructions 4   Low Function Basic Mobility Raw Score  14   Low Function Basic Mobility Standardized Score  22.01   Highest Level Of Mobility   JH-HLM Goal 2: Bed activities/Dependent transfer   JH-HLM Achieved 3: Sit at edge of bed   Education   Education Provided Mobility training;Assistive device   Patient Reinforcement needed   End of Consult   Patient Position at End of Consult Supine;Bed/Chair alarm activated;All needs within reach       The patient's AM-PAC Basic Mobility Inpatient Short Form Raw Score is 6. A Raw score of less than or equal to 16 suggests the patient may benefit from discharge to post-acute rehabilitation services. Please also refer to the recommendation of the Physical Therapist for safe discharge planning.     Pt tolerated session poorly reporting increased pain in left foot. She continues to require increased assistance to complete all bed mobility. She was agreeable to trial transfers this date, but was unable to clear  buttock despite maxAx2. She requires increased time to complete all mobility and demonstrates decreased understanding of current situation and limitations as she is insistent on returning home. She is limited by decreased strength, balance, endurance and increased pain. She will continue to benefit from PT services to maximize LOF.       Linda Alberto, PT,DPT

## 2023-12-29 NOTE — PROGRESS NOTES
"Progress Note - Cardiology   Tigist Hewitt 68 y.o. female MRN: [id number]  Unit/Bed#: -01 Encounter: [id number]    Assessment:  COVID 19 Pneumonia  Acute on chronic HFpEF- on bumex infusion currently at 1 mg/hr   - continues to diurese well after one dose diuril and diamox x2  COPD  Chronic afib- rate controlled               - on coumadin   Chronic resp failure- on 4 L at baseline   NISHI on CPAP   CKD3  Mild to mod MR  Mild AS  Severe TR  Non compliance with CHF diet     Plan:  Continue bum ex drip  Monitor I and o  Daily standing weight if possible  Monitor renal function and electrolytes   Will continue to follow     Subjective/Objective     Subjective: pt reports significant urinary output. Improved breathing. Feels close to baseline     Objective: continues to diurese well. Baseline Cr likely 1.3-1.4    Vitals: /68   Pulse 100   Temp 97.9 °F (36.6 °C)   Resp 20   Ht 5' 2\" (1.575 m)   Wt (!) 156 kg (343 lb 7.6 oz)   SpO2 (!) 88%   BMI 62.82 kg/m²   Vitals:    12/28/23 0912 12/29/23 0609   Weight: (!) 159 kg (350 lb 8.5 oz) (!) 156 kg (343 lb 7.6 oz)     Orthostatic Blood Pressures      Flowsheet Row Most Recent Value   Blood Pressure 111/68 filed at 12/29/2023 0846   Patient Position - Orthostatic VS Lying filed at 12/28/2023 1100              Intake/Output Summary (Last 24 hours) at 12/29/2023 1142  Last data filed at 12/29/2023 1101  Gross per 24 hour   Intake 1500 ml   Output 4400 ml   Net -2900 ml       Invasive Devices       Peripheral Intravenous Line  Duration             Peripheral IV 12/27/23 Distal;Left;Ventral (anterior) Forearm 1 day    Peripheral IV 12/27/23 Distal;Right;Ventral (anterior) Forearm 1 day              Drain  Duration             Urethral Catheter Temperature probe 16 Fr. 5 days                    Physical Exam  Constitutional:       Appearance: Normal appearance.   HENT:      Head: Normocephalic and atraumatic.   Cardiovascular:      Rate and Rhythm: Rhythm " irregular.      Heart sounds: Murmur heard.   Pulmonary:      Effort: Pulmonary effort is normal.   Abdominal:      General: There is distension.   Musculoskeletal:         General: Swelling present.   Skin:     General: Skin is warm.   Neurological:      General: No focal deficit present.      Mental Status: She is alert.   Psychiatric:         Mood and Affect: Mood normal.        Lab Results: I have personally reviewed pertinent lab results.    CBC with diff:   Results from last 7 days   Lab Units 12/25/23  0510   WBC Thousand/uL 5.74   RBC Million/uL 4.01   HEMOGLOBIN g/dL 12.0   HEMATOCRIT % 39.0   MCV fL 97   MCH pg 29.9   MCHC g/dL 30.8*   RDW % 20.0*   MPV fL 10.8   PLATELETS Thousands/uL 161     CMP:   Results from last 7 days   Lab Units 12/29/23  0812 12/26/23  0505 12/25/23  0510   SODIUM mmol/L 143   < > 144   CHLORIDE mmol/L 101   < > 104   CO2 mmol/L 34*   < > 33*   BUN mg/dL 41*   < > 41*   CREATININE mg/dL 1.35*   < > 1.15   CALCIUM mg/dL 9.8   < > 9.1   AST U/L  --   --  32   ALT U/L  --   --  17   ALK PHOS U/L  --   --  110*   EGFR ml/min/1.73sq m 39   < > 48    < > = values in this interval not displayed.     HS Troponin:   0   Lab Value Date/Time    HSTNI0 36 12/23/2023 1441    HSTNI2 34 12/23/2023 1651    HSTNI4 58 (H) 10/22/2022 1736    HSTNI4 25 09/21/2022 1756     BNP:   Results from last 7 days   Lab Units 12/29/23  0812   POTASSIUM mmol/L 3.5   CHLORIDE mmol/L 101   CO2 mmol/L 34*   BUN mg/dL 41*   CREATININE mg/dL 1.35*   CALCIUM mg/dL 9.8   EGFR ml/min/1.73sq m 39     Coags:   Results from last 7 days   Lab Units 12/29/23  0812 12/24/23  0537 12/23/23  1441   PTT seconds  --   --  38*   INR  2.75*   < > 2.17*    < > = values in this interval not displayed.     TSH:   Results from last 7 days   Lab Units 12/24/23  0537   TSH 3RD GENERATON uIU/mL 1.154     Magnesium:   Results from last 7 days   Lab Units 12/29/23  0812   MAGNESIUM mg/dL 2.5     Lipid Profile:     Imaging: I have personally  reviewed pertinent reports.        EKG:          Narrative & Impression     Atrial fibrillation  Right bundle branch block  Nonspecific ST and T wave abnormality  Prolonged QT  Abnormal ECG  When compared with ECG of 22-OCT-2022 11:51,  Nonspecific T wave abnormality now evident in Lateral leads  Confirmed by Aníbal Tes ([id number]) on 12/26/2023 10:47:06 AM                  Echo 12/24/2023:    Left Ventricle: Left ventricular cavity size is normal. Wall thickness is normal. The left ventricular ejection fraction is 55% by visual estimation, although assessment is significantly limited due to poor endocardial border definition even with the use of contrast. Systolic function is probably normal. Although no diagnostic regional wall motion abnormality was identified, this possibility cannot be completely excluded on the basis of this study. Unable to assess diastolic function due to atrial fibrillation.    IVS: There is both systolic and diastolic flattening of the interventricular septum consistent with right ventricle pressure and volume overload.    Right Ventricle: Right ventricular cavity size is mildly dilated. Systolic function is moderately reduced.    Right Atrium: The atrium is dilated.    Atrial Septum: The septum bows into the left atrium, suggesting increased right atrial pressure.    Aortic Valve: There is mild stenosis. The aortic valve peak velocity is 1.25 m/s. The aortic valve mean gradient is 4 mmHg. The dimensionless velocity index is 0.53. The aortic valve area is 1.56 cm2. The stroke volume index is 15.00 ml/m2. The aortic valve velocity is increased due to stenosis but lower than expected due to the presence of decreased flow.    Mitral Valve: There is mild annular calcification. There is mild to moderate regurgitation with a posteriorly directed jet. Severity is potentially underestimated due to eccentric jet.    Tricuspid Valve: There is severe regurgitation. The tricuspid valve regurgitation  jet is directed toward septum. The right ventricular systolic pressure is moderately elevated. The estimated right ventricular systolic pressure is 53.00 mmHg.    Pulmonic Valve: There is mild regurgitation.    Prior TTE study available for comparison. Prior study date: 9/21/2022. No significant changes noted compared to the prior study.

## 2023-12-29 NOTE — PLAN OF CARE
Problem: Potential for Falls  Goal: Patient will remain free of falls  Description: INTERVENTIONS:  - Educate patient/family on patient safety including physical limitations  - Instruct patient to call for assistance with activity   - Consult OT/PT to assist with strengthening/mobility   - Keep Call bell within reach  - Keep bed low and locked with side rails adjusted as appropriate  - Keep care items and personal belongings within reach  - Initiate and maintain comfort rounds  - Make Fall Risk Sign visible to staff  - Offer Toileting every 2 Hours, in advance of need  - Initiate/Maintain bed/chair alarm  - Obtain necessary fall risk management equipment: bed/chair alarm  - Apply yellow socks and bracelet for high fall risk patients  - Consider moving patient to room near nurses station  Outcome: Progressing     Problem: Prexisting or High Potential for Compromised Skin Integrity  Goal: Skin integrity is maintained or improved  Description: INTERVENTIONS:  - Identify patients at risk for skin breakdown  - Assess and monitor skin integrity  - Assess and monitor nutrition and hydration status  - Monitor labs   - Assess for incontinence   - Turn and reposition patient  - Assist with mobility/ambulation  - Relieve pressure over bony prominences  - Avoid friction and shearing  - Provide appropriate hygiene as needed including keeping skin clean and dry  - Evaluate need for skin moisturizer/barrier cream  - Collaborate with interdisciplinary team   - Patient/family teaching  - Consider wound care consult   Outcome: Progressing     Problem: SAFETY ADULT  Goal: Patient will remain free of falls  Description: INTERVENTIONS:  - Educate patient/family on patient safety including physical limitations  - Instruct patient to call for assistance with activity   - Consult OT/PT to assist with strengthening/mobility   - Keep Call bell within reach  - Keep bed low and locked with side rails adjusted as appropriate  - Keep care  items and personal belongings within reach  - Initiate and maintain comfort rounds  - Make Fall Risk Sign visible to staff  - Offer Toileting every 2 Hours, in advance of need  - Initiate/Maintain bed/chair alarm  - Obtain necessary fall risk management equipment: bed/chair alarm  - Apply yellow socks and bracelet for high fall risk patients  - Consider moving patient to room near nurses station  Outcome: Progressing     Problem: DISCHARGE PLANNING  Goal: Discharge to home or other facility with appropriate resources  Description: INTERVENTIONS:  - Identify barriers to discharge w/patient and caregiver  - Arrange for needed discharge resources and transportation as appropriate  - Identify discharge learning needs (meds, wound care, etc.)  - Arrange for interpretive services to assist at discharge as needed  - Refer to Case Management Department for coordinating discharge planning if the patient needs post-hospital services based on physician/advanced practitioner order or complex needs related to functional status, cognitive ability, or social support system  Outcome: Progressing

## 2023-12-29 NOTE — PLAN OF CARE
Problem: OCCUPATIONAL THERAPY ADULT  Goal: Performs self-care activities at highest level of function for planned discharge setting.  See evaluation for individualized goals.  Description: Treatment Interventions: ADL retraining, Functional transfer training, UE strengthening/ROM, Endurance training, Patient/family training, Equipment evaluation/education, Compensatory technique education, Continued evaluation, Energy conservation, Activityengagement          See flowsheet documentation for full assessment, interventions and recommendations.   Outcome: Progressing  Note: Limitation: Decreased ADL status, Decreased UE ROM, Decreased Safe judgement during ADL, Decreased UE strength, Decreased endurance, Decreased cognition, Decreased self-care trans, Decreased high-level ADLs     Assessment: Tigist was seen for a follow up OT/PT co-treatment session focused on improving performance with bed mobility, STS transfers from bed level, and ADLs. Pt completed hygiene and grooming tasks with Min A due to decreased thoroughness, LB ADLs with Max/Dependent, UB ADLs with Min/Mod A, set up with self-feeding from breakfast tray. Functional performance is limited by anxiety, fear of falling, knee pain, left foot pain, impaired balance, and overall deconditioning. Continue OT POC to maximize functional independence, reduce caregiver burden, and prevent falls/rehospitalizations.     Rehab Resource Intensity Level, OT: I (Maximum Resource Intensity)

## 2023-12-29 NOTE — PROGRESS NOTES
Patient:  HANNA WASHINGTON    MRN:  [id number]    Aidin Request ID:  [id number]    Level of care reserved:  Home Health Agency    Partner Reserved:  [facility] [place], [place] [id number] [phone]    Clinical needs requested:    Geography searched:  [phone]    Start of Service:    Request sent:  3:08pm EST on 12/28/2023 by Pippa Mccormick    Partner reserved:  2:02pm EST on 12/29/2023 by Tamara Chin    Choice list shared:  2:02pm EST on 12/29/2023 by Tamara Chin

## 2023-12-29 NOTE — OCCUPATIONAL THERAPY NOTE
Occupational Therapy Progress Note     Patient Name: Tigist Hewitt  Today's Date: 12/29/2023  Problem List  Principal Problem:    Acute on chronic diastolic congestive heart failure (HCC)  Active Problems:    Atrial fibrillation (HCC)    COPD (chronic obstructive pulmonary disease) ([facility])    Type 2 diabetes mellitus with hyperglycemia, with long-term current use of insulin ([facility])    Pneumonia due to COVID-19 virus    Chronic respiratory failure with hypoxia ([facility])    Morbid obesity ([facility])    Hypothyroidism    NISHI (obstructive sleep apnea)    Abnormal CT scan, pelvis    CKD (chronic kidney disease) stage 3, GFR 30-59 ml/min ([facility])            12/29/23 0745   OT Last Visit   OT Visit Date 12/29/23   Note Type   Note Type Treatment   Pain Assessment   Pain Location/Orientation Orientation: Left;Location: Foot  (bilateral knees)   Pain Rating: FLACC (Rest) - Face 0   Pain Rating: FLACC (Rest) - Legs 0   Pain Rating: FLACC (Rest) - Activity 0   Pain Rating: FLACC (Rest) - Cry 0   Pain Rating: FLACC (Rest) - Consolability 0   Score: FLACC (Rest) 0   Pain Rating: FLACC (Activity) - Face 2   Pain Rating: FLACC (Activity) - Legs 1   Pain Rating: FLACC (Activity) - Activity 1   Pain Rating: FLACC (Activity) - Cry 2   Pain Rating: FLACC (Activity) - Consolability 1   Score: FLACC (Activity) 7   Restrictions/Precautions   Weight Bearing Precautions Per Order No   Other Precautions Cardiac/sternal;Airborne/isolation;O2;Fall Risk  (4L O2)   ADL   Where Assessed Edge of bed   Eating Assistance 5  Supervision/Setup   Eating Deficit Setup   Eating Comments seated upright in bed   Grooming Assistance 5  Supervision/Setup   Grooming Deficit Setup;Brushing hair   UB Bathing Assistance 4  Minimal Assistance   UB Bathing Deficit Setup;Chest;Right arm;Left arm;Abdomen   UB Bathing Comments Min A to increase thoroughness with warmed wipes   LB Bathing Assistance 2  Maximal Assistance   LB Bathing Deficit Buttocks;Right lower leg including  foot;Left lower leg including foot;Perineal area   LB Dressing Assistance 1  Total Assistance   LB Dressing Deficit Don/doff R sock;Don/doff L sock   LB Dressing Comments Limited by BLE pain   Toileting Assistance    (frederick catheter in place)   Functional Standing Tolerance   Time 3-5 seconds   Activity static standing from EOB, cleared buttocks minimally with Max A x 2   Comments RW in stance   Bed Mobility   Rolling R 2  Maximal assistance   Additional items Assist x 2;Increased time required;Verbal cues   Rolling L 2  Maximal assistance   Additional items Assist x 2;Increased time required;Verbal cues   Supine to Sit 2  Maximal assistance   Additional items Assist x 2;HOB elevated;Bedrails;Increased time required;Verbal cues;LE management   Sit to Supine 1  Dependent   Additional items Assist x 3;HOB elevated;LE management;Verbal cues   Additional Comments EOB sitting tolerance for 5-8 minutes while completing ADL routine. Left lateral lean noted while seated unsupported and supported in bed with Min/Mod A to self-correct to midline.   Transfers   Sit to Stand 2  Maximal assistance   Additional items Assist x 2   Stand to Sit 2  Maximal assistance   Additional items Assist x 2   Additional Comments Cleared buttocks minimally cleared with partial stance tolerated for ~3-5 seconds prior to sitting due to BLE pain (knees, left foot) and fear of falling. Vitals: 91-94% on 4L O2, ~120BPM after standing activity.   Cognition   Arousal/Participation Alert;Responsive   Attention Within functional limits   Assessment   Assessment Tigist was seen for a follow up OT/PT co-treatment session focused on improving performance with bed mobility, STS transfers from bed level, and ADLs. Pt completed hygiene and grooming tasks with Min A due to decreased thoroughness, LB ADLs with Max/Dependent, UB ADLs with Min/Mod A, set up with self-feeding from breakfast tray. Functional performance is limited by anxiety, fear of falling, knee  pain, left foot pain, impaired balance, and overall deconditioning. Continue OT POC to maximize functional independence, reduce caregiver burden, and prevent falls/rehospitalizations.   Plan   Treatment Interventions ADL retraining;Functional transfer training;UE strengthening/ROM;Endurance training;Patient/family training;Equipment evaluation/education;Compensatory technique education;Continued evaluation;Energy conservation;Activityengagement   Goal Expiration Date 01/10/24   OT Frequency 2-3x/wk   Discharge Recommendation   Rehab Resource Intensity Level, OT I (Maximum Resource Intensity)   AM-PAC Daily Activity Inpatient   Lower Body Dressing 1   Bathing 1   Toileting 1   Upper Body Dressing 1   Grooming 1   Eating 4   Daily Activity Raw Score 9   AM-PAC Applied Cognition Inpatient   Following a Speech/Presentation 4   Understanding Ordinary Conversation 4   Taking Medications 4   Remembering Where Things Are Placed or Put Away 4   Remembering List of 4-5 Errands 4   Taking Care of Complicated Tasks 3   Applied Cognition Raw Score 23   Applied Cognition Standardized Score 53.08     Assessment:  The patient's raw score on the AM-PAC Daily Activity Inpatient Short Form is 12. A raw score of less than 19 suggests the patient may benefit from discharge to post-acute rehabilitation services. Please refer to the recommendation of the Occupational Therapist for safe discharge planning.    Pt goals to be met by 1/10/2024:     Pt will demonstrate ability to complete grooming/hygiene tasks @ mod I after set-up.  Pt will demonstrate ability to complete supine<>sit @ min assist in order to increase safety and independence during ADL tasks.  Pt will demonstrate ability to complete UB ADLs including washing/dressing @ min assist in order to increase performance and participation during meaningful tasks  Pt will demonstrate ability to complete LB dressing @ min assist in order to increase safety and independence during  meaningful tasks.   Pt will demonstrate ability to jeff/doff socks/shoes while sitting EOB/chair @ min assist in order to increase safety and independence during meaningful tasks.   Pt will demonstrate ability to complete toileting tasks including CM and pericare @ min assist in order to increase safety and independence during meaningful tasks.  Pt will demonstrate ability to complete EOB, chair, toilet/commode transfers @ mod I in order to increase performance and participation during functional tasks.  Pt will demonstrate ability to stand for 1 minute while maintaining F balance with use of RW for UB support PRN.  Pt will demonstrate ability to tolerate 5 minute OT session with no vc'ing for deep breathing or use of energy conservation techniques in order to increase activity tolerance during functional tasks.   Pt will demonstrate Good carryover of use of energy conservation/compensatory strategies during ADLs and functional tasks in order to increase safety and reduce risk for falls.   Pt will demonstrate Good attention and participation in continued evaluation of functional ambulation house hold distances in order to assist with safe d/c planning.  Pt will attend to continued cognitive assessments 100% of the time in order to provide most appropriate d/c recommendations.   Pt will follow 100% simple 2-step commands and be A&O x4 consistently with environmental cues to increase participation in functional activities.   Pt will identify 3 areas of interest/hobbies and 1 intervention on how to incorporate into daily life in order to increase interaction with environment and peers as well as increase participation in meaningful tasks.   Pt will demonstrate 100% carryover of BUE HEP in order to increase BUE MS and increase performance during functional tasks upon d/c home.    Yudi Quintero MS, OTR/L

## 2023-12-29 NOTE — PROGRESS NOTES
[facility]  Progress Note  Name: Tigist Hewitt I  MRN: [id number]  Unit/Bed#: [facility] I Date of Admission: 12/23/2023   Date of Service: 12/29/2023 I Hospital Day: 6    Assessment/Plan   * Acute on chronic diastolic congestive heart failure (HCC)  Assessment & Plan  Wt Readings from Last 3 Encounters:   12/29/23 (!) 156 kg (343 lb 7.6 oz)   10/29/22 (!) 151 kg (331 lb 12.7 oz)   10/03/22 (!) 171 kg (377 lb 6.8 oz)     Presents with dyspnea, orthopnea, anasarca and CTA chest with pulmonary edema, hypervolemic with CHF exacerbation.?>50lb over her weight at the time of last hospitalization.  Reports compliance with torsemide 50 mg twice daily  Seems since admission overall as a  everywhere but scale she lost 43 pounds she is -24 L  CHF pathway, intake output, daily weights, 2 g sodium diet with 1500 mL of fluid restriction  Previous echocardiogram with preserved ejection fraction but significant valvular heart disease, update echo  Monitor volume status  Continue bumex today will be given diamox and bmp q12   In 2022 discharged at 331 lb    CKD (chronic kidney disease) stage 3, GFR 30-59 ml/min ([facility])  Assessment & Plan  Lab Results   Component Value Date    EGFR 39 12/29/2023    EGFR 38 12/28/2023    EGFR 48 12/28/2023    CREATININE 1.35 (H) 12/29/2023    CREATININE 1.40 (H) 12/28/2023    CREATININE 1.14 12/28/2023     Hx CKD 3.  Baseline creatinine between 1-1.3.  Creatinine baseline  Monitor closely with IV diuresis  Trend creatinine  Renally dose medications and avoid nephrotoxic medications    Abnormal CT scan, pelvis  Assessment & Plan  Abnormal endometrial thickening on CT pelvis  Discussed with patient that she will need GYN follow-up  Patient endorses intermittent vaginal bleeding    NISHI (obstructive sleep apnea)  Assessment & Plan  Continue CPAP    Hypothyroidism  Assessment & Plan  Continue PTA levothyroxine 188 mcg daily  Tsh nml    Morbid obesity (HCC)  Assessment  & Plan  BMI 70  Intensive lifestyle modification required  Outpatient referral to bariatric medicine    Chronic respiratory failure with hypoxia ([facility])  Assessment & Plan  Chronically on 4 L oxygen nasal cannula Home regimen  Stable at baseline    Pneumonia due to COVID-19 virus  Assessment & Plan  Background: Presented with dyspnea, fluid retention found to be COVID-positive  COVID19- PCR positive 12/23  Supplemental O2 with 4LNC saturating 95%; this is patient's baseline   imaging   CTA chest with GGO viral versus pulmonary edema  Started on mild COVID pathway for treatment  Not requiring increased oxygen needs, no indication for dexamethasone  AC warfarin-INR therapeutic  Daily CBC and CMP  Continue check inflammatory markers as indicated  S/p 5 days of remdesavir   Encourage ISP/flutter valve  Encourage proning and early mobilization  Blood cultures pending  Trend procalcitonin  Airborne/Droplet precautions ordered     Type 2 diabetes mellitus with hyperglycemia, with long-term current use of insulin ([facility])  Assessment & Plan  Lab Results   Component Value Date    HGBA1C 7.3 (H) 12/23/2023       Recent Labs     12/28/23  1558 12/28/23  2145 12/29/23  0712 12/29/23  1102   POCGLU 126 146* 119 221*         Blood Sugar Average: Last 72 hrs:  (P) [id number]    Continue home regimen Lantus 26 units at bedtime, lispro 5 units 3 times daily meals  Add SSI with Accu-Cheks  Hypoglycemia protocol  Received dexamethasone for COVID-monitor for steroid-induced hyperglycemia     COPD (chronic obstructive pulmonary disease) ([facility])  Assessment & Plan  Acute exacerbation secondary to COVID  As needed bronchodilators  Continue PTA Breo and Singulair  Received IV dexamethasone x 1 in ED, will hold further steroids  Monitor  Oxygen needs are baseline    Atrial fibrillation ([facility])  Assessment & Plan  Continue warfarin 7.5 mg daily  Goal INR 2-3  Rates uncontrolled soft blood pressure start midodrine to allow increase of Toprol  increase Toprol to 37.5 mg p.o. twice daily and give extra 12.5 now  INR therapetic                 VTE Pharmacologic Prophylaxis:   Moderate Risk (Score 3-4) - Pharmacological DVT Prophylaxis Ordered: warfarin (Coumadin).    Mobility:   Basic Mobility Inpatient Raw Score: 6  -HLM Goal: 2: Bed activities/Dependent transfer  JH-HLM Achieved: 2: Bed activities/Dependent transfer  HLM Goal NOT achieved. Continue with multidisciplinary rounding and encourage appropriate mobility to improve upon HLM goals.    Patient Centered Rounds: I performed bedside rounds with nursing staff today.   Discussions with Specialists or Other Care Team Provider: cards    Education and Discussions with Family / Patient: pt    Total Time Spent on Date of Encounter in care of patient: >35 mins. This time was spent on one or more of the following: performing physical exam; counseling and coordination of care; obtaining or reviewing history; documenting in the medical record; reviewing/ordering tests, medications or procedures; communicating with other healthcare professionals and discussing with patient's family/caregivers.    Current Length of Stay: 6 day(s)  Current Patient Status: Inpatient   Certification Statement: The patient will continue to require additional inpatient hospital stay due to chf  Discharge Plan: Anticipate discharge in >72 hrs to home with home services.    Code Status: Level 3 - DNAR and DNI    Subjective:   Seen and examined sob ok just cold    Objective:     Vitals:   Temp (24hrs), Av.8 °F (36.6 °C), Min:97.5 °F (36.4 °C), Max:98.1 °F (36.7 °C)    Temp:  [97.5 °F (36.4 °C)-98.1 °F (36.7 °C)] 98.1 °F (36.7 °C)  HR:  [] 100  Resp:  [20] 20  BP: (101-113)/(58-69) 111/69  SpO2:  [88 %-99 %] 95 %  Body mass index is 62.82 kg/m².     Input and Output Summary (last 24 hours):     Intake/Output Summary (Last 24 hours) at 2023 1155  Last data filed at 2023 1101  Gross per 24 hour   Intake 1500 ml    Output 4400 ml   Net -2900 ml       Physical Exam:   Physical Exam  Vitals and nursing note reviewed.   Constitutional:       General: She is not in acute distress.     Appearance: She is well-developed.   HENT:      Head: Normocephalic and atraumatic.   Eyes:      Conjunctiva/sclera: Conjunctivae normal.   Cardiovascular:      Rate and Rhythm: Normal rate. Rhythm irregular.      Heart sounds: No murmur heard.  Pulmonary:      Effort: Pulmonary effort is normal. No respiratory distress.      Breath sounds: Rales present.   Abdominal:      Palpations: Abdomen is soft.      Tenderness: There is no abdominal tenderness.   Musculoskeletal:         General: No swelling.      Cervical back: Neck supple.   Skin:     General: Skin is warm and dry.      Capillary Refill: Capillary refill takes less than 2 seconds.   Neurological:      Mental Status: She is alert and oriented to person, place, and time.   Psychiatric:         Mood and Affect: Mood normal.          Additional Data:     Labs:  Results from last 7 days   Lab Units 12/25/23  0510   WBC Thousand/uL 5.74   HEMOGLOBIN g/dL 12.0   HEMATOCRIT % 39.0   PLATELETS Thousands/uL 161   NEUTROS PCT % 61   LYMPHS PCT % 17   MONOS PCT % 19*   EOS PCT % 1     Results from last 7 days   Lab Units 12/29/23  0812 12/26/23  0505 12/25/23  0510   SODIUM mmol/L 143   < > 144   POTASSIUM mmol/L 3.5   < > 3.6   CHLORIDE mmol/L 101   < > 104   CO2 mmol/L 34*   < > 33*   BUN mg/dL 41*   < > 41*   CREATININE mg/dL 1.35*   < > 1.15   ANION GAP mmol/L 8   < > 7   CALCIUM mg/dL 9.8   < > 9.1   ALBUMIN g/dL  --   --  3.4*   TOTAL BILIRUBIN mg/dL  --   --  0.82   ALK PHOS U/L  --   --  110*   ALT U/L  --   --  17   AST U/L  --   --  32   GLUCOSE RANDOM mg/dL 123   < > 112    < > = values in this interval not displayed.     Results from last 7 days   Lab Units 12/29/23  0812   INR  2.75*     Results from last 7 days   Lab Units 12/29/23  1102 12/29/23  0712 12/28/23  7745 12/28/23  1651  12/28/23  1131 12/28/23  0714 12/27/23  2047 12/27/23  1545 12/27/23  1131 12/27/23  0750 12/26/23  2040 12/26/23  1600   POC GLUCOSE mg/dl 221* 119 146* 126 211* 135 183* 136 184* 138 168* 138     Results from last 7 days   Lab Units 12/23/23  1441   HEMOGLOBIN A1C % 7.3*     Results from last 7 days   Lab Units 12/24/23  0537 12/23/23  1441   LACTIC ACID mmol/L  --  1.3   PROCALCITONIN ng/ml 0.08 0.05       Lines/Drains:  Invasive Devices       Peripheral Intravenous Line  Duration             Peripheral IV 12/27/23 Distal;Left;Ventral (anterior) Forearm 1 day    Peripheral IV 12/27/23 Distal;Right;Ventral (anterior) Forearm 1 day              Drain  Duration             Urethral Catheter Temperature probe 16 Fr. 5 days                  Urinary Catheter:  Goal for removal: Remove after 48 hrs of I/O monitoring           Telemetry:  Telemetry Orders (From admission, onward)               24 Hour Telemetry Monitoring  Continuous x 24 Hours (Telem)        Question:  Reason for 24 Hour Telemetry  Answer:  Decompensated CHF- and any one of the following: continuous diuretic infusion or total diuretic dose >200 mg daily, associated electrolyte derangement (I.e. K < 3.0), ionotropic drip (continuous infusion), hx of ventricular arrhythmia, or new EF < 35%                     Telemetry Reviewed: Atrial fibrillation. HR averaging 110  Indication for Continued Telemetry Use: Acute CHF on >200 mg lasix/day or equivalent dose or with new reduced EF.              Imaging: Reviewed radiology reports from this admission including: chest xray    Recent Cultures (last 7 days):   Results from last 7 days   Lab Units 12/23/23  1444 12/23/23  1441   BLOOD CULTURE  No Growth After 5 Days. No Growth After 5 Days.       Last 24 Hours Medication List:   Current Facility-Administered Medications   Medication Dose Route Frequency Provider Last Rate    acetaminophen  650 mg Oral Q6H PRN CAMPBELL Munoz      albuterol  2.5 mg  Nebulization Q4H PRN Kaitlin S Bar, CRNP      allopurinol  100 mg Oral Daily Kaitlin S Bar, CRNP      atorvastatin  10 mg Oral Daily Kaitlin S Bar, CRNP      bumetanide (BUMEX) 12.5 mg infusion 50 mL  1 mg/hr Intravenous Continuous Debby Galindo PA-C 1 mg/hr (12/29/23 1144)    cholecalciferol  1,000 Units Oral Daily Kaitlin S Bar, CRNP      docusate sodium  100 mg Oral BID Kaitlin S Bar, CRNP      ferrous sulfate  325 mg Oral Daily With Breakfast Kaitlin S Bar, CRNP      Fluticasone Furoate-Vilanterol  1 puff Inhalation Daily Kaitlin S Bar, CRNP      insulin glargine  26 Units Subcutaneous HS Kaitlin S Bar, CRNP      insulin lispro  2-12 Units Subcutaneous TID AC Kaitlin S Bar, CRNP      insulin lispro  2-12 Units Subcutaneous HS Kaitlin S Bar, CRNP      insulin lispro  5 Units Subcutaneous TID With Meals Kaitlin S Bar, CRNP      levothyroxine  288 mcg Oral Early Morning Kaitlin S Bar, CRNP      loratadine  10 mg Oral Daily Kaitlin S Bar, CRNP      metoprolol succinate  12.5 mg Oral Once Radha Wright MD      metoprolol succinate  37.5 mg Oral BID Radha Wright MD      midodrine  2.5 mg Oral TID AC Radha Wright MD      montelukast  10 mg Oral HS Kaitlin S Bar, CRNP      multivitamin-minerals  1 tablet Oral Daily Kaitlin S Bar, CRNP      pantoprazole  40 mg Oral Early Morning Kaitlin S Bar, CRNP      polyethylene glycol  17 g Oral Daily PRN Kaitlin S Bar, CRNP      potassium chloride  20 mEq Oral TID Kaitlin S Bar, CRNP      warfarin  7.5 mg Oral Daily (warfarin) Kaitlin S Bar, CRNP          Today, Patient Was Seen By: Radha Wright MD    **Please Note: This note may have been constructed using a voice recognition system.**

## 2023-12-29 NOTE — PLAN OF CARE
Problem: Potential for Falls  Goal: Patient will remain free of falls  Description: INTERVENTIONS:  - Educate patient/family on patient safety including physical limitations  - Instruct patient to call for assistance with activity   - Consult OT/PT to assist with strengthening/mobility   - Keep Call bell within reach  - Keep bed low and locked with side rails adjusted as appropriate  - Keep care items and personal belongings within reach  - Initiate and maintain comfort rounds  - Make Fall Risk Sign visible to staff  - Offer Toileting every 2 Hours, in advance of need  - Initiate/Maintain bed alarm  - Obtain necessary fall risk management equipment:  - Apply yellow socks and bracelet for high fall risk patients  - Consider moving patient to room near nurses station  Outcome: Progressing     Problem: Prexisting or High Potential for Compromised Skin Integrity  Goal: Skin integrity is maintained or improved  Description: INTERVENTIONS:  - Identify patients at risk for skin breakdown  - Assess and monitor skin integrity  - Assess and monitor nutrition and hydration status  - Monitor labs   - Assess for incontinence   - Turn and reposition patient  - Assist with mobility/ambulation  - Relieve pressure over bony prominences  - Avoid friction and shearing  - Provide appropriate hygiene as needed including keeping skin clean and dry  - Evaluate need for skin moisturizer/barrier cream  - Collaborate with interdisciplinary team   - Patient/family teaching  - Consider wound care consult   Outcome: Progressing     Problem: PAIN - ADULT  Goal: Verbalizes/displays adequate comfort level or baseline comfort level  Description: Interventions:  - Encourage patient to monitor pain and request assistance  - Assess pain using appropriate pain scale  - Administer analgesics based on type and severity of pain and evaluate response  - Implement non-pharmacological measures as appropriate and evaluate response  - Consider cultural and  social influences on pain and pain management  - Notify physician/advanced practitioner if interventions unsuccessful or patient reports new pain  Outcome: Progressing     Problem: INFECTION - ADULT  Goal: Absence or prevention of progression during hospitalization  Description: INTERVENTIONS:  - Assess and monitor for signs and symptoms of infection  - Monitor lab/diagnostic results  - Monitor all insertion sites, i.e. indwelling lines, tubes, and drains  - Monitor endotracheal if appropriate and nasal secretions for changes in amount and color  - [place] appropriate cooling/warming therapies per order  - Administer medications as ordered  - Instruct and encourage patient and family to use good hand hygiene technique  - Identify and instruct in appropriate isolation precautions for identified infection/condition  Outcome: Progressing     Problem: SAFETY ADULT  Goal: Patient will remain free of falls  Description: INTERVENTIONS:  - Educate patient/family on patient safety including physical limitations  - Instruct patient to call for assistance with activity   - Consult OT/PT to assist with strengthening/mobility   - Keep Call bell within reach  - Keep bed low and locked with side rails adjusted as appropriate  - Keep care items and personal belongings within reach  - Initiate and maintain comfort rounds  - Make Fall Risk Sign visible to staff  - Offer Toileting every 2 Hours, in advance of need  - Initiate/Maintain bed alarm  - Obtain necessary fall risk management equipment  - Apply yellow socks and bracelet for high fall risk patients  - Consider moving patient to room near nurses station  Outcome: Progressing  Goal: Maintain or return to baseline ADL function  Description: INTERVENTIONS:  -  Assess patient's ability to carry out ADLs; assess patient's baseline for ADL function and identify physical deficits which impact ability to perform ADLs (bathing, care of mouth/teeth, toileting, grooming, dressing,  etc.)  - Assess/evaluate cause of self-care deficits   - Assess range of motion  - Assess patient's mobility; develop plan if impaired  - Assess patient's need for assistive devices and provide as appropriate  - Encourage maximum independence but intervene and supervise when necessary  - Involve family in performance of ADLs  - Assess for home care needs following discharge   - Consider OT consult to assist with ADL evaluation and planning for discharge  - Provide patient education as appropriate  Outcome: Progressing  Goal: Maintains/Returns to pre admission functional level  Description: INTERVENTIONS:  - Perform AM-PAC 6 Click Basic Mobility/ Daily Activity assessment daily.  - Set and communicate daily mobility goal to care team and patient/family/caregiver.   - Collaborate with rehabilitation services on mobility goals if consulted  - Record patient progress and toleration of activity level   Outcome: Progressing     Problem: DISCHARGE PLANNING  Goal: Discharge to home or other facility with appropriate resources  Description: INTERVENTIONS:  - Identify barriers to discharge w/patient and caregiver  - Arrange for needed discharge resources and transportation as appropriate  - Identify discharge learning needs (meds, wound care, etc.)  - Arrange for interpretive services to assist at discharge as needed  - Refer to Case Management Department for coordinating discharge planning if the patient needs post-hospital services based on physician/advanced practitioner order or complex needs related to functional status, cognitive ability, or social support system  Outcome: Progressing     Problem: Knowledge Deficit  Goal: Patient/family/caregiver demonstrates understanding of disease process, treatment plan, medications, and discharge instructions  Description: Complete learning assessment and assess knowledge base.  Interventions:  - Provide teaching at level of understanding  - Provide teaching via preferred learning  methods  Outcome: Progressing     Problem: CARDIOVASCULAR - ADULT  Goal: Maintains optimal cardiac output and hemodynamic stability  Description: INTERVENTIONS:  - Monitor I/O, vital signs and rhythm  - Monitor for S/S and trends of decreased cardiac output  - Administer and titrate ordered vasoactive medications to optimize hemodynamic stability  - Assess quality of pulses, skin color and temperature  - Assess for signs of decreased coronary artery perfusion  - Instruct patient to report change in severity of symptoms  Outcome: Progressing  Goal: Absence of cardiac dysrhythmias or at baseline rhythm  Description: INTERVENTIONS:  - Continuous cardiac monitoring, vital signs, obtain 12 lead EKG if ordered  - Administer antiarrhythmic and heart rate control medications as ordered  - Monitor electrolytes and administer replacement therapy as ordered  Outcome: Progressing     Problem: RESPIRATORY - ADULT  Goal: Achieves optimal ventilation and oxygenation  Description: INTERVENTIONS:  - Assess for changes in respiratory status  - Assess for changes in mentation and behavior  - Position to facilitate oxygenation and minimize respiratory effort  - Oxygen administered by appropriate delivery if ordered  - Initiate smoking cessation education as indicated  - Encourage broncho-pulmonary hygiene including cough, deep breathe, Incentive Spirometry  - Assess the need for suctioning and aspirate as needed  - Assess and instruct to report SOB or any respiratory difficulty  - Respiratory Therapy support as indicated  Outcome: Progressing     Problem: Nutrition/Hydration-ADULT  Goal: Nutrient/Hydration intake appropriate for improving, restoring or maintaining nutritional needs  Description: Monitor and assess patient's nutrition/hydration status for malnutrition. Collaborate with interdisciplinary team and initiate plan and interventions as ordered.  Monitor patient's weight and dietary intake as ordered or per policy. Utilize  nutrition screening tool and intervene as necessary. Determine patient's food preferences and provide high-protein, high-caloric foods as appropriate.     INTERVENTIONS:  - Monitor oral intake, urinary output, labs, and treatment plans  - Assess nutrition and hydration status and recommend course of action  - Evaluate amount of meals eaten  - Assist patient with eating if necessary   - Allow adequate time for meals  - Recommend/ encourage appropriate diets, oral nutritional supplements, and vitamin/mineral supplements  - Order, calculate, and assess calorie counts as needed  - Recommend, monitor, and adjust tube feedings and TPN/PPN based on assessed needs  - Assess need for intravenous fluids  - Provide specific nutrition/hydration education as appropriate  - Include patient/family/caregiver in decisions related to nutrition  Outcome: Progressing

## 2023-12-29 NOTE — ASSESSMENT & PLAN NOTE
Continue warfarin 7.5 mg daily  Goal INR 2-3  Rates uncontrolled soft blood pressure start midodrine to allow increase of Toprol increase Toprol to 37.5 mg p.o. twice daily and give extra 12.5 now  INR therapetic

## 2023-12-30 LAB
ANION GAP SERPL CALCULATED.3IONS-SCNC: 5 MMOL/L
ANION GAP SERPL CALCULATED.3IONS-SCNC: 7 MMOL/L
BASOPHILS # BLD AUTO: 0.04 THOUSANDS/ÂΜL (ref 0–0.1)
BASOPHILS NFR BLD AUTO: 1 % (ref 0–1)
BUN SERPL-MCNC: 40 MG/DL (ref 5–25)
BUN SERPL-MCNC: 41 MG/DL (ref 5–25)
CALCIUM SERPL-MCNC: 9.2 MG/DL (ref 8.4–10.2)
CALCIUM SERPL-MCNC: 9.3 MG/DL (ref 8.4–10.2)
CHLORIDE SERPL-SCNC: 103 MMOL/L (ref 96–108)
CHLORIDE SERPL-SCNC: 104 MMOL/L (ref 96–108)
CO2 SERPL-SCNC: 32 MMOL/L (ref 21–32)
CO2 SERPL-SCNC: 34 MMOL/L (ref 21–32)
CREAT SERPL-MCNC: 1.16 MG/DL (ref 0.6–1.3)
CREAT SERPL-MCNC: 1.2 MG/DL (ref 0.6–1.3)
EOSINOPHIL # BLD AUTO: 0.27 THOUSAND/ÂΜL (ref 0–0.61)
EOSINOPHIL NFR BLD AUTO: 3 % (ref 0–6)
ERYTHROCYTE [DISTWIDTH] IN BLOOD BY AUTOMATED COUNT: 18.6 % (ref 11.6–15.1)
GFR SERPL CREATININE-BSD FRML MDRD: 45 ML/MIN/1.73SQ M
GFR SERPL CREATININE-BSD FRML MDRD: 47 ML/MIN/1.73SQ M
GLUCOSE SERPL-MCNC: 116 MG/DL (ref 65–140)
GLUCOSE SERPL-MCNC: 120 MG/DL (ref 65–140)
GLUCOSE SERPL-MCNC: 140 MG/DL (ref 65–140)
GLUCOSE SERPL-MCNC: 143 MG/DL (ref 65–140)
GLUCOSE SERPL-MCNC: 143 MG/DL (ref 65–140)
GLUCOSE SERPL-MCNC: 150 MG/DL (ref 65–140)
HCT VFR BLD AUTO: 42.3 % (ref 34.8–46.1)
HGB BLD-MCNC: 13.1 G/DL (ref 11.5–15.4)
IMM GRANULOCYTES # BLD AUTO: 0.05 THOUSAND/UL (ref 0–0.2)
IMM GRANULOCYTES NFR BLD AUTO: 1 % (ref 0–2)
INR PPP: 3.01 (ref 0.84–1.19)
LYMPHOCYTES # BLD AUTO: 1.2 THOUSANDS/ÂΜL (ref 0.6–4.47)
LYMPHOCYTES NFR BLD AUTO: 14 % (ref 14–44)
MCH RBC QN AUTO: 30 PG (ref 26.8–34.3)
MCHC RBC AUTO-ENTMCNC: 31 G/DL (ref 31.4–37.4)
MCV RBC AUTO: 97 FL (ref 82–98)
MONOCYTES # BLD AUTO: 1.07 THOUSAND/ÂΜL (ref 0.17–1.22)
MONOCYTES NFR BLD AUTO: 13 % (ref 4–12)
NEUTROPHILS # BLD AUTO: 5.92 THOUSANDS/ÂΜL (ref 1.85–7.62)
NEUTS SEG NFR BLD AUTO: 68 % (ref 43–75)
NRBC BLD AUTO-RTO: 0 /100 WBCS
PLATELET # BLD AUTO: 223 THOUSANDS/UL (ref 149–390)
PMV BLD AUTO: 11.2 FL (ref 8.9–12.7)
POTASSIUM SERPL-SCNC: 3.5 MMOL/L (ref 3.5–5.3)
POTASSIUM SERPL-SCNC: 3.5 MMOL/L (ref 3.5–5.3)
POTASSIUM SERPL-SCNC: 5.8 MMOL/L (ref 3.5–5.3)
PROTHROMBIN TIME: 31.5 SECONDS (ref 11.6–14.5)
RBC # BLD AUTO: 4.37 MILLION/UL (ref 3.81–5.12)
SODIUM SERPL-SCNC: 142 MMOL/L (ref 135–147)
SODIUM SERPL-SCNC: 143 MMOL/L (ref 135–147)
WBC # BLD AUTO: 8.55 THOUSAND/UL (ref 4.31–10.16)

## 2023-12-30 PROCEDURE — 99232 SBSQ HOSP IP/OBS MODERATE 35: CPT | Performed by: FAMILY MEDICINE

## 2023-12-30 PROCEDURE — 84132 ASSAY OF SERUM POTASSIUM: CPT | Performed by: FAMILY MEDICINE

## 2023-12-30 PROCEDURE — 85025 COMPLETE CBC W/AUTO DIFF WBC: CPT | Performed by: FAMILY MEDICINE

## 2023-12-30 PROCEDURE — 82948 REAGENT STRIP/BLOOD GLUCOSE: CPT

## 2023-12-30 PROCEDURE — 80048 BASIC METABOLIC PNL TOTAL CA: CPT | Performed by: FAMILY MEDICINE

## 2023-12-30 PROCEDURE — 85610 PROTHROMBIN TIME: CPT | Performed by: FAMILY MEDICINE

## 2023-12-30 RX ORDER — CALCIUM CARBONATE 500 MG/1
500 TABLET, CHEWABLE ORAL DAILY PRN
Status: DISCONTINUED | OUTPATIENT
Start: 2023-12-30 | End: 2024-01-02 | Stop reason: HOSPADM

## 2023-12-30 RX ORDER — MIDODRINE HYDROCHLORIDE 5 MG/1
5 TABLET ORAL
Status: DISCONTINUED | OUTPATIENT
Start: 2023-12-30 | End: 2024-01-02 | Stop reason: HOSPADM

## 2023-12-30 RX ORDER — WARFARIN SODIUM 7.5 MG/1
7.5 TABLET ORAL
Status: DISCONTINUED | OUTPATIENT
Start: 2023-12-31 | End: 2023-12-31

## 2023-12-30 RX ADMIN — PANTOPRAZOLE SODIUM 40 MG: 40 TABLET, DELAYED RELEASE ORAL at 05:04

## 2023-12-30 RX ADMIN — POTASSIUM CHLORIDE 20 MEQ: 1500 TABLET, EXTENDED RELEASE ORAL at 15:21

## 2023-12-30 RX ADMIN — ATORVASTATIN CALCIUM 10 MG: 10 TABLET, FILM COATED ORAL at 09:02

## 2023-12-30 RX ADMIN — ACETAMINOPHEN 650 MG: 325 TABLET, FILM COATED ORAL at 15:21

## 2023-12-30 RX ADMIN — POTASSIUM CHLORIDE 20 MEQ: 1500 TABLET, EXTENDED RELEASE ORAL at 09:02

## 2023-12-30 RX ADMIN — METOPROLOL SUCCINATE 37.5 MG: 25 TABLET, EXTENDED RELEASE ORAL at 21:37

## 2023-12-30 RX ADMIN — INSULIN LISPRO 5 UNITS: 100 INJECTION, SOLUTION INTRAVENOUS; SUBCUTANEOUS at 11:59

## 2023-12-30 RX ADMIN — FERROUS SULFATE TAB 325 MG (65 MG ELEMENTAL FE) 325 MG: 325 (65 FE) TAB at 09:09

## 2023-12-30 RX ADMIN — MIDODRINE HYDROCHLORIDE 5 MG: 5 TABLET ORAL at 15:21

## 2023-12-30 RX ADMIN — INSULIN GLARGINE 26 UNITS: 100 INJECTION, SOLUTION SUBCUTANEOUS at 21:39

## 2023-12-30 RX ADMIN — INSULIN LISPRO 5 UNITS: 100 INJECTION, SOLUTION INTRAVENOUS; SUBCUTANEOUS at 09:05

## 2023-12-30 RX ADMIN — MIDODRINE HYDROCHLORIDE 2.5 MG: 5 TABLET ORAL at 05:04

## 2023-12-30 RX ADMIN — Medication 1000 UNITS: at 09:02

## 2023-12-30 RX ADMIN — Medication 1 MG/HR: at 16:38

## 2023-12-30 RX ADMIN — ALLOPURINOL 100 MG: 100 TABLET ORAL at 09:02

## 2023-12-30 RX ADMIN — Medication 1 TABLET: at 09:02

## 2023-12-30 RX ADMIN — MONTELUKAST 10 MG: 10 TABLET, FILM COATED ORAL at 21:37

## 2023-12-30 RX ADMIN — ACETAMINOPHEN 650 MG: 325 TABLET, FILM COATED ORAL at 09:09

## 2023-12-30 RX ADMIN — METOPROLOL SUCCINATE 37.5 MG: 25 TABLET, EXTENDED RELEASE ORAL at 09:02

## 2023-12-30 RX ADMIN — Medication 1 MG/HR: at 05:07

## 2023-12-30 RX ADMIN — CALCIUM CARBONATE (ANTACID) CHEW TAB 500 MG 500 MG: 500 CHEW TAB at 21:36

## 2023-12-30 RX ADMIN — FLUTICASONE FUROATE AND VILANTEROL TRIFENATATE 1 PUFF: 100; 25 POWDER RESPIRATORY (INHALATION) at 09:03

## 2023-12-30 RX ADMIN — LORATADINE 10 MG: 10 TABLET ORAL at 09:02

## 2023-12-30 RX ADMIN — LEVOTHYROXINE SODIUM 288 MCG: 88 TABLET ORAL at 05:04

## 2023-12-30 RX ADMIN — DOCUSATE SODIUM 100 MG: 100 CAPSULE, LIQUID FILLED ORAL at 09:02

## 2023-12-30 RX ADMIN — CALCIUM CARBONATE (ANTACID) CHEW TAB 500 MG 500 MG: 500 CHEW TAB at 15:57

## 2023-12-30 RX ADMIN — POTASSIUM CHLORIDE 20 MEQ: 1500 TABLET, EXTENDED RELEASE ORAL at 21:37

## 2023-12-30 RX ADMIN — MIDODRINE HYDROCHLORIDE 2.5 MG: 5 TABLET ORAL at 12:01

## 2023-12-30 NOTE — PLAN OF CARE
Problem: Potential for Falls  Goal: Patient will remain free of falls  Description: INTERVENTIONS:  - Educate patient/family on patient safety including physical limitations  - Instruct patient to call for assistance with activity   - Consult OT/PT to assist with strengthening/mobility   - Keep Call bell within reach  - Keep bed low and locked with side rails adjusted as appropriate  - Keep care items and personal belongings within reach  - Initiate and maintain comfort rounds  - Make Fall Risk Sign visible to staff  - Offer Toileting every . Hours, in advance of need  - Initiate/Maintain .alarm  - Obtain necessary fall risk management equipment: ...  - Apply yellow socks and bracelet for high fall risk patients  - Consider moving patient to room near nurses station  Outcome: Progressing     Problem: Prexisting or High Potential for Compromised Skin Integrity  Goal: Skin integrity is maintained or improved  Description: INTERVENTIONS:  - Identify patients at risk for skin breakdown  - Assess and monitor skin integrity  - Assess and monitor nutrition and hydration status  - Monitor labs   - Assess for incontinence   - Turn and reposition patient  - Assist with mobility/ambulation  - Relieve pressure over bony prominences  - Avoid friction and shearing  - Provide appropriate hygiene as needed including keeping skin clean and dry  - Evaluate need for skin moisturizer/barrier cream  - Collaborate with interdisciplinary team   - Patient/family teaching  - Consider wound care consult   Outcome: Progressing     Problem: PAIN - ADULT  Goal: Verbalizes/displays adequate comfort level or baseline comfort level  Description: Interventions:  - Encourage patient to monitor pain and request assistance  - Assess pain using appropriate pain scale  - Administer analgesics based on type and severity of pain and evaluate response  - Implement non-pharmacological measures as appropriate and evaluate response  - Consider cultural and  social influences on pain and pain management  - Notify physician/advanced practitioner if interventions unsuccessful or patient reports new pain  Outcome: Progressing     Problem: INFECTION - ADULT  Goal: Absence or prevention of progression during hospitalization  Description: INTERVENTIONS:  - Assess and monitor for signs and symptoms of infection  - Monitor lab/diagnostic results  - Monitor all insertion sites, i.e. indwelling lines, tubes, and drains  - Monitor endotracheal if appropriate and nasal secretions for changes in amount and color  - [place] appropriate cooling/warming therapies per order  - Administer medications as ordered  - Instruct and encourage patient and family to use good hand hygiene technique  - Identify and instruct in appropriate isolation precautions for identified infection/condition  Outcome: Progressing     Problem: SAFETY ADULT  Goal: Patient will remain free of falls  Description: INTERVENTIONS:  - Educate patient/family on patient safety including physical limitations  - Instruct patient to call for assistance with activity   - Consult OT/PT to assist with strengthening/mobility   - Keep Call bell within reach  - Keep bed low and locked with side rails adjusted as appropriate  - Keep care items and personal belongings within reach  - Initiate and maintain comfort rounds  - Make Fall Risk Sign visible to staff  - Offer Toileting every . Hours, in advance of need  - Initiate/Maintain .alarm  - Obtain necessary fall risk management equipment: ...  - Apply yellow socks and bracelet for high fall risk patients  - Consider moving patient to room near nurses station  Outcome: Progressing  Goal: Maintain or return to baseline ADL function  Description: INTERVENTIONS:  - Educate patient/family on patient safety including physical limitations  - Instruct patient to call for assistance with activity   - Consult OT/PT to assist with strengthening/mobility   - Keep Call bell within reach  -  Keep bed low and locked with side rails adjusted as appropriate  - Keep care items and personal belongings within reach  - Initiate and maintain comfort rounds  - Make Fall Risk Sign visible to staff  - Offer Toileting every . Hours, in advance of need  - Initiate/Maintain .alarm  - Obtain necessary fall risk management equipment: ..  - Apply yellow socks and bracelet for high fall risk patients  - Consider moving patient to room near nurses station  Outcome: Progressing  Goal: Maintains/Returns to pre admission functional level  Description: INTERVENTIONS:  - Perform AM-PAC 6 Click Basic Mobility/ Daily Activity assessment daily.  - Set and communicate daily mobility goal to care team and patient/family/caregiver.   - Collaborate with rehabilitation services on mobility goals if consulted  - Perform Range of Motion . times a day.  - Reposition patient every . hours.  - Dangle patient . times a day  - Stand patient . times a day  - Ambulate patient . times a day  - Out of bed to chair . times a day   - Out of bed for meals .. times a day  - Out of bed for toileting  - Record patient progress and toleration of activity level   Outcome: Progressing     Problem: DISCHARGE PLANNING  Goal: Discharge to home or other facility with appropriate resources  Description: INTERVENTIONS:  - Identify barriers to discharge w/patient and caregiver  - Arrange for needed discharge resources and transportation as appropriate  - Identify discharge learning needs (meds, wound care, etc.)  - Arrange for interpretive services to assist at discharge as needed  - Refer to Case Management Department for coordinating discharge planning if the patient needs post-hospital services based on physician/advanced practitioner order or complex needs related to functional status, cognitive ability, or social support system  Outcome: Progressing     Problem: Knowledge Deficit  Goal: Patient/family/caregiver demonstrates understanding of disease  process, treatment plan, medications, and discharge instructions  Description: Complete learning assessment and assess knowledge base.  Interventions:  - Provide teaching at level of understanding  - Provide teaching via preferred learning methods  Outcome: Progressing     Problem: CARDIOVASCULAR - ADULT  Goal: Maintains optimal cardiac output and hemodynamic stability  Description: INTERVENTIONS:  - Monitor I/O, vital signs and rhythm  - Monitor for S/S and trends of decreased cardiac output  - Administer and titrate ordered vasoactive medications to optimize hemodynamic stability  - Assess quality of pulses, skin color and temperature  - Assess for signs of decreased coronary artery perfusion  - Instruct patient to report change in severity of symptoms  Outcome: Progressing  Goal: Absence of cardiac dysrhythmias or at baseline rhythm  Description: INTERVENTIONS:  - Continuous cardiac monitoring, vital signs, obtain 12 lead EKG if ordered  - Administer antiarrhythmic and heart rate control medications as ordered  - Monitor electrolytes and administer replacement therapy as ordered  Outcome: Progressing     Problem: RESPIRATORY - ADULT  Goal: Achieves optimal ventilation and oxygenation  Description: INTERVENTIONS:  - Assess for changes in respiratory status  - Assess for changes in mentation and behavior  - Position to facilitate oxygenation and minimize respiratory effort  - Oxygen administered by appropriate delivery if ordered  - Initiate smoking cessation education as indicated  - Encourage broncho-pulmonary hygiene including cough, deep breathe, Incentive Spirometry  - Assess the need for suctioning and aspirate as needed  - Assess and instruct to report SOB or any respiratory difficulty  - Respiratory Therapy support as indicated  Outcome: Progressing     Problem: Nutrition/Hydration-ADULT  Goal: Nutrient/Hydration intake appropriate for improving, restoring or maintaining nutritional needs  Description:  Monitor and assess patient's nutrition/hydration status for malnutrition. Collaborate with interdisciplinary team and initiate plan and interventions as ordered.  Monitor patient's weight and dietary intake as ordered or per policy. Utilize nutrition screening tool and intervene as necessary. Determine patient's food preferences and provide high-protein, high-caloric foods as appropriate.     INTERVENTIONS:  - Monitor oral intake, urinary output, labs, and treatment plans  - Assess nutrition and hydration status and recommend course of action  - Evaluate amount of meals eaten  - Assist patient with eating if necessary   - Allow adequate time for meals  - Recommend/ encourage appropriate diets, oral nutritional supplements, and vitamin/mineral supplements  - Order, calculate, and assess calorie counts as needed  - Recommend, monitor, and adjust tube feedings and TPN/PPN based on assessed needs  - Assess need for intravenous fluids  - Provide specific nutrition/hydration education as appropriate  - Include patient/family/caregiver in decisions related to nutrition  Outcome: Progressing

## 2023-12-30 NOTE — ASSESSMENT & PLAN NOTE
Continue warfarin 7.5 mg daily  Goal INR 2-3  INR is at 3 will hold Coumadin today restart tomorrow.  Rate improved with Toprol-XL 37.5 mg p.o. twice daily unfortunately was not given yesterday increase midodrine to 5 3- times daily

## 2023-12-30 NOTE — ASSESSMENT & PLAN NOTE
Lab Results   Component Value Date    EGFR 45 12/30/2023    EGFR 44 12/29/2023    EGFR 39 12/29/2023    CREATININE 1.20 12/30/2023    CREATININE 1.22 12/29/2023    CREATININE 1.35 (H) 12/29/2023     Hx CKD 3.  Baseline creatinine between 1-1.3.  Creatinine baseline  Monitor closely with IV diuresis  Trend creatinine  Renally dose medications and avoid nephrotoxic medications

## 2023-12-30 NOTE — ASSESSMENT & PLAN NOTE
Acute exacerbation secondary to COVID  As needed bronchodilators  Continue PTA Breo and Singulair  Received IV dexamethasone x 1 in ED, will hold further steroids  Monitor  Oxygen needs are baseline   Complex Repair And Double Advancement Flap Text: The defect edges were debeveled with a #15 scalpel blade.  The primary defect was closed partially with a complex linear closure.  Given the location of the remaining defect, shape of the defect and the proximity to free margins a double advancement flap was deemed most appropriate for complete closure of the defect.  Using a sterile surgical marker, an appropriate advancement flap was drawn incorporating the defect and placing the expected incisions within the relaxed skin tension lines where possible.    The area thus outlined was incised deep to adipose tissue with a #15 scalpel blade.  The skin margins were undermined to an appropriate distance in all directions utilizing iris scissors.

## 2023-12-30 NOTE — PROGRESS NOTES
[facility]  Progress Note  Name: Tigist Hewitt I  MRN: [id number]  Unit/Bed#: [facility] I Date of Admission: 12/23/2023   Date of Service: 12/30/2023 I Hospital Day: 7    Assessment/Plan   * Acute on chronic diastolic congestive heart failure (HCC)  Assessment & Plan  Wt Readings from Last 3 Encounters:   12/30/23 (!) 154 kg (339 lb 1.1 oz)   10/29/22 (!) 151 kg (331 lb 12.7 oz)   10/03/22 (!) 171 kg (377 lb 6.8 oz)     Presents with dyspnea, orthopnea, anasarca and CTA chest with pulmonary edema, hypervolemic with CHF exacerbation.?>50lb over her weight at the time of last hospitalization.  Reports compliance with torsemide 50 mg twice daily  Seems since admission overall as a  everywhere but scale she lost 49 pounds she is -27 L  CHF pathway, intake output, daily weights, 2 g sodium diet with 1500 mL of fluid restriction  Previous echocardiogram with preserved ejection fraction but significant valvular heart disease, update echo  Monitor volume status  Continue bumex today will be given diamox and bmp q12   In 2022 discharged at 331 lb    CKD (chronic kidney disease) stage 3, GFR 30-59 ml/min ([facility])  Assessment & Plan  Lab Results   Component Value Date    EGFR 45 12/30/2023    EGFR 44 12/29/2023    EGFR 39 12/29/2023    CREATININE 1.20 12/30/2023    CREATININE 1.22 12/29/2023    CREATININE 1.35 (H) 12/29/2023     Hx CKD 3.  Baseline creatinine between 1-1.3.  Creatinine baseline  Monitor closely with IV diuresis  Trend creatinine  Renally dose medications and avoid nephrotoxic medications    Abnormal CT scan, pelvis  Assessment & Plan  Abnormal endometrial thickening on CT pelvis  Discussed with patient that she will need GYN follow-up  Patient endorses intermittent vaginal bleeding    NISHI (obstructive sleep apnea)  Assessment & Plan  Continue CPAP    Hypothyroidism  Assessment & Plan  Continue PTA levothyroxine 188 mcg daily  Tsh nml    Morbid obesity (HCC)  Assessment &  Plan  BMI 70  Intensive lifestyle modification required  Outpatient referral to bariatric medicine    Chronic respiratory failure with hypoxia ([facility])  Assessment & Plan  Chronically on 4 L oxygen nasal cannula Home regimen  Stable at baseline    Pneumonia due to COVID-19 virus  Assessment & Plan  Background: Presented with dyspnea, fluid retention found to be COVID-positive  COVID19- PCR positive 12/23  Supplemental O2 with 4LNC saturating 95%; this is patient's baseline   imaging   CTA chest with GGO viral versus pulmonary edema  Started on mild COVID pathway for treatment  Not requiring increased oxygen needs, no indication for dexamethasone  AC warfarin-INR therapeutic  Daily CBC and CMP  Continue check inflammatory markers as indicated  S/p 5 days of remdesavir   Encourage ISP/flutter valve  Encourage proning and early mobilization  Blood cultures pending  Trend procalcitonin  Airborne/Droplet precautions ordered     Type 2 diabetes mellitus with hyperglycemia, with long-term current use of insulin ([facility])  Assessment & Plan  Lab Results   Component Value Date    HGBA1C 7.3 (H) 12/23/2023       Recent Labs     12/29/23  1552 12/29/23  2052 12/30/23  0759 12/30/23  1156   POCGLU 175* 115 116 143*         Blood Sugar Average: Last 72 hrs:  (P) [id number]    Continue home regimen Lantus 26 units at bedtime, lispro 5 units 3 times daily meals  Add SSI with Accu-Cheks  Hypoglycemia protocol  Received dexamethasone for COVID-monitor for steroid-induced hyperglycemia     COPD (chronic obstructive pulmonary disease) ([facility])  Assessment & Plan  Acute exacerbation secondary to COVID  As needed bronchodilators  Continue PTA Breo and Singulair  Received IV dexamethasone x 1 in ED, will hold further steroids  Monitor  Oxygen needs are baseline    Atrial fibrillation ([facility])  Assessment & Plan  Continue warfarin 7.5 mg daily  Goal INR 2-3  INR is at 3 will hold Coumadin today restart tomorrow.  Rate improved with Toprol-XL  37.5 mg p.o. twice daily unfortunately was not given yesterday increase midodrine to 5 3- times daily                 VTE Pharmacologic Prophylaxis:   Moderate Risk (Score 3-4) - Pharmacological DVT Prophylaxis Ordered: warfarin (Coumadin).    Mobility:   Basic Mobility Inpatient Raw Score: 6  -HLM Goal: 2: Bed activities/Dependent transfer  JH-HLM Achieved: 2: Bed activities/Dependent transfer  HLM Goal NOT achieved. Continue with multidisciplinary rounding and encourage appropriate mobility to improve upon HLM goals.    Patient Centered Rounds: I performed bedside rounds with nursing staff today.   Discussions with Specialists or Other Care Team Provider: none    Education and Discussions with Family / Patient: pt    Total Time Spent on Date of Encounter in care of patient: >35 mins. This time was spent on one or more of the following: performing physical exam; counseling and coordination of care; obtaining or reviewing history; documenting in the medical record; reviewing/ordering tests, medications or procedures; communicating with other healthcare professionals and discussing with patient's family/caregivers.    Current Length of Stay: 7 day(s)  Current Patient Status: Inpatient   Certification Statement: The patient will continue to require additional inpatient hospital stay due to CHF  Discharge Plan: Anticipate discharge in 48-72 hrs to home with home services.    Code Status: Level 3 - DNAR and DNI    Subjective:   Seen and examined shortness of breath improved    Objective:     Vitals:   Temp (24hrs), Av.5 °F (36.4 °C), Min:97.3 °F (36.3 °C), Max:97.7 °F (36.5 °C)    Temp:  [97.3 °F (36.3 °C)-97.7 °F (36.5 °C)] 97.3 °F (36.3 °C)  HR:  [] 89  Resp:  [16-20] 16  BP: (105-112)/(58-70) 105/62  SpO2:  [90 %-99 %] 96 %  Body mass index is 62.02 kg/m².     Input and Output Summary (last 24 hours):     Intake/Output Summary (Last 24 hours) at 2023 1309  Last data filed at 2023 1001  Gross  per 24 hour   Intake 840 ml   Output 3750 ml   Net -2910 ml       Physical Exam:   Physical Exam  Vitals and nursing note reviewed.   Constitutional:       General: She is not in acute distress.     Appearance: She is well-developed.   HENT:      Head: Normocephalic and atraumatic.   Eyes:      Conjunctiva/sclera: Conjunctivae normal.   Cardiovascular:      Rate and Rhythm: Normal rate and regular rhythm.      Heart sounds: No murmur heard.  Pulmonary:      Effort: Pulmonary effort is normal. No respiratory distress.      Breath sounds: No wheezing or rales.      Comments: Very difficult to asses   Abdominal:      General: There is no distension.      Palpations: Abdomen is soft.      Tenderness: There is no abdominal tenderness.   Musculoskeletal:         General: Swelling (improved) present.      Cervical back: Neck supple.   Skin:     General: Skin is warm and dry.      Capillary Refill: Capillary refill takes less than 2 seconds.   Neurological:      Mental Status: She is alert and oriented to person, place, and time.   Psychiatric:         Mood and Affect: Mood normal.          Additional Data:     Labs:  Results from last 7 days   Lab Units 12/30/23  0509   WBC Thousand/uL 8.55   HEMOGLOBIN g/dL 13.1   HEMATOCRIT % 42.3   PLATELETS Thousands/uL 223   NEUTROS PCT % 68   LYMPHS PCT % 14   MONOS PCT % 13*   EOS PCT % 3     Results from last 7 days   Lab Units 12/30/23  1049 12/30/23  0837 12/26/23  0505 12/25/23  0510   SODIUM mmol/L  --  142   < > 144   POTASSIUM mmol/L 3.5 5.8*   < > 3.6   CHLORIDE mmol/L  --  103   < > 104   CO2 mmol/L  --  34*   < > 33*   BUN mg/dL  --  40*   < > 41*   CREATININE mg/dL  --  1.20   < > 1.15   ANION GAP mmol/L  --  5   < > 7   CALCIUM mg/dL  --  9.3   < > 9.1   ALBUMIN g/dL  --   --   --  3.4*   TOTAL BILIRUBIN mg/dL  --   --   --  0.82   ALK PHOS U/L  --   --   --  110*   ALT U/L  --   --   --  17   AST U/L  --   --   --  32   GLUCOSE RANDOM mg/dL  --  150*   < > 112    < > =  values in this interval not displayed.     Results from last 7 days   Lab Units 12/30/23  0509   INR  3.01*     Results from last 7 days   Lab Units 12/30/23  1156 12/30/23  0759 12/29/23  2052 12/29/23  1552 12/29/23  1102 12/29/23  0712 12/28/23  2145 12/28/23  1558 12/28/23  1131 12/28/23  0714 12/27/23  2047 12/27/23  1545   POC GLUCOSE mg/dl 143* 116 115 175* 221* 119 146* 126 211* 135 183* 136     Results from last 7 days   Lab Units 12/23/23  1441   HEMOGLOBIN A1C % 7.3*     Results from last 7 days   Lab Units 12/24/23  0537 12/23/23  1441   LACTIC ACID mmol/L  --  1.3   PROCALCITONIN ng/ml 0.08 0.05       Lines/Drains:  Invasive Devices       Peripheral Intravenous Line  Duration             Peripheral IV 12/27/23 Distal;Left;Ventral (anterior) Forearm 2 days    Peripheral IV 12/27/23 Distal;Right;Ventral (anterior) Forearm 2 days              Drain  Duration             Urethral Catheter Temperature probe 16 Fr. 6 days                  Urinary Catheter:  Goal for removal: Remove after 48 hrs of I/O monitoring           Telemetry:  Telemetry Orders (From admission, onward)               24 Hour Telemetry Monitoring  Continuous x 24 Hours (Telem)        Question:  Reason for 24 Hour Telemetry  Answer:  Decompensated CHF- and any one of the following: continuous diuretic infusion or total diuretic dose >200 mg daily, associated electrolyte derangement (I.e. K < 3.0), ionotropic drip (continuous infusion), hx of ventricular arrhythmia, or new EF < 35%                     Telemetry Reviewed: Atrial fibrillation. HR averaging 89  Indication for Continued Telemetry Use: Arrthymias requiring medical therapy             Imaging: Reviewed radiology reports from this admission including: chest xray    Recent Cultures (last 7 days):   Results from last 7 days   Lab Units 12/23/23  1444 12/23/23  1441   BLOOD CULTURE  No Growth After 5 Days. No Growth After 5 Days.       Last 24 Hours Medication List:   Current  Facility-Administered Medications   Medication Dose Route Frequency Provider Last Rate    acetaminophen  650 mg Oral Q6H PRN Kaitlin S Bar, CRNP      albuterol  2.5 mg Nebulization Q4H PRN Kaitlin S Bar, CRNP      allopurinol  100 mg Oral Daily Kaitlin S Bar, CRNP      atorvastatin  10 mg Oral Daily Kaitlin S Bar, CRNP      bumetanide (BUMEX) 12.5 mg infusion 50 mL  1 mg/hr Intravenous Continuous Debby Galindo PA-C 1 mg/hr (12/30/23 0507)    cholecalciferol  1,000 Units Oral Daily Kaitlin S Bar, CRNP      docusate sodium  100 mg Oral BID Kaitlin S Bar, CRNP      ferrous sulfate  325 mg Oral Daily With Breakfast Kaitlin S Bar, CRNP      Fluticasone Furoate-Vilanterol  1 puff Inhalation Daily Kaitlin S Bar, CRNP      insulin glargine  26 Units Subcutaneous HS Kaitlin S Bar, CRNP      insulin lispro  2-12 Units Subcutaneous TID AC Kaitlin S Bar, CRNP      insulin lispro  2-12 Units Subcutaneous HS Kaitlin S Bar, CRNP      insulin lispro  5 Units Subcutaneous TID With Meals Radha Wright MD      levothyroxine  288 mcg Oral Early Morning Kaitlin S Bar, CRNP      loratadine  10 mg Oral Daily Kaitlin S Bar, CRNP      metoprolol succinate  37.5 mg Oral BID Radha Wright MD      midodrine  5 mg Oral TID AC Radha Wright MD      montelukast  10 mg Oral HS Kaitlin S Bar, CRNP      multivitamin-minerals  1 tablet Oral Daily Kaitlin S Bar, CRNP      pantoprazole  40 mg Oral Early Morning Kaitlin S Bar, CRNP      polyethylene glycol  17 g Oral Daily PRN Kaitlin S Bar, CRNP      potassium chloride  20 mEq Oral TID Kaitlin S Bar, CRNP      [START ON 12/31/2023] warfarin  7.5 mg Oral Daily (warfarin) Radha Wright MD          Today, Patient Was Seen By: Radha Wright MD    **Please Note: This note may have been constructed using a voice recognition system.**

## 2023-12-30 NOTE — ASSESSMENT & PLAN NOTE
Lab Results   Component Value Date    HGBA1C 7.3 (H) 12/23/2023       Recent Labs     12/29/23  1552 12/29/23  2052 12/30/23  0759 12/30/23  1156   POCGLU 175* 115 116 143*         Blood Sugar Average: Last 72 hrs:  (P) [id number]    Continue home regimen Lantus 26 units at bedtime, lispro 5 units 3 times daily meals  Add SSI with Accu-Cheks  Hypoglycemia protocol  Received dexamethasone for COVID-monitor for steroid-induced hyperglycemia

## 2023-12-30 NOTE — ASSESSMENT & PLAN NOTE
Wt Readings from Last 3 Encounters:   12/30/23 (!) 154 kg (339 lb 1.1 oz)   10/29/22 (!) 151 kg (331 lb 12.7 oz)   10/03/22 (!) 171 kg (377 lb 6.8 oz)     Presents with dyspnea, orthopnea, anasarca and CTA chest with pulmonary edema, hypervolemic with CHF exacerbation.?>50lb over her weight at the time of last hospitalization.  Reports compliance with torsemide 50 mg twice daily  Seems since admission overall as a  everywhere but scale she lost 49 pounds she is -27 L  CHF pathway, intake output, daily weights, 2 g sodium diet with 1500 mL of fluid restriction  Previous echocardiogram with preserved ejection fraction but significant valvular heart disease, update echo  Monitor volume status  Continue bumex today will be given diamox and bmp q12   In 2022 discharged at 331 lb

## 2023-12-31 LAB
ANION GAP SERPL CALCULATED.3IONS-SCNC: 6 MMOL/L
ANION GAP SERPL CALCULATED.3IONS-SCNC: 6 MMOL/L
BUN SERPL-MCNC: 39 MG/DL (ref 5–25)
BUN SERPL-MCNC: 42 MG/DL (ref 5–25)
CALCIUM SERPL-MCNC: 8.8 MG/DL (ref 8.4–10.2)
CALCIUM SERPL-MCNC: 9.2 MG/DL (ref 8.4–10.2)
CHLORIDE SERPL-SCNC: 104 MMOL/L (ref 96–108)
CHLORIDE SERPL-SCNC: 104 MMOL/L (ref 96–108)
CO2 SERPL-SCNC: 31 MMOL/L (ref 21–32)
CO2 SERPL-SCNC: 34 MMOL/L (ref 21–32)
CREAT SERPL-MCNC: 1.11 MG/DL (ref 0.6–1.3)
CREAT SERPL-MCNC: 1.16 MG/DL (ref 0.6–1.3)
GFR SERPL CREATININE-BSD FRML MDRD: 47 ML/MIN/1.73SQ M
GFR SERPL CREATININE-BSD FRML MDRD: 50 ML/MIN/1.73SQ M
GLUCOSE SERPL-MCNC: 124 MG/DL (ref 65–140)
GLUCOSE SERPL-MCNC: 125 MG/DL (ref 65–140)
GLUCOSE SERPL-MCNC: 140 MG/DL (ref 65–140)
GLUCOSE SERPL-MCNC: 162 MG/DL (ref 65–140)
GLUCOSE SERPL-MCNC: 174 MG/DL (ref 65–140)
GLUCOSE SERPL-MCNC: 182 MG/DL (ref 65–140)
INR PPP: 3.24 (ref 0.84–1.19)
POTASSIUM SERPL-SCNC: 3.5 MMOL/L (ref 3.5–5.3)
POTASSIUM SERPL-SCNC: 3.5 MMOL/L (ref 3.5–5.3)
PROTHROMBIN TIME: 33.4 SECONDS (ref 11.6–14.5)
SODIUM SERPL-SCNC: 141 MMOL/L (ref 135–147)
SODIUM SERPL-SCNC: 144 MMOL/L (ref 135–147)

## 2023-12-31 PROCEDURE — 80048 BASIC METABOLIC PNL TOTAL CA: CPT | Performed by: FAMILY MEDICINE

## 2023-12-31 PROCEDURE — 85610 PROTHROMBIN TIME: CPT | Performed by: FAMILY MEDICINE

## 2023-12-31 PROCEDURE — 82948 REAGENT STRIP/BLOOD GLUCOSE: CPT

## 2023-12-31 PROCEDURE — 99232 SBSQ HOSP IP/OBS MODERATE 35: CPT | Performed by: FAMILY MEDICINE

## 2023-12-31 RX ORDER — SIMETHICONE 80 MG
80 TABLET,CHEWABLE ORAL EVERY 6 HOURS PRN
Status: DISCONTINUED | OUTPATIENT
Start: 2023-12-31 | End: 2024-01-02 | Stop reason: HOSPADM

## 2023-12-31 RX ADMIN — Medication 1000 UNITS: at 08:20

## 2023-12-31 RX ADMIN — SIMETHICONE 80 MG: 80 TABLET, CHEWABLE ORAL at 09:30

## 2023-12-31 RX ADMIN — DOCUSATE SODIUM 100 MG: 100 CAPSULE, LIQUID FILLED ORAL at 08:22

## 2023-12-31 RX ADMIN — POTASSIUM CHLORIDE 20 MEQ: 1500 TABLET, EXTENDED RELEASE ORAL at 21:43

## 2023-12-31 RX ADMIN — LORATADINE 10 MG: 10 TABLET ORAL at 08:20

## 2023-12-31 RX ADMIN — METOPROLOL SUCCINATE 37.5 MG: 25 TABLET, EXTENDED RELEASE ORAL at 21:38

## 2023-12-31 RX ADMIN — INSULIN GLARGINE 26 UNITS: 100 INJECTION, SOLUTION SUBCUTANEOUS at 21:37

## 2023-12-31 RX ADMIN — MIDODRINE HYDROCHLORIDE 5 MG: 5 TABLET ORAL at 05:48

## 2023-12-31 RX ADMIN — MIDODRINE HYDROCHLORIDE 5 MG: 5 TABLET ORAL at 12:12

## 2023-12-31 RX ADMIN — POTASSIUM CHLORIDE 20 MEQ: 1500 TABLET, EXTENDED RELEASE ORAL at 15:48

## 2023-12-31 RX ADMIN — ACETAMINOPHEN 650 MG: 325 TABLET, FILM COATED ORAL at 16:26

## 2023-12-31 RX ADMIN — ACETAMINOPHEN 650 MG: 325 TABLET, FILM COATED ORAL at 09:30

## 2023-12-31 RX ADMIN — LEVOTHYROXINE SODIUM 288 MCG: 88 TABLET ORAL at 05:48

## 2023-12-31 RX ADMIN — Medication 1 MG/HR: at 04:57

## 2023-12-31 RX ADMIN — INSULIN LISPRO 2 UNITS: 100 INJECTION, SOLUTION INTRAVENOUS; SUBCUTANEOUS at 21:38

## 2023-12-31 RX ADMIN — FLUTICASONE FUROATE AND VILANTEROL TRIFENATATE 1 PUFF: 100; 25 POWDER RESPIRATORY (INHALATION) at 08:20

## 2023-12-31 RX ADMIN — MONTELUKAST 10 MG: 10 TABLET, FILM COATED ORAL at 21:43

## 2023-12-31 RX ADMIN — METOPROLOL SUCCINATE 37.5 MG: 25 TABLET, EXTENDED RELEASE ORAL at 08:20

## 2023-12-31 RX ADMIN — MIDODRINE HYDROCHLORIDE 5 MG: 5 TABLET ORAL at 15:48

## 2023-12-31 RX ADMIN — Medication 1 TABLET: at 08:20

## 2023-12-31 RX ADMIN — CALCIUM CARBONATE (ANTACID) CHEW TAB 500 MG 500 MG: 500 CHEW TAB at 05:47

## 2023-12-31 RX ADMIN — INSULIN LISPRO 2 UNITS: 100 INJECTION, SOLUTION INTRAVENOUS; SUBCUTANEOUS at 12:12

## 2023-12-31 RX ADMIN — ALLOPURINOL 100 MG: 100 TABLET ORAL at 08:20

## 2023-12-31 RX ADMIN — ATORVASTATIN CALCIUM 10 MG: 10 TABLET, FILM COATED ORAL at 08:20

## 2023-12-31 RX ADMIN — POTASSIUM CHLORIDE 20 MEQ: 1500 TABLET, EXTENDED RELEASE ORAL at 08:19

## 2023-12-31 RX ADMIN — Medication 1 MG/HR: at 16:26

## 2023-12-31 RX ADMIN — PANTOPRAZOLE SODIUM 40 MG: 40 TABLET, DELAYED RELEASE ORAL at 05:48

## 2023-12-31 RX ADMIN — INSULIN LISPRO 5 UNITS: 100 INJECTION, SOLUTION INTRAVENOUS; SUBCUTANEOUS at 12:13

## 2023-12-31 RX ADMIN — FERROUS SULFATE TAB 325 MG (65 MG ELEMENTAL FE) 325 MG: 325 (65 FE) TAB at 08:20

## 2023-12-31 NOTE — ASSESSMENT & PLAN NOTE
Lab Results   Component Value Date    EGFR 50 12/31/2023    EGFR 47 12/30/2023    EGFR 45 12/30/2023    CREATININE 1.11 12/31/2023    CREATININE 1.16 12/30/2023    CREATININE 1.20 12/30/2023     Hx CKD 3.  Baseline creatinine between 1-1.3.  Creatinine baseline  Monitor closely with IV diuresis  Trend creatinine  Renally dose medications and avoid nephrotoxic medications

## 2023-12-31 NOTE — PLAN OF CARE
Problem: Potential for Falls  Goal: Patient will remain free of falls  Description: INTERVENTIONS:  - Educate patient/family on patient safety including physical limitations  - Instruct patient to call for assistance with activity   - Consult OT/PT to assist with strengthening/mobility   - Keep Call bell within reach  - Keep bed low and locked with side rails adjusted as appropriate  - Keep care items and personal belongings within reach  - Initiate and maintain comfort rounds  - Make Fall Risk Sign visible to staff  - Offer Toileting every . Hours, in advance of need  - Initiate/Maintain .alarm  - Obtain necessary fall risk management equipment: ...  - Apply yellow socks and bracelet for high fall risk patients  - Consider moving patient to room near nurses station  Outcome: Progressing

## 2023-12-31 NOTE — PROGRESS NOTES
[facility]  Progress Note  Name: Tigist Hewitt I  MRN: [id number]  Unit/Bed#: [facility] I Date of Admission: 12/23/2023   Date of Service: 12/31/2023 I Hospital Day: 8    Assessment/Plan   * Acute on chronic diastolic congestive heart failure (HCC)  Assessment & Plan  Wt Readings from Last 3 Encounters:   12/31/23 (!) 153 kg (337 lb 1.3 oz)   10/29/22 (!) 151 kg (331 lb 12.7 oz)   10/03/22 (!) 171 kg (377 lb 6.8 oz)     Presents with dyspnea, orthopnea, anasarca and CTA chest with pulmonary edema, hypervolemic with CHF exacerbation.?>50lb over her weight at the time of last hospitalization.  Reports compliance with torsemide 50 mg twice daily  Seems since admission overall as a  everywhere but scale she lost 21 pounds she is -29 L  CHF pathway, intake output, daily weights, 2 g sodium diet with 1500 mL of fluid restriction  Previous echocardiogram with preserved ejection fraction but significant valvular heart disease, update echo  Monitor volume status  Continue bumex today will be given diamox and bmp q12   In 2022 discharged at 331 lb    CKD (chronic kidney disease) stage 3, GFR 30-59 ml/min ([facility])  Assessment & Plan  Lab Results   Component Value Date    EGFR 50 12/31/2023    EGFR 47 12/30/2023    EGFR 45 12/30/2023    CREATININE 1.11 12/31/2023    CREATININE 1.16 12/30/2023    CREATININE 1.20 12/30/2023     Hx CKD 3.  Baseline creatinine between 1-1.3.  Creatinine baseline  Monitor closely with IV diuresis  Trend creatinine  Renally dose medications and avoid nephrotoxic medications    Abnormal CT scan, pelvis  Assessment & Plan  Abnormal endometrial thickening on CT pelvis  Discussed with patient that she will need GYN follow-up  Patient endorses intermittent vaginal bleeding    NISHI (obstructive sleep apnea)  Assessment & Plan  Continue CPAP    Hypothyroidism  Assessment & Plan  Continue PTA levothyroxine 188 mcg daily  Tsh nml    Morbid obesity (HCC)  Assessment &  Plan  BMI 70  Intensive lifestyle modification required  Outpatient referral to bariatric medicine    Chronic respiratory failure with hypoxia ([facility])  Assessment & Plan  Chronically on 4 L oxygen nasal cannula Home regimen  Stable at baseline    Pneumonia due to COVID-19 virus  Assessment & Plan  Background: Presented with dyspnea, fluid retention found to be COVID-positive  COVID19- PCR positive 12/23  Supplemental O2 with 4LNC saturating 95%; this is patient's baseline   imaging   CTA chest with GGO viral versus pulmonary edema  Started on mild COVID pathway for treatment  Not requiring increased oxygen needs, no indication for dexamethasone  AC warfarin-INR therapeutic  Daily CBC and CMP  Continue check inflammatory markers as indicated  S/p 5 days of remdesavir   Encourage ISP/flutter valve  Encourage proning and early mobilization  Blood cultures pending  Trend procalcitonin  Airborne/Droplet precautions ordered     Type 2 diabetes mellitus with hyperglycemia, with long-term current use of insulin ([facility])  Assessment & Plan  Lab Results   Component Value Date    HGBA1C 7.3 (H) 12/23/2023       Recent Labs     12/30/23  1610 12/30/23 2012 12/31/23  0759 12/31/23  1108   POCGLU 120 140 124 182*         Blood Sugar Average: Last 72 hrs:  (P) 148.6743150592829913    Continue home regimen Lantus 26 units at bedtime, lispro 5 units 3 times daily meals  Add SSI with Accu-Cheks  Hypoglycemia protocol  Received dexamethasone for COVID-monitor for steroid-induced hyperglycemia     COPD (chronic obstructive pulmonary disease) ([facility])  Assessment & Plan  Acute exacerbation secondary to COVID  As needed bronchodilators  Continue PTA Breo and Singulair  Received IV dexamethasone x 1 in ED, will hold further steroids  Monitor  Oxygen needs are baseline    Atrial fibrillation ([facility])  Assessment & Plan  Continue warfarin 7.5 mg daily  Goal INR 2-3  INR is at 3 will hold Coumadin today  Rate improved with Toprol-XL 37.5 mg p.o. twice  daily unfortunately was not given yesterday increase midodrine to 5 3- times daily                 VTE Pharmacologic Prophylaxis:   Moderate Risk (Score 3-4) - Pharmacological DVT Prophylaxis Contraindicated. Sequential Compression Devices Ordered.    Mobility:   Basic Mobility Inpatient Raw Score: 6  JH-HLM Goal: 2: Bed activities/Dependent transfer  JH-HLM Achieved: 2: Bed activities/Dependent transfer  HLM Goal NOT achieved. Continue with multidisciplinary rounding and encourage appropriate mobility to improve upon HLM goals.    Patient Centered Rounds: I performed bedside rounds with nursing staff today.   Discussions with Specialists or Other Care Team Provider: none    Education and Discussions with Family / Patient: pt    Total Time Spent on Date of Encounter in care of patient: >35 mins. This time was spent on one or more of the following: performing physical exam; counseling and coordination of care; obtaining or reviewing history; documenting in the medical record; reviewing/ordering tests, medications or procedures; communicating with other healthcare professionals and discussing with patient's family/caregivers.    Current Length of Stay: 8 day(s)  Current Patient Status: Inpatient   Certification Statement: The patient will continue to require additional inpatient hospital stay due to chf  Discharge Plan: Anticipate discharge in 48-72 hrs to home with home services.    Code Status: Level 3 - DNAR and DNI    Subjective:   Seen and examined has some gas pain     Objective:     Vitals:   Temp (24hrs), Av.6 °F (36.4 °C), Min:97.5 °F (36.4 °C), Max:97.7 °F (36.5 °C)    Temp:  [97.5 °F (36.4 °C)-97.7 °F (36.5 °C)] 97.7 °F (36.5 °C)  HR:  [85-98] 92  BP: (112-116)/(62-79) 113/77  SpO2:  [91 %-97 %] 96 %  Body mass index is 61.65 kg/m².     Input and Output Summary (last 24 hours):     Intake/Output Summary (Last 24 hours) at 2023 1308  Last data filed at 2023 0803  Gross per 24 hour   Intake  480 ml   Output 2300 ml   Net -1820 ml       Physical Exam:   Physical Exam  Vitals and nursing note reviewed.   Constitutional:       General: She is not in acute distress.     Appearance: She is well-developed.   HENT:      Head: Normocephalic and atraumatic.   Eyes:      Conjunctiva/sclera: Conjunctivae normal.   Cardiovascular:      Rate and Rhythm: Normal rate and regular rhythm.      Heart sounds: No murmur heard.  Pulmonary:      Effort: Pulmonary effort is normal. No respiratory distress.      Breath sounds: Normal breath sounds. No wheezing or rales.   Abdominal:      General: There is no distension.      Palpations: Abdomen is soft.      Tenderness: There is no abdominal tenderness.   Musculoskeletal:         General: Swelling (upper thigh) present.      Cervical back: Neck supple.   Skin:     General: Skin is warm and dry.      Capillary Refill: Capillary refill takes less than 2 seconds.   Neurological:      Mental Status: She is alert and oriented to person, place, and time.   Psychiatric:         Mood and Affect: Mood normal.          Additional Data:     Labs:  Results from last 7 days   Lab Units 12/30/23  0509   WBC Thousand/uL 8.55   HEMOGLOBIN g/dL 13.1   HEMATOCRIT % 42.3   PLATELETS Thousands/uL 223   NEUTROS PCT % 68   LYMPHS PCT % 14   MONOS PCT % 13*   EOS PCT % 3     Results from last 7 days   Lab Units 12/31/23  0759 12/26/23  0505 12/25/23  0510   SODIUM mmol/L 144   < > 144   POTASSIUM mmol/L 3.5   < > 3.6   CHLORIDE mmol/L 104   < > 104   CO2 mmol/L 34*   < > 33*   BUN mg/dL 39*   < > 41*   CREATININE mg/dL 1.11   < > 1.15   ANION GAP mmol/L 6   < > 7   CALCIUM mg/dL 9.2   < > 9.1   ALBUMIN g/dL  --   --  3.4*   TOTAL BILIRUBIN mg/dL  --   --  0.82   ALK PHOS U/L  --   --  110*   ALT U/L  --   --  17   AST U/L  --   --  32   GLUCOSE RANDOM mg/dL 125   < > 112    < > = values in this interval not displayed.     Results from last 7 days   Lab Units 12/31/23  0759   INR  3.24*     Results  from last 7 days   Lab Units 12/31/23  1108 12/31/23  0759 12/30/23 2012 12/30/23  1610 12/30/23  1156 12/30/23  0759 12/29/23  2052 12/29/23  1552 12/29/23  1102 12/29/23  0712 12/28/23  2145 12/28/23  1558   POC GLUCOSE mg/dl 182* 124 140 120 143* 116 115 175* 221* 119 146* 126               Lines/Drains:  Invasive Devices       Peripheral Intravenous Line  Duration             Peripheral IV 12/27/23 Distal;Left;Ventral (anterior) Forearm 3 days    Peripheral IV 12/27/23 Distal;Right;Ventral (anterior) Forearm 3 days              Drain  Duration             Urethral Catheter Temperature probe 16 Fr. 7 days                  Urinary Catheter:  Goal for removal: Remove after 48 hrs of I/O monitoring           Telemetry:  Telemetry Orders (From admission, onward)               24 Hour Telemetry Monitoring  Continuous x 24 Hours ([place])        Question:  Reason for 24 Hour Telemetry  Answer:  Decompensated CHF- and any one of the following: continuous diuretic infusion or total diuretic dose >200 mg daily, associated electrolyte derangement (I.e. K < 3.0), ionotropic drip (continuous infusion), hx of ventricular arrhythmia, or new EF < 35%                     Telemetry Reviewed: Atrial fibrillation. HR averaging 90  Indication for Continued Telemetry Use: Acute CHF on >200 mg lasix/day or equivalent dose or with new reduced EF.              Imaging: Reviewed radiology reports from this admission including: chest xray    Recent Cultures (last 7 days):         Last 24 Hours Medication List:   Current Facility-Administered Medications   Medication Dose Route Frequency Provider Last Rate    acetaminophen  650 mg Oral Q6H PRN Kaitlin S Bar, CRNP      albuterol  2.5 mg Nebulization Q4H PRN Kaitlin S Bar, CRNP      allopurinol  100 mg Oral Daily Kaitlin S Bar, CRNP      atorvastatin  10 mg Oral Daily Kaitlin S Bar, CRNP      bumetanide (BUMEX) 12.5 mg infusion 50 mL  1 mg/hr Intravenous Continuous Debby Galindo PA-C  1 mg/hr (12/31/23 3497)    calcium carbonate  500 mg Oral Daily PRN Katerina Rome, CRNP      cholecalciferol  1,000 Units Oral Daily Kaitlin S Bar, CRNP      docusate sodium  100 mg Oral BID Kaitlin S Bar, CRNP      ferrous sulfate  325 mg Oral Daily With Breakfast Kaitlin S Bar, CRNP      Fluticasone Furoate-Vilanterol  1 puff Inhalation Daily Kaitlin S Bar, CRNP      insulin glargine  26 Units Subcutaneous HS Kaitlin S Bar, CRNP      insulin lispro  2-12 Units Subcutaneous TID AC Kaitlin S Bar, CRNP      insulin lispro  2-12 Units Subcutaneous HS Kaitlin S Bar, CRNP      insulin lispro  5 Units Subcutaneous TID With Meals Radha Wright MD      levothyroxine  288 mcg Oral Early Morning Kaitlin S Bar, CRNP      loratadine  10 mg Oral Daily Kaitlin S Bar, CRNP      metoprolol succinate  37.5 mg Oral BID Radha Wright MD      midodrine  5 mg Oral TID AC Radha Wright MD      montelukast  10 mg Oral HS Kaitlin S Bar, CRNP      multivitamin-minerals  1 tablet Oral Daily Kaitlin S Bar, CRNP      pantoprazole  40 mg Oral Early Morning Kaitlin S Bar, CRNP      polyethylene glycol  17 g Oral Daily PRN Kaitlin S Bar, CRNP      potassium chloride  20 mEq Oral TID Kaitlin S Bar, CRNP      simethicone  80 mg Oral Q6H PRN Radha Wright MD          Today, Patient Was Seen By: Radha Wright MD    **Please Note: This note may have been constructed using a voice recognition system.**

## 2023-12-31 NOTE — ASSESSMENT & PLAN NOTE
Continue warfarin 7.5 mg daily  Goal INR 2-3  INR is at 3 will hold Coumadin today  Rate improved with Toprol-XL 37.5 mg p.o. twice daily unfortunately was not given yesterday increase midodrine to 5 3- times daily

## 2023-12-31 NOTE — ASSESSMENT & PLAN NOTE
Lab Results   Component Value Date    HGBA1C 7.3 (H) 12/23/2023       Recent Labs     12/30/23  1610 12/30/23 2012 12/31/23  0759 12/31/23  1108   POCGLU 120 140 124 182*         Blood Sugar Average: Last 72 hrs:  (P) 148.1664312037994324    Continue home regimen Lantus 26 units at bedtime, lispro 5 units 3 times daily meals  Add SSI with Accu-Cheks  Hypoglycemia protocol  Received dexamethasone for COVID-monitor for steroid-induced hyperglycemia

## 2023-12-31 NOTE — ASSESSMENT & PLAN NOTE
Wt Readings from Last 3 Encounters:   12/31/23 (!) 153 kg (337 lb 1.3 oz)   10/29/22 (!) 151 kg (331 lb 12.7 oz)   10/03/22 (!) 171 kg (377 lb 6.8 oz)     Presents with dyspnea, orthopnea, anasarca and CTA chest with pulmonary edema, hypervolemic with CHF exacerbation.?>50lb over her weight at the time of last hospitalization.  Reports compliance with torsemide 50 mg twice daily  Seems since admission overall as a  everywhere but scale she lost 21 pounds she is -29 L  CHF pathway, intake output, daily weights, 2 g sodium diet with 1500 mL of fluid restriction  Previous echocardiogram with preserved ejection fraction but significant valvular heart disease, update echo  Monitor volume status  Continue bumex today will be given diamox and bmp q12   In 2022 discharged at 331 lb

## 2024-01-01 LAB
ANION GAP SERPL CALCULATED.3IONS-SCNC: 8 MMOL/L
BUN SERPL-MCNC: 38 MG/DL (ref 5–25)
CALCIUM SERPL-MCNC: 8.8 MG/DL (ref 8.4–10.2)
CHLORIDE SERPL-SCNC: 106 MMOL/L (ref 96–108)
CO2 SERPL-SCNC: 29 MMOL/L (ref 21–32)
CREAT SERPL-MCNC: 1.03 MG/DL (ref 0.6–1.3)
GFR SERPL CREATININE-BSD FRML MDRD: 55 ML/MIN/1.73SQ M
GLUCOSE SERPL-MCNC: 108 MG/DL (ref 65–140)
GLUCOSE SERPL-MCNC: 112 MG/DL (ref 65–140)
GLUCOSE SERPL-MCNC: 135 MG/DL (ref 65–140)
GLUCOSE SERPL-MCNC: 156 MG/DL (ref 65–140)
GLUCOSE SERPL-MCNC: 184 MG/DL (ref 65–140)
INR PPP: 3.13 (ref 0.84–1.19)
POTASSIUM SERPL-SCNC: 3.7 MMOL/L (ref 3.5–5.3)
PROTHROMBIN TIME: 32.4 SECONDS (ref 11.6–14.5)
SODIUM SERPL-SCNC: 143 MMOL/L (ref 135–147)

## 2024-01-01 PROCEDURE — 85610 PROTHROMBIN TIME: CPT | Performed by: FAMILY MEDICINE

## 2024-01-01 PROCEDURE — 82948 REAGENT STRIP/BLOOD GLUCOSE: CPT

## 2024-01-01 PROCEDURE — 99232 SBSQ HOSP IP/OBS MODERATE 35: CPT | Performed by: FAMILY MEDICINE

## 2024-01-01 PROCEDURE — 80048 BASIC METABOLIC PNL TOTAL CA: CPT | Performed by: FAMILY MEDICINE

## 2024-01-01 RX ORDER — TORSEMIDE 20 MG/1
60 TABLET ORAL 2 TIMES DAILY
Status: DISCONTINUED | OUTPATIENT
Start: 2024-01-01 | End: 2024-01-02 | Stop reason: HOSPADM

## 2024-01-01 RX ADMIN — MIDODRINE HYDROCHLORIDE 5 MG: 5 TABLET ORAL at 17:00

## 2024-01-01 RX ADMIN — POTASSIUM CHLORIDE 20 MEQ: 1500 TABLET, EXTENDED RELEASE ORAL at 17:00

## 2024-01-01 RX ADMIN — PANTOPRAZOLE SODIUM 40 MG: 40 TABLET, DELAYED RELEASE ORAL at 05:41

## 2024-01-01 RX ADMIN — Medication 1000 UNITS: at 08:05

## 2024-01-01 RX ADMIN — LEVOTHYROXINE SODIUM 288 MCG: 88 TABLET ORAL at 05:41

## 2024-01-01 RX ADMIN — MIDODRINE HYDROCHLORIDE 5 MG: 5 TABLET ORAL at 05:41

## 2024-01-01 RX ADMIN — Medication 1 MG/HR: at 03:53

## 2024-01-01 RX ADMIN — FLUTICASONE FUROATE AND VILANTEROL TRIFENATATE 1 PUFF: 100; 25 POWDER RESPIRATORY (INHALATION) at 08:06

## 2024-01-01 RX ADMIN — TORSEMIDE 60 MG: 20 TABLET ORAL at 22:00

## 2024-01-01 RX ADMIN — METOPROLOL SUCCINATE 37.5 MG: 25 TABLET, EXTENDED RELEASE ORAL at 22:00

## 2024-01-01 RX ADMIN — LORATADINE 10 MG: 10 TABLET ORAL at 08:05

## 2024-01-01 RX ADMIN — TORSEMIDE 60 MG: 20 TABLET ORAL at 10:19

## 2024-01-01 RX ADMIN — DOCUSATE SODIUM 100 MG: 100 CAPSULE, LIQUID FILLED ORAL at 08:05

## 2024-01-01 RX ADMIN — MIDODRINE HYDROCHLORIDE 5 MG: 5 TABLET ORAL at 12:01

## 2024-01-01 RX ADMIN — Medication 1 TABLET: at 08:05

## 2024-01-01 RX ADMIN — INSULIN GLARGINE 26 UNITS: 100 INJECTION, SOLUTION SUBCUTANEOUS at 22:02

## 2024-01-01 RX ADMIN — ALLOPURINOL 100 MG: 100 TABLET ORAL at 08:05

## 2024-01-01 RX ADMIN — INSULIN LISPRO 2 UNITS: 100 INJECTION, SOLUTION INTRAVENOUS; SUBCUTANEOUS at 16:57

## 2024-01-01 RX ADMIN — FERROUS SULFATE TAB 325 MG (65 MG ELEMENTAL FE) 325 MG: 325 (65 FE) TAB at 07:49

## 2024-01-01 RX ADMIN — POTASSIUM CHLORIDE 20 MEQ: 1500 TABLET, EXTENDED RELEASE ORAL at 08:05

## 2024-01-01 RX ADMIN — ATORVASTATIN CALCIUM 10 MG: 10 TABLET, FILM COATED ORAL at 08:05

## 2024-01-01 RX ADMIN — METOPROLOL SUCCINATE 37.5 MG: 25 TABLET, EXTENDED RELEASE ORAL at 08:05

## 2024-01-01 RX ADMIN — INSULIN LISPRO 2 UNITS: 100 INJECTION, SOLUTION INTRAVENOUS; SUBCUTANEOUS at 22:02

## 2024-01-01 RX ADMIN — POTASSIUM CHLORIDE 20 MEQ: 1500 TABLET, EXTENDED RELEASE ORAL at 22:00

## 2024-01-01 RX ADMIN — ACETAMINOPHEN 650 MG: 325 TABLET, FILM COATED ORAL at 10:18

## 2024-01-01 RX ADMIN — MONTELUKAST 10 MG: 10 TABLET, FILM COATED ORAL at 22:02

## 2024-01-01 NOTE — ASSESSMENT & PLAN NOTE
Lab Results   Component Value Date    HGBA1C 7.3 (H) 12/23/2023       Recent Labs     12/31/23  1549 12/31/23  2050 01/01/24  0756 01/01/24  1151   POCGLU 140 162* 108 135         Blood Sugar Average: Last 72 hrs:  (P) 142.6345159283219739    Continue home regimen Lantus 26 units at bedtime, dc lispro   Add SSI with Accu-Cheks  Hypoglycemia protocol  Received dexamethasone for COVID-monitor for steroid-induced hyperglycemia

## 2024-01-01 NOTE — ASSESSMENT & PLAN NOTE
Lab Results   Component Value Date    EGFR 55 01/01/2024    EGFR 47 12/31/2023    EGFR 50 12/31/2023    CREATININE 1.03 01/01/2024    CREATININE 1.16 12/31/2023    CREATININE 1.11 12/31/2023     Hx CKD 3.  Baseline creatinine between 1-1.3.  Creatinine baseline  Monitor closely with IV diuresis  Trend creatinine  Renally dose medications and avoid nephrotoxic medications

## 2024-01-01 NOTE — ASSESSMENT & PLAN NOTE
Wt Readings from Last 3 Encounters:   01/01/24 (!) 150 kg (330 lb 4 oz)   10/29/22 (!) 151 kg (331 lb 12.7 oz)   10/03/22 (!) 171 kg (377 lb 6.8 oz)     Presents with dyspnea, orthopnea, anasarca and CTA chest with pulmonary edema, hypervolemic with CHF exacerbation.?>50lb over her weight at the time of last hospitalization.  Reports compliance with torsemide 50 mg twice daily  Seems since admission overall as a  everywhere but scale she lost 55 pounds she is -30 L  CHF pathway, intake output, daily weights, 2 g sodium diet with 1500 mL of fluid restriction  Previous echocardiogram with preserved ejection fraction but significant valvular heart disease, update echo  In 2022 discharged at 331 lb  Weight below 2022 seems euvolemic but body habitus hard to tell  Change to torsemide increase home dose to 60 mg po bid(was on 50 mg po bid )  Bryson frederick

## 2024-01-01 NOTE — PLAN OF CARE
Problem: Potential for Falls  Goal: Patient will remain free of falls  Description: INTERVENTIONS:  - Educate patient/family on patient safety including physical limitations  - Instruct patient to call for assistance with activity   - Consult OT/PT to assist with strengthening/mobility   - Keep Call bell within reach  - Keep bed low and locked with side rails adjusted as appropriate  - Keep care items and personal belongings within reach  - Initiate and maintain comfort rounds  - Make Fall Risk Sign visible to staff  - Offer Toileting every . Hours, in advance of need  - Initiate/Maintain .alarm  - Obtain necessary fall risk management equipment: ...  - Apply yellow socks and bracelet for high fall risk patients  - Consider moving patient to room near nurses station  Outcome: Progressing     Problem: Prexisting or High Potential for Compromised Skin Integrity  Goal: Skin integrity is maintained or improved  Description: INTERVENTIONS:  - Identify patients at risk for skin breakdown  - Assess and monitor skin integrity  - Assess and monitor nutrition and hydration status  - Monitor labs   - Assess for incontinence   - Turn and reposition patient  - Assist with mobility/ambulation  - Relieve pressure over bony prominences  - Avoid friction and shearing  - Provide appropriate hygiene as needed including keeping skin clean and dry  - Evaluate need for skin moisturizer/barrier cream  - Collaborate with interdisciplinary team   - Patient/family teaching  - Consider wound care consult   Outcome: Progressing     Problem: PAIN - ADULT  Goal: Verbalizes/displays adequate comfort level or baseline comfort level  Description: Interventions:  - Encourage patient to monitor pain and request assistance  - Assess pain using appropriate pain scale  - Administer analgesics based on type and severity of pain and evaluate response  - Implement non-pharmacological measures as appropriate and evaluate response  - Consider cultural and  social influences on pain and pain management  - Notify physician/advanced practitioner if interventions unsuccessful or patient reports new pain  Outcome: Progressing     Problem: INFECTION - ADULT  Goal: Absence or prevention of progression during hospitalization  Description: INTERVENTIONS:  - Assess and monitor for signs and symptoms of infection  - Monitor lab/diagnostic results  - Monitor all insertion sites, i.e. indwelling lines, tubes, and drains  - Monitor endotracheal if appropriate and nasal secretions for changes in amount and color  - [place] appropriate cooling/warming therapies per order  - Administer medications as ordered  - Instruct and encourage patient and family to use good hand hygiene technique  - Identify and instruct in appropriate isolation precautions for identified infection/condition  Outcome: Progressing     Problem: SAFETY ADULT  Goal: Patient will remain free of falls  Description: INTERVENTIONS:  - Educate patient/family on patient safety including physical limitations  - Instruct patient to call for assistance with activity   - Consult OT/PT to assist with strengthening/mobility   - Keep Call bell within reach  - Keep bed low and locked with side rails adjusted as appropriate  - Keep care items and personal belongings within reach  - Initiate and maintain comfort rounds  - Make Fall Risk Sign visible to staff  - Offer Toileting every . Hours, in advance of need  - Initiate/Maintain .alarm  - Obtain necessary fall risk management equipment: ...  - Apply yellow socks and bracelet for high fall risk patients  - Consider moving patient to room near nurses station  Outcome: Progressing  Goal: Maintain or return to baseline ADL function  Description: INTERVENTIONS:  - Educate patient/family on patient safety including physical limitations  - Instruct patient to call for assistance with activity   - Consult OT/PT to assist with strengthening/mobility   - Keep Call bell within reach  -  Keep bed low and locked with side rails adjusted as appropriate  - Keep care items and personal belongings within reach  - Initiate and maintain comfort rounds  - Make Fall Risk Sign visible to staff  - Offer Toileting every . Hours, in advance of need  - Initiate/Maintain .alarm  - Obtain necessary fall risk management equipment: ..  - Apply yellow socks and bracelet for high fall risk patients  - Consider moving patient to room near nurses station  Outcome: Progressing  Goal: Maintains/Returns to pre admission functional level  Description: INTERVENTIONS:  - Perform AM-PAC 6 Click Basic Mobility/ Daily Activity assessment daily.  - Set and communicate daily mobility goal to care team and patient/family/caregiver.   - Collaborate with rehabilitation services on mobility goals if consulted  - Perform Range of Motion . times a day.  - Reposition patient every . hours.  - Dangle patient .. times a day  - Stand patient . times a day  - Ambulate patient . times a day  - Out of bed to chair . times a day   - Out of bed for meals ....... times a day  - Out of bed for toileting  - Record patient progress and toleration of activity level   Outcome: Progressing     Problem: Knowledge Deficit  Goal: Patient/family/caregiver demonstrates understanding of disease process, treatment plan, medications, and discharge instructions  Description: Complete learning assessment and assess knowledge base.  Interventions:  - Provide teaching at level of understanding  - Provide teaching via preferred learning methods  Outcome: Progressing     Problem: CARDIOVASCULAR - ADULT  Goal: Maintains optimal cardiac output and hemodynamic stability  Description: INTERVENTIONS:  - Monitor I/O, vital signs and rhythm  - Monitor for S/S and trends of decreased cardiac output  - Administer and titrate ordered vasoactive medications to optimize hemodynamic stability  - Assess quality of pulses, skin color and temperature  - Assess for signs of decreased  coronary artery perfusion  - Instruct patient to report change in severity of symptoms  Outcome: Progressing  Goal: Absence of cardiac dysrhythmias or at baseline rhythm  Description: INTERVENTIONS:  - Continuous cardiac monitoring, vital signs, obtain 12 lead EKG if ordered  - Administer antiarrhythmic and heart rate control medications as ordered  - Monitor electrolytes and administer replacement therapy as ordered  Outcome: Progressing     Problem: RESPIRATORY - ADULT  Goal: Achieves optimal ventilation and oxygenation  Description: INTERVENTIONS:  - Assess for changes in respiratory status  - Assess for changes in mentation and behavior  - Position to facilitate oxygenation and minimize respiratory effort  - Oxygen administered by appropriate delivery if ordered  - Initiate smoking cessation education as indicated  - Encourage broncho-pulmonary hygiene including cough, deep breathe, Incentive Spirometry  - Assess the need for suctioning and aspirate as needed  - Assess and instruct to report SOB or any respiratory difficulty  - Respiratory Therapy support as indicated  Outcome: Progressing

## 2024-01-01 NOTE — PROGRESS NOTES
[facility]  Progress Note  Name: Tigist Hewitt I  MRN: [id number]  Unit/Bed#: MS Marry I Date of Admission: 12/23/2023   Date of Service: 1/1/2024 I Hospital Day: 9    Assessment/Plan   * Acute on chronic diastolic congestive heart failure (HCC)  Assessment & Plan  Wt Readings from Last 3 Encounters:   01/01/24 (!) 150 kg (330 lb 4 oz)   10/29/22 (!) 151 kg (331 lb 12.7 oz)   10/03/22 (!) 171 kg (377 lb 6.8 oz)     Presents with dyspnea, orthopnea, anasarca and CTA chest with pulmonary edema, hypervolemic with CHF exacerbation.?>50lb over her weight at the time of last hospitalization.  Reports compliance with torsemide 50 mg twice daily  Seems since admission overall as a  everywhere but scale she lost 55 pounds she is -30 L  CHF pathway, intake output, daily weights, 2 g sodium diet with 1500 mL of fluid restriction  Previous echocardiogram with preserved ejection fraction but significant valvular heart disease, update echo  In 2022 discharged at 331 lb  Weight below 2022 seems euvolemic but body habitus hard to tell  Change to torsemide increase home dose to 60 mg po bid(was on 50 mg po bid )  Bryson frederick      CKD (chronic kidney disease) stage 3, GFR 30-59 ml/min ([facility])  Assessment & Plan  Lab Results   Component Value Date    EGFR 55 01/01/2024    EGFR 47 12/31/2023    EGFR 50 12/31/2023    CREATININE 1.03 01/01/2024    CREATININE 1.16 12/31/2023    CREATININE 1.11 12/31/2023     Hx CKD 3.  Baseline creatinine between 1-1.3.  Creatinine baseline  Monitor closely with IV diuresis  Trend creatinine  Renally dose medications and avoid nephrotoxic medications    Abnormal CT scan, pelvis  Assessment & Plan  Abnormal endometrial thickening on CT pelvis  Discussed with patient that she will need GYN follow-up  Patient endorses intermittent vaginal bleeding    NISHI (obstructive sleep apnea)  Assessment & Plan  Continue CPAP    Hypothyroidism  Assessment & Plan  Continue PTA  levothyroxine 188 mcg daily  Tsh nml    Morbid obesity ([facility])  Assessment & Plan  BMI 70  Intensive lifestyle modification required  Outpatient referral to bariatric medicine    Chronic respiratory failure with hypoxia ([facility])  Assessment & Plan  Chronically on 4 L oxygen nasal cannula Home regimen  Stable at baseline    Pneumonia due to COVID-19 virus  Assessment & Plan  Background: Presented with dyspnea, fluid retention found to be COVID-positive  COVID19- PCR positive 12/23  Supplemental O2 with 4LNC saturating 95%; this is patient's baseline   imaging   CTA chest with GGO viral versus pulmonary edema  Started on mild COVID pathway for treatment  Not requiring increased oxygen needs, no indication for dexamethasone  AC warfarin-INR therapeutic  Daily CBC and CMP  Continue check inflammatory markers as indicated  S/p 5 days of remdesavir   Encourage ISP/flutter valve  Encourage proning and early mobilization  Blood cultures pending  Trend procalcitonin  Airborne/Droplet precautions ordered     Type 2 diabetes mellitus with hyperglycemia, with long-term current use of insulin ([facility])  Assessment & Plan  Lab Results   Component Value Date    HGBA1C 7.3 (H) 12/23/2023       Recent Labs     12/31/23  1549 12/31/23  2050 01/01/24  0756 01/01/24  1151   POCGLU 140 162* 108 135         Blood Sugar Average: Last 72 hrs:  (P) [id number]    Continue home regimen Lantus 26 units at bedtime, dc lispro   Add SSI with Accu-Cheks  Hypoglycemia protocol  Received dexamethasone for COVID-monitor for steroid-induced hyperglycemia     COPD (chronic obstructive pulmonary disease) ([facility])  Assessment & Plan  Acute exacerbation secondary to COVID  As needed bronchodilators  Continue PTA Breo and Singulair  Received IV dexamethasone x 1 in ED, will hold further steroids  Monitor  Oxygen needs are baseline    Atrial fibrillation ([facility])  Assessment & Plan  Continue warfarin 7.5 mg daily  Goal INR 2-3  INR is at 3 will hold Coumadin  today  - continue toprol xl 37.5 mg po bid - rates improved                  VTE Pharmacologic Prophylaxis:   Moderate Risk (Score 3-4) - Pharmacological DVT Prophylaxis Contraindicated. Sequential Compression Devices Ordered.    Mobility:   Basic Mobility Inpatient Raw Score: 6  JH-HLM Goal: 2: Bed activities/Dependent transfer  JH-HLM Achieved: 2: Bed activities/Dependent transfer  HLM Goal NOT achieved. Continue with multidisciplinary rounding and encourage appropriate mobility to improve upon HLM goals.    Patient Centered Rounds: I performed bedside rounds with nursing staff today.   Discussions with Specialists or Other Care Team Provider: cm    Education and Discussions with Family / Patient: pt    Total Time Spent on Date of Encounter in care of patient: >35 mins. This time was spent on one or more of the following: performing physical exam; counseling and coordination of care; obtaining or reviewing history; documenting in the medical record; reviewing/ordering tests, medications or procedures; communicating with other healthcare professionals and discussing with patient's family/caregivers.    Current Length of Stay: 9 day(s)  Current Patient Status: Inpatient   Certification Statement: The patient will continue to require additional inpatient hospital stay due to chf  Discharge Plan: Anticipate discharge tomorrow to home with home services.    Code Status: Level 3 - DNAR and DNI    Subjective:   Seen and examined no sob     Objective:     Vitals:   Temp (24hrs), Av.7 °F (36.5 °C), Min:97.3 °F (36.3 °C), Max:97.9 °F (36.6 °C)    Temp:  [97.3 °F (36.3 °C)-97.9 °F (36.6 °C)] 97.7 °F (36.5 °C)  HR:  [88-99] 99  Resp:  [17-19] 19  BP: (115-122)/(67-75) 117/67  SpO2:  [88 %-97 %] 93 %  Body mass index is 60.4 kg/m².     Input and Output Summary (last 24 hours):     Intake/Output Summary (Last 24 hours) at 2024 1214  Last data filed at 2024 0932  Gross per 24 hour   Intake 960 ml   Output 1900 ml    Net -940 ml       Physical Exam:   Physical Exam  Vitals and nursing note reviewed.   Constitutional:       General: She is not in acute distress.     Appearance: She is well-developed.   HENT:      Head: Normocephalic and atraumatic.   Eyes:      Conjunctiva/sclera: Conjunctivae normal.   Cardiovascular:      Rate and Rhythm: Normal rate and regular rhythm.      Heart sounds: No murmur heard.  Pulmonary:      Effort: Pulmonary effort is normal. No respiratory distress.      Breath sounds: Normal breath sounds. No wheezing or rales.   Abdominal:      General: There is no distension.      Palpations: Abdomen is soft.      Tenderness: There is no abdominal tenderness.   Musculoskeletal:         General: Swelling (none on legs and has chronic thigh lymphedema) present.      Cervical back: Neck supple.   Skin:     General: Skin is warm and dry.      Capillary Refill: Capillary refill takes less than 2 seconds.   Neurological:      Mental Status: She is alert.   Psychiatric:         Mood and Affect: Mood normal.          Additional Data:     Labs:  Results from last 7 days   Lab Units 12/30/23  0509   WBC Thousand/uL 8.55   HEMOGLOBIN g/dL 13.1   HEMATOCRIT % 42.3   PLATELETS Thousands/uL 223   NEUTROS PCT % 68   LYMPHS PCT % 14   MONOS PCT % 13*   EOS PCT % 3     Results from last 7 days   Lab Units 01/01/24  0755   SODIUM mmol/L 143   POTASSIUM mmol/L 3.7   CHLORIDE mmol/L 106   CO2 mmol/L 29   BUN mg/dL 38*   CREATININE mg/dL 1.03   ANION GAP mmol/L 8   CALCIUM mg/dL 8.8   GLUCOSE RANDOM mg/dL 112     Results from last 7 days   Lab Units 01/01/24  0451   INR  3.13*     Results from last 7 days   Lab Units 01/01/24  1151 01/01/24  0756 12/31/23  2050 12/31/23  1549 12/31/23  1108 12/31/23  0759 12/30/23 2012 12/30/23  1610 12/30/23  1156 12/30/23  0759 12/29/23 2052 12/29/23  1552   POC GLUCOSE mg/dl 135 108 162* 140 182* 124 140 120 143* 116 115 175*               Lines/Drains:  Invasive Devices       Peripheral  Intravenous Line  Duration             Peripheral IV 12/31/23 Right;Ventral (anterior) Forearm <1 day                          Imaging: Reviewed radiology reports from this admission including: chest xray    Recent Cultures (last 7 days):         Last 24 Hours Medication List:   Current Facility-Administered Medications   Medication Dose Route Frequency Provider Last Rate    acetaminophen  650 mg Oral Q6H PRN Kaitlin S Bar, CRNP      albuterol  2.5 mg Nebulization Q4H PRN Kaitlin S Bar, CRNP      allopurinol  100 mg Oral Daily Kaitlin S Bar, CRNP      atorvastatin  10 mg Oral Daily Kaitlin S Bar, CRNP      calcium carbonate  500 mg Oral Daily PRN Katerina Rome, CRNP      cholecalciferol  1,000 Units Oral Daily Kaitlin S Bar, CRNP      docusate sodium  100 mg Oral BID Kaitlin S Bar, CRNP      ferrous sulfate  325 mg Oral Daily With Breakfast Kaitlin S Bar, CRNP      Fluticasone Furoate-Vilanterol  1 puff Inhalation Daily Kaitlin S Bar, CRNP      insulin glargine  26 Units Subcutaneous HS Kaitlin S Bar, CRNP      insulin lispro  2-12 Units Subcutaneous TID AC Kaitlin S Bar, CRNP      insulin lispro  2-12 Units Subcutaneous HS Kaitlin S Bar, CRNP      levothyroxine  288 mcg Oral Early Morning Kaitlin S Bar, CRNP      loratadine  10 mg Oral Daily Kaitlin S Bar, CRNP      metoprolol succinate  37.5 mg Oral BID Radha Wright MD      midodrine  5 mg Oral TID AC Radha Wright MD      montelukast  10 mg Oral HS Kaitlin S Bar, CRNP      multivitamin-minerals  1 tablet Oral Daily Kaitlin S Bar, CRNP      pantoprazole  40 mg Oral Early Morning Kaitlin S Bar, CRNP      polyethylene glycol  17 g Oral Daily PRN Kaitlin S Bar, CRNP      potassium chloride  20 mEq Oral TID Kaitlin S Bar, CRNP      simethicone  80 mg Oral Q6H PRN Radha Wright MD      torsemide  60 mg Oral BID Radha Wright MD          Today, Patient Was Seen By: Radha Wright MD    **Please Note: This note may have been constructed using a voice  recognition system.**

## 2024-01-01 NOTE — ASSESSMENT & PLAN NOTE
Continue warfarin 7.5 mg daily  Goal INR 2-3  INR is at 3 will hold Coumadin today  - continue toprol xl 37.5 mg po bid - rates improved

## 2024-01-02 ENCOUNTER — HOSPITAL ENCOUNTER (INPATIENT)
Facility: HOSPITAL | Age: 69
LOS: 1 days | Discharge: NURSING, REHAB OR LONG-TERM CARE | DRG: 556 | End: 2024-01-04
Attending: EMERGENCY MEDICINE | Admitting: FAMILY MEDICINE
Payer: COMMERCIAL

## 2024-01-02 VITALS
OXYGEN SATURATION: 92 % | DIASTOLIC BLOOD PRESSURE: 63 MMHG | WEIGHT: 293 LBS | BODY MASS INDEX: 53.92 KG/M2 | TEMPERATURE: 97.9 F | RESPIRATION RATE: 18 BRPM | HEIGHT: 62 IN | HEART RATE: 83 BPM | SYSTOLIC BLOOD PRESSURE: 118 MMHG

## 2024-01-02 DIAGNOSIS — R53.1 GENERALIZED WEAKNESS: ICD-10-CM

## 2024-01-02 DIAGNOSIS — R26.2 AMBULATORY DYSFUNCTION: Primary | ICD-10-CM

## 2024-01-02 DIAGNOSIS — N30.90 CYSTITIS: ICD-10-CM

## 2024-01-02 DIAGNOSIS — J44.9 CHRONIC OBSTRUCTIVE PULMONARY DISEASE, UNSPECIFIED COPD TYPE (HCC): Chronic | ICD-10-CM

## 2024-01-02 PROBLEM — J98.4 LUNG CYST: Status: ACTIVE | Noted: 2024-01-02

## 2024-01-02 LAB
ALBUMIN SERPL BCP-MCNC: 3.3 G/DL (ref 3.5–5)
ALP SERPL-CCNC: 170 U/L (ref 34–104)
ALT SERPL W P-5'-P-CCNC: 48 U/L (ref 7–52)
ANION GAP SERPL CALCULATED.3IONS-SCNC: 3 MMOL/L
ANION GAP SERPL CALCULATED.3IONS-SCNC: 5 MMOL/L
AST SERPL W P-5'-P-CCNC: 92 U/L (ref 13–39)
BILIRUB SERPL-MCNC: 1.11 MG/DL (ref 0.2–1)
BUN SERPL-MCNC: 40 MG/DL (ref 5–25)
BUN SERPL-MCNC: 45 MG/DL (ref 5–25)
CALCIUM ALBUM COR SERPL-MCNC: 9.2 MG/DL (ref 8.3–10.1)
CALCIUM SERPL-MCNC: 8.6 MG/DL (ref 8.4–10.2)
CALCIUM SERPL-MCNC: 8.9 MG/DL (ref 8.4–10.2)
CHLORIDE SERPL-SCNC: 101 MMOL/L (ref 96–108)
CHLORIDE SERPL-SCNC: 104 MMOL/L (ref 96–108)
CO2 SERPL-SCNC: 30 MMOL/L (ref 21–32)
CO2 SERPL-SCNC: 32 MMOL/L (ref 21–32)
CREAT SERPL-MCNC: 1.08 MG/DL (ref 0.6–1.3)
CREAT SERPL-MCNC: 1.24 MG/DL (ref 0.6–1.3)
ERYTHROCYTE [DISTWIDTH] IN BLOOD BY AUTOMATED COUNT: 18 % (ref 11.6–15.1)
GFR SERPL CREATININE-BSD FRML MDRD: 44 ML/MIN/1.73SQ M
GFR SERPL CREATININE-BSD FRML MDRD: 52 ML/MIN/1.73SQ M
GLUCOSE SERPL-MCNC: 117 MG/DL (ref 65–140)
GLUCOSE SERPL-MCNC: 121 MG/DL (ref 65–140)
GLUCOSE SERPL-MCNC: 152 MG/DL (ref 65–140)
GLUCOSE SERPL-MCNC: 248 MG/DL (ref 65–140)
HCT VFR BLD AUTO: 44.2 % (ref 34.8–46.1)
HGB BLD-MCNC: 13.5 G/DL (ref 11.5–15.4)
INR PPP: 2.55 (ref 0.84–1.19)
MCH RBC QN AUTO: 29.2 PG (ref 26.8–34.3)
MCHC RBC AUTO-ENTMCNC: 30.5 G/DL (ref 31.4–37.4)
MCV RBC AUTO: 96 FL (ref 82–98)
PLATELET # BLD AUTO: 329 THOUSANDS/UL (ref 149–390)
PMV BLD AUTO: 10.6 FL (ref 8.9–12.7)
POTASSIUM SERPL-SCNC: 4.1 MMOL/L (ref 3.5–5.3)
POTASSIUM SERPL-SCNC: 5.6 MMOL/L (ref 3.5–5.3)
PROT SERPL-MCNC: 8.3 G/DL (ref 6.4–8.4)
PROTHROMBIN TIME: 27.7 SECONDS (ref 11.6–14.5)
RBC # BLD AUTO: 4.63 MILLION/UL (ref 3.81–5.12)
SODIUM SERPL-SCNC: 134 MMOL/L (ref 135–147)
SODIUM SERPL-SCNC: 141 MMOL/L (ref 135–147)
WBC # BLD AUTO: 11.28 THOUSAND/UL (ref 4.31–10.16)

## 2024-01-02 PROCEDURE — 80048 BASIC METABOLIC PNL TOTAL CA: CPT | Performed by: FAMILY MEDICINE

## 2024-01-02 PROCEDURE — 99239 HOSP IP/OBS DSCHRG MGMT >30: CPT | Performed by: FAMILY MEDICINE

## 2024-01-02 PROCEDURE — 99285 EMERGENCY DEPT VISIT HI MDM: CPT

## 2024-01-02 PROCEDURE — 85610 PROTHROMBIN TIME: CPT | Performed by: FAMILY MEDICINE

## 2024-01-02 PROCEDURE — 82948 REAGENT STRIP/BLOOD GLUCOSE: CPT

## 2024-01-02 PROCEDURE — 93005 ELECTROCARDIOGRAM TRACING: CPT

## 2024-01-02 PROCEDURE — 36415 COLL VENOUS BLD VENIPUNCTURE: CPT | Performed by: EMERGENCY MEDICINE

## 2024-01-02 PROCEDURE — 85007 BL SMEAR W/DIFF WBC COUNT: CPT | Performed by: EMERGENCY MEDICINE

## 2024-01-02 PROCEDURE — 99285 EMERGENCY DEPT VISIT HI MDM: CPT | Performed by: EMERGENCY MEDICINE

## 2024-01-02 PROCEDURE — 80053 COMPREHEN METABOLIC PANEL: CPT | Performed by: EMERGENCY MEDICINE

## 2024-01-02 PROCEDURE — 85027 COMPLETE CBC AUTOMATED: CPT | Performed by: EMERGENCY MEDICINE

## 2024-01-02 RX ORDER — TORSEMIDE 20 MG/1
60 TABLET ORAL 2 TIMES DAILY
Qty: 180 TABLET | Refills: 0 | Status: SHIPPED | OUTPATIENT
Start: 2024-01-02 | End: 2024-02-01

## 2024-01-02 RX ORDER — MIDODRINE HYDROCHLORIDE 10 MG/1
5 TABLET ORAL
Qty: 45 TABLET | Refills: 0 | Status: SHIPPED | OUTPATIENT
Start: 2024-01-02 | End: 2024-02-01

## 2024-01-02 RX ORDER — METOPROLOL SUCCINATE 25 MG/1
37.5 TABLET, EXTENDED RELEASE ORAL 2 TIMES DAILY
Qty: 30 TABLET | Refills: 0 | Status: SHIPPED | OUTPATIENT
Start: 2024-01-02 | End: 2024-02-01

## 2024-01-02 RX ORDER — POTASSIUM CHLORIDE 20 MEQ/1
20 TABLET, EXTENDED RELEASE ORAL 3 TIMES DAILY
Start: 2024-01-02

## 2024-01-02 RX ADMIN — FLUTICASONE FUROATE AND VILANTEROL TRIFENATATE 1 PUFF: 100; 25 POWDER RESPIRATORY (INHALATION) at 09:05

## 2024-01-02 RX ADMIN — LORATADINE 10 MG: 10 TABLET ORAL at 09:04

## 2024-01-02 RX ADMIN — METOPROLOL SUCCINATE 37.5 MG: 25 TABLET, EXTENDED RELEASE ORAL at 09:04

## 2024-01-02 RX ADMIN — Medication 1000 UNITS: at 09:04

## 2024-01-02 RX ADMIN — Medication 1 TABLET: at 09:04

## 2024-01-02 RX ADMIN — PANTOPRAZOLE SODIUM 40 MG: 40 TABLET, DELAYED RELEASE ORAL at 05:10

## 2024-01-02 RX ADMIN — DOCUSATE SODIUM 100 MG: 100 CAPSULE, LIQUID FILLED ORAL at 09:04

## 2024-01-02 RX ADMIN — ATORVASTATIN CALCIUM 10 MG: 10 TABLET, FILM COATED ORAL at 09:04

## 2024-01-02 RX ADMIN — ALLOPURINOL 100 MG: 100 TABLET ORAL at 09:04

## 2024-01-02 RX ADMIN — MIDODRINE HYDROCHLORIDE 5 MG: 5 TABLET ORAL at 09:04

## 2024-01-02 RX ADMIN — MIDODRINE HYDROCHLORIDE 5 MG: 5 TABLET ORAL at 12:35

## 2024-01-02 RX ADMIN — TORSEMIDE 60 MG: 20 TABLET ORAL at 09:04

## 2024-01-02 RX ADMIN — FERROUS SULFATE TAB 325 MG (65 MG ELEMENTAL FE) 325 MG: 325 (65 FE) TAB at 09:04

## 2024-01-02 RX ADMIN — POTASSIUM CHLORIDE 20 MEQ: 1500 TABLET, EXTENDED RELEASE ORAL at 09:04

## 2024-01-02 RX ADMIN — LEVOTHYROXINE SODIUM 288 MCG: 88 TABLET ORAL at 05:10

## 2024-01-02 NOTE — DISCHARGE SUMMARY
[facility]  Discharge- Tigist Hewitt 1955, 68 y.o. female MRN: [id number]  Unit/Bed#: [facility] Encounter: [id number]  Primary Care Provider: Marisa Haque MD   Date and time admitted to hospital: 12/23/2023  2:16 PM    Chronic respiratory failure with hypoxia (HCC)  Assessment & Plan  Chronically on 4 L oxygen nasal cannula Home regimen  Patient back to baseline.    Atrial fibrillation (HCC)  Assessment & Plan  Continue warfarin 7.5 mg daily  Goal INR 2-3  INR is 2.55  continue toprol xl 37.5 mg po bid -    * Acute on chronic diastolic congestive heart failure ([facility])  Assessment & Plan  Wt Readings from Last 3 Encounters:   01/02/24 (!) 149 kg (328 lb 0.7 oz)   10/29/22 (!) 151 kg (331 lb 12.7 oz)   10/03/22 (!) 171 kg (377 lb 6.8 oz)     Presents with dyspnea, orthopnea, anasarca and CTA chest with pulmonary edema, hypervolemic with CHF exacerbation.?>50lb over her weight at the time of last hospitalization.  Reports compliance with torsemide 50 mg twice daily  Patient had good diuretic effects.  Back to baseline, patient torsemide dose increased to 60 mg twice daily.      Type 2 diabetes mellitus with hyperglycemia, with long-term current use of insulin ([facility])  Assessment & Plan  Lab Results   Component Value Date    HGBA1C 7.3 (H) 12/23/2023       Recent Labs     01/01/24  1556 01/01/24 2027 01/02/24  0755 01/02/24  1114   POCGLU 184* 156* 121 152*         Blood Sugar Average: Last 72 hrs:  (P) 141.1643864409920808    Can resume home medication.    COPD (chronic obstructive pulmonary disease) ([facility])  Assessment & Plan  Acute exacerbation secondary to COVID  As needed bronchodilators  Continue PTA Breo and Singulair  Received IV dexamethasone x 1 in ED,  Monitor  Oxygen needs are baseline    CKD (chronic kidney disease) stage 3, GFR 30-59 ml/min ([facility])  Assessment & Plan  Lab Results   Component Value Date    EGFR 52 01/02/2024    EGFR 55 01/01/2024    EGFR 47 12/31/2023     CREATININE 1.08 01/02/2024    CREATININE 1.03 01/01/2024    CREATININE 1.16 12/31/2023     Hx CKD 3.  Baseline creatinine between 1-1.3.  Creatinine baseline  Monitor closely with IV diuresis  Trend creatinine  Renally dose medications and avoid nephrotoxic medications    Abnormal CT scan, pelvis  Assessment & Plan  Abnormal endometrial thickening on CT pelvis  Discussed with patient that she will need GYN follow-up  Patient endorses intermittent vaginal bleeding    NISHI (obstructive sleep apnea)  Assessment & Plan  Continue CPAP    Hypothyroidism  Assessment & Plan  Continue PTA levothyroxine 188 mcg daily  Tsh nml    Morbid obesity (HCC)  Assessment & Plan  BMI 70  Intensive lifestyle modification required  Outpatient referral to bariatric medicine    Pneumonia due to COVID-19 virus  Assessment & Plan  Background: Presented with dyspnea, fluid retention found to be COVID-positive  COVID19- PCR positive 12/23  Supplemental O2 with 4LNC saturating 95%; this is patient's baseline   imaging   CTA chest with GGO viral versus pulmonary edema  Condition improved, patient back to baseline.      Lung Cyst  4.2 cm air-filled cyst in the right upper lobe, with several septations as on the April 2020 CT, however with a new 9 mm nodule along 1 of the septations. Underlying malignancy cannot be excluded. Recommend short interval follow-up chest CT in 3   months or PET/CT     Patient follows up with pulmonology outpatient basis, advised the patient to follow-up with pulmonologist as soon as possible.        Medical Problems       Resolved Problems  Date Reviewed: 1/1/2024   None       Discharging Physician / Practitioner: Pedro Price MD  PCP: Marisa Haque MD  Admission Date:   Admission Orders (From admission, onward)       Ordered        12/23/23 1923  INPATIENT ADMISSION  Once                          Discharge Date: 01/02/24    Consultations During Hospital Stay:  Cardiology    Procedures Performed:   PE Study with  CT Abdomen and Pelvis with contrast   Final Result by Cassie Eduardo MD (12/23 1842)      Evaluation severely limited by streak artifact from patient's body touching the CT gantry, as well as by motion artifact.      CTA CHEST:      1) No central or lobar pulmonary embolism. Evaluation of the segmental or subsegmental pulmonary arteries is limited.      2) Diffuse groundglass attenuation of the lung parenchyma with some associated areas of interlobular septal thickening. This may be seen in the setting of pulmonary edema and/or viral pneumonia. No airspace consolidations.      3) 4.2 cm air-filled cyst in the right upper lobe, with several septations as on the April 2020 CT, however with a new 9 mm nodule along 1 of the septations. Underlying malignancy cannot be excluded. Recommend short interval follow-up chest CT in 3    months or PET/CT. Pulmonology consultation may also be of value.      4) Mild cardiomegaly, with reflux of IV contrast into the dilated IVC and hepatic veins, suggestive of right heart failure.      5) Main pulmonary artery dilated up to 3.9 cm, suggestive of pulmonary arterial hypertension.      CT ABDOMEN/PELVIS:      6) No acute abdominal or pelvic pathology.      7) Endometrial stripe thickened up to 1.9 cm, which may be secondary to endometrial hyperplasia, an endometrial polyp, or endometrial neoplasm. Recommend further evaluation with nonemergent pelvic ultrasound if feasible, and/or tissue sampling.      8) Recanalized periumbilical vein, which may indicate underlying portal hypertension.      9) Multiple additional findings as above.      The study was marked in EPIC for immediate notification.            Workstation performed: [id number]         XR chest portable   Final Result by Taran Horvath MD (12/24 3842)      Mild pulmonary vascular congestion. Cardiomegaly.      No focal consolidation, pleural effusion, or pneumothorax.                  Workstation performed: [id number]            Echo complete :    Left Ventricle: Left ventricular cavity size is normal. Wall thickness is normal. The left ventricular ejection fraction is 55% by visual estimation, although assessment is significantly limited due to poor endocardial border definition even with the use of contrast. Systolic function is probably normal. Although no diagnostic regional wall motion abnormality was identified, this possibility cannot be completely excluded on the basis of this study. Unable to assess diastolic function due to atrial fibrillation.    IVS: There is both systolic and diastolic flattening of the interventricular septum consistent with right ventricle pressure and volume overload.    Right Ventricle: Right ventricular cavity size is mildly dilated. Systolic function is moderately reduced.    Right Atrium: The atrium is dilated.    Atrial Septum: The septum bows into the left atrium, suggesting increased right atrial pressure.    Aortic Valve: There is mild stenosis. The aortic valve peak velocity is 1.25 m/s. The aortic valve mean gradient is 4 mmHg. The dimensionless velocity index is 0.53. The aortic valve area is 1.56 cm2. The stroke volume index is 15.00 ml/m2. The aortic valve velocity is increased due to stenosis but lower than expected due to the presence of decreased flow.    Mitral Valve: There is mild annular calcification. There is mild to moderate regurgitation with a posteriorly directed jet. Severity is potentially underestimated due to eccentric jet.    Tricuspid Valve: There is severe regurgitation. The tricuspid valve regurgitation jet is directed toward septum. The right ventricular systolic pressure is moderately elevated. The estimated right ventricular systolic pressure is 53.00 mmHg.    Pulmonic Valve: There is mild regurgitation.    Prior TTE study available for comparison. Prior study date: 9/21/2022. No significant changes noted compared to the prior study.    Significant Findings / Test  Results:   Lab Results   Component Value Date    WBC 8.55 12/30/2023    HGB 13.1 12/30/2023    HCT 42.3 12/30/2023    MCV 97 12/30/2023     12/30/2023     Lab Results   Component Value Date    SODIUM 141 01/02/2024    K 4.1 01/02/2024     01/02/2024    CO2 32 01/02/2024    AGAP 5 01/02/2024    BUN 40 (H) 01/02/2024    CREATININE 1.08 01/02/2024    GLUC 117 01/02/2024    CALCIUM 8.9 01/02/2024    AST 32 12/25/2023    ALT 17 12/25/2023    ALKPHOS 110 (H) 12/25/2023    TP 7.2 12/25/2023    TBILI 0.82 12/25/2023    EGFR 52 01/02/2024     Collected Updated Procedure Result Status Patient Facility Result Comment    12/23/2023 1444 12/28/2023 2201 Blood culture #1 [[id number]]   Blood from Arm, Left    Final result [facility]  Component Value   Blood Culture No Growth After 5 Days.             12/23/2023 1444 12/23/2023 1530 FLU/RSV/COVID - if FLU/RSV clinically relevant [[id number]]    (Abnormal)   Nares from Nose    Final result [facility] - copy/paste COVID Guidelines URL to browser: [email or web address]   SARS-CoV-2 assay is a Nucleic Acid Amplification assay intended for the   qualitative detection of nucleic acid from SARS-CoV-2 in nasopharyngeal   swabs. Results are for the presumptive identification of SARS-CoV-2 RNA.   Positive results are indicative of infection with SARS-CoV-2, the virus   causing COVID-19, but do not rule out bacterial infection or co-infection   with other viruses. Laboratories within the [place] and its   territories are required to report all positive results to the appropriate   public health authorities. Negative results do not preclude SARS-CoV-2   infection and should not be used as the sole basis for treatment or other   patient management decisions. Negative results must be combined with   clinical observations, patient history, and  epidemiological information.   This test has not been FDA cleared or approved.   This test has been authorized by FDA under an Emergency Use Authorization   (EUA). This test is only authorized for the duration of time the   declaration that circumstances exist justifying the authorization of the   emergency use of an in vitro diagnostic tests for detection of SARS-CoV-2   virus and/or diagnosis of COVID-19 infection under section 564(b)(1) of   the Act, 21 U.S.C. 360bbb-3(b)(1), unless the authorization is terminated   or revoked sooner. The test has been validated but independent review by FDA   and CLIA is pending.   Test performed using [employer] GeneXpert: This RT-PCR assay targets N2,   a region unique to SARS-CoV-2. A conserved region in the E-gene was chosen   for pan-Sarbecovirus detection which includes SARS-CoV-2.   According to CMS-2020-01-R, this platform meets the definition of high-throughput technology.    Component Value   SARS-CoV-2 Positive Abnormal    INFLUENZA A PCR Negative   INFLUENZA B PCR Negative   RSV PCR Negative          12/23/2023 1441 12/28/2023 2201 Blood culture #2 [[id number]]   Blood from Arm, Right    Final result [facility]  Component Value   Blood Culture No Growth After 5 Days.          Incidental Findings:   As mentioned above  I reviewed the above mentioned incidental findings with the patient and/or family and they expressed understanding.    Test Results Pending at Discharge (will require follow up):   None     Outpatient Tests Requested:  Will benefit to repeat chest x-ray in 4 weeks with PCP    Complications: None    Reason for Admission: Shortness of breath    Hospital Course:   Tigist Hewitt is a 68 y.o. female patient who originally presented to the hospital on 12/23/2023 due to shortness of breath.  Patient initially admitted under the diagnosis of acute on chronic diastolic congestive heart failure, patient placed on Bumex drip as per  "cardiology recommendation with significant improvement, which later on transition to p.o. torsemide 60 mg twice daily.  Patient also had history of A-fib, metoprolol dose increased with good control.  Continue Coumadin.  During the hospitalization, patient also received treatment for pneumonia due to COVID-19 virus.  With the treatment, patient condition improved, patient chronically uses 4 L of oxygen, back to baseline.    Patient advised to follow-up with PCP, pulmonology and cardiology outpatient basis.  Patient will benefit to repeat chest x-ray in 4 weeks with PCP.    On discharge date, INR was 2.55.  Patient can resume home dose of Coumadin and diabetic medications and follow-up with PCP.    Patient had incidental finding of CT scan with endometrial thickening, patient does endorse intermittent vaginal bleeding,-previous team discussed with patient's with follow-up with GYN outpatient basis, patient verbalized understanding agrees.  Patient advised to follow-up with PCP for referral.    All lab results, imaging findings, treatment plan and option discussed in details with patient.  Verbalized understanding agrees.  Family member also updated over the phone.  Please see above list of diagnoses and related plan for additional information.     Condition at Discharge: stable    Discharge Day Visit / Exam:   Subjective: Seen and evaluated and examined.  Resting comfortably.  Denies any significant complaint.  Vitals: Blood Pressure: 118/63 (01/02/24 0604)  Pulse: 83 (01/02/24 0604)  Temperature: 97.9 °F (36.6 °C) (01/02/24 0604)  Temp Source: Temporal (12/31/23 2355)  Respirations: 18 (01/01/24 2203)  Height: 5' 2\" (157.5 cm) (12/24/23 1105)  Weight - Scale: (!) 149 kg (328 lb 0.7 oz) (01/02/24 0539)  SpO2: 92 % (01/02/24 0604)  Exam:   Physical Exam  Vitals and nursing note reviewed.   Constitutional:       Appearance: She is obese. She is not ill-appearing or diaphoretic.   HENT:      Mouth/Throat:      Pharynx: " No oropharyngeal exudate or posterior oropharyngeal erythema.   Eyes:      General: No scleral icterus.        Left eye: No discharge.      Extraocular Movements: Extraocular movements intact.      Conjunctiva/sclera: Conjunctivae normal.      Pupils: Pupils are equal, round, and reactive to light.   Cardiovascular:      Rate and Rhythm: Normal rate.      Heart sounds:      No friction rub. No gallop.   Pulmonary:      Effort: Pulmonary effort is normal. No respiratory distress.      Breath sounds: No stridor. No wheezing or rhonchi.   Abdominal:      General: Abdomen is flat. Bowel sounds are normal. There is no distension.      Palpations: There is no mass.      Tenderness: There is no abdominal tenderness.      Hernia: No hernia is present.   Musculoskeletal:      Right lower leg: No edema.      Left lower leg: No edema.      Comments: Chronic venous stasis bilaterally.   Skin:     General: Skin is warm.   Neurological:      General: No focal deficit present.      Mental Status: She is alert and oriented to person, place, and time.      Cranial Nerves: No cranial nerve deficit.      Sensory: No sensory deficit.      Motor: No weakness.      Coordination: Coordination normal.          Discussion with Family: Updated  (family member at as listed in the emergency contact) via phone.    Discharge instructions/Information to patient and family:   See after visit summary for information provided to patient and family.      Provisions for Follow-Up Care:  See after visit summary for information related to follow-up care and any pertinent home health orders.      Mobility at time of Discharge:   Basic Mobility Inpatient Raw Score: 7  JH-HLM Goal: 2: Bed activities/Dependent transfer  JH-HLM Achieved: 2: Bed activities/Dependent transfer  HLM Goal achieved. Continue to encourage appropriate mobility.     Disposition:   Home with VNA Services (Reminder: Complete face to face encounter)    Planned Readmission:  If condition get worse.     Discharge Statement:  Greater than 50% of the total time was spent examining patient, answering all patient questions, arranging and discussing plan of care with patient as well as directly providing post-discharge instructions.  Additional time then spent on discharge activities.    Discharge Medications:  See after visit summary for reconciled discharge medications provided to patient and/or family.      **Please Note: This note may have been constructed using a voice recognition system**

## 2024-01-02 NOTE — INCIDENTAL FINDINGS
The following findings require follow up:  Radiographic finding   Finding: PE Study with CT Abdomen and Pelvis with contrast: Evaluation severely limited by streak artifact from patient's body touching the CT gantry, as well as by motion artifact., CTA CHEST:, 1) No central or lobar pulmonary embolism. Evaluation of the segmental or subsegmental pulmonary arteries is limited., 2) Diffuse groundglass attenuation of the lung parenchyma with some associated areas of interlobular septal thickening. This may be seen in the setting of pulmonary edema and/or viral pneumonia. No airspace consolidations., 3) 4.2 cm air-filled cyst in the right upper lobe, with several septations as on the April 2020 CT, however with a new 9 mm nodule along 1 of the septations. Underlying malignancy cannot be excluded. Recommend short interval follow-up chest CT in 3 , months or PET/CT. Pulmonology consultation may also be of value., 4) Mild cardiomegaly, with reflux of IV contrast into the dilated IVC and hepatic veins, suggestive of right heart failure., 5) Main pulmonary artery dilated up to 3.9 cm, suggestive of pulmonary arterial hypertension., CT ABDOMEN/PELVIS:, 6) No acute abdominal or pelvic pathology., 7) Endometrial stripe thickened up to 1.9 cm, which may be secondary to endometrial hyperplasia, an endometrial polyp, or endometrial neoplasm. Recommend further evaluation with nonemergent pelvic ultrasound if feasible, and/or tissue sampling., 8) Recanalized periumbilical vein, which may indicate underlying portal hypertension., 9) Multiple additional findings as above.    Follow up required: yes   Follow up should be done within 1 week(s)    Please notify the following clinician to assist with the follow up:   Dr. Marisa Haque MD [facility] - Internal Medicine [phone]

## 2024-01-02 NOTE — ASSESSMENT & PLAN NOTE
Background: Presented with dyspnea, fluid retention found to be COVID-positive  COVID19- PCR positive 12/23  Supplemental O2 with 4LNC saturating 95%; this is patient's baseline   imaging   CTA chest with GGO viral versus pulmonary edema  Condition improved, patient back to baseline.

## 2024-01-02 NOTE — CASE MANAGEMENT
Case Management Discharge Planning Note    Patient name Tigist Hewitt  Location /-01 MRN [id number]  : 1955 Date 2024       Current Admission Date: 2023  Current Admission Diagnosis:Acute on chronic diastolic congestive heart failure (HCC)   Patient Active Problem List    Diagnosis Date Noted    Lung cyst 2024    Abnormal CT scan, pelvis 2023    CKD (chronic kidney disease) stage 3, GFR 30-59 ml/min ([facility]) 2023    NISHI (obstructive sleep apnea) 2023    Intertrigo 10/29/2022    Vaginal bleeding 10/23/2022    Acute kidney injury superimposed on chronic kidney disease  10/22/2022    Hypotension 10/22/2022    GERD (gastroesophageal reflux disease) 10/22/2022    Acute on chronic diastolic congestive heart failure ([facility]) 2022    IMTIAZ (acute kidney injury) ([facility]) 2022    Morbid obesity ([facility]) 2022    Hypothyroidism 2022    Supratherapeutic INR 04/15/2020    Pneumonia due to COVID-19 virus 2020    Chronic respiratory failure with hypoxia ([facility]) 2020    Asthma 2020    Atrial fibrillation ([facility]) 2020    COPD (chronic obstructive pulmonary disease) ([facility]) 2020    Type 2 diabetes mellitus with hyperglycemia, with long-term current use of insulin ([facility]) 2020    Heart failure ([facility]) 2020    Shortness of breath 2020    Anemia 2020      LOS (days): 10  Geometric Mean LOS (GMLOS) (days): 5  Days to GMLOS:-4.8     OBJECTIVE:  Risk of Unplanned Readmission Score: 22.82         Current admission status: Inpatient   Preferred Pharmacy:   [id number] [place] [address]  Phone: [phone] Fax: [phone]    Primary Care Provider: Marisa Haque MD    Primary Insurance: [employer] [facility] RE[employer]  Secondary Insurance:     DISCHARGE DETAILS:        AVS to [facility] order uploaded in [facility]

## 2024-01-02 NOTE — CASE MANAGEMENT
Case Management Discharge Planning Note    Patient name Tigist Hewitt  Location /-01 MRN [id number]  : 1955 Date 2024       Current Admission Date: 2023  Current Admission Diagnosis:Acute on chronic diastolic congestive heart failure ([facility])   Patient Active Problem List    Diagnosis Date Noted    Abnormal CT scan, pelvis 2023    CKD (chronic kidney disease) stage 3, GFR 30-59 ml/min ([facility]) 2023    NISHI (obstructive sleep apnea) 2023    Intertrigo 10/29/2022    Vaginal bleeding 10/23/2022    Acute kidney injury superimposed on chronic kidney disease  10/22/2022    Hypotension 10/22/2022    GERD (gastroesophageal reflux disease) 10/22/2022    Acute on chronic diastolic congestive heart failure ([facility]) 2022    IMTIAZ (acute kidney injury) ([facility]) 2022    Morbid obesity ([facility]) 2022    Hypothyroidism 2022    Supratherapeutic INR 04/15/2020    Pneumonia due to COVID-19 virus 2020    Chronic respiratory failure with hypoxia ([facility]) 2020    Asthma 2020    Atrial fibrillation ([facility]) 2020    COPD (chronic obstructive pulmonary disease) ([facility]) 2020    Type 2 diabetes mellitus with hyperglycemia, with long-term current use of insulin ([facility]) 2020    Heart failure ([facility]) 2020    Shortness of breath 2020    Anemia 2020      LOS (days): 10  Geometric Mean LOS (GMLOS) (days): 5  Days to GMLOS:-4.6     OBJECTIVE:  Risk of Unplanned Readmission Score: 20.2         Current admission status: Inpatient   Preferred Pharmacy:   [id number] - [place] [address]  Phone: [phone] Fax: [phone]    Primary Care Provider: Marisa Haque MD    Primary Insurance: [employer] [facility] RE[employer]  Secondary Insurance:     DISCHARGE DETAILS:         updated [facility] hospital, in [facility], with patients medical status

## 2024-01-02 NOTE — ASSESSMENT & PLAN NOTE
Wt Readings from Last 3 Encounters:   01/02/24 (!) 149 kg (328 lb 0.7 oz)   10/29/22 (!) 151 kg (331 lb 12.7 oz)   10/03/22 (!) 171 kg (377 lb 6.8 oz)     Presents with dyspnea, orthopnea, anasarca and CTA chest with pulmonary edema, hypervolemic with CHF exacerbation.?>50lb over her weight at the time of last hospitalization.  Reports compliance with torsemide 50 mg twice daily  Patient had good diuretic effects.  Back to baseline, patient torsemide dose increased to 60 mg twice daily.

## 2024-01-02 NOTE — ASSESSMENT & PLAN NOTE
Lab Results   Component Value Date    HGBA1C 7.3 (H) 12/23/2023       Recent Labs     01/01/24  1556 01/01/24 2027 01/02/24  0755 01/02/24  1114   POCGLU 184* 156* 121 152*         Blood Sugar Average: Last 72 hrs:  (P) 141.5345054763620789    Can resume home medication.

## 2024-01-02 NOTE — ASSESSMENT & PLAN NOTE
4.2 cm air-filled cyst in the right upper lobe, with several septations as on the April 2020 CT, however with a new 9 mm nodule along 1 of the septations. Underlying malignancy cannot be excluded. Recommend short interval follow-up chest CT in 3   months or PET/CT     Patient follows up with pulmonology outpatient basis, advised the patient to follow-up with pulmonologist as soon as possible.

## 2024-01-02 NOTE — CASE MANAGEMENT
Case Management Discharge Planning Note    Patient name Tigist Hewitt  Location /-01 MRN [id number]  : 1955 Date 2024       Current Admission Date: 2023  Current Admission Diagnosis:Acute on chronic diastolic congestive heart failure ([facility])   Patient Active Problem List    Diagnosis Date Noted    Abnormal CT scan, pelvis 2023    CKD (chronic kidney disease) stage 3, GFR 30-59 ml/min ([facility]) 2023    NISHI (obstructive sleep apnea) 2023    Intertrigo 10/29/2022    Vaginal bleeding 10/23/2022    Acute kidney injury superimposed on chronic kidney disease  10/22/2022    Hypotension 10/22/2022    GERD (gastroesophageal reflux disease) 10/22/2022    Acute on chronic diastolic congestive heart failure ([facility]) 2022    IMTIAZ (acute kidney injury) ([facility]) 2022    Morbid obesity ([facility]) 2022    Hypothyroidism 2022    Supratherapeutic INR 04/15/2020    Pneumonia due to COVID-19 virus 2020    Chronic respiratory failure with hypoxia ([facility]) 2020    Asthma 2020    Atrial fibrillation ([facility]) 2020    COPD (chronic obstructive pulmonary disease) ([facility]) 2020    Type 2 diabetes mellitus with hyperglycemia, with long-term current use of insulin ([facility]) 2020    Heart failure ([facility]) 2020    Shortness of breath 2020    Anemia 2020      LOS (days): 10  Geometric Mean LOS (GMLOS) (days): 5  Days to GMLOS:-4.7     OBJECTIVE:  Risk of Unplanned Readmission Score: 20.25         Current admission status: Inpatient   Preferred Pharmacy:   [id number] - [place] [address]  Phone: [phone] Fax: [phone]    Primary Care Provider: Marisa Haque MD    Primary Insurance: [employer]  Secondary Insurance:     DISCHARGE DETAILS:    Discharge planning discussed with:: spouse, [place]  Freedom of Choice: Yes  Comments - Freedom of Choice: blanket AIDIN  referral for HHC  CM contacted family/caregiver?: Yes  Were Treatment Team discharge recommendations reviewed with patient/caregiver?: Yes  Did patient/caregiver verbalize understanding of patient care needs?: Yes  Were patient/caregiver advised of the risks associated with not following Treatment Team discharge recommendations?: Yes         Requested Home Health Care         Oxygen LPM Ordered (if applicable based on home health services needed):: 4 LPM    DME Referral Provided  Referral made for DME?: No    Other Referral/Resources/Interventions Provided:  Interventions: HHC  Referral Comments: [facility]    Would you like to participate in our [facility] service program?  : No - Declined    Treatment Team Recommendation: Home with Home Health Care  Discharge Destination Plan:: Home with Home Health Care  Transport at Discharge : BLS Ambulance                             IMM Given (Date):: 01/02/24  IMM Given to:: Family  Family notified:: discussed with spouse           CM contacted spouse to discuss discharge of patient home today with [facility].     CM contacted patient in her room to discuss discharge. Patient reported she feels comfortable with discharge today.  CM spoke to patient at the bedside, reviewed DC IMM with patient and informed that patient can stay an additional 4 hours for reconsidering appealing the discharge as the medicare rights were review on the day of discharge. Pt verbalized understanding and feels ready to go home and does not intend to stay 4 hours to reconsider.  IMM placed in bin for filing.    CM submitted [facility] referral for BLS transport of patient home.

## 2024-01-02 NOTE — PLAN OF CARE
Problem: Potential for Falls  Goal: Patient will remain free of falls  Description: INTERVENTIONS:  - Educate patient/family on patient safety including physical limitations  - Instruct patient to call for assistance with activity   - Consult OT/PT to assist with strengthening/mobility   - Keep Call bell within reach  - Keep bed low and locked with side rails adjusted as appropriate  - Keep care items and personal belongings within reach  - Initiate and maintain comfort rounds  - Make Fall Risk Sign visible to staff  - Offer Toileting every . Hours, in advance of need  - Initiate/Maintain .alarm  - Obtain necessary fall risk management equipment: ...  - Apply yellow socks and bracelet for high fall risk patients  - Consider moving patient to room near nurses station  Outcome: Progressing     Problem: Prexisting or High Potential for Compromised Skin Integrity  Goal: Skin integrity is maintained or improved  Description: INTERVENTIONS:  - Identify patients at risk for skin breakdown  - Assess and monitor skin integrity  - Assess and monitor nutrition and hydration status  - Monitor labs   - Assess for incontinence   - Turn and reposition patient  - Assist with mobility/ambulation  - Relieve pressure over bony prominences  - Avoid friction and shearing  - Provide appropriate hygiene as needed including keeping skin clean and dry  - Evaluate need for skin moisturizer/barrier cream  - Collaborate with interdisciplinary team   - Patient/family teaching  - Consider wound care consult   Outcome: Progressing     Problem: PAIN - ADULT  Goal: Verbalizes/displays adequate comfort level or baseline comfort level  Description: Interventions:  - Encourage patient to monitor pain and request assistance  - Assess pain using appropriate pain scale  - Administer analgesics based on type and severity of pain and evaluate response  - Implement non-pharmacological measures as appropriate and evaluate response  - Consider cultural and  social influences on pain and pain management  - Notify physician/advanced practitioner if interventions unsuccessful or patient reports new pain  Outcome: Progressing     Problem: INFECTION - ADULT  Goal: Absence or prevention of progression during hospitalization  Description: INTERVENTIONS:  - Assess and monitor for signs and symptoms of infection  - Monitor lab/diagnostic results  - Monitor all insertion sites, i.e. indwelling lines, tubes, and drains  - Monitor endotracheal if appropriate and nasal secretions for changes in amount and color  - [place] appropriate cooling/warming therapies per order  - Administer medications as ordered  - Instruct and encourage patient and family to use good hand hygiene technique  - Identify and instruct in appropriate isolation precautions for identified infection/condition  Outcome: Progressing     Problem: SAFETY ADULT  Goal: Patient will remain free of falls  Description: INTERVENTIONS:  - Educate patient/family on patient safety including physical limitations  - Instruct patient to call for assistance with activity   - Consult OT/PT to assist with strengthening/mobility   - Keep Call bell within reach  - Keep bed low and locked with side rails adjusted as appropriate  - Keep care items and personal belongings within reach  - Initiate and maintain comfort rounds  - Make Fall Risk Sign visible to staff  - Offer Toileting every . Hours, in advance of need  - Initiate/Maintain .alarm  - Obtain necessary fall risk management equipment: ...  - Apply yellow socks and bracelet for high fall risk patients  - Consider moving patient to room near nurses station  Outcome: Progressing  Goal: Maintain or return to baseline ADL function  Description: INTERVENTIONS:  - Educate patient/family on patient safety including physical limitations  - Instruct patient to call for assistance with activity   - Consult OT/PT to assist with strengthening/mobility   - Keep Call bell within reach  -  Keep bed low and locked with side rails adjusted as appropriate  - Keep care items and personal belongings within reach  - Initiate and maintain comfort rounds  - Make Fall Risk Sign visible to staff  - Offer Toileting every . Hours, in advance of need  - Initiate/Maintain .alarm  - Obtain necessary fall risk management equipment: ..  - Apply yellow socks and bracelet for high fall risk patients  - Consider moving patient to room near nurses station  Outcome: Progressing  Goal: Maintains/Returns to pre admission functional level  Description: INTERVENTIONS:  - Perform AM-PAC 6 Click Basic Mobility/ Daily Activity assessment daily.  - Set and communicate daily mobility goal to care team and patient/family/caregiver.   - Collaborate with rehabilitation services on mobility goals if consulted  - Perform Range of Motion 2 times a day.  - Reposition patient every 2 hours.  - Dangle patient 2 times a day  - Stand patient 2 times a day  - Ambulate patient 2 times a day  - Out of bed to chair 2 times a day   - Out of bed for meals 2 times a day  - Out of bed for toileting  - Record patient progress and toleration of activity level   Outcome: Progressing     Problem: DISCHARGE PLANNING  Goal: Discharge to home or other facility with appropriate resources  Description: INTERVENTIONS:  - Identify barriers to discharge w/patient and caregiver  - Arrange for needed discharge resources and transportation as appropriate  - Identify discharge learning needs (meds, wound care, etc.)  - Arrange for interpretive services to assist at discharge as needed  - Refer to Case Management Department for coordinating discharge planning if the patient needs post-hospital services based on physician/advanced practitioner order or complex needs related to functional status, cognitive ability, or social support system  Outcome: Progressing     Problem: Knowledge Deficit  Goal: Patient/family/caregiver demonstrates understanding of disease process,  treatment plan, medications, and discharge instructions  Description: Complete learning assessment and assess knowledge base.  Interventions:  - Provide teaching at level of understanding  - Provide teaching via preferred learning methods  Outcome: Progressing     Problem: CARDIOVASCULAR - ADULT  Goal: Maintains optimal cardiac output and hemodynamic stability  Description: INTERVENTIONS:  - Monitor I/O, vital signs and rhythm  - Monitor for S/S and trends of decreased cardiac output  - Administer and titrate ordered vasoactive medications to optimize hemodynamic stability  - Assess quality of pulses, skin color and temperature  - Assess for signs of decreased coronary artery perfusion  - Instruct patient to report change in severity of symptoms  Outcome: Progressing  Goal: Absence of cardiac dysrhythmias or at baseline rhythm  Description: INTERVENTIONS:  - Continuous cardiac monitoring, vital signs, obtain 12 lead EKG if ordered  - Administer antiarrhythmic and heart rate control medications as ordered  - Monitor electrolytes and administer replacement therapy as ordered  Outcome: Progressing     Problem: RESPIRATORY - ADULT  Goal: Achieves optimal ventilation and oxygenation  Description: INTERVENTIONS:  - Assess for changes in respiratory status  - Assess for changes in mentation and behavior  - Position to facilitate oxygenation and minimize respiratory effort  - Oxygen administered by appropriate delivery if ordered  - Initiate smoking cessation education as indicated  - Encourage broncho-pulmonary hygiene including cough, deep breathe, Incentive Spirometry  - Assess the need for suctioning and aspirate as needed  - Assess and instruct to report SOB or any respiratory difficulty  - Respiratory Therapy support as indicated  Outcome: Progressing     Problem: Nutrition/Hydration-ADULT  Goal: Nutrient/Hydration intake appropriate for improving, restoring or maintaining nutritional needs  Description: Monitor and  assess patient's nutrition/hydration status for malnutrition. Collaborate with interdisciplinary team and initiate plan and interventions as ordered.  Monitor patient's weight and dietary intake as ordered or per policy. Utilize nutrition screening tool and intervene as necessary. Determine patient's food preferences and provide high-protein, high-caloric foods as appropriate.     INTERVENTIONS:  - Monitor oral intake, urinary output, labs, and treatment plans  - Assess nutrition and hydration status and recommend course of action  - Evaluate amount of meals eaten  - Assist patient with eating if necessary   - Allow adequate time for meals  - Recommend/ encourage appropriate diets, oral nutritional supplements, and vitamin/mineral supplements  - Order, calculate, and assess calorie counts as needed  - Recommend, monitor, and adjust tube feedings and TPN/PPN based on assessed needs  - Assess need for intravenous fluids  - Provide specific nutrition/hydration education as appropriate  - Include patient/family/caregiver in decisions related to nutrition  Outcome: Progressing

## 2024-01-02 NOTE — ASSESSMENT & PLAN NOTE
Acute exacerbation secondary to COVID  As needed bronchodilators  Continue PTA Breo and Singulair  Received IV dexamethasone x 1 in ED,  Monitor  Oxygen needs are baseline

## 2024-01-02 NOTE — ASSESSMENT & PLAN NOTE
Lab Results   Component Value Date    EGFR 52 01/02/2024    EGFR 55 01/01/2024    EGFR 47 12/31/2023    CREATININE 1.08 01/02/2024    CREATININE 1.03 01/01/2024    CREATININE 1.16 12/31/2023     Hx CKD 3.  Baseline creatinine between 1-1.3.  Creatinine baseline  Monitor closely with IV diuresis  Trend creatinine  Renally dose medications and avoid nephrotoxic medications

## 2024-01-03 PROBLEM — E87.5 HYPERKALEMIA: Status: ACTIVE | Noted: 2024-01-03

## 2024-01-03 PROBLEM — R26.2 AMBULATORY DYSFUNCTION: Status: ACTIVE | Noted: 2024-01-03

## 2024-01-03 LAB
ANION GAP SERPL CALCULATED.3IONS-SCNC: 5 MMOL/L
BACTERIA UR QL AUTO: ABNORMAL /HPF
BASOPHILS # BLD MANUAL: 0 THOUSAND/UL (ref 0–0.1)
BASOPHILS NFR MAR MANUAL: 0 % (ref 0–1)
BILIRUB UR QL STRIP: ABNORMAL
BUN SERPL-MCNC: 41 MG/DL (ref 5–25)
CALCIUM SERPL-MCNC: 8.6 MG/DL (ref 8.4–10.2)
CHLORIDE SERPL-SCNC: 103 MMOL/L (ref 96–108)
CLARITY UR: ABNORMAL
CO2 SERPL-SCNC: 31 MMOL/L (ref 21–32)
COLOR UR: ABNORMAL
CREAT SERPL-MCNC: 1.04 MG/DL (ref 0.6–1.3)
EOSINOPHIL # BLD MANUAL: 0.23 THOUSAND/UL (ref 0–0.4)
EOSINOPHIL NFR BLD MANUAL: 2 % (ref 0–6)
GFR SERPL CREATININE-BSD FRML MDRD: 54 ML/MIN/1.73SQ M
GLUCOSE SERPL-MCNC: 133 MG/DL (ref 65–140)
GLUCOSE SERPL-MCNC: 134 MG/DL (ref 65–140)
GLUCOSE SERPL-MCNC: 136 MG/DL (ref 65–140)
GLUCOSE SERPL-MCNC: 143 MG/DL (ref 65–140)
GLUCOSE SERPL-MCNC: 161 MG/DL (ref 65–140)
GLUCOSE SERPL-MCNC: 177 MG/DL (ref 65–140)
GLUCOSE SERPL-MCNC: 192 MG/DL (ref 65–140)
GLUCOSE SERPL-MCNC: 217 MG/DL (ref 65–140)
GLUCOSE UR STRIP-MCNC: NEGATIVE MG/DL
HGB UR QL STRIP.AUTO: ABNORMAL
KETONES UR STRIP-MCNC: NEGATIVE MG/DL
LEUKOCYTE ESTERASE UR QL STRIP: ABNORMAL
LYMPHOCYTES # BLD AUTO: 1.02 THOUSAND/UL (ref 0.6–4.47)
LYMPHOCYTES # BLD AUTO: 9 % (ref 14–44)
MONOCYTES # BLD AUTO: 1.13 THOUSAND/UL (ref 0–1.22)
MONOCYTES NFR BLD: 10 % (ref 4–12)
NEUTROPHILS # BLD MANUAL: 8.91 THOUSAND/UL (ref 1.85–7.62)
NEUTS SEG NFR BLD AUTO: 79 % (ref 43–75)
NITRITE UR QL STRIP: NEGATIVE
NON-SQ EPI CELLS URNS QL MICRO: ABNORMAL /HPF
PH UR STRIP.AUTO: 5.5 [PH]
PLATELET BLD QL SMEAR: ADEQUATE
POTASSIUM SERPL-SCNC: 3.7 MMOL/L (ref 3.5–5.3)
PROT UR STRIP-MCNC: NEGATIVE MG/DL
RBC #/AREA URNS AUTO: ABNORMAL /HPF
RBC MORPH BLD: NORMAL
SODIUM SERPL-SCNC: 139 MMOL/L (ref 135–147)
SP GR UR STRIP.AUTO: 1.01 (ref 1–1.03)
UROBILINOGEN UR QL STRIP.AUTO: 1 E.U./DL
WBC #/AREA URNS AUTO: ABNORMAL /HPF

## 2024-01-03 PROCEDURE — 97167 OT EVAL HIGH COMPLEX 60 MIN: CPT

## 2024-01-03 PROCEDURE — 87086 URINE CULTURE/COLONY COUNT: CPT | Performed by: EMERGENCY MEDICINE

## 2024-01-03 PROCEDURE — 82948 REAGENT STRIP/BLOOD GLUCOSE: CPT

## 2024-01-03 PROCEDURE — 87186 SC STD MICRODIL/AGAR DIL: CPT | Performed by: EMERGENCY MEDICINE

## 2024-01-03 PROCEDURE — 97163 PT EVAL HIGH COMPLEX 45 MIN: CPT

## 2024-01-03 PROCEDURE — 36415 COLL VENOUS BLD VENIPUNCTURE: CPT | Performed by: FAMILY MEDICINE

## 2024-01-03 PROCEDURE — 81001 URINALYSIS AUTO W/SCOPE: CPT | Performed by: EMERGENCY MEDICINE

## 2024-01-03 PROCEDURE — 87077 CULTURE AEROBIC IDENTIFY: CPT | Performed by: EMERGENCY MEDICINE

## 2024-01-03 PROCEDURE — 87147 CULTURE TYPE IMMUNOLOGIC: CPT | Performed by: EMERGENCY MEDICINE

## 2024-01-03 PROCEDURE — 99223 1ST HOSP IP/OBS HIGH 75: CPT | Performed by: FAMILY MEDICINE

## 2024-01-03 PROCEDURE — 80048 BASIC METABOLIC PNL TOTAL CA: CPT | Performed by: FAMILY MEDICINE

## 2024-01-03 RX ORDER — DOCUSATE SODIUM 100 MG/1
100 CAPSULE, LIQUID FILLED ORAL 2 TIMES DAILY
Status: DISCONTINUED | OUTPATIENT
Start: 2024-01-03 | End: 2024-01-04 | Stop reason: HOSPADM

## 2024-01-03 RX ORDER — LORATADINE 10 MG/1
10 TABLET ORAL DAILY
Status: DISCONTINUED | OUTPATIENT
Start: 2024-01-03 | End: 2024-01-04 | Stop reason: HOSPADM

## 2024-01-03 RX ORDER — POLYETHYLENE GLYCOL 3350 17 G/17G
17 POWDER, FOR SOLUTION ORAL DAILY PRN
Status: DISCONTINUED | OUTPATIENT
Start: 2024-01-03 | End: 2024-01-04 | Stop reason: HOSPADM

## 2024-01-03 RX ORDER — PANTOPRAZOLE SODIUM 40 MG/1
40 TABLET, DELAYED RELEASE ORAL
Status: DISCONTINUED | OUTPATIENT
Start: 2024-01-03 | End: 2024-01-04 | Stop reason: HOSPADM

## 2024-01-03 RX ORDER — MONTELUKAST SODIUM 10 MG/1
10 TABLET ORAL
Status: DISCONTINUED | OUTPATIENT
Start: 2024-01-03 | End: 2024-01-04 | Stop reason: HOSPADM

## 2024-01-03 RX ORDER — INSULIN LISPRO 100 [IU]/ML
3 INJECTION, SOLUTION INTRAVENOUS; SUBCUTANEOUS
Status: DISCONTINUED | OUTPATIENT
Start: 2024-01-03 | End: 2024-01-04 | Stop reason: HOSPADM

## 2024-01-03 RX ORDER — MIDODRINE HYDROCHLORIDE 5 MG/1
5 TABLET ORAL
Status: DISCONTINUED | OUTPATIENT
Start: 2024-01-03 | End: 2024-01-04 | Stop reason: HOSPADM

## 2024-01-03 RX ORDER — FERROUS SULFATE 325(65) MG
325 TABLET ORAL
Status: DISCONTINUED | OUTPATIENT
Start: 2024-01-03 | End: 2024-01-04 | Stop reason: HOSPADM

## 2024-01-03 RX ORDER — INSULIN GLARGINE 100 [IU]/ML
20 INJECTION, SOLUTION SUBCUTANEOUS
Status: DISCONTINUED | OUTPATIENT
Start: 2024-01-03 | End: 2024-01-04 | Stop reason: HOSPADM

## 2024-01-03 RX ORDER — INSULIN LISPRO 100 [IU]/ML
2-12 INJECTION, SOLUTION INTRAVENOUS; SUBCUTANEOUS
Status: DISCONTINUED | OUTPATIENT
Start: 2024-01-03 | End: 2024-01-04 | Stop reason: HOSPADM

## 2024-01-03 RX ORDER — ACETAMINOPHEN 325 MG/1
650 TABLET ORAL EVERY 6 HOURS PRN
Status: DISCONTINUED | OUTPATIENT
Start: 2024-01-03 | End: 2024-01-04 | Stop reason: HOSPADM

## 2024-01-03 RX ORDER — ATORVASTATIN CALCIUM 10 MG/1
10 TABLET, FILM COATED ORAL DAILY
Status: DISCONTINUED | OUTPATIENT
Start: 2024-01-03 | End: 2024-01-04 | Stop reason: HOSPADM

## 2024-01-03 RX ORDER — WARFARIN SODIUM 7.5 MG/1
7.5 TABLET ORAL
Status: DISCONTINUED | OUTPATIENT
Start: 2024-01-03 | End: 2024-01-04 | Stop reason: HOSPADM

## 2024-01-03 RX ORDER — ALLOPURINOL 100 MG/1
100 TABLET ORAL DAILY
Status: DISCONTINUED | OUTPATIENT
Start: 2024-01-03 | End: 2024-01-04 | Stop reason: HOSPADM

## 2024-01-03 RX ORDER — MELATONIN
2000 DAILY
Status: DISCONTINUED | OUTPATIENT
Start: 2024-01-03 | End: 2024-01-04 | Stop reason: HOSPADM

## 2024-01-03 RX ORDER — METOPROLOL SUCCINATE 25 MG/1
37.5 TABLET, EXTENDED RELEASE ORAL 2 TIMES DAILY
Status: DISCONTINUED | OUTPATIENT
Start: 2024-01-03 | End: 2024-01-04 | Stop reason: HOSPADM

## 2024-01-03 RX ORDER — NYSTATIN 100000 [USP'U]/G
POWDER TOPICAL 2 TIMES DAILY
Status: DISCONTINUED | OUTPATIENT
Start: 2024-01-03 | End: 2024-01-04 | Stop reason: HOSPADM

## 2024-01-03 RX ORDER — TORSEMIDE 20 MG/1
60 TABLET ORAL 2 TIMES DAILY
Status: DISCONTINUED | OUTPATIENT
Start: 2024-01-03 | End: 2024-01-04 | Stop reason: HOSPADM

## 2024-01-03 RX ADMIN — TORSEMIDE 60 MG: 20 TABLET ORAL at 01:31

## 2024-01-03 RX ADMIN — INSULIN LISPRO 3 UNITS: 100 INJECTION, SOLUTION INTRAVENOUS; SUBCUTANEOUS at 08:40

## 2024-01-03 RX ADMIN — NYSTATIN: 100000 POWDER TOPICAL at 21:12

## 2024-01-03 RX ADMIN — MIDODRINE HYDROCHLORIDE 5 MG: 5 TABLET ORAL at 11:41

## 2024-01-03 RX ADMIN — TORSEMIDE 60 MG: 20 TABLET ORAL at 21:46

## 2024-01-03 RX ADMIN — ACETAMINOPHEN 650 MG: 325 TABLET, FILM COATED ORAL at 13:42

## 2024-01-03 RX ADMIN — ALLOPURINOL 100 MG: 100 TABLET ORAL at 08:34

## 2024-01-03 RX ADMIN — ATORVASTATIN CALCIUM 10 MG: 10 TABLET, FILM COATED ORAL at 08:39

## 2024-01-03 RX ADMIN — Medication 2000 UNITS: at 08:37

## 2024-01-03 RX ADMIN — LEVOTHYROXINE SODIUM 288 MCG: 88 TABLET ORAL at 05:34

## 2024-01-03 RX ADMIN — INSULIN LISPRO 2 UNITS: 100 INJECTION, SOLUTION INTRAVENOUS; SUBCUTANEOUS at 10:17

## 2024-01-03 RX ADMIN — INSULIN LISPRO 3 UNITS: 100 INJECTION, SOLUTION INTRAVENOUS; SUBCUTANEOUS at 12:19

## 2024-01-03 RX ADMIN — PANTOPRAZOLE SODIUM 40 MG: 40 TABLET, DELAYED RELEASE ORAL at 05:35

## 2024-01-03 RX ADMIN — ACETAMINOPHEN 650 MG: 325 TABLET, FILM COATED ORAL at 01:30

## 2024-01-03 RX ADMIN — MONTELUKAST 10 MG: 10 TABLET, FILM COATED ORAL at 01:32

## 2024-01-03 RX ADMIN — TORSEMIDE 60 MG: 20 TABLET ORAL at 08:39

## 2024-01-03 RX ADMIN — FERROUS SULFATE TAB 325 MG (65 MG ELEMENTAL FE) 325 MG: 325 (65 FE) TAB at 08:36

## 2024-01-03 RX ADMIN — METOPROLOL SUCCINATE 37.5 MG: 25 TABLET, EXTENDED RELEASE ORAL at 08:39

## 2024-01-03 RX ADMIN — DOCUSATE SODIUM 100 MG: 100 CAPSULE, LIQUID FILLED ORAL at 08:22

## 2024-01-03 RX ADMIN — WARFARIN SODIUM 7.5 MG: 7.5 TABLET ORAL at 18:37

## 2024-01-03 RX ADMIN — DOCUSATE SODIUM 100 MG: 100 CAPSULE, LIQUID FILLED ORAL at 18:37

## 2024-01-03 RX ADMIN — LORATADINE 10 MG: 10 TABLET ORAL at 08:38

## 2024-01-03 RX ADMIN — INSULIN GLARGINE 20 UNITS: 100 INJECTION, SOLUTION SUBCUTANEOUS at 21:47

## 2024-01-03 RX ADMIN — INSULIN LISPRO 2 UNITS: 100 INJECTION, SOLUTION INTRAVENOUS; SUBCUTANEOUS at 21:47

## 2024-01-03 RX ADMIN — NYSTATIN: 100000 POWDER TOPICAL at 08:33

## 2024-01-03 RX ADMIN — INSULIN GLARGINE 20 UNITS: 100 INJECTION, SOLUTION SUBCUTANEOUS at 01:35

## 2024-01-03 RX ADMIN — MIDODRINE HYDROCHLORIDE 5 MG: 5 TABLET ORAL at 08:50

## 2024-01-03 RX ADMIN — Medication 1 TABLET: at 08:21

## 2024-01-03 RX ADMIN — METOPROLOL SUCCINATE 37.5 MG: 25 TABLET, EXTENDED RELEASE ORAL at 21:46

## 2024-01-03 RX ADMIN — MONTELUKAST 10 MG: 10 TABLET, FILM COATED ORAL at 21:46

## 2024-01-03 RX ADMIN — INSULIN LISPRO 4 UNITS: 100 INJECTION, SOLUTION INTRAVENOUS; SUBCUTANEOUS at 01:35

## 2024-01-03 RX ADMIN — MIDODRINE HYDROCHLORIDE 5 MG: 5 TABLET ORAL at 15:48

## 2024-01-03 NOTE — ASSESSMENT & PLAN NOTE
Patient's potassium is 5.6.  I will discontinue her potassium supplementation.  Repeat BMP in the morning

## 2024-01-03 NOTE — UTILIZATION REVIEW
Initial Clinical Review    WAS OBSERVATION 1/2/24 @ 2227 CONVERTED TO INPATIENT ADMISSION 1/3/24 @ 1649 DUE TO CONTINUED STAY REQUIRED TO CARE FOR PATIENT WITH DX: AMBULATORY DYSFUNCTION.      Admission: Date/Time/Statement:   Admission Orders (From admission, onward)       Ordered        01/03/24 1649  Inpatient Admission  Once            01/02/24 2227  Place in Observation  Once                          Orders Placed This Encounter   Procedures    Inpatient Admission     Standing Status:   Standing     Number of Occurrences:   1     Order Specific Question:   Level of Care     Answer:   Med Surg [16]     Order Specific Question:   Estimated length of stay     Answer:   More than 2 Midnights     Order Specific Question:   Certification     Answer:   I certify that inpatient services are medically necessary for this patient for a duration of greater than two midnights. See H&P and MD Progress Notes for additional information about the patient's course of treatment.     ED Arrival Information       Expected   -    Arrival   1/2/2024 21:55    Acuity   Urgent              Means of arrival   Ambulance    Escorted by   [facility]    Service   Hospitalist    Admission type   Emergency              Arrival complaint   -             Chief Complaint   Patient presents with    Difficulty Walking     Patient presents to the ED with complaints of ambulatory dysfunction since discharge from the hospital today. She states she was ambulatory prior to admission, but states she did not ambulate during her stay.        Initial Presentation: 68 y.o. female to ED via EMS from home   Present to ED with unable to ambulate.    She is unable to get up from her power lift chair to stand with her walker. (Baseline Uses 4 L of oxygen via nasal cannula )  PMHX atrial fibs, chronic respiratory failure on 4 L, diastolic heart failure, type 2 diabetes, COPD, CKD stage III, morbid obesity, hypothyroidism, NISHI on CPAP and recent COVID-19    Admitted to OBS with DX: Ambulatory dysfunction   on exam: B /L LE edema; motor weakness noted; K5.6  PLAN: monitor labs; PT / OT eval - tx; CM consulted for possible placement; Accuchecks with ssic; hold potassium      Date: 1/3/24 - CHANGED TO INPATIENT   Patient evaluated by PT OT recommendation rehab.  Pending authorization,   Plan: monitor labs; CM consulted for placement; Accuchecks with ssic; hold potassium    Date: 1/4/24     Day 3: Has surpassed a 2nd midnight with active treatments and services, which include Rehab auth pending.        ED Triage Vitals [01/02/24 1009]   Temperature Pulse Respirations Blood Pressure SpO2   (!) 97.3 °F (36.3 °C) 99 16 130/61 98 %      Temp Source Heart Rate Source Patient Position - Orthostatic VS BP Location FiO2 (%)   Temporal Monitor Lying Left arm --      Pain Score       No Pain          Wt Readings from Last 1 Encounters:   01/02/24 (!) 149 kg (328 lb 0.7 oz)     Additional Vital Signs:   Date/Time Temp Pulse Resp BP MAP (mmHg) SpO2 Calculated FIO2 (%) - Nasal Cannula Nasal Cannula O2 Flow Rate (L/min) O2 Device Patient Position - Orthostatic VS   01/04/24 0900 -- -- -- -- -- -- 36 4 L/min Nasal cannula --   01/04/24 07:31:46 97.5 °F (36.4 °C) 96 16 111/71 84 95 % -- -- -- --   01/04/24 0309 -- -- -- -- -- 95 % 36 4 L/min Nasal cannula --   01/03/24 2146 -- 85 -- 118/73 -- -- -- -- -- --   01/03/24 21:40:10 97.3 °F (36.3 °C) Abnormal  85 18 118/73 88 97 % -- -- -- --   01/03/24 17:52:37 97.3 °F (36.3 °C) Abnormal  77 18 118/73 88 95 % 36 4 L/min Nasal cannula --   01/03/24 1645 -- 76 18 108/65 82 99 % 36 4 L/min Nasal cannula --   01/03/24 1600 -- 81 16 106/64 79 99 % -- -- Nasal cannula --   01/03/24 1500 -- 88 16 141/53 77 98 % 36 4 L/min Nasal cannula --   01/03/24 1430 -- 84 16 116/55 80 99 % -- -- Nasal cannula --   01/03/24 1400 -- 73 16 113/56 80 98 % 36 4 L/min Nasal cannula --   01/03/24 1330 -- 89 16 128/77 93 94 % -- -- Nasal cannula --   01/03/24 1200 --  79 16 106/54 78 100 % -- -- Nasal cannula --   01/03/24 1136 -- 90 18 115/68 86 99 % 36 4 L/min Nasal cannula --     Date/Time Temp Pulse Resp BP MAP (mmHg) SpO2 Calculated FIO2 (%) - Nasal Cannula Nasal Cannula O2 Flow Rate (L/min) O2 Device Patient Position - Orthostatic VS   01/03/24 0906 -- -- -- -- -- -- -- -- Nasal cannula  --   O2 Device: 4L at 01/03/24 0906   01/03/24 0900 -- 75 16 116/71 82 95 % -- -- -- --   01/03/24 0838 -- 81 16 114/88 97 -- 36 4 L/min Nasal cannula --   01/03/24 0539 -- 88 17 118/65 86 98 % 36 4 L/min Nasal cannula Lying   01/03/24 0133 -- 83 -- 109/73 -- -- -- -- -- --   01/02/24 2219 97.3 °F (36.3 °C) Abnormal  99 16 130/61 -- 98 % 36 4 L/min Nasal cannula Lying       EKG: Not read        Results from last 7 days   Lab Units 01/02/24 2238 12/30/23  0509   WBC Thousand/uL 11.28* 8.55   HEMOGLOBIN g/dL 13.5 13.1   HEMATOCRIT % 44.2 42.3   PLATELETS Thousands/uL 329 223   NEUTROS ABS Thousands/µL  --  5.92        Results from last 7 days   Lab Units 01/03/24  0535 01/02/24 2238 01/02/24  0501 01/01/24  0755 12/31/23 2011 12/29/23  2103 12/29/23  0812   SODIUM mmol/L 139 134* 141 143 141   < > 143   POTASSIUM mmol/L 3.7 5.6* 4.1 3.7 3.5   < > 3.5   CHLORIDE mmol/L 103 101 104 106 104   < > 101   CO2 mmol/L 31 30 32 29 31   < > 34*   ANION GAP mmol/L 5 3 5 8 6   < > 8   BUN mg/dL 41* 45* 40* 38* 42*   < > 41*   CREATININE mg/dL 1.04 1.24 1.08 1.03 1.16   < > 1.35*   EGFR ml/min/1.73sq m 54 44 52 55 47   < > 39   CALCIUM mg/dL 8.6 8.6 8.9 8.8 8.8   < > 9.8   MAGNESIUM mg/dL  --   --   --   --   --   --  2.5    < > = values in this interval not displayed.     Results from last 7 days   Lab Units 01/02/24  2238   AST U/L 92*   ALT U/L 48   ALK PHOS U/L 170*   TOTAL PROTEIN g/dL 8.3   ALBUMIN g/dL 3.3*   TOTAL BILIRUBIN mg/dL 1.11*     Results from last 7 days   Lab Units 01/04/24  0729 01/03/24  2041 01/03/24  1620 01/03/24  1349 01/03/24  1218 01/03/24  1010 01/03/24  0819 01/03/24  0050  01/02/24  1114 01/02/24  0755 01/01/24 2027 01/01/24  1556   POC GLUCOSE mg/dl 159* 192* 134 136 143* 177* 133 217* 152* 121 156* 184*     Results from last 7 days   Lab Units 01/03/24  0535 01/02/24  2238 01/02/24  0501 01/01/24  0755 12/31/23 2011 12/31/23  0759 12/30/23 2020 12/30/23  0837 12/29/23  2103 12/29/23  0812 12/28/23  2048   GLUCOSE RANDOM mg/dL 161* 248* 117 112 174* 125 143* 150* 119 123 157*        Results from last 7 days   Lab Units 01/02/24  0501 01/01/24  0451 12/31/23  0759   PROTIME seconds 27.7* 32.4* 33.4*   INR  2.55* 3.13* 3.24*        Results from last 7 days   Lab Units 01/03/24  0004   CLARITY UA  Slightly Cloudy   COLOR UA  Rhonda   SPEC GRAV UA  1.010   PH UA  5.5   GLUCOSE UA mg/dl Negative   KETONES UA mg/dl Negative   BLOOD UA  Large*   PROTEIN UA mg/dl Negative   NITRITE UA  Negative   BILIRUBIN UA  Small*   UROBILINOGEN UA E.U./dl 1.0   LEUKOCYTES UA  Small*   WBC UA /hpf 10-20*   RBC UA /hpf 10-20*   BACTERIA UA /hpf Moderate*   EPITHELIAL CELLS WET PREP /hpf Occasional          ED Treatment:   Medication Administration from 01/02/2024 2155 to 01/03/2024 1132         Date/Time Order Dose Route Action     01/03/2024 0130 EST acetaminophen (TYLENOL) tablet 650 mg 650 mg Oral Given     01/03/2024 0834 EST allopurinol (ZYLOPRIM) tablet 100 mg 100 mg Oral Given     01/03/2024 0839 EST atorvastatin (LIPITOR) tablet 10 mg 10 mg Oral Given     01/03/2024 0838 EST loratadine (CLARITIN) tablet 10 mg 10 mg Oral Given     01/03/2024 0837 EST cholecalciferol (VITAMIN D3) tablet 2,000 Units 2,000 Units Oral Given     01/03/2024 0822 EST docusate sodium (COLACE) capsule 100 mg 100 mg Oral Given     01/03/2024 0836 EST ferrous sulfate tablet 325 mg 325 mg Oral Given     01/03/2024 0840 EST insulin lispro (HumaLOG) 100 units/mL subcutaneous injection 3 Units 3 Units Subcutaneous Given     01/03/2024 0135 EST insulin glargine (LANTUS) subcutaneous injection 20 Units 0.2 mL 20 Units Subcutaneous  Given     01/03/2024 0534 EST levothyroxine tablet 288 mcg 288 mcg Oral Given     01/03/2024 0839 EST metoprolol succinate (TOPROL-XL) 24 hr tablet 37.5 mg 37.5 mg Oral Given     01/03/2024 0850 EST midodrine (PROAMATINE) tablet 5 mg 5 mg Oral Given     01/03/2024 0132 EST montelukast (SINGULAIR) tablet 10 mg 10 mg Oral Given     01/03/2024 0821 EST multivitamin-minerals (CENTRUM) tablet 1 tablet 1 tablet Oral Given     01/03/2024 0833 EST nystatin (MYCOSTATIN) powder -- Topical Given     01/03/2024 0535 EST pantoprazole (PROTONIX) EC tablet 40 mg 40 mg Oral Given     01/03/2024 0839 EST torsemide (DEMADEX) tablet 60 mg 60 mg Oral Given     01/03/2024 0131 EST torsemide (DEMADEX) tablet 60 mg 60 mg Oral Given     01/03/2024 1017 EST insulin lispro (HumaLOG) 100 units/mL subcutaneous injection 2-12 Units 2 Units Subcutaneous Given     01/03/2024 0135 EST insulin lispro (HumaLOG) 100 units/mL subcutaneous injection 2-12 Units 4 Units Subcutaneous Given            Present on Admission:   Atrial fibrillation (HCC)   COPD (chronic obstructive pulmonary disease) (HCC)   Chronic respiratory failure with hypoxia (HCC)   Hypothyroidism   GERD (gastroesophageal reflux disease)   Ambulatory dysfunction   Heart failure (HCC)   Hypotension   Hyperkalemia      Admitting Diagnosis: Difficulty walking [R26.2]  Generalized weakness [R53.1]  Ambulatory dysfunction [R26.2]    Age/Sex: 68 y.o. female    Admission Orders: SCDs; Consistent Carbohydrate Diet. Blood glucose checks ACHS.      Scheduled Medications:  allopurinol, 100 mg, Oral, Daily  atorvastatin, 10 mg, Oral, Daily  cholecalciferol, 2,000 Units, Oral, Daily  docusate sodium, 100 mg, Oral, BID  ferrous sulfate, 325 mg, Oral, Daily With Breakfast  insulin glargine, 20 Units, Subcutaneous, HS  insulin lispro, 2-12 Units, Subcutaneous, 4x Daily (PC & HS)  insulin lispro, 3 Units, Subcutaneous, TID With Meals  levothyroxine, 288 mcg, Oral, Early Morning  loratadine, 10 mg, Oral,  Daily  metoprolol succinate, 37.5 mg, Oral, BID  midodrine, 5 mg, Oral, TID AC  montelukast, 10 mg, Oral, HS  multivitamin-minerals, 1 tablet, Oral, Daily  nystatin, , Topical, BID  pantoprazole, 40 mg, Oral, Early Morning  torsemide, 60 mg, Oral, BID  warfarin, 7.5 mg, Oral, Daily (warfarin)      Continuous IV Infusions: none     PRN Meds:    acetaminophen, 650 mg, Oral, Q6H PRN  (1/3 rec'd x2)   polyethylene glycol, 17 g, Oral, Daily PRN        IP CONSULT TO CASE MANAGEMENT  IP CONSULT TO CASE MANAGEMENT    Network Utilization Review Department  ATTENTION: Please call with any questions or concerns to [phone] and carefully listen to the prompts so that you are directed to the right person. All voicemails are confidential.   For Discharge needs, contact Care Management DC Support Team at [phone] opt. 2  Send all requests for admission clinical reviews, approved or denied determinations and any other requests to dedicated fax number below belonging to the [facility] where the patient is receiving treatment. List of dedicated fax numbers for the Facilities:  FACILITY NAME UR FAX NUMBER   ADMISSION DENIALS (Administrative/Medical Necessity) [phone]   DISCHARGE SUPPORT TEAM (NETWORK) [phone]   PARENT CHILD HEALTH (Maternity/NICU/Pediatrics) [phone]   [facility] [phone]   [facility] [phone]   [facility] [phone]   [facility] [phone]   [facility] [phone]   [facility] [phone]   [facility] [phone]   [facility] [phone]   [facility] [phone]   [facility] [phone]   [facility] [phone]

## 2024-01-03 NOTE — PLAN OF CARE
Problem: OCCUPATIONAL THERAPY ADULT  Goal: Performs self-care activities at highest level of function for planned discharge setting.  See evaluation for individualized goals.  Description: Treatment Interventions: ADL retraining, Functional transfer training, UE strengthening/ROM, Endurance training, Patient/family training, Continued evaluation, Energy conservation, Activityengagement      See flowsheet documentation for full assessment, interventions and recommendations.   Outcome: Progressing  Note: Limitation: Decreased Safe judgement during ADL, Decreased UE strength, Decreased ADL status, Decreased cognition, Decreased endurance, Decreased self-care trans, Decreased high-level ADLs  Prognosis: Good  Assessment: Ms. Castillo is a 68 y.o. female admitted to [facility] 1/2/2024 with admitting diagnosis: generalized weakness, ambulatory dysfunction. Pt with PMHx impacting their performance during ADL tasks, including: hyperkalemia. Prior to admission to the hospital pt was performing ADLs without physical assistance. IADLs with physical assistance. Functional transfers/ambulation without physical assistance. Cognitive status was PTA was WFL. OT order placed to assess pt's ADLs, cognitive status, and performance during functional tasks in order to maximize safety and independence while making most appropriate d/c recommendations. PT/OT co-evaluation completed at this time d/t mobility deficits and safety concerns. Pt's clinical presentation is currently unstable/unpredictable given new onset deficits that effect pt's occupational performance and ability to safely return to PLOF including decrease activity tolerance, decrease standing balance, decrease performance during ADL tasks, decrease cognition, decrease generalized strength, decrease performance during functional transfers, frequent falls, and high fall risk combined with medical complications of hypotension, A-fib, and need for input for mobility  technique/safety.  Personal factors affecting pt at time of initial evaluation include: advanced age, inability to perform IADLs, inability to perform ADLs, inability to ambulate household distances, inability to navigate community distances, and recent fall(s)/fall history. Pt will benefit from continued acute skilled OT services to address deficits as defined above and to maximize level independence/participation during ADLs and functional tasks to facilitate return toward PLOF and improved quality of life.     From an occupational therapy standpoint, recommendation at time of d/c would be Level I: Maximum Resource Intensity . The patient's raw score on the -PAC Daily Activity Inpatient Short Form is 13. A raw score of less than 19 suggests the patient may benefit from discharge to post-acute rehabilitation services. Please refer to the recommendation of the Occupational Therapist for safe discharge planning.     Rehab Resource Intensity Level, OT: I (Maximum Resource Intensity)

## 2024-01-03 NOTE — OCCUPATIONAL THERAPY NOTE
Occupational Therapy Evaluation     Patient Name: Tigist Hewitt  Today's Date: 1/3/2024  Problem List  Principal Problem:    Ambulatory dysfunction  Active Problems:    Atrial fibrillation (HCC)    COPD (chronic obstructive pulmonary disease) (HCC)    Type 2 diabetes mellitus with hyperglycemia, with long-term current use of insulin (HCC)    Heart failure (HCC)    Chronic respiratory failure with hypoxia (HCC)    Hypothyroidism    Hypotension    GERD (gastroesophageal reflux disease)    Hyperkalemia    Past Medical History  Past Medical History:   Diagnosis Date    Asthma     Atrial fibrillation (HCC)     COPD (chronic obstructive pulmonary disease) (HCC)     Diabetes mellitus (HCC)     Disease of thyroid gland     Heart failure (HCC)     Kidney failure     Morbid obesity due to excess calories (HCC)     NISHI (obstructive sleep apnea)      Past Surgical History  Past Surgical History:   Procedure Laterality Date    CARDIAC ELECTROPHYSIOLOGY MAPPING AND ABLATION      by Dr. Ferraro at [facility]    CARDIOVERSION N/A     GALLBLADDER SURGERY      HERNIA REPAIR             01/03/24 1009   OT Last Visit   OT Visit Date 01/03/24   Note Type   Note type Evaluation   Pain Assessment   Pain Assessment Tool 0-10   Pain Score 6   Pain Location/Orientation Location: Knee;Orientation: Left;Orientation: Right;Location: Leg   Hospital Pain Intervention(s) Repositioned;Rest   Restrictions/Precautions   Weight Bearing Precautions Per Order No   Home Living   Type of Home House  (split level)   Home Layout Performs ADLs on one level;Able to live on main level with bedroom/bathroom  (split level but has 1st floor set up; 0 IZAIAH)   Bathroom Shower/Tub   (sponge bathes)   Bathroom Toilet   (bedside commode)   Bathroom Equipment Commode   Bathroom Accessibility Not accessible   Home Equipment Walker;Wheelchair-manual  (recliner lift chair)   Prior Function   Level of [place] Independent with functional mobility;Needs assistance  with ADLs;Needs assistance with IADLS   Lives With Spouse;Son   Receives Help From Family;Friend(s);Personal care attendant   IADLs Independent with medication management;Family/Friend/Other provides transportation;Family/Friend/Other provides meals   Falls in the last 6 months 1 to 4   ADL   Where Assessed Edge of bed   Grooming Assistance 5  Supervision/Setup   Grooming Deficit Setup;Brushing hair;Increased time to complete   LB Dressing Assistance 1  Total Assistance   LB Dressing Deficit Don/doff R sock;Don/doff L sock   Toileting Deficit   (purewick in place upon OT arrival)   Additional Comments Unable to bend knees or flex at hips to complete LB ADLs. Anticipate Max A with UB ADLs.   Bed Mobility   Rolling R 2  Maximal assistance   Additional items Assist x 1;Increased time required;Verbal cues   Rolling L 2  Maximal assistance   Additional items Assist x 1;Increased time required;Verbal cues   Supine to Sit 2  Maximal assistance   Additional items Assist x 1;Increased time required;Verbal cues  (trunk management)   Sit to Supine 2  Maximal assistance   Additional items Assist x 2;LE management  (trunk management)   Transfers   Sit to Stand 2  Maximal assistance   Additional items Assist x 2;Increased time required;Verbal cues   Stand to Sit 2  Maximal assistance   Additional items Assist x 2;Increased time required;Verbal cues   Additional Comments Minimal clearance despite Max A x 2   Balance   Static Sitting Fair -   Dynamic Sitting Poor +   Static Standing Poor -   Dynamic Standing Poor -   Activity Tolerance   Activity Tolerance Patient limited by fatigue;Patient limited by pain   Medical Staff Made Aware Linda GALARZA Assessment   RUE Assessment   (3/5)   LUE Assessment   LUE Assessment   (3/5)   Cognition   Overall Cognitive Status WFL   Arousal/Participation Alert;Responsive   Attention Within functional limits   Orientation Level Oriented X4   Memory Within functional limits   Following Commands  Follows one step commands without difficulty   Assessment   Limitation Decreased Safe judgement during ADL;Decreased UE strength;Decreased ADL status;Decreased cognition;Decreased endurance;Decreased self-care trans;Decreased high-level ADLs   Prognosis Good   Assessment Ms. Castillo is a 68 y.o. female admitted to [facility] 1/2/2024 with admitting diagnosis: generalized weakness, ambulatory dysfunction. Pt with PMHx impacting their performance during ADL tasks, including: hyperkalemia. Prior to admission to the hospital pt was performing ADLs without physical assistance. IADLs with physical assistance. Functional transfers/ambulation without physical assistance. Cognitive status was PTA was WFL. OT order placed to assess pt's ADLs, cognitive status, and performance during functional tasks in order to maximize safety and independence while making most appropriate d/c recommendations. PT/OT co-evaluation completed at this time d/t mobility deficits and safety concerns. Pt's clinical presentation is currently unstable/unpredictable given new onset deficits that effect pt's occupational performance and ability to safely return to OF including decrease activity tolerance, decrease standing balance, decrease performance during ADL tasks, decrease cognition, decrease generalized strength, decrease performance during functional transfers, frequent falls, and high fall risk combined with medical complications of hypotension, A-fib, and need for input for mobility technique/safety.  Personal factors affecting pt at time of initial evaluation include: advanced age, inability to perform IADLs, inability to perform ADLs, inability to ambulate household distances, inability to navigate community distances, and recent fall(s)/fall history. Pt will benefit from continued acute skilled OT services to address deficits as defined above and to maximize level independence/participation during ADLs and functional tasks to facilitate return  toward PLOF and improved quality of life.     From an occupational therapy standpoint, recommendation at time of d/c would be Level I: Maximum Resource Intensity . The patient's raw score on the AM-PAC Daily Activity Inpatient Short Form is 13. A raw score of less than 19 suggests the patient may benefit from discharge to post-acute rehabilitation services. Please refer to the recommendation of the Occupational Therapist for safe discharge planning.   Plan   Treatment Interventions ADL retraining;Functional transfer training;UE strengthening/ROM;Endurance training;Patient/family training;Continued evaluation;Energy conservation;Activityengagement   Goal Expiration Date 01/17/24   OT Frequency 3-5x/wk   Discharge Recommendation   Rehab Resource Intensity Level, OT I (Maximum Resource Intensity)   AM-PAC Daily Activity Inpatient   Lower Body Dressing 1   Bathing 2   Toileting 2   Upper Body Dressing 2   Grooming 3   Eating 3   Daily Activity Raw Score 13   Daily Activity Standardized Score (Calc for Raw Score >=11) 32.03   AM-[facility] Applied Cognition Inpatient   Following a Speech/Presentation 3   Understanding Ordinary Conversation 3   Taking Medications 3   Remembering Where Things Are Placed or Put Away 3   Remembering List of 4-5 Errands 3   Taking Care of Complicated Tasks 3   Applied Cognition Raw Score 18   Applied Cognition Standardized Score 38.07     Assessment:  From an occupational therapy standpoint, recommendation at time of d/c would be Level I: Maximum Resource Intensity . The patient's raw score on the AM-PAC Daily Activity Inpatient Short Form is 16. A raw score of less than 19 suggests the patient may benefit from discharge to post-acute rehabilitation services. Please refer to the recommendation of the Occupational Therapist for safe discharge planning.     Goals to be met by 1/17/2024:  Pt will demonstrate ability to complete grooming/hygiene tasks @ Mod I after set-up.  Pt will demonstrate ability  to complete supine<>sit @ Mod I in order to increase safety and independence during ADL tasks.  Pt will demonstrate ability to complete UB ADLs including washing/dressing @ Mod I in order to increase performance and participation during meaningful tasks  Pt will demonstrate ability to complete LB dressing @ Mod I with LHAE PRN in order to increase safety and independence during meaningful tasks.   Pt will demonstrate ability to jeff/doff socks/shoes while sitting @ Mod I in order to increase safety and independence during meaningful tasks.   Pt will demonstrate ability to complete toileting tasks including CM and pericare @ Mod I in order to increase safety and independence during meaningful tasks.  Pt will demonstrate ability to complete EOB, chair, toilet/commode transfers @ Mod I in order to increase performance and participation during functional tasks.  Pt will demonstrate ability to stand for 5-10 minutes while maintaining Fair+ balance with use of least restrictive AD for UB support PRN.  Pt will demonstrate ability to tolerate 30-35 minute OT session with no vc'ing for deep breathing or use of energy conservation techniques in order to increase activity tolerance during functional tasks.   Pt will demonstrate Good carryover of use of energy conservation/compensatory strategies during ADLs and functional tasks in order to increase safety and reduce risk for falls.   Pt will demonstrate Good attention and participation in continued evaluation of functional ambulation house hold distances in order to assist with safe d/c planning.  Pt will attend to continued cognitive assessments 100% of the time in order to provide most appropriate d/c recommendations.   Pt will follow 100% simple 2-step commands and be A&O x4 consistently with environmental cues to increase participation in functional activities.   Pt will identify 3 areas of interest/hobbies and 1 intervention on how to incorporate into daily life in order  to increase interaction with environment and peers as well as increase participation in meaningful tasks.   Pt will demonstrate 100% carryover of BUE HEP in order to increase BUE MS and increase performance during functional tasks upon d/c home.      Yudi Quintero MS, OTR/L         No

## 2024-01-03 NOTE — ED PROVIDER NOTES
History  Chief Complaint   Patient presents with    Difficulty Walking     Patient presents to the ED with complaints of ambulatory dysfunction since discharge from the hospital today. She states she was ambulatory prior to admission, but states she did not ambulate during her stay.      Patient is a 68-year-old female with history of asthma A-fib COPD diabetes heart failure recently hospitalized for heart failure for 11 days discharged this afternoon upon getting home she was unable to ambulate or transfer was lowered to the floor by EMS crew and then lifted and assisted to chair and then could not get out of chair patient does not feel like she is safe at home and wants to seek rehab placement no trauma fall or injury occurred today no chest pain fevers or chills or shortness of breath.        History provided by:  Patient      Prior to Admission Medications   Prescriptions Last Dose Informant Patient Reported? Taking?   Cholecalciferol (Vitamin D3) 25 MCG (1000 UT) CAPS   Yes No   Sig: Take 2,000 Units by mouth daily   Insulin Aspart (NovoLOG) 100 units/mL injection   No No   Sig: Inject 3 Units under the skin 3 (three) times a day with meals   Lantus SoloStar 100 units/mL SOPN   Yes No   Sig: Inject 26 Units under the skin daily at bedtime 26 units   acetaminophen (TYLENOL) 325 mg tablet   No No   Sig: Take 2 tablets (650 mg total) by mouth every 6 (six) hours as needed for fever   allopurinol (ZYLOPRIM) 100 mg tablet   Yes No   Sig: Take 100 mg by mouth daily Dose needs to be verified   ammonium lactate (LAC-HYDRIN) 12 % lotion   No No   Sig: Apply topically 2 (two) times a day as needed for dry skin   atorvastatin (LIPITOR) 10 mg tablet   Yes No   Sig: Take 10 mg by mouth daily   cetirizine (ZyrTEC) 10 mg tablet   Yes No   Sig: Take 10 mg by mouth daily   docusate sodium (COLACE) 100 mg capsule   No No   Sig: Take 1 capsule (100 mg total) by mouth 2 (two) times a day   ferrous sulfate 325 (65 Fe) mg tablet    Yes No   Sig: Take 325 mg by mouth daily with breakfast   fluticasone-vilanterol (BREO ELLIPTA) 100-25 mcg/inh inhaler   Yes No   Sig: Inhale 1 puff daily Rinse mouth after use.   levothyroxine 200 mcg tablet  Self Yes No   Sig: Take 288 mcg by mouth daily   levothyroxine 88 mcg tablet   Yes No   metoprolol succinate (TOPROL-XL) 25 mg 24 hr tablet   No No   Sig: Take 1.5 tablets (37.5 mg total) by mouth 2 (two) times a day   midodrine (PROAMATINE) 10 MG tablet   No No   Sig: Take 0.5 tablets (5 mg total) by mouth 3 (three) times a day before meals Please hold the medication if your systolic blood pressure is more than 115   montelukast (SINGULAIR) 10 mg tablet   Yes No   Sig: Take 10 mg by mouth daily at bedtime   multivitamin (THERAGRAN) TABS   Yes No   Sig: Take 1 tablet by mouth daily   nystatin (MYCOSTATIN) powder   No No   Sig: Apply topically 2 (two) times a day   omeprazole (PriLOSEC) 40 MG capsule   Yes No   Sig: Take 40 mg by mouth daily   polyethylene glycol (MIRALAX) 17 g packet   No No   Sig: Take 17 g by mouth daily as needed (Constipation)   potassium chloride (K-DUR,KLOR-CON) 20 mEq tablet   No No   Sig: Take 1 tablet (20 mEq total) by mouth 3 (three) times a day   potassium chloride (MICRO-K) 10 MEQ CR capsule   Yes No   Sig: TAKE 1 CAPSULE BY MOUTH EVERY DAY IN THE MORNING   torsemide (DEMADEX) 20 mg tablet   No No   Sig: Take 3 tablets (60 mg total) by mouth 2 (two) times a day   warfarin (COUMADIN) 7.5 mg tablet   No No   Sig: INR range is 2-3      Facility-Administered Medications: None       Past Medical History:   Diagnosis Date    Asthma     Atrial fibrillation (HCC)     COPD (chronic obstructive pulmonary disease) (HCC)     Diabetes mellitus (HCC)     Disease of thyroid gland     Heart failure (HCC)     Kidney failure     Morbid obesity due to excess calories (HCC)     NISHI (obstructive sleep apnea)        Past Surgical History:   Procedure Laterality Date    CARDIAC ELECTROPHYSIOLOGY MAPPING  AND ABLATION      by Dr. Ferraro at [facility]    CARDIOVERSION N/A     GALLBLADDER SURGERY      HERNIA REPAIR         Family History   Problem Relation Age of Onset    Arthritis Mother     Hearing loss Mother     Heart disease Mother     Hypertension Mother     Stroke Mother     Alcohol abuse Father     Cancer Father     Diabetes Father     Arthritis Sister     Cancer Sister     Alcohol abuse Brother     Diabetes Son     Arthritis Daughter     Drug abuse Daughter     Heart disease Maternal Grandmother     Hypertension Maternal Grandmother     Stroke Maternal Grandmother     Arthritis Maternal Aunt     Asthma Neg Hx     Birth defects Neg Hx     COPD Neg Hx     Depression Neg Hx     Early death Neg Hx     Hyperlipidemia Neg Hx     Kidney disease Neg Hx     Learning disabilities Neg Hx     Mental illness Neg Hx     Mental retardation Neg Hx     Miscarriages / Stillbirths Neg Hx     Vision loss Neg Hx      I have reviewed and agree with the history as documented.    E-Cigarette/Vaping    E-Cigarette Use Never User      E-Cigarette/Vaping Substances     Social History     Tobacco Use    Smoking status: Never    Smokeless tobacco: Never   Vaping Use    Vaping status: Never Used   Substance Use Topics    Alcohol use: Never    Drug use: Never       Review of Systems   Constitutional:  Positive for fatigue. Negative for activity change, appetite change, chills and fever.   HENT:  Negative for congestion, ear pain, rhinorrhea and sore throat.    Eyes:  Negative for discharge, redness and visual disturbance.   Respiratory:  Negative for cough, chest tightness, shortness of breath and wheezing.    Cardiovascular:  Negative for chest pain and palpitations.   Gastrointestinal:  Negative for abdominal pain, constipation, diarrhea, nausea and vomiting.   Endocrine: Negative for polydipsia and polyuria.   Genitourinary:  Negative for difficulty urinating, dysuria, frequency, hematuria and urgency.   Musculoskeletal:  Negative for  arthralgias and myalgias.   Skin:  Negative for color change, pallor and rash.   Neurological:  Positive for weakness. Negative for dizziness, light-headedness, numbness and headaches.        Difficulty ambulating   Hematological:  Negative for adenopathy. Does not bruise/bleed easily.   All other systems reviewed and are negative.      Physical Exam  Physical Exam  Vitals and nursing note reviewed.   Constitutional:       Appearance: She is well-developed.   HENT:      Head: Normocephalic and atraumatic.      Right Ear: External ear normal.      Left Ear: External ear normal.      Nose: Nose normal.   Eyes:      Conjunctiva/sclera: Conjunctivae normal.      Pupils: Pupils are equal, round, and reactive to light.   Cardiovascular:      Rate and Rhythm: Normal rate and regular rhythm.      Heart sounds: Normal heart sounds.   Pulmonary:      Effort: Pulmonary effort is normal. No respiratory distress.      Breath sounds: Normal breath sounds. No wheezing or rales.   Chest:      Chest wall: No tenderness.   Abdominal:      General: Bowel sounds are normal. There is no distension.      Palpations: Abdomen is soft.      Tenderness: There is no abdominal tenderness. There is no guarding.   Musculoskeletal:         General: Normal range of motion.      Cervical back: Normal range of motion and neck supple.      Right lower leg: Edema present.      Left lower leg: Edema present.   Skin:     General: Skin is warm and dry.   Neurological:      Mental Status: She is alert and oriented to person, place, and time.      Cranial Nerves: No cranial nerve deficit.      Sensory: No sensory deficit.      Motor: Weakness present. No pronator drift.         Vital Signs  ED Triage Vitals [01/02/24 2219]   Temperature Pulse Respirations Blood Pressure SpO2   (!) 97.3 °F (36.3 °C) 99 16 130/61 98 %      Temp Source Heart Rate Source Patient Position - Orthostatic VS BP Location FiO2 (%)   Temporal Monitor Lying Left arm --      Pain Score        No Pain           Vitals:    01/02/24 2219 01/03/24 0133   BP: 130/61 109/73   Pulse: 99 83   Patient Position - Orthostatic VS: Lying          Visual Acuity      ED Medications  Medications   acetaminophen (TYLENOL) tablet 650 mg (650 mg Oral Given 1/3/24 0130)   allopurinol (ZYLOPRIM) tablet 100 mg (has no administration in time range)   atorvastatin (LIPITOR) tablet 10 mg (has no administration in time range)   loratadine (CLARITIN) tablet 10 mg (has no administration in time range)   cholecalciferol (VITAMIN D3) tablet 2,000 Units (has no administration in time range)   docusate sodium (COLACE) capsule 100 mg (has no administration in time range)   ferrous sulfate tablet 325 mg (has no administration in time range)   insulin lispro (HumaLOG) 100 units/mL subcutaneous injection 3 Units (has no administration in time range)   insulin glargine (LANTUS) subcutaneous injection 20 Units 0.2 mL (20 Units Subcutaneous Given 1/3/24 0135)   levothyroxine tablet 288 mcg (has no administration in time range)   metoprolol succinate (TOPROL-XL) 24 hr tablet 37.5 mg (37.5 mg Oral Not Given 1/3/24 0133)   midodrine (PROAMATINE) tablet 5 mg (has no administration in time range)   montelukast (SINGULAIR) tablet 10 mg (10 mg Oral Given 1/3/24 0132)   multivitamin-minerals (CENTRUM) tablet 1 tablet (has no administration in time range)   nystatin (MYCOSTATIN) powder (has no administration in time range)   pantoprazole (PROTONIX) EC tablet 40 mg (has no administration in time range)   polyethylene glycol (MIRALAX) packet 17 g (has no administration in time range)   torsemide (DEMADEX) tablet 60 mg (60 mg Oral Given 1/3/24 0131)   warfarin (COUMADIN) tablet 7.5 mg (has no administration in time range)   insulin lispro (HumaLOG) 100 units/mL subcutaneous injection 2-12 Units (4 Units Subcutaneous Given 1/3/24 0135)       Diagnostic Studies  Results Reviewed       Procedure Component Value Units Date/Time    Fingerstick Glucose  (POCT) [[id number]]  (Abnormal) Collected: 01/03/24 0050    Lab Status: Final result Updated: 01/03/24 0051     POC Glucose 217 mg/dl     Urine Microscopic [[id number]]  (Abnormal) Collected: 01/03/24 0004    Lab Status: Final result Specimen: Urine, Clean Catch Updated: 01/03/24 0036     RBC, UA 10-20 /hpf      WBC, UA 10-20 /hpf      Epithelial Cells Occasional /hpf      Bacteria, UA Moderate /hpf     Urine culture [[id number]] Collected: 01/03/24 0004    Lab Status: In process Specimen: Urine, Clean Catch Updated: 01/03/24 0036    Manual Differential(PHLEBS Do Not Order) [[id number]]  (Abnormal) Collected: 01/02/24 2238    Lab Status: Final result Specimen: Blood from Hand, Right Updated: 01/03/24 0025     Segmented % 79 %      Lymphocytes % 9 %      Monocytes % 10 %      Eosinophils, % 2 %      Basophils % 0 %      Absolute Neutrophils 8.91 Thousand/uL      Lymphocytes Absolute 1.02 Thousand/uL      Monocytes Absolute 1.13 Thousand/uL      Eosinophils Absolute 0.23 Thousand/uL      Basophils Absolute 0.00 Thousand/uL      Total Counted --     RBC Morphology Normal     Platelet Estimate Adequate    UA w Reflex to Microscopic w Reflex to Culture [[id number]]  (Abnormal) Collected: 01/03/24 0004    Lab Status: Final result Specimen: Urine, Clean Catch Updated: 01/03/24 0021     Color, UA Rhonda     Clarity, UA Slightly Cloudy     Specific Gravity, UA 1.010     pH, UA 5.5     Leukocytes, UA Small     Nitrite, UA Negative     Protein, UA Negative mg/dl      Glucose, UA Negative mg/dl      Ketones, UA Negative mg/dl      Urobilinogen, UA 1.0 E.U./dl      Bilirubin, UA Small     Occult Blood, UA Large    Comprehensive metabolic panel [[id number]]  (Abnormal) Collected: 01/02/24 2238    Lab Status: Final result Specimen: Blood from Hand, Right Updated: 01/02/24 2338     Sodium 134 mmol/L      Potassium 5.6 mmol/L      Chloride 101 mmol/L      CO2 30 mmol/L      ANION GAP 3 mmol/L      BUN 45 mg/dL      Creatinine 1.24 mg/dL       Glucose 248 mg/dL      Calcium 8.6 mg/dL      Corrected Calcium 9.2 mg/dL      AST 92 U/L      ALT 48 U/L      Alkaline Phosphatase 170 U/L      Total Protein 8.3 g/dL      Albumin 3.3 g/dL      Total Bilirubin 1.11 mg/dL      eGFR 44 ml/min/1.73sq m     Narrative:      [employer] guidelines for Chronic Kidney Disease (CKD):     Stage 1 with normal or high GFR (GFR > 90 mL/min/1.73 square meters)    Stage 2 Mild CKD (GFR = 60-89 mL/min/1.73 square meters)    Stage 3A Moderate CKD (GFR = 45-59 mL/min/1.73 square meters)    Stage 3B Moderate CKD (GFR = 30-44 mL/min/1.73 square meters)    Stage 4 Severe CKD (GFR = 15-29 mL/min/1.73 square meters)    Stage 5 End Stage CKD (GFR <15 mL/min/1.73 square meters)  Note: GFR calculation is accurate only with a steady state creatinine    CBC and differential [[id number]]  (Abnormal) Collected: 01/02/24 2238    Lab Status: Final result Specimen: Blood from Hand, Right Updated: 01/02/24 2302     WBC 11.28 Thousand/uL      RBC 4.63 Million/uL      Hemoglobin 13.5 g/dL      Hematocrit 44.2 %      MCV 96 fL      MCH 29.2 pg      MCHC 30.5 g/dL      RDW 18.0 %      MPV 10.6 fL      Platelets 329 Thousands/uL                    No orders to display              Procedures  ECG 12 Lead Documentation Only    Date/Time: 1/2/2024 10:38 PM    Performed by: Ish France DO  Authorized by: Ish France DO    ECG reviewed by me, the ED Provider: yes    Patient location:  ED  Previous ECG:     Comparison to cardiac monitor: Yes    Quality:     Tracing quality:  Limited by artifact  Rate:     ECG rate:  89    ECG rate assessment: normal    Rhythm:     Rhythm: atrial fibrillation    Ectopy:     Ectopy: PVCs    Conduction:     Conduction: abnormal      Abnormal conduction: non-specific intraventricular conduction delay    ST segments:     ST segments:  Non-specific  T waves:     T waves: non-specific             ED Course  ED Course as of 01/03/24 0139   Lyudmila Da Silva  02, 2024 2253 Spoke with Dr. Lerner hospitalist on-call reviewed case and findings in the emergency department accepts for observation.                                 SBIRT 22yo+      Flowsheet Row Most Recent Value   Initial Alcohol Screen: US AUDIT-C     1. How often do you have a drink containing alcohol? 0 Filed at: 01/02/2024 2218   2. How many drinks containing alcohol do you have on a typical day you are drinking?  0 Filed at: 01/02/2024 2218   3b. FEMALE Any Age, or MALE 65+: How often do you have 4 or more drinks on one occassion? 0 Filed at: 01/02/2024 2218   Audit-C Score 0 Filed at: 01/02/2024 2218   EMORY: How many times in the past year have you...    Used an illegal drug or used a prescription medication for non-medical reasons? Never Filed at: 01/02/2024 2218                      Medical Decision Making  Problems Addressed:  Ambulatory dysfunction: acute illness or injury  Generalized weakness: acute illness or injury    Amount and/or Complexity of Data Reviewed  Labs: ordered. Decision-making details documented in ED Course.  ECG/medicine tests: ordered and independent interpretation performed. Decision-making details documented in ED Course.    Risk  Decision regarding hospitalization.             Disposition  Final diagnoses:   Ambulatory dysfunction   Generalized weakness     Time reflects when diagnosis was documented in both MDM as applicable and the Disposition within this note       Time User Action Codes Description Comment    1/2/2024 10:28 PM Ish France Add [R26.2] Ambulatory dysfunction     1/2/2024 10:28 PM Ish France Add [R53.1] Generalized weakness           ED Disposition       ED Disposition   Admit    Condition   Stable    Date/Time   Tue Jan 2, 2024 2228    Comment   Case was discussed with Dr. Lerner and the patient's admission status was agreed to be Admission Status: observation status to the service of Dr. Lerner .               Follow-up Information    None          Patient's Medications   Discharge Prescriptions    No medications on file       No discharge procedures on file.    PDMP Review         Value Time User    PDMP Reviewed  Yes 10/29/2022 12:30 PM Pedro Price MD            ED Provider  Electronically Signed by             Ish France,   01/02/24 2253       Ish France,   01/03/24 0136

## 2024-01-03 NOTE — ED NOTES
Pt had BM. Pt and linen cleaned. Pt repositioned with wedges placed under both hips. Call bell in reach.     Kerry Velazquez RN  01/03/24 8758

## 2024-01-03 NOTE — PROGRESS NOTES
Patient:  HANNA WASHINGTON    MRN:  [id number]    Aidin Request ID:  [id number]    Level of care reserved:    Partner Reserved:    Clinical needs requested:    Geography searched:  15 miles around [id number]    Start of Service:    Request sent:  9:41am EST on 1/3/2024 by Odilia Pang    Partner reserved:  by Odilia Pang    Choice list shared:  2:01pm EST on 1/3/2024 by Odilia Pang

## 2024-01-03 NOTE — H&P
[facility]  H&P  Name: Tigist Hewitt 68 y.o. female I MRN: [id number]  Unit/Bed#: ED 12 I Date of Admission: 1/2/2024   Date of Service: 1/3/2024 I Hospital Day: 0      Assessment/Plan   * Ambulatory dysfunction  Assessment & Plan  Unable to walk.  And stand  Will have PT/OT is here and case management for rehab    Hyperkalemia  Assessment & Plan  Patient's potassium is 5.6.  I will discontinue her potassium supplementation.  Repeat BMP in the morning    Atrial fibrillation (HCC)  Assessment & Plan  Continue with Lopressor and warfarin for anticoagulation    Type 2 diabetes mellitus with hyperglycemia, with long-term current use of insulin ([facility])  Assessment & Plan  Continue with Lantus and mealtime insulin.  Will also add on sliding scale    Lab Results   Component Value Date    HGBA1C 7.3 (H) 12/23/2023       Recent Labs     01/01/24  1556 01/01/24 2027 01/02/24  0755 01/02/24  1114   POCGLU 184* 156* 121 152*       Blood Sugar Average: Last 72 hrs:        Chronic respiratory failure with hypoxia ([facility])  Assessment & Plan  Uses 4 L of oxygen via nasal cannula    Hypothyroidism  Assessment & Plan  Continue levothyroxine    GERD (gastroesophageal reflux disease)  Assessment & Plan  Continue Protonix    COPD (chronic obstructive pulmonary disease) ([facility])  Assessment & Plan  At home she takes Breo    Heart failure ([facility])  Assessment & Plan  Continue with Lopressor, torsemide    Wt Readings from Last 3 Encounters:   01/02/24 (!) 149 kg (328 lb 0.7 oz)   10/29/22 (!) 151 kg (331 lb 12.7 oz)   10/03/22 (!) 171 kg (377 lb 6.8 oz)               Hypotension  Assessment & Plan  Continue midodrine         Disposition  #1 PT/OT consult  #2 case management consult  #3 rehab placement  #4 hold potassium  #5 repeat labs in the morning          VTE Prophylaxis: Warfarin (Coumadin)  / sequential compression device   Code Status: Level 3 - DNAR and DNI       Anticipated Length of Stay:  Patient will be  admitted on an Observation basis with an anticipated length of stay of less than 2 midnights.   Justification for Hospital Stay: Please see detailed plans noted above.    Chief Complaint:     Inability ambulate  History of Present Illness:  Tigist Hewitt is a 68 y.o. female who has past medical history significant for atrial fibs, chronic respiratory failure on 4 L, diastolic heart failure, type 2 diabetes, COPD, CKD stage III, morbid obesity, hypothyroidism, NISHI on CPAP and recent COVID-19 virus presents back to the emergency room unable to ambulate.  She was discharged yesterday on January 2, 2023 she comes back 3 hours later.  She is unable to get up from her power lift chair to stand with her walker.      Review of Systems:    Constitutional:  Denies fever or chills   Eyes:  Denies change in visual acuity   HENT:  Denies nasal congestion or sore throat   Respiratory:  Denies cough or shortness of breath   Cardiovascular:  Denies chest pain or edema   GI:  Denies abdominal pain or bloody stools  :  Denies dysuria   Musculoskeletal: Feels weak  Integument:  Denies rash   Neurologic:  Denies headache or sensory changes   Endocrine:  Denies polyuria or polydipsia   Lymphatic:  Denies swollen glands   Psychiatric:  Denies depression or anxiety     Past Medical and Surgical History:   Past Medical History:   Diagnosis Date    Asthma     Atrial fibrillation (HCC)     COPD (chronic obstructive pulmonary disease) (HCC)     Diabetes mellitus (HCC)     Disease of thyroid gland     Heart failure (HCC)     Kidney failure     Morbid obesity due to excess calories (HCC)     NISHI (obstructive sleep apnea)      Past Surgical History:   Procedure Laterality Date    CARDIAC ELECTROPHYSIOLOGY MAPPING AND ABLATION      by Dr. Ferraro at [facility]    CARDIOVERSION N/A     GALLBLADDER SURGERY      HERNIA REPAIR         Meds/Allergies:  (Not in a hospital admission)      Allergies:   Allergies   Allergen Reactions    Acesulfame  Potassium - Food Allergy Anaphylaxis    Aspartame - Food Allergy Anaphylaxis    Saccharin - Food Allergy Anaphylaxis    Sucralose - Food Allergy Anaphylaxis    Metformin GI Intolerance       History:  Marital Status: /Civil Union     Substance Use History:   Social History     Substance and Sexual Activity   Alcohol Use Never     Social History     Tobacco Use   Smoking Status Never   Smokeless Tobacco Never     Social History     Substance and Sexual Activity   Drug Use Never       Family History:  Family History   Problem Relation Age of Onset    Arthritis Mother     Hearing loss Mother     Heart disease Mother     Hypertension Mother     Stroke Mother     Alcohol abuse Father     Cancer Father     Diabetes Father     Arthritis Sister     Cancer Sister     Alcohol abuse Brother     Diabetes Son     Arthritis Daughter     Drug abuse Daughter     Heart disease Maternal Grandmother     Hypertension Maternal Grandmother     Stroke Maternal Grandmother     Arthritis Maternal Aunt     Asthma Neg Hx     Birth defects Neg Hx     COPD Neg Hx     Depression Neg Hx     Early death Neg Hx     Hyperlipidemia Neg Hx     Kidney disease Neg Hx     Learning disabilities Neg Hx     Mental illness Neg Hx     Mental retardation Neg Hx     Miscarriages / Stillbirths Neg Hx     Vision loss Neg Hx        Physical Exam:     Vitals:   Blood Pressure: 130/61 (01/02/24 2219)  Pulse: 99 (01/02/24 2219)  Temperature: (!) 97.3 °F (36.3 °C) (01/02/24 2219)  Temp Source: Temporal (01/02/24 2219)  Respirations: 16 (01/02/24 2219)  SpO2: 98 % (01/02/24 2219)    Constitutional:  Non-toxic appearance  Eyes:  EOMI, No scleral icterus   HENT:   oropharynx moist, external ears normal, external nose normal   Respiratory: Shallow breath sounds  Cardiovascular:  Normal rate, no murmurs   GI:  Soft, nondistended, no guarding   :  No costovertebral angle tenderness   Musculoskeletal:  no tenderness, no deformities.   Integument: Venous stasis  bilateral lower extremities   neurologic:  Alert &awake, communicative, CN 2-12 normal,  no focal deficits noted   Psychiatric:  Speech and behavior appropriate       Lab Results: I have personally reviewed pertinent reports.      Results from last 7 days   Lab Units 01/02/24 2238 12/30/23  0509   WBC Thousand/uL 11.28* 8.55   HEMOGLOBIN g/dL 13.5 13.1   HEMATOCRIT % 44.2 42.3   PLATELETS Thousands/uL 329 223   NEUTROS PCT %  --  68   LYMPHS PCT %  --  14   LYMPHO PCT % 9*  --    MONOS PCT %  --  13*   MONO PCT % 10  --    EOS PCT % 2 3     Results from last 7 days   Lab Units 01/02/24  2238   POTASSIUM mmol/L 5.6*   CHLORIDE mmol/L 101   CO2 mmol/L 30   BUN mg/dL 45*   CREATININE mg/dL 1.24   CALCIUM mg/dL 8.6   ALK PHOS U/L 170*   ALT U/L 48   AST U/L 92*     Results from last 7 days   Lab Units 01/02/24  0501   INR  2.55*         Imaging: I have personally reviewed pertinent reports.      Echo complete w/ contrast if indicated    Result Date: 12/24/2023  Narrative:   Left Ventricle: Left ventricular cavity size is normal. Wall thickness is normal. The left ventricular ejection fraction is 55% by visual estimation, although assessment is significantly limited due to poor endocardial border definition even with the use of contrast. Systolic function is probably normal. Although no diagnostic regional wall motion abnormality was identified, this possibility cannot be completely excluded on the basis of this study. Unable to assess diastolic function due to atrial fibrillation.   IVS: There is both systolic and diastolic flattening of the interventricular septum consistent with right ventricle pressure and volume overload.   Right Ventricle: Right ventricular cavity size is mildly dilated. Systolic function is moderately reduced.   Right Atrium: The atrium is dilated.   Atrial Septum: The septum bows into the left atrium, suggesting increased right atrial pressure.   Aortic Valve: There is mild stenosis. The aortic  valve peak velocity is 1.25 m/s. The aortic valve mean gradient is 4 mmHg. The dimensionless velocity index is 0.53. The aortic valve area is 1.56 cm2. The stroke volume index is 15.00 ml/m2. The aortic valve velocity is increased due to stenosis but lower than expected due to the presence of decreased flow.   Mitral Valve: There is mild annular calcification. There is mild to moderate regurgitation with a posteriorly directed jet. Severity is potentially underestimated due to eccentric jet.   Tricuspid Valve: There is severe regurgitation. The tricuspid valve regurgitation jet is directed toward septum. The right ventricular systolic pressure is moderately elevated. The estimated right ventricular systolic pressure is 53.00 mmHg.   Pulmonic Valve: There is mild regurgitation.   Prior TTE study available for comparison. Prior study date: 9/21/2022. No significant changes noted compared to the prior study.     XR chest portable    Result Date: 12/24/2023  Narrative: CHEST INDICATION:   Shortness of breath. COMPARISON: October 23, 2022 EXAM PERFORMED/VIEWS:  XR CHEST PORTABLE FINDINGS: Heart shadow is enlarged but unchanged from prior exam. No focal consolidative airspace opacity. Prominence of pulmonary vascular and interstitial markings suggestive of mild pulmonary vascular congestion.  No pneumothorax or pleural effusion. Osseous structures appear within normal limits for patient age.     Impression: Mild pulmonary vascular congestion. Cardiomegaly. No focal consolidation, pleural effusion, or pneumothorax. Workstation performed: [id number]     PE Study with CT Abdomen and Pelvis with contrast    Result Date: 12/23/2023  Narrative: CT PULMONARY ANGIOGRAM OF THE CHEST AND CT ABDOMEN AND PELVIS WITH INTRAVENOUS CONTRAST INDICATION:   sob and edema. As per review of electronic medical record, patient with history of heart failure, atrial fibrillation on Coumadin and COPD presenting with shortness of breath and  worsening lower extremity edema. COMPARISON: CT of the abdomen and pelvis from 10/22/2022 and CT of the chest from 4/13/2020. TECHNIQUE:  CT examination of the chest, abdomen and pelvis was performed.  Thin section CT angiographic technique was used in the chest in order to evaluate for pulmonary embolus and coronal 3D MIP postprocessing was performed on the acquisition scanner. Multiplanar 2D reformatted images were created from the source data. This examination, like all CT scans performed in the [facility], was performed utilizing techniques to minimize radiation dose exposure, including the use of iterative reconstruction and automated exposure control. Radiation dose length product (DLP) for this visit:  2095 mGy-cm IV Contrast:  100 mL of iohexol (OMNIPAQUE) Enteric Contrast:  Enteric contrast was not administered. FINDINGS: Evaluation is limited by motion artifact and severely limited by streak artifact from patient's body touching the CT gantry. CHEST PULMONARY ARTERIAL TREE:  No central or lobar pulmonary embolism. Evaluation of the segmental or subsegmental pulmonary arteries is limited.  Main pulmonary artery dilated up to 3.9 cm, suggestive of pulmonary arterial hypertension. BRONCHOPULMONARY:  Clear central airways. Evaluation of the lung parenchyma is limited by the aforementioned factors as well as hypoinflation. There is diffuse groundglass attenuation of the lung parenchyma with some associated areas of interlobular septal thickening. No airspace consolidations. There is a 4.2 cm air-filled cyst in the right upper lobe which has several septations. There is a 9 mm nodule along one of the septations within the cyst (series 4 image 89), which is new from the April 2020 CT. PLEURA:  No pleural effusions. No pneumothorax. HEART/GREAT VESSELS: Mild cardiomegaly. Trace pericardial effusion. There is reflux of IV contrast into the dilated IVC and hepatic veins. Normal caliber thoracic  aorta. MEDIASTINUM/LYMPH NODES: No definite mediastinal or hilar lymphadenopathy although evaluation is limited. No axillary mediastinal lymphadenopathy. CHEST WALL AND LOWER NECK:  Unremarkable. ABDOMEN LIVER/BILIARY TREE:  Normal liver morphology. No focal hepatic lesions. No biliary ductal dilation. GALLBLADDER:  The patient is status post cholecystectomy. SPLEEN: Unremarkable. 2 small soft tissue density nodules are seen adjacent to the spleen, most likely representing splenules. PANCREAS:  Unremarkable. Normal caliber main pancreatic duct. ADRENAL GLANDS:  Unremarkable. KIDNEYS/URETERS:  Symmetric renal enhancement. No intrarenal or ureteral calculi. No hydronephrosis. No focal renal lesions. STOMACH AND BOWEL:  Evaluation of the gastrointestinal tract is somewhat limited by underdistention and lack of oral contrast. No bowel obstruction or convincing inflammation. Scattered colonic diverticuli, however no evidence of diverticulitis. APPENDIX:  The appendix is not visualized, however there are no secondary findings of appendicitis. ABDOMINOPELVIC CAVITY:  No ascites or pneumoperitoneum. LYMPH NODES: No abdominal or pelvic lymphadenopathy. VESSELS: Normal caliber aorta. Patent major branch vessels. The periumbilical vein is recanalized. PELVIS REPRODUCTIVE ORGANS: The endometrial stripe is thickened up to 1.9 cm. URINARY BLADDER: The urinary bladder is mostly collapsed around a Dow catheter. Air in the nondependent portion of the urinary bladder is likely related to the catheter. ABDOMINAL WALL/INGUINAL REGIONS: There is diastases of the rectus abdominis muscles with resultant anterior protrusion of intra-abdominal contents. There is anasarca. Dystrophic calcifications are seen within the soft tissues of the lower back. MUSCULOSKELETAL:  No focal aggressive osseous lesions. Degenerative changes of the spine.     Impression: Evaluation severely limited by streak artifact from patient's body touching the CT  gantry, as well as by motion artifact. CTA CHEST: 1) No central or lobar pulmonary embolism. Evaluation of the segmental or subsegmental pulmonary arteries is limited. 2) Diffuse groundglass attenuation of the lung parenchyma with some associated areas of interlobular septal thickening. This may be seen in the setting of pulmonary edema and/or viral pneumonia. No airspace consolidations. 3) 4.2 cm air-filled cyst in the right upper lobe, with several septations as on the April 2020 CT, however with a new 9 mm nodule along 1 of the septations. Underlying malignancy cannot be excluded. Recommend short interval follow-up chest CT in 3 months or PET/CT. Pulmonology consultation may also be of value. 4) Mild cardiomegaly, with reflux of IV contrast into the dilated IVC and hepatic veins, suggestive of right heart failure. 5) Main pulmonary artery dilated up to 3.9 cm, suggestive of pulmonary arterial hypertension. CT ABDOMEN/PELVIS: 6) No acute abdominal or pelvic pathology. 7) Endometrial stripe thickened up to 1.9 cm, which may be secondary to endometrial hyperplasia, an endometrial polyp, or endometrial neoplasm. Recommend further evaluation with nonemergent pelvic ultrasound if feasible, and/or tissue sampling. 8) Recanalized periumbilical vein, which may indicate underlying portal hypertension. 9) Multiple additional findings as above. The study was marked in EPIC for immediate notification. Workstation performed: [id number]       Total time for visit, including counseling/coordination of care: 65 minutes. Greater than 50% of this total time spent on direct patient counseling and coorination of care.     Epic Records Reviewed as well as Records in Care Everywhere    ** Please Note: Dragon 360 Dictation voice to text software was used in the creation of this document. **

## 2024-01-03 NOTE — QUICK NOTE
Patient evaluated by PT OT recommendation rehab.  Pending authorization,    The same recommendation was in previous admission which patient refused at that time, and preferred to go home.  After going home, patient was having trouble difficulty with walking and came back to emergency room.

## 2024-01-03 NOTE — PHYSICAL THERAPY NOTE
"   PHYSICAL THERAPY EVALUATION  NAME:  Tigist Hewitt  DATE: 01/03/24    AGE:   68 y.o.  Mrn:   [id number]  ADMIT DX:  Difficulty walking [R26.2]    Past Medical History:   Diagnosis Date    Asthma     Atrial fibrillation (HCC)     COPD (chronic obstructive pulmonary disease) (HCC)     Diabetes mellitus (HCC)     Disease of thyroid gland     Heart failure (HCC)     Kidney failure     Morbid obesity due to excess calories (HCC)     NISHI (obstructive sleep apnea)      Length Of Stay: 0  Performed at least 2 patient identifiers during session: Name and Birthday  PHYSICAL THERAPY EVALUATION :    01/03/24 1010   PT Last Visit   PT Visit Date 01/03/24   Note Type   Note type Evaluation   Pain Assessment   Pain Assessment Tool 0-10   Pain Score 6   Pain Location/Orientation Orientation: Bilateral;Location: Leg;Location: Knee   Pain Rating: FLACC (Rest) - Face 0   Pain Rating: FLACC (Rest) - Legs 0   Pain Rating: FLACC (Rest) - Activity 0   Pain Rating: FLACC (Rest) - Cry 1   Pain Rating: FLACC (Rest) - Consolability 0   Score: FLACC (Rest) 1   Pain Rating: FLACC (Activity) - Face 1   Pain Rating: FLACC (Activity) - Legs 0   Pain Rating: FLACC (Activity) - Activity 1   Pain Rating: FLACC (Activity) - Cry 1   Pain Rating: FLACC (Activity) - Consolability 0   Score: FLACC (Activity) 3   Restrictions/Precautions   Weight Bearing Precautions Per Order No   Other Precautions Airborne/isolation;Contact/isolation;Chair Alarm;Bed Alarm;Fall Risk;Pain;O2  (4 lpm NC O2)   Home Living   Type of Home House  (split level, no IZAIAH)   Home Layout Two level;Performs ADLs on one level;Able to live on main level with bedroom/bathroom  (Split level but has 1st floor set up. 0 IZAIAH)   Bathroom Shower/Tub   (sponge bathes)   Bathroom Toilet   (BSC)   Bathroom Equipment Commode   Home Equipment Wheelchair-manual;Walker  (recliner lift chair)   Additional Comments Pt reports living in a split level home with 0 IZAIAH. Pt reports that she \"lives\" " "in her recliner lift chair and will typically spt lift chair <> BSC with use of RW and will occasionally take \"a couple\" steps. Pt is otherwise nonambulatory.   Prior Function   Level of [place] Independent with functional mobility;Needs assistance with ADLs;Needs assistance with IADLS   Lives With Spouse;Son   Receives Help From Family;Friend(s);Personal care attendant  (Caregiver 3x a week for 2hrs- Monday, Wednesday, Friday)   IADLs Independent with medication management;Family/Friend/Other provides transportation;Family/Friend/Other provides meals   Falls in the last 6 months 1 to 4   Comments Reports being able to transfer with RW.   General   Additional Pertinent History Pt admitted 12/23/23 to 1/2/23, declined post acute rehab as she preferred to return to home, returned to home and EMS needed to lower her to the ground, then was unable to stand from recliner lift chair. pt wiht increased B LE edema. ROM limite dby body habitus   Cognition   Orientation Level Oriented X4   Following Commands Follows one step commands without difficulty   Subjective   Subjective \"I couldn't get out of my lift chair.\"   RLE Assessment   RLE Assessment   (ankle DF/PF to neutral, hip flexion limite dby body habitus, knee extension ~-10 degrees. 2-/5 except knee extension 2/5)   LLE Assessment   LLE Assessment   (hip flexion limited by body habitus, knee extension WFL, ankle DF to neutral. strength 3-/5 knee extension otherwise 2-/5)   Bed Mobility   Rolling R 2  Maximal assistance   Additional items Increased time required;Assist x 1;Verbal cues   Rolling L 2  Maximal assistance   Additional items Assist x 1;Increased time required;Verbal cues   Supine to Sit 2  Maximal assistance   Additional items Assist x 1;Increased time required;Verbal cues;LE management  (trunk management)   Sit to Supine 2  Maximal assistance   Additional items Assist x 2;Increased time required;LE management;Verbal cues  (trunk management) " "  Additional Comments HOB elevated > 30 degrees. required maxAx1 to achieve sitting EOB. manual cues for trunk and LE management. sate EOB 5' with SBA. returned to supine with maxAx2 for trunk and LE management with verbal cues for technique. required maxAx1 to roll right and left for repositioning.   Transfers   Sit to Stand   (only partially cleared despite maxAx2)   Additional items Assist x 2   Additional Comments use of RW. right knee and foot blocked, left foot blocked. pt unable to fully clear buttock despite maxAx2 for 2 trials. verbal cues for technique and sequence.   Balance   Static Sitting Fair -   Dynamic Sitting Poor +   Endurance Deficit   Endurance Deficit Description min ALBERT. Spo2 mid 90s on 4 lpm   Activity Tolerance   Activity Tolerance Patient limited by fatigue;Patient limited by pain   Medical Staff Made Aware OTYudi   Nurse Made Aware RNKerry   Assessment   Prognosis Fair   Problem List Decreased strength;Decreased endurance;Impaired balance;Decreased mobility;Decreased range of motion;Impaired judgement;Decreased safety awareness;Obesity;Decreased skin integrity;Pain   Barriers to Discharge Decreased caregiver support;Inaccessible home environment   Barriers to Discharge Comments requires assistance to complete mobility, unable to stand, fall risk   Goals   Patient Goals \"Go to rehab, anywhere but [place].\"   STG Expiration Date 01/17/24   PT Treatment Day 0   Plan   Treatment/Interventions ADL retraining;Functional transfer training;LE strengthening/ROM;Therapeutic exercise;Endurance training;Patient/family training;Equipment eval/education;Bed mobility;Gait training;Compensatory technique education;Spoke to nursing;Spoke to case management;OT   PT Frequency 3-5x/wk   Discharge Recommendation   Rehab Resource Intensity Level, PT I (Maximum Resource Intensity)   AM-PAC Basic Mobility Inpatient   Turning in Flat Bed Without Bedrails 2   Lying on Back to Sitting on Edge of Flat Bed " Without Bedrails 2   Moving Bed to Chair 1   Standing Up From Chair Using Arms 1   Walk in Room 1   Climb 3-5 Stairs With Railing 1   Basic Mobility Inpatient Raw Score 8   Turning Head Towards Sound 4   Follow Simple Instructions 4   Low Function Basic Mobility Raw Score  16   Low Function Basic Mobility Standardized Score  25.72   Highest Level Of Mobility   -HL Goal 3: Sit at edge of bed   JH-HLM Achieved 3: Sit at edge of bed   End of Consult   Patient Position at End of Consult Supine;Bed/Chair alarm activated;All needs within reach       Pt requires PT/OT co-eval due to medical complexity, safety concerns, fall risk, significant assistance with mobility and/or cognitive-behavioral impairments.    (Please find full objective findings from PT assessment regarding body systems outlined above).     Assessment: Pt is a 68 y.o. female seen for PT evaluation s/p admission to [facility] on 1/2/2024 with Ambulatory dysfunction.  Order placed for PT services.  Upon evaluation: Pt is presenting with impaired functional mobility due to pain, decreased strength, decreased ROM, decreased endurance, impaired balance, decreased safety awareness, impaired judgment, fall risk, LE edema, and impaired skin integrity requiring  maximal of 1 to 2 assistance for bed mobility, mechanical conveyance from nursing standpoint for OOB mobility, and unable to fully clear buttock despite maxAx2 . Pt's clinical presentation is currently unpredictable given the functional mobility deficits above, especially weakness, edema of extremities, decreased skin integrity, decreased endurance, impaired balance, pain, decreased activity tolerance, decreased functional mobility tolerance, decreased safety awareness, impaired judgement, and SOB upon exertion, coupled with fall risks as indicated by AM-PAC 6-Clicks: 8/24 as well as hx of falls, impaired balance, polypharmacy, impaired judgement, decreased safety awareness, and  obesity and combined with medical complications of pain impacting overall mobility status, abnormal renal lab values, abnormal blood sugars, abnormal potassium values, readmission to hospital, and need for input for mobility technique/safety, covid-19.  Pt's PMHx and comorbidities that may affect physical performance and progress include: asthma, CHF, COPD, and hypotension, chronic respiratory failure with hypoxia, DM, Afib, obesity . Personal factors affecting pt at time of IE include: inaccessible home environment, limited home support, inability to perform IADLs, inability to perform ADLs, inability to navigate level surfaces without external assistance, inability to ambulate household distances, limited insight into impairments, and recent fall(s)/fall history. Pt will benefit from continued skilled PT services to address deficits as defined above and to maximize level of functional mobility to facilitate return toward PLOF and improved QOL. From PT/mobility standpoint, recommendation at time of d/c would be Level I (Maximum Resource Intensity) in order to reduce fall risk and maximize pt's functional independence and consistency with mobility. Recommend trial with walker next 1-2 sessions and ther ex next 1-2 sessions.       The patient's AM-PAC Basic Mobility Inpatient Short Form Raw Score is 8. A Raw score of less than or equal to 16 suggests the patient may benefit from discharge to post-acute rehabilitation services. Please also refer to the recommendation of the Physical Therapist for safe discharge planning.       Goals: Pt will: Perform bed mobility tasks with consistent modA of 1 to reposition in bed and prepare for transfers. Pt will perform transfers with  maxAx2  to decrease risk for falls, improve ease of transfers, and improve activity tolerance. Pt will participate in objective balance assessment to determine baseline fall risk. Pt will increase B LE strength >/= 1/2 MMT grade to facilitate  functional mobility.      Linda Alberto, PT,DPT

## 2024-01-03 NOTE — CASE MANAGEMENT
[facility] received request for authorization from Care Manager.  Authorization request for: SNF  Facility Name: [facility] NPI: [id number]  Facility MD: DEVIN Burger NPI: [id number]  Authorization initiated by contacting insurance: [employer]    Via: Fax  Clinicals submitted via: fax # [phone]

## 2024-01-03 NOTE — ASSESSMENT & PLAN NOTE
Continue with Lopressor, torsemide    Wt Readings from Last 3 Encounters:   01/02/24 (!) 149 kg (328 lb 0.7 oz)   10/29/22 (!) 151 kg (331 lb 12.7 oz)   10/03/22 (!) 171 kg (377 lb 6.8 oz)

## 2024-01-03 NOTE — PLAN OF CARE
Problem: PHYSICAL THERAPY ADULT  Goal: Performs mobility at highest level of function for planned discharge setting.  See evaluation for individualized goals.  Description: Treatment/Interventions: ADL retraining, Functional transfer training, LE strengthening/ROM, Therapeutic exercise, Endurance training, Patient/family training, Equipment eval/education, Bed mobility, Gait training, Compensatory technique education, Spoke to nursing, Spoke to case management, OT          See flowsheet documentation for full assessment, interventions and recommendations.  Note: Prognosis: Fair  Problem List: Decreased strength, Decreased endurance, Impaired balance, Decreased mobility, Decreased range of motion, Impaired judgement, Decreased safety awareness, Obesity, Decreased skin integrity, Pain  Assessment: Pt is a 68 y.o. female seen for PT evaluation s/p admission to [facility] on 1/2/2024 with Ambulatory dysfunction.  Order placed for PT services.  Upon evaluation: Pt is presenting with impaired functional mobility due to pain, decreased strength, decreased ROM, decreased endurance, impaired balance, decreased safety awareness, impaired judgment, fall risk, LE edema, and impaired skin integrity requiring  maximal of 1 to 2 assistance for bed mobility, mechanical conveyance from nursing standpoint for OOB mobility, and unable to fully clear buttock despite maxAx2 . Pt's clinical presentation is currently unpredictable given the functional mobility deficits above, especially weakness, edema of extremities, decreased skin integrity, decreased endurance, impaired balance, pain, decreased activity tolerance, decreased functional mobility tolerance, decreased safety awareness, impaired judgement, and SOB upon exertion, coupled with fall risks as indicated by AM-PAC 6-Clicks: 8/24 as well as hx of falls, impaired balance, polypharmacy, impaired judgement, decreased safety awareness, and obesity and combined  with medical complications of pain impacting overall mobility status, abnormal renal lab values, abnormal blood sugars, abnormal potassium values, readmission to hospital, and need for input for mobility technique/safety, covid-19.  Pt's PMHx and comorbidities that may affect physical performance and progress include: asthma, CHF, COPD, and hypotension, chronic respiratory failure with hypoxia, DM, Afib, obesity . Personal factors affecting pt at time of IE include: inaccessible home environment, limited home support, inability to perform IADLs, inability to perform ADLs, inability to navigate level surfaces without external assistance, inability to ambulate household distances, limited insight into impairments, and recent fall(s)/fall history. Pt will benefit from continued skilled PT services to address deficits as defined above and to maximize level of functional mobility to facilitate return toward PLOF and improved QOL. From PT/mobility standpoint, recommendation at time of d/c would be Level I (Maximum Resource Intensity) in order to reduce fall risk and maximize pt's functional independence and consistency with mobility. Recommend trial with walker next 1-2 sessions and ther ex next 1-2 sessions.  Barriers to Discharge: Decreased caregiver support, Inaccessible home environment  Barriers to Discharge Comments: requires assistance to complete mobility, unable to stand, fall risk  Rehab Resource Intensity Level, PT: I (Maximum Resource Intensity)    See flowsheet documentation for full assessment.

## 2024-01-03 NOTE — CASE MANAGEMENT
Case Management Assessment & Discharge Planning Note    Patient name Tigist Hewitt  Location ED 12/ED 12 MRN [id number]  : 1955 Date 1/3/2024       Current Admission Date: 2024  Current Admission Diagnosis:Ambulatory dysfunction   Patient Active Problem List    Diagnosis Date Noted    Ambulatory dysfunction 2024    Hyperkalemia 2024    Lung cyst 2024    Abnormal CT scan, pelvis 2023    CKD (chronic kidney disease) stage 3, GFR 30-59 ml/min ([facility]) 2023    NISHI (obstructive sleep apnea) 2023    Intertrigo 10/29/2022    Vaginal bleeding 10/23/2022    Acute kidney injury superimposed on chronic kidney disease  10/22/2022    Hypotension 10/22/2022    GERD (gastroesophageal reflux disease) 10/22/2022    Acute on chronic diastolic congestive heart failure ([facility]) 2022    IMTIAZ (acute kidney injury) ([facility]) 2022    Morbid obesity ([facility]) 2022    Hypothyroidism 2022    Supratherapeutic INR 04/15/2020    Pneumonia due to COVID-19 virus 2020    Chronic respiratory failure with hypoxia ([facility]) 2020    Asthma 2020    Atrial fibrillation ([facility]) 2020    COPD (chronic obstructive pulmonary disease) ([facility]) 2020    Type 2 diabetes mellitus with hyperglycemia, with long-term current use of insulin ([facility]) 2020    Heart failure ([facility]) 2020    Shortness of breath 2020    Anemia 2020      LOS (days): 0  Geometric Mean LOS (GMLOS) (days):   Days to GMLOS:     OBJECTIVE:              Current admission status: Observation  Referral Reason: Rehab    Preferred Pharmacy:   [id number] - [place], [place] - [address] [place] [address]  Phone: [phone] Fax: [phone]    Primary Care Provider: Marisa Haque MD    Primary Insurance: [facility] RE[facility]  Secondary Insurance:     ASSESSMENT:  Active Health Care Proxies    There are no active Health Care Proxies on file.              "    Readmission Root Cause  30 Day Readmission: Yes  Who directed you to return to the hospital?: Family, Self  Did you understand whom to contact if you had questions or problems?: No (Pt states \"I was a little fuzzy on that.\")  Did you get your prescriptions before you left the hospital?: No  Reason::  (Did not have time to  Rx)  Were you able to get your prescriptions filled when you left the hospital?: No  Reason::  (Did not have time to  Rx)  Did you take your medications as prescribed?: Yes  Were you able to get to your follow-up appointments?: No  Reason:: Readmitted prior to appointment  During previous admission, was a post-acute recommendation made?: No (Pt states \"no.\" PT/OT note from 12/29/23 rec \"Max resource.\")  Patient was readmitted due to: Weakness  Action Plan: SNF-STR placement    Patient Information  Admitted from:: Home  Mental Status: Alert  During Assessment patient was accompanied by: Not accompanied during assessment  Assessment information provided by:: Patient  Primary Caregiver: Family (Son)  Caregiver's Name:: Philip Cooper  Caregiver's Relationship to Patient:: Family Member  Caregiver's Telephone Number:: [phone]  Support Systems: Spouse/significant other, Son  [place] Residence[place] [facility]  What city do you live in?: [place]  Home entry access options. Select all that apply.: No steps to enter home  Type of Current Residence: Bi-level  Upon entering residence, is there a bedroom on the main floor (no further steps)?: Yes  Upon entering residence, is there a bathroom on the main floor (no further steps)?: Yes  Living Arrangements: Lives w/ Spouse/significant other, Lives w/ Son  Is patient a ?: No    Activities of Daily Living Prior to Admission  Functional Status: Assistance  Completes ADLs independently?: No  Level of ADL dependence: Assistance  Ambulates independently?: No  Level of ambulatory dependence: Assistance  Does patient use assisted " devices?: Yes  Assisted Devices (DME) used: Home Oxygen concentrator, Portable Oxygen tanks, CPAP, Bedside Commode, Wheelchair, Walker (Lift chair)  DME Company Name (respiratory supplies): [employer] CPAP and O2  O2 Rate(s): 4L  Does patient currently own DME?: Yes  What DME does the patient currently own?: Bedside Commode, CPAP, Home Oxygen concentrator, Walker, Portable Oxygen tanks, Wheelchair  Does patient have a history of Outpatient Therapy (PT/OT)?: No  Does the patient have a history of Short-Term Rehab?: Yes ([facility])  Does patient have a history of HHC?: Yes ([facility])  Does patient currently have HHC?: Yes    Current Home Health Care  Type of Current Home Care Services: Home PT, Home OT, Nurse visit  Current Home Health Agency:: Other (please enter name in comment) ([facility])  Current Home Health Follow-Up Provider:: PCP    Patient Information Continued  Income Source: SSI/SSD  Does patient have prescription coverage?: Yes  Does patient receive dialysis treatments?: No  Does patient have a history of substance abuse?: No  Does patient have a history of Mental Health Diagnosis?: No         Means of Transportation  Means of Transport to Appts:: Other (Comment) ([facility] singleton w/ MA)      Housing Stability: Low Risk  (1/3/2024)    Housing Stability Vital Sign     Unable to Pay for Housing in the Last Year: No     Number of Places Lived in the Last Year: 1     Unstable Housing in the Last Year: No   Food Insecurity: No Food Insecurity (1/3/2024)    Hunger Vital Sign     Worried About Running Out of Food in the Last Year: Never true     Ran Out of Food in the Last Year: Never true   Transportation Needs: No Transportation Needs (1/3/2024)    PRAPARE - Transportation     Lack of Transportation (Medical): No     Lack of Transportation (Non-Medical): No   Utilities: Not At Risk (1/3/2024)    [facility] Utilities     Threatened with loss of utilities: No       DISCHARGE DETAILS:    Discharge planning discussed with::  Pt  Freedom of Choice: Yes  Comments - Freedom of Choice: Pt agreeable to STR at SNF w/in a 10 mile radius. Pt refusing [facility] for STR.  CM contacted family/caregiver?: No- see comments (Pt declined.)  Were Treatment Team discharge recommendations reviewed with patient/caregiver?: Yes  Did patient/caregiver verbalize understanding of patient care needs?: Yes  Were patient/caregiver advised of the risks associated with not following Treatment Team discharge recommendations?: Yes                   Other Referral/Resources/Interventions Provided:  Interventions: SNF, Short Term Rehab    Would you like to participate in our [facility] service program?  : No - Declined    Treatment Team Recommendation: SNF, Short Term Rehab     Transport at Discharge : BLS Ambulance (Pt wears 4L O2)         3148 - PT/OT aware of priority treat. Referral for SNF sent via [facility].      5094 - CM spoke with pt who is agreeable to [facility]. Same reserved in Aidin. Auth info sent to CM d/c support via [employer].

## 2024-01-03 NOTE — ED NOTES
Pt placed on purewick. Resting comfortably no complaints at this time. Call bell within reach.     Ivana Shaikh  01/03/24 0022

## 2024-01-03 NOTE — UTILIZATION REVIEW
NOTIFICATION OF ADMISSION DISCHARGE   This is a Notification of Discharge from [facility]. Please be advised that this patient has been discharge from our facility. Below you will find the admission and discharge date and time including the patient’s disposition.   UTILIZATION REVIEW CONTACT:  Mirela Perkins  Utilization   Network Utilization Review Department  Phone: [phone] x carefully listen to the prompts. All voicemails are confidential.  Email: [email or web address]     ADMISSION INFORMATION  PRESENTATION DATE: 12/23/2023  2:16 PM  OBERVATION ADMISSION DATE:   INPATIENT ADMISSION DATE: 12/23/23  7:23 PM   DISCHARGE DATE: 1/2/2024  3:12 PM   DISPOSITION:Home with Home Health Care    Network Utilization Review Department  ATTENTION: Please call with any questions or concerns to [phone] and carefully listen to the prompts so that you are directed to the right person. All voicemails are confidential.   For Discharge needs, contact Care Management DC Support Team at [phone] opt. 2  Send all requests for admission clinical reviews, approved or denied determinations and any other requests to dedicated fax number below belonging to the campus where the patient is receiving treatment. List of dedicated fax numbers for the Facilities:  FACILITY NAME UR FAX NUMBER   ADMISSION DENIALS (Administrative/Medical Necessity) [phone]   DISCHARGE SUPPORT TEAM ([facility]) [phone]   PARENT CHILD HEALTH (Maternity/NICU/Pediatrics) [phone]   [facility] [phone]   [facility] [phone]   [facility] [phone]   [facility] [phone]   [facility] [phone]   [facility] [phone]   [facility] [phone]   [facility]  [place] [phone]   [facility] [phone]   [facility] [phone]   [facility] [phone]

## 2024-01-03 NOTE — PLAN OF CARE
Problem: Prexisting or High Potential for Compromised Skin Integrity  Goal: Skin integrity is maintained or improved  Description: INTERVENTIONS:  - Identify patients at risk for skin breakdown  - Assess and monitor skin integrity  - Assess and monitor nutrition and hydration status  - Monitor labs   - Assess for incontinence   - Turn and reposition patient  - Assist with mobility/ambulation  - Relieve pressure over bony prominences  - Avoid friction and shearing  - Provide appropriate hygiene as needed including keeping skin clean and dry  - Evaluate need for skin moisturizer/barrier cream  - Collaborate with interdisciplinary team   - Patient/family teaching  - Consider wound care consult   Outcome: Progressing     Problem: PAIN - ADULT  Goal: Verbalizes/displays adequate comfort level or baseline comfort level  Description: Interventions:  - Encourage patient to monitor pain and request assistance  - Assess pain using appropriate pain scale  - Administer analgesics based on type and severity of pain and evaluate response  - Implement non-pharmacological measures as appropriate and evaluate response  - Consider cultural and social influences on pain and pain management  - Notify physician/advanced practitioner if interventions unsuccessful or patient reports new pain  Outcome: Progressing     Problem: INFECTION - ADULT  Goal: Absence or prevention of progression during hospitalization  Description: INTERVENTIONS:  - Assess and monitor for signs and symptoms of infection  - Monitor lab/diagnostic results  - Monitor all insertion sites, i.e. indwelling lines, tubes, and drains  - Monitor endotracheal if appropriate and nasal secretions for changes in amount and color  - [place] appropriate cooling/warming therapies per order  - Administer medications as ordered  - Instruct and encourage patient and family to use good hand hygiene technique  - Identify and instruct in appropriate isolation precautions for  identified infection/condition  Outcome: Progressing  Goal: Absence of fever/infection during neutropenic period  Description: INTERVENTIONS:  - Monitor WBC    Outcome: Progressing     Problem: SAFETY ADULT  Goal: Patient will remain free of falls  Description: INTERVENTIONS:  - Educate patient/family on patient safety including physical limitations  - Instruct patient to call for assistance with activity   - Consult OT/PT to assist with strengthening/mobility   - Keep Call bell within reach  - Keep bed low and locked with side rails adjusted as appropriate  - Keep care items and personal belongings within reach  - Initiate and maintain comfort rounds  - Make Fall Risk Sign visible to staff  - Offer Toileting every    Hours, in advance of need  - Initiate/Maintain   alarm  - Obtain necessary fall risk management equipment:     - Apply yellow socks and bracelet for high fall risk patients  - Consider moving patient to room near nurses station  Outcome: Progressing  Goal: Maintain or return to baseline ADL function  Description: INTERVENTIONS:  -  Assess patient's ability to carry out ADLs; assess patient's baseline for ADL function and identify physical deficits which impact ability to perform ADLs (bathing, care of mouth/teeth, toileting, grooming, dressing, etc.)  - Assess/evaluate cause of self-care deficits   - Assess range of motion  - Assess patient's mobility; develop plan if impaired  - Assess patient's need for assistive devices and provide as appropriate  - Encourage maximum independence but intervene and supervise when necessary  - Involve family in performance of ADLs  - Assess for home care needs following discharge   - Consider OT consult to assist with ADL evaluation and planning for discharge  - Provide patient education as appropriate  Outcome: Progressing  Goal: Maintains/Returns to pre admission functional level  Description: INTERVENTIONS:  - Perform AM-PAC 6 Click Basic Mobility/ Daily Activity  assessment daily.  - Set and communicate daily mobility goal to care team and patient/family/caregiver.   - Collaborate with rehabilitation services on mobility goals if consulted  - Perform Range of Motion    times a day.  - Reposition patient every    hours.  - Dangle patient    times a day  - Stand patient    times a day  - Ambulate patient    times a day  - Out of bed to chair    times a day   - Out of bed for meals    times a day  - Out of bed for toileting  - Record patient progress and toleration of activity level   Outcome: Progressing     Problem: DISCHARGE PLANNING  Goal: Discharge to home or other facility with appropriate resources  Description: INTERVENTIONS:  - Identify barriers to discharge w/patient and caregiver  - Arrange for needed discharge resources and transportation as appropriate  - Identify discharge learning needs (meds, wound care, etc.)  - Arrange for interpretive services to assist at discharge as needed  - Refer to Case Management Department for coordinating discharge planning if the patient needs post-hospital services based on physician/advanced practitioner order or complex needs related to functional status, cognitive ability, or social support system  Outcome: Progressing     Problem: Knowledge Deficit  Goal: Patient/family/caregiver demonstrates understanding of disease process, treatment plan, medications, and discharge instructions  Description: Complete learning assessment and assess knowledge base.  Interventions:  - Provide teaching at level of understanding  - Provide teaching via preferred learning methods  Outcome: Progressing

## 2024-01-03 NOTE — ASSESSMENT & PLAN NOTE
Continue with Lantus and mealtime insulin.  Will also add on sliding scale    Lab Results   Component Value Date    HGBA1C 7.3 (H) 12/23/2023       Recent Labs     01/01/24  1556 01/01/24 2027 01/02/24  0755 01/02/24  1114   POCGLU 184* 156* 121 152*       Blood Sugar Average: Last 72 hrs:

## 2024-01-03 NOTE — ED NOTES
Pt transferred to hospital bed with help of PT. PT at patient's bedside.      Reddy Garrett RN  01/03/24 1014

## 2024-01-04 VITALS
TEMPERATURE: 97.5 F | SYSTOLIC BLOOD PRESSURE: 113 MMHG | HEART RATE: 88 BPM | RESPIRATION RATE: 16 BRPM | OXYGEN SATURATION: 94 % | DIASTOLIC BLOOD PRESSURE: 73 MMHG

## 2024-01-04 PROBLEM — I50.32 CHRONIC DIASTOLIC HEART FAILURE (HCC): Status: ACTIVE | Noted: 2020-04-13

## 2024-01-04 PROBLEM — E03.9 HYPOTHYROIDISM: Chronic | Status: RESOLVED | Noted: 2022-09-21 | Resolved: 2024-01-04

## 2024-01-04 PROBLEM — K21.9 GERD (GASTROESOPHAGEAL REFLUX DISEASE): Chronic | Status: RESOLVED | Noted: 2022-10-22 | Resolved: 2024-01-04

## 2024-01-04 LAB
GLUCOSE SERPL-MCNC: 159 MG/DL (ref 65–140)
GLUCOSE SERPL-MCNC: 164 MG/DL (ref 65–140)

## 2024-01-04 PROCEDURE — 82948 REAGENT STRIP/BLOOD GLUCOSE: CPT

## 2024-01-04 PROCEDURE — 99239 HOSP IP/OBS DSCHRG MGMT >30: CPT | Performed by: FAMILY MEDICINE

## 2024-01-04 RX ORDER — PREDNISONE 20 MG/1
TABLET ORAL DAILY
Qty: 15 TABLET | Refills: 0
Start: 2024-01-04 | End: 2024-01-16

## 2024-01-04 RX ORDER — IPRATROPIUM BROMIDE AND ALBUTEROL SULFATE 2.5; .5 MG/3ML; MG/3ML
3 SOLUTION RESPIRATORY (INHALATION) ONCE
Status: DISCONTINUED | OUTPATIENT
Start: 2024-01-04 | End: 2024-01-04 | Stop reason: HOSPADM

## 2024-01-04 RX ADMIN — Medication 1 TABLET: at 08:02

## 2024-01-04 RX ADMIN — INSULIN LISPRO 3 UNITS: 100 INJECTION, SOLUTION INTRAVENOUS; SUBCUTANEOUS at 08:04

## 2024-01-04 RX ADMIN — ALLOPURINOL 100 MG: 100 TABLET ORAL at 08:03

## 2024-01-04 RX ADMIN — INSULIN LISPRO 3 UNITS: 100 INJECTION, SOLUTION INTRAVENOUS; SUBCUTANEOUS at 11:31

## 2024-01-04 RX ADMIN — LORATADINE 10 MG: 10 TABLET ORAL at 08:02

## 2024-01-04 RX ADMIN — INSULIN LISPRO 2 UNITS: 100 INJECTION, SOLUTION INTRAVENOUS; SUBCUTANEOUS at 08:04

## 2024-01-04 RX ADMIN — METOPROLOL SUCCINATE 37.5 MG: 25 TABLET, EXTENDED RELEASE ORAL at 08:02

## 2024-01-04 RX ADMIN — TORSEMIDE 60 MG: 20 TABLET ORAL at 08:02

## 2024-01-04 RX ADMIN — INSULIN LISPRO 2 UNITS: 100 INJECTION, SOLUTION INTRAVENOUS; SUBCUTANEOUS at 11:32

## 2024-01-04 RX ADMIN — ATORVASTATIN CALCIUM 10 MG: 10 TABLET, FILM COATED ORAL at 08:02

## 2024-01-04 RX ADMIN — ACETAMINOPHEN 650 MG: 325 TABLET, FILM COATED ORAL at 11:32

## 2024-01-04 RX ADMIN — MIDODRINE HYDROCHLORIDE 5 MG: 5 TABLET ORAL at 11:32

## 2024-01-04 RX ADMIN — PANTOPRAZOLE SODIUM 40 MG: 40 TABLET, DELAYED RELEASE ORAL at 05:57

## 2024-01-04 RX ADMIN — FERROUS SULFATE TAB 325 MG (65 MG ELEMENTAL FE) 325 MG: 325 (65 FE) TAB at 08:02

## 2024-01-04 RX ADMIN — DOCUSATE SODIUM 100 MG: 100 CAPSULE, LIQUID FILLED ORAL at 08:02

## 2024-01-04 RX ADMIN — MIDODRINE HYDROCHLORIDE 5 MG: 5 TABLET ORAL at 08:03

## 2024-01-04 RX ADMIN — Medication 2000 UNITS: at 08:02

## 2024-01-04 RX ADMIN — LEVOTHYROXINE SODIUM 288 MCG: 88 TABLET ORAL at 05:57

## 2024-01-04 NOTE — ASSESSMENT & PLAN NOTE
Continue with Lopressor, torsemide  Low-salt diet, fluid restriction    Wt Readings from Last 3 Encounters:   01/02/24 (!) 149 kg (328 lb 0.7 oz)   10/29/22 (!) 151 kg (331 lb 12.7 oz)   10/03/22 (!) 171 kg (377 lb 6.8 oz)

## 2024-01-04 NOTE — CASE MANAGEMENT
Case Management Discharge Planning Note    Patient name Tigist Hewitt  Location /-01 MRN [id number]  : 1955 Date 2024       Current Admission Date: 2024  Current Admission Diagnosis:Ambulatory dysfunction   Patient Active Problem List    Diagnosis Date Noted    Ambulatory dysfunction 2024    Hyperkalemia 2024    Lung cyst 2024    Abnormal CT scan, pelvis 2023    CKD (chronic kidney disease) stage 3, GFR 30-59 ml/min ([facility]) 2023    NISHI (obstructive sleep apnea) 2023    Intertrigo 10/29/2022    Vaginal bleeding 10/23/2022    Acute kidney injury superimposed on chronic kidney disease  10/22/2022    Hypotension 10/22/2022    GERD (gastroesophageal reflux disease) 10/22/2022    Acute on chronic diastolic congestive heart failure ([facility]) 2022    IMTIAZ (acute kidney injury) ([facility]) 2022    Morbid obesity ([facility]) 2022    Hypothyroidism 2022    Supratherapeutic INR 04/15/2020    Pneumonia due to COVID-19 virus 2020    Chronic respiratory failure with hypoxia ([facility]) 2020    Asthma 2020    Atrial fibrillation ([facility]) 2020    COPD (chronic obstructive pulmonary disease) ([facility]) 2020    Type 2 diabetes mellitus with hyperglycemia, with long-term current use of insulin ([facility]) 2020    Heart failure ([facility]) 2020    Shortness of breath 2020    Anemia 2020      LOS (days): 1  Geometric Mean LOS (GMLOS) (days):   Days to GMLOS:     OBJECTIVE:  Risk of Unplanned Readmission Score: 24.94         Current admission status: Inpatient   Preferred Pharmacy:   [facility]/pharmacy #[id number] [place] [address]  Phone: [phone] Fax: [phone]    Primary Care Provider: Marisa Haque MD    Primary Insurance: [employer]  Secondary Insurance:     DISCHARGE DETAILS:     0900 CM submitted Roundtrip referral for S transport [facility]        CM met with patient to let her know transport time to [facility] STR.  Patient thankful.     CM contacted patients son to let him know patients transport time to STR.                     Requested Home Health Care         Is the patient interested in HHC at discharge?: No    DME Referral Provided  Referral made for DME?: No    Other Referral/Resources/Interventions Provided:  Interventions: Short Term Rehab  Referral Comments: [facility]         Treatment Team Recommendation: Short Term Rehab  Discharge Destination Plan:: Short Term Rehab  Transport at Discharge : S Ambulance        Transported by (Company and Unit #): [facility]  ETA of Transport (Date): 01/04/24  ETA of Transport (Time): 1400

## 2024-01-04 NOTE — CASE MANAGEMENT
Case Management Discharge Planning Note    Patient name Tigist Hweitt  Location /-01 MRN [id number]  : 1955 Date 2024       Current Admission Date: 2024  Current Admission Diagnosis:Ambulatory dysfunction   Patient Active Problem List    Diagnosis Date Noted    Ambulatory dysfunction 2024    Hyperkalemia 2024    Lung cyst 2024    Abnormal CT scan, pelvis 2023    CKD (chronic kidney disease) stage 3, GFR 30-59 ml/min ([facility]) 2023    NISHI (obstructive sleep apnea) 2023    Intertrigo 10/29/2022    Vaginal bleeding 10/23/2022    Acute kidney injury superimposed on chronic kidney disease  10/22/2022    Hypotension 10/22/2022    GERD (gastroesophageal reflux disease) 10/22/2022    Acute on chronic diastolic congestive heart failure ([facility]) 2022    IMTIAZ (acute kidney injury) ([facility]) 2022    Morbid obesity ([facility]) 2022    Hypothyroidism 2022    Supratherapeutic INR 04/15/2020    Pneumonia due to COVID-19 virus 2020    Chronic respiratory failure with hypoxia ([facility]) 2020    Asthma 2020    Atrial fibrillation ([facility]) 2020    COPD (chronic obstructive pulmonary disease) ([facility]) 2020    Type 2 diabetes mellitus with hyperglycemia, with long-term current use of insulin ([facility]) 2020    Heart failure ([facility]) 2020    Shortness of breath 2020    Anemia 2020      LOS (days): 1  Geometric Mean LOS (GMLOS) (days):   Days to GMLOS:     OBJECTIVE:  Risk of Unplanned Readmission Score: 24.94         Current admission status: Inpatient   Preferred Pharmacy:   [facility]/pharmacy #[id number] [place] [address]  Phone: [phone] Fax: [phone]    Primary Care Provider: Marisa Haque MD    Primary Insurance: [employer] [facility] [employer]  Secondary Insurance:     DISCHARGE DETAILS:                                                                                                                Facility Insurance Auth Number: [id number]

## 2024-01-04 NOTE — CASE MANAGEMENT
[facility] has received approved authorization from insurance: [employer]       Called in by Rep: Colette  Authorization received for: SNF  Facility: [facility]   Authorization #: [id number]  Start of Care: 01/04/2023  Next Review Date: 2 Business Days   Submit next review to #: [phone]  Care Manager notified: Tamara Chin

## 2024-01-04 NOTE — NURSING NOTE
IV removed. Discharged from [facility]. Report given to Fadia at [facility].

## 2024-01-04 NOTE — PLAN OF CARE
Problem: Prexisting or High Potential for Compromised Skin Integrity  Goal: Skin integrity is maintained or improved  Description: INTERVENTIONS:  - Identify patients at risk for skin breakdown  - Assess and monitor skin integrity  - Assess and monitor nutrition and hydration status  - Monitor labs   - Assess for incontinence   - Turn and reposition patient  - Assist with mobility/ambulation  - Relieve pressure over bony prominences  - Avoid friction and shearing  - Provide appropriate hygiene as needed including keeping skin clean and dry  - Evaluate need for skin moisturizer/barrier cream  - Collaborate with interdisciplinary team   - Patient/family teaching  - Consider wound care consult   Outcome: Progressing     Problem: PAIN - ADULT  Goal: Verbalizes/displays adequate comfort level or baseline comfort level  Description: Interventions:  - Encourage patient to monitor pain and request assistance  - Assess pain using appropriate pain scale  - Administer analgesics based on type and severity of pain and evaluate response  - Implement non-pharmacological measures as appropriate and evaluate response  - Consider cultural and social influences on pain and pain management  - Notify physician/advanced practitioner if interventions unsuccessful or patient reports new pain  Outcome: Progressing     Problem: INFECTION - ADULT  Goal: Absence or prevention of progression during hospitalization  Description: INTERVENTIONS:  - Assess and monitor for signs and symptoms of infection  - Monitor lab/diagnostic results  - Monitor all insertion sites, i.e. indwelling lines, tubes, and drains  - Monitor endotracheal if appropriate and nasal secretions for changes in amount and color  - [place] appropriate cooling/warming therapies per order  - Administer medications as ordered  - Instruct and encourage patient and family to use good hand hygiene technique  - Identify and instruct in appropriate isolation precautions for  identified infection/condition  Outcome: Progressing  Goal: Absence of fever/infection during neutropenic period  Description: INTERVENTIONS:  - Monitor WBC    Outcome: Progressing     Problem: SAFETY ADULT  Goal: Patient will remain free of falls  Description: INTERVENTIONS:  - Educate patient/family on patient safety including physical limitations  - Instruct patient to call for assistance with activity   - Consult OT/PT to assist with strengthening/mobility   - Keep Call bell within reach  - Keep bed low and locked with side rails adjusted as appropriate  - Keep care items and personal belongings within reach  - Initiate and maintain comfort rounds  - Make Fall Risk Sign visible to staff  - Offer Toileting every 2 Hours, in advance of need  - Initiate/Maintain alarm  - Obtain necessary fall risk management equipment  - Apply yellow socks and bracelet for high fall risk patients  - Consider moving patient to room near nurses station  Outcome: Progressing  Goal: Maintain or return to baseline ADL function  Description: INTERVENTIONS:  -  Assess patient's ability to carry out ADLs; assess patient's baseline for ADL function and identify physical deficits which impact ability to perform ADLs (bathing, care of mouth/teeth, toileting, grooming, dressing, etc.)  - Assess/evaluate cause of self-care deficits   - Assess range of motion  - Assess patient's mobility; develop plan if impaired  - Assess patient's need for assistive devices and provide as appropriate  - Encourage maximum independence but intervene and supervise when necessary  - Involve family in performance of ADLs  - Assess for home care needs following discharge   - Consider OT consult to assist with ADL evaluation and planning for discharge  - Provide patient education as appropriate  Outcome: Progressing  Goal: Maintains/Returns to pre admission functional level  Description: INTERVENTIONS:  - Perform AM-PAC 6 Click Basic Mobility/ Daily Activity  assessment daily.  - Set and communicate daily mobility goal to care team and patient/family/caregiver.   - Collaborate with rehabilitation services on mobility goals if consulted  - Perform Range of Motion 3 times a day.  - Reposition patient every 2 hours.  - Dangle patient 3 times a day  - Stand patient 3 times a day  - Ambulate patient 3 times a day  - Out of bed to chair 3 times a day   - Out of bed for meals 3 times a day  - Out of bed for toileting  - Record patient progress and toleration of activity level   Outcome: Progressing     Problem: DISCHARGE PLANNING  Goal: Discharge to home or other facility with appropriate resources  Description: INTERVENTIONS:  - Identify barriers to discharge w/patient and caregiver  - Arrange for needed discharge resources and transportation as appropriate  - Identify discharge learning needs (meds, wound care, etc.)  - Arrange for interpretive services to assist at discharge as needed  - Refer to Case Management Department for coordinating discharge planning if the patient needs post-hospital services based on physician/advanced practitioner order or complex needs related to functional status, cognitive ability, or social support system  Outcome: Progressing     Problem: Knowledge Deficit  Goal: Patient/family/caregiver demonstrates understanding of disease process, treatment plan, medications, and discharge instructions  Description: Complete learning assessment and assess knowledge base.  Interventions:  - Provide teaching at level of understanding  - Provide teaching via preferred learning methods  Outcome: Progressing

## 2024-01-04 NOTE — DISCHARGE SUMMARY
[facility]  Discharge- Tigist Hewitt 1955, 68 y.o. female MRN: [id number]  Unit/Bed#: MS Priscila-Jonathan Encounter: [id number]  Primary Care Provider: Marisa Haque MD   Date and time admitted to hospital: 1/2/2024 10:19 PM    Chronic respiratory failure with hypoxia (Formerly [facility])  Assessment & Plan  Use 4 L of oxygen via nasal cannula, remain on baseline  Patient also has sleep apnea, does use CPAP at nighttime, continue    Atrial fibrillation (Formerly [facility])  Assessment & Plan  Continue with Lopressor and warfarin for anticoagulation    * Ambulatory dysfunction  Assessment & Plan  Patient was in this hospital recently, in previous admission patient evaluated by PT OT recommendation rehab-but at that time, patient refused to go to rehab and preferred to go home.  After going home, patient was unable to move, came back to the emergency room.  Reevaluated by PT OT, recommendation rehab placement, patient is getting discharged to [facility] rehab      Type 2 diabetes mellitus with hyperglycemia, with long-term current use of insulin (Formerly [facility])  Assessment & Plan  Continue with Lantus and mealtime insulin.  Will also add on sliding scale    Lab Results   Component Value Date    HGBA1C 7.3 (H) 12/23/2023       Recent Labs     01/03/24  1349 01/03/24  1620 01/03/24  2041 01/04/24  0729   POCGLU 136 134 192* 159*         Blood Sugar Average: Last 72 hrs:  (P) 161.375  Patient remains on tapering dose of steroid, which can affect patient blood sugar.  Continue to monitor    COPD (chronic obstructive pulmonary disease) (Formerly [facility])  Assessment & Plan  Continue home medication, continue tapering dose of steroid    Hyperkalemia  Assessment & Plan  Patient's potassium is 5.6-it was hemolyzed, repeat potassium remained normal  Can resume home medication.    Hypotension  Assessment & Plan  Continue midodrine    Chronic diastolic heart failure (Formerly [facility])  Assessment & Plan  Continue with Lopressor, torsemide  Low-salt diet, fluid  restriction    Wt Readings from Last 3 Encounters:   01/02/24 (!) 149 kg (328 lb 0.7 oz)   10/29/22 (!) 151 kg (331 lb 12.7 oz)   10/03/22 (!) 171 kg (377 lb 6.8 oz)               Hypothyroidism-resolved as of 1/4/2024  Assessment & Plan  Continue levothyroxine        Medical Problems       Resolved Problems  Date Reviewed: 1/3/2024            Resolved    Hypothyroidism 1/4/2024     Resolved by  Pedro Price MD    GERD (gastroesophageal reflux disease) 1/4/2024     Resolved by  Pedro Price MD        Discharging Physician / Practitioner: Pedro Price MD  PCP: Marisa Haque MD  Admission Date:   Admission Orders (From admission, onward)       Ordered        01/03/24 1649  Inpatient Admission  Once            01/02/24 2227  Place in Observation  Once                          Discharge Date: 01/04/24    Consultations During Hospital Stay:  PT/OT    Procedures Performed:   No orders to display         Significant Findings / Test Results:   Lab Results   Component Value Date    WBC 11.28 (H) 01/02/2024    HGB 13.5 01/02/2024    HCT 44.2 01/02/2024    MCV 96 01/02/2024     01/02/2024     Lab Results   Component Value Date    SODIUM 139 01/03/2024    K 3.7 01/03/2024     01/03/2024    CO2 31 01/03/2024    AGAP 5 01/03/2024    BUN 41 (H) 01/03/2024    CREATININE 1.04 01/03/2024    GLUC 161 (H) 01/03/2024    CALCIUM 8.6 01/03/2024    AST 92 (H) 01/02/2024    ALT 48 01/02/2024    ALKPHOS 170 (H) 01/02/2024    TP 8.3 01/02/2024    TBILI 1.11 (H) 01/02/2024    EGFR 54 01/03/2024         Incidental Findings:   As mentioned above  I reviewed the above mentioned incidental findings with the patient and/or family and they expressed understanding.    Test Results Pending at Discharge (will require follow up):   Urine culture to follow     Outpatient Tests Requested:    Repeat chest x-ray in 4 weeks with PCP//pulmonology    Complications:  none    Reason for Admission: Ambulatory [facility]  Course:   Tigist Hewitt is a 68 y.o. female patient who originally presented to the hospital on 1/2/2024 due to ambulatory dysfunction.  Patient was recently on this hospital, received treatment for CHF and COPD exacerbation, after improvement, patient can discharge.  In previous admission patient was evaluated by PT OT recommendation to go to rehab, patient refused and preferred to go to home.  After going home, patient was unable to move around, came back to the emergency room.  Evaluated again, recommendation rehab.  At this time, patient agrees to proceed with rehab placement.  Patient is getting discharged to [facility].    No significant other medical change.  Patient advised to follow-up with PCP, cardiology and pulmonology as instructed in previous discharge summary.  Has some incidental finding which was documented in previous discharge summary and patient advised to follow-up with corresponding specialist.    Patient also advised to continue 4 L of oxygen during daytime, at nighttime patient is to use CPAP.      Please see above list of diagnoses and related plan for additional information.     Condition at Discharge: stable    Discharge Day Visit / Exam:   Subjective: Seen and evaluated during rounds.  Resting comfortably.  Denies any significant complaint.  Vitals: Blood Pressure: 111/71 (01/04/24 0731)  Pulse: 96 (01/04/24 0731)  Temperature: 97.5 °F (36.4 °C) (01/04/24 0731)  Temp Source: Temporal (01/02/24 2219)  Respirations: 16 (01/04/24 0731)  SpO2: 95 % (01/04/24 0731)  Exam:   Physical Exam  Vitals and nursing note reviewed.   Constitutional:       Appearance: She is obese. She is not ill-appearing.   Eyes:      General: No scleral icterus.        Left eye: No discharge.      Extraocular Movements: Extraocular movements intact.      Conjunctiva/sclera: Conjunctivae normal.      Pupils: Pupils are equal, round, and reactive to light.   Cardiovascular:      Rate and Rhythm: Normal rate.       Heart sounds:      No friction rub. No gallop.   Pulmonary:      Effort: Pulmonary effort is normal. No respiratory distress.      Breath sounds: No stridor. No wheezing or rhonchi.   Abdominal:      General: Abdomen is flat. Bowel sounds are normal. There is no distension.      Palpations: There is no mass.      Tenderness: There is no abdominal tenderness.      Hernia: No hernia is present.   Musculoskeletal:      Right lower leg: No edema.      Left lower leg: No edema.   Skin:     General: Skin is warm.   Neurological:      General: No focal deficit present.      Mental Status: She is alert and oriented to person, place, and time.      Cranial Nerves: No cranial nerve deficit.      Sensory: No sensory deficit.      Motor: No weakness.      Coordination: Coordination normal.          Discussion with Family:  with patient.     Discharge instructions/Information to patient and family:   See after visit summary for information provided to patient and family.      Provisions for Follow-Up Care:  See after visit summary for information related to follow-up care and any pertinent home health orders.      Mobility at time of Discharge:   Basic Mobility Inpatient Raw Score: 8  -[facility] Goal: 3: Sit at edge of bed  -HLM Achieved: 2: Bed activities/Dependent transfer  HLM Goal NOT achieved. Continue to encourage mobility in post discharge setting.     Disposition:   Acute Rehab at [facility]    Planned Readmission: If condition get worse.     Discharge Statement:  Greater than 50% of the total time was spent examining patient, answering all patient questions, arranging and discussing plan of care with patient as well as directly providing post-discharge instructions.  Additional time then spent on discharge activities.    Discharge Medications:  See after visit summary for reconciled discharge medications provided to patient and/or family.      **Please Note: This note may have been constructed using a voice  recognition system**

## 2024-01-04 NOTE — ASSESSMENT & PLAN NOTE
Patient was in this hospital recently, in previous admission patient evaluated by PT OT recommendation rehab-but at that time, patient refused to go to rehab and preferred to go home.  After going home, patient was unable to move, came back to the emergency room.  Reevaluated by PT OT, recommendation rehab placement, patient is getting discharged to [facility]

## 2024-01-04 NOTE — UTILIZATION REVIEW
NOTIFICATION OF INPATIENT ADMISSION   AUTHORIZATION REQUEST   SERVICING FACILITY:   [place] [address]  Tax ID: [id number]  NPI: [id number] ATTENDING PROVIDER:  Attending Name and NPI#: Pedro Price Md [[id number]]  Address: [address] [place] [address]  Phone: [phone]   ADMISSION INFORMATION:  Place of Service: Inpatient [facility]  Place of Service Code: 21  Inpatient Admission Date/Time: 1/3/24  4:49 PM  Discharge Date/Time: No discharge date for patient encounter.  Admitting Diagnosis Code/Description:  Difficulty walking [R26.2]  Generalized weakness [R53.1]  Ambulatory dysfunction [R26.2]     UTILIZATION REVIEW CONTACT:  Mirela Perkins Utilization   Network Utilization Review Department  Phone: [phone]  Fax [phone]  Email: [email or web address]  Contact for approvals/pending authorizations, clinical reviews, and discharge.     PHYSICIAN ADVISORY SERVICES:  Medical Necessity Denial & Sxax-gu-Rsig Review  Phone: [phone]  Fax: [phone]  Email: [email or web address]     DISCHARGE SUPPORT TEAM:  For Patients Discharge Needs & Updates  Phone: [phone] opt. 2 Fax: [phone]  Email: [email or web address]

## 2024-01-04 NOTE — PLAN OF CARE
Problem: Prexisting or High Potential for Compromised Skin Integrity  Goal: Skin integrity is maintained or improved  Description: INTERVENTIONS:  - Identify patients at risk for skin breakdown  - Assess and monitor skin integrity  - Assess and monitor nutrition and hydration status  - Monitor labs   - Assess for incontinence   - Turn and reposition patient  - Assist with mobility/ambulation  - Relieve pressure over bony prominences  - Avoid friction and shearing  - Provide appropriate hygiene as needed including keeping skin clean and dry  - Evaluate need for skin moisturizer/barrier cream  - Collaborate with interdisciplinary team   - Patient/family teaching  - Consider wound care consult   Outcome: Progressing     Problem: PAIN - ADULT  Goal: Verbalizes/displays adequate comfort level or baseline comfort level  Description: Interventions:  - Encourage patient to monitor pain and request assistance  - Assess pain using appropriate pain scale  - Administer analgesics based on type and severity of pain and evaluate response  - Implement non-pharmacological measures as appropriate and evaluate response  - Consider cultural and social influences on pain and pain management  - Notify physician/advanced practitioner if interventions unsuccessful or patient reports new pain  Outcome: Progressing     Problem: INFECTION - ADULT  Goal: Absence or prevention of progression during hospitalization  Description: INTERVENTIONS:  - Assess and monitor for signs and symptoms of infection  - Monitor lab/diagnostic results  - Monitor all insertion sites, i.e. indwelling lines, tubes, and drains  - Monitor endotracheal if appropriate and nasal secretions for changes in amount and color  - [place] appropriate cooling/warming therapies per order  - Administer medications as ordered  - Instruct and encourage patient and family to use good hand hygiene technique  - Identify and instruct in appropriate isolation precautions for  identified infection/condition  Outcome: Progressing  Goal: Absence of fever/infection during neutropenic period  Description: INTERVENTIONS:  - Monitor WBC    Outcome: Progressing     Problem: SAFETY ADULT  Goal: Patient will remain free of falls  Description: INTERVENTIONS:  - Educate patient/family on patient safety including physical limitations  - Instruct patient to call for assistance with activity   - Consult OT/PT to assist with strengthening/mobility   - Keep Call bell within reach  - Keep bed low and locked with side rails adjusted as appropriate  - Keep care items and personal belongings within reach  - Initiate and maintain comfort rounds  - Make Fall Risk Sign visible to staff  - Offer Toileting every 2 Hours, in advance of need  - Initiate/Maintain 2alarm  - Obtain necessary fall risk management equipment: 2  - Apply yellow socks and bracelet for high fall risk patients  - Consider moving patient to room near nurses station  Outcome: Progressing  Goal: Maintain or return to baseline ADL function  Description: INTERVENTIONS:  -  Assess patient's ability to carry out ADLs; assess patient's baseline for ADL function and identify physical deficits which impact ability to perform ADLs (bathing, care of mouth/teeth, toileting, grooming, dressing, etc.)  - Assess/evaluate cause of self-care deficits   - Assess range of motion  - Assess patient's mobility; develop plan if impaired  - Assess patient's need for assistive devices and provide as appropriate  - Encourage maximum independence but intervene and supervise when necessary  - Involve family in performance of ADLs  - Assess for home care needs following discharge   - Consider OT consult to assist with ADL evaluation and planning for discharge  - Provide patient education as appropriate  Outcome: Progressing  Goal: Maintains/Returns to pre admission functional level  Description: INTERVENTIONS:  - Perform AM-PAC 6 Click Basic Mobility/ Daily Activity  assessment daily.  - Set and communicate daily mobility goal to care team and patient/family/caregiver.   - Collaborate with rehabilitation services on mobility goals if consulted  - Perform Range of Motion 2 times a day.  - Reposition patient every 2 hours.  - Dangle patient 2 times a day  - Stand patient 2 times a day  - Ambulate patient 2 times a day  - Out of bed to chair 2 times a day   - Out of bed for meals 2 times a day  - Out of bed for toileting  - Record patient progress and toleration of activity level   Outcome: Progressing     Problem: DISCHARGE PLANNING  Goal: Discharge to home or other facility with appropriate resources  Description: INTERVENTIONS:  - Identify barriers to discharge w/patient and caregiver  - Arrange for needed discharge resources and transportation as appropriate  - Identify discharge learning needs (meds, wound care, etc.)  - Arrange for interpretive services to assist at discharge as needed  - Refer to Case Management Department for coordinating discharge planning if the patient needs post-hospital services based on physician/advanced practitioner order or complex needs related to functional status, cognitive ability, or social support system  Outcome: Progressing     Problem: Knowledge Deficit  Goal: Patient/family/caregiver demonstrates understanding of disease process, treatment plan, medications, and discharge instructions  Description: Complete learning assessment and assess knowledge base.  Interventions:  - Provide teaching at level of understanding  - Provide teaching via preferred learning methods  Outcome: Progressing

## 2024-01-04 NOTE — CASE MANAGEMENT
Case Management Discharge Planning Note    Patient name Tigist Hewitt  Location /-01 MRN [id number]  : 1955 Date 2024       Current Admission Date: 2024  Current Admission Diagnosis:Ambulatory dysfunction   Patient Active Problem List    Diagnosis Date Noted    Ambulatory dysfunction 2024    Hyperkalemia 2024    Lung cyst 2024    Abnormal CT scan, pelvis 2023    CKD (chronic kidney disease) stage 3, GFR 30-59 ml/min ([facility]) 2023    NISHI (obstructive sleep apnea) 2023    Intertrigo 10/29/2022    Vaginal bleeding 10/23/2022    Acute kidney injury superimposed on chronic kidney disease  10/22/2022    Hypotension 10/22/2022    Acute on chronic diastolic congestive heart failure ([facility]) 2022    IMTIAZ (acute kidney injury) ([facility]) 2022    Morbid obesity ([facility]) 2022    Supratherapeutic INR 04/15/2020    Pneumonia due to COVID-19 virus 2020    Chronic respiratory failure with hypoxia ([facility]) 2020    Asthma 2020    Atrial fibrillation ([facility]) 2020    COPD (chronic obstructive pulmonary disease) ([facility]) 2020    Type 2 diabetes mellitus with hyperglycemia, with long-term current use of insulin ([facility]) 2020    Chronic diastolic heart failure ([facility]) 2020    Shortness of breath 2020    Anemia 2020      LOS (days): 1  Geometric Mean LOS (GMLOS) (days):   Days to GMLOS:     OBJECTIVE:  Risk of Unplanned Readmission Score: 27.04         Current admission status: Inpatient   Preferred Pharmacy:   [facility]/pharmacy #[id number] - [place] [address]  Phone: [phone] Fax: [phone]    Primary Care Provider: Marisa Haque MD    Primary Insurance: [employer] REP  Secondary Insurance:     DISCHARGE DETAILS:        AVS to [facility] in [facility]

## 2024-01-04 NOTE — ASSESSMENT & PLAN NOTE
Continue with Lantus and mealtime insulin.  Will also add on sliding scale    Lab Results   Component Value Date    HGBA1C 7.3 (H) 12/23/2023       Recent Labs     01/03/24  1349 01/03/24  1620 01/03/24  2041 01/04/24  0729   POCGLU 136 134 192* 159*         Blood Sugar Average: Last 72 hrs:  (P) 161.375  Patient remains on tapering dose of steroid, which can affect patient blood sugar.  Continue to monitor

## 2024-01-05 ENCOUNTER — TELEPHONE (OUTPATIENT)
Dept: GASTROENTEROLOGY | Facility: CLINIC | Age: 69
End: 2024-01-05

## 2024-01-05 LAB
BACTERIA UR CULT: ABNORMAL
QRS AXIS: 53 DEGREES
QRSD INTERVAL: 150 MS
QT INTERVAL: 416 MS
QTC INTERVAL: 506 MS
T WAVE AXIS: 110 DEGREES
VENTRICULAR RATE: 89 BPM

## 2024-01-05 PROCEDURE — 93010 ELECTROCARDIOGRAM REPORT: CPT | Performed by: INTERNAL MEDICINE

## 2024-01-05 RX ORDER — NITROFURANTOIN 25; 75 MG/1; MG/1
100 CAPSULE ORAL 2 TIMES DAILY
Qty: 14 CAPSULE | Refills: 0
Start: 2024-01-05 | End: 2024-01-12

## 2024-01-05 NOTE — TELEPHONE ENCOUNTER
Yasmani from [facility] left message on voicemail regarding this new admission that requires a hospital follow up. She requested a follow up with Dr. Harris but appears this is a respiratory therapist, not sure if your hospital follow ups need to be with a physician or ok to schedule with PA and/or CRNP.    Yasmani can be reached at [phone].

## 2024-01-05 NOTE — UTILIZATION REVIEW
NOTIFICATION OF ADMISSION DISCHARGE   This is a Notification of Discharge from [facility]. Please be advised that this patient has been discharge from our facility. Below you will find the admission and discharge date and time including the patient’s disposition.   UTILIZATION REVIEW CONTACT:  Mirela Perkins  Utilization   Network Utilization Review Department  Phone: [phone] x carefully listen to the prompts. All voicemails are confidential.  Email: [email or web address]     ADMISSION INFORMATION  PRESENTATION DATE: 1/2/2024 10:19 PM  OBERVATION ADMISSION DATE:   INPATIENT ADMISSION DATE: 1/3/24  4:49 PM   DISCHARGE DATE: 1/4/2024  3:04 PM   DISPOSITION:Non [facility] Acute Rehab    Network Utilization Review Department  ATTENTION: Please call with any questions or concerns to [phone] and carefully listen to the prompts so that you are directed to the right person. All voicemails are confidential.   For Discharge needs, contact Care Management DC Support Team at [phone] opt. 2  Send all requests for admission clinical reviews, approved or denied determinations and any other requests to dedicated fax number below belonging to the campus where the patient is receiving treatment. List of dedicated fax numbers for the Facilities:  FACILITY NAME UR FAX NUMBER   ADMISSION DENIALS (Administrative/Medical Necessity) [phone]   DISCHARGE SUPPORT TEAM ([facility]) [phone]   PARENT CHILD HEALTH (Maternity/NICU/Pediatrics) [phone]   [facility] [phone]   [facility] [phone]   [facility] [phone]   [facility] [phone]   [facility] [phone]   [facility] [phone]   [facility] [phone]   [facility]  [phone]   [facility] [phone]   [facility] [phone]   [facility] [phone]

## 2024-01-26 RX ORDER — METOLAZONE 2.5 MG/1
TABLET ORAL
COMMUNITY
Start: 2023-11-13

## 2024-01-26 RX ORDER — ALBUTEROL SULFATE 90 UG/1
2 AEROSOL, METERED RESPIRATORY (INHALATION) EVERY 6 HOURS PRN
COMMUNITY

## 2024-01-26 RX ORDER — GLIPIZIDE 5 MG/1
5 TABLET ORAL
COMMUNITY

## 2024-01-26 RX ORDER — ALBUTEROL SULFATE 2.5 MG/3ML
2.5 SOLUTION RESPIRATORY (INHALATION) EVERY 6 HOURS PRN
COMMUNITY

## 2024-01-26 RX ORDER — GLIPIZIDE 10 MG/1
1 TABLET, FILM COATED, EXTENDED RELEASE ORAL EVERY MORNING
COMMUNITY

## 2024-02-13 NOTE — ASSESSMENT & PLAN NOTE
Continue levothyroxine   Detail Level: Simple Additional Notes: Discussed with Dr. Way. Patient states that her hepatitis C is no longer detected. If labs ok, we can start Humira. If viral load is high, refer to have treated. Once treatment starts she may start Humira. Render Risk Assessment In Note?: no

## 2024-02-21 PROBLEM — J12.82 PNEUMONIA DUE TO COVID-19 VIRUS: Status: RESOLVED | Noted: 2020-04-14 | Resolved: 2024-02-21

## 2024-02-21 PROBLEM — U07.1 PNEUMONIA DUE TO COVID-19 VIRUS: Status: RESOLVED | Noted: 2020-04-14 | Resolved: 2024-02-21

## 2024-05-02 ENCOUNTER — HOSPITAL ENCOUNTER (INPATIENT)
Facility: HOSPITAL | Age: 69
LOS: 10 days | Discharge: NURSING, REHAB OR LONG-TERM CARE | DRG: 292 | End: 2024-05-12
Attending: STUDENT IN AN ORGANIZED HEALTH CARE EDUCATION/TRAINING PROGRAM | Admitting: FAMILY MEDICINE
Payer: COMMERCIAL

## 2024-05-02 ENCOUNTER — APPOINTMENT (EMERGENCY)
Dept: RADIOLOGY | Facility: HOSPITAL | Age: 69
DRG: 292 | End: 2024-05-02
Payer: COMMERCIAL

## 2024-05-02 DIAGNOSIS — M25.562 ACUTE PAIN OF LEFT KNEE: ICD-10-CM

## 2024-05-02 DIAGNOSIS — I50.33 ACUTE ON CHRONIC DIASTOLIC HEART FAILURE (HCC): ICD-10-CM

## 2024-05-02 DIAGNOSIS — Z79.4 TYPE 2 DIABETES MELLITUS WITHOUT COMPLICATION, WITH LONG-TERM CURRENT USE OF INSULIN (HCC): ICD-10-CM

## 2024-05-02 DIAGNOSIS — J96.11 CHRONIC RESPIRATORY FAILURE WITH HYPOXIA (HCC): Primary | ICD-10-CM

## 2024-05-02 DIAGNOSIS — L30.4 INTERTRIGO: ICD-10-CM

## 2024-05-02 DIAGNOSIS — E11.9 TYPE 2 DIABETES MELLITUS WITHOUT COMPLICATION, WITH LONG-TERM CURRENT USE OF INSULIN (HCC): ICD-10-CM

## 2024-05-02 DIAGNOSIS — E66.01 MORBID OBESITY (HCC): ICD-10-CM

## 2024-05-02 PROBLEM — I50.32 CHRONIC DIASTOLIC HEART FAILURE (HCC): Status: RESOLVED | Noted: 2020-04-13 | Resolved: 2024-05-02

## 2024-05-02 LAB
ALBUMIN SERPL BCP-MCNC: 3.5 G/DL (ref 3.5–5)
ALP SERPL-CCNC: 121 U/L (ref 34–104)
ALT SERPL W P-5'-P-CCNC: 10 U/L (ref 7–52)
ANION GAP SERPL CALCULATED.3IONS-SCNC: 8 MMOL/L (ref 4–13)
APTT PPP: 43 SECONDS (ref 23–37)
ARTERIAL PATENCY WRIST A: YES
AST SERPL W P-5'-P-CCNC: 37 U/L (ref 13–39)
BACTERIA UR QL AUTO: ABNORMAL /HPF
BASE EXCESS BLDA CALC-SCNC: 7.2 MMOL/L
BASOPHILS # BLD AUTO: 0.05 THOUSANDS/ÂΜL (ref 0–0.1)
BASOPHILS NFR BLD AUTO: 1 % (ref 0–1)
BILIRUB SERPL-MCNC: 1.92 MG/DL (ref 0.2–1)
BILIRUB UR QL STRIP: NEGATIVE
BUN SERPL-MCNC: 45 MG/DL (ref 5–25)
CALCIUM SERPL-MCNC: 9.8 MG/DL (ref 8.4–10.2)
CHLORIDE SERPL-SCNC: 100 MMOL/L (ref 96–108)
CLARITY UR: ABNORMAL
CO2 SERPL-SCNC: 33 MMOL/L (ref 21–32)
COLOR UR: YELLOW
CREAT SERPL-MCNC: 0.97 MG/DL (ref 0.6–1.3)
EOSINOPHIL # BLD AUTO: 0.1 THOUSAND/ÂΜL (ref 0–0.61)
EOSINOPHIL NFR BLD AUTO: 1 % (ref 0–6)
ERYTHROCYTE [DISTWIDTH] IN BLOOD BY AUTOMATED COUNT: 17.7 % (ref 11.6–15.1)
EST. AVERAGE GLUCOSE BLD GHB EST-MCNC: 154 MG/DL
GFR SERPL CREATININE-BSD FRML MDRD: 58 ML/MIN/1.73SQ M
GLUCOSE SERPL-MCNC: 115 MG/DL (ref 65–140)
GLUCOSE SERPL-MCNC: 135 MG/DL (ref 65–140)
GLUCOSE SERPL-MCNC: 97 MG/DL (ref 65–140)
GLUCOSE UR STRIP-MCNC: NEGATIVE MG/DL
HBA1C MFR BLD: 7 %
HCO3 BLDA-SCNC: 29.9 MMOL/L (ref 22–28)
HCT VFR BLD AUTO: 37.2 % (ref 34.8–46.1)
HGB BLD-MCNC: 11.8 G/DL (ref 11.5–15.4)
HGB UR QL STRIP.AUTO: ABNORMAL
IMM GRANULOCYTES # BLD AUTO: 0.04 THOUSAND/UL (ref 0–0.2)
IMM GRANULOCYTES NFR BLD AUTO: 1 % (ref 0–2)
INR PPP: 2.25 (ref 0.84–1.19)
KETONES UR STRIP-MCNC: NEGATIVE MG/DL
LACTATE SERPL-SCNC: 1.5 MMOL/L (ref 0.5–2)
LEUKOCYTE ESTERASE UR QL STRIP: ABNORMAL
LIPASE SERPL-CCNC: 10 U/L (ref 11–82)
LYMPHOCYTES # BLD AUTO: 1.06 THOUSANDS/ÂΜL (ref 0.6–4.47)
LYMPHOCYTES NFR BLD AUTO: 13 % (ref 14–44)
MAGNESIUM SERPL-MCNC: 2.1 MG/DL (ref 1.9–2.7)
MCH RBC QN AUTO: 30.9 PG (ref 26.8–34.3)
MCHC RBC AUTO-ENTMCNC: 31.7 G/DL (ref 31.4–37.4)
MCV RBC AUTO: 97 FL (ref 82–98)
MONOCYTES # BLD AUTO: 1.12 THOUSAND/ÂΜL (ref 0.17–1.22)
MONOCYTES NFR BLD AUTO: 14 % (ref 4–12)
MUCOUS THREADS UR QL AUTO: ABNORMAL
NEUTROPHILS # BLD AUTO: 5.71 THOUSANDS/ÂΜL (ref 1.85–7.62)
NEUTS SEG NFR BLD AUTO: 70 % (ref 43–75)
NITRITE UR QL STRIP: NEGATIVE
NON-SQ EPI CELLS URNS QL MICRO: ABNORMAL /HPF
NRBC BLD AUTO-RTO: 0 /100 WBCS
O2 CT BLDA-SCNC: 17.6 ML/DL (ref 16–23)
OTHER FIO2: ABNORMAL %
OXYHGB MFR BLDA: 94.1 % (ref 94–97)
PCO2 BLDA: 35.7 MM HG (ref 36–44)
PH BLDA: 7.54 [PH] (ref 7.35–7.45)
PH UR STRIP.AUTO: 6 [PH]
PLATELET # BLD AUTO: 176 THOUSANDS/UL (ref 149–390)
PMV BLD AUTO: 11.7 FL (ref 8.9–12.7)
PO2 BLDA: 75.6 MM HG (ref 75–129)
POTASSIUM SERPL-SCNC: 3.7 MMOL/L (ref 3.5–5.3)
PROT SERPL-MCNC: 7.6 G/DL (ref 6.4–8.4)
PROT UR STRIP-MCNC: ABNORMAL MG/DL
PROTHROMBIN TIME: 25.2 SECONDS (ref 11.6–14.5)
RBC # BLD AUTO: 3.82 MILLION/UL (ref 3.81–5.12)
RBC #/AREA URNS AUTO: ABNORMAL /HPF
SODIUM SERPL-SCNC: 141 MMOL/L (ref 135–147)
SP GR UR STRIP.AUTO: 1.01 (ref 1–1.03)
SPECIMEN SOURCE: ABNORMAL
UROBILINOGEN UR QL STRIP.AUTO: 1 E.U./DL
WBC # BLD AUTO: 8.08 THOUSAND/UL (ref 4.31–10.16)
WBC #/AREA URNS AUTO: ABNORMAL /HPF

## 2024-05-02 PROCEDURE — 71045 X-RAY EXAM CHEST 1 VIEW: CPT

## 2024-05-02 PROCEDURE — 99285 EMERGENCY DEPT VISIT HI MDM: CPT | Performed by: STUDENT IN AN ORGANIZED HEALTH CARE EDUCATION/TRAINING PROGRAM

## 2024-05-02 PROCEDURE — 81001 URINALYSIS AUTO W/SCOPE: CPT | Performed by: STUDENT IN AN ORGANIZED HEALTH CARE EDUCATION/TRAINING PROGRAM

## 2024-05-02 PROCEDURE — 83735 ASSAY OF MAGNESIUM: CPT | Performed by: STUDENT IN AN ORGANIZED HEALTH CARE EDUCATION/TRAINING PROGRAM

## 2024-05-02 PROCEDURE — 87086 URINE CULTURE/COLONY COUNT: CPT | Performed by: STUDENT IN AN ORGANIZED HEALTH CARE EDUCATION/TRAINING PROGRAM

## 2024-05-02 PROCEDURE — 85610 PROTHROMBIN TIME: CPT | Performed by: STUDENT IN AN ORGANIZED HEALTH CARE EDUCATION/TRAINING PROGRAM

## 2024-05-02 PROCEDURE — 99285 EMERGENCY DEPT VISIT HI MDM: CPT

## 2024-05-02 PROCEDURE — 85730 THROMBOPLASTIN TIME PARTIAL: CPT | Performed by: STUDENT IN AN ORGANIZED HEALTH CARE EDUCATION/TRAINING PROGRAM

## 2024-05-02 PROCEDURE — 87040 BLOOD CULTURE FOR BACTERIA: CPT | Performed by: STUDENT IN AN ORGANIZED HEALTH CARE EDUCATION/TRAINING PROGRAM

## 2024-05-02 PROCEDURE — 36600 WITHDRAWAL OF ARTERIAL BLOOD: CPT

## 2024-05-02 PROCEDURE — 82948 REAGENT STRIP/BLOOD GLUCOSE: CPT

## 2024-05-02 PROCEDURE — 93005 ELECTROCARDIOGRAM TRACING: CPT

## 2024-05-02 PROCEDURE — 99223 1ST HOSP IP/OBS HIGH 75: CPT | Performed by: FAMILY MEDICINE

## 2024-05-02 PROCEDURE — 83690 ASSAY OF LIPASE: CPT | Performed by: STUDENT IN AN ORGANIZED HEALTH CARE EDUCATION/TRAINING PROGRAM

## 2024-05-02 PROCEDURE — 82805 BLOOD GASES W/O2 SATURATION: CPT | Performed by: FAMILY MEDICINE

## 2024-05-02 PROCEDURE — 36415 COLL VENOUS BLD VENIPUNCTURE: CPT | Performed by: STUDENT IN AN ORGANIZED HEALTH CARE EDUCATION/TRAINING PROGRAM

## 2024-05-02 PROCEDURE — 96374 THER/PROPH/DIAG INJ IV PUSH: CPT

## 2024-05-02 PROCEDURE — 80053 COMPREHEN METABOLIC PANEL: CPT | Performed by: STUDENT IN AN ORGANIZED HEALTH CARE EDUCATION/TRAINING PROGRAM

## 2024-05-02 PROCEDURE — 87186 SC STD MICRODIL/AGAR DIL: CPT | Performed by: STUDENT IN AN ORGANIZED HEALTH CARE EDUCATION/TRAINING PROGRAM

## 2024-05-02 PROCEDURE — 83036 HEMOGLOBIN GLYCOSYLATED A1C: CPT | Performed by: FAMILY MEDICINE

## 2024-05-02 PROCEDURE — 83605 ASSAY OF LACTIC ACID: CPT | Performed by: STUDENT IN AN ORGANIZED HEALTH CARE EDUCATION/TRAINING PROGRAM

## 2024-05-02 PROCEDURE — 85025 COMPLETE CBC W/AUTO DIFF WBC: CPT | Performed by: STUDENT IN AN ORGANIZED HEALTH CARE EDUCATION/TRAINING PROGRAM

## 2024-05-02 PROCEDURE — 87077 CULTURE AEROBIC IDENTIFY: CPT | Performed by: STUDENT IN AN ORGANIZED HEALTH CARE EDUCATION/TRAINING PROGRAM

## 2024-05-02 RX ORDER — INSULIN GLARGINE 100 [IU]/ML
15 INJECTION, SOLUTION SUBCUTANEOUS
Status: DISCONTINUED | OUTPATIENT
Start: 2024-05-02 | End: 2024-05-03

## 2024-05-02 RX ORDER — FLUTICASONE FUROATE AND VILANTEROL 100; 25 UG/1; UG/1
1 POWDER RESPIRATORY (INHALATION) DAILY
Status: DISCONTINUED | OUTPATIENT
Start: 2024-05-03 | End: 2024-05-12 | Stop reason: HOSPADM

## 2024-05-02 RX ORDER — PANTOPRAZOLE SODIUM 40 MG/1
40 TABLET, DELAYED RELEASE ORAL
Status: DISCONTINUED | OUTPATIENT
Start: 2024-05-03 | End: 2024-05-12 | Stop reason: HOSPADM

## 2024-05-02 RX ORDER — OXYCODONE HYDROCHLORIDE 5 MG/1
5 TABLET ORAL EVERY 6 HOURS PRN
Status: DISCONTINUED | OUTPATIENT
Start: 2024-05-02 | End: 2024-05-02

## 2024-05-02 RX ORDER — FUROSEMIDE 10 MG/ML
80 INJECTION INTRAMUSCULAR; INTRAVENOUS ONCE
Status: COMPLETED | OUTPATIENT
Start: 2024-05-02 | End: 2024-05-02

## 2024-05-02 RX ORDER — INSULIN LISPRO 100 [IU]/ML
1-6 INJECTION, SOLUTION INTRAVENOUS; SUBCUTANEOUS
Status: DISCONTINUED | OUTPATIENT
Start: 2024-05-02 | End: 2024-05-04

## 2024-05-02 RX ORDER — LORATADINE 10 MG/1
10 TABLET ORAL DAILY
Status: DISCONTINUED | OUTPATIENT
Start: 2024-05-02 | End: 2024-05-12 | Stop reason: HOSPADM

## 2024-05-02 RX ORDER — WARFARIN SODIUM 5 MG/1
5 TABLET ORAL
Status: DISCONTINUED | OUTPATIENT
Start: 2024-05-02 | End: 2024-05-12 | Stop reason: HOSPADM

## 2024-05-02 RX ORDER — LEVOTHYROXINE SODIUM 88 UG/1
88 TABLET ORAL
Status: DISCONTINUED | OUTPATIENT
Start: 2024-05-03 | End: 2024-05-02

## 2024-05-02 RX ORDER — METOPROLOL TARTRATE 1 MG/ML
2.5 INJECTION, SOLUTION INTRAVENOUS EVERY 6 HOURS PRN
Status: DISCONTINUED | OUTPATIENT
Start: 2024-05-02 | End: 2024-05-12 | Stop reason: HOSPADM

## 2024-05-02 RX ORDER — NYSTATIN 100000 [USP'U]/G
POWDER TOPICAL 2 TIMES DAILY
Status: DISCONTINUED | OUTPATIENT
Start: 2024-05-02 | End: 2024-05-12 | Stop reason: HOSPADM

## 2024-05-02 RX ORDER — POTASSIUM CHLORIDE 20 MEQ/1
20 TABLET, EXTENDED RELEASE ORAL 3 TIMES DAILY
Status: DISCONTINUED | OUTPATIENT
Start: 2024-05-02 | End: 2024-05-12 | Stop reason: HOSPADM

## 2024-05-02 RX ORDER — METOPROLOL SUCCINATE 25 MG/1
37.5 TABLET, EXTENDED RELEASE ORAL 2 TIMES DAILY
Status: DISCONTINUED | OUTPATIENT
Start: 2024-05-02 | End: 2024-05-12 | Stop reason: HOSPADM

## 2024-05-02 RX ORDER — OXYCODONE HYDROCHLORIDE 10 MG/1
10 TABLET ORAL EVERY 6 HOURS PRN
Status: DISCONTINUED | OUTPATIENT
Start: 2024-05-02 | End: 2024-05-08

## 2024-05-02 RX ORDER — MONTELUKAST SODIUM 10 MG/1
10 TABLET ORAL
Status: DISCONTINUED | OUTPATIENT
Start: 2024-05-02 | End: 2024-05-12 | Stop reason: HOSPADM

## 2024-05-02 RX ORDER — METOPROLOL SUCCINATE 25 MG/1
37.5 TABLET, EXTENDED RELEASE ORAL 2 TIMES DAILY
Status: DISCONTINUED | OUTPATIENT
Start: 2024-05-02 | End: 2024-05-02

## 2024-05-02 RX ORDER — FUROSEMIDE 10 MG/ML
20 SYRINGE (ML) INJECTION CONTINUOUS
Status: DISCONTINUED | OUTPATIENT
Start: 2024-05-02 | End: 2024-05-05

## 2024-05-02 RX ORDER — MIDODRINE HYDROCHLORIDE 5 MG/1
5 TABLET ORAL
Status: DISCONTINUED | OUTPATIENT
Start: 2024-05-02 | End: 2024-05-07

## 2024-05-02 RX ORDER — FERROUS SULFATE 325(65) MG
325 TABLET ORAL
Status: DISCONTINUED | OUTPATIENT
Start: 2024-05-03 | End: 2024-05-12 | Stop reason: HOSPADM

## 2024-05-02 RX ORDER — ALBUTEROL SULFATE 2.5 MG/3ML
2.5 SOLUTION RESPIRATORY (INHALATION) EVERY 6 HOURS PRN
Status: DISCONTINUED | OUTPATIENT
Start: 2024-05-02 | End: 2024-05-09

## 2024-05-02 RX ORDER — INSULIN LISPRO 100 [IU]/ML
2-12 INJECTION, SOLUTION INTRAVENOUS; SUBCUTANEOUS
Status: DISCONTINUED | OUTPATIENT
Start: 2024-05-02 | End: 2024-05-12 | Stop reason: HOSPADM

## 2024-05-02 RX ORDER — ATORVASTATIN CALCIUM 10 MG/1
10 TABLET, FILM COATED ORAL DAILY
Status: DISCONTINUED | OUTPATIENT
Start: 2024-05-02 | End: 2024-05-12 | Stop reason: HOSPADM

## 2024-05-02 RX ORDER — ALLOPURINOL 100 MG/1
100 TABLET ORAL DAILY
Status: DISCONTINUED | OUTPATIENT
Start: 2024-05-02 | End: 2024-05-04

## 2024-05-02 RX ORDER — ACETAMINOPHEN 325 MG/1
650 TABLET ORAL EVERY 4 HOURS PRN
Status: DISCONTINUED | OUTPATIENT
Start: 2024-05-02 | End: 2024-05-12 | Stop reason: HOSPADM

## 2024-05-02 RX ADMIN — POTASSIUM CHLORIDE 20 MEQ: 1500 TABLET, EXTENDED RELEASE ORAL at 16:47

## 2024-05-02 RX ADMIN — POTASSIUM CHLORIDE 20 MEQ: 1500 TABLET, EXTENDED RELEASE ORAL at 21:21

## 2024-05-02 RX ADMIN — LORATADINE 10 MG: 10 TABLET ORAL at 16:48

## 2024-05-02 RX ADMIN — ALLOPURINOL 100 MG: 100 TABLET ORAL at 16:48

## 2024-05-02 RX ADMIN — ACETAMINOPHEN 325MG 650 MG: 325 TABLET ORAL at 19:11

## 2024-05-02 RX ADMIN — INSULIN GLARGINE 15 UNITS: 100 INJECTION, SOLUTION SUBCUTANEOUS at 21:12

## 2024-05-02 RX ADMIN — OXYCODONE HYDROCHLORIDE 10 MG: 10 TABLET ORAL at 21:21

## 2024-05-02 RX ADMIN — WARFARIN SODIUM 5 MG: 5 TABLET ORAL at 17:11

## 2024-05-02 RX ADMIN — ATORVASTATIN CALCIUM 10 MG: 10 TABLET, FILM COATED ORAL at 16:47

## 2024-05-02 RX ADMIN — Medication 10 MG/HR: at 18:13

## 2024-05-02 RX ADMIN — OXYCODONE HYDROCHLORIDE 5 MG: 5 TABLET ORAL at 16:30

## 2024-05-02 RX ADMIN — MIDODRINE HYDROCHLORIDE 5 MG: 5 TABLET ORAL at 17:11

## 2024-05-02 RX ADMIN — METOPROLOL TARTRATE 2.5 MG: 5 INJECTION INTRAVENOUS at 18:37

## 2024-05-02 RX ADMIN — MONTELUKAST 10 MG: 10 TABLET, FILM COATED ORAL at 21:22

## 2024-05-02 RX ADMIN — METOPROLOL SUCCINATE 37.5 MG: 25 TABLET, EXTENDED RELEASE ORAL at 18:56

## 2024-05-02 RX ADMIN — NYSTATIN: 100000 POWDER TOPICAL at 17:13

## 2024-05-02 RX ADMIN — FUROSEMIDE 80 MG: 10 INJECTION, SOLUTION INTRAMUSCULAR; INTRAVENOUS at 13:32

## 2024-05-02 NOTE — RESPIRATORY THERAPY NOTE
Pt wears cpap at home . Refuses to use our machine . States her son will bring hers in tomorrow . Dr Dukes made aware.

## 2024-05-02 NOTE — ED NOTES
Patient repositioned onto right side using pillow for support.     Allison A Schoener, RN  05/02/24 8454

## 2024-05-02 NOTE — ED PROVIDER NOTES
History  Chief Complaint   Patient presents with    Shortness of Breath     And weight gain since yesterday, non compliant with her medications for past few days.        History provided by:  Patient, medical records and caregiver  Medical Problem  Severity:  Moderate  Onset quality:  Gradual  Duration:  5 days  Timing:  Constant  Progression:  Worsening  Chronicity:  Recurrent  Context:  Hx of COPD, HF. On metolazone, aldactone, torsemide. Hasn't taken meds x 2 days. Worsening weight gain, increased lethargy. On chronic O2 at 4 lpm. Worsening shortness of breath.  Associated symptoms: cough, fatigue and shortness of breath    Associated symptoms: no abdominal pain, no chest pain, no congestion, no diarrhea, no fever, no headaches, no loss of consciousness, no nausea, no vomiting and no wheezing      Prior to Admission Medications   Prescriptions Last Dose Informant Patient Reported? Taking?   Cholecalciferol (Vitamin D3) 25 MCG (1000 UT) CAPS Past Week  Yes Yes   Sig: Take 2,000 Units by mouth daily   Insulin Aspart (NovoLOG) 100 units/mL injection Past Week  No Yes   Sig: Inject 3 Units under the skin 3 (three) times a day with meals   Patient taking differently: Inject 8 Units under the skin 3 (three) times a day with meals   Lantus SoloStar 100 units/mL SOPN Past Week  Yes Yes   Sig: Inject 26 Units under the skin daily at bedtime 26 units   acetaminophen (TYLENOL) 325 mg tablet Past Week  No Yes   Sig: Take 2 tablets (650 mg total) by mouth every 6 (six) hours as needed for fever   albuterol (2.5 mg/3 mL) 0.083 % nebulizer solution Past Month  Yes Yes   Sig: Inhale 2.5 mg every 6 (six) hours as needed   albuterol (PROVENTIL HFA,VENTOLIN HFA) 90 mcg/act inhaler Past Month  Yes Yes   Sig: Inhale 2 puffs every 6 (six) hours as needed   allopurinol (ZYLOPRIM) 100 mg tablet Past Week  Yes Yes   Sig: Take 100 mg by mouth daily Dose needs to be verified   atorvastatin (LIPITOR) 10 mg tablet Past Week  Yes Yes   Sig:  Take 10 mg by mouth daily   cetirizine (ZyrTEC) 10 mg tablet Past Week  Yes Yes   Sig: Take 10 mg by mouth daily   docusate sodium (COLACE) 100 mg capsule Past Week  No Yes   Sig: Take 1 capsule (100 mg total) by mouth 2 (two) times a day   ferrous sulfate 325 (65 Fe) mg tablet Past Week  Yes Yes   Sig: Take 325 mg by mouth daily with breakfast   fluticasone-vilanterol (BREO ELLIPTA) 100-25 mcg/inh inhaler Past Week  Yes Yes   Sig: Inhale 1 puff daily Rinse mouth after use.   glipiZIDE (GLUCOTROL XL) 10 mg 24 hr tablet Past Week  Yes Yes   Sig: Take 1 tablet by mouth every morning   levothyroxine 200 mcg tablet Past Week Self Yes Yes   Sig: Take 288 mcg by mouth daily   levothyroxine 88 mcg tablet Past Week  Yes Yes   metolazone (ZAROXOLYN) 2.5 mg tablet Not Taking  Yes No   Sig: TAKE ONE TAB MY MOUTH ONCE WEEKLY AS NEEDED FOR EDEMA OR SHORTNESS OF BREATH   Patient not taking: Reported on 5/2/2024   metoprolol succinate (TOPROL-XL) 25 mg 24 hr tablet Past Week  No Yes   Sig: Take 1.5 tablets (37.5 mg total) by mouth 2 (two) times a day   midodrine (PROAMATINE) 10 MG tablet   No No   Sig: Take 0.5 tablets (5 mg total) by mouth 3 (three) times a day before meals Please hold the medication if your systolic blood pressure is more than 115   montelukast (SINGULAIR) 10 mg tablet Past Week  Yes Yes   Sig: Take 10 mg by mouth daily at bedtime   multivitamin (THERAGRAN) TABS Past Week  Yes Yes   Sig: Take 1 tablet by mouth daily   nystatin (MYCOSTATIN) powder Past Week  No Yes   Sig: Apply topically 2 (two) times a day   omeprazole (PriLOSEC) 40 MG capsule Past Week  Yes Yes   Sig: Take 40 mg by mouth daily   potassium chloride (K-DUR,KLOR-CON) 20 mEq tablet Past Week  No Yes   Sig: Take 1 tablet (20 mEq total) by mouth 3 (three) times a day   torsemide (DEMADEX) 20 mg tablet Not Taking  No No   Sig: Take 3 tablets (60 mg total) by mouth 2 (two) times a day   Patient not taking: Reported on 5/2/2024   warfarin (COUMADIN) 7.5  mg tablet Past Week  No Yes   Sig: INR range is 2-3      Facility-Administered Medications: None       Past Medical History:   Diagnosis Date    Asthma     Atrial fibrillation (HCC)     COPD (chronic obstructive pulmonary disease) (HCC)     Diabetes mellitus (HCC)     Disease of thyroid gland     Heart failure (HCC)     Kidney failure     Morbid obesity due to excess calories (HCC)     NISHI (obstructive sleep apnea)        Past Surgical History:   Procedure Laterality Date    CARDIAC ELECTROPHYSIOLOGY MAPPING AND ABLATION      by Dr. Ferraro at [facility]    CARDIOVERSION N/A     GALLBLADDER SURGERY      HERNIA REPAIR         Family History   Problem Relation Age of Onset    Arthritis Mother     Hearing loss Mother     Heart disease Mother     Hypertension Mother     Stroke Mother     Alcohol abuse Father     Cancer Father     Diabetes Father     Arthritis Sister     Cancer Sister     Alcohol abuse Brother     Diabetes Son     Arthritis Daughter     Drug abuse Daughter     Heart disease Maternal Grandmother     Hypertension Maternal Grandmother     Stroke Maternal Grandmother     Arthritis Maternal Aunt     Asthma Neg Hx     Birth defects Neg Hx     COPD Neg Hx     Depression Neg Hx     Early death Neg Hx     Hyperlipidemia Neg Hx     Kidney disease Neg Hx     Learning disabilities Neg Hx     Mental illness Neg Hx     Mental retardation Neg Hx     Miscarriages / Stillbirths Neg Hx     Vision loss Neg Hx      I have reviewed and agree with the history as documented.    E-Cigarette/Vaping    E-Cigarette Use Never User      E-Cigarette/Vaping Substances    Nicotine No     THC No     CBD No     Flavoring No     Other No     Unknown No      Social History     Tobacco Use    Smoking status: Never     Passive exposure: Never    Smokeless tobacco: Never   Vaping Use    Vaping status: Never Used   Substance Use Topics    Alcohol use: Never    Drug use: Never       Review of Systems   Constitutional:  Positive for activity  change, appetite change and fatigue. Negative for fever.   HENT:  Negative for congestion.    Respiratory:  Positive for cough and shortness of breath. Negative for chest tightness and wheezing.    Cardiovascular:  Positive for leg swelling. Negative for chest pain and palpitations.   Gastrointestinal:  Negative for abdominal pain, diarrhea, nausea and vomiting.   Genitourinary:  Positive for decreased urine volume, hematuria and pelvic pain. Negative for difficulty urinating, dysuria and flank pain.   Musculoskeletal:  Positive for gait problem and joint swelling.   Skin:  Negative for color change and pallor.   Neurological:  Negative for dizziness, loss of consciousness, syncope, light-headedness and headaches.   Hematological:  Bruises/bleeds easily.   Psychiatric/Behavioral:  Positive for confusion and decreased concentration.    All other systems reviewed and are negative.      Physical Exam  Physical Exam  Vitals and nursing note reviewed.   Constitutional:       Appearance: She is obese. She is ill-appearing.   HENT:      Head: Normocephalic and atraumatic.      Mouth/Throat:      Comments: Dry mucous membranes  Eyes:      Extraocular Movements: Extraocular movements intact.   Cardiovascular:      Rate and Rhythm: Tachycardia present.      Pulses: Normal pulses.      Heart sounds: No murmur heard.  Pulmonary:      Effort: Respiratory distress present.      Breath sounds: Rales present. No decreased breath sounds, wheezing or rhonchi.   Chest:      Chest wall: No tenderness.   Abdominal:      General: Bowel sounds are normal.      Palpations: Abdomen is soft.      Tenderness: There is no abdominal tenderness. There is no guarding or rebound.   Musculoskeletal:      Right lower leg: Tenderness present. Edema present.      Left lower leg: Tenderness present. Edema present.      Comments: Chronic lower extremity skin changes.    Skin:     General: Skin is warm and dry.      Coloration: Skin is cyanotic.       Findings: Erythema and rash present.      Comments: Inguinal intertrigo   Neurological:      General: No focal deficit present.      Mental Status: She is alert and oriented to person, place, and time.   Psychiatric:         Mood and Affect: Mood normal. Mood is not anxious.       Vital Signs  ED Triage Vitals [05/02/24 1131]   Temperature Pulse Respirations Blood Pressure SpO2   97.7 °F (36.5 °C) 89 18 (!) 163/104 95 %      Temp Source Heart Rate Source Patient Position - Orthostatic VS BP Location FiO2 (%)   Temporal Monitor Sitting Right arm --      Pain Score       No Pain           Vitals:    05/02/24 1534 05/02/24 1853 05/02/24 2224 05/03/24 0319   BP: 105/61 126/71 110/65 119/76   Pulse: 80 100 85 90   Patient Position - Orthostatic VS:    Lying         Visual Acuity      ED Medications  furosemide (LASIX) injection 80 mg (80 mg Intravenous Given 5/2/24 1332)     Diagnostic Studies  Results Reviewed       Procedure Component Value Units Date/Time    Blood culture #1 [[id number]] Collected: 05/02/24 1209    Lab Status: Preliminary result Specimen: Blood from Hand, Left Updated: 05/02/24 2001     Blood Culture Received in Microbiology Lab. Culture in Progress.    Blood culture #2 [[id number]] Collected: 05/02/24 1209    Lab Status: Preliminary result Specimen: Blood from Arm, Left Updated: 05/02/24 2001     Blood Culture Received in Microbiology Lab. Culture in Progress.    Urine Microscopic [[id number]]  (Abnormal) Collected: 05/02/24 1443    Lab Status: Final result Specimen: Urine, Clean Catch Updated: 05/02/24 1541     RBC, UA 20-30 /hpf      WBC, UA Innumerable /hpf      Epithelial Cells Occasional /hpf      Bacteria, UA Occasional /hpf      MUCUS THREADS Occasional    Urine culture [[id number]] Collected: 05/02/24 1443    Lab Status: In process Specimen: Urine, Clean Catch Updated: 05/02/24 1541    UA w Reflex to Microscopic w Reflex to Culture [[id number]]  (Abnormal) Collected: 05/02/24 1443    Lab Status:  Final result Specimen: Urine, Clean Catch Updated: 05/02/24 1458     Color, UA Yellow     Clarity, UA Cloudy     Specific Gravity, UA 1.010     pH, UA 6.0     Leukocytes, UA Large     Nitrite, UA Negative     Protein, UA Trace mg/dl      Glucose, UA Negative mg/dl      Ketones, UA Negative mg/dl      Urobilinogen, UA 1.0 E.U./dl      Bilirubin, UA Negative     Occult Blood, UA Large    Comprehensive metabolic panel [[id number]]  (Abnormal) Collected: 05/02/24 1209    Lab Status: Final result Specimen: Blood from Arm, Left Updated: 05/02/24 1250     Sodium 141 mmol/L      Potassium 3.7 mmol/L      Chloride 100 mmol/L      CO2 33 mmol/L      ANION GAP 8 mmol/L      BUN 45 mg/dL      Creatinine 0.97 mg/dL      Glucose 115 mg/dL      Calcium 9.8 mg/dL      AST 37 U/L      ALT 10 U/L      Alkaline Phosphatase 121 U/L      Total Protein 7.6 g/dL      Albumin 3.5 g/dL      Total Bilirubin 1.92 mg/dL      eGFR 58 ml/min/1.73sq m     Narrative:      National Kidney Disease Foundation guidelines for Chronic Kidney Disease (CKD):     Stage 1 with normal or high GFR (GFR > 90 mL/min/1.73 square meters)    Stage 2 Mild CKD (GFR = 60-89 mL/min/1.73 square meters)    Stage 3A Moderate CKD (GFR = 45-59 mL/min/1.73 square meters)    Stage 3B Moderate CKD (GFR = 30-44 mL/min/1.73 square meters)    Stage 4 Severe CKD (GFR = 15-29 mL/min/1.73 square meters)    Stage 5 End Stage CKD (GFR <15 mL/min/1.73 square meters)  Note: GFR calculation is accurate only with a steady state creatinine    Lactic acid, plasma (w/reflex if result > 2.0) [[id number]]  (Normal) Collected: 05/02/24 1209    Lab Status: Final result Specimen: Blood from Arm, Left Updated: 05/02/24 1250     LACTIC ACID 1.5 mmol/L     Narrative:      Result may be elevated if tourniquet was used during collection.    Magnesium [[id number]]  (Normal) Collected: 05/02/24 1209    Lab Status: Final result Specimen: Blood from Arm, Left Updated: 05/02/24 1250     Magnesium 2.1 mg/dL      Lipase [[id number]]  (Abnormal) Collected: 05/02/24 1209    Lab Status: Final result Specimen: Blood from Arm, Left Updated: 05/02/24 1250     Lipase 10 u/L     Protime-INR [[id number]]  (Abnormal) Collected: 05/02/24 1209    Lab Status: Final result Specimen: Blood from Arm, Left Updated: 05/02/24 1235     Protime 25.2 seconds      INR 2.25    APTT [[id number]]  (Abnormal) Collected: 05/02/24 1209    Lab Status: Final result Specimen: Blood from Arm, Left Updated: 05/02/24 1235     PTT 43 seconds     CBC and differential [[id number]]  (Abnormal) Collected: 05/02/24 1209    Lab Status: Final result Specimen: Blood from Arm, Left Updated: 05/02/24 1217     WBC 8.08 Thousand/uL      RBC 3.82 Million/uL      Hemoglobin 11.8 g/dL      Hematocrit 37.2 %      MCV 97 fL      MCH 30.9 pg      MCHC 31.7 g/dL      RDW 17.7 %      MPV 11.7 fL      Platelets 176 Thousands/uL      nRBC 0 /100 WBCs      Segmented % 70 %      Immature Grans % 1 %      Lymphocytes % 13 %      Monocytes % 14 %      Eosinophils Relative 1 %      Basophils Relative 1 %      Absolute Neutrophils 5.71 Thousands/µL      Absolute Immature Grans 0.04 Thousand/uL      Absolute Lymphocytes 1.06 Thousands/µL      Absolute Monocytes 1.12 Thousand/µL      Eosinophils Absolute 0.10 Thousand/µL      Basophils Absolute 0.05 Thousands/µL                XR chest 1 view portable   ED Interpretation by Jarad Ruiz DO (05/02 1238)   Cardiomegaly.  Mild pulmonary vascular congestion.             Procedures  Procedures     ED Course  ED Course as of 05/03/24 0530   Thu May 02, 2024   1204 Vital signs reviewed.  On chronic 4 L of oxygen via nasal cannula.  Worsening weight gain, shortness of breath over the last 5 days.  Has not been compliant with her prescribed medications over the last 48 hours.  Will obtain basic labs, chest x-ray.   1238 Chest x-ray interpreted by me.  Cardiomegaly noted.  Mild pulmonary vascular congestion.   1328 Per the caretaker, the  patient's hips have been wider than her walker and the patient feels as if she has been gaining weight over the last few days.  Will administer a dose of IV Lasix.    Caretaker also expresses decreased urine output but red-tinged urine. UA is pending.    1356 [facility] contacted and accepts admission.                                SBIRT 20yo+      Flowsheet Row Most Recent Value   Initial Alcohol Screen: US AUDIT-C     1. How often do you have a drink containing alcohol? 0 Filed at: 05/02/2024 1131   2. How many drinks containing alcohol do you have on a typical day you are drinking?  0 Filed at: 05/02/2024 1131   3b. FEMALE Any Age, or MALE 65+: How often do you have 4 or more drinks on one occassion? 0 Filed at: 05/02/2024 1131   Audit-C Score 0 Filed at: 05/02/2024 1131   EMORY: How many times in the past year have you...    Used an illegal drug or used a prescription medication for non-medical reasons? Never Filed at: 05/02/2024 1131                      Medical Decision Making  This patient presents with weight gain, generalized weakness.   Diagnostic considerations include CHF exacerbation, IMTIAZ, UTI, PNA. See ED Course.         Problems Addressed:  Acute on chronic diastolic heart failure (HCC): chronic illness or injury with exacerbation, progression, or side effects of treatment that poses a threat to life or bodily functions  Chronic respiratory failure with hypoxia (HCC): chronic illness or injury with exacerbation, progression, or side effects of treatment  Intertrigo: acute illness or injury  Morbid obesity (HCC): chronic illness or injury    Amount and/or Complexity of Data Reviewed  Labs: ordered. Decision-making details documented in ED Course.  Radiology: ordered and independent interpretation performed. Decision-making details documented in ED Course.    Risk  Prescription drug management.  Decision regarding hospitalization.             Disposition  Final diagnoses:   Chronic respiratory failure with  hypoxia (HCC)   Acute on chronic diastolic heart failure (HCC)   Intertrigo   Morbid obesity (HCC)     Time reflects when diagnosis was documented in both MDM as applicable and the Disposition within this note       Time User Action Codes Description Comment    5/2/2024  2:16 PM Jarad Ruiz [J96.11] Chronic respiratory failure with hypoxia (HCC)     5/2/2024  2:17 PM Jarad Ruiz Add [I50.33] Acute on chronic diastolic heart failure (HCC)     5/2/2024  2:17 PM Jarad Ruiz Add [L30.4] Intertrigo     5/2/2024  2:17 PM Jarad Ruiz Add [E66.01] Morbid obesity (HCC)           ED Disposition       ED Disposition   Admit    Condition   Stable    Date/Time   u May 2, 2024 5967    Comment   Case was discussed with Dr. Wright and the patient's admission status was agreed to be Admission Status: inpatient status to the service of Dr. Dukes .               Follow-up Information    None         Current Discharge Medication List        CONTINUE these medications which have NOT CHANGED    Details   acetaminophen (TYLENOL) 325 mg tablet Take 2 tablets (650 mg total) by mouth every 6 (six) hours as needed for fever  Qty: 30 tablet, Refills: 0    Associated Diagnoses: COVID-19 virus infection      albuterol (2.5 mg/3 mL) 0.083 % nebulizer solution Inhale 2.5 mg every 6 (six) hours as needed      albuterol (PROVENTIL HFA,VENTOLIN HFA) 90 mcg/act inhaler Inhale 2 puffs every 6 (six) hours as needed      allopurinol (ZYLOPRIM) 100 mg tablet Take 100 mg by mouth daily Dose needs to be verified      atorvastatin (LIPITOR) 10 mg tablet Take 10 mg by mouth daily      cetirizine (ZyrTEC) 10 mg tablet Take 10 mg by mouth daily      Cholecalciferol (Vitamin D3) 25 MCG (1000 UT) CAPS Take 2,000 Units by mouth daily      docusate sodium (COLACE) 100 mg capsule Take 1 capsule (100 mg total) by mouth 2 (two) times a day  Refills: 0    Associated Diagnoses: Morbid obesity (HCC)      ferrous sulfate 325 (65 Fe) mg tablet Take  325 mg by mouth daily with breakfast      fluticasone-vilanterol (BREO ELLIPTA) 100-25 mcg/inh inhaler Inhale 1 puff daily Rinse mouth after use.      glipiZIDE (GLUCOTROL XL) 10 mg 24 hr tablet Take 1 tablet by mouth every morning      Insulin Aspart (NovoLOG) 100 units/mL injection Inject 3 Units under the skin 3 (three) times a day with meals  Qty: 10 mL, Refills: 0    Associated Diagnoses: Type 2 diabetes mellitus without complication, with long-term current use of insulin ([facility])      Lantus SoloStar 100 units/mL SOPN Inject 26 Units under the skin daily at bedtime 26 units      !! levothyroxine 200 mcg tablet Take 288 mcg by mouth daily      !! levothyroxine 88 mcg tablet       metoprolol succinate (TOPROL-XL) 25 mg 24 hr tablet Take 1.5 tablets (37.5 mg total) by mouth 2 (two) times a day  Qty: 30 tablet, Refills: 0    Associated Diagnoses: Heart failure, unspecified HF chronicity, unspecified heart failure type (HCC); Atrial fibrillation, unspecified type (HCC)      montelukast (SINGULAIR) 10 mg tablet Take 10 mg by mouth daily at bedtime      multivitamin (THERAGRAN) TABS Take 1 tablet by mouth daily      nystatin (MYCOSTATIN) powder Apply topically 2 (two) times a day  Qty: 15 g, Refills: 0    Associated Diagnoses: Morbid obesity (HCC)      omeprazole (PriLOSEC) 40 MG capsule Take 40 mg by mouth daily      potassium chloride (K-DUR,KLOR-CON) 20 mEq tablet Take 1 tablet (20 mEq total) by mouth 3 (three) times a day    Associated Diagnoses: Acute on chronic congestive heart failure, unspecified heart failure type (HCC)      warfarin (COUMADIN) 7.5 mg tablet INR range is 2-3  Qty: 15 tablet, Refills: 0    Associated Diagnoses: Atrial fibrillation, unspecified type (HCC)      metolazone (ZAROXOLYN) 2.5 mg tablet TAKE ONE TAB MY MOUTH ONCE WEEKLY AS NEEDED FOR EDEMA OR SHORTNESS OF BREATH      midodrine (PROAMATINE) 10 MG tablet Take 0.5 tablets (5 mg total) by mouth 3 (three) times a day before meals Please hold  the medication if your systolic blood pressure is more than 115  Qty: 45 tablet, Refills: 0    Associated Diagnoses: Hypotension due to hypovolemia      torsemide (DEMADEX) 20 mg tablet Take 3 tablets (60 mg total) by mouth 2 (two) times a day  Qty: 180 tablet, Refills: 0    Associated Diagnoses: CHF exacerbation (HCC); Acute on chronic congestive heart failure, unspecified heart failure type (HCC)       !! - Potential duplicate medications found. Please discuss with provider.          No discharge procedures on file.    PDMP Review         Value Time User    PDMP Reviewed  Yes 1/4/2024 11:09 AM Pedro Price MD            ED Provider  Electronically Signed by             Jarad Ruiz DO  05/03/24 0534

## 2024-05-02 NOTE — ASSESSMENT & PLAN NOTE
"Lab Results   Component Value Date    HGBA1C 7.3 (H) 12/23/2023       No results for input(s): \"POCGLU\" in the last 72 hours.    Blood Sugar Average: Last 72 hrs:  Placed on modified regimen of Lantus and insulin sliding scale and diabetic diet for now    "

## 2024-05-02 NOTE — ASSESSMENT & PLAN NOTE
Wt Readings from Last 3 Encounters:   05/02/24 (!) 167 kg (368 lb 2.7 oz)   01/02/24 (!) 149 kg (328 lb 0.7 oz)   10/29/22 (!) 151 kg (331 lb 12.7 oz)     2d echo from December 2023 shows normal EF but abnormal diastolic function and severe tricuspid regurg.  Patient normally takes torsemide 60 mg twice daily and metolazone once a week.  She has gained approximately 20 to 30 pounds at least and was asked by her cardiologist to increase metolazone however lately she has been very somnolent and not taking her meds for the last 2 to 3 days.    Will place her on a Lasix drip consult cardiology monitor electrolytes closely will do an ABG as she appears to have a lot of blue discoloration of her fingers and toes.maybe secondary to hypoxia or Raynaud's.  Chronically uses 4 L of oxygen and CPAP at at bedtime will continue for now

## 2024-05-02 NOTE — H&P
"[facility]  H&P  Name: Tigist Hewitt 69 y.o. female I MRN: [id number]  Unit/Bed#: -01 I Date of Admission: 5/2/2024   Date of Service: 5/2/2024 I Hospital Day: 0      Assessment/Plan   * Acute on chronic diastolic congestive heart failure (HCC)  Assessment & Plan  Wt Readings from Last 3 Encounters:   05/02/24 (!) 167 kg (368 lb 2.7 oz)   01/02/24 (!) 149 kg (328 lb 0.7 oz)   10/29/22 (!) 151 kg (331 lb 12.7 oz)     2d echo from December 2023 shows normal EF but abnormal diastolic function and severe tricuspid regurg.  Patient normally takes torsemide 60 mg twice daily and metolazone once a week.  She has gained approximately 20 to 30 pounds at least and was asked by her cardiologist to increase metolazone however lately she has been very somnolent and not taking her meds for the last 2 to 3 days.    Will place her on a Lasix drip consult cardiology monitor electrolytes closely will do an ABG as she appears to have a lot of blue discoloration of her fingers and toes.maybe secondary to hypoxia or Raynaud's.  Chronically uses 4 L of oxygen and CPAP at at bedtime will continue for now          Morbid obesity (HCC)  Assessment & Plan  bMI 69.56 will need diuresis and counseling on diet exercise and lifestyle modification    Type 2 diabetes mellitus with hyperglycemia, with long-term current use of insulin ([facility])  Assessment & Plan  Lab Results   Component Value Date    HGBA1C 7.3 (H) 12/23/2023       No results for input(s): \"POCGLU\" in the last 72 hours.    Blood Sugar Average: Last 72 hrs:  Placed on modified regimen of Lantus and insulin sliding scale and diabetic diet for now      COPD (chronic obstructive pulmonary disease) ([facility])  Assessment & Plan  Not in exacerbation.  Continue home regimen  Has chronic respiratory failure uses 4 L of oxygen at baseline along with CPAP will continue also check ABG to evaluate CO2 and O2 levels    Atrial fibrillation ([facility])  Assessment & " Plan  Currently rate controlled with Toprol-XL and INR is therapeutic normally takes Coumadin 7.5 mg daily will place on 5 mg for now and observe         VTE Prophylaxis: Warfarin (Coumadin)  / sequential compression device   Code Status: Full code  POLST: There is no POLST form on file for this patient (pre-hospital)  Discussion with family: Discussed with friend at bedside    Anticipated Length of Stay:  Patient will be admitted on an Inpatient basis with an anticipated length of stay of more than 2 midnights.   Justification for Hospital Stay: Acute on chronic diastolic CHF    Total Time for Visit, including Counseling / Coordination of Care: 90 minutes.  Greater than 50% of this total time spent on direct patient counseling and coordination of care.    Chief Complaint:   Generalized weakness and somnolence    History of Present Illness:    Tigist Hewitt is a 69 y.o. female who presents with generalized weakness and somnolence at home for the last 2 to 3 days.  Patient is normally able to walk a few steps with a walker but now she has been more somnolent not taking her meds for the last 1 to 2 days not eating and drinking much and sleeping a lot as per family.  Now is more awake and alert but has a lot of bluish discoloration of her hands and feet and also swelling in her legs noted.  Patient feels that she has gained some extra weight however an accurate weight has not been obtained yet.  Denies any chest pain fevers chills nausea vomiting or diarrhea.    Review of Systems:    Review of Systems   Constitutional:  Positive for appetite change and fatigue. Negative for chills and fever.   HENT:  Negative for hearing loss, sore throat and trouble swallowing.    Eyes:  Negative for photophobia, discharge and visual disturbance.   Respiratory:  Positive for shortness of breath. Negative for chest tightness.    Cardiovascular:  Positive for leg swelling. Negative for chest pain and palpitations.    Gastrointestinal:  Negative for abdominal pain, blood in stool and vomiting.   Endocrine: Negative for polydipsia and polyuria.   Genitourinary:  Negative for difficulty urinating, dysuria, flank pain and hematuria.   Musculoskeletal:  Negative for back pain and gait problem.   Skin:  Negative for rash.   Allergic/Immunologic: Negative for environmental allergies and food allergies.   Neurological:  Positive for weakness and headaches. Negative for dizziness, seizures and syncope.   Hematological:  Does not bruise/bleed easily.   Psychiatric/Behavioral:  Positive for sleep disturbance. Negative for behavioral problems.    All other systems reviewed and are negative.      Past Medical and Surgical History:     Past Medical History:   Diagnosis Date    Asthma     Atrial fibrillation (HCC)     COPD (chronic obstructive pulmonary disease) (HCC)     Diabetes mellitus (HCC)     Disease of thyroid gland     Heart failure (HCC)     Kidney failure     Morbid obesity due to excess calories (HCC)     NISHI (obstructive sleep apnea)        Past Surgical History:   Procedure Laterality Date    CARDIAC ELECTROPHYSIOLOGY MAPPING AND ABLATION      by Dr. Ferraro at [facility]    CARDIOVERSION N/A     GALLBLADDER SURGERY      HERNIA REPAIR         Meds/Allergies:    Prior to Admission medications    Medication Sig Start Date End Date Taking? Authorizing Provider   acetaminophen (TYLENOL) 325 mg tablet Take 2 tablets (650 mg total) by mouth every 6 (six) hours as needed for fever 4/15/20  Yes Gonzalo Jarrett MD   albuterol (2.5 mg/3 mL) 0.083 % nebulizer solution Inhale 2.5 mg every 6 (six) hours as needed   Yes Historical Provider, MD   albuterol (PROVENTIL HFA,VENTOLIN HFA) 90 mcg/act inhaler Inhale 2 puffs every 6 (six) hours as needed   Yes Historical Provider, MD   allopurinol (ZYLOPRIM) 100 mg tablet Take 100 mg by mouth daily Dose needs to be verified   Yes Historical Provider, MD   atorvastatin (LIPITOR) 10 mg tablet Take 10 mg by  mouth daily   Yes Historical Provider, MD   cetirizine (ZyrTEC) 10 mg tablet Take 10 mg by mouth daily   Yes Historical Provider, MD   Cholecalciferol (Vitamin D3) 25 MCG (1000 UT) CAPS Take 2,000 Units by mouth daily   Yes Historical Provider, MD   docusate sodium (COLACE) 100 mg capsule Take 1 capsule (100 mg total) by mouth 2 (two) times a day 10/3/22  Yes Janice Porter MD   ferrous sulfate 325 (65 Fe) mg tablet Take 325 mg by mouth daily with breakfast   Yes Historical Provider, MD   fluticasone-vilanterol (BREO ELLIPTA) 100-25 mcg/inh inhaler Inhale 1 puff daily Rinse mouth after use.   Yes Historical Provider, MD   glipiZIDE (GLUCOTROL XL) 10 mg 24 hr tablet Take 1 tablet by mouth every morning   Yes Historical Provider, MD   Insulin Aspart (NovoLOG) 100 units/mL injection Inject 3 Units under the skin 3 (three) times a day with meals  Patient taking differently: Inject 8 Units under the skin 3 (three) times a day with meals 10/3/22  Yes Janice Porter MD   Lantus SoloStar 100 units/mL SOPN Inject 26 Units under the skin daily at bedtime 26 units 11/25/22  Yes Historical Provider, MD   levothyroxine 200 mcg tablet Take 288 mcg by mouth daily   Yes Historical Provider, MD   levothyroxine 88 mcg tablet  11/20/22  Yes Historical Provider, MD   metoprolol succinate (TOPROL-XL) 25 mg 24 hr tablet Take 1.5 tablets (37.5 mg total) by mouth 2 (two) times a day 1/2/24 5/2/24 Yes Pedro Price MD   montelukast (SINGULAIR) 10 mg tablet Take 10 mg by mouth daily at bedtime   Yes Historical Provider, MD   multivitamin (THERAGRAN) TABS Take 1 tablet by mouth daily   Yes Historical Provider, MD   nystatin (MYCOSTATIN) powder Apply topically 2 (two) times a day 10/3/22  Yes Janice Porter MD   omeprazole (PriLOSEC) 40 MG capsule Take 40 mg by mouth daily   Yes Historical Provider, MD   potassium chloride (K-DUR,KLOR-CON) 20 mEq tablet Take 1 tablet (20 mEq total) by mouth 3 (three) times a day 1/2/24  Yes Pedro FORBES  MD Albert   warfarin (COUMADIN) 7.5 mg tablet INR range is 2-3 10/29/22  Yes Pedro Price MD   metolazone (ZAROXOLYN) 2.5 mg tablet TAKE ONE TAB MY MOUTH ONCE WEEKLY AS NEEDED FOR EDEMA OR SHORTNESS OF BREATH  Patient not taking: Reported on 5/2/2024 11/13/23   Historical Provider, MD   midodrine (PROAMATINE) 10 MG tablet Take 0.5 tablets (5 mg total) by mouth 3 (three) times a day before meals Please hold the medication if your systolic blood pressure is more than 115 1/2/24 2/1/24  Pedro Price MD   torsemide (DEMADEX) 20 mg tablet Take 3 tablets (60 mg total) by mouth 2 (two) times a day  Patient not taking: Reported on 5/2/2024 1/2/24 2/1/24  Pedro Price MD   glipiZIDE (GLUCOTROL) 5 mg tablet Take 5 mg by mouth  Patient not taking: Reported on 5/2/2024 5/2/24  Historical Provider, MD   polyethylene glycol (MIRALAX) 17 g packet Take 17 g by mouth daily as needed (Constipation)  Patient not taking: Reported on 5/2/2024 10/3/22 5/2/24  Janice Porter MD     I have reviewed home medications with patient personally.    Allergies:   Allergies   Allergen Reactions    Acesulfame Potassium - Food Allergy Anaphylaxis    Aspartame - Food Allergy Anaphylaxis    Saccharin - Food Allergy Anaphylaxis    Sucralose - Food Allergy Anaphylaxis    Metformin GI Intolerance       Social History:     Marital Status: /Civil Union     Social History     Substance and Sexual Activity   Alcohol Use Never     Social History     Tobacco Use   Smoking Status Never    Passive exposure: Never   Smokeless Tobacco Never     Social History     Substance and Sexual Activity   Drug Use Never       Family History:    Family History   Problem Relation Age of Onset    Arthritis Mother     Hearing loss Mother     Heart disease Mother     Hypertension Mother     Stroke Mother     Alcohol abuse Father     Cancer Father     Diabetes Father     Arthritis Sister     Cancer Sister     Alcohol abuse Brother     Diabetes Son      "Arthritis Daughter     Drug abuse Daughter     Heart disease Maternal Grandmother     Hypertension Maternal Grandmother     Stroke Maternal Grandmother     Arthritis Maternal Aunt     Asthma Neg Hx     Birth defects Neg Hx     COPD Neg Hx     Depression Neg Hx     Early death Neg Hx     Hyperlipidemia Neg Hx     Kidney disease Neg Hx     Learning disabilities Neg Hx     Mental illness Neg Hx     Mental retardation Neg Hx     Miscarriages / Stillbirths Neg Hx     Vision loss Neg Hx        Physical Exam:     Vitals:   Blood Pressure: 105/61 (05/02/24 1534)  Pulse: 80 (05/02/24 1534)  Temperature: 98.2 °F (36.8 °C) (05/02/24 1534)  Temp Source: Temporal (05/02/24 1131)  Respirations: 18 (05/02/24 1457)  Height: 5' 1\" (154.9 cm) (05/02/24 1517)  Weight - Scale: (!) 167 kg (368 lb 2.7 oz) (05/02/24 1131)  SpO2: 96 % (05/02/24 1534)    Physical Exam  Vitals and nursing note reviewed.   Constitutional:       Appearance: She is ill-appearing.   HENT:      Head: Normocephalic and atraumatic.      Right Ear: External ear normal.      Left Ear: External ear normal.      Nose: Nose normal.      Mouth/Throat:      Pharynx: Oropharynx is clear.   Eyes:      Pupils: Pupils are equal, round, and reactive to light.   Cardiovascular:      Rate and Rhythm: Normal rate and regular rhythm.      Heart sounds: Normal heart sounds.   Pulmonary:      Effort: Pulmonary effort is normal.      Comments: Mod air Entry bilaterally with diminished breath sounds bilateral bases  Abdominal:      General: Bowel sounds are normal.      Palpations: Abdomen is soft.      Tenderness: There is no abdominal tenderness.   Musculoskeletal:         General: Normal range of motion.      Cervical back: Normal range of motion and neck supple.      Right lower leg: Edema present.      Left lower leg: Edema present.      Comments: Venous stasis discoloration and thickening noted of the skin on the medial aspects of lower extremity   Skin:     General: Skin is " warm and dry.      Capillary Refill: Capillary refill takes less than 2 seconds.   Neurological:      General: No focal deficit present.      Mental Status: She is alert and oriented to person, place, and time.   Psychiatric:         Mood and Affect: Mood normal.           Additional Data:     Lab Results: I have personally reviewed pertinent reports.      Results from last 7 days   Lab Units 05/02/24  1209   WBC Thousand/uL 8.08   HEMOGLOBIN g/dL 11.8   HEMATOCRIT % 37.2   PLATELETS Thousands/uL 176   SEGS PCT % 70   LYMPHO PCT % 13*   MONO PCT % 14*   EOS PCT % 1     Results from last 7 days   Lab Units 05/02/24  1209   SODIUM mmol/L 141   POTASSIUM mmol/L 3.7   CHLORIDE mmol/L 100   CO2 mmol/L 33*   BUN mg/dL 45*   CREATININE mg/dL 0.97   ANION GAP mmol/L 8   CALCIUM mg/dL 9.8   ALBUMIN g/dL 3.5   TOTAL BILIRUBIN mg/dL 1.92*   ALK PHOS U/L 121*   ALT U/L 10   AST U/L 37   GLUCOSE RANDOM mg/dL 115     Results from last 7 days   Lab Units 05/02/24  1209   INR  2.25*             Results from last 7 days   Lab Units 05/02/24  1209   LACTIC ACID mmol/L 1.5       Imaging: I have personally reviewed pertinent reports.      XR chest 1 view portable   ED Interpretation by Jarad Ruiz DO (05/02 1238)   Cardiomegaly.  Mild pulmonary vascular congestion.          EKG, Pathology, and Other Studies Reviewed on Admission:   EKG: Reviewed    Allscripts / Epic Records Reviewed: Yes     ** Please Note: This note has been constructed using a voice recognition system. **

## 2024-05-02 NOTE — PLAN OF CARE
Problem: Potential for Falls  Goal: Patient will remain free of falls  Description: INTERVENTIONS:  - Educate patient/family on patient safety including physical limitations  - Instruct patient to call for assistance with activity   - Consult OT/PT to assist with strengthening/mobility   - Keep Call bell within reach  - Keep bed low and locked with side rails adjusted as appropriate  - Keep care items and personal belongings within reach  - Initiate and maintain comfort rounds  - Make Fall Risk Sign visible to staff  - Apply yellow socks and bracelet for high fall risk patients  - Consider moving patient to room near nurses station  Outcome: Progressing     Problem: Prexisting or High Potential for Compromised Skin Integrity  Goal: Skin integrity is maintained or improved  Description: INTERVENTIONS:  - Identify patients at risk for skin breakdown  - Assess and monitor skin integrity  - Assess and monitor nutrition and hydration status  - Monitor labs   - Assess for incontinence   - Turn and reposition patient  - Assist with mobility/ambulation  - Relieve pressure over bony prominences  - Avoid friction and shearing  - Provide appropriate hygiene as needed including keeping skin clean and dry  - Evaluate need for skin moisturizer/barrier cream  - Collaborate with interdisciplinary team   - Patient/family teaching  - Consider wound care consult   Outcome: Progressing     Problem: PAIN - ADULT  Goal: Verbalizes/displays adequate comfort level or baseline comfort level  Description: Interventions:  - Encourage patient to monitor pain and request assistance  - Assess pain using appropriate pain scale  - Administer analgesics based on type and severity of pain and evaluate response  - Implement non-pharmacological measures as appropriate and evaluate response  - Consider cultural and social influences on pain and pain management  - Notify physician/advanced practitioner if interventions unsuccessful or patient reports  new pain  Outcome: Progressing     Problem: SAFETY ADULT  Goal: Patient will remain free of falls  Description: INTERVENTIONS:  - Educate patient/family on patient safety including physical limitations  - Instruct patient to call for assistance with activity   - Consult OT/PT to assist with strengthening/mobility   - Keep Call bell within reach  - Keep bed low and locked with side rails adjusted as appropriate  - Keep care items and personal belongings within reach  - Initiate and maintain comfort rounds  - Make Fall Risk Sign visible to staff  - Offer Toileting every 2 Hours, in advance of need  - Initiate/Maintain bed alarm  - Obtain necessary fall risk management equipment  - Apply yellow socks and bracelet for high fall risk patients  - Consider moving patient to room near nurses station  Outcome: Progressing  Goal: Maintain or return to baseline ADL function  Description: INTERVENTIONS:  -  Assess patient's ability to carry out ADLs; assess patient's baseline for ADL function and identify physical deficits which impact ability to perform ADLs (bathing, care of mouth/teeth, toileting, grooming, dressing, etc.)  - Assess/evaluate cause of self-care deficits   - Assess range of motion  - Assess patient's mobility; develop plan if impaired  - Assess patient's need for assistive devices and provide as appropriate  - Encourage maximum independence but intervene and supervise when necessary  - Involve family in performance of ADLs  - Assess for home care needs following discharge   - Consider OT consult to assist with ADL evaluation and planning for discharge  - Provide patient education as appropriate  Outcome: Progressing  Goal: Maintains/Returns to pre admission functional level  Description: INTERVENTIONS:  - Perform AM-PAC 6 Click Basic Mobility/ Daily Activity assessment daily.  - Set and communicate daily mobility goal to care team and patient/family/caregiver.   - Collaborate with rehabilitation services on  mobility goals if consulted  - Reposition patient every 2 hours.  - Ambulate patient 3 times a day  - Out of bed to chair 3 times a day   - Out of bed for meals 3 times a day  - Out of bed for toileting  - Record patient progress and toleration of activity level   Outcome: Progressing     Problem: DISCHARGE PLANNING  Goal: Discharge to home or other facility with appropriate resources  Description: INTERVENTIONS:  - Identify barriers to discharge w/patient and caregiver  - Arrange for needed discharge resources and transportation as appropriate  - Identify discharge learning needs (meds, wound care, etc.)  - Arrange for interpretive services to assist at discharge as needed  - Refer to Case Management Department for coordinating discharge planning if the patient needs post-hospital services based on physician/advanced practitioner order or complex needs related to functional status, cognitive ability, or social support system  Outcome: Progressing     Problem: Knowledge Deficit  Goal: Patient/family/caregiver demonstrates understanding of disease process, treatment plan, medications, and discharge instructions  Description: Complete learning assessment and assess knowledge base.  Interventions:  - Provide teaching at level of understanding  - Provide teaching via preferred learning methods  Outcome: Progressing     Problem: CARDIOVASCULAR - ADULT  Goal: Maintains optimal cardiac output and hemodynamic stability  Description: INTERVENTIONS:  - Monitor I/O, vital signs and rhythm  - Monitor for S/S and trends of decreased cardiac output  - Administer and titrate ordered vasoactive medications to optimize hemodynamic stability  - Assess quality of pulses, skin color and temperature  - Assess for signs of decreased coronary artery perfusion  - Instruct patient to report change in severity of symptoms  Outcome: Progressing  Goal: Absence of cardiac dysrhythmias or at baseline rhythm  Description: INTERVENTIONS:  -  Continuous cardiac monitoring, vital signs, obtain 12 lead EKG if ordered  - Administer antiarrhythmic and heart rate control medications as ordered  - Monitor electrolytes and administer replacement therapy as ordered  Outcome: Progressing     Problem: RESPIRATORY - ADULT  Goal: Achieves optimal ventilation and oxygenation  Description: INTERVENTIONS:  - Assess for changes in respiratory status  - Assess for changes in mentation and behavior  - Position to facilitate oxygenation and minimize respiratory effort  - Oxygen administered by appropriate delivery if ordered  - Initiate smoking cessation education as indicated  - Encourage broncho-pulmonary hygiene including cough, deep breathe, Incentive Spirometry  - Assess the need for suctioning and aspirate as needed  - Assess and instruct to report SOB or any respiratory difficulty  - Respiratory Therapy support as indicated  Outcome: Progressing     Problem: METABOLIC, FLUID AND ELECTROLYTES - ADULT  Goal: Electrolytes maintained within normal limits  Description: INTERVENTIONS:  - Monitor labs and assess patient for signs and symptoms of electrolyte imbalances  - Administer electrolyte replacement as ordered  - Monitor response to electrolyte replacements, including repeat lab results as appropriate  - Instruct patient on fluid and nutrition as appropriate  Outcome: Progressing  Goal: Fluid balance maintained  Description: INTERVENTIONS:  - Monitor labs   - Monitor I/O and WT  - Instruct patient on fluid and nutrition as appropriate  - Assess for signs & symptoms of volume excess or deficit  Outcome: Progressing  Goal: Glucose maintained within target range  Description: INTERVENTIONS:  - Monitor Blood Glucose as ordered  - Assess for signs and symptoms of hyperglycemia and hypoglycemia  - Administer ordered medications to maintain glucose within target range  - Assess nutritional intake and initiate nutrition service referral as needed  Outcome: Progressing

## 2024-05-02 NOTE — PLAN OF CARE
Problem: Potential for Falls  Goal: Patient will remain free of falls  Description: INTERVENTIONS:  - Educate patient/family on patient safety including physical limitations  - Instruct patient to call for assistance with activity   - Consult OT/PT to assist with strengthening/mobility   - Keep Call bell within reach  - Keep bed low and locked with side rails adjusted as appropriate  - Keep care items and personal belongings within reach  - Initiate and maintain comfort rounds  - Make Fall Risk Sign visible to staff  - Offer Toileting every *** Hours, in advance of need  - Initiate/Maintain ***alarm  - Obtain necessary fall risk management equipment: ***  - Apply yellow socks and bracelet for high fall risk patients  - Consider moving patient to room near nurses station  5/2/2024 1902 by Erlinda Galaviz RN  Outcome: Progressing  5/2/2024 1901 by Erlinda Galaviz RN  Outcome: Progressing     Problem: Prexisting or High Potential for Compromised Skin Integrity  Goal: Skin integrity is maintained or improved  Description: INTERVENTIONS:  - Identify patients at risk for skin breakdown  - Assess and monitor skin integrity  - Assess and monitor nutrition and hydration status  - Monitor labs   - Assess for incontinence   - Turn and reposition patient  - Assist with mobility/ambulation  - Relieve pressure over bony prominences  - Avoid friction and shearing  - Provide appropriate hygiene as needed including keeping skin clean and dry  - Evaluate need for skin moisturizer/barrier cream  - Collaborate with interdisciplinary team   - Patient/family teaching  - Consider wound care consult   5/2/2024 1902 by Erlinda Galaviz RN  Outcome: Progressing  5/2/2024 1901 by Erlinda Galaviz RN  Outcome: Progressing     Problem: PAIN - ADULT  Goal: Verbalizes/displays adequate comfort level or baseline comfort level  Description: Interventions:  - Encourage patient to monitor pain and request assistance  - Assess pain using appropriate pain  scale  - Administer analgesics based on type and severity of pain and evaluate response  - Implement non-pharmacological measures as appropriate and evaluate response  - Consider cultural and social influences on pain and pain management  - Notify physician/advanced practitioner if interventions unsuccessful or patient reports new pain  5/2/2024 1902 by Erlinda Galaviz RN  Outcome: Progressing  5/2/2024 1901 by Erlinda Galaviz RN  Outcome: Progressing     Problem: SAFETY ADULT  Goal: Patient will remain free of falls  Description: INTERVENTIONS:  - Educate patient/family on patient safety including physical limitations  - Instruct patient to call for assistance with activity   - Consult OT/PT to assist with strengthening/mobility   - Keep Call bell within reach  - Keep bed low and locked with side rails adjusted as appropriate  - Keep care items and personal belongings within reach  - Initiate and maintain comfort rounds  - Make Fall Risk Sign visible to staff  - Offer Toileting every *** Hours, in advance of need  - Initiate/Maintain ***alarm  - Obtain necessary fall risk management equipment: ***  - Apply yellow socks and bracelet for high fall risk patients  - Consider moving patient to room near nurses station  5/2/2024 1902 by Erlinda Galaviz RN  Outcome: Progressing  5/2/2024 1901 by Erlinda Galaviz RN  Outcome: Progressing  Goal: Maintain or return to baseline ADL function  Description: INTERVENTIONS:  -  Assess patient's ability to carry out ADLs; assess patient's baseline for ADL function and identify physical deficits which impact ability to perform ADLs (bathing, care of mouth/teeth, toileting, grooming, dressing, etc.)  - Assess/evaluate cause of self-care deficits   - Assess range of motion  - Assess patient's mobility; develop plan if impaired  - Assess patient's need for assistive devices and provide as appropriate  - Encourage maximum independence but intervene and supervise when necessary  - Involve family  in performance of ADLs  - Assess for home care needs following discharge   - Consider OT consult to assist with ADL evaluation and planning for discharge  - Provide patient education as appropriate  5/2/2024 1902 by Erlinda Galaviz RN  Outcome: Progressing  5/2/2024 1901 by Erlinda Galaviz RN  Outcome: Progressing  Goal: Maintains/Returns to pre admission functional level  Description: INTERVENTIONS:  - Perform AM-PAC 6 Click Basic Mobility/ Daily Activity assessment daily.  - Set and communicate daily mobility goal to care team and patient/family/caregiver.   - Collaborate with rehabilitation services on mobility goals if consulted  - Perform Range of Motion *** times a day.  - Reposition patient every *** hours.  - Dangle patient *** times a day  - Stand patient *** times a day  - Ambulate patient *** times a day  - Out of bed to chair *** times a day   - Out of bed for meals *** times a day  - Out of bed for toileting  - Record patient progress and toleration of activity level   5/2/2024 1902 by Erlinda Galaviz RN  Outcome: Progressing  5/2/2024 1901 by Erlinda Galaviz RN  Outcome: Progressing     Problem: DISCHARGE PLANNING  Goal: Discharge to home or other facility with appropriate resources  Description: INTERVENTIONS:  - Identify barriers to discharge w/patient and caregiver  - Arrange for needed discharge resources and transportation as appropriate  - Identify discharge learning needs (meds, wound care, etc.)  - Arrange for interpretive services to assist at discharge as needed  - Refer to Case Management Department for coordinating discharge planning if the patient needs post-hospital services based on physician/advanced practitioner order or complex needs related to functional status, cognitive ability, or social support system  5/2/2024 1902 by Erlinda Galaviz RN  Outcome: Progressing  5/2/2024 1901 by Erlinda Galaviz RN  Outcome: Progressing     Problem: Knowledge Deficit  Goal: Patient/family/caregiver demonstrates  understanding of disease process, treatment plan, medications, and discharge instructions  Description: Complete learning assessment and assess knowledge base.  Interventions:  - Provide teaching at level of understanding  - Provide teaching via preferred learning methods  5/2/2024 1902 by Erlinda Galaviz RN  Outcome: Progressing  5/2/2024 1901 by Erlinda Galaviz RN  Outcome: Progressing     Problem: CARDIOVASCULAR - ADULT  Goal: Maintains optimal cardiac output and hemodynamic stability  Description: INTERVENTIONS:  - Monitor I/O, vital signs and rhythm  - Monitor for S/S and trends of decreased cardiac output  - Administer and titrate ordered vasoactive medications to optimize hemodynamic stability  - Assess quality of pulses, skin color and temperature  - Assess for signs of decreased coronary artery perfusion  - Instruct patient to report change in severity of symptoms  5/2/2024 1902 by Erlinda Galaviz RN  Outcome: Progressing  5/2/2024 1901 by Erlinda Galaviz RN  Outcome: Progressing  Goal: Absence of cardiac dysrhythmias or at baseline rhythm  Description: INTERVENTIONS:  - Continuous cardiac monitoring, vital signs, obtain 12 lead EKG if ordered  - Administer antiarrhythmic and heart rate control medications as ordered  - Monitor electrolytes and administer replacement therapy as ordered  5/2/2024 1902 by Erlinda Galaviz RN  Outcome: Progressing  5/2/2024 1901 by Erlinda Galaviz RN  Outcome: Progressing     Problem: RESPIRATORY - ADULT  Goal: Achieves optimal ventilation and oxygenation  Description: INTERVENTIONS:  - Assess for changes in respiratory status  - Assess for changes in mentation and behavior  - Position to facilitate oxygenation and minimize respiratory effort  - Oxygen administered by appropriate delivery if ordered  - Initiate smoking cessation education as indicated  - Encourage broncho-pulmonary hygiene including cough, deep breathe, Incentive Spirometry  - Assess the need for suctioning and aspirate as  needed  - Assess and instruct to report SOB or any respiratory difficulty  - Respiratory Therapy support as indicated  5/2/2024 1902 by Erlinda Galaviz RN  Outcome: Progressing  5/2/2024 1901 by Erlinda Galaviz RN  Outcome: Progressing     Problem: METABOLIC, FLUID AND ELECTROLYTES - ADULT  Goal: Electrolytes maintained within normal limits  Description: INTERVENTIONS:  - Monitor labs and assess patient for signs and symptoms of electrolyte imbalances  - Administer electrolyte replacement as ordered  - Monitor response to electrolyte replacements, including repeat lab results as appropriate  - Instruct patient on fluid and nutrition as appropriate  5/2/2024 1902 by Erlinda Galaviz RN  Outcome: Progressing  5/2/2024 1901 by Erlinda Galaviz RN  Outcome: Progressing  Goal: Fluid balance maintained  Description: INTERVENTIONS:  - Monitor labs   - Monitor I/O and WT  - Instruct patient on fluid and nutrition as appropriate  - Assess for signs & symptoms of volume excess or deficit  5/2/2024 1902 by Erlinda Galaviz RN  Outcome: Progressing  5/2/2024 1901 by Erlinda Galaviz RN  Outcome: Progressing  Goal: Glucose maintained within target range  Description: INTERVENTIONS:  - Monitor Blood Glucose as ordered  - Assess for signs and symptoms of hyperglycemia and hypoglycemia  - Administer ordered medications to maintain glucose within target range  - Assess nutritional intake and initiate nutrition service referral as needed  5/2/2024 1902 by Erlinda Galaviz RN  Outcome: Progressing  5/2/2024 1901 by Erlinda Galaviz RN  Outcome: Progressing

## 2024-05-02 NOTE — ASSESSMENT & PLAN NOTE
Not in exacerbation.  Continue home regimen  Has chronic respiratory failure uses 4 L of oxygen at baseline along with CPAP will continue also check ABG to evaluate CO2 and O2 levels

## 2024-05-03 LAB
ALBUMIN SERPL BCP-MCNC: 3.3 G/DL (ref 3.5–5)
ALP SERPL-CCNC: 113 U/L (ref 34–104)
ALT SERPL W P-5'-P-CCNC: 7 U/L (ref 7–52)
ANION GAP SERPL CALCULATED.3IONS-SCNC: 9 MMOL/L (ref 4–13)
ANION GAP SERPL CALCULATED.3IONS-SCNC: 9 MMOL/L (ref 4–13)
AST SERPL W P-5'-P-CCNC: 22 U/L (ref 13–39)
ATRIAL RATE: 58 BPM
BILIRUB SERPL-MCNC: 1.75 MG/DL (ref 0.2–1)
BUN SERPL-MCNC: 46 MG/DL (ref 5–25)
BUN SERPL-MCNC: 46 MG/DL (ref 5–25)
CALCIUM ALBUM COR SERPL-MCNC: 10.1 MG/DL (ref 8.3–10.1)
CALCIUM SERPL-MCNC: 9.5 MG/DL (ref 8.4–10.2)
CALCIUM SERPL-MCNC: 9.5 MG/DL (ref 8.4–10.2)
CHLORIDE SERPL-SCNC: 101 MMOL/L (ref 96–108)
CHLORIDE SERPL-SCNC: 101 MMOL/L (ref 96–108)
CO2 SERPL-SCNC: 31 MMOL/L (ref 21–32)
CO2 SERPL-SCNC: 31 MMOL/L (ref 21–32)
CREAT SERPL-MCNC: 1.06 MG/DL (ref 0.6–1.3)
CREAT SERPL-MCNC: 1.06 MG/DL (ref 0.6–1.3)
ERYTHROCYTE [DISTWIDTH] IN BLOOD BY AUTOMATED COUNT: 17.9 % (ref 11.6–15.1)
GFR SERPL CREATININE-BSD FRML MDRD: 52 ML/MIN/1.73SQ M
GFR SERPL CREATININE-BSD FRML MDRD: 52 ML/MIN/1.73SQ M
GLUCOSE SERPL-MCNC: 120 MG/DL (ref 65–140)
GLUCOSE SERPL-MCNC: 121 MG/DL (ref 65–140)
GLUCOSE SERPL-MCNC: 121 MG/DL (ref 65–140)
GLUCOSE SERPL-MCNC: 123 MG/DL (ref 65–140)
GLUCOSE SERPL-MCNC: 131 MG/DL (ref 65–140)
GLUCOSE SERPL-MCNC: 133 MG/DL (ref 65–140)
HCT VFR BLD AUTO: 36.5 % (ref 34.8–46.1)
HGB BLD-MCNC: 11.7 G/DL (ref 11.5–15.4)
INR PPP: 2.25 (ref 0.84–1.19)
LIPASE SERPL-CCNC: 10 U/L (ref 11–82)
MCH RBC QN AUTO: 31.1 PG (ref 26.8–34.3)
MCHC RBC AUTO-ENTMCNC: 32.1 G/DL (ref 31.4–37.4)
MCV RBC AUTO: 97 FL (ref 82–98)
PLATELET # BLD AUTO: 159 THOUSANDS/UL (ref 149–390)
PMV BLD AUTO: 11.1 FL (ref 8.9–12.7)
POTASSIUM SERPL-SCNC: 3.6 MMOL/L (ref 3.5–5.3)
POTASSIUM SERPL-SCNC: 3.6 MMOL/L (ref 3.5–5.3)
PR INTERVAL: 224 MS
PROT SERPL-MCNC: 7.1 G/DL (ref 6.4–8.4)
PROTHROMBIN TIME: 25.1 SECONDS (ref 11.6–14.5)
QRS AXIS: -1 DEGREES
QRSD INTERVAL: 116 MS
QT INTERVAL: 278 MS
QTC INTERVAL: 371 MS
RBC # BLD AUTO: 3.76 MILLION/UL (ref 3.81–5.12)
SODIUM SERPL-SCNC: 141 MMOL/L (ref 135–147)
SODIUM SERPL-SCNC: 141 MMOL/L (ref 135–147)
T WAVE AXIS: 234 DEGREES
VENTRICULAR RATE: 107 BPM
WBC # BLD AUTO: 6.48 THOUSAND/UL (ref 4.31–10.16)

## 2024-05-03 PROCEDURE — 85027 COMPLETE CBC AUTOMATED: CPT | Performed by: FAMILY MEDICINE

## 2024-05-03 PROCEDURE — 82948 REAGENT STRIP/BLOOD GLUCOSE: CPT

## 2024-05-03 PROCEDURE — 97535 SELF CARE MNGMENT TRAINING: CPT

## 2024-05-03 PROCEDURE — 85610 PROTHROMBIN TIME: CPT | Performed by: FAMILY MEDICINE

## 2024-05-03 PROCEDURE — 97530 THERAPEUTIC ACTIVITIES: CPT

## 2024-05-03 PROCEDURE — 99233 SBSQ HOSP IP/OBS HIGH 50: CPT | Performed by: FAMILY MEDICINE

## 2024-05-03 PROCEDURE — 83690 ASSAY OF LIPASE: CPT | Performed by: FAMILY MEDICINE

## 2024-05-03 PROCEDURE — 80048 BASIC METABOLIC PNL TOTAL CA: CPT | Performed by: FAMILY MEDICINE

## 2024-05-03 PROCEDURE — 80053 COMPREHEN METABOLIC PANEL: CPT | Performed by: FAMILY MEDICINE

## 2024-05-03 PROCEDURE — 97167 OT EVAL HIGH COMPLEX 60 MIN: CPT

## 2024-05-03 PROCEDURE — 97163 PT EVAL HIGH COMPLEX 45 MIN: CPT

## 2024-05-03 RX ORDER — MICONAZOLE NITRATE 20 MG/G
CREAM TOPICAL 2 TIMES DAILY
Status: DISCONTINUED | OUTPATIENT
Start: 2024-05-03 | End: 2024-05-12 | Stop reason: HOSPADM

## 2024-05-03 RX ORDER — ONDANSETRON 2 MG/ML
4 INJECTION INTRAMUSCULAR; INTRAVENOUS EVERY 6 HOURS PRN
Status: DISCONTINUED | OUTPATIENT
Start: 2024-05-03 | End: 2024-05-12 | Stop reason: HOSPADM

## 2024-05-03 RX ADMIN — MIDODRINE HYDROCHLORIDE 5 MG: 5 TABLET ORAL at 13:09

## 2024-05-03 RX ADMIN — MIDODRINE HYDROCHLORIDE 5 MG: 5 TABLET ORAL at 17:12

## 2024-05-03 RX ADMIN — FERROUS SULFATE TAB 325 MG (65 MG ELEMENTAL FE) 325 MG: 325 (65 FE) TAB at 08:42

## 2024-05-03 RX ADMIN — POTASSIUM CHLORIDE 20 MEQ: 1500 TABLET, EXTENDED RELEASE ORAL at 17:11

## 2024-05-03 RX ADMIN — MICONAZOLE NITRATE: 20 CREAM TOPICAL at 20:27

## 2024-05-03 RX ADMIN — POTASSIUM CHLORIDE 20 MEQ: 1500 TABLET, EXTENDED RELEASE ORAL at 20:26

## 2024-05-03 RX ADMIN — METOPROLOL SUCCINATE 37.5 MG: 25 TABLET, EXTENDED RELEASE ORAL at 20:26

## 2024-05-03 RX ADMIN — NYSTATIN: 100000 POWDER TOPICAL at 08:45

## 2024-05-03 RX ADMIN — Medication 1 TABLET: at 08:42

## 2024-05-03 RX ADMIN — FLUTICASONE FUROATE AND VILANTEROL TRIFENATATE 1 PUFF: 100; 25 POWDER RESPIRATORY (INHALATION) at 08:44

## 2024-05-03 RX ADMIN — Medication 20 MG/HR: at 17:31

## 2024-05-03 RX ADMIN — ALLOPURINOL 100 MG: 100 TABLET ORAL at 08:42

## 2024-05-03 RX ADMIN — MONTELUKAST 10 MG: 10 TABLET, FILM COATED ORAL at 21:54

## 2024-05-03 RX ADMIN — LORATADINE 10 MG: 10 TABLET ORAL at 08:42

## 2024-05-03 RX ADMIN — POTASSIUM CHLORIDE 20 MEQ: 1500 TABLET, EXTENDED RELEASE ORAL at 08:42

## 2024-05-03 RX ADMIN — WARFARIN SODIUM 5 MG: 5 TABLET ORAL at 17:11

## 2024-05-03 RX ADMIN — OXYCODONE HYDROCHLORIDE 10 MG: 10 TABLET ORAL at 09:43

## 2024-05-03 RX ADMIN — LEVOTHYROXINE SODIUM 288 MCG: 88 TABLET ORAL at 06:14

## 2024-05-03 RX ADMIN — PANTOPRAZOLE SODIUM 40 MG: 40 TABLET, DELAYED RELEASE ORAL at 06:14

## 2024-05-03 RX ADMIN — ATORVASTATIN CALCIUM 10 MG: 10 TABLET, FILM COATED ORAL at 08:42

## 2024-05-03 RX ADMIN — MIDODRINE HYDROCHLORIDE 5 MG: 5 TABLET ORAL at 06:14

## 2024-05-03 RX ADMIN — NYSTATIN: 100000 POWDER TOPICAL at 17:13

## 2024-05-03 RX ADMIN — ONDANSETRON 4 MG: 2 INJECTION INTRAMUSCULAR; INTRAVENOUS at 10:07

## 2024-05-03 RX ADMIN — METOPROLOL SUCCINATE 37.5 MG: 25 TABLET, EXTENDED RELEASE ORAL at 08:42

## 2024-05-03 NOTE — CASE MANAGEMENT
Case Management Discharge Planning Note    Patient name Tigist Hewitt  Location /-01 MRN [id number]  : 1955 Date 5/3/2024       Current Admission Date: 2024  Current Admission Diagnosis:Acute on chronic diastolic congestive heart failure (HCC)   Patient Active Problem List    Diagnosis Date Noted    Ambulatory dysfunction 2024    Hyperkalemia 2024    Lung cyst 2024    Abnormal CT scan, pelvis 2023    CKD (chronic kidney disease) stage 3, GFR 30-59 ml/min ([facility]) 2023    NISHI (obstructive sleep apnea) 2023    Intertrigo 10/29/2022    Vaginal bleeding 10/23/2022    Acute kidney injury superimposed on chronic kidney disease  ([facility]) 10/22/2022    Hypotension 10/22/2022    Acute on chronic diastolic congestive heart failure ([facility]) 2022    IMTIAZ (acute kidney injury) ([facility]) 2022    Morbid obesity ([facility]) 2022    Supratherapeutic INR 04/15/2020    Chronic respiratory failure with hypoxia ([facility]) 2020    Asthma 2020    Atrial fibrillation ([facility]) 2020    COPD (chronic obstructive pulmonary disease) ([facility]) 2020    Type 2 diabetes mellitus with hyperglycemia, with long-term current use of insulin ([facility]) 2020    Shortness of breath 2020    Anemia 2020      LOS (days): 1  Geometric Mean LOS (GMLOS) (days): 3  Days to GMLOS:1.9     OBJECTIVE:  Risk of Unplanned Readmission Score: 28.83         Current admission status: Inpatient   Preferred Pharmacy:   [id number] [place], [place] - [address] [place] [address]  Phone: [phone] Fax: [phone]    Primary Care Provider: Marisa Haque MD    Primary Insurance: [employer] [facility]  Secondary Insurance:     DISCHARGE DETAILS:        Noted [facility] has accepted, however the recommendation at this time is for STR- Will need to follow up with patient in regards to rehab.

## 2024-05-03 NOTE — UTILIZATION REVIEW
Initial Clinical Review    Admission: Date/Time/Statement:   Admission Orders (From admission, onward)       Ordered        05/02/24 1418  INPATIENT ADMISSION  Once                          Orders Placed This Encounter   Procedures    INPATIENT ADMISSION     Standing Status:   Standing     Number of Occurrences:   1     Order Specific Question:   Level of Care     Answer:   Med Surg [16]     Order Specific Question:   Estimated length of stay     Answer:   More than 2 Midnights     Order Specific Question:   Certification     Answer:   I certify that inpatient services are medically necessary for this patient for a duration of greater than two midnights. See H&P and MD Progress Notes for additional information about the patient's course of treatment.     ED Arrival Information       Expected   -    Arrival   5/2/2024 11:27    Acuity   Urgent              Means of arrival   Ambulance    Escorted by   [facility]   Hospitalist    Admission type   Emergency              Arrival complaint   sob             Chief Complaint   Patient presents with    Shortness of Breath     And weight gain since yesterday, non compliant with her medications for past few days.        Initial Presentation: 69 y.o. female to ED via EMS from home  Present to ED with generalized weakness and somnolence at home for the last 2 to 3 days. Patient is normally able to walk a few steps with a walker but now she has been more somnolent not taking her meds for the last 1 to 2 days not eating and drinking much and sleeping a lot as per family. Endorses that she feels like she has gained some extra weight.   PMHX: Asthma, A-Fib, COPD(baseline O2 @ 4L), DM, CHF, IMTIAZ, obesity, NISHI; 2d echo from December 2023 shows normal EF but abnormal diastolic function and severe tricuspid regurg.   Admitted to [facility] with DX: Acute on chronic diastolic congestive heart failure   on exam: hypertensive; tachypnea; lungs diminished at bases bilat; B/L LE  edema with venous statis discoloration. lot of bluish discoloration of her hands and feet and also swelling in her legs noted.   CXR pulmonary venous congestion.   PLAN: cont lasix gtt; monitor labs; rec'd lopressor iv x1; pain control (see below); Accuchecks with ssic; cardiology consult; f/u ABG      Anticipated Length of Stay/Certification Statement: Patient will be admitted on an Inpatient basis with an anticipated length of stay of more than 2 midnights.   Justification for Hospital Stay: Acute on chronic diastolic CHF       Date: 5/3/24      Day 2  complaining of nausea today. Not diuresing much. improvement noted in blue discoloration of her fingers and toes cont on a Lasix drip and increase to 20 mg/hr.  I/O Net -107  Plan: cont lasix gtt; monitor labs; pain / nausea control (see below); Accuchecks with ssic; f/u ABG      CARDIOLOGY CONSULT  Assessment: Acute on chronic heart failure with preserved ejection fraction / Chronic A-fib on warfarin and rate control / COPD / Chronic respiratory failure on 4 L of O2 at baseline / Sleep apnea on CPAP / CKD / Mild AS / Mild to moderate MR / Severe TR  On examination her cardiac rhythm is irregular irregular, systolic ejection murmur is noted. Difficult to assess JVD given neck girth. There is peripheral lower extremity edema as well as erythema across most of her legs. She appears to be volume overloaded but her full volume status is difficult to evaluate given her body habitus.   Plan: Continue Lasix infusion and monitor renal function.       ED Triage Vitals [05/02/24 1131]   Temperature Pulse Respirations Blood Pressure SpO2   97.7 °F (36.5 °C) 89 18 (!) 163/104 95 %      Temp Source Heart Rate Source Patient Position - Orthostatic VS BP Location FiO2 (%)   Temporal Monitor Sitting Right arm --      Pain Score       No Pain          Wt Readings from Last 1 Encounters:   05/03/24 (!) 161 kg (354 lb 15.1 oz)         Wt Readings from Last 3 Encounters:   05/02/24 (!)  167 kg (368 lb 2.7 oz)   01/02/24 (!) 149 kg (328 lb 0.7 oz)   10/29/22 (!) 151 kg (331 lb 12.7 oz)         Wt Readings from Last 3 Encounters:   05/03/24 (!) 161 kg (354 lb 15.1 oz)   01/02/24 (!) 149 kg (328 lb 0.7 oz)   10/29/22 (!) 151 kg (331 lb 12.7 oz)       Additional Vital Signs:   Date/Time Temp Pulse Resp BP MAP (mmHg) SpO2 Calculated FIO2 (%) - Nasal Cannula Nasal Cannula O2 Flow Rate (L/min) O2 Device Patient Position - Orthostatic VS   05/03/24 0840 -- -- -- -- -- -- 28 2 L/min Nasal cannula --   05/03/24 07:24:43 97.5 °F (36.4 °C) 70 18 111/69 83 80 % Abnormal  -- -- -- --   05/03/24 05:43:30 97.2 °F (36.2 °C) Abnormal  68 17 103/65 78 92 % -- -- -- Lying   05/03/24 03:19:09 97.7 °F (36.5 °C) 90 15 119/76 90 92 % -- -- -- Lying   05/02/24 2224 97.7 °F (36.5 °C) 85 16 110/65 80 88 % Abnormal  -- -- -- --   05/02/24 18:53:13 98.1 °F (36.7 °C) 100 18 126/71 89 90 % -- -- -- --   05/02/24 15:34:15 98.2 °F (36.8 °C) 80 -- 105/61 76 96 % -- -- -- --   05/02/24 14:57:01 98.1 °F (36.7 °C) 87 18 136/80 99 97 % -- -- -- --   05/02/24 1332 -- 92 22 163/104 Abnormal  -- 99 % 36 4 L/min Nasal cannula Lying   05/02/24 1131 97.7 °F (36.5 °C) 89 18 163/104 Abnormal  -- 95 % 36 4 L/min Nasal cannula Sitting       EKG: Atrial fibrillation with rapid ventricular response and PVCs vs Abberrant conducted beats  Incomplete right bundle branch block  Cannot rule out Anterior infarct , age undetermined  Nonspecific T wave changes  Abnormal ECG      Pertinent Labs/Diagnostic Test Results:   XR chest 1 view portable   ED Interpretation by Jarad Ruiz DO (05/02 1238)   Cardiomegaly.  Mild pulmonary vascular congestion.      Final Result by Yulia Cabrera MD (05/03 0920)      Mild pulmonary venous congestion.            Workstation performed: [id number]              Results from last 7 days   Lab Units 05/03/24  0502 05/02/24  1209   WBC Thousand/uL 6.48 8.08   HEMOGLOBIN g/dL 11.7 11.8   HEMATOCRIT % 36.5 37.2    PLATELETS Thousands/uL 159 176   TOTAL NEUT ABS Thousands/µL  --  5.71        Results from last 7 days   Lab Units 05/03/24  0502 05/02/24  1209   SODIUM mmol/L 141  141 141   POTASSIUM mmol/L 3.6  3.6 3.7   CHLORIDE mmol/L 101  101 100   CO2 mmol/L 31  31 33*   ANION GAP mmol/L 9  9 8   BUN mg/dL 46*  46* 45*   CREATININE mg/dL 1.06  1.06 0.97   EGFR ml/min/1.73sq m 52  52 58   CALCIUM mg/dL 9.5  9.5 9.8   MAGNESIUM mg/dL  --  2.1     Results from last 7 days   Lab Units 05/03/24  0502 05/02/24  1209   AST U/L 22 37   ALT U/L 7 10   ALK PHOS U/L 113* 121*   TOTAL PROTEIN g/dL 7.1 7.6   ALBUMIN g/dL 3.3* 3.5   TOTAL BILIRUBIN mg/dL 1.75* 1.92*     Results from last 7 days   Lab Units 05/03/24  1216 05/03/24  0723 05/02/24  2103 05/02/24  1658   POC GLUCOSE mg/dl 123 120 135 97     Results from last 7 days   Lab Units 05/03/24  0502 05/02/24  1209   GLUCOSE RANDOM mg/dL 121  121 115        Results from last 7 days   Lab Units 05/02/24  1738   HEMOGLOBIN A1C % 7.0*   EAG mg/dl 154        Results from last 7 days   Lab Units 05/02/24  1619   PH ART  7.541*   PCO2 ART mm Hg 35.7*   PO2 ART mm Hg 75.6   HCO3 ART mmol/L 29.9*   BASE EXC ART mmol/L 7.2   O2 CONTENT ART mL/dL 17.6   O2 HGB, ARTERIAL % 94.1   ABG SOURCE  Radial, Right        Results from last 7 days   Lab Units 05/03/24  0502 05/02/24  1209   PROTIME seconds 25.1* 25.2*   INR  2.25* 2.25*   PTT seconds  --  43*        Results from last 7 days   Lab Units 05/02/24  1209   LACTIC ACID mmol/L 1.5        Results from last 7 days   Lab Units 05/03/24  0502 05/02/24  1209   LIPASE u/L 10* 10*       Results from last 7 days   Lab Units 05/02/24  1443   CLARITY UA  Cloudy   COLOR UA  Yellow   SPEC GRAV UA  1.010   PH UA  6.0   GLUCOSE UA mg/dl Negative   KETONES UA mg/dl Negative   BLOOD UA  Large*   PROTEIN UA mg/dl Trace*   NITRITE UA  Negative   BILIRUBIN UA  Negative   UROBILINOGEN UA E.U./dl 1.0   LEUKOCYTES UA  Large*   WBC UA /hpf Innumerable*    RBC UA /hpf 20-30*   BACTERIA UA /hpf Occasional   EPITHELIAL CELLS WET PREP /hpf Occasional   MUCUS THREADS  Occasional*       Results from last 7 days   Lab Units 05/02/24  1209   BLOOD CULTURE  Received in Microbiology Lab. Culture in Progress.  Received in Microbiology Lab. Culture in Progress.       ED Treatment:   Medication Administration from 05/02/2024 1127 to 05/02/2024 1448         Date/Time Order Dose Route Action     05/02/2024 1332 EDT furosemide (LASIX) injection 80 mg 80 mg Intravenous Given            Present on Admission:   Atrial fibrillation (HCC)   COPD (chronic obstructive pulmonary disease) (HCC)   (Resolved) Chronic diastolic heart failure (HCC)   Acute on chronic diastolic congestive heart failure (HCC)   Morbid obesity (HCC)      Admitting Diagnosis: Morbid obesity (HCC) [E66.01]  Acute on chronic diastolic heart failure (HCC) [I50.33]  Intertrigo [L30.4]  SOB (shortness of breath) [R06.02]  Chronic respiratory failure with hypoxia ([facility]) [J96.11]    Age/Sex: 69 y.o. female    Admission Orders: SCD's; I/O; daily wts; tele monitoring; cardiac / Consistent Carbohydrate Diet. Blood glucose checks ACHS. Fluid restriction 1800    Scheduled Medications:  allopurinol, 100 mg, Oral, Daily  atorvastatin, 10 mg, Oral, Daily  ferrous sulfate, 325 mg, Oral, Daily With Breakfast  Fluticasone Furoate-Vilanterol, 1 puff, Inhalation, Daily  insulin glargine, 15 Units, Subcutaneous, HS  insulin lispro, 1-6 Units, Subcutaneous, HS  insulin lispro, 2-12 Units, Subcutaneous, TID AC  levothyroxine, 288 mcg, Oral, Early Morning  loratadine, 10 mg, Oral, Daily  metoprolol succinate, 37.5 mg, Oral, BID  midodrine, 5 mg, Oral, TID AC  montelukast, 10 mg, Oral, HS  multivitamin-minerals, 1 tablet, Oral, Daily  nystatin, , Topical, BID  pantoprazole, 40 mg, Oral, Daily Before Breakfast  potassium chloride, 20 mEq, Oral, TID  warfarin, 5 mg, Oral, Daily (warfarin)      Continuous IV Infusions:  furosemide, 10 mg/hr,  Intravenous, Continuous      PRN Meds:  acetaminophen, 650 mg, Oral, Q4H PRN  albuterol, 2.5 mg, Nebulization, Q6H PRN  metoprolol, 2.5 mg, Intravenous, Q6H PRN  (5/2 recd x1)    ondansetron, 4 mg, Intravenous, Q6H PRN  (5/3 rec'd x1 so far today)   oxyCODONE, 10 mg, Oral, Q6H PRN    (5/2 recd x1)  (5/3 rec'd x1 so far today)         IP CONSULT TO NUTRITION SERVICES  IP CONSULT TO CARDIOLOGY    Network Utilization Review Department  ATTENTION: Please call with any questions or concerns to [phone] and carefully listen to the prompts so that you are directed to the right person. All voicemails are confidential.   For Discharge needs, contact Care Management DC Support Team at [phone] opt. 2  Send all requests for admission clinical reviews, approved or denied determinations and any other requests to dedicated fax number below belonging to the [place] where the patient is receiving treatment. List of dedicated fax numbers for the Facilities:  FACILITY NAME UR FAX NUMBER   ADMISSION DENIALS (Administrative/Medical Necessity) [phone]   DISCHARGE SUPPORT TEAM (NETWORK) [phone]   PARENT CHILD HEALTH (Maternity/NICU/Pediatrics) [phone]   [facility] [phone]   [facility] [phone]   [facility] [phone]   [facility] [phone]   [facility] [phone]   [facility] [phone]   [facility] [phone]   [facility] [phone]   [facility] [phone]   [facility] [phone]   [facility] [phone]   [facility] [phone]

## 2024-05-03 NOTE — PLAN OF CARE
Problem: OCCUPATIONAL THERAPY ADULT  Goal: Performs self-care activities at highest level of function for planned discharge setting.  See evaluation for individualized goals.  Description: Treatment Interventions: ADL retraining, Visual perceptual retraining, Functional transfer training, UE strengthening/ROM, Patient/family training, Endurance training, Cognitive reorientation, Equipment evaluation/education, Neuromuscular reeducation, Fine motor coordination activities, Compensatory technique education, UE splinting, Continued evaluation, Cardiac education, Energy conservation, Activityengagement          See flowsheet documentation for full assessment, interventions and recommendations.   Note: Limitation: Decreased ADL status, Decreased UE ROM, Decreased UE strength, Decreased Safe judgement during ADL, Decreased endurance, Decreased self-care trans, Decreased high-level ADLs  Prognosis: Guarded  Assessment: Pt is a 69 y.o. female, admitted to [facility] 5/2/2024 d/t experiencing generalized weakness and somnolence. Dx: acute on chronic diastolic congestive heart failure. Pt with PMHx impacting their performance during ADL tasks, including: asthma, A-Fib, COPD, CM, disease of thyroid gland, heart/kidney failure, morbid obesity, NISHI. Prior to admission to the hospital Pt was performing ADLs with physical assistance. IADLs with physical assistance. Functional transfers/ambulation with physical assistance. Cognitive status was PTA was Intact. OT order placed to assess Pt's ADLs, cognitive status, and performance during functional tasks in order to maximize safety and independence while making most appropriate d/c recommendations. PT/OT co-evaluation completed at this time d/t significant mobility deficits and safety concerns. Pt's clinical presentation is currently unstable/unpredictable given new onset deficits that effect Pt's occupational performance and ability to safely return to OF including decrease activity  tolerance, decrease standing balance, decrease sitting balance, decrease performance during ADL tasks, decrease safety awareness , decrease BUE ROM, decrease UB MS, increased pain, decrease generalized strength, decrease activity engagement, and decrease performance during functional transfers combined with medical complications of pain impacting overall mobility status, abnormal CBC, edema/swelling, wounds, decreased skin integrity, and need for input for mobility technique/safety. Pt maintained Good O2 sats on 2lpm throughout. Personal factors affecting Pt at time of initial evaluation include: limited home support, past experience, inability to perform IADLs, inability to perform ADLs, inability to ambulate household distances, limited insight into impairments, and decreased initiation and engagement. Pt will benefit from continued skilled OT services to address deficits as defined above and to maximize level independence/participation during ADLs and functional tasks to facilitate return toward PLOF and improved quality of life. From an occupational therapy standpoint, recommendation at time of d/c would be Level II: Moderate Resource Intensity Therapy.     Rehab Resource Intensity Level, OT: II (Moderate Resource Intensity)

## 2024-05-03 NOTE — ASSESSMENT & PLAN NOTE
Currently rate controlled with Toprol-XL and INR is therapeutic normally takes Coumadin 7.5 mg daily will place on 5 mg for now and observe

## 2024-05-03 NOTE — WOUND OSTOMY CARE
Consult Note - Wound   Tigist Yenilaitus 69 y.o. female MRN: [id number]  Unit/Bed#: -01 Encounter: [id number]        History and Present Illness:  Admitted with acute on chronic diastolic heart failure. COPD HF. Chronic 02. 4L . BMI 69.56.     Assessment Findings:   Supine in bed, assist of 2 to reposition  1)Bilateral buttocks posterior thighs anterior knees groin,  MASD ITD, erythema.  Uses pur wik for bladder management  2)B/L LE dry scaly hyperpigmentation.Inner aspect of knees dry flaky     Skin care plans:  1-Apply SERGO to sacrum, buttocks , groin, posterior upper thighs , inner kneesBID and PRN  2-Hydraguard to bilateral heel BID and PRN  3-Elevate heels to offload pressure  4-Ehob cushion when out of bed.  5-Turn/repoisiton q2h or when medically stable for pressure re-distribution on skin.  6-Moisturize skin daily with skin nourishing cream   7-Cleanse bilateral lower extremities with soap and water, apply skin nourishing cream to intact skin BID     Lab, Imaging  Wound Image   05/03/24 1711   Wound Description Beefy red 05/03/24 1711   Alyssa-wound Assessment Dry;Intact 05/03/24 1711   Wound Length (cm) 18 cm 05/03/24 1711   Wound Width (cm) 22 cm 05/03/24 1711   Wound Surface Area (cm^2) 396 cm^2 05/03/24 1711   Drainage Amount None 05/03/24 1711   Treatments Cleansed;Site care 05/03/24 1711   Dressing Moisture barrier 05/03/24 1711   Dressing Changed New 05/03/24 1711   Patient Tolerance Tolerated well 05/03/24 1711       Wound 12/23/23 Pretibial Left (Active)   Wound Image   05/02/24 2305   Wound 05/02/24 MASD Sacrum (Active)   Wound Image   05/03/24 1710   Wound Description Beefy red;Intact;Dry 05/03/24 1710   Alyssa-wound Assessment Callus;Hyperpigmented;Scaly;Swelling 05/03/24 1710   Wound Length (cm) 36 cm 05/03/24 1710   Wound Width (cm) 36 cm 05/03/24 1710   Wound Surface Area (cm^2) 1296 cm^2 05/03/24 1710   Drainage Amount None 05/03/24 1710   Treatments Site care;Cleansed 05/03/24 1710    Dressing Moisture barrier 05/03/24 1710   Dressing Changed New 05/03/24 1710     Call or tigertext with any questions  Wound Care will continue to follow weekly    Zainab Easley BSN RN [facility]ON

## 2024-05-03 NOTE — NURSING NOTE
Patient noted to have red colored urine, no complaints of pain or discomfort with urination. Provider made aware via tiger text. No new orders at this time.

## 2024-05-03 NOTE — PROGRESS NOTES
Patient:  HANNA WASHINGTON    MRN:  [id number]    Aidin Request ID:  [id number]    Level of care reserved:  Home Health Agency    Partner Reserved:  [facility] [place] [address] [phone]    Clinical needs requested:    Geography searched:  [id number]    Start of Service:    Request sent:  9:37am EDT on 5/3/2024 by Coby Rebollar    Partner reserved:  12:01pm EDT on 5/3/2024 by Coby Rebollar    Choice list shared:  12:01pm EDT on 5/3/2024 by Coby Rebollar

## 2024-05-03 NOTE — CONSULTS
Consultation - Cardiology   Tigist Hewitt 69 y.o. female MRN: [id number]  Unit/Bed#: -01 Encounter: [id number]    Assessment/Plan     Assessment:  Acute on chronic HFpEF on lasix drip at 10 mg/hr, edema, pulmonary vascular congestion    - fluid status very difficult given body habitus, unable to weigh patient   COPD  Chronic afib on coumadin   Rate controlled  Chronic resp failure on 4 L at baseline  NISHI on CPAP  CKD  Mild AS  Severe TR    Plan:  Continue lasix infusion  Monitor I and O  Clearly cannot get stand weights  Monitor renal function and electrolytes  No further changes from a cardiac perspective     History of Present Illness   Physician Requesting Consult: Amanda Dukes MD  Reason for Consult / Principal Problem: acute on chronic HFpEF  HPI: Tigist Hewitt is a 69 y.o. year old female with history of chronic afib, CHFpEF, non compliance, COPD, DM2, CKD, severe TR is here for weakness that started 3 days prior to admission. She reports she didn't take her water pills for a few days due to this. She reports she could note walk due to her breathing issues. She came in with edema, abdominal distention and pulmonary vascular congestion. She was placed on a lasix infusion. She reports no changes since lasix infusion was started.      Inpatient consult to Cardiology  Consult performed by: Debby Galindo PA-C  Consult ordered by: Amanda Dukes MD        Review of Systems   Constitutional:  Positive for fatigue and unexpected weight change. Negative for chills.   Respiratory:  Positive for shortness of breath. Negative for chest tightness.    Cardiovascular:  Positive for leg swelling. Negative for chest pain and palpitations.   Gastrointestinal:  Positive for abdominal distention and nausea.   Skin:  Negative for rash.   Neurological:  Positive for weakness. Negative for dizziness.   Psychiatric/Behavioral:  Negative for agitation.        Historical Information   Past Medical History:  "  Diagnosis Date    Asthma     Atrial fibrillation (HCC)     COPD (chronic obstructive pulmonary disease) (HCC)     Diabetes mellitus (HCC)     Disease of thyroid gland     Heart failure (HCC)     Kidney failure     Morbid obesity due to excess calories (HCC)     NISHI (obstructive sleep apnea)      Past Surgical History:   Procedure Laterality Date    CARDIAC ELECTROPHYSIOLOGY MAPPING AND ABLATION      by Dr. Ferraro at [facility]    CARDIOVERSION N/A     GALLBLADDER SURGERY      HERNIA REPAIR       Social History     Substance and Sexual Activity   Alcohol Use Never     Social History     Substance and Sexual Activity   Drug Use Never     E-Cigarette/Vaping    E-Cigarette Use Never User      E-Cigarette/Vaping Substances    Nicotine No     THC No     CBD No     Flavoring No     Other No     Unknown No      Social History     Tobacco Use   Smoking Status Never    Passive exposure: Never   Smokeless Tobacco Never     Family History: non-contributory    Meds/Allergies   all current active meds have been reviewed  Allergies   Allergen Reactions    Acesulfame Potassium - Food Allergy Anaphylaxis    Aspartame - Food Allergy Anaphylaxis    Saccharin - Food Allergy Anaphylaxis    Sucralose - Food Allergy Anaphylaxis    Metformin GI Intolerance       Objective   Vitals: Blood pressure 111/69, pulse 70, temperature 97.5 °F (36.4 °C), resp. rate 18, height 5' 1\" (1.549 m), weight (!) 161 kg (354 lb 15.1 oz), SpO2 (!) 80%.  Orthostatic Blood Pressures      Flowsheet Row Most Recent Value   Blood Pressure 111/69 filed at 05/03/2024 0724   Patient Position - Orthostatic VS Lying filed at 05/03/2024 0543              Intake/Output Summary (Last 24 hours) at 5/3/2024 1047  Last data filed at 5/3/2024 1029  Gross per 24 hour   Intake 480 ml   Output 987 ml   Net -507 ml       Invasive Devices       Peripheral Intravenous Line  Duration             Peripheral IV 05/02/24 Left;Ventral (anterior) Hand <1 day              Drain  Duration "             External Urinary Catheter <1 day                    Physical Exam  Vitals and nursing note reviewed.   Constitutional:       Appearance: Normal appearance.   HENT:      Head: Normocephalic and atraumatic.   Cardiovascular:      Rate and Rhythm: Rhythm irregular.      Heart sounds: Murmur heard.   Pulmonary:      Effort: Pulmonary effort is normal.   Musculoskeletal:         General: Swelling present.      Cervical back: Neck supple.   Neurological:      General: No focal deficit present.      Mental Status: She is alert.   Psychiatric:         Mood and Affect: Mood normal.         Thought Content: Thought content normal.         Lab Results: I have personally reviewed pertinent lab results.    CBC with diff:   Results from last 7 days   Lab Units 05/03/24  0502   WBC Thousand/uL 6.48   RBC Million/uL 3.76*   HEMOGLOBIN g/dL 11.7   HEMATOCRIT % 36.5   MCV fL 97   MCH pg 31.1   MCHC g/dL 32.1   RDW % 17.9*   MPV fL 11.1   PLATELETS Thousands/uL 159     CMP:   Results from last 7 days   Lab Units 05/03/24  0502   SODIUM mmol/L 141  141   CHLORIDE mmol/L 101  101   CO2 mmol/L 31  31   BUN mg/dL 46*  46*   CREATININE mg/dL 1.06  1.06   CALCIUM mg/dL 9.5  9.5   AST U/L 22   ALT U/L 7   ALK PHOS U/L 113*   EGFR ml/min/1.73sq m 52  52     HS Troponin:   0   Lab Value Date/Time    HSTNI0 36 12/23/2023 1441    HSTNI2 34 12/23/2023 1651    HSTNI4 58 (H) 10/22/2022 1736    HSTNI4 25 09/21/2022 1756     BNP:   Results from last 7 days   Lab Units 05/03/24  0502   POTASSIUM mmol/L 3.6  3.6   CHLORIDE mmol/L 101  101   CO2 mmol/L 31  31   BUN mg/dL 46*  46*   CREATININE mg/dL 1.06  1.06   CALCIUM mg/dL 9.5  9.5   EGFR ml/min/1.73sq m 52  52     Coags:   Results from last 7 days   Lab Units 05/03/24  0502 05/02/24  1209   PTT seconds  --  43*   INR  2.25* 2.25*     TSH:     Magnesium:   Results from last 7 days   Lab Units 05/02/24  1209   MAGNESIUM mg/dL 2.1     Lipid Profile:     Imaging: I have  personally reviewed pertinent reports.    EKG: rate controlled afib     Echo 12/24/2024:    Left Ventricle: Left ventricular cavity size is normal. Wall thickness is normal. The left ventricular ejection fraction is 55% by visual estimation, although assessment is significantly limited due to poor endocardial border definition even with the use of contrast. Systolic function is probably normal. Although no diagnostic regional wall motion abnormality was identified, this possibility cannot be completely excluded on the basis of this study. Unable to assess diastolic function due to atrial fibrillation.    IVS: There is both systolic and diastolic flattening of the interventricular septum consistent with right ventricle pressure and volume overload.    Right Ventricle: Right ventricular cavity size is mildly dilated. Systolic function is moderately reduced.    Right Atrium: The atrium is dilated.    Atrial Septum: The septum bows into the left atrium, suggesting increased right atrial pressure.    Aortic Valve: There is mild stenosis. The aortic valve peak velocity is 1.25 m/s. The aortic valve mean gradient is 4 mmHg. The dimensionless velocity index is 0.53. The aortic valve area is 1.56 cm2. The stroke volume index is 15.00 ml/m2. The aortic valve velocity is increased due to stenosis but lower than expected due to the presence of decreased flow.    Mitral Valve: There is mild annular calcification. There is mild to moderate regurgitation with a posteriorly directed jet. Severity is potentially underestimated due to eccentric jet.    Tricuspid Valve: There is severe regurgitation. The tricuspid valve regurgitation jet is directed toward septum. The right ventricular systolic pressure is moderately elevated. The estimated right ventricular systolic pressure is 53.00 mmHg.    Pulmonic Valve: There is mild regurgitation.    Prior TTE study available for comparison. Prior study date: 9/21/2022. No significant changes  noted compared to the prior study.

## 2024-05-03 NOTE — PLAN OF CARE
Problem: Potential for Falls  Goal: Patient will remain free of falls  Description: INTERVENTIONS:  - Educate patient/family on patient safety including physical limitations  - Instruct patient to call for assistance with activity   - Consult OT/PT to assist with strengthening/mobility   - Keep Call bell within reach  - Keep bed low and locked with side rails adjusted as appropriate  - Keep care items and personal belongings within reach  - Initiate and maintain comfort rounds  - Make Fall Risk Sign visible to staff  - Offer Toileting every 2 Hours, in advance of need  - Initiate/Maintain bed/chair alarm  - Obtain necessary fall risk management equipment  - Apply yellow socks and bracelet for high fall risk patients  - Consider moving patient to room near nurses station  Outcome: Progressing     Problem: Prexisting or High Potential for Compromised Skin Integrity  Goal: Skin integrity is maintained or improved  Description: INTERVENTIONS:  - Identify patients at risk for skin breakdown  - Assess and monitor skin integrity  - Assess and monitor nutrition and hydration status  - Monitor labs   - Assess for incontinence   - Turn and reposition patient  - Assist with mobility/ambulation  - Relieve pressure over bony prominences  - Avoid friction and shearing  - Provide appropriate hygiene as needed including keeping skin clean and dry  - Evaluate need for skin moisturizer/barrier cream  - Collaborate with interdisciplinary team   - Patient/family teaching  - Consider wound care consult   Outcome: Progressing     Problem: PAIN - ADULT  Goal: Verbalizes/displays adequate comfort level or baseline comfort level  Description: Interventions:  - Encourage patient to monitor pain and request assistance  - Assess pain using appropriate pain scale  - Administer analgesics based on type and severity of pain and evaluate response  - Implement non-pharmacological measures as appropriate and evaluate response  - Consider cultural  and social influences on pain and pain management  - Notify physician/advanced practitioner if interventions unsuccessful or patient reports new pain  Outcome: Progressing     Problem: SAFETY ADULT  Goal: Patient will remain free of falls  Description: INTERVENTIONS:  - Educate patient/family on patient safety including physical limitations  - Instruct patient to call for assistance with activity   - Consult OT/PT to assist with strengthening/mobility   - Keep Call bell within reach  - Keep bed low and locked with side rails adjusted as appropriate  - Keep care items and personal belongings within reach  - Initiate and maintain comfort rounds  - Make Fall Risk Sign visible to staff  - Offer Toileting every 2 Hours, in advance of need  - Initiate/Maintain bed/chair alarm  - Obtain necessary fall risk management equipment  - Apply yellow socks and bracelet for high fall risk patients  - Consider moving patient to room near nurses station  Outcome: Progressing  Goal: Maintain or return to baseline ADL function  Description: INTERVENTIONS:  -  Assess patient's ability to carry out ADLs; assess patient's baseline for ADL function and identify physical deficits which impact ability to perform ADLs (bathing, care of mouth/teeth, toileting, grooming, dressing, etc.)  - Assess/evaluate cause of self-care deficits   - Assess range of motion  - Assess patient's mobility; develop plan if impaired  - Assess patient's need for assistive devices and provide as appropriate  - Encourage maximum independence but intervene and supervise when necessary  - Involve family in performance of ADLs  - Assess for home care needs following discharge   - Consider OT consult to assist with ADL evaluation and planning for discharge  - Provide patient education as appropriate  Outcome: Progressing  Goal: Maintains/Returns to pre admission functional level  Description: INTERVENTIONS:  - Perform AM-PAC 6 Click Basic Mobility/ Daily Activity  assessment daily.  - Set and communicate daily mobility goal to care team and patient/family/caregiver.   - Collaborate with rehabilitation services on mobility goals if consulted  - Reposition patient every 2 hours.  - Stand patient 3 times a day  - Ambulate patient 3 times a day  - Out of bed to chair 3 times a day   - Out of bed for meals 3 times a day  - Out of bed for toileting  - Record patient progress and toleration of activity level   Outcome: Progressing     Problem: DISCHARGE PLANNING  Goal: Discharge to home or other facility with appropriate resources  Description: INTERVENTIONS:  - Identify barriers to discharge w/patient and caregiver  - Arrange for needed discharge resources and transportation as appropriate  - Identify discharge learning needs (meds, wound care, etc.)  - Arrange for interpretive services to assist at discharge as needed  - Refer to Case Management Department for coordinating discharge planning if the patient needs post-hospital services based on physician/advanced practitioner order or complex needs related to functional status, cognitive ability, or social support system  Outcome: Progressing     Problem: Knowledge Deficit  Goal: Patient/family/caregiver demonstrates understanding of disease process, treatment plan, medications, and discharge instructions  Description: Complete learning assessment and assess knowledge base.  Interventions:  - Provide teaching at level of understanding  - Provide teaching via preferred learning methods  Outcome: Progressing     Problem: CARDIOVASCULAR - ADULT  Goal: Maintains optimal cardiac output and hemodynamic stability  Description: INTERVENTIONS:  - Monitor I/O, vital signs and rhythm  - Monitor for S/S and trends of decreased cardiac output  - Administer and titrate ordered vasoactive medications to optimize hemodynamic stability  - Assess quality of pulses, skin color and temperature  - Assess for signs of decreased coronary artery  perfusion  - Instruct patient to report change in severity of symptoms  Outcome: Progressing  Goal: Absence of cardiac dysrhythmias or at baseline rhythm  Description: INTERVENTIONS:  - Continuous cardiac monitoring, vital signs, obtain 12 lead EKG if ordered  - Administer antiarrhythmic and heart rate control medications as ordered  - Monitor electrolytes and administer replacement therapy as ordered  Outcome: Progressing     Problem: RESPIRATORY - ADULT  Goal: Achieves optimal ventilation and oxygenation  Description: INTERVENTIONS:  - Assess for changes in respiratory status  - Assess for changes in mentation and behavior  - Position to facilitate oxygenation and minimize respiratory effort  - Oxygen administered by appropriate delivery if ordered  - Initiate smoking cessation education as indicated  - Encourage broncho-pulmonary hygiene including cough, deep breathe, Incentive Spirometry  - Assess the need for suctioning and aspirate as needed  - Assess and instruct to report SOB or any respiratory difficulty  - Respiratory Therapy support as indicated  Outcome: Progressing     Problem: METABOLIC, FLUID AND ELECTROLYTES - ADULT  Goal: Electrolytes maintained within normal limits  Description: INTERVENTIONS:  - Monitor labs and assess patient for signs and symptoms of electrolyte imbalances  - Administer electrolyte replacement as ordered  - Monitor response to electrolyte replacements, including repeat lab results as appropriate  - Instruct patient on fluid and nutrition as appropriate  Outcome: Progressing  Goal: Fluid balance maintained  Description: INTERVENTIONS:  - Monitor labs   - Monitor I/O and WT  - Instruct patient on fluid and nutrition as appropriate  - Assess for signs & symptoms of volume excess or deficit  Outcome: Progressing  Goal: Glucose maintained within target range  Description: INTERVENTIONS:  - Monitor Blood Glucose as ordered  - Assess for signs and symptoms of hyperglycemia and  hypoglycemia  - Administer ordered medications to maintain glucose within target range  - Assess nutritional intake and initiate nutrition service referral as needed  Outcome: Progressing

## 2024-05-03 NOTE — PLAN OF CARE
Problem: PHYSICAL THERAPY ADULT  Goal: Performs mobility at highest level of function for planned discharge setting.  See evaluation for individualized goals.  Description: Treatment/Interventions: ADL retraining, Functional transfer training, LE strengthening/ROM, Therapeutic exercise, Endurance training, Patient/family training, Equipment eval/education, Bed mobility, Gait training, Compensatory technique education, Spoke to nursing, Spoke to case management, OT          See flowsheet documentation for full assessment, interventions and recommendations.  5/3/2024 1735 by Linda Alberto, PT  Note: Prognosis: Guarded  Problem List: Decreased strength, Decreased range of motion, Decreased endurance, Impaired balance, Decreased mobility, Impaired judgement, Decreased safety awareness, Obesity, Decreased skin integrity, Pain  Pt tolerated session poorly. She requires significant assistance to complete mobility and attempt to stand, unable to clear buttock despite maximal effort. She requires signficiant assistance to roll and desaturates O2 with head of bed flat for repsotioning. She is limtied byd ecreased strengfth, balance, endurance and pain. She will continue to beenfit from PT services to [facility].     Barriers to Discharge: Decreased caregiver support, Inaccessible home environment  Barriers to Discharge Comments: unable to stand, fall risk, decreased insight to deficits. significant assistance with mobility  Rehab Resource Intensity Level, PT: II (Moderate Resource Intensity)    See flowsheet documentation for full assessment.

## 2024-05-03 NOTE — DISCHARGE INSTR - OTHER ORDERS
Skin care plans:  1-Apply SERGO to sacrum, buttocks , groin, posterior upper thighs , inner kneesBID and PRN  2-Hydraguard to bilateral heel BID and PRN  3-Elevate heels to offload pressure  4-Ehob cushion when out of bed.  5-Turn/repoisiton q2h or when medically stable for pressure re-distribution on skin.  6-Moisturize skin daily with skin nourishing cream   7-Cleanse bilateral lower extremities with soap and water, apply skin nourishing cream to intact skin BID

## 2024-05-03 NOTE — OCCUPATIONAL THERAPY NOTE
Occupational Therapy Evaluation     Patient Name: Tigist Hewitt  Today's Date: 5/3/2024  Problem List  Principal Problem:    Acute on chronic diastolic congestive heart failure (HCC)  Active Problems:    Atrial fibrillation (HCC)    COPD (chronic obstructive pulmonary disease) (HCC)    Type 2 diabetes mellitus with hyperglycemia, with long-term current use of insulin (HCC)    Morbid obesity (HCC)    Past Medical History  Past Medical History:   Diagnosis Date    Asthma     Atrial fibrillation (HCC)     COPD (chronic obstructive pulmonary disease) (HCC)     Diabetes mellitus (HCC)     Disease of thyroid gland     Heart failure (HCC)     Kidney failure     Morbid obesity due to excess calories (HCC)     NISHI (obstructive sleep apnea)      Past Surgical History  Past Surgical History:   Procedure Laterality Date    CARDIAC ELECTROPHYSIOLOGY MAPPING AND ABLATION      by Dr. Ferraro at [facility]    CARDIOVERSION N/A     GALLBLADDER SURGERY      HERNIA REPAIR        05/03/24 1135   Note Type   Note type Evaluation   Pain Assessment   Pain Assessment Tool 0-10   Pain Score 4   Pain Location/Orientation Orientation: Bilateral;Location: Knee   Home Living   Type of Home House   Home Layout Able to live on main level with bedroom/bathroom;Performs ADLs on one level  (split-level)   Bathroom Shower/Tub   (sponge bathes)   Bathroom Toilet   (BSC-)   Home Equipment Wheelchair-manual;Walker   Additional Comments Pt reports living with her spouse and son in a 2SH. Pt sleeps in recliner lift chair and only completes SPT <> BSC and W/C at baseline with use of RW.   Prior Function   Level of [place] Needs assistance with ADLs;Needs assistance with functional mobility;Needs assistance with IADLS   Lives With Spouse;Son   Receives Help From Family;Friend(s)   IADLs Family/Friend/Other provides meals;Family/Friend/Other provides transportation;Family/Friend/Other provides medication management   Falls in the last 6 months 0    Comments Spouse used to asssit with all ADL/IADL tasks but went partial amoutation of foot and is no longer able to provide extensive assist, friend helps with bathing/dressing   ADL   UB Dressing Assistance 2  Maximal Assistance   UB Dressing Deficit Setup;Verbal cueing;Increased time to complete;Thread RUE;Thread LUE;Pull over head;Pull down in back;Pull around back   LB Dressing Assistance 1  Total Assistance   LB Dressing Deficit Setup;Requires assistive device for steadying;Verbal cueing;Increased time to complete   Additional Comments UB ADLs @ Max A d/t BLE weakness. LB ADLs @ Total A. Pt required assist to don socks while seated at EOB. See tx assessment for greater detail regarding ADL performance.   Bed Mobility   Supine to Sit 2  Maximal assistance   Additional items Assist x 2;Increased time required;Verbal cues   Additional Comments Pt supine in bed at beginning of session. Supine to sit @ Max A x2. Pt able to maintain seated at EOB at S-SBA. See tx assessment for greater detail regarding bed mobility.   Transfers   Sit to Stand 2  Maximal assistance   Additional items Assist x 2;Verbal cues;Increased time required   Stand to Sit 2  Maximal assistance   Additional items Assist x 2;Increased time required;Verbal cues   Stand pivot Unable to assess   Additional Comments Unable to clear buttocks during STS from EOB to RW despite Max A x2. See tx assessment for greater detail regarding functional mobility.   Balance   Static Sitting Good   Dynamic Sitting Fair +   Activity Tolerance   Activity Tolerance Patient limited by pain;Patient limited by fatigue   Medical Staff Made Aware Spoke with PT Linda GOODEN Assessment   RUE Assessment WFL   LUE Assessment   LUE Assessment WFL   Hand Function   Gross Motor Coordination Functional   Fine Motor Coordination Functional   Hand Function Comments Significant decreased BUE MS noted throughout   Cognition   Overall Cognitive Status WFL   Arousal/Participation  Alert;Responsive;Cooperative   Attention Attends with cues to redirect   Orientation Level Oriented X4   Memory Within functional limits   Following Commands Follows one step commands with increased time or repetition   Comments Limited insight to deficits and prognosis   Assessment   Limitation Decreased ADL status;Decreased UE ROM;Decreased UE strength;Decreased Safe judgement during ADL;Decreased endurance;Decreased self-care trans;Decreased high-level ADLs   Prognosis Guarded   Assessment Pt is a 69 y.o. female, admitted to [facility] 5/2/2024 d/t experiencing generalized weakness and somnolence. Dx: acute on chronic diastolic congestive heart failure. Pt with PMHx impacting their performance during ADL tasks, including: asthma, A-Fib, COPD, CM, disease of thyroid gland, heart/kidney failure, morbid obesity, NISHI. Prior to admission to the hospital Pt was performing ADLs with physical assistance. IADLs with physical assistance. Functional transfers/ambulation with physical assistance. Cognitive status was PTA was Intact. OT order placed to assess Pt's ADLs, cognitive status, and performance during functional tasks in order to maximize safety and independence while making most appropriate d/c recommendations. PT/OT co-evaluation completed at this time d/t significant mobility deficits and safety concerns. Pt's clinical presentation is currently unstable/unpredictable given new onset deficits that effect Pt's occupational performance and ability to safely return to PLOF including decrease activity tolerance, decrease standing balance, decrease sitting balance, decrease performance during ADL tasks, decrease safety awareness , decrease BUE ROM, decrease UB MS, increased pain, decrease generalized strength, decrease activity engagement, and decrease performance during functional transfers combined with medical complications of pain impacting overall mobility status, abnormal CBC, edema/swelling, wounds, decreased skin  integrity, and need for input for mobility technique/safety. Pt maintained Good O2 sats on 2lpm throughout. Personal factors affecting Pt at time of initial evaluation include: limited home support, past experience, inability to perform IADLs, inability to perform ADLs, inability to ambulate household distances, limited insight into impairments, and decreased initiation and engagement. Pt will benefit from continued skilled OT services to address deficits as defined above and to maximize level independence/participation during ADLs and functional tasks to facilitate return toward PLOF and improved quality of life. From an occupational therapy standpoint, recommendation at time of d/c would be Level II: Moderate Resource Intensity Therapy.   Plan   Treatment Interventions ADL retraining;Visual perceptual retraining;Functional transfer training;UE strengthening/ROM;Patient/family training;Endurance training;Cognitive reorientation;Equipment evaluation/education;Neuromuscular reeducation;Fine motor coordination activities;Compensatory technique education;UE splinting;Continued evaluation;Cardiac education;Energy conservation;Activityengagement   Goal Expiration Date 05/17/24   OT Treatment Day 1   OT Frequency 2-3x/wk   Discharge Recommendation   Rehab Resource Intensity Level, OT II (Moderate Resource Intensity)   AM-PAC Daily Activity Inpatient   Lower Body Dressing 1   Bathing 2   Toileting 1   Upper Body Dressing 2   Grooming 2   Eating 4   Daily Activity Raw Score 12   Daily Activity Standardized Score (Calc for Raw Score >=11) 30.6   AM-PAC Applied Cognition Inpatient   Following a Speech/Presentation 2   Understanding Ordinary Conversation 3   Taking Medications 2   Remembering Where Things Are Placed or Put Away 2   Remembering List of 4-5 Errands 2   Taking Care of Complicated Tasks 2   Applied Cognition Raw Score 13   Applied Cognition Standardized Score 30.46   Additional Treatment Session   Start Time 1200    End Time 1209   Treatment Assessment Pt completed OT tx session #1 focused on ADL performance and functional mobility. Pt seated at EOB at beginning of session. Additional failed attempt of STS from EOB with Max A x2 but unable to clear buttocks. Sit to supine @ Total A x3. Once supine in bed, Total A x3 to roll L/R to situate in bed and for pericare d/t incontinence. O2 decreasing with bed mobility rolling and HOB lowered but quickly recovering with repositioning. Pt seated OOB in chair at end of session with all needs met.     The patient's raw score on the AM-PAC Daily Activity Inpatient Short Form is 12. A raw score of less than 19 suggests the patient may benefit from discharge to post-acute rehabilitation services. Please refer to the recommendation of the Occupational Therapist for safe discharge planning.    Pt goals to be met by 5/17/2024    Pt will demonstrate ability to complete grooming/hygiene tasks @ S while seated after set-up  Pt will demonstrate ability to complete UB ADLs including washing/dressing @ S/set-up in order to increase performance and participation during meaningful tasks  Pt will demonstrate ability to complete LB dressing @ Mod A in order to increase safety and independence during meaningful tasks.   Pt will demonstrate ability to complete toileting tasks including CM and pericare @ Mod A in order to increase safety and independence during meaningful tasks.  Pt will demonstrate ability to complete EOB, chair, toilet/commode transfers @ Mod A in order to increase performance and participation during functional tasks.  Pt will demonstrate ability to stand for 1-2 minutes while maintaining Fair + balance with use of RW for UB support PRN.  Pt will demonstrate Good carryover of use of energy conservation/compensatory strategies during ADLs and functional tasks in order to increase safety and reduce risk for falls.   Pt will demonstrate Good attention and participation in continued  evaluation of functional ambulation house hold distances in order to assist with safe d/c planning.  Pt will attend to continued cognitive assessments 100% of the time in order to provide most appropriate d/c recommendations.   Pt will follow 100% simple 2-step commands and be A&O x4 consistently with environmental cues to increase participation in functional activities.   Pt will identify 3 areas of interest/hobbies and 1 intervention on how to incorporate into daily life in order to increase interaction with environment and peers as well as increase participation in meaningful tasks.   Pt will demonstrate 100% carryover of BUE HEP in order to increase BUE MS and increase performance during functional tasks upon d/c home.    Gabriela Simon, OTR/L

## 2024-05-03 NOTE — PROGRESS NOTES
[facility]  Progress Note  Name: Tigist Hewitt I  MRN: [id number]  Unit/Bed#: MS Ally I Date of Admission: 5/2/2024   Date of Service: 5/3/2024 I Hospital Day: 1    Assessment/Plan   * Acute on chronic diastolic congestive heart failure (HCC)  Assessment & Plan  Wt Readings from Last 3 Encounters:   05/03/24 (!) 161 kg (354 lb 15.1 oz)   01/02/24 (!) 149 kg (328 lb 0.7 oz)   10/29/22 (!) 151 kg (331 lb 12.7 oz)     2d echo from December 2023 shows normal EF but abnormal diastolic function and severe tricuspid regurg.  Patient normally takes torsemide 60 mg twice daily and metolazone once a week.  She has gained approximately 20 to 30 pounds at least and was asked by her cardiologist to increase metolazone however lately she has been very somnolent and not taking her meds for the last 2 to 3 days.    cont on a Lasix drip and increase to 20 mg/hr. consult cardiology monitor electrolytes.  Not diuresing much.improvement noted in blue discoloration of her fingers and toes.maybe secondary to hypoxia or Raynaud's.  Chronically uses 4 L of oxygen and CPAP at at bedtime will continue for now          Morbid obesity (HCC)  Assessment & Plan  bMI 69.56 will need diuresis and counseling on diet exercise and lifestyle modification    Type 2 diabetes mellitus with hyperglycemia, with long-term current use of insulin ([facility])  Assessment & Plan  Lab Results   Component Value Date    HGBA1C 7.0 (H) 05/02/2024       Recent Labs     05/02/24  1658 05/02/24  2103 05/03/24  0723 05/03/24  1216   POCGLU 97 135 120 123       Blood Sugar Average: Last 72 hrs:  (P) 118.75Placed on modified regimen of insulin sliding scale and diabetic diet for now  She is nauseous and oral intake is decreased and hence stopped Lantus      COPD (chronic obstructive pulmonary disease) ([facility])  Assessment & Plan  Not in exacerbation.  Continue home regimen  Has chronic respiratory failure uses 4 L of oxygen at baseline along  with CPAP will continue also check ABG to evaluate CO2 and O2 levels    Atrial fibrillation (HCC)  Assessment & Plan  Currently rate controlled with Toprol-XL and INR is therapeutic normally takes Coumadin 7.5 mg daily will place on 5 mg for now and observe               VTE Pharmacologic Prophylaxis: VTE Score: 2 Moderate Risk (Score 3-4) - Pharmacological DVT Prophylaxis Ordered: warfarin (Coumadin).    Mobility:   Basic Mobility Inpatient Raw Score: 11  -[facility] Goal: 4: Move to chair/commode  JH-HLM Achieved: 2: Bed activities/Dependent transfer  JH-HLM Goal achieved. Continue to encourage appropriate mobility.    Patient Centered Rounds: I performed bedside rounds with nursing staff today.   Discussions with Specialists or Other Care Team Provider: none    Education and Discussions with Family / Patient: will update    Total Time Spent on Date of Encounter in care of patient: 35 mins. This time was spent on one or more of the following: performing physical exam; counseling and coordination of care; obtaining or reviewing history; documenting in the medical record; reviewing/ordering tests, medications or procedures; communicating with other healthcare professionals and discussing with patient's family/caregivers.    Current Length of Stay: 1 day(s)  Current Patient Status: Inpatient   Certification Statement: The patient will continue to require additional inpatient hospital stay due to chf  Discharge Plan: Anticipate discharge in 48-72 hrs to discharge location to be determined pending rehab evaluations.    Code Status: Level 3 - DNAR and DNI    Subjective:   Patient denies any chest pain or shortness of breath complaining of nausea today and her hands and feet are not blue today like yesterday.  Awake and alert.  Denies any abdominal pain or diarrhea    Objective:     Vitals:   Temp (24hrs), Av.7 °F (36.5 °C), Min:97.2 °F (36.2 °C), Max:98.2 °F (36.8 °C)    Temp:  [97.2 °F (36.2 °C)-98.2 °F (36.8 °C)] 97.5  °F (36.4 °C)  HR:  [] 70  Resp:  [15-18] 18  BP: (103-126)/(61-76) 111/69  SpO2:  [80 %-96 %] 80 %  Body mass index is 67.07 kg/m².     Input and Output Summary (last 24 hours):     Intake/Output Summary (Last 24 hours) at 5/3/2024 1500  Last data filed at 5/3/2024 1029  Gross per 24 hour   Intake 480 ml   Output 587 ml   Net -107 ml       Physical Exam:   Physical Exam  Vitals and nursing note reviewed.   Constitutional:       Appearance: Normal appearance.   HENT:      Head: Normocephalic and atraumatic.      Right Ear: External ear normal.      Left Ear: External ear normal.      Nose: Nose normal.      Mouth/Throat:      Pharynx: Oropharynx is clear.   Cardiovascular:      Rate and Rhythm: Normal rate and regular rhythm.      Heart sounds: Normal heart sounds.   Pulmonary:      Effort: Pulmonary effort is normal.      Comments: mod air entry bilaterally with mild decreased breath sounds bilateral bases  Abdominal:      General: Bowel sounds are normal.      Palpations: Abdomen is soft.      Tenderness: There is no abdominal tenderness.   Musculoskeletal:         General: Normal range of motion.      Cervical back: Normal range of motion and neck supple.      Right lower leg: Edema present.      Left lower leg: Edema present.   Skin:     General: Skin is warm and dry.      Capillary Refill: Capillary refill takes less than 2 seconds.   Neurological:      General: No focal deficit present.      Mental Status: She is alert and oriented to person, place, and time.   Psychiatric:         Mood and Affect: Mood normal.            Additional Data:     Labs:  Results from last 7 days   Lab Units 05/03/24  0502 05/02/24  1209   WBC Thousand/uL 6.48 8.08   HEMOGLOBIN g/dL 11.7 11.8   HEMATOCRIT % 36.5 37.2   PLATELETS Thousands/uL 159 176   SEGS PCT %  --  70   LYMPHO PCT %  --  13*   MONO PCT %  --  14*   EOS PCT %  --  1     Results from last 7 days   Lab Units 05/03/24  0502   SODIUM mmol/L 141  141   POTASSIUM  mmol/L 3.6  3.6   CHLORIDE mmol/L 101  101   CO2 mmol/L 31  31   BUN mg/dL 46*  46*   CREATININE mg/dL 1.06  1.06   ANION GAP mmol/L 9  9   CALCIUM mg/dL 9.5  9.5   ALBUMIN g/dL 3.3*   TOTAL BILIRUBIN mg/dL 1.75*   ALK PHOS U/L 113*   ALT U/L 7   AST U/L 22   GLUCOSE RANDOM mg/dL 121  121     Results from last 7 days   Lab Units 05/03/24  0502   INR  2.25*     Results from last 7 days   Lab Units 05/03/24  1216 05/03/24  0723 05/02/24  2103 05/02/24  1658   POC GLUCOSE mg/dl 123 120 135 97     Results from last 7 days   Lab Units 05/02/24  1738   HEMOGLOBIN A1C % 7.0*     Results from last 7 days   Lab Units 05/02/24  1209   LACTIC ACID mmol/L 1.5       Lines/Drains:  Invasive Devices       Peripheral Intravenous Line  Duration             Peripheral IV 05/02/24 Left;Ventral (anterior) Hand 1 day              Drain  Duration             External Urinary Catheter <1 day                      Telemetry:  Telemetry Orders (From admission, onward)               24 Hour Telemetry Monitoring  Continuous x 24 Hours ([place]lem)        Question:  Reason for 24 Hour Telemetry  Answer:  Decompensated CHF- and any one of the following: continuous diuretic infusion or total diuretic dose >200 mg daily, associated electrolyte derangement (I.e. K < 3.0), ionotropic drip (continuous infusion), hx of ventricular arrhythmia, or new EF < 35%                     Telemetry Reviewed: Atrial fibrillation. HR averaging 80  Indication for Continued Telemetry Use: Acute CHF on >200 mg lasix/day or equivalent dose or with new reduced EF.              Imaging: Reviewed radiology reports from this admission including: chest xray    Recent Cultures (last 7 days):   Results from last 7 days   Lab Units 05/02/24  1209   BLOOD CULTURE  Received in Microbiology Lab. Culture in Progress.  Received in Microbiology Lab. Culture in Progress.       Last 24 Hours Medication List:   Current Facility-Administered Medications   Medication Dose Route  Frequency Provider Last Rate    acetaminophen  650 mg Oral Q4H PRN Amanda Dukes MD      albuterol  2.5 mg Nebulization Q6H PRN Amanda Dukes MD      allopurinol  100 mg Oral Daily Amanda Dukes MD      atorvastatin  10 mg Oral Daily Amanda Dukes MD      ferrous sulfate  325 mg Oral Daily With Breakfast Amanda Dukes MD      Fluticasone Furoate-Vilanterol  1 puff Inhalation Daily Amanda Dukes MD      furosemide  20 mg/hr Intravenous Continuous Amanda Dukes MD 20 mg/hr (05/03/24 1452)    insulin lispro  1-6 Units Subcutaneous HS Amanda Dukes MD      insulin lispro  2-12 Units Subcutaneous TID AC Amanda Dukes MD      levothyroxine  288 mcg Oral Early Morning Amanda Dukes MD      loratadine  10 mg Oral Daily Amanda Dukes MD      metoprolol  2.5 mg Intravenous Q6H PRN Radha Wright MD      metoprolol succinate  37.5 mg Oral BID Radha Wright MD      midodrine  5 mg Oral TID AC Amanda Dukes MD      montelukast  10 mg Oral HS Amanda Dukes MD      multivitamin-minerals  1 tablet Oral Daily Amanda Dukes MD      nystatin   Topical BID Amanda Dukes MD      ondansetron  4 mg Intravenous Q6H PRN Amanda Dukes MD      oxyCODONE  10 mg Oral Q6H PRN Aaron Patterson MD      pantoprazole  40 mg Oral Daily Before Breakfast Amanda Dukes MD      potassium chloride  20 mEq Oral TID Amanda Dukes MD      warfarin  5 mg Oral Daily (warfarin) Amanda Dukes MD          Today, Patient Was Seen By: Amanda Dukes MD    **Please Note: This note may have been constructed using a voice recognition system.**

## 2024-05-03 NOTE — ASSESSMENT & PLAN NOTE
Lab Results   Component Value Date    HGBA1C 7.0 (H) 05/02/2024       Recent Labs     05/02/24  1658 05/02/24  2103 05/03/24  0723 05/03/24  1216   POCGLU 97 135 120 123       Blood Sugar Average: Last 72 hrs:  (P) 118.75Placed on modified regimen of insulin sliding scale and diabetic diet for now  She is nauseous and oral intake is decreased and hence stopped Lantus

## 2024-05-03 NOTE — CONSULTS
"Consult received for CHF Ed.     Pt reports nausea and vomiting at this time, emesis is basin on bedside table. Says she eats well at baseline though decreased appetite last Sunday, could not remember \"Monday/Tuesday\" due to extreme lethargy, H&P says last 2-3 days somnolence. Pt says she is aware of following low Na diet \"but not a carb restriction.\" Chart review of weight hx: 10/29/22 331lb, 12/30/23 339lb, 5/3/24 354lb. Pt with CHF, likely fluid related weight gain.     Pt not able to have sugar substitutes, unable to offer glucerna supplement to pt. Pt is ordered zofran, will monitor po intake and consider supplement alternatives at follow up as needed. Deferred diet ed at this time given nausea \"just the thought of food makes me sick.\"     Will adjust CCD 2 to level 3 to better match estimated needs.   "

## 2024-05-03 NOTE — PHYSICAL THERAPY NOTE
PHYSICAL THERAPY EVALUATION  NAME:  Tigist Hewitt  DATE: 05/03/24    AGE:   69 y.o.  Mrn:   [id number]  ADMIT DX:  Morbid obesity (HCC) [E66.01]  Acute on chronic diastolic heart failure (HCC) [I50.33]  Intertrigo [L30.4]  SOB (shortness of breath) [R06.02]  Chronic respiratory failure with hypoxia (HCC) [J96.11]    Past Medical History:   Diagnosis Date    Asthma     Atrial fibrillation (HCC)     COPD (chronic obstructive pulmonary disease) (HCC)     Diabetes mellitus (HCC)     Disease of thyroid gland     Heart failure (HCC)     Kidney failure     Morbid obesity due to excess calories (HCC)     NISHI (obstructive sleep apnea)      Length Of Stay: 1  Performed at least 2 patient identifiers during session: Name and Birthday  PHYSICAL THERAPY EVALUATION :    05/03/24 1134   PT Last Visit   PT Visit Date 05/03/24   Note Type   Note type Evaluation   Pain Assessment   Pain Assessment Tool FLACC   Pain Score 4   Pain Location/Orientation Orientation: Bilateral;Location: Leg   Pain Rating: FLACC (Rest) - Face 0   Pain Rating: FLACC (Rest) - Legs 0   Pain Rating: FLACC (Rest) - Activity 0   Pain Rating: FLACC (Rest) - Cry 0   Pain Rating: FLACC (Rest) - Consolability 0   Score: FLACC (Rest) 0   Pain Rating: FLACC (Activity) - Face 1   Pain Rating: FLACC (Activity) - Legs 0   Pain Rating: FLACC (Activity) - Activity 0   Pain Rating: FLACC (Activity) - Cry 1   Pain Rating: FLACC (Activity) - Consolability 0   Score: FLACC (Activity) 2   Restrictions/Precautions   Other Precautions Chair Alarm;Bed Alarm;Fall Risk;O2;Multiple lines;Pain   Home Living   Type of Home House   Home Layout Performs ADLs on one level;Able to live on main level with bedroom/bathroom;Multi-level   Bathroom Shower/Tub   (sponge bathes)   Bathroom Toilet   (BSC)   Home Equipment Wheelchair-manual;Walker   Additional Comments Pt reports living with her spouse and son in a 2SH. Pt sleeps in recliner lift chair and only completes SPT <> BSC and  "W/C at baseline with use of RW. Pt unabble to report last time she was able to ambulate short distances.   Prior Function   Level of [place] Needs assistance with ADLs;Needs assistance with functional mobility;Needs assistance with IADLS   Lives With Spouse;Son   Receives Help From Family;Friend(s)   IADLs Family/Friend/Other provides meals;Family/Friend/Other provides transportation;Family/Friend/Other provides medication management   Falls in the last 6 months  1 to 4   Comments Spouse used to asssit with all ADL/IADL tasks but underwent partial amoutation of foot and is no longer able to provide extensive assist, friend helps with bathing/dressing   General   Additional Pertinent History Pt admitted in January 2024. Declined post acute rehab and returned to home but unable to omplete mobility and returned almost immediately. Discharged to rehab. Returned home some time in February. Pt wiht significant B LE edema.   Cognition   Arousal/Participation Lethargic   Orientation Level Oriented X4   Following Commands Follows one step commands with increased time or repetition   Comments decreased insight to deficits, requires increased time to respond and cues for increased alertness.   Subjective   Subjective \"I went to rehab to stand pivot and go home. It is too soon to tell right now if I need rehab.\"   RLE Assessment   RLE Assessment   (minimal AROM B LEs. strength 2-/5)   LLE Assessment   LLE Assessment   (minimal AROM B LEs. strength 2-/5)   Bed Mobility   Supine to Sit 2  Maximal assistance   Additional items Assist x 2;Increased time required;Verbal cues;LE management  (trunk management)   Additional Comments HOB elevated > 30 degrees. pt sleeps in recliner lift chair. requires maxAx2 to ahcieve sitting on EOB wiht manaul cues for trunk and LE management.   Transfers   Sit to Stand   (unable to clear buttock despite maxAx2)   Stand pivot Unable to assess   Additional Comments walker blocked, pt attempting " "to pull form anteiror aspect of RW. cues of rhand placement ofr saftey. unable to clear buttock despite maxAx2 with RW and 3rd person holding RW in place.   Balance   Static Sitting Fair   Dynamic Sitting Fair -   Endurance Deficit   Endurance Deficit Yes   Endurance Deficit Description fatigue, ALBERT. Spo2 on 2 lpm NC at rest 94%   Activity Tolerance   Activity Tolerance Patient limited by fatigue;Patient limited by pain   Medical Staff Made Aware Gabriela DE. RNMaday aware of concern for safety with mobility with patient changing to Kreg bed. Despite patient's girth, Kreg bed not advised due to patient's height and risk for falls sliding off bed due to mechanical limitations with height of bed. recommend asif bed that goes 12\" from ground to allow for safe mobility and sitting balance at EOB.   Nurse Made Aware Scooter WALSH   Assessment   Prognosis Guarded   Problem List Decreased strength;Decreased range of motion;Decreased endurance;Impaired balance;Decreased mobility;Impaired judgement;Decreased safety awareness;Obesity;Decreased skin integrity;Pain   Barriers to Discharge Decreased caregiver support;Inaccessible home environment   Barriers to Discharge Comments unable to stand, fall risk, decreased insight to deficits. significant assistance with mobility   Goals   Patient Goals \"Go home\"   [facility] Expiration Date 05/17/24   PT Treatment Day 1   Plan   Treatment/Interventions ADL retraining;Functional transfer training;LE strengthening/ROM;Therapeutic exercise;Endurance training;Patient/family training;Equipment eval/education;Bed mobility;Gait training;Compensatory technique education;Spoke to nursing;Spoke to case management;OT   PT Frequency 2-3x/wk   Discharge Recommendation   Rehab Resource Intensity Level, PT II (Moderate Resource Intensity)   Additional Comments should patient decline post acute rehab, will require heather lift for safe return to home   AM-PAC Basic Mobility Inpatient   Turning in Flat Bed " Without Bedrails 1   Lying on Back to Sitting on Edge of Flat Bed Without Bedrails 1   Moving Bed to Chair 1   Standing Up From Chair Using Arms 1   Walk in Room 1   Climb 3-5 Stairs With Railing 1   Basic Mobility Inpatient Raw Score 6   Turning Head Towards Sound 3   Follow Simple Instructions 3   Low Function Basic Mobility Raw Score  12   Low Function Basic Mobility Standardized Score  18.33   [facility] Highest Level Of Mobility   [facility] Goal 2: Bed activities/Dependent transfer   [facility] Achieved 3: Sit at edge of bed   Additional Treatment Session   Start Time 1159   End Time 1210   Treatment Assessment Pt tolerated session poorly. She requires significant assistance to complete mobility and attempt to stand, unable to clear buttock despite maximal effort. She requires signficiant assistance to roll and desaturates O2 with head of bed flat for repsotioning. She is limtied byd ecreased strengfth, balance, endurance and pain. She will continue to beenfit from PT services to [facility].   Equipment Use use of RW. attempted sit to stand with right knee blocked and pt pulling from RW with 3rd person stabilizing walker. pt unable to clear buttock despite maxAx2. returned to supine wiht maxAx3 with manual cues for LE, trunk management. rolling to left with maxAx2 with manaul cues for completion. pt taling throughout pericare and positoning in bed. upon return to supine, patient cyanotic. Spo2 rapidly decreased to 60s to 70s briefly. cues for pursed lip breathing, with head elevated and cues for breathing, patient SpO2 increased to mid 90s with O2 at 3 lpm. returned to supine for repositioning, Spo2 rapidly decreased to 86% and returned to mid 90s on 2 lpm once head elevated. pt verbalized understanding of need for post acute rehab   End of Consult   Patient Position at End of Consult Supine;Bed/Chair alarm activated;All needs within reach       Pt requires PT/OT co-eval/treat due to medical complexity, safety  concerns, fall risk, significant assistance with mobility and/or cognitive-behavioral impairments.    (Please find full objective findings from PT assessment regarding body systems outlined above).     Assessment: Pt is a 69 y.o. female seen for PT evaluation s/p admission to [facility] on 5/2/2024 with Acute on chronic diastolic congestive heart failure (HCC).  Order placed for PT services.  Upon evaluation: Pt is presenting with impaired functional mobility due to pain, decreased strength, decreased ROM, decreased endurance, impaired balance, gait deviations, impaired cognition, decreased safety awareness, impaired judgment, fall risk, LE edema, and impaired skin integrity requiring  maximal of 2 assistance for bed mobility, mechanical conveyance from nursing standpoint for OOB mobility, and unable to clear buttock despite maxAx2 for sit to stand trial . Pt's clinical presentation is currently unpredictable given the functional mobility deficits above, especially weakness, decreased ROM, edema of extremities, decreased skin integrity, decreased endurance, impaired balance, pain, decreased activity tolerance, decreased functional mobility tolerance, decreased safety awareness, and impaired judgement, coupled with fall risks as indicated by AM-PAC 6-Clicks: 6/24 as well as hx of falls, impaired balance, polypharmacy, impaired judgement, decreased safety awareness, and obesity and combined with medical complications of pain impacting overall mobility status, abnormal renal lab values, need for input for mobility technique/safety, and acute CHF .  Pt's PMHx and comorbidities that may affect physical performance and progress include: A fib, asthma, CHF, COPD on 4 lpm NC at baseline (pt noted to be on 2 lpm during evaluation with Spo2 at 94% at rest supine), DM, obesity, and kidney failure. Personal factors affecting pt at time of IE include: inaccessible home environment, limited home support,  inability to perform IADLs, inability to perform ADLs, inability to navigate level surfaces without external assistance, inability to ambulate household distances, limited insight into impairments, and recent fall(s)/fall history. Pt will benefit from continued skilled PT services to address deficits as defined above and to maximize level of functional mobility to facilitate return toward PLOF and improved QOL. From PT/mobility standpoint, recommendation at time of d/c would be Level II (Moderate Resource Intensity in order to reduce fall risk and maximize pt's functional independence and consistency with mobility. Recommend trial with walker next 1-2 sessions and ther ex next 1-2 sessions.       The patient's AM-PAC Basic Mobility Inpatient Short Form Raw Score is 6. A Raw score of less than or equal to 16 suggests the patient may benefit from discharge to post-acute rehabilitation services. Please also refer to the recommendation of the Physical Therapist for safe discharge planning.       Goals: Pt will: Perform bed mobility tasks with consistent modA of 1 to reposition in bed and prepare for transfers. Pt will perform transfers with consistent modA of 1  to 2 to decrease burden of care, decrease risk for falls, improve ease of transfers, and improve activity tolerance and prepare for ambulation. Pt will ambulate with RW for >/= 10' with   modAx2   to decrease burden of care, decrease risk for falls, improve ease of transfers, and improve activity tolerance and to access home environment. Pt will participate in objective balance assessment to determine baseline fall risk. Pt will increase B LE strength >/= 1/2 MMT grade to facilitate functional mobility.      Linda Alberto, PT,DPT

## 2024-05-03 NOTE — CASE MANAGEMENT
Case Management Assessment & Discharge Planning Note    Patient name Tigist Hewitt  Location /-01 MRN [id number]  : 1955 Date 5/3/2024       Current Admission Date: 2024  Current Admission Diagnosis:Acute on chronic diastolic congestive heart failure (HCC)   Patient Active Problem List    Diagnosis Date Noted    Ambulatory dysfunction 2024    Hyperkalemia 2024    Lung cyst 2024    Abnormal CT scan, pelvis 2023    CKD (chronic kidney disease) stage 3, GFR 30-59 ml/min ([facility]) 2023    NISHI (obstructive sleep apnea) 2023    Intertrigo 10/29/2022    Vaginal bleeding 10/23/2022    Acute kidney injury superimposed on chronic kidney disease  ([facility]) 10/22/2022    Hypotension 10/22/2022    Acute on chronic diastolic congestive heart failure ([facility]) 2022    IMTIAZ (acute kidney injury) ([facility]) 2022    Morbid obesity ([facility]) 2022    Supratherapeutic INR 04/15/2020    Chronic respiratory failure with hypoxia ([facility]) 2020    Asthma 2020    Atrial fibrillation ([facility]) 2020    COPD (chronic obstructive pulmonary disease) ([facility]) 2020    Type 2 diabetes mellitus with hyperglycemia, with long-term current use of insulin ([facility]) 2020    Shortness of breath 2020    Anemia 2020      LOS (days): 1  Geometric Mean LOS (GMLOS) (days):   Days to GMLOS:     OBJECTIVE:    Risk of Unplanned Readmission Score: 28.97         Current admission status: Inpatient  Referral Reason:  (dc planning)    Preferred Pharmacy:   [id number] [place] [id number]  Phone: [phone] Fax: [phone]    Primary Care Provider: Marisa Haque MD    Primary Insurance: [employer]  Secondary Insurance:     CM met with patient at the bedside,baseline information  was obtained. CM discussed the role of CM in helping the patient develop a discharge plan and assist the patient in  carry out their plan.    ASSESSMENT:  Active Health Care Proxies    There are no active Health Care Proxies on file.       Advance Directives  Does patient have a Health Care POA?: Yes  Does patient have Advance Directives?: Yes  Primary Contact: Kenneth Hewitt (Spouse)  [phone]         Readmission Root Cause  30 Day Readmission: No    Patient Information  Admitted from:: Home  Mental Status: Alert  During Assessment patient was accompanied by: Not accompanied during assessment  Assessment information provided by:: Patient  Primary Caregiver: Family  Support Systems: Son, Friend  County of Residence: [facility]  What [facility] do you live in?: [place]  Home entry access options. Select all that apply.: No steps to enter home  Type of Current Residence: Bi-level  Upon entering residence, is there a bedroom on the main floor (no further steps)?: No (patient stays in the Rec room of her home for about 9 years)  Upon entering residence, is there a bathroom on the main floor (no further steps)?: Yes  Living Arrangements: Lives w/ Son, Lives w/ Spouse/significant other  Is patient a ?: No    Activities of Daily Living Prior to Admission  Functional Status: Assistance  Completes ADLs independently?: No  Level of ADL dependence: Assistance  Ambulates independently?: No  Level of ambulatory dependence: Assistance  Does patient use assisted devices?: Yes  Assisted Devices (DME) used: Home Oxygen concentrator, Walker, Wheelchair, CPAP, Portable Oxygen tanks, Bedside Commode, Other (Comment) (Lift chair recliner)  DME Company Name (respiratory supplies): [employer], for 02 and CPAP  O2 Rate(s): 4 liters 24/7  Does patient currently own DME?: Yes  What DME does the patient currently own?: Nebulizer, Bedside Commode, Straight Cane, Walker, Wheelchair, Home Oxygen concentrator, CPAP, Portable Oxygen tanks  Does patient have a history of Outpatient Therapy (PT/OT)?: No  Does the patient have a history of Short-Term  Rehab?: Yes ([facility])  Does patient have a history of HHC?: Yes ([facility])  Does patient currently have HHC?: No         Patient Information Continued  Income Source: Pension/[employer]  Does patient have prescription coverage?: Yes  Does patient have a history of substance abuse?: No         Means of Transportation  Means of Transport to [facility]:: Other (Comment) ([facility] transport)    Per patient her son resides with her and her .  Son does assist patient when he is not working at [facility] [employer].  Per patient she sleeps in a lift recliner chair, uses a walker to stand pivot to the [facility] which is next to her chair. Pt shared her spouse is not able to assist as much as prior related to his sanna issues and partial amputation of foot.  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  We did discuss transport home- per patient her son can bring the WC and 02 and the team here can arrange WC transport.  DISCHARGE DETAILS:    Discharge planning discussed with:: patient  Freedom of Choice: Yes  Comments - Freedom of Choice: Discussed care needs and potential dc needs--- pt is unsure what she will need or want. ( STR vs HHC) however agreeable for an early referral to [facility] with [facility] as pt had them in the past. Pt is active with [facility]  CM contacted family/caregiver?: No- see comments (declined at this time)             Discharge Destination Plan::  (TBD, based on stability and functional status)      Minimally anticipate HHC, pt was agreeable [facility].  - referral placed to [facility].   However functional status to be determine.    CM will continue to follow for dc needs.

## 2024-05-03 NOTE — ASSESSMENT & PLAN NOTE
Wt Readings from Last 3 Encounters:   05/03/24 (!) 161 kg (354 lb 15.1 oz)   01/02/24 (!) 149 kg (328 lb 0.7 oz)   10/29/22 (!) 151 kg (331 lb 12.7 oz)     2d echo from December 2023 shows normal EF but abnormal diastolic function and severe tricuspid regurg.  Patient normally takes torsemide 60 mg twice daily and metolazone once a week.  She has gained approximately 20 to 30 pounds at least and was asked by her cardiologist to increase metolazone however lately she has been very somnolent and not taking her meds for the last 2 to 3 days.    cont on a Lasix drip and increase to 20 mg/hr. consult cardiology monitor electrolytes.  Not diuresing much.improvement noted in blue discoloration of her fingers and toes.maybe secondary to hypoxia or Raynaud's.  Chronically uses 4 L of oxygen and CPAP at at bedtime will continue for now

## 2024-05-03 NOTE — PLAN OF CARE
Problem: PHYSICAL THERAPY ADULT  Goal: Performs mobility at highest level of function for planned discharge setting.  See evaluation for individualized goals.  Description: Treatment/Interventions: ADL retraining, Functional transfer training, LE strengthening/ROM, Therapeutic exercise, Endurance training, Patient/family training, Equipment eval/education, Bed mobility, Gait training, Compensatory technique education, Spoke to nursing, Spoke to case management, OT          See flowsheet documentation for full assessment, interventions and recommendations.  Note: Prognosis: Guarded  Problem List: Decreased strength, Decreased range of motion, Decreased endurance, Impaired balance, Decreased mobility, Impaired judgement, Decreased safety awareness, Obesity, Decreased skin integrity, Pain  Assessment: Pt is a 69 y.o. female seen for PT evaluation s/p admission to [facility] on 5/2/2024 with Acute on chronic diastolic congestive heart failure (HCC).  Order placed for PT services.  Upon evaluation: Pt is presenting with impaired functional mobility due to pain, decreased strength, decreased ROM, decreased endurance, impaired balance, gait deviations, impaired cognition, decreased safety awareness, impaired judgment, fall risk, LE edema, and impaired skin integrity requiring  maximal of 2 assistance for bed mobility, mechanical conveyance from nursing standpoint for OOB mobility, and unable to clear buttock despite maxAx2 for sit to stand trial . Pt's clinical presentation is currently unpredictable given the functional mobility deficits above, especially weakness, decreased ROM, edema of extremities, decreased skin integrity, decreased endurance, impaired balance, pain, decreased activity tolerance, decreased functional mobility tolerance, decreased safety awareness, and impaired judgement, coupled with fall risks as indicated by AM-PAC 6-Clicks: 6/24 as well as hx of falls, impaired balance,  polypharmacy, impaired judgement, decreased safety awareness, and obesity and combined with medical complications of pain impacting overall mobility status, abnormal renal lab values, need for input for mobility technique/safety, and acute CHF .  Pt's PMHx and comorbidities that may affect physical performance and progress include: A fib, asthma, CHF, COPD on 4 lpm NC at baseline (pt noted to be on 2 lpm during evaluation with Spo2 at 94% at rest supine), DM, obesity, and kidney failure. Personal factors affecting pt at time of IE include: inaccessible home environment, limited home support, inability to perform IADLs, inability to perform ADLs, inability to navigate level surfaces without external assistance, inability to ambulate household distances, limited insight into impairments, and recent fall(s)/fall history. Pt will benefit from continued skilled PT services to address deficits as defined above and to maximize level of functional mobility to facilitate return toward PLOF and improved QOL. From PT/mobility standpoint, recommendation at time of d/c would be Level II (Moderate Resource Intensity in order to reduce fall risk and maximize pt's functional independence and consistency with mobility. Recommend trial with walker next 1-2 sessions and ther ex next 1-2 sessions.  Barriers to Discharge: Decreased caregiver support, Inaccessible home environment  Barriers to Discharge Comments: unable to stand, fall risk, decreased insight to deficits. significant assistance with mobility  Rehab Resource Intensity Level, PT: II (Moderate Resource Intensity)    See flowsheet documentation for full assessment.

## 2024-05-04 ENCOUNTER — APPOINTMENT (INPATIENT)
Dept: RADIOLOGY | Facility: HOSPITAL | Age: 69
DRG: 292 | End: 2024-05-04
Payer: COMMERCIAL

## 2024-05-04 PROBLEM — N30.00 ACUTE CYSTITIS WITHOUT HEMATURIA: Status: ACTIVE | Noted: 2024-05-04

## 2024-05-04 PROBLEM — M25.562 ACUTE PAIN OF LEFT KNEE: Status: ACTIVE | Noted: 2024-05-04

## 2024-05-04 LAB
ANION GAP SERPL CALCULATED.3IONS-SCNC: 7 MMOL/L (ref 4–13)
BACTERIA UR CULT: ABNORMAL
BUN SERPL-MCNC: 48 MG/DL (ref 5–25)
CALCIUM SERPL-MCNC: 9.7 MG/DL (ref 8.4–10.2)
CHLORIDE SERPL-SCNC: 101 MMOL/L (ref 96–108)
CO2 SERPL-SCNC: 34 MMOL/L (ref 21–32)
CREAT SERPL-MCNC: 1.11 MG/DL (ref 0.6–1.3)
CRP SERPL QL: 36.9 MG/L
GFR SERPL CREATININE-BSD FRML MDRD: 50 ML/MIN/1.73SQ M
GLUCOSE SERPL-MCNC: 135 MG/DL (ref 65–140)
GLUCOSE SERPL-MCNC: 144 MG/DL (ref 65–140)
GLUCOSE SERPL-MCNC: 151 MG/DL (ref 65–140)
GLUCOSE SERPL-MCNC: 157 MG/DL (ref 65–140)
GLUCOSE SERPL-MCNC: 193 MG/DL (ref 65–140)
INR PPP: 2.44 (ref 0.84–1.19)
LACTATE SERPL-SCNC: 1.3 MMOL/L (ref 0.5–2)
MAGNESIUM SERPL-MCNC: 2.1 MG/DL (ref 1.9–2.7)
POTASSIUM SERPL-SCNC: 3.7 MMOL/L (ref 3.5–5.3)
PROTHROMBIN TIME: 26.8 SECONDS (ref 11.6–14.5)
SODIUM SERPL-SCNC: 142 MMOL/L (ref 135–147)
URATE SERPL-MCNC: 10.7 MG/DL (ref 2–7.5)

## 2024-05-04 PROCEDURE — 84550 ASSAY OF BLOOD/URIC ACID: CPT | Performed by: FAMILY MEDICINE

## 2024-05-04 PROCEDURE — 86140 C-REACTIVE PROTEIN: CPT | Performed by: FAMILY MEDICINE

## 2024-05-04 PROCEDURE — 82948 REAGENT STRIP/BLOOD GLUCOSE: CPT

## 2024-05-04 PROCEDURE — 99233 SBSQ HOSP IP/OBS HIGH 50: CPT | Performed by: FAMILY MEDICINE

## 2024-05-04 PROCEDURE — 94760 N-INVAS EAR/PLS OXIMETRY 1: CPT

## 2024-05-04 PROCEDURE — 85610 PROTHROMBIN TIME: CPT | Performed by: FAMILY MEDICINE

## 2024-05-04 PROCEDURE — 83605 ASSAY OF LACTIC ACID: CPT | Performed by: FAMILY MEDICINE

## 2024-05-04 PROCEDURE — 83735 ASSAY OF MAGNESIUM: CPT | Performed by: FAMILY MEDICINE

## 2024-05-04 PROCEDURE — 80048 BASIC METABOLIC PNL TOTAL CA: CPT | Performed by: FAMILY MEDICINE

## 2024-05-04 PROCEDURE — 73560 X-RAY EXAM OF KNEE 1 OR 2: CPT

## 2024-05-04 RX ORDER — METOLAZONE 5 MG/1
2.5 TABLET ORAL DAILY
Status: DISCONTINUED | OUTPATIENT
Start: 2024-05-04 | End: 2024-05-06

## 2024-05-04 RX ORDER — CEFTRIAXONE 2 G/50ML
2000 INJECTION, SOLUTION INTRAVENOUS EVERY 24 HOURS
Status: DISCONTINUED | OUTPATIENT
Start: 2024-05-04 | End: 2024-05-05

## 2024-05-04 RX ORDER — INSULIN LISPRO 100 [IU]/ML
2-12 INJECTION, SOLUTION INTRAVENOUS; SUBCUTANEOUS
Status: DISCONTINUED | OUTPATIENT
Start: 2024-05-04 | End: 2024-05-12 | Stop reason: HOSPADM

## 2024-05-04 RX ORDER — PREDNISONE 20 MG/1
40 TABLET ORAL DAILY
Status: COMPLETED | OUTPATIENT
Start: 2024-05-04 | End: 2024-05-06

## 2024-05-04 RX ORDER — INSULIN GLARGINE 100 [IU]/ML
10 INJECTION, SOLUTION SUBCUTANEOUS
Status: DISCONTINUED | OUTPATIENT
Start: 2024-05-04 | End: 2024-05-05

## 2024-05-04 RX ORDER — ALLOPURINOL 100 MG/1
200 TABLET ORAL DAILY
Status: DISCONTINUED | OUTPATIENT
Start: 2024-05-05 | End: 2024-05-12 | Stop reason: HOSPADM

## 2024-05-04 RX ADMIN — METOPROLOL SUCCINATE 37.5 MG: 25 TABLET, EXTENDED RELEASE ORAL at 09:43

## 2024-05-04 RX ADMIN — WARFARIN SODIUM 5 MG: 5 TABLET ORAL at 17:05

## 2024-05-04 RX ADMIN — PANTOPRAZOLE SODIUM 40 MG: 40 TABLET, DELAYED RELEASE ORAL at 06:27

## 2024-05-04 RX ADMIN — LEVOTHYROXINE SODIUM 288 MCG: 88 TABLET ORAL at 06:27

## 2024-05-04 RX ADMIN — ALLOPURINOL 100 MG: 100 TABLET ORAL at 09:43

## 2024-05-04 RX ADMIN — INSULIN GLARGINE 10 UNITS: 100 INJECTION, SOLUTION SUBCUTANEOUS at 22:30

## 2024-05-04 RX ADMIN — POTASSIUM CHLORIDE 20 MEQ: 1500 TABLET, EXTENDED RELEASE ORAL at 20:42

## 2024-05-04 RX ADMIN — POTASSIUM CHLORIDE 20 MEQ: 1500 TABLET, EXTENDED RELEASE ORAL at 17:05

## 2024-05-04 RX ADMIN — METOLAZONE 2.5 MG: 5 TABLET ORAL at 12:54

## 2024-05-04 RX ADMIN — NYSTATIN: 100000 POWDER TOPICAL at 17:05

## 2024-05-04 RX ADMIN — MIDODRINE HYDROCHLORIDE 5 MG: 5 TABLET ORAL at 12:54

## 2024-05-04 RX ADMIN — Medication 1 TABLET: at 09:43

## 2024-05-04 RX ADMIN — MICONAZOLE NITRATE: 20 CREAM TOPICAL at 17:05

## 2024-05-04 RX ADMIN — MONTELUKAST 10 MG: 10 TABLET, FILM COATED ORAL at 22:30

## 2024-05-04 RX ADMIN — CEFTRIAXONE 2000 MG: 2 INJECTION, SOLUTION INTRAVENOUS at 09:44

## 2024-05-04 RX ADMIN — METOPROLOL SUCCINATE 37.5 MG: 25 TABLET, EXTENDED RELEASE ORAL at 20:42

## 2024-05-04 RX ADMIN — NYSTATIN: 100000 POWDER TOPICAL at 09:46

## 2024-05-04 RX ADMIN — ATORVASTATIN CALCIUM 10 MG: 10 TABLET, FILM COATED ORAL at 09:43

## 2024-05-04 RX ADMIN — MICONAZOLE NITRATE: 20 CREAM TOPICAL at 09:46

## 2024-05-04 RX ADMIN — INSULIN LISPRO 2 UNITS: 100 INJECTION, SOLUTION INTRAVENOUS; SUBCUTANEOUS at 22:30

## 2024-05-04 RX ADMIN — PREDNISONE 40 MG: 20 TABLET ORAL at 12:54

## 2024-05-04 RX ADMIN — LORATADINE 10 MG: 10 TABLET ORAL at 09:43

## 2024-05-04 RX ADMIN — POTASSIUM CHLORIDE 20 MEQ: 1500 TABLET, EXTENDED RELEASE ORAL at 09:43

## 2024-05-04 RX ADMIN — FERROUS SULFATE TAB 325 MG (65 MG ELEMENTAL FE) 325 MG: 325 (65 FE) TAB at 09:43

## 2024-05-04 RX ADMIN — MIDODRINE HYDROCHLORIDE 5 MG: 5 TABLET ORAL at 06:27

## 2024-05-04 RX ADMIN — Medication 20 MG/HR: at 19:27

## 2024-05-04 RX ADMIN — OXYCODONE HYDROCHLORIDE 10 MG: 10 TABLET ORAL at 09:52

## 2024-05-04 RX ADMIN — FLUTICASONE FUROATE AND VILANTEROL TRIFENATATE 1 PUFF: 100; 25 POWDER RESPIRATORY (INHALATION) at 09:45

## 2024-05-04 NOTE — PROGRESS NOTES
[facility]  Progress Note  Name: Tigist Hewitt I  MRN: [id number]  Unit/Bed#: MS Ally I Date of Admission: 5/2/2024   Date of Service: 5/4/2024 I Hospital Day: 2    Assessment/Plan   * Acute on chronic diastolic congestive heart failure (HCC)  Assessment & Plan  Wt Readings from Last 3 Encounters:   05/04/24 (!) 164 kg (360 lb 10.8 oz)   01/02/24 (!) 149 kg (328 lb 0.7 oz)   10/29/22 (!) 151 kg (331 lb 12.7 oz)     2d echo from December 2023 shows normal EF but abnormal diastolic function and severe tricuspid regurg.  Patient normally takes torsemide 60 mg twice daily and metolazone once a week.  She has gained approximately 20 to 30 pounds at least and was asked by her cardiologist to increase metolazone however lately she has been very somnolent and not taking her meds for the last 2 to 3 days.    cont on a Lasix drip and increase to 20 mg/hr. consult cardiology monitor electrolytes.  Not diuresing much.improvement noted in blue discoloration of her fingers and toes.maybe secondary to hypoxia or Raynaud's.  Chronically uses 4 L of oxygen and CPAP at at bedtime will continue for now  Added metolazone 2.5 mg daily          Acute cystitis without hematuria  Assessment & Plan  Placed on IV Rocephin.  UA and urine culture abnormal    Acute pain of left knee  Assessment & Plan  Patient has known left bad arthritis knee pain we will do x-ray and also placed on prednisone along with Tylenol for pain control check uric acid level and ESR.    Morbid obesity (HCC)  Assessment & Plan  bMI 69.56 will need diuresis and counseling on diet exercise and lifestyle modification    Type 2 diabetes mellitus with hyperglycemia, with long-term current use of insulin ([facility])  Assessment & Plan  Lab Results   Component Value Date    HGBA1C 7.0 (H) 05/02/2024       Recent Labs     05/03/24  1216 05/03/24  1702 05/03/24  2133 05/04/24  0804   POCGLU 123 131 133 135         Blood Sugar Average: Last 72  hrs:  (P) [id number] on modified regimen of insulin sliding scale and diabetic diet for now  On short course of prednisone and hence resume Lantus      COPD (chronic obstructive pulmonary disease) (HCC)  Assessment & Plan  Not in exacerbation.  Continue home regimen  Has chronic respiratory failure uses 4 L of oxygen at baseline along with CPAP will continue also check ABG to evaluate CO2 and O2 levels    Atrial fibrillation (HCC)  Assessment & Plan  Currently rate controlled with Toprol-XL and INR is therapeutic normally takes Coumadin 7.5 mg daily will place on 5 mg for now and observe               VTE Pharmacologic Prophylaxis: VTE Score: 2 Moderate Risk (Score 3-4) - Pharmacological DVT Prophylaxis Ordered: warfarin (Coumadin).    Mobility:   Basic Mobility Inpatient Raw Score: 6  JH-HLM Goal: 2: Bed activities/Dependent transfer  JH-HLM Achieved: 2: Bed activities/Dependent transfer  JH-HLM Goal achieved. Continue to encourage appropriate mobility.    Patient Centered Rounds: I performed bedside rounds with nursing staff today.   Discussions with Specialists or Other Care Team Provider:     Education and Discussions with Family / Patient:     Total Time Spent on Date of Encounter in care of patient: 35 mins. This time was spent on one or more of the following: performing physical exam; counseling and coordination of care; obtaining or reviewing history; documenting in the medical record; reviewing/ordering tests, medications or procedures; communicating with other healthcare professionals and discussing with patient's family/caregivers.    Current Length of Stay: 2 day(s)  Current Patient Status: Inpatient   Certification Statement: patient chf  Discharge Plan: Anticipate discharge in 48-72 hrs to discharge location to be determined pending rehab evaluations.    Code Status: Level 3 - DNAR and DNI    Subjective:   Patient denies any chest pain or shortness of breath or abdominal pain at this  time but complains of leg swelling and pain in her left knee that really bothers her limits her ability to move around    Objective:     Vitals:   Temp (24hrs), Av.3 °F (36.3 °C), Min:97 °F (36.1 °C), Max:97.9 °F (36.6 °C)    Temp:  [97 °F (36.1 °C)-97.9 °F (36.6 °C)] 97.7 °F (36.5 °C)  HR:  [62-81] 73  Resp:  [16-17] 16  BP: (108-118)/(59-75) 114/59  SpO2:  [90 %-98 %] 98 %  Body mass index is 68.15 kg/m².     Input and Output Summary (last 24 hours):     Intake/Output Summary (Last 24 hours) at 2024 1109  Last data filed at 2024 0401  Gross per 24 hour   Intake 360 ml   Output 500 ml   Net -140 ml       Physical Exam:   Physical Exam  Vitals and nursing note reviewed.   Constitutional:       Appearance: Normal appearance.   HENT:      Head: Normocephalic and atraumatic.      Right Ear: External ear normal.      Left Ear: External ear normal.      Nose: Nose normal.      Mouth/Throat:      Pharynx: Oropharynx is clear.   Cardiovascular:      Rate and Rhythm: Normal rate and regular rhythm.      Heart sounds: Normal heart sounds.   Pulmonary:      Effort: Pulmonary effort is normal.      Breath sounds: Normal breath sounds.   Abdominal:      General: Bowel sounds are normal.      Palpations: Abdomen is soft.      Tenderness: There is no abdominal tenderness.   Musculoskeletal:         General: Tenderness present. Normal range of motion.      Cervical back: Normal range of motion and neck supple.      Right lower leg: Edema present.      Left lower leg: Edema present.   Skin:     General: Skin is warm and dry.      Capillary Refill: Capillary refill takes less than 2 seconds.   Neurological:      General: No focal deficit present.      Mental Status: She is alert and oriented to person, place, and time.   Psychiatric:         Mood and Affect: Mood normal.            Additional Data:     Labs:  Results from last 7 days   Lab Units 24  0502 24  1209   WBC Thousand/uL 6.48 8.08   HEMOGLOBIN g/dL  11.7 11.8   HEMATOCRIT % 36.5 37.2   PLATELETS Thousands/uL 159 176   SEGS PCT %  --  70   LYMPHO PCT %  --  13*   MONO PCT %  --  14*   EOS PCT %  --  1     Results from last 7 days   Lab Units 05/04/24  0605 05/03/24  0502   SODIUM mmol/L 142 141  141   POTASSIUM mmol/L 3.7 3.6  3.6   CHLORIDE mmol/L 101 101  101   CO2 mmol/L 34* 31  31   BUN mg/dL 48* 46*  46*   CREATININE mg/dL 1.11 1.06  1.06   ANION GAP mmol/L 7 9  9   CALCIUM mg/dL 9.7 9.5  9.5   ALBUMIN g/dL  --  3.3*   TOTAL BILIRUBIN mg/dL  --  1.75*   ALK PHOS U/L  --  113*   ALT U/L  --  7   AST U/L  --  22   GLUCOSE RANDOM mg/dL 144* 121  121     Results from last 7 days   Lab Units 05/04/24  0605   INR  2.44*     Results from last 7 days   Lab Units 05/04/24  0804 05/03/24  2133 05/03/24  1702 05/03/24  1216 05/03/24  0723 05/02/24  2103 05/02/24  1658   POC GLUCOSE mg/dl 135 133 131 123 120 135 97     Results from last 7 days   Lab Units 05/02/24  1738   HEMOGLOBIN A1C % 7.0*     Results from last 7 days   Lab Units 05/04/24  0605 05/02/24  1209   LACTIC ACID mmol/L 1.3 1.5       Lines/Drains:  Invasive Devices       Peripheral Intravenous Line  Duration             Peripheral IV 05/02/24 Left;Ventral (anterior) Hand 1 day              Drain  Duration             External Urinary Catheter 1 day                      Telemetry:  Telemetry Orders (From admission, onward)               24 Hour Telemetry Monitoring  Continuous x 24 Hours (Telem)        Expiring   Question:  Reason for 24 Hour Telemetry  Answer:  Decompensated CHF- and any one of the following: continuous diuretic infusion or total diuretic dose >200 mg daily, associated electrolyte derangement (I.e. K < 3.0), ionotropic drip (continuous infusion), hx of ventricular arrhythmia, or new EF < 35%                     Telemetry Reviewed: Normal Sinus Rhythm  Indication for Continued Telemetry Use: No indication for continued use. Will discontinue.              Imaging: Reviewed  radiology reports from this admission including: xray(s)    Recent Cultures (last 7 days):   Results from last 7 days   Lab Units 05/02/24  1443 05/02/24  1209   BLOOD CULTURE   --  No Growth at 24 hrs.  No Growth at 24 hrs.   URINE CULTURE  60,000-69,000 cfu/ml Gram Negative Fer*  --        Last 24 Hours Medication List:   Current Facility-Administered Medications   Medication Dose Route Frequency Provider Last Rate    acetaminophen  650 mg Oral Q4H PRN Amanda Dukes MD      albuterol  2.5 mg Nebulization Q6H PRN Amanda Dukes MD      [START ON 5/5/2024] allopurinol  200 mg Oral Daily Amanda Dukes MD      atorvastatin  10 mg Oral Daily Amanda Dukes MD      cefTRIAXone  2,000 mg Intravenous Q24H Amanda Dukes MD 2,000 mg (05/04/24 0944)    ferrous sulfate  325 mg Oral Daily With Breakfast Amanda Dukes MD      Fluticasone Furoate-Vilanterol  1 puff Inhalation Daily Amanda Dukes MD      furosemide  20 mg/hr Intravenous Continuous Amanda Dukes MD 20 mg/hr (05/03/24 1731)    insulin lispro  1-6 Units Subcutaneous HS Amanda Dukes MD      insulin lispro  2-12 Units Subcutaneous TID AC Amanda Dukes MD      levothyroxine  288 mcg Oral Early Morning Amanda Dukes MD      loratadine  10 mg Oral Daily Amanda Dukes MD      metolazone  2.5 mg Oral Daily Amanda Dukes MD      metoprolol  2.5 mg Intravenous Q6H PRN Radha Wright MD      metoprolol succinate  37.5 mg Oral BID Radha Wright MD      midodrine  5 mg Oral TID AC Amanda Dukes MD      SERGO ANTIFUNGAL   Topical BID Amanda Dukes MD      montelukast  10 mg Oral HS Amanda Dukes MD      multivitamin-minerals  1 tablet Oral Daily Amanda Dukes MD      nystatin   Topical BID Amanda Dukes MD      ondansetron  4 mg Intravenous Q6H PRN Amanda Dukes MD      oxyCODONE  10 mg Oral Q6H PRN Aaron Patterson MD      pantoprazole  40 mg Oral Daily Before Breakfast Amanda Dukes MD      potassium chloride  20 mEq Oral TID Amanda Dukes MD      predniSONE  40 mg Oral Daily Amanda Dukes MD       warfarin  5 mg Oral Daily (warfarin) Amanda Dukes MD          Today, Patient Was Seen By: Amanda Dukes MD    **Please Note: This note may have been constructed using a voice recognition system.**

## 2024-05-04 NOTE — UTILIZATION REVIEW
"NOTIFICATION OF INPATIENT ADMISSION   AUTHORIZATION REQUEST   SERVICING FACILITY:   [facility]  Address: [address] [place] [address]  Tax ID: [id number]  NPI: [id number] ATTENDING PROVIDER:  Attending Name and NPI#: Amanda Dukes Md [[id number]]  Address: [address] [place] [address]  Phone: [phone]   ADMISSION INFORMATION:  Place of Service: Inpatient [facility]  Place of Service Code: 21  Inpatient Admission Date/Time: 5/2/24  2:18 PM  Discharge Date/Time: No discharge date for patient encounter.  Admitting Diagnosis Code/Description:  Morbid obesity (HCC) [E66.01]  Acute on chronic diastolic heart failure (HCC) [I50.33]  Intertrigo [L30.4]  SOB (shortness of breath) [R06.02]  Chronic respiratory failure with hypoxia (HCC) [J96.11]     UTILIZATION REVIEW CONTACT:  Yara \"Ebony\"Perry Utilization   Network Utilization Review Department  Phone: [phone]  Fax: [phone]  Email: [email or web address]  Contact for approvals/pending authorizations, clinical reviews, and discharge.     PHYSICIAN ADVISORY SERVICES:  Medical Necessity Denial & Wmye-dc-Zlql Review  Phone: [phone]  Fax: [phone]  Email: [email or web address]     DISCHARGE SUPPORT TEAM:  For Patients Discharge Needs & Updates  Phone: [phone] opt. 2 Fax: [phone]  Email: [email or web address]     "

## 2024-05-04 NOTE — RESPIRATORY THERAPY NOTE
Pt home cpap set up hose attached , 2l O2 bled in nd sterile water container filled   pt states she can put herself on

## 2024-05-04 NOTE — UTILIZATION REVIEW
SEE INITIAL REVIEW AT BOTTOM     5/4   Remains on   lasix drip.  Not diuresing much.    Discoloration of fingers  and toes improving, possibly  Raynauids or due to hypoxia.  On  O2  chronically  at 4 L NC.  Complains of  leg swelling  and pain  in left knee, limiting  ambulation.   Continue  current meds.  Still with LE  swelling.

## 2024-05-04 NOTE — ASSESSMENT & PLAN NOTE
Wt Readings from Last 3 Encounters:   05/04/24 (!) 164 kg (360 lb 10.8 oz)   01/02/24 (!) 149 kg (328 lb 0.7 oz)   10/29/22 (!) 151 kg (331 lb 12.7 oz)     2d echo from December 2023 shows normal EF but abnormal diastolic function and severe tricuspid regurg.  Patient normally takes torsemide 60 mg twice daily and metolazone once a week.  She has gained approximately 20 to 30 pounds at least and was asked by her cardiologist to increase metolazone however lately she has been very somnolent and not taking her meds for the last 2 to 3 days.    cont on a Lasix drip and increase to 20 mg/hr. consult cardiology monitor electrolytes.  Not diuresing much.improvement noted in blue discoloration of her fingers and toes.maybe secondary to hypoxia or Raynaud's.  Chronically uses 4 L of oxygen and CPAP at at bedtime will continue for now  Added metolazone 2.5 mg daily

## 2024-05-04 NOTE — PLAN OF CARE
Problem: Potential for Falls  Goal: Patient will remain free of falls  Description: INTERVENTIONS:  - Educate patient/family on patient safety including physical limitations  - Instruct patient to call for assistance with activity   - Consult OT/PT to assist with strengthening/mobility   - Keep Call bell within reach  - Keep bed low and locked with side rails adjusted as appropriate  - Keep care items and personal belongings within reach  - Initiate and maintain comfort rounds  - Make Fall Risk Sign visible to staff      Problem: CARDIOVASCULAR - ADULT  Goal: Maintains optimal cardiac output and hemodynamic stability  Description: INTERVENTIONS:  - Monitor I/O, vital signs and rhythm  - Monitor for S/S and trends of decreased cardiac output  - Administer and titrate ordered vasoactive medications to optimize hemodynamic stability  - Assess quality of pulses, skin color and temperature  - Assess for signs of decreased coronary artery perfusion  - Instruct patient to report change in severity of symptoms  Outcome: Progressing     Problem: RESPIRATORY - ADULT  Goal: Achieves optimal ventilation and oxygenation  Description: INTERVENTIONS:  - Assess for changes in respiratory status  - Assess for changes in mentation and behavior  - Position to facilitate oxygenation and minimize respiratory effort  - Oxygen administered by appropriate delivery if ordered  - Initiate smoking cessation education as indicated  - Encourage broncho-pulmonary hygiene including cough, deep breathe, Incentive Spirometry  - Assess the need for suctioning and aspirate as needed  - Assess and instruct to report SOB or any respiratory difficulty  - Respiratory Therapy support as indicated  Outcome: Progressing        - Apply yellow socks and bracelet for high fall risk patients  - Consider moving patient to room near nurses station  Outcome: Progressing

## 2024-05-04 NOTE — ASSESSMENT & PLAN NOTE
Patient has known left bad arthritis knee pain we will do x-ray and also placed on prednisone along with Tylenol for pain control check uric acid level and ESR.

## 2024-05-04 NOTE — ASSESSMENT & PLAN NOTE
Lab Results   Component Value Date    HGBA1C 7.0 (H) 05/02/2024       Recent Labs     05/03/24  1216 05/03/24  1702 05/03/24  2133 05/04/24  0804   POCGLU 123 131 133 135         Blood Sugar Average: Last 72 hrs:  (P) 124.8124701484015278Bcryae on modified regimen of insulin sliding scale and diabetic diet for now  On short course of prednisone and hence resume Lantus

## 2024-05-05 LAB
ANION GAP SERPL CALCULATED.3IONS-SCNC: 6 MMOL/L (ref 4–13)
ANION GAP SERPL CALCULATED.3IONS-SCNC: 7 MMOL/L (ref 4–13)
BUN SERPL-MCNC: 45 MG/DL (ref 5–25)
BUN SERPL-MCNC: 46 MG/DL (ref 5–25)
CALCIUM SERPL-MCNC: 9.6 MG/DL (ref 8.4–10.2)
CALCIUM SERPL-MCNC: 9.9 MG/DL (ref 8.4–10.2)
CHLORIDE SERPL-SCNC: 101 MMOL/L (ref 96–108)
CHLORIDE SERPL-SCNC: 97 MMOL/L (ref 96–108)
CO2 SERPL-SCNC: 34 MMOL/L (ref 21–32)
CO2 SERPL-SCNC: 39 MMOL/L (ref 21–32)
CREAT SERPL-MCNC: 1.12 MG/DL (ref 0.6–1.3)
CREAT SERPL-MCNC: 1.18 MG/DL (ref 0.6–1.3)
GFR SERPL CREATININE-BSD FRML MDRD: 46 ML/MIN/1.73SQ M
GFR SERPL CREATININE-BSD FRML MDRD: 49 ML/MIN/1.73SQ M
GLUCOSE SERPL-MCNC: 148 MG/DL (ref 65–140)
GLUCOSE SERPL-MCNC: 180 MG/DL (ref 65–140)
GLUCOSE SERPL-MCNC: 214 MG/DL (ref 65–140)
GLUCOSE SERPL-MCNC: 257 MG/DL (ref 65–140)
GLUCOSE SERPL-MCNC: 272 MG/DL (ref 65–140)
GLUCOSE SERPL-MCNC: 279 MG/DL (ref 65–140)
INR PPP: 2.61 (ref 0.84–1.19)
POTASSIUM SERPL-SCNC: 3.7 MMOL/L (ref 3.5–5.3)
POTASSIUM SERPL-SCNC: 3.8 MMOL/L (ref 3.5–5.3)
PROTHROMBIN TIME: 28.2 SECONDS (ref 11.6–14.5)
SODIUM SERPL-SCNC: 142 MMOL/L (ref 135–147)
SODIUM SERPL-SCNC: 142 MMOL/L (ref 135–147)

## 2024-05-05 PROCEDURE — 99222 1ST HOSP IP/OBS MODERATE 55: CPT | Performed by: ORTHOPAEDIC SURGERY

## 2024-05-05 PROCEDURE — 82948 REAGENT STRIP/BLOOD GLUCOSE: CPT

## 2024-05-05 PROCEDURE — 99232 SBSQ HOSP IP/OBS MODERATE 35: CPT | Performed by: FAMILY MEDICINE

## 2024-05-05 PROCEDURE — 80048 BASIC METABOLIC PNL TOTAL CA: CPT | Performed by: FAMILY MEDICINE

## 2024-05-05 PROCEDURE — 85610 PROTHROMBIN TIME: CPT | Performed by: FAMILY MEDICINE

## 2024-05-05 RX ORDER — INSULIN GLARGINE 100 [IU]/ML
15 INJECTION, SOLUTION SUBCUTANEOUS
Status: DISCONTINUED | OUTPATIENT
Start: 2024-05-05 | End: 2024-05-12 | Stop reason: HOSPADM

## 2024-05-05 RX ORDER — CEPHALEXIN 500 MG/1
500 CAPSULE ORAL EVERY 8 HOURS SCHEDULED
Status: DISCONTINUED | OUTPATIENT
Start: 2024-05-06 | End: 2024-05-09

## 2024-05-05 RX ORDER — BUMETANIDE 0.25 MG/ML
0.5 INJECTION INTRAMUSCULAR; INTRAVENOUS CONTINUOUS
Status: DISCONTINUED | OUTPATIENT
Start: 2024-05-05 | End: 2024-05-08

## 2024-05-05 RX ADMIN — FERROUS SULFATE TAB 325 MG (65 MG ELEMENTAL FE) 325 MG: 325 (65 FE) TAB at 09:43

## 2024-05-05 RX ADMIN — METOPROLOL SUCCINATE 37.5 MG: 25 TABLET, EXTENDED RELEASE ORAL at 22:05

## 2024-05-05 RX ADMIN — Medication 0.5 MG/HR: at 13:53

## 2024-05-05 RX ADMIN — MICONAZOLE NITRATE: 20 CREAM TOPICAL at 09:47

## 2024-05-05 RX ADMIN — Medication 1 TABLET: at 09:42

## 2024-05-05 RX ADMIN — MIDODRINE HYDROCHLORIDE 5 MG: 5 TABLET ORAL at 17:35

## 2024-05-05 RX ADMIN — INSULIN GLARGINE 15 UNITS: 100 INJECTION, SOLUTION SUBCUTANEOUS at 22:03

## 2024-05-05 RX ADMIN — ALLOPURINOL 200 MG: 100 TABLET ORAL at 09:42

## 2024-05-05 RX ADMIN — LEVOTHYROXINE SODIUM 288 MCG: 88 TABLET ORAL at 05:19

## 2024-05-05 RX ADMIN — CEFTRIAXONE 2000 MG: 2 INJECTION, SOLUTION INTRAVENOUS at 09:47

## 2024-05-05 RX ADMIN — ATORVASTATIN CALCIUM 10 MG: 10 TABLET, FILM COATED ORAL at 09:42

## 2024-05-05 RX ADMIN — WARFARIN SODIUM 5 MG: 5 TABLET ORAL at 17:35

## 2024-05-05 RX ADMIN — NYSTATIN: 100000 POWDER TOPICAL at 09:46

## 2024-05-05 RX ADMIN — MIDODRINE HYDROCHLORIDE 5 MG: 5 TABLET ORAL at 05:19

## 2024-05-05 RX ADMIN — PREDNISONE 40 MG: 20 TABLET ORAL at 09:42

## 2024-05-05 RX ADMIN — NYSTATIN: 100000 POWDER TOPICAL at 17:37

## 2024-05-05 RX ADMIN — INSULIN LISPRO 6 UNITS: 100 INJECTION, SOLUTION INTRAVENOUS; SUBCUTANEOUS at 12:01

## 2024-05-05 RX ADMIN — LORATADINE 10 MG: 10 TABLET ORAL at 09:42

## 2024-05-05 RX ADMIN — MONTELUKAST 10 MG: 10 TABLET, FILM COATED ORAL at 22:04

## 2024-05-05 RX ADMIN — MICONAZOLE NITRATE: 20 CREAM TOPICAL at 19:08

## 2024-05-05 RX ADMIN — INSULIN LISPRO 6 UNITS: 100 INJECTION, SOLUTION INTRAVENOUS; SUBCUTANEOUS at 22:03

## 2024-05-05 RX ADMIN — MIDODRINE HYDROCHLORIDE 5 MG: 5 TABLET ORAL at 12:00

## 2024-05-05 RX ADMIN — PANTOPRAZOLE SODIUM 40 MG: 40 TABLET, DELAYED RELEASE ORAL at 05:19

## 2024-05-05 RX ADMIN — POTASSIUM CHLORIDE 20 MEQ: 1500 TABLET, EXTENDED RELEASE ORAL at 22:04

## 2024-05-05 RX ADMIN — INSULIN LISPRO 4 UNITS: 100 INJECTION, SOLUTION INTRAVENOUS; SUBCUTANEOUS at 17:36

## 2024-05-05 RX ADMIN — FLUTICASONE FUROATE AND VILANTEROL TRIFENATATE 1 PUFF: 100; 25 POWDER RESPIRATORY (INHALATION) at 09:50

## 2024-05-05 RX ADMIN — POTASSIUM CHLORIDE 20 MEQ: 1500 TABLET, EXTENDED RELEASE ORAL at 17:35

## 2024-05-05 RX ADMIN — POTASSIUM CHLORIDE 20 MEQ: 1500 TABLET, EXTENDED RELEASE ORAL at 09:43

## 2024-05-05 NOTE — ASSESSMENT & PLAN NOTE
Lab Results   Component Value Date    HGBA1C 7.0 (H) 05/02/2024       Recent Labs     05/04/24  1633 05/04/24  2135 05/05/24  0804 05/05/24  1122   POCGLU 151* 193* 148* 257*         Blood Sugar Average: Last 72 hrs:  (P) 148.7195420833171023Fsylpi on modified regimen of insulin sliding scale and diabetic diet for now  On short course of prednisone and hence resume Lantus

## 2024-05-05 NOTE — DISCHARGE INSTR - AVS FIRST PAGE
Weight bearing as tolerated on both legs.  Patient may follow up with Dr Ingram in the [place] office, or Dr Shin in the [place] office as needed as an outpatient for possible intra-articular guided knee injections.  Continue oral steroids as indicated.  May apply ice to knees for 20-30 minutes as needed for pain.

## 2024-05-05 NOTE — PROGRESS NOTES
[facility]  Progress Note  Name: Tigist Hewitt I  MRN: [id number]  Unit/Bed#: MS [facility]-01 I Date of Admission: 5/2/2024   Date of Service: 5/5/2024 I Hospital Day: 3    Assessment/Plan   * Acute on chronic diastolic congestive heart failure (HCC)  Assessment & Plan  Wt Readings from Last 3 Encounters:   05/04/24 (!) 164 kg (360 lb 10.8 oz)   01/02/24 (!) 149 kg (328 lb 0.7 oz)   10/29/22 (!) 151 kg (331 lb 12.7 oz)     2d echo from December 2023 shows normal EF but abnormal diastolic function and severe tricuspid regurg.  Patient normally takes torsemide 60 mg twice daily and metolazone once a week.  She has gained approximately 20 to 30 pounds at least and was asked by her cardiologist to increase metolazone however lately she has been very somnolent and not taking her meds for the last 2 to 3 days.    consult cardiology monitor electrolytes.  Currently on Lasix drip 20 mg/h and metolazone 2.5 mg daily.  Not diuresing much.improvement noted in blue discoloration of her fingers and toes.maybe secondary to hypoxia or Raynaud's.  Chronically uses 4 L of oxygen and CPAP at at bedtime will continue for now    Stop Lasix drip and trial Bumex 0.5 mg/h and try to get standing weight          Acute cystitis without hematuria  Assessment & Plan  Placed on IV Rocephin.  UA and urine culture abnormal.  Urine Culture growing Proteus will switch to oral Keflex    Acute pain of left knee  Assessment & Plan  Patient has known left bad arthritis knee pain we will do x-ray and also placed on prednisone along with Tylenol for pain control .  reviewed uric acid level and crp  discussed with orthopedics.  Will trial oral prednisone and need outpatient follow-up with Ortho    Morbid obesity (HCC)  Assessment & Plan  bMI 69.56 will need diuresis and counseling on diet exercise and lifestyle modification    Type 2 diabetes mellitus with hyperglycemia, with long-term current use of insulin (HCC)  Assessment  & Plan  Lab Results   Component Value Date    HGBA1C 7.0 (H) 05/02/2024       Recent Labs     05/04/24  1633 05/04/24  2135 05/05/24  0804 05/05/24  1122   POCGLU 151* 193* 148* 257*         Blood Sugar Average: Last 72 hrs:  (P) [id number] on modified regimen of insulin sliding scale and diabetic diet for now  On short course of prednisone and hence resume Lantus      COPD (chronic obstructive pulmonary disease) (HCC)  Assessment & Plan  Not in exacerbation.  Continue home regimen  Has chronic respiratory failure uses 4 L of oxygen at baseline along with CPAP will continue.  Abg on admission acceptable    Atrial fibrillation (HCC)  Assessment & Plan  Currently rate controlled with Toprol-XL and INR is therapeutic normally takes Coumadin 7.5 mg daily will place on 5 mg for now and observe  Inr therapeutic       VTE Pharmacologic Prophylaxis: VTE Score: 2 Moderate Risk (Score 3-4) - Pharmacological DVT Prophylaxis Ordered: warfarin (Coumadin).    Mobility:   Basic Mobility Inpatient Raw Score: 6  -HLM Goal: 2: Bed activities/Dependent transfer  JH-HLM Achieved: 2: Bed activities/Dependent transfer  JH-HLM Goal achieved. Continue to encourage appropriate mobility.    Patient Centered Rounds: I performed bedside rounds with nursing staff today.   Discussions with Specialists or Other Care Team Provider: dw ortho    Education and Discussions with Family / Patient: will update spouse    Total Time Spent on Date of Encounter in care of patient: 35 mins. This time was spent on one or more of the following: performing physical exam; counseling and coordination of care; obtaining or reviewing history; documenting in the medical record; reviewing/ordering tests, medications or procedures; communicating with other healthcare professionals and discussing with patient's family/caregivers.    Current Length of Stay: 3 day(s)  Current Patient Status: Inpatient   Certification Statement: The patient will continue  to require additional inpatient hospital stay due to chf  Discharge Plan: Anticipate discharge in 24-48 hrs to discharge location to be determined pending rehab evaluations.    Code Status: Level 3 - DNAR and DNI    Subjective:   Patient complains of pain in her knees otherwise denies any other discomfort.  Denies any chest pain or shortness of breath not urinating as much as we hoped she would be    Objective:     Vitals:   Temp (24hrs), Av.6 °F (36.4 °C), Min:97.5 °F (36.4 °C), Max:97.9 °F (36.6 °C)    Temp:  [97.5 °F (36.4 °C)-97.9 °F (36.6 °C)] 97.9 °F (36.6 °C)  HR:  [66-81] 71  Resp:  [16-18] 17  BP: (107-133)/(55-86) 120/63  SpO2:  [76 %-98 %] 98 %  Body mass index is 68.15 kg/m².     Input and Output Summary (last 24 hours):     Intake/Output Summary (Last 24 hours) at 2024 1236  Last data filed at 2024 0843  Gross per 24 hour   Intake 1121 ml   Output 1400 ml   Net -279 ml       Physical Exam:   Physical Exam  Vitals and nursing note reviewed.   Constitutional:       Appearance: Normal appearance.   HENT:      Head: Normocephalic and atraumatic.      Right Ear: External ear normal.      Left Ear: External ear normal.      Nose: Nose normal.      Mouth/Throat:      Pharynx: Oropharynx is clear.   Cardiovascular:      Rate and Rhythm: Normal rate and regular rhythm.      Heart sounds: Normal heart sounds.   Pulmonary:      Effort: Pulmonary effort is normal.      Breath sounds: Normal breath sounds.   Abdominal:      General: Bowel sounds are normal.      Palpations: Abdomen is soft.      Tenderness: There is no abdominal tenderness.   Musculoskeletal:         General: Normal range of motion.      Cervical back: Normal range of motion and neck supple.      Comments: Thickening and discoloration of the skin of bilateral medial thighs.  No edema of lower shin area noted   Skin:     General: Skin is warm and dry.      Capillary Refill: Capillary refill takes less than 2 seconds.   Neurological:       General: No focal deficit present.      Mental Status: She is alert and oriented to person, place, and time.   Psychiatric:         Mood and Affect: Mood normal.            Additional Data:     Labs:  Results from last 7 days   Lab Units 05/03/24  0502 05/02/24  1209   WBC Thousand/uL 6.48 8.08   HEMOGLOBIN g/dL 11.7 11.8   HEMATOCRIT % 36.5 37.2   PLATELETS Thousands/uL 159 176   SEGS PCT %  --  70   LYMPHO PCT %  --  13*   MONO PCT %  --  14*   EOS PCT %  --  1     Results from last 7 days   Lab Units 05/05/24  0457 05/04/24  0605 05/03/24  0502   SODIUM mmol/L 142   < > 141  141   POTASSIUM mmol/L 3.8   < > 3.6  3.6   CHLORIDE mmol/L 101   < > 101  101   CO2 mmol/L 34*   < > 31  31   BUN mg/dL 46*   < > 46*  46*   CREATININE mg/dL 1.12   < > 1.06  1.06   ANION GAP mmol/L 7   < > 9  9   CALCIUM mg/dL 9.6   < > 9.5  9.5   ALBUMIN g/dL  --   --  3.3*   TOTAL BILIRUBIN mg/dL  --   --  1.75*   ALK PHOS U/L  --   --  113*   ALT U/L  --   --  7   AST U/L  --   --  22   GLUCOSE RANDOM mg/dL 180*   < > 121  121    < > = values in this interval not displayed.     Results from last 7 days   Lab Units 05/05/24  0457   INR  2.61*     Results from last 7 days   Lab Units 05/05/24  1122 05/05/24  0804 05/04/24  2135 05/04/24  1633 05/04/24  1130 05/04/24  0804 05/03/24  2133 05/03/24  1702 05/03/24  1216 05/03/24  0723 05/02/24  2103 05/02/24  1658   POC GLUCOSE mg/dl 257* 148* 193* 151* 157* 135 133 131 123 120 135 97     Results from last 7 days   Lab Units 05/02/24  1738   HEMOGLOBIN A1C % 7.0*     Results from last 7 days   Lab Units 05/04/24  0605 05/02/24  1209   LACTIC ACID mmol/L 1.3 1.5       Lines/Drains:  Invasive Devices       Peripheral Intravenous Line  Duration             Peripheral IV 05/02/24 Left;Ventral (anterior) Hand 3 days    Peripheral IV 05/04/24 Dorsal (posterior);Right Hand 1 day              Drain  Duration             External Urinary Catheter 2 days                       Telemetry:  Telemetry Orders (From admission, onward)               24 Hour Telemetry Monitoring  Continuous x 24 Hours (Telem)        Question:  Reason for 24 Hour Telemetry  Answer:  Decompensated CHF- and any one of the following: continuous diuretic infusion or total diuretic dose >200 mg daily, associated electrolyte derangement (I.e. K < 3.0), ionotropic drip (continuous infusion), hx of ventricular arrhythmia, or new EF < 35%                     Telemetry Reviewed: afib  Indication for Continued Telemetry Use: Acute CHF on >200 mg lasix/day or equivalent dose or with new reduced EF.              Imaging: Reviewed radiology reports from this admission including: xray(s)    Recent Cultures (last 7 days):   Results from last 7 days   Lab Units 05/02/24  1443 05/02/24  1209   BLOOD CULTURE   --  No Growth at 48 hrs.  No Growth at 48 hrs.   URINE CULTURE  60,000-69,000 cfu/ml Proteus mirabilis*  --        Last 24 Hours Medication List:   Current Facility-Administered Medications   Medication Dose Route Frequency Provider Last Rate    acetaminophen  650 mg Oral Q4H PRN Amanda Dukes MD      albuterol  2.5 mg Nebulization Q6H PRN Amanda Dukes MD      allopurinol  200 mg Oral Daily Amanda Dukes MD      atorvastatin  10 mg Oral Daily Amanda Dukes MD      bumetanide (BUMEX) 12.5 mg infusion 50 mL  0.5 mg/hr Intravenous Continuous Amanda Dukes MD      [START ON 5/6/2024] cephalexin  500 mg Oral Q8H [facility] Amanda Dukes MD      ferrous sulfate  325 mg Oral Daily With Breakfast Amanda Dukes MD      Fluticasone Furoate-Vilanterol  1 puff Inhalation Daily Amanda Dukes MD      insulin glargine  10 Units Subcutaneous HS Amanda Dukes MD      insulin lispro  2-12 Units Subcutaneous TID AC Amanda Dukes MD      insulin lispro  2-12 Units Subcutaneous HS Radha Wright MD      levothyroxine  288 mcg Oral Early Morning Amanda Dukes MD      loratadine  10 mg Oral Daily Amanda Dukes MD      metolazone  2.5 mg Oral Daily Amanda  MD Ernst      metoprolol  2.5 mg Intravenous Q6H PRN Radha Wright MD      metoprolol succinate  37.5 mg Oral BID Radha Wright MD      midodrine  5 mg Oral TID AC Amanda Dukes MD      SERGO ANTIFUNGAL   Topical BID Amanda Dukes MD      montelukast  10 mg Oral HS Amanda Dukes MD      multivitamin-minerals  1 tablet Oral Daily Amanda Dukes MD      nystatin   Topical BID Amanda Dukes MD      ondansetron  4 mg Intravenous Q6H PRN Amanda Dukes MD      oxyCODONE  10 mg Oral Q6H PRN Aaron Patterson MD      pantoprazole  40 mg Oral Daily Before Breakfast Amanda Dukes MD      potassium chloride  20 mEq Oral TID Amanda Dukes MD      predniSONE  40 mg Oral Daily Amanda Dukes MD      warfarin  5 mg Oral Daily (warfarin) Amanda Dukes MD          Today, Patient Was Seen By: Amanda Dukes MD    **Please Note: This note may have been constructed using a voice recognition system.**

## 2024-05-05 NOTE — ASSESSMENT & PLAN NOTE
Wt Readings from Last 3 Encounters:   05/04/24 (!) 164 kg (360 lb 10.8 oz)   01/02/24 (!) 149 kg (328 lb 0.7 oz)   10/29/22 (!) 151 kg (331 lb 12.7 oz)     2d echo from December 2023 shows normal EF but abnormal diastolic function and severe tricuspid regurg.  Patient normally takes torsemide 60 mg twice daily and metolazone once a week.  She has gained approximately 20 to 30 pounds at least and was asked by her cardiologist to increase metolazone however lately she has been very somnolent and not taking her meds for the last 2 to 3 days.    consult cardiology monitor electrolytes.  Currently on Lasix drip 20 mg/h and metolazone 2.5 mg daily.  Not diuresing much.improvement noted in blue discoloration of her fingers and toes.maybe secondary to hypoxia or Raynaud's.  Chronically uses 4 L of oxygen and CPAP at at bedtime will continue for now    Stop Lasix drip and trial Bumex 0.5 mg/h and try to get standing weight

## 2024-05-05 NOTE — PLAN OF CARE
Problem: METABOLIC, FLUID AND ELECTROLYTES - ADULT  Goal: Fluid balance maintained  Description: INTERVENTIONS:  - Monitor labs   - Monitor I/O and WT  - Instruct patient on fluid and nutrition as appropriate  - Assess for signs & symptoms of volume excess or deficit  Outcome: Progressing     Problem: METABOLIC, FLUID AND ELECTROLYTES - ADULT  Goal: Glucose maintained within target range  Description: INTERVENTIONS:  - Monitor Blood Glucose as ordered  - Assess for signs and symptoms of hyperglycemia and hypoglycemia  - Administer ordered medications to maintain glucose within target range  - Assess nutritional intake and initiate nutrition service referral as needed  Outcome: Progressing

## 2024-05-05 NOTE — ASSESSMENT & PLAN NOTE
Placed on IV Rocephin.  UA and urine culture abnormal.  Urine Culture growing Proteus will switch to oral Keflex

## 2024-05-05 NOTE — ASSESSMENT & PLAN NOTE
Not in exacerbation.  Continue home regimen  Has chronic respiratory failure uses 4 L of oxygen at baseline along with CPAP will continue.  Abg on admission acceptable

## 2024-05-05 NOTE — CONSULTS
"Orthopedics   Tigist Hewitt 69 y.o. female MRN: [id number]  Unit/Bed#: -01      Chief Complaint:   Bilateral knee pain, left greater than right    HPI:   69 y.o.female morbidly obese white female who is a minimal ambulator complaining of bilateral knee pain, left greater than right.  She reports she can only ambulate from her lift chair to bedside commode and back to her chair.  He denies any recent injury trauma or fall.  She reports chronic history of bilateral knee arthritis.  She previously saw Dr. Pete Mendez the li in [place] with tried injections then had ordered her a wheelchair and walker and told her there was not much else we could offer her.  She also saw a pain management doctor for injections in the [place] area in the past.  She does not remember timing or dates and when she had her last injection.  She does have a remote history of \"left knee fracture\" years ago.  She states that she has always had pain in her knees and there is no significant change.  The patient reports  the pain is over the more medial aspect of her knees and will radiate down into her shin slightly.  She denies hip pain.  X-rays were performed only on the left knee yesterday.  Pain is relatively constant and moderate to severe.  .She has multiple medical conditions. Exacerbating factors weightbearing and motion.  She notes that she has more motion in the left knee than she does in the right knee but that both are very limited.  PMH significant for asthma, a-fib, COPD, DM, Morbid Obesity, Kidney failure, NISHI, Heart failure.  Occupation not working.  In discussions with Dr. Dukes the  would like her 100 pounds lighter and without knee pain before returning home.  Internal medicine has placed the patient on oral steroids.    Review Of Systems:   Skin: changes on posterior thigh/calf and anterior shin as viewed in media 5/2/24  Neuro: See HPI  Musculoskeletal: See HPI  14 point review of systems negative " except as stated above     Past Medical History:   Past Medical History:   Diagnosis Date    Asthma     Atrial fibrillation (HCC)     COPD (chronic obstructive pulmonary disease) (HCC)     Diabetes mellitus (HCC)     Disease of thyroid gland     Heart failure (HCC)     Kidney failure     Morbid obesity due to excess calories (HCC)     NISHI (obstructive sleep apnea)      Past Surgical History:   Past Surgical History:   Procedure Laterality Date    CARDIAC ELECTROPHYSIOLOGY MAPPING AND ABLATION      by Dr. Ferraro at [facility]    CARDIOVERSION N/A     GALLBLADDER SURGERY      HERNIA REPAIR       Family History:  Family history reviewed and non-contributory  Family History   Problem Relation Age of Onset    Arthritis Mother     Hearing loss Mother     Heart disease Mother     Hypertension Mother     Stroke Mother     Alcohol abuse Father     Cancer Father     Diabetes Father     Arthritis Sister     Cancer Sister     Alcohol abuse Brother     Diabetes Son     Arthritis Daughter     Drug abuse Daughter     Heart disease Maternal Grandmother     Hypertension Maternal Grandmother     Stroke Maternal Grandmother     Arthritis Maternal Aunt     Asthma Neg Hx     Birth defects Neg Hx     COPD Neg Hx     Depression Neg Hx     Early death Neg Hx     Hyperlipidemia Neg Hx     Kidney disease Neg Hx     Learning disabilities Neg Hx     Mental illness Neg Hx     Mental retardation Neg Hx     Miscarriages / Stillbirths Neg Hx     Vision loss Neg Hx      Social History:  Social History     Socioeconomic History    Marital status: /Civil Union     Spouse name: None    Number of children: None    Years of education: None    Highest education level: None   Occupational History    None   Tobacco Use    Smoking status: Never     Passive exposure: Never    Smokeless tobacco: Never   Vaping Use    Vaping status: Never Used   Substance and Sexual Activity    Alcohol use: Never    Drug use: Never    Sexual activity: None   Other Topics  Concern    None   Social History Narrative    None     Social Determinants of Health     Financial Resource Strain: Not on file   Food Insecurity: No Food Insecurity (1/3/2024)    Hunger Vital Sign     Worried About Running Out of Food in the Last Year: Never true     Ran Out of Food in the Last Year: Never true   Transportation Needs: No Transportation Needs (1/3/2024)    PRAPARE - Transportation     Lack of Transportation (Medical): No     Lack of Transportation (Non-Medical): No   Physical Activity: Not on file   Stress: Not on file   Social Connections: Not on file   Intimate Partner Violence: Not on file   Housing Stability: Low Risk  (1/3/2024)    Housing Stability Vital Sign     Unable to Pay for Housing in the Last Year: No     Number of Places Lived in the Last Year: 1     Unstable Housing in the Last Year: No     Allergies:   Allergies   Allergen Reactions    Acesulfame Potassium - Food Allergy Anaphylaxis    Aspartame - Food Allergy Anaphylaxis    Saccharin - Food Allergy Anaphylaxis    Sucralose - Food Allergy Anaphylaxis    Metformin GI Intolerance     Labs:  0   Lab Value Date/Time    HCT 36.5 05/03/2024 0502    HCT 37.2 05/02/2024 1209    HCT 44.2 01/02/2024 2238    HGB 11.7 05/03/2024 0502    HGB 11.8 05/02/2024 1209    HGB 13.5 01/02/2024 2238    PT 23.6 (H) 10/14/2022 0539    INR 2.61 (H) 05/05/2024 0457    INR 2.1 10/14/2022 0539    WBC 6.48 05/03/2024 0502    WBC 8.08 05/02/2024 1209    WBC 11.28 (H) 01/02/2024 2238    CRP 36.9 (H) 05/04/2024 0605     Meds:    Current Facility-Administered Medications:     acetaminophen (TYLENOL) tablet 650 mg, 650 mg, Oral, Q4H PRN, Amanda Dukes MD, 650 mg at 05/02/24 1911    albuterol inhalation solution 2.5 mg, 2.5 mg, Nebulization, Q6H PRN, Amanda Dukes MD    allopurinol (ZYLOPRIM) tablet 200 mg, 200 mg, Oral, Daily, Amanda Dukes MD, 200 mg at 05/05/24 0942    atorvastatin (LIPITOR) tablet 10 mg, 10 mg, Oral, Daily, Amanda Dukes MD, 10 mg at 05/05/24 0943     cefTRIAXone (ROCEPHIN) IVPB (premix in dextrose) 2,000 mg 50 mL, 2,000 mg, Intravenous, Q24H, Amanda Dukes MD, Last Rate: 100 mL/hr at 05/05/24 0947, 2,000 mg at 05/05/24 0947    ferrous sulfate tablet 325 mg, 325 mg, Oral, Daily With Breakfast, Amanda Dukes MD, 325 mg at 05/05/24 0943    Fluticasone Furoate-Vilanterol 100-25 mcg/actuation 1 puff, 1 puff, Inhalation, Daily, Amanda Dukes MD, 1 puff at 05/05/24 0950    furosemide (LASIX) 500 mg infusion 50 mL, 20 mg/hr, Intravenous, Continuous, Amanda Dukes MD, Last Rate: 2 mL/hr at 05/04/24 1927, 20 mg/hr at 05/04/24 1927    insulin glargine (LANTUS) subcutaneous injection 10 Units 0.1 mL, 10 Units, Subcutaneous, HS, Amanda Dukes MD, 10 Units at 05/04/24 2230    insulin lispro (HumALOG/ADMELOG) 100 units/mL subcutaneous injection 2-12 Units, 2-12 Units, Subcutaneous, TID AC **AND** Fingerstick Glucose (POCT), , , TID AC, Amanda Dukes MD    insulin lispro (HumALOG/ADMELOG) 100 units/mL subcutaneous injection 2-12 Units, 2-12 Units, Subcutaneous, HS, Radha Wright MD, 2 Units at 05/04/24 2230    levothyroxine tablet 288 mcg, 288 mcg, Oral, Early Morning, Amanda Dukes MD, 288 mcg at 05/05/24 0519    loratadine (CLARITIN) tablet 10 mg, 10 mg, Oral, Daily, Amanda Dukes MD, 10 mg at 05/05/24 0942    metolazone (ZAROXOLYN) tablet 2.5 mg, 2.5 mg, Oral, Daily, Amanda Dukes MD, 2.5 mg at 05/04/24 1254    metoprolol (LOPRESSOR) injection 2.5 mg, 2.5 mg, Intravenous, Q6H PRN, Radha Wright MD, 2.5 mg at 05/02/24 1837    metoprolol succinate (TOPROL-XL) 24 hr tablet 37.5 mg, 37.5 mg, Oral, BID, Radha Wright MD, 37.5 mg at 05/04/24 2042    midodrine (PROAMATINE) tablet 5 mg, 5 mg, Oral, TID AC, Amanda Dukes MD, 5 mg at 05/05/24 0519    moisture barrier miconazole 2% cream (aka SERGO MOISTURE BARRIER ANTIFUNGAL CREAM), , Topical, BID, Amanda Dukes MD, Given at 05/05/24 0962    montelukast (SINGULAIR) tablet 10 mg, 10 mg, Oral, HS, Amanda Dukes MD, 10 mg at 05/04/24 9441     "multivitamin-minerals (CENTRUM) tablet 1 tablet, 1 tablet, Oral, Daily, Amanda Dukes MD, 1 tablet at 05/05/24 0942    nystatin (MYCOSTATIN) powder, , Topical, BID, Amanda Dukes MD, Given at 05/05/24 0946    ondansetron (ZOFRAN) injection 4 mg, 4 mg, Intravenous, Q6H PRN, Amanda Dukes MD, 4 mg at 05/03/24 1007    oxyCODONE (ROXICODONE) immediate release tablet 10 mg, 10 mg, Oral, Q6H PRN, Aaron Patterson MD, 10 mg at 05/04/24 0952    pantoprazole (PROTONIX) EC tablet 40 mg, 40 mg, Oral, Daily Before Breakfast, Amanda Dukes MD, 40 mg at 05/05/24 0519    potassium chloride (Klor-Con M20) CR tablet 20 mEq, 20 mEq, Oral, TID, Amanda Dukes MD, 20 mEq at 05/05/24 0943    predniSONE tablet 40 mg, 40 mg, Oral, Daily, Amanda Dukes MD, 40 mg at 05/05/24 0942    warfarin (COUMADIN) tablet 5 mg, 5 mg, Oral, Daily (warfarin), Amanda Dukes MD, 5 mg at 05/04/24 1705    Blood Culture:   Lab Results   Component Value Date    BLOODCX No Growth at 48 hrs. 05/02/2024    BLOODCX No Growth at 48 hrs. 05/02/2024     Wound Culture:   No results found for: \"WOUNDCULT\"    Ins and Outs:  I/O last 24 hours:  In: 1121 [P.O.:1121]  Out: 1400 [Urine:1400]    Physical Exam:   /66   Pulse 80   Temp 97.7 °F (36.5 °C)   Resp 16   Ht 5' 1\" (1.549 m)   Wt (!) 164 kg (360 lb 10.8 oz)   SpO2 98%   BMI 68.15 kg/m²   Gen: No acute distress, resting comfortably in bed  HEENT: Eyes clear, moist mucus membranes, hearing intact  Respiratory: No audible wheezing or stridor  Cardiovascular: Well Perfused peripherally, 2+ distal pulse  Abdomen: nondistended, no peritoneal signs  Musculoskeletal: left lower extremity  Skin intact about the anterior knee but chronic skin changes to the posterior medial aspect of the thigh calf and anterior shin.  See media pictures from 5/2/24.  Tender to palpation over Medial knee  No obvious effusion but exam is limited due to the body habitus.    AROM to 25 degrees flexion and 0 degrees extension   Passive ROM to 30 " degrees flexion and degrees extension  Cannot perform straight leg raise due to body habitus.  Grossly stable to varus/valgus stress, negative lachmans, posterior draw.  Sensation intact L3-S1  4/5 strength quads and hamstrings  +2 DP/PT pulses  Musculature is soft and compressible.    Leg lengths are symmetric  Right knee was not evaluated due to consult only for left knee pain and only having left knee x-rays available.    Radiology:   I personally reviewed the films.  X-rays AP/Lateral views left knee shows severe degenerative arthritis without gross fracture or other osseous abnormality. No apparent effusion, but quality is obscured by the size of the leg and extensive soft tissue.    Assessment:  69 y.o. female with left knee arthritic pain with morbid obesity and BMI of 68 with chronic skin changes and chronic limited ambulation and limited function due to body habitus.  Patient currently on oral steroids.    Plan:   Weight bearing as tolerated  left lower extremity  PT  Continue oral steroids.  Could consider cortisone injection if OK with medical team, however due to body habitus this may best be performed in the outpatient setting with ultrasound guidance by Dr. Shin.  Patient is not a surgical candidate.  Pain control and DVT prophylaxis per primary service.  Body mass index is 68.15 kg/m². morbidly obese. Recommend behavior modifications.  Dispo: Ortho signing off      Dylan Dalton PA-C

## 2024-05-05 NOTE — ASSESSMENT & PLAN NOTE
Patient has known left bad arthritis knee pain we will do x-ray and also placed on prednisone along with Tylenol for pain control .  reviewed uric acid level and crp  discussed with orthopedics.  Will trial oral prednisone and need outpatient follow-up with Ortho

## 2024-05-05 NOTE — RESPIRATORY THERAPY NOTE
Respiratory Note     05/04/24 2115   Respiratory Assessment   Resp Comments Pt home cpap filled with sterile water, pt placed self on home cpap with 4LPM titrated in.   O2 Device home cpap +4LPM   Oxygen Therapy/Pulse Ox   O2 Device CPAP   Nasal Cannula O2 Flow Rate (L/min) 4 L/min   Calculated FIO2 (%) - Nasal Cannula 36   SpO2 97 %   SpO2 Activity At Rest   $ Pulse Oximetry Spot Check Charge Completed

## 2024-05-05 NOTE — ASSESSMENT & PLAN NOTE
Currently rate controlled with Toprol-XL and INR is therapeutic normally takes Coumadin 7.5 mg daily will place on 5 mg for now and observe  Inr therapeutic

## 2024-05-06 LAB
ANION GAP SERPL CALCULATED.3IONS-SCNC: 5 MMOL/L (ref 4–13)
BUN SERPL-MCNC: 44 MG/DL (ref 5–25)
CALCIUM SERPL-MCNC: 10 MG/DL (ref 8.4–10.2)
CHLORIDE SERPL-SCNC: 99 MMOL/L (ref 96–108)
CO2 SERPL-SCNC: 39 MMOL/L (ref 21–32)
CREAT SERPL-MCNC: 0.96 MG/DL (ref 0.6–1.3)
GFR SERPL CREATININE-BSD FRML MDRD: 59 ML/MIN/1.73SQ M
GLUCOSE SERPL-MCNC: 148 MG/DL (ref 65–140)
GLUCOSE SERPL-MCNC: 173 MG/DL (ref 65–140)
GLUCOSE SERPL-MCNC: 218 MG/DL (ref 65–140)
GLUCOSE SERPL-MCNC: 237 MG/DL (ref 65–140)
GLUCOSE SERPL-MCNC: 316 MG/DL (ref 65–140)
MAGNESIUM SERPL-MCNC: 1.9 MG/DL (ref 1.9–2.7)
POTASSIUM SERPL-SCNC: 3.4 MMOL/L (ref 3.5–5.3)
SODIUM SERPL-SCNC: 143 MMOL/L (ref 135–147)

## 2024-05-06 PROCEDURE — 99233 SBSQ HOSP IP/OBS HIGH 50: CPT | Performed by: FAMILY MEDICINE

## 2024-05-06 PROCEDURE — 82948 REAGENT STRIP/BLOOD GLUCOSE: CPT

## 2024-05-06 PROCEDURE — 83735 ASSAY OF MAGNESIUM: CPT | Performed by: FAMILY MEDICINE

## 2024-05-06 PROCEDURE — 80048 BASIC METABOLIC PNL TOTAL CA: CPT | Performed by: FAMILY MEDICINE

## 2024-05-06 PROCEDURE — 99223 1ST HOSP IP/OBS HIGH 75: CPT | Performed by: INTERNAL MEDICINE

## 2024-05-06 PROCEDURE — NC001 PR NO CHARGE: Performed by: PHYSICIAN ASSISTANT

## 2024-05-06 RX ORDER — LIDOCAINE 50 MG/G
1 PATCH TOPICAL
Status: DISCONTINUED | OUTPATIENT
Start: 2024-05-06 | End: 2024-05-06

## 2024-05-06 RX ORDER — ACETAZOLAMIDE 250 MG/1
250 TABLET ORAL EVERY 12 HOURS SCHEDULED
Status: COMPLETED | OUTPATIENT
Start: 2024-05-06 | End: 2024-05-06

## 2024-05-06 RX ADMIN — MICONAZOLE NITRATE: 20 CREAM TOPICAL at 17:10

## 2024-05-06 RX ADMIN — FLUTICASONE FUROATE AND VILANTEROL TRIFENATATE 1 PUFF: 100; 25 POWDER RESPIRATORY (INHALATION) at 09:24

## 2024-05-06 RX ADMIN — CEPHALEXIN 500 MG: 500 CAPSULE ORAL at 14:06

## 2024-05-06 RX ADMIN — INSULIN LISPRO 4 UNITS: 100 INJECTION, SOLUTION INTRAVENOUS; SUBCUTANEOUS at 17:12

## 2024-05-06 RX ADMIN — ACETAZOLAMIDE 250 MG: 250 TABLET ORAL at 11:45

## 2024-05-06 RX ADMIN — Medication 1 TABLET: at 09:20

## 2024-05-06 RX ADMIN — ACETAMINOPHEN 325MG 650 MG: 325 TABLET ORAL at 11:45

## 2024-05-06 RX ADMIN — MONTELUKAST 10 MG: 10 TABLET, FILM COATED ORAL at 21:33

## 2024-05-06 RX ADMIN — ATORVASTATIN CALCIUM 10 MG: 10 TABLET, FILM COATED ORAL at 09:22

## 2024-05-06 RX ADMIN — LEVOTHYROXINE SODIUM 288 MCG: 88 TABLET ORAL at 06:11

## 2024-05-06 RX ADMIN — WARFARIN SODIUM 5 MG: 5 TABLET ORAL at 17:10

## 2024-05-06 RX ADMIN — METOLAZONE 2.5 MG: 5 TABLET ORAL at 09:20

## 2024-05-06 RX ADMIN — NYSTATIN: 100000 POWDER TOPICAL at 17:10

## 2024-05-06 RX ADMIN — INSULIN LISPRO 4 UNITS: 100 INJECTION, SOLUTION INTRAVENOUS; SUBCUTANEOUS at 12:15

## 2024-05-06 RX ADMIN — MICONAZOLE NITRATE: 20 CREAM TOPICAL at 09:24

## 2024-05-06 RX ADMIN — METOPROLOL SUCCINATE 37.5 MG: 25 TABLET, EXTENDED RELEASE ORAL at 09:22

## 2024-05-06 RX ADMIN — OXYCODONE HYDROCHLORIDE 10 MG: 10 TABLET ORAL at 19:29

## 2024-05-06 RX ADMIN — PANTOPRAZOLE SODIUM 40 MG: 40 TABLET, DELAYED RELEASE ORAL at 06:11

## 2024-05-06 RX ADMIN — POTASSIUM CHLORIDE 20 MEQ: 1500 TABLET, EXTENDED RELEASE ORAL at 21:33

## 2024-05-06 RX ADMIN — NYSTATIN: 100000 POWDER TOPICAL at 09:24

## 2024-05-06 RX ADMIN — PREDNISONE 40 MG: 20 TABLET ORAL at 09:23

## 2024-05-06 RX ADMIN — INSULIN GLARGINE 15 UNITS: 100 INJECTION, SOLUTION SUBCUTANEOUS at 21:33

## 2024-05-06 RX ADMIN — CEPHALEXIN 500 MG: 500 CAPSULE ORAL at 06:11

## 2024-05-06 RX ADMIN — MIDODRINE HYDROCHLORIDE 5 MG: 5 TABLET ORAL at 11:44

## 2024-05-06 RX ADMIN — POTASSIUM CHLORIDE 20 MEQ: 1500 TABLET, EXTENDED RELEASE ORAL at 09:20

## 2024-05-06 RX ADMIN — FERROUS SULFATE TAB 325 MG (65 MG ELEMENTAL FE) 325 MG: 325 (65 FE) TAB at 09:16

## 2024-05-06 RX ADMIN — Medication 0.5 MG/HR: at 06:14

## 2024-05-06 RX ADMIN — ALLOPURINOL 200 MG: 100 TABLET ORAL at 09:21

## 2024-05-06 RX ADMIN — METOPROLOL SUCCINATE 37.5 MG: 25 TABLET, EXTENDED RELEASE ORAL at 21:39

## 2024-05-06 RX ADMIN — POTASSIUM CHLORIDE 20 MEQ: 1500 TABLET, EXTENDED RELEASE ORAL at 16:20

## 2024-05-06 RX ADMIN — CEPHALEXIN 500 MG: 500 CAPSULE ORAL at 21:33

## 2024-05-06 RX ADMIN — LORATADINE 10 MG: 10 TABLET ORAL at 09:20

## 2024-05-06 RX ADMIN — ACETAZOLAMIDE 250 MG: 250 TABLET ORAL at 21:33

## 2024-05-06 RX ADMIN — INSULIN LISPRO 8 UNITS: 100 INJECTION, SOLUTION INTRAVENOUS; SUBCUTANEOUS at 21:31

## 2024-05-06 NOTE — CASE MANAGEMENT
Case Management Discharge Planning Note    Patient name Tigist Hewitt  Location /-01 MRN [id number]  : 1955 Date 2024       Current Admission Date: 2024  Current Admission Diagnosis:Acute on chronic diastolic congestive heart failure (HCC)   Patient Active Problem List    Diagnosis Date Noted    Acute pain of left knee 2024    Acute cystitis without hematuria 2024    Ambulatory dysfunction 2024    Hyperkalemia 2024    Lung cyst 2024    Abnormal CT scan, pelvis 2023    CKD (chronic kidney disease) stage 3, GFR 30-59 ml/min ([facility]) 2023    NISHI (obstructive sleep apnea) 2023    Intertrigo 10/29/2022    Vaginal bleeding 10/23/2022    Acute kidney injury superimposed on chronic kidney disease  ([facility]) 10/22/2022    Hypotension 10/22/2022    Acute on chronic diastolic congestive heart failure ([facility]) 2022    IMTIAZ (acute kidney injury) ([facility]) 2022    Morbid obesity ([facility]) 2022    Supratherapeutic INR 04/15/2020    Chronic respiratory failure with hypoxia ([facility]) 2020    Asthma 2020    Atrial fibrillation ([facility]) 2020    COPD (chronic obstructive pulmonary disease) ([facility]) 2020    Type 2 diabetes mellitus with hyperglycemia, with long-term current use of insulin ([facility]) 2020    Shortness of breath 2020    Anemia 2020      LOS (days): 4  Geometric Mean LOS (GMLOS) (days): 3  Days to GMLOS:-1     OBJECTIVE:  Risk of Unplanned Readmission Score: 32.91         Current admission status: Inpatient   Preferred Pharmacy:   [id number] [place] [address]  Phone: [phone] Fax: [phone]    Primary Care Provider: Marisa Haque MD    Primary Insurance: [facility] RE[facility]  Secondary Insurance:     DISCHARGE DETAILS:    Followed up on recommendations for STR:    Discharge planning discussed with:: patient and friend at the bedside  "Shazia  Freedom of Choice: Yes  Comments - Freedom of Choice: Reviewed the only accepting facility at this time with 14 referrals made, 10 declines was [facility]. Pt wants to discuss with her . CM suggested additional referrals to [facility], however pt is declining this at this. She stated \" she wants to talk to her People.\"  CM contacted family/caregiver?: No- see comments (declined)                       DME Referral Provided  Referral made for DME?: No    Other Referral/Resources/Interventions Provided:  Interventions: Short Term Rehab  Referral Comments: Patient will be following up with her faimily on accepting facility_ [facility]         Treatment Team Recommendation: Short Term Rehab  Discharge Destination Plan:: Short Term Rehab  Transport at Discharge : BLS Ambulance                                                             "

## 2024-05-06 NOTE — ASSESSMENT & PLAN NOTE
Wt Readings from Last 3 Encounters:   05/06/24 (!) 161 kg (355 lb 9.6 oz)   01/02/24 (!) 149 kg (328 lb 0.7 oz)   10/29/22 (!) 151 kg (331 lb 12.7 oz)     2d echo from December 2023 shows normal EF but abnormal diastolic function and severe tricuspid regurg.  Patient normally takes torsemide 60 mg twice daily and metolazone once a week.  She has gained approximately 20 to 30 pounds at least and was asked by her cardiologist to increase metolazone however lately she has been very somnolent and not taking her meds for the last 2 to 3 days.    consult cardiology monitor electrolytes. Initially placed on Lasix drip 20 mg/h and metolazone 2.5 mg daily.  Not diuresing much.which to Bumex drip 0.5 mg/h along with metolazone and noted to be diuresing better but CO2 rising and hence metolazone stopped and placed on Diamox.  Continue Bumex drip at current rate.  Monitor electrolytes closely   improvement noted in blue discoloration of her fingers and toes.maybe secondary to hypoxia or Raynaud's.  Chronically uses 4 L of oxygen and CPAP at at bedtime will continue for now  Unable to get accurate weights

## 2024-05-06 NOTE — ASSESSMENT & PLAN NOTE
Lab Results   Component Value Date    HGBA1C 7.0 (H) 05/02/2024       Recent Labs     05/05/24  1635 05/05/24  2139 05/06/24  0749 05/06/24  1145   POCGLU 214* 279* 148* 218*         Blood Sugar Average: Last 72 hrs:  (P) [id number] on modified regimen of insulin sliding scale and diabetic diet for now  On short course of prednisone and hence resume Lantus

## 2024-05-06 NOTE — PROGRESS NOTES
Brief note: Patient is on oral steroids without any significant improvement in her knee pain.  Although she has not had good benefit with cortisone injection due to the waning effect in the past, this could be considered while hospitalized.  Due to her body habitus however, recommendation would be for interventional radiology to perform the injection.  This would be done as an attempt to get her some pain relief while hospitalized and limit difficulty of travel and ambulation to and from an office appointment.  This would be at the discretion of the medical team.

## 2024-05-06 NOTE — PLAN OF CARE
Problem: Potential for Falls  Goal: Patient will remain free of falls  Description: INTERVENTIONS:  - Educate patient/family on patient safety including physical limitations  - Instruct patient to call for assistance with activity   - Consult OT/PT to assist with strengthening/mobility   - Keep Call bell within reach  - Keep bed low and locked with side rails adjusted as appropriate  - Keep care items and personal belongings within reach  - Initiate and maintain comfort rounds  - Make Fall Risk Sign visible to staff  - Offer Toileting every 2 Hours, in advance of need  - Initiate/Maintain bed alarm    - Apply yellow socks and bracelet for high fall risk patients  - Consider moving patient to room near nurses station  Outcome: Progressing     Problem: Prexisting or High Potential for Compromised Skin Integrity  Goal: Skin integrity is maintained or improved  Description: INTERVENTIONS:  - Identify patients at risk for skin breakdown  - Assess and monitor skin integrity  - Assess and monitor nutrition and hydration status  - Monitor labs   - Assess for incontinence   - Turn and reposition patient  - Assist with mobility/ambulation  - Relieve pressure over bony prominences  - Avoid friction and shearing  - Provide appropriate hygiene as needed including keeping skin clean and dry  - Evaluate need for skin moisturizer/barrier cream  - Collaborate with interdisciplinary team   - Patient/family teaching  - Consider wound care consult   Outcome: Progressing     Problem: PAIN - ADULT  Goal: Verbalizes/displays adequate comfort level or baseline comfort level  Description: Interventions:  - Encourage patient to monitor pain and request assistance  - Assess pain using appropriate pain scale  - Administer analgesics based on type and severity of pain and evaluate response  - Implement non-pharmacological measures as appropriate and evaluate response  - Consider cultural and social influences on pain and pain management  -  Notify physician/advanced practitioner if interventions unsuccessful or patient reports new pain  Outcome: Progressing     Problem: SAFETY ADULT  Goal: Patient will remain free of falls  Description: INTERVENTIONS:  - Educate patient/family on patient safety including physical limitations  - Instruct patient to call for assistance with activity   - Consult OT/PT to assist with strengthening/mobility   - Keep Call bell within reach  - Keep bed low and locked with side rails adjusted as appropriate  - Keep care items and personal belongings within reach  - Initiate and maintain comfort rounds  - Make Fall Risk Sign visible to staff  - Offer Toileting every 2 Hours, in advance of need  - Initiate/Maintain bed alarm    - Apply yellow socks and bracelet for high fall risk patients  - Consider moving patient to room near nurses station  Outcome: Progressing  Goal: Maintain or return to baseline ADL function  Description: INTERVENTIONS:  -  Assess patient's ability to carry out ADLs; assess patient's baseline for ADL function and identify physical deficits which impact ability to perform ADLs (bathing, care of mouth/teeth, toileting, grooming, dressing, etc.)  - Assess/evaluate cause of self-care deficits   - Assess range of motion  - Assess patient's mobility; develop plan if impaired  - Assess patient's need for assistive devices and provide as appropriate  - Encourage maximum independence but intervene and supervise when necessary  - Involve family in performance of ADLs  - Assess for home care needs following discharge   - Consider OT consult to assist with ADL evaluation and planning for discharge  - Provide patient education as appropriate  Outcome: Progressing  Goal: Maintains/Returns to pre admission functional level  Description: INTERVENTIONS:  - Perform AM-PAC 6 Click Basic Mobility/ Daily Activity assessment daily.  - Set and communicate daily mobility goal to care team and patient/family/caregiver.   -  Collaborate with rehabilitation services on mobility goals if consulted  - Reposition patient every 2 hours.    - Out of bed for toileting  - Record patient progress and toleration of activity level   Outcome: Progressing     Problem: DISCHARGE PLANNING  Goal: Discharge to home or other facility with appropriate resources  Description: INTERVENTIONS:  - Identify barriers to discharge w/patient and caregiver  - Arrange for needed discharge resources and transportation as appropriate  - Identify discharge learning needs (meds, wound care, etc.)  - Arrange for interpretive services to assist at discharge as needed  - Refer to Case Management Department for coordinating discharge planning if the patient needs post-hospital services based on physician/advanced practitioner order or complex needs related to functional status, cognitive ability, or social support system  Outcome: Progressing     Problem: Knowledge Deficit  Goal: Patient/family/caregiver demonstrates understanding of disease process, treatment plan, medications, and discharge instructions  Description: Complete learning assessment and assess knowledge base.  Interventions:  - Provide teaching at level of understanding  - Provide teaching via preferred learning methods  Outcome: Progressing     Problem: CARDIOVASCULAR - ADULT  Goal: Maintains optimal cardiac output and hemodynamic stability  Description: INTERVENTIONS:  - Monitor I/O, vital signs and rhythm  - Monitor for S/S and trends of decreased cardiac output  - Administer and titrate ordered vasoactive medications to optimize hemodynamic stability  - Assess quality of pulses, skin color and temperature  - Assess for signs of decreased coronary artery perfusion  - Instruct patient to report change in severity of symptoms  Outcome: Progressing  Goal: Absence of cardiac dysrhythmias or at baseline rhythm  Description: INTERVENTIONS:  - Continuous cardiac monitoring, vital signs, obtain 12 lead EKG if  ordered  - Administer antiarrhythmic and heart rate control medications as ordered  - Monitor electrolytes and administer replacement therapy as ordered  Outcome: Progressing     Problem: RESPIRATORY - ADULT  Goal: Achieves optimal ventilation and oxygenation  Description: INTERVENTIONS:  - Assess for changes in respiratory status  - Assess for changes in mentation and behavior  - Position to facilitate oxygenation and minimize respiratory effort  - Oxygen administered by appropriate delivery if ordered  - Initiate smoking cessation education as indicated  - Encourage broncho-pulmonary hygiene including cough, deep breathe, Incentive Spirometry  - Assess the need for suctioning and aspirate as needed  - Assess and instruct to report SOB or any respiratory difficulty  - Respiratory Therapy support as indicated  Outcome: Progressing     Problem: METABOLIC, FLUID AND ELECTROLYTES - ADULT  Goal: Electrolytes maintained within normal limits  Description: INTERVENTIONS:  - Monitor labs and assess patient for signs and symptoms of electrolyte imbalances  - Administer electrolyte replacement as ordered  - Monitor response to electrolyte replacements, including repeat lab results as appropriate  - Instruct patient on fluid and nutrition as appropriate  Outcome: Progressing  Goal: Fluid balance maintained  Description: INTERVENTIONS:  - Monitor labs   - Monitor I/O and WT  - Instruct patient on fluid and nutrition as appropriate  - Assess for signs & symptoms of volume excess or deficit  Outcome: Progressing  Goal: Glucose maintained within target range  Description: INTERVENTIONS:  - Monitor Blood Glucose as ordered  - Assess for signs and symptoms of hyperglycemia and hypoglycemia  - Administer ordered medications to maintain glucose within target range  - Assess nutritional intake and initiate nutrition service referral as needed  Outcome: Progressing     Problem: Nutrition/Hydration-ADULT  Goal: Nutrient/Hydration intake  appropriate for improving, restoring or maintaining nutritional needs  Description: Monitor and assess patient's nutrition/hydration status for malnutrition. Collaborate with interdisciplinary team and initiate plan and interventions as ordered.  Monitor patient's weight and dietary intake as ordered or per policy. Utilize nutrition screening tool and intervene as necessary. Determine patient's food preferences and provide high-protein, high-caloric foods as appropriate.     INTERVENTIONS:  - Monitor oral intake, urinary output, labs, and treatment plans  - Assess nutrition and hydration status and recommend course of action  - Evaluate amount of meals eaten  - Assist patient with eating if necessary   - Allow adequate time for meals  - Recommend/ encourage appropriate diets, oral nutritional supplements, and vitamin/mineral supplements  - Order, calculate, and assess calorie counts as needed  - Recommend, monitor, and adjust tube feedings and TPN/PPN based on assessed needs  - Assess need for intravenous fluids  - Provide specific nutrition/hydration education as appropriate  - Include patient/family/caregiver in decisions related to nutrition  Outcome: Progressing

## 2024-05-06 NOTE — ASSESSMENT & PLAN NOTE
Patient has known left bad arthritis x-ray reviewed.and also placed on prednisone along with Tylenol for pain control .  reviewed uric acid level and crp  discussed with orthopedics and with spouse.  Will trial oral prednisone for 5 days and need outpatient follow-up with Ortho  recommended subacute rehab and asked case management to discuss options with her as patient is reluctant about this

## 2024-05-06 NOTE — WOUND OSTOMY CARE
Progress Note - Wound   Tigist Garcíalaitus 69 y.o. female MRN: [id number]  Unit/Bed#: -01 Encounter: [id number]      History and Present Illness:  Admitted with acute on chronic diastolic heart failure. COPD HF. Chronic 02. 4L . BMI 69.56.     Assessment:   1)Medial tibias with fibrosis papillomas and deep skin folds .Hyperpigmentation and scaling of skin .  All areas cleansed and dried thoroughly. Interdry fabric placed flat in skin folds of upper thighs with 2 inches exposed.  2)B/L L/E cleansed and hydrguard applied  3)Heels intact  4)Sacrum, deep gluteal fold and buttocks dry and intact  Deep red bilateral buttocks and upper posterior thighs , all areas are blanchable.  5)Right lateral abdominal fold pink.   Pur wik in place, needs assist of 2 to turn and reposition.    Skin care plans:  1-Apply SERGO to sacrum, buttocks , groin, posterior upper thighs , inner kneesBID and PRN  2-Hydraguard to bilateral heel BID and PRN  3-Elevate heels to offload pressure  4-Ehob cushion when out of bed.  5-Turn/repoisiton q2h or when medically stable for pressure re-distribution on skin.  6-Moisturize skin daily with skin nourishing cream   7-Cleanse bilateral lower extremities with soap and water, apply skin nourishing cream to intact skin BID       Call or tigertext with any questions  Wound Care will continue to follow weekly    Zainab HENDERSON RN CWON

## 2024-05-06 NOTE — ASSESSMENT & PLAN NOTE
Placed on IV Rocephin.  UA and urine culture abnormal.  Urine Culture growing Proteus will switch to oral Keflex for 3 more days

## 2024-05-06 NOTE — CONSULTS
Progress Note:Cardiology  Tigist Hewitt 1955, 69 y.o. female MRN: [id number]    Unit/Bed#: -01 Encounter: [id number]  Attending Physician: Amanda Dukes MD   Primary Care Provider: Marisa Haque MD   Date admitted to hospital: 5/2/2024  Length of stay: 4         * Acute on chronic diastolic congestive heart failure (HCC)  Assessment & Plan  Wt Readings from Last 3 Encounters:   05/06/24 (!) 161 kg (355 lb 9.6 oz)   01/02/24 (!) 149 kg (328 lb 0.7 oz)   10/29/22 (!) 151 kg (331 lb 12.7 oz)     History of HFpEF.  Echo 12/24/2023 with LVEF 55%, mild AS, mild-mod MR, severe TR with PASP 53mmHg which is overall unchanged from prior.  Maintained on torsemide and metolazone at home with reported missed doses prior to hospital admission.  She has undergone work up by pulmonology for pulmonary hypertension.  Currently diuresing well with -3.3 L in 24 hours on Bumex 0.5 mg/hr drip. Also on metolazone 2.5 mg daily, which I will stop at this time due to rising CO2 levels. Add Diamox 250 mg PO x 2 doses.  Continue strict I's/O's, sodium/fluid restriction. Unable to obtain daily weights due to inability to stand on her own.  Continue CPAP  Would benefit from intense physical therapy and increased mobility but unfortunately she is lacking motivation for this.    Atrial fibrillation (HCC)  Assessment & Plan  History of atrial fibrillation. Details are unclear due to lack of availability of medical records.  Previously following with Dr. Ferraro through [facility].  Reports undergoing 2 failed cardioversions and ultimately an atrial fibrillation ablation, date unknown. Persistent a fib since at least 2020.  She is maintained on warfarin anticoagulation, goal INR 2-3.  Remains in rate controlled atrial fibrillation this admission on metoprolol succinate 37.5 mg BID.  Optimize electrolytes for K+ > 4, Mag > 2.        Subjective:   Patient seen and examined.  No significant events overnight. She is  "resting comfortably in bed breathing on 3 L NC. She states she feels much better since admission. No new complaints today. Urinating vigorously on her current medication regimen.     Review of Systems   Constitutional: Negative.   HENT: Negative.     Cardiovascular:  Positive for dyspnea on exertion and leg swelling. Negative for chest pain, irregular heartbeat, near-syncope, orthopnea and palpitations.   Respiratory:  Negative for cough and snoring.    Endocrine: Negative.    Skin: Negative.    Musculoskeletal: Negative.    Gastrointestinal: Negative.    Genitourinary: Negative.    Neurological: Negative.    Psychiatric/Behavioral: Negative.           Objective:     Vitals: Blood pressure 114/67, pulse 69, temperature 97.9 °F (36.6 °C), resp. rate 18, height 5' 1\" (1.549 m), weight (!) 161 kg (355 lb 9.6 oz), SpO2 99%., Body mass index is 67.19 kg/m².,     Orthostatic Blood Pressures      Flowsheet Row Most Recent Value   Blood Pressure 114/67 filed at 2024 0749   Patient Position - Orthostatic VS Lying filed at 2024 0543            Physical Exam  Vitals and nursing note reviewed.   Constitutional:       General: She is not in acute distress.     Appearance: She is well-developed. She is obese.   HENT:      Head: Normocephalic and atraumatic.   Eyes:      Conjunctiva/sclera: Conjunctivae normal.   Cardiovascular:      Rate and Rhythm: Normal rate. Rhythm irregularly irregular.      Heart sounds: No murmur heard.  Pulmonary:      Effort: Pulmonary effort is normal. No respiratory distress.      Breath sounds: Wheezing present.      Comments: 3 L NC  Abdominal:      Palpations: Abdomen is soft.      Tenderness: There is no abdominal tenderness.   Musculoskeletal:         General: No swelling.      Cervical back: Neck supple.      Right lower le+ Edema present.      Left lower le+ Edema present.   Skin:     General: Skin is warm and dry.      Capillary Refill: Capillary refill takes less than 2 " seconds.   Neurological:      Mental Status: She is alert.   Psychiatric:         Mood and Affect: Mood normal.            Intake/Output Summary (Last 24 hours) at 5/6/2024 1100  Last data filed at 5/6/2024 0901  Gross per 24 hour   Intake 740 ml   Output 4100 ml   Net -3360 ml       Weight (last 2 days)       Date/Time Weight    05/06/24 0600 161 (355.6)    05/04/24 0607 164 (360.67)               Medications:      Current Facility-Administered Medications:     acetaminophen (TYLENOL) tablet 650 mg, 650 mg, Oral, Q4H PRN, Amanda Dukes MD, 650 mg at 05/02/24 1911    acetaZOLAMIDE (DIAMOX) tablet 250 mg, 250 mg, Oral, Q12H HALEY, CAMPBELL Bolton    albuterol inhalation solution 2.5 mg, 2.5 mg, Nebulization, Q6H PRN, Amanda Dukes MD    allopurinol (ZYLOPRIM) tablet 200 mg, 200 mg, Oral, Daily, Amanda Dukes MD, 200 mg at 05/06/24 0921    atorvastatin (LIPITOR) tablet 10 mg, 10 mg, Oral, Daily, Amanda Dukes MD, 10 mg at 05/06/24 0922    bumetanide (BUMEX) 12.5 mg infusion 50 mL, 0.5 mg/hr, Intravenous, Continuous, Amanda Dukes MD, Last Rate: 2 mL/hr at 05/06/24 0614, 0.5 mg/hr at 05/06/24 0614    cephalexin (KEFLEX) capsule 500 mg, 500 mg, Oral, Q8H HALEY, Amanda Dukes MD, 500 mg at 05/06/24 0611    ferrous sulfate tablet 325 mg, 325 mg, Oral, Daily With Breakfast, Amanda Dukes MD, 325 mg at 05/06/24 0916    Fluticasone Furoate-Vilanterol 100-25 mcg/actuation 1 puff, 1 puff, Inhalation, Daily, Amanda Dukes MD, 1 puff at 05/06/24 0924    insulin glargine (LANTUS) subcutaneous injection 15 Units 0.15 mL, 15 Units, Subcutaneous, HS, Amanda Dukes MD, 15 Units at 05/05/24 2203    insulin lispro (HumALOG/ADMELOG) 100 units/mL subcutaneous injection 2-12 Units, 2-12 Units, Subcutaneous, TID AC, 4 Units at 05/05/24 1736 **AND** Fingerstick Glucose (POCT), , , TID AC, Amanda Dukes MD    insulin lispro (HumALOG/ADMELOG) 100 units/mL subcutaneous injection 2-12 Units, 2-12 Units, Subcutaneous, HS, Radha Wright MD, 6 Units at  05/05/24 2203    levothyroxine tablet 288 mcg, 288 mcg, Oral, Early Morning, Amanda Dukes MD, 288 mcg at 05/06/24 0611    loratadine (CLARITIN) tablet 10 mg, 10 mg, Oral, Daily, Amanda Dukes MD, 10 mg at 05/06/24 0920    metoprolol (LOPRESSOR) injection 2.5 mg, 2.5 mg, Intravenous, Q6H PRN, Radha Wright MD, 2.5 mg at 05/02/24 1837    metoprolol succinate (TOPROL-XL) 24 hr tablet 37.5 mg, 37.5 mg, Oral, BID, Radha Wright MD, 37.5 mg at 05/06/24 0922    midodrine (PROAMATINE) tablet 5 mg, 5 mg, Oral, TID AC, Amanda Dukes MD, 5 mg at 05/05/24 1735    moisture barrier miconazole 2% cream (aka SERGO MOISTURE BARRIER ANTIFUNGAL CREAM), , Topical, BID, Amanda Dukes MD, Given at 05/06/24 0924    montelukast (SINGULAIR) tablet 10 mg, 10 mg, Oral, HS, Amanda Dukes MD, 10 mg at 05/05/24 2204    multivitamin-minerals (CENTRUM) tablet 1 tablet, 1 tablet, Oral, Daily, Amanda Dukes MD, 1 tablet at 05/06/24 0920    nystatin (MYCOSTATIN) powder, , Topical, BID, Amanda Dukes MD, Given at 05/06/24 0924    ondansetron (ZOFRAN) injection 4 mg, 4 mg, Intravenous, Q6H PRN, Amanda Dukes MD, 4 mg at 05/03/24 1007    oxyCODONE (ROXICODONE) immediate release tablet 10 mg, 10 mg, Oral, Q6H PRN, Aaron Patterson MD, 10 mg at 05/04/24 0952    pantoprazole (PROTONIX) EC tablet 40 mg, 40 mg, Oral, Daily Before Breakfast, Amanda Dukes MD, 40 mg at 05/06/24 0611    potassium chloride (Klor-Con M20) CR tablet 20 mEq, 20 mEq, Oral, TID, Amanda Dukes MD, 20 mEq at 05/06/24 0920    warfarin (COUMADIN) tablet 5 mg, 5 mg, Oral, Daily (warfarin), Amanda Dukes MD, 5 mg at 05/05/24 1735     Labs & Results:        Results from last 7 days   Lab Units 05/03/24  0502 05/02/24  1209   WBC Thousand/uL 6.48 8.08   HEMOGLOBIN g/dL 11.7 11.8   HEMATOCRIT % 36.5 37.2   PLATELETS Thousands/uL 159 176         Results from last 7 days   Lab Units 05/06/24  0518 05/05/24  1839 05/05/24  0457 05/04/24  0605 05/03/24  0502 05/02/24  1209   POTASSIUM mmol/L 3.4* 3.7  3.8   < > 3.6  3.6 3.7   CHLORIDE mmol/L 99 97 101   < > 101  101 100   CO2 mmol/L 39* 39* 34*   < > 31  31 33*   BUN mg/dL 44* 45* 46*   < > 46*  46* 45*   CREATININE mg/dL 0.96 1.18 1.12   < > 1.06  1.06 0.97   CALCIUM mg/dL 10.0 9.9 9.6   < > 9.5  9.5 9.8   ALK PHOS U/L  --   --   --   --  113* 121*   ALT U/L  --   --   --   --  7 10   AST U/L  --   --   --   --  22 37    < > = values in this interval not displayed.     Results from last 7 days   Lab Units 05/05/24  0457 05/04/24  0605 05/03/24  0502 05/02/24  1209   INR  2.61* 2.44* 2.25* 2.25*   PTT seconds  --   --   --  43*     Results from last 7 days   Lab Units 05/06/24  0518 05/04/24  0605 05/02/24  1209   MAGNESIUM mg/dL 1.9 2.1 2.1          Telemetry: atrial fibrillation, rate 60-80's

## 2024-05-06 NOTE — PLAN OF CARE
Problem: Potential for Falls  Goal: Patient will remain free of falls  Description: INTERVENTIONS:  - Educate patient/family on patient safety including physical limitations  - Instruct patient to call for assistance with activity   - Consult OT/PT to assist with strengthening/mobility   - Keep Call bell within reach  - Keep bed low and locked with side rails adjusted as appropriate  - Keep care items and personal belongings within reach  - Initiate and maintain comfort rounds  - Make Fall Risk Sign visible to staff  - Offer Toileting every 2 Hours, in advance of need  - Initiate/Maintain bed alarm  - Obtain necessary fall risk management equipment:   - Apply yellow socks and bracelet for high fall risk patients  - Consider moving patient to room near nurses station  Outcome: Progressing     Problem: Prexisting or High Potential for Compromised Skin Integrity  Goal: Skin integrity is maintained or improved  Description: INTERVENTIONS:  - Identify patients at risk for skin breakdown  - Assess and monitor skin integrity  - Assess and monitor nutrition and hydration status  - Monitor labs   - Assess for incontinence   - Turn and reposition patient  - Assist with mobility/ambulation  - Relieve pressure over bony prominences  - Avoid friction and shearing  - Provide appropriate hygiene as needed including keeping skin clean and dry  - Evaluate need for skin moisturizer/barrier cream  - Collaborate with interdisciplinary team   - Patient/family teaching  - Consider wound care consult   Outcome: Progressing     Problem: PAIN - ADULT  Goal: Verbalizes/displays adequate comfort level or baseline comfort level  Description: Interventions:  - Encourage patient to monitor pain and request assistance  - Assess pain using appropriate pain scale  - Administer analgesics based on type and severity of pain and evaluate response  - Implement non-pharmacological measures as appropriate and evaluate response  - Consider cultural and  social influences on pain and pain management  - Notify physician/advanced practitioner if interventions unsuccessful or patient reports new pain  Outcome: Progressing     Problem: SAFETY ADULT  Goal: Patient will remain free of falls  Description: INTERVENTIONS:  - Educate patient/family on patient safety including physical limitations  - Instruct patient to call for assistance with activity   - Consult OT/PT to assist with strengthening/mobility   - Keep Call bell within reach  - Keep bed low and locked with side rails adjusted as appropriate  - Keep care items and personal belongings within reach  - Initiate and maintain comfort rounds  - Make Fall Risk Sign visible to staff  - Offer Toileting every 2 Hours, in advance of need  - Initiate/Maintain bed alarm  - Obtain necessary fall risk management equipment:   - Apply yellow socks and bracelet for high fall risk patients  - Consider moving patient to room near nurses station  Outcome: Progressing  Goal: Maintain or return to baseline ADL function  Description: INTERVENTIONS:  -  Assess patient's ability to carry out ADLs; assess patient's baseline for ADL function and identify physical deficits which impact ability to perform ADLs (bathing, care of mouth/teeth, toileting, grooming, dressing, etc.)  - Assess/evaluate cause of self-care deficits   - Assess range of motion  - Assess patient's mobility; develop plan if impaired  - Assess patient's need for assistive devices and provide as appropriate  - Encourage maximum independence but intervene and supervise when necessary  - Involve family in performance of ADLs  - Assess for home care needs following discharge   - Consider OT consult to assist with ADL evaluation and planning for discharge  - Provide patient education as appropriate  Outcome: Progressing  Goal: Maintains/Returns to pre admission functional level  Description: INTERVENTIONS:  - Perform AM-PAC 6 Click Basic Mobility/ Daily Activity assessment  daily.  - Set and communicate daily mobility goal to care team and patient/family/caregiver.   - Collaborate with rehabilitation services on mobility goals if consulted  - Perform Range of Motion 3 times a day.  - Reposition patient every 2 hours.  - Dangle patient 0 times a day  - Stand patient 0 times a day  - Ambulate patient 0 times a day  - Out of bed to chair 0 times a day   - Out of bed for meals 0 times a day  - Out of bed for toileting  - Record patient progress and toleration of activity level   Outcome: Progressing     Problem: DISCHARGE PLANNING  Goal: Discharge to home or other facility with appropriate resources  Description: INTERVENTIONS:  - Identify barriers to discharge w/patient and caregiver  - Arrange for needed discharge resources and transportation as appropriate  - Identify discharge learning needs (meds, wound care, etc.)  - Arrange for interpretive services to assist at discharge as needed  - Refer to Case Management Department for coordinating discharge planning if the patient needs post-hospital services based on physician/advanced practitioner order or complex needs related to functional status, cognitive ability, or social support system  Outcome: Progressing     Problem: Knowledge Deficit  Goal: Patient/family/caregiver demonstrates understanding of disease process, treatment plan, medications, and discharge instructions  Description: Complete learning assessment and assess knowledge base.  Interventions:  - Provide teaching at level of understanding  - Provide teaching via preferred learning methods  Outcome: Progressing     Problem: CARDIOVASCULAR - ADULT  Goal: Maintains optimal cardiac output and hemodynamic stability  Description: INTERVENTIONS:  - Monitor I/O, vital signs and rhythm  - Monitor for S/S and trends of decreased cardiac output  - Administer and titrate ordered vasoactive medications to optimize hemodynamic stability  - Assess quality of pulses, skin color and  temperature  - Assess for signs of decreased coronary artery perfusion  - Instruct patient to report change in severity of symptoms  Outcome: Progressing  Goal: Absence of cardiac dysrhythmias or at baseline rhythm  Description: INTERVENTIONS:  - Continuous cardiac monitoring, vital signs, obtain 12 lead EKG if ordered  - Administer antiarrhythmic and heart rate control medications as ordered  - Monitor electrolytes and administer replacement therapy as ordered  Outcome: Progressing     Problem: RESPIRATORY - ADULT  Goal: Achieves optimal ventilation and oxygenation  Description: INTERVENTIONS:  - Assess for changes in respiratory status  - Assess for changes in mentation and behavior  - Position to facilitate oxygenation and minimize respiratory effort  - Oxygen administered by appropriate delivery if ordered  - Initiate smoking cessation education as indicated  - Encourage broncho-pulmonary hygiene including cough, deep breathe, Incentive Spirometry  - Assess the need for suctioning and aspirate as needed  - Assess and instruct to report SOB or any respiratory difficulty  - Respiratory Therapy support as indicated  Outcome: Progressing     Problem: METABOLIC, FLUID AND ELECTROLYTES - ADULT  Goal: Electrolytes maintained within normal limits  Description: INTERVENTIONS:  - Monitor labs and assess patient for signs and symptoms of electrolyte imbalances  - Administer electrolyte replacement as ordered  - Monitor response to electrolyte replacements, including repeat lab results as appropriate  - Instruct patient on fluid and nutrition as appropriate  Outcome: Progressing  Goal: Fluid balance maintained  Description: INTERVENTIONS:  - Monitor labs   - Monitor I/O and WT  - Instruct patient on fluid and nutrition as appropriate  - Assess for signs & symptoms of volume excess or deficit  Outcome: Progressing  Goal: Glucose maintained within target range  Description: INTERVENTIONS:  - Monitor Blood Glucose as  ordered  - Assess for signs and symptoms of hyperglycemia and hypoglycemia  - Administer ordered medications to maintain glucose within target range  - Assess nutritional intake and initiate nutrition service referral as needed  Outcome: Progressing     Problem: Nutrition/Hydration-ADULT  Goal: Nutrient/Hydration intake appropriate for improving, restoring or maintaining nutritional needs  Description: Monitor and assess patient's nutrition/hydration status for malnutrition. Collaborate with interdisciplinary team and initiate plan and interventions as ordered.  Monitor patient's weight and dietary intake as ordered or per policy. Utilize nutrition screening tool and intervene as necessary. Determine patient's food preferences and provide high-protein, high-caloric foods as appropriate.     INTERVENTIONS:  - Monitor oral intake, urinary output, labs, and treatment plans  - Assess nutrition and hydration status and recommend course of action  - Evaluate amount of meals eaten  - Assist patient with eating if necessary   - Allow adequate time for meals  - Recommend/ encourage appropriate diets, oral nutritional supplements, and vitamin/mineral supplements  - Order, calculate, and assess calorie counts as needed  - Recommend, monitor, and adjust tube feedings and TPN/PPN based on assessed needs  - Assess need for intravenous fluids  - Provide specific nutrition/hydration education as appropriate  - Include patient/family/caregiver in decisions related to nutrition  Outcome: Progressing

## 2024-05-06 NOTE — CASE MANAGEMENT
Case Management Discharge Planning Note    Patient name Tigist Hewitt  Location /-01 MRN [id number]  : 1955 Date 2024       Current Admission Date: 2024  Current Admission Diagnosis:Acute on chronic diastolic congestive heart failure (HCC)   Patient Active Problem List    Diagnosis Date Noted    Acute pain of left knee 2024    Acute cystitis without hematuria 2024    Ambulatory dysfunction 2024    Hyperkalemia 2024    Lung cyst 2024    Abnormal CT scan, pelvis 2023    CKD (chronic kidney disease) stage 3, GFR 30-59 ml/min ([facility]) 2023    NISHI (obstructive sleep apnea) 2023    Intertrigo 10/29/2022    Vaginal bleeding 10/23/2022    Acute kidney injury superimposed on chronic kidney disease  ([facility]) 10/22/2022    Hypotension 10/22/2022    Acute on chronic diastolic congestive heart failure ([facility]) 2022    IMTIAZ (acute kidney injury) ([facility]) 2022    Morbid obesity ([facility]) 2022    Supratherapeutic INR 04/15/2020    Chronic respiratory failure with hypoxia ([facility]) 2020    Asthma 2020    Atrial fibrillation ([facility]) 2020    COPD (chronic obstructive pulmonary disease) ([facility]) 2020    Type 2 diabetes mellitus with hyperglycemia, with long-term current use of insulin ([facility]) 2020    Shortness of breath 2020    Anemia 2020      LOS (days): 4  Geometric Mean LOS (GMLOS) (days): 3  Days to GMLOS:-0.8     OBJECTIVE:  Risk of Unplanned Readmission Score: 32.84         Current admission status: Inpatient   Preferred Pharmacy:   [id number] [place] [address]  Phone: [phone] Fax: [phone]    Primary Care Provider: Marisa Haque MD    Primary Insurance: [facility] RE[facility]  Secondary Insurance:     DISCHARGE DETAILS:  Recommendation for dc is for STR, blanket referral was made via [facility].  CM will follow up with availabilities with  patient.

## 2024-05-06 NOTE — ASSESSMENT & PLAN NOTE
Wt Readings from Last 3 Encounters:   05/06/24 (!) 161 kg (355 lb 9.6 oz)   01/02/24 (!) 149 kg (328 lb 0.7 oz)   10/29/22 (!) 151 kg (331 lb 12.7 oz)     History of HFpEF.  Echo 12/24/2023 with LVEF 55%, mild AS, mild-mod MR, severe TR with PASP 53mmHg which is overall unchanged from prior.  Maintained on torsemide and metolazone at home with reported missed doses prior to hospital admission.  She has undergone work up by pulmonology for pulmonary hypertension.  Currently diuresing well with -3.4 L in 24 hours on Bumex 0.5 mg/hr drip and Diamox 250 mg PO x 2 doses.  Transitioned to Bumex 2 mg IV BID 5/8/2024 with good response.Received 3 doses of Diamox 250 mg.  Responding well to Bumex 2 mg BID.  Continue CPAP  Would benefit from intense physical therapy and increased mobility but unfortunately she is lacking motivation for this.  Needs close outpatient follow up with her cardiologist.

## 2024-05-06 NOTE — ASSESSMENT & PLAN NOTE
History of atrial fibrillation. Details are unclear due to lack of availability of medical records.  Previously following with Dr. Ferraro through [facility].  Reports undergoing 2 failed cardioversions and ultimately an atrial fibrillation ablation, date unknown. Persistent a fib since at least 2020.  She is maintained on warfarin anticoagulation, goal INR 2-3.  Remains in rate controlled atrial fibrillation this admission on metoprolol succinate 37.5 mg BID.  Optimize electrolytes for K+ > 4, Mag > 2.  Ok to discontinue telemetry as no longer on Bumex drip.

## 2024-05-06 NOTE — PROGRESS NOTES
[facility]  Progress Note  Name: Tigist Hewitt I  MRN: [id number]  Unit/Bed#: MS Ally I Date of Admission: 5/2/2024   Date of Service: 5/6/2024 I Hospital Day: 4    Assessment/Plan   * Acute on chronic diastolic congestive heart failure (HCC)  Assessment & Plan  Wt Readings from Last 3 Encounters:   05/06/24 (!) 161 kg (355 lb 9.6 oz)   01/02/24 (!) 149 kg (328 lb 0.7 oz)   10/29/22 (!) 151 kg (331 lb 12.7 oz)     2d echo from December 2023 shows normal EF but abnormal diastolic function and severe tricuspid regurg.  Patient normally takes torsemide 60 mg twice daily and metolazone once a week.  She has gained approximately 20 to 30 pounds at least and was asked by her cardiologist to increase metolazone however lately she has been very somnolent and not taking her meds for the last 2 to 3 days.    consult cardiology monitor electrolytes. Initially placed on Lasix drip 20 mg/h and metolazone 2.5 mg daily.  Not diuresing much.which to Bumex drip 0.5 mg/h along with metolazone and noted to be diuresing better but CO2 rising and hence metolazone stopped and placed on Diamox.  Continue Bumex drip at current rate.  Monitor electrolytes closely   improvement noted in blue discoloration of her fingers and toes.maybe secondary to hypoxia or Raynaud's.  Chronically uses 4 L of oxygen and CPAP at at bedtime will continue for now  Unable to get accurate weights          Acute cystitis without hematuria  Assessment & Plan  Placed on IV Rocephin.  UA and urine culture abnormal.  Urine Culture growing Proteus will switch to oral Keflex for 3 more days    Acute pain of left knee  Assessment & Plan  Patient has known left bad arthritis x-ray reviewed.and also placed on prednisone along with Tylenol for pain control .  reviewed uric acid level and crp  discussed with orthopedics and with spouse.  Will trial oral prednisone for 5 days and need outpatient follow-up with Ortho  recommended subacute  rehab and asked case management to discuss options with her as patient is reluctant about this      Morbid obesity (HCC)  Assessment & Plan  bMI 69.56 will need diuresis and counseling on diet exercise and lifestyle modification    Type 2 diabetes mellitus with hyperglycemia, with long-term current use of insulin ([facility])  Assessment & Plan  Lab Results   Component Value Date    HGBA1C 7.0 (H) 05/02/2024       Recent Labs     05/05/24  1635 05/05/24  2139 05/06/24  0749 05/06/24  1145   POCGLU 214* 279* 148* 218*         Blood Sugar Average: Last 72 hrs:  (P) [id number] on modified regimen of insulin sliding scale and diabetic diet for now  On short course of prednisone and hence resume Lantus      COPD (chronic obstructive pulmonary disease) ([facility])  Assessment & Plan  Not in exacerbation.  Continue home regimen  Has chronic respiratory failure uses 4 L of oxygen at baseline along with CPAP will continue.  Abg on admission acceptable    Atrial fibrillation ([facility])  Assessment & Plan  Currently rate controlled with Toprol-XL and INR is therapeutic normally takes Coumadin 7.5 mg daily will place on 5 mg for now and observe  Inr therapeutic      VTE Pharmacologic Prophylaxis:   Pharmacologic: Warfarin (Coumadin)  Mechanical VTE Prophylaxis in Place: Yes    Patient Centered Rounds: I have performed bedside rounds with nursing staff today.    Discussions with Specialists or Other Care Team Provider: cardio    Education and Discussions with Family / Patient: discussed with patient and     Time Spent for Care: 45 minutes.  More than 50% of total time spent on counseling and coordination of care as described above.    Current Length of Stay: 4 day(s)    Current Patient Status: Inpatient   Certification Statement: The patient will continue to require additional inpatient hospital stay due to chf    Discharge Plan: in 2-3 days    Code Status: Level 3 - DNAR and DNI      Subjective:   Patient denies any chest  pain or shortness of breath feeling better.  Complains of chronic pain in her left knee which is a little bit better today    Objective:     Vitals:   Temp (24hrs), Av.8 °F (36.6 °C), Min:97.5 °F (36.4 °C), Max:97.9 °F (36.6 °C)    Temp:  [97.5 °F (36.4 °C)-97.9 °F (36.6 °C)] 97.9 °F (36.6 °C)  HR:  [63-84] 76  Resp:  [16-18] 18  BP: (114-132)/(61-69) 116/61  SpO2:  [87 %-99 %] 97 %  Body mass index is 67.19 kg/m².     Input and Output Summary (last 24 hours):       Intake/Output Summary (Last 24 hours) at 2024 1238  Last data filed at 2024 0901  Gross per 24 hour   Intake 740 ml   Output 4100 ml   Net -3360 ml       Physical Exam:     Physical Exam  Vitals and nursing note reviewed.   Constitutional:       Appearance: Normal appearance. She is obese.   HENT:      Head: Normocephalic and atraumatic.      Right Ear: External ear normal.      Left Ear: External ear normal.      Nose: Nose normal.      Mouth/Throat:      Pharynx: Oropharynx is clear.   Eyes:      Pupils: Pupils are equal, round, and reactive to light.   Cardiovascular:      Rate and Rhythm: Normal rate and regular rhythm.      Heart sounds: Normal heart sounds.   Pulmonary:      Effort: Pulmonary effort is normal.      Comments:  decreased breath sounds bilateral bases  Abdominal:      General: Bowel sounds are normal.      Palpations: Abdomen is soft.      Tenderness: There is no abdominal tenderness.   Musculoskeletal:         General: Normal range of motion.      Cervical back: Normal range of motion and neck supple.      Comments: Medial thigh Swelling noted.   Skin:     General: Skin is warm and dry.      Capillary Refill: Capillary refill takes less than 2 seconds.   Neurological:      General: No focal deficit present.      Mental Status: She is alert and oriented to person, place, and time.   Psychiatric:         Mood and Affect: Mood normal.           Additional Data:     Labs:    Results from last 7 days   Lab Units 24  9528  05/02/24  1209   WBC Thousand/uL 6.48 8.08   HEMOGLOBIN g/dL 11.7 11.8   HEMATOCRIT % 36.5 37.2   PLATELETS Thousands/uL 159 176   SEGS PCT %  --  70   LYMPHO PCT %  --  13*   MONO PCT %  --  14*   EOS PCT %  --  1     Results from last 7 days   Lab Units 05/06/24  0518 05/04/24  0605 05/03/24  0502   SODIUM mmol/L 143   < > 141  141   POTASSIUM mmol/L 3.4*   < > 3.6  3.6   CHLORIDE mmol/L 99   < > 101  101   CO2 mmol/L 39*   < > 31  31   BUN mg/dL 44*   < > 46*  46*   CREATININE mg/dL 0.96   < > 1.06  1.06   ANION GAP mmol/L 5   < > 9  9   CALCIUM mg/dL 10.0   < > 9.5  9.5   ALBUMIN g/dL  --   --  3.3*   TOTAL BILIRUBIN mg/dL  --   --  1.75*   ALK PHOS U/L  --   --  113*   ALT U/L  --   --  7   AST U/L  --   --  22   GLUCOSE RANDOM mg/dL 173*   < > 121  121    < > = values in this interval not displayed.     Results from last 7 days   Lab Units 05/05/24  0457   INR  2.61*     Results from last 7 days   Lab Units 05/06/24  1145 05/06/24  0749 05/05/24  2139 05/05/24  1635 05/05/24  1122 05/05/24  0804 05/04/24  2135 05/04/24  1633 05/04/24  1130 05/04/24  0804 05/03/24  2133 05/03/24  1702   POC GLUCOSE mg/dl 218* 148* 279* 214* 257* 148* 193* 151* 157* 135 133 131     Results from last 7 days   Lab Units 05/02/24  1738   HEMOGLOBIN A1C % 7.0*     Results from last 7 days   Lab Units 05/04/24  0605 05/02/24  1209   LACTIC ACID mmol/L 1.3 1.5           * I Have Reviewed All Lab Data Listed Above.  * Additional Pertinent Lab Tests Reviewed: All Labs For Current Hospital Admission Reviewed    Imaging:    Imaging Reports Reviewed Today Include: cxray  Imaging Personally Reviewed by Myself Includes:  cxray    Recent Cultures (last 7 days):     Results from last 7 days   Lab Units 05/02/24  1443 05/02/24  1209   BLOOD CULTURE   --  No Growth at 72 hrs.  No Growth at 72 hrs.   URINE CULTURE  60,000-69,000 cfu/ml Proteus mirabilis*  --        Last 24 Hours Medication List:   Current Facility-Administered  Medications   Medication Dose Route Frequency Provider Last Rate    acetaminophen  650 mg Oral Q4H PRN Amanda Dukes MD      acetaZOLAMIDE  250 mg Oral Q12H HALEY CAMPBELL Bolton      albuterol  2.5 mg Nebulization Q6H PRN Amanda Dukes MD      allopurinol  200 mg Oral Daily Amanda Dukes MD      atorvastatin  10 mg Oral Daily Amanda Dukes MD      bumetanide (BUMEX) 12.5 mg infusion 50 mL  0.5 mg/hr Intravenous Continuous Amanda Dueks MD 0.5 mg/hr (05/06/24 0614)    cephalexin  500 mg Oral Q8H HALEY Amanda Dukes MD      ferrous sulfate  325 mg Oral Daily With Breakfast Amanda Dukes MD      Fluticasone Furoate-Vilanterol  1 puff Inhalation Daily Amanda Dukes MD      insulin glargine  15 Units Subcutaneous HS Amanda Dukes MD      insulin lispro  2-12 Units Subcutaneous TID AC Amanda Dukes MD      insulin lispro  2-12 Units Subcutaneous HS Radha Wright MD      levothyroxine  288 mcg Oral Early Morning Amanda Dukes MD      loratadine  10 mg Oral Daily Amanda Dukes MD      metoprolol  2.5 mg Intravenous Q6H PRN Radha Wright MD      metoprolol succinate  37.5 mg Oral BID Radha Wright MD      midodrine  5 mg Oral TID AC Amanda Dukes MD      SERGO ANTIFUNGAL   Topical BID Amanda Dukes MD      montelukast  10 mg Oral HS Amanda Dukes MD      multivitamin-minerals  1 tablet Oral Daily Amanda Dukes MD      nystatin   Topical BID Amanda Dukes MD      ondansetron  4 mg Intravenous Q6H PRN Amanda Dukes MD      oxyCODONE  10 mg Oral Q6H PRN Aaron Patterson MD      pantoprazole  40 mg Oral Daily Before Breakfast Amanda Dukes MD      potassium chloride  20 mEq Oral TID Amanda Dukes MD      warfarin  5 mg Oral Daily (warfarin) Amanda Dukes MD          Today, Patient Was Seen By: Amanda Dukes MD    ** Please Note: Dictation voice to text software may have been used in the creation of this document. **

## 2024-05-07 LAB
ANION GAP SERPL CALCULATED.3IONS-SCNC: 3 MMOL/L (ref 4–13)
BACTERIA BLD CULT: NORMAL
BACTERIA BLD CULT: NORMAL
BUN SERPL-MCNC: 44 MG/DL (ref 5–25)
CALCIUM SERPL-MCNC: 10.2 MG/DL (ref 8.4–10.2)
CHLORIDE SERPL-SCNC: 92 MMOL/L (ref 96–108)
CO2 SERPL-SCNC: 44 MMOL/L (ref 21–32)
CREAT SERPL-MCNC: 1.17 MG/DL (ref 0.6–1.3)
GFR SERPL CREATININE-BSD FRML MDRD: 46 ML/MIN/1.73SQ M
GLUCOSE SERPL-MCNC: 197 MG/DL (ref 65–140)
GLUCOSE SERPL-MCNC: 236 MG/DL (ref 65–140)
GLUCOSE SERPL-MCNC: 244 MG/DL (ref 65–140)
GLUCOSE SERPL-MCNC: 261 MG/DL (ref 65–140)
GLUCOSE SERPL-MCNC: 281 MG/DL (ref 65–140)
INR PPP: 2.88 (ref 0.84–1.19)
POTASSIUM SERPL-SCNC: 3.5 MMOL/L (ref 3.5–5.3)
PROTHROMBIN TIME: 30.4 SECONDS (ref 11.6–14.5)
SODIUM SERPL-SCNC: 139 MMOL/L (ref 135–147)

## 2024-05-07 PROCEDURE — 80048 BASIC METABOLIC PNL TOTAL CA: CPT | Performed by: FAMILY MEDICINE

## 2024-05-07 PROCEDURE — 99232 SBSQ HOSP IP/OBS MODERATE 35: CPT | Performed by: INTERNAL MEDICINE

## 2024-05-07 PROCEDURE — 85610 PROTHROMBIN TIME: CPT | Performed by: FAMILY MEDICINE

## 2024-05-07 PROCEDURE — 82948 REAGENT STRIP/BLOOD GLUCOSE: CPT

## 2024-05-07 RX ORDER — MIDODRINE HYDROCHLORIDE 5 MG/1
2.5 TABLET ORAL
Status: DISCONTINUED | OUTPATIENT
Start: 2024-05-07 | End: 2024-05-10

## 2024-05-07 RX ADMIN — CEPHALEXIN 500 MG: 500 CAPSULE ORAL at 05:37

## 2024-05-07 RX ADMIN — WARFARIN SODIUM 5 MG: 5 TABLET ORAL at 17:11

## 2024-05-07 RX ADMIN — FERROUS SULFATE TAB 325 MG (65 MG ELEMENTAL FE) 325 MG: 325 (65 FE) TAB at 08:59

## 2024-05-07 RX ADMIN — OXYCODONE HYDROCHLORIDE 10 MG: 10 TABLET ORAL at 12:47

## 2024-05-07 RX ADMIN — METOPROLOL SUCCINATE 37.5 MG: 25 TABLET, EXTENDED RELEASE ORAL at 21:28

## 2024-05-07 RX ADMIN — INSULIN LISPRO 4 UNITS: 100 INJECTION, SOLUTION INTRAVENOUS; SUBCUTANEOUS at 11:53

## 2024-05-07 RX ADMIN — INSULIN LISPRO 2 UNITS: 100 INJECTION, SOLUTION INTRAVENOUS; SUBCUTANEOUS at 17:12

## 2024-05-07 RX ADMIN — Medication 1 TABLET: at 09:01

## 2024-05-07 RX ADMIN — NYSTATIN: 100000 POWDER TOPICAL at 17:14

## 2024-05-07 RX ADMIN — POTASSIUM CHLORIDE 20 MEQ: 1500 TABLET, EXTENDED RELEASE ORAL at 09:01

## 2024-05-07 RX ADMIN — POTASSIUM CHLORIDE 20 MEQ: 1500 TABLET, EXTENDED RELEASE ORAL at 21:28

## 2024-05-07 RX ADMIN — MIDODRINE HYDROCHLORIDE 2.5 MG: 5 TABLET ORAL at 11:51

## 2024-05-07 RX ADMIN — Medication 0.5 MG/HR: at 05:39

## 2024-05-07 RX ADMIN — INSULIN LISPRO 6 UNITS: 100 INJECTION, SOLUTION INTRAVENOUS; SUBCUTANEOUS at 08:58

## 2024-05-07 RX ADMIN — MIDODRINE HYDROCHLORIDE 2.5 MG: 5 TABLET ORAL at 16:36

## 2024-05-07 RX ADMIN — MIDODRINE HYDROCHLORIDE 5 MG: 5 TABLET ORAL at 06:00

## 2024-05-07 RX ADMIN — MONTELUKAST 10 MG: 10 TABLET, FILM COATED ORAL at 21:34

## 2024-05-07 RX ADMIN — PANTOPRAZOLE SODIUM 40 MG: 40 TABLET, DELAYED RELEASE ORAL at 06:00

## 2024-05-07 RX ADMIN — MICONAZOLE NITRATE: 20 CREAM TOPICAL at 08:57

## 2024-05-07 RX ADMIN — NYSTATIN: 100000 POWDER TOPICAL at 08:57

## 2024-05-07 RX ADMIN — LORATADINE 10 MG: 10 TABLET ORAL at 09:02

## 2024-05-07 RX ADMIN — CEPHALEXIN 500 MG: 500 CAPSULE ORAL at 13:24

## 2024-05-07 RX ADMIN — INSULIN GLARGINE 15 UNITS: 100 INJECTION, SOLUTION SUBCUTANEOUS at 21:34

## 2024-05-07 RX ADMIN — LEVOTHYROXINE SODIUM 288 MCG: 88 TABLET ORAL at 05:37

## 2024-05-07 RX ADMIN — MICONAZOLE NITRATE: 20 CREAM TOPICAL at 17:14

## 2024-05-07 RX ADMIN — POTASSIUM CHLORIDE 20 MEQ: 1500 TABLET, EXTENDED RELEASE ORAL at 16:36

## 2024-05-07 RX ADMIN — ATORVASTATIN CALCIUM 10 MG: 10 TABLET, FILM COATED ORAL at 09:02

## 2024-05-07 RX ADMIN — FLUTICASONE FUROATE AND VILANTEROL TRIFENATATE 1 PUFF: 100; 25 POWDER RESPIRATORY (INHALATION) at 08:57

## 2024-05-07 RX ADMIN — CEPHALEXIN 500 MG: 500 CAPSULE ORAL at 21:27

## 2024-05-07 RX ADMIN — INSULIN LISPRO 4 UNITS: 100 INJECTION, SOLUTION INTRAVENOUS; SUBCUTANEOUS at 21:27

## 2024-05-07 RX ADMIN — ALLOPURINOL 200 MG: 100 TABLET ORAL at 09:01

## 2024-05-07 RX ADMIN — METOPROLOL SUCCINATE 37.5 MG: 25 TABLET, EXTENDED RELEASE ORAL at 09:02

## 2024-05-07 NOTE — CASE MANAGEMENT
Case Management Discharge Planning Note    Patient name Tigist Hewitt  Location /-01 MRN [id number]  : 1955 Date 2024       Current Admission Date: 2024  Current Admission Diagnosis:Acute on chronic diastolic congestive heart failure (HCC)   Patient Active Problem List    Diagnosis Date Noted    Acute pain of left knee 2024    Acute cystitis without hematuria 2024    Ambulatory dysfunction 2024    Hyperkalemia 2024    Lung cyst 2024    Abnormal CT scan, pelvis 2023    CKD (chronic kidney disease) stage 3, GFR 30-59 ml/min ([facility]) 2023    NISHI (obstructive sleep apnea) 2023    Intertrigo 10/29/2022    Vaginal bleeding 10/23/2022    Acute kidney injury superimposed on chronic kidney disease  ([facility]) 10/22/2022    Hypotension 10/22/2022    Acute on chronic diastolic congestive heart failure ([facility]) 2022    IMTIAZ (acute kidney injury) ([facility]) 2022    Morbid obesity ([facility]) 2022    Supratherapeutic INR 04/15/2020    Chronic respiratory failure with hypoxia ([facility]) 2020    Asthma 2020    Atrial fibrillation ([facility]) 2020    COPD (chronic obstructive pulmonary disease) ([facility]) 2020    Type 2 diabetes mellitus with hyperglycemia, with long-term current use of insulin ([facility]) 2020    Shortness of breath 2020    Anemia 2020      LOS (days): 5  Geometric Mean LOS (GMLOS) (days): 3  Days to GMLOS:-2     OBJECTIVE:  Risk of Unplanned Readmission Score: 31.29         Current admission status: Inpatient   Preferred Pharmacy:   [id number] [place] [address]  Phone: [phone] Fax: [phone]    Primary Care Provider: Marisa Haque MD    Primary Insurance: [employer] [facility] RE[facility]  Secondary Insurance:     DISCHARGE DETAILS:    Reviewed dc planning with patient, spouse and friend with recommendation for STR.    Discharge planning discussed  with:: patient, spouse and good friend Shazia  Freedom of Choice: Yes  Comments - Freedom of Choice: Choice is to accept the bed at [facility]  CM contacted family/caregiver?: Yes  Were Treatment Team discharge recommendations reviewed with patient/caregiver?: No  Did patient/caregiver verbalize understanding of patient care needs?: Yes       Contacts  Patient Contacts: arben Cooper  Relationship to Patient:: Family  Contact Method: In Person  Reason/Outcome: Discharge Planning         DME Referral Provided  Referral made for DME?: No    Other Referral/Resources/Interventions Provided:  Interventions: Short Term Rehab  Referral Comments: Accepted [facility] in [place]         Treatment Team Recommendation: Short Term Rehab  Discharge Destination Plan:: Short Term Rehab ([facility])  Transport at Discharge : [facility]LS Ambulance        Patient will need an auth at the time of medical stability.

## 2024-05-07 NOTE — PROGRESS NOTES
Progress Note:Cardiology  Tigist Hewitt 1955, 69 y.o. female MRN: [id number]    Unit/Bed#: -01 Encounter: [id number]  Attending Physician: Janice Porter MD   Primary Care Provider: Marisa Haque MD   Date admitted to hospital: 5/2/2024  Length of stay: 5         * Acute on chronic diastolic congestive heart failure (HCC)  Assessment & Plan  Wt Readings from Last 3 Encounters:   05/06/24 (!) 161 kg (355 lb 9.6 oz)   01/02/24 (!) 149 kg (328 lb 0.7 oz)   10/29/22 (!) 151 kg (331 lb 12.7 oz)     History of HFpEF.  Echo 12/24/2023 with LVEF 55%, mild AS, mild-mod MR, severe TR with PASP 53mmHg which is overall unchanged from prior.  Maintained on torsemide and metolazone at home with reported missed doses prior to hospital admission.  She has undergone work up by pulmonology for pulmonary hypertension.  Currently diuresing well with -3.4 L in 24 hours on Bumex 0.5 mg/hr drip and Diamox 250 mg PO x 2 doses.  Will continue drip through today. Reassess BMP this afternoon and plan transition to intermittent IV diuretic within 12-24 hours.  Continue strict I's/O's, sodium/fluid restriction. Unable to obtain daily weights due to inability to stand on her own.  Continue CPAP  Would benefit from intense physical therapy and increased mobility but unfortunately she is lacking motivation for this.    Atrial fibrillation (HCC)  Assessment & Plan  History of atrial fibrillation. Details are unclear due to lack of availability of medical records.  Previously following with Dr. Ferraro through [facility].  Reports undergoing 2 failed cardioversions and ultimately an atrial fibrillation ablation, date unknown. Persistent a fib since at least 2020.  She is maintained on warfarin anticoagulation, goal INR 2-3.  Remains in rate controlled atrial fibrillation this admission on metoprolol succinate 37.5 mg BID.  Optimize electrolytes for K+ > 4, Mag > 2.        Subjective:   Patient seen and examined.  No  "significant events overnight. She continues to urinate vigorously. She states she is feeling well. Remains on baseline O2 requirements.     Review of Systems   Constitutional: Negative.   HENT: Negative.     Cardiovascular:  Positive for dyspnea on exertion. Negative for chest pain, irregular heartbeat, near-syncope, orthopnea and palpitations.   Respiratory:  Negative for cough and snoring.    Endocrine: Negative.    Skin: Negative.    Musculoskeletal: Negative.    Gastrointestinal: Negative.    Genitourinary: Negative.    Neurological: Negative.    Psychiatric/Behavioral: Negative.           Objective:     Vitals: Blood pressure 110/65, pulse 58, temperature 97.5 °F (36.4 °C), resp. rate 18, height 5' 1\" (1.549 m), weight (!) 154 kg (340 lb 9.8 oz), SpO2 94%., Body mass index is 64.36 kg/m².,     Orthostatic Blood Pressures      Flowsheet Row Most Recent Value   Blood Pressure 110/65 filed at 05/07/2024 0735   Patient Position - Orthostatic VS Lying filed at 05/03/2024 0543            Physical Exam  Vitals and nursing note reviewed.   Constitutional:       General: She is not in acute distress.     Appearance: She is well-developed. She is obese.   HENT:      Head: Normocephalic and atraumatic.   Eyes:      Conjunctiva/sclera: Conjunctivae normal.   Neck:      Vascular: No JVD.   Cardiovascular:      Rate and Rhythm: Normal rate. Rhythm irregularly irregular.      Heart sounds: No murmur heard.     Comments: Trace lower leg edema  Pulmonary:      Effort: Pulmonary effort is normal. No respiratory distress.      Breath sounds: Wheezing present.      Comments: 3 L NC  Abdominal:      Palpations: Abdomen is soft.      Tenderness: There is no abdominal tenderness.   Musculoskeletal:         General: No swelling.      Cervical back: Neck supple.   Skin:     General: Skin is warm and dry.      Capillary Refill: Capillary refill takes less than 2 seconds.   Neurological:      Mental Status: She is alert.   Psychiatric:   "       Mood and Affect: Mood normal.            Intake/Output Summary (Last 24 hours) at 5/7/2024 0845  Last data filed at 5/7/2024 0628  Gross per 24 hour   Intake 1080 ml   Output 4550 ml   Net -3470 ml       Weight (last 2 days)       Date/Time Weight    05/07/24 0600 154 (340.61)    05/06/24 1300 160 (353.84)    05/06/24 1255 155 (342.5)    05/06/24 0600 161 (355.6)               Medications:      Current Facility-Administered Medications:     acetaminophen (TYLENOL) tablet 650 mg, 650 mg, Oral, Q4H PRN, Amanda Dukes MD, 650 mg at 05/06/24 1145    albuterol inhalation solution 2.5 mg, 2.5 mg, Nebulization, Q6H PRN, Amanda Dukes MD    allopurinol (ZYLOPRIM) tablet 200 mg, 200 mg, Oral, Daily, Amanda Dukes MD, 200 mg at 05/06/24 0921    atorvastatin (LIPITOR) tablet 10 mg, 10 mg, Oral, Daily, Amanda Dukes MD, 10 mg at 05/06/24 0922    bumetanide (BUMEX) 12.5 mg infusion 50 mL, 0.5 mg/hr, Intravenous, Continuous, Amanda Dukes MD, Last Rate: 2 mL/hr at 05/07/24 0539, 0.5 mg/hr at 05/07/24 0539    cephalexin (KEFLEX) capsule 500 mg, 500 mg, Oral, Q8H HALEY, Amanda Dukes MD, 500 mg at 05/07/24 0537    ferrous sulfate tablet 325 mg, 325 mg, Oral, Daily With Breakfast, Amanda Dukes MD, 325 mg at 05/06/24 0916    Fluticasone Furoate-Vilanterol 100-25 mcg/actuation 1 puff, 1 puff, Inhalation, Daily, Amanda Dukes MD, 1 puff at 05/06/24 0924    insulin glargine (LANTUS) subcutaneous injection 15 Units 0.15 mL, 15 Units, Subcutaneous, HS, Amanda Dukes MD, 15 Units at 05/06/24 2133    insulin lispro (HumALOG/ADMELOG) 100 units/mL subcutaneous injection 2-12 Units, 2-12 Units, Subcutaneous, TID AC, 4 Units at 05/06/24 1712 **AND** Fingerstick Glucose (POCT), , , TID AC, Amanda Dukes MD    insulin lispro (HumALOG/ADMELOG) 100 units/mL subcutaneous injection 2-12 Units, 2-12 Units, Subcutaneous, HS, Radha Wright MD, 8 Units at 05/06/24 2131    levothyroxine tablet 288 mcg, 288 mcg, Oral, Early Morning, Amanda Dukes MD, 288 mcg  at 05/07/24 0537    loratadine (CLARITIN) tablet 10 mg, 10 mg, Oral, Daily, Amanda Dukes MD, 10 mg at 05/06/24 0920    metoprolol (LOPRESSOR) injection 2.5 mg, 2.5 mg, Intravenous, Q6H PRN, Radha Wright MD, 2.5 mg at 05/02/24 1837    metoprolol succinate (TOPROL-XL) 24 hr tablet 37.5 mg, 37.5 mg, Oral, BID, Radha Wright MD, 37.5 mg at 05/06/24 2139    midodrine (PROAMATINE) tablet 2.5 mg, 2.5 mg, Oral, TID AC, CAMPBELL Bolton    moisture barrier miconazole 2% cream (aka SERGO MOISTURE BARRIER ANTIFUNGAL CREAM), , Topical, BID, Amanda Dukes MD, Given at 05/06/24 1710    montelukast (SINGULAIR) tablet 10 mg, 10 mg, Oral, HS, Amanda Dukes MD, 10 mg at 05/06/24 2133    multivitamin-minerals (CENTRUM) tablet 1 tablet, 1 tablet, Oral, Daily, Amanda Dukes MD, 1 tablet at 05/06/24 0920    nystatin (MYCOSTATIN) powder, , Topical, BID, Amanda Dukes MD, Given at 05/06/24 1710    ondansetron (ZOFRAN) injection 4 mg, 4 mg, Intravenous, Q6H PRN, Amanda Dukes MD, 4 mg at 05/03/24 1007    oxyCODONE (ROXICODONE) immediate release tablet 10 mg, 10 mg, Oral, Q6H PRN, Aaron Patterson MD, 10 mg at 05/06/24 1929    pantoprazole (PROTONIX) EC tablet 40 mg, 40 mg, Oral, Daily Before Breakfast, Amanda Dukes MD, 40 mg at 05/07/24 0600    potassium chloride (Klor-Con M20) CR tablet 20 mEq, 20 mEq, Oral, TID, Amanda Dukes MD, 20 mEq at 05/06/24 2133    warfarin (COUMADIN) tablet 5 mg, 5 mg, Oral, Daily (warfarin), Amanda Dukes MD, 5 mg at 05/06/24 1710     Labs & Results:        Results from last 7 days   Lab Units 05/03/24  0502 05/02/24  1209   WBC Thousand/uL 6.48 8.08   HEMOGLOBIN g/dL 11.7 11.8   HEMATOCRIT % 36.5 37.2   PLATELETS Thousands/uL 159 176         Results from last 7 days   Lab Units 05/07/24  0449 05/06/24  0518 05/05/24  1839 05/04/24  0605 05/03/24  0502 05/02/24  1209   POTASSIUM mmol/L 3.5 3.4* 3.7   < > 3.6  3.6 3.7   CHLORIDE mmol/L 92* 99 97   < > 101  101 100   CO2 mmol/L 44* 39* 39*   < > 31  31 33*    BUN mg/dL 44* 44* 45*   < > 46*  46* 45*   CREATININE mg/dL 1.17 0.96 1.18   < > 1.06  1.06 0.97   CALCIUM mg/dL 10.2 10.0 9.9   < > 9.5  9.5 9.8   ALK PHOS U/L  --   --   --   --  113* 121*   ALT U/L  --   --   --   --  7 10   AST U/L  --   --   --   --  22 37    < > = values in this interval not displayed.     Results from last 7 days   Lab Units 05/07/24  0449 05/05/24  0457 05/04/24  0605 05/03/24  0502 05/02/24  1209   INR  2.88* 2.61* 2.44*   < > 2.25*   PTT seconds  --   --   --   --  43*    < > = values in this interval not displayed.     Results from last 7 days   Lab Units 05/06/24  0518 05/04/24  0605 05/02/24  1209   MAGNESIUM mg/dL 1.9 2.1 2.1            Telemetry: atrial fibrillation, rate 40-70's

## 2024-05-07 NOTE — ASSESSMENT & PLAN NOTE
Wt Readings from Last 3 Encounters:   05/07/24 (!) 154 kg (340 lb 9.8 oz)   01/02/24 (!) 149 kg (328 lb 0.7 oz)   10/29/22 (!) 151 kg (331 lb 12.7 oz)   Patient has heart failure with preserved ejection fraction and maintained on torsemide 60 mg twice daily and metolazone once a week  2d echo from December 2023 shows normal EF but abnormal diastolic function and severe tricuspid regurg.  She has gained approximately 20 to 30 pounds at least and was asked by her cardiologist to increase metolazone however lately she has been very somnolent and not taking her meds for the last 2 to 3 days.    Patient did not have adequate diuretic response initially on Lasix drip 20 mg/h and metolazone 2.5 mg daily.  Subsequently transition to Bumex drip 0.5 mg/h along with metolazone .-7.8 L since admission and 3.4 L in the last 24 hours.  Unable to adequately assess volume status because of body habitus and inability to do standing weights.  Increase metabolic alkalosis noted and received 2 doses of Diamox.  Continue with Bumex gtt. today and likely transition to intermittent IV diuretics in the next 24 hours.    Monitor electrolytes closely    Chronically uses 4 L of oxygen and CPAP at at bedtime will continue for now  Unable to get accurate weights but bed scale shows weight from 368 pounds -->340 pounds

## 2024-05-07 NOTE — PROGRESS NOTES
Patient:  HANNA WASHINGTON    MRN:  [id number]    [facility] Request ID:  [id number]    Level of care reserved:  Skilled Nursing Facility    Partner Reserved:  [place] [address] [phone]    Clinical needs requested:  bariatric    Geography searched:  40 miles around [id number]    Start of Service:    Request sent:  8:48am EDT on 5/6/2024 by Coby Rebollar    Partner reserved:  1:38pm EDT on 5/7/2024 by Coby Rebollar    Choice list shared:  1:28pm EDT on 5/6/2024 by Coby Rebollar

## 2024-05-07 NOTE — ASSESSMENT & PLAN NOTE
Lab Results   Component Value Date    HGBA1C 7.0 (H) 05/02/2024       Recent Labs     05/06/24  1621 05/06/24  2104 05/07/24  0734 05/07/24  1144   POCGLU 237* 316* 261* 236*         Blood Sugar Average: Last 72 hrs:  (P) [id number] on modified regimen of insulin sliding scale and diabetic diet for now  On short course of prednisone and hence resume Lantus

## 2024-05-07 NOTE — PROGRESS NOTES
[facility]  Progress Note  Name: Tigist Hewitt I  MRN: [id number]  Unit/Bed#: -01 I Date of Admission: 5/2/2024   Date of Service: 5/7/2024 I Hospital Day: 5    Assessment/Plan   * Acute on chronic diastolic congestive heart failure (HCC)  Assessment & Plan  Wt Readings from Last 3 Encounters:   05/07/24 (!) 154 kg (340 lb 9.8 oz)   01/02/24 (!) 149 kg (328 lb 0.7 oz)   10/29/22 (!) 151 kg (331 lb 12.7 oz)   Patient has heart failure with preserved ejection fraction and maintained on torsemide 60 mg twice daily and metolazone once a week  2d echo from December 2023 shows normal EF but abnormal diastolic function and severe tricuspid regurg.  She has gained approximately 20 to 30 pounds at least and was asked by her cardiologist to increase metolazone however lately she has been very somnolent and not taking her meds for the last 2 to 3 days.    Patient did not have adequate diuretic response initially on Lasix drip 20 mg/h and metolazone 2.5 mg daily.  Subsequently transition to Bumex drip 0.5 mg/h along with metolazone .-7.8 L since admission and 3.4 L in the last 24 hours.  Unable to adequately assess volume status because of body habitus and inability to do standing weights.  Increase metabolic alkalosis noted and received 2 doses of Diamox.  Continue with Bumex gtt. today and likely transition to intermittent IV diuretics in the next 24 hours.    Monitor electrolytes closely    Chronically uses 4 L of oxygen and CPAP at at bedtime will continue for now  Unable to get accurate weights but bed scale shows weight from 368 pounds -->340 pounds          Acute cystitis without hematuria  Assessment & Plan  Placed on IV Rocephin.  UA and urine culture abnormal.  Urine Culture growing Proteus will switch to oral Keflex for 3 more days    Acute pain of left knee  Assessment & Plan  Patient has known left bad arthritis x-ray reviewed.and also placed on prednisone along with Tylenol  for pain control .  reviewed uric acid level and crp  discussed with orthopedics and with spouse.  Will trial oral prednisone for 5 days and need outpatient follow-up with Ortho  recommended subacute rehab .  Patient agreeable to transition to [facility] on discharge      Morbid obesity ([facility])  Assessment & Plan  bMI 69.56 will need diuresis and counseling on diet exercise and lifestyle modification    Type 2 diabetes mellitus with hyperglycemia, with long-term current use of insulin ([facility])  Assessment & Plan  Lab Results   Component Value Date    HGBA1C 7.0 (H) 05/02/2024       Recent Labs     05/06/24  1621 05/06/24  2104 05/07/24  0734 05/07/24  1144   POCGLU 237* 316* 261* 236*         Blood Sugar Average: Last 72 hrs:  (P) [id number] on modified regimen of insulin sliding scale and diabetic diet for now  On short course of prednisone and hence resume Lantus      COPD (chronic obstructive pulmonary disease) (Former[facility])  Assessment & Plan  Not in exacerbation.  Continue home regimen  Has chronic respiratory failure uses 4 L of oxygen at baseline along with CPAP will continue.  Abg on admission acceptable    Atrial fibrillation ([facility])  Assessment & Plan  Currently rate controlled with Toprol-XL and INR is therapeutic normally takes Coumadin 7.5 mg daily will place on 5 mg for now and observe  Inr therapeutic               VTE Pharmacologic Prophylaxis: VTE Score: 2 High Risk (Score >/= 5) - Pharmacological DVT Prophylaxis Ordered: warfarin (Coumadin). Sequential Compression Devices Ordered.    Mobility:   Basic Mobility Inpatient Raw Score: 6  JH-HLM Goal: 2: Bed activities/Dependent transfer  JH-HLM Achieved: 2: Bed activities/Dependent transfer  JH-HLM Goal NOT achieved. Continue with multidisciplinary rounding and encourage appropriate mobility to improve upon JH-HLM goals.    Patient Centered Rounds: I performed bedside rounds with nursing staff today.   Discussions with Specialists or Other Care  Team Provider: Discussed with cardiology    Education and Discussions with Family / Patient: Attempted to update  () via phone. Left voicemail.     Total Time Spent on Date of Encounter in care of patient: 20 mins. This time was spent on one or more of the following: performing physical exam; counseling and coordination of care; obtaining or reviewing history; documenting in the medical record; reviewing/ordering tests, medications or procedures; communicating with other healthcare professionals and discussing with patient's family/caregivers.    Current Length of Stay: 5 day(s)  Current Patient Status: Inpatient   Certification Statement: The patient will continue to require additional inpatient hospital stay due to ongoing IV diuresis  Discharge Plan: Anticipate discharge in 48 hrs to rehab facility.    Code Status: Level 3 - DNAR and DNI    Subjective:   Seen and examined at bedside.  Did not sleep well yesterday.  Denies any worsening shortness of breath.  No acute events overnight.  Remains hemodynamically stable.    Objective:     Vitals:   Temp (24hrs), Av.8 °F (36.6 °C), Min:97.5 °F (36.4 °C), Max:98.1 °F (36.7 °C)    Temp:  [97.5 °F (36.4 °C)-98.1 °F (36.7 °C)] 97.7 °F (36.5 °C)  HR:  [58-70] 69  Resp:  [18] 18  BP: (103-132)/(61-75) 110/63  SpO2:  [94 %-100 %] 100 %  Body mass index is 64.36 kg/m².     Input and Output Summary (last 24 hours):     Intake/Output Summary (Last 24 hours) at 2024 1410  Last data filed at 2024 1027  Gross per 24 hour   Intake 1320 ml   Output 4675 ml   Net -3355 ml       Physical Exam:   Physical Exam  Constitutional:       General: She is not in acute distress.     Appearance: She is obese.   HENT:      Head: Normocephalic.      Nose: Nose normal.      Mouth/Throat:      Mouth: Mucous membranes are moist.   Eyes:      Extraocular Movements: Extraocular movements intact.      Pupils: Pupils are equal, round, and reactive to light.    Cardiovascular:      Rate and Rhythm: Normal rate.   Pulmonary:      Effort: Pulmonary effort is normal.      Breath sounds: Normal breath sounds.      Comments: Diminished breath sounds bilaterally.  Abdominal:      General: There is distension.      Palpations: Abdomen is soft.      Tenderness: There is no abdominal tenderness.      Comments: Increased abdominal girth.   Musculoskeletal:      Right lower leg: No edema.      Left lower leg: No edema.   Neurological:      General: No focal deficit present.      Mental Status: She is alert and oriented to person, place, and time. Mental status is at baseline.          Additional Data:     Labs:  Results from last 7 days   Lab Units 05/03/24  0502 05/02/24  1209   WBC Thousand/uL 6.48 8.08   HEMOGLOBIN g/dL 11.7 11.8   HEMATOCRIT % 36.5 37.2   PLATELETS Thousands/uL 159 176   SEGS PCT %  --  70   LYMPHO PCT %  --  13*   MONO PCT %  --  14*   EOS PCT %  --  1     Results from last 7 days   Lab Units 05/07/24  0449 05/04/24  0605 05/03/24  0502   SODIUM mmol/L 139   < > 141  141   POTASSIUM mmol/L 3.5   < > 3.6  3.6   CHLORIDE mmol/L 92*   < > 101  101   CO2 mmol/L 44*   < > 31  31   BUN mg/dL 44*   < > 46*  46*   CREATININE mg/dL 1.17   < > 1.06  1.06   ANION GAP mmol/L 3*   < > 9  9   CALCIUM mg/dL 10.2   < > 9.5  9.5   ALBUMIN g/dL  --   --  3.3*   TOTAL BILIRUBIN mg/dL  --   --  1.75*   ALK PHOS U/L  --   --  113*   ALT U/L  --   --  7   AST U/L  --   --  22   GLUCOSE RANDOM mg/dL 281*   < > 121  121    < > = values in this interval not displayed.     Results from last 7 days   Lab Units 05/07/24  0449   INR  2.88*     Results from last 7 days   Lab Units 05/07/24  1144 05/07/24  0734 05/06/24  2104 05/06/24  1621 05/06/24  1145 05/06/24  0749 05/05/24  2139 05/05/24  1635 05/05/24  1122 05/05/24  0804 05/04/24  2135 05/04/24  1633   POC GLUCOSE mg/dl 236* 261* 316* 237* 218* 148* 279* 214* 257* 148* 193* 151*     Results from last 7 days   Lab Units  05/02/24  1738   HEMOGLOBIN A1C % 7.0*     Results from last 7 days   Lab Units 05/04/24  0605 05/02/24  1209   LACTIC ACID mmol/L 1.3 1.5       Lines/Drains:  Invasive Devices       Peripheral Intravenous Line  Duration             Peripheral IV 05/04/24 Dorsal (posterior);Right Hand 3 days              Drain  Duration             External Urinary Catheter 4 days                      Telemetry:  Telemetry Orders (From admission, onward)               24 Hour Telemetry Monitoring  Continuous x 24 Hours (Telem)        Question:  Reason for 24 Hour Telemetry  Answer:  Decompensated CHF- and any one of the following: continuous diuretic infusion or total diuretic dose >200 mg daily, associated electrolyte derangement (I.e. K < 3.0), ionotropic drip (continuous infusion), hx of ventricular arrhythmia, or new EF < 35%                     Telemetry Reviewed: Normal Sinus Rhythm  Indication for Continued Telemetry Use: Acute CHF on >200 mg lasix/day or equivalent dose or with new reduced EF.              Imaging: No pertinent imaging reviewed.    Recent Cultures (last 7 days):   Results from last 7 days   Lab Units 05/02/24  1443 05/02/24  1209   BLOOD CULTURE   --  No Growth After 4 Days.  No Growth After 4 Days.   URINE CULTURE  60,000-69,000 cfu/ml Proteus mirabilis*  --        Last 24 Hours Medication List:   Current Facility-Administered Medications   Medication Dose Route Frequency Provider Last Rate    acetaminophen  650 mg Oral Q4H PRN Amanda Dukes MD      albuterol  2.5 mg Nebulization Q6H PRN Amanda Dukes MD      allopurinol  200 mg Oral Daily Amanda Dukes MD      atorvastatin  10 mg Oral Daily Amanda Dukes MD      bumetanide (BUMEX) 12.5 mg infusion 50 mL  0.5 mg/hr Intravenous Continuous Amanda Dukes MD 0.5 mg/hr (05/07/24 0539)    cephalexin  500 mg Oral Q8H HALEY Amanda Dukes MD      ferrous sulfate  325 mg Oral Daily With Breakfast Amanda Dukes MD      Fluticasone Furoate-Vilanterol  1 puff Inhalation Daily  Amanda Dukes MD      insulin glargine  15 Units Subcutaneous HS Amanda Dukes MD      insulin lispro  2-12 Units Subcutaneous TID AC Amanda Dukes MD      insulin lispro  2-12 Units Subcutaneous HS Radha Wright MD      levothyroxine  288 mcg Oral Early Morning Amanda Dukes MD      loratadine  10 mg Oral Daily Amanda Dukes MD      metoprolol  2.5 mg Intravenous Q6H PRN Radha Wright MD      metoprolol succinate  37.5 mg Oral BID Radha Wright MD      midodrine  2.5 mg Oral TID AC CAMPBELL Bolton      SERGO ANTIFUNGAL   Topical BID Amanda Dukes MD      montelukast  10 mg Oral HS Amanda Dukes MD      multivitamin-minerals  1 tablet Oral Daily Amanda Dukes MD      nystatin   Topical BID Amanda Dukes MD      ondansetron  4 mg Intravenous Q6H PRN Amanda Dukes MD      oxyCODONE  10 mg Oral Q6H PRN Aaron Patterson MD      pantoprazole  40 mg Oral Daily Before Breakfast Amanda Dukes MD      potassium chloride  20 mEq Oral TID Amanda Dukes MD      warfarin  5 mg Oral Daily (warfarin) Amanda Dukes MD          Today, Patient Was Seen By: Janice Porter MD    **Please Note: This note may have been constructed using a voice recognition system.**

## 2024-05-07 NOTE — PLAN OF CARE
Problem: Potential for Falls  Goal: Patient will remain free of falls  Description: INTERVENTIONS:  - Educate patient/family on patient safety including physical limitations  - Instruct patient to call for assistance with activity   - Consult OT/PT to assist with strengthening/mobility   - Keep Call bell within reach  - Keep bed low and locked with side rails adjusted as appropriate  - Keep care items and personal belongings within reach  - Initiate and maintain comfort rounds  - Make Fall Risk Sign visible to staff  - Offer Toileting every  Hours, in advance of need  - Initiate/Maintain alarm  - Obtain necessary fall risk management equipment:   - Apply yellow socks and bracelet for high fall risk patients  - Consider moving patient to room near nurses station  Outcome: Progressing     Problem: Prexisting or High Potential for Compromised Skin Integrity  Goal: Skin integrity is maintained or improved  Description: INTERVENTIONS:  - Identify patients at risk for skin breakdown  - Assess and monitor skin integrity  - Assess and monitor nutrition and hydration status  - Monitor labs   - Assess for incontinence   - Turn and reposition patient  - Assist with mobility/ambulation  - Relieve pressure over bony prominences  - Avoid friction and shearing  - Provide appropriate hygiene as needed including keeping skin clean and dry  - Evaluate need for skin moisturizer/barrier cream  - Collaborate with interdisciplinary team   - Patient/family teaching  - Consider wound care consult   Outcome: Progressing     Problem: PAIN - ADULT  Goal: Verbalizes/displays adequate comfort level or baseline comfort level  Description: Interventions:  - Encourage patient to monitor pain and request assistance  - Assess pain using appropriate pain scale  - Administer analgesics based on type and severity of pain and evaluate response  - Implement non-pharmacological measures as appropriate and evaluate response  - Consider cultural and  social influences on pain and pain management  - Notify physician/advanced practitioner if interventions unsuccessful or patient reports new pain  Outcome: Progressing     Problem: SAFETY ADULT  Goal: Patient will remain free of falls  Description: INTERVENTIONS:  - Educate patient/family on patient safety including physical limitations  - Instruct patient to call for assistance with activity   - Consult OT/PT to assist with strengthening/mobility   - Keep Call bell within reach  - Keep bed low and locked with side rails adjusted as appropriate  - Keep care items and personal belongings within reach  - Initiate and maintain comfort rounds  - Make Fall Risk Sign visible to staff  - Offer Toileting every  Hours, in advance of need  - Initiate/Maintain alarm  - Obtain necessary fall risk management equipment:   - Apply yellow socks and bracelet for high fall risk patients  - Consider moving patient to room near nurses station  Outcome: Progressing  Goal: Maintain or return to baseline ADL function  Description: INTERVENTIONS:  -  Assess patient's ability to carry out ADLs; assess patient's baseline for ADL function and identify physical deficits which impact ability to perform ADLs (bathing, care of mouth/teeth, toileting, grooming, dressing, etc.)  - Assess/evaluate cause of self-care deficits   - Assess range of motion  - Assess patient's mobility; develop plan if impaired  - Assess patient's need for assistive devices and provide as appropriate  - Encourage maximum independence but intervene and supervise when necessary  - Involve family in performance of ADLs  - Assess for home care needs following discharge   - Consider OT consult to assist with ADL evaluation and planning for discharge  - Provide patient education as appropriate  Outcome: Progressing  Goal: Maintains/Returns to pre admission functional level  Description: INTERVENTIONS:  - Perform AM-PAC 6 Click Basic Mobility/ Daily Activity assessment daily.  -  Set and communicate daily mobility goal to care team and patient/family/caregiver.   - Collaborate with rehabilitation services on mobility goals if consulted  - Perform Range of Motion  times a day.  - Reposition patient every  hours.  - Dangle patient  times a day  - Stand patient  times a day  - Ambulate patient  times a day  - Out of bed to chair  times a day   - Out of bed for meals  times a day  - Out of bed for toileting  - Record patient progress and toleration of activity level   Outcome: Progressing     Problem: DISCHARGE PLANNING  Goal: Discharge to home or other facility with appropriate resources  Description: INTERVENTIONS:  - Identify barriers to discharge w/patient and caregiver  - Arrange for needed discharge resources and transportation as appropriate  - Identify discharge learning needs (meds, wound care, etc.)  - Arrange for interpretive services to assist at discharge as needed  - Refer to Case Management Department for coordinating discharge planning if the patient needs post-hospital services based on physician/advanced practitioner order or complex needs related to functional status, cognitive ability, or social support system  Outcome: Progressing     Problem: Knowledge Deficit  Goal: Patient/family/caregiver demonstrates understanding of disease process, treatment plan, medications, and discharge instructions  Description: Complete learning assessment and assess knowledge base.  Interventions:  - Provide teaching at level of understanding  - Provide teaching via preferred learning methods  Outcome: Progressing     Problem: CARDIOVASCULAR - ADULT  Goal: Maintains optimal cardiac output and hemodynamic stability  Description: INTERVENTIONS:  - Monitor I/O, vital signs and rhythm  - Monitor for S/S and trends of decreased cardiac output  - Administer and titrate ordered vasoactive medications to optimize hemodynamic stability  - Assess quality of pulses, skin color and temperature  - Assess  for signs of decreased coronary artery perfusion  - Instruct patient to report change in severity of symptoms  Outcome: Progressing  Goal: Absence of cardiac dysrhythmias or at baseline rhythm  Description: INTERVENTIONS:  - Continuous cardiac monitoring, vital signs, obtain 12 lead EKG if ordered  - Administer antiarrhythmic and heart rate control medications as ordered  - Monitor electrolytes and administer replacement therapy as ordered  Outcome: Progressing     Problem: RESPIRATORY - ADULT  Goal: Achieves optimal ventilation and oxygenation  Description: INTERVENTIONS:  - Assess for changes in respiratory status  - Assess for changes in mentation and behavior  - Position to facilitate oxygenation and minimize respiratory effort  - Oxygen administered by appropriate delivery if ordered  - Initiate smoking cessation education as indicated  - Encourage broncho-pulmonary hygiene including cough, deep breathe, Incentive Spirometry  - Assess the need for suctioning and aspirate as needed  - Assess and instruct to report SOB or any respiratory difficulty  - Respiratory Therapy support as indicated  Outcome: Progressing     Problem: METABOLIC, FLUID AND ELECTROLYTES - ADULT  Goal: Electrolytes maintained within normal limits  Description: INTERVENTIONS:  - Monitor labs and assess patient for signs and symptoms of electrolyte imbalances  - Administer electrolyte replacement as ordered  - Monitor response to electrolyte replacements, including repeat lab results as appropriate  - Instruct patient on fluid and nutrition as appropriate  Outcome: Progressing  Goal: Fluid balance maintained  Description: INTERVENTIONS:  - Monitor labs   - Monitor I/O and WT  - Instruct patient on fluid and nutrition as appropriate  - Assess for signs & symptoms of volume excess or deficit  Outcome: Progressing  Goal: Glucose maintained within target range  Description: INTERVENTIONS:  - Monitor Blood Glucose as ordered  - Assess for signs and  symptoms of hyperglycemia and hypoglycemia  - Administer ordered medications to maintain glucose within target range  - Assess nutritional intake and initiate nutrition service referral as needed  Outcome: Progressing     Problem: Nutrition/Hydration-ADULT  Goal: Nutrient/Hydration intake appropriate for improving, restoring or maintaining nutritional needs  Description: Monitor and assess patient's nutrition/hydration status for malnutrition. Collaborate with interdisciplinary team and initiate plan and interventions as ordered.  Monitor patient's weight and dietary intake as ordered or per policy. Utilize nutrition screening tool and intervene as necessary. Determine patient's food preferences and provide high-protein, high-caloric foods as appropriate.     INTERVENTIONS:  - Monitor oral intake, urinary output, labs, and treatment plans  - Assess nutrition and hydration status and recommend course of action  - Evaluate amount of meals eaten  - Assist patient with eating if necessary   - Allow adequate time for meals  - Recommend/ encourage appropriate diets, oral nutritional supplements, and vitamin/mineral supplements  - Order, calculate, and assess calorie counts as needed  - Recommend, monitor, and adjust tube feedings and TPN/PPN based on assessed needs  - Assess need for intravenous fluids  - Provide specific nutrition/hydration education as appropriate  - Include patient/family/caregiver in decisions related to nutrition  Outcome: Progressing

## 2024-05-07 NOTE — ASSESSMENT & PLAN NOTE
Patient has known left bad arthritis x-ray reviewed.and also placed on prednisone along with Tylenol for pain control .  reviewed uric acid level and crp  discussed with orthopedics and with spouse.  Will trial oral prednisone for 5 days and need outpatient follow-up with Ortho  recommended subacute rehab .  Patient agreeable to transition to [facility] on discharge

## 2024-05-08 ENCOUNTER — APPOINTMENT (INPATIENT)
Dept: RADIOLOGY | Facility: HOSPITAL | Age: 69
DRG: 292 | End: 2024-05-08
Payer: COMMERCIAL

## 2024-05-08 LAB
ANION GAP SERPL CALCULATED.3IONS-SCNC: 4 MMOL/L (ref 4–13)
BASE EX.OXY STD BLDV CALC-SCNC: 95.8 % (ref 60–80)
BASE EXCESS BLDV CALC-SCNC: 14.1 MMOL/L
BUN SERPL-MCNC: 43 MG/DL (ref 5–25)
CALCIUM SERPL-MCNC: 10.6 MG/DL (ref 8.4–10.2)
CHLORIDE SERPL-SCNC: 91 MMOL/L (ref 96–108)
CO2 SERPL-SCNC: 45 MMOL/L (ref 21–32)
CREAT SERPL-MCNC: 1.08 MG/DL (ref 0.6–1.3)
GFR SERPL CREATININE-BSD FRML MDRD: 51 ML/MIN/1.73SQ M
GLUCOSE SERPL-MCNC: 176 MG/DL (ref 65–140)
GLUCOSE SERPL-MCNC: 178 MG/DL (ref 65–140)
GLUCOSE SERPL-MCNC: 185 MG/DL (ref 65–140)
GLUCOSE SERPL-MCNC: 191 MG/DL (ref 65–140)
GLUCOSE SERPL-MCNC: 197 MG/DL (ref 65–140)
HCO3 BLDV-SCNC: 40.7 MMOL/L (ref 24–30)
INR PPP: 2.56 (ref 0.84–1.19)
O2 CT BLDV-SCNC: 19.1 ML/DL
PCO2 BLDV: 58.5 MM HG (ref 42–50)
PH BLDV: 7.46 [PH] (ref 7.3–7.4)
PO2 BLDV: 113.2 MM HG (ref 35–45)
POTASSIUM SERPL-SCNC: 3.7 MMOL/L (ref 3.5–5.3)
PROTHROMBIN TIME: 27.8 SECONDS (ref 11.6–14.5)
SODIUM SERPL-SCNC: 140 MMOL/L (ref 135–147)

## 2024-05-08 PROCEDURE — 82805 BLOOD GASES W/O2 SATURATION: CPT | Performed by: INTERNAL MEDICINE

## 2024-05-08 PROCEDURE — 97530 THERAPEUTIC ACTIVITIES: CPT

## 2024-05-08 PROCEDURE — 71045 X-RAY EXAM CHEST 1 VIEW: CPT

## 2024-05-08 PROCEDURE — 80048 BASIC METABOLIC PNL TOTAL CA: CPT | Performed by: INTERNAL MEDICINE

## 2024-05-08 PROCEDURE — 82948 REAGENT STRIP/BLOOD GLUCOSE: CPT

## 2024-05-08 PROCEDURE — 99232 SBSQ HOSP IP/OBS MODERATE 35: CPT | Performed by: INTERNAL MEDICINE

## 2024-05-08 PROCEDURE — 85610 PROTHROMBIN TIME: CPT | Performed by: FAMILY MEDICINE

## 2024-05-08 RX ORDER — OXYCODONE HYDROCHLORIDE 5 MG/1
5 TABLET ORAL EVERY 6 HOURS PRN
Status: DISCONTINUED | OUTPATIENT
Start: 2024-05-08 | End: 2024-05-08

## 2024-05-08 RX ORDER — OXYCODONE HYDROCHLORIDE 5 MG/1
5 TABLET ORAL EVERY 6 HOURS PRN
Status: DISCONTINUED | OUTPATIENT
Start: 2024-05-08 | End: 2024-05-12 | Stop reason: HOSPADM

## 2024-05-08 RX ORDER — BUMETANIDE 0.25 MG/ML
2 INJECTION INTRAMUSCULAR; INTRAVENOUS 2 TIMES DAILY
Status: DISCONTINUED | OUTPATIENT
Start: 2024-05-08 | End: 2024-05-09

## 2024-05-08 RX ADMIN — OXYCODONE HYDROCHLORIDE 5 MG: 5 TABLET ORAL at 19:53

## 2024-05-08 RX ADMIN — MONTELUKAST 10 MG: 10 TABLET, FILM COATED ORAL at 21:40

## 2024-05-08 RX ADMIN — Medication 0.5 MG/HR: at 03:56

## 2024-05-08 RX ADMIN — LORATADINE 10 MG: 10 TABLET ORAL at 08:09

## 2024-05-08 RX ADMIN — INSULIN LISPRO 2 UNITS: 100 INJECTION, SOLUTION INTRAVENOUS; SUBCUTANEOUS at 12:44

## 2024-05-08 RX ADMIN — CEPHALEXIN 500 MG: 500 CAPSULE ORAL at 13:21

## 2024-05-08 RX ADMIN — CEPHALEXIN 500 MG: 500 CAPSULE ORAL at 06:09

## 2024-05-08 RX ADMIN — INSULIN GLARGINE 15 UNITS: 100 INJECTION, SOLUTION SUBCUTANEOUS at 21:38

## 2024-05-08 RX ADMIN — PANTOPRAZOLE SODIUM 40 MG: 40 TABLET, DELAYED RELEASE ORAL at 06:09

## 2024-05-08 RX ADMIN — MIDODRINE HYDROCHLORIDE 2.5 MG: 5 TABLET ORAL at 06:10

## 2024-05-08 RX ADMIN — CEPHALEXIN 500 MG: 500 CAPSULE ORAL at 21:40

## 2024-05-08 RX ADMIN — INSULIN LISPRO 2 UNITS: 100 INJECTION, SOLUTION INTRAVENOUS; SUBCUTANEOUS at 08:15

## 2024-05-08 RX ADMIN — METOPROLOL SUCCINATE 37.5 MG: 25 TABLET, EXTENDED RELEASE ORAL at 21:39

## 2024-05-08 RX ADMIN — POTASSIUM CHLORIDE 20 MEQ: 1500 TABLET, EXTENDED RELEASE ORAL at 17:12

## 2024-05-08 RX ADMIN — BUMETANIDE 2 MG: 0.25 INJECTION INTRAMUSCULAR; INTRAVENOUS at 10:35

## 2024-05-08 RX ADMIN — ALLOPURINOL 200 MG: 100 TABLET ORAL at 08:09

## 2024-05-08 RX ADMIN — INSULIN LISPRO 2 UNITS: 100 INJECTION, SOLUTION INTRAVENOUS; SUBCUTANEOUS at 21:38

## 2024-05-08 RX ADMIN — WARFARIN SODIUM 5 MG: 5 TABLET ORAL at 17:12

## 2024-05-08 RX ADMIN — Medication 1 TABLET: at 08:12

## 2024-05-08 RX ADMIN — POTASSIUM CHLORIDE 20 MEQ: 1500 TABLET, EXTENDED RELEASE ORAL at 21:40

## 2024-05-08 RX ADMIN — MIDODRINE HYDROCHLORIDE 2.5 MG: 5 TABLET ORAL at 10:35

## 2024-05-08 RX ADMIN — MICONAZOLE NITRATE 1 APPLICATION: 20 CREAM TOPICAL at 08:05

## 2024-05-08 RX ADMIN — ACETAMINOPHEN 325MG 650 MG: 325 TABLET ORAL at 13:22

## 2024-05-08 RX ADMIN — OXYCODONE HYDROCHLORIDE 10 MG: 10 TABLET ORAL at 01:57

## 2024-05-08 RX ADMIN — LEVOTHYROXINE SODIUM 288 MCG: 88 TABLET ORAL at 06:10

## 2024-05-08 RX ADMIN — FERROUS SULFATE TAB 325 MG (65 MG ELEMENTAL FE) 325 MG: 325 (65 FE) TAB at 08:09

## 2024-05-08 RX ADMIN — NYSTATIN 1 APPLICATION: 100000 POWDER TOPICAL at 17:14

## 2024-05-08 RX ADMIN — POTASSIUM CHLORIDE 20 MEQ: 1500 TABLET, EXTENDED RELEASE ORAL at 08:12

## 2024-05-08 RX ADMIN — OXYCODONE HYDROCHLORIDE 10 MG: 10 TABLET ORAL at 08:09

## 2024-05-08 RX ADMIN — FLUTICASONE FUROATE AND VILANTEROL TRIFENATATE 1 PUFF: 100; 25 POWDER RESPIRATORY (INHALATION) at 08:14

## 2024-05-08 RX ADMIN — BUMETANIDE 2 MG: 0.25 INJECTION INTRAMUSCULAR; INTRAVENOUS at 17:14

## 2024-05-08 RX ADMIN — INSULIN LISPRO 2 UNITS: 100 INJECTION, SOLUTION INTRAVENOUS; SUBCUTANEOUS at 17:15

## 2024-05-08 RX ADMIN — MIDODRINE HYDROCHLORIDE 2.5 MG: 5 TABLET ORAL at 17:12

## 2024-05-08 RX ADMIN — NYSTATIN 1 APPLICATION: 100000 POWDER TOPICAL at 08:05

## 2024-05-08 RX ADMIN — ATORVASTATIN CALCIUM 10 MG: 10 TABLET, FILM COATED ORAL at 08:09

## 2024-05-08 RX ADMIN — MICONAZOLE NITRATE 1 APPLICATION: 20 CREAM TOPICAL at 17:14

## 2024-05-08 NOTE — PLAN OF CARE
Problem: PAIN - ADULT  Goal: Verbalizes/displays adequate comfort level or baseline comfort level  Description: Interventions:  - Encourage patient to monitor pain and request assistance  - Assess pain using appropriate pain scale  - Administer analgesics based on type and severity of pain and evaluate response  - Implement non-pharmacological measures as appropriate and evaluate response  - Consider cultural and social influences on pain and pain management  - Notify physician/advanced practitioner if interventions unsuccessful or patient reports new pain  Outcome: Progressing     Problem: CARDIOVASCULAR - ADULT  Goal: Absence of cardiac dysrhythmias or at baseline rhythm  Description: INTERVENTIONS:  - Continuous cardiac monitoring, vital signs, obtain 12 lead EKG if ordered  - Administer antiarrhythmic and heart rate control medications as ordered  - Monitor electrolytes and administer replacement therapy as ordered  Outcome: Progressing     Problem: Nutrition/Hydration-ADULT  Goal: Nutrient/Hydration intake appropriate for improving, restoring or maintaining nutritional needs  Description: Monitor and assess patient's nutrition/hydration status for malnutrition. Collaborate with interdisciplinary team and initiate plan and interventions as ordered.  Monitor patient's weight and dietary intake as ordered or per policy. Utilize nutrition screening tool and intervene as necessary. Determine patient's food preferences and provide high-protein, high-caloric foods as appropriate.     INTERVENTIONS:  - Monitor oral intake, urinary output, labs, and treatment plans  - Assess nutrition and hydration status and recommend course of action  - Evaluate amount of meals eaten  - Assist patient with eating if necessary   - Allow adequate time for meals  - Recommend/ encourage appropriate diets, oral nutritional supplements, and vitamin/mineral supplements  - Order, calculate, and assess calorie counts as needed  - Recommend,  monitor, and adjust tube feedings and TPN/PPN based on assessed needs  - Assess need for intravenous fluids  - Provide specific nutrition/hydration education as appropriate  - Include patient/family/caregiver in decisions related to nutrition  Outcome: Progressing

## 2024-05-08 NOTE — ASSESSMENT & PLAN NOTE
Lab Results   Component Value Date    HGBA1C 7.0 (H) 05/02/2024       Recent Labs     05/07/24  1642 05/07/24  2112 05/08/24  0748 05/08/24  1113   POCGLU 197* 244* 178* 197*         Blood Sugar Average: Last 72 hrs:  (P) [id number] on modified regimen of insulin sliding scale and diabetic diet for now  On short course of prednisone and hence resume Lantus

## 2024-05-08 NOTE — PLAN OF CARE
Problem: PHYSICAL THERAPY ADULT  Goal: Performs mobility at highest level of function for planned discharge setting.  See evaluation for individualized goals.  Description: Treatment/Interventions: ADL retraining, Functional transfer training, LE strengthening/ROM, Therapeutic exercise, Endurance training, Patient/family training, Equipment eval/education, Bed mobility, Gait training, Compensatory technique education, Spoke to nursing, Spoke to case management, OT          See flowsheet documentation for full assessment, interventions and recommendations.  Outcome: Progressing  Note: Prognosis: Fair  Problem List: Decreased strength, Decreased endurance, Impaired balance, Decreased range of motion, Decreased mobility, Impaired judgement, Decreased safety awareness, Obesity, Decreased skin integrity, Pain  Assessment: Pt tolerated session fairly. She was able to achieve standing 3x this date with personal RW  and decreasing assistance. She is unable to wt shift despite manual cues and demonstrates quick onset fatigue with limited standing time. She continues to require increased assistance with bed mobility. She requires reassurance. She is limited by decreased strength, balance, endurance. She will continue to benefit from PT services to maximize LOF.  Barriers to Discharge: Decreased caregiver support, Inaccessible home environment  Barriers to Discharge Comments: fall risk, significant assistance with mobility  Rehab Resource Intensity Level, PT: II (Moderate Resource Intensity)    See flowsheet documentation for full assessment.

## 2024-05-08 NOTE — ASSESSMENT & PLAN NOTE
Wt Readings from Last 3 Encounters:   05/08/24 (!) 156 kg (344 lb 5.7 oz)   01/02/24 (!) 149 kg (328 lb 0.7 oz)   10/29/22 (!) 151 kg (331 lb 12.7 oz)   Patient has heart failure with preserved ejection fraction and maintained on torsemide 60 mg twice daily and metolazone once a week  2d echo from December 2023 shows normal EF but abnormal diastolic function and severe tricuspid regurg.  She has gained approximately 20 to 30 pounds at least and was asked by her cardiologist to increase metolazone however lately she has been very somnolent and not taking her meds for the last 2 to 3 days.    Patient did not have adequate diuretic response initially on Lasix drip 20 mg/h and metolazone 2.5 mg daily.  Subsequently transition to Bumex drip 0.5 mg/h along with metolazone .-7.8 L since admission and 3.4 L in the last 24 hours.  Unable to adequately assess volume status because of body habitus and inability to do standing weights.  Increase metabolic alkalosis noted and received 2 doses of Diamox.  Discontinue  Bumex gtt. today and transition to Bumex 2 mg intravenous twice daily     Monitor electrolytes closely    Chronically uses 4 L of oxygen and CPAP at at bedtime will continue for now  Unable to get accurate weights but bed scale shows weight from 368 pounds -->340 pounds

## 2024-05-08 NOTE — CASE MANAGEMENT
Case Management Discharge Planning Note    Patient name Tigist Hewitt  Location /-01 MRN [id number]  : 1955 Date 2024       Current Admission Date: 2024  Current Admission Diagnosis:Acute on chronic diastolic congestive heart failure (HCC)   Patient Active Problem List    Diagnosis Date Noted    Acute pain of left knee 2024    Acute cystitis without hematuria 2024    Ambulatory dysfunction 2024    Hyperkalemia 2024    Lung cyst 2024    Abnormal CT scan, pelvis 2023    CKD (chronic kidney disease) stage 3, GFR 30-59 ml/min ([facility]) 2023    NISHI (obstructive sleep apnea) 2023    Intertrigo 10/29/2022    Vaginal bleeding 10/23/2022    Acute kidney injury superimposed on chronic kidney disease  ([facility]) 10/22/2022    Hypotension 10/22/2022    Acute on chronic diastolic congestive heart failure ([facility]) 2022    IMTIAZ (acute kidney injury) ([facility]) 2022    Morbid obesity ([facility]) 2022    Supratherapeutic INR 04/15/2020    Chronic respiratory failure with hypoxia ([facility]) 2020    Asthma 2020    Atrial fibrillation ([facility]) 2020    COPD (chronic obstructive pulmonary disease) ([facility]) 2020    Type 2 diabetes mellitus with hyperglycemia, with long-term current use of insulin ([facility]) 2020    Shortness of breath 2020    Anemia 2020      LOS (days): 6  Geometric Mean LOS (GMLOS) (days): 3  Days to GMLOS:-3     OBJECTIVE:  Risk of Unplanned Readmission Score: 31.4         Current admission status: Inpatient   Preferred Pharmacy:   [id number] [place] [address]  Phone: [phone] Fax: [phone]    Primary Care Provider: Marisa Haque MD    Primary Insurance: [employer] [facility] RE[employer]  Secondary Insurance:     DISCHARGE DETAILS:    Discussed dc plans with spouse and patient..    Discharge planning discussed with:: patient and spouse  Freedom of  Choice: Yes  Comments - Freedom of Choice: Discussed with patient and spouse ( via phone) [facility] can not accomodate patient at this time, they do not have a leighann bed available. However they can offer a bed at [facility]. [facility] agree[facility] to [facility] this bed. Have had difficulites finding a facility due to weight and 12 referrals were made, with only 1 accepting facility.  CM contacted family/caregiver?: Yes  Were Treatment Team discharge recommendations reviewed with patient/caregiver?: Yes  Did patient/caregiver verbalize understanding of patient care needs?: Yes       Contacts  Patient Contacts: Kenneth spouse  Relationship to Patient:: Family  Contact Method: Phone  Phone Number: [phone] (H)  Reason/Outcome: Discharge Planning    Requested Home Health Care         Is the patient interested in C at discharge?: No         Other Referral/Resources/Interventions Provided:  Referral Comments: Accepted bed a [facility]         Treatment Team Recommendation: Short Term Rehab  Discharge Destination Plan:: Short Term Rehab ([facility])  Transport at Discharge : [facility]      [facility] next PT/OT evaluation/treat.

## 2024-05-08 NOTE — PROGRESS NOTES
Progress Note:Cardiology  Tigist Hewitt 1955, 69 y.o. female MRN: [id number]    Unit/Bed#: -01 Encounter: [id number]  Attending Physician: Janice Porter MD   Primary Care Provider: Marisa Haque MD   Date admitted to hospital: 5/2/2024  Length of stay: 6         * Acute on chronic diastolic congestive heart failure (HCC)  Assessment & Plan  Wt Readings from Last 3 Encounters:   05/06/24 (!) 161 kg (355 lb 9.6 oz)   01/02/24 (!) 149 kg (328 lb 0.7 oz)   10/29/22 (!) 151 kg (331 lb 12.7 oz)     History of HFpEF.  Echo 12/24/2023 with LVEF 55%, mild AS, mild-mod MR, severe TR with PASP 53mmHg which is overall unchanged from prior.  Maintained on torsemide and metolazone at home with reported missed doses prior to hospital admission.  She has undergone work up by pulmonology for pulmonary hypertension.  Currently diuresing well with -3.4 L in 24 hours on Bumex 0.5 mg/hr drip and Diamox 250 mg PO x 2 doses.  Discontinue Bumex drip. Transition to Bumex 2 mg IV BID today. Titrate based on response.  Continue strict I's/O's, sodium/fluid restriction. Unable to obtain daily weights due to inability to stand on her own.  Continue CPAP  Would benefit from intense physical therapy and increased mobility but unfortunately she is lacking motivation for this.    Atrial fibrillation (HCC)  Assessment & Plan  History of atrial fibrillation. Details are unclear due to lack of availability of medical records.  Previously following with Dr. Ferraro through [facility].  Reports undergoing 2 failed cardioversions and ultimately an atrial fibrillation ablation, date unknown. Persistent a fib since at least 2020.  She is maintained on warfarin anticoagulation, goal INR 2-3.  Remains in rate controlled atrial fibrillation this admission on metoprolol succinate 37.5 mg BID.  Optimize electrolytes for K+ > 4, Mag > 2.  Ok to discontinue telemetry as no longer on Bumex drip.        Subjective:   Patient seen  "and examined.  No significant events overnight. She is drowsy this morning. No new complaints. She is wheezing today and primary team made aware.    Review of Systems   Constitutional: Negative.   HENT: Negative.     Cardiovascular:  Positive for dyspnea on exertion. Negative for chest pain, irregular heartbeat, leg swelling, near-syncope, orthopnea and palpitations.   Respiratory:  Negative for cough and snoring.    Endocrine: Negative.    Skin: Negative.    Musculoskeletal: Negative.    Gastrointestinal: Negative.    Genitourinary: Negative.    Neurological: Negative.    Psychiatric/Behavioral: Negative.           Objective:     Vitals: Blood pressure 109/65, pulse 83, temperature 98.1 °F (36.7 °C), temperature source Temporal, resp. rate 17, height 5' 1\" (1.549 m), weight (!) 156 kg (344 lb 5.7 oz), SpO2 97%., Body mass index is 65.07 kg/m².,     Orthostatic Blood Pressures      Flowsheet Row Most Recent Value   Blood Pressure 109/65 filed at 05/08/2024 0811   Patient Position - Orthostatic VS Sitting filed at 05/08/2024 0717            Physical Exam  Vitals and nursing note reviewed.   Constitutional:       General: She is not in acute distress.     Appearance: She is well-developed. She is obese.   HENT:      Head: Normocephalic and atraumatic.   Eyes:      Conjunctiva/sclera: Conjunctivae normal.   Neck:      Vascular: No JVD.   Cardiovascular:      Rate and Rhythm: Normal rate. Rhythm irregularly irregular.      Heart sounds: No murmur heard.  Pulmonary:      Effort: Pulmonary effort is normal. No respiratory distress.      Breath sounds: Wheezing (significant wheezing) present.      Comments: Remains on 4 L NC  Abdominal:      Palpations: Abdomen is soft.      Tenderness: There is no abdominal tenderness.   Musculoskeletal:         General: No swelling.      Cervical back: Neck supple.      Right lower leg: No edema.      Left lower leg: No edema.   Skin:     General: Skin is warm and dry.      Capillary " Refill: Capillary refill takes less than 2 seconds.   Neurological:      Mental Status: She is alert.   Psychiatric:         Mood and Affect: Mood normal.            Intake/Output Summary (Last 24 hours) at 5/8/2024 0939  Last data filed at 5/8/2024 0938  Gross per 24 hour   Intake 520 ml   Output 3960 ml   Net -3440 ml       Weight (last 2 days)       Date/Time Weight    05/08/24 0600 156 (344.36)    05/08/24 0522 156 (344.36)    05/07/24 0600 154 (340.61)    05/06/24 1300 160 (353.84)    05/06/24 1255 155 (342.5)    05/06/24 0600 161 (355.6)               Medications:      Current Facility-Administered Medications:     acetaminophen (TYLENOL) tablet 650 mg, 650 mg, Oral, Q4H PRN, Amanda Dukes MD, 650 mg at 05/06/24 1145    albuterol inhalation solution 2.5 mg, 2.5 mg, Nebulization, Q6H PRN, Amanda Dukes MD    allopurinol (ZYLOPRIM) tablet 200 mg, 200 mg, Oral, Daily, Amanda Dukes MD, 200 mg at 05/08/24 0809    atorvastatin (LIPITOR) tablet 10 mg, 10 mg, Oral, Daily, Amanda Dukes MD, 10 mg at 05/08/24 0809    bumetanide (BUMEX) injection 2 mg, 2 mg, Intravenous, BID, Janice Porter MD    cephalexin (KEFLEX) capsule 500 mg, 500 mg, Oral, Q8H HALEY, Amanda Dukes MD, 500 mg at 05/08/24 0609    ferrous sulfate tablet 325 mg, 325 mg, Oral, Daily With Breakfast, Amanda Dukes MD, 325 mg at 05/08/24 0809    Fluticasone Furoate-Vilanterol 100-25 mcg/actuation 1 puff, 1 puff, Inhalation, Daily, Amanda Dukes MD, 1 puff at 05/08/24 0814    insulin glargine (LANTUS) subcutaneous injection 15 Units 0.15 mL, 15 Units, Subcutaneous, HS, Amanda Dukes MD, 15 Units at 05/07/24 2134    insulin lispro (HumALOG/ADMELOG) 100 units/mL subcutaneous injection 2-12 Units, 2-12 Units, Subcutaneous, TID AC, 2 Units at 05/08/24 0815 **AND** Fingerstick Glucose (POCT), , , TID AC, Amanda Dukes MD    insulin lispro (HumALOG/ADMELOG) 100 units/mL subcutaneous injection 2-12 Units, 2-12 Units, Subcutaneous, HS, Radha Wright MD, 4 Units at  05/07/24 2127    levothyroxine tablet 288 mcg, 288 mcg, Oral, Early Morning, Amanda Dukes MD, 288 mcg at 05/08/24 0610    loratadine (CLARITIN) tablet 10 mg, 10 mg, Oral, Daily, Amanda Dukes MD, 10 mg at 05/08/24 0809    metoprolol (LOPRESSOR) injection 2.5 mg, 2.5 mg, Intravenous, Q6H PRN, Radha Wright MD, 2.5 mg at 05/02/24 1837    metoprolol succinate (TOPROL-XL) 24 hr tablet 37.5 mg, 37.5 mg, Oral, BID, Radha Wright MD, 37.5 mg at 05/07/24 2128    midodrine (PROAMATINE) tablet 2.5 mg, 2.5 mg, Oral, TID AC, Desi Stack, ROGELIONP, 2.5 mg at 05/08/24 0610    moisture barrier miconazole 2% cream (aka SERGO MOISTURE BARRIER ANTIFUNGAL CREAM), , Topical, BID, Amanda Dukes MD, 1 Application at 05/08/24 0805    montelukast (SINGULAIR) tablet 10 mg, 10 mg, Oral, HS, Amanda Dukes MD, 10 mg at 05/07/24 2134    multivitamin-minerals (CENTRUM) tablet 1 tablet, 1 tablet, Oral, Daily, Amanda Dukes MD, 1 tablet at 05/08/24 0812    nystatin (MYCOSTATIN) powder, , Topical, BID, Amanda Dukes MD, 1 Application at 05/08/24 0805    ondansetron (ZOFRAN) injection 4 mg, 4 mg, Intravenous, Q6H PRN, Amanda Dukes MD, 4 mg at 05/03/24 1007    oxyCODONE (ROXICODONE) IR tablet 5 mg, 5 mg, Oral, Q6H PRN, Janice Porter MD    pantoprazole (PROTONIX) EC tablet 40 mg, 40 mg, Oral, Daily Before Breakfast, Amanda Dukes MD, 40 mg at 05/08/24 0609    potassium chloride (Klor-Con M20) CR tablet 20 mEq, 20 mEq, Oral, TID, Amanda Dukes MD, 20 mEq at 05/08/24 0812    warfarin (COUMADIN) tablet 5 mg, 5 mg, Oral, Daily (warfarin), Amanda Dukes MD, 5 mg at 05/07/24 1711     Labs & Results:        Results from last 7 days   Lab Units 05/03/24  0502 05/02/24  1209   WBC Thousand/uL 6.48 8.08   HEMOGLOBIN g/dL 11.7 11.8   HEMATOCRIT % 36.5 37.2   PLATELETS Thousands/uL 159 176         Results from last 7 days   Lab Units 05/08/24  0501 05/07/24  0449 05/06/24  0518 05/04/24  0605 05/03/24  0502 05/02/24  1209   POTASSIUM mmol/L 3.7 3.5 3.4*   < > 3.6   3.6 3.7   CHLORIDE mmol/L 91* 92* 99   < > 101  101 100   CO2 mmol/L 45* 44* 39*   < > 31  31 33*   BUN mg/dL 43* 44* 44*   < > 46*  46* 45*   CREATININE mg/dL 1.08 1.17 0.96   < > 1.06  1.06 0.97   CALCIUM mg/dL 10.6* 10.2 10.0   < > 9.5  9.5 9.8   ALK PHOS U/L  --   --   --   --  113* 121*   ALT U/L  --   --   --   --  7 10   AST U/L  --   --   --   --  22 37    < > = values in this interval not displayed.     Results from last 7 days   Lab Units 05/08/24  0501 05/07/24  0449 05/05/24  0457 05/03/24  0502 05/02/24  1209   INR  2.56* 2.88* 2.61*   < > 2.25*   PTT seconds  --   --   --   --  43*    < > = values in this interval not displayed.     Results from last 7 days   Lab Units 05/06/24  0518 05/04/24  0605 05/02/24  1209   MAGNESIUM mg/dL 1.9 2.1 2.1            Telemetry: atrial fibrillation, rate 40-70's. Occasional PVC's.

## 2024-05-08 NOTE — PLAN OF CARE
Problem: Potential for Falls  Goal: Patient will remain free of falls  Description: INTERVENTIONS:  - Educate patient/family on patient safety including physical limitations  - Instruct patient to call for assistance with activity   - Consult OT/PT to assist with strengthening/mobility   - Keep Call bell within reach  - Keep bed low and locked with side rails adjusted as appropriate  - Keep care items and personal belongings within reach  - Initiate and maintain comfort rounds  - Make Fall Risk Sign visible to staff  - Apply yellow socks and bracelet for high fall risk patients  - Consider moving patient to room near nurses station  Outcome: Progressing     Problem: Prexisting or High Potential for Compromised Skin Integrity  Goal: Skin integrity is maintained or improved  Description: INTERVENTIONS:  - Identify patients at risk for skin breakdown  - Assess and monitor skin integrity  - Assess and monitor nutrition and hydration status  - Monitor labs   - Assess for incontinence   - Turn and reposition patient  - Assist with mobility/ambulation  - Relieve pressure over bony prominences  - Avoid friction and shearing  - Provide appropriate hygiene as needed including keeping skin clean and dry  - Evaluate need for skin moisturizer/barrier cream  - Collaborate with interdisciplinary team   - Patient/family teaching  - Consider wound care consult   Outcome: Progressing     Problem: PAIN - ADULT  Goal: Verbalizes/displays adequate comfort level or baseline comfort level  Description: Interventions:  - Encourage patient to monitor pain and request assistance  - Assess pain using appropriate pain scale  - Administer analgesics based on type and severity of pain and evaluate response  - Implement non-pharmacological measures as appropriate and evaluate response  - Consider cultural and social influences on pain and pain management  - Notify physician/advanced practitioner if interventions unsuccessful or patient reports  new pain  Outcome: Progressing     Problem: SAFETY ADULT  Goal: Patient will remain free of falls  Description: INTERVENTIONS:  - Educate patient/family on patient safety including physical limitations  - Instruct patient to call for assistance with activity   - Consult OT/PT to assist with strengthening/mobility   - Keep Call bell within reach  - Keep bed low and locked with side rails adjusted as appropriate  - Keep care items and personal belongings within reach  - Initiate and maintain comfort rounds  - Make Fall Risk Sign visible to staff  - Offer Toileting every 2 Hours, in advance of need  - Initiate/Maintain bed alarm  - Obtain necessary fall risk management equipment  - Apply yellow socks and bracelet for high fall risk patients  - Consider moving patient to room near nurses station  Outcome: Progressing  Goal: Maintain or return to baseline ADL function  Description: INTERVENTIONS:  -  Assess patient's ability to carry out ADLs; assess patient's baseline for ADL function and identify physical deficits which impact ability to perform ADLs (bathing, care of mouth/teeth, toileting, grooming, dressing, etc.)  - Assess/evaluate cause of self-care deficits   - Assess range of motion  - Assess patient's mobility; develop plan if impaired  - Assess patient's need for assistive devices and provide as appropriate  - Encourage maximum independence but intervene and supervise when necessary  - Involve family in performance of ADLs  - Assess for home care needs following discharge   - Consider OT consult to assist with ADL evaluation and planning for discharge  - Provide patient education as appropriate  Outcome: Progressing  Goal: Maintains/Returns to pre admission functional level  Description: INTERVENTIONS:  - Perform AM-PAC 6 Click Basic Mobility/ Daily Activity assessment daily.  - Set and communicate daily mobility goal to care team and patient/family/caregiver.   - Collaborate with rehabilitation services on  mobility goals if consulted  - Reposition patient every 2 hours.  - Out of bed for toileting  - Record patient progress and toleration of activity level   Outcome: Progressing     Problem: DISCHARGE PLANNING  Goal: Discharge to home or other facility with appropriate resources  Description: INTERVENTIONS:  - Identify barriers to discharge w/patient and caregiver  - Arrange for needed discharge resources and transportation as appropriate  - Identify discharge learning needs (meds, wound care, etc.)  - Arrange for interpretive services to assist at discharge as needed  - Refer to Case Management Department for coordinating discharge planning if the patient needs post-hospital services based on physician/advanced practitioner order or complex needs related to functional status, cognitive ability, or social support system  Outcome: Progressing     Problem: Knowledge Deficit  Goal: Patient/family/caregiver demonstrates understanding of disease process, treatment plan, medications, and discharge instructions  Description: Complete learning assessment and assess knowledge base.  Interventions:  - Provide teaching at level of understanding  - Provide teaching via preferred learning methods  Outcome: Progressing     Problem: CARDIOVASCULAR - ADULT  Goal: Maintains optimal cardiac output and hemodynamic stability  Description: INTERVENTIONS:  - Monitor I/O, vital signs and rhythm  - Monitor for S/S and trends of decreased cardiac output  - Administer and titrate ordered vasoactive medications to optimize hemodynamic stability  - Assess quality of pulses, skin color and temperature  - Assess for signs of decreased coronary artery perfusion  - Instruct patient to report change in severity of symptoms  Outcome: Progressing  Goal: Absence of cardiac dysrhythmias or at baseline rhythm  Description: INTERVENTIONS:  - Continuous cardiac monitoring, vital signs, obtain 12 lead EKG if ordered  - Administer antiarrhythmic and heart  rate control medications as ordered  - Monitor electrolytes and administer replacement therapy as ordered  Outcome: Progressing     Problem: RESPIRATORY - ADULT  Goal: Achieves optimal ventilation and oxygenation  Description: INTERVENTIONS:  - Assess for changes in respiratory status  - Assess for changes in mentation and behavior  - Position to facilitate oxygenation and minimize respiratory effort  - Oxygen administered by appropriate delivery if ordered  - Initiate smoking cessation education as indicated  - Encourage broncho-pulmonary hygiene including cough, deep breathe, Incentive Spirometry  - Assess the need for suctioning and aspirate as needed  - Assess and instruct to report SOB or any respiratory difficulty  - Respiratory Therapy support as indicated  Outcome: Progressing     Problem: METABOLIC, FLUID AND ELECTROLYTES - ADULT  Goal: Electrolytes maintained within normal limits  Description: INTERVENTIONS:  - Monitor labs and assess patient for signs and symptoms of electrolyte imbalances  - Administer electrolyte replacement as ordered  - Monitor response to electrolyte replacements, including repeat lab results as appropriate  - Instruct patient on fluid and nutrition as appropriate  Outcome: Progressing  Goal: Fluid balance maintained  Description: INTERVENTIONS:  - Monitor labs   - Monitor I/O and WT  - Instruct patient on fluid and nutrition as appropriate  - Assess for signs & symptoms of volume excess or deficit  Outcome: Progressing  Goal: Glucose maintained within target range  Description: INTERVENTIONS:  - Monitor Blood Glucose as ordered  - Assess for signs and symptoms of hyperglycemia and hypoglycemia  - Administer ordered medications to maintain glucose within target range  - Assess nutritional intake and initiate nutrition service referral as needed  Outcome: Progressing     Problem: Nutrition/Hydration-ADULT  Goal: Nutrient/Hydration intake appropriate for improving, restoring or  maintaining nutritional needs  Description: Monitor and assess patient's nutrition/hydration status for malnutrition. Collaborate with interdisciplinary team and initiate plan and interventions as ordered.  Monitor patient's weight and dietary intake as ordered or per policy. Utilize nutrition screening tool and intervene as necessary. Determine patient's food preferences and provide high-protein, high-caloric foods as appropriate.     INTERVENTIONS:  - Monitor oral intake, urinary output, labs, and treatment plans  - Assess nutrition and hydration status and recommend course of action  - Evaluate amount of meals eaten  - Assist patient with eating if necessary   - Allow adequate time for meals  - Recommend/ encourage appropriate diets, oral nutritional supplements, and vitamin/mineral supplements  - Order, calculate, and assess calorie counts as needed  - Recommend, monitor, and adjust tube feedings and TPN/PPN based on assessed needs  - Assess need for intravenous fluids  - Provide specific nutrition/hydration education as appropriate  - Include patient/family/caregiver in decisions related to nutrition  Outcome: Progressing

## 2024-05-08 NOTE — PHYSICAL THERAPY NOTE
"   PHYSICAL THERAPY TREATMENT NOTE  NAME:  Tigist Hewitt  DATE: 05/08/24    Length Of Stay: 6  Performed at least 2 patient identifiers during session: Name and Birthday    TREATMENT FLOW SHEET:    05/08/24 8599   PT Last Visit   PT Visit Date 05/08/24   Note Type   Note Type Treatment   Pain Assessment   Pain Assessment Tool 0-10   Pain Score 2  (2-3/10 right leg then in left leg with standing)   Pain Location/Orientation Location: Leg;Orientation: Left;Orientation: Right   Restrictions/Precautions   Other Precautions Chair Alarm;Bed Alarm;Fall Risk;Pain;O2  (4 lpm NC chronically)   General   Chart Reviewed Yes   Response to Previous Treatment Patient reporting fatigue but able to participate.   Cognition   Orientation Level Oriented X4   Following Commands Follows one step commands with increased time or repetition   Subjective   Subjective \"I guess I did say I wanted to walk.\"   Bed Mobility   Rolling L 2  Maximal assistance   Additional items Assist x 2;Increased time required;Verbal cues   Supine to Sit 3  Moderate assistance   Additional items Assist x 2;Increased time required;Verbal cues;LE management  (trunk management)   Sit to Supine 2  Maximal assistance   Additional items Assist x 3;Increased time required;Verbal cues;LE management  (trunk management)   Additional Comments HOB elevated > 30 degrees. use of bedrail. required modAx2 to ahcieve sitting on EOB with manual cues for trunk management. returned to supine with maxAx3 for LE and trunk management for safe completion. rolling to left 1x with maxAx2.   Transfers   Sit to Stand   (maxAx2--->modAx2)   Additional items Assist x 2;Increased time required;Verbal cues   Stand to Sit 3  Moderate assistance   Additional items Assist x 2;Increased time required;Verbal cues   Stand pivot Unable to assess   Additional Comments use of patient's RW. pt placing right hand on anteiror aspect of RW and left hand on  area of RW. required maxAx2 to ahcieve " "standing 1st trial, B feet blocked. then 2nd and 3rd trial with same technique, modAx2 to ahcieve standing. pt stood for 10-15 seconds max each trial with modAx2 to maintain standing wiht cues fo ruprihgt posture. pt with wide BOLA. 2nd standing trial, attempted wt shifting, pt unable. returned to sitting.   Balance   Static Sitting Good   Dynamic Sitting Fair +   Static Standing Poor   Endurance Deficit   Endurance Deficit Yes   Endurance Deficit Description fatigue, min ALBERT. Spo2 desaturated to 85% on 4 lpm with bed flat for positioning upon return to supine, inc to 95% with head elevated   Activity Tolerance   Activity Tolerance Patient limited by fatigue;Patient limited by pain   Medical Staff Made Aware OT, Becki   Nurse Made Aware RNDarlene   Assessment   Prognosis Fair   Problem List Decreased strength;Decreased endurance;Impaired balance;Decreased range of motion;Decreased mobility;Impaired judgement;Decreased safety awareness;Obesity;Decreased skin integrity;Pain   Barriers to Discharge Decreased caregiver support;Inaccessible home environment   Barriers to Discharge Comments fall risk, significant assistance with mobility   Goals   Patient Goals \"go home\"   PT Treatment Day 2   Plan   Treatment/Interventions ADL retraining;Functional transfer training;LE strengthening/ROM;Therapeutic exercise;Endurance training;Patient/family training;Equipment eval/education;Bed mobility;Gait training;Compensatory technique education;Spoke to nursing;Spoke to case management;OT   Progress Slow progress, decreased activity tolerance   PT Frequency 2-3x/wk   Discharge Recommendation   Rehab Resource Intensity Level, PT II (Moderate Resource Intensity)   Additional Comments should patient decline post acute rehab, will require heather lift for safe return to home   AM-PAC Basic Mobility Inpatient   Turning in Flat Bed Without Bedrails 1   Lying on Back to Sitting on Edge of Flat Bed Without Bedrails 1   Moving Bed to Chair 1 "   Standing Up From Chair Using Arms 1   Walk in Room 1   Climb 3-5 Stairs With Railing 1   Basic Mobility Inpatient Raw Score 6   Turning Head Towards Sound 4   Follow Simple Instructions 4   Low Function Basic Mobility Raw Score  14   Low Function Basic Mobility Standardized Score  22.01   [facility] Highest Level Of Mobility   -HL Goal 2: Bed activities/Dependent transfer   -HL Achieved 3: Sit at edge of bed   Education   Education Provided Mobility training;Assistive device   Patient Reinforcement needed   End of Consult   Patient Position at End of Consult Supine;Bed/Chair alarm activated;All needs within reach       Pt requires PT /OT co-treat due to signficant assistance with mobility, medical complexity and/or cognitive-behavioral impairments.     The patient's AM-PAC Basic Mobility Inpatient Short Form Raw Score is 6. A Raw score of less than or equal to 16 suggests the patient may benefit from discharge to post-acute rehabilitation services. Please also refer to the recommendation of the Physical Therapist for safe discharge planning.     Pt tolerated session fairly. She was able to achieve standing 3x this date with personal RW  and decreasing assistance. She is unable to wt shift despite manual cues and demonstrates quick onset fatigue with limited standing time. She continues to require increased assistance with bed mobility. She requires reassurance. She is limited by decreased strength, balance, endurance. She will continue to benefit from PT services to maximize LOF.       Linda Alberto, PT,DPT

## 2024-05-08 NOTE — PLAN OF CARE
Problem: OCCUPATIONAL THERAPY ADULT  Goal: Performs self-care activities at highest level of function for planned discharge setting.  See evaluation for individualized goals.  Description: Treatment Interventions: ADL retraining, Visual perceptual retraining, Functional transfer training, UE strengthening/ROM, Patient/family training, Endurance training, Cognitive reorientation, Equipment evaluation/education, Neuromuscular reeducation, Fine motor coordination activities, Compensatory technique education, UE splinting, Continued evaluation, Cardiac education, Energy conservation, Activityengagement          See flowsheet documentation for full assessment, interventions and recommendations.   Outcome: Progressing  Note: Limitation: Decreased ADL status, Decreased UE ROM, Decreased UE strength, Decreased Safe judgement during ADL, Decreased endurance, Decreased self-care trans, Decreased high-level ADLs  Prognosis: Guarded  Assessment: Pt seen for OT treatment session focusing on ADLs/IADLs, functional mobility, functional standing tolerance, functional transfers, patient education, continued evaluation. Pt greeted supine in bed at start of session. Pt alert and cooperative throughout session. Pt with good sitting balance and poor standing balance. Pt limited by fatigue and pain during session today. Pt completed supine to sit EOB with modAx2. Pt EOB sitting with good balance. Pt completed 3 trial sit to stand with maxA for first sit to stand and modAx2 for 2nd and 3rd trial. Pt with sitting rest breaks in between each sit to stand trial. Standing tolerance of about 10-15 seconds with RW for stability. Pt completed sit to supine with maxAx3, trunk and LE management. Pt ended session supine in bed with HOB elevated. Pt reports 3/10 pain and some dizziness after 2nd standing trial, resolved after a few minutes. Pt's vitals include: stable, HR increased when trialing the sit to stands with RW EOB.     Pt ended session  supine in bed. Call bell and phone within reach. All needs met and pt reports no further questions at this time. Continue to recommend moderate intensity resource when medically cleared. OT will continue to follow pt on caseload.     Rehab Resource Intensity Level, OT: II (Moderate Resource Intensity)

## 2024-05-08 NOTE — OCCUPATIONAL THERAPY NOTE
Occupational Therapy Progress Note     Patient Name: Tigist Hewitt  Today's Date: 5/8/2024  Problem List  Principal Problem:    Acute on chronic diastolic congestive heart failure (HCC)  Active Problems:    Atrial fibrillation (HCC)    COPD (chronic obstructive pulmonary disease) (HCC)    Type 2 diabetes mellitus with hyperglycemia, with long-term current use of insulin (HCC)    Morbid obesity (HCC)    Acute pain of left knee    Acute cystitis without hematuria       05/08/24 1340   OT Last Visit   OT Visit Date 05/08/24   Note Type   Note Type Treatment   Pain Assessment   Pain Assessment Tool 0-10   Pain Score 2  (2-3/10)   Pain Location/Orientation Orientation: Left;Location: Leg   Restrictions/Precautions   Weight Bearing Precautions Per Order No   Other Precautions Chair Alarm;Bed Alarm;Fall Risk;Pain  (4 L O2 NC chronically)   ADL   Where Assessed Edge of bed   Functional Standing Tolerance   Time ~10-15 sec each sit to stand   Activity practicing sitting to standing with RW   Comments RW for stability   Bed Mobility   Rolling L 2  Maximal assistance   Additional items Assist x 2;Increased time required;Verbal cues   Supine to Sit 3  Moderate assistance   Additional items Assist x 1;Increased time required;Verbal cues;LE management  (trunk management)   Sit to Supine 2  Maximal assistance   Additional items Assist x 3;Increased time required;Verbal cues;LE management   Additional Comments HOB elevated, required modAx2 to achieve sitting on EOB with verbal cues for trunk management. Returned to supine with maxAx3 for LE and trunk management for safety.   Transfers   Sit to Stand 2  Maximal assistance  (maxAx2 for first sit to stand then modAx2 for other 2)   Additional items Assist x 2;Increased time required;Verbal cues  (RW)   Stand to Sit 3  Moderate assistance   Additional items Assist x 2;Increased time required;Verbal cues   Stand pivot Unable to assess   Additional Comments use of RW. Pt placing  right had on anterior aspect of RW and r hand on  area. Pt required maxAx2 to acheive standing 1 st trial, then 2nd and 3rd trial with same technique modAx2. Standing tolerance for 10-15 seconds for each trial. Inc verbal cues for upright posture when standing   Functional Mobility   Functional Mobility   (unable to assess)   Cognition   Overall Cognitive Status WFL   Arousal/Participation Alert;Responsive;Cooperative   Attention Attends with cues to redirect   Orientation Level Oriented X4   Memory Within functional limits   Following Commands Follows one step commands with increased time or repetition   Comments Increased verbal cues for hand placement and proper posture when standing   Activity Tolerance   Activity Tolerance Patient limited by fatigue;Patient limited by pain   Medical Staff Made Aware PCAs, PT [facility] notified   Assessment   Assessment Pt seen for OT treatment session focusing on ADLs/IADLs, functional mobility, functional standing tolerance, functional transfers, patient education, continued evaluation. Pt greeted supine in bed at start of session. Pt alert and cooperative throughout session. Pt with good sitting balance and poor standing balance. Pt limited by fatigue and pain during session today. Pt completed supine to sit EOB with modAx2. Pt EOB sitting with good balance. Pt completed 3 trial sit to stand with maxA for first sit to stand and modAx2 for 2nd and 3rd trial. Pt with sitting rest breaks in between each sit to stand trial. Standing tolerance of about 10-15 seconds with RW for stability. Pt completed sit to supine with maxAx3, trunk and LE management. Pt ended session supine in bed with HOB elevated. Pt reports 3/10 pain and some dizziness after 2nd standing trial, resolved after a few minutes. Pt's vitals include: stable, HR increased when trialing the sit to stands with RW EOB.     Pt ended session supine in bed. Call bell and phone within reach. All needs met and pt  reports no further questions at this time. Continue to recommend moderate intensity resource when medically cleared. OT will continue to follow pt on caseload.   Plan   Treatment Interventions ADL retraining;Functional transfer training;UE strengthening/ROM;Endurance training;Patient/family training;Equipment evaluation/education;Compensatory technique education;Continued evaluation;Cardiac education;Energy conservation;Activityengagement   Goal Expiration Date 05/17/24   OT Treatment Day 2   OT Frequency 2-3x/wk   Discharge Recommendation   Rehab Resource Intensity Level, OT II (Moderate Resource Intensity)   Additional Comments  The patient's raw score on the AM-PAC Daily Activity Inpatient Short Form is 8. A raw score of less than 19 suggests the patient may benefit from discharge to post-acute rehabilitation services. Please refer to the recommendation of the Occupational Therapist for safe discharge planning.   AM-PAC Daily Activity Inpatient   Lower Body Dressing 1   Bathing 1   Toileting 1   Upper Body Dressing 1   Grooming 1   Eating 3   Daily Activity Raw Score 8   AM-PAC Applied Cognition Inpatient   Following a Speech/Presentation 3   Understanding Ordinary Conversation 3   Taking Medications 2   Remembering Where Things Are Placed or Put Away 2   Remembering List of 4-5 Errands 2   Taking Care of Complicated Tasks 2   Applied Cognition Raw Score 14   Applied Cognition Standardized Score 32.02   End of Consult   Education Provided Yes   Patient Position at End of Consult Supine;Bed/Chair alarm activated;All needs within reach   Nurse Communication Nurse aware of consult     Becki Wilson OT

## 2024-05-08 NOTE — QUICK NOTE
Dr. Porter made aware patients tele monitor alarmed for v tach image sent to review. Patient asymptomatic resting in bed. Dr. Porter after review does not believe true V tach. Will continue to keep patient telemetry monitoring.

## 2024-05-09 PROBLEM — E87.3 METABOLIC ALKALOSIS: Status: ACTIVE | Noted: 2024-05-09

## 2024-05-09 LAB
ANION GAP SERPL CALCULATED.3IONS-SCNC: 2 MMOL/L (ref 4–13)
BUN SERPL-MCNC: 46 MG/DL (ref 5–25)
CALCIUM SERPL-MCNC: 9.9 MG/DL (ref 8.4–10.2)
CHLORIDE SERPL-SCNC: 93 MMOL/L (ref 96–108)
CO2 SERPL-SCNC: 44 MMOL/L (ref 21–32)
CREAT SERPL-MCNC: 1.12 MG/DL (ref 0.6–1.3)
GFR SERPL CREATININE-BSD FRML MDRD: 49 ML/MIN/1.73SQ M
GLUCOSE SERPL-MCNC: 144 MG/DL (ref 65–140)
GLUCOSE SERPL-MCNC: 146 MG/DL (ref 65–140)
GLUCOSE SERPL-MCNC: 178 MG/DL (ref 65–140)
GLUCOSE SERPL-MCNC: 280 MG/DL (ref 65–140)
GLUCOSE SERPL-MCNC: 373 MG/DL (ref 65–140)
POTASSIUM SERPL-SCNC: 3.8 MMOL/L (ref 3.5–5.3)
SODIUM SERPL-SCNC: 139 MMOL/L (ref 135–147)

## 2024-05-09 PROCEDURE — 94640 AIRWAY INHALATION TREATMENT: CPT

## 2024-05-09 PROCEDURE — 82948 REAGENT STRIP/BLOOD GLUCOSE: CPT

## 2024-05-09 PROCEDURE — 99232 SBSQ HOSP IP/OBS MODERATE 35: CPT | Performed by: INTERNAL MEDICINE

## 2024-05-09 PROCEDURE — 94760 N-INVAS EAR/PLS OXIMETRY 1: CPT

## 2024-05-09 PROCEDURE — 80048 BASIC METABOLIC PNL TOTAL CA: CPT | Performed by: INTERNAL MEDICINE

## 2024-05-09 PROCEDURE — 99233 SBSQ HOSP IP/OBS HIGH 50: CPT | Performed by: FAMILY MEDICINE

## 2024-05-09 RX ORDER — ACETAZOLAMIDE 250 MG/1
250 TABLET ORAL ONCE
Status: COMPLETED | OUTPATIENT
Start: 2024-05-09 | End: 2024-05-09

## 2024-05-09 RX ORDER — METHYLPREDNISOLONE SODIUM SUCCINATE 40 MG/ML
40 INJECTION, POWDER, LYOPHILIZED, FOR SOLUTION INTRAMUSCULAR; INTRAVENOUS EVERY 12 HOURS SCHEDULED
Status: DISCONTINUED | OUTPATIENT
Start: 2024-05-09 | End: 2024-05-10

## 2024-05-09 RX ORDER — BUMETANIDE 1 MG/1
2 TABLET ORAL 2 TIMES DAILY
Status: DISCONTINUED | OUTPATIENT
Start: 2024-05-09 | End: 2024-05-12 | Stop reason: HOSPADM

## 2024-05-09 RX ORDER — IPRATROPIUM BROMIDE AND ALBUTEROL SULFATE 2.5; .5 MG/3ML; MG/3ML
3 SOLUTION RESPIRATORY (INHALATION)
Status: DISCONTINUED | OUTPATIENT
Start: 2024-05-09 | End: 2024-05-09

## 2024-05-09 RX ORDER — IPRATROPIUM BROMIDE AND ALBUTEROL SULFATE 2.5; .5 MG/3ML; MG/3ML
3 SOLUTION RESPIRATORY (INHALATION) 3 TIMES DAILY
Status: DISCONTINUED | OUTPATIENT
Start: 2024-05-10 | End: 2024-05-12 | Stop reason: HOSPADM

## 2024-05-09 RX ADMIN — ACETAZOLAMIDE 250 MG: 250 TABLET ORAL at 12:32

## 2024-05-09 RX ADMIN — METOPROLOL SUCCINATE 37.5 MG: 25 TABLET, EXTENDED RELEASE ORAL at 21:37

## 2024-05-09 RX ADMIN — INSULIN LISPRO 10 UNITS: 100 INJECTION, SOLUTION INTRAVENOUS; SUBCUTANEOUS at 21:41

## 2024-05-09 RX ADMIN — METHYLPREDNISOLONE SODIUM SUCCINATE 40 MG: 40 INJECTION, POWDER, FOR SOLUTION INTRAMUSCULAR; INTRAVENOUS at 21:39

## 2024-05-09 RX ADMIN — NYSTATIN: 100000 POWDER TOPICAL at 17:16

## 2024-05-09 RX ADMIN — METHYLPREDNISOLONE SODIUM SUCCINATE 40 MG: 40 INJECTION, POWDER, FOR SOLUTION INTRAMUSCULAR; INTRAVENOUS at 12:33

## 2024-05-09 RX ADMIN — BUMETANIDE 2 MG: 1 TABLET ORAL at 17:16

## 2024-05-09 RX ADMIN — FLUTICASONE FUROATE AND VILANTEROL TRIFENATATE 1 PUFF: 100; 25 POWDER RESPIRATORY (INHALATION) at 09:19

## 2024-05-09 RX ADMIN — IPRATROPIUM BROMIDE AND ALBUTEROL SULFATE 3 ML: 2.5; .5 SOLUTION RESPIRATORY (INHALATION) at 14:03

## 2024-05-09 RX ADMIN — LORATADINE 10 MG: 10 TABLET ORAL at 09:10

## 2024-05-09 RX ADMIN — IPRATROPIUM BROMIDE AND ALBUTEROL SULFATE 3 ML: 2.5; .5 SOLUTION RESPIRATORY (INHALATION) at 19:23

## 2024-05-09 RX ADMIN — POTASSIUM CHLORIDE 20 MEQ: 1500 TABLET, EXTENDED RELEASE ORAL at 17:16

## 2024-05-09 RX ADMIN — ACETAMINOPHEN 325MG 650 MG: 325 TABLET ORAL at 12:41

## 2024-05-09 RX ADMIN — LEVOTHYROXINE SODIUM 288 MCG: 88 TABLET ORAL at 06:17

## 2024-05-09 RX ADMIN — OXYCODONE HYDROCHLORIDE 5 MG: 5 TABLET ORAL at 21:35

## 2024-05-09 RX ADMIN — INSULIN GLARGINE 15 UNITS: 100 INJECTION, SOLUTION SUBCUTANEOUS at 21:37

## 2024-05-09 RX ADMIN — MIDODRINE HYDROCHLORIDE 2.5 MG: 5 TABLET ORAL at 17:16

## 2024-05-09 RX ADMIN — BUMETANIDE 2 MG: 1 TABLET ORAL at 12:33

## 2024-05-09 RX ADMIN — MICONAZOLE NITRATE: 20 CREAM TOPICAL at 17:19

## 2024-05-09 RX ADMIN — WARFARIN SODIUM 5 MG: 5 TABLET ORAL at 17:16

## 2024-05-09 RX ADMIN — INSULIN LISPRO 6 UNITS: 100 INJECTION, SOLUTION INTRAVENOUS; SUBCUTANEOUS at 17:16

## 2024-05-09 RX ADMIN — ATORVASTATIN CALCIUM 10 MG: 10 TABLET, FILM COATED ORAL at 09:10

## 2024-05-09 RX ADMIN — ALLOPURINOL 200 MG: 100 TABLET ORAL at 09:10

## 2024-05-09 RX ADMIN — FERROUS SULFATE TAB 325 MG (65 MG ELEMENTAL FE) 325 MG: 325 (65 FE) TAB at 09:10

## 2024-05-09 RX ADMIN — PANTOPRAZOLE SODIUM 40 MG: 40 TABLET, DELAYED RELEASE ORAL at 06:18

## 2024-05-09 RX ADMIN — POTASSIUM CHLORIDE 20 MEQ: 1500 TABLET, EXTENDED RELEASE ORAL at 21:36

## 2024-05-09 RX ADMIN — Medication 1 TABLET: at 09:10

## 2024-05-09 RX ADMIN — MICONAZOLE NITRATE: 20 CREAM TOPICAL at 09:10

## 2024-05-09 RX ADMIN — INSULIN LISPRO 2 UNITS: 100 INJECTION, SOLUTION INTRAVENOUS; SUBCUTANEOUS at 12:36

## 2024-05-09 RX ADMIN — CEPHALEXIN 500 MG: 500 CAPSULE ORAL at 06:17

## 2024-05-09 RX ADMIN — NYSTATIN: 100000 POWDER TOPICAL at 09:10

## 2024-05-09 RX ADMIN — MONTELUKAST 10 MG: 10 TABLET, FILM COATED ORAL at 21:36

## 2024-05-09 RX ADMIN — MIDODRINE HYDROCHLORIDE 2.5 MG: 5 TABLET ORAL at 12:33

## 2024-05-09 RX ADMIN — POTASSIUM CHLORIDE 20 MEQ: 1500 TABLET, EXTENDED RELEASE ORAL at 09:10

## 2024-05-09 NOTE — ASSESSMENT & PLAN NOTE
Likely contraction alkalosis from diuresis  Will need another dose of Diamox, will discuss with cardiology

## 2024-05-09 NOTE — CASE MANAGEMENT
[facility] received request for authorization from Care Manager.  Authorization request submitted for: SNF  Facility Name:[facility]   NPI:[id number]  Facility MD: Greame    NPI: [id number]  Authorization initiated by contacting insurance:  [email or web address]  Via: [employer]   Clinicals submitted via Portal attachment   Pending Reference #:[id number]    Care Manager notified: Coby Rebollar     Updates to authorization status will be noted in chart. Please reach out to CM for updates on any clinical information.

## 2024-05-09 NOTE — ASSESSMENT & PLAN NOTE
Mild exacerbation noted with sob and wheezing even after generous diuresis  Start IV solumedrol 40mg BID and duoneb nebs  Has chronic respiratory failure uses 4 L of oxygen at baseline along with CPAP will continue.  Abg on admission acceptable

## 2024-05-09 NOTE — ASSESSMENT & PLAN NOTE
Lab Results   Component Value Date    HGBA1C 7.0 (H) 05/02/2024       Recent Labs     05/08/24  1113 05/08/24  1601 05/08/24  2106 05/09/24  0731   POCGLU 197* 191* 185* 146*         Blood Sugar Average: Last 72 hrs:  (P) [id number]  Acceptable blood glucose, goal is 100-180  Placed on modified regimen of insulin sliding scale and diabetic diet for now  On short course of prednisone and hence resume Lantus

## 2024-05-09 NOTE — CASE MANAGEMENT
Case Management Discharge Planning Note    Patient name Tigist Hewitt  Location /-01 MRN [id number]  : 1955 Date 2024       Current Admission Date: 2024  Current Admission Diagnosis:Acute on chronic diastolic congestive heart failure (HCC)   Patient Active Problem List    Diagnosis Date Noted    Metabolic alkalosis 2024    Acute pain of left knee 2024    Acute cystitis without hematuria 2024    Ambulatory dysfunction 2024    Hyperkalemia 2024    Lung cyst 2024    Abnormal CT scan, pelvis 2023    CKD (chronic kidney disease) stage 3, GFR 30-59 ml/min ([facility]) 2023    NISHI (obstructive sleep apnea) 2023    Intertrigo 10/29/2022    Vaginal bleeding 10/23/2022    Acute kidney injury superimposed on chronic kidney disease  ([facility]) 10/22/2022    Hypotension 10/22/2022    Acute on chronic diastolic congestive heart failure ([facility]) 2022    IMTIAZ (acute kidney injury) ([facility]) 2022    Morbid obesity ([facility]) 2022    Supratherapeutic INR 04/15/2020    Chronic respiratory failure with hypoxia ([facility]) 2020    Asthma 2020    Atrial fibrillation ([facility]) 2020    COPD (chronic obstructive pulmonary disease) ([facility]) 2020    Type 2 diabetes mellitus with hyperglycemia, with long-term current use of insulin ([facility]) 2020    Shortness of breath 2020    Anemia 2020      LOS (days): 7  Geometric Mean LOS (GMLOS) (days): 3  Days to GMLOS:-4     OBJECTIVE:  Risk of Unplanned Readmission Score: 33.79         Current admission status: Inpatient   Preferred Pharmacy:   [facility]/pharmacy #[id number] [place] [address]  Phone: [phone] Fax: [phone]    Primary Care Provider: Marisa Haque MD    Primary Insurance: [employer] RE[employer]  Secondary Insurance:     DISCHARGE DETAILS:    Discussed in rounds anticipate dc tomorrow.    Discharge planning discussed  with:: patient, spouse and friend Shazia  Freedom of Choice: Yes  Comments - Freedom of Choice: Discussed with patient, referral was reopened to secure the bed at [facility] and with this being done, [facility] was able to see the referral again and they had an open bed now. Discussed this with patient and her choice is to go to [facility], as [place] is further away and it makes it difficult for family to visit. CM notified [facility] of change in plan.  CM contacted family/caregiver?: Yes  Were Treatment Team discharge recommendations reviewed with patient/caregiver?: Yes  Did patient/caregiver verbalize understanding of patient care needs?: Yes  Were patient/caregiver advised of the risks associated with not following Treatment Team discharge recommendations?: Yes    Contacts  Patient Contacts: Kenneth, spouse  Contact Method: Phone  Reason/Outcome: Discharge Planning              Other Referral/Resources/Interventions Provided:  Interventions: Short Term Rehab  Referral Comments: Secured [facility], for dc and they are aware pt will bring her CPAP unit, is on 4 liters of continuous 02 and they have a leighann bed available.    - requested CM support assistance for authorization       Treatment Team Recommendation: Short Term Rehab  Discharge Destination Plan:: Short Term Rehab ([facility])                                              Accepting Facility Name, City & State : [facility]  Receiving Facility/Agency Phone Number: [phone]  Facility/Agency Fax Number: [phone]

## 2024-05-09 NOTE — ASSESSMENT & PLAN NOTE
For UTI, patient was placed on IV Rocephin and then transition to p.o. Keflex.  Growing less than 100,000 colonies of Proteus  Today is day 6 of antibiotics.  Adequate treatment achieved.  Discontinue antibiotics

## 2024-05-09 NOTE — ASSESSMENT & PLAN NOTE
Wt Readings from Last 3 Encounters:   05/09/24 (!) 151 kg (332 lb 14.3 oz)   01/02/24 (!) 149 kg (328 lb 0.7 oz)   10/29/22 (!) 151 kg (331 lb 12.7 oz)   Patient has heart failure with preserved ejection fraction and maintained on torsemide 60 mg twice daily and metolazone once a week  2d echo from December 2023 shows normal EF 55% but abnormal diastolic function and severe tricuspid regurg.  She had gained approximately 20 to 30 pounds at least and was asked by her cardiologist to increase metolazone however lately she has been very somnolent and not taking her meds for the last 2 to 3 days.    Patient did not have adequate diuretic response initially on Lasix drip 20 mg/h and metolazone 2.5 mg daily.  Subsequently transition to Bumex drip 0.5 mg/h along with metolazone  -12 L net negative.  Also unable to adequately assess volume status because of body habitus and inability to do standing weights.  Increase metabolic alkalosis noted likely from contraction and received 2 doses of Diamox.    Bumex drip was discontinued and transitioned to Bumex 2 mg intravenous twice daily       Monitor electrolytes closely   Chronically uses 4 L of oxygen and CPAP at at bedtime will continue for now  Unable to get accurate weights but bed scale shows weight from 368 pounds -->340 pounds

## 2024-05-09 NOTE — CASE MANAGEMENT
Case Management Discharge Planning Note    Patient name Tigist Hewitt  Location /-01 MRN [id number]  : 1955 Date 2024       Current Admission Date: 2024  Current Admission Diagnosis:Acute on chronic diastolic congestive heart failure (HCC)   Patient Active Problem List    Diagnosis Date Noted    Metabolic alkalosis 2024    Acute pain of left knee 2024    Acute cystitis without hematuria 2024    Ambulatory dysfunction 2024    Hyperkalemia 2024    Lung cyst 2024    Abnormal CT scan, pelvis 2023    CKD (chronic kidney disease) stage 3, GFR 30-59 ml/min ([facility]) 2023    NISHI (obstructive sleep apnea) 2023    Intertrigo 10/29/2022    Vaginal bleeding 10/23/2022    Acute kidney injury superimposed on chronic kidney disease  ([facility]) 10/22/2022    Hypotension 10/22/2022    Acute on chronic diastolic congestive heart failure ([facility]) 2022    IMTIAZ (acute kidney injury) ([facility]) 2022    Morbid obesity ([facility]) 2022    Supratherapeutic INR 04/15/2020    Chronic respiratory failure with hypoxia ([facility]) 2020    Asthma 2020    Atrial fibrillation ([facility]) 2020    COPD (chronic obstructive pulmonary disease) ([facility]) 2020    Type 2 diabetes mellitus with hyperglycemia, with long-term current use of insulin ([facility]) 2020    Shortness of breath 2020    Anemia 2020      LOS (days): 7  Geometric Mean LOS (GMLOS) (days): 3  Days to GMLOS:-4.1     OBJECTIVE:  Risk of Unplanned Readmission Score: 33.86         Current admission status: Inpatient   Preferred Pharmacy:   [facility]/pharmacy #[id number] - [place], [place] - [address] [place] [id number]  Phone: [phone] Fax: [phone]    Primary Care Provider: Marisa Haque MD    Primary Insurance: [employer] [facility] [employer]  Secondary Insurance:     DISCHARGE DETAILS:     Secured transport received for tomorrow, now pending auth.  Medical necessity  in the filing drawer.      Number/Name of Dispatcher: Natalee Good Will     ETA of Transport (Date): 05/10/24  ETA of Transport (Time): 1300              IMM Given (Date):: 05/09/24  IMM Given to:: Patient

## 2024-05-09 NOTE — PROGRESS NOTES
[facility]  Progress Note  Name: Tigist Hewitt I  MRN: [id number]  Unit/Bed#: -01 I Date of Admission: 5/2/2024   Date of Service: 5/9/2024 I Hospital Day: 7    Assessment/Plan   * Acute on chronic diastolic congestive heart failure (HCC)  Assessment & Plan  Wt Readings from Last 3 Encounters:   05/09/24 (!) 151 kg (332 lb 14.3 oz)   01/02/24 (!) 149 kg (328 lb 0.7 oz)   10/29/22 (!) 151 kg (331 lb 12.7 oz)   Patient has heart failure with preserved ejection fraction and maintained on torsemide 60 mg twice daily and metolazone once a week  2d echo from December 2023 shows normal EF 55% but abnormal diastolic function and severe tricuspid regurg.  She had gained approximately 20 to 30 pounds at least and was asked by her cardiologist to increase metolazone however lately she has been very somnolent and not taking her meds for the last 2 to 3 days.    Patient did not have adequate diuretic response initially on Lasix drip 20 mg/h and metolazone 2.5 mg daily.  Subsequently transition to Bumex drip 0.5 mg/h along with metolazone  -12 L net negative.  Also unable to adequately assess volume status because of body habitus and inability to do standing weights.  Increase metabolic alkalosis noted likely from contraction and received 2 doses of Diamox.    Bumex drip was discontinued and transitioned to Bumex 2 mg intravenous twice daily       Monitor electrolytes closely   Chronically uses 4 L of oxygen and CPAP at at bedtime will continue for now  Unable to get accurate weights but bed scale shows weight from 368 pounds -->340 pounds          Metabolic alkalosis  Assessment & Plan  Likely contraction alkalosis from diuresis  Will need another dose of Diamox, will discuss with cardiology    Acute cystitis without hematuria  Assessment & Plan  For UTI, patient was placed on IV Rocephin and then transition to p.o. Keflex.  Growing less than 100,000 colonies of Proteus  Today is day 6 of  antibiotics.  Adequate treatment achieved.  Discontinue antibiotics      Type 2 diabetes mellitus with hyperglycemia, with long-term current use of insulin ([facility])  Assessment & Plan  Lab Results   Component Value Date    HGBA1C 7.0 (H) 05/02/2024       Recent Labs     05/08/24  1113 05/08/24  1601 05/08/24  2106 05/09/24  0731   POCGLU 197* 191* 185* 146*         Blood Sugar Average: Last 72 hrs:  (P) [id number]  Acceptable blood glucose, goal is 100-180  Placed on modified regimen of insulin sliding scale and diabetic diet for now  On short course of prednisone and hence resume Lantus      COPD (chronic obstructive pulmonary disease) ([facility])  Assessment & Plan  Mild exacerbation noted with sob and wheezing even after generous diuresis  Start IV solumedrol 40mg BID and duoneb nebs  Has chronic respiratory failure uses 4 L of oxygen at baseline along with CPAP will continue.  Abg on admission acceptable    Atrial fibrillation ([facility])  Assessment & Plan  INR therapeutic at 2.5 , currently rate controlled with Toprol-XL and INR is therapeutic normally takes Coumadin 7.5 mg daily will place on 5 mg for now and observe  Cardiology following  History of ablation                 VTE Pharmacologic Prophylaxis: VTE Score: 2  Coumadin    Mobility:   Basic Mobility Inpatient Raw Score: 6  JH-HLM Goal: 2: Bed activities/Dependent transfer  JH-HLM Achieved: 2: Bed activities/Dependent transfer  JH-HLM Goal achieved. Continue to encourage appropriate mobility.    Patient Centered Rounds: I performed bedside rounds with nursing staff today.   Discussions with Specialists or Other Care Team Provider: Cardiology    Education and Discussions with Family / Patient:  patient refused for me to call family. She said no when asked.     Total Time Spent on Date of Encounter in care of patient: 55 mins. This time was spent on one or more of the following: performing physical exam; counseling and coordination of care; obtaining or  reviewing history; documenting in the medical record; reviewing/ordering tests, medications or procedures; communicating with other healthcare professionals and discussing with patient's family/caregivers.    Current Length of Stay: 7 day(s)  Current Patient Status: Inpatient   Certification Statement: The patient will continue to require additional inpatient hospital stay due to ongoing resp symptoms and close monitoring on diuretics and now on iv steroids  Discharge Plan: Anticipate discharge in 48 hrs to rehab facility.    Code Status: Level 3 - DNAR and DNI    Subjective:   Pt seen and examined at bedside  C/o wheezing and mild sob   Good diuresis achieved  No fever or cp  No other nusring concerns    Objective:     Vitals:   Temp (24hrs), Av.9 °F (36.6 °C), Min:97.7 °F (36.5 °C), Max:98.1 °F (36.7 °C)    Temp:  [97.7 °F (36.5 °C)-98.1 °F (36.7 °C)] 97.9 °F (36.6 °C)  HR:  [62-86] 82  Resp:  [16-20] 18  BP: ()/(45-68) 96/60  SpO2:  [93 %-100 %] 98 %  Body mass index is 62.9 kg/m².     Input and Output Summary (last 24 hours):     Intake/Output Summary (Last 24 hours) at 2024 1143  Last data filed at 2024 0900  Gross per 24 hour   Intake 660 ml   Output 1230 ml   Net -570 ml       Physical Exam:   Physical Exam s1,2 (+), systolic murmur 2/6 mitral area  Lungs wheezing lower and middle lung fields  Pedal edema +1, ?lymphedema changes  Abd chronically distended, no acute distention noted, NT , BS (+) 4 quads  Appears ill and fatigued  A&Ox3  Coperative  No JVD noted    Additional Data:     Labs:  Results from last 7 days   Lab Units 24  0502 24  1209   WBC Thousand/uL 6.48 8.08   HEMOGLOBIN g/dL 11.7 11.8   HEMATOCRIT % 36.5 37.2   PLATELETS Thousands/uL 159 176   SEGS PCT %  --  70   LYMPHO PCT %  --  13*   MONO PCT %  --  14*   EOS PCT %  --  1     Results from last 7 days   Lab Units 24  0455 24  0605 24  0502   SODIUM mmol/L 139   < > 141  141   POTASSIUM mmol/L  3.8   < > 3.6  3.6   CHLORIDE mmol/L 93*   < > 101  101   CO2 mmol/L 44*   < > 31  31   BUN mg/dL 46*   < > 46*  46*   CREATININE mg/dL 1.12   < > 1.06  1.06   ANION GAP mmol/L 2*   < > 9  9   CALCIUM mg/dL 9.9   < > 9.5  9.5   ALBUMIN g/dL  --   --  3.3*   TOTAL BILIRUBIN mg/dL  --   --  1.75*   ALK PHOS U/L  --   --  113*   ALT U/L  --   --  7   AST U/L  --   --  22   GLUCOSE RANDOM mg/dL 144*   < > 121  121    < > = values in this interval not displayed.     Results from last 7 days   Lab Units 05/08/24  0501   INR  2.56*     Results from last 7 days   Lab Units 05/09/24  0731 05/08/24  2106 05/08/24  1601 05/08/24  1113 05/08/24  0748 05/07/24  2112 05/07/24  1642 05/07/24  1144 05/07/24  0734 05/06/24  2104 05/06/24  1621 05/06/24  1145   POC GLUCOSE mg/dl 146* 185* 191* 197* 178* 244* 197* 236* 261* 316* 237* 218*     Results from last 7 days   Lab Units 05/02/24  1738   HEMOGLOBIN A1C % 7.0*     Results from last 7 days   Lab Units 05/04/24  0605 05/02/24  1209   LACTIC ACID mmol/L 1.3 1.5       Lines/Drains:  Invasive Devices       Peripheral Intravenous Line  Duration             Peripheral IV 05/08/24 Right;Ventral (anterior) Forearm 1 day    Peripheral IV 05/09/24 Dorsal (posterior);Left Hand <1 day              Drain  Duration             External Urinary Catheter 6 days                          Imaging: No pertinent imaging reviewed.    Recent Cultures (last 7 days):   Results from last 7 days   Lab Units 05/02/24  1443 05/02/24  1209   BLOOD CULTURE   --  No Growth After 5 Days.  No Growth After 5 Days.   URINE CULTURE  60,000-69,000 cfu/ml Proteus mirabilis*  --        Last 24 Hours Medication List:   Current Facility-Administered Medications   Medication Dose Route Frequency Provider Last Rate    acetaminophen  650 mg Oral Q4H PRN Amanda Dukes MD      acetaZOLAMIDE  250 mg Oral Once CAMPBELL Bolton      allopurinol  200 mg Oral Daily Amanda Dukes MD      atorvastatin  10 mg Oral Daily  Amanda Dukes MD      bumetanide  2 mg Oral BID CAMPBELL Bolton      ferrous sulfate  325 mg Oral Daily With Breakfast Amanda Dukes MD      Fluticasone Furoate-Vilanterol  1 puff Inhalation Daily Amanda Dukes MD      insulin glargine  15 Units Subcutaneous HS Amanda Dukes MD      insulin lispro  2-12 Units Subcutaneous TID AC Amanda Dukes MD      insulin lispro  2-12 Units Subcutaneous HS Radha Wright MD      ipratropium-albuterol  3 mL Nebulization Q6H Ceasar Lerner MD      levothyroxine  288 mcg Oral Early Morning Amanda Dukes MD      loratadine  10 mg Oral Daily Amanda Dukes MD      methylPREDNISolone sodium succinate  40 mg Intravenous Q12H [facility] Ceasar Lerner MD      metoprolol  2.5 mg Intravenous Q6H PRN Radha Wright MD      metoprolol succinate  37.5 mg Oral BID Radha Wright MD      midodrine  2.5 mg Oral TID AC CAMPBELL Bolton      SERGO ANTIFUNGAL   Topical BID Amanda Dukes MD      montelukast  10 mg Oral HS Amanda Dukes MD      multivitamin-minerals  1 tablet Oral Daily Amanda Dukes MD      nystatin   Topical BID Amanda Dukes MD      ondansetron  4 mg Intravenous Q6H PRN Amanda Dukes MD      oxyCODONE  5 mg Oral Q6H PRN CAMPBELL Munoz      pantoprazole  40 mg Oral Daily Before Breakfast Amanda Dukes MD      potassium chloride  20 mEq Oral TID Amanda Dukes MD      warfarin  5 mg Oral Daily (warfarin) Amanda Dukes MD          Today, Patient Was Seen By: Ceasar Lerner MD    **Please Note: This note may have been constructed using a voice recognition system.**

## 2024-05-09 NOTE — PROGRESS NOTES
Progress Note:Cardiology  Tigist Hewitt 1955, 69 y.o. female MRN: [id number]    Unit/Bed#: -01 Encounter: [id number]  Attending Physician: Ceasar Lerner MD   Primary Care Provider: Marisa Haque MD   Date admitted to hospital: 5/2/2024  Length of stay: 7         * Acute on chronic diastolic congestive heart failure (HCC)  Assessment & Plan  Wt Readings from Last 3 Encounters:   05/06/24 (!) 161 kg (355 lb 9.6 oz)   01/02/24 (!) 149 kg (328 lb 0.7 oz)   10/29/22 (!) 151 kg (331 lb 12.7 oz)     History of HFpEF.  Echo 12/24/2023 with LVEF 55%, mild AS, mild-mod MR, severe TR with PASP 53mmHg which is overall unchanged from prior.  Maintained on torsemide and metolazone at home with reported missed doses prior to hospital admission.  She has undergone work up by pulmonology for pulmonary hypertension.  Currently diuresing well with -3.4 L in 24 hours on Bumex 0.5 mg/hr drip and Diamox 250 mg PO x 2 doses.  Transitioned to Bumex 2 mg IV BID 5/8/2024 with good response. Transition to PO Bumex 2 mg BID today.  Diamox 250 mg PO x 1 today.  She is wheezing. Chest xray 5/8/2024 continues to show left pleural effusion. May consider IR evaluation for possible thoracentesis.   Continue strict I's/O's, sodium/fluid restriction. Unable to obtain daily weights due to inability to stand on her own.  Continue CPAP  Would benefit from intense physical therapy and increased mobility but unfortunately she is lacking motivation for this.    Atrial fibrillation (HCC)  Assessment & Plan  History of atrial fibrillation. Details are unclear due to lack of availability of medical records.  Previously following with Dr. Ferraro through [facility].  Reports undergoing 2 failed cardioversions and ultimately an atrial fibrillation ablation, date unknown. Persistent a fib since at least 2020.  She is maintained on warfarin anticoagulation, goal INR 2-3.  Remains in rate controlled atrial fibrillation this admission  "on metoprolol succinate 37.5 mg BID.  Optimize electrolytes for K+ > 4, Mag > 2.  Ok to discontinue telemetry as no longer on Bumex drip.        Subjective:   Patient seen and examined.she has a dry cough when I enter the room. She is wheezing on exam. She denies shortness of breath, chest pain, or lightheadedness.    Review of Systems   Constitutional: Negative.   HENT: Negative.     Cardiovascular:  Positive for dyspnea on exertion. Negative for chest pain, irregular heartbeat, leg swelling, near-syncope, orthopnea and palpitations.   Respiratory:  Positive for cough and wheezing. Negative for snoring.    Endocrine: Negative.    Skin: Negative.    Musculoskeletal: Negative.    Gastrointestinal: Negative.    Genitourinary: Negative.    Neurological: Negative.    Psychiatric/Behavioral: Negative.           Objective:     Vitals: Blood pressure 96/60, pulse 82, temperature 97.9 °F (36.6 °C), resp. rate 18, height 5' 1\" (1.549 m), weight (!) 151 kg (332 lb 14.3 oz), SpO2 98%., Body mass index is 62.9 kg/m².,     Orthostatic Blood Pressures      Flowsheet Row Most Recent Value   Blood Pressure 96/60 filed at 05/09/2024 0915   Patient Position - Orthostatic VS Lying filed at 05/09/2024 0915            Physical Exam  Vitals and nursing note reviewed.   Constitutional:       General: She is not in acute distress.     Appearance: She is well-developed. She is obese.   HENT:      Head: Normocephalic and atraumatic.   Eyes:      Conjunctiva/sclera: Conjunctivae normal.   Cardiovascular:      Rate and Rhythm: Normal rate. Rhythm irregularly irregular.      Heart sounds: No murmur heard.     Comments: Trivial lower leg edema  Pulmonary:      Effort: Pulmonary effort is normal. No respiratory distress.      Breath sounds: Wheezing (throughout) present.      Comments: 4 L NC  Abdominal:      Palpations: Abdomen is soft.      Tenderness: There is no abdominal tenderness.   Musculoskeletal:         General: No swelling.      " Cervical back: Neck supple.   Skin:     General: Skin is warm and dry.      Capillary Refill: Capillary refill takes less than 2 seconds.   Neurological:      Mental Status: She is alert.   Psychiatric:         Mood and Affect: Mood normal.            Intake/Output Summary (Last 24 hours) at 5/9/2024 1014  Last data filed at 5/9/2024 0900  Gross per 24 hour   Intake 780 ml   Output 1230 ml   Net -450 ml       Weight (last 2 days)       Date/Time Weight    05/09/24 0600 151 (332.89)    05/08/24 0600 156 (344.36)    05/08/24 0522 156 (344.36)    05/07/24 0600 154 (340.61)               Medications:      Current Facility-Administered Medications:     acetaminophen (TYLENOL) tablet 650 mg, 650 mg, Oral, Q4H PRN, Amanda Dukes MD, 650 mg at 05/08/24 1322    acetaZOLAMIDE (DIAMOX) tablet 250 mg, 250 mg, Oral, Once, CAMPBELL Bolton    albuterol inhalation solution 2.5 mg, 2.5 mg, Nebulization, Q6H PRN, Amanda Dukes MD    allopurinol (ZYLOPRIM) tablet 200 mg, 200 mg, Oral, Daily, Amanda Dukes MD, 200 mg at 05/09/24 0910    atorvastatin (LIPITOR) tablet 10 mg, 10 mg, Oral, Daily, Amanda Dukes MD, 10 mg at 05/09/24 0910    bumetanide (BUMEX) tablet 2 mg, 2 mg, Oral, BID, CAMPBELL Bolton    ferrous sulfate tablet 325 mg, 325 mg, Oral, Daily With Breakfast, Amanda Dukes MD, 325 mg at 05/09/24 0910    Fluticasone Furoate-Vilanterol 100-25 mcg/actuation 1 puff, 1 puff, Inhalation, Daily, Amanda Dukes MD, 1 puff at 05/09/24 0919    insulin glargine (LANTUS) subcutaneous injection 15 Units 0.15 mL, 15 Units, Subcutaneous, HS, Amanda Dukes MD, 15 Units at 05/08/24 2138    insulin lispro (HumALOG/ADMELOG) 100 units/mL subcutaneous injection 2-12 Units, 2-12 Units, Subcutaneous, TID AC, 2 Units at 05/08/24 1715 **AND** Fingerstick Glucose (POCT), , , TID AC, Amanda Dukes MD    insulin lispro (HumALOG/ADMELOG) 100 units/mL subcutaneous injection 2-12 Units, 2-12 Units, Subcutaneous, HS, Radha Wright MD, 2 Units at 05/08/24  2138    levothyroxine tablet 288 mcg, 288 mcg, Oral, Early Morning, Amanda Dukes MD, 288 mcg at 05/09/24 0617    loratadine (CLARITIN) tablet 10 mg, 10 mg, Oral, Daily, Amanda Dukes MD, 10 mg at 05/09/24 0910    metoprolol (LOPRESSOR) injection 2.5 mg, 2.5 mg, Intravenous, Q6H PRN, Radha Wright MD, 2.5 mg at 05/02/24 1837    metoprolol succinate (TOPROL-XL) 24 hr tablet 37.5 mg, 37.5 mg, Oral, BID, Radha Wright MD, 37.5 mg at 05/08/24 2139    midodrine (PROAMATINE) tablet 2.5 mg, 2.5 mg, Oral, TID AC, CAMPBELL Bolton, 2.5 mg at 05/08/24 1712    moisture barrier miconazole 2% cream (aka SERGO MOISTURE BARRIER ANTIFUNGAL CREAM), , Topical, BID, Amanda Dukes MD, Given at 05/09/24 0910    montelukast (SINGULAIR) tablet 10 mg, 10 mg, Oral, HS, Amanda Dukes MD, 10 mg at 05/08/24 2140    multivitamin-minerals (CENTRUM) tablet 1 tablet, 1 tablet, Oral, Daily, Amanda Dukes MD, 1 tablet at 05/09/24 0910    nystatin (MYCOSTATIN) powder, , Topical, BID, Amanda Dukes MD, Given at 05/09/24 0910    ondansetron (ZOFRAN) injection 4 mg, 4 mg, Intravenous, Q6H PRN, Amanda Dukes MD, 4 mg at 05/03/24 1007    oxyCODONE (ROXICODONE) IR tablet 5 mg, 5 mg, Oral, Q6H PRN, Kaitlin Dinero, CRNP    pantoprazole (PROTONIX) EC tablet 40 mg, 40 mg, Oral, Daily Before Breakfast, Amanda Dukes MD, 40 mg at 05/09/24 0618    potassium chloride (Klor-Con M20) CR tablet 20 mEq, 20 mEq, Oral, TID, Amanda Dukes MD, 20 mEq at 05/09/24 0910    warfarin (COUMADIN) tablet 5 mg, 5 mg, Oral, Daily (warfarin), Amanda Dukes MD, 5 mg at 05/08/24 1712     Labs & Results:        Results from last 7 days   Lab Units 05/03/24  0502 05/02/24  1209   WBC Thousand/uL 6.48 8.08   HEMOGLOBIN g/dL 11.7 11.8   HEMATOCRIT % 36.5 37.2   PLATELETS Thousands/uL 159 176         Results from last 7 days   Lab Units 05/09/24  0455 05/08/24  0501 05/07/24  0449 05/04/24  0605 05/03/24  0502 05/02/24  1209   POTASSIUM mmol/L 3.8 3.7 3.5   < > 3.6  3.6 3.7   CHLORIDE mmol/L  93* 91* 92*   < > 101  101 100   CO2 mmol/L 44* 45* 44*   < > 31  31 33*   BUN mg/dL 46* 43* 44*   < > 46*  46* 45*   CREATININE mg/dL 1.12 1.08 1.17   < > 1.06  1.06 0.97   CALCIUM mg/dL 9.9 10.6* 10.2   < > 9.5  9.5 9.8   ALK PHOS U/L  --   --   --   --  113* 121*   ALT U/L  --   --   --   --  7 10   AST U/L  --   --   --   --  22 37    < > = values in this interval not displayed.     Results from last 7 days   Lab Units 05/08/24  0501 05/07/24  0449 05/05/24  0457 05/03/24  0502 05/02/24  1209   INR  2.56* 2.88* 2.61*   < > 2.25*   PTT seconds  --   --   --   --  43*    < > = values in this interval not displayed.     Results from last 7 days   Lab Units 05/06/24  0518 05/04/24  0605 05/02/24  1209   MAGNESIUM mg/dL 1.9 2.1 2.1

## 2024-05-09 NOTE — ASSESSMENT & PLAN NOTE
INR therapeutic at 2.5 , currently rate controlled with Toprol-XL and INR is therapeutic normally takes Coumadin 7.5 mg daily will place on 5 mg for now and observe  Cardiology following  History of ablation

## 2024-05-09 NOTE — PLAN OF CARE
Problem: Potential for Falls  Goal: Patient will remain free of falls  Description: INTERVENTIONS:  - Educate patient/family on patient safety including physical limitations  - Instruct patient to call for assistance with activity   - Consult OT/PT to assist with strengthening/mobility   - Keep Call bell within reach  - Keep bed low and locked with side rails adjusted as appropriate  - Keep care items and personal belongings within reach  - Initiate and maintain comfort rounds  - Make Fall Risk Sign visible to staff  - Offer Toileting every 2 Hours, in advance of need  - Initiate/Maintain bed alarm  - Obtain necessary fall risk management equipment: heather   - Apply yellow socks and bracelet for high fall risk patients  - Consider moving patient to room near nurses station  Outcome: Progressing     Problem: Prexisting or High Potential for Compromised Skin Integrity  Goal: Skin integrity is maintained or improved  Description: INTERVENTIONS:  - Identify patients at risk for skin breakdown  - Assess and monitor skin integrity  - Assess and monitor nutrition and hydration status  - Monitor labs   - Assess for incontinence   - Turn and reposition patient  - Assist with mobility/ambulation  - Relieve pressure over bony prominences  - Avoid friction and shearing  - Provide appropriate hygiene as needed including keeping skin clean and dry  - Evaluate need for skin moisturizer/barrier cream  - Collaborate with interdisciplinary team   - Patient/family teaching  - Consider wound care consult   Outcome: Progressing     Problem: PAIN - ADULT  Goal: Verbalizes/displays adequate comfort level or baseline comfort level  Description: Interventions:  - Encourage patient to monitor pain and request assistance  - Assess pain using appropriate pain scale  - Administer analgesics based on type and severity of pain and evaluate response  - Implement non-pharmacological measures as appropriate and evaluate response  - Consider  cultural and social influences on pain and pain management  - Notify physician/advanced practitioner if interventions unsuccessful or patient reports new pain  Outcome: Progressing     Problem: SAFETY ADULT  Goal: Patient will remain free of falls  Description: INTERVENTIONS:  - Educate patient/family on patient safety including physical limitations  - Instruct patient to call for assistance with activity   - Consult OT/PT to assist with strengthening/mobility   - Keep Call bell within reach  - Keep bed low and locked with side rails adjusted as appropriate  - Keep care items and personal belongings within reach  - Initiate and maintain comfort rounds  - Make Fall Risk Sign visible to staff  - Offer Toileting every 2 Hours, in advance of need  - Initiate/Maintain bed alarm  - Obtain necessary fall risk management equipment: heather  - Apply yellow socks and bracelet for high fall risk patients  - Consider moving patient to room near nurses station  Outcome: Progressing  Goal: Maintain or return to baseline ADL function  Description: INTERVENTIONS:  -  Assess patient's ability to carry out ADLs; assess patient's baseline for ADL function and identify physical deficits which impact ability to perform ADLs (bathing, care of mouth/teeth, toileting, grooming, dressing, etc.)  - Assess/evaluate cause of self-care deficits   - Assess range of motion  - Assess patient's mobility; develop plan if impaired  - Assess patient's need for assistive devices and provide as appropriate  - Encourage maximum independence but intervene and supervise when necessary  - Involve family in performance of ADLs  - Assess for home care needs following discharge   - Consider OT consult to assist with ADL evaluation and planning for discharge  - Provide patient education as appropriate  Outcome: Progressing  Goal: Maintains/Returns to pre admission functional level  Description: INTERVENTIONS:  - Perform AM-PAC 6 Click Basic Mobility/ Daily  Activity assessment daily.  - Set and communicate daily mobility goal to care team and patient/family/caregiver.   - Collaborate with rehabilitation services on mobility goals if consulted  - Perform Range of Motion 3 times a day.  - Reposition patient every 2 hours.  - Out of bed for toileting  - Record patient progress and toleration of activity level   Outcome: Progressing     Problem: CARDIOVASCULAR - ADULT  Goal: Maintains optimal cardiac output and hemodynamic stability  Description: INTERVENTIONS:  - Monitor I/O, vital signs and rhythm  - Monitor for S/S and trends of decreased cardiac output  - Administer and titrate ordered vasoactive medications to optimize hemodynamic stability  - Assess quality of pulses, skin color and temperature  - Assess for signs of decreased coronary artery perfusion  - Instruct patient to report change in severity of symptoms  Outcome: Progressing  Goal: Absence of cardiac dysrhythmias or at baseline rhythm  Description: INTERVENTIONS:  - Continuous cardiac monitoring, vital signs, obtain 12 lead EKG if ordered  - Administer antiarrhythmic and heart rate control medications as ordered  - Monitor electrolytes and administer replacement therapy as ordered  Outcome: Progressing

## 2024-05-09 NOTE — PROGRESS NOTES
Patient:  HANNA WASHINGTON    MRN:  [id number]    Aidin Request ID:  [id number]    Level of care reserved:  Skilled Nursing Facility    Partner Reserved:  [place] [id number] [phone]    Clinical needs requested:  bariatric    Geography searched:  40 miles around [id number]    Start of Service:    Request sent:  8:48am EDT on 5/6/2024 by Coby Rebollar    Partner reserved:  1:37pm EDT on 5/9/2024 by Coby Rebollar    Choice list shared:  11:25am EDT on 5/9/2024 by Coby Rebollar

## 2024-05-09 NOTE — CASE MANAGEMENT
Case Management Discharge Planning Note    Patient name Tigist Hewitt  Location /-01 MRN [id number]  : 1955 Date 2024       Current Admission Date: 2024  Current Admission Diagnosis:Acute on chronic diastolic congestive heart failure (HCC)   Patient Active Problem List    Diagnosis Date Noted    Metabolic alkalosis 2024    Acute pain of left knee 2024    Acute cystitis without hematuria 2024    Ambulatory dysfunction 2024    Hyperkalemia 2024    Lung cyst 2024    Abnormal CT scan, pelvis 2023    CKD (chronic kidney disease) stage 3, GFR 30-59 ml/min ([facility]) 2023    NISHI (obstructive sleep apnea) 2023    Intertrigo 10/29/2022    Vaginal bleeding 10/23/2022    Acute kidney injury superimposed on chronic kidney disease  ([facility]) 10/22/2022    Hypotension 10/22/2022    Acute on chronic diastolic congestive heart failure ([facility]) 2022    IMTIAZ (acute kidney injury) ([facility]) 2022    Morbid obesity ([facility]) 2022    Supratherapeutic INR 04/15/2020    Chronic respiratory failure with hypoxia ([facility]) 2020    Asthma 2020    Atrial fibrillation ([facility]) 2020    COPD (chronic obstructive pulmonary disease) ([facility]) 2020    Type 2 diabetes mellitus with hyperglycemia, with long-term current use of insulin ([facility]) 2020    Shortness of breath 2020    Anemia 2020      LOS (days): 7  Geometric Mean LOS (GMLOS) (days): 3  Days to GMLOS:-4.1     OBJECTIVE:  Risk of Unplanned Readmission Score: 33.79         Current admission status: Inpatient   Preferred Pharmacy:   [facility]/pharmacy #[id number] - [place], [place] - [address] [place] [id number]  Phone: [phone] Fax: [phone]    Primary Care Provider: Marisa Haque MD    Primary Insurance: [facility] RE[facility]  Secondary Insurance:     DISCHARGE DETAILS:     In anticipation of dc.  Roundtrip request placed for BLS with [place] done for  tomorrow for about 1300

## 2024-05-10 LAB
ALBUMIN SERPL BCP-MCNC: 3.1 G/DL (ref 3.5–5)
ALP SERPL-CCNC: 119 U/L (ref 34–104)
ALT SERPL W P-5'-P-CCNC: 15 U/L (ref 7–52)
ANION GAP SERPL CALCULATED.3IONS-SCNC: 4 MMOL/L (ref 4–13)
AST SERPL W P-5'-P-CCNC: 22 U/L (ref 13–39)
BASOPHILS # BLD AUTO: 0.01 THOUSANDS/ÂΜL (ref 0–0.1)
BASOPHILS NFR BLD AUTO: 0 % (ref 0–1)
BILIRUB SERPL-MCNC: 1.05 MG/DL (ref 0.2–1)
BUN SERPL-MCNC: 47 MG/DL (ref 5–25)
CALCIUM ALBUM COR SERPL-MCNC: 10.2 MG/DL (ref 8.3–10.1)
CALCIUM SERPL-MCNC: 9.5 MG/DL (ref 8.4–10.2)
CHLORIDE SERPL-SCNC: 94 MMOL/L (ref 96–108)
CO2 SERPL-SCNC: 39 MMOL/L (ref 21–32)
CREAT SERPL-MCNC: 1.1 MG/DL (ref 0.6–1.3)
EOSINOPHIL # BLD AUTO: 0 THOUSAND/ÂΜL (ref 0–0.61)
EOSINOPHIL NFR BLD AUTO: 0 % (ref 0–6)
ERYTHROCYTE [DISTWIDTH] IN BLOOD BY AUTOMATED COUNT: 16.5 % (ref 11.6–15.1)
GFR SERPL CREATININE-BSD FRML MDRD: 50 ML/MIN/1.73SQ M
GLUCOSE SERPL-MCNC: 322 MG/DL (ref 65–140)
GLUCOSE SERPL-MCNC: 372 MG/DL (ref 65–140)
GLUCOSE SERPL-MCNC: 377 MG/DL (ref 65–140)
GLUCOSE SERPL-MCNC: 383 MG/DL (ref 65–140)
GLUCOSE SERPL-MCNC: 402 MG/DL (ref 65–140)
GLUCOSE SERPL-MCNC: 417 MG/DL (ref 65–140)
HCT VFR BLD AUTO: 38.8 % (ref 34.8–46.1)
HGB BLD-MCNC: 12.3 G/DL (ref 11.5–15.4)
IMM GRANULOCYTES # BLD AUTO: 0.04 THOUSAND/UL (ref 0–0.2)
IMM GRANULOCYTES NFR BLD AUTO: 1 % (ref 0–2)
LYMPHOCYTES # BLD AUTO: 0.52 THOUSANDS/ÂΜL (ref 0.6–4.47)
LYMPHOCYTES NFR BLD AUTO: 8 % (ref 14–44)
MCH RBC QN AUTO: 31.1 PG (ref 26.8–34.3)
MCHC RBC AUTO-ENTMCNC: 31.7 G/DL (ref 31.4–37.4)
MCV RBC AUTO: 98 FL (ref 82–98)
MONOCYTES # BLD AUTO: 0.1 THOUSAND/ÂΜL (ref 0.17–1.22)
MONOCYTES NFR BLD AUTO: 2 % (ref 4–12)
NEUTROPHILS # BLD AUTO: 5.55 THOUSANDS/ÂΜL (ref 1.85–7.62)
NEUTS SEG NFR BLD AUTO: 89 % (ref 43–75)
NRBC BLD AUTO-RTO: 0 /100 WBCS
PLATELET # BLD AUTO: 152 THOUSANDS/UL (ref 149–390)
PMV BLD AUTO: 11.6 FL (ref 8.9–12.7)
POTASSIUM SERPL-SCNC: 3.9 MMOL/L (ref 3.5–5.3)
PROT SERPL-MCNC: 7 G/DL (ref 6.4–8.4)
RBC # BLD AUTO: 3.95 MILLION/UL (ref 3.81–5.12)
SODIUM SERPL-SCNC: 137 MMOL/L (ref 135–147)
WBC # BLD AUTO: 6.22 THOUSAND/UL (ref 4.31–10.16)

## 2024-05-10 PROCEDURE — 97530 THERAPEUTIC ACTIVITIES: CPT

## 2024-05-10 PROCEDURE — 99232 SBSQ HOSP IP/OBS MODERATE 35: CPT | Performed by: FAMILY MEDICINE

## 2024-05-10 PROCEDURE — 94760 N-INVAS EAR/PLS OXIMETRY 1: CPT

## 2024-05-10 PROCEDURE — 99232 SBSQ HOSP IP/OBS MODERATE 35: CPT | Performed by: INTERNAL MEDICINE

## 2024-05-10 PROCEDURE — 80053 COMPREHEN METABOLIC PANEL: CPT | Performed by: FAMILY MEDICINE

## 2024-05-10 PROCEDURE — 85025 COMPLETE CBC W/AUTO DIFF WBC: CPT | Performed by: FAMILY MEDICINE

## 2024-05-10 PROCEDURE — 82948 REAGENT STRIP/BLOOD GLUCOSE: CPT

## 2024-05-10 PROCEDURE — 94640 AIRWAY INHALATION TREATMENT: CPT

## 2024-05-10 RX ORDER — POLYETHYLENE GLYCOL 3350 17 G/17G
17 POWDER, FOR SOLUTION ORAL DAILY
Status: DISCONTINUED | OUTPATIENT
Start: 2024-05-10 | End: 2024-05-12 | Stop reason: HOSPADM

## 2024-05-10 RX ORDER — INSULIN LISPRO 100 [IU]/ML
10 INJECTION, SOLUTION INTRAVENOUS; SUBCUTANEOUS ONCE
Status: COMPLETED | OUTPATIENT
Start: 2024-05-10 | End: 2024-05-10

## 2024-05-10 RX ADMIN — METHYLPREDNISOLONE SODIUM SUCCINATE 40 MG: 40 INJECTION, POWDER, FOR SOLUTION INTRAMUSCULAR; INTRAVENOUS at 08:50

## 2024-05-10 RX ADMIN — INSULIN LISPRO 10 UNITS: 100 INJECTION, SOLUTION INTRAVENOUS; SUBCUTANEOUS at 10:29

## 2024-05-10 RX ADMIN — FLUTICASONE FUROATE AND VILANTEROL TRIFENATATE 1 PUFF: 100; 25 POWDER RESPIRATORY (INHALATION) at 08:53

## 2024-05-10 RX ADMIN — MICONAZOLE NITRATE: 20 CREAM TOPICAL at 08:57

## 2024-05-10 RX ADMIN — Medication 1 TABLET: at 08:50

## 2024-05-10 RX ADMIN — MONTELUKAST 10 MG: 10 TABLET, FILM COATED ORAL at 21:20

## 2024-05-10 RX ADMIN — NYSTATIN: 100000 POWDER TOPICAL at 08:51

## 2024-05-10 RX ADMIN — GLYCERIN 1 SUPPOSITORY: 2 SUPPOSITORY RECTAL at 17:52

## 2024-05-10 RX ADMIN — POLYETHYLENE GLYCOL 3350 17 G: 17 POWDER, FOR SOLUTION ORAL at 16:37

## 2024-05-10 RX ADMIN — IPRATROPIUM BROMIDE AND ALBUTEROL SULFATE 3 ML: 2.5; .5 SOLUTION RESPIRATORY (INHALATION) at 19:41

## 2024-05-10 RX ADMIN — ATORVASTATIN CALCIUM 10 MG: 10 TABLET, FILM COATED ORAL at 08:50

## 2024-05-10 RX ADMIN — BUMETANIDE 2 MG: 1 TABLET ORAL at 08:50

## 2024-05-10 RX ADMIN — FERROUS SULFATE TAB 325 MG (65 MG ELEMENTAL FE) 325 MG: 325 (65 FE) TAB at 08:51

## 2024-05-10 RX ADMIN — WARFARIN SODIUM 5 MG: 5 TABLET ORAL at 16:37

## 2024-05-10 RX ADMIN — LORATADINE 10 MG: 10 TABLET ORAL at 08:50

## 2024-05-10 RX ADMIN — INSULIN LISPRO 10 UNITS: 100 INJECTION, SOLUTION INTRAVENOUS; SUBCUTANEOUS at 16:38

## 2024-05-10 RX ADMIN — BUMETANIDE 2 MG: 1 TABLET ORAL at 16:37

## 2024-05-10 RX ADMIN — INSULIN LISPRO 12 UNITS: 100 INJECTION, SOLUTION INTRAVENOUS; SUBCUTANEOUS at 21:21

## 2024-05-10 RX ADMIN — MICONAZOLE NITRATE: 20 CREAM TOPICAL at 17:52

## 2024-05-10 RX ADMIN — LEVOTHYROXINE SODIUM 288 MCG: 88 TABLET ORAL at 06:20

## 2024-05-10 RX ADMIN — IPRATROPIUM BROMIDE AND ALBUTEROL SULFATE 3 ML: 2.5; .5 SOLUTION RESPIRATORY (INHALATION) at 08:00

## 2024-05-10 RX ADMIN — ALLOPURINOL 200 MG: 100 TABLET ORAL at 08:50

## 2024-05-10 RX ADMIN — INSULIN LISPRO 10 UNITS: 100 INJECTION, SOLUTION INTRAVENOUS; SUBCUTANEOUS at 12:16

## 2024-05-10 RX ADMIN — METOPROLOL SUCCINATE 37.5 MG: 25 TABLET, EXTENDED RELEASE ORAL at 21:21

## 2024-05-10 RX ADMIN — INSULIN GLARGINE 15 UNITS: 100 INJECTION, SOLUTION SUBCUTANEOUS at 21:21

## 2024-05-10 RX ADMIN — PANTOPRAZOLE SODIUM 40 MG: 40 TABLET, DELAYED RELEASE ORAL at 06:20

## 2024-05-10 RX ADMIN — NYSTATIN: 100000 POWDER TOPICAL at 17:52

## 2024-05-10 RX ADMIN — INSULIN LISPRO 8 UNITS: 100 INJECTION, SOLUTION INTRAVENOUS; SUBCUTANEOUS at 08:53

## 2024-05-10 RX ADMIN — POTASSIUM CHLORIDE 20 MEQ: 1500 TABLET, EXTENDED RELEASE ORAL at 21:21

## 2024-05-10 RX ADMIN — IPRATROPIUM BROMIDE AND ALBUTEROL SULFATE 3 ML: 2.5; .5 SOLUTION RESPIRATORY (INHALATION) at 14:01

## 2024-05-10 RX ADMIN — POTASSIUM CHLORIDE 20 MEQ: 1500 TABLET, EXTENDED RELEASE ORAL at 16:37

## 2024-05-10 RX ADMIN — POTASSIUM CHLORIDE 20 MEQ: 1500 TABLET, EXTENDED RELEASE ORAL at 08:50

## 2024-05-10 NOTE — PHYSICAL THERAPY NOTE
PHYSICAL THERAPY TREATMENT NOTE      NAME:  Tigist Hewitt  DATE: 05/10/24    Length Of Stay: 8  Performed at least 2 patient identifiers during session: Name and Birthday       TREATMENT FLOW SHEET:       05/10/24 3847   PT Last Visit   PT Visit Date 05/10/24   Note Type   Note Type Treatment   Pain Assessment   Pain Assessment Tool 0-10   Pain Score No Pain   Restrictions/Precautions   Weight Bearing Precautions Per Order No   Other Precautions Bed Alarm;Chair Alarm;Fall Risk;O2;Fluid restriction  (4L O2)   General   Chart Reviewed Yes   Response to Previous Treatment Patient reporting fatigue but able to participate.   Family/Caregiver Present Yes  (caregiver)   Cognition   Arousal/Participation Cooperative   Attention Within functional limits   Bed Mobility   Supine to Sit 3  Moderate assistance   Additional items Assist x 1;Increased time required;Verbal cues;LE management;HOB elevated   Transfers   Sit to Stand 4  Minimal assistance   Additional items Assist x 2;Increased time required;Verbal cues  (pt prefers to pull-up on RW)   Stand to Sit 4  Minimal assistance   Additional items Assist x 1;Increased time required;Verbal cues   Stand pivot 4  Minimal assistance   Additional items Assist x 2;Increased time required;Verbal cues   Additional Comments Using RW   Ambulation/Elevation   Gait Assistance   (non-ambulatory)   Balance   Static Sitting Good   Dynamic Sitting Fair +   Static Standing Fair   Dynamic Standing Fair -   Endurance Deficit   Endurance Deficit Yes   Endurance Deficit Description fatigue; on 4L O2; ALBERT with HR elevating to 115 bpm   Activity Tolerance   Activity Tolerance Patient limited by fatigue   Medical Staff Made Aware PCA   Nurse Made Aware RN Randee   Assessment   Prognosis Fair   Problem List Decreased strength;Decreased range of motion;Decreased endurance;Impaired balance;Decreased mobility;Obesity;Decreased skin integrity   Assessment Pt tolerates tx session fairly.   Interventions consisting of bed mobility, transfer training, and pt education.  Pt progressing to be able to stand and complete SPT with RW into bedside recliner chair.   Barriers to Discharge Other (Comment)   Barriers to Discharge Comments fall risk, assistance with mobility   Goals   STG Expiration Date 05/17/24   PT Treatment Day 3   Plan   Progress Slow progress, decreased activity tolerance   PT Frequency 2-3x/wk   Discharge Recommendation   Rehab Resource Intensity Level, PT II (Moderate Resource Intensity)   AM-PAC Basic Mobility Inpatient   Turning in Flat Bed Without Bedrails 2   Lying on Back to Sitting on Edge of Flat Bed Without Bedrails 2   Moving Bed to Chair 1   Standing Up From Chair Using Arms 1   Walk in Room 1   Climb 3-5 Stairs With Railing 1   Basic Mobility Inpatient Raw Score 8   Turning Head Towards Sound 4   Follow Simple Instructions 4   Low Function Basic Mobility Raw Score  16   Low Function Basic Mobility Standardized Score  25.72   [facility] Highest Level Of Mobility   -HLM Goal 3: Sit at edge of bed   JH-HLM Achieved 4: Move to chair/commode   Education   Education Provided Mobility training   Patient Reinforcement needed   End of Consult   Patient Position at End of Consult Bedside chair;Bed/Chair alarm activated;All needs within reach   End of Consult Comments 4L O2       The patient's AM-PAC Basic Mobility Inpatient Short Form Raw Score is 8. A Raw score of less than or equal to 16 suggests the patient may benefit from discharge to post-acute rehabilitation services. Please also refer to the recommendation of the Physical Therapist for safe discharge planning.         Abhishek Davalos, PT,DPT

## 2024-05-10 NOTE — ASSESSMENT & PLAN NOTE
Patient has known left bad arthritis x-ray reviewed.and also placed on prednisone along with Tylenol for pain control .  reviewed uric acid level and crp  discussed with orthopedics and with spouse.  Will trial oral prednisone for 5 days and need outpatient follow-up with Ortho  recommended subacute rehab .  Patient agreeable to transition to [facility] on discharge

## 2024-05-10 NOTE — ASSESSMENT & PLAN NOTE
Lab Results   Component Value Date    HGBA1C 7.0 (H) 05/02/2024       Recent Labs     05/09/24  2116 05/10/24  0821 05/10/24  0952 05/10/24  1134   POCGLU 373* 322* 417* 383*         Blood Sugar Average: Last 72 hrs:  (P) 252.3141615210117009  Acceptable blood glucose, goal is 100-180  Patient blood sugar is running high after patient received steroid.  At this time, we will discontinue steroid, will also provide additional 10 units of lispro with showing some improvement, will continue current treatment and monitor.

## 2024-05-10 NOTE — PLAN OF CARE
Problem: PHYSICAL THERAPY ADULT  Goal: Performs mobility at highest level of function for planned discharge setting.  See evaluation for individualized goals.  Description: Treatment/Interventions: ADL retraining, Functional transfer training, LE strengthening/ROM, Therapeutic exercise, Endurance training, Patient/family training, Equipment eval/education, Bed mobility, Gait training, Compensatory technique education, Spoke to nursing, Spoke to case management, OT          See flowsheet documentation for full assessment, interventions and recommendations.  Outcome: Progressing  Note: Prognosis: Fair  Problem List: Decreased strength, Decreased range of motion, Decreased endurance, Impaired balance, Decreased mobility, Obesity, Decreased skin integrity  Assessment: Pt tolerates tx session fairly.  Interventions consisting of bed mobility, transfer training, and pt education.  Pt progressing to be able to stand and complete SPT with RW into bedside recliner chair.  Barriers to Discharge: Other (Comment)  Barriers to Discharge Comments: fall risk, assistance with mobility  Rehab Resource Intensity Level, PT: II (Moderate Resource Intensity)    See flowsheet documentation for full assessment.

## 2024-05-10 NOTE — PROGRESS NOTES
[facility]  Progress Note  Name: Tigist Hewitt I  MRN: [id number]  Unit/Bed#: -01 I Date of Admission: 5/2/2024   Date of Service: 5/10/2024 I Hospital Day: 8    Assessment/Plan   Acute on chronic diastolic congestive heart failure (HCC)  Assessment & Plan  Wt Readings from Last 3 Encounters:   05/10/24 (!) 149 kg (329 lb 9.4 oz)   01/02/24 (!) 149 kg (328 lb 0.7 oz)   10/29/22 (!) 151 kg (331 lb 12.7 oz)   Patient has heart failure with preserved ejection fraction and maintained on torsemide 60 mg twice daily and metolazone once a week  2d echo from December 2023 shows normal EF 55% but abnormal diastolic function and severe tricuspid regurg.  She had gained approximately 20 to 30 pounds at least and was asked by her cardiologist to increase metolazone however lately she has been very somnolent and not taking her meds for the last 2 to 3 days.    Patient did not have adequate diuretic response initially on Lasix drip 20 mg/h and metolazone 2.5 mg daily.  Subsequently transition to Bumex drip 0.5 mg/h along with metolazone  -12 L net negative.  Also unable to adequately assess volume status because of body habitus and inability to do standing weights.  Increase metabolic alkalosis noted likely from contraction and received 2 doses of Diamox.    Bumex drip was discontinued and transitioned to Bumex 2 mg intravenous twice daily       Monitor electrolytes closely   Chronically uses 4 L of oxygen and CPAP at at bedtime will continue for now  Unable to get accurate weights but bed scale shows weight from 368 pounds -->340 pounds          Atrial fibrillation (HCC)  Assessment & Plan  INR therapeutic at 2.5 , currently rate controlled with Toprol-XL and INR is therapeutic normally takes Coumadin 7.5 mg daily will place on 5 mg for now and observe  Cardiology following  History of ablation      * Type 2 diabetes mellitus with hyperglycemia, with long-term current use of insulin  ([facility])  Assessment & Plan  Lab Results   Component Value Date    HGBA1C 7.0 (H) 05/02/2024       Recent Labs     05/09/24  2116 05/10/24  0821 05/10/24  0952 05/10/24  1134   POCGLU 373* 322* 417* 383*         Blood Sugar Average: Last 72 hrs:  (P) 252.9073025131554808  Acceptable blood glucose, goal is 100-180  Patient blood sugar is running high after patient received steroid.  At this time, we will discontinue steroid, will also provide additional 10 units of lispro with showing some improvement, will continue current treatment and monitor.      COPD (chronic obstructive pulmonary disease) ([facility])  Assessment & Plan  Mild exacerbation noted with sob and wheezing even after generous diuresis  Start IV solumedrol 40mg BID and duoneb nebs  Has chronic respiratory failure uses 4 L of oxygen at baseline along with CPAP will continue.  Abg on admission acceptable    Metabolic alkalosis  Assessment & Plan  Likely contraction alkalosis from diuresis  Status post Diamox with improvement.    Acute cystitis without hematuria  Assessment & Plan  For UTI, patient was placed on IV Rocephin and then transition to p.o. Keflex.  Growing less than 100,000 colonies of Proteus  Today is day 6 of antibiotics.  Adequate treatment achieved.  Discontinue antibiotics      Acute pain of left knee  Assessment & Plan  Patient has known left bad arthritis x-ray reviewed.and also placed on prednisone along with Tylenol for pain control .  reviewed uric acid level and crp  discussed with orthopedics and with spouse.  Will trial oral prednisone for 5 days and need outpatient follow-up with Ortho  recommended subacute rehab .  Patient agreeable to transition to [facility] on discharge      Morbid obesity ([facility])  Assessment & Plan  bMI 69.56 will need diuresis and counseling on diet exercise and lifestyle modification             VTE Pharmacologic Prophylaxis: VTE Score: 2 Low Risk (Score 0-2) - Encourage Ambulation.    Mobility:   Basic Mobility  Inpatient Raw Score: 8  JH-HLM Goal: 3: Sit at edge of bed  JH-HLM Achieved: 4: Move to chair/commode  JH-HLM Goal achieved. Continue to encourage appropriate mobility.    Patient Centered Rounds: I performed bedside rounds with nursing staff today.   Discussions with Specialists or Other Care Team Provider:     Education and Discussions with Family / Patient: Updated  () via phone.    Current Length of Stay: 8 day(s)  Current Patient Status: Inpatient   Certification Statement: The patient will continue to require additional inpatient hospital stay due to monitor above condition  Discharge Plan: Anticipate discharge in 24-48 hrs to rehab facility.    Code Status: Level 3 - DNAR and DNI    Subjective:   Seen and evaluated during the event.  Resting comfortably.  Denies any significant complaint other than constipation    Objective:     Vitals:   Temp (24hrs), Av °F (36.7 °C), Min:97.7 °F (36.5 °C), Max:98.1 °F (36.7 °C)    Temp:  [97.7 °F (36.5 °C)-98.1 °F (36.7 °C)] 98.1 °F (36.7 °C)  HR:  [] 101  Resp:  [16-18] 18  BP: (101-130)/(55-77) 110/77  SpO2:  [96 %-100 %] 99 %  Body mass index is 62.28 kg/m².     Input and Output Summary (last 24 hours):     Intake/Output Summary (Last 24 hours) at 5/10/2024 1539  Last data filed at 5/10/2024 0900  Gross per 24 hour   Intake 630 ml   Output 1442 ml   Net -812 ml       Physical Exam:   Physical Exam  Vitals and nursing note reviewed.   Constitutional:       Appearance: Normal appearance. She is not ill-appearing or diaphoretic.   Eyes:      Extraocular Movements: Extraocular movements intact.      Conjunctiva/sclera: Conjunctivae normal.      Pupils: Pupils are equal, round, and reactive to light.   Cardiovascular:      Rate and Rhythm: Normal rate.      Heart sounds:      No friction rub. No gallop.   Pulmonary:      Effort: Pulmonary effort is normal. No respiratory distress.      Breath sounds: No stridor. No wheezing or rhonchi.    Abdominal:      General: Abdomen is flat. There is no distension.      Palpations: There is no mass.      Tenderness: There is no abdominal tenderness.      Hernia: No hernia is present.   Musculoskeletal:      Right lower leg: No edema.      Left lower leg: No edema.   Neurological:      General: No focal deficit present.      Mental Status: She is alert and oriented to person, place, and time.      Cranial Nerves: No cranial nerve deficit.      Sensory: No sensory deficit.      Motor: No weakness.      Coordination: Coordination normal.          Additional Data:     Labs:  Results from last 7 days   Lab Units 05/10/24  0552   WBC Thousand/uL 6.22   HEMOGLOBIN g/dL 12.3   HEMATOCRIT % 38.8   PLATELETS Thousands/uL 152   SEGS PCT % 89*   LYMPHO PCT % 8*   MONO PCT % 2*   EOS PCT % 0     Results from last 7 days   Lab Units 05/10/24  0552   SODIUM mmol/L 137   POTASSIUM mmol/L 3.9   CHLORIDE mmol/L 94*   CO2 mmol/L 39*   BUN mg/dL 47*   CREATININE mg/dL 1.10   ANION GAP mmol/L 4   CALCIUM mg/dL 9.5   ALBUMIN g/dL 3.1*   TOTAL BILIRUBIN mg/dL 1.05*   ALK PHOS U/L 119*   ALT U/L 15   AST U/L 22   GLUCOSE RANDOM mg/dL 377*     Results from last 7 days   Lab Units 05/08/24  0501   INR  2.56*     Results from last 7 days   Lab Units 05/10/24  1134 05/10/24  0952 05/10/24  0821 05/09/24  2116 05/09/24  1655 05/09/24  1147 05/09/24  0731 05/08/24  2106 05/08/24  1601 05/08/24  1113 05/08/24  0748 05/07/24  2112   POC GLUCOSE mg/dl 383* 417* 322* 373* 280* 178* 146* 185* 191* 197* 178* 244*         Results from last 7 days   Lab Units 05/04/24  0605   LACTIC ACID mmol/L 1.3       Lines/Drains:  Invasive Devices       Peripheral Intravenous Line  Duration             Peripheral IV 05/09/24 Dorsal (posterior);Left Hand 1 day              Drain  Duration             External Urinary Catheter 7 days                          Imaging: No pertinent imaging reviewed.    Recent Cultures (last 7 days):         Last 24 Hours  Medication List:   Current Facility-Administered Medications   Medication Dose Route Frequency Provider Last Rate    acetaminophen  650 mg Oral Q4H PRN Amanda Dukes MD      allopurinol  200 mg Oral Daily Amanda Dukes MD      atorvastatin  10 mg Oral Daily Amanda Dukes MD      bumetanide  2 mg Oral BID CAMPBELL Bolton      ferrous sulfate  325 mg Oral Daily With Breakfast Amanda Dukes MD      Fluticasone Furoate-Vilanterol  1 puff Inhalation Daily Amanda Dukes MD      glycerin (adult)  1 suppository Rectal Once Pedro Price MD      insulin glargine  15 Units Subcutaneous HS Amanda Dukes MD      insulin lispro  2-12 Units Subcutaneous TID AC Amanda Dukes MD      insulin lispro  2-12 Units Subcutaneous HS Radha Wright MD      ipratropium-albuterol  3 mL Nebulization TID Ceasar Lerner MD      levothyroxine  288 mcg Oral Early Morning Amanda Dukes MD      loratadine  10 mg Oral Daily Amanda Dukes MD      metoprolol  2.5 mg Intravenous Q6H PRN Radha Wright MD      metoprolol succinate  37.5 mg Oral BID Radha Wright MD      SERGO ANTIFUNGAL   Topical BID Amanda Dukes MD      montelukast  10 mg Oral HS Amanda Dukes MD      multivitamin-minerals  1 tablet Oral Daily Amnada Dukes MD      nystatin   Topical BID Amanda Dukes MD      ondansetron  4 mg Intravenous Q6H PRN Amanda Dukes MD      oxyCODONE  5 mg Oral Q6H PRN CAMPBELL Munoz      pantoprazole  40 mg Oral Daily Before Breakfast Amanda Dukes MD      polyethylene glycol  17 g Oral Daily Pedro Price MD      potassium chloride  20 mEq Oral TID Amanda Dukes MD      warfarin  5 mg Oral Daily (warfarin) Amanda Dukes MD          Today, Patient Was Seen By: Pedro Price MD    **Please Note: This note may have been constructed using a voice recognition system.**

## 2024-05-10 NOTE — ASSESSMENT & PLAN NOTE
Wt Readings from Last 3 Encounters:   05/10/24 (!) 149 kg (329 lb 9.4 oz)   01/02/24 (!) 149 kg (328 lb 0.7 oz)   10/29/22 (!) 151 kg (331 lb 12.7 oz)   Patient has heart failure with preserved ejection fraction and maintained on torsemide 60 mg twice daily and metolazone once a week  2d echo from December 2023 shows normal EF 55% but abnormal diastolic function and severe tricuspid regurg.  She had gained approximately 20 to 30 pounds at least and was asked by her cardiologist to increase metolazone however lately she has been very somnolent and not taking her meds for the last 2 to 3 days.    Patient did not have adequate diuretic response initially on Lasix drip 20 mg/h and metolazone 2.5 mg daily.  Subsequently transition to Bumex drip 0.5 mg/h along with metolazone  -12 L net negative.  Also unable to adequately assess volume status because of body habitus and inability to do standing weights.  Increase metabolic alkalosis noted likely from contraction and received 2 doses of Diamox.    Bumex drip was discontinued and transitioned to Bumex 2 mg intravenous twice daily       Monitor electrolytes closely   Chronically uses 4 L of oxygen and CPAP at at bedtime will continue for now  Unable to get accurate weights but bed scale shows weight from 368 pounds -->340 pounds

## 2024-05-10 NOTE — PLAN OF CARE
Problem: INFECTION - ADULT  Goal: Absence or prevention of progression during hospitalization  Description: INTERVENTIONS:  - Assess and monitor for signs and symptoms of infection  - Monitor lab/diagnostic results  - Monitor all insertion sites, i.e. indwelling lines, tubes, and drains  - Monitor endotracheal if appropriate and nasal secretions for changes in amount and color  - [place] appropriate cooling/warming therapies per order  - Administer medications as ordered  - Instruct and encourage patient and family to use good hand hygiene technique  - Identify and instruct in appropriate isolation precautions for identified infection/condition  Outcome: Progressing     Problem: PAIN - ADULT  Goal: Verbalizes/displays adequate comfort level or baseline comfort level  Description: Interventions:  - Encourage patient to monitor pain and request assistance  - Assess pain using appropriate pain scale  - Administer analgesics based on type and severity of pain and evaluate response  - Implement non-pharmacological measures as appropriate and evaluate response  - Consider cultural and social influences on pain and pain management  - Notify physician/advanced practitioner if interventions unsuccessful or patient reports new pain  Outcome: Progressing

## 2024-05-10 NOTE — CASE MANAGEMENT
Case Management Discharge Planning Note    Patient name Tigist Hewitt  Location /-01 MRN [id number]  : 1955 Date 5/10/2024       Current Admission Date: 2024  Current Admission Diagnosis:Acute on chronic diastolic congestive heart failure (HCC)   Patient Active Problem List    Diagnosis Date Noted    Metabolic alkalosis 2024    Acute pain of left knee 2024    Acute cystitis without hematuria 2024    Ambulatory dysfunction 2024    Hyperkalemia 2024    Lung cyst 2024    Abnormal CT scan, pelvis 2023    CKD (chronic kidney disease) stage 3, GFR 30-59 ml/min ([facility]) 2023    NISHI (obstructive sleep apnea) 2023    Intertrigo 10/29/2022    Vaginal bleeding 10/23/2022    Acute kidney injury superimposed on chronic kidney disease  ([facility]) 10/22/2022    Hypotension 10/22/2022    Acute on chronic diastolic congestive heart failure ([facility]) 2022    IMTIAZ (acute kidney injury) ([facility]) 2022    Morbid obesity ([facility]) 2022    Supratherapeutic INR 04/15/2020    Chronic respiratory failure with hypoxia ([facility]) 2020    Asthma 2020    Atrial fibrillation ([facility]) 2020    COPD (chronic obstructive pulmonary disease) ([facility]) 2020    Type 2 diabetes mellitus with hyperglycemia, with long-term current use of insulin ([facility]) 2020    Shortness of breath 2020    Anemia 2020      LOS (days): 8  Geometric Mean LOS (GMLOS) (days): 3  Days to GMLOS:-4.8     OBJECTIVE:  Risk of Unplanned Readmission Score: 33.84         Current admission status: Inpatient   Preferred Pharmacy:   [facility]/pharmacy #[id number] - [place], [place] - [address] [place] [id number]  Phone: [phone] Fax: [phone]    Primary Care Provider: Marisa Haque MD    Primary Insurance: [employer] [facility] [employer]  Secondary Insurance:     DISCHARGE DETAILS:                                                                                                                Facility Insurance Auth Number: [id number]

## 2024-05-10 NOTE — PROGRESS NOTES
Progress Note:Cardiology  Tigist Hewitt 1955, 69 y.o. female MRN: [id number]    Unit/Bed#: -01 Encounter: [id number]  Attending Physician: Pedro Price MD   Primary Care Provider: Marisa Haque MD   Date admitted to hospital: 5/2/2024  Length of stay: 8         * Acute on chronic diastolic congestive heart failure (HCC)  Assessment & Plan  Wt Readings from Last 3 Encounters:   05/06/24 (!) 161 kg (355 lb 9.6 oz)   01/02/24 (!) 149 kg (328 lb 0.7 oz)   10/29/22 (!) 151 kg (331 lb 12.7 oz)     History of HFpEF.  Echo 12/24/2023 with LVEF 55%, mild AS, mild-mod MR, severe TR with PASP 53mmHg which is overall unchanged from prior.  Maintained on torsemide and metolazone at home with reported missed doses prior to hospital admission.  She has undergone work up by pulmonology for pulmonary hypertension.  Currently diuresing well with -3.4 L in 24 hours on Bumex 0.5 mg/hr drip and Diamox 250 mg PO x 2 doses.  Transitioned to Bumex 2 mg IV BID 5/8/2024 with good response.Received 3 doses of Diamox 250 mg.  Responding well to Bumex 2 mg BID.  Continue CPAP  Would benefit from intense physical therapy and increased mobility but unfortunately she is lacking motivation for this.  Needs close outpatient follow up with her cardiologist.    Atrial fibrillation (HCC)  Assessment & Plan  History of atrial fibrillation. Details are unclear due to lack of availability of medical records.  Previously following with Dr. Ferraro through [facility].  Reports undergoing 2 failed cardioversions and ultimately an atrial fibrillation ablation, date unknown. Persistent a fib since at least 2020.  She is maintained on warfarin anticoagulation, goal INR 2-3.  Remains in rate controlled atrial fibrillation this admission on metoprolol succinate 37.5 mg BID.  Optimize electrolytes for K+ > 4, Mag > 2.  Ok to discontinue telemetry as no longer on Bumex drip.        Subjective:   Patient seen and examined.  No  "significant events overnight. She is sitting up eating breakfast. Reports feeling much better than yesterday. Received a dose of solu-medrol. No complaints this morning.    Review of Systems   Constitutional: Negative.   HENT: Negative.     Cardiovascular:  Positive for dyspnea on exertion. Negative for chest pain, irregular heartbeat, leg swelling, near-syncope, orthopnea and palpitations.   Respiratory:  Negative for cough and snoring.    Endocrine: Negative.    Skin: Negative.    Musculoskeletal: Negative.    Gastrointestinal: Negative.    Genitourinary: Negative.    Neurological: Negative.    Psychiatric/Behavioral: Negative.           Objective:     Vitals: Blood pressure 101/55, pulse 74, temperature 97.7 °F (36.5 °C), resp. rate 18, height 5' 1\" (1.549 m), weight (!) 149 kg (329 lb 9.4 oz), SpO2 99%., Body mass index is 62.28 kg/m².,     Orthostatic Blood Pressures      Flowsheet Row Most Recent Value   Blood Pressure 101/55 filed at 05/10/2024 0823   Patient Position - Orthostatic VS Lying filed at 05/09/2024 0915            Physical Exam  Vitals and nursing note reviewed.   Constitutional:       General: She is not in acute distress.     Appearance: She is well-developed. She is obese.   HENT:      Head: Normocephalic and atraumatic.   Eyes:      Conjunctiva/sclera: Conjunctivae normal.   Cardiovascular:      Rate and Rhythm: Normal rate. Rhythm irregularly irregular.      Heart sounds: No murmur heard.  Pulmonary:      Effort: Pulmonary effort is normal. No respiratory distress.      Breath sounds: Wheezing present.      Comments: 4 L NC  Abdominal:      Palpations: Abdomen is soft.      Tenderness: There is no abdominal tenderness.   Musculoskeletal:         General: No swelling.      Cervical back: Neck supple.      Right lower leg: No edema.      Left lower leg: No edema.   Skin:     General: Skin is warm and dry.      Capillary Refill: Capillary refill takes less than 2 seconds.   Neurological:      " Mental Status: She is alert.   Psychiatric:         Mood and Affect: Mood normal.            Intake/Output Summary (Last 24 hours) at 5/10/2024 0908  Last data filed at 5/10/2024 0613  Gross per 24 hour   Intake 450 ml   Output 1954 ml   Net -1504 ml       Weight (last 2 days)       Date/Time Weight    05/10/24 0600 149 (329.59)    05/09/24 0600 151 (332.89)    05/08/24 0600 156 (344.36)    05/08/24 0522 156 (344.36)               Medications:      Current Facility-Administered Medications:     acetaminophen (TYLENOL) tablet 650 mg, 650 mg, Oral, Q4H PRN, Amanda Dukes MD, 650 mg at 05/09/24 1241    allopurinol (ZYLOPRIM) tablet 200 mg, 200 mg, Oral, Daily, Amanda Dukes MD, 200 mg at 05/10/24 0850    atorvastatin (LIPITOR) tablet 10 mg, 10 mg, Oral, Daily, Amanda Dukes MD, 10 mg at 05/10/24 0850    bumetanide (BUMEX) tablet 2 mg, 2 mg, Oral, BID, CAMPBELL Bolton, 2 mg at 05/10/24 0850    ferrous sulfate tablet 325 mg, 325 mg, Oral, Daily With Breakfast, Amanda Dukes MD, 325 mg at 05/10/24 0851    Fluticasone Furoate-Vilanterol 100-25 mcg/actuation 1 puff, 1 puff, Inhalation, Daily, Amanda Dukes MD, 1 puff at 05/10/24 0853    insulin glargine (LANTUS) subcutaneous injection 15 Units 0.15 mL, 15 Units, Subcutaneous, HS, Amanda Dukes MD, 15 Units at 05/09/24 2137    insulin lispro (HumALOG/ADMELOG) 100 units/mL subcutaneous injection 2-12 Units, 2-12 Units, Subcutaneous, TID AC, 8 Units at 05/10/24 0853 **AND** Fingerstick Glucose (POCT), , , TID AC, Amanda Dukes MD    insulin lispro (HumALOG/ADMELOG) 100 units/mL subcutaneous injection 2-12 Units, 2-12 Units, Subcutaneous, HS, Radha Wright MD, 10 Units at 05/09/24 2141    ipratropium-albuterol (DUO-NEB) 0.5-2.5 mg/3 mL inhalation solution 3 mL, 3 mL, Nebulization, TID, Ceasar Lerner MD, 3 mL at 05/10/24 0800    levothyroxine tablet 288 mcg, 288 mcg, Oral, Early Morning, Amanda Dukes MD, 288 mcg at 05/10/24 0620    loratadine (CLARITIN) tablet 10 mg, 10 mg, Oral,  Daily, Amanda Dukes MD, 10 mg at 05/10/24 0850    methylPREDNISolone sodium succinate (Solu-MEDROL) injection 40 mg, 40 mg, Intravenous, Q12H HALEY, Ceasar Lerner MD, 40 mg at 05/10/24 0850    metoprolol (LOPRESSOR) injection 2.5 mg, 2.5 mg, Intravenous, Q6H PRN, Radha Wright MD, 2.5 mg at 05/02/24 1837    metoprolol succinate (TOPROL-XL) 24 hr tablet 37.5 mg, 37.5 mg, Oral, BID, Radha Wright MD, 37.5 mg at 05/09/24 2137    moisture barrier miconazole 2% cream (aka SERGO MOISTURE BARRIER ANTIFUNGAL CREAM), , Topical, BID, Amanda Dukes MD, Given at 05/10/24 0857    montelukast (SINGULAIR) tablet 10 mg, 10 mg, Oral, HS, Amanda Dukes MD, 10 mg at 05/09/24 2136    multivitamin-minerals (CENTRUM) tablet 1 tablet, 1 tablet, Oral, Daily, Amanda Dukes MD, 1 tablet at 05/10/24 0850    nystatin (MYCOSTATIN) powder, , Topical, BID, Amanda Dukes MD, Given at 05/10/24 0851    ondansetron (ZOFRAN) injection 4 mg, 4 mg, Intravenous, Q6H PRN, Amanda Dukes MD, 4 mg at 05/03/24 1007    oxyCODONE (ROXICODONE) IR tablet 5 mg, 5 mg, Oral, Q6H PRN, Kaitlin S Bar, CRNP, 5 mg at 05/09/24 2135    pantoprazole (PROTONIX) EC tablet 40 mg, 40 mg, Oral, Daily Before Breakfast, Amanda Dukes MD, 40 mg at 05/10/24 0620    potassium chloride (Klor-Con M20) CR tablet 20 mEq, 20 mEq, Oral, TID, Amanda Dukes MD, 20 mEq at 05/10/24 0850    warfarin (COUMADIN) tablet 5 mg, 5 mg, Oral, Daily (warfarin), Amanda Dukes MD, 5 mg at 05/09/24 1716     Labs & Results:        Results from last 7 days   Lab Units 05/10/24  0552   WBC Thousand/uL 6.22   HEMOGLOBIN g/dL 12.3   HEMATOCRIT % 38.8   PLATELETS Thousands/uL 152         Results from last 7 days   Lab Units 05/10/24  0552 05/09/24  0455 05/08/24  0501   POTASSIUM mmol/L 3.9 3.8 3.7   CHLORIDE mmol/L 94* 93* 91*   CO2 mmol/L 39* 44* 45*   BUN mg/dL 47* 46* 43*   CREATININE mg/dL 1.10 1.12 1.08   CALCIUM mg/dL 9.5 9.9 10.6*   ALK PHOS U/L 119*  --   --    ALT U/L 15  --   --    AST U/L 22  --   --       Results from last 7 days   Lab Units 05/08/24  0501 05/07/24  0449 05/05/24  0457   INR  2.56* 2.88* 2.61*     Results from last 7 days   Lab Units 05/06/24  0518 05/04/24  0605   MAGNESIUM mg/dL 1.9 2.1

## 2024-05-10 NOTE — CASE MANAGEMENT
Case Management Discharge Planning Note    Patient name Tigist Hewitt  Location /-01 MRN [id number]  : 1955 Date 5/10/2024       Current Admission Date: 2024  Current Admission Diagnosis:Acute on chronic diastolic congestive heart failure (HCC)   Patient Active Problem List    Diagnosis Date Noted    Metabolic alkalosis 2024    Acute pain of left knee 2024    Acute cystitis without hematuria 2024    Ambulatory dysfunction 2024    Hyperkalemia 2024    Lung cyst 2024    Abnormal CT scan, pelvis 2023    CKD (chronic kidney disease) stage 3, GFR 30-59 ml/min ([facility]) 2023    NISHI (obstructive sleep apnea) 2023    Intertrigo 10/29/2022    Vaginal bleeding 10/23/2022    Acute kidney injury superimposed on chronic kidney disease  ([facility]) 10/22/2022    Hypotension 10/22/2022    Acute on chronic diastolic congestive heart failure ([facility]) 2022    IMTIAZ (acute kidney injury) ([facility]) 2022    Morbid obesity ([facility]) 2022    Supratherapeutic INR 04/15/2020    Chronic respiratory failure with hypoxia ([facility]) 2020    Asthma 2020    Atrial fibrillation ([facility]) 2020    COPD (chronic obstructive pulmonary disease) ([facility]) 2020    Type 2 diabetes mellitus with hyperglycemia, with long-term current use of insulin ([facility]) 2020    Shortness of breath 2020    Anemia 2020      LOS (days): 8  Geometric Mean LOS (GMLOS) (days): 3  Days to GMLOS:-4.8     OBJECTIVE:  Risk of Unplanned Readmission Score: 33.84         Current admission status: Inpatient   Preferred Pharmacy:   [facility]/pharmacy #[id number] [place], [place] - [address] [place] [address]  Phone: [phone] Fax: [phone]    Primary Care Provider: Marisa Haque MD    Primary Insurance: [employer] [facility] REP  Secondary Insurance:     DISCHARGE DETAILS:     Aware of ^ blood sugars, provider would like to delay her discharge for  better management of glucose. Reschedule request for tomorrow for 1300 was placed in [employer]. Notified Seton Tania of change in plan.

## 2024-05-10 NOTE — CASE MANAGEMENT
Case Management Discharge Planning Note    Patient name Tigist Hewitt  Location /-01 MRN [id number]  : 1955 Date 5/10/2024       Current Admission Date: 2024  Current Admission Diagnosis:Type 2 diabetes mellitus with hyperglycemia, with long-term current use of insulin ([facility])   Patient Active Problem List    Diagnosis Date Noted    Metabolic alkalosis 2024    Acute pain of left knee 2024    Acute cystitis without hematuria 2024    Ambulatory dysfunction 2024    Hyperkalemia 2024    Lung cyst 2024    Abnormal CT scan, pelvis 2023    CKD (chronic kidney disease) stage 3, GFR 30-59 ml/min ([facility]) 2023    NISHI (obstructive sleep apnea) 2023    Intertrigo 10/29/2022    Vaginal bleeding 10/23/2022    Acute kidney injury superimposed on chronic kidney disease  ([facility]) 10/22/2022    Hypotension 10/22/2022    Acute on chronic diastolic congestive heart failure ([facility]) 2022    IMTIAZ (acute kidney injury) ([facility]) 2022    Morbid obesity ([facility]) 2022    Supratherapeutic INR 04/15/2020    Chronic respiratory failure with hypoxia ([facility]) 2020    Asthma 2020    Atrial fibrillation ([facility]) 2020    COPD (chronic obstructive pulmonary disease) ([facility]) 2020    Type 2 diabetes mellitus with hyperglycemia, with long-term current use of insulin ([facility]) 2020    Shortness of breath 2020    Anemia 2020      LOS (days): 8  Geometric Mean LOS (GMLOS) (days): 3  Days to GMLOS:-5.1     OBJECTIVE:  Risk of Unplanned Readmission Score: 32.49         Current admission status: Inpatient   Preferred Pharmacy:   [facility]/pharmacy #[id number] [address] [address]  Phone: [phone] Fax: [phone]    Primary Care Provider: Marisa Haque MD    Primary Insurance: [employer] [facility] RE[employer]  Secondary Insurance:     DISCHARGE DETAILS:        [facility] claimed the ride for  Tigist Hewitt in unit/room  bed -01, and will arrive on 05/11/2024 at 2:00pm EDT. Contact them at [phone]      To [facility]                                                     Transported by (Company and Unit #): William  ETA of Transport (Date): 05/11/24  ETA of Transport (Time): 1400

## 2024-05-10 NOTE — CASE MANAGEMENT
[facility] has received APPROVED authorization.  Insurance: [employer]    Called in by Rep:Romina FERNANDEZ#  [phone]  Authorization received for: SNF  Facility: [facility]   Authorization #:[id number]  Start of Care:05/10  Next Review Date:5/14  Submit next review to: [phone]     Care Manager notified:Coby Rebollar      Please reach out to  for updates on any clinical information.

## 2024-05-11 LAB
GLUCOSE SERPL-MCNC: 226 MG/DL (ref 65–140)
GLUCOSE SERPL-MCNC: 257 MG/DL (ref 65–140)
GLUCOSE SERPL-MCNC: 315 MG/DL (ref 65–140)
GLUCOSE SERPL-MCNC: 367 MG/DL (ref 65–140)
INR PPP: 2.23 (ref 0.84–1.19)
PROTHROMBIN TIME: 25.1 SECONDS (ref 11.6–14.5)

## 2024-05-11 PROCEDURE — 94760 N-INVAS EAR/PLS OXIMETRY 1: CPT

## 2024-05-11 PROCEDURE — 85610 PROTHROMBIN TIME: CPT | Performed by: FAMILY MEDICINE

## 2024-05-11 PROCEDURE — 94640 AIRWAY INHALATION TREATMENT: CPT

## 2024-05-11 PROCEDURE — 99232 SBSQ HOSP IP/OBS MODERATE 35: CPT | Performed by: FAMILY MEDICINE

## 2024-05-11 PROCEDURE — 82948 REAGENT STRIP/BLOOD GLUCOSE: CPT

## 2024-05-11 RX ORDER — INSULIN LISPRO 100 [IU]/ML
10 INJECTION, SOLUTION INTRAVENOUS; SUBCUTANEOUS
Status: DISCONTINUED | OUTPATIENT
Start: 2024-05-11 | End: 2024-05-12 | Stop reason: HOSPADM

## 2024-05-11 RX ADMIN — LORATADINE 10 MG: 10 TABLET ORAL at 08:50

## 2024-05-11 RX ADMIN — ATORVASTATIN CALCIUM 10 MG: 10 TABLET, FILM COATED ORAL at 08:50

## 2024-05-11 RX ADMIN — WARFARIN SODIUM 5 MG: 5 TABLET ORAL at 17:00

## 2024-05-11 RX ADMIN — MONTELUKAST 10 MG: 10 TABLET, FILM COATED ORAL at 20:44

## 2024-05-11 RX ADMIN — METOPROLOL SUCCINATE 37.5 MG: 25 TABLET, EXTENDED RELEASE ORAL at 20:44

## 2024-05-11 RX ADMIN — INSULIN LISPRO 8 UNITS: 100 INJECTION, SOLUTION INTRAVENOUS; SUBCUTANEOUS at 08:00

## 2024-05-11 RX ADMIN — IPRATROPIUM BROMIDE AND ALBUTEROL SULFATE 3 ML: 2.5; .5 SOLUTION RESPIRATORY (INHALATION) at 07:21

## 2024-05-11 RX ADMIN — INSULIN LISPRO 10 UNITS: 100 INJECTION, SOLUTION INTRAVENOUS; SUBCUTANEOUS at 17:00

## 2024-05-11 RX ADMIN — Medication 1 TABLET: at 08:50

## 2024-05-11 RX ADMIN — BUMETANIDE 2 MG: 1 TABLET ORAL at 17:00

## 2024-05-11 RX ADMIN — POTASSIUM CHLORIDE 20 MEQ: 1500 TABLET, EXTENDED RELEASE ORAL at 20:44

## 2024-05-11 RX ADMIN — LEVOTHYROXINE SODIUM 288 MCG: 88 TABLET ORAL at 06:07

## 2024-05-11 RX ADMIN — NYSTATIN: 100000 POWDER TOPICAL at 08:53

## 2024-05-11 RX ADMIN — POTASSIUM CHLORIDE 20 MEQ: 1500 TABLET, EXTENDED RELEASE ORAL at 17:00

## 2024-05-11 RX ADMIN — FLUTICASONE FUROATE AND VILANTEROL TRIFENATATE 1 PUFF: 100; 25 POWDER RESPIRATORY (INHALATION) at 08:51

## 2024-05-11 RX ADMIN — PANTOPRAZOLE SODIUM 40 MG: 40 TABLET, DELAYED RELEASE ORAL at 06:07

## 2024-05-11 RX ADMIN — METOPROLOL SUCCINATE 37.5 MG: 25 TABLET, EXTENDED RELEASE ORAL at 08:50

## 2024-05-11 RX ADMIN — INSULIN LISPRO 6 UNITS: 100 INJECTION, SOLUTION INTRAVENOUS; SUBCUTANEOUS at 17:00

## 2024-05-11 RX ADMIN — INSULIN LISPRO 10 UNITS: 100 INJECTION, SOLUTION INTRAVENOUS; SUBCUTANEOUS at 08:00

## 2024-05-11 RX ADMIN — BUMETANIDE 2 MG: 1 TABLET ORAL at 08:50

## 2024-05-11 RX ADMIN — INSULIN GLARGINE 15 UNITS: 100 INJECTION, SOLUTION SUBCUTANEOUS at 21:52

## 2024-05-11 RX ADMIN — INSULIN LISPRO 10 UNITS: 100 INJECTION, SOLUTION INTRAVENOUS; SUBCUTANEOUS at 11:57

## 2024-05-11 RX ADMIN — NYSTATIN: 100000 POWDER TOPICAL at 17:11

## 2024-05-11 RX ADMIN — OXYCODONE HYDROCHLORIDE 5 MG: 5 TABLET ORAL at 00:29

## 2024-05-11 RX ADMIN — FERROUS SULFATE TAB 325 MG (65 MG ELEMENTAL FE) 325 MG: 325 (65 FE) TAB at 09:00

## 2024-05-11 RX ADMIN — IPRATROPIUM BROMIDE AND ALBUTEROL SULFATE 3 ML: 2.5; .5 SOLUTION RESPIRATORY (INHALATION) at 19:19

## 2024-05-11 RX ADMIN — ALLOPURINOL 200 MG: 100 TABLET ORAL at 08:50

## 2024-05-11 RX ADMIN — IPRATROPIUM BROMIDE AND ALBUTEROL SULFATE 3 ML: 2.5; .5 SOLUTION RESPIRATORY (INHALATION) at 14:17

## 2024-05-11 RX ADMIN — INSULIN LISPRO 10 UNITS: 100 INJECTION, SOLUTION INTRAVENOUS; SUBCUTANEOUS at 11:56

## 2024-05-11 RX ADMIN — POTASSIUM CHLORIDE 20 MEQ: 1500 TABLET, EXTENDED RELEASE ORAL at 08:50

## 2024-05-11 RX ADMIN — INSULIN LISPRO 4 UNITS: 100 INJECTION, SOLUTION INTRAVENOUS; SUBCUTANEOUS at 21:52

## 2024-05-11 NOTE — ASSESSMENT & PLAN NOTE
Lab Results   Component Value Date    HGBA1C 7.0 (H) 05/02/2024       Recent Labs     05/10/24  1623 05/10/24  2100 05/11/24  0807 05/11/24  1144   POCGLU 372* 402* 315* 367*         Blood Sugar Average: Last 72 hrs:  (P) [id number]  Acceptable blood glucose, goal is 140-180  Blood pressure is still running high.  Yesterday insulin dose adjusted.  Since still running high blood sugar, will add lispro 10 units 3 times daily with hypoglycemia precaution.

## 2024-05-11 NOTE — ASSESSMENT & PLAN NOTE
For UTI, patient was placed on IV Rocephin and then transition to p.o. Keflex.  Growing less than 100,000 colonies of Proteus    Adequate treatment achieved.  Discontinue antibiotics

## 2024-05-11 NOTE — PLAN OF CARE
Problem: PAIN - ADULT  Goal: Verbalizes/displays adequate comfort level or baseline comfort level  Description: Interventions:  - Encourage patient to monitor pain and request assistance  - Assess pain using appropriate pain scale  - Administer analgesics based on type and severity of pain and evaluate response  - Implement non-pharmacological measures as appropriate and evaluate response  - Consider cultural and social influences on pain and pain management  - Notify physician/advanced practitioner if interventions unsuccessful or patient reports new pain  Outcome: Progressing     Problem: INFECTION - ADULT  Goal: Absence or prevention of progression during hospitalization  Description: INTERVENTIONS:  - Assess and monitor for signs and symptoms of infection  - Monitor lab/diagnostic results  - Monitor all insertion sites, i.e. indwelling lines, tubes, and drains  - Monitor endotracheal if appropriate and nasal secretions for changes in amount and color  - [place] appropriate cooling/warming therapies per order  - Administer medications as ordered  - Instruct and encourage patient and family to use good hand hygiene technique  - Identify and instruct in appropriate isolation precautions for identified infection/condition  Outcome: Progressing  Goal: Absence of fever/infection during neutropenic period  Description: INTERVENTIONS:  - Monitor WBC    Outcome: Progressing     Problem: SAFETY ADULT  Goal: Patient will remain free of falls  Description: INTERVENTIONS:  - Educate patient/family on patient safety including physical limitations  - Instruct patient to call for assistance with activity   - Consult OT/PT to assist with strengthening/mobility   - Keep Call bell within reach  - Keep bed low and locked with side rails adjusted as appropriate  - Keep care items and personal belongings within reach  - Initiate and maintain comfort rounds  - Make Fall Risk Sign visible to staff  - Offer Toileting every    Hours,  in advance of need  - Initiate/Maintain   alarm  - Obtain necessary fall risk management equipment:     - Apply yellow socks and bracelet for high fall risk patients  - Consider moving patient to room near nurses station  Outcome: Progressing  Goal: Maintain or return to baseline ADL function  Description: INTERVENTIONS:  -  Assess patient's ability to carry out ADLs; assess patient's baseline for ADL function and identify physical deficits which impact ability to perform ADLs (bathing, care of mouth/teeth, toileting, grooming, dressing, etc.)  - Assess/evaluate cause of self-care deficits   - Assess range of motion  - Assess patient's mobility; develop plan if impaired  - Assess patient's need for assistive devices and provide as appropriate  - Encourage maximum independence but intervene and supervise when necessary  - Involve family in performance of ADLs  - Assess for home care needs following discharge   - Consider OT consult to assist with ADL evaluation and planning for discharge  - Provide patient education as appropriate  Outcome: Progressing  Goal: Maintains/Returns to pre admission functional level  Description: INTERVENTIONS:  - Perform AM-PAC 6 Click Basic Mobility/ Daily Activity assessment daily.  - Set and communicate daily mobility goal to care team and patient/family/caregiver.   - Collaborate with rehabilitation services on mobility goals if consulted  - Perform Range of Motion    times a day.  - Reposition patient every    hours.  - Dangle patient    times a day  - Stand patient    times a day  - Ambulate patient    times a day  - Out of bed to chair    times a day   - Out of bed for meals            times a day  - Out of bed for toileting  - Record patient progress and toleration of activity level   Outcome: Progressing     Problem: DISCHARGE PLANNING  Goal: Discharge to home or other facility with appropriate resources  Description: INTERVENTIONS:  - Identify barriers to discharge w/patient and  caregiver  - Arrange for needed discharge resources and transportation as appropriate  - Identify discharge learning needs (meds, wound care, etc.)  - Arrange for interpretive services to assist at discharge as needed  - Refer to Case Management Department for coordinating discharge planning if the patient needs post-hospital services based on physician/advanced practitioner order or complex needs related to functional status, cognitive ability, or social support system  Outcome: Progressing     Problem: Knowledge Deficit  Goal: Patient/family/caregiver demonstrates understanding of disease process, treatment plan, medications, and discharge instructions  Description: Complete learning assessment and assess knowledge base.  Interventions:  - Provide teaching at level of understanding  - Provide teaching via preferred learning methods  Outcome: Progressing

## 2024-05-11 NOTE — ASSESSMENT & PLAN NOTE
Patient has known left bad arthritis x-ray reviewed.and also placed on prednisone along with Tylenol for pain control .  reviewed uric acid level and crp  discussed with orthopedics and with spouse.  Will trial oral prednisone for 5 days and need outpatient follow-up with Ortho  recommended subacute rehab .  Patient agreeable to transition to [facility] on discharge

## 2024-05-11 NOTE — NURSING NOTE
"Pt incont of large amount urine .Pt washed. Dry wick pads replaced to abdominal folds. Powder applied to abdominal folds, under breasts and cream to sacrum. Skin intact. Encouraging pt to get oob. Pt refusing at this time. States she is \"too tired\". Encouraging repositioning with pillows- pt refused this as well. Education provided.   "

## 2024-05-11 NOTE — CASE MANAGEMENT
Case Management Discharge Planning Note    Patient name Tigist Moraesitus  Location /-01 MRN [id number]  : 1955 Date 2024       Current Admission Date: 2024  Current Admission Diagnosis:Type 2 diabetes mellitus with hyperglycemia, with long-term current use of insulin ([facility])   Patient Active Problem List    Diagnosis Date Noted    Metabolic alkalosis 2024    Acute pain of left knee 2024    Acute cystitis without hematuria 2024    Ambulatory dysfunction 2024    Hyperkalemia 2024    Lung cyst 2024    Abnormal CT scan, pelvis 2023    CKD (chronic kidney disease) stage 3, GFR 30-59 ml/min ([facility]) 2023    NISHI (obstructive sleep apnea) 2023    Intertrigo 10/29/2022    Vaginal bleeding 10/23/2022    Acute kidney injury superimposed on chronic kidney disease  ([facility]) 10/22/2022    Hypotension 10/22/2022    Acute on chronic diastolic congestive heart failure ([facility]) 2022    IMTIAZ (acute kidney injury) ([facility]) 2022    Morbid obesity ([facility]) 2022    Supratherapeutic INR 04/15/2020    Chronic respiratory failure with hypoxia ([facility]) 2020    Asthma 2020    Atrial fibrillation ([facility]) 2020    COPD (chronic obstructive pulmonary disease) ([facility]) 2020    Type 2 diabetes mellitus with hyperglycemia, with long-term current use of insulin ([facility]) 2020    Shortness of breath 2020    Anemia 2020      LOS (days): 9  Geometric Mean LOS (GMLOS) (days): 3  Days to GMLOS:-6     OBJECTIVE:  Risk of Unplanned Readmission Score: 29.97         Current admission status: Inpatient   Preferred Pharmacy:   [facility]/pharmacy #[id number] [address] [address]  Phone: [phone] Fax: [phone]    Primary Care Provider: Marisa Haque MD    Primary Insurance: [employer] [facility] [employer]  Secondary Insurance:     DISCHARGE DETAILS:     CM confirmed with nursing that they cancelled  patient's transportation when discharge was changed.     CM to follow for patient's care and discharge needs.

## 2024-05-11 NOTE — ASSESSMENT & PLAN NOTE
Wt Readings from Last 3 Encounters:   05/11/24 (!) 153 kg (337 lb 15.4 oz)   01/02/24 (!) 149 kg (328 lb 0.7 oz)   10/29/22 (!) 151 kg (331 lb 12.7 oz)   Patient has heart failure with preserved ejection fraction and maintained on torsemide 60 mg twice daily and metolazone once a week  2d echo from December 2023 shows normal EF 55% but abnormal diastolic function and severe tricuspid regurg.  She had gained approximately 20 to 30 pounds at least and was asked by her cardiologist to increase metolazone however lately she has been very somnolent and not taking her meds for the last 2 to 3 days.    Patient did not have adequate diuretic response initially on Lasix drip 20 mg/h and metolazone 2.5 mg daily.  Subsequently transition to Bumex drip 0.5 mg/h along with metolazone  -12 L net negative.  Also unable to adequately assess volume status because of body habitus and inability to do standing weights.  Increase metabolic alkalosis noted likely from contraction and received 2 doses of Diamox.    Bumex drip was discontinued and transitioned to Bumex 2 mg intravenous twice daily       Monitor electrolytes closely   Chronically uses 4 L of oxygen and CPAP at at bedtime will continue for now  Unable to get accurate weights but bed scale shows weight from 368 pounds -->340 pounds

## 2024-05-11 NOTE — PROGRESS NOTES
[facility]  Progress Note  Name: Tigist Hewitt I  MRN: [id number]  Unit/Bed#: -01 I Date of Admission: 5/2/2024   Date of Service: 5/11/2024 I Hospital Day: 9    Assessment/Plan   Acute on chronic diastolic congestive heart failure (HCC)  Assessment & Plan  Wt Readings from Last 3 Encounters:   05/11/24 (!) 153 kg (337 lb 15.4 oz)   01/02/24 (!) 149 kg (328 lb 0.7 oz)   10/29/22 (!) 151 kg (331 lb 12.7 oz)   Patient has heart failure with preserved ejection fraction and maintained on torsemide 60 mg twice daily and metolazone once a week  2d echo from December 2023 shows normal EF 55% but abnormal diastolic function and severe tricuspid regurg.  She had gained approximately 20 to 30 pounds at least and was asked by her cardiologist to increase metolazone however lately she has been very somnolent and not taking her meds for the last 2 to 3 days.    Patient did not have adequate diuretic response initially on Lasix drip 20 mg/h and metolazone 2.5 mg daily.  Subsequently transition to Bumex drip 0.5 mg/h along with metolazone  -12 L net negative.  Also unable to adequately assess volume status because of body habitus and inability to do standing weights.  Increase metabolic alkalosis noted likely from contraction and received 2 doses of Diamox.    Bumex drip was discontinued and transitioned to Bumex 2 mg intravenous twice daily       Monitor electrolytes closely   Chronically uses 4 L of oxygen and CPAP at at bedtime will continue for now  Unable to get accurate weights but bed scale shows weight from 368 pounds -->340 pounds          Atrial fibrillation (HCC)  Assessment & Plan  INR therapeutic at 2.5 , currently rate controlled with Toprol-XL and INR is therapeutic normally takes Coumadin 7.5 mg daily will place on 5 mg for now and observe  Cardiology following  History of ablation      * Type 2 diabetes mellitus with hyperglycemia, with long-term current use of insulin  (Formerly [facility])  Assessment & Plan  Lab Results   Component Value Date    HGBA1C 7.0 (H) 05/02/2024       Recent Labs     05/10/24  1623 05/10/24  2100 05/11/24  0807 05/11/24  1144   POCGLU 372* 402* 315* 367*         Blood Sugar Average: Last 72 hrs:  (P) 287.4824368294074364  Acceptable blood glucose, goal is 140-180  Blood pressure is still running high.  Yesterday insulin dose adjusted.  Since still running high blood sugar, will add lispro 10 units 3 times daily with hypoglycemia precaution.      COPD (chronic obstructive pulmonary disease) (Formerly [facility])  Assessment & Plan  Mild exacerbation noted with sob and wheezing even after generous diuresis  Start IV solumedrol 40mg BID and duoneb nebs  Has chronic respiratory failure uses 4 L of oxygen at baseline along with CPAP will continue.  Abg on admission acceptable    Metabolic alkalosis  Assessment & Plan  Likely contraction alkalosis from diuresis  Status post Diamox with improvement.    Acute cystitis without hematuria  Assessment & Plan  For UTI, patient was placed on IV Rocephin and then transition to p.o. Keflex.  Growing less than 100,000 colonies of Proteus    Adequate treatment achieved.  Discontinue antibiotics      Acute pain of left knee  Assessment & Plan  Patient has known left bad arthritis x-ray reviewed.and also placed on prednisone along with Tylenol for pain control .  reviewed uric acid level and crp  discussed with orthopedics and with spouse.  Will trial oral prednisone for 5 days and need outpatient follow-up with Ortho  recommended subacute rehab .  Patient agreeable to transition to [facility] on discharge      Morbid obesity (Formerly [facility])  Assessment & Plan  bMI 69.56 will need diuresis and counseling on diet exercise and lifestyle modification           VTE Pharmacologic Prophylaxis: VTE Score: 2 Low Risk (Score 0-2) - Encourage Ambulation.    Mobility:   Basic Mobility Inpatient Raw Score: 8  JH-HLM Goal: 3: Sit at edge of bed  JH-HLM Achieved: 4: Move to  chair/commode  -HL Goal achieved. Continue to encourage appropriate mobility.    Patient Centered Rounds: I performed bedside rounds with nursing staff today.   Discussions with Specialists or Other Care Team Provider: None    Education and Discussions with Family / Patient: Updated  () via phone.      Current Length of Stay: 9 day(s)  Current Patient Status: Inpatient   Certification Statement: The patient will continue to require additional inpatient hospital stay due to monitorable conditions  Discharge Plan: Anticipate discharge in 24-48 hrs to rehab facility.    Code Status: Level 3 - DNAR and DNI    Subjective:   Seen and evaluated during RN.  Resting comfortably.  Denies any significant complaint blood sugar still running high.    Objective:     Vitals:   Temp (24hrs), Av.8 °F (36.6 °C), Min:97.5 °F (36.4 °C), Max:98.1 °F (36.7 °C)    Temp:  [97.5 °F (36.4 °C)-98.1 °F (36.7 °C)] 97.9 °F (36.6 °C)  HR:  [] 73  Resp:  [15-18] 15  BP: (110-130)/(63-77) 119/73  SpO2:  [92 %-100 %] 100 %  Body mass index is 63.86 kg/m².     Input and Output Summary (last 24 hours):     Intake/Output Summary (Last 24 hours) at 2024 1155  Last data filed at 2024 1100  Gross per 24 hour   Intake 1340 ml   Output 1430 ml   Net -90 ml       Physical Exam:   Physical Exam  Vitals and nursing note reviewed.   Constitutional:       Appearance: Normal appearance. She is not ill-appearing or diaphoretic.   HENT:      Mouth/Throat:      Mouth: Mucous membranes are moist.      Pharynx: No oropharyngeal exudate.   Eyes:      Extraocular Movements: Extraocular movements intact.      Conjunctiva/sclera: Conjunctivae normal.      Pupils: Pupils are equal, round, and reactive to light.   Cardiovascular:      Rate and Rhythm: Normal rate.      Heart sounds:      No friction rub. No gallop.   Pulmonary:      Effort: Pulmonary effort is normal. No respiratory distress.      Breath sounds: No stridor. No  wheezing or rhonchi.   Musculoskeletal:      Right lower leg: No edema.      Left lower leg: No edema.   Skin:     Findings: No lesion.   Neurological:      General: No focal deficit present.      Mental Status: She is alert and oriented to person, place, and time.      Cranial Nerves: No cranial nerve deficit.      Sensory: No sensory deficit.      Motor: No weakness.      Coordination: Coordination normal.          Additional Data:     Labs:  Results from last 7 days   Lab Units 05/10/24  0552   WBC Thousand/uL 6.22   HEMOGLOBIN g/dL 12.3   HEMATOCRIT % 38.8   PLATELETS Thousands/uL 152   SEGS PCT % 89*   LYMPHO PCT % 8*   MONO PCT % 2*   EOS PCT % 0     Results from last 7 days   Lab Units 05/10/24  0552   SODIUM mmol/L 137   POTASSIUM mmol/L 3.9   CHLORIDE mmol/L 94*   CO2 mmol/L 39*   BUN mg/dL 47*   CREATININE mg/dL 1.10   ANION GAP mmol/L 4   CALCIUM mg/dL 9.5   ALBUMIN g/dL 3.1*   TOTAL BILIRUBIN mg/dL 1.05*   ALK PHOS U/L 119*   ALT U/L 15   AST U/L 22   GLUCOSE RANDOM mg/dL 377*     Results from last 7 days   Lab Units 05/08/24  0501   INR  2.56*     Results from last 7 days   Lab Units 05/11/24  1144 05/11/24  0807 05/10/24  2100 05/10/24  1623 05/10/24  1134 05/10/24  0952 05/10/24  0821 05/09/24  2116 05/09/24  1655 05/09/24  1147 05/09/24  0731 05/08/24  2106   POC GLUCOSE mg/dl 367* 315* 402* 372* 383* 417* 322* 373* 280* 178* 146* 185*               Lines/Drains:  Invasive Devices       Peripheral Intravenous Line  Duration             Peripheral IV 05/09/24 Dorsal (posterior);Left Hand 2 days              Drain  Duration             External Urinary Catheter 1 day                          Imaging: No pertinent imaging reviewed.    Recent Cultures (last 7 days):         Last 24 Hours Medication List:   Current Facility-Administered Medications   Medication Dose Route Frequency Provider Last Rate    acetaminophen  650 mg Oral Q4H PRN Amanda Dukes MD      allopurinol  200 mg Oral Daily Amanda Dukes MD       atorvastatin  10 mg Oral Daily Amanda Dukes MD      bumetanide  2 mg Oral BID CAMPBELL Bolton      ferrous sulfate  325 mg Oral Daily With Breakfast Amanda Dukes MD      Fluticasone Furoate-Vilanterol  1 puff Inhalation Daily Amanda Dukes MD      insulin glargine  15 Units Subcutaneous HS Amanda Dukes MD      insulin lispro  10 Units Subcutaneous TID With Meals Pedro Price MD      insulin lispro  2-12 Units Subcutaneous TID AC Amanda Dukes MD      insulin lispro  2-12 Units Subcutaneous HS Radha Wright MD      ipratropium-albuterol  3 mL Nebulization TID Ceasar Lerner MD      levothyroxine  288 mcg Oral Early Morning Amanda Dukes MD      loratadine  10 mg Oral Daily Amanda Dukes MD      metoprolol  2.5 mg Intravenous Q6H PRN Radha Wright MD      metoprolol succinate  37.5 mg Oral BID Radha Wright MD      SERGO ANTIFUNGAL   Topical BID Amanda Dukes MD      montelukast  10 mg Oral HS Amanda Dukes MD      multivitamin-minerals  1 tablet Oral Daily Amanda Dukes MD      nystatin   Topical BID Amanda Dukes MD      ondansetron  4 mg Intravenous Q6H PRN Amanda Dukes MD      oxyCODONE  5 mg Oral Q6H PRN CAMPBELL Munoz      pantoprazole  40 mg Oral Daily Before Breakfast Amanda Dukes MD      polyethylene glycol  17 g Oral Daily Pedro Price MD      potassium chloride  20 mEq Oral TID Amanda Dukes MD      warfarin  5 mg Oral Daily (warfarin) Amanda Dukes MD          Today, Patient Was Seen By: Pedro Price MD    **Please Note: This note may have been constructed using a voice recognition system.**

## 2024-05-12 VITALS
HEART RATE: 86 BPM | TEMPERATURE: 96.6 F | DIASTOLIC BLOOD PRESSURE: 58 MMHG | RESPIRATION RATE: 18 BRPM | OXYGEN SATURATION: 96 % | WEIGHT: 293 LBS | SYSTOLIC BLOOD PRESSURE: 97 MMHG | BODY MASS INDEX: 55.32 KG/M2 | HEIGHT: 61 IN

## 2024-05-12 LAB
ANION GAP SERPL CALCULATED.3IONS-SCNC: 4 MMOL/L (ref 4–13)
BASOPHILS # BLD AUTO: 0.01 THOUSANDS/ÂΜL (ref 0–0.1)
BASOPHILS NFR BLD AUTO: 0 % (ref 0–1)
BUN SERPL-MCNC: 48 MG/DL (ref 5–25)
CALCIUM SERPL-MCNC: 9.7 MG/DL (ref 8.4–10.2)
CHLORIDE SERPL-SCNC: 98 MMOL/L (ref 96–108)
CO2 SERPL-SCNC: 36 MMOL/L (ref 21–32)
CREAT SERPL-MCNC: 0.89 MG/DL (ref 0.6–1.3)
EOSINOPHIL # BLD AUTO: 0.19 THOUSAND/ÂΜL (ref 0–0.61)
EOSINOPHIL NFR BLD AUTO: 3 % (ref 0–6)
ERYTHROCYTE [DISTWIDTH] IN BLOOD BY AUTOMATED COUNT: 16.4 % (ref 11.6–15.1)
GFR SERPL CREATININE-BSD FRML MDRD: 65 ML/MIN/1.73SQ M
GLUCOSE SERPL-MCNC: 148 MG/DL (ref 65–140)
GLUCOSE SERPL-MCNC: 180 MG/DL (ref 65–140)
GLUCOSE SERPL-MCNC: 212 MG/DL (ref 65–140)
HCT VFR BLD AUTO: 40.1 % (ref 34.8–46.1)
HGB BLD-MCNC: 12.4 G/DL (ref 11.5–15.4)
IMM GRANULOCYTES # BLD AUTO: 0.05 THOUSAND/UL (ref 0–0.2)
IMM GRANULOCYTES NFR BLD AUTO: 1 % (ref 0–2)
INR PPP: 2.39 (ref 0.84–1.19)
LYMPHOCYTES # BLD AUTO: 0.83 THOUSANDS/ÂΜL (ref 0.6–4.47)
LYMPHOCYTES NFR BLD AUTO: 11 % (ref 14–44)
MCH RBC QN AUTO: 30.5 PG (ref 26.8–34.3)
MCHC RBC AUTO-ENTMCNC: 30.9 G/DL (ref 31.4–37.4)
MCV RBC AUTO: 99 FL (ref 82–98)
MONOCYTES # BLD AUTO: 0.9 THOUSAND/ÂΜL (ref 0.17–1.22)
MONOCYTES NFR BLD AUTO: 12 % (ref 4–12)
NEUTROPHILS # BLD AUTO: 5.76 THOUSANDS/ÂΜL (ref 1.85–7.62)
NEUTS SEG NFR BLD AUTO: 73 % (ref 43–75)
NRBC BLD AUTO-RTO: 0 /100 WBCS
PLATELET # BLD AUTO: 154 THOUSANDS/UL (ref 149–390)
PMV BLD AUTO: 11.5 FL (ref 8.9–12.7)
POTASSIUM SERPL-SCNC: 4.3 MMOL/L (ref 3.5–5.3)
PROTHROMBIN TIME: 26.4 SECONDS (ref 11.6–14.5)
RBC # BLD AUTO: 4.06 MILLION/UL (ref 3.81–5.12)
SODIUM SERPL-SCNC: 138 MMOL/L (ref 135–147)
WBC # BLD AUTO: 7.74 THOUSAND/UL (ref 4.31–10.16)

## 2024-05-12 PROCEDURE — 80048 BASIC METABOLIC PNL TOTAL CA: CPT | Performed by: FAMILY MEDICINE

## 2024-05-12 PROCEDURE — 99239 HOSP IP/OBS DSCHRG MGMT >30: CPT | Performed by: FAMILY MEDICINE

## 2024-05-12 PROCEDURE — 82948 REAGENT STRIP/BLOOD GLUCOSE: CPT

## 2024-05-12 PROCEDURE — 85610 PROTHROMBIN TIME: CPT | Performed by: FAMILY MEDICINE

## 2024-05-12 PROCEDURE — 94640 AIRWAY INHALATION TREATMENT: CPT

## 2024-05-12 PROCEDURE — 85025 COMPLETE CBC W/AUTO DIFF WBC: CPT | Performed by: FAMILY MEDICINE

## 2024-05-12 PROCEDURE — 94760 N-INVAS EAR/PLS OXIMETRY 1: CPT

## 2024-05-12 RX ORDER — BUMETANIDE 2 MG/1
2 TABLET ORAL 2 TIMES DAILY
Qty: 60 TABLET | Refills: 0 | Status: ON HOLD
Start: 2024-05-12 | End: 2024-06-11

## 2024-05-12 RX ORDER — INSULIN ASPART 100 [IU]/ML
10 INJECTION, SOLUTION INTRAVENOUS; SUBCUTANEOUS
Status: ON HOLD
Start: 2024-05-12

## 2024-05-12 RX ADMIN — PANTOPRAZOLE SODIUM 40 MG: 40 TABLET, DELAYED RELEASE ORAL at 05:54

## 2024-05-12 RX ADMIN — INSULIN LISPRO 10 UNITS: 100 INJECTION, SOLUTION INTRAVENOUS; SUBCUTANEOUS at 12:10

## 2024-05-12 RX ADMIN — INSULIN LISPRO 10 UNITS: 100 INJECTION, SOLUTION INTRAVENOUS; SUBCUTANEOUS at 08:26

## 2024-05-12 RX ADMIN — ATORVASTATIN CALCIUM 10 MG: 10 TABLET, FILM COATED ORAL at 09:34

## 2024-05-12 RX ADMIN — Medication 1 TABLET: at 09:34

## 2024-05-12 RX ADMIN — FLUTICASONE FUROATE AND VILANTEROL TRIFENATATE 1 PUFF: 100; 25 POWDER RESPIRATORY (INHALATION) at 09:34

## 2024-05-12 RX ADMIN — LEVOTHYROXINE SODIUM 288 MCG: 88 TABLET ORAL at 05:54

## 2024-05-12 RX ADMIN — IPRATROPIUM BROMIDE AND ALBUTEROL SULFATE 3 ML: 2.5; .5 SOLUTION RESPIRATORY (INHALATION) at 07:23

## 2024-05-12 RX ADMIN — POTASSIUM CHLORIDE 20 MEQ: 1500 TABLET, EXTENDED RELEASE ORAL at 09:34

## 2024-05-12 RX ADMIN — FERROUS SULFATE TAB 325 MG (65 MG ELEMENTAL FE) 325 MG: 325 (65 FE) TAB at 09:33

## 2024-05-12 RX ADMIN — INSULIN LISPRO 4 UNITS: 100 INJECTION, SOLUTION INTRAVENOUS; SUBCUTANEOUS at 12:10

## 2024-05-12 RX ADMIN — ALLOPURINOL 200 MG: 100 TABLET ORAL at 09:33

## 2024-05-12 RX ADMIN — INSULIN LISPRO 2 UNITS: 100 INJECTION, SOLUTION INTRAVENOUS; SUBCUTANEOUS at 08:00

## 2024-05-12 RX ADMIN — LORATADINE 10 MG: 10 TABLET ORAL at 09:33

## 2024-05-12 RX ADMIN — IPRATROPIUM BROMIDE AND ALBUTEROL SULFATE 3 ML: 2.5; .5 SOLUTION RESPIRATORY (INHALATION) at 14:42

## 2024-05-12 RX ADMIN — NYSTATIN: 100000 POWDER TOPICAL at 09:34

## 2024-05-12 NOTE — ASSESSMENT & PLAN NOTE
Wt Readings from Last 3 Encounters:   05/12/24 (!) 155 kg (342 lb 2.5 oz)   01/02/24 (!) 149 kg (328 lb 0.7 oz)   10/29/22 (!) 151 kg (331 lb 12.7 oz)   Patient has heart failure with preserved ejection fraction and maintained on torsemide 60 mg twice daily and metolazone once a week  2d echo from December 2023 shows normal EF 55% but abnormal diastolic function and severe tricuspid regurg.  She had gained approximately 20 to 30 pounds at least and was asked by her cardiologist to increase metolazone however lately she has been very somnolent and not taking her meds for the last 2 to 3 days.    Patient did not have adequate diuretic response initially on Lasix drip 20 mg/h and metolazone 2.5 mg daily.  Subsequently transition to Bumex drip 0.5 mg/h along with metolazone  -12 L net negative.  Also unable to adequately assess volume status because of body habitus and inability to do standing weights.   Bumex 2 mg intravenous twice daily       Monitor electrolytes closely   Chronically uses 4 L of oxygen and CPAP at at bedtime will continue for now  Unable to get accurate weights but bed scale shows weight from 368 pounds -->340 pounds

## 2024-05-12 NOTE — PLAN OF CARE
Problem: PAIN - ADULT  Goal: Verbalizes/displays adequate comfort level or baseline comfort level  Description: Interventions:  - Encourage patient to monitor pain and request assistance  - Assess pain using appropriate pain scale  - Administer analgesics based on type and severity of pain and evaluate response  - Implement non-pharmacological measures as appropriate and evaluate response  - Consider cultural and social influences on pain and pain management  - Notify physician/advanced practitioner if interventions unsuccessful or patient reports new pain  5/12/2024 1307 by Cici Guidry RN  Outcome: Adequate for Discharge  5/12/2024 1305 by Cici Guidry RN  Outcome: Progressing     Problem: INFECTION - ADULT  Goal: Absence or prevention of progression during hospitalization  Description: INTERVENTIONS:  - Assess and monitor for signs and symptoms of infection  - Monitor lab/diagnostic results  - Monitor all insertion sites, i.e. indwelling lines, tubes, and drains  - Monitor endotracheal if appropriate and nasal secretions for changes in amount and color  - [place] appropriate cooling/warming therapies per order  - Administer medications as ordered  - Instruct and encourage patient and family to use good hand hygiene technique  - Identify and instruct in appropriate isolation precautions for identified infection/condition  5/12/2024 1307 by Cici Guidry RN  Outcome: Adequate for Discharge  5/12/2024 1305 by Cici Guidry RN  Outcome: Progressing  Goal: Absence of fever/infection during neutropenic period  Description: INTERVENTIONS:  - Monitor WBC    5/12/2024 1307 by Cici Guidry RN  Outcome: Adequate for Discharge  5/12/2024 1305 by Cici Guidry RN  Outcome: Progressing     Problem: SAFETY ADULT  Goal: Patient will remain free of falls  Description: INTERVENTIONS:  - Educate patient/family on patient safety including physical limitations  - Instruct patient to call for assistance  with activity   - Consult OT/PT to assist with strengthening/mobility   - Keep Call bell within reach  - Keep bed low and locked with side rails adjusted as appropriate  - Keep care items and personal belongings within reach  - Initiate and maintain comfort rounds  - Make Fall Risk Sign visible to staff  - Offer Toileting every    Hours, in advance of need  - Initiate/Maintain   alarm  - Obtain necessary fall risk management equipment:     - Apply yellow socks and bracelet for high fall risk patients  - Consider moving patient to room near nurses station  5/12/2024 1307 by Cici Guidry RN  Outcome: Adequate for Discharge  5/12/2024 1305 by Cici Guidry RN  Outcome: Progressing  Goal: Maintain or return to baseline ADL function  Description: INTERVENTIONS:  -  Assess patient's ability to carry out ADLs; assess patient's baseline for ADL function and identify physical deficits which impact ability to perform ADLs (bathing, care of mouth/teeth, toileting, grooming, dressing, etc.)  - Assess/evaluate cause of self-care deficits   - Assess range of motion  - Assess patient's mobility; develop plan if impaired  - Assess patient's need for assistive devices and provide as appropriate  - Encourage maximum independence but intervene and supervise when necessary  - Involve family in performance of ADLs  - Assess for home care needs following discharge   - Consider OT consult to assist with ADL evaluation and planning for discharge  - Provide patient education as appropriate  5/12/2024 1307 by Cici Guidry RN  Outcome: Adequate for Discharge  5/12/2024 1305 by Cici Guidry RN  Outcome: Progressing  Goal: Maintains/Returns to pre admission functional level  Description: INTERVENTIONS:  - Perform AM-PAC 6 Click Basic Mobility/ Daily Activity assessment daily.  - Set and communicate daily mobility goal to care team and patient/family/caregiver.   - Collaborate with rehabilitation services on mobility goals if  consulted  - Perform Range of Motion    times a day.  - Reposition patient every    hours.  - Dangle patient    times a day  - Stand patient    times a day  - Ambulate patient    times a day  - Out of bed to chair    times a day   - Out of bed for meals        times a day  - Out of bed for toileting  - Record patient progress and toleration of activity level   5/12/2024 1307 by Cici Guidry RN  Outcome: Adequate for Discharge  5/12/2024 1305 by Cici Guidry RN  Outcome: Progressing     Problem: DISCHARGE PLANNING  Goal: Discharge to home or other facility with appropriate resources  Description: INTERVENTIONS:  - Identify barriers to discharge w/patient and caregiver  - Arrange for needed discharge resources and transportation as appropriate  - Identify discharge learning needs (meds, wound care, etc.)  - Arrange for interpretive services to assist at discharge as needed  - Refer to Case Management Department for coordinating discharge planning if the patient needs post-hospital services based on physician/advanced practitioner order or complex needs related to functional status, cognitive ability, or social support system  5/12/2024 1307 by Cici Guidry RN  Outcome: Adequate for Discharge  5/12/2024 1305 by Cici Guidry RN  Outcome: Progressing     Problem: Knowledge Deficit  Goal: Patient/family/caregiver demonstrates understanding of disease process, treatment plan, medications, and discharge instructions  Description: Complete learning assessment and assess knowledge base.  Interventions:  - Provide teaching at level of understanding  - Provide teaching via preferred learning methods  5/12/2024 1307 by Cici Guidry RN  Outcome: Adequate for Discharge  5/12/2024 1305 by Cici Guidry RN  Outcome: Progressing

## 2024-05-12 NOTE — PLAN OF CARE
Problem: PAIN - ADULT  Goal: Verbalizes/displays adequate comfort level or baseline comfort level  Description: Interventions:  - Encourage patient to monitor pain and request assistance  - Assess pain using appropriate pain scale  - Administer analgesics based on type and severity of pain and evaluate response  - Implement non-pharmacological measures as appropriate and evaluate response  - Consider cultural and social influences on pain and pain management  - Notify physician/advanced practitioner if interventions unsuccessful or patient reports new pain  Outcome: Progressing     Problem: INFECTION - ADULT  Goal: Absence or prevention of progression during hospitalization  Description: INTERVENTIONS:  - Assess and monitor for signs and symptoms of infection  - Monitor lab/diagnostic results  - Monitor all insertion sites, i.e. indwelling lines, tubes, and drains  - Monitor endotracheal if appropriate and nasal secretions for changes in amount and color  - [place] appropriate cooling/warming therapies per order  - Administer medications as ordered  - Instruct and encourage patient and family to use good hand hygiene technique  - Identify and instruct in appropriate isolation precautions for identified infection/condition  Outcome: Progressing     Problem: DISCHARGE PLANNING  Goal: Discharge to home or other facility with appropriate resources  Description: INTERVENTIONS:  - Identify barriers to discharge w/patient and caregiver  - Arrange for needed discharge resources and transportation as appropriate  - Identify discharge learning needs (meds, wound care, etc.)  - Arrange for interpretive services to assist at discharge as needed  - Refer to Case Management Department for coordinating discharge planning if the patient needs post-hospital services based on physician/advanced practitioner order or complex needs related to functional status, cognitive ability, or social support system  Outcome: Progressing      Problem: Knowledge Deficit  Goal: Patient/family/caregiver demonstrates understanding of disease process, treatment plan, medications, and discharge instructions  Description: Complete learning assessment and assess knowledge base.  Interventions:  - Provide teaching at level of understanding  - Provide teaching via preferred learning methods  Outcome: Progressing

## 2024-05-12 NOTE — ASSESSMENT & PLAN NOTE
INR therapeutic at 2.39 , currently rate controlled with Toprol-XL   History of ablation  Continue Coumadin

## 2024-05-12 NOTE — ASSESSMENT & PLAN NOTE
Mild exacerbation noted with sob and wheezing even after generous diuresis  Has chronic respiratory failure uses 4 L of oxygen at baseline along with CPAP will continue.  Manage stable, continue current treatment.

## 2024-05-12 NOTE — PLAN OF CARE
Problem: PAIN - ADULT  Goal: Verbalizes/displays adequate comfort level or baseline comfort level  Description: Interventions:  - Encourage patient to monitor pain and request assistance  - Assess pain using appropriate pain scale  - Administer analgesics based on type and severity of pain and evaluate response  - Implement non-pharmacological measures as appropriate and evaluate response  - Consider cultural and social influences on pain and pain management  - Notify physician/advanced practitioner if interventions unsuccessful or patient reports new pain  Outcome: Progressing     Problem: INFECTION - ADULT  Goal: Absence or prevention of progression during hospitalization  Description: INTERVENTIONS:  - Assess and monitor for signs and symptoms of infection  - Monitor lab/diagnostic results  - Monitor all insertion sites, i.e. indwelling lines, tubes, and drains  - Monitor endotracheal if appropriate and nasal secretions for changes in amount and color  - [place] appropriate cooling/warming therapies per order  - Administer medications as ordered  - Instruct and encourage patient and family to use good hand hygiene technique  - Identify and instruct in appropriate isolation precautions for identified infection/condition  Outcome: Progressing  Goal: Absence of fever/infection during neutropenic period  Description: INTERVENTIONS:  - Monitor WBC    Outcome: Progressing     Problem: SAFETY ADULT  Goal: Patient will remain free of falls  Description: INTERVENTIONS:  - Educate patient/family on patient safety including physical limitations  - Instruct patient to call for assistance with activity   - Consult OT/PT to assist with strengthening/mobility   - Keep Call bell within reach  - Keep bed low and locked with side rails adjusted as appropriate  - Keep care items and personal belongings within reach  - Initiate and maintain comfort rounds  - Make Fall Risk Sign visible to staff  - Offer Toileting every    Hours,  in advance of need  - Initiate/Maintain   alarm  - Obtain necessary fall risk management equipment:     - Apply yellow socks and bracelet for high fall risk patients  - Consider moving patient to room near nurses station  Outcome: Progressing  Goal: Maintain or return to baseline ADL function  Description: INTERVENTIONS:  -  Assess patient's ability to carry out ADLs; assess patient's baseline for ADL function and identify physical deficits which impact ability to perform ADLs (bathing, care of mouth/teeth, toileting, grooming, dressing, etc.)  - Assess/evaluate cause of self-care deficits   - Assess range of motion  - Assess patient's mobility; develop plan if impaired  - Assess patient's need for assistive devices and provide as appropriate  - Encourage maximum independence but intervene and supervise when necessary  - Involve family in performance of ADLs  - Assess for home care needs following discharge   - Consider OT consult to assist with ADL evaluation and planning for discharge  - Provide patient education as appropriate  Outcome: Progressing  Goal: Maintains/Returns to pre admission functional level  Description: INTERVENTIONS:  - Perform AM-PAC 6 Click Basic Mobility/ Daily Activity assessment daily.  - Set and communicate daily mobility goal to care team and patient/family/caregiver.   - Collaborate with rehabilitation services on mobility goals if consulted  - Perform Range of Motion    times a day.  - Reposition patient every    hours.  - Dangle patient    times a day  - Stand patient    times a day  - Ambulate patient    times a day  - Out of bed to chair    times a day   - Out of bed for meals        times a day  - Out of bed for toileting  - Record patient progress and toleration of activity level   Outcome: Progressing     Problem: DISCHARGE PLANNING  Goal: Discharge to home or other facility with appropriate resources  Description: INTERVENTIONS:  - Identify barriers to discharge w/patient and  caregiver  - Arrange for needed discharge resources and transportation as appropriate  - Identify discharge learning needs (meds, wound care, etc.)  - Arrange for interpretive services to assist at discharge as needed  - Refer to Case Management Department for coordinating discharge planning if the patient needs post-hospital services based on physician/advanced practitioner order or complex needs related to functional status, cognitive ability, or social support system  Outcome: Progressing     Problem: Knowledge Deficit  Goal: Patient/family/caregiver demonstrates understanding of disease process, treatment plan, medications, and discharge instructions  Description: Complete learning assessment and assess knowledge base.  Interventions:  - Provide teaching at level of understanding  - Provide teaching via preferred learning methods  Outcome: Progressing

## 2024-05-12 NOTE — DISCHARGE SUMMARY
[facility]  Discharge- Tigist Hewitt 1955, 69 y.o. female MRN: [id number]  Unit/Bed#: [facility] Encounter: [id number]  Primary Care Provider: Marisa Haque MD   Date and time admitted to hospital: 5/2/2024 11:27 AM    Acute on chronic diastolic congestive heart failure (HCC)  Assessment & Plan  Wt Readings from Last 3 Encounters:   05/12/24 (!) 155 kg (342 lb 2.5 oz)   01/02/24 (!) 149 kg (328 lb 0.7 oz)   10/29/22 (!) 151 kg (331 lb 12.7 oz)   Patient has heart failure with preserved ejection fraction and maintained on torsemide 60 mg twice daily and metolazone once a week  2d echo from December 2023 shows normal EF 55% but abnormal diastolic function and severe tricuspid regurg.  She had gained approximately 20 to 30 pounds at least and was asked by her cardiologist to increase metolazone however lately she has been very somnolent and not taking her meds for the last 2 to 3 days.    Patient did not have adequate diuretic response initially on Lasix drip 20 mg/h and metolazone 2.5 mg daily.  Subsequently transition to Bumex drip 0.5 mg/h along with metolazone  -12 L net negative.  Also unable to adequately assess volume status because of body habitus and inability to do standing weights.   Bumex 2 mg intravenous twice daily       Monitor electrolytes closely   Chronically uses 4 L of oxygen and CPAP at at bedtime will continue for now  Unable to get accurate weights but bed scale shows weight from 368 pounds -->340 pounds          Atrial fibrillation (HCC)  Assessment & Plan  INR therapeutic at 2.39 , currently rate controlled with Toprol-XL   History of ablation  Continue Coumadin      * Type 2 diabetes mellitus with hyperglycemia, with long-term current use of insulin (HCC)  Assessment & Plan  Lab Results   Component Value Date    HGBA1C 7.0 (H) 05/02/2024       Recent Labs     05/11/24  1632 05/11/24  2058 05/12/24  0810 05/12/24  1156   POCGLU 257* 226* 180* 212*          Blood Sugar Average: Last 72 hrs:  (P) 295.4803438096578181  Acceptable blood glucose, goal is 140-180  Blood sugar is improving slowly, continue current treatment.      COPD (chronic obstructive pulmonary disease) (HCC)  Assessment & Plan  Mild exacerbation noted with sob and wheezing even after generous diuresis  Has chronic respiratory failure uses 4 L of oxygen at baseline along with CPAP will continue.  Manage stable, continue current treatment.    Metabolic alkalosis  Assessment & Plan  Likely contraction alkalosis from diuresis  Status post Diamox with improvement.    Acute cystitis without hematuria  Assessment & Plan  For UTI, patient was placed on IV Rocephin and then transition to p.o. Keflex.  Growing less than 100,000 colonies of Proteus    Adequate treatment achieved.  Discontinue antibiotics      Acute pain of left knee  Assessment & Plan  Patient has known left bad arthritis x-ray reviewed.and also placed on prednisone along with Tylenol for pain control .  reviewed uric acid level and crp  discussed with orthopedics and with spouse.  Will trial oral prednisone for 5 days and need outpatient follow-up with Ortho  recommended subacute rehab .  Patient agreeable to transition to [facility] on discharge      Morbid obesity (HCC)  Assessment & Plan  bMI 69.56 will need diuresis and counseling on diet exercise and lifestyle modification        Medical Problems       Resolved Problems  Date Reviewed: 5/10/2024            Resolved    Chronic diastolic heart failure (HCC) 5/2/2024     Resolved by  Amanda Dukes MD        Discharging Physician / Practitioner: Pedro Price MD  PCP: Marisa Haque MD  Admission Date:   Admission Orders (From admission, onward)       Ordered        05/02/24 1418  INPATIENT ADMISSION  Once                          Discharge Date: 05/12/24    Consultations During Hospital Stay:  Cardiology, PT/OT/orthopedic    Procedures Performed:   Lab Results   Component Value  Date    WBC 7.74 05/12/2024    HGB 12.4 05/12/2024    HCT 40.1 05/12/2024    MCV 99 (H) 05/12/2024     05/12/2024     Lab Results   Component Value Date    SODIUM 138 05/12/2024    K 4.3 05/12/2024    CL 98 05/12/2024    CO2 36 (H) 05/12/2024    AGAP 4 05/12/2024    BUN 48 (H) 05/12/2024    CREATININE 0.89 05/12/2024    GLUC 148 (H) 05/12/2024    CALCIUM 9.7 05/12/2024    AST 22 05/10/2024    ALT 15 05/10/2024    ALKPHOS 119 (H) 05/10/2024    TP 7.0 05/10/2024    TBILI 1.05 (H) 05/10/2024    EGFR 65 05/12/2024     Collected Updated Procedure Result Status Patient Facility Result Comment    05/02/2024 1443 05/04/2024 1655 Urine culture [[id number]]    (Abnormal)   Urine, Clean Catch    Final result [facility]  Component Value   Urine Culture 60,000-69,000 cfu/ml Proteus mirabilis Abnormal     This organism has been edited. The previous result was Gram Negative Fer on 5/3/2024 at 1602 EDT.       Susceptibility    Proteus mirabilis (1)    Antibiotic Interpretation Microscan  Method Status    ZID Performed  Yes  KHANH Final    Ampicillin ($$) Susceptible <=8.00 ug/ml KHANH Final    Aztreonam ($$$) Susceptible <=4 ug/ml KHANH Final    Cefazolin ($) Susceptible <=2.00 ug/ml KHANH Final    Ciprofloxacin ($) Susceptible <=0.25 ug/ml KHANH Final    Gentamicin ($$) Susceptible <=2 ug/ml KHANH Final    Levofloxacin ($) Susceptible <=0.50 ug/ml KHANH Final    Nitrofurantoin Resistant 64 ug/ml KHANH Final    Tetracycline Resistant >8 ug/ml KHANH Final    Tobramycin ($) Susceptible <=2 ug/ml KHANH Final    Trimethoprim + Sulfamethoxazole ($$$) Susceptible <=0.5/9.5 ug/ml KHANH Final          05/02/2024 1209 05/07/2024 1901 Blood culture #1 [[id number]]   Blood from Hand, Left    Final result [facility]  Component Value   Blood Culture No Growth After 5 Days.             05/02/2024 1209 05/07/2024 1901 Blood culture #2 [[id number]]   Blood from Arm, Left    Final result [facility]  Component Value   Blood Culture No Growth After 5 Days.          Significant Findings / Test Results:   As mentioned above    Incidental Findings:   As mentioned above  I reviewed the above mentioned incidental findings with the patient and/or family and they expressed understanding.    Test Results Pending at Discharge (will require follow up):   none     Outpatient Tests Requested:  CXR in 4 weeks with PCP    Complications: None    Reason for Admission: Generalized weakness and somnolence    Hospital Course:   Tigist Hewitt is a 69 y.o. female patient who originally presented to the hospital on 5/2/2024 due to generalized weakness and somnolence secondary to CHF exacerbation.  Patient received adequate IV diuretics.  With significant improvement, evaluated by cardiology, diuretics transition to p.o. Bumex and continue other medications.    Patient also received few dose of IV steroid due to suspected COPD, with side effect of hyperglycemia, for that reason, insulin dose adjusted with significant improvement, steroid discontinued.  Patient has chronic respiratory failure, chronically uses 4 L of oxygen and CPAP at nighttime, will will need to continue.    Patient also completed adequate antibiotic for cystitis.    This time, patient remained medically stable to be discharged.  Patient is getting discharged to [facility].  Commendation to repeat chest x-ray in 4 weeks with PCP.  She needs to follow-up with PCP, cardiology and other specialties as scheduled.    All lab results, imaging findings, treatment plan and option discussed in details with patient and family member on regular basis over the phone.  Verbalized understanding agrees.      Please see above list of diagnoses and related plan for additional information.     Condition at Discharge: stable    Discharge Day Visit / Exam:   Subjective: Seen and evaluated during the run.  Resting comfortably but denies any significant complaint  "other than cough.  Vitals: Blood Pressure: 97/58 (05/12/24 0937)  Pulse: 86 (05/12/24 0937)  Temperature: (!) 96.6 °F (35.9 °C) (05/12/24 0812)  Temp Source: Temporal (05/10/24 0383)  Respirations: 18 (05/12/24 0812)  Height: 5' 1\" (154.9 cm) (05/03/24 0936)  Weight - Scale: (!) 155 kg (342 lb 2.5 oz) (05/12/24 0542)  SpO2: 95 % (05/12/24 0937)  Exam:   Physical Exam  Vitals and nursing note reviewed.   Constitutional:       Appearance: She is not ill-appearing or diaphoretic.   HENT:      Mouth/Throat:      Mouth: Mucous membranes are moist.      Pharynx: No oropharyngeal exudate.   Eyes:      General: No scleral icterus.        Left eye: No discharge.      Extraocular Movements: Extraocular movements intact.      Conjunctiva/sclera: Conjunctivae normal.      Pupils: Pupils are equal, round, and reactive to light.   Cardiovascular:      Rate and Rhythm: Normal rate.      Heart sounds:      No friction rub. No gallop.   Pulmonary:      Effort: Pulmonary effort is normal. No respiratory distress.      Breath sounds: No stridor. No wheezing or rhonchi.   Musculoskeletal:      Right lower leg: No edema.      Left lower leg: No edema.   Neurological:      Mental Status: She is alert and oriented to person, place, and time.      Cranial Nerves: No cranial nerve deficit.      Sensory: No sensory deficit.      Motor: No weakness.      Coordination: Coordination normal.         Discussion with Family: Updated  () via phone.    Discharge instructions/Information to patient and family:   See after visit summary for information provided to patient and family.      Provisions for Follow-Up Care:  See after visit summary for information related to follow-up care and any pertinent home health orders.      Mobility at time of Discharge:   Basic Mobility Inpatient Raw Score: 8  JH-HLM Goal: 3: Sit at edge of bed  JH-HLM Achieved: 3: Sit at edge of bed  HLM Goal achieved. Continue to encourage appropriate " mobility.     Disposition:   Other Skilled Nursing Facility at [facility]    Planned Readmission: If condition get worse     Discharge Statement:  Greater than 50% of the total time was spent examining patient, answering all patient questions, arranging and discussing plan of care with patient as well as directly providing post-discharge instructions.  Additional time then spent on discharge activities.    Discharge Medications:  See after visit summary for reconciled discharge medications provided to patient and/or family.      **Please Note: This note may have been constructed using a voice recognition system**

## 2024-05-12 NOTE — CASE MANAGEMENT
Case Management Discharge Planning Note    Patient name Tigist Hewitt  Location /-01 MRN [id number]  : 1955 Date 2024       Current Admission Date: 2024  Current Admission Diagnosis:Type 2 diabetes mellitus with hyperglycemia, with long-term current use of insulin ([facility])   Patient Active Problem List    Diagnosis Date Noted    Metabolic alkalosis 2024    Acute pain of left knee 2024    Acute cystitis without hematuria 2024    Ambulatory dysfunction 2024    Hyperkalemia 2024    Lung cyst 2024    Abnormal CT scan, pelvis 2023    CKD (chronic kidney disease) stage 3, GFR 30-59 ml/min ([facility]) 2023    NISHI (obstructive sleep apnea) 2023    Intertrigo 10/29/2022    Vaginal bleeding 10/23/2022    Acute kidney injury superimposed on chronic kidney disease  ([facility]) 10/22/2022    Hypotension 10/22/2022    Acute on chronic diastolic congestive heart failure ([facility]) 2022    IMTIAZ (acute kidney injury) ([facility]) 2022    Morbid obesity ([facility]) 2022    Supratherapeutic INR 04/15/2020    Chronic respiratory failure with hypoxia ([facility]) 2020    Asthma 2020    Atrial fibrillation ([facility]) 2020    COPD (chronic obstructive pulmonary disease) ([facility]) 2020    Type 2 diabetes mellitus with hyperglycemia, with long-term current use of insulin ([facility]) 2020    Shortness of breath 2020    Anemia 2020      LOS (days): 10  Geometric Mean LOS (GMLOS) (days): 3  Days to GMLOS:-6.8     OBJECTIVE:  Risk of Unplanned Readmission Score: 32.86         Current admission status: Inpatient   Preferred Pharmacy:   [facility]/pharmacy #[id number] [address] [address]  Phone: [phone] Fax: [phone]    Primary Care Provider: Marisa Haqeu MD    Primary Insurance: [facility] RE[facility]  Secondary Insurance:     DISCHARGE DETAILS:                    Pt medically stable for  discharge, CM placing transportation request to [facility]          As per [facility] will  pt at 1400 to transport to [facility]                                                                                           5

## 2024-05-12 NOTE — ASSESSMENT & PLAN NOTE
Lab Results   Component Value Date    HGBA1C 7.0 (H) 05/02/2024       Recent Labs     05/11/24  1632 05/11/24 2058 05/12/24  0810 05/12/24  1156   POCGLU 257* 226* 180* 212*         Blood Sugar Average: Last 72 hrs:  (P) 295.7408504748008665  Acceptable blood glucose, goal is 140-180  Blood sugar is improving slowly, continue current treatment.

## 2024-05-12 NOTE — NURSING NOTE
Report given to janine Garcia rn supervisor at [facility]. Iv dcd. Pt cleansed of incont urine. Powder to all folds . Dry wicking sheet place between abdominal folds. Folds remain excoriated.

## 2024-05-12 NOTE — ASSESSMENT & PLAN NOTE
Patient has known left bad arthritis x-ray reviewed.and also placed on prednisone along with Tylenol for pain control .  reviewed uric acid level and crp  discussed with orthopedics and with spouse.  Will trial oral prednisone for 5 days and need outpatient follow-up with Ortho  recommended subacute rehab .  Patient agreeable to transition to [facility] on discharge

## 2024-05-13 NOTE — UTILIZATION REVIEW
NOTIFICATION OF ADMISSION DISCHARGE   This is a Notification of Discharge from [facility]. Please be advised that this patient has been discharge from our facility. Below you will find the admission and discharge date and time including the patient’s disposition.   UTILIZATION REVIEW CONTACT:  Mirela Perkins  Utilization   Network Utilization Review Department  Phone: [phone] x carefully listen to the prompts. All voicemails are confidential.  Email: [email or web address]     ADMISSION INFORMATION  PRESENTATION DATE: 5/2/2024 11:27 AM  OBERVATION ADMISSION DATE:   INPATIENT ADMISSION DATE: 5/2/24  2:18 PM   DISCHARGE DATE: 5/12/2024  2:45 PM   DISPOSITION:Non [facility] SNF/TCU/SNU    Network Utilization Review Department  ATTENTION: Please call with any questions or concerns to [phone] and carefully listen to the prompts so that you are directed to the right person. All voicemails are confidential.   For Discharge needs, contact Care Management DC Support Team at [phone] opt. 2  Send all requests for admission clinical reviews, approved or denied determinations and any other requests to dedicated fax number below belonging to the campus where the patient is receiving treatment. List of dedicated fax numbers for the Facilities:  FACILITY NAME UR FAX NUMBER   ADMISSION DENIALS (Administrative/Medical Necessity) [phone]   DISCHARGE SUPPORT TEAM ([facility]) [phone]   PARENT CHILD HEALTH (Maternity/NICU/Pediatrics) [phone]   [facility] [phone]   [facility] [phone]   [facility] [phone]   [facility] [phone]   [facility] [phone]   [facility] [phone]   [facility] [phone]   [facility]  [phone]   [facility] [phone]   [facility] [phone]   [facility] [phone]   [facility] [phone]

## 2024-05-25 ENCOUNTER — APPOINTMENT (EMERGENCY)
Dept: RADIOLOGY | Facility: HOSPITAL | Age: 69
DRG: 543 | End: 2024-05-25
Payer: COMMERCIAL

## 2024-05-25 ENCOUNTER — APPOINTMENT (EMERGENCY)
Dept: CT IMAGING | Facility: HOSPITAL | Age: 69
DRG: 543 | End: 2024-05-25
Payer: COMMERCIAL

## 2024-05-25 ENCOUNTER — HOSPITAL ENCOUNTER (INPATIENT)
Facility: HOSPITAL | Age: 69
LOS: 4 days | Discharge: HOME HEALTH | DRG: 543 | End: 2024-05-29
Attending: EMERGENCY MEDICINE | Admitting: FAMILY MEDICINE
Payer: COMMERCIAL

## 2024-05-25 DIAGNOSIS — M19.90 OSTEOARTHRITIS: ICD-10-CM

## 2024-05-25 DIAGNOSIS — S82.892A CLOSED FRACTURE OF LEFT ANKLE, INITIAL ENCOUNTER: Primary | ICD-10-CM

## 2024-05-25 DIAGNOSIS — L03.90 CELLULITIS: ICD-10-CM

## 2024-05-25 PROBLEM — M10.9 GOUT: Status: ACTIVE | Noted: 2024-05-25

## 2024-05-25 PROBLEM — I89.1 LYMPHANGITIS: Status: ACTIVE | Noted: 2024-05-25

## 2024-05-25 PROBLEM — I50.30 DIASTOLIC CHF (HCC): Status: ACTIVE | Noted: 2020-04-13

## 2024-05-25 PROBLEM — M25.579 ANKLE PAIN: Status: ACTIVE | Noted: 2024-05-25

## 2024-05-25 PROBLEM — E78.5 HLD (HYPERLIPIDEMIA): Status: ACTIVE | Noted: 2024-05-25

## 2024-05-25 LAB
ALBUMIN SERPL BCP-MCNC: 3.3 G/DL (ref 3.5–5)
ALP SERPL-CCNC: 182 U/L (ref 34–104)
ALT SERPL W P-5'-P-CCNC: 23 U/L (ref 7–52)
ANION GAP SERPL CALCULATED.3IONS-SCNC: 7 MMOL/L (ref 4–13)
AST SERPL W P-5'-P-CCNC: 22 U/L (ref 13–39)
BASOPHILS # BLD AUTO: 0.04 THOUSANDS/ÂΜL (ref 0–0.1)
BASOPHILS NFR BLD AUTO: 1 % (ref 0–1)
BILIRUB SERPL-MCNC: 1.02 MG/DL (ref 0.2–1)
BUN SERPL-MCNC: 22 MG/DL (ref 5–25)
CALCIUM ALBUM COR SERPL-MCNC: 10.2 MG/DL (ref 8.3–10.1)
CALCIUM SERPL-MCNC: 9.6 MG/DL (ref 8.4–10.2)
CHLORIDE SERPL-SCNC: 106 MMOL/L (ref 96–108)
CO2 SERPL-SCNC: 28 MMOL/L (ref 21–32)
CREAT SERPL-MCNC: 1.12 MG/DL (ref 0.6–1.3)
EOSINOPHIL # BLD AUTO: 0.25 THOUSAND/ÂΜL (ref 0–0.61)
EOSINOPHIL NFR BLD AUTO: 4 % (ref 0–6)
ERYTHROCYTE [DISTWIDTH] IN BLOOD BY AUTOMATED COUNT: 16.2 % (ref 11.6–15.1)
GFR SERPL CREATININE-BSD FRML MDRD: 49 ML/MIN/1.73SQ M
GLUCOSE SERPL-MCNC: 102 MG/DL (ref 65–140)
GLUCOSE SERPL-MCNC: 143 MG/DL (ref 65–140)
GLUCOSE SERPL-MCNC: 174 MG/DL (ref 65–140)
HCT VFR BLD AUTO: 38.9 % (ref 34.8–46.1)
HGB BLD-MCNC: 12.1 G/DL (ref 11.5–15.4)
IMM GRANULOCYTES # BLD AUTO: 0.04 THOUSAND/UL (ref 0–0.2)
IMM GRANULOCYTES NFR BLD AUTO: 1 % (ref 0–2)
INR PPP: 1.97 (ref 0.84–1.19)
LYMPHOCYTES # BLD AUTO: 1.13 THOUSANDS/ÂΜL (ref 0.6–4.47)
LYMPHOCYTES NFR BLD AUTO: 19 % (ref 14–44)
MCH RBC QN AUTO: 30.6 PG (ref 26.8–34.3)
MCHC RBC AUTO-ENTMCNC: 31.1 G/DL (ref 31.4–37.4)
MCV RBC AUTO: 98 FL (ref 82–98)
MONOCYTES # BLD AUTO: 0.68 THOUSAND/ÂΜL (ref 0.17–1.22)
MONOCYTES NFR BLD AUTO: 12 % (ref 4–12)
NEUTROPHILS # BLD AUTO: 3.77 THOUSANDS/ÂΜL (ref 1.85–7.62)
NEUTS SEG NFR BLD AUTO: 63 % (ref 43–75)
NRBC BLD AUTO-RTO: 0 /100 WBCS
PLATELET # BLD AUTO: 207 THOUSANDS/UL (ref 149–390)
PLATELET # BLD AUTO: 218 THOUSANDS/UL (ref 149–390)
PMV BLD AUTO: 10.1 FL (ref 8.9–12.7)
PMV BLD AUTO: 10.9 FL (ref 8.9–12.7)
POTASSIUM SERPL-SCNC: 4.5 MMOL/L (ref 3.5–5.3)
PROT SERPL-MCNC: 7.4 G/DL (ref 6.4–8.4)
PROTHROMBIN TIME: 22.7 SECONDS (ref 11.6–14.5)
RBC # BLD AUTO: 3.96 MILLION/UL (ref 3.81–5.12)
SODIUM SERPL-SCNC: 141 MMOL/L (ref 135–147)
WBC # BLD AUTO: 5.91 THOUSAND/UL (ref 4.31–10.16)

## 2024-05-25 PROCEDURE — 73700 CT LOWER EXTREMITY W/O DYE: CPT

## 2024-05-25 PROCEDURE — 82948 REAGENT STRIP/BLOOD GLUCOSE: CPT

## 2024-05-25 PROCEDURE — 85049 AUTOMATED PLATELET COUNT: CPT | Performed by: NURSE PRACTITIONER

## 2024-05-25 PROCEDURE — 99222 1ST HOSP IP/OBS MODERATE 55: CPT | Performed by: FAMILY MEDICINE

## 2024-05-25 PROCEDURE — 99284 EMERGENCY DEPT VISIT MOD MDM: CPT

## 2024-05-25 PROCEDURE — 73610 X-RAY EXAM OF ANKLE: CPT

## 2024-05-25 PROCEDURE — 85610 PROTHROMBIN TIME: CPT | Performed by: NURSE PRACTITIONER

## 2024-05-25 PROCEDURE — 73564 X-RAY EXAM KNEE 4 OR MORE: CPT

## 2024-05-25 PROCEDURE — 80053 COMPREHEN METABOLIC PANEL: CPT | Performed by: NURSE PRACTITIONER

## 2024-05-25 PROCEDURE — 85025 COMPLETE CBC W/AUTO DIFF WBC: CPT | Performed by: NURSE PRACTITIONER

## 2024-05-25 PROCEDURE — 99285 EMERGENCY DEPT VISIT HI MDM: CPT | Performed by: PHYSICIAN ASSISTANT

## 2024-05-25 RX ORDER — ACETAMINOPHEN 325 MG/1
650 TABLET ORAL EVERY 6 HOURS PRN
Status: DISCONTINUED | OUTPATIENT
Start: 2024-05-25 | End: 2024-05-25

## 2024-05-25 RX ORDER — INSULIN LISPRO 100 [IU]/ML
2-12 INJECTION, SOLUTION INTRAVENOUS; SUBCUTANEOUS
Status: DISCONTINUED | OUTPATIENT
Start: 2024-05-25 | End: 2024-05-29 | Stop reason: HOSPADM

## 2024-05-25 RX ORDER — INSULIN GLARGINE 100 [IU]/ML
26 INJECTION, SOLUTION SUBCUTANEOUS
Status: DISCONTINUED | OUTPATIENT
Start: 2024-05-25 | End: 2024-05-29 | Stop reason: HOSPADM

## 2024-05-25 RX ORDER — CEPHALEXIN 500 MG/1
500 CAPSULE ORAL EVERY 6 HOURS SCHEDULED
Qty: 20 CAPSULE | Refills: 0 | Status: SHIPPED | OUTPATIENT
Start: 2024-05-25 | End: 2024-05-29

## 2024-05-25 RX ORDER — OXYCODONE HYDROCHLORIDE 5 MG/1
5 TABLET ORAL EVERY 4 HOURS PRN
Status: DISCONTINUED | OUTPATIENT
Start: 2024-05-25 | End: 2024-05-29 | Stop reason: HOSPADM

## 2024-05-25 RX ORDER — ALLOPURINOL 100 MG/1
100 TABLET ORAL DAILY
Status: DISCONTINUED | OUTPATIENT
Start: 2024-05-25 | End: 2024-05-29 | Stop reason: HOSPADM

## 2024-05-25 RX ORDER — MIDODRINE HYDROCHLORIDE 5 MG/1
5 TABLET ORAL
Status: DISCONTINUED | OUTPATIENT
Start: 2024-05-25 | End: 2024-05-29 | Stop reason: HOSPADM

## 2024-05-25 RX ORDER — FLUTICASONE FUROATE AND VILANTEROL 100; 25 UG/1; UG/1
1 POWDER RESPIRATORY (INHALATION) DAILY
Status: DISCONTINUED | OUTPATIENT
Start: 2024-05-25 | End: 2024-05-29 | Stop reason: HOSPADM

## 2024-05-25 RX ORDER — DOCUSATE SODIUM 100 MG/1
100 CAPSULE, LIQUID FILLED ORAL 2 TIMES DAILY
Status: DISCONTINUED | OUTPATIENT
Start: 2024-05-25 | End: 2024-05-29 | Stop reason: HOSPADM

## 2024-05-25 RX ORDER — OXYCODONE HYDROCHLORIDE 5 MG/1
2.5 TABLET ORAL EVERY 4 HOURS PRN
Status: DISCONTINUED | OUTPATIENT
Start: 2024-05-25 | End: 2024-05-29 | Stop reason: HOSPADM

## 2024-05-25 RX ORDER — FERROUS SULFATE 325(65) MG
325 TABLET ORAL
Status: DISCONTINUED | OUTPATIENT
Start: 2024-05-26 | End: 2024-05-29 | Stop reason: HOSPADM

## 2024-05-25 RX ORDER — OXYCODONE HYDROCHLORIDE AND ACETAMINOPHEN 5; 325 MG/1; MG/1
1 TABLET ORAL EVERY 4 HOURS PRN
Qty: 12 TABLET | Refills: 0 | Status: SHIPPED | OUTPATIENT
Start: 2024-05-25 | End: 2024-05-31

## 2024-05-25 RX ORDER — ALBUTEROL SULFATE 90 UG/1
2 AEROSOL, METERED RESPIRATORY (INHALATION) EVERY 6 HOURS PRN
Status: DISCONTINUED | OUTPATIENT
Start: 2024-05-25 | End: 2024-05-29 | Stop reason: HOSPADM

## 2024-05-25 RX ORDER — MONTELUKAST SODIUM 10 MG/1
10 TABLET ORAL
Status: DISCONTINUED | OUTPATIENT
Start: 2024-05-25 | End: 2024-05-29 | Stop reason: HOSPADM

## 2024-05-25 RX ORDER — ATORVASTATIN CALCIUM 10 MG/1
10 TABLET, FILM COATED ORAL DAILY
Status: DISCONTINUED | OUTPATIENT
Start: 2024-05-25 | End: 2024-05-29 | Stop reason: HOSPADM

## 2024-05-25 RX ORDER — LEVOTHYROXINE SODIUM 0.1 MG/1
200 TABLET ORAL DAILY
Status: DISCONTINUED | OUTPATIENT
Start: 2024-05-25 | End: 2024-05-25

## 2024-05-25 RX ORDER — MAGNESIUM HYDROXIDE/ALUMINUM HYDROXICE/SIMETHICONE 120; 1200; 1200 MG/30ML; MG/30ML; MG/30ML
30 SUSPENSION ORAL EVERY 6 HOURS PRN
Status: DISCONTINUED | OUTPATIENT
Start: 2024-05-25 | End: 2024-05-29 | Stop reason: HOSPADM

## 2024-05-25 RX ORDER — ACETAMINOPHEN 325 MG/1
650 TABLET ORAL EVERY 8 HOURS SCHEDULED
Status: DISCONTINUED | OUTPATIENT
Start: 2024-05-25 | End: 2024-05-29 | Stop reason: HOSPADM

## 2024-05-25 RX ORDER — ENOXAPARIN SODIUM 100 MG/ML
40 INJECTION SUBCUTANEOUS DAILY
Status: DISCONTINUED | OUTPATIENT
Start: 2024-05-25 | End: 2024-05-27

## 2024-05-25 RX ORDER — PANTOPRAZOLE SODIUM 20 MG/1
20 TABLET, DELAYED RELEASE ORAL
Status: DISCONTINUED | OUTPATIENT
Start: 2024-05-26 | End: 2024-05-29 | Stop reason: HOSPADM

## 2024-05-25 RX ORDER — OXYCODONE HYDROCHLORIDE AND ACETAMINOPHEN 5; 325 MG/1; MG/1
1 TABLET ORAL ONCE
Status: COMPLETED | OUTPATIENT
Start: 2024-05-25 | End: 2024-05-25

## 2024-05-25 RX ORDER — INSULIN LISPRO 100 [IU]/ML
5 INJECTION, SOLUTION INTRAVENOUS; SUBCUTANEOUS
Status: DISCONTINUED | OUTPATIENT
Start: 2024-05-25 | End: 2024-05-29 | Stop reason: HOSPADM

## 2024-05-25 RX ORDER — LEVOTHYROXINE SODIUM 88 UG/1
88 TABLET ORAL
Status: DISCONTINUED | OUTPATIENT
Start: 2024-05-26 | End: 2024-05-29 | Stop reason: HOSPADM

## 2024-05-25 RX ORDER — LEVOTHYROXINE SODIUM 0.1 MG/1
200 TABLET ORAL DAILY
Status: DISCONTINUED | OUTPATIENT
Start: 2024-05-26 | End: 2024-05-29 | Stop reason: HOSPADM

## 2024-05-25 RX ORDER — POTASSIUM CHLORIDE 20 MEQ/1
20 TABLET, EXTENDED RELEASE ORAL 3 TIMES DAILY
Status: DISCONTINUED | OUTPATIENT
Start: 2024-05-25 | End: 2024-05-29 | Stop reason: HOSPADM

## 2024-05-25 RX ORDER — METOPROLOL SUCCINATE 25 MG/1
37.5 TABLET, EXTENDED RELEASE ORAL 2 TIMES DAILY
Status: DISCONTINUED | OUTPATIENT
Start: 2024-05-25 | End: 2024-05-29 | Stop reason: HOSPADM

## 2024-05-25 RX ORDER — WARFARIN SODIUM 7.5 MG/1
7.5 TABLET ORAL
Status: DISCONTINUED | OUTPATIENT
Start: 2024-05-25 | End: 2024-05-29 | Stop reason: HOSPADM

## 2024-05-25 RX ORDER — BUMETANIDE 1 MG/1
2 TABLET ORAL 2 TIMES DAILY
Status: DISCONTINUED | OUTPATIENT
Start: 2024-05-25 | End: 2024-05-29 | Stop reason: HOSPADM

## 2024-05-25 RX ADMIN — INSULIN GLARGINE 26 UNITS: 100 INJECTION, SOLUTION SUBCUTANEOUS at 21:41

## 2024-05-25 RX ADMIN — OXYCODONE HYDROCHLORIDE AND ACETAMINOPHEN 1 TABLET: 5; 325 TABLET ORAL at 13:27

## 2024-05-25 RX ADMIN — WARFARIN SODIUM 7.5 MG: 7.5 TABLET ORAL at 20:20

## 2024-05-25 RX ADMIN — OXYCODONE HYDROCHLORIDE 5 MG: 5 TABLET ORAL at 18:29

## 2024-05-25 RX ADMIN — ALLOPURINOL 100 MG: 100 TABLET ORAL at 19:12

## 2024-05-25 RX ADMIN — ENOXAPARIN SODIUM 40 MG: 40 INJECTION SUBCUTANEOUS at 19:22

## 2024-05-25 RX ADMIN — INSULIN LISPRO 5 UNITS: 100 INJECTION, SOLUTION INTRAVENOUS; SUBCUTANEOUS at 17:00

## 2024-05-25 RX ADMIN — INSULIN LISPRO 2 UNITS: 100 INJECTION, SOLUTION INTRAVENOUS; SUBCUTANEOUS at 21:41

## 2024-05-25 RX ADMIN — Medication 2000 UNITS: at 19:12

## 2024-05-25 RX ADMIN — METOPROLOL SUCCINATE 37.5 MG: 25 TABLET, EXTENDED RELEASE ORAL at 20:20

## 2024-05-25 RX ADMIN — MIDODRINE HYDROCHLORIDE 5 MG: 5 TABLET ORAL at 19:13

## 2024-05-25 RX ADMIN — MONTELUKAST 10 MG: 10 TABLET, FILM COATED ORAL at 21:41

## 2024-05-25 RX ADMIN — BUMETANIDE 2 MG: 1 TABLET ORAL at 19:12

## 2024-05-25 RX ADMIN — Medication 1 TABLET: at 19:12

## 2024-05-25 RX ADMIN — FLUTICASONE FUROATE AND VILANTEROL TRIFENATATE 1 PUFF: 100; 25 POWDER RESPIRATORY (INHALATION) at 19:13

## 2024-05-25 RX ADMIN — POTASSIUM CHLORIDE 20 MEQ: 1500 TABLET, EXTENDED RELEASE ORAL at 19:22

## 2024-05-25 RX ADMIN — ATORVASTATIN CALCIUM 10 MG: 10 TABLET, FILM COATED ORAL at 19:12

## 2024-05-25 NOTE — ASSESSMENT & PLAN NOTE
Chronic appearing changes to LLE skin w/ thickening/skin discoloration   CT of LE: Extensive induration of the subcutaneous fat of the medial upper thigh with significant skin thickening, consistent with cellulitis. No evidence of soft tissue gas. No deep fascial edema or fluid collection. No intramuscular fluid collection.   Pending labs   W/ tx prophylactically with cefdinir and observe for improvement over next 24 hours  Will ultimately require outpatient wound care  Labs not yet obtained pending labs nursing notified

## 2024-05-25 NOTE — ASSESSMENT & PLAN NOTE
"Lab Results   Component Value Date    HGBA1C 7.0 (H) 05/02/2024     No results for input(s): \"POCGLU\" in the last 72 hours.    Blood Sugar Average: Last 72 hrs:    SSI AC & QHS  Diabetic diet  Hold PTA glipizide  Continue PTA Lantus 26 U qhs & Humalog 10 U TID w/ meals (will start with 5 units TID and SS tonight )   Increase meal time coverage tomorrow     "

## 2024-05-25 NOTE — H&P
"[facility]  H&P  Name: Tigist Hewitt 69 y.o. female I MRN: [id number]  Unit/Bed#: [facility]-01 I Date of Admission: 5/25/2024   Date of Service: 5/25/2024 I Hospital Day: 0      Assessment & Plan   * Ankle pain  Assessment & Plan  Left ankle pain following attempt to transition off commode when her legs got \"spongy\" and her left ankle turned outward   CT LLE:    Nondisplaced, oblique fracture of the distal fibula  Nondisplaced fracture of the tip of the lateral malleolus   Orthopedics spoke with ED provider  Placed cam boot; nonweightbearing as much as possible  Consult PT/OT; baseline mobility with transfers from lift chair to commode  Analgesia    HLD (hyperlipidemia)  Assessment & Plan  Continue PTA Lipitor 10 mg q day     Gout  Assessment & Plan  No recent flare  Continue allopurinol 100 mg q day     Lymphangitis  Assessment & Plan  Chronic appearing changes to LLE skin w/ thickening/skin discoloration   CT of LE: Extensive induration of the subcutaneous fat of the medial upper thigh with significant skin thickening, consistent with cellulitis. No evidence of soft tissue gas. No deep fascial edema or fluid collection. No intramuscular fluid collection.   Pending labs   W/ tx prophylactically with cefdinir and observe for improvement over next 24 hours  Will ultimately require outpatient wound care  Labs not yet obtained pending labs nursing notified     CKD (chronic kidney disease) stage 3, GFR 30-59 ml/min ([facility])  Assessment & Plan  Lab Results   Component Value Date    EGFR 65 05/12/2024    EGFR 50 05/10/2024    EGFR 49 05/09/2024    CREATININE 0.89 05/12/2024    CREATININE 1.10 05/10/2024    CREATININE 1.12 05/09/2024     Pending BMP  Avoid nephrotoxins and hypotension    GERD (gastroesophageal reflux disease)  Assessment & Plan  Continue PPI therapy qhs     Hypotension  Assessment & Plan  Chronic  Continue midodrine 5 mg TID w/ meals   Trend BP    Hypothyroidism  Assessment & " "Plan  Continue levothyroxine 88 mcg q day   Last TSH therapeutic in 2022 - do you want to check this?*     Chronic respiratory failure with hypoxia (HCC)  Assessment & Plan  Utilizes 4 L via NC at home & CPAP nightly    Anemia  Assessment & Plan  Continue iron supplement  Hemoglobin pending    Diastolic CHF ([facility])  Assessment & Plan  Wt Readings from Last 3 Encounters:   05/12/24 (!) 155 kg (342 lb 2.5 oz)   01/02/24 (!) 149 kg (328 lb 0.7 oz)   10/29/22 (!) 151 kg (331 lb 12.7 oz)     Clinically euvolemic  ECHO (12/2023):  EF 55%   Unable to assess diastolic function due to atrial fibrillation  Continue PTA K Dur, Bumex 2 mg BID & Toprol XL 37.5 mg BID  Daily weights      Type 2 diabetes mellitus with hyperglycemia, with long-term current use of insulin ([facility])  Assessment & Plan  Lab Results   Component Value Date    HGBA1C 7.0 (H) 05/02/2024     No results for input(s): \"POCGLU\" in the last 72 hours.    Blood Sugar Average: Last 72 hrs:    SSI AC & QHS  Diabetic diet  Hold PTA glipizide  Continue PTA Lantus 26 U qhs & Humalog 10 U TID w/ meals (will start with 5 units TID and SS tonight )   Increase meal time coverage tomorrow       COPD (chronic obstructive pulmonary disease) ([facility])  Assessment & Plan  No acute exacerbation  Continue maintenance inhalers  Monitor respiratory status  Respiratory protocol     Atrial fibrillation ([facility])  Assessment & Plan  Rate controlled; continue Toprol XL 37.5 mg BID   Continue Coumadin 7.5 mg q day; INR pending          VTE Pharmacologic Prophylaxis: VTE Score: 16 High Risk (Score >/= 5) - Pharmacological DVT Prophylaxis Ordered: enoxaparin (Lovenox). Sequential Compression Devices Ordered.  Code Status: Level 3 - DNAR and DNI discussed with the patient   Discussion with family: Updated  (friend) at bedside.    Anticipated Length of Stay: Patient will be admitted on an inpatient basis with an anticipated length of stay of greater than 2 midnights secondary to acute " fracture and further workup .    Total Time Spent on Date of Encounter in care of patient: 45 mins. This time was spent on one or more of the following: performing physical exam; counseling and coordination of care; obtaining or reviewing history; documenting in the medical record; reviewing/ordering tests, medications or procedures; communicating with other healthcare professionals and discussing with patient's family/caregivers.    Chief Complaint: left ankle and knee pain     History of Present Illness:  Tigist Hewitt is a 69 y.o. female with a PMH of A-fib, COPD, diabetes type 2, hypothyroidism, heart failure,, prior ablation/cardioversion , NISHI who presents with left knee and ankle pain.  Patient reports that her baseline function is moving from lift chair to commode at max.  Last evening patient reported she was attempting to transfer herself from her commode to her lift chair.  During the process she seems to have twisted her ankle.  She reported that it felt like her left knee was about to follow however family was able to move her left chair under her.  She denied falling.  After which she decided to go to bed and see if it was better in the morning.  However upon waking she was still in discomfort.  Patient came to the emergency room with ankle and knee pain underwent CT scan noting nondisplaced, oblique fracture of the distal fibula and nondisplaced fracture of the tip of the lateral malleolus as well as ankle more T's congruent and no acute knee injury however severe tricompartmental osteoarthrosis.  ED discussed with orthopedics recommending cam boot and nonweightbearing is much as possible.  Patient unable to return home since she cannot stand and pivot due to severe pain.      Review of Systems:  Review of Systems   Constitutional:  Positive for activity change (limited only mobilizes from lift chair to commode). Negative for appetite change, chills, diaphoresis, fatigue, fever and unexpected  weight change.   HENT:  Negative for congestion, sore throat and trouble swallowing.    Eyes:  Negative for visual disturbance.   Respiratory:  Negative for cough, choking, chest tightness, shortness of breath, wheezing and stridor.    Cardiovascular:  Negative for chest pain.   Gastrointestinal:  Negative for anal bleeding, blood in stool, constipation and vomiting.   Endocrine: Polyphagia: ct leg.   Genitourinary:  Negative for difficulty urinating, dysuria and frequency. Dyspareunia: lift chair to commode.  Musculoskeletal:  Positive for arthralgias, back pain and gait problem. Negative for neck pain and neck stiffness.   Skin:  Positive for wound (chronic skin condition bl lower extremeties). Negative for color change.   Neurological:  Negative for dizziness, speech difficulty, weakness and headaches.       Past Medical and Surgical History:   Past Medical History:   Diagnosis Date    Asthma     Atrial fibrillation (HCC)     COPD (chronic obstructive pulmonary disease) (HCC)     Diabetes mellitus (HCC)     Disease of thyroid gland     Heart failure (HCC)     Kidney failure     Morbid obesity due to excess calories (HCC)     NISHI (obstructive sleep apnea)        Past Surgical History:   Procedure Laterality Date    CARDIAC ELECTROPHYSIOLOGY MAPPING AND ABLATION      by Dr. Ferraro at [facility]    CARDIOVERSION N/A     GALLBLADDER SURGERY      HERNIA REPAIR         Meds/Allergies:  Prior to Admission medications    Medication Sig Start Date End Date Taking? Authorizing Provider   cephalexin (KEFLEX) 500 mg capsule Take 1 capsule (500 mg total) by mouth every 6 (six) hours for 5 days 5/25/24 5/30/24 Yes Veronica Garcia PA-C   oxyCODONE-acetaminophen (Percocet) 5-325 mg per tablet Take 1 tablet by mouth every 4 (four) hours as needed for severe pain for up to 6 days Max Daily Amount: 6 tablets 5/25/24 5/31/24 Yes Veronica Garcia PA-C   acetaminophen (TYLENOL) 325 mg tablet Take 2 tablets (650 mg total) by mouth every 6  (six) hours as needed for fever 4/15/20   Gonzalo Jarrett MD   albuterol (2.5 mg/3 mL) 0.083 % nebulizer solution Inhale 2.5 mg every 6 (six) hours as needed    Historical Provider, MD   albuterol (PROVENTIL HFA,VENTOLIN HFA) 90 mcg/act inhaler Inhale 2 puffs every 6 (six) hours as needed    Historical Provider, MD   allopurinol (ZYLOPRIM) 100 mg tablet Take 100 mg by mouth daily Dose needs to be verified    Historical Provider, MD   atorvastatin (LIPITOR) 10 mg tablet Take 10 mg by mouth daily    Historical Provider, MD   bumetanide (BUMEX) 2 mg tablet Take 1 tablet (2 mg total) by mouth 2 (two) times a day 5/12/24 6/11/24  Pedro Price MD   cetirizine (ZyrTEC) 10 mg tablet Take 10 mg by mouth daily    Historical Provider, MD   Cholecalciferol (Vitamin D3) 25 MCG (1000 UT) CAPS Take 2,000 Units by mouth daily    Historical Provider, MD   docusate sodium (COLACE) 100 mg capsule Take 1 capsule (100 mg total) by mouth 2 (two) times a day 10/3/22   Janice Porter MD   ferrous sulfate 325 (65 Fe) mg tablet Take 325 mg by mouth daily with breakfast    Historical Provider, MD   fluticasone-vilanterol (BREO ELLIPTA) 100-25 mcg/inh inhaler Inhale 1 puff daily Rinse mouth after use.    Historical Provider, MD   glipiZIDE (GLUCOTROL XL) 10 mg 24 hr tablet Take 1 tablet by mouth every morning    Historical Provider, MD   Insulin Aspart (NovoLOG) 100 units/mL injection Inject 10 Units under the skin 3 (three) times a day with meals 5/12/24   Pedro Price MD   Lantus SoloStar 100 units/mL SOPN Inject 26 Units under the skin daily at bedtime 26 units 11/25/22   Historical Provider, MD   levothyroxine 200 mcg tablet Take 288 mcg by mouth daily    Historical Provider, MD   levothyroxine 88 mcg tablet  11/20/22   Historical Provider, MD   metoprolol succinate (TOPROL-XL) 25 mg 24 hr tablet Take 1.5 tablets (37.5 mg total) by mouth 2 (two) times a day 1/2/24 5/2/24  Pedro Price MD   midodrine (PROAMATINE) 10 MG tablet  Take 0.5 tablets (5 mg total) by mouth 3 (three) times a day before meals Please hold the medication if your systolic blood pressure is more than 115 1/2/24 2/1/24  Pedro Price MD   montelukast (SINGULAIR) 10 mg tablet Take 10 mg by mouth daily at bedtime    Historical Provider, MD   multivitamin (THERAGRAN) TABS Take 1 tablet by mouth daily    Historical Provider, MD   nystatin (MYCOSTATIN) powder Apply topically 2 (two) times a day 10/3/22   Janice Porter MD   omeprazole (PriLOSEC) 40 MG capsule Take 40 mg by mouth daily    Historical Provider, MD   potassium chloride (K-DUR,KLOR-CON) 20 mEq tablet Take 1 tablet (20 mEq total) by mouth 3 (three) times a day 1/2/24   Pedro Price MD   warfarin (COUMADIN) 7.5 mg tablet INR range is 2-3 10/29/22   Pedro Price MD     I have reviewed home medications with patient personally.    Allergies:   Allergies   Allergen Reactions    Acesulfame Potassium - Food Allergy Anaphylaxis    Aspartame - Food Allergy Anaphylaxis    Saccharin - Food Allergy Anaphylaxis    Sucralose - Food Allergy Anaphylaxis    Metformin GI Intolerance       Social History:  Marital Status: /Civil Union   Occupation: retired   Patient Pre-hospital Living Situation: Home  Patient Pre-hospital Level of Mobility:  lift chair to commode stand pivot only   Patient Pre-hospital Diet Restrictions: unable to eat diabetic diet since she has an allergy to sweeteners   Substance Use History:   Social History     Substance and Sexual Activity   Alcohol Use Never     Social History     Tobacco Use   Smoking Status Never    Passive exposure: Never   Smokeless Tobacco Never     Social History     Substance and Sexual Activity   Drug Use Never       Family History:  Family History   Problem Relation Age of Onset    Arthritis Mother     Hearing loss Mother     Heart disease Mother     Hypertension Mother     Stroke Mother     Alcohol abuse Father     Cancer Father     Diabetes Father      Arthritis Sister     Cancer Sister     Alcohol abuse Brother     Diabetes Son     Arthritis Daughter     Drug abuse Daughter     Heart disease Maternal Grandmother     Hypertension Maternal Grandmother     Stroke Maternal Grandmother     Arthritis Maternal Aunt     Asthma Neg Hx     Birth defects Neg Hx     COPD Neg Hx     Depression Neg Hx     Early death Neg Hx     Hyperlipidemia Neg Hx     Kidney disease Neg Hx     Learning disabilities Neg Hx     Mental illness Neg Hx     Mental retardation Neg Hx     Miscarriages / Stillbirths Neg Hx     Vision loss Neg Hx        Physical Exam:     Vitals:   Blood Pressure: 109/65 (05/25/24 1639)  Pulse: 82 (05/25/24 1639)  Temperature: 97.5 °F (36.4 °C) (05/25/24 1639)  Temp Source: Temporal (05/25/24 1009)  Respirations: (!) 24 (05/25/24 1639)  Weight - Scale: (!) 158 kg (347 lb 10.7 oz) (05/25/24 1634)  SpO2: 94 % (05/25/24 1639)    Physical Exam  Constitutional:       General: She is not in acute distress.     Appearance: She is obese. She is not ill-appearing, toxic-appearing or diaphoretic.   HENT:      Head: Normocephalic and atraumatic.      Nose: No congestion.   Eyes:      General:         Right eye: No discharge.         Left eye: No discharge.   Cardiovascular:      Rate and Rhythm: Normal rate.      Heart sounds: No murmur heard.     No friction rub. No gallop.   Pulmonary:      Effort: No respiratory distress.      Breath sounds: No stridor. No wheezing, rhonchi or rales.   Chest:      Chest wall: No tenderness.   Abdominal:      General: There is no distension.      Palpations: There is no mass.      Tenderness: There is no abdominal tenderness. There is no guarding or rebound.      Hernia: No hernia is present.   Musculoskeletal:         General: Tenderness present. No swelling, deformity or signs of injury.      Right lower leg: Edema present.      Left lower leg: Edema present.      Comments: Left leg with cam boot in place/   Severe chronic skin condition bl  lower extremeties some erythema    Skin:     Coloration: Skin is not jaundiced or pale.      Findings: Erythema (bl legs) present. No bruising, lesion or rash.   Neurological:      Mental Status: She is alert and oriented to person, place, and time.   Psychiatric:         Behavior: Behavior normal.          Additional Data:     Lab Results:              Results from last 7 days   Lab Units 05/25/24  1708   POC GLUCOSE mg/dl 102               Lines/Drains:  Invasive Devices       Peripheral Intravenous Line  Duration             Peripheral IV 05/09/24 Dorsal (posterior);Left Hand 16 days    Peripheral IV 05/25/24 Right Antecubital <1 day                        Imaging: Reviewed radiology reports from this admission including:    CT lower extremity wo contrast left   Final Result by Hui Urias MD (05/25 0862)      1.  Nondisplaced, oblique fracture of the distal fibula.   2.  Nondisplaced fracture of the tip of the lateral malleolus.   3.  Ankle mortise is incongruent.   4.  No acute knee fracture or malalignment.   5.  Severe tricompartmental osteoarthrosis of the knee.   6.  Extensive induration of the subcutaneous fat of the medial upper thigh with significant skin thickening, consistent with cellulitis. No evidence of soft tissue gas. No deep fascial edema or fluid collection. No intramuscular fluid collection.            Workstation performed: [id number]         XR ankle 3+ vw left   Final Result by Gui Liao DO (05/25 9147)      Questionable nondisplaced distal fibular fracture versus prominent trabecular pattern. Follow-up plain films in 10 days may be helpful.               Resident: GIAN BANUELOS I, the attending radiologist, have reviewed the images and agree with the final report above.      Workstation performed: [id number]         XR knee 4+ vw left injury    (Results Pending)       EKG and Other Studies Reviewed on Admission:   EKG: Atrial fibrillation. HR 80's.    ** Please  Note: This note has been constructed using a voice recognition system. **

## 2024-05-25 NOTE — PLAN OF CARE
Problem: Potential for Falls  Goal: Patient will remain free of falls  Description: INTERVENTIONS:  - Educate patient/family on patient safety including physical limitations  - Instruct patient to call for assistance with activity   - Consult OT/PT to assist with strengthening/mobility   - Keep Call bell within reach  - Keep bed low and locked with side rails adjusted as appropriate  - Keep care items and personal belongings within reach  - Initiate and maintain comfort rounds  - Make Fall Risk Sign visible to staff  - Offer Toileting every    Hours, in advance of need  - Initiate/Maintain   alarm  - Obtain necessary fall risk management equipment:     - Apply yellow socks and bracelet for high fall risk patients  - Consider moving patient to room near nurses station  Outcome: Progressing     Problem: Prexisting or High Potential for Compromised Skin Integrity  Goal: Skin integrity is maintained or improved  Description: INTERVENTIONS:  - Identify patients at risk for skin breakdown  - Assess and monitor skin integrity  - Assess and monitor nutrition and hydration status  - Monitor labs   - Assess for incontinence   - Turn and reposition patient  - Assist with mobility/ambulation  - Relieve pressure over bony prominences  - Avoid friction and shearing  - Provide appropriate hygiene as needed including keeping skin clean and dry  - Evaluate need for skin moisturizer/barrier cream  - Collaborate with interdisciplinary team   - Patient/family teaching  - Consider wound care consult   Outcome: Progressing     Problem: PAIN - ADULT  Goal: Verbalizes/displays adequate comfort level or baseline comfort level  Description: Interventions:  - Encourage patient to monitor pain and request assistance  - Assess pain using appropriate pain scale  - Administer analgesics based on type and severity of pain and evaluate response  - Implement non-pharmacological measures as appropriate and evaluate response  - Consider cultural and  social influences on pain and pain management  - Notify physician/advanced practitioner if interventions unsuccessful or patient reports new pain  Outcome: Progressing     Problem: INFECTION - ADULT  Goal: Absence or prevention of progression during hospitalization  Description: INTERVENTIONS:  - Assess and monitor for signs and symptoms of infection  - Monitor lab/diagnostic results  - Monitor all insertion sites, i.e. indwelling lines, tubes, and drains  - Monitor endotracheal if appropriate and nasal secretions for changes in amount and color  - [place] appropriate cooling/warming therapies per order  - Administer medications as ordered  - Instruct and encourage patient and family to use good hand hygiene technique  - Identify and instruct in appropriate isolation precautions for identified infection/condition  Outcome: Progressing  Goal: Absence of fever/infection during neutropenic period  Description: INTERVENTIONS:  - Monitor WBC    Outcome: Progressing     Problem: SAFETY ADULT  Goal: Patient will remain free of falls  Description: INTERVENTIONS:  - Educate patient/family on patient safety including physical limitations  - Instruct patient to call for assistance with activity   - Consult OT/PT to assist with strengthening/mobility   - Keep Call bell within reach  - Keep bed low and locked with side rails adjusted as appropriate  - Keep care items and personal belongings within reach  - Initiate and maintain comfort rounds  - Make Fall Risk Sign visible to staff  - Offer Toileting every    Hours, in advance of need  - Initiate/Maintain   alarm  - Obtain necessary fall risk management equipment:     - Apply yellow socks and bracelet for high fall risk patients  - Consider moving patient to room near nurses station  Outcome: Progressing  Goal: Maintain or return to baseline ADL function  Description: INTERVENTIONS:  -  Assess patient's ability to carry out ADLs; assess patient's baseline for ADL function and  identify physical deficits which impact ability to perform ADLs (bathing, care of mouth/teeth, toileting, grooming, dressing, etc.)  - Assess/evaluate cause of self-care deficits   - Assess range of motion  - Assess patient's mobility; develop plan if impaired  - Assess patient's need for assistive devices and provide as appropriate  - Encourage maximum independence but intervene and supervise when necessary  - Involve family in performance of ADLs  - Assess for home care needs following discharge   - Consider OT consult to assist with ADL evaluation and planning for discharge  - Provide patient education as appropriate  Outcome: Progressing  Goal: Maintains/Returns to pre admission functional level  Description: INTERVENTIONS:  - Perform AM-PAC 6 Click Basic Mobility/ Daily Activity assessment daily.  - Set and communicate daily mobility goal to care team and patient/family/caregiver.   - Collaborate with rehabilitation services on mobility goals if consulted  - Perform Range of Motion    times a day.  - Reposition patient every    hours.  - Dangle patient    times a day  - Stand patient    times a day  - Ambulate patient    times a day  - Out of bed to chair    times a day   - Out of bed for meals          times a day  - Out of bed for toileting  - Record patient progress and toleration of activity level   Outcome: Progressing     Problem: DISCHARGE PLANNING  Goal: Discharge to home or other facility with appropriate resources  Description: INTERVENTIONS:  - Identify barriers to discharge w/patient and caregiver  - Arrange for needed discharge resources and transportation as appropriate  - Identify discharge learning needs (meds, wound care, etc.)  - Arrange for interpretive services to assist at discharge as needed  - Refer to Case Management Department for coordinating discharge planning if the patient needs post-hospital services based on physician/advanced practitioner order or complex needs related to  functional status, cognitive ability, or social support system  Outcome: Progressing     Problem: Knowledge Deficit  Goal: Patient/family/caregiver demonstrates understanding of disease process, treatment plan, medications, and discharge instructions  Description: Complete learning assessment and assess knowledge base.  Interventions:  - Provide teaching at level of understanding  - Provide teaching via preferred learning methods  Outcome: Progressing     Problem: Nutrition/Hydration-ADULT  Goal: Nutrient/Hydration intake appropriate for improving, restoring or maintaining nutritional needs  Description: Monitor and assess patient's nutrition/hydration status for malnutrition. Collaborate with interdisciplinary team and initiate plan and interventions as ordered.  Monitor patient's weight and dietary intake as ordered or per policy. Utilize nutrition screening tool and intervene as necessary. Determine patient's food preferences and provide high-protein, high-caloric foods as appropriate.     INTERVENTIONS:  - Monitor oral intake, urinary output, labs, and treatment plans  - Assess nutrition and hydration status and recommend course of action  - Evaluate amount of meals eaten  - Assist patient with eating if necessary   - Allow adequate time for meals  - Recommend/ encourage appropriate diets, oral nutritional supplements, and vitamin/mineral supplements  - Order, calculate, and assess calorie counts as needed  - Recommend, monitor, and adjust tube feedings and TPN/PPN based on assessed needs  - Assess need for intravenous fluids  - Provide specific nutrition/hydration education as appropriate  - Include patient/family/caregiver in decisions related to nutrition  Outcome: Progressing

## 2024-05-25 NOTE — ED NOTES
Patient reports feeling unable to complete ADLs at home. Caregiver at bedside and reports concern for care.  Provider notified.     Allison A Schoener, RN  05/25/24 8409

## 2024-05-25 NOTE — ASSESSMENT & PLAN NOTE
Wt Readings from Last 3 Encounters:   05/12/24 (!) 155 kg (342 lb 2.5 oz)   01/02/24 (!) 149 kg (328 lb 0.7 oz)   10/29/22 (!) 151 kg (331 lb 12.7 oz)     Clinically euvolemic  ECHO (12/2023):  EF 55%   Unable to assess diastolic function due to atrial fibrillation  Continue PTA K Dur, Bumex 2 mg BID & Toprol XL 37.5 mg BID  Daily weights

## 2024-05-25 NOTE — ED PROVIDER NOTES
"History  Chief Complaint   Patient presents with    Ankle Injury     Pt states she was transferring to a cammode and twisted left ankle and knee. Denies fall.     69-year-old female presents the emergency department for evaluation of left ankle and knee pain.  States last night she was attempting to transfer herself from her commode to her lift chair.  Reports in this process her legs felt \"spongy\" and her left ankle started to turn out.  States it felt like her left knee was about to follow it however family was able to move her lift chair under her.  Denies any fall.  States she did have pain however got \"tucked in for the night and hoped it would be better in the morning.\"  Patient states this morning she still discomfort.  Took Tylenol without significant relief. She denies any hip pain.         Prior to Admission Medications   Prescriptions Last Dose Informant Patient Reported? Taking?   Cholecalciferol (Vitamin D3) 25 MCG (1000 UT) CAPS   Yes No   Sig: Take 2,000 Units by mouth daily   Insulin Aspart (NovoLOG) 100 units/mL injection   No No   Sig: Inject 10 Units under the skin 3 (three) times a day with meals   Lantus SoloStar 100 units/mL SOPN   Yes No   Sig: Inject 26 Units under the skin daily at bedtime 26 units   acetaminophen (TYLENOL) 325 mg tablet   No No   Sig: Take 2 tablets (650 mg total) by mouth every 6 (six) hours as needed for fever   albuterol (2.5 mg/3 mL) 0.083 % nebulizer solution   Yes No   Sig: Inhale 2.5 mg every 6 (six) hours as needed   albuterol (PROVENTIL HFA,VENTOLIN HFA) 90 mcg/act inhaler   Yes No   Sig: Inhale 2 puffs every 6 (six) hours as needed   allopurinol (ZYLOPRIM) 100 mg tablet   Yes No   Sig: Take 100 mg by mouth daily Dose needs to be verified   atorvastatin (LIPITOR) 10 mg tablet   Yes No   Sig: Take 10 mg by mouth daily   bumetanide (BUMEX) 2 mg tablet   No No   Sig: Take 1 tablet (2 mg total) by mouth 2 (two) times a day   cetirizine (ZyrTEC) 10 mg tablet   Yes No "   Sig: Take 10 mg by mouth daily   docusate sodium (COLACE) 100 mg capsule   No No   Sig: Take 1 capsule (100 mg total) by mouth 2 (two) times a day   ferrous sulfate 325 (65 Fe) mg tablet   Yes No   Sig: Take 325 mg by mouth daily with breakfast   fluticasone-vilanterol (BREO ELLIPTA) 100-25 mcg/inh inhaler   Yes No   Sig: Inhale 1 puff daily Rinse mouth after use.   glipiZIDE (GLUCOTROL XL) 10 mg 24 hr tablet   Yes No   Sig: Take 1 tablet by mouth every morning   levothyroxine 200 mcg tablet  Self Yes No   Sig: Take 288 mcg by mouth daily   levothyroxine 88 mcg tablet   Yes No   metoprolol succinate (TOPROL-XL) 25 mg 24 hr tablet   No No   Sig: Take 1.5 tablets (37.5 mg total) by mouth 2 (two) times a day   midodrine (PROAMATINE) 10 MG tablet   No No   Sig: Take 0.5 tablets (5 mg total) by mouth 3 (three) times a day before meals Please hold the medication if your systolic blood pressure is more than 115   montelukast (SINGULAIR) 10 mg tablet   Yes No   Sig: Take 10 mg by mouth daily at bedtime   multivitamin (THERAGRAN) TABS   Yes No   Sig: Take 1 tablet by mouth daily   nystatin (MYCOSTATIN) powder   No No   Sig: Apply topically 2 (two) times a day   omeprazole (PriLOSEC) 40 MG capsule   Yes No   Sig: Take 40 mg by mouth daily   potassium chloride (K-DUR,KLOR-CON) 20 mEq tablet   No No   Sig: Take 1 tablet (20 mEq total) by mouth 3 (three) times a day   warfarin (COUMADIN) 7.5 mg tablet   No No   Sig: INR range is 2-3      Facility-Administered Medications: None       Past Medical History:   Diagnosis Date    Asthma     Atrial fibrillation (HCC)     COPD (chronic obstructive pulmonary disease) (HCC)     Diabetes mellitus (HCC)     Disease of thyroid gland     Heart failure (HCC)     Kidney failure     Morbid obesity due to excess calories (HCC)     NISHI (obstructive sleep apnea)        Past Surgical History:   Procedure Laterality Date    CARDIAC ELECTROPHYSIOLOGY MAPPING AND ABLATION      by Dr. Ferraro at  [facility]    CARDIOVERSION N/A     GALLBLADDER SURGERY      HERNIA REPAIR         Family History   Problem Relation Age of Onset    Arthritis Mother     Hearing loss Mother     Heart disease Mother     Hypertension Mother     Stroke Mother     Alcohol abuse Father     Cancer Father     Diabetes Father     Arthritis Sister     Cancer Sister     Alcohol abuse Brother     Diabetes Son     Arthritis Daughter     Drug abuse Daughter     Heart disease Maternal Grandmother     Hypertension Maternal Grandmother     Stroke Maternal Grandmother     Arthritis Maternal Aunt     Asthma Neg Hx     Birth defects Neg Hx     COPD Neg Hx     Depression Neg Hx     Early death Neg Hx     Hyperlipidemia Neg Hx     Kidney disease Neg Hx     Learning disabilities Neg Hx     Mental illness Neg Hx     Mental retardation Neg Hx     Miscarriages / Stillbirths Neg Hx     Vision loss Neg Hx      I have reviewed and agree with the history as documented.    E-Cigarette/Vaping    E-Cigarette Use Never User      E-Cigarette/Vaping Substances    Nicotine No     THC No     CBD No     Flavoring No     Other No     Unknown No      Social History     Tobacco Use    Smoking status: Never     Passive exposure: Never    Smokeless tobacco: Never   Vaping Use    Vaping status: Never Used   Substance Use Topics    Alcohol use: Never    Drug use: Never       Review of Systems   Constitutional:  Negative for appetite change, fatigue and fever.   Respiratory: Negative.     Cardiovascular: Negative.    Musculoskeletal:         Left ankle and knee pain   Skin: Negative.    Neurological: Negative.    All other systems reviewed and are negative.      Physical Exam  Physical Exam  Vitals and nursing note reviewed.   Constitutional:       General: She is not in acute distress.     Appearance: Normal appearance. She is obese. She is not ill-appearing, toxic-appearing or diaphoretic.   HENT:      Head: Normocephalic.   Eyes:      Conjunctiva/sclera: Conjunctivae normal.    Pulmonary:      Effort: Pulmonary effort is normal.   Musculoskeletal:         General: Tenderness present.      Comments: Denies tenderness to both left ankle and knee.  She has decreased range of motion at the left knee.  No pelvic tenderness.  Exam is limited by body habitus   Skin:     General: Skin is warm and dry.      Comments: Chronic bilateral skin color changes in lower extremities   Bruising on upper extremities    Neurological:      General: No focal deficit present.      Mental Status: She is alert and oriented to person, place, and time.   Psychiatric:         Mood and Affect: Mood normal.         Vital Signs  ED Triage Vitals   Temperature Pulse Respirations Blood Pressure SpO2   05/25/24 1009 05/25/24 1010 05/25/24 1010 05/25/24 1010 05/25/24 1010   97.9 °F (36.6 °C) 74 22 106/55 95 %      Temp Source Heart Rate Source Patient Position - Orthostatic VS BP Location FiO2 (%)   05/25/24 1009 05/25/24 1258 05/25/24 1258 -- --   Temporal Monitor Sitting        Pain Score       05/25/24 1010       10 - Worst Possible Pain           Vitals:    05/25/24 1010 05/25/24 1258   BP: 106/55 105/65   Pulse: 74 78   Patient Position - Orthostatic VS:  Sitting         Visual Acuity      ED Medications  Medications   oxyCODONE-acetaminophen (PERCOCET) 5-325 mg per tablet 1 tablet (1 tablet Oral Given 5/25/24 1327)       Diagnostic Studies  Results Reviewed       None                   CT lower extremity wo contrast left   Final Result by Hui Urias MD (05/25 1243)      1.  Nondisplaced, oblique fracture of the distal fibula.   2.  Nondisplaced fracture of the tip of the lateral malleolus.   3.  Ankle mortise is incongruent.   4.  No acute knee fracture or malalignment.   5.  Severe tricompartmental osteoarthrosis of the knee.   6.  Extensive induration of the subcutaneous fat of the medial upper thigh with significant skin thickening, consistent with cellulitis. No evidence of soft tissue gas. No deep  fascial edema or fluid collection. No intramuscular fluid collection.            Workstation performed: [id number]         XR ankle 3+ vw left   Final Result by Guibrooke Liao DO (05/25 4220)      Questionable nondisplaced distal fibular fracture versus prominent trabecular pattern. Follow-up plain films in 10 days may be helpful.               Resident: GIAN BANUELOS I, the attending radiologist, have reviewed the images and agree with the final report above.      Workstation performed: [id number]         XR knee 4+ vw left injury    (Results Pending)              Procedures  Procedures   Prevalon boot placed by nursing staff      ED Course  ED Course as of 05/25/24 1513   Sat May 25, 2024   1255 CT left lower extremity:    1.  Nondisplaced, oblique fracture of the distal fibula.  2.  Nondisplaced fracture of the tip of the lateral malleolus.  3.  Ankle mortise is incongruent.  4.  No acute knee fracture or malalignment.  5.  Severe tricompartmental osteoarthrosis of the knee.  6.  Extensive induration of the subcutaneous fat of the medial upper thigh with significant skin thickening, consistent with cellulitis. No evidence of soft tissue gas. No deep fascial edema or fluid collection. No intramuscular fluid collection.     1255 TT sent to ortho   1312 Orthopedics recommended cam boot, nonweightbearing as much as possible and orthopedic follow-up.  Patient was agreeable to treatment plan.  Placed in cam boot by nursing staff   1352 Patient was waiting for ride home. States she spoke to her  and does not feel able to go home. Reports too much pain and is unable to stand and pivot which she can't do this   1354 TT sent to medicine    1400 Patient was accepted for admission                               SBIRT 22yo+      Flowsheet Row Most Recent Value   Initial Alcohol Screen: US AUDIT-C     1. How often do you have a drink containing alcohol? 0 Filed at: 05/25/2024 1010   2. How many drinks  containing alcohol do you have on a typical day you are drinking?  0 Filed at: 05/25/2024 1010   3b. FEMALE Any Age, or MALE 65+: How often do you have 4 or more drinks on one occassion? 0 Filed at: 05/25/2024 1010   Audit-C Score 0 Filed at: 05/25/2024 1010   EMORY: How many times in the past year have you...    Used an illegal drug or used a prescription medication for non-medical reasons? Never Filed at: 05/25/2024 1010                      Medical Decision Making  69 year old female presented to the ED for evaluation of left ankle and knee pain status post standing and twisting ankle last night.  Patient at risk for the following but not limited to fracture, contusion, dislocation, sprain.  X-ray imaging and CT imaging were obtained noted for significant osteoarthritis in the left knee and left ankle fracture.  Discussed with orthopedics recommended cam boot and nonweightbearing with close orthopedic follow-up.  This was originally discussed and agreed upon with the patient however after placed in cam boot she does not feel like she would be able to stand and pivot at home which she is required to do.  Therefore patient was accepted to medicine service for continued care and evaluation.  There is also possible cellulitis noted on CT scan.  Patient does have chronic coloration changes in the legs.  She has not fevers. legs chronically discolored. No complaints of pain prior to incident yesterday. Discussed with medicine for continued treatment.     Amount and/or Complexity of Data Reviewed  Radiology: ordered.    Risk  Prescription drug management.  Decision regarding hospitalization.             Disposition  Final diagnoses:   Closed fracture of left ankle, initial encounter   Osteoarthritis   Cellulitis     Time reflects when diagnosis was documented in both MDM as applicable and the Disposition within this note       Time User Action Codes Description Comment    5/25/2024  1:12 PM Veronica Garcia Add [O49.356O]  Closed fracture of left ankle, initial encounter     5/25/2024  1:12 PM Veronica Garcia [M19.90] Osteoarthritis     5/25/2024  1:32 PM Veronica Garcia [L03.90] Cellulitis           ED Disposition       ED Disposition   Admit    Condition   Stable    Date/Time   Sat May 25, 2024 1402    Comment   Case was discussed with Dr. Price and the patient's admission status was agreed to be Admission Status: inpatient status to the service of Dr. Price .               Follow-up Information       Follow up With Specialties Details Why Contact Info    Marisa Haque MD Internal Medicine   [address] [facility] [address]  [phone]      [facility]   [address]  [address]  [facility] [address]  [phone]              Patient's Medications   Discharge Prescriptions    CEPHALEXIN (KEFLEX) 500 MG CAPSULE    Take 1 capsule (500 mg total) by mouth every 6 (six) hours for 5 days       Start Date: 5/25/2024 End Date: 5/30/2024       Order Dose: 500 mg       Quantity: 20 capsule    Refills: 0    OXYCODONE-ACETAMINOPHEN (PERCOCET) 5-325 MG PER TABLET    Take 1 tablet by mouth every 4 (four) hours as needed for severe pain for up to 6 days Max Daily Amount: 6 tablets       Start Date: 5/25/2024 End Date: 5/31/2024       Order Dose: 1 tablet       Quantity: 12 tablet    Refills: 0           PDMP Review         Value Time User    PDMP Reviewed  Yes 1/4/2024 11:09 AM Pedro Price MD            ED Provider  Electronically Signed by             Veronica Garcia PA-C  05/25/24 0082

## 2024-05-25 NOTE — RESPIRATORY THERAPY NOTE
RT Protocol Note  Tigist Hewitt 69 y.o. female MRN: [id number]  Unit/Bed#: -01 Encounter: [id number]    Assessment    Principal Problem:    Ankle pain  Active Problems:    Atrial fibrillation (HCC)    COPD (chronic obstructive pulmonary disease) (HCC)    Type 2 diabetes mellitus with hyperglycemia, with long-term current use of insulin (HCC)    Diastolic CHF (HCC)    Anemia    Chronic respiratory failure with hypoxia (HCC)    Hypothyroidism    Hypotension    GERD (gastroesophageal reflux disease)    CKD (chronic kidney disease) stage 3, GFR 30-59 ml/min (HCC)    Lymphangitis    Gout    HLD (hyperlipidemia)      Home Pulmonary Medications:  Breo Dialy PRN albuterol  Home Devices/Therapy: BiPAP/CPAP    Past Medical History:   Diagnosis Date    Asthma     Atrial fibrillation (HCC)     COPD (chronic obstructive pulmonary disease) (HCC)     Diabetes mellitus (HCC)     Disease of thyroid gland     Heart failure (HCC)     Kidney failure     Morbid obesity due to excess calories (HCC)     NISHI (obstructive sleep apnea)      Social History     Socioeconomic History    Marital status: /Civil Union     Spouse name: None    Number of children: None    Years of education: None    Highest education level: None   Occupational History    None   Tobacco Use    Smoking status: Never     Passive exposure: Never    Smokeless tobacco: Never   Vaping Use    Vaping status: Never Used   Substance and Sexual Activity    Alcohol use: Never    Drug use: Never    Sexual activity: None   Other Topics Concern    None   Social History Narrative    None     Social Determinants of Health     Financial Resource Strain: Not on file   Food Insecurity: No Food Insecurity (1/3/2024)    Hunger Vital Sign     Worried About Running Out of Food in the Last Year: Never true     Ran Out of Food in the Last Year: Never true   Transportation Needs: No Transportation Needs (1/3/2024)    PRAPARE - Transportation     Lack of Transportation  (Medical): No     Lack of Transportation (Non-Medical): No   Physical Activity: Not on file   Stress: Not on file   Social Connections: Not on file   Intimate Partner Violence: Not on file   Housing Stability: Low Risk  (1/3/2024)    Housing Stability Vital Sign     Unable to Pay for Housing in the Last Year: No     Number of Places Lived in the Last Year: 1     Unstable Housing in the Last Year: No       Subjective         Objective    Physical Exam:   Assessment Type: Assess only  General Appearance: Alert, Awake  Respiratory Pattern: Normal  Chest Assessment: Chest expansion symmetrical  Cough: None  O2 Device: 4 L nc    Vitals:  Blood pressure 109/65, pulse 82, temperature 97.5 °F (36.4 °C), resp. rate (!) 24, weight (!) 158 kg (347 lb 10.7 oz), SpO2 94%.          Imaging and other studies: I have personally reviewed pertinent reports.      O2 Device: 4 L nc     Plan    Respiratory Plan: Home Bronchodilator Patient pathway        Resp Comments: (P) Pt brought in for ankle pain. Pt has hx of copd/asthma takes breo daily and prn albuterol. Both ordered. Pt wears chronic 4 L nc and cpap nightly. Pt family brought her own cpap in. Pt denies any resp distress.No changes made.

## 2024-05-25 NOTE — ASSESSMENT & PLAN NOTE
Lab Results   Component Value Date    EGFR 65 05/12/2024    EGFR 50 05/10/2024    EGFR 49 05/09/2024    CREATININE 0.89 05/12/2024    CREATININE 1.10 05/10/2024    CREATININE 1.12 05/09/2024     Pending BMP  Avoid nephrotoxins and hypotension

## 2024-05-25 NOTE — ASSESSMENT & PLAN NOTE
No acute exacerbation  Continue maintenance inhalers  Monitor respiratory status  Respiratory protocol

## 2024-05-25 NOTE — ASSESSMENT & PLAN NOTE
"Left ankle pain following attempt to transition off commode when her legs got \"spongy\" and her left ankle turned outward   CT LLE:    Nondisplaced, oblique fracture of the distal fibula  Nondisplaced fracture of the tip of the lateral malleolus   Orthopedics spoke with ED provider  Placed cam boot; nonweightbearing as much as possible  Consult PT/OT; baseline mobility with transfers from lift chair to commode  Analgesia  "

## 2024-05-25 NOTE — DISCHARGE INSTRUCTIONS
Remain in splint.  Ice and elevate.  Pain medication was sent to your pharmacy.  Be cautious while using this medication may make you drowsy.  Also can be constipating and addicting.  Please follow-up with orthopedics and return with new or worsening symptoms  Call Dr. Morse on Tuesday for apt   CT lower extremity wo contrast left   Final Result      1.  Nondisplaced, oblique fracture of the distal fibula.   2.  Nondisplaced fracture of the tip of the lateral malleolus.   3.  Ankle mortise is incongruent.   4.  No acute knee fracture or malalignment.   5.  Severe tricompartmental osteoarthrosis of the knee.   6.  Extensive induration of the subcutaneous fat of the medial upper thigh with significant skin thickening, consistent with cellulitis. No evidence of soft tissue gas. No deep fascial edema or fluid collection. No intramuscular fluid collection.            Workstation performed: [id number]         XR ankle 3+ vw left    (Results Pending)   XR knee 4+ vw left injury    (Results Pending)

## 2024-05-26 LAB
ALBUMIN SERPL BCP-MCNC: 3.1 G/DL (ref 3.5–5)
ALP SERPL-CCNC: 153 U/L (ref 34–104)
ALT SERPL W P-5'-P-CCNC: 18 U/L (ref 7–52)
ANION GAP SERPL CALCULATED.3IONS-SCNC: 6 MMOL/L (ref 4–13)
AST SERPL W P-5'-P-CCNC: 18 U/L (ref 13–39)
BASOPHILS # BLD AUTO: 0.04 THOUSANDS/ÂΜL (ref 0–0.1)
BASOPHILS NFR BLD AUTO: 1 % (ref 0–1)
BILIRUB SERPL-MCNC: 1.03 MG/DL (ref 0.2–1)
BUN SERPL-MCNC: 21 MG/DL (ref 5–25)
CALCIUM ALBUM COR SERPL-MCNC: 9.9 MG/DL (ref 8.3–10.1)
CALCIUM SERPL-MCNC: 9.2 MG/DL (ref 8.4–10.2)
CHLORIDE SERPL-SCNC: 107 MMOL/L (ref 96–108)
CO2 SERPL-SCNC: 28 MMOL/L (ref 21–32)
CREAT SERPL-MCNC: 0.94 MG/DL (ref 0.6–1.3)
EOSINOPHIL # BLD AUTO: 0.3 THOUSAND/ÂΜL (ref 0–0.61)
EOSINOPHIL NFR BLD AUTO: 5 % (ref 0–6)
ERYTHROCYTE [DISTWIDTH] IN BLOOD BY AUTOMATED COUNT: 16.2 % (ref 11.6–15.1)
GFR SERPL CREATININE-BSD FRML MDRD: 61 ML/MIN/1.73SQ M
GLUCOSE SERPL-MCNC: 112 MG/DL (ref 65–140)
GLUCOSE SERPL-MCNC: 124 MG/DL (ref 65–140)
GLUCOSE SERPL-MCNC: 79 MG/DL (ref 65–140)
GLUCOSE SERPL-MCNC: 80 MG/DL (ref 65–140)
GLUCOSE SERPL-MCNC: 84 MG/DL (ref 65–140)
HCT VFR BLD AUTO: 37.2 % (ref 34.8–46.1)
HGB BLD-MCNC: 11.6 G/DL (ref 11.5–15.4)
IMM GRANULOCYTES # BLD AUTO: 0.03 THOUSAND/UL (ref 0–0.2)
IMM GRANULOCYTES NFR BLD AUTO: 1 % (ref 0–2)
INR PPP: 1.98 (ref 0.84–1.19)
LYMPHOCYTES # BLD AUTO: 1.15 THOUSANDS/ÂΜL (ref 0.6–4.47)
LYMPHOCYTES NFR BLD AUTO: 20 % (ref 14–44)
MCH RBC QN AUTO: 30.4 PG (ref 26.8–34.3)
MCHC RBC AUTO-ENTMCNC: 31.2 G/DL (ref 31.4–37.4)
MCV RBC AUTO: 98 FL (ref 82–98)
MONOCYTES # BLD AUTO: 0.73 THOUSAND/ÂΜL (ref 0.17–1.22)
MONOCYTES NFR BLD AUTO: 12 % (ref 4–12)
NEUTROPHILS # BLD AUTO: 3.62 THOUSANDS/ÂΜL (ref 1.85–7.62)
NEUTS SEG NFR BLD AUTO: 61 % (ref 43–75)
NRBC BLD AUTO-RTO: 0 /100 WBCS
PLATELET # BLD AUTO: 203 THOUSANDS/UL (ref 149–390)
PMV BLD AUTO: 10.6 FL (ref 8.9–12.7)
POTASSIUM SERPL-SCNC: 4 MMOL/L (ref 3.5–5.3)
PROT SERPL-MCNC: 6.7 G/DL (ref 6.4–8.4)
PROTHROMBIN TIME: 22.8 SECONDS (ref 11.6–14.5)
RBC # BLD AUTO: 3.81 MILLION/UL (ref 3.81–5.12)
SODIUM SERPL-SCNC: 141 MMOL/L (ref 135–147)
WBC # BLD AUTO: 5.87 THOUSAND/UL (ref 4.31–10.16)

## 2024-05-26 PROCEDURE — 99232 SBSQ HOSP IP/OBS MODERATE 35: CPT | Performed by: NURSE PRACTITIONER

## 2024-05-26 PROCEDURE — 85025 COMPLETE CBC W/AUTO DIFF WBC: CPT | Performed by: NURSE PRACTITIONER

## 2024-05-26 PROCEDURE — 97163 PT EVAL HIGH COMPLEX 45 MIN: CPT

## 2024-05-26 PROCEDURE — 82948 REAGENT STRIP/BLOOD GLUCOSE: CPT

## 2024-05-26 PROCEDURE — 99222 1ST HOSP IP/OBS MODERATE 55: CPT | Performed by: ORTHOPAEDIC SURGERY

## 2024-05-26 PROCEDURE — 80053 COMPREHEN METABOLIC PANEL: CPT | Performed by: NURSE PRACTITIONER

## 2024-05-26 PROCEDURE — NC001 PR NO CHARGE: Performed by: ORTHOPAEDIC SURGERY

## 2024-05-26 PROCEDURE — 85610 PROTHROMBIN TIME: CPT | Performed by: FAMILY MEDICINE

## 2024-05-26 PROCEDURE — 27786 TREATMENT OF ANKLE FRACTURE: CPT | Performed by: ORTHOPAEDIC SURGERY

## 2024-05-26 RX ADMIN — DOCUSATE SODIUM 100 MG: 100 CAPSULE, LIQUID FILLED ORAL at 09:22

## 2024-05-26 RX ADMIN — LEVOTHYROXINE SODIUM 88 MCG: 100 TABLET ORAL at 05:51

## 2024-05-26 RX ADMIN — Medication 2000 UNITS: at 09:22

## 2024-05-26 RX ADMIN — LEVOTHYROXINE SODIUM 200 MCG: 100 TABLET ORAL at 05:52

## 2024-05-26 RX ADMIN — Medication 1 TABLET: at 09:22

## 2024-05-26 RX ADMIN — OXYCODONE HYDROCHLORIDE 2.5 MG: 5 TABLET ORAL at 20:25

## 2024-05-26 RX ADMIN — PANTOPRAZOLE SODIUM 20 MG: 20 TABLET, DELAYED RELEASE ORAL at 05:51

## 2024-05-26 RX ADMIN — MIDODRINE HYDROCHLORIDE 5 MG: 5 TABLET ORAL at 05:52

## 2024-05-26 RX ADMIN — INSULIN GLARGINE 26 UNITS: 100 INJECTION, SOLUTION SUBCUTANEOUS at 21:39

## 2024-05-26 RX ADMIN — INSULIN LISPRO 5 UNITS: 100 INJECTION, SOLUTION INTRAVENOUS; SUBCUTANEOUS at 11:36

## 2024-05-26 RX ADMIN — MONTELUKAST 10 MG: 10 TABLET, FILM COATED ORAL at 21:39

## 2024-05-26 RX ADMIN — POTASSIUM CHLORIDE 20 MEQ: 1500 TABLET, EXTENDED RELEASE ORAL at 20:26

## 2024-05-26 RX ADMIN — BUMETANIDE 2 MG: 1 TABLET ORAL at 09:22

## 2024-05-26 RX ADMIN — MIDODRINE HYDROCHLORIDE 5 MG: 5 TABLET ORAL at 16:29

## 2024-05-26 RX ADMIN — ACETAMINOPHEN 325MG 650 MG: 325 TABLET ORAL at 21:39

## 2024-05-26 RX ADMIN — ALLOPURINOL 100 MG: 100 TABLET ORAL at 09:22

## 2024-05-26 RX ADMIN — DOCUSATE SODIUM 100 MG: 100 CAPSULE, LIQUID FILLED ORAL at 17:33

## 2024-05-26 RX ADMIN — POTASSIUM CHLORIDE 20 MEQ: 1500 TABLET, EXTENDED RELEASE ORAL at 16:29

## 2024-05-26 RX ADMIN — WARFARIN SODIUM 7.5 MG: 7.5 TABLET ORAL at 17:33

## 2024-05-26 RX ADMIN — BUMETANIDE 2 MG: 1 TABLET ORAL at 17:33

## 2024-05-26 RX ADMIN — ATORVASTATIN CALCIUM 10 MG: 10 TABLET, FILM COATED ORAL at 09:22

## 2024-05-26 RX ADMIN — OXYCODONE HYDROCHLORIDE 5 MG: 5 TABLET ORAL at 02:31

## 2024-05-26 RX ADMIN — ENOXAPARIN SODIUM 40 MG: 40 INJECTION SUBCUTANEOUS at 09:22

## 2024-05-26 RX ADMIN — MIDODRINE HYDROCHLORIDE 5 MG: 5 TABLET ORAL at 11:33

## 2024-05-26 RX ADMIN — INSULIN LISPRO 5 UNITS: 100 INJECTION, SOLUTION INTRAVENOUS; SUBCUTANEOUS at 08:00

## 2024-05-26 RX ADMIN — ACETAMINOPHEN 325MG 650 MG: 325 TABLET ORAL at 05:51

## 2024-05-26 RX ADMIN — FLUTICASONE FUROATE AND VILANTEROL TRIFENATATE 1 PUFF: 100; 25 POWDER RESPIRATORY (INHALATION) at 09:23

## 2024-05-26 RX ADMIN — FERROUS SULFATE TAB 325 MG (65 MG ELEMENTAL FE) 325 MG: 325 (65 FE) TAB at 12:26

## 2024-05-26 RX ADMIN — OXYCODONE HYDROCHLORIDE 5 MG: 5 TABLET ORAL at 09:32

## 2024-05-26 RX ADMIN — POTASSIUM CHLORIDE 20 MEQ: 1500 TABLET, EXTENDED RELEASE ORAL at 09:22

## 2024-05-26 RX ADMIN — METOPROLOL SUCCINATE 37.5 MG: 25 TABLET, EXTENDED RELEASE ORAL at 09:22

## 2024-05-26 NOTE — PLAN OF CARE
Problem: Potential for Falls  Goal: Patient will remain free of falls  Description: INTERVENTIONS:  - Educate patient/family on patient safety including physical limitations  - Instruct patient to call for assistance with activity   - Consult OT/PT to assist with strengthening/mobility   - Keep Call bell within reach  - Keep bed low and locked with side rails adjusted as appropriate  - Keep care items and personal belongings within reach  - Initiate and maintain comfort rounds  - Make Fall Risk Sign visible to staff  - Offer Toileting every    Hours, in advance of need  - Initiate/Maintain   alarm  - Obtain necessary fall risk management equipment:     - Apply yellow socks and bracelet for high fall risk patients  - Consider moving patient to room near nurses station  Outcome: Progressing     Problem: Prexisting or High Potential for Compromised Skin Integrity  Goal: Skin integrity is maintained or improved  Description: INTERVENTIONS:  - Identify patients at risk for skin breakdown  - Assess and monitor skin integrity  - Assess and monitor nutrition and hydration status  - Monitor labs   - Assess for incontinence   - Turn and reposition patient  - Assist with mobility/ambulation  - Relieve pressure over bony prominences  - Avoid friction and shearing  - Provide appropriate hygiene as needed including keeping skin clean and dry  - Evaluate need for skin moisturizer/barrier cream  - Collaborate with interdisciplinary team   - Patient/family teaching  - Consider wound care consult   Outcome: Progressing     Problem: PAIN - ADULT  Goal: Verbalizes/displays adequate comfort level or baseline comfort level  Description: Interventions:  - Encourage patient to monitor pain and request assistance  - Assess pain using appropriate pain scale  - Administer analgesics based on type and severity of pain and evaluate response  - Implement non-pharmacological measures as appropriate and evaluate response  - Consider cultural and  social influences on pain and pain management  - Notify physician/advanced practitioner if interventions unsuccessful or patient reports new pain  Outcome: Progressing     Problem: INFECTION - ADULT  Goal: Absence or prevention of progression during hospitalization  Description: INTERVENTIONS:  - Assess and monitor for signs and symptoms of infection  - Monitor lab/diagnostic results  - Monitor all insertion sites, i.e. indwelling lines, tubes, and drains  - Monitor endotracheal if appropriate and nasal secretions for changes in amount and color  - [place] appropriate cooling/warming therapies per order  - Administer medications as ordered  - Instruct and encourage patient and family to use good hand hygiene technique  - Identify and instruct in appropriate isolation precautions for identified infection/condition  Outcome: Progressing  Goal: Absence of fever/infection during neutropenic period  Description: INTERVENTIONS:  - Monitor WBC    Outcome: Progressing     Problem: SAFETY ADULT  Goal: Patient will remain free of falls  Description: INTERVENTIONS:  - Educate patient/family on patient safety including physical limitations  - Instruct patient to call for assistance with activity   - Consult OT/PT to assist with strengthening/mobility   - Keep Call bell within reach  - Keep bed low and locked with side rails adjusted as appropriate  - Keep care items and personal belongings within reach  - Initiate and maintain comfort rounds  - Make Fall Risk Sign visible to staff  - Offer Toileting every    Hours, in advance of need  - Initiate/Maintain   alarm  - Obtain necessary fall risk management equipment:     - Apply yellow socks and bracelet for high fall risk patients  - Consider moving patient to room near nurses station  Outcome: Progressing  Goal: Maintain or return to baseline ADL function  Description: INTERVENTIONS:  -  Assess patient's ability to carry out ADLs; assess patient's baseline for ADL function and  identify physical deficits which impact ability to perform ADLs (bathing, care of mouth/teeth, toileting, grooming, dressing, etc.)  - Assess/evaluate cause of self-care deficits   - Assess range of motion  - Assess patient's mobility; develop plan if impaired  - Assess patient's need for assistive devices and provide as appropriate  - Encourage maximum independence but intervene and supervise when necessary  - Involve family in performance of ADLs  - Assess for home care needs following discharge   - Consider OT consult to assist with ADL evaluation and planning for discharge  - Provide patient education as appropriate  Outcome: Progressing  Goal: Maintains/Returns to pre admission functional level  Description: INTERVENTIONS:  - Perform AM-PAC 6 Click Basic Mobility/ Daily Activity assessment daily.  - Set and communicate daily mobility goal to care team and patient/family/caregiver.   - Collaborate with rehabilitation services on mobility goals if consulted  - Perform Range of Motion    times a day.  - Reposition patient every    hours.  - Dangle patient    times a day  - Stand patient    times a day  - Ambulate patient    times a day  - Out of bed to chair    times a day   - Out of bed for meals          times a day  - Out of bed for toileting  - Record patient progress and toleration of activity level   Outcome: Progressing     Problem: DISCHARGE PLANNING  Goal: Discharge to home or other facility with appropriate resources  Description: INTERVENTIONS:  - Identify barriers to discharge w/patient and caregiver  - Arrange for needed discharge resources and transportation as appropriate  - Identify discharge learning needs (meds, wound care, etc.)  - Arrange for interpretive services to assist at discharge as needed  - Refer to Case Management Department for coordinating discharge planning if the patient needs post-hospital services based on physician/advanced practitioner order or complex needs related to  functional status, cognitive ability, or social support system  Outcome: Progressing     Problem: Knowledge Deficit  Goal: Patient/family/caregiver demonstrates understanding of disease process, treatment plan, medications, and discharge instructions  Description: Complete learning assessment and assess knowledge base.  Interventions:  - Provide teaching at level of understanding  - Provide teaching via preferred learning methods  Outcome: Progressing     Problem: Nutrition/Hydration-ADULT  Goal: Nutrient/Hydration intake appropriate for improving, restoring or maintaining nutritional needs  Description: Monitor and assess patient's nutrition/hydration status for malnutrition. Collaborate with interdisciplinary team and initiate plan and interventions as ordered.  Monitor patient's weight and dietary intake as ordered or per policy. Utilize nutrition screening tool and intervene as necessary. Determine patient's food preferences and provide high-protein, high-caloric foods as appropriate.     INTERVENTIONS:  - Monitor oral intake, urinary output, labs, and treatment plans  - Assess nutrition and hydration status and recommend course of action  - Evaluate amount of meals eaten  - Assist patient with eating if necessary   - Allow adequate time for meals  - Recommend/ encourage appropriate diets, oral nutritional supplements, and vitamin/mineral supplements  - Order, calculate, and assess calorie counts as needed  - Recommend, monitor, and adjust tube feedings and TPN/PPN based on assessed needs  - Assess need for intravenous fluids  - Provide specific nutrition/hydration education as appropriate  - Include patient/family/caregiver in decisions related to nutrition  Outcome: Progressing

## 2024-05-26 NOTE — PROGRESS NOTES
"[facility]  Progress Note  Name: Tigist Hewitt I  MRN: [id number]  Unit/Bed#: MS [id number] I Date of Admission: 5/25/2024   Date of Service: 5/26/2024 I Hospital Day: 1    Assessment & Plan   * Ankle pain  Assessment & Plan  Left ankle pain following attempt to transition off commode when her legs got \"spongy\" and her left ankle turned outward   CT LLE:    Nondisplaced, oblique fracture of the distal fibula  Nondisplaced fracture of the tip of the lateral malleolus   Orthopedics spoke with ED provider  Placed cam boot; nonweightbearing as much as possible  Consult PT/OT; baseline mobility with transfers from lift chair to commode  Analgesia    Diastolic CHF ([facility])  Assessment & Plan  Wt Readings from Last 3 Encounters:   05/26/24 (!) 162 kg (357 lb 2.3 oz)   05/12/24 (!) 155 kg (342 lb 2.5 oz)   01/02/24 (!) 149 kg (328 lb 0.7 oz)     Clinically euvolemic  ECHO (12/2023):  EF 55%   Unable to assess diastolic function due to atrial fibrillation  Continue PTA K Dur, Bumex 2 mg BID & Toprol XL 37.5 mg BID  Daily weights  Pt is on a fluid restriction       HLD (hyperlipidemia)  Assessment & Plan  Continue PTA Lipitor 10 mg q day     Gout  Assessment & Plan  No recent flare  Continue allopurinol 100 mg q day     Lymphangitis  Assessment & Plan  Chronic appearing changes to LLE skin w/ thickening/skin discoloration   CT of LE: Extensive induration of the subcutaneous fat of the medial upper thigh with significant skin thickening, consistent with cellulitis. No evidence of soft tissue gas. No deep fascial edema or fluid collection. No intramuscular fluid collection.   Labs stable no leukocytosis.   Will ultimately require outpatient wound care - discussed with pt and caregiver - concerned dt limited mobility and transport there  Labs not yet obtained pending labs nursing notified     CKD (chronic kidney disease) stage 3, GFR 30-59 ml/min ([facility])  Assessment & Plan  Lab Results   Component Value " Date    EGFR 61 05/26/2024    EGFR 49 05/25/2024    EGFR 65 05/12/2024    CREATININE 0.94 05/26/2024    CREATININE 1.12 05/25/2024    CREATININE 0.89 05/12/2024     Pending BMP  Avoid nephrotoxins and hypotension    GERD (gastroesophageal reflux disease)  Assessment & Plan  Continue PPI therapy qhs     Hypotension  Assessment & Plan  Chronic  Continue midodrine 5 mg TID w/ meals   Some hypotension continue monitor     Hypothyroidism  Assessment & Plan  Continue levothyroxine 88 mcg q day     Chronic respiratory failure with hypoxia ([facility])  Assessment & Plan  Utilizes 4 L via NC at home & CPAP nightly    Anemia  Assessment & Plan  Continue iron supplement  Hemoglobin pending    Type 2 diabetes mellitus with hyperglycemia, with long-term current use of insulin ([facility])  Assessment & Plan  Lab Results   Component Value Date    HGBA1C 7.0 (H) 05/02/2024     Recent Labs     05/25/24  1708 05/25/24  2055 05/26/24  0730 05/26/24  1129   POCGLU 102 174* 80 112       Blood Sugar Average: Last 72 hrs:  (P) 117  SSI AC & QHS  Diabetic diet  Hold PTA glipizide  Continue PTA Lantus 26 U qhs & Humalog 10 U TID w/ meals (will start with 5 units TID and SS tonight )   Increase meal time coverage to home dose , if increase in blood sugar. At this time bs remains stable       COPD (chronic obstructive pulmonary disease) ([facility])  Assessment & Plan  No acute exacerbation  Continue maintenance inhalers  Monitor respiratory status  Respiratory protocol     Atrial fibrillation ([facility])  Assessment & Plan  Rate controlled; continue Toprol XL 37.5 mg BID   Continue Coumadin 7.5 mg q day  INR 1.98                 VTE Pharmacologic Prophylaxis: VTE Score: 16 High Risk (Score >/= 5) - Pharmacological DVT Prophylaxis Ordered: apixaban (Eliquis). Sequential Compression Devices Ordered.    Mobility:   Basic Mobility Inpatient Raw Score: 8  JH-HLM Goal: 3: Sit at edge of bed  JH-HLM Achieved: 2: Bed activities/Dependent transfer  JH-HLM Goal achieved.  Continue to encourage appropriate mobility.    Patient Centered Rounds: I performed bedside rounds with nursing staff today.   Discussions with Specialists or Other Care Team Provider: nursing     Education and Discussions with Family / Patient: Updated  (caregiver and family ) at bedside.    Total Time Spent on Date of Encounter in care of patient: 40 mins. This time was spent on one or more of the following: performing physical exam; counseling and coordination of care; obtaining or reviewing history; documenting in the medical record; reviewing/ordering tests, medications or procedures; communicating with other healthcare professionals and discussing with patient's family/caregivers.    Current Length of Stay: 1 day(s)  Current Patient Status: Inpatient   Certification Statement: The patient will continue to require additional inpatient hospital stay due to pending PT input in regard to discharge   Discharge Plan: Anticipate discharge in 24-48 hrs to pending pt input    Code Status: Level 3 - DNAR and DNI    Subjective:   Patient is resting in bed.  Reports that she had some reports that she had some pain in her left toe.   Reports pain better now . No other complaints lots of questions about discharge and her needs sp this acute incident   Objective:     Vitals:   Temp (24hrs), Av.8 °F (36.6 °C), Min:97.5 °F (36.4 °C), Max:98.1 °F (36.7 °C)    Temp:  [97.5 °F (36.4 °C)-98.1 °F (36.7 °C)] 98.1 °F (36.7 °C)  HR:  [74-86] 77  Resp:  [17-24] 20  BP: ()/(61-72) 94/68  SpO2:  [78 %-96 %] 92 %  Body mass index is 67.48 kg/m².     Input and Output Summary (last 24 hours):     Intake/Output Summary (Last 24 hours) at 2024 1429  Last data filed at 2024 1256  Gross per 24 hour   Intake 720 ml   Output --   Net 720 ml       Physical Exam:   Physical Exam  Constitutional:       General: She is not in acute distress.     Appearance: She is obese. She is not ill-appearing, toxic-appearing or  diaphoretic.   HENT:      Head: Normocephalic and atraumatic.      Nose: No congestion.   Eyes:      General:         Right eye: No discharge.         Left eye: No discharge.   Cardiovascular:      Rate and Rhythm: Normal rate.      Heart sounds: No murmur heard.     No friction rub. No gallop.   Pulmonary:      Effort: No respiratory distress.      Breath sounds: No stridor. No wheezing, rhonchi or rales.   Chest:      Chest wall: No tenderness.   Abdominal:      General: There is no distension.      Palpations: There is no mass.      Tenderness: There is no abdominal tenderness. There is no guarding or rebound.      Hernia: No hernia is present.   Musculoskeletal:         General: No swelling, tenderness, deformity or signs of injury.      Right lower leg: No edema.      Left lower leg: No edema.      Comments: Bl lymphedema with dark    Skin:     Coloration: Skin is not jaundiced or pale.      Findings: No bruising, erythema, lesion or rash.      Comments: Lichenification of bl legs/ pt has calmazine cream all over unable to see legs at this time due to white paste cream    Neurological:      Mental Status: She is alert and oriented to person, place, and time.   Psychiatric:         Behavior: Behavior normal.          Additional Data:     Labs:  Results from last 7 days   Lab Units 05/26/24  0511   WBC Thousand/uL 5.87   HEMOGLOBIN g/dL 11.6   HEMATOCRIT % 37.2   PLATELETS Thousands/uL 203   SEGS PCT % 61   LYMPHO PCT % 20   MONO PCT % 12   EOS PCT % 5     Results from last 7 days   Lab Units 05/26/24  0511   SODIUM mmol/L 141   POTASSIUM mmol/L 4.0   CHLORIDE mmol/L 107   CO2 mmol/L 28   BUN mg/dL 21   CREATININE mg/dL 0.94   ANION GAP mmol/L 6   CALCIUM mg/dL 9.2   ALBUMIN g/dL 3.1*   TOTAL BILIRUBIN mg/dL 1.03*   ALK PHOS U/L 153*   ALT U/L 18   AST U/L 18   GLUCOSE RANDOM mg/dL 79     Results from last 7 days   Lab Units 05/26/24  0511   INR  1.98*     Results from last 7 days   Lab Units 05/26/24  1129  05/26/24  0730 05/25/24 2055 05/25/24  1708   POC GLUCOSE mg/dl 112 80 174* 102               Lines/Drains:  Invasive Devices       Peripheral Intravenous Line  Duration             Peripheral IV 05/09/24 Dorsal (posterior);Left Hand 17 days    Peripheral IV 05/25/24 Right Antecubital <1 day                          Imaging: No pertinent imaging reviewed.    Recent Cultures (last 7 days):         Last 24 Hours Medication List:   Current Facility-Administered Medications   Medication Dose Route Frequency Provider Last Rate    acetaminophen  650 mg Oral Q8H HALEY CAMPBELL [place]      albuterol  2 puff Inhalation Q6H PRN CAMPBELL Camp      allopurinol  100 mg Oral Daily CAMPBELL [facility]      aluminum-magnesium hydroxide-simethicone  30 mL Oral Q6H PRN CAMPBELL Camp      atorvastatin  10 mg Oral Daily CAMPBELL [facility]      bumetanide  2 mg Oral BID CAMPBELL Camp      Cholecalciferol  2,000 Units Oral Daily CAMPBELL [facility]      docusate sodium  100 mg Oral BID CAMPBELL [facility]      enoxaparin  40 mg Subcutaneous Daily CAMPBELL [facility]      ferrous sulfate  325 mg Oral Daily With Breakfast CAMPBELL Camp      Fluticasone Furoate-Vilanterol  1 puff Inhalation Daily CAMPBELL [facility]      insulin glargine  26 Units Subcutaneous HS CAMPBELL [facility]      insulin lispro  2-12 Units Subcutaneous TID AC C[facility]BELL [facility]      insulin lispro  2-12 Units Subcutaneous HS [facility]BELL [place]      insulin lispro  5 Units Subcutaneous TID With Meals CAMPBELL [facility]      levothyroxine  200 mcg Oral Daily C[facility]BELL [facility]      levothyroxine  88 mcg Oral Early Morning C[facility]BELL [facility]      metoprolol succinate  37.5 mg Oral BID [facility]BELL [facility]      midodrine  5 mg Oral TID AC [facility]BELL [place]      montelukast  10 mg Oral HS [facility]LL [place]      multivitamin-minerals  1 tablet Oral Daily [facility]BELL [place]      oxyCODONE  2.5 mg Oral Q4H PRN Katerina  CAMPBELL [place]      oxyCODONE  5 mg Oral Q4H PRN [facility]BE[facility] [place]      pantoprazole  20 mg Oral Early Morning [facility]      potassium chloride  20 mEq Oral TID [facility]      warfarin  7.5 mg Oral Daily (warfarin) [facility]BELL [place]          Today, Patient Was Seen By: CAMPBELL Camp    **Please Note: This note may have been constructed using a voice recognition system.**

## 2024-05-26 NOTE — ASSESSMENT & PLAN NOTE
Lab Results   Component Value Date    HGBA1C 7.0 (H) 05/02/2024     Recent Labs     05/25/24  1708 05/25/24 2055 05/26/24  0730 05/26/24  1129   POCGLU 102 174* 80 112       Blood Sugar Average: Last 72 hrs:  (P) 117  SSI AC & QHS  Diabetic diet  Hold PTA glipizide  Continue PTA Lantus 26 U qhs & Humalog 10 U TID w/ meals (will start with 5 units TID and SS tonight )   Increase meal time coverage to home dose , if increase in blood sugar. At this time bs remains stable

## 2024-05-26 NOTE — PHYSICAL THERAPY NOTE
" PHYSICAL THERAPY EVALUATION      NAME:  Tigist Hewitt  DATE: 05/26/24    AGE:   69 y.o.  Mrn:   [id number]  ADMIT DX:  Cellulitis [L03.90]  Osteoarthritis [M19.90]  Ankle injury [S99.919A]  Closed fracture of left ankle, initial encounter [R79.746H]    Past Medical History:   Diagnosis Date    Asthma     Atrial fibrillation (HCC)     COPD (chronic obstructive pulmonary disease) (HCC)     Diabetes mellitus (HCC)     Disease of thyroid gland     Heart failure (HCC)     Kidney failure     Morbid obesity due to excess calories (HCC)     NISHI (obstructive sleep apnea)      Length Of Stay: 1  Performed at least 2 patient identifiers during session: Name and Birthday                 PHYSICAL THERAPY EVALUATION:        05/26/24 1430   PT Last Visit   PT Visit Date 05/26/24   Note Type   Note type Evaluation   Pain Assessment   Pain Assessment Tool 0-10   Pain Score 6   Pain Location/Orientation Orientation: Left  (Ankle)   Restrictions/Precautions   Weight Bearing Precautions Per Order Yes   LLE Weight Bearing Per Order   (NWB to \"minimal\" WBing through L ankle)   Braces or Orthoses CAM Boot  (L LE)   Other Precautions O2;Fall Risk;Pain  (4L O2)   Home Living   Type of Home House   Home Layout Performs ADLs on one level;Able to live on main level with bedroom/bathroom   Bathroom Shower/Tub   (sponge bathes)   Bathroom Toilet   (BSC)   Home Equipment Walker;Wheelchair-manual   Additional Comments Sleeps in recliner lift chair.  Chronic use 4L O2.   Prior Function   Level of [place] Needs assistance with ADLs;Needs assistance with functional mobility;Needs assistance with IADLS   Lives With Spouse;Son   Receives Help From Family;Friend(s)  (states she has multiple friends who are RNs who assist with caregiving needs)   IADLs Family/Friend/Other provides transportation;Family/Friend/Other provides meals;Family/Friend/Other provides medication management   Falls in the last 6 months 1 to 4   Comments Friend(s) help " "with bathing/dressing.  Son assists with SPTs recliner lift chair <> BSC using walker   General   Additional Pertinent History D/C'd from this facility roughly 2-3 wks ago to STR --> arrives back here to [facility] from home s/p fall   Family/Caregiver Present No   Cognition   Overall Cognitive Status WFL   Arousal/Participation Alert   Orientation Level Oriented X4   Bed Mobility   Supine to Sit 2  Maximal assistance   Sit to Supine 1  Dependent   Additional Comments pt sits on EOB for 4 minutes, reporting inability to tolerate any longer as she states, \"I'm beginning to shake\"   Transfers   Sit to Stand Unable to assess  (unsafe)   Ambulation/Elevation   Gait Assistance Not tested  (non-ambulatory at baseline)   Balance   Static Sitting Fair   Dynamic Sitting Fair -   Endurance Deficit   Endurance Deficit Yes   Activity Tolerance   Activity Tolerance Patient limited by fatigue   Nurse Made Aware Yes Cici   Assessment   Prognosis Poor   Problem List Decreased strength;Decreased endurance;Impaired balance;Decreased mobility;Pain;Orthopedic restrictions;Decreased skin integrity;Obesity;Impaired judgement   Barriers to Discharge Other (Comment)   Barriers to Discharge Comments high fall risk, sedentary lifestyle posing risk of re-admission, WBing restriction L LE, CAM boot L LE, failed D/C STR to home recently, body habitus   Goals   Patient Goals \"go home with a hospital bed\"   STG Expiration Date 06/09/24   PT Treatment Day 0   Plan   Treatment/Interventions ADL retraining;Functional transfer training;LE strengthening/ROM;Therapeutic exercise;Endurance training;Patient/family training;Bed mobility;Gait training;Equipment eval/education;Compensatory technique education;Spoke to nursing   PT Frequency 1-2x/wk   Discharge Recommendation   Rehab Resource Intensity Level, PT II (Moderate Resource Intensity)   Additional Comments Pt insists on D/Cing back home instead of going to STR:  if she/family decline rehab, pt will " require:  Hospital Bed, David Lift (as well as professional transport back into the home)   AM-PAC Basic Mobility Inpatient   Turning in Flat Bed Without Bedrails 1   Lying on Back to Sitting on Edge of Flat Bed Without Bedrails 2   Moving Bed to Chair 1   Standing Up From Chair Using Arms 1   Walk in Room 1   Climb 3-5 Stairs With Railing 1   Basic Mobility Inpatient Raw Score 7   Turning Head Towards Sound 4   Follow Simple Instructions 4   Low Function Basic Mobility Raw Score  15   Low Function Basic Mobility Standardized Score  23.9   [facility] Highest Level Of Mobility   -HL Goal 2: Bed activities/Dependent transfer   -HL Achieved 3: Sit at edge of bed   End of Consult   Patient Position at End of Consult Supine;Bed/Chair alarm activated;All needs within reach  (4L O2 with SpO2 at 91-92%)     (Please find full objective findings from PT assessment regarding body systems outlined above).     Assessment: Pt is a 69 y.o. female seen for PT evaluation s/p admission to [facility] on 5/25/[facility]24 with Ankle pain.  Order placed for PT services.  Upon evaluation: Pt is presenting with impaired functional mobility due to pain, decreased strength, decreased ROM, decreased endurance, impaired balance, impaired judgment, orthopedic restrictions, fall risk, LE edema, and impaired skin integrity requiring  max - DEP  assistance for bed mobility. Pt's clinical presentation is currently unstable/unpredictable given the functional mobility deficits above, especially weakness, pain, decreased activity tolerance, decreased functional mobility tolerance, and SOB upon exertion, coupled with fall risks as indicated by AM-PAC 6-Clicks: 07/24 as well as hx of falls and obesity and combined with medical complications of pain impacting overall mobility status, readmission to hospital, and need for input for mobility technique/safety.  Pt's PMHx and comorbidities that may affect physical performance and progress  include: A fib, CHF, CKD, DM, and obesity. Personal factors affecting pt at time of IE include: questionable non-compliance, depression, past experience, inability to perform IADLs, inability to perform ADLs, decreased initiation and engagement, and recent fall(s)/fall history. Pt will benefit from continued skilled PT services to address deficits as defined above and to maximize level of functional mobility to facilitate return toward PLOF and improved QOL. From PT/mobility standpoint, recommendation at time of d/c would be Level II (Moderate Resource Intensity pending progress in order to reduce fall risk and maximize pt's functional independence and consistency with mobility in order to facilitate return to PLOF.  Recommend ther ex next 1-2 sessions and mechanical conveyance from nursing standpoint for OOB mobility.     The patient's AM-PAC Basic Mobility Inpatient Short Form Raw Score is 7. A Raw score of less than or equal to 16 suggests the patient may benefit from discharge to post-acute rehabilitation services. Please also refer to the recommendation of the Physical Therapist for safe discharge planning.       Goals: Pt will perform bed mobility tasks with  min A  to reposition in bed and prevent skin integrity issues.  Pt will perform transfers with  mechanical lift  to promote being out of bed. Pt will Tolerate 3 hr OOB to faciliate upright tolerance.         Abhishek Davalos, PT,DPT

## 2024-05-26 NOTE — ASSESSMENT & PLAN NOTE
Lab Results   Component Value Date    EGFR 61 05/26/2024    EGFR 49 05/25/2024    EGFR 65 05/12/2024    CREATININE 0.94 05/26/2024    CREATININE 1.12 05/25/2024    CREATININE 0.89 05/12/2024     Pending BMP  Avoid nephrotoxins and hypotension

## 2024-05-26 NOTE — PLAN OF CARE
Problem: PHYSICAL THERAPY ADULT  Goal: Performs mobility at highest level of function for planned discharge setting.  See evaluation for individualized goals.  Description: Treatment/Interventions: ADL retraining, Functional transfer training, LE strengthening/ROM, Therapeutic exercise, Endurance training, Patient/family training, Bed mobility, Gait training, Equipment eval/education, Compensatory technique education, Spoke to nursing          See flowsheet documentation for full assessment, interventions and recommendations.  Note: Prognosis: Poor  Problem List: Decreased strength, Decreased endurance, Impaired balance, Decreased mobility, Pain, Orthopedic restrictions, Decreased skin integrity, Obesity, Impaired judgement  Assessment: Pt is a 69 y.o. female seen for PT evaluation s/p admission to [facility] on 5/25/2024 with Ankle pain.  Order placed for PT services.  Upon evaluation: Pt is presenting with impaired functional mobility due to pain, decreased strength, decreased ROM, decreased endurance, impaired balance, impaired judgment, orthopedic restrictions, fall risk, LE edema, and impaired skin integrity requiring  max - DEP  assistance for bed mobility. Pt's clinical presentation is currently unstable/unpredictable given the functional mobility deficits above, especially weakness, pain, decreased activity tolerance, decreased functional mobility tolerance, and SOB upon exertion, coupled with fall risks as indicated by AM-PAC 6-Clicks: 07/24 as well as hx of falls and obesity and combined with medical complications of pain impacting overall mobility status, readmission to hospital, and need for input for mobility technique/safety.  Pt's PMHx and comorbidities that may affect physical performance and progress include: A fib, CHF, CKD, DM, and obesity. Personal factors affecting pt at time of IE include: questionable non-compliance, depression, past experience, inability to perform IADLs,  inability to perform ADLs, decreased initiation and engagement, and recent fall(s)/fall history. Pt will benefit from continued skilled PT services to address deficits as defined above and to maximize level of functional mobility to facilitate return toward PLOF and improved QOL. From PT/mobility standpoint, recommendation at time of d/c would be Level II (Moderate Resource Intensity pending progress in order to reduce fall risk and maximize pt's functional independence and consistency with mobility in order to facilitate return to PLOF.  Recommend ther ex next 1-2 sessions and mechanical conveyance from nursing standpoint for OOB mobility.  Barriers to Discharge: Other (Comment)  Barriers to Discharge Comments: high fall risk, sedentary lifestyle posing risk of re-admission, WBing restriction L LE, CAM boot L LE, failed D/C STR to home recently  Rehab Resource Intensity Level, PT: II (Moderate Resource Intensity)    See flowsheet documentation for full assessment.

## 2024-05-26 NOTE — ASSESSMENT & PLAN NOTE
Wt Readings from Last 3 Encounters:   05/26/24 (!) 162 kg (357 lb 2.3 oz)   05/12/24 (!) 155 kg (342 lb 2.5 oz)   01/02/24 (!) 149 kg (328 lb 0.7 oz)     Clinically euvolemic  ECHO (12/2023):  EF 55%   Unable to assess diastolic function due to atrial fibrillation  Continue PTA K Dur, Bumex 2 mg BID & Toprol XL 37.5 mg BID  Daily weights  Pt is on a fluid restriction

## 2024-05-26 NOTE — ASSESSMENT & PLAN NOTE
Chronic appearing changes to LLE skin w/ thickening/skin discoloration   CT of LE: Extensive induration of the subcutaneous fat of the medial upper thigh with significant skin thickening, consistent with cellulitis. No evidence of soft tissue gas. No deep fascial edema or fluid collection. No intramuscular fluid collection.   Labs stable no leukocytosis.   Will ultimately require outpatient wound care - discussed with pt and caregiver - concerned dt limited mobility and transport there  Labs not yet obtained pending labs nursing notified

## 2024-05-26 NOTE — CONSULTS
Consultation - Orthopedics   Tigist Hewitt 69 y.o. female MRN: [id number]  Unit/Bed#: -01 Encounter: [id number]      Assessment & Plan     Assessment:  Fracture distal left fibula; morbid obesity; advanced osteophyte left knee; ambulatory dysfunction; medical problems as listed in the patient's chart  Plan:  Continued use of the cam boot at this time and minimize weightbearing.  Patient may be out of bed to a bedside chair with minimal weightbearing left lower extremity.  Once medically stable, she can be discharged and followed up as an outpatient.    In regards to her left knee, this can be treated as an outpatient with possible steroid injection.  She denies having received any treatment for her knee for several years.    History of Present Illness   Physician Requesting Consult: Geovanny Nava DO  Reason for Consult / Principal Problem: Fracture left ankle  HPI: Tigist Hewitt is a 69 y.o. year old female who presents with injury to her left ankle.  She states that she was getting up from the bedside commode to her lift chair on 5/24/2024 when she twisted her leg and injured her ankle.  She was able to get back into her lift chair and remained there overnight.  When her son attempted to get her out of the lift chair the following morning, she noted significant ankle pain, inability to tolerate what little weightbearing she normally would do and was brought to the emergency room at [facility].  She was subsequently admitted with x-rays demonstrating a fracture of the distal fibula.  Orthopedic consultation has been requested.  She currently lives at home with her son and .  She does minimal, if any, ambulating.  She will pivot from her lift chair to a bedside commode.  She essentially remains in the lift chair throughout the entire day and sleeps in the lift chair at night.  She states she really has not ambulated for approximately 6 to 8 years.  She does have some left knee  pain but admits the exacerbation of her chronic left knee pain that occurred at the time of the injury has resolved and her left knee symptoms are now consistent with her more chronic pain.  She states that she has had corticosteroid injections to her left knee but not since the early 2000's.  She denies any recent treatment for her left knee.  She was scheduled to be seen in the orthopedic office on 5/29/2024.    Consults    Review of Systems: The patient's 10 organ system review is negative excluding the chief complaint and as is consistent with a past medical history.    Historical Information   Past Medical History:   Diagnosis Date    Asthma     Atrial fibrillation (HCC)     COPD (chronic obstructive pulmonary disease) (HCC)     Diabetes mellitus (HCC)     Disease of thyroid gland     Heart failure (HCC)     Kidney failure     Morbid obesity due to excess calories (HCC)     NISHI (obstructive sleep apnea)      Past Surgical History:   Procedure Laterality Date    CARDIAC ELECTROPHYSIOLOGY MAPPING AND ABLATION      by Dr. Ferraro at [facility]    CARDIOVERSION N/A     GALLBLADDER SURGERY      HERNIA REPAIR       Social History   Social History     Substance and Sexual Activity   Alcohol Use Never     Social History     Substance and Sexual Activity   Drug Use Never     E-Cigarette/Vaping    E-Cigarette Use Never User      E-Cigarette/Vaping Substances    Nicotine No     THC No     CBD No     Flavoring No     Other No     Unknown No      Social History     Tobacco Use   Smoking Status Never    Passive exposure: Never   Smokeless Tobacco Never     Family History: non-contributory    Meds/Allergies   all current active meds have been reviewed  Allergies   Allergen Reactions    Acesulfame Potassium - Food Allergy Anaphylaxis    Aspartame - Food Allergy Anaphylaxis    Saccharin - Food Allergy Anaphylaxis    Sucralose - Food Allergy Anaphylaxis    Metformin GI Intolerance       Objective   Vitals: Blood pressure 110/61,  pulse 83, temperature 98.1 °F (36.7 °C), resp. rate 20, weight (!) 162 kg (357 lb 2.3 oz), SpO2 93%.,Body mass index is 67.48 kg/m².      Intake/Output Summary (Last 24 hours) at 5/26/2024 0831  Last data filed at 5/25/2024 1700  Gross per 24 hour   Intake 120 ml   Output --   Net 120 ml     I/O last 24 hours:  In: 120 [P.O.:120]  Out: -     Invasive Devices       Peripheral Intravenous Line  Duration             Peripheral IV 05/09/24 Dorsal (posterior);Left Hand 17 days    Peripheral IV 05/25/24 Right Antecubital <1 day                    Physical Exam: Tigist is alert and oriented and appears to be in no acute distress.  HEENT exam is grossly benign  Heart regular, pulses palpable in the upper extremity, difficult to palpate lower extremities  Lungs clear  Abdomen soft and nontender  Skin without lacerations or abrasions.  Dysvascular changes are noted in the lower extremities bilaterally.  Ortho Exam: The left ankle is immobilized in a cam boot.  This was opened to allow visualization and examination.  The skin is intact in the left lower extremity with chronic lower extremity dysvascular changes noted symmetrical bilaterally.  She has tenderness over the distal fibula of the left ankle and some tenderness over the medial ankle.  She was able to move her toes without difficulty.  Ankle range of motion elicited complaints of pain.  There is no visible deformity to visual inspection.  The remainder of the lower extremity exam is consistent with her chronic left knee pain secondary to advanced degenerative disease.    Lab Results: CBC:   Lab Results   Component Value Date    WBC 5.87 05/26/2024    HGB 11.6 05/26/2024    HCT 37.2 05/26/2024    MCV 98 05/26/2024     05/26/2024    RBC 3.81 05/26/2024    MCH 30.4 05/26/2024    MCHC 31.2 (L) 05/26/2024    RDW 16.2 (H) 05/26/2024    MPV 10.6 05/26/2024    NRBC 0 05/26/2024     CMP:   Lab Results   Component Value Date    SODIUM 141 05/26/2024     05/26/2024     CO2 28 05/26/2024    BUN 21 05/26/2024    CREATININE 0.94 05/26/2024    CALCIUM 9.2 05/26/2024    AST 18 05/26/2024    ALT 18 05/26/2024    ALKPHOS 153 (H) 05/26/2024    EGFR 61 05/26/2024     Imaging Studies:  X-rays and CT scan of the ankle and leg demonstrate advanced degenerative disease of the knee.  There is a fracture of the distal left fibula without obvious injury to the medial malleolus.  The ankle alignment remains overall acceptable.  EKG, Pathology, and Other Studies: I have personally reviewed pertinent reports.

## 2024-05-27 PROBLEM — S82.899A ANKLE FRACTURE: Status: ACTIVE | Noted: 2024-05-25

## 2024-05-27 LAB
GLUCOSE SERPL-MCNC: 104 MG/DL (ref 65–140)
GLUCOSE SERPL-MCNC: 117 MG/DL (ref 65–140)
GLUCOSE SERPL-MCNC: 176 MG/DL (ref 65–140)
GLUCOSE SERPL-MCNC: 180 MG/DL (ref 65–140)
INR PPP: 2.02 (ref 0.84–1.19)
PROTHROMBIN TIME: 23.2 SECONDS (ref 11.6–14.5)

## 2024-05-27 PROCEDURE — 82948 REAGENT STRIP/BLOOD GLUCOSE: CPT

## 2024-05-27 PROCEDURE — 99232 SBSQ HOSP IP/OBS MODERATE 35: CPT

## 2024-05-27 PROCEDURE — 99024 POSTOP FOLLOW-UP VISIT: CPT | Performed by: ORTHOPAEDIC SURGERY

## 2024-05-27 PROCEDURE — 85610 PROTHROMBIN TIME: CPT | Performed by: HOSPITALIST

## 2024-05-27 RX ADMIN — MONTELUKAST 10 MG: 10 TABLET, FILM COATED ORAL at 21:49

## 2024-05-27 RX ADMIN — LEVOTHYROXINE SODIUM 200 MCG: 100 TABLET ORAL at 06:16

## 2024-05-27 RX ADMIN — DOCUSATE SODIUM 100 MG: 100 CAPSULE, LIQUID FILLED ORAL at 10:00

## 2024-05-27 RX ADMIN — METOPROLOL SUCCINATE 37.5 MG: 25 TABLET, EXTENDED RELEASE ORAL at 10:00

## 2024-05-27 RX ADMIN — PANTOPRAZOLE SODIUM 20 MG: 20 TABLET, DELAYED RELEASE ORAL at 06:16

## 2024-05-27 RX ADMIN — POTASSIUM CHLORIDE 20 MEQ: 1500 TABLET, EXTENDED RELEASE ORAL at 21:49

## 2024-05-27 RX ADMIN — INSULIN GLARGINE 26 UNITS: 100 INJECTION, SOLUTION SUBCUTANEOUS at 21:49

## 2024-05-27 RX ADMIN — ACETAMINOPHEN 325MG 650 MG: 325 TABLET ORAL at 14:01

## 2024-05-27 RX ADMIN — ENOXAPARIN SODIUM 40 MG: 40 INJECTION SUBCUTANEOUS at 10:02

## 2024-05-27 RX ADMIN — MIDODRINE HYDROCHLORIDE 5 MG: 5 TABLET ORAL at 11:32

## 2024-05-27 RX ADMIN — POTASSIUM CHLORIDE 20 MEQ: 1500 TABLET, EXTENDED RELEASE ORAL at 17:56

## 2024-05-27 RX ADMIN — BUMETANIDE 2 MG: 1 TABLET ORAL at 10:00

## 2024-05-27 RX ADMIN — POTASSIUM CHLORIDE 20 MEQ: 1500 TABLET, EXTENDED RELEASE ORAL at 10:00

## 2024-05-27 RX ADMIN — ACETAMINOPHEN 325MG 650 MG: 325 TABLET ORAL at 06:15

## 2024-05-27 RX ADMIN — INSULIN LISPRO 5 UNITS: 100 INJECTION, SOLUTION INTRAVENOUS; SUBCUTANEOUS at 11:30

## 2024-05-27 RX ADMIN — FLUTICASONE FUROATE AND VILANTEROL TRIFENATATE 1 PUFF: 100; 25 POWDER RESPIRATORY (INHALATION) at 10:08

## 2024-05-27 RX ADMIN — OXYCODONE HYDROCHLORIDE 5 MG: 5 TABLET ORAL at 00:29

## 2024-05-27 RX ADMIN — WARFARIN SODIUM 7.5 MG: 7.5 TABLET ORAL at 17:56

## 2024-05-27 RX ADMIN — Medication 2000 UNITS: at 10:00

## 2024-05-27 RX ADMIN — FERROUS SULFATE TAB 325 MG (65 MG ELEMENTAL FE) 325 MG: 325 (65 FE) TAB at 10:08

## 2024-05-27 RX ADMIN — BUMETANIDE 2 MG: 1 TABLET ORAL at 17:59

## 2024-05-27 RX ADMIN — Medication 1 TABLET: at 10:00

## 2024-05-27 RX ADMIN — INSULIN LISPRO 2 UNITS: 100 INJECTION, SOLUTION INTRAVENOUS; SUBCUTANEOUS at 21:50

## 2024-05-27 RX ADMIN — ALLOPURINOL 100 MG: 100 TABLET ORAL at 10:00

## 2024-05-27 RX ADMIN — INSULIN LISPRO 2 UNITS: 100 INJECTION, SOLUTION INTRAVENOUS; SUBCUTANEOUS at 11:29

## 2024-05-27 RX ADMIN — LEVOTHYROXINE SODIUM 88 MCG: 100 TABLET ORAL at 06:16

## 2024-05-27 RX ADMIN — OXYCODONE HYDROCHLORIDE 2.5 MG: 5 TABLET ORAL at 15:06

## 2024-05-27 RX ADMIN — OXYCODONE HYDROCHLORIDE 2.5 MG: 5 TABLET ORAL at 21:48

## 2024-05-27 RX ADMIN — MIDODRINE HYDROCHLORIDE 5 MG: 5 TABLET ORAL at 06:16

## 2024-05-27 RX ADMIN — DOCUSATE SODIUM 100 MG: 100 CAPSULE, LIQUID FILLED ORAL at 17:57

## 2024-05-27 RX ADMIN — ATORVASTATIN CALCIUM 10 MG: 10 TABLET, FILM COATED ORAL at 10:00

## 2024-05-27 NOTE — ASSESSMENT & PLAN NOTE
Chronic appearing changes to LLE skin w/ thickening/skin discoloration   CT of LE: Extensive induration of the subcutaneous fat of the medial upper thigh with significant skin thickening, consistent with cellulitis. No evidence of soft tissue gas. No deep fascial edema or fluid collection. No intramuscular fluid collection.   Labs stable no leukocytosis.   Will ultimately require outpatient wound care - discussed with pt and caregiver - concerned dt limited mobility and transport there

## 2024-05-27 NOTE — PROGRESS NOTES
Progress Note - Orthopedics   Tigist Garcíakaty 69 y.o. female MRN: [id number]  Unit/Bed#: -01 Encounter: [id number]    Assessment:  Fracture distal left fibula; ambulatory dysfunction; morbid obesity; chronic lymphangitis and medical problems as listed in her chart    Plan:  Continue current plan.  Ideally, nonweightbearing left lower extremity.  However,  I do not think she would be able to safely mobilize nonweightbearing on her left lower extremity.  Bed rest with David lift utilized to get her into a chair at home would be reasonable and her goal is not to go to a nursing home indicating that she has her son and an RN at home that can help with out of bed to chair using a David lift.  Once discharged, follow-up in approximately 1 week would be appropriate to reevaluate the ankle, obtain new x-rays and ensure that the fracture is remaining adequately aligned.    Weight bearing: Nonweightbearing versus minimal weightbearing for transfers    VTE Pharmacologic Prophylaxis: Warfarin (Coumadin)  VTE Mechanical Prophylaxis: sequential compression device    Subjective: Tigist notes slightly less pain today than previously.    Vitals: Blood pressure 111/71, pulse 77, temperature 98.1 °F (36.7 °C), resp. rate 20, weight (!) 157 kg (347 lb 3.6 oz), SpO2 92%.,Body mass index is 65.61 kg/m².      Intake/Output Summary (Last 24 hours) at 5/27/2024 1003  Last data filed at 5/26/2024 1953  Gross per 24 hour   Intake 360 ml   Output 250 ml   Net 110 ml       Invasive Devices       Peripheral Intravenous Line  Duration             Peripheral IV 05/09/24 Dorsal (posterior);Left Hand 18 days    Peripheral IV 05/25/24 Right Antecubital 1 day                    Physical Exam: The left lower extremity Cam boot remains in place.  She does complain of some tenderness to palpation of the distal fibula.  Also lesser tenderness medially.  There is no change in swelling.  Skin remains intact.

## 2024-05-27 NOTE — UTILIZATION REVIEW
Initial Clinical Review    Admission: Date/Time/Statement:   Admission Orders (From admission, onward)       Ordered        05/25/24 1402  INPATIENT ADMISSION  Once                          Orders Placed This Encounter   Procedures    INPATIENT ADMISSION     Standing Status:   Standing     Number of Occurrences:   1     Order Specific Question:   Level of Care     Answer:   Med Surg [16]     Order Specific Question:   Estimated length of stay     Answer:   More than 2 Midnights     Order Specific Question:   Certification     Answer:   I certify that inpatient services are medically necessary for this patient for a duration of greater than two midnights. See H&P and MD Progress Notes for additional information about the patient's course of treatment.     ED Arrival Information       Expected   -    Arrival   5/25/2024 10:04    Acuity   Urgent              Means of arrival   Ambulance    Escorted by   [facility]    Service   Hospitalist    Admission type   Emergency              Arrival complaint   Ankle Pain             Chief Complaint   Patient presents with    Ankle Injury     Pt states she was transferring to a cammode and twisted left ankle and knee. Denies fall.       Initial Presentation: 69 y.o. female to ED from home via EMS   PMH of A-fib, COPD, diabetes type 2, hypothyroidism, heart failure,, prior ablation/cardioversion , NISHI who presents with left knee and ankle pain.  Patient reports that her baseline function is moving from lift chair to commode at max.  Last evening patient reported she was attempting to transfer herself from her commode to her lift chair.  During the process she seems to have twisted her ankle.  She reported that it felt like her left knee was about to follow however family was able to move her left chair under her.  CT scan noting nondisplaced, oblique fracture of the distal fibula and nondisplaced fracture of the tip of the lateral malleolus as well as ankle more T's  congruent and no acute knee injury however severe tricompartmental osteoarthrosis.   admitted IP status w/  ankle fracture plan for ortho consult , cam boot , NWB as much as possible , pain control. GERD PPI . Hypotension chronic cont midodrine and trend . HF cont bumex , toprol , weights . DM SSI and monitor. COPD  cont inhalers . Afib toprol , coumadin .     PE; BLE edema , erythema .     Anticipated Length of Stay/Certification Statement:  Patient will be admitted on an inpatient basis with an anticipated length of stay of greater than 2 midnights secondary to acute fracture and further workup .    5/26 Ortho consult   Fracture distal left fibula; morbid obesity; advanced osteophyte left knee; ambulatory dysfunction; medical problems as listed in the patient's chart  Plan:  Continued use of the cam boot at this time and minimize weightbearing.  f/u as OP     Date:   5/26 Day 2: c/o some pain in toe . PT OT eval  , baseline mobility with transfers from lift chair to commode . Pain control . PT rec level II . Pt insists on going home instead of STR .     Date: 5/27  Day 3: Has surpassed a 2nd midnight with active treatments and services. Pt declining rehab . Minimal ankle pain this am . Will need heather lift and hospital bed since plan to return home . CM following .         ED Triage Vitals   Temperature Pulse Respirations Blood Pressure SpO2   05/25/24 1009 05/25/24 1010 05/25/24 1010 05/25/24 1010 05/25/24 1010   97.9 °F (36.6 °C) 74 22 106/55 95 %      Temp Source Heart Rate Source Patient Position - Orthostatic VS BP Location FiO2 (%)   05/25/24 1009 05/25/24 1258 05/25/24 1258 -- --   Temporal Monitor Sitting        Pain Score       05/25/24 1010       10 - Worst Possible Pain          Wt Readings from Last 1 Encounters:   05/27/24 (!) 157 kg (347 lb 3.6 oz)     Additional Vital Signs:   Date/Time Temp Pulse Resp BP MAP (mmHg) SpO2 Calculated FIO2 (%) - Nasal Cannula Nasal Cannula O2 Flow Rate (L/min) O2  Device O2 Interface Device Patient Position - Orthostatic VS   05/27/24 08:21:22 98.1 °F (36.7 °C) 77 20 111/71 84 92 % -- -- -- -- --   05/27/24 06:17:42 -- 87 -- 112/66 81 96 % -- -- -- -- --   05/26/24 22:45:48 -- 92 -- 102/62 75 92 % -- -- -- -- --   05/26/24 22:45:11 -- 79 -- 102/62 75 93 % -- -- -- -- --   05/26/24 2040 -- -- -- -- -- 92 % 36 4 L/min Nasal cannula -- --   05/26/24 2029 -- 88 -- 99/58 -- -- -- -- -- -- --   05/26/24 20:27:02 -- 89 -- 115/47 Abnormal  70 90 % -- -- -- -- --   05/26/24 15:46:04 97.9 °F (36.6 °C) 88 16 117/66 83 100 % -- -- -- -- --   05/26/24 11:33:11 -- 77 -- 94/68 77 92 % -- -- -- -- --   05/26/24 0900 -- -- -- -- -- -- 36 4 L/min Nasal cannula -- --   05/26/24 07:34:16 -- 83 20 110/61 77 93 % -- -- -- -- --   05/26/24 05:51:27 -- 86 -- 108/62 77 78 % Abnormal  -- -- -- -- --   05/25/24 23:13:10 98.1 °F (36.7 °C) 79 17 110/72 85 93 % -- -- -- -- --   05/25/24 2200 -- -- -- -- -- 93 % -- -- -- Face mask --   05/25/24 20:19:55 -- 74 -- 118/62 81 95 % 36 4 L/min Nasal cannula -- --   05/25/24 1829 -- -- -- -- -- -- 36 4 L/min Nasal cannula -- --   05/25/24 16:39:47 97.5 °F (36.4 °C) 82 24 Abnormal  109/65 80 94 % -- -- -- -- --   05/25/24 1545 -- 79 19 95/71 -- 96 % 36 4 L/min Nasal cannula -- Sitting   05/25/24 1258 -- 78 20 105/65 -- 94 % 36 4 L/min Nasal cannula -- Sitting   05/25/24 1010 -- 74 22 106/55 -- 95 % 36 4 L/min Nasal cannula -- --     Pertinent Labs/Diagnostic Test Results:   CT lower extremity wo contrast left   Final Result by Hui Urias MD (05/25 6922)      1.  Nondisplaced, oblique fracture of the distal fibula.   2.  Nondisplaced fracture of the tip of the lateral malleolus.   3.  Ankle mortise is incongruent.   4.  No acute knee fracture or malalignment.   5.  Severe tricompartmental osteoarthrosis of the knee.   6.  Extensive induration of the subcutaneous fat of the medial upper thigh with significant skin thickening, consistent with cellulitis. No  evidence of soft tissue gas. No deep fascial edema or fluid collection. No intramuscular fluid collection.            Workstation performed: [id number]         XR ankle 3+ vw left   Final Result by Gui Liao DO (05/25 1458)      Questionable nondisplaced distal fibular fracture versus prominent trabecular pattern. Follow-up plain films in 10 days may be helpful.               Resident: GIAN BANUELOS I, the attending radiologist, have reviewed the images and agree with the final report above.      Workstation performed: [id number]         XR knee 4+ vw left injury   Final Result by Gui Liao DO (05/25 1902)      *Limited study. No acute osseous abnormality within limitations.      Severe tricompartmental osteoarthritis.         Resident: GIAN BANUELOS I, the attending radiologist, have reviewed the images and agree with the final report above.      Workstation performed: [id number]               Results from last 7 days   Lab Units 05/26/24  0511 05/25/24  1823   WBC Thousand/uL 5.87 5.91   HEMOGLOBIN g/dL 11.6 12.1   HEMATOCRIT % 37.2 38.9   PLATELETS Thousands/uL 203 218  207   TOTAL NEUT ABS Thousands/µL 3.62 3.77         Results from last 7 days   Lab Units 05/26/24  0511 05/25/24  1823   SODIUM mmol/L 141 141   POTASSIUM mmol/L 4.0 4.5   CHLORIDE mmol/L 107 106   CO2 mmol/L 28 28   ANION GAP mmol/L 6 7   BUN mg/dL 21 22   CREATININE mg/dL 0.94 1.12   EGFR ml/min/1.73sq m 61 49   CALCIUM mg/dL 9.2 9.6     Results from last 7 days   Lab Units 05/26/24  0511 05/25/24  1823   AST U/L 18 22   ALT U/L 18 23   ALK PHOS U/L 153* 182*   TOTAL PROTEIN g/dL 6.7 7.4   ALBUMIN g/dL 3.1* 3.3*   TOTAL BILIRUBIN mg/dL 1.03* 1.02*     Results from last 7 days   Lab Units 05/27/24  0816 05/26/24 2058 05/26/24  1651 05/26/24  1129 05/26/24  0730 05/25/24  2055 05/25/24  1708   POC GLUCOSE mg/dl 104 124 84 112 80 174* 102     Results from last 7 days   Lab Units 05/26/24  0511 05/25/24  1829    GLUCOSE RANDOM mg/dL 79 143*       Results from last 7 days   Lab Units 05/27/24  0531 05/26/24  0511 05/25/24  1835   PROTIME seconds 23.2* 22.8* 22.7*   INR  2.02* 1.98* 1.97*       ED Treatment:   Medication Administration from 05/25/2024 1003 to 05/25/2024 1630         Date/Time Order Dose Route Action Action by Comments     05/25/2024 1327 EDT oxyCODONE-acetaminophen (PERCOCET) 5-325 mg per tablet 1 tablet 1 tablet Oral Given Allison A Schoener, RN --          Past Medical History:   Diagnosis Date    Asthma     Atrial fibrillation (HCC)     COPD (chronic obstructive pulmonary disease) ([facility])     Diabetes mellitus (HCC)     Disease of thyroid gland     Heart failure ([facility])     Kidney failure     Morbid obesity due to excess calories ([facility])     NISHI (obstructive sleep apnea)      Present on Admission:   CKD (chronic kidney disease) stage 3, GFR 30-59 ml/min ([facility])   Atrial fibrillation ([facility])   Anemia   Chronic respiratory failure with hypoxia ([facility])   Diastolic CHF ([facility])   Hypothyroidism   GERD (gastroesophageal reflux disease)   Hypotension   COPD (chronic obstructive pulmonary disease) ([facility])      Admitting Diagnosis: Cellulitis [L03.90]  Osteoarthritis [M19.90]  Ankle injury [S99.164N]  Closed fracture of left ankle, initial encounter [T64.646N]  Age/Sex: 69 y.o. female  Admission Orders:  Scheduled Medications:  acetaminophen, 650 mg, Oral, Q8H HALEY  allopurinol, 100 mg, Oral, Daily  atorvastatin, 10 mg, Oral, Daily  bumetanide, 2 mg, Oral, BID  Cholecalciferol, 2,000 Units, Oral, Daily  docusate sodium, 100 mg, Oral, BID  enoxaparin, 40 mg, Subcutaneous, Daily  ferrous sulfate, 325 mg, Oral, Daily With Breakfast  Fluticasone Furoate-Vilanterol, 1 puff, Inhalation, Daily  insulin glargine, 26 Units, Subcutaneous, HS  insulin lispro, 2-12 Units, Subcutaneous, TID AC  insulin lispro, 2-12 Units, Subcutaneous, HS  insulin lispro, 5 Units, Subcutaneous, TID With Meals  levothyroxine, 200 mcg, Oral,  Daily  levothyroxine, 88 mcg, Oral, Early Morning  metoprolol succinate, 37.5 mg, Oral, BID  midodrine, 5 mg, Oral, TID AC  montelukast, 10 mg, Oral, HS  multivitamin-minerals, 1 tablet, Oral, Daily  pantoprazole, 20 mg, Oral, Early Morning  potassium chloride, 20 mEq, Oral, TID  warfarin, 7.5 mg, Oral, Daily (warfarin)      Continuous IV Infusions:     PRN Meds:  albuterol, 2 puff, Inhalation, Q6H PRN  aluminum-magnesium hydroxide-simethicone, 30 mL, Oral, Q6H PRN  oxyCODONE, 2.5 mg, Oral, Q4H PRN  oxyCODONE, 5 mg, Oral, Q4H PRN      Fingerstick ac and hs   Fldr restriction   PT OT eval   Weights   IP CONSULT TO ORTHOPEDIC SURGERY    Network Utilization Review Department  ATTENTION: Please call with any questions or concerns to [phone] and carefully listen to the prompts so that you are directed to the right person. All voicemails are confidential.   For Discharge needs, contact Care Management DC Support Team at [phone] opt. [phone]  Send all requests for admission clinical reviews, approved or denied determinations and any other requests to dedicated fax number below belonging to the campus where the patient is receiving treatment. List of dedicated fax numbers for the Facilities:  FACILITY NAME UR FAX NUMBER   ADMISSION DENIALS (Administrative/Medical Necessity) [phone]   DISCHARGE SUPPORT TEAM (NETWORK) [phone]   PARENT CHILD HEALTH (Maternity/NICU/Pediatrics) [phone]   [facility] [phone]   [facility] [phone]   [facility] [phone]   [facility] [phone]   [facility] [phone]   [facility] [phone]   [facility] [phone]   [facility] [phone]   [facility] [phone]   [facility]  [phone]   [facility] [phone]   [facility] [phone]

## 2024-05-27 NOTE — PLAN OF CARE
Problem: Potential for Falls  Goal: Patient will remain free of falls  Description: INTERVENTIONS:  - Educate patient/family on patient safety including physical limitations  - Instruct patient to call for assistance with activity   - Consult OT/PT to assist with strengthening/mobility   - Keep Call bell within reach  - Keep bed low and locked with side rails adjusted as appropriate  - Keep care items and personal belongings within reach  - Initiate and maintain comfort rounds  - Make Fall Risk Sign visible to staff  - Offer Toileting every  Hours, in advance of need  - Initiate/Maintain alarm  - Obtain necessary fall risk management equipment:   - Apply yellow socks and bracelet for high fall risk patients  - Consider moving patient to room near nurses station  Outcome: Progressing     Problem: Prexisting or High Potential for Compromised Skin Integrity  Goal: Skin integrity is maintained or improved  Description: INTERVENTIONS:  - Identify patients at risk for skin breakdown  - Assess and monitor skin integrity  - Assess and monitor nutrition and hydration status  - Monitor labs   - Assess for incontinence   - Turn and reposition patient  - Assist with mobility/ambulation  - Relieve pressure over bony prominences  - Avoid friction and shearing  - Provide appropriate hygiene as needed including keeping skin clean and dry  - Evaluate need for skin moisturizer/barrier cream  - Collaborate with interdisciplinary team   - Patient/family teaching  - Consider wound care consult   Outcome: Progressing     Problem: PAIN - ADULT  Goal: Verbalizes/displays adequate comfort level or baseline comfort level  Description: Interventions:  - Encourage patient to monitor pain and request assistance  - Assess pain using appropriate pain scale  - Administer analgesics based on type and severity of pain and evaluate response  - Implement non-pharmacological measures as appropriate and evaluate response  - Consider cultural and  social influences on pain and pain management  - Notify physician/advanced practitioner if interventions unsuccessful or patient reports new pain  Outcome: Progressing     Problem: INFECTION - ADULT  Goal: Absence or prevention of progression during hospitalization  Description: INTERVENTIONS:  - Assess and monitor for signs and symptoms of infection  - Monitor lab/diagnostic results  - Monitor all insertion sites, i.e. indwelling lines, tubes, and drains  - Monitor endotracheal if appropriate and nasal secretions for changes in amount and color  - [place] appropriate cooling/warming therapies per order  - Administer medications as ordered  - Instruct and encourage patient and family to use good hand hygiene technique  - Identify and instruct in appropriate isolation precautions for identified infection/condition  Outcome: Progressing  Goal: Absence of fever/infection during neutropenic period  Description: INTERVENTIONS:  - Monitor WBC    Outcome: Progressing     Problem: SAFETY ADULT  Goal: Patient will remain free of falls  Description: INTERVENTIONS:  - Educate patient/family on patient safety including physical limitations  - Instruct patient to call for assistance with activity   - Consult OT/PT to assist with strengthening/mobility   - Keep Call bell within reach  - Keep bed low and locked with side rails adjusted as appropriate  - Keep care items and personal belongings within reach  - Initiate and maintain comfort rounds  - Make Fall Risk Sign visible to staff  - Offer Toileting every  Hours, in advance of need  - Initiate/Maintain alarm  - Obtain necessary fall risk management equipment:   - Apply yellow socks and bracelet for high fall risk patients  - Consider moving patient to room near nurses station  Outcome: Progressing  Goal: Maintain or return to baseline ADL function  Description: INTERVENTIONS:  -  Assess patient's ability to carry out ADLs; assess patient's baseline for ADL function and  identify physical deficits which impact ability to perform ADLs (bathing, care of mouth/teeth, toileting, grooming, dressing, etc.)  - Assess/evaluate cause of self-care deficits   - Assess range of motion  - Assess patient's mobility; develop plan if impaired  - Assess patient's need for assistive devices and provide as appropriate  - Encourage maximum independence but intervene and supervise when necessary  - Involve family in performance of ADLs  - Assess for home care needs following discharge   - Consider OT consult to assist with ADL evaluation and planning for discharge  - Provide patient education as appropriate  Outcome: Progressing  Goal: Maintains/Returns to pre admission functional level  Description: INTERVENTIONS:  - Perform AM-PAC 6 Click Basic Mobility/ Daily Activity assessment daily.  - Set and communicate daily mobility goal to care team and patient/family/caregiver.   - Collaborate with rehabilitation services on mobility goals if consulted  - Perform Range of Motion  times a day.  - Reposition patient every hours.  - Dangle patient  times a day  - Stand patient  times a day  - Ambulate patient  times a day  - Out of bed to chair times a day   - Out of bed for meals  times a day  - Out of bed for toileting  - Record patient progress and toleration of activity level   Outcome: Progressing     Problem: DISCHARGE PLANNING  Goal: Discharge to home or other facility with appropriate resources  Description: INTERVENTIONS:  - Identify barriers to discharge w/patient and caregiver  - Arrange for needed discharge resources and transportation as appropriate  - Identify discharge learning needs (meds, wound care, etc.)  - Arrange for interpretive services to assist at discharge as needed  - Refer to Case Management Department for coordinating discharge planning if the patient needs post-hospital services based on physician/advanced practitioner order or complex needs related to functional status, cognitive  ability, or social support system  Outcome: Progressing     Problem: Knowledge Deficit  Goal: Patient/family/caregiver demonstrates understanding of disease process, treatment plan, medications, and discharge instructions  Description: Complete learning assessment and assess knowledge base.  Interventions:  - Provide teaching at level of understanding  - Provide teaching via preferred learning methods  Outcome: Progressing     Problem: Nutrition/Hydration-ADULT  Goal: Nutrient/Hydration intake appropriate for improving, restoring or maintaining nutritional needs  Description: Monitor and assess patient's nutrition/hydration status for malnutrition. Collaborate with interdisciplinary team and initiate plan and interventions as ordered.  Monitor patient's weight and dietary intake as ordered or per policy. Utilize nutrition screening tool and intervene as necessary. Determine patient's food preferences and provide high-protein, high-caloric foods as appropriate.     INTERVENTIONS:  - Monitor oral intake, urinary output, labs, and treatment plans  - Assess nutrition and hydration status and recommend course of action  - Evaluate amount of meals eaten  - Assist patient with eating if necessary   - Allow adequate time for meals  - Recommend/ encourage appropriate diets, oral nutritional supplements, and vitamin/mineral supplements  - Order, calculate, and assess calorie counts as needed  - Recommend, monitor, and adjust tube feedings and TPN/PPN based on assessed needs  - Assess need for intravenous fluids  - Provide specific nutrition/hydration education as appropriate  - Include patient/family/caregiver in decisions related to nutrition  Outcome: Progressing

## 2024-05-27 NOTE — ASSESSMENT & PLAN NOTE
Lab Results   Component Value Date    HGBA1C 7.0 (H) 05/02/2024     Recent Labs     05/26/24  1651 05/26/24  2058 05/27/24  0816 05/27/24  1112   POCGLU 84 124 104 180*         Blood Sugar Average: Last 72 hrs:  (P) 120  SSI AC & QHS  Diabetic diet  Hold PTA glipizide  Continue PTA Lantus 26 U qhs & Humalog 10 U TID w/ meals (will start with 5 units TID and SS tonight )   Increase meal time coverage to home dose , if increase in blood sugar. At this time bs remains stable

## 2024-05-27 NOTE — ASSESSMENT & PLAN NOTE
"Left ankle pain following attempt to transition off commode when her legs got \"spongy\" and her left ankle turned outward   CT LLE:    Nondisplaced, oblique fracture of the distal fibula  Nondisplaced fracture of the tip of the lateral malleolus   Orthopedics spoke with ED provider  Placed cam boot; nonweightbearing as much as possible  Consult PT/OT; baseline mobility with transfers from lift chair to commode  Declines rehab   Will need heather lift and hospital bed  Analgesia  "

## 2024-05-27 NOTE — ASSESSMENT & PLAN NOTE
Wt Readings from Last 3 Encounters:   05/27/24 (!) 157 kg (347 lb 3.6 oz)   05/12/24 (!) 155 kg (342 lb 2.5 oz)   01/02/24 (!) 149 kg (328 lb 0.7 oz)     Clinically euvolemic  ECHO (12/2023):  EF 55%   Unable to assess diastolic function due to atrial fibrillation  Continue PTA K Dur, Bumex 2 mg BID & Toprol XL 37.5 mg BID  Daily weights  Pt is on a fluid restriction

## 2024-05-27 NOTE — DISCHARGE SUMMARY
"[facility]  Discharge- Tigist Hewitt 1955, 69 y.o. female MRN: [id number]  Unit/Bed#: -Jonathan Encounter: [id number]  Primary Care Provider: Marisa Haque MD   Date and time admitted to hospital: 5/25/2024 10:04 AM    * Ankle fracture  Assessment & Plan  Left ankle pain following attempt to transition off commode when her legs got \"spongy\" and her left ankle turned outward   CT LLE:    Nondisplaced, oblique fracture of the distal fibula  Nondisplaced fracture of the tip of the lateral malleolus   Orthopedics spoke with ED provider  Placed cam boot; nonweightbearing as much as possible  Consult PT/OT; baseline mobility with transfers from lift chair to commode  Declines rehab   Will need heather lift and hospital bed  Analgesia    HLD (hyperlipidemia)  Assessment & Plan  Continue PTA Lipitor 10 mg q day     Gout  Assessment & Plan  No recent flare  Continue allopurinol 100 mg q day     Lymphangitis  Assessment & Plan  Chronic appearing changes to LLE skin w/ thickening/skin discoloration   CT of LE: Extensive induration of the subcutaneous fat of the medial upper thigh with significant skin thickening, consistent with cellulitis. No evidence of soft tissue gas. No deep fascial edema or fluid collection. No intramuscular fluid collection.   Labs stable no leukocytosis.   Will ultimately require outpatient wound care - discussed with pt and caregiver - concerned dt limited mobility and transport there    CKD (chronic kidney disease) stage 3, GFR 30-59 ml/min ([facility])  Assessment & Plan  Lab Results   Component Value Date    EGFR 57 05/28/2024    EGFR 61 05/26/2024    EGFR 49 05/25/2024    CREATININE 0.99 05/28/2024    CREATININE 0.94 05/26/2024    CREATININE 1.12 05/25/2024     Pending BMP  Avoid nephrotoxins and hypotension    GERD (gastroesophageal reflux disease)  Assessment & Plan  Continue PPI therapy qhs     Hypotension  Assessment & Plan  Chronic  Continue midodrine 5 mg TID " w/ meals   Some hypotension continue monitor     Hypothyroidism  Assessment & Plan  Continue levothyroxine 88 mcg q day     Chronic respiratory failure with hypoxia (HCC)  Assessment & Plan  Utilizes 4 L via NC at home & CPAP nightly    Anemia  Assessment & Plan  Continue iron supplement  Hemoglobin pending    Diastolic CHF (HCC)  Assessment & Plan  Wt Readings from Last 3 Encounters:   05/28/24 (!) 156 kg (343 lb 7.6 oz)   05/12/24 (!) 155 kg (342 lb 2.5 oz)   01/02/24 (!) 149 kg (328 lb 0.7 oz)     Clinically euvolemic  ECHO (12/2023):  EF 55%   Unable to assess diastolic function due to atrial fibrillation  Continue PTA K Dur, Bumex 2 mg BID & Toprol XL 37.5 mg BID  Daily weights  Pt is on a fluid restriction       Type 2 diabetes mellitus with hyperglycemia, with long-term current use of insulin (HCC)  Assessment & Plan  Lab Results   Component Value Date    HGBA1C 7.0 (H) 05/02/2024     Recent Labs     05/27/24  1618 05/27/24  2138 05/28/24  0752 05/28/24  1151   POCGLU 117 176* 147* 160*         Blood Sugar Average: Last 72 hrs:  (P) 130  SSI AC & QHS  Diabetic diet  Hold PTA glipizide  Continue PTA Lantus 26 U qhs & Humalog 10 U TID w/ meals (will start with 5 units TID and SS tonight )   Increase meal time coverage to home dose , if increase in blood sugar. At this time bs remains stable       COPD (chronic obstructive pulmonary disease) (HCC)  Assessment & Plan  No acute exacerbation  Continue maintenance inhalers  Monitor respiratory status  Respiratory protocol     Atrial fibrillation (HCC)  Assessment & Plan  Rate controlled; continue Toprol XL 37.5 mg BID   Continue Coumadin 7.5 mg q day  Lab Results   Component Value Date    INR 2.02 (H) 05/27/2024    INR 1.98 (H) 05/26/2024    INR 1.97 (H) 05/25/2024             Medical Problems       Resolved Problems  Date Reviewed: 5/26/2024   None       Discharging Physician / Practitioner: Kerry Diaz PA-C  PCP: Marisa Haque MD  Admission Date:   Admission  Orders (From admission, onward)       Ordered        05/25/24 1402  INPATIENT ADMISSION  Once                          Discharge Date: 05/28/24    Consultations During Hospital Stay:  Orthopedics  PT  OT    Procedures Performed:   None    Significant Findings / Test Results:   XR knee 4+ vw left injury - Result Date: 5/25/2024  *Limited study. No acute osseous abnormality within limitations. Severe tricompartmental osteoarthritis.     XR ankle 3+ vw left - Result Date: 5/25/2024  Questionable nondisplaced distal fibular fracture versus prominent trabecular pattern. Follow-up plain films in 10 days may be helpful.     CT lower extremity wo contrast left - Result Date: 5/25/2024  1.  Nondisplaced, oblique fracture of the distal fibula. 2.  Nondisplaced fracture of the tip of the lateral malleolus. 3.  Ankle mortise is incongruent. 4.  No acute knee fracture or malalignment. 5.  Severe tricompartmental osteoarthrosis of the knee. 6.  Extensive induration of the subcutaneous fat of the medial upper thigh with significant skin thickening, consistent with cellulitis. No evidence of soft tissue gas. No deep fascial edema or fluid collection. No intramuscular fluid collection.     Incidental Findings:   None   I reviewed the above mentioned incidental findings with the patient and/or family and they expressed understanding.    Test Results Pending at Discharge (will require follow up):   None     Outpatient Tests Requested:  None    Complications:  none    Reason for Admission: Ankle pain secondary to acute fracture    Hospital Course:   Tigist eHwitt is a 69 y.o. female patient with history of morbid obesity, CKD, gout, hyperlipidemia, GERD, chronic hypotension, hypothyroidism, chronic respiratory failure on 4 L nasal cannula diastolic CHF, chronic anemia, type 2 diabetes, DMII, COPD, A-fib on Coumadin who originally presented to the hospital on 5/25/2024 due to twisting her ankle and having ankle pain.  Workup in the  ER was revealing of acute fracture.  Orthopedics consulted and put in cam boot, nonweightbearing.  She was seen by PT/OT recommended for rehab.  She declines rehab.  Will give DME - David lift and hospital bed ordered.     Please see above list of diagnoses and related plan for additional information.     Condition at Discharge: fair    Discharge Day Visit / Exam:   Subjective:  Seen and examined. Feels the same!  Vitals: Blood Pressure: 110/59 (05/28/24 0753)  Pulse: 63 (05/28/24 0753)  Temperature: 97.9 °F (36.6 °C) (05/28/24 0753)  Temp Source: Temporal (05/25/24 1009)  Respirations: 18 (05/28/24 0753)  Weight - Scale: (!) 156 kg (343 lb 7.6 oz) (05/28/24 0600)  SpO2: 92 % (05/28/24 0753)  Exam:   Physical Exam  Vitals and nursing note reviewed.   Constitutional:       General: She is not in acute distress.     Appearance: She is obese.   HENT:      Head: Normocephalic and atraumatic.      Nose: No congestion.      Mouth/Throat:      Mouth: Mucous membranes are moist.   Eyes:      Conjunctiva/sclera: Conjunctivae normal.   Cardiovascular:      Rate and Rhythm: Normal rate and regular rhythm.      Pulses: Normal pulses.      Heart sounds: Normal heart sounds. No murmur heard.  Pulmonary:      Effort: Pulmonary effort is normal. No respiratory distress.      Breath sounds: Normal breath sounds.   Abdominal:      General: Bowel sounds are normal.      Palpations: Abdomen is soft.      Tenderness: There is no abdominal tenderness.   Musculoskeletal:         General: Signs of injury present. Normal range of motion.      Right lower leg: No edema.      Left lower leg: No edema.      Comments: Ankle in boot   Skin:     General: Skin is warm and dry.   Neurological:      Mental Status: She is alert and oriented to person, place, and time.          Discussion with Family: Updated  () via phone.    Discharge instructions/Information to patient and family:   See after visit summary for information  provided to patient and family.      Provisions for Follow-Up Care:  See after visit summary for information related to follow-up care and any pertinent home health orders.      Mobility at time of Discharge:   Basic Mobility Inpatient Raw Score: 6  JH-HLM Goal: 2: Bed activities/Dependent transfer  JH-HLM Achieved: 2: Bed activities/Dependent transfer  HLM Goal achieved. Continue to encourage appropriate mobility.     Disposition:   Home with VNA Services (Reminder: Complete face to face encounter)    Planned Readmission: None     Discharge Statement:  I spent 45 minutes discharging the patient. This time was spent on the day of discharge. I had direct contact with the patient on the day of discharge. Greater than 50% of the total time was spent examining patient, answering all patient questions, arranging and discussing plan of care with patient as well as directly providing post-discharge instructions.  Additional time then spent on discharge activities.    Discharge Medications:  See after visit summary for reconciled discharge medications provided to patient and/or family.      **Please Note: This note may have been constructed using a voice recognition system**

## 2024-05-27 NOTE — PROGRESS NOTES
"[facility]  Progress Note  Name: Tigist Hewitt I  MRN: [id number]  Unit/Bed#: -01 I Date of Admission: 5/25/2024   Date of Service: 5/27/2024 I Hospital Day: 2    Assessment & Plan   * Ankle fracture  Assessment & Plan  Left ankle pain following attempt to transition off commode when her legs got \"spongy\" and her left ankle turned outward   CT LLE:    Nondisplaced, oblique fracture of the distal fibula  Nondisplaced fracture of the tip of the lateral malleolus   Orthopedics spoke with ED provider  Placed cam boot; nonweightbearing as much as possible  Consult PT/OT; baseline mobility with transfers from lift chair to commode  Declines rehab   Will need heather lift and hospital bed  Analgesia    HLD (hyperlipidemia)  Assessment & Plan  Continue PTA Lipitor 10 mg q day     Gout  Assessment & Plan  No recent flare  Continue allopurinol 100 mg q day     Lymphangitis  Assessment & Plan  Chronic appearing changes to LLE skin w/ thickening/skin discoloration   CT of LE: Extensive induration of the subcutaneous fat of the medial upper thigh with significant skin thickening, consistent with cellulitis. No evidence of soft tissue gas. No deep fascial edema or fluid collection. No intramuscular fluid collection.   Labs stable no leukocytosis.   Will ultimately require outpatient wound care - discussed with pt and caregiver - concerned dt limited mobility and transport there    CKD (chronic kidney disease) stage 3, GFR 30-59 ml/min ([facility])  Assessment & Plan  Lab Results   Component Value Date    EGFR 61 05/26/2024    EGFR 49 05/25/2024    EGFR 65 05/12/2024    CREATININE 0.94 05/26/2024    CREATININE 1.12 05/25/2024    CREATININE 0.89 05/12/2024     Pending BMP  Avoid nephrotoxins and hypotension    GERD (gastroesophageal reflux disease)  Assessment & Plan  Continue PPI therapy qhs     Hypotension  Assessment & Plan  Chronic  Continue midodrine 5 mg TID w/ meals   Some hypotension continue " monitor     Hypothyroidism  Assessment & Plan  Continue levothyroxine 88 mcg q day     Chronic respiratory failure with hypoxia (HCC)  Assessment & Plan  Utilizes 4 L via NC at home & CPAP nightly    Anemia  Assessment & Plan  Continue iron supplement  Hemoglobin pending    Diastolic CHF (HCC)  Assessment & Plan  Wt Readings from Last 3 Encounters:   05/27/24 (!) 157 kg (347 lb 3.6 oz)   05/12/24 (!) 155 kg (342 lb 2.5 oz)   01/02/24 (!) 149 kg (328 lb 0.7 oz)     Clinically euvolemic  ECHO (12/2023):  EF 55%   Unable to assess diastolic function due to atrial fibrillation  Continue PTA K Dur, Bumex 2 mg BID & Toprol XL 37.5 mg BID  Daily weights  Pt is on a fluid restriction       Type 2 diabetes mellitus with hyperglycemia, with long-term current use of insulin ([facility])  Assessment & Plan  Lab Results   Component Value Date    HGBA1C 7.0 (H) 05/02/2024     Recent Labs     05/26/24  1651 05/26/24  2058 05/27/24  0816 05/27/24  1112   POCGLU 84 124 104 180*         Blood Sugar Average: Last 72 hrs:  (P) 120  SSI AC & QHS  Diabetic diet  Hold PTA glipizide  Continue PTA Lantus 26 U qhs & Humalog 10 U TID w/ meals (will start with 5 units TID and SS tonight )   Increase meal time coverage to home dose , if increase in blood sugar. At this time bs remains stable       COPD (chronic obstructive pulmonary disease) ([facility])  Assessment & Plan  No acute exacerbation  Continue maintenance inhalers  Monitor respiratory status  Respiratory protocol     Atrial fibrillation ([facility])  Assessment & Plan  Rate controlled; continue Toprol XL 37.5 mg BID   Continue Coumadin 7.5 mg q day  Lab Results   Component Value Date    INR 2.02 (H) 05/27/2024    INR 1.98 (H) 05/26/2024    INR 1.97 (H) 05/25/2024                    VTE Pharmacologic Prophylaxis: VTE Score: 16 High Risk (Score >/= 5) - Pharmacological DVT Prophylaxis Ordered: warfarin (Coumadin). Sequential Compression Devices Ordered.    Mobility:   Basic Mobility Inpatient Raw Score:  6  JH-HLM Goal: 2: Bed activities/Dependent transfer  JH-HLM Achieved: 2: Bed activities/Dependent transfer  JH-HLM Goal achieved. Continue to encourage appropriate mobility.    Patient Centered Rounds: I performed bedside rounds with nursing staff today.   Discussions with Specialists or Other Care Team Provider: LUCY    Education and Discussions with Family / Patient: Updated  () via phone.    Total Time Spent on Date of Encounter in care of patient:  mins. This time was spent on one or more of the following: performing physical exam; counseling and coordination of care; obtaining or reviewing history; documenting in the medical record; reviewing/ordering tests, medications or procedures; communicating with other healthcare professionals and discussing with patient's family/caregivers.    Current Length of Stay: 2 day(s)  Current Patient Status: Inpatient   Certification Statement: The patient will continue to require additional inpatient hospital stay due to pending DME for home  Discharge Plan: Anticipate discharge in 24-48 hrs to home with home services.    Code Status: Level 3 - DNAR and DNI    Subjective:   Seen and examined. Minimal ankle pain this AM. Otherwise feels well. Declines rehab.     Objective:     Vitals:   Temp (24hrs), Av °F (36.7 °C), Min:97.9 °F (36.6 °C), Max:98.1 °F (36.7 °C)    Temp:  [97.9 °F (36.6 °C)-98.1 °F (36.7 °C)] 98.1 °F (36.7 °C)  HR:  [74-92] 74  Resp:  [16-20] 20  BP: ()/(47-71) 114/66  SpO2:  [90 %-100 %] 92 %  Body mass index is 65.61 kg/m².     Input and Output Summary (last 24 hours):     Intake/Output Summary (Last 24 hours) at 2024 1212  Last data filed at 2024 0900  Gross per 24 hour   Intake 660 ml   Output 250 ml   Net 410 ml       Physical Exam:   Physical Exam  Vitals and nursing note reviewed.   Constitutional:       General: She is not in acute distress.     Appearance: She is obese.      Comments: Morbidly obese   HENT:       Head: Normocephalic and atraumatic.      Nose: No congestion.      Mouth/Throat:      Mouth: Mucous membranes are moist.   Eyes:      Conjunctiva/sclera: Conjunctivae normal.   Cardiovascular:      Rate and Rhythm: Normal rate and regular rhythm.      Pulses: Normal pulses.      Heart sounds: Normal heart sounds. No murmur heard.  Pulmonary:      Effort: Pulmonary effort is normal. No respiratory distress.      Breath sounds: Normal breath sounds.   Abdominal:      General: Bowel sounds are normal.      Palpations: Abdomen is soft.      Tenderness: There is no abdominal tenderness.   Musculoskeletal:         General: Signs of injury (left ankle in boot) present. Normal range of motion.   Skin:     General: Skin is warm and dry.   Neurological:      Mental Status: She is alert and oriented to person, place, and time.          Additional Data:     Labs:  Results from last 7 days   Lab Units 05/26/24  0511   WBC Thousand/uL 5.87   HEMOGLOBIN g/dL 11.6   HEMATOCRIT % 37.2   PLATELETS Thousands/uL 203   SEGS PCT % 61   LYMPHO PCT % 20   MONO PCT % 12   EOS PCT % 5     Results from last 7 days   Lab Units 05/26/24  0511   SODIUM mmol/L 141   POTASSIUM mmol/L 4.0   CHLORIDE mmol/L 107   CO2 mmol/L 28   BUN mg/dL 21   CREATININE mg/dL 0.94   ANION GAP mmol/L 6   CALCIUM mg/dL 9.2   ALBUMIN g/dL 3.1*   TOTAL BILIRUBIN mg/dL 1.03*   ALK PHOS U/L 153*   ALT U/L 18   AST U/L 18   GLUCOSE RANDOM mg/dL 79     Results from last 7 days   Lab Units 05/27/24  0531   INR  2.02*     Results from last 7 days   Lab Units 05/27/24  1112 05/27/24  0816 05/26/24 2058 05/26/24  1651 05/26/24  1129 05/26/24  0730 05/25/24  2055 05/25/24  1708   POC GLUCOSE mg/dl 180* 104 124 84 112 80 174* 102               Lines/Drains:  Invasive Devices       Peripheral Intravenous Line  Duration             Peripheral IV 05/09/24 Dorsal (posterior);Left Hand 18 days    Peripheral IV 05/25/24 Right Antecubital 1 day                          Imaging: No  pertinent imaging reviewed.    Recent Cultures (last 7 days):         Last 24 Hours Medication List:   Current Facility-Administered Medications   Medication Dose Route Frequency Provider Last Rate    acetaminophen  650 mg Oral Q8H HALEY [facility]      albuterol  2 puff Inhalation Q6H PRN [facility]      allopurinol  100 mg Oral Daily [facility]      aluminum-magnesium hydroxide-simethicone  30 mL Oral Q6H PRN [facility]      atorvastatin  10 mg Oral Daily [facility]      bumetanide  2 mg Oral BID [facility]      Cholecalciferol  2,000 Units Oral Daily [facility]      docusate sodium  100 mg Oral BID Katerina Rome, [facility]BELL      ferrous sulfate  325 mg Oral Daily With Breakfast [facility]      Fluticasone Furoate-Vilanterol  1 puff Inhalation Daily [facility]      insulin glargine  26 Units Subcutaneous HS [facility]      insulin lispro  2-12 Units Subcutaneous TID AC [facility]      insulin lispro  2-12 Units Subcutaneous HS [facility]      insulin lispro  5 Units Subcutaneous TID With Meals [facility]      levothyroxine  200 mcg Oral Daily [facility]      levothyroxine  88 mcg Oral Early Morning [facility]      metoprolol succinate  37.5 mg Oral BID [facility]      midodrine  5 mg Oral TID AC [facility]      montelukast  10 mg Oral HS [facility]      multivitamin-minerals  1 tablet Oral Daily [facility]      oxyCODONE  2.5 mg Oral Q4H PRN [facility]      oxyCODONE  5 mg Oral Q4H PRN [facility]      pantoprazole  20 mg Oral Early Morning [facility]      potassium chloride  20 mEq Oral TID [facility]      warfarin  7.5 mg Oral Daily (warfarin) [facility]          Today, Patient Was Seen By: Kerry Diaz PA-C    **Please Note: This note may have been constructed using a voice  recognition system.**

## 2024-05-27 NOTE — ASSESSMENT & PLAN NOTE
Rate controlled; continue Toprol XL 37.5 mg BID   Continue Coumadin 7.5 mg q day  Lab Results   Component Value Date    INR 2.02 (H) 05/27/2024    INR 1.98 (H) 05/26/2024    INR 1.97 (H) 05/25/2024

## 2024-05-28 LAB
ANION GAP SERPL CALCULATED.3IONS-SCNC: 5 MMOL/L (ref 4–13)
BUN SERPL-MCNC: 22 MG/DL (ref 5–25)
CALCIUM SERPL-MCNC: 9.5 MG/DL (ref 8.4–10.2)
CHLORIDE SERPL-SCNC: 106 MMOL/L (ref 96–108)
CO2 SERPL-SCNC: 31 MMOL/L (ref 21–32)
CREAT SERPL-MCNC: 0.99 MG/DL (ref 0.6–1.3)
ERYTHROCYTE [DISTWIDTH] IN BLOOD BY AUTOMATED COUNT: 16.5 % (ref 11.6–15.1)
GFR SERPL CREATININE-BSD FRML MDRD: 57 ML/MIN/1.73SQ M
GLUCOSE SERPL-MCNC: 119 MG/DL (ref 65–140)
GLUCOSE SERPL-MCNC: 135 MG/DL (ref 65–140)
GLUCOSE SERPL-MCNC: 147 MG/DL (ref 65–140)
GLUCOSE SERPL-MCNC: 160 MG/DL (ref 65–140)
GLUCOSE SERPL-MCNC: 161 MG/DL (ref 65–140)
HCT VFR BLD AUTO: 42 % (ref 34.8–46.1)
HGB BLD-MCNC: 12.5 G/DL (ref 11.5–15.4)
MCH RBC QN AUTO: 30.9 PG (ref 26.8–34.3)
MCHC RBC AUTO-ENTMCNC: 29.8 G/DL (ref 31.4–37.4)
MCV RBC AUTO: 104 FL (ref 82–98)
PLATELET # BLD AUTO: 211 THOUSANDS/UL (ref 149–390)
PMV BLD AUTO: 10.5 FL (ref 8.9–12.7)
POTASSIUM SERPL-SCNC: 4.1 MMOL/L (ref 3.5–5.3)
RBC # BLD AUTO: 4.05 MILLION/UL (ref 3.81–5.12)
SODIUM SERPL-SCNC: 142 MMOL/L (ref 135–147)
WBC # BLD AUTO: 5.36 THOUSAND/UL (ref 4.31–10.16)

## 2024-05-28 PROCEDURE — 82948 REAGENT STRIP/BLOOD GLUCOSE: CPT

## 2024-05-28 PROCEDURE — 85027 COMPLETE CBC AUTOMATED: CPT

## 2024-05-28 PROCEDURE — 80048 BASIC METABOLIC PNL TOTAL CA: CPT

## 2024-05-28 PROCEDURE — 99024 POSTOP FOLLOW-UP VISIT: CPT | Performed by: PHYSICIAN ASSISTANT

## 2024-05-28 PROCEDURE — 99231 SBSQ HOSP IP/OBS SF/LOW 25: CPT

## 2024-05-28 RX ADMIN — ACETAMINOPHEN 325MG 650 MG: 325 TABLET ORAL at 13:23

## 2024-05-28 RX ADMIN — FERROUS SULFATE TAB 325 MG (65 MG ELEMENTAL FE) 325 MG: 325 (65 FE) TAB at 07:38

## 2024-05-28 RX ADMIN — POTASSIUM CHLORIDE 20 MEQ: 1500 TABLET, EXTENDED RELEASE ORAL at 21:49

## 2024-05-28 RX ADMIN — ACETAMINOPHEN 325MG 650 MG: 325 TABLET ORAL at 06:00

## 2024-05-28 RX ADMIN — LEVOTHYROXINE SODIUM 200 MCG: 100 TABLET ORAL at 06:00

## 2024-05-28 RX ADMIN — MONTELUKAST 10 MG: 10 TABLET, FILM COATED ORAL at 21:51

## 2024-05-28 RX ADMIN — ACETAMINOPHEN 325MG 650 MG: 325 TABLET ORAL at 21:49

## 2024-05-28 RX ADMIN — ATORVASTATIN CALCIUM 10 MG: 10 TABLET, FILM COATED ORAL at 08:29

## 2024-05-28 RX ADMIN — LEVOTHYROXINE SODIUM 88 MCG: 100 TABLET ORAL at 06:00

## 2024-05-28 RX ADMIN — Medication 2000 UNITS: at 08:28

## 2024-05-28 RX ADMIN — INSULIN GLARGINE 26 UNITS: 100 INJECTION, SOLUTION SUBCUTANEOUS at 21:52

## 2024-05-28 RX ADMIN — BUMETANIDE 2 MG: 1 TABLET ORAL at 08:29

## 2024-05-28 RX ADMIN — FLUTICASONE FUROATE AND VILANTEROL TRIFENATATE 1 PUFF: 100; 25 POWDER RESPIRATORY (INHALATION) at 08:32

## 2024-05-28 RX ADMIN — OXYCODONE HYDROCHLORIDE 5 MG: 5 TABLET ORAL at 07:37

## 2024-05-28 RX ADMIN — INSULIN LISPRO 5 UNITS: 100 INJECTION, SOLUTION INTRAVENOUS; SUBCUTANEOUS at 12:07

## 2024-05-28 RX ADMIN — PANTOPRAZOLE SODIUM 20 MG: 20 TABLET, DELAYED RELEASE ORAL at 06:00

## 2024-05-28 RX ADMIN — INSULIN LISPRO 5 UNITS: 100 INJECTION, SOLUTION INTRAVENOUS; SUBCUTANEOUS at 17:17

## 2024-05-28 RX ADMIN — MIDODRINE HYDROCHLORIDE 5 MG: 5 TABLET ORAL at 12:07

## 2024-05-28 RX ADMIN — DOCUSATE SODIUM 100 MG: 100 CAPSULE, LIQUID FILLED ORAL at 17:18

## 2024-05-28 RX ADMIN — METOPROLOL SUCCINATE 37.5 MG: 25 TABLET, EXTENDED RELEASE ORAL at 21:48

## 2024-05-28 RX ADMIN — ALLOPURINOL 100 MG: 100 TABLET ORAL at 08:29

## 2024-05-28 RX ADMIN — MIDODRINE HYDROCHLORIDE 5 MG: 5 TABLET ORAL at 06:00

## 2024-05-28 RX ADMIN — MIDODRINE HYDROCHLORIDE 5 MG: 5 TABLET ORAL at 17:18

## 2024-05-28 RX ADMIN — WARFARIN SODIUM 7.5 MG: 7.5 TABLET ORAL at 17:18

## 2024-05-28 RX ADMIN — POTASSIUM CHLORIDE 20 MEQ: 1500 TABLET, EXTENDED RELEASE ORAL at 17:18

## 2024-05-28 RX ADMIN — INSULIN LISPRO 2 UNITS: 100 INJECTION, SOLUTION INTRAVENOUS; SUBCUTANEOUS at 12:08

## 2024-05-28 RX ADMIN — Medication 1 TABLET: at 08:29

## 2024-05-28 RX ADMIN — BUMETANIDE 2 MG: 1 TABLET ORAL at 17:18

## 2024-05-28 RX ADMIN — POTASSIUM CHLORIDE 20 MEQ: 1500 TABLET, EXTENDED RELEASE ORAL at 08:29

## 2024-05-28 RX ADMIN — OXYCODONE HYDROCHLORIDE 5 MG: 5 TABLET ORAL at 17:21

## 2024-05-28 RX ADMIN — DOCUSATE SODIUM 100 MG: 100 CAPSULE, LIQUID FILLED ORAL at 08:29

## 2024-05-28 NOTE — CASE MANAGEMENT
Case Management Assessment & Discharge Planning Note    Patient name Tigist Hewitt  Location /-01 MRN [id number]  : 1955 Date 2024       Current Admission Date: 2024  Current Admission Diagnosis:Ankle fracture   Patient Active Problem List    Diagnosis Date Noted Date Diagnosed    Ankle fracture 2024     Lymphangitis 2024     Gout 2024     HLD (hyperlipidemia) 2024     Metabolic alkalosis 2024     Acute pain of left knee 2024     Acute cystitis without hematuria 2024     Ambulatory dysfunction 2024     Hyperkalemia 2024     Lung cyst 2024     Abnormal CT scan, pelvis 2023     CKD (chronic kidney disease) stage 3, GFR 30-59 ml/min ([facility]) 2023     NISHI (obstructive sleep apnea) 2023     Intertrigo 10/29/2022     Vaginal bleeding 10/23/2022     Acute kidney injury superimposed on chronic kidney disease  ([facility]) 10/22/2022     Hypotension 10/22/2022     GERD (gastroesophageal reflux disease) 10/22/2022     Acute on chronic diastolic congestive heart failure ([facility]) 2022     IMTIAZ (acute kidney injury) ([facility]) 2022     Morbid obesity ([facility]) 2022     Hypothyroidism 2022     Supratherapeutic INR 04/15/2020     Chronic respiratory failure with hypoxia ([facility]) 2020     Asthma 2020     Atrial fibrillation ([facility]) 2020     COPD (chronic obstructive pulmonary disease) ([facility]) 2020     Type 2 diabetes mellitus with hyperglycemia, with long-term current use of insulin ([facility]) 2020     Diastolic CHF ([facility]) 2020     Shortness of breath 2020     Anemia 2020       LOS (days): 3  Geometric Mean LOS (GMLOS) (days):   Days to GMLOS:     OBJECTIVE:  PATIENT READMITTED TO HOSPITAL  Risk of Unplanned Readmission Score: 31.25         Current admission status: Inpatient       Preferred Pharmacy:   [id number] [address]  [place] [address]  Phone: [phone] Fax: [phone]    Primary Care Provider: Marisa Haque MD    Primary Insurance: [employer]  Secondary Insurance:     ASSESSMENT:  Active Health Care Proxies    There are no active Health Care Proxies on file.       Advance Directives  Does patient have a Health Care POA?: Yes (, POA)  Does patient have Advance Directives?: Yes  Advance Directives: Power of  for health care, Power of  for finance  Primary Contact: Kenneth              Patient Information  Admitted from:: Home  Mental Status: Alert  During Assessment patient was accompanied by: Not accompanied during assessment  Assessment information provided by:: Patient  Primary Caregiver: Family  Caregiver's Name:: son and   Caregiver's Relationship to Patient:: Family Member  Support Systems: Spouse/significant other, Son, Family members  [place] [facility] Residence: [facility]  Home entry access options. Select all that apply.: No steps to enter home  Type of Current Residence: Bi-level  Upon entering residence, is there a bedroom on the main floor (no further steps)?: Yes (pt sleeps on recliner/lift chair)  Upon entering residence, is there a bathroom on the main floor (no further steps)?: Yes  Living Arrangements: Lives w/ Spouse/significant other  Is patient a ?: No    Activities of Daily Living Prior to Admission  Functional Status: Assistance  Completes ADLs independently?: No  Level of ADL dependence: Assistance  Ambulates independently?: No  Level of ambulatory dependence: Assistance  Does patient use assisted devices?: Yes  Assisted Devices (DME) used: Bedside Commode, Home Oxygen concentrator, CPAP, Walker, David lift, Hospital Bed, Wheelchair  DME Company Name (respiratory supplies): [employer]  O2 Rate(s): 4  Does patient currently own DME?: Yes  What DME does the patient currently own?: Bedside Commode, CPAP, Home Oxygen concentrator, Hospital Bed, David  lift, Walker, Wheelchair  Does patient have a history of Outpatient Therapy (PT/OT)?: No  Does the patient have a history of Short-Term Rehab?: Yes ([facility] [place])  Does patient have a history of HHC?: Yes ([facility])  Does patient currently have HHC?: No         Patient Information Continued  Does patient have prescription coverage?: Yes  Does patient receive dialysis treatments?: No  Does patient have a history of substance abuse?: No  Does patient have a history of Mental Health Diagnosis?: No         Means of Transportation  Means of Transport to Appts:: Family transport      Social Determinants of Health (SDOH)      Flowsheet Row Most Recent Value   Housing Stability    In the last 12 months, was there a time when you were not able to pay the mortgage or rent on time? N   In the past 12 months, how many times have you moved where you were living? 1   At any time in the past 12 months, were you homeless or living in a shelter (including now)? N   Transportation Needs    In the past 12 months, has lack of transportation kept you from medical appointments or from getting medications? no   In the past 12 months, has lack of transportation kept you from meetings, work, or from getting things needed for daily living? No   Food Insecurity    Within the past 12 months, you worried that your food would run out before you got the money to buy more. Never true   Within the past 12 months, the food you bought just didn't last and you didn't have money to get more. Never true   Utilities    In the past 12 months has the electric, gas, oil, or water company threatened to shut off services in your home? No            DISCHARGE DETAILS:    Discharge planning discussed with:: patient  Freedom of Choice: Yes     CM contacted family/caregiver?: Yes  Were Treatment Team discharge recommendations reviewed with patient/caregiver?: Yes  Did patient/caregiver verbalize understanding of patient care needs?: Yes  Were  patient/caregiver advised of the risks associated with not following Treatment Team discharge recommendations?: Yes    Contacts  Patient Contacts: , Kenneth  Relationship to Patient:: Family

## 2024-05-28 NOTE — PLAN OF CARE
Problem: Potential for Falls  Goal: Patient will remain free of falls  Description: INTERVENTIONS:  - Educate patient/family on patient safety including physical limitations  - Instruct patient to call for assistance with activity   - Consult OT/PT to assist with strengthening/mobility   - Keep Call bell within reach  - Keep bed low and locked with side rails adjusted as appropriate  - Keep care items and personal belongings within reach  - Initiate and maintain comfort rounds  - Make Fall Risk Sign visible to staff  - Offer Toileting every 2 Hours, in advance of need  - Initiate/Maintain   alarm  - Obtain necessary fall risk management equipment:     - Apply yellow socks and bracelet for high fall risk patients  - Consider moving patient to room near nurses station  Outcome: Progressing     Problem: Prexisting or High Potential for Compromised Skin Integrity  Goal: Skin integrity is maintained or improved  Description: INTERVENTIONS:  - Identify patients at risk for skin breakdown  - Assess and monitor skin integrity  - Assess and monitor nutrition and hydration status  - Monitor labs   - Assess for incontinence   - Turn and reposition patient  - Assist with mobility/ambulation  - Relieve pressure over bony prominences  - Avoid friction and shearing  - Provide appropriate hygiene as needed including keeping skin clean and dry  - Evaluate need for skin moisturizer/barrier cream  - Collaborate with interdisciplinary team   - Patient/family teaching  - Consider wound care consult   Outcome: Progressing     Problem: PAIN - ADULT  Goal: Verbalizes/displays adequate comfort level or baseline comfort level  Description: Interventions:  - Encourage patient to monitor pain and request assistance  - Assess pain using appropriate pain scale  - Administer analgesics based on type and severity of pain and evaluate response  - Implement non-pharmacological measures as appropriate and evaluate response  - Consider cultural and  social influences on pain and pain management  - Notify physician/advanced practitioner if interventions unsuccessful or patient reports new pain  Outcome: Progressing     Problem: INFECTION - ADULT  Goal: Absence or prevention of progression during hospitalization  Description: INTERVENTIONS:  - Assess and monitor for signs and symptoms of infection  - Monitor lab/diagnostic results  - Monitor all insertion sites, i.e. indwelling lines, tubes, and drains  - Monitor endotracheal if appropriate and nasal secretions for changes in amount and color  - [place] appropriate cooling/warming therapies per order  - Administer medications as ordered  - Instruct and encourage patient and family to use good hand hygiene technique  - Identify and instruct in appropriate isolation precautions for identified infection/condition  Outcome: Progressing  Goal: Absence of fever/infection during neutropenic period  Description: INTERVENTIONS:  - Monitor WBC    Outcome: Progressing     Problem: SAFETY ADULT  Goal: Patient will remain free of falls  Description: INTERVENTIONS:  - Educate patient/family on patient safety including physical limitations  - Instruct patient to call for assistance with activity   - Consult OT/PT to assist with strengthening/mobility   - Keep Call bell within reach  - Keep bed low and locked with side rails adjusted as appropriate  - Keep care items and personal belongings within reach  - Initiate and maintain comfort rounds  - Make Fall Risk Sign visible to staff  - Offer Toileting every   2 Hours, in advance of need  - Initiate/Maintain   alarm  - Obtain necessary fall risk management equipment:     - Apply yellow socks and bracelet for high fall risk patients  - Consider moving patient to room near nurses station  Outcome: Progressing  Goal: Maintain or return to baseline ADL function  Description: INTERVENTIONS:  -  Assess patient's ability to carry out ADLs; assess patient's baseline for ADL function and  identify physical deficits which impact ability to perform ADLs (bathing, care of mouth/teeth, toileting, grooming, dressing, etc.)  - Assess/evaluate cause of self-care deficits   - Assess range of motion  - Assess patient's mobility; develop plan if impaired  - Assess patient's need for assistive devices and provide as appropriate  - Encourage maximum independence but intervene and supervise when necessary  - Involve family in performance of ADLs  - Assess for home care needs following discharge   - Consider OT consult to assist with ADL evaluation and planning for discharge  - Provide patient education as appropriate  Outcome: Progressing  Goal: Maintains/Returns to pre admission functional level  Description: INTERVENTIONS:  - Perform AM-PAC 6 Click Basic Mobility/ Daily Activity assessment daily.  - Set and communicate daily mobility goal to care team and patient/family/caregiver.   - Collaborate with rehabilitation services on mobility goals if consulted  - Perform Range of Motion 3 times a day.  - Reposition patient every 2 hours.  - Dangle patient 3 times a day  - Stand patient 3 times a day  - Ambulate patient 3 times a day  - Out of bed to chair 3 times a day   - Out of bed for meals 3 times a day  - Out of bed for toileting  - Record patient progress and toleration of activity level   Outcome: Progressing     Problem: DISCHARGE PLANNING  Goal: Discharge to home or other facility with appropriate resources  Description: INTERVENTIONS:  - Identify barriers to discharge w/patient and caregiver  - Arrange for needed discharge resources and transportation as appropriate  - Identify discharge learning needs (meds, wound care, etc.)  - Arrange for interpretive services to assist at discharge as needed  - Refer to Case Management Department for coordinating discharge planning if the patient needs post-hospital services based on physician/advanced practitioner order or complex needs related to functional status,  cognitive ability, or social support system  Outcome: Progressing     Problem: Knowledge Deficit  Goal: Patient/family/caregiver demonstrates understanding of disease process, treatment plan, medications, and discharge instructions  Description: Complete learning assessment and assess knowledge base.  Interventions:  - Provide teaching at level of understanding  - Provide teaching via preferred learning methods  Outcome: Progressing     Problem: Nutrition/Hydration-ADULT  Goal: Nutrient/Hydration intake appropriate for improving, restoring or maintaining nutritional needs  Description: Monitor and assess patient's nutrition/hydration status for malnutrition. Collaborate with interdisciplinary team and initiate plan and interventions as ordered.  Monitor patient's weight and dietary intake as ordered or per policy. Utilize nutrition screening tool and intervene as necessary. Determine patient's food preferences and provide high-protein, high-caloric foods as appropriate.     INTERVENTIONS:  - Monitor oral intake, urinary output, labs, and treatment plans  - Assess nutrition and hydration status and recommend course of action  - Evaluate amount of meals eaten  - Assist patient with eating if necessary   - Allow adequate time for meals  - Recommend/ encourage appropriate diets, oral nutritional supplements, and vitamin/mineral supplements  - Order, calculate, and assess calorie counts as needed  - Recommend, monitor, and adjust tube feedings and TPN/PPN based on assessed needs  - Assess need for intravenous fluids  - Provide specific nutrition/hydration education as appropriate  - Include patient/family/caregiver in decisions related to nutrition  Outcome: Progressing

## 2024-05-28 NOTE — PROGRESS NOTES
Patient:  HANNA WASHINGTON    MRN:  [id number]    Aidin Request ID:  [id number]    Level of care reserved:  Home Health Agency    Partner Reserved:  [facility] [place] [address] [phone]    Clinical needs requested:    Geography searched:  [phone]    Start of Service:  5/30/2024    Request sent:  9:00am EDT on 5/28/2024 by Cathie Blake    Partner reserved:  10:16am EDT on 5/28/2024 by Cathie Balke    Choice list shared:  10:15am EDT on 5/28/2024 by Cathie Blake

## 2024-05-28 NOTE — CASE MANAGEMENT
Case Management Discharge Planning Note    Patient name Tigist Hewitt  Location /-01 MRN [id number]  : 1955 Date 2024       Current Admission Date: 2024  Current Admission Diagnosis:Ankle fracture   Patient Active Problem List    Diagnosis Date Noted Date Diagnosed    Ankle fracture 2024     Lymphangitis 2024     Gout 2024     HLD (hyperlipidemia) 2024     Metabolic alkalosis 2024     Acute pain of left knee 2024     Acute cystitis without hematuria 2024     Ambulatory dysfunction 2024     Hyperkalemia 2024     Lung cyst 2024     Abnormal CT scan, pelvis 2023     CKD (chronic kidney disease) stage 3, GFR 30-59 ml/min ([facility]) 2023     NISHI (obstructive sleep apnea) 2023     Intertrigo 10/29/2022     Vaginal bleeding 10/23/2022     Acute kidney injury superimposed on chronic kidney disease  ([facility]) 10/22/2022     Hypotension 10/22/2022     GERD (gastroesophageal reflux disease) 10/22/2022     Acute on chronic diastolic congestive heart failure ([facility]) 2022     IMTIAZ (acute kidney injury) ([facility]) 2022     Morbid obesity ([facility]) 2022     Hypothyroidism 2022     Supratherapeutic INR 04/15/2020     Chronic respiratory failure with hypoxia ([facility]) 2020     Asthma 2020     Atrial fibrillation ([facility]) 2020     COPD (chronic obstructive pulmonary disease) ([facility]) 2020     Type 2 diabetes mellitus with hyperglycemia, with long-term current use of insulin ([facility]) 2020     Diastolic CHF ([facility]) 2020     Shortness of breath 2020     Anemia 2020       LOS (days): 3  Geometric Mean LOS (GMLOS) (days):   Days to GMLOS:     OBJECTIVE:  Risk of Unplanned Readmission Score: 30.44         Current admission status: Inpatient   Preferred Pharmacy:   [facility]/pharmacy #[id number] - [place], [place] - [address] [place] [address]  Phone: [phone] Fax:  [phone]    Primary Care Provider: Marisa Haque MD    Primary Insurance: [employer] RE[employer]  Secondary Insurance:     DISCHARGE DETAILS:           STR recommended for pt at time of discharge, pt declining STR, pt is agreeable to [facility].  CM placing blanket referral to [facility] ins A[facility]            A heather and hospital bed has been recommended for pt at time of discharge.  Cm placing order to [employer] in [place]          [facility] has accepted pt at time of discharge            As per [employer], DME will be delivered to pts home tomorrow, 5/29/24

## 2024-05-28 NOTE — DISCHARGE SUMMARY
"[facility]  Discharge- Tigist Moraeskalpesh 1955, 69 y.o. female MRN: [id number]  Unit/Bed#: -01 Encounter: [id number]  Primary Care Provider: Marisa Haque MD   Date and time admitted to hospital: 5/25/2024 10:04 AM    * Ankle fracture  Assessment & Plan  Left ankle pain following attempt to transition off commode when her legs got \"spongy\" and her left ankle turned outward   Possible pathologic fracture due to possible osteoporosis, in setting of morbid obesity and significant immobility for 6 to 8 years, has sedentary lifestyle and mobility  CT LLE:    Nondisplaced, oblique fracture of the distal fibula  Nondisplaced fracture of the tip of the lateral malleolus   Orthopedics spoke with ED provider  Placed cam boot; nonweightbearing as much as possible  Consult PT/OT; baseline mobility with transfers from lift chair to commode  Declines rehab   Will need heather lift and hospital bed  Analgesia    HLD (hyperlipidemia)  Assessment & Plan  Continue PTA Lipitor 10 mg q day     Gout  Assessment & Plan  No recent flare  Continue allopurinol 100 mg q day     Lymphangitis  Assessment & Plan  Chronic appearing changes to LLE skin w/ thickening/skin discoloration   CT of LE: Extensive induration of the subcutaneous fat of the medial upper thigh with significant skin thickening, consistent with cellulitis. No evidence of soft tissue gas. No deep fascial edema or fluid collection. No intramuscular fluid collection.   Labs stable no leukocytosis.   Will ultimately require outpatient wound care - discussed with pt and caregiver - concerned dt limited mobility and transport there    CKD (chronic kidney disease) stage 3, GFR 30-59 ml/min ([facility])  Assessment & Plan  Lab Results   Component Value Date    EGFR 57 05/28/2024    EGFR 61 05/26/2024    EGFR 49 05/25/2024    CREATININE 0.99 05/28/2024    CREATININE 0.94 05/26/2024    CREATININE 1.12 05/25/2024     Pending BMP  Avoid nephrotoxins and " hypotension    GERD (gastroesophageal reflux disease)  Assessment & Plan  Continue PPI therapy qhs     Hypotension  Assessment & Plan  Chronic  Continue midodrine 5 mg TID w/ meals   Some hypotension continue monitor     Hypothyroidism  Assessment & Plan  Continue levothyroxine 88 mcg q day     Chronic respiratory failure with hypoxia (HCC)  Assessment & Plan  Utilizes 4 L via NC at home & CPAP nightly    Anemia  Assessment & Plan  Continue iron supplement  Hemoglobin pending    Diastolic CHF (HCC)  Assessment & Plan  Wt Readings from Last 3 Encounters:   05/29/24 (!) 157 kg (346 lb 12.5 oz)   05/12/24 (!) 155 kg (342 lb 2.5 oz)   01/02/24 (!) 149 kg (328 lb 0.7 oz)     Clinically euvolemic  ECHO (12/2023):  EF 55%   Unable to assess diastolic function due to atrial fibrillation  Continue PTA K Dur, Bumex 2 mg BID & Toprol XL 37.5 mg BID  Daily weights  Pt is on a fluid restriction       Type 2 diabetes mellitus with hyperglycemia, with long-term current use of insulin ([facility])  Assessment & Plan  Lab Results   Component Value Date    HGBA1C 7.0 (H) 05/02/2024     Recent Labs     05/28/24  1151 05/28/24  1658 05/28/24  2113 05/29/24  0749   POCGLU 160* 119 135 121         Blood Sugar Average: Last 72 hrs:  (P) [id number]  SSI AC & QHS  Diabetic diet  Hold PTA glipizide  Continue PTA Lantus 26 U qhs & Humalog 10 U TID w/ meals (will start with 5 units TID and SS tonight )   Increase meal time coverage to home dose , if increase in blood sugar. At this time bs remains stable       COPD (chronic obstructive pulmonary disease) (HCC)  Assessment & Plan  No acute exacerbation  Continue maintenance inhalers  Monitor respiratory status  Respiratory protocol     Atrial fibrillation (HCC)  Assessment & Plan  Rate controlled; continue Toprol XL 37.5 mg BID   Continue Coumadin 7.5 mg q day  Lab Results   Component Value Date    INR 2.02 (H) 05/27/2024    INR 1.98 (H) 05/26/2024    INR 1.97 (H) 05/25/2024              Medical Problems       Resolved Problems  Date Reviewed: 5/26/2024   None       Discharging Physician / Practitioner: Kerry Diaz PA-C  PCP: Marisa Haque MD  Admission Date:   Admission Orders (From admission, onward)       Ordered        05/25/24 1402  INPATIENT ADMISSION  Once                          Discharge Date: 05/29/24    Consultations During Hospital Stay:  Orthopedics  PT  OT    Procedures Performed:   None    Significant Findings / Test Results:   XR knee 4+ vw left injury - Result Date: 5/25/2024  *Limited study. No acute osseous abnormality within limitations. Severe tricompartmental osteoarthritis.     XR ankle 3+ vw left - Result Date: 5/25/2024  Questionable nondisplaced distal fibular fracture versus prominent trabecular pattern. Follow-up plain films in 10 days may be helpful.     CT lower extremity wo contrast left - Result Date: 5/25/2024  1.  Nondisplaced, oblique fracture of the distal fibula. 2.  Nondisplaced fracture of the tip of the lateral malleolus. 3.  Ankle mortise is incongruent. 4.  No acute knee fracture or malalignment. 5.  Severe tricompartmental osteoarthrosis of the knee. 6.  Extensive induration of the subcutaneous fat of the medial upper thigh with significant skin thickening, consistent with cellulitis. No evidence of soft tissue gas. No deep fascial edema or fluid collection. No intramuscular fluid collection.     Incidental Findings:   None   I reviewed the above mentioned incidental findings with the patient and/or family and they expressed understanding.    Test Results Pending at Discharge (will require follow up):   None     Outpatient Tests Requested:  None    Complications:  none    Reason for Admission: Ankle pain secondary to acute fracture    Hospital Course:   Tigist Hewitt is a 69 y.o. female patient with history of morbid obesity, CKD, gout, hyperlipidemia, GERD, chronic hypotension, hypothyroidism, chronic respiratory failure on 4 L  nasal cannula diastolic CHF, chronic anemia, type 2 diabetes, DMII, COPD, A-fib on Coumadin who originally presented to the hospital on 5/25/2024 due to twisting her ankle and having ankle pain.  Workup in the ER was revealing of acute fracture.  Orthopedics consulted and put in cam boot, nonweightbearing.  She was seen by PT/OT recommended for rehab.  She declines rehab.  Will give DME - David lift and hospital bed ordered.     Please see above list of diagnoses and related plan for additional information.     Condition at Discharge: fair    Discharge Day Visit / Exam:   Subjective:  Seen and examined. Feels the same!  Vitals: Blood Pressure: 117/56 (05/29/24 0751)  Pulse: 78 (05/29/24 0751)  Temperature: 98.1 °F (36.7 °C) (05/29/24 0751)  Temp Source: Temporal (05/25/24 1009)  Respirations: 18 (05/29/24 0751)  Weight - Scale: (!) 157 kg (346 lb 12.5 oz) (05/29/24 0500)  SpO2: 96 % (05/29/24 0751)  Exam:   Physical Exam  Vitals and nursing note reviewed.   Constitutional:       General: She is not in acute distress.     Appearance: She is obese.   HENT:      Head: Normocephalic and atraumatic.      Nose: No congestion.      Mouth/Throat:      Mouth: Mucous membranes are moist.   Eyes:      Conjunctiva/sclera: Conjunctivae normal.   Cardiovascular:      Rate and Rhythm: Normal rate and regular rhythm.      Pulses: Normal pulses.      Heart sounds: Normal heart sounds. No murmur heard.  Pulmonary:      Effort: Pulmonary effort is normal. No respiratory distress.      Breath sounds: Normal breath sounds.   Abdominal:      General: Bowel sounds are normal.      Palpations: Abdomen is soft.      Tenderness: There is no abdominal tenderness.   Musculoskeletal:         General: Signs of injury present. Normal range of motion.      Right lower leg: No edema.      Left lower leg: No edema.      Comments: Ankle in boot   Skin:     General: Skin is warm and dry.   Neurological:      Mental Status: She is alert and oriented to  person, place, and time.          Discussion with Family: Updated  () via phone.    Discharge instructions/Information to patient and family:   See after visit summary for information provided to patient and family.      Provisions for Follow-Up Care:  See after visit summary for information related to follow-up care and any pertinent home health orders.      Mobility at time of Discharge:   Basic Mobility Inpatient Raw Score: 6  JH-HLM Goal: 2: Bed activities/Dependent transfer  JH-HLM Achieved: 2: Bed activities/Dependent transfer  HLM Goal achieved. Continue to encourage appropriate mobility.     Disposition:   Home with VNA Services (Reminder: Complete face to face encounter)    Planned Readmission: None     Discharge Statement:  I spent 45 minutes discharging the patient. This time was spent on the day of discharge. I had direct contact with the patient on the day of discharge. Greater than 50% of the total time was spent examining patient, answering all patient questions, arranging and discussing plan of care with patient as well as directly providing post-discharge instructions.  Additional time then spent on discharge activities.    Discharge Medications:  See after visit summary for reconciled discharge medications provided to patient and/or family.      **Please Note: This note may have been constructed using a voice recognition system**

## 2024-05-28 NOTE — CASE MANAGEMENT
Case Management Discharge Planning Note    Patient name Tigist Hewitt  Location /-01 MRN [id number]  : 1955 Date 2024       Current Admission Date: 2024  Current Admission Diagnosis:Ankle fracture   Patient Active Problem List    Diagnosis Date Noted Date Diagnosed    Ankle fracture 2024     Lymphangitis 2024     Gout 2024     HLD (hyperlipidemia) 2024     Metabolic alkalosis 2024     Acute pain of left knee 2024     Acute cystitis without hematuria 2024     Ambulatory dysfunction 2024     Hyperkalemia 2024     Lung cyst 2024     Abnormal CT scan, pelvis 2023     CKD (chronic kidney disease) stage 3, GFR 30-59 ml/min ([facility]) 2023     NISHI (obstructive sleep apnea) 2023     Intertrigo 10/29/2022     Vaginal bleeding 10/23/2022     Acute kidney injury superimposed on chronic kidney disease  ([facility]) 10/22/2022     Hypotension 10/22/2022     GERD (gastroesophageal reflux disease) 10/22/2022     Acute on chronic diastolic congestive heart failure ([facility]) 2022     IMTIAZ (acute kidney injury) ([facility]) 2022     Morbid obesity ([facility]) 2022     Hypothyroidism 2022     Supratherapeutic INR 04/15/2020     Chronic respiratory failure with hypoxia ([facility]) 2020     Asthma 2020     Atrial fibrillation ([facility]) 2020     COPD (chronic obstructive pulmonary disease) ([facility]) 2020     Type 2 diabetes mellitus with hyperglycemia, with long-term current use of insulin ([facility]) 2020     Diastolic CHF ([facility]) 2020     Shortness of breath 2020     Anemia 2020       LOS (days): 3  Geometric Mean LOS (GMLOS) (days):   Days to GMLOS:     OBJECTIVE:  Risk of Unplanned Readmission Score: 31.25         Current admission status: Inpatient   Preferred Pharmacy:   [facility]/pharmacy #[id number] - [place], [place] - [address] [place] [address]  Phone: [phone] Fax:  [phone]    Primary Care Provider: Marisa Haque MD    Primary Insurance: [employer] RE[employer]  Secondary Insurance:     DISCHARGE DETAILS:                 Anticipating pts discharge tomorrow, 5/29/24, CM placing transportation request to [facility] in [place], requested 1600

## 2024-05-28 NOTE — ASSESSMENT & PLAN NOTE
Lab Results   Component Value Date    HGBA1C 7.0 (H) 05/02/2024     Recent Labs     05/27/24  1618 05/27/24  2138 05/28/24  0752 05/28/24  1151   POCGLU 117 176* 147* 160*         Blood Sugar Average: Last 72 hrs:  (P) 130  SSI AC & QHS  Diabetic diet  Hold PTA glipizide  Continue PTA Lantus 26 U qhs & Humalog 10 U TID w/ meals (will start with 5 units TID and SS tonight )   Increase meal time coverage to home dose , if increase in blood sugar. At this time bs remains stable

## 2024-05-28 NOTE — ASSESSMENT & PLAN NOTE
Wt Readings from Last 3 Encounters:   05/28/24 (!) 156 kg (343 lb 7.6 oz)   05/12/24 (!) 155 kg (342 lb 2.5 oz)   01/02/24 (!) 149 kg (328 lb 0.7 oz)     Clinically euvolemic  ECHO (12/2023):  EF 55%   Unable to assess diastolic function due to atrial fibrillation  Continue PTA K Dur, Bumex 2 mg BID & Toprol XL 37.5 mg BID  Daily weights  Pt is on a fluid restriction

## 2024-05-28 NOTE — PROGRESS NOTES
Brief note:  S: Patient tolerating cam boot for her distal fib fracture however she notes that she has some heel pain.  She also pain over the lateral side of the big toe and feels as though she may be getting an ingrown toenail..    O: Short Cam boot was removed to evaluate the foot.  Short cam boot due to the patient body habitus and lymphangitis.  There is interning of the great toe nail but no significant erythema along the lateral aspect.  No discharge.  The heel notes some mild discoloration of the skin posterior aspect with tenderness to palpation.  There is no breakdown as yet.      A: Patient with distal fib fracture with some heel soreness and questionable early ingrown toenail of the great toe.    Plan: Continue cam boot however patient will need preventative heel pad placed on the foot seen prior to go back into the cam boot.  Could have podiatry possibly evaluate the great toe nail.  Continue nonweightbearing with very minimal weightbearing for transfers only.

## 2024-05-28 NOTE — PLAN OF CARE
Problem: Potential for Falls  Goal: Patient will remain free of falls  Description: INTERVENTIONS:  - Educate patient/family on patient safety including physical limitations  - Instruct patient to call for assistance with activity   - Consult OT/PT to assist with strengthening/mobility   - Keep Call bell within reach  - Keep bed low and locked with side rails adjusted as appropriate  - Keep care items and personal belongings within reach  - Initiate and maintain comfort rounds  - Make Fall Risk Sign visible to staff  - Offer Toileting every  Hours, in advance of need  - Initiate/Maintain alarm  - Obtain necessary fall risk management equipment:   - Apply yellow socks and bracelet for high fall risk patients  - Consider moving patient to room near nurses station  Outcome: Progressing     Problem: Prexisting or High Potential for Compromised Skin Integrity  Goal: Skin integrity is maintained or improved  Description: INTERVENTIONS:  - Identify patients at risk for skin breakdown  - Assess and monitor skin integrity  - Assess and monitor nutrition and hydration status  - Monitor labs   - Assess for incontinence   - Turn and reposition patient  - Assist with mobility/ambulation  - Relieve pressure over bony prominences  - Avoid friction and shearing  - Provide appropriate hygiene as needed including keeping skin clean and dry  - Evaluate need for skin moisturizer/barrier cream  - Collaborate with interdisciplinary team   - Patient/family teaching  - Consider wound care consult   Outcome: Progressing     Problem: PAIN - ADULT  Goal: Verbalizes/displays adequate comfort level or baseline comfort level  Description: Interventions:  - Encourage patient to monitor pain and request assistance  - Assess pain using appropriate pain scale  - Administer analgesics based on type and severity of pain and evaluate response  - Implement non-pharmacological measures as appropriate and evaluate response  - Consider cultural and  social influences on pain and pain management  - Notify physician/advanced practitioner if interventions unsuccessful or patient reports new pain  Outcome: Progressing     Problem: INFECTION - ADULT  Goal: Absence or prevention of progression during hospitalization  Description: INTERVENTIONS:  - Assess and monitor for signs and symptoms of infection  - Monitor lab/diagnostic results  - Monitor all insertion sites, i.e. indwelling lines, tubes, and drains  - Monitor endotracheal if appropriate and nasal secretions for changes in amount and color  - [place] appropriate cooling/warming therapies per order  - Administer medications as ordered  - Instruct and encourage patient and family to use good hand hygiene technique  - Identify and instruct in appropriate isolation precautions for identified infection/condition  Outcome: Progressing  Goal: Absence of fever/infection during neutropenic period  Description: INTERVENTIONS:  - Monitor WBC    Outcome: Progressing     Problem: SAFETY ADULT  Goal: Patient will remain free of falls  Description: INTERVENTIONS:  - Educate patient/family on patient safety including physical limitations  - Instruct patient to call for assistance with activity   - Consult OT/PT to assist with strengthening/mobility   - Keep Call bell within reach  - Keep bed low and locked with side rails adjusted as appropriate  - Keep care items and personal belongings within reach  - Initiate and maintain comfort rounds  - Make Fall Risk Sign visible to staff  - Offer Toileting every  Hours, in advance of need  - Initiate/Maintain alarm  - Obtain necessary fall risk management equipment:   - Apply yellow socks and bracelet for high fall risk patients  - Consider moving patient to room near nurses station  Outcome: Progressing  Goal: Maintain or return to baseline ADL function  Description: INTERVENTIONS:  -  Assess patient's ability to carry out ADLs; assess patient's baseline for ADL function and  identify physical deficits which impact ability to perform ADLs (bathing, care of mouth/teeth, toileting, grooming, dressing, etc.)  - Assess/evaluate cause of self-care deficits   - Assess range of motion  - Assess patient's mobility; develop plan if impaired  - Assess patient's need for assistive devices and provide as appropriate  - Encourage maximum independence but intervene and supervise when necessary  - Involve family in performance of ADLs  - Assess for home care needs following discharge   - Consider OT consult to assist with ADL evaluation and planning for discharge  - Provide patient education as appropriate  Outcome: Progressing  Goal: Maintains/Returns to pre admission functional level  Description: INTERVENTIONS:  - Perform AM-PAC 6 Click Basic Mobility/ Daily Activity assessment daily.  - Set and communicate daily mobility goal to care team and patient/family/caregiver.   - Collaborate with rehabilitation services on mobility goals if consulted  - Perform Range of Motion  times a day.  - Reposition patient every  hours.  - Dangle patient  times a day  - Stand patient  times a day  - Ambulate patient  times a day  - Out of bed to chair  times a day   - Out of bed for meals  times a day  - Out of bed for toileting  - Record patient progress and toleration of activity level   Outcome: Progressing     Problem: DISCHARGE PLANNING  Goal: Discharge to home or other facility with appropriate resources  Description: INTERVENTIONS:  - Identify barriers to discharge w/patient and caregiver  - Arrange for needed discharge resources and transportation as appropriate  - Identify discharge learning needs (meds, wound care, etc.)  - Arrange for interpretive services to assist at discharge as needed  - Refer to Case Management Department for coordinating discharge planning if the patient needs post-hospital services based on physician/advanced practitioner order or complex needs related to functional status,  cognitive ability, or social support system  Outcome: Progressing     Problem: Knowledge Deficit  Goal: Patient/family/caregiver demonstrates understanding of disease process, treatment plan, medications, and discharge instructions  Description: Complete learning assessment and assess knowledge base.  Interventions:  - Provide teaching at level of understanding  - Provide teaching via preferred learning methods  Outcome: Progressing     Problem: Nutrition/Hydration-ADULT  Goal: Nutrient/Hydration intake appropriate for improving, restoring or maintaining nutritional needs  Description: Monitor and assess patient's nutrition/hydration status for malnutrition. Collaborate with interdisciplinary team and initiate plan and interventions as ordered.  Monitor patient's weight and dietary intake as ordered or per policy. Utilize nutrition screening tool and intervene as necessary. Determine patient's food preferences and provide high-protein, high-caloric foods as appropriate.     INTERVENTIONS:  - Monitor oral intake, urinary output, labs, and treatment plans  - Assess nutrition and hydration status and recommend course of action  - Evaluate amount of meals eaten  - Assist patient with eating if necessary   - Allow adequate time for meals  - Recommend/ encourage appropriate diets, oral nutritional supplements, and vitamin/mineral supplements  - Order, calculate, and assess calorie counts as needed  - Recommend, monitor, and adjust tube feedings and TPN/PPN based on assessed needs  - Assess need for intravenous fluids  - Provide specific nutrition/hydration education as appropriate  - Include patient/family/caregiver in decisions related to nutrition  Outcome: Progressing

## 2024-05-28 NOTE — ASSESSMENT & PLAN NOTE
Lab Results   Component Value Date    EGFR 57 05/28/2024    EGFR 61 05/26/2024    EGFR 49 05/25/2024    CREATININE 0.99 05/28/2024    CREATININE 0.94 05/26/2024    CREATININE 1.12 05/25/2024     Pending BMP  Avoid nephrotoxins and hypotension

## 2024-05-28 NOTE — UTILIZATION REVIEW
NOTIFICATION OF INPATIENT ADMISSION   AUTHORIZATION REQUEST   SERVICING FACILITY:   [place] [address]  Tax ID: [id number]  NPI: [id number] ATTENDING PROVIDER:  Attending Name and NPI#: Amanda Dukes Md [[id number]]  Address: [address] [place] [address]  Phone: [phone]   ADMISSION INFORMATION:  Place of Service: Inpatient [facility]  Place of Service Code: 21  Inpatient Admission Date/Time: 5/25/24  2:02 PM  Discharge Date/Time: No discharge date for patient encounter.  Admitting Diagnosis Code/Description:  Cellulitis [L03.90]  Osteoarthritis [M19.90]  Ankle injury [S99.919A]  Closed fracture of left ankle, initial encounter [S82.892A]     UTILIZATION REVIEW CONTACT:  Mirela Perkins, Utilization   Network Utilization Review Department  Phone: [phone]  Fax [phone]  Email: [email or web address]  Contact for approvals/pending authorizations, clinical reviews, and discharge.     PHYSICIAN ADVISORY SERVICES:  Medical Necessity Denial & Ockm-ru-Pvnh Review  Phone: [phone]  Fax: [phone]  Email: [email or web address]     DISCHARGE SUPPORT TEAM:  For Patients Discharge Needs & Updates  Phone: [phone] opt. 2 Fax: [phone]  Email: [email or web address]

## 2024-05-28 NOTE — PROGRESS NOTES
"[facility]  Progress Note  Name: Tigist Hewitt I  MRN: [id number]  Unit/Bed#: -01 I Date of Admission: 5/25/2024   Date of Service: 5/28/2024 I Hospital Day: 3    Assessment & Plan   * Ankle fracture  Assessment & Plan  Left ankle pain following attempt to transition off commode when her legs got \"spongy\" and her left ankle turned outward   CT LLE:    Nondisplaced, oblique fracture of the distal fibula  Nondisplaced fracture of the tip of the lateral malleolus   Orthopedics spoke with ED provider  Placed cam boot; nonweightbearing as much as possible  Consult PT/OT; baseline mobility with transfers from lift chair to commode  Declines rehab   Will need heather lift and hospital bed  Analgesia    HLD (hyperlipidemia)  Assessment & Plan  Continue PTA Lipitor 10 mg q day     Gout  Assessment & Plan  No recent flare  Continue allopurinol 100 mg q day     Lymphangitis  Assessment & Plan  Chronic appearing changes to LLE skin w/ thickening/skin discoloration   CT of LE: Extensive induration of the subcutaneous fat of the medial upper thigh with significant skin thickening, consistent with cellulitis. No evidence of soft tissue gas. No deep fascial edema or fluid collection. No intramuscular fluid collection.   Labs stable no leukocytosis.   Will ultimately require outpatient wound care - discussed with pt and caregiver - concerned dt limited mobility and transport there    CKD (chronic kidney disease) stage 3, GFR 30-59 ml/min ([facility])  Assessment & Plan  Lab Results   Component Value Date    EGFR 57 05/28/2024    EGFR 61 05/26/2024    EGFR 49 05/25/2024    CREATININE 0.99 05/28/2024    CREATININE 0.94 05/26/2024    CREATININE 1.12 05/25/2024     Pending BMP  Avoid nephrotoxins and hypotension    GERD (gastroesophageal reflux disease)  Assessment & Plan  Continue PPI therapy qhs     Hypotension  Assessment & Plan  Chronic  Continue midodrine 5 mg TID w/ meals   Some hypotension continue " monitor     Hypothyroidism  Assessment & Plan  Continue levothyroxine 88 mcg q day     Chronic respiratory failure with hypoxia (HCC)  Assessment & Plan  Utilizes 4 L via NC at home & CPAP nightly    Anemia  Assessment & Plan  Continue iron supplement  Hemoglobin pending    Diastolic CHF (HCC)  Assessment & Plan  Wt Readings from Last 3 Encounters:   05/28/24 (!) 156 kg (343 lb 7.6 oz)   05/12/24 (!) 155 kg (342 lb 2.5 oz)   01/02/24 (!) 149 kg (328 lb 0.7 oz)     Clinically euvolemic  ECHO (12/2023):  EF 55%   Unable to assess diastolic function due to atrial fibrillation  Continue PTA K Dur, Bumex 2 mg BID & Toprol XL 37.5 mg BID  Daily weights  Pt is on a fluid restriction       Type 2 diabetes mellitus with hyperglycemia, with long-term current use of insulin ([facility])  Assessment & Plan  Lab Results   Component Value Date    HGBA1C 7.0 (H) 05/02/2024     Recent Labs     05/27/24  1618 05/27/24  2138 05/28/24  0752 05/28/24  1151   POCGLU 117 176* 147* 160*         Blood Sugar Average: Last 72 hrs:  (P) 130  SSI AC & QHS  Diabetic diet  Hold PTA glipizide  Continue PTA Lantus 26 U qhs & Humalog 10 U TID w/ meals (will start with 5 units TID and SS tonight )   Increase meal time coverage to home dose , if increase in blood sugar. At this time bs remains stable       COPD (chronic obstructive pulmonary disease) ([facility])  Assessment & Plan  No acute exacerbation  Continue maintenance inhalers  Monitor respiratory status  Respiratory protocol     Atrial fibrillation ([facility])  Assessment & Plan  Rate controlled; continue Toprol XL 37.5 mg BID   Continue Coumadin 7.5 mg q day  Lab Results   Component Value Date    INR 2.02 (H) 05/27/2024    INR 1.98 (H) 05/26/2024    INR 1.97 (H) 05/25/2024                  VTE Pharmacologic Prophylaxis: VTE Score: 16 High Risk (Score >/= 5) - Pharmacological DVT Prophylaxis Ordered: warfarin (Coumadin). Sequential Compression Devices Ordered.    Mobility:   Basic Mobility Inpatient Raw Score:  6  JH-HLM Goal: 2: Bed activities/Dependent transfer  JH-HLM Achieved: 2: Bed activities/Dependent transfer  JH-HLM Goal achieved. Continue to encourage appropriate mobility.    Patient Centered Rounds: I performed bedside rounds with nursing staff today.   Discussions with Specialists or Other Care Team Provider: LUCY    Education and Discussions with Family / Patient: Updated  () via phone.    Total Time Spent on Date of Encounter in care of patient:  mins. This time was spent on one or more of the following: performing physical exam; counseling and coordination of care; obtaining or reviewing history; documenting in the medical record; reviewing/ordering tests, medications or procedures; communicating with other healthcare professionals and discussing with patient's family/caregivers.    Current Length of Stay: 3 day(s)  Current Patient Status: Inpatient   Certification Statement: The patient will continue to require additional inpatient hospital stay due to pending David and bed   Discharge Plan: Anticipate discharge tomorrow to home with home services.    Code Status: Level 3 - DNAR and DNI    Subjective:   Seen examined.  Endorses not having much pain today, overall feeling okay.     Objective:     Vitals:   Temp (24hrs), Av.9 °F (36.6 °C), Min:97.9 °F (36.6 °C), Max:97.9 °F (36.6 °C)    Temp:  [97.9 °F (36.6 °C)] 97.9 °F (36.6 °C)  HR:  [63-82] 63  Resp:  [16-18] 18  BP: (105-123)/(59-70) 110/59  SpO2:  [92 %-95 %] 92 %  Body mass index is 64.9 kg/m².     Input and Output Summary (last 24 hours):     Intake/Output Summary (Last 24 hours) at 2024 1423  Last data filed at 2024 1319  Gross per 24 hour   Intake 480 ml   Output 1096 ml   Net -616 ml       Physical Exam:   Physical Exam  Vitals and nursing note reviewed.   Constitutional:       General: She is not in acute distress.     Appearance: She is obese.   HENT:      Head: Normocephalic and atraumatic.      Nose: No  congestion.      Mouth/Throat:      Mouth: Mucous membranes are moist.   Eyes:      Conjunctiva/sclera: Conjunctivae normal.   Cardiovascular:      Rate and Rhythm: Normal rate and regular rhythm.      Pulses: Normal pulses.      Heart sounds: Normal heart sounds. No murmur heard.  Pulmonary:      Effort: Pulmonary effort is normal. No respiratory distress.      Breath sounds: Normal breath sounds.   Abdominal:      General: Bowel sounds are normal.      Palpations: Abdomen is soft.      Tenderness: There is no abdominal tenderness.   Musculoskeletal:         General: Signs of injury present. Normal range of motion.      Right lower leg: No edema.      Left lower leg: No edema.      Comments: Ankle in boot   Skin:     General: Skin is warm and dry.   Neurological:      Mental Status: She is alert and oriented to person, place, and time.          Additional Data:     Labs:  Results from last 7 days   Lab Units 05/28/24  0548 05/26/24  0511   WBC Thousand/uL 5.36 5.87   HEMOGLOBIN g/dL 12.5 11.6   HEMATOCRIT % 42.0 37.2   PLATELETS Thousands/uL 211 203   SEGS PCT %  --  61   LYMPHO PCT %  --  20   MONO PCT %  --  12   EOS PCT %  --  5     Results from last 7 days   Lab Units 05/28/24  0548 05/26/24  0511   SODIUM mmol/L 142 141   POTASSIUM mmol/L 4.1 4.0   CHLORIDE mmol/L 106 107   CO2 mmol/L 31 28   BUN mg/dL 22 21   CREATININE mg/dL 0.99 0.94   ANION GAP mmol/L 5 6   CALCIUM mg/dL 9.5 9.2   ALBUMIN g/dL  --  3.1*   TOTAL BILIRUBIN mg/dL  --  1.03*   ALK PHOS U/L  --  153*   ALT U/L  --  18   AST U/L  --  18   GLUCOSE RANDOM mg/dL 161* 79     Results from last 7 days   Lab Units 05/27/24  0531   INR  2.02*     Results from last 7 days   Lab Units 05/28/24  1151 05/28/24  0752 05/27/24  2138 05/27/24  1618 05/27/24  1112 05/27/24  0816 05/26/24 2058 05/26/24  1651 05/26/24  1129 05/26/24  0730 05/25/24  2055 05/25/24  1708   POC GLUCOSE mg/dl 160* 147* 176* 117 180* 104 124 84 112 80 174* 102                Lines/Drains:  Invasive Devices       Peripheral Intravenous Line  Duration             Peripheral IV 05/09/24 Dorsal (posterior);Left Hand 19 days    Peripheral IV 05/25/24 Right Antecubital 2 days                          Imaging: No pertinent imaging reviewed.    Recent Cultures (last 7 days):         Last 24 Hours Medication List:   Current Facility-Administered Medications   Medication Dose Route Frequency Provider Last Rate    acetaminophen  650 mg Oral Q8H HALEY [facility]      albuterol  2 puff Inhalation Q6H PRN [facility]      allopurinol  100 mg Oral Daily [facility]      aluminum-magnesium hydroxide-simethicone  30 mL Oral Q6H PRN [facility]      atorvastatin  10 mg Oral Daily [facility]      bumetanide  2 mg Oral BID [facility]      Cholecalciferol  2,000 Units Oral Daily [facility]      docusate sodium  100 mg Oral BID [facility]      ferrous sulfate  325 mg Oral Daily With Breakfast [facility]      Fluticasone Furoate-Vilanterol  1 puff Inhalation Daily [facility]      insulin glargine  26 Units Subcutaneous HS [facility]      insulin lispro  2-12 Units Subcutaneous TID AC [facility]      insulin lispro  2-12 Units Subcutaneous HS [facility]      insulin lispro  5 Units Subcutaneous TID With Meals [facility]      levothyroxine  200 mcg Oral Daily [facility]      levothyroxine  88 mcg Oral Early Morning [facility]      metoprolol succinate  37.5 mg Oral BID [facility]      midodrine  5 mg Oral TID AC [facility]      montelukast  10 mg Oral HS [facility]      multivitamin-minerals  1 tablet Oral Daily [facility]      oxyCODONE  2.5 mg Oral Q4H PRN [facility]      oxyCODONE  5 mg Oral Q4H PRN [facility]      pantoprazole  20 mg Oral Early Morning [facility]      potassium  chloride  20 mEq Oral TID [facility]      warfarin  7.5 mg Oral Daily (warfarin) [facility]          Today, Patient Was Seen By: Kerry Diaz PA-C    **Please Note: This note may have been constructed using a voice recognition system.**

## 2024-05-29 VITALS
WEIGHT: 293 LBS | RESPIRATION RATE: 16 BRPM | DIASTOLIC BLOOD PRESSURE: 70 MMHG | BODY MASS INDEX: 65.52 KG/M2 | SYSTOLIC BLOOD PRESSURE: 100 MMHG | TEMPERATURE: 97.7 F | OXYGEN SATURATION: 94 % | HEART RATE: 68 BPM

## 2024-05-29 LAB
GLUCOSE SERPL-MCNC: 121 MG/DL (ref 65–140)
GLUCOSE SERPL-MCNC: 173 MG/DL (ref 65–140)

## 2024-05-29 PROCEDURE — 82948 REAGENT STRIP/BLOOD GLUCOSE: CPT

## 2024-05-29 PROCEDURE — 99239 HOSP IP/OBS DSCHRG MGMT >30: CPT

## 2024-05-29 RX ADMIN — LEVOTHYROXINE SODIUM 200 MCG: 100 TABLET ORAL at 06:28

## 2024-05-29 RX ADMIN — BUMETANIDE 2 MG: 1 TABLET ORAL at 08:18

## 2024-05-29 RX ADMIN — ACETAMINOPHEN 325MG 650 MG: 325 TABLET ORAL at 14:26

## 2024-05-29 RX ADMIN — Medication 1 TABLET: at 08:17

## 2024-05-29 RX ADMIN — FLUTICASONE FUROATE AND VILANTEROL TRIFENATATE 1 PUFF: 100; 25 POWDER RESPIRATORY (INHALATION) at 08:20

## 2024-05-29 RX ADMIN — ALLOPURINOL 100 MG: 100 TABLET ORAL at 08:18

## 2024-05-29 RX ADMIN — MIDODRINE HYDROCHLORIDE 5 MG: 5 TABLET ORAL at 06:27

## 2024-05-29 RX ADMIN — DOCUSATE SODIUM 100 MG: 100 CAPSULE, LIQUID FILLED ORAL at 08:17

## 2024-05-29 RX ADMIN — INSULIN LISPRO 5 UNITS: 100 INJECTION, SOLUTION INTRAVENOUS; SUBCUTANEOUS at 12:12

## 2024-05-29 RX ADMIN — FERROUS SULFATE TAB 325 MG (65 MG ELEMENTAL FE) 325 MG: 325 (65 FE) TAB at 08:17

## 2024-05-29 RX ADMIN — LEVOTHYROXINE SODIUM 88 MCG: 100 TABLET ORAL at 06:28

## 2024-05-29 RX ADMIN — PANTOPRAZOLE SODIUM 20 MG: 20 TABLET, DELAYED RELEASE ORAL at 06:27

## 2024-05-29 RX ADMIN — POTASSIUM CHLORIDE 20 MEQ: 1500 TABLET, EXTENDED RELEASE ORAL at 08:17

## 2024-05-29 RX ADMIN — INSULIN LISPRO 2 UNITS: 100 INJECTION, SOLUTION INTRAVENOUS; SUBCUTANEOUS at 12:12

## 2024-05-29 RX ADMIN — ATORVASTATIN CALCIUM 10 MG: 10 TABLET, FILM COATED ORAL at 08:18

## 2024-05-29 RX ADMIN — ACETAMINOPHEN 325MG 650 MG: 325 TABLET ORAL at 06:27

## 2024-05-29 RX ADMIN — METOPROLOL SUCCINATE 37.5 MG: 25 TABLET, EXTENDED RELEASE ORAL at 08:17

## 2024-05-29 RX ADMIN — Medication 2000 UNITS: at 08:18

## 2024-05-29 RX ADMIN — MIDODRINE HYDROCHLORIDE 5 MG: 5 TABLET ORAL at 12:12

## 2024-05-29 RX ADMIN — OXYCODONE HYDROCHLORIDE 5 MG: 5 TABLET ORAL at 10:48

## 2024-05-29 NOTE — CASE MANAGEMENT
Case Management Discharge Planning Note    Patient name Tigist Hewitt  Location /-01 MRN [id number]  : 1955 Date 2024       Current Admission Date: 2024  Current Admission Diagnosis:Ankle fracture   Patient Active Problem List    Diagnosis Date Noted Date Diagnosed    Ankle fracture 2024     Lymphangitis 2024     Gout 2024     HLD (hyperlipidemia) 2024     Metabolic alkalosis 2024     Acute pain of left knee 2024     Acute cystitis without hematuria 2024     Ambulatory dysfunction 2024     Hyperkalemia 2024     Lung cyst 2024     Abnormal CT scan, pelvis 2023     CKD (chronic kidney disease) stage 3, GFR 30-59 ml/min ([facility]) 2023     NISHI (obstructive sleep apnea) 2023     Intertrigo 10/29/2022     Vaginal bleeding 10/23/2022     Acute kidney injury superimposed on chronic kidney disease  ([facility]) 10/22/2022     Hypotension 10/22/2022     GERD (gastroesophageal reflux disease) 10/22/2022     Acute on chronic diastolic congestive heart failure ([facility]) 2022     IMTIAZ (acute kidney injury) ([facility]) 2022     Morbid obesity ([facility]) 2022     Hypothyroidism 2022     Supratherapeutic INR 04/15/2020     Chronic respiratory failure with hypoxia ([facility]) 2020     Asthma 2020     Atrial fibrillation ([facility]) 2020     COPD (chronic obstructive pulmonary disease) ([facility]) 2020     Type 2 diabetes mellitus with hyperglycemia, with long-term current use of insulin ([facility]) 2020     Diastolic CHF ([facility]) 2020     Shortness of breath 2020     Anemia 2020       LOS (days): 4  Geometric Mean LOS (GMLOS) (days):   Days to GMLOS:     OBJECTIVE:  Risk of Unplanned Readmission Score: 31.57         Current admission status: Inpatient   Preferred Pharmacy:   [facility]/pharmacy #[id number] - [place], [place] - [address] [place] [address]  Phone: [phone] Fax:  "[phone]    Primary Care Provider: Marisa Haque MD    Primary Insurance: [employer] [facility]  Secondary Insurance:     DISCHARGE DETAILS:           As per NORAH, [facility] will  pt at 1600 to transport to home address            1000 Cm spoke with pt who sts DME will be delivered \"this morning\" to her home.  Pt is agreeable with 1600  time                                                                                                  "

## 2024-05-29 NOTE — ASSESSMENT & PLAN NOTE
Wt Readings from Last 3 Encounters:   05/29/24 (!) 157 kg (346 lb 12.5 oz)   05/12/24 (!) 155 kg (342 lb 2.5 oz)   01/02/24 (!) 149 kg (328 lb 0.7 oz)     Clinically euvolemic  ECHO (12/2023):  EF 55%   Unable to assess diastolic function due to atrial fibrillation  Continue PTA K Dur, Bumex 2 mg BID & Toprol XL 37.5 mg BID  Daily weights  Pt is on a fluid restriction

## 2024-05-29 NOTE — PLAN OF CARE
Problem: Potential for Falls  Goal: Patient will remain free of falls  Description: INTERVENTIONS:  - Educate patient/family on patient safety including physical limitations  - Instruct patient to call for assistance with activity   - Consult OT/PT to assist with strengthening/mobility   - Keep Call bell within reach  - Keep bed low and locked with side rails adjusted as appropriate  - Keep care items and personal belongings within reach  - Initiate and maintain comfort rounds  - Make Fall Risk Sign visible to staff  - Offer Toileting every 2 Hours, in advance of need  - Initiate/Maintain bed alarm  - Obtain necessary fall risk management equipment  - Apply yellow socks and bracelet for high fall risk patients  - Consider moving patient to room near nurses station  5/29/2024 0038 by Angeli Damico RN  Outcome: Progressing  5/29/2024 0038 by Angeli Damico RN  Outcome: Progressing     Problem: Prexisting or High Potential for Compromised Skin Integrity  Goal: Skin integrity is maintained or improved  Description: INTERVENTIONS:  - Identify patients at risk for skin breakdown  - Assess and monitor skin integrity  - Assess and monitor nutrition and hydration status  - Monitor labs   - Assess for incontinence   - Turn and reposition patient  - Assist with mobility/ambulation  - Relieve pressure over bony prominences  - Avoid friction and shearing  - Provide appropriate hygiene as needed including keeping skin clean and dry  - Evaluate need for skin moisturizer/barrier cream  - Collaborate with interdisciplinary team   - Patient/family teaching  - Consider wound care consult   5/29/2024 0038 by Angeli Damico RN  Outcome: Progressing  5/29/2024 0038 by Angeli Damico RN  Outcome: Progressing     Problem: PAIN - ADULT  Goal: Verbalizes/displays adequate comfort level or baseline comfort level  Description: Interventions:  - Encourage patient to monitor pain and request assistance  - Assess pain using appropriate pain  scale  - Administer analgesics based on type and severity of pain and evaluate response  - Implement non-pharmacological measures as appropriate and evaluate response  - Consider cultural and social influences on pain and pain management  - Notify physician/advanced practitioner if interventions unsuccessful or patient reports new pain  5/29/2024 0038 by Angeli Damico RN  Outcome: Progressing  5/29/2024 0038 by Angeli Damico RN  Outcome: Progressing     Problem: INFECTION - ADULT  Goal: Absence or prevention of progression during hospitalization  Description: INTERVENTIONS:  - Assess and monitor for signs and symptoms of infection  - Monitor lab/diagnostic results  - Monitor all insertion sites, i.e. indwelling lines, tubes, and drains  - Monitor endotracheal if appropriate and nasal secretions for changes in amount and color  - [place] appropriate cooling/warming therapies per order  - Administer medications as ordered  - Instruct and encourage patient and family to use good hand hygiene technique  - Identify and instruct in appropriate isolation precautions for identified infection/condition  5/29/2024 0038 by Angeli Damico RN  Outcome: Progressing  5/29/2024 0038 by Angeli Damico RN  Outcome: Progressing  Goal: Absence of fever/infection during neutropenic period  Description: INTERVENTIONS:  - Monitor WBC    5/29/2024 0038 by Angeli Damico RN  Outcome: Progressing  5/29/2024 0038 by Angeli Damico RN  Outcome: Progressing     Problem: SAFETY ADULT  Goal: Patient will remain free of falls  Description: INTERVENTIONS:  - Educate patient/family on patient safety including physical limitations  - Instruct patient to call for assistance with activity   - Consult OT/PT to assist with strengthening/mobility   - Keep Call bell within reach  - Keep bed low and locked with side rails adjusted as appropriate  - Keep care items and personal belongings within reach  - Initiate and maintain comfort rounds  - Make Fall Risk  Sign visible to staff  - Offer Toileting every 2 Hours, in advance of need  - Initiate/Maintain bedalarm  - Obtain necessary fall risk management equipment  - Apply yellow socks and bracelet for high fall risk patients  - Consider moving patient to room near nurses station  5/29/2024 0038 by Angeli Damico RN  Outcome: Progressing  5/29/2024 0038 by Angeli Damico RN  Outcome: Progressing  Goal: Maintain or return to baseline ADL function  Description: INTERVENTIONS:  -  Assess patient's ability to carry out ADLs; assess patient's baseline for ADL function and identify physical deficits which impact ability to perform ADLs (bathing, care of mouth/teeth, toileting, grooming, dressing, etc.)  - Assess/evaluate cause of self-care deficits   - Assess range of motion  - Assess patient's mobility; develop plan if impaired  - Assess patient's need for assistive devices and provide as appropriate  - Encourage maximum independence but intervene and supervise when necessary  - Involve family in performance of ADLs  - Assess for home care needs following discharge   - Consider OT consult to assist with ADL evaluation and planning for discharge  - Provide patient education as appropriate  Outcome: Progressing  Goal: Maintains/Returns to pre admission functional level  Description: INTERVENTIONS:  - Perform AM-PAC 6 Click Basic Mobility/ Daily Activity assessment daily.  - Set and communicate daily mobility goal to care team and patient/family/caregiver.   - Collaborate with rehabilitation services on mobility goals if consulted  - Perform Range of Motion 3 times a day.  - Reposition patient every 2 hours.  - Dangle patient 3 times a day  - Stand patient 3 times a day  - Ambulate patient 3 times a day  - Out of bed to chair 3 times a day   - Out of bed for meals 3 times a day  - Out of bed for toileting  - Record patient progress and toleration of activity level   Outcome: Progressing     Problem: DISCHARGE PLANNING  Goal:  Discharge to home or other facility with appropriate resources  Description: INTERVENTIONS:  - Identify barriers to discharge w/patient and caregiver  - Arrange for needed discharge resources and transportation as appropriate  - Identify discharge learning needs (meds, wound care, etc.)  - Arrange for interpretive services to assist at discharge as needed  - Refer to Case Management Department for coordinating discharge planning if the patient needs post-hospital services based on physician/advanced practitioner order or complex needs related to functional status, cognitive ability, or social support system  Outcome: Progressing     Problem: Knowledge Deficit  Goal: Patient/family/caregiver demonstrates understanding of disease process, treatment plan, medications, and discharge instructions  Description: Complete learning assessment and assess knowledge base.  Interventions:  - Provide teaching at level of understanding  - Provide teaching via preferred learning methods  Outcome: Progressing     Problem: Nutrition/Hydration-ADULT  Goal: Nutrient/Hydration intake appropriate for improving, restoring or maintaining nutritional needs  Description: Monitor and assess patient's nutrition/hydration status for malnutrition. Collaborate with interdisciplinary team and initiate plan and interventions as ordered.  Monitor patient's weight and dietary intake as ordered or per policy. Utilize nutrition screening tool and intervene as necessary. Determine patient's food preferences and provide high-protein, high-caloric foods as appropriate.     INTERVENTIONS:  - Monitor oral intake, urinary output, labs, and treatment plans  - Assess nutrition and hydration status and recommend course of action  - Evaluate amount of meals eaten  - Assist patient with eating if necessary   - Allow adequate time for meals  - Recommend/ encourage appropriate diets, oral nutritional supplements, and vitamin/mineral supplements  - Order, calculate,  and assess calorie counts as needed  - Recommend, monitor, and adjust tube feedings and TPN/PPN based on assessed needs  - Assess need for intravenous fluids  - Provide specific nutrition/hydration education as appropriate  - Include patient/family/caregiver in decisions related to nutrition  Outcome: Progressing

## 2024-05-29 NOTE — PLAN OF CARE
Problem: Potential for Falls  Goal: Patient will remain free of falls  Description: INTERVENTIONS:  - Educate patient/family on patient safety including physical limitations  - Instruct patient to call for assistance with activity   - Consult OT/PT to assist with strengthening/mobility   - Keep Call bell within reach  - Keep bed low and locked with side rails adjusted as appropriate  - Keep care items and personal belongings within reach  - Initiate and maintain comfort rounds  - Make Fall Risk Sign visible to staff  - Offer Toileting every 2 Hours, in advance of need  - Initiate/Maintain bed alarm  - Obtain necessary fall risk management equipment  - Apply yellow socks and bracelet for high fall risk patients  - Consider moving patient to room near nurses station  Outcome: Progressing     Problem: Prexisting or High Potential for Compromised Skin Integrity  Goal: Skin integrity is maintained or improved  Description: INTERVENTIONS:  - Identify patients at risk for skin breakdown  - Assess and monitor skin integrity  - Assess and monitor nutrition and hydration status  - Monitor labs   - Assess for incontinence   - Turn and reposition patient  - Assist with mobility/ambulation  - Relieve pressure over bony prominences  - Avoid friction and shearing  - Provide appropriate hygiene as needed including keeping skin clean and dry  - Evaluate need for skin moisturizer/barrier cream  - Collaborate with interdisciplinary team   - Patient/family teaching  - Consider wound care consult   Outcome: Progressing     Problem: PAIN - ADULT  Goal: Verbalizes/displays adequate comfort level or baseline comfort level  Description: Interventions:  - Encourage patient to monitor pain and request assistance  - Assess pain using appropriate pain scale  - Administer analgesics based on type and severity of pain and evaluate response  - Implement non-pharmacological measures as appropriate and evaluate response  - Consider cultural and  social influences on pain and pain management  - Notify physician/advanced practitioner if interventions unsuccessful or patient reports new pain  Outcome: Progressing     Problem: INFECTION - ADULT  Goal: Absence or prevention of progression during hospitalization  Description: INTERVENTIONS:  - Assess and monitor for signs and symptoms of infection  - Monitor lab/diagnostic results  - Monitor all insertion sites, i.e. indwelling lines, tubes, and drains  - Monitor endotracheal if appropriate and nasal secretions for changes in amount and color  - [place] appropriate cooling/warming therapies per order  - Administer medications as ordered  - Instruct and encourage patient and family to use good hand hygiene technique  - Identify and instruct in appropriate isolation precautions for identified infection/condition  Outcome: Progressing  Goal: Absence of fever/infection during neutropenic period  Description: INTERVENTIONS:  - Monitor WBC    Outcome: Progressing     Problem: SAFETY ADULT  Goal: Patient will remain free of falls  Description: INTERVENTIONS:  - Educate patient/family on patient safety including physical limitations  - Instruct patient to call for assistance with activity   - Consult OT/PT to assist with strengthening/mobility   - Keep Call bell within reach  - Keep bed low and locked with side rails adjusted as appropriate  - Keep care items and personal belongings within reach  - Initiate and maintain comfort rounds  - Make Fall Risk Sign visible to staff  - Offer Toileting every 2 Hours, in advance of need  - Initiate/Maintain bed alarm  - Obtain necessary fall risk management equipment  - Apply yellow socks and bracelet for high fall risk patients  - Consider moving patient to room near nurses station  Outcome: Progressing  Goal: Maintain or return to baseline ADL function  Description: INTERVENTIONS:  -  Assess patient's ability to carry out ADLs; assess patient's baseline for ADL function and  identify physical deficits which impact ability to perform ADLs (bathing, care of mouth/teeth, toileting, grooming, dressing, etc.)  - Assess/evaluate cause of self-care deficits   - Assess range of motion  - Assess patient's mobility; develop plan if impaired  - Assess patient's need for assistive devices and provide as appropriate  - Encourage maximum independence but intervene and supervise when necessary  - Involve family in performance of ADLs  - Assess for home care needs following discharge   - Consider OT consult to assist with ADL evaluation and planning for discharge  - Provide patient education as appropriate  Outcome: Progressing  Goal: Maintains/Returns to pre admission functional level  Description: INTERVENTIONS:  - Perform AM-PAC 6 Click Basic Mobility/ Daily Activity assessment daily.  - Set and communicate daily mobility goal to care team and patient/family/caregiver.   - Collaborate with rehabilitation services on mobility goals if consulted  - Reposition patient every 2 hours.  - Stand patient 3 times a day  - Ambulate patient 3 times a day  - Out of bed to chair 3 times a day   - Out of bed for meals 3 times a day  - Out of bed for toileting  - Record patient progress and toleration of activity level   Outcome: Progressing     Problem: DISCHARGE PLANNING  Goal: Discharge to home or other facility with appropriate resources  Description: INTERVENTIONS:  - Identify barriers to discharge w/patient and caregiver  - Arrange for needed discharge resources and transportation as appropriate  - Identify discharge learning needs (meds, wound care, etc.)  - Arrange for interpretive services to assist at discharge as needed  - Refer to Case Management Department for coordinating discharge planning if the patient needs post-hospital services based on physician/advanced practitioner order or complex needs related to functional status, cognitive ability, or social support system  Outcome: Progressing      Problem: Knowledge Deficit  Goal: Patient/family/caregiver demonstrates understanding of disease process, treatment plan, medications, and discharge instructions  Description: Complete learning assessment and assess knowledge base.  Interventions:  - Provide teaching at level of understanding  - Provide teaching via preferred learning methods  Outcome: Progressing     Problem: Nutrition/Hydration-ADULT  Goal: Nutrient/Hydration intake appropriate for improving, restoring or maintaining nutritional needs  Description: Monitor and assess patient's nutrition/hydration status for malnutrition. Collaborate with interdisciplinary team and initiate plan and interventions as ordered.  Monitor patient's weight and dietary intake as ordered or per policy. Utilize nutrition screening tool and intervene as necessary. Determine patient's food preferences and provide high-protein, high-caloric foods as appropriate.     INTERVENTIONS:  - Monitor oral intake, urinary output, labs, and treatment plans  - Assess nutrition and hydration status and recommend course of action  - Evaluate amount of meals eaten  - Assist patient with eating if necessary   - Allow adequate time for meals  - Recommend/ encourage appropriate diets, oral nutritional supplements, and vitamin/mineral supplements  - Order, calculate, and assess calorie counts as needed  - Recommend, monitor, and adjust tube feedings and TPN/PPN based on assessed needs  - Assess need for intravenous fluids  - Provide specific nutrition/hydration education as appropriate  - Include patient/family/caregiver in decisions related to nutrition  Outcome: Progressing

## 2024-05-29 NOTE — ASSESSMENT & PLAN NOTE
Lab Results   Component Value Date    HGBA1C 7.0 (H) 05/02/2024     Recent Labs     05/28/24  1151 05/28/24  1658 05/28/24  2113 05/29/24  0749   POCGLU 160* 119 135 121         Blood Sugar Average: Last 72 hrs:  (P) [id number]  SSI AC & QHS  Diabetic diet  Hold PTA glipizide  Continue PTA Lantus 26 U qhs & Humalog 10 U TID w/ meals (will start with 5 units TID and SS tonight )   Increase meal time coverage to home dose , if increase in blood sugar. At this time bs remains stable

## 2024-05-29 NOTE — DISCHARGE INSTR - AVS FIRST PAGE
Dear Tigist Hewitt,     It was our pleasure to care for you here at [facility]. For follow up as well as any medication refills, we recommend that you follow up with your primary care physician. Here are the most important instructions/ recommendations at discharge:     Important follow up information -   Follow-up with Dr. Morse a week from discharge for repeat x-rays and evaluation of ankle fracture  Other Instructions -   Remain nonweightbearing/very minimal weightbearing for transfers only to affected left ankle  Oxycodone pain medication was sent to your pharmacy by ER provider on 5/25-can utilize as needed, do not take more than instructed  Please review this entire after visit summary as additional general instructions including medication list, appointments, activity, diet, any pertinent wound care, and other additional recommendations from your care team that may be provided for you.      Sincerely,     Kerry Diaz PA-C

## 2024-05-29 NOTE — NURSING NOTE
Patient's IV removed and catheter intact. AVS reviewed by virtual nurse with patient. Patient escorted out by stretcher by EMS.

## 2024-05-29 NOTE — ASSESSMENT & PLAN NOTE
"Left ankle pain following attempt to transition off commode when her legs got \"spongy\" and her left ankle turned outward   Possible pathologic fracture due to possible osteoporosis, in setting of morbid obesity and significant immobility for 6 to 8 years, has sedentary lifestyle and mobility  CT LLE:    Nondisplaced, oblique fracture of the distal fibula  Nondisplaced fracture of the tip of the lateral malleolus   Orthopedics spoke with ED provider  Placed cam boot; nonweightbearing as much as possible  Consult PT/OT; baseline mobility with transfers from lift chair to commode  Declines rehab   Will need heather lift and hospital bed  Analgesia  "

## 2024-05-30 LAB

## 2024-05-30 NOTE — UTILIZATION REVIEW
NOTIFICATION OF ADMISSION DISCHARGE   This is a Notification of Discharge from [facility]. Please be advised that this patient has been discharge from our facility. Below you will find the admission and discharge date and time including the patient’s disposition.   UTILIZATION REVIEW CONTACT:  Mirela Perkins  Utilization   Network Utilization Review Department  Phone: [phone] x carefully listen to the prompts. All voicemails are confidential.  Email: [email or web address]     ADMISSION INFORMATION  PRESENTATION DATE: 5/25/2024 10:04 AM  OBERVATION ADMISSION DATE:   INPATIENT ADMISSION DATE: 5/25/24  2:02 PM   DISCHARGE DATE: 5/29/2024  4:32 PM   DISPOSITION:Home with Home Health Care    Network Utilization Review Department  ATTENTION: Please call with any questions or concerns to [phone] and carefully listen to the prompts so that you are directed to the right person. All voicemails are confidential.   For Discharge needs, contact Care Management DC Support Team at [phone] opt. 2  Send all requests for admission clinical reviews, approved or denied determinations and any other requests to dedicated fax number below belonging to the campus where the patient is receiving treatment. List of dedicated fax numbers for the Facilities:  FACILITY NAME UR FAX NUMBER   ADMISSION DENIALS (Administrative/Medical Necessity) [phone]   DISCHARGE SUPPORT TEAM ([facility]) [phone]   PARENT CHILD HEALTH (Maternity/NICU/Pediatrics) [phone]   [facility] [phone]   [facility] [phone]   [facility] [phone]   [facility] [phone]   [facility] [phone]   [facility] [phone]   [facility] [phone]   [facility]  [place] [phone]   [facility] [phone]   [facility] [phone]   [facility] [phone]   [facility] [phone]

## 2024-06-03 PROBLEM — N30.00 ACUTE CYSTITIS WITHOUT HEMATURIA: Status: RESOLVED | Noted: 2024-05-04 | Resolved: 2024-06-03

## 2024-06-10 ENCOUNTER — OFFICE VISIT (OUTPATIENT)
Dept: OBGYN CLINIC | Facility: CLINIC | Age: 69
End: 2024-06-10

## 2024-06-10 ENCOUNTER — HOSPITAL ENCOUNTER (OUTPATIENT)
Dept: RADIOLOGY | Facility: CLINIC | Age: 69
Discharge: HOME | End: 2024-06-10
Payer: COMMERCIAL

## 2024-06-10 VITALS
HEART RATE: 78 BPM | SYSTOLIC BLOOD PRESSURE: 126 MMHG | WEIGHT: 293 LBS | TEMPERATURE: 97.3 F | BODY MASS INDEX: 55.32 KG/M2 | OXYGEN SATURATION: 100 % | HEIGHT: 61 IN | DIASTOLIC BLOOD PRESSURE: 76 MMHG

## 2024-06-10 DIAGNOSIS — S82.892D CLOSED FRACTURE OF LEFT ANKLE WITH ROUTINE HEALING, SUBSEQUENT ENCOUNTER: ICD-10-CM

## 2024-06-10 PROCEDURE — 99024 POSTOP FOLLOW-UP VISIT: CPT | Performed by: ORTHOPAEDIC SURGERY

## 2024-06-10 PROCEDURE — 73610 X-RAY EXAM OF ANKLE: CPT

## 2024-06-10 RX ORDER — TRAMADOL HYDROCHLORIDE 50 MG/1
50 TABLET ORAL EVERY 12 HOURS PRN
Qty: 20 TABLET | Refills: 0 | Status: SHIPPED | OUTPATIENT
Start: 2024-06-10

## 2024-06-10 NOTE — PATIENT INSTRUCTIONS
Patient will continue with essentially nonweightbearing or very minimal weightbearing and be maintained in the cam boot.  She will follow-up in orthopedic office in 3 weeks for repeat x-ray.  Cam boot should be maintained at all times and to gently bathe and for heel. care.  Prescription for Ultram 20 tablets used sparingly once or twice a day was sent to the pharmacy and to be supplemented with Tylenol.  No further Percocet would be prescribed.  Nonweightbearing on the left leg.

## 2024-06-10 NOTE — PROGRESS NOTES
"Patient Name:  Tigist Hewitt  MRN:  [id number]    Assessment     1. Closed fracture of left ankle with routine healing, subsequent encounter  Ambulatory Referral to Orthopedic Surgery    XR ankle 3+ vw left    traMADol (Ultram) 50 mg tablet        Plan     Patient will continue with essentially nonweightbearing or very minimal weightbearing and be maintained in the cam boot.  She will follow-up in orthopedic office in 3 weeks for repeat x-ray.  Cam boot should be maintained at all times and to gently bathe and for heel. care.  Prescription for Ultram 20 tablets used sparingly once or twice a day was sent to the pharmacy and to be supplemented with Tylenol.  No further Percocet would be prescribed.  Nonweightbearing on the left leg.    Return in about 3 weeks (around 7/1/2024) for Recheck left ankle 3 view x-ray.    Subjective   Tigist Hewitt returns for follow-up of left distal fibular fracture treated with minimal weightbearing in cam boot a morbidly obese patient who does minimal ambulation with chronic lymphangitis.  Injury date 5/24/2024 when she injured  her leg and ankle getting up side of the commode to her lift chair.  The patient is 2 week(s) post injury and returns for routine follow-up and x-ray. Patient complains of continued discomfort and pain at the lateral ankle.  She is requesting some pain medication.  She is using Tylenol which does not help much.  She has chronic kidney disease and not to be using anti-inflammatory medication.  She was previously on Percocet prescribed upon leaving the hospital.  Has used Ultram in the past without reaction.   was reviewed.  She notes she still has some soreness about the heel area which is treated with Prevalon bandages.  She feels this is a result of the boot.  Is requesting that the boot be able to to possibly be removed.    Objective     /76   Pulse 78   Temp (!) 97.3 °F (36.3 °C) (Temporal)   Ht 5' 1\" (1.549 m)   Wt (!) 157 kg (346 " lb)   SpO2 100%   BMI 65.38 kg/m²     Left ankle maintained in a cam boot.  With the cam boot removed the Prevalon dressings are intact of the heel.  The heel shows continued stage I pressure but no obvious breakdown.  There is no drainage nor ulceration.  Sensation maintained to the foot and heel area.  She is gross range of motion of the toes.  Expected pain with palpation of the distal fibula none over the distal tibia.  No calf pain.  Extensive lymphedema.  No ecchymosis.  [place] on the fact that the patient has ability to be completely nonweightbearing with wheelchair and David left and the risk of further skin injury maintained in a cam boot decision was made to place the patient into a bulky Leslie soft dressing.  Patient notes this fits well and does not causing any pain.  A donut pad with ABDs was placed over the heel to offload any pressure.    Data Review     I have personally reviewed pertinent films in PACS documenting SLIM maintained position alignment of the essentially nondisplaced distal fib fracture ankle mortise grossly maintained in a patient who is morbidly obese and nonambulatory.    Dylan Dalton PA-C

## 2024-06-27 ENCOUNTER — HOSPITAL ENCOUNTER (INPATIENT)
Facility: HOSPITAL | Age: 69
LOS: 9 days | Discharge: HOME HEALTH | DRG: 871 | End: 2024-07-07
Attending: EMERGENCY MEDICINE | Admitting: INTERNAL MEDICINE
Payer: COMMERCIAL

## 2024-06-27 ENCOUNTER — APPOINTMENT (EMERGENCY)
Dept: RADIOLOGY | Facility: HOSPITAL | Age: 69
DRG: 871 | End: 2024-06-27
Payer: COMMERCIAL

## 2024-06-27 DIAGNOSIS — E66.01 MORBID OBESITY (HCC): ICD-10-CM

## 2024-06-27 DIAGNOSIS — J96.01 ACUTE HYPOXIC RESPIRATORY FAILURE (HCC): ICD-10-CM

## 2024-06-27 DIAGNOSIS — S82.892D CLOSED FRACTURE OF LEFT ANKLE WITH ROUTINE HEALING, SUBSEQUENT ENCOUNTER: ICD-10-CM

## 2024-06-27 DIAGNOSIS — I50.33 ACUTE ON CHRONIC DIASTOLIC CONGESTIVE HEART FAILURE (HCC): ICD-10-CM

## 2024-06-27 DIAGNOSIS — J44.1 COPD EXACERBATION (HCC): ICD-10-CM

## 2024-06-27 DIAGNOSIS — N17.9 ACUTE KIDNEY INJURY SUPERIMPOSED ON CHRONIC KIDNEY DISEASE  (HCC): ICD-10-CM

## 2024-06-27 DIAGNOSIS — I50.9 CHF (CONGESTIVE HEART FAILURE) (HCC): Primary | ICD-10-CM

## 2024-06-27 DIAGNOSIS — I95.9 HYPOTENSION: ICD-10-CM

## 2024-06-27 DIAGNOSIS — A41.9 SEPSIS (HCC): ICD-10-CM

## 2024-06-27 DIAGNOSIS — R09.02 HYPOXIA: ICD-10-CM

## 2024-06-27 DIAGNOSIS — I48.91 ATRIAL FIBRILLATION WITH RVR (HCC): ICD-10-CM

## 2024-06-27 DIAGNOSIS — I50.33 ACUTE ON CHRONIC DIASTOLIC HEART FAILURE (HCC): ICD-10-CM

## 2024-06-27 DIAGNOSIS — J96.11 CHRONIC RESPIRATORY FAILURE WITH HYPOXIA (HCC): Chronic | ICD-10-CM

## 2024-06-27 DIAGNOSIS — J96.21 ACUTE ON CHRONIC RESPIRATORY FAILURE WITH HYPOXIA (HCC): ICD-10-CM

## 2024-06-27 DIAGNOSIS — N18.9 ACUTE KIDNEY INJURY SUPERIMPOSED ON CHRONIC KIDNEY DISEASE  (HCC): ICD-10-CM

## 2024-06-27 DIAGNOSIS — J44.9 CHRONIC OBSTRUCTIVE PULMONARY DISEASE, UNSPECIFIED COPD TYPE (HCC): Chronic | ICD-10-CM

## 2024-06-27 DIAGNOSIS — I48.91 ATRIAL FIBRILLATION WITH RAPID VENTRICULAR RESPONSE (HCC): ICD-10-CM

## 2024-06-27 DIAGNOSIS — I48.0 PAROXYSMAL ATRIAL FIBRILLATION (HCC): Chronic | ICD-10-CM

## 2024-06-27 LAB
ALBUMIN SERPL BCG-MCNC: 3.5 G/DL (ref 3.5–5)
ALP SERPL-CCNC: 170 U/L (ref 34–104)
ALT SERPL W P-5'-P-CCNC: 16 U/L (ref 7–52)
ANION GAP SERPL CALCULATED.3IONS-SCNC: 7 MMOL/L (ref 4–13)
ANISOCYTOSIS BLD QL SMEAR: PRESENT
APTT PPP: 48 SECONDS (ref 23–37)
AST SERPL W P-5'-P-CCNC: 45 U/L (ref 13–39)
BASE EX.OXY STD BLDV CALC-SCNC: 86.7 % (ref 60–80)
BASE EXCESS BLDV CALC-SCNC: 0.7 MMOL/L
BASOPHILS # BLD MANUAL: 0 THOUSAND/UL (ref 0–0.1)
BASOPHILS NFR MAR MANUAL: 0 % (ref 0–1)
BILIRUB SERPL-MCNC: 1.55 MG/DL (ref 0.2–1)
BNP SERPL-MCNC: 444 PG/ML (ref 0–100)
BUN SERPL-MCNC: 30 MG/DL (ref 5–25)
CALCIUM SERPL-MCNC: 9.8 MG/DL (ref 8.4–10.2)
CARDIAC TROPONIN I PNL SERPL HS: 40 NG/L
CHLORIDE SERPL-SCNC: 101 MMOL/L (ref 96–108)
CO2 SERPL-SCNC: 28 MMOL/L (ref 21–32)
CREAT SERPL-MCNC: 0.97 MG/DL (ref 0.6–1.3)
EOSINOPHIL # BLD MANUAL: 0 THOUSAND/UL (ref 0–0.4)
EOSINOPHIL NFR BLD MANUAL: 0 % (ref 0–6)
ERYTHROCYTE [DISTWIDTH] IN BLOOD BY AUTOMATED COUNT: 17.7 % (ref 11.6–15.1)
FLUAV RNA RESP QL NAA+PROBE: NEGATIVE
FLUBV RNA RESP QL NAA+PROBE: NEGATIVE
GFR SERPL CREATININE-BSD FRML MDRD: 58 ML/MIN/1.73SQ M
GLUCOSE SERPL-MCNC: 117 MG/DL (ref 65–140)
HCO3 BLDV-SCNC: 25.6 MMOL/L (ref 24–30)
HCT VFR BLD AUTO: 40.1 % (ref 34.8–46.1)
HGB BLD-MCNC: 12.8 G/DL (ref 11.5–15.4)
INR PPP: 2.8 (ref 0.84–1.19)
LACTATE SERPL-SCNC: 1.4 MMOL/L (ref 0.5–2)
LG PLATELETS BLD QL SMEAR: PRESENT
LYMPHOCYTES # BLD AUTO: 0.48 THOUSAND/UL (ref 0.6–4.47)
LYMPHOCYTES # BLD AUTO: 2 % (ref 14–44)
MACROCYTES BLD QL AUTO: PRESENT
MAGNESIUM SERPL-MCNC: 2.1 MG/DL (ref 1.9–2.7)
MCH RBC QN AUTO: 30 PG (ref 26.8–34.3)
MCHC RBC AUTO-ENTMCNC: 31.9 G/DL (ref 31.4–37.4)
MCV RBC AUTO: 94 FL (ref 82–98)
MONOCYTES # BLD AUTO: 1.19 THOUSAND/UL (ref 0–1.22)
MONOCYTES NFR BLD: 5 % (ref 4–12)
NEUTROPHILS # BLD MANUAL: 22.23 THOUSAND/UL (ref 1.85–7.62)
NEUTS BAND NFR BLD MANUAL: 9 % (ref 0–8)
NEUTS SEG NFR BLD AUTO: 84 % (ref 43–75)
NRBC BLD AUTO-RTO: 1 /100 WBC (ref 0–2)
O2 CT BLDV-SCNC: 17.2 ML/DL
PCO2 BLDV: 42 MM HG (ref 42–50)
PH BLDV: 7.4 [PH] (ref 7.3–7.4)
PLATELET # BLD AUTO: 228 THOUSANDS/UL (ref 149–390)
PLATELET BLD QL SMEAR: ADEQUATE
PMV BLD AUTO: 10.6 FL (ref 8.9–12.7)
PO2 BLDV: 57.1 MM HG (ref 35–45)
POTASSIUM SERPL-SCNC: 4.8 MMOL/L (ref 3.5–5.3)
POTASSIUM SERPL-SCNC: 6.4 MMOL/L (ref 3.5–5.3)
PROT SERPL-MCNC: 7.9 G/DL (ref 6.4–8.4)
PROTHROMBIN TIME: 29.8 SECONDS (ref 11.6–14.5)
RBC # BLD AUTO: 4.26 MILLION/UL (ref 3.81–5.12)
RBC MORPH BLD: PRESENT
RSV RNA RESP QL NAA+PROBE: NEGATIVE
SARS-COV-2 RNA RESP QL NAA+PROBE: NEGATIVE
SODIUM SERPL-SCNC: 136 MMOL/L (ref 135–147)
STOMATOCYTES BLD QL SMEAR: PRESENT
WBC # BLD AUTO: 23.9 THOUSAND/UL (ref 4.31–10.16)

## 2024-06-27 PROCEDURE — 0241U HB NFCT DS VIR RESP RNA 4 TRGT: CPT | Performed by: EMERGENCY MEDICINE

## 2024-06-27 PROCEDURE — 93005 ELECTROCARDIOGRAM TRACING: CPT

## 2024-06-27 PROCEDURE — 83880 ASSAY OF NATRIURETIC PEPTIDE: CPT | Performed by: EMERGENCY MEDICINE

## 2024-06-27 PROCEDURE — 87154 CUL TYP ID BLD PTHGN 6+ TRGT: CPT | Performed by: EMERGENCY MEDICINE

## 2024-06-27 PROCEDURE — 99285 EMERGENCY DEPT VISIT HI MDM: CPT

## 2024-06-27 PROCEDURE — 84132 ASSAY OF SERUM POTASSIUM: CPT | Performed by: EMERGENCY MEDICINE

## 2024-06-27 PROCEDURE — 84484 ASSAY OF TROPONIN QUANT: CPT | Performed by: EMERGENCY MEDICINE

## 2024-06-27 PROCEDURE — 85730 THROMBOPLASTIN TIME PARTIAL: CPT | Performed by: EMERGENCY MEDICINE

## 2024-06-27 PROCEDURE — 99291 CRITICAL CARE FIRST HOUR: CPT | Performed by: EMERGENCY MEDICINE

## 2024-06-27 PROCEDURE — 96375 TX/PRO/DX INJ NEW DRUG ADDON: CPT

## 2024-06-27 PROCEDURE — 71045 X-RAY EXAM CHEST 1 VIEW: CPT

## 2024-06-27 PROCEDURE — 85610 PROTHROMBIN TIME: CPT | Performed by: EMERGENCY MEDICINE

## 2024-06-27 PROCEDURE — 82805 BLOOD GASES W/O2 SATURATION: CPT | Performed by: EMERGENCY MEDICINE

## 2024-06-27 PROCEDURE — 36415 COLL VENOUS BLD VENIPUNCTURE: CPT | Performed by: EMERGENCY MEDICINE

## 2024-06-27 PROCEDURE — 80053 COMPREHEN METABOLIC PANEL: CPT | Performed by: EMERGENCY MEDICINE

## 2024-06-27 PROCEDURE — 85007 BL SMEAR W/DIFF WBC COUNT: CPT | Performed by: EMERGENCY MEDICINE

## 2024-06-27 PROCEDURE — 83735 ASSAY OF MAGNESIUM: CPT | Performed by: EMERGENCY MEDICINE

## 2024-06-27 PROCEDURE — 84145 PROCALCITONIN (PCT): CPT | Performed by: PHYSICIAN ASSISTANT

## 2024-06-27 PROCEDURE — 96365 THER/PROPH/DIAG IV INF INIT: CPT

## 2024-06-27 PROCEDURE — 94640 AIRWAY INHALATION TREATMENT: CPT

## 2024-06-27 PROCEDURE — 87040 BLOOD CULTURE FOR BACTERIA: CPT | Performed by: EMERGENCY MEDICINE

## 2024-06-27 PROCEDURE — 84443 ASSAY THYROID STIM HORMONE: CPT | Performed by: EMERGENCY MEDICINE

## 2024-06-27 PROCEDURE — 85027 COMPLETE CBC AUTOMATED: CPT | Performed by: EMERGENCY MEDICINE

## 2024-06-27 PROCEDURE — 83605 ASSAY OF LACTIC ACID: CPT | Performed by: EMERGENCY MEDICINE

## 2024-06-27 RX ORDER — DEXAMETHASONE SODIUM PHOSPHATE 10 MG/ML
6 INJECTION, SOLUTION INTRAMUSCULAR; INTRAVENOUS ONCE
Status: COMPLETED | OUTPATIENT
Start: 2024-06-27 | End: 2024-06-27

## 2024-06-27 RX ORDER — ONDANSETRON 2 MG/ML
INJECTION INTRAMUSCULAR; INTRAVENOUS
Status: COMPLETED
Start: 2024-06-27 | End: 2024-06-27

## 2024-06-27 RX ORDER — MIDODRINE HYDROCHLORIDE 5 MG/1
5 TABLET ORAL ONCE
Status: COMPLETED | OUTPATIENT
Start: 2024-06-27 | End: 2024-06-27

## 2024-06-27 RX ORDER — DEXTROSE MONOHYDRATE 25 G/50ML
25 INJECTION, SOLUTION INTRAVENOUS ONCE
Status: DISCONTINUED | OUTPATIENT
Start: 2024-06-27 | End: 2024-06-27

## 2024-06-27 RX ORDER — CEFTRIAXONE 1 G/50ML
1000 INJECTION, SOLUTION INTRAVENOUS ONCE
Status: COMPLETED | OUTPATIENT
Start: 2024-06-27 | End: 2024-06-27

## 2024-06-27 RX ORDER — CALCIUM GLUCONATE 20 MG/ML
1 INJECTION, SOLUTION INTRAVENOUS ONCE
Status: DISCONTINUED | OUTPATIENT
Start: 2024-06-27 | End: 2024-06-27

## 2024-06-27 RX ORDER — IPRATROPIUM BROMIDE AND ALBUTEROL SULFATE 2.5; .5 MG/3ML; MG/3ML
3 SOLUTION RESPIRATORY (INHALATION) ONCE
Status: COMPLETED | OUTPATIENT
Start: 2024-06-27 | End: 2024-06-27

## 2024-06-27 RX ORDER — METOPROLOL TARTRATE 1 MG/ML
5 INJECTION, SOLUTION INTRAVENOUS ONCE
Status: COMPLETED | OUTPATIENT
Start: 2024-06-27 | End: 2024-06-27

## 2024-06-27 RX ADMIN — DEXAMETHASONE SODIUM PHOSPHATE 6 MG: 10 INJECTION, SOLUTION INTRAMUSCULAR; INTRAVENOUS at 22:39

## 2024-06-27 RX ADMIN — MIDODRINE HYDROCHLORIDE 5 MG: 5 TABLET ORAL at 23:39

## 2024-06-27 RX ADMIN — METOPROLOL TARTRATE 5 MG: 5 INJECTION INTRAVENOUS at 23:37

## 2024-06-27 RX ADMIN — IPRATROPIUM BROMIDE AND ALBUTEROL SULFATE 3 ML: 2.5; .5 SOLUTION RESPIRATORY (INHALATION) at 22:39

## 2024-06-27 RX ADMIN — CEFTRIAXONE 1000 MG: 1 INJECTION, SOLUTION INTRAVENOUS at 23:20

## 2024-06-27 RX ADMIN — ONDANSETRON: 2 INJECTION INTRAMUSCULAR; INTRAVENOUS at 23:56

## 2024-06-28 ENCOUNTER — APPOINTMENT (INPATIENT)
Dept: CT IMAGING | Facility: HOSPITAL | Age: 69
DRG: 871 | End: 2024-06-28
Payer: COMMERCIAL

## 2024-06-28 PROBLEM — J96.21 ACUTE ON CHRONIC RESPIRATORY FAILURE WITH HYPOXIA (HCC): Status: ACTIVE | Noted: 2024-06-28

## 2024-06-28 LAB
2HR DELTA HS TROPONIN: 10 NG/L
4HR DELTA HS TROPONIN: 51 NG/L
ANION GAP SERPL CALCULATED.3IONS-SCNC: 9 MMOL/L (ref 4–13)
ANION GAP SERPL CALCULATED.3IONS-SCNC: 9 MMOL/L (ref 4–13)
BACTERIA UR QL AUTO: ABNORMAL /HPF
BASE EX.OXY STD BLDV CALC-SCNC: 96.9 % (ref 60–80)
BASE EXCESS BLDV CALC-SCNC: 0.8 MMOL/L
BILIRUB UR QL STRIP: ABNORMAL
BUN SERPL-MCNC: 33 MG/DL (ref 5–25)
BUN SERPL-MCNC: 35 MG/DL (ref 5–25)
CALCIUM SERPL-MCNC: 9.4 MG/DL (ref 8.4–10.2)
CALCIUM SERPL-MCNC: 9.6 MG/DL (ref 8.4–10.2)
CARDIAC TROPONIN I PNL SERPL HS: 125 NG/L (ref 8–18)
CARDIAC TROPONIN I PNL SERPL HS: 176 NG/L (ref 8–18)
CARDIAC TROPONIN I PNL SERPL HS: 216 NG/L (ref 8–18)
CARDIAC TROPONIN I PNL SERPL HS: 247 NG/L (ref 8–18)
CARDIAC TROPONIN I PNL SERPL HS: 50 NG/L
CARDIAC TROPONIN I PNL SERPL HS: 91 NG/L
CHLORIDE SERPL-SCNC: 102 MMOL/L (ref 96–108)
CHLORIDE SERPL-SCNC: 103 MMOL/L (ref 96–108)
CLARITY UR: ABNORMAL
CO2 SERPL-SCNC: 24 MMOL/L (ref 21–32)
CO2 SERPL-SCNC: 25 MMOL/L (ref 21–32)
COLOR UR: ABNORMAL
CREAT SERPL-MCNC: 1.13 MG/DL (ref 0.6–1.3)
CREAT SERPL-MCNC: 1.16 MG/DL (ref 0.6–1.3)
ERYTHROCYTE [DISTWIDTH] IN BLOOD BY AUTOMATED COUNT: 18.4 % (ref 11.6–15.1)
GFR SERPL CREATININE-BSD FRML MDRD: 47 ML/MIN/1.73SQ M
GFR SERPL CREATININE-BSD FRML MDRD: 49 ML/MIN/1.73SQ M
GLUCOSE SERPL-MCNC: 138 MG/DL (ref 65–140)
GLUCOSE SERPL-MCNC: 152 MG/DL (ref 65–140)
GLUCOSE SERPL-MCNC: 155 MG/DL (ref 65–140)
GLUCOSE SERPL-MCNC: 159 MG/DL (ref 65–140)
GLUCOSE SERPL-MCNC: 170 MG/DL (ref 65–140)
GLUCOSE SERPL-MCNC: 198 MG/DL (ref 65–140)
GLUCOSE UR STRIP-MCNC: NEGATIVE MG/DL
HCO3 BLDV-SCNC: 25.8 MMOL/L (ref 24–30)
HCT VFR BLD AUTO: 40 % (ref 34.8–46.1)
HGB BLD-MCNC: 13 G/DL (ref 11.5–15.4)
HGB UR QL STRIP.AUTO: ABNORMAL
INR PPP: 3.18 (ref 0.84–1.19)
KETONES UR STRIP-MCNC: ABNORMAL MG/DL
LEUKOCYTE ESTERASE UR QL STRIP: ABNORMAL
MAGNESIUM SERPL-MCNC: 2.1 MG/DL (ref 1.9–2.7)
MCH RBC QN AUTO: 30.9 PG (ref 26.8–34.3)
MCHC RBC AUTO-ENTMCNC: 32.5 G/DL (ref 31.4–37.4)
MCV RBC AUTO: 95 FL (ref 82–98)
NITRITE UR QL STRIP: NEGATIVE
NON-SQ EPI CELLS URNS QL MICRO: ABNORMAL /HPF
O2 CT BLDV-SCNC: 19 ML/DL
PCO2 BLDV: 42.6 MM HG (ref 42–50)
PH BLDV: 7.4 [PH] (ref 7.3–7.4)
PH UR STRIP.AUTO: 6 [PH]
PHOSPHATE SERPL-MCNC: 4 MG/DL (ref 2.3–4.1)
PLATELET # BLD AUTO: 197 THOUSANDS/UL (ref 149–390)
PMV BLD AUTO: 12.4 FL (ref 8.9–12.7)
PO2 BLDV: 125.4 MM HG (ref 35–45)
POTASSIUM SERPL-SCNC: 5 MMOL/L (ref 3.5–5.3)
POTASSIUM SERPL-SCNC: 5.1 MMOL/L (ref 3.5–5.3)
PROCALCITONIN SERPL-MCNC: 0.31 NG/ML
PROCALCITONIN SERPL-MCNC: 1.21 NG/ML
PROT UR STRIP-MCNC: >=300 MG/DL
PROTHROMBIN TIME: 32.8 SECONDS (ref 11.6–14.5)
QRS AXIS: 33 DEGREES
QRSD INTERVAL: 140 MS
QT INTERVAL: 320 MS
QTC INTERVAL: 435 MS
RBC # BLD AUTO: 4.21 MILLION/UL (ref 3.81–5.12)
RBC #/AREA URNS AUTO: ABNORMAL /HPF
SODIUM SERPL-SCNC: 135 MMOL/L (ref 135–147)
SODIUM SERPL-SCNC: 137 MMOL/L (ref 135–147)
SP GR UR STRIP.AUTO: 1.02 (ref 1–1.03)
T WAVE AXIS: 194 DEGREES
TSH SERPL DL<=0.05 MIU/L-ACNC: 2.72 UIU/ML (ref 0.45–4.5)
UROBILINOGEN UR QL STRIP.AUTO: 1 E.U./DL
VENTRICULAR RATE: 111 BPM
WBC # BLD AUTO: 26.28 THOUSAND/UL (ref 4.31–10.16)
WBC #/AREA URNS AUTO: ABNORMAL /HPF

## 2024-06-28 PROCEDURE — 82805 BLOOD GASES W/O2 SATURATION: CPT | Performed by: NURSE PRACTITIONER

## 2024-06-28 PROCEDURE — 80048 BASIC METABOLIC PNL TOTAL CA: CPT | Performed by: NURSE PRACTITIONER

## 2024-06-28 PROCEDURE — 84484 ASSAY OF TROPONIN QUANT: CPT | Performed by: EMERGENCY MEDICINE

## 2024-06-28 PROCEDURE — 36415 COLL VENOUS BLD VENIPUNCTURE: CPT | Performed by: EMERGENCY MEDICINE

## 2024-06-28 PROCEDURE — 83735 ASSAY OF MAGNESIUM: CPT | Performed by: PHYSICIAN ASSISTANT

## 2024-06-28 PROCEDURE — 82948 REAGENT STRIP/BLOOD GLUCOSE: CPT

## 2024-06-28 PROCEDURE — NC001 PR NO CHARGE: Performed by: NURSE PRACTITIONER

## 2024-06-28 PROCEDURE — 85027 COMPLETE CBC AUTOMATED: CPT | Performed by: PHYSICIAN ASSISTANT

## 2024-06-28 PROCEDURE — 94002 VENT MGMT INPAT INIT DAY: CPT

## 2024-06-28 PROCEDURE — 96367 TX/PROPH/DG ADDL SEQ IV INF: CPT

## 2024-06-28 PROCEDURE — 94640 AIRWAY INHALATION TREATMENT: CPT

## 2024-06-28 PROCEDURE — 84484 ASSAY OF TROPONIN QUANT: CPT | Performed by: PHYSICIAN ASSISTANT

## 2024-06-28 PROCEDURE — 81001 URINALYSIS AUTO W/SCOPE: CPT | Performed by: EMERGENCY MEDICINE

## 2024-06-28 PROCEDURE — 80048 BASIC METABOLIC PNL TOTAL CA: CPT | Performed by: PHYSICIAN ASSISTANT

## 2024-06-28 PROCEDURE — 70450 CT HEAD/BRAIN W/O DYE: CPT

## 2024-06-28 PROCEDURE — 74176 CT ABD & PELVIS W/O CONTRAST: CPT

## 2024-06-28 PROCEDURE — 94760 N-INVAS EAR/PLS OXIMETRY 1: CPT

## 2024-06-28 PROCEDURE — 71250 CT THORAX DX C-: CPT

## 2024-06-28 PROCEDURE — 36556 INSERT NON-TUNNEL CV CATH: CPT | Performed by: NURSE PRACTITIONER

## 2024-06-28 PROCEDURE — 84145 PROCALCITONIN (PCT): CPT | Performed by: NURSE PRACTITIONER

## 2024-06-28 PROCEDURE — 87086 URINE CULTURE/COLONY COUNT: CPT | Performed by: EMERGENCY MEDICINE

## 2024-06-28 PROCEDURE — 99223 1ST HOSP IP/OBS HIGH 75: CPT | Performed by: INTERNAL MEDICINE

## 2024-06-28 PROCEDURE — 85610 PROTHROMBIN TIME: CPT | Performed by: PHYSICIAN ASSISTANT

## 2024-06-28 PROCEDURE — 84100 ASSAY OF PHOSPHORUS: CPT | Performed by: PHYSICIAN ASSISTANT

## 2024-06-28 PROCEDURE — 84484 ASSAY OF TROPONIN QUANT: CPT | Performed by: NURSE PRACTITIONER

## 2024-06-28 PROCEDURE — 96361 HYDRATE IV INFUSION ADD-ON: CPT

## 2024-06-28 RX ORDER — BUMETANIDE 0.25 MG/ML
2 INJECTION INTRAMUSCULAR; INTRAVENOUS ONCE
Status: COMPLETED | OUTPATIENT
Start: 2024-06-28 | End: 2024-06-28

## 2024-06-28 RX ORDER — FERROUS SULFATE 325(65) MG
325 TABLET ORAL
Status: DISCONTINUED | OUTPATIENT
Start: 2024-06-29 | End: 2024-07-07 | Stop reason: HOSPADM

## 2024-06-28 RX ORDER — DOCUSATE SODIUM 100 MG/1
100 CAPSULE, LIQUID FILLED ORAL DAILY
Status: DISCONTINUED | OUTPATIENT
Start: 2024-06-29 | End: 2024-07-07 | Stop reason: HOSPADM

## 2024-06-28 RX ORDER — INSULIN LISPRO 100 [IU]/ML
2-12 INJECTION, SOLUTION INTRAVENOUS; SUBCUTANEOUS
Status: DISCONTINUED | OUTPATIENT
Start: 2024-06-28 | End: 2024-07-07 | Stop reason: HOSPADM

## 2024-06-28 RX ORDER — BUMETANIDE 0.25 MG/ML
1 INJECTION INTRAMUSCULAR; INTRAVENOUS ONCE
Status: COMPLETED | OUTPATIENT
Start: 2024-06-28 | End: 2024-06-28

## 2024-06-28 RX ORDER — IPRATROPIUM BROMIDE AND ALBUTEROL SULFATE 2.5; .5 MG/3ML; MG/3ML
3 SOLUTION RESPIRATORY (INHALATION)
Status: DISCONTINUED | OUTPATIENT
Start: 2024-06-28 | End: 2024-06-29

## 2024-06-28 RX ORDER — FUROSEMIDE 10 MG/ML
40 INJECTION INTRAMUSCULAR; INTRAVENOUS ONCE
Status: COMPLETED | OUTPATIENT
Start: 2024-06-28 | End: 2024-06-28

## 2024-06-28 RX ORDER — CETIRIZINE HYDROCHLORIDE 5 MG/1
5 TABLET ORAL DAILY
COMMUNITY

## 2024-06-28 RX ORDER — MONTELUKAST SODIUM 10 MG/1
10 TABLET ORAL
Status: DISCONTINUED | OUTPATIENT
Start: 2024-06-28 | End: 2024-07-07 | Stop reason: HOSPADM

## 2024-06-28 RX ORDER — ATORVASTATIN CALCIUM 10 MG/1
10 TABLET, FILM COATED ORAL DAILY
Status: DISCONTINUED | OUTPATIENT
Start: 2024-06-29 | End: 2024-07-07 | Stop reason: HOSPADM

## 2024-06-28 RX ORDER — METHYLPREDNISOLONE SODIUM SUCCINATE 40 MG/ML
40 INJECTION, POWDER, LYOPHILIZED, FOR SOLUTION INTRAMUSCULAR; INTRAVENOUS EVERY 8 HOURS
Status: DISCONTINUED | OUTPATIENT
Start: 2024-06-28 | End: 2024-06-28

## 2024-06-28 RX ORDER — LORATADINE 10 MG/1
5 TABLET ORAL DAILY
Status: DISCONTINUED | OUTPATIENT
Start: 2024-06-29 | End: 2024-07-07 | Stop reason: HOSPADM

## 2024-06-28 RX ORDER — ALLOPURINOL 100 MG/1
100 TABLET ORAL DAILY
Status: DISCONTINUED | OUTPATIENT
Start: 2024-06-29 | End: 2024-07-07 | Stop reason: HOSPADM

## 2024-06-28 RX ORDER — AMIODARONE HYDROCHLORIDE 450 MG/9ML
INJECTION, SOLUTION INTRAVENOUS
Status: DISPENSED
Start: 2024-06-28 | End: 2024-06-28

## 2024-06-28 RX ORDER — DEXTROSE MONOHYDRATE 50 MG/ML
INJECTION, SOLUTION INTRAVENOUS
Status: DISPENSED
Start: 2024-06-28 | End: 2024-06-28

## 2024-06-28 RX ORDER — AMIODARONE HYDROCHLORIDE 150 MG/3ML
INJECTION, SOLUTION INTRAVENOUS
Status: DISPENSED
Start: 2024-06-28 | End: 2024-06-28

## 2024-06-28 RX ORDER — INSULIN LISPRO 100 [IU]/ML
2-12 INJECTION, SOLUTION INTRAVENOUS; SUBCUTANEOUS EVERY 6 HOURS SCHEDULED
Status: DISCONTINUED | OUTPATIENT
Start: 2024-06-28 | End: 2024-06-28

## 2024-06-28 RX ORDER — METOLAZONE 5 MG/1
5 TABLET ORAL ONCE
Status: COMPLETED | OUTPATIENT
Start: 2024-06-28 | End: 2024-06-28

## 2024-06-28 RX ORDER — GUAIFENESIN 600 MG/1
600 TABLET, EXTENDED RELEASE ORAL EVERY 12 HOURS SCHEDULED
Status: DISCONTINUED | OUTPATIENT
Start: 2024-06-28 | End: 2024-07-07 | Stop reason: HOSPADM

## 2024-06-28 RX ORDER — MIDODRINE HYDROCHLORIDE 5 MG/1
5 TABLET ORAL
Status: DISCONTINUED | OUTPATIENT
Start: 2024-06-28 | End: 2024-07-02

## 2024-06-28 RX ORDER — CEFTRIAXONE 2 G/50ML
2000 INJECTION, SOLUTION INTRAVENOUS EVERY 24 HOURS
Status: DISCONTINUED | OUTPATIENT
Start: 2024-06-28 | End: 2024-07-01

## 2024-06-28 RX ORDER — ACETAMINOPHEN 325 MG/1
650 TABLET ORAL EVERY 6 HOURS PRN
Status: DISCONTINUED | OUTPATIENT
Start: 2024-06-28 | End: 2024-07-07 | Stop reason: HOSPADM

## 2024-06-28 RX ORDER — PANTOPRAZOLE SODIUM 40 MG/1
40 TABLET, DELAYED RELEASE ORAL
Status: DISCONTINUED | OUTPATIENT
Start: 2024-06-29 | End: 2024-07-07 | Stop reason: HOSPADM

## 2024-06-28 RX ADMIN — ACETAMINOPHEN 325MG 650 MG: 325 TABLET ORAL at 17:37

## 2024-06-28 RX ADMIN — BUMETANIDE 2 MG: 0.25 INJECTION INTRAMUSCULAR; INTRAVENOUS at 14:56

## 2024-06-28 RX ADMIN — IPRATROPIUM BROMIDE AND ALBUTEROL SULFATE 3 ML: 2.5; .5 SOLUTION RESPIRATORY (INHALATION) at 19:23

## 2024-06-28 RX ADMIN — AMIODARONE HYDROCHLORIDE 1 MG/MIN: 50 INJECTION, SOLUTION INTRAVENOUS at 02:34

## 2024-06-28 RX ADMIN — MONTELUKAST 10 MG: 10 TABLET, FILM COATED ORAL at 21:51

## 2024-06-28 RX ADMIN — CEFTRIAXONE 2000 MG: 2 INJECTION, SOLUTION INTRAVENOUS at 14:48

## 2024-06-28 RX ADMIN — INSULIN LISPRO 2 UNITS: 100 INJECTION, SOLUTION INTRAVENOUS; SUBCUTANEOUS at 06:10

## 2024-06-28 RX ADMIN — AZITHROMYCIN MONOHYDRATE 500 MG: 500 INJECTION, POWDER, LYOPHILIZED, FOR SOLUTION INTRAVENOUS at 02:22

## 2024-06-28 RX ADMIN — BUMETANIDE 1 MG: 0.25 INJECTION INTRAMUSCULAR; INTRAVENOUS at 11:50

## 2024-06-28 RX ADMIN — GUAIFENESIN 600 MG: 600 TABLET ORAL at 21:51

## 2024-06-28 RX ADMIN — DILTIAZEM HYDROCHLORIDE 5 MG/HR: 5 INJECTION INTRAVENOUS at 00:45

## 2024-06-28 RX ADMIN — AMIODARONE HYDROCHLORIDE 150 MG: 50 INJECTION, SOLUTION INTRAVENOUS at 02:20

## 2024-06-28 RX ADMIN — METHYLPREDNISOLONE SODIUM SUCCINATE 40 MG: 40 INJECTION, POWDER, FOR SOLUTION INTRAMUSCULAR; INTRAVENOUS at 09:32

## 2024-06-28 RX ADMIN — FUROSEMIDE 40 MG: 10 INJECTION, SOLUTION INTRAMUSCULAR; INTRAVENOUS at 01:47

## 2024-06-28 RX ADMIN — METOLAZONE 5 MG: 5 TABLET ORAL at 14:48

## 2024-06-28 RX ADMIN — MIDODRINE HYDROCHLORIDE 5 MG: 5 TABLET ORAL at 11:48

## 2024-06-28 RX ADMIN — IPRATROPIUM BROMIDE AND ALBUTEROL SULFATE 3 ML: 2.5; .5 SOLUTION RESPIRATORY (INHALATION) at 07:33

## 2024-06-28 RX ADMIN — INSULIN LISPRO 2 UNITS: 100 INJECTION, SOLUTION INTRAVENOUS; SUBCUTANEOUS at 21:53

## 2024-06-28 RX ADMIN — MIDODRINE HYDROCHLORIDE 5 MG: 5 TABLET ORAL at 15:00

## 2024-06-28 RX ADMIN — IPRATROPIUM BROMIDE AND ALBUTEROL SULFATE 3 ML: 2.5; .5 SOLUTION RESPIRATORY (INHALATION) at 13:59

## 2024-06-28 RX ADMIN — SODIUM CHLORIDE 250 ML: 0.9 INJECTION, SOLUTION INTRAVENOUS at 00:09

## 2024-06-28 NOTE — PLAN OF CARE
Patient admitted to [facility]  with CHF exaberation, COPD exaberation, and Afib w/ RVR. Received Lasix and amio bolus with infusion in ER, placed on CPAP 40%. Pt very drowsy, but oriented x4.  Lung sounds diminished. Troponins trending.     Problem: PAIN - ADULT  Goal: Verbalizes/displays adequate comfort level or baseline comfort level  Description: Interventions:  - Encourage patient to monitor pain and request assistance  - Assess pain using appropriate pain scale  - Administer analgesics based on type and severity of pain and evaluate response  - Implement non-pharmacological measures as appropriate and evaluate response  - Consider cultural and social influences on pain and pain management  - Notify physician/advanced practitioner if interventions unsuccessful or patient reports new pain  Outcome: Progressing     Problem: INFECTION - ADULT  Goal: Absence or prevention of progression during hospitalization  Description: INTERVENTIONS:  - Assess and monitor for signs and symptoms of infection  - Monitor lab/diagnostic results  - Monitor all insertion sites, i.e. indwelling lines, tubes, and drains  - Monitor endotracheal if appropriate and nasal secretions for changes in amount and color  - [place] appropriate cooling/warming therapies per order  - Administer medications as ordered  - Instruct and encourage patient and family to use good hand hygiene technique  - Identify and instruct in appropriate isolation precautions for identified infection/condition  Outcome: Progressing     Problem: SAFETY ADULT  Goal: Patient will remain free of falls  Description: INTERVENTIONS:  - Educate patient/family on patient safety including physical limitations  - Instruct patient to call for assistance with activity   - Consult OT/PT to assist with strengthening/mobility   - Keep Call bell within reach  - Keep bed low and locked with side rails adjusted as appropriate  - Keep care items and personal belongings within reach  -  Initiate and maintain comfort rounds  - Make Fall Risk Sign visible to staff  - Offer Toileting every 2 Hours, in advance of need if unable to call for assistance  - Initiate/Maintain bed/chair alarm for confusion, impulsivity, or noncompliance with calling for assistance  - Apply yellow socks and bracelet for high fall risk patients  - Consider moving patient to room near nurses station  Outcome: Progressing  Goal: Maintain or return to baseline ADL function  Description: INTERVENTIONS:  -  Assess patient's ability to carry out ADLs; assess patient's baseline for ADL function and identify physical deficits which impact ability to perform ADLs (bathing, care of mouth/teeth, toileting, grooming, dressing, etc.)  - Assess/evaluate cause of self-care deficits   - Assess range of motion  - Assess patient's mobility; develop plan if impaired  - Assess patient's need for assistive devices and provide as appropriate  - Encourage maximum independence but intervene and supervise when necessary  - Involve family in performance of ADLs  - Assess for home care needs following discharge   - Consider OT consult to assist with ADL evaluation and planning for discharge  - Provide patient education as appropriate  Outcome: Progressing  Goal: Maintains/Returns to pre admission functional level  Description: INTERVENTIONS:  - Perform AM-PAC 6 Click Basic Mobility/ Daily Activity assessment daily.  - Set and communicate daily mobility goal to care team and patient/family/caregiver.   - Collaborate with rehabilitation services on mobility goals if consulted  - Perform Range of Motion 3 times a day.  - Reposition patient every 2 hours if unable to reposition self  - Out of bed for toileting when medically appropriate  - Record patient progress and toleration of activity level   Outcome: Progressing     Problem: DISCHARGE PLANNING  Goal: Discharge to home or other facility with appropriate resources  Description: INTERVENTIONS:  -  Identify barriers to discharge w/patient and caregiver  - Arrange for needed discharge resources and transportation as appropriate  - Identify discharge learning needs (meds, wound care, etc.)  - Arrange for interpretive services to assist at discharge as needed  - Refer to Case Management Department for coordinating discharge planning if the patient needs post-hospital services based on physician/advanced practitioner order or complex needs related to functional status, cognitive ability, or social support system  Outcome: Progressing     Problem: Knowledge Deficit  Goal: Patient/family/caregiver demonstrates understanding of disease process, treatment plan, medications, and discharge instructions  Description: Complete learning assessment and assess knowledge base.  Interventions:  - Provide teaching at level of understanding  - Provide teaching via preferred learning methods  Outcome: Progressing     Problem: CARDIOVASCULAR - ADULT  Goal: Maintains optimal cardiac output and hemodynamic stability  Description: INTERVENTIONS:  - Monitor I/O, vital signs and rhythm  - Monitor for S/S and trends of decreased cardiac output  - Administer and titrate ordered vasoactive medications to optimize hemodynamic stability  - Assess quality of pulses, skin color and temperature  - Assess for signs of decreased coronary artery perfusion  - Instruct patient to report change in severity of symptoms  Outcome: Progressing  Goal: Absence of cardiac dysrhythmias or at baseline rhythm  Description: INTERVENTIONS:  - Continuous cardiac monitoring, vital signs, obtain 12 lead EKG if ordered  - Administer antiarrhythmic and heart rate control medications as ordered  - Monitor electrolytes and administer replacement therapy as ordered  Outcome: Progressing     Problem: RESPIRATORY - ADULT  Goal: Achieves optimal ventilation and oxygenation  Description: INTERVENTIONS:  - Assess for changes in respiratory status  - Assess for changes in  mentation and behavior  - Position to facilitate oxygenation and minimize respiratory effort  - Oxygen administered by appropriate delivery if ordered  - Initiate smoking cessation education as indicated  - Encourage broncho-pulmonary hygiene including cough, deep breathe, Incentive Spirometry  - Assess the need for suctioning and aspirate as needed  - Assess and instruct to report SOB or any respiratory difficulty  - Respiratory Therapy support as indicated  Outcome: Progressing     Problem: GENITOURINARY - ADULT  Goal: Maintains or returns to baseline urinary function  Description: INTERVENTIONS:  - Assess urinary function  - Encourage oral fluids to ensure adequate hydration if ordered  - Administer IV fluids as ordered to ensure adequate hydration  - Administer ordered medications as needed  - Offer frequent toileting  - Follow urinary retention protocol if ordered  Outcome: Progressing  Goal: Absence of urinary retention  Description: INTERVENTIONS:  - Assess patient’s ability to void and empty bladder  - Monitor I/O  - Bladder scan as needed  - Discuss with physician/AP medications to alleviate retention as needed  - Discuss catheterization for long term situations as appropriate  Outcome: Progressing  Goal: Urinary catheter remains patent  Description: INTERVENTIONS:  - Assess patency of urinary catheter  - If patient has a chronic [place], consider changing catheter if non-functioning  - Follow guidelines for intermittent irrigation of non-functioning urinary catheter  Outcome: Progressing     Problem: METABOLIC, FLUID AND ELECTROLYTES - ADULT  Goal: Electrolytes maintained within normal limits  Description: INTERVENTIONS:  - Monitor labs and assess patient for signs and symptoms of electrolyte imbalances  - Administer electrolyte replacement as ordered  - Monitor response to electrolyte replacements, including repeat lab results as appropriate  - Instruct patient on fluid and nutrition as  appropriate  Outcome: Progressing  Goal: Fluid balance maintained  Description: INTERVENTIONS:  - Monitor labs   - Monitor I/O and WT  - Instruct patient on fluid and nutrition as appropriate  - Assess for signs & symptoms of volume excess or deficit  Outcome: Progressing     Problem: Prexisting or High Potential for Compromised Skin Integrity  Goal: Skin integrity is maintained or improved  Description: INTERVENTIONS:  - Identify patients at risk for skin breakdown  - Assess and monitor skin integrity  - Assess and monitor nutrition and hydration status  - Monitor labs   - Assess for incontinence   - Turn and reposition patient  - Assist with mobility/ambulation  - Relieve pressure over bony prominences  - Avoid friction and shearing  - Provide appropriate hygiene as needed including keeping skin clean and dry  - Evaluate need for skin moisturizer/barrier cream  - Collaborate with interdisciplinary team   - Patient/family teaching  - Consider wound care consult   Outcome: Progressing

## 2024-06-28 NOTE — ASSESSMENT & PLAN NOTE
Patient presented to ED on her baseline 4L NC with tachypnea and hypoxia. She was also lethargic.  She was placed on CPAP.  She is noted to have NISHI and wears CPAP as baseline.   VBG showed pH 7.4 and pCO2 of 42.  Likely multifactorial given CHF exacerbation, COPD exacerbation and afib with RVR.   Azithromycin and IV steroids ordered.  Lasix 40 mg IV administered.   Hold home Bumex and diurese PRN with IV meds.  Monitor I/O.  Respiratory protocol.   Trial CPAP off in the morning.   FAMILY HISTORY:  FH: heart disease

## 2024-06-28 NOTE — ED PROVIDER NOTES
History  Chief Complaint   Patient presents with    Shortness of Breath     Chronic SOB/ copd. Family called for ems due to increases work of breathing and color change     Patient is a 69-year-old female brought by EMS for shortness of breath with increased work of breathing, patient with known history of COPD, chronically on oxygen via nasal cannula and wears BiPAP at night, she reports increased cough, congestion, shortness of breath, denies fever or chills, no sick contacts, denies chest pain, no nausea vomiting or diarrhea        Prior to Admission Medications   Prescriptions Last Dose Informant Patient Reported? Taking?   Cholecalciferol (Vitamin D3) 25 MCG (1000 UT) CAPS   Yes No   Sig: Take 2,000 Units by mouth daily   Insulin Aspart (NovoLOG) 100 units/mL injection   No No   Sig: Inject 10 Units under the skin 3 (three) times a day with meals   Lantus SoloStar 100 units/mL SOPN   Yes No   Sig: Inject 26 Units under the skin daily at bedtime 26 units   acetaminophen (TYLENOL) 325 mg tablet   No No   Sig: Take 2 tablets (650 mg total) by mouth every 6 (six) hours as needed for fever   albuterol (PROVENTIL HFA,VENTOLIN HFA) 90 mcg/act inhaler   Yes No   Sig: Inhale 2 puffs every 6 (six) hours as needed   allopurinol (ZYLOPRIM) 100 mg tablet   Yes No   Sig: Take 100 mg by mouth daily Dose needs to be verified   atorvastatin (LIPITOR) 10 mg tablet   Yes No   Sig: Take 10 mg by mouth daily   bumetanide (BUMEX) 2 mg tablet   No No   Sig: Take 1 tablet (2 mg total) by mouth 2 (two) times a day   docusate sodium (COLACE) 100 mg capsule   No No   Sig: Take 1 capsule (100 mg total) by mouth 2 (two) times a day   ferrous sulfate 325 (65 Fe) mg tablet   Yes No   Sig: Take 325 mg by mouth daily with breakfast   fluticasone-vilanterol (BREO ELLIPTA) 100-25 mcg/inh inhaler   Yes No   Sig: Inhale 1 puff daily Rinse mouth after use.   glipiZIDE (GLUCOTROL XL) 10 mg 24 hr tablet   Yes No   Sig: Take 1 tablet by mouth every  morning   levothyroxine 200 mcg tablet  Self Yes No   Sig: Take 288 mcg by mouth daily   levothyroxine 88 mcg tablet   Yes No   metoprolol succinate (TOPROL-XL) 25 mg 24 hr tablet   No No   Sig: Take 1.5 tablets (37.5 mg total) by mouth 2 (two) times a day   midodrine (PROAMATINE) 10 MG tablet   No No   Sig: Take 0.5 tablets (5 mg total) by mouth 3 (three) times a day before meals Please hold the medication if your systolic blood pressure is more than 115   montelukast (SINGULAIR) 10 mg tablet   Yes No   Sig: Take 10 mg by mouth daily at bedtime   multivitamin (THERAGRAN) TABS   Yes No   Sig: Take 1 tablet by mouth daily   omeprazole (PriLOSEC) 40 MG capsule   Yes No   Sig: Take 40 mg by mouth daily   potassium chloride (K-DUR,KLOR-CON) 20 mEq tablet   No No   Sig: Take 1 tablet (20 mEq total) by mouth 3 (three) times a day   traMADol (Ultram) 50 mg tablet   No No   Sig: Take 1 tablet (50 mg total) by mouth every 12 (twelve) hours as needed for moderate pain   warfarin (COUMADIN) 7.5 mg tablet   No No   Sig: INR range is 2-3   Patient taking differently: Take 7.5 mg by mouth daily INR range is 2-3      Facility-Administered Medications: None       Past Medical History:   Diagnosis Date    Asthma     Atrial fibrillation (HCC)     COPD (chronic obstructive pulmonary disease) (HCC)     Diabetes mellitus (HCC)     Disease of thyroid gland     Heart failure (HCC)     Kidney failure     Morbid obesity due to excess calories (HCC)     NISHI (obstructive sleep apnea)     UTI (urinary tract infection)        Past Surgical History:   Procedure Laterality Date    CARDIAC ELECTROPHYSIOLOGY MAPPING AND ABLATION      by Dr. Ferraro at [facility]    CARDIOVERSION N/A     GALLBLADDER SURGERY      HERNIA REPAIR         Family History   Problem Relation Age of Onset    Arthritis Mother     Hearing loss Mother     Heart disease Mother     Hypertension Mother     Stroke Mother     Alcohol abuse Father     Cancer Father     Diabetes Father      Arthritis Sister     Cancer Sister     Alcohol abuse Brother     Diabetes Son     Arthritis Daughter     Drug abuse Daughter     Heart disease Maternal Grandmother     Hypertension Maternal Grandmother     Stroke Maternal Grandmother     Arthritis Maternal Aunt     Asthma Neg Hx     Birth defects Neg Hx     COPD Neg Hx     Depression Neg Hx     Early death Neg Hx     Hyperlipidemia Neg Hx     Kidney disease Neg Hx     Learning disabilities Neg Hx     Mental illness Neg Hx     Mental retardation Neg Hx     Miscarriages / Stillbirths Neg Hx     Vision loss Neg Hx      I have reviewed and agree with the history as documented.    E-Cigarette/Vaping    E-Cigarette Use Never User      E-Cigarette/Vaping Substances    Nicotine No     THC No     CBD No     Flavoring No     Other No     Unknown No      Social History     Tobacco Use    Smoking status: Never     Passive exposure: Never    Smokeless tobacco: Never   Vaping Use    Vaping status: Never Used   Substance Use Topics    Alcohol use: Never    Drug use: Never       Review of Systems   Constitutional: Negative.    HENT:  Positive for congestion.    Eyes: Negative.    Respiratory:  Positive for cough and shortness of breath.    Cardiovascular: Negative.    Gastrointestinal: Negative.    Endocrine: Negative.    Genitourinary: Negative.    Musculoskeletal: Negative.    Skin: Negative.    Allergic/Immunologic: Negative.    Neurological: Negative.    Hematological: Negative.    Psychiatric/Behavioral: Negative.         Physical Exam  Physical Exam  Constitutional:       Appearance: She is obese. She is ill-appearing.   HENT:      Head: Normocephalic and atraumatic.   Cardiovascular:      Rate and Rhythm: Normal rate.   Pulmonary:      Effort: Pulmonary effort is normal.      Breath sounds: Wheezing present.   Abdominal:      Palpations: Abdomen is soft.   Musculoskeletal:        Feet:       Comments: Left foot wrapped with Kerlix and Coban   Feet:      Comments: Area of  purplish discoloration on the left posterior heel, see attached photo  Skin:     Coloration: Skin is pale.         Vital Signs  ED Triage Vitals   Temperature Pulse Respirations Blood Pressure SpO2   06/27/24 2213 06/27/24 2213 06/27/24 2213 06/27/24 2213 06/27/24 2213   99.1 °F (37.3 °C) 90 14 101/59 93 %      Temp Source Heart Rate Source Patient Position - Orthostatic VS BP Location FiO2 (%)   06/27/24 2213 06/27/24 2213 06/27/24 2213 06/27/24 2213 06/28/24 0006   Tympanic Monitor Sitting Left arm 40      Pain Score       06/27/24 2213       No Pain           Vitals:    06/28/24 0015 06/28/24 0045 06/28/24 0100 06/28/24 0115   BP: 94/57 98/56 100/52 102/55   Pulse: (!) 141 (!) 132 (!) 128 (!) 115   Patient Position - Orthostatic VS: Sitting  Sitting Sitting         Visual Acuity      ED Medications  Medications   amiodarone (CORDARONE) 900 mg in dextrose 5 % 500 mL infusion (has no administration in time range)   amiodarone (CORDARONE) 900 mg in dextrose 5 % 500 mL infusion (has no administration in time range)   methylPREDNISolone sodium succinate (Solu-MEDROL) injection 40 mg (has no administration in time range)   azithromycin (ZITHROMAX) 500 mg in sodium chloride 0.9 % 250 mL IVPB (500 mg Intravenous New Bag 6/28/24 0222)   ipratropium-albuterol (DUO-NEB) 0.5-2.5 mg/3 mL inhalation solution 3 mL (3 mL Nebulization Given 6/27/24 2239)   dexamethasone (PF) (DECADRON) injection 6 mg (6 mg Intravenous Given 6/27/24 2239)   cefTRIAXone (ROCEPHIN) IVPB (premix in dextrose) 1,000 mg 50 mL (0 mg Intravenous Stopped 6/27/24 2358)   metoprolol (LOPRESSOR) injection 5 mg (5 mg Intravenous Given 6/27/24 2337)   midodrine (PROAMATINE) tablet 5 mg (5 mg Oral Given 6/27/24 2339)   ondansetron (ZOFRAN) 4 mg/2 mL injection **ADS Override Pull** (  Given 6/27/24 1367)   sodium chloride 0.9 % bolus 250 mL (0 mL Intravenous Stopped 6/28/24 0048)   amiodarone 150 mg in dextrose 5 % 100 mL IV bolus (0 mg Intravenous Stopped  6/28/24 0233)   furosemide (LASIX) injection 40 mg (40 mg Intravenous Given 6/28/24 0147)       Diagnostic Studies  Results Reviewed       Procedure Component Value Units Date/Time    Urine Microscopic [[id number]]  (Abnormal) Collected: 06/28/24 0146    Lab Status: Final result Specimen: Urine, Indwelling Dow Catheter Updated: 06/28/24 0206     RBC, UA 4-10 /hpf      WBC, UA Innumerable /hpf      Epithelial Cells Occasional /hpf      Bacteria, UA Innumerable /hpf     Urine culture [[id number]] Collected: 06/28/24 0146    Lab Status: In process Specimen: Urine, Indwelling Dwo Catheter Updated: 06/28/24 0206    UA w Reflex to Microscopic w Reflex to Culture [[id number]]  (Abnormal) Collected: 06/28/24 0146    Lab Status: Final result Specimen: Urine, Indwelling Dow Catheter Updated: 06/28/24 0159     Color, UA Rhonda     Clarity, UA Slightly Cloudy     Specific Gravity, UA 1.025     pH, UA 6.0     Leukocytes, UA Moderate     Nitrite, UA Negative     Protein, UA >=300 mg/dl      Glucose, UA Negative mg/dl      Ketones, UA Trace mg/dl      Urobilinogen, UA 1.0 E.U./dl      Bilirubin, UA Moderate     Occult Blood, UA Large    Procalcitonin [[id number]]  (Abnormal) Collected: 06/27/24 2320    Lab Status: Final result Specimen: Blood from Arm, Left Updated: 06/28/24 0157     Procalcitonin 0.31 ng/ml     HS Troponin I 2hr [[id number]]  (Abnormal) Collected: 06/28/24 0033    Lab Status: Final result Specimen: Blood from Arm, Left Updated: 06/28/24 0112     hs TnI 2hr 50 ng/L      Delta 2hr hsTnI 10 ng/L     TSH, 3rd generation with Free T4 reflex [[id number]]  (Normal) Collected: 06/27/24 2320    Lab Status: Final result Specimen: Blood from Arm, Left Updated: 06/28/24 0048     TSH 3RD GENERATON 2.718 uIU/mL     HS Troponin I 4hr [[id number]]     Lab Status: No result Specimen: Blood     B-Type Natriuretic Peptide(BNP) [[id number]]  (Abnormal) Collected: 06/27/24 2235    Lab Status: Final result Specimen: Blood from Arm, Left  Updated: 06/27/24 2351      pg/mL     Potassium [[id number]]  (Normal) Collected: 06/27/24 2320    Lab Status: Final result Specimen: Blood from Arm, Left Updated: 06/27/24 2349     Potassium 4.8 mmol/L     Manual Differential(PHLEBS Do Not Order) [[id number]]  (Abnormal) Collected: 06/27/24 2235    Lab Status: Final result Specimen: Blood from Arm, Left Updated: 06/27/24 2348     Segmented % 84 %      Bands % 9 %      Lymphocytes % 2 %      Monocytes % 5 %      Eosinophils % 0 %      Basophils % 0 %      Absolute Neutrophils 22.23 Thousand/uL      Absolute Lymphocytes 0.48 Thousand/uL      Absolute Monocytes 1.19 Thousand/uL      Absolute Eosinophils 0.00 Thousand/uL      Absolute Basophils 0.00 Thousand/uL      Total Counted --     nRBC 1 /100 WBC      RBC Morphology Present     Platelet Estimate Adequate     Large Platelet Present     Anisocytosis Present     Macrocytes Present     Stomatocytes Present    Lactic acid, plasma (w/reflex if result > 2.0) [[id number]]  (Normal) Collected: 06/27/24 2329    Lab Status: Final result Specimen: Blood Updated: 06/27/24 2345     LACTIC ACID 1.4 mmol/L     Narrative:      Result may be elevated if tourniquet was used during collection.    Protime-INR [[id number]]  (Abnormal) Collected: 06/27/24 2250    Lab Status: Final result Specimen: Blood from Arm, Left Updated: 06/27/24 2339     Protime 29.8 seconds      INR 2.80    APTT [[id number]]  (Abnormal) Collected: 06/27/24 2250    Lab Status: Final result Specimen: Blood from Arm, Left Updated: 06/27/24 2339     PTT 48 seconds     Blood culture #1 [[id number]] Collected: 06/27/24 2307    Lab Status: In process Specimen: Blood Updated: 06/27/24 2325    Blood culture #2 [[id number]] Collected: 06/27/24 2307    Lab Status: In process Specimen: Blood Updated: 06/27/24 2325    FLU/RSV/COVID - if FLU/RSV clinically relevant [[id number]]  (Normal) Collected: 06/27/24 2235    Lab Status: Final result Specimen: Nares from Nose Updated:  06/27/24 2324     SARS-CoV-2 Negative     INFLUENZA A PCR Negative     INFLUENZA B PCR Negative     RSV PCR Negative    Narrative:      FOR PEDIATRIC PATIENTS - copy/paste COVID Guidelines URL to browser: [email or web address]    SARS-CoV-2 assay is a Nucleic Acid Amplification assay intended for the  qualitative detection of nucleic acid from SARS-CoV-2 in nasopharyngeal  swabs. Results are for the presumptive identification of SARS-CoV-2 RNA.    Positive results are indicative of infection with SARS-CoV-2, the virus  causing COVID-19, but do not rule out bacterial infection or co-infection  with other viruses. Laboratories within the [place] and its  territories are required to report all positive results to the appropriate  public health authorities. Negative results do not preclude SARS-CoV-2  infection and should not be used as the sole basis for treatment or other  patient management decisions. Negative results must be combined with  clinical observations, patient history, and epidemiological information.  This test has not been FDA cleared or approved.    This test has been authorized by FDA under an Emergency Use Authorization  (EUA). This test is only authorized for the duration of time the  declaration that circumstances exist justifying the authorization of the  emergency use of an in vitro diagnostic tests for detection of SARS-CoV-2  virus and/or diagnosis of COVID-19 infection under section 564(b)(1) of  the Act, 21 U.S.C. 360bbb-3(b)(1), unless the authorization is terminated  or revoked sooner. The test has been validated but independent review by FDA  and CLIA is pending.    Test performed using [employer]: This RT-PCR assay targets N2,  a region unique to SARS-CoV-2. A conserved region in the E-gene was chosen  for pan-Sarbecovirus detection which includes SARS-CoV-2.    According to CMS-2020-01-R, this platform meets the definition  of high-[employer] technology.    Comprehensive metabolic panel [[id number]]  (Abnormal) Collected: 06/27/24 2235    Lab Status: Final result Specimen: Blood from Arm, Left Updated: 06/27/24 2315     Sodium 136 mmol/L      Potassium 6.4 mmol/L      Chloride 101 mmol/L      CO2 28 mmol/L      ANION GAP 7 mmol/L      BUN 30 mg/dL      Creatinine 0.97 mg/dL      Glucose 117 mg/dL      Calcium 9.8 mg/dL      AST 45 U/L      ALT 16 U/L      Alkaline Phosphatase 170 U/L      Total Protein 7.9 g/dL      Albumin 3.5 g/dL      Total Bilirubin 1.55 mg/dL      eGFR 58 ml/min/1.73sq m     Narrative:      National Kidney Disease Foundation guidelines for Chronic Kidney Disease (CKD):     Stage 1 with normal or high GFR (GFR > 90 mL/min/1.73 square meters)    Stage 2 Mild CKD (GFR = 60-89 mL/min/1.73 square meters)    Stage 3A Moderate CKD (GFR = 45-59 mL/min/1.73 square meters)    Stage 3B Moderate CKD (GFR = 30-44 mL/min/1.73 square meters)    Stage 4 Severe CKD (GFR = 15-29 mL/min/1.73 square meters)    Stage 5 End Stage CKD (GFR <15 mL/min/1.73 square meters)  Note: GFR calculation is accurate only with a steady state creatinine    Magnesium [[id number]]  (Normal) Collected: 06/27/24 2235    Lab Status: Final result Specimen: Blood from Arm, Left Updated: 06/27/24 2309     Magnesium 2.1 mg/dL     HS Troponin 0hr (reflex protocol) [[id number]]  (Normal) Collected: 06/27/24 2235    Lab Status: Final result Specimen: Blood from Arm, Left Updated: 06/27/24 2308     hs TnI 0hr 40 ng/L     CBC and differential [[id number]]  (Abnormal) Collected: 06/27/24 2235    Lab Status: Final result Specimen: Blood from Arm, Left Updated: 06/27/24 2252     WBC 23.90 Thousand/uL      RBC 4.26 Million/uL      Hemoglobin 12.8 g/dL      Hematocrit 40.1 %      MCV 94 fL      MCH 30.0 pg      MCHC 31.9 g/dL      RDW 17.7 %      MPV 10.6 fL      Platelets 228 Thousands/uL     Blood gas, venous [[id number]]  (Abnormal) Collected: 06/27/24 8968    Lab  Status: Final result Specimen: Blood from Arm, Left Updated: 06/27/24 2246     pH, Abel 7.403     pCO2, Abel 42.0 mm Hg      pO2, Abel 57.1 mm Hg      HCO3, Abel 25.6 mmol/L      Base Excess, Abel 0.7 mmol/L      O2 Content, Abel 17.2 ml/dL      O2 HGB, VENOUS 86.7 %                    XR chest 1 view portable    (Results Pending)              Procedures  CriticalCare Time    Date/Time: 6/28/2024 1:47 AM    Performed by: Niharika aDhl DO  Authorized by: Niharika Dahl DO    Critical care provider statement:     Critical care time (minutes):  75    Critical care time was exclusive of:  Separately billable procedures and treating other patients and teaching time    Critical care was necessary to treat or prevent imminent or life-threatening deterioration of the following conditions:  Renal failure, cardiac failure and sepsis    Critical care was time spent personally by me on the following activities:  Obtaining history from patient or surrogate, development of treatment plan with patient or surrogate, discussions with primary provider, evaluation of patient's response to treatment, examination of patient, ordering and performing treatments and interventions, ordering and review of laboratory studies, ordering and review of radiographic studies, re-evaluation of patient's condition and review of old charts    I assumed direction of critical care for this patient from another provider in my specialty: no    ECG 12 Lead Documentation Only    Date/Time: 6/27/2024 10:37 PM    Performed by: Niharika Dahl DO  Authorized by: Niharika Dahl DO    Indications / Diagnosis:  Sob  ECG reviewed by me, the ED Provider: yes    Patient location:  ED  Previous ECG:     Previous ECG:  Compared to current    Similarity:  No change    Comparison to cardiac monitor: Yes    Interpretation:     Interpretation: abnormal    Rate:     ECG rate:  111    ECG rate assessment: tachycardic    Rhythm:     Rhythm: atrial fibrillation    Ectopy:      Ectopy: none    QRS:     QRS intervals:  Normal  Conduction:     Conduction: normal    ST segments:     ST segments:  Normal  T waves:     T waves: inverted      Inverted:  I, II, III, V1, V2, V3, V4 and V5           ED Course  ED Course as of 06/28/24 0239   Thu Jun 27, 2024   2320 Wt Readings from Last 3 Encounters:  05/29/24 (!) 157 kg (346 lb 12.5 oz)  05/12/24 (!) 155 kg (342 lb 2.5 oz)  01/02/24 (!) 149 kg (328 lb 0.7 oz)    Weight today: 176kg/387lb   Fri Jun 28, 2024   0052 MPV: 10.6                               SBIRT 22yo+      Flowsheet Row Most Recent Value   Initial Alcohol Screen: US AUDIT-C     1. How often do you have a drink containing alcohol? 0 Filed at: 06/27/2024 2217   2. How many drinks containing alcohol do you have on a typical day you are drinking?  0 Filed at: 06/27/2024 2217   3a. Male UNDER 65: How often do you have five or more drinks on one occasion? 0 Filed at: 06/27/2024 2217   3b. FEMALE Any Age, or MALE 65+: How often do you have 4 or more drinks on one occassion? 0 Filed at: 06/27/2024 2217   Audit-C Score 0 Filed at: 06/27/2024 2217   EMORY: How many times in the past year have you...    Used an illegal drug or used a prescription medication for non-medical reasons? Never Filed at: 06/27/2024 2217                      Medical Decision Making  Patient presents with signs and symptoms consistent with acute exacerbation of chronic CHF combined with COPD and possible sepsis.  Differential diagnosis includes alternate cardiopulmonary causes such as ischemia, PE, pneumothorax and pneumonia as well as other causes of dyspnea such as asthma/RAD, flash pulmonary edema, dysrhythmia, but these are less likely.  Patient also in A-fib with RVR requiring Cardizem drip, also hypoxic with increased work of breathing and lethargy requiring BiPAP, patient was still hypotensive requiring IV fluid bolus, history of hypotension previously chronically on midodrine    Problems Addressed:  Acute hypoxic  respiratory failure (HCC): acute illness or injury  Atrial fibrillation with rapid ventricular response (HCC): acute illness or injury  CHF (congestive heart failure) (HCC): chronic illness or injury with exacerbation, progression, or side effects of treatment  COPD exacerbation (HCC): chronic illness or injury with exacerbation, progression, or side effects of treatment  Hypotension: chronic illness or injury with exacerbation, progression, or side effects of treatment  Hypoxia: acute illness or injury    Amount and/or Complexity of Data Reviewed  Labs: ordered. Decision-making details documented in ED Course.  Radiology: ordered and independent interpretation performed. Decision-making details documented in ED Course.  ECG/medicine tests: ordered and independent interpretation performed. Decision-making details documented in ED Course.    Risk  Prescription drug management.  Decision regarding hospitalization.             Disposition  Final diagnoses:   CHF (congestive heart failure) (HCC)   COPD exacerbation (HCC)   Hypoxia   Hypotension   Acute hypoxic respiratory failure (HCC)   Atrial fibrillation with rapid ventricular response (HCC)     Time reflects when diagnosis was documented in both MDM as applicable and the Disposition within this note       Time User Action Codes Description Comment    6/28/2024  1:03 AM Niharika Dahl Add [I50.9] CHF (congestive heart failure) (HCC)     6/28/2024  1:03 AM PolNiharika hackett Add [J44.1] COPD exacerbation (HCC)     6/28/2024  1:03 AM PollionelzNiharika camargo Add [R09.02] Hypoxia     6/28/2024  1:04 AM PollionelzNiharika camargo Add [I95.9] Hypotension     6/28/2024  1:04 AM PollionelzNiharika camargo Add [J96.01] Acute hypoxic respiratory failure (HCC)     6/28/2024  1:05 AM PollionelzNiharika camargo Add [I48.91] Atrial fibrillation with rapid ventricular response (HCC)     6/28/2024  1:26 AM Maikel Mathew Add [I50.33] Acute on chronic diastolic congestive heart failure (HCC)     6/28/2024  1:26 AM Priyanka  Maikel Thorne [I48.0] Paroxysmal atrial fibrillation (HCC)           ED Disposition       ED Disposition   Admit    Condition   Stable    Date/Time   Fri Jun 28, 2024 0103    Comment   Case was discussed with Dr Contreras and the patient's admission status was agreed to be Admission Status: inpatient status to the service of Dr. Contreras .               Follow-up Information    None         Patient's Medications   Discharge Prescriptions    No medications on file       No discharge procedures on file.    PDMP Review         Value Time User    PDMP Reviewed  Yes 6/10/2024 12:07 PM Dylan Dalton PA-C            ED Provider  Electronically Signed by             Niharika Dahl,   06/28/24 0150       Niharika Dahl,   06/28/24 0239

## 2024-06-28 NOTE — NURSING NOTE
Spouse brought in empty pill bottle labeled Alprazolam  0.25mg Tablet. Dated Filled: 4/26/24. Quantity 30. Per spouse unknown how many pills were in bottle at time of presumed overdose.

## 2024-06-28 NOTE — PROCEDURES
Venous Access Line Insertion    Date/Time: 6/28/2024 8:15 AM    Performed by: CAMPBELL Christiansen  Authorized by: CAPMBELL Christiansen    Consent:     Consent obtained:  Verbal    Consent given by:  Patient    Alternatives discussed:  No treatment  Universal protocol:     Procedure explained and questions answered to patient or proxy's satisfaction: yes      Immediately prior to procedure, a time out was called: yes      Relevant documents present and verified: yes      Test results available and properly labeled: yes      Radiology Images displayed and confirmed.  If images not available, report reviewed: yes      Required blood products, implants, devices, and special equipment available: yes      Site/side marked: yes      Patient identity confirmed:  Arm band and verbally with patient  Pre-procedure details:     Hand hygiene: Hand hygiene performed prior to insertion      Sterile barrier technique: All elements of maximal sterile technique followed      Skin preparation:  ChloraPrep    Skin preparation agent: Skin preparation agent completely dried prior to procedure    Procedure details:     Complex Venous Access Line Type: Midline      Complex Venous Access Line Indications: no peripheral vascular access      Location:  Brachial    Catheter size:  20 gauge    Total catheter length (cm):  10    Patient position:  Flat    Ultrasound image availability:  Not obtained due to urgency    Number of attempts:  4    Successful placement: yes      Landmarks identified: yes    Anesthesia (see MAR for exact dosages):     Anesthesia method:  None  Post-procedure details:     Post-procedure:  Securement device placed and dressing applied    Assessment:  Blood return through all ports and free fluid flow    Post-procedure complications: none      Patient tolerance of procedure:  Tolerated well, no immediate complications

## 2024-06-28 NOTE — PLAN OF CARE
Problem: INFECTION - ADULT  Goal: Absence or prevention of progression during hospitalization  Description: INTERVENTIONS:  - Assess and monitor for signs and symptoms of infection  - Monitor lab/diagnostic results  - Monitor all insertion sites, i.e. indwelling lines, tubes, and drains  - Monitor endotracheal if appropriate and nasal secretions for changes in amount and color  - Bodega appropriate cooling/warming therapies per order  - Administer medications as ordered  - Instruct and encourage patient and family to use good hand hygiene technique  - Identify and instruct in appropriate isolation precautions for identified infection/condition  Outcome: Progressing     Problem: SAFETY ADULT  Goal: Patient will remain free of falls  Description: INTERVENTIONS:  - Educate patient/family on patient safety including physical limitations  - Instruct patient to call for assistance with activity   - Consult OT/PT to assist with strengthening/mobility   - Keep Call bell within reach  - Keep bed low and locked with side rails adjusted as appropriate  - Keep care items and personal belongings within reach  - Initiate and maintain comfort rounds  - Make Fall Risk Sign visible to staff  - Offer Toileting every 2 Hours, in advance of need if unable to call for assistance  - Initiate/Maintain bed/chair alarm for confusion, impulsivity, or noncompliance with calling for assistance  - Apply yellow socks and bracelet for high fall risk patients  - Consider moving patient to room near nurses station  Outcome: Progressing  Goal: Maintain or return to baseline ADL function  Description: INTERVENTIONS:  -  Assess patient's ability to carry out ADLs; assess patient's baseline for ADL function and identify physical deficits which impact ability to perform ADLs (bathing, care of mouth/teeth, toileting, grooming, dressing, etc.)  - Assess/evaluate cause of self-care deficits   - Assess range of motion  - Assess patient's mobility;  develop plan if impaired  - Assess patient's need for assistive devices and provide as appropriate  - Encourage maximum independence but intervene and supervise when necessary  - Involve family in performance of ADLs  - Assess for home care needs following discharge   - Consider OT consult to assist with ADL evaluation and planning for discharge  - Provide patient education as appropriate  Outcome: Progressing  Goal: Maintains/Returns to pre admission functional level  Description: INTERVENTIONS:  - Perform AM-PAC 6 Click Basic Mobility/ Daily Activity assessment daily.  - Set and communicate daily mobility goal to care team and patient/family/caregiver.   - Collaborate with rehabilitation services on mobility goals if consulted  - Perform Range of Motion 3 times a day.  - Reposition patient every 2 hours if unable to reposition self  - Out of bed for toileting when medically appropriate  - Record patient progress and toleration of activity level   Outcome: Progressing     Problem: CARDIOVASCULAR - ADULT  Goal: Maintains optimal cardiac output and hemodynamic stability  Description: INTERVENTIONS:  - Monitor I/O, vital signs and rhythm  - Monitor for S/S and trends of decreased cardiac output  - Administer and titrate ordered vasoactive medications to optimize hemodynamic stability  - Assess quality of pulses, skin color and temperature  - Assess for signs of decreased coronary artery perfusion  - Instruct patient to report change in severity of symptoms  Outcome: Progressing  Goal: Absence of cardiac dysrhythmias or at baseline rhythm  Description: INTERVENTIONS:  - Continuous cardiac monitoring, vital signs, obtain 12 lead EKG if ordered  - Administer antiarrhythmic and heart rate control medications as ordered  - Monitor electrolytes and administer replacement therapy as ordered  Outcome: Progressing     Problem: RESPIRATORY - ADULT  Goal: Achieves optimal ventilation and oxygenation  Description:  INTERVENTIONS:  - Assess for changes in respiratory status  - Assess for changes in mentation and behavior  - Position to facilitate oxygenation and minimize respiratory effort  - Oxygen administered by appropriate delivery if ordered  - Initiate smoking cessation education as indicated  - Encourage broncho-pulmonary hygiene including cough, deep breathe, Incentive Spirometry  - Assess the need for suctioning and aspirate as needed  - Assess and instruct to report SOB or any respiratory difficulty  - Respiratory Therapy support as indicated  Outcome: Progressing     Problem: METABOLIC, FLUID AND ELECTROLYTES - ADULT  Goal: Electrolytes maintained within normal limits  Description: INTERVENTIONS:  - Monitor labs and assess patient for signs and symptoms of electrolyte imbalances  - Administer electrolyte replacement as ordered  - Monitor response to electrolyte replacements, including repeat lab results as appropriate  - Instruct patient on fluid and nutrition as appropriate  Outcome: Progressing  Goal: Fluid balance maintained  Description: INTERVENTIONS:  - Monitor labs   - Monitor I/O and WT  - Instruct patient on fluid and nutrition as appropriate  - Assess for signs & symptoms of volume excess or deficit  Outcome: Progressing     Problem: Nutrition/Hydration-ADULT  Goal: Nutrient/Hydration intake appropriate for improving, restoring or maintaining nutritional needs  Description: Monitor and assess patient's nutrition/hydration status for malnutrition. Collaborate with interdisciplinary team and initiate plan and interventions as ordered.  Monitor patient's weight and dietary intake as ordered or per policy. Utilize nutrition screening tool and intervene as necessary. Determine patient's food preferences and provide high-protein, high-caloric foods as appropriate.     INTERVENTIONS:  - Monitor oral intake, urinary output, labs, and treatment plans  - Assess nutrition and hydration status and recommend course of  action  - Evaluate amount of meals eaten  - Assist patient with eating if necessary   - Allow adequate time for meals  - Recommend/ encourage appropriate diets, oral nutritional supplements, and vitamin/mineral supplements  - Order, calculate, and assess calorie counts as needed  - Recommend, monitor, and adjust tube feedings and TPN/PPN based on assessed needs  - Assess need for intravenous fluids  - Provide specific nutrition/hydration education as appropriate  - Include patient/family/caregiver in decisions related to nutrition  Outcome: Progressing     Problem: Prexisting or High Potential for Compromised Skin Integrity  Goal: Skin integrity is maintained or improved  Description: INTERVENTIONS:  - Identify patients at risk for skin breakdown  - Assess and monitor skin integrity  - Assess and monitor nutrition and hydration status  - Monitor labs   - Assess for incontinence   - Turn and reposition patient  - Assist with mobility/ambulation  - Relieve pressure over bony prominences  - Avoid friction and shearing  - Provide appropriate hygiene as needed including keeping skin clean and dry  - Evaluate need for skin moisturizer/barrier cream  - Collaborate with interdisciplinary team   - Patient/family teaching  - Consider wound care consult   Outcome: Progressing

## 2024-06-28 NOTE — ASSESSMENT & PLAN NOTE
"Lab Results   Component Value Date    HGBA1C 7.0 (H) 05/02/2024       No results for input(s): \"POCGLU\" in the last 72 hours.    Blood Sugar Average: Last 72 hrs:    Hold home diabetic medications.  SSI with Q6H glucose checks ordered.  "

## 2024-06-28 NOTE — QUICK NOTE
Patient's spouse called but unavailable at this time for update. Update given to son as he is the second contact.

## 2024-06-28 NOTE — ASSESSMENT & PLAN NOTE
Patient presented to ED on her baseline 4L NC with tachypnea and hypoxia. She was also lethargic.  She was placed on CPAP.  She is noted to have NISHI and wears CPAP as baseline.   VBG on admission showed pH 7.4 and pCO2 of 42.  Likely multifactorial given CHF exacerbation, pneumonia, less likely COPD exacerbation at this point  Azithromycin/CTX and IV steroids given on admission   Completed 3 days azithromycin  Continue Ceftriaxone D3  Lasix 40 mg IV administered. Poor response. Trial Bumex IV and metolazone on admission   Hold home Bumex and diurese PRN with IV meds.  Monitor I/O.  Respiratory protocol.   CPAP HS

## 2024-06-28 NOTE — ASSESSMENT & PLAN NOTE
Wt Readings from Last 3 Encounters:   06/27/24 (!) 176 kg (387 lb 9.1 oz)   06/10/24 (!) 157 kg (346 lb)   05/29/24 (!) 157 kg (346 lb 12.5 oz)     Patient noted have a 19 kg weight gain since 6/10.   .  See plan under acute on chronic respiratory failure.

## 2024-06-28 NOTE — CASE MANAGEMENT
Case Management Assessment & Discharge Planning Note    Patient name Tigist Hewitt  Location /-01 MRN [id number]  : 1955 Date 2024       Current Admission Date: 2024  Current Admission Diagnosis:Acute on chronic respiratory failure with hypoxia (HCC)   Patient Active Problem List    Diagnosis Date Noted Date Diagnosed    Acute on chronic respiratory failure with hypoxia (HCC) 2024     Ankle fracture 2024     Lymphangitis 2024     Gout 2024     HLD (hyperlipidemia) 2024     Metabolic alkalosis 2024     Acute pain of left knee 2024     Ambulatory dysfunction 2024     Hyperkalemia 2024     Lung cyst 2024     Abnormal CT scan, pelvis 2023     CKD (chronic kidney disease) stage 3, GFR 30-59 ml/min ([facility]) 2023     NISHI (obstructive sleep apnea) 2023     Intertrigo 10/29/2022     Vaginal bleeding 10/23/2022     Acute kidney injury superimposed on chronic kidney disease  ([facility]) 10/22/2022     Hypotension 10/22/2022     GERD (gastroesophageal reflux disease) 10/22/2022     Acute on chronic diastolic congestive heart failure ([facility]) 2022     IMTIAZ (acute kidney injury) ([facility]) 2022     Morbid obesity ([facility]) 2022     Hypothyroidism 2022     Supratherapeutic INR 04/15/2020     Chronic respiratory failure with hypoxia ([facility]) 2020     Asthma 2020     Atrial fibrillation with RVR ([facility]) 2020     COPD (chronic obstructive pulmonary disease) ([facility]) 2020     Type 2 diabetes mellitus with hyperglycemia, with long-term current use of insulin ([facility]) 2020     Diastolic CHF ([facility]) 2020     Shortness of breath 2020     Anemia 2020       LOS (days): 0  Geometric Mean LOS (GMLOS) (days): 3.9  Days to GMLOS:3.4     OBJECTIVE:  PATIENT READMITTED TO HOSPITAL  Risk of Unplanned Readmission Score: 38.94         Current admission status: Inpatient       Preferred Pharmacy:    [id number] - [place], [place] - [address] [place] [id number]  Phone: [phone] Fax: [phone]    Primary Care Provider: Marisa Haque MD    Primary Insurance: [facility] REP  Secondary Insurance:     ASSESSMENT:  Active Health Care Proxies    There are no active Health Care Proxies on file.       Advance Directives  Does patient have a Health Care POA?: Yes (, Kenneth)  Does patient have Advance Directives?: Yes  Advance Directives: Power of  for finance, Power of  for health care  Primary Contact: Kenneth              Patient Information  Admitted from:: Home  Mental Status: Alert  During Assessment patient was accompanied by: Not accompanied during assessment  Assessment information provided by:: Patient  Primary Caregiver: Family  Caregiver's Name::  and adult son  Caregiver's Relationship to Patient:: Family Member  Support Systems: Spouse/significant other, Son, Daughter, Family members  [facility] Residence: [facility]  Home entry access options. Select all that apply.: No steps to enter home  Type of Current Residence: Bi-level  Upon entering residence, is there a bedroom on the main floor (no further steps)?: Yes (pt has a first floor set up)  Upon entering residence, is there a bathroom on the main floor (no further steps)?: Yes  Living Arrangements: Lives w/ Spouse/significant other, Lives w/ Son  Is patient a ?: No    Activities of Daily Living Prior to Admission  Functional Status: Assistance  Completes ADLs independently?: No  Level of ADL dependence: Assistance  Ambulates independently?: No  Level of ambulatory dependence: Assistance  Does patient use assisted devices?: Yes  Assisted Devices (DME) used: Walker, David lift, Hospital Bed, Home Oxygen concentrator, Wheelchair, CPAP  DME Company Name (respiratory supplies): [employer]  O2 Rate(s): 4L and CPAP  Does patient currently own DME?: Yes  What DME does  the patient currently own?: Wheelchair, Hospital Bed, David lift, Walker, Bedside Commode, Home Oxygen concentrator  Does patient have a history of Outpatient Therapy (PT/OT)?: No  Does the patient have a history of Short-Term Rehab?: Yes ([facility], [facility])  Does patient have a history of HHC?: Yes  Does patient currently have HHC?: Yes ([facility] and [facility])    Current Home Health Care  Type of Current Home Care Services: Nurse visit, Home OT, Home PT  Current Home Health Agency:: [facility]  Current Home Health Follow-Up Provider:: PCP    Patient Information Continued  Income Source: SSI/SSD  Does patient have prescription coverage?: Yes  Does patient receive dialysis treatments?: No  Does patient have a history of substance abuse?: No  Does patient have a history of Mental Health Diagnosis?: No         Means of Transportation  Means of Transport to Appts:: Family transport      Social Determinants of Health (SDOH)      Flowsheet Row Most Recent Value   Housing Stability    In the last 12 months, was there a time when you were not able to pay the mortgage or rent on time? N   In the past 12 months, how many times have you moved where you were living? 1   At any time in the past 12 months, were you homeless or living in a shelter (including now)? N   Transportation Needs    In the past 12 months, has lack of transportation kept you from medical appointments or from getting medications? no   In the past 12 months, has lack of transportation kept you from meetings, work, or from getting things needed for daily living? No   Food Insecurity    Within the past 12 months, you worried that your food would run out before you got the money to buy more. Never true   Within the past 12 months, the food you bought just didn't last and you didn't have money to get more. Never true   Utilities    In the past 12 months has the electric, gas, oil, or water company threatened to shut off services in  "your home? No            DISCHARGE DETAILS:    Discharge planning discussed with:: patient  Freedom of Choice: Yes     CM contacted family/caregiver?: No- see comments (declined)             Contacts  Patient Contacts: vu,   Relationship to Patient:: Family                   Would you like to participate in our [facility] service program?  : No - Declined                 Pt from home, lives with , Vu and son, Santiago. Pt sts  and son assist with her daily cares.  Pt sts she \"hasn't really been out of bed much because of my ankle\" (hx of fx ankle 5/25/24). Pt sts she was transferring \"occasionally\" to a , but sts \"the visiting nurses told me I should be using the heather anymore because I have a sore on my bottom.\" Pt sts she is currently mostly bed bound at baseline.    Pt has a first floor set up with hospital bed.  Pt uses chronic O2 4L and home CPAP, through [employer].                                                       "

## 2024-06-28 NOTE — H&P
[facility]  H&P  Name: Tigist Hewitt 69 y.o. female I MRN: [id number]  Unit/Bed#: -01 I Date of Admission: 6/27/2024   Date of Service: 6/28/2024 I Hospital Day: 0      Assessment & Plan   Atrial fibrillation with RVR (HCC)  Assessment & Plan  Lopressor 5 mg IV x1 and Cardizem gtt administered in the ED.   Given patient has a therapeutic INR on Warfarin, afib with RVR presented in the ED and patient is known to have low BP on midodrine, the decision was made to change the cardizem gtt to an amiodarone gtt.    Consider heparin gtt and use of IV beta blocker if patient cannot resume PO meds on 6/28.   Monitor on telemetry.     * Acute on chronic respiratory failure with hypoxia (HCC)  Assessment & Plan  Patient presented to ED on her baseline 4L NC with tachypnea and hypoxia. She was also lethargic.  She was placed on CPAP.  She is noted to have NISHI and wears CPAP as baseline.   VBG showed pH 7.4 and pCO2 of 42.  Likely multifactorial given CHF exacerbation, COPD exacerbation and afib with RVR.   Azithromycin and IV steroids ordered.  Lasix 40 mg IV administered.   Hold home Bumex and diurese PRN with IV meds.  Monitor I/O.  Respiratory protocol.   Trial CPAP off in the morning.    Acute on chronic diastolic congestive heart failure (HCC)  Assessment & Plan  Wt Readings from Last 3 Encounters:   06/27/24 (!) 176 kg (387 lb 9.1 oz)   06/10/24 (!) 157 kg (346 lb)   05/29/24 (!) 157 kg (346 lb 12.5 oz)     Patient noted have a 19 kg weight gain since 6/10.   .  See plan under acute on chronic respiratory failure.           COPD (chronic obstructive pulmonary disease) (HCC)  Assessment & Plan  See plan under acute on chronic respiratory failure with hypoxia.    NISHI (obstructive sleep apnea)  Assessment & Plan  Continue CPAP HS    Morbid obesity (HCC)  Assessment & Plan  Consider dietary consult.   Encourage exercise and lifestyle changes.    Type 2 diabetes mellitus with  "hyperglycemia, with long-term current use of insulin ([facility])  Assessment & Plan  Lab Results   Component Value Date    HGBA1C 7.0 (H) 05/02/2024       No results for input(s): \"POCGLU\" in the last 72 hours.    Blood Sugar Average: Last 72 hrs:    Hold home diabetic medications.  SSI with Q6H glucose checks ordered.           History of Present Illness     HPI: Tigist Hewitt is a 69 y.o. who presents with c/o SOB, increased work of breathing and family endorsed cyanotic changes that started earlier in the day.  Upon arrival to the ED, she was noted to be in respiratory distress, hypoxic and lethargic.  She was placed on CPAP.  She was also noted to have developed afib with RVR while in the ED.  She was administered IV lopressor 5 mg and started on a cardizem gtt.  It was noted that the patient has baseline HoTN and takes midodrine 10 mg TID and was hypotensive in the ED.  She was given a dose of midodrine and a 250 mL bolus of crystalloid fluids given her h/o CHF.  Critical Care was consulted for admission.     History obtained from chart review.  Review of Systems: Review of Systems   Unable to perform ROS: Acuity of condition     Disposition: Stepdown Level 1  Historical Information   Past Medical History:  No date: Asthma  No date: Atrial fibrillation ([facility])  No date: COPD (chronic obstructive pulmonary disease) ([facility])  No date: Diabetes mellitus ([facility])  No date: Disease of thyroid gland  No date: Heart failure ([facility])  No date: Kidney failure  No date: Morbid obesity due to excess calories ([facility])  No date: NISHI (obstructive sleep apnea)  No date: UTI (urinary tract infection) Past Surgical History:  No date: CARDIAC ELECTROPHYSIOLOGY MAPPING AND ABLATION      Comment:  by Dr. Ferraro at [facility]  No date: CARDIOVERSION; N/A  No date: GALLBLADDER SURGERY  No date: HERNIA REPAIR   Current Outpatient Medications   Medication Instructions    acetaminophen (TYLENOL) 650 mg, Oral, Every 6 hours PRN    albuterol (PROVENTIL " HFA,VENTOLIN HFA) 90 mcg/act inhaler 2 puffs, Inhalation, Every 6 hours PRN    allopurinol (ZYLOPRIM) 100 mg, Oral, Daily, Dose needs to be verified    atorvastatin (LIPITOR) 10 mg, Oral, Daily    bumetanide (BUMEX) 2 mg, Oral, 2 times daily    docusate sodium (COLACE) 100 mg, Oral, 2 times daily    ferrous sulfate 325 mg, Oral, Daily with breakfast    fluticasone-vilanterol (BREO ELLIPTA) 100-25 mcg/inh inhaler 1 puff, Inhalation, Daily, Rinse mouth after use.     glipiZIDE (GLUCOTROL XL) 10 mg 24 hr tablet 1 tablet, Oral, Every morning    Insulin Aspart (NOVOLOG) 10 Units, Subcutaneous, 3 times daily with meals    Lantus SoloStar 26 Units, Subcutaneous, Daily at bedtime, 26 units    levothyroxine 88 mcg tablet No dose, route, or frequency recorded.    levothyroxine 288 mcg, Oral, Daily    metoprolol succinate (TOPROL-XL) 37.5 mg, Oral, 2 times daily    midodrine (PROAMATINE) 5 mg, Oral, 3 times daily before meals, Please hold the medication if your systolic blood pressure is more than 115    montelukast (SINGULAIR) 10 mg, Oral, Daily at bedtime    multivitamin (THERAGRAN) TABS 1 tablet, Oral, Daily    omeprazole (PRILOSEC) 40 mg, Oral, Daily    potassium chloride (K-DUR,KLOR-CON) 20 mEq tablet 20 mEq, Oral, 3 times daily    traMADol (ULTRAM) 50 mg, Oral, Every 12 hours PRN    Vitamin D3 2,000 Units, Oral, Daily    warfarin (COUMADIN) 7.5 mg tablet INR range is 2-3    Allergies   Allergen Reactions    Acesulfame Potassium - Food Allergy Anaphylaxis    Aspartame - Food Allergy Anaphylaxis    Saccharin - Food Allergy Anaphylaxis    Sucralose - Food Allergy Anaphylaxis    Bactrim [Sulfamethoxazole-Trimethoprim] Other (See Comments)     Makes blood pressure go up, fever, chills    Metformin GI Intolerance      Social History     Tobacco Use    Smoking status: Never     Passive exposure: Never    Smokeless tobacco: Never   Vaping Use    Vaping status: Never Used   Substance Use Topics    Alcohol use: Never    Drug use:  Never    Family History   Problem Relation Age of Onset    Arthritis Mother     Hearing loss Mother     Heart disease Mother     Hypertension Mother     Stroke Mother     Alcohol abuse Father     Cancer Father     Diabetes Father     Arthritis Sister     Cancer Sister     Alcohol abuse Brother     Diabetes Son     Arthritis Daughter     Drug abuse Daughter     Heart disease Maternal Grandmother     Hypertension Maternal Grandmother     Stroke Maternal Grandmother     Arthritis Maternal Aunt     Asthma Neg Hx     Birth defects Neg Hx     COPD Neg Hx     Depression Neg Hx     Early death Neg Hx     Hyperlipidemia Neg Hx     Kidney disease Neg Hx     Learning disabilities Neg Hx     Mental illness Neg Hx     Mental retardation Neg Hx     Miscarriages / Stillbirths Neg Hx     Vision loss Neg Hx           Objective                            Vitals I/O      Most Recent Min/Max in 24hrs   Temp 99.3 °F (37.4 °C) Temp  Min: 99.1 °F (37.3 °C)  Max: 99.3 °F (37.4 °C)   Pulse (!) 119 Pulse  Min: 90  Max: 163   Resp (!) 29 Resp  Min: 14  Max: 29   BP (!) 88/58 BP  Min: 78/55  Max: 102/55   O2 Sat 94 % SpO2  Min: 87 %  Max: 98 %    No intake or output data in the 24 hours ending 06/28/24 0311    Diet NPO    Invasive Monitoring   Arterial Line  Kelli BP    No data recorded   MAP    No data recorded           Physical Exam   Physical Exam  Eyes:      General:         Right eye: No discharge.         Left eye: No discharge.      Extraocular Movements: Extraocular movements intact.      Pupils: Pupils are equal, round, and reactive to light.   Skin:     General: Skin is warm and dry.      Coloration: Skin is not jaundiced or pale.      Findings: Rash (B/l lower extremities) present.   HENT:      Head: Normocephalic and atraumatic.      Mouth/Throat:      Mouth: Mucous membranes are moist.   Neck:      Vascular: No JVD.   no midline tenderness Cardiovascular:      Rate and Rhythm: Tachycardia present. Rhythm irregular.    Musculoskeletal:         General: Swelling and signs of injury present. No tenderness or deformity.      Right lower leg: Edema present.      Left lower leg: Edema present.   Abdominal: General: Bowel sounds are normal. There is no distension.      Palpations: Abdomen is soft.      Tenderness: There is no abdominal tenderness. There is no guarding.   Constitutional:       General: She is in acute distress.      Appearance: She is well-developed. She is morbidly obese. She is ill-appearing.      Interventions: Face mask in place.   Pulmonary:      Effort: Tachypnea present.      Breath sounds: Decreased breath sounds present.   Neurological:      General: No focal deficit present.      Mental Status: She is alert and oriented to person, place and time.   Genitourinary/Anorectal:  Dow present.          Diagnostic Studies      EKG: afib with RVR  Imaging:  I have personally reviewed pertinent films in PACS     Medications:  Scheduled PRN   azithromycin, 500 mg, Q24H  insulin lispro, 2-12 Units, Q6H HALEY  ipratropium-albuterol, 3 mL, Q6H  methylPREDNISolone sodium succinate, 40 mg, Q8H          Continuous    amiodarone (CORDARONE) 900 mg in dextrose 5 % 500 mL infusion, 1 mg/min, Last Rate: 1 mg/min (06/28/24 0234)  amiodarone (CORDARONE) 900 mg in dextrose 5 % 500 mL infusion, 0.5 mg/min         Labs:    CBC    Recent Labs     06/27/24 2235   WBC 23.90*   HGB 12.8   HCT 40.1      BANDSPCT 9*     BMP    Recent Labs     06/27/24 2235 06/27/24  2320   SODIUM 136  --    K 6.4* 4.8     --    CO2 28  --    AGAP 7  --    BUN 30*  --    CREATININE 0.97  --    CALCIUM 9.8  --        Coags    Recent Labs     06/27/24  2250   INR 2.80*   PTT 48*        Additional Electrolytes  Recent Labs     06/27/24 2235   MG 2.1          Blood Gas    No recent results  Recent Labs     06/27/24 2235   PHVEN 7.403*   UEX2NLD 42.0   PO2VEN 57.1*   XTY4CGI 25.6   BEVEN 0.7   I6SFFVQ 86.7*    LFTs  Recent Labs     06/27/24 2235    ALT 16   AST 45*   ALKPHOS 170*   ALB 3.5   TBILI 1.55*       Infectious  Recent Labs     06/27/24  2320   PROCALCITONI 0.31*     Glucose  Recent Labs     06/27/24  2235   GLUC 117             Anticipated Length of Stay is > 2 midnights  Maikel Mathew Jr, PAKENNEDY

## 2024-06-28 NOTE — ASSESSMENT & PLAN NOTE
Lopressor 5 mg IV x1 and Cardizem gtt administered in the ED.   Given patient has a therapeutic INR on Warfarin, afib with RVR presented in the ED and patient is known to have low BP on midodrine, the decision was made to change the cardizem gtt to an amiodarone gtt.    Consider heparin gtt and use of IV beta blocker if patient cannot resume PO meds on 6/28.   Monitor on telemetry.

## 2024-06-28 NOTE — UTILIZATION REVIEW
Initial Clinical Review    Admission: Date/Time/Statement:   Admission Orders (From admission, onward)       Ordered        06/28/24 0105  INPATIENT ADMISSION  Once                          Orders Placed This Encounter   Procedures    INPATIENT ADMISSION     Standing Status:   Standing     Number of Occurrences:   1     Order Specific Question:   Level of Care     Answer:   Level 1 Stepdown [13]     Order Specific Question:   Estimated length of stay     Answer:   More than 2 Midnights     Order Specific Question:   Certification     Answer:   I certify that inpatient services are medically necessary for this patient for a duration of greater than two midnights. See H&P and MD Progress Notes for additional information about the patient's course of treatment.     ED Arrival Information       Expected   -    Arrival   6/27/2024 22:07    Acuity   Emergent              Means of arrival   Ambulance    Escorted by   [facility]    Service   Critical Care/ICU    Admission type   Emergency              Arrival complaint   Lethargic             Chief Complaint   Patient presents with    Shortness of Breath     Chronic SOB/ copd. Family called for ems due to increases work of breathing and color change       Initial Presentation: 69 y.o. female to ED via EMS from home  Present to ED with c/o SOB, increased work of breathing and family endorsed cyanotic changes that started earlier in the day. Upon arrival to the ED, she was noted to be in respiratory distress, hypoxic and lethargic. She was placed on CPAP. She was also noted to have developed afib with RVR while in the ED. She was administered IV lopressor 5 mg and started on a cardizem gtt.   PMHX: COPD (baseline 4L); asthma; DM; CHF; NISHI  Admitted to Level 1 Stepdown with DX: Acute on chronic respiratory failure with hypoxia   on exam: lethargic; T 99.1; hypotensive; tachy  - irregular; tachypnea; B/L LE edema; ; WBC 23.90; K 6.4; procal 0.31; VBG showed pH 7.4  and pCO2 of 42.   PLAN: cont cardizem gtt; cont iv abx; cont DuoNebs; monitor labs; Cardiopulmonary monitoring; NPO; Accuc[facility]cks with [facility]          Date: 6/28/24       Day 2  Lethargic; dyspnea on exertion and at rest; O2 @ 4L via nc   Plan: start amiodarone gtt; stop cardizem gtt; rec'd bumex iv x1; start solumedrol iv; rec'd ivf bolus 250; cont iv abx; cont DuoNebs; monitor labs; Cardiopulmonary monitoring; NPO; Accuchecks with [facility]      ED Triage Vitals [06/27/24 2213]   Temperature Pulse Respirations Blood Pressure SpO2 Pain Score   99.1 °F (37.3 °C) 90 14 101/59 93 % No Pain     Weight (last 2 days)       Date/Time Weight    06/28/24 0257 172 (379.41)    06/27/24 2213 176 (387.57)            Vital Signs (last 3 days)       Date/Time Temp Pulse Resp BP MAP (mmHg) SpO2 FiO2 (%) Calculated FIO2 (%) - Nasal Cannula Nasal Cannula O2 Flow Rate (L/min) O2 Device O2 Interface Device Patient Position - Orthostatic VS Dulce Maria Coma Scale Score Pain    06/28/24 0948 -- -- -- -- -- -- -- 36 4 L/min Nasal cannula -- -- -- --    06/28/24 0748 97.6 °F (36.4 °C) 76 14 98/54 73 98 % -- -- -- CPAP -- Lying -- --    06/28/24 0733 -- -- -- -- -- 98 % -- -- -- -- -- -- -- --    06/28/24 0715 -- 81 15 90/51 65 -- -- -- -- -- -- -- -- --    06/28/24 0700 -- 75 16 77/49 59 99 % 40 -- -- CPAP -- Sitting -- --    06/28/24 0645 -- 74 16 83/52 62 -- -- -- -- -- -- Sitting -- --    06/28/24 0600 -- -- -- -- -- 99 % 40 -- -- CPAP -- -- -- --    06/28/24 0530 -- 85 15 91/55 69 -- -- -- -- -- -- -- -- --    06/28/24 0515 -- 84 17 87/50 66 -- -- -- -- -- -- -- -- --    06/28/24 0500 -- 83 18 100/53 74 98 % 40 -- -- CPAP -- Sitting -- --    06/28/24 0445 -- 93 16 99/59 74 -- -- -- -- -- -- -- -- --    06/28/24 0430 -- 85 16 88/59 68 -- -- -- -- -- -- -- -- --    06/28/24 0415 -- 91 18 94/52 71 -- -- -- -- -- -- -- -- --    06/28/24 0400 -- 94 17 92/46 67 99 % 40 -- -- CPAP -- Sitting -- --    06/28/24 0345 -- 101 16 99/54 73 -- -- -- -- -- -- --  -- --    06/28/24 0330 -- 104 15 94/55 68 -- -- -- -- -- -- -- -- --    06/28/24 0317 -- 109 18 92/53 66 -- -- -- -- -- -- Sitting -- --    06/28/24 0315 -- 108 19 80/57 63 -- -- -- -- -- -- -- -- --    06/28/24 0300 -- 119 29 88/58 68 94 % 40 -- -- CPAP -- Sitting 14 --    06/28/24 0257 99.3 °F (37.4 °C) 98 18 88/67 73 94 % 40 -- -- CPAP -- Sitting -- No Pain    06/28/24 0253 -- -- -- -- -- -- -- -- -- -- Full face mask -- -- --    06/28/24 0115 -- 115 20 102/55 76 98 % -- -- -- CPAP -- Sitting -- --    06/28/24 0100 -- 128 20 100/52 69 97 % -- -- -- CPAP -- Sitting -- --    06/28/24 0045 -- 132 -- 98/56 -- -- -- -- -- -- -- -- -- --    06/28/24 0015 -- 141 18 94/57 71 98 % -- -- -- CPAP -- Sitting -- --    06/28/24 0006 -- -- -- -- -- 98 % 40 -- -- CPAP Full face mask -- -- --    06/28/24 0000 -- 152 24 91/46 66 87 % -- 36 4 L/min Nasal cannula -- Sitting -- --    06/27/24 2357 -- -- -- -- -- -- -- -- -- -- -- -- 15 --    06/27/24 2356 -- -- -- -- -- -- -- -- -- Nasal cannula  -- -- -- --    06/27/24 2345 -- 163 26 78/55 63 90 % -- 36 4 L/min Nasal cannula -- Sitting -- --    06/27/24 2330 -- 160 24 78/40 52 89 % -- 36 4 L/min Nasal cannula -- Sitting -- --    06/27/24 2315 -- 117 23 92/56 69 93 % -- 36 4 L/min Nasal cannula -- Sitting -- --    06/27/24 2300 -- 121 18 98/58 73 95 % -- 36 4 L/min Nasal cannula -- Sitting -- --    06/27/24 2215 -- 97 -- 98/53 70 91 % -- -- -- Nasal cannula -- -- -- --    06/27/24 2213 99.1 °F (37.3 °C) 90 14 101/59 -- 93 % -- 36 4 L/min Nasal cannula -- Sitting -- No Pain              Pertinent Labs/Diagnostic Test Results:   Radiology:  XR chest 1 view portable    (Results Pending)   CT head wo contrast    (Results Pending)    VAS VENOUS DUPLEX - LOWER LIMB BILATERAL    (Results Pending)   CT chest abdomen pelvis wo contrast    (Results Pending)       Results from last 7 days   Lab Units 06/27/24 2235   SARS-COV-2  Negative     Results from last 7 days   Lab Units 06/28/24  0598  06/27/24  2235   WBC Thousand/uL 26.28* 23.90*   HEMOGLOBIN g/dL 13.0 12.8   HEMATOCRIT % 40.0 40.1   PLATELETS Thousands/uL 197 228   BANDS PCT %  --  9*        Results from last 7 days   Lab Units 06/28/24  0743 06/28/24  0552 06/27/24  2320 06/27/24  2235   SODIUM mmol/L 137  --   --  136   POTASSIUM mmol/L 5.0  --  4.8 6.4*   CHLORIDE mmol/L 103  --   --  101   CO2 mmol/L 25  --   --  28   ANION GAP mmol/L 9  --   --  7   BUN mg/dL 33*  --   --  30*   CREATININE mg/dL 1.13  --   --  0.97   EGFR ml/min/1.73sq m 49  --   --  58   CALCIUM mg/dL 9.6  --   --  9.8   MAGNESIUM mg/dL  --  2.1  --  2.1   PHOSPHORUS mg/dL  --  4.0  --   --      Results from last 7 days   Lab Units 06/27/24 2235   AST U/L 45*   ALT U/L 16   ALK PHOS U/L 170*   TOTAL PROTEIN g/dL 7.9   ALBUMIN g/dL 3.5   TOTAL BILIRUBIN mg/dL 1.55*     Results from last 7 days   Lab Units 06/28/24  0555   POC GLUCOSE mg/dl 170*     Results from last 7 days   Lab Units 06/28/24  0743 06/27/24  2235   GLUCOSE RANDOM mg/dL 155* 117        Results from last 7 days   Lab Units 06/28/24  0830 06/27/24  2235   PH ALFREDO  7.400 7.403*   PCO2 ALFREDO mm Hg 42.6 42.0   PO2 ALFREDO mm Hg 125.4* 57.1*   HCO3 ALFREDO mmol/L 25.8 25.6   BASE EXC ALFREDO mmol/L 0.8 0.7   O2 CONTENT ALFREDO ml/dL 19.0 17.2   O2 HGB, VENOUS % 96.9* 86.7*        Results from last 7 days   Lab Units 06/28/24  0323 06/28/24  0033 06/27/24 2235   HS TNI 0HR ng/L  --   --  40   HS TNI 2HR ng/L  --  50*  --    HSTNI D2 ng/L  --  10  --    HS TNI 4HR ng/L 91*  --   --    HSTNI D4 ng/L 51*  --   --         Results from last 7 days   Lab Units 06/28/24  0552 06/27/24  2250   PROTIME seconds 32.8* 29.8*   INR  3.18* 2.80*   PTT seconds  --  48*     Results from last 7 days   Lab Units 06/27/24  2320   TSH 3RD GENERATON uIU/mL 2.718     Results from last 7 days   Lab Units 06/28/24  0552 06/27/24  2320   PROCALCITONIN ng/ml 1.21* 0.31*     Results from last 7 days   Lab Units 06/27/24  2329   LACTIC ACID mmol/L 1.4         Results from last 7 days   Lab Units 06/27/24  2235   BNP pg/mL 444*        Results from last 7 days   Lab Units 06/28/24  0146   CLARITY UA  Slightly Cloudy   COLOR UA  Rhonda   SPEC GRAV UA  1.025   PH UA  6.0   GLUCOSE UA mg/dl Negative   KETONES UA mg/dl Trace*   BLOOD UA  Large*   PROTEIN UA mg/dl >=300*   NITRITE UA  Negative   BILIRUBIN UA  Moderate*   UROBILINOGEN UA E.U./dl 1.0   LEUKOCYTES UA  Moderate*   WBC UA /hpf Innumerable*   RBC UA /hpf 4-10*   BACTERIA UA /hpf Innumerable*   EPITHELIAL CELLS WET PREP /hpf Occasional     Results from last 7 days   Lab Units 06/27/24  2235   INFLUENZA A PCR  Negative   INFLUENZA B PCR  Negative   RSV PCR  Negative       Results from last 7 days   Lab Units 06/27/24  2307   BLOOD CULTURE  Received in Microbiology Lab. Culture in Progress.  Received in Microbiology Lab. Culture in Progress.     ED Treatment-Medication Administration from 06/27/2024 2205 to 06/28/2024 0256         Date/Time Order Dose Route Action     06/27/2024 2239 ipratropium-albuterol (DUO-NEB) 0.5-2.5 mg/3 mL inhalation solution 3 mL 3 mL Nebulization Given     06/27/2024 2239 dexamethasone (PF) (DECADRON) injection 6 mg 6 mg Intravenous Given     06/27/2024 2320 cefTRIAXone (ROCEPHIN) IVPB (premix in dextrose) 1,000 mg 50 mL 1,000 mg Intravenous New Bag     06/27/2024 2337 metoprolol (LOPRESSOR) injection 5 mg 5 mg Intravenous Given     06/27/2024 2339 midodrine (PROAMATINE) tablet 5 mg 5 mg Oral Given     06/27/2024 2356 ondansetron (ZOFRAN) 4 mg/2 mL injection **ADS Override Pull** --  Given     06/28/2024 0009 sodium chloride 0.9 % bolus 250 mL 250 mL Intravenous New Bag     06/28/2024 0045 diltiazem (CARDIZEM) 125 mg in sodium chloride 0.9 % 125 mL infusion 5 mg/hr Intravenous New Bag     06/28/2024 0100 diltiazem (CARDIZEM) 125 mg in sodium chloride 0.9 % 125 mL infusion 7.5 mg/hr Intravenous Rate/Dose Change     06/28/2024 0220 amiodarone 150 mg in dextrose 5 % 100 mL IV bolus 150 mg  Intravenous New Bag     06/28/2024 0234 amiodarone (CORDARONE) 900 mg in dextrose 5 % 500 mL infusion 1 mg/min Intravenous New Bag     06/28/2024 0147 furosemide (LASIX) injection 40 mg 40 mg Intravenous Given     06/28/2024 0222 azithromycin (ZITHROMAX) 500 mg in sodium chloride 0.9 % 250 mL IVPB 500 mg Intravenous New Bag            Past Medical History:   Diagnosis Date    Asthma     Atrial fibrillation (HCC)     COPD (chronic obstructive pulmonary disease) ([facility])     Diabetes mellitus (HCC)     Disease of thyroid gland     Heart failure ([facility])     Kidney failure     Morbid obesity due to excess calories ([facility])     NISHI (obstructive sleep apnea)     UTI (urinary tract infection)      Present on Admission:   Atrial fibrillation with RVR ([facility])   COPD (chronic obstructive pulmonary disease) ([facility])   Acute on chronic diastolic congestive heart failure ([facility])   Morbid obesity ([facility])   NISHI (obstructive sleep apnea)      Admitting Diagnosis: CHF (congestive heart failure) ([facility]) [I50.9]  Paroxysmal atrial fibrillation ([facility]) [I48.0]  SOB (shortness of breath) [R06.02]  Hypotension [I95.9]  Hypoxia [R09.02]  COPD exacerbation ([facility]) [J44.1]  Acute on chronic diastolic congestive heart failure ([facility]) [I50.33]  Atrial fibrillation with rapid ventricular response ([facility]) [I48.91]  Acute hypoxic respiratory failure ([facility]) [J96.01]    Age/Sex: 69 y.o. female    Admission Orders: SCDs; I/O; Daily wts; neuro checks; Cardiopulmonary monitoring; NPO    Scheduled Medications:  azithromycin, 500 mg, Intravenous, Q24H  bumetanide, 1 mg, Intravenous, Once  (recd 6/28)  insulin lispro, 2-12 Units, Subcutaneous, Q6H HALEY  ipratropium-albuterol, 3 mL, Nebulization, Q6H  methylPREDNISolone sodium succinate, 40 mg, Intravenous, Q8H  midodrine, 5 mg, Oral, TID AC      sodium chloride 0.9 % bolus 250 mL  Dose: 250 mL  Freq: Once Route: IV  Last Dose: Stopped (06/28/24 0048)  Start: 06/28/24 0000 End: 06/28/24 0048      Continuous IV Infusions:  amiodarone  (CORDARONE) 900 mg in dextrose 5 % 500 mL infusion, 0.5 mg/min, Intravenous, Continuous      PRN Meds: None      IP CONSULT TO CASE MANAGEMENT    Network Utilization Review Department  ATTENTION: Please call with any questions or concerns to [phone] and carefully listen to the prompts so that you are directed to the right person. All voicemails are confidential.   For Discharge needs, contact Care Management DC Support Team at [phone] opt. [phone]  Send all requests for admission clinical reviews, approved or denied determinations and any other requests to dedicated fax number below belonging to the campus where the patient is receiving treatment. List of dedicated fax numbers for the Facilities:  FACILITY NAME UR FAX NUMBER   ADMISSION DENIALS (Administrative/Medical Necessity) [phone]   DISCHARGE SUPPORT TEAM (NETWORK) [phone]   PARENT CHILD HEALTH (Maternity/NICU/Pediatrics) [phone]   [facility] [phone]   [facility] [phone]   [facility] [phone]   [facility] [phone]   Pending sale to [facility] [phone]   [facility] [phone]   [facility] [phone]   [facility] [phone]   [facility] [phone]   [facility] [phone]   [facility] [phone]   [facility] [phone]

## 2024-06-28 NOTE — PROCEDURES
Venous Access Line Insertion    Date/Time: 6/28/2024 4:10 PM    Performed by: CAMPBELL Christiansen  Authorized by: CAMPBELL Christiansen    Patient location:  Bedside  Consent:     Consent obtained:  Verbal    Consent given by:  Patient  Universal protocol:     Procedure explained and questions answered to patient or proxy's satisfaction: yes      Immediately prior to procedure, a time out was called: yes      Patient identity confirmed:  Verbally with patient  Pre-procedure details:     Hand hygiene: Hand hygiene performed prior to insertion      Sterile barrier technique: All elements of maximal sterile technique followed      Skin preparation:  ChloraPrep    Skin preparation agent: Skin preparation agent completely dried prior to procedure    Procedure details:     Complex Venous Access Line Type: Midline      Complex Venous Access Line Indications: no peripheral vascular access      Orientation:  Left    Location:  Cephalic    Catheter size:  20 gauge    Total catheter length (cm):  10    Ultrasound image availability:  Not obtained due to urgency    Number of attempts:  1    Successful placement: yes      Landmarks identified: yes    Anesthesia (see MAR for exact dosages):     Anesthesia method:  None  Post-procedure details:     Post-procedure:  Dressing applied    Assessment:  Free fluid flow and no pneumothorax on x-ray    Post-procedure complications: none      Patient tolerance of procedure:  Tolerated well, no immediate complications

## 2024-06-29 LAB
ANION GAP SERPL CALCULATED.3IONS-SCNC: 6 MMOL/L (ref 4–13)
BACTERIA UR CULT: NORMAL
BUN SERPL-MCNC: 41 MG/DL (ref 5–25)
CALCIUM SERPL-MCNC: 9.4 MG/DL (ref 8.4–10.2)
CHLORIDE SERPL-SCNC: 101 MMOL/L (ref 96–108)
CO2 SERPL-SCNC: 28 MMOL/L (ref 21–32)
CREAT SERPL-MCNC: 1.24 MG/DL (ref 0.6–1.3)
ERYTHROCYTE [DISTWIDTH] IN BLOOD BY AUTOMATED COUNT: 17.8 % (ref 11.6–15.1)
GFR SERPL CREATININE-BSD FRML MDRD: 43 ML/MIN/1.73SQ M
GLUCOSE SERPL-MCNC: 159 MG/DL (ref 65–140)
GLUCOSE SERPL-MCNC: 162 MG/DL (ref 65–140)
GLUCOSE SERPL-MCNC: 169 MG/DL (ref 65–140)
GLUCOSE SERPL-MCNC: 173 MG/DL (ref 65–140)
GLUCOSE SERPL-MCNC: 202 MG/DL (ref 65–140)
HCT VFR BLD AUTO: 36.5 % (ref 34.8–46.1)
HGB BLD-MCNC: 11.7 G/DL (ref 11.5–15.4)
INR PPP: 4.66 (ref 0.84–1.19)
MAGNESIUM SERPL-MCNC: 2 MG/DL (ref 1.9–2.7)
MCH RBC QN AUTO: 30.5 PG (ref 26.8–34.3)
MCHC RBC AUTO-ENTMCNC: 32.1 G/DL (ref 31.4–37.4)
MCV RBC AUTO: 95 FL (ref 82–98)
MECA+MECC ISLT/SPM QL: DETECTED
PLATELET # BLD AUTO: 197 THOUSANDS/UL (ref 149–390)
PMV BLD AUTO: 10.6 FL (ref 8.9–12.7)
POTASSIUM SERPL-SCNC: 4.6 MMOL/L (ref 3.5–5.3)
PROCALCITONIN SERPL-MCNC: 3.13 NG/ML
PROTHROMBIN TIME: 44.1 SECONDS (ref 11.6–14.5)
RBC # BLD AUTO: 3.83 MILLION/UL (ref 3.81–5.12)
S EPIDERMIDIS DNA BLD POS QL NAA+NON-PRB: DETECTED
SODIUM SERPL-SCNC: 135 MMOL/L (ref 135–147)
WBC # BLD AUTO: 19.9 THOUSAND/UL (ref 4.31–10.16)

## 2024-06-29 PROCEDURE — 94760 N-INVAS EAR/PLS OXIMETRY 1: CPT

## 2024-06-29 PROCEDURE — 83735 ASSAY OF MAGNESIUM: CPT | Performed by: NURSE PRACTITIONER

## 2024-06-29 PROCEDURE — 94660 CPAP INITIATION&MGMT: CPT

## 2024-06-29 PROCEDURE — 99233 SBSQ HOSP IP/OBS HIGH 50: CPT | Performed by: INTERNAL MEDICINE

## 2024-06-29 PROCEDURE — 80048 BASIC METABOLIC PNL TOTAL CA: CPT | Performed by: NURSE PRACTITIONER

## 2024-06-29 PROCEDURE — 82948 REAGENT STRIP/BLOOD GLUCOSE: CPT

## 2024-06-29 PROCEDURE — 85610 PROTHROMBIN TIME: CPT | Performed by: PHYSICIAN ASSISTANT

## 2024-06-29 PROCEDURE — 94640 AIRWAY INHALATION TREATMENT: CPT

## 2024-06-29 PROCEDURE — 87040 BLOOD CULTURE FOR BACTERIA: CPT | Performed by: PHYSICIAN ASSISTANT

## 2024-06-29 PROCEDURE — NC001 PR NO CHARGE

## 2024-06-29 PROCEDURE — 85027 COMPLETE CBC AUTOMATED: CPT | Performed by: NURSE PRACTITIONER

## 2024-06-29 PROCEDURE — 84145 PROCALCITONIN (PCT): CPT | Performed by: PHYSICIAN ASSISTANT

## 2024-06-29 PROCEDURE — 94003 VENT MGMT INPAT SUBQ DAY: CPT

## 2024-06-29 RX ORDER — TRAMADOL HYDROCHLORIDE 50 MG/1
50 TABLET ORAL EVERY 12 HOURS PRN
Status: DISCONTINUED | OUTPATIENT
Start: 2024-06-29 | End: 2024-07-07 | Stop reason: HOSPADM

## 2024-06-29 RX ORDER — AMIODARONE HYDROCHLORIDE 200 MG/1
200 TABLET ORAL
Status: DISCONTINUED | OUTPATIENT
Start: 2024-06-30 | End: 2024-06-29

## 2024-06-29 RX ORDER — BUMETANIDE 0.25 MG/ML
2 INJECTION INTRAMUSCULAR; INTRAVENOUS 2 TIMES DAILY
Status: DISCONTINUED | OUTPATIENT
Start: 2024-06-29 | End: 2024-06-30

## 2024-06-29 RX ORDER — IPRATROPIUM BROMIDE AND ALBUTEROL SULFATE 2.5; .5 MG/3ML; MG/3ML
3 SOLUTION RESPIRATORY (INHALATION)
Status: DISCONTINUED | OUTPATIENT
Start: 2024-06-29 | End: 2024-07-07 | Stop reason: HOSPADM

## 2024-06-29 RX ORDER — BUMETANIDE 0.25 MG/ML
1 INJECTION INTRAMUSCULAR; INTRAVENOUS 2 TIMES DAILY
Status: DISCONTINUED | OUTPATIENT
Start: 2024-06-29 | End: 2024-06-29

## 2024-06-29 RX ORDER — DIPHENHYDRAMINE HCL 25 MG
25 TABLET ORAL EVERY 6 HOURS PRN
Status: DISCONTINUED | OUTPATIENT
Start: 2024-06-29 | End: 2024-07-07 | Stop reason: HOSPADM

## 2024-06-29 RX ORDER — AMIODARONE HYDROCHLORIDE 200 MG/1
200 TABLET ORAL
Status: DISCONTINUED | OUTPATIENT
Start: 2024-06-29 | End: 2024-07-01

## 2024-06-29 RX ORDER — FLUTICASONE FUROATE AND VILANTEROL 100; 25 UG/1; UG/1
1 POWDER RESPIRATORY (INHALATION) DAILY
Status: DISCONTINUED | OUTPATIENT
Start: 2024-06-29 | End: 2024-07-07 | Stop reason: HOSPADM

## 2024-06-29 RX ADMIN — AZITHROMYCIN MONOHYDRATE 500 MG: 500 INJECTION, POWDER, LYOPHILIZED, FOR SOLUTION INTRAVENOUS at 02:29

## 2024-06-29 RX ADMIN — GUAIFENESIN 600 MG: 600 TABLET ORAL at 08:09

## 2024-06-29 RX ADMIN — Medication 2000 UNITS: at 08:09

## 2024-06-29 RX ADMIN — MIDODRINE HYDROCHLORIDE 5 MG: 5 TABLET ORAL at 05:43

## 2024-06-29 RX ADMIN — LORATADINE 5 MG: 10 TABLET ORAL at 08:09

## 2024-06-29 RX ADMIN — INSULIN LISPRO 4 UNITS: 100 INJECTION, SOLUTION INTRAVENOUS; SUBCUTANEOUS at 11:54

## 2024-06-29 RX ADMIN — IPRATROPIUM BROMIDE AND ALBUTEROL SULFATE 3 ML: 2.5; .5 SOLUTION RESPIRATORY (INHALATION) at 21:14

## 2024-06-29 RX ADMIN — IPRATROPIUM BROMIDE AND ALBUTEROL SULFATE 3 ML: 2.5; .5 SOLUTION RESPIRATORY (INHALATION) at 13:48

## 2024-06-29 RX ADMIN — DIPHENHYDRAMINE HYDROCHLORIDE 25 MG: 25 TABLET ORAL at 15:19

## 2024-06-29 RX ADMIN — ACETAMINOPHEN 325MG 650 MG: 325 TABLET ORAL at 11:54

## 2024-06-29 RX ADMIN — AMIODARONE HYDROCHLORIDE 200 MG: 200 TABLET ORAL at 12:29

## 2024-06-29 RX ADMIN — DOCUSATE SODIUM 100 MG: 100 CAPSULE, LIQUID FILLED ORAL at 08:09

## 2024-06-29 RX ADMIN — ALLOPURINOL 100 MG: 100 TABLET ORAL at 08:09

## 2024-06-29 RX ADMIN — MONTELUKAST 10 MG: 10 TABLET, FILM COATED ORAL at 21:00

## 2024-06-29 RX ADMIN — INSULIN LISPRO 2 UNITS: 100 INJECTION, SOLUTION INTRAVENOUS; SUBCUTANEOUS at 21:28

## 2024-06-29 RX ADMIN — LEVOTHYROXINE SODIUM 288 MCG: 88 TABLET ORAL at 08:09

## 2024-06-29 RX ADMIN — BUMETANIDE 1 MG: 0.25 INJECTION INTRAMUSCULAR; INTRAVENOUS at 12:29

## 2024-06-29 RX ADMIN — PANTOPRAZOLE SODIUM 40 MG: 40 TABLET, DELAYED RELEASE ORAL at 05:43

## 2024-06-29 RX ADMIN — BUMETANIDE 2 MG: 0.25 INJECTION INTRAMUSCULAR; INTRAVENOUS at 20:53

## 2024-06-29 RX ADMIN — INSULIN LISPRO 2 UNITS: 100 INJECTION, SOLUTION INTRAVENOUS; SUBCUTANEOUS at 08:11

## 2024-06-29 RX ADMIN — GUAIFENESIN 600 MG: 600 TABLET ORAL at 20:53

## 2024-06-29 RX ADMIN — CEFTRIAXONE 2000 MG: 2 INJECTION, SOLUTION INTRAVENOUS at 14:48

## 2024-06-29 RX ADMIN — MIDODRINE HYDROCHLORIDE 5 MG: 5 TABLET ORAL at 11:54

## 2024-06-29 RX ADMIN — AMIODARONE HYDROCHLORIDE 0.5 MG/MIN: 50 INJECTION, SOLUTION INTRAVENOUS at 05:45

## 2024-06-29 RX ADMIN — FERROUS SULFATE TAB 325 MG (65 MG ELEMENTAL FE) 325 MG: 325 (65 FE) TAB at 08:09

## 2024-06-29 RX ADMIN — FLUTICASONE FUROATE AND VILANTEROL TRIFENATATE 1 PUFF: 100; 25 POWDER RESPIRATORY (INHALATION) at 14:48

## 2024-06-29 RX ADMIN — IPRATROPIUM BROMIDE AND ALBUTEROL SULFATE 3 ML: 2.5; .5 SOLUTION RESPIRATORY (INHALATION) at 09:13

## 2024-06-29 RX ADMIN — ATORVASTATIN CALCIUM 10 MG: 10 TABLET, FILM COATED ORAL at 08:09

## 2024-06-29 RX ADMIN — TRAMADOL HYDROCHLORIDE 50 MG: 50 TABLET, COATED ORAL at 02:49

## 2024-06-29 RX ADMIN — MIDODRINE HYDROCHLORIDE 5 MG: 5 TABLET ORAL at 17:00

## 2024-06-29 RX ADMIN — Medication 1 TABLET: at 08:09

## 2024-06-29 RX ADMIN — INSULIN LISPRO 2 UNITS: 100 INJECTION, SOLUTION INTRAVENOUS; SUBCUTANEOUS at 17:07

## 2024-06-29 NOTE — ASSESSMENT & PLAN NOTE
Wt Readings from Last 3 Encounters:   06/30/24 (!) 175 kg (385 lb 5.8 oz)   06/10/24 (!) 157 kg (346 lb)   05/29/24 (!) 157 kg (346 lb 12.5 oz)     Patient noted have a 19 kg weight gain since 6/10  Managed at home with Bumex 2mg BID - states compliance with medications   12/2023 ECHO: EF 55% with severe TR, RVSP 53  Repeat ECHO ordered     Poor response to lasix 40 on admission, switched to bumex   Continue IV bumex 2mg with improved urine output    Monitor I&Os  [id number]  Cardiology consulted, awaiting recs

## 2024-06-29 NOTE — ASSESSMENT & PLAN NOTE
Lab Results   Component Value Date    HGBA1C 7.0 (H) 05/02/2024       Recent Labs     06/28/24  1615 06/28/24  2118 06/29/24  0751 06/29/24  1131   POCGLU 138 198* 159* 202*       Blood Sugar Average: Last 72 hrs:  (P) 169.0071275792957275    Hold home diabetic medications.  SSI alg 4 g  ACHS fingersticks

## 2024-06-29 NOTE — PLAN OF CARE
Problem: CARDIOVASCULAR - ADULT  Goal: Maintains optimal cardiac output and hemodynamic stability  Description: INTERVENTIONS:  - Monitor I/O, vital signs and rhythm  - Monitor for S/S and trends of decreased cardiac output  - Administer and titrate ordered vasoactive medications to optimize hemodynamic stability  - Assess quality of pulses, skin color and temperature  - Assess for signs of decreased coronary artery perfusion  - Instruct patient to report change in severity of symptoms  Outcome: Progressing  Goal: Absence of cardiac dysrhythmias or at baseline rhythm  Description: INTERVENTIONS:  - Continuous cardiac monitoring, vital signs, obtain 12 lead EKG if ordered  - Administer antiarrhythmic and heart rate control medications as ordered  - Monitor electrolytes and administer replacement therapy as ordered  Outcome: Progressing     Problem: RESPIRATORY - ADULT  Goal: Achieves optimal ventilation and oxygenation  Description: INTERVENTIONS:  - Assess for changes in respiratory status  - Assess for changes in mentation and behavior  - Position to facilitate oxygenation and minimize respiratory effort  - Oxygen administered by appropriate delivery if ordered  - Initiate smoking cessation education as indicated  - Encourage broncho-pulmonary hygiene including cough, deep breathe, Incentive Spirometry  - Assess the need for suctioning and aspirate as needed  - Assess and instruct to report SOB or any respiratory difficulty  - Respiratory Therapy support as indicated  Outcome: Progressing     Problem: Prexisting or High Potential for Compromised Skin Integrity  Goal: Skin integrity is maintained or improved  Description: INTERVENTIONS:  - Identify patients at risk for skin breakdown  - Assess and monitor skin integrity  - Assess and monitor nutrition and hydration status  - Monitor labs   - Assess for incontinence   - Turn and reposition patient  - Assist with mobility/ambulation  - Relieve pressure over bony  prominences  - Avoid friction and shearing  - Provide appropriate hygiene as needed including keeping skin clean and dry  - Evaluate need for skin moisturizer/barrier cream  - Collaborate with interdisciplinary team   - Patient/family teaching  - Consider wound care consult   Outcome: Progressing

## 2024-06-29 NOTE — ASSESSMENT & PLAN NOTE
Lopressor 5 mg IV x1 and Cardizem gtt administered in the ED.   Given patient has a therapeutic INR on Warfarin, afib with RVR presented in the ED and patient is known to have low BP on midodrine, the decision was made to change the cardizem gtt to an amiodarone gtt.    Monitor on telemetry.   Anticoagulation being held for supratherapuetic INR

## 2024-06-29 NOTE — ASSESSMENT & PLAN NOTE
Less likely in exacerbation  At baseline O2 requirement of 4L NC  Home regimen of breo ellipta, continue

## 2024-06-29 NOTE — ASSESSMENT & PLAN NOTE
Patient presented to ED on her baseline 4L NC with tachypnea and hypoxia. She was also lethargic.  She was placed on CPAP.  She is noted to have NISHI and wears CPAP as baseline.   VBG on admission showed pH 7.4 and pCO2 of 42.  Likely multifactorial given CHF exacerbation, pneumonia, less likely COPD exacerbation at this point  Azithromycin/CTX and IV steroids given on admission   Completed 3 days azithromycin  Continue Ceftriaxone D4 - to complete 7 day course for PNA  Lasix 40 mg IV administered. Poor response. Trial Bumex IV and metolazone on admission   Hold home Bumex and diurese with IV bumex   Monitor I/O.  Respiratory protocol.   CPAP HS

## 2024-06-29 NOTE — ASSESSMENT & PLAN NOTE
Less likely in exacerbation  At baseline O2 requirement of 4L NC  Home regimen of breo ellipta, will restart

## 2024-06-29 NOTE — ASSESSMENT & PLAN NOTE
Wt Readings from Last 3 Encounters:   06/29/24 (!) 173 kg (380 lb 8.2 oz)   06/10/24 (!) 157 kg (346 lb)   05/29/24 (!) 157 kg (346 lb 12.5 oz)     Patient noted have a 19 kg weight gain since 6/10  Managed at home with Bumex 2mg BID  12/2023 ECHO: EF 55% with severe TR, RVSP 53    Poor response to lasix 40 on admission, given bumex x2 6/28  Will continue bumex 1mg IV BID  Goal -1L in 24hr   Monitor I&Os

## 2024-06-29 NOTE — PROGRESS NOTES
Critical Care Interval Transfer Note:    Brief Hospital Summary:         Barriers to discharge:   Patient 16kg heavier since last weigh in from orthopedic office since 6/10, will need ongoing diuresis for CHF exacerbation   Ceftriaxone for PNA  PT/OT eval      Consults: IP CONSULT TO CASE MANAGEMENT  IP CONSULT TO CARDIOLOGY    Recommended to review admission imaging for incidental findings and document in discharge navigator: Chart reviewed, no known incidental findings noted at this time.      Discharge Plan: Anticipate discharge in 48 hrs to discharge location to be determined pending rehab evaluations.  Dow Plan: Continue for accurate I/O monitoring for 48 hours            Patient seen and evaluated by Critical Care today and deemed to be appropriate for transfer to Med Surg. Spoke to Dr. Dukes from [facility] to accept transfer. Critical care can be contacted via [employer] with any questions or concerns.

## 2024-06-29 NOTE — ASSESSMENT & PLAN NOTE
Lab Results   Component Value Date    HGBA1C 7.0 (H) 05/02/2024       Recent Labs     06/29/24  1659 06/29/24  2124 06/30/24  0726 06/30/24  1115   POCGLU 162* 173* 137 195*       Blood Sugar Average: Last 72 hrs:  (P) 168.6    Hold home diabetic medications.  SSI alg 4  ACHS fingersticks

## 2024-06-29 NOTE — UTILIZATION REVIEW
SEE INITIAL REVIEW AT BOTTOM     6/29  Currently  on  baseline   O2  requirement, 4L  NC.   Started on home tramadol for  L foot pain.     B/L  LE  discolored.   Monitor labs.   Continue current meds.  Continue  IV  bumex  BID  and   KING>

## 2024-06-29 NOTE — PROGRESS NOTES
[facility]  Progress Note  Name: Tigist Hewitt I  MRN: [id number]  Unit/Bed#: -01 I Date of Admission: 6/27/2024   Date of Service: 6/29/2024 I Hospital Day: 1    Assessment & Plan   * Acute on chronic respiratory failure with hypoxia (HCC)  Assessment & Plan  Patient presented to ED on her baseline 4L NC with tachypnea and hypoxia. She was also lethargic.  She was placed on CPAP.  She is noted to have NISHI and wears CPAP as baseline.   VBG on admission showed pH 7.4 and pCO2 of 42.  Likely multifactorial given CHF exacerbation, pneumonia, less likely COPD exacerbation at this point  Azithromycin/CTX and IV steroids given on admission   Completed 3 days azithromycin  Continue Ceftriaxone D3  Lasix 40 mg IV administered. Poor response. Trial Bumex IV and metolazone on admission   Hold home Bumex and diurese PRN with IV meds.  Monitor I/O.  Respiratory protocol.   CPAP HS    Acute on chronic diastolic congestive heart failure (HCC)  Assessment & Plan  Wt Readings from Last 3 Encounters:   06/29/24 (!) 173 kg (380 lb 8.2 oz)   06/10/24 (!) 157 kg (346 lb)   05/29/24 (!) 157 kg (346 lb 12.5 oz)     Patient noted have a 19 kg weight gain since 6/10  Managed at home with Bumex 2mg BID  12/2023 ECHO: EF 55% with severe TR, RVSP 53    Poor response to lasix 40 on admission, given bumex x2 6/28  Will continue bumex 1mg IV BID  Goal -1L in 24hr   Monitor I&Os      Atrial fibrillation with RVR (HCC)  Assessment & Plan  Lopressor 5 mg IV x1 and Cardizem gtt administered in the ED.   Given patient has a therapeutic INR on Warfarin, afib with RVR presented in the ED and patient is known to have low BP on midodrine, the decision was made to change the cardizem gtt to an amiodarone gtt.    Consider heparin gtt and use of IV beta blocker if patient cannot resume PO meds on 6/28.   Monitor on telemetry.     NISHI (obstructive sleep apnea)  Assessment & Plan  Continue CPAP HS    Morbid  obesity ([facility])  Assessment & Plan  Consider dietary consult.   Encourage exercise and lifestyle changes.    Type 2 diabetes mellitus with hyperglycemia, with long-term current use of insulin ([facility])  Assessment & Plan  Lab Results   Component Value Date    HGBA1C 7.0 (H) 05/02/2024       Recent Labs     06/28/24  1615 06/28/24  2118 06/29/24  0751 06/29/24  1131   POCGLU 138 198* 159* 202*       Blood Sugar Average: Last 72 hrs:  (P) [id number]    Hold home diabetic medications.  SSI alg 4 g  ACHS fingersticks    COPD (chronic obstructive pulmonary disease) ([facility])  Assessment & Plan  Less likely in exacerbation  At baseline O2 requirement of 4L NC  Home regimen of breo ellipta, will restart              Disposition: Stepdown Level 1    ICU Core Measures     A: Assess, Prevent, and Manage Pain Has pain been assessed? Yes  Need for changes to pain regimen? Yes   B: Both SAT/SAT  N/A   C: Choice of Sedation RASS Goal: 0 Alert and Calm  Need for changes to sedation or analgesia regimen? NA   D: Delirium CAM-ICU: Negative   E: Early Mobility  Plan for early mobility? Yes   F: Family Engagement Plan for family engagement today? Yes       Antibiotic Review: Awaiting culture results.     Review of Invasive Devices:    Dow Plan: Continue for accurate I/O monitoring for 48 hours        Prophylaxis:  VTE VTE covered by:    None       Stress Ulcer  covered byomeprazole (PriLOSEC) 40 MG capsule [[id number]] (Long-Term Med), pantoprazole (PROTONIX) EC tablet 40 mg [[id number]]         Significant 24hr Events     24hr events: No acute events overnight, patient back on home settings of cpap and O2 requirements. Patient restarted on home Tramadol for pain control in L foot.      Subjective   Review of Systems: Review of Systems   Constitutional: Negative.    HENT: Negative.     Eyes: Negative.    Respiratory:  Negative for shortness of breath and wheezing.    Cardiovascular:  Positive for leg swelling.   Gastrointestinal:   Negative for abdominal pain.   Endocrine: Negative.    Genitourinary: Negative.    Musculoskeletal:  Positive for gait problem.        Stiffness from laying in bed. L foot pain from fracture from a fall couple weeks ago   Skin: Negative.    Allergic/Immunologic: Negative.    Hematological: Negative.    Psychiatric/Behavioral: Negative.          Objective                            Vitals I/O      Most Recent Min/Max in 24hrs   Temp 97.5 °F (36.4 °C) Temp  Min: 97.5 °F (36.4 °C)  Max: 98.7 °F (37.1 °C)   Pulse 81 Pulse  Min: 68  Max: 99   Resp (!) 23 Resp  Min: 14  Max: 25   /66 BP  Min: 84/58  Max: 127/66   O2 Sat 98 % SpO2  Min: 93 %  Max: 98 %      Intake/Output Summary (Last 24 hours) at 2024 1218  Last data filed at 2024 1103  Gross per 24 hour   Intake 570.63 ml   Output 1038 ml   Net -467.37 ml       Diet Marcos/CHO Controlled; Consistent Carbohydrate Diet Level 2 (5 carb servings/75 grams CHO/meal); Fluid Restriction 1800 ML    Invasive Monitoring           Physical Exam   Physical Exam  Eyes:      Extraocular Movements: Extraocular movements intact.      Pupils: Pupils are equal, round, and reactive to light.   Skin:     General: Skin is warm and dry.      Capillary Refill: Capillary refill takes less than 2 seconds.   HENT:      Head: Normocephalic.      Mouth/Throat:      Mouth: Mucous membranes are moist.   Cardiovascular:      Rate and Rhythm: Normal rate and regular rhythm.      Heart sounds: Normal heart sounds.      Comments: Discoloration of BLE  Musculoskeletal:      Right lower le+ Edema present.      Left lower le+ Edema present.   Abdominal: General: Bowel sounds are normal.      Palpations: Abdomen is soft.      Tenderness: There is no abdominal tenderness.   Constitutional:       Appearance: She is well-developed and well-nourished.   Neurological:      General: No focal deficit present.      Mental Status: She is alert and oriented to person, place and time. She is calm.       Sensory: Sensation is intact.      Motor: gross motor function is at baseline for patient.   Genitourinary/Anorectal:  Dow present.          Diagnostic Studies      EKG: afib 80s  Imaging: CT headI have personally reviewed pertinent reports.       Medications:  Scheduled PRN   allopurinol, 100 mg, Daily  amiodarone, 200 mg, Daily With Breakfast  atorvastatin, 10 mg, Daily  azithromycin, 500 mg, Q24H  bumetanide, 1 mg, BID  cefTRIAXone, 2,000 mg, Q24H  Cholecalciferol, 2,000 Units, Daily  docusate sodium, 100 mg, Daily  ferrous sulfate, 325 mg, Daily With Breakfast  Fluticasone Furoate-Vilanterol, 1 puff, Daily  guaiFENesin, 600 mg, Q12H HALEY  insulin lispro, 2-12 Units, 4x Daily (AC & HS)  ipratropium-albuterol, 3 mL, Q6H  levothyroxine, 288 mcg, Daily  loratadine, 5 mg, Daily  midodrine, 5 mg, TID AC  montelukast, 10 mg, HS  multivitamin-minerals, 1 tablet, Daily  pantoprazole, 40 mg, Early Morning      acetaminophen, 650 mg, Q6H PRN  traMADol, 50 mg, Q12H PRN       Continuous          Labs:    CBC    Recent Labs     06/27/24  2235 06/28/24  0552 06/29/24  0541   WBC 23.90* 26.28* 19.90*   HGB 12.8 13.0 11.7   HCT 40.1 40.0 36.5    197 197   BANDSPCT 9*  --   --      BMP    Recent Labs     06/28/24  1153 06/29/24  0541   SODIUM 135 135   K 5.1 4.6    101   CO2 24 28   AGAP 9 6   BUN 35* 41*   CREATININE 1.16 1.24   CALCIUM 9.4 9.4       Coags    Recent Labs     06/27/24  2250 06/28/24  0552 06/29/24  0541   INR 2.80* 3.18* 4.66*   PTT 48*  --   --         Additional Electrolytes  Recent Labs     06/28/24  0552 06/29/24  0541   MG 2.1 2.0   PHOS 4.0  --           Blood Gas    No recent results  Recent Labs     06/28/24  0830   PHVEN 7.400   PKD2KUK 42.6   PO2VEN 125.4*   KBK3KDV 25.8   BEVEN 0.8   V7ESZEZ 96.9*    LFTs  Recent Labs     06/27/24  2235   ALT 16   AST 45*   ALKPHOS 170*   ALB 3.5   TBILI 1.55*       Infectious  Recent Labs     06/28/24  0552 06/29/24  0541   PROCALCITONI 1.21* 3.13*      Glucose  Recent Labs     06/27/24  2235 06/28/24  0743 06/28/24  1153 06/29/24  0541   GLUC 117 155* 159* 169*               CAMPBELL Ahuja

## 2024-06-30 ENCOUNTER — APPOINTMENT (INPATIENT)
Dept: RADIOLOGY | Facility: HOSPITAL | Age: 69
DRG: 871 | End: 2024-06-30
Payer: COMMERCIAL

## 2024-06-30 ENCOUNTER — TELEPHONE (OUTPATIENT)
Dept: OTHER | Facility: OTHER | Age: 69
End: 2024-06-30

## 2024-06-30 PROBLEM — R78.81 POSITIVE BLOOD CULTURE: Status: ACTIVE | Noted: 2024-06-30

## 2024-06-30 PROBLEM — E87.6 HYPOKALEMIA: Status: ACTIVE | Noted: 2024-06-30

## 2024-06-30 PROBLEM — A41.9 SEPSIS (HCC): Status: ACTIVE | Noted: 2024-06-30

## 2024-06-30 LAB
ANION GAP SERPL CALCULATED.3IONS-SCNC: 8 MMOL/L (ref 4–13)
BUN SERPL-MCNC: 45 MG/DL (ref 5–25)
CALCIUM SERPL-MCNC: 9.6 MG/DL (ref 8.4–10.2)
CHLORIDE SERPL-SCNC: 99 MMOL/L (ref 96–108)
CO2 SERPL-SCNC: 30 MMOL/L (ref 21–32)
CREAT SERPL-MCNC: 1.23 MG/DL (ref 0.6–1.3)
ERYTHROCYTE [DISTWIDTH] IN BLOOD BY AUTOMATED COUNT: 17.8 % (ref 11.6–15.1)
GFR SERPL CREATININE-BSD FRML MDRD: 44 ML/MIN/1.73SQ M
GLUCOSE SERPL-MCNC: 137 MG/DL (ref 65–140)
GLUCOSE SERPL-MCNC: 157 MG/DL (ref 65–140)
GLUCOSE SERPL-MCNC: 176 MG/DL (ref 65–140)
GLUCOSE SERPL-MCNC: 182 MG/DL (ref 65–140)
GLUCOSE SERPL-MCNC: 195 MG/DL (ref 65–140)
HCT VFR BLD AUTO: 35.7 % (ref 34.8–46.1)
HGB BLD-MCNC: 11.4 G/DL (ref 11.5–15.4)
INR PPP: 4.2 (ref 0.84–1.19)
MAGNESIUM SERPL-MCNC: 2 MG/DL (ref 1.9–2.7)
MCH RBC QN AUTO: 30.1 PG (ref 26.8–34.3)
MCHC RBC AUTO-ENTMCNC: 31.9 G/DL (ref 31.4–37.4)
MCV RBC AUTO: 94 FL (ref 82–98)
PHOSPHATE SERPL-MCNC: 3.6 MG/DL (ref 2.3–4.1)
PLATELET # BLD AUTO: 173 THOUSANDS/UL (ref 149–390)
PMV BLD AUTO: 10.9 FL (ref 8.9–12.7)
POTASSIUM SERPL-SCNC: 3.2 MMOL/L (ref 3.5–5.3)
PROCALCITONIN SERPL-MCNC: 1.88 NG/ML
PROTHROMBIN TIME: 40.7 SECONDS (ref 11.6–14.5)
RBC # BLD AUTO: 3.79 MILLION/UL (ref 3.81–5.12)
SODIUM SERPL-SCNC: 137 MMOL/L (ref 135–147)
WBC # BLD AUTO: 18.61 THOUSAND/UL (ref 4.31–10.16)

## 2024-06-30 PROCEDURE — 94760 N-INVAS EAR/PLS OXIMETRY 1: CPT

## 2024-06-30 PROCEDURE — 80048 BASIC METABOLIC PNL TOTAL CA: CPT

## 2024-06-30 PROCEDURE — 85610 PROTHROMBIN TIME: CPT | Performed by: PHYSICIAN ASSISTANT

## 2024-06-30 PROCEDURE — 82948 REAGENT STRIP/BLOOD GLUCOSE: CPT

## 2024-06-30 PROCEDURE — 84145 PROCALCITONIN (PCT): CPT

## 2024-06-30 PROCEDURE — 99232 SBSQ HOSP IP/OBS MODERATE 35: CPT

## 2024-06-30 PROCEDURE — 94003 VENT MGMT INPAT SUBQ DAY: CPT

## 2024-06-30 PROCEDURE — 94640 AIRWAY INHALATION TREATMENT: CPT

## 2024-06-30 PROCEDURE — 84100 ASSAY OF PHOSPHORUS: CPT

## 2024-06-30 PROCEDURE — 73610 X-RAY EXAM OF ANKLE: CPT

## 2024-06-30 PROCEDURE — 85027 COMPLETE CBC AUTOMATED: CPT

## 2024-06-30 PROCEDURE — 83735 ASSAY OF MAGNESIUM: CPT

## 2024-06-30 RX ORDER — POTASSIUM CHLORIDE 20 MEQ/1
40 TABLET, EXTENDED RELEASE ORAL ONCE
Status: COMPLETED | OUTPATIENT
Start: 2024-06-30 | End: 2024-06-30

## 2024-06-30 RX ORDER — BUMETANIDE 0.25 MG/ML
2 INJECTION INTRAMUSCULAR; INTRAVENOUS 2 TIMES DAILY
Status: DISCONTINUED | OUTPATIENT
Start: 2024-06-30 | End: 2024-07-07 | Stop reason: HOSPADM

## 2024-06-30 RX ADMIN — ATORVASTATIN CALCIUM 10 MG: 10 TABLET, FILM COATED ORAL at 08:48

## 2024-06-30 RX ADMIN — MIDODRINE HYDROCHLORIDE 5 MG: 5 TABLET ORAL at 12:04

## 2024-06-30 RX ADMIN — IPRATROPIUM BROMIDE AND ALBUTEROL SULFATE 3 ML: 2.5; .5 SOLUTION RESPIRATORY (INHALATION) at 13:52

## 2024-06-30 RX ADMIN — DOCUSATE SODIUM 100 MG: 100 CAPSULE, LIQUID FILLED ORAL at 08:48

## 2024-06-30 RX ADMIN — LORATADINE 5 MG: 10 TABLET ORAL at 08:48

## 2024-06-30 RX ADMIN — BUMETANIDE 2 MG: 0.25 INJECTION INTRAMUSCULAR; INTRAVENOUS at 20:33

## 2024-06-30 RX ADMIN — Medication 1 TABLET: at 08:48

## 2024-06-30 RX ADMIN — PANTOPRAZOLE SODIUM 40 MG: 40 TABLET, DELAYED RELEASE ORAL at 06:27

## 2024-06-30 RX ADMIN — INSULIN LISPRO 2 UNITS: 100 INJECTION, SOLUTION INTRAVENOUS; SUBCUTANEOUS at 22:15

## 2024-06-30 RX ADMIN — GUAIFENESIN 600 MG: 600 TABLET ORAL at 20:33

## 2024-06-30 RX ADMIN — INSULIN LISPRO 2 UNITS: 100 INJECTION, SOLUTION INTRAVENOUS; SUBCUTANEOUS at 17:17

## 2024-06-30 RX ADMIN — FLUTICASONE FUROATE AND VILANTEROL TRIFENATATE 1 PUFF: 100; 25 POWDER RESPIRATORY (INHALATION) at 08:48

## 2024-06-30 RX ADMIN — MONTELUKAST 10 MG: 10 TABLET, FILM COATED ORAL at 20:33

## 2024-06-30 RX ADMIN — TRAMADOL HYDROCHLORIDE 50 MG: 50 TABLET, COATED ORAL at 18:17

## 2024-06-30 RX ADMIN — BUMETANIDE 2 MG: 0.25 INJECTION INTRAMUSCULAR; INTRAVENOUS at 08:48

## 2024-06-30 RX ADMIN — ACETAMINOPHEN 325MG 650 MG: 325 TABLET ORAL at 12:05

## 2024-06-30 RX ADMIN — ALLOPURINOL 100 MG: 100 TABLET ORAL at 08:48

## 2024-06-30 RX ADMIN — IPRATROPIUM BROMIDE AND ALBUTEROL SULFATE 3 ML: 2.5; .5 SOLUTION RESPIRATORY (INHALATION) at 08:05

## 2024-06-30 RX ADMIN — INSULIN LISPRO 2 UNITS: 100 INJECTION, SOLUTION INTRAVENOUS; SUBCUTANEOUS at 12:05

## 2024-06-30 RX ADMIN — IPRATROPIUM BROMIDE AND ALBUTEROL SULFATE 3 ML: 2.5; .5 SOLUTION RESPIRATORY (INHALATION) at 20:50

## 2024-06-30 RX ADMIN — LEVOTHYROXINE SODIUM 288 MCG: 88 TABLET ORAL at 06:27

## 2024-06-30 RX ADMIN — AMIODARONE HYDROCHLORIDE 200 MG: 200 TABLET ORAL at 08:48

## 2024-06-30 RX ADMIN — CEFTRIAXONE 2000 MG: 2 INJECTION, SOLUTION INTRAVENOUS at 15:17

## 2024-06-30 RX ADMIN — MIDODRINE HYDROCHLORIDE 5 MG: 5 TABLET ORAL at 15:11

## 2024-06-30 RX ADMIN — Medication 2000 UNITS: at 08:48

## 2024-06-30 RX ADMIN — FERROUS SULFATE TAB 325 MG (65 MG ELEMENTAL FE) 325 MG: 325 (65 FE) TAB at 08:48

## 2024-06-30 RX ADMIN — GUAIFENESIN 600 MG: 600 TABLET ORAL at 08:48

## 2024-06-30 RX ADMIN — POTASSIUM CHLORIDE 40 MEQ: 1500 TABLET, EXTENDED RELEASE ORAL at 08:48

## 2024-06-30 RX ADMIN — DIPHENHYDRAMINE HYDROCHLORIDE 25 MG: 25 TABLET ORAL at 15:16

## 2024-06-30 RX ADMIN — AZITHROMYCIN MONOHYDRATE 500 MG: 500 INJECTION, POWDER, LYOPHILIZED, FOR SOLUTION INTRAVENOUS at 01:33

## 2024-06-30 RX ADMIN — MIDODRINE HYDROCHLORIDE 5 MG: 5 TABLET ORAL at 06:27

## 2024-06-30 NOTE — PLAN OF CARE
Problem: CARDIOVASCULAR - ADULT  Goal: Maintains optimal cardiac output and hemodynamic stability  Description: INTERVENTIONS:  - Monitor I/O, vital signs and rhythm  - Monitor for S/S and trends of decreased cardiac output  - Administer and titrate ordered vasoactive medications to optimize hemodynamic stability  - Assess quality of pulses, skin color and temperature  - Assess for signs of decreased coronary artery perfusion  - Instruct patient to report change in severity of symptoms  Outcome: Progressing     Problem: RESPIRATORY - ADULT  Goal: Achieves optimal ventilation and oxygenation  Description: INTERVENTIONS:  - Assess for changes in respiratory status  - Assess for changes in mentation and behavior  - Position to facilitate oxygenation and minimize respiratory effort  - Oxygen administered by appropriate delivery if ordered  - Initiate smoking cessation education as indicated  - Encourage broncho-pulmonary hygiene including cough, deep breathe, Incentive Spirometry  - Assess the need for suctioning and aspirate as needed  - Assess and instruct to report SOB or any respiratory difficulty  - Respiratory Therapy support as indicated  Outcome: Progressing     Problem: GENITOURINARY - ADULT  Goal: Urinary catheter remains patent  Description: INTERVENTIONS:  - Assess patency of urinary catheter  - If patient has a chronic frederick, consider changing catheter if non-functioning  - Follow guidelines for intermittent irrigation of non-functioning urinary catheter  Outcome: Progressing

## 2024-06-30 NOTE — ASSESSMENT & PLAN NOTE
Met on admission with leukocytosis, tachycardia, tachypnea  Urine culture without infection  CT chest with possible pneumonia in the appropriate setting  Was started on IV ceftriaxone  Leukocytosis improving, however still elevated  Procalcitonin trending upward?  Patient clinically appears to be improving - supplemental O2 and mentation back to baseline   Continue to monitor signs/symptoms of infection   Can consider additional imaging if clinically worsens

## 2024-06-30 NOTE — PROGRESS NOTES
[facility]  Progress Note  Name: Tigist Hewitt I  MRN: [id number]  Unit/Bed#: -01 I Date of Admission: 6/27/2024   Date of Service: 6/30/2024 I Hospital Day: 2    Assessment & Plan   * Acute on chronic respiratory failure with hypoxia (HCC)  Assessment & Plan  Patient presented to ED on her baseline 4L NC with tachypnea and hypoxia. She was also lethargic.  She was placed on CPAP.  She is noted to have NISHI and wears CPAP as baseline.   VBG on admission showed pH 7.4 and pCO2 of 42.  Likely multifactorial given CHF exacerbation, pneumonia, less likely COPD exacerbation at this point  Azithromycin/CTX and IV steroids given on admission   Completed 3 days azithromycin  Continue Ceftriaxone D4 - to complete 7 day course for PNA  Lasix 40 mg IV administered. Poor response. Trial Bumex IV and metolazone on admission   Hold home Bumex and diurese with IV bumex   Monitor I/O.  Respiratory protocol.   CPAP HS    Sepsis (HCC)  Assessment & Plan  Met on admission with leukocytosis, tachycardia, tachypnea  Urine culture without infection  CT chest with possible pneumonia in the appropriate setting  Was started on IV ceftriaxone  Leukocytosis improving, however still elevated  Procalcitonin trending upward?  Patient clinically appears to be improving - supplemental O2 and mentation back to baseline   Continue to monitor signs/symptoms of infection   Can consider additional imaging if clinically worsens    Positive blood culture  Assessment & Plan  1/2 blood cultures Staph epidermidis -likely contamination  Follow-up repeat blood cultures    Hypokalemia  Assessment & Plan  4.2 on am labs   Repleted   Continue to monitor given diuresis and replete as needed    NISHI (obstructive sleep apnea)  Assessment & Plan  Continue CPAP HS     Morbid obesity (HCC)  Assessment & Plan  Consider dietary consult.   Encourage exercise and lifestyle changes.    Acute on chronic diastolic congestive heart  failure (HCC)  Assessment & Plan  Wt Readings from Last 3 Encounters:   06/30/24 (!) 175 kg (385 lb 5.8 oz)   06/10/24 (!) 157 kg (346 lb)   05/29/24 (!) 157 kg (346 lb 12.5 oz)     Patient noted have a 19 kg weight gain since 6/10  Managed at home with Bumex 2mg BID - states compliance with medications   12/2023 ECHO: EF 55% with severe TR, RVSP 53  Repeat ECHO ordered     Poor response to lasix 40 on admission, switched to bumex   Continue IV bumex 2mg with improved urine output    Monitor I&Os  [id number]  Cardiology consulted, awaiting recs         Supratherapeutic INR  Assessment & Plan  Is maintained on warfarin 7.5mg qhs  INR this am 4.2   No active bleeding   Continue holding warfarin dosing for goal INR 2-3    Type 2 diabetes mellitus with hyperglycemia, with long-term current use of insulin ([facility])  Assessment & Plan  Lab Results   Component Value Date    HGBA1C 7.0 (H) 05/02/2024       Recent Labs     06/29/24  1659 06/29/24  2124 06/30/24  0726 06/30/24  1115   POCGLU 162* 173* 137 195*       Blood Sugar Average: Last 72 hrs:  (P) 168.6    Hold home diabetic medications.  SSI alg 4  ACHS fingersticks    COPD (chronic obstructive pulmonary disease) ([facility])  Assessment & Plan  Less likely in exacerbation  At baseline O2 requirement of 4L NC  Home regimen of breo ellipta, continue      Atrial fibrillation with RVR ([facility])  Assessment & Plan  Lopressor 5 mg IV x1 and Cardizem gtt administered in the ED.   Given patient has a therapeutic INR on Warfarin, afib with RVR presented in the ED and patient is known to have low BP on midodrine, the decision was made to change the cardizem gtt to an amiodarone gtt.    Monitor on telemetry.   Anticoagulation being held for supratherapuetic INR         VTE Pharmacologic Prophylaxis: VTE Score: 11 High Risk (Score >/= 5) - Pharmacological DVT Prophylaxis Contraindicated. Sequential Compression Devices Ordered. Anticoagulated on warfarin, currently being held for  supratherapeutic INR    Mobility:   Basic Mobility Inpatient Raw Score: 6  JH-HLM Goal: 2: Bed activities/Dependent transfer  JH-HLM Achieved: 2: Bed activities/Dependent transfer  JH-HLM Goal achieved. Continue to encourage appropriate mobility.    Patient Centered Rounds: I performed bedside rounds with nursing staff today.   Discussions with Specialists or Other Care Team Provider: None    Education and Discussions with Family / Patient: Updated  () via phone.    Total Time Spent on Date of Encounter in care of patient: This time was spent on one or more of the following: performing physical exam; counseling and coordination of care; obtaining or reviewing history; documenting in the medical record; reviewing/ordering tests, medications or procedures; communicating with other healthcare professionals and discussing with patient's family/caregivers.    Current Length of Stay: 2 day(s)  Current Patient Status: Inpatient   Certification Statement: The patient will continue to require additional inpatient hospital stay due to continued IV diuresis  Discharge Plan: Anticipate discharge in >72 hrs to home.    Code Status: Level 3 - DNAR and DNI    Subjective:   Seen and examined. No acute events overnight. States she feels better. Offers no additional complaints    Objective:     Vitals:   Temp (24hrs), Av.4 °F (36.3 °C), Min:96.8 °F (36 °C), Max:98 °F (36.7 °C)    Temp:  [96.8 °F (36 °C)-98 °F (36.7 °C)] 96.9 °F (36.1 °C)  HR:  [72-91] 91  Resp:  [11-24] 24  BP: ()/(56-69) 108/62  SpO2:  [90 %-100 %] 97 %  Body mass index is 72.81 kg/m².     Input and Output Summary (last 24 hours):     Intake/Output Summary (Last 24 hours) at 2024 1427  Last data filed at 2024 1200  Gross per 24 hour   Intake 810 ml   Output 2975 ml   Net -2165 ml       Physical Exam:   Physical Exam  Constitutional:       Appearance: She is obese. She is ill-appearing (chronically).   HENT:      Mouth/Throat:       Mouth: Mucous membranes are moist.   Eyes:      Conjunctiva/sclera: Conjunctivae normal.   Cardiovascular:      Heart sounds: Normal heart sounds.   Pulmonary:      Breath sounds: Normal breath sounds.   Abdominal:      General: There is distension.      Palpations: Abdomen is soft.   Musculoskeletal:      Right lower leg: Edema present.      Left lower leg: Edema present.   Skin:     General: Skin is warm and dry.   Neurological:      General: No focal deficit present.       Additional Data:     Labs:  Results from last 7 days   Lab Units 06/30/24  0529 06/28/24  0552 06/27/24  2235   WBC Thousand/uL 18.61*   < > 23.90*   HEMOGLOBIN g/dL 11.4*   < > 12.8   HEMATOCRIT % 35.7   < > 40.1   PLATELETS Thousands/uL 173   < > 228   BANDS PCT %  --   --  9*   LYMPHO PCT %  --   --  2*   MONO PCT %  --   --  5   EOS PCT %  --   --  0    < > = values in this interval not displayed.     Results from last 7 days   Lab Units 06/30/24  0529 06/27/24  2320 06/27/24  2235   SODIUM mmol/L 137   < > 136   POTASSIUM mmol/L 3.2*   < > 6.4*   CHLORIDE mmol/L 99   < > 101   CO2 mmol/L 30   < > 28   BUN mg/dL 45*   < > 30*   CREATININE mg/dL 1.23   < > 0.97   ANION GAP mmol/L 8   < > 7   CALCIUM mg/dL 9.6   < > 9.8   ALBUMIN g/dL  --   --  3.5   TOTAL BILIRUBIN mg/dL  --   --  1.55*   ALK PHOS U/L  --   --  170*   ALT U/L  --   --  16   AST U/L  --   --  45*   GLUCOSE RANDOM mg/dL 157*   < > 117    < > = values in this interval not displayed.     Results from last 7 days   Lab Units 06/30/24  0529   INR  4.20*     Results from last 7 days   Lab Units 06/30/24  1115 06/30/24  0726 06/29/24  2124 06/29/24  1659 06/29/24  1131 06/29/24  0751 06/28/24  2118 06/28/24  1615 06/28/24  1220 06/28/24  0555   POC GLUCOSE mg/dl 195* 137 173* 162* 202* 159* 198* 138 152* 170*         Results from last 7 days   Lab Units 06/29/24  0541 06/28/24  0552 06/27/24  2329 06/27/24  2320   LACTIC ACID mmol/L  --   --  1.4  --    PROCALCITONIN ng/ml  3.13* 1.21*  --  0.31*       Lines/Drains:  Invasive Devices       Peripheral Intravenous Line  Duration             Peripheral IV 06/27/24 Left;Medial;Upper 2 days    Peripheral IV 06/27/24 Left;Ventral (anterior) 2 days    Long-Dwell Peripheral IV (Midline) 06/28/24 1 day              Drain  Duration             Urethral Catheter Straight-tip 16 Fr. 2 days                  Urinary Catheter:  Goal for removal: Remove after 48 hrs of I/O monitoring               Imaging: Reviewed radiology reports from this admission including: chest CT scan and abdominal/pelvic CT    Recent Cultures (last 7 days):   Results from last 7 days   Lab Units 06/29/24  0541 06/28/24  0146 06/27/24  2307   BLOOD CULTURE  Received in Microbiology Lab. Culture in Progress.  Received in Microbiology Lab. Culture in Progress.  --  Staphylococcus epidermidis*  No Growth at 48 hrs.   GRAM STAIN RESULT   --   --  Gram positive cocci in clusters*   URINE CULTURE   --  <10,000 cfu/ml  --        Last 24 Hours Medication List:   Current Facility-Administered Medications   Medication Dose Route Frequency Provider Last Rate    acetaminophen  650 mg Oral Q6H PRN CAMPBELL Christiansen      allopurinol  100 mg Oral Daily CAMPBELL Christiansen      amiodarone  200 mg Oral Daily With Breakfast CAMPBELL Ahuja      atorvastatin  10 mg Oral Daily CAMPBELL Christiansen      bumetanide  2 mg Intravenous BID Ashly Vergara PA-C      cefTRIAXone  2,000 mg Intravenous Q24H CAMPBELL Christiansen Stopped (06/29/24 0008)    Cholecalciferol  2,000 Units Oral Daily CAMPBELL Christiansen      diphenhydrAMINE  25 mg Oral Q6H PRN CAMPBELL Ahuja      docusate sodium  100 mg Oral Daily CAMPBELL Christiansen      ferrous sulfate  325 mg Oral Daily With Breakfast CAMPBELL Christiansen      Fluticasone Furoate-Vilanterol  1 puff Inhalation Daily CAMPBELL Ahuja      guaiFENesin  600 mg Oral Q12H [facility] CAMPBELL Christiansen      insulin  lispro  2-12 Units Subcutaneous 4x Daily (AC & HS) CAMPBELL Christiansen      ipratropium-albuterol  3 mL Nebulization Q6H While awake Amanda Dukes MD      levothyroxine  288 mcg Oral Daily Clarke Rivera, CAMPBELL      loratadine  5 mg Oral Daily Clarke Rivera, CAMPBELL      midodrine  5 mg Oral TID AC Clarke Rivera, CAMPBELL      montelukast  10 mg Oral HS CAMPBELL Christiansen      multivitamin-minerals  1 tablet Oral Daily CAMPBELL Christiansen      pantoprazole  40 mg Oral Early Morning Clarke Rivera, CAMPBELL      traMADol  50 mg Oral Q12H PRN Maikel Mathew Jr., PA-C          Today, Patient Was Seen By: Giovana Adamson PA-C    **Please Note: This note may have been constructed using a voice recognition system.**

## 2024-06-30 NOTE — ASSESSMENT & PLAN NOTE
Is maintained on warfarin 7.5mg qhs  INR this am 4.2   No active bleeding   Continue holding warfarin dosing for goal INR 2-3

## 2024-06-30 NOTE — ASSESSMENT & PLAN NOTE
4.2 on am labs   Repleted   Continue to monitor given diuresis and replete as needed   07-Feb-2024 14:05 33.5

## 2024-06-30 NOTE — TELEPHONE ENCOUNTER
Patient is calling regarding cancelling an appointment.    Date/Time:7/1/2024 @ 8:    Patient was rescheduled: YES [] NO [x]    Patient requesting call back to reschedule: YES [x] NO []    Pt is admitted in hospital. She wants to speak to the doctor because they want her to put weight on her foot but she doesn't want to unless ortho doctor says she should

## 2024-07-01 ENCOUNTER — APPOINTMENT (INPATIENT)
Dept: NON INVASIVE DIAGNOSTICS | Facility: HOSPITAL | Age: 69
DRG: 871 | End: 2024-07-01
Payer: COMMERCIAL

## 2024-07-01 PROBLEM — S82.892A CLOSED LEFT ANKLE FRACTURE: Status: ACTIVE | Noted: 2024-07-01

## 2024-07-01 LAB
ALBUMIN SERPL BCG-MCNC: 3 G/DL (ref 3.5–5)
ALP SERPL-CCNC: 118 U/L (ref 34–104)
ALT SERPL W P-5'-P-CCNC: 14 U/L (ref 7–52)
ANION GAP SERPL CALCULATED.3IONS-SCNC: 7 MMOL/L (ref 4–13)
AST SERPL W P-5'-P-CCNC: 17 U/L (ref 13–39)
BACTERIA BLD CULT: ABNORMAL
BASOPHILS # BLD AUTO: 0.02 THOUSANDS/ÂΜL (ref 0–0.1)
BASOPHILS NFR BLD AUTO: 0 % (ref 0–1)
BILIRUB DIRECT SERPL-MCNC: 0.45 MG/DL (ref 0–0.2)
BILIRUB SERPL-MCNC: 1.13 MG/DL (ref 0.2–1)
BUN SERPL-MCNC: 50 MG/DL (ref 5–25)
CALCIUM SERPL-MCNC: 9.5 MG/DL (ref 8.4–10.2)
CHLORIDE SERPL-SCNC: 98 MMOL/L (ref 96–108)
CO2 SERPL-SCNC: 33 MMOL/L (ref 21–32)
CREAT SERPL-MCNC: 1.25 MG/DL (ref 0.6–1.3)
EOSINOPHIL # BLD AUTO: 0.49 THOUSAND/ÂΜL (ref 0–0.61)
EOSINOPHIL NFR BLD AUTO: 4 % (ref 0–6)
ERYTHROCYTE [DISTWIDTH] IN BLOOD BY AUTOMATED COUNT: 17.6 % (ref 11.6–15.1)
GFR SERPL CREATININE-BSD FRML MDRD: 43 ML/MIN/1.73SQ M
GLUCOSE SERPL-MCNC: 142 MG/DL (ref 65–140)
GLUCOSE SERPL-MCNC: 146 MG/DL (ref 65–140)
GLUCOSE SERPL-MCNC: 197 MG/DL (ref 65–140)
GLUCOSE SERPL-MCNC: 199 MG/DL (ref 65–140)
GLUCOSE SERPL-MCNC: 234 MG/DL (ref 65–140)
GRAM STN SPEC: ABNORMAL
HCT VFR BLD AUTO: 34.7 % (ref 34.8–46.1)
HGB BLD-MCNC: 11.2 G/DL (ref 11.5–15.4)
IMM GRANULOCYTES # BLD AUTO: 0.12 THOUSAND/UL (ref 0–0.2)
IMM GRANULOCYTES NFR BLD AUTO: 1 % (ref 0–2)
INR PPP: 2.96 (ref 0.84–1.19)
LYMPHOCYTES # BLD AUTO: 0.71 THOUSANDS/ÂΜL (ref 0.6–4.47)
LYMPHOCYTES NFR BLD AUTO: 5 % (ref 14–44)
MAGNESIUM SERPL-MCNC: 1.8 MG/DL (ref 1.9–2.7)
MCH RBC QN AUTO: 30.4 PG (ref 26.8–34.3)
MCHC RBC AUTO-ENTMCNC: 32.3 G/DL (ref 31.4–37.4)
MCV RBC AUTO: 94 FL (ref 82–98)
MECA+MECC ISLT/SPM QL: DETECTED
MONOCYTES # BLD AUTO: 0.94 THOUSAND/ÂΜL (ref 0.17–1.22)
MONOCYTES NFR BLD AUTO: 7 % (ref 4–12)
NEUTROPHILS # BLD AUTO: 10.99 THOUSANDS/ÂΜL (ref 1.85–7.62)
NEUTS SEG NFR BLD AUTO: 83 % (ref 43–75)
NRBC BLD AUTO-RTO: 0 /100 WBCS
PLATELET # BLD AUTO: 165 THOUSANDS/UL (ref 149–390)
PMV BLD AUTO: 10.9 FL (ref 8.9–12.7)
POTASSIUM SERPL-SCNC: 2.8 MMOL/L (ref 3.5–5.3)
PROT SERPL-MCNC: 6.9 G/DL (ref 6.4–8.4)
PROTHROMBIN TIME: 31.1 SECONDS (ref 11.6–14.5)
RBC # BLD AUTO: 3.68 MILLION/UL (ref 3.81–5.12)
S EPIDERMIDIS DNA BLD POS QL NAA+NON-PRB: DETECTED
SODIUM SERPL-SCNC: 138 MMOL/L (ref 135–147)
WBC # BLD AUTO: 13.27 THOUSAND/UL (ref 4.31–10.16)

## 2024-07-01 PROCEDURE — 93970 EXTREMITY STUDY: CPT | Performed by: SURGERY

## 2024-07-01 PROCEDURE — 94640 AIRWAY INHALATION TREATMENT: CPT

## 2024-07-01 PROCEDURE — 99024 POSTOP FOLLOW-UP VISIT: CPT | Performed by: ORTHOPAEDIC SURGERY

## 2024-07-01 PROCEDURE — 93306 TTE W/DOPPLER COMPLETE: CPT | Performed by: INTERNAL MEDICINE

## 2024-07-01 PROCEDURE — 93306 TTE W/DOPPLER COMPLETE: CPT

## 2024-07-01 PROCEDURE — 85610 PROTHROMBIN TIME: CPT

## 2024-07-01 PROCEDURE — 82948 REAGENT STRIP/BLOOD GLUCOSE: CPT

## 2024-07-01 PROCEDURE — 83735 ASSAY OF MAGNESIUM: CPT

## 2024-07-01 PROCEDURE — 94760 N-INVAS EAR/PLS OXIMETRY 1: CPT

## 2024-07-01 PROCEDURE — 80048 BASIC METABOLIC PNL TOTAL CA: CPT

## 2024-07-01 PROCEDURE — 99233 SBSQ HOSP IP/OBS HIGH 50: CPT

## 2024-07-01 PROCEDURE — 85025 COMPLETE CBC W/AUTO DIFF WBC: CPT

## 2024-07-01 PROCEDURE — 80076 HEPATIC FUNCTION PANEL: CPT

## 2024-07-01 PROCEDURE — 99223 1ST HOSP IP/OBS HIGH 75: CPT | Performed by: INTERNAL MEDICINE

## 2024-07-01 PROCEDURE — 93970 EXTREMITY STUDY: CPT

## 2024-07-01 RX ORDER — MAGNESIUM SULFATE HEPTAHYDRATE 40 MG/ML
2 INJECTION, SOLUTION INTRAVENOUS ONCE
Status: COMPLETED | OUTPATIENT
Start: 2024-07-01 | End: 2024-07-01

## 2024-07-01 RX ORDER — ALBUTEROL SULFATE 2.5 MG/3ML
2.5 SOLUTION RESPIRATORY (INHALATION) EVERY 6 HOURS PRN
Status: DISCONTINUED | OUTPATIENT
Start: 2024-07-01 | End: 2024-07-07 | Stop reason: HOSPADM

## 2024-07-01 RX ORDER — POTASSIUM CHLORIDE 20 MEQ/1
40 TABLET, EXTENDED RELEASE ORAL
Status: DISCONTINUED | OUTPATIENT
Start: 2024-07-01 | End: 2024-07-01

## 2024-07-01 RX ORDER — POLYETHYLENE GLYCOL 3350 17 G/17G
17 POWDER, FOR SOLUTION ORAL DAILY PRN
Status: DISCONTINUED | OUTPATIENT
Start: 2024-07-01 | End: 2024-07-05

## 2024-07-01 RX ORDER — FAMOTIDINE 20 MG/1
40 TABLET, FILM COATED ORAL ONCE
Status: COMPLETED | OUTPATIENT
Start: 2024-07-01 | End: 2024-07-01

## 2024-07-01 RX ORDER — LEVOFLOXACIN 5 MG/ML
750 INJECTION, SOLUTION INTRAVENOUS EVERY 24 HOURS
Status: COMPLETED | OUTPATIENT
Start: 2024-07-02 | End: 2024-07-03

## 2024-07-01 RX ADMIN — Medication 2000 UNITS: at 08:27

## 2024-07-01 RX ADMIN — MIDODRINE HYDROCHLORIDE 5 MG: 5 TABLET ORAL at 11:03

## 2024-07-01 RX ADMIN — IPRATROPIUM BROMIDE AND ALBUTEROL SULFATE 3 ML: 2.5; .5 SOLUTION RESPIRATORY (INHALATION) at 13:32

## 2024-07-01 RX ADMIN — Medication 12.5 MG: at 12:27

## 2024-07-01 RX ADMIN — ACETAMINOPHEN 325MG 650 MG: 325 TABLET ORAL at 06:00

## 2024-07-01 RX ADMIN — FAMOTIDINE 40 MG: 20 TABLET, FILM COATED ORAL at 14:54

## 2024-07-01 RX ADMIN — FLUTICASONE FUROATE AND VILANTEROL TRIFENATATE 1 PUFF: 100; 25 POWDER RESPIRATORY (INHALATION) at 08:28

## 2024-07-01 RX ADMIN — MAGNESIUM SULFATE HEPTAHYDRATE 2 G: 40 INJECTION, SOLUTION INTRAVENOUS at 11:13

## 2024-07-01 RX ADMIN — MIDODRINE HYDROCHLORIDE 5 MG: 5 TABLET ORAL at 16:41

## 2024-07-01 RX ADMIN — DIPHENHYDRAMINE HYDROCHLORIDE 25 MG: 25 TABLET ORAL at 14:34

## 2024-07-01 RX ADMIN — POTASSIUM CHLORIDE 40 MEQ: 1500 TABLET, EXTENDED RELEASE ORAL at 11:03

## 2024-07-01 RX ADMIN — DOCUSATE SODIUM 100 MG: 100 CAPSULE, LIQUID FILLED ORAL at 08:27

## 2024-07-01 RX ADMIN — GUAIFENESIN 600 MG: 600 TABLET ORAL at 08:28

## 2024-07-01 RX ADMIN — CEFTRIAXONE 2000 MG: 2 INJECTION, SOLUTION INTRAVENOUS at 14:07

## 2024-07-01 RX ADMIN — ALBUTEROL SULFATE 2.5 MG: 2.5 SOLUTION RESPIRATORY (INHALATION) at 17:04

## 2024-07-01 RX ADMIN — BUMETANIDE 2 MG: 0.25 INJECTION INTRAMUSCULAR; INTRAVENOUS at 08:28

## 2024-07-01 RX ADMIN — LORATADINE 5 MG: 10 TABLET ORAL at 08:27

## 2024-07-01 RX ADMIN — POTASSIUM CHLORIDE 40 MEQ: 1500 TABLET, EXTENDED RELEASE ORAL at 16:41

## 2024-07-01 RX ADMIN — ATORVASTATIN CALCIUM 10 MG: 10 TABLET, FILM COATED ORAL at 08:27

## 2024-07-01 RX ADMIN — ALLOPURINOL 100 MG: 100 TABLET ORAL at 08:27

## 2024-07-01 RX ADMIN — Medication 1 TABLET: at 08:27

## 2024-07-01 RX ADMIN — LEVOTHYROXINE SODIUM 288 MCG: 88 TABLET ORAL at 06:00

## 2024-07-01 RX ADMIN — IPRATROPIUM BROMIDE AND ALBUTEROL SULFATE 3 ML: 2.5; .5 SOLUTION RESPIRATORY (INHALATION) at 20:47

## 2024-07-01 RX ADMIN — AMIODARONE HYDROCHLORIDE 200 MG: 200 TABLET ORAL at 08:27

## 2024-07-01 RX ADMIN — INSULIN LISPRO 2 UNITS: 100 INJECTION, SOLUTION INTRAVENOUS; SUBCUTANEOUS at 12:29

## 2024-07-01 RX ADMIN — PANTOPRAZOLE SODIUM 40 MG: 40 TABLET, DELAYED RELEASE ORAL at 06:01

## 2024-07-01 RX ADMIN — BUMETANIDE 2 MG: 0.25 INJECTION INTRAMUSCULAR; INTRAVENOUS at 20:39

## 2024-07-01 RX ADMIN — GUAIFENESIN 600 MG: 600 TABLET ORAL at 20:39

## 2024-07-01 RX ADMIN — MONTELUKAST 10 MG: 10 TABLET, FILM COATED ORAL at 21:13

## 2024-07-01 RX ADMIN — Medication 12.5 MG: at 21:12

## 2024-07-01 RX ADMIN — INSULIN LISPRO 2 UNITS: 100 INJECTION, SOLUTION INTRAVENOUS; SUBCUTANEOUS at 17:09

## 2024-07-01 RX ADMIN — FERROUS SULFATE TAB 325 MG (65 MG ELEMENTAL FE) 325 MG: 325 (65 FE) TAB at 12:28

## 2024-07-01 RX ADMIN — IPRATROPIUM BROMIDE AND ALBUTEROL SULFATE 3 ML: 2.5; .5 SOLUTION RESPIRATORY (INHALATION) at 08:51

## 2024-07-01 NOTE — RESPIRATORY THERAPY NOTE
07/01/24 1333   Respiratory Assessment   Resp Comments Pt is stable on 3.5L . States she does txs prn at home but feels SOB , will re evaluate tomorrow   cont w/ q6 for now

## 2024-07-01 NOTE — ASSESSMENT & PLAN NOTE
Hx of persistent atrial fibrillation, failed prior cardioversion/ablation in past   Developed A fib with RVR over admission secondary to septic shock requiring amiodarone gtt since weaned  Appreciate cardiology recs  BB has been on hold with relative hypotension, since improved with midodrine. Resumed metoprolol tartrate 12.5mg bid  Can use digoxin for rate control if needed   INR suptherapeutic, continue to hold Coumadin, trend daily

## 2024-07-01 NOTE — CASE MANAGEMENT
Case Management Discharge Planning Note    Patient name Tigist Hewitt  Location /-01 MRN [id number]  : 1955 Date 2024       Current Admission Date: 2024  Current Admission Diagnosis:Acute on chronic respiratory failure with hypoxia ([facility])   Patient Active Problem List    Diagnosis Date Noted Date Diagnosed    Hypokalemia 2024     Positive blood culture 2024     Sepsis ([facility]) 2024     Acute on chronic respiratory failure with hypoxia ([facility]) 2024     Ankle fracture 2024     Lymphangitis 2024     Gout 2024     HLD (hyperlipidemia) 2024     Metabolic alkalosis 2024     Acute pain of left knee 2024     Ambulatory dysfunction 2024     Hyperkalemia 2024     Lung cyst 2024     Abnormal CT scan, pelvis 2023     CKD (chronic kidney disease) stage 3, GFR 30-59 ml/min ([facility]) 2023     NISHI (obstructive sleep apnea) 2023     Intertrigo 10/29/2022     Vaginal bleeding 10/23/2022     Acute kidney injury superimposed on chronic kidney disease  ([facility]) 10/22/2022     Hypotension 10/22/2022     GERD (gastroesophageal reflux disease) 10/22/2022     Acute on chronic diastolic congestive heart failure ([facility]) 2022     IMTIAZ (acute kidney injury) ([facility]) 2022     Morbid obesity ([facility]) 2022     Hypothyroidism 2022     Supratherapeutic INR 04/15/2020     Chronic respiratory failure with hypoxia ([facility]) 2020     Asthma 2020     Atrial fibrillation with RVR ([facility]) 2020     COPD (chronic obstructive pulmonary disease) ([facility]) 2020     Type 2 diabetes mellitus with hyperglycemia, with long-term current use of insulin ([facility]) 2020     Diastolic CHF ([facility]) 2020     Shortness of breath 2020     Anemia 2020       LOS (days): 3  Geometric Mean LOS (GMLOS) (days): 3.9  Days to GMLOS:0.4     OBJECTIVE:  Risk of Unplanned Readmission Score: 43.67         Current  admission status: Inpatient   Preferred Pharmacy:   [id number] - [place], [place] - [address] [place] [id number]  Phone: [phone] Fax: [phone]    Primary Care Provider: Marisa Haque MD    Primary Insurance: [employer] [facility]  Secondary Insurance:     DISCHARGE DETAILS:       Freedom of Choice: Yes  Comments - Freedom of Choice: AIDIN referral to [facility] for resumption of care upon discharge                     Requested Home Health Care         Is the patient interested in HHC at discharge?: Yes  Home Health Discipline requested:: Nursing, Occupational Therapy, Physical Therapy  Home Health Agency Name:: [facility]  HHA External Referral Reason (only applicable if external HHA name selected): Patient has established relationship with provider  Home Health Follow-Up Provider:: PCP  Home Health Services Needed:: Evaluate Functional Status and Safety, Gait/ADL Training, Strengthening/Theraputic Exercises to Improve Function, Other (comment), Urinary Incontinence Catheter Management (Medication Management)  Homebound Criteria Met:: Uses an Assist Device (i.e. cane, walker, etc)  Supporting Clincal Findings:: Limited Endurance, Fatigues Easliy in Short Distances         Other Referral/Resources/Interventions Provided:  Interventions: HHC  Referral Comments: [facility]             CM submitted AIDIN referral to [facility] for resumption of care upon discharge from [facility].    CM to follow for medical stability for discharge.

## 2024-07-01 NOTE — PROGRESS NOTES
Patient:  HANNA WASHINGTON    MRN:  [id number]    Aidin Request ID:  [id number]    Level of care reserved:  Home Health Agency    Partner Reserved:  [facility] [place] [address] [phone]    Clinical needs requested:    Geography searched:  10 miles around [address]    Start of Service:    Request sent:  7:52am EDT on 6/29/2024 by Tamara Chin    Partner reserved:  12:40pm EDT on 7/1/2024 by Tamara Chin    Choice list shared:  12:40pm EDT on 7/1/2024 by Tamara Chin

## 2024-07-01 NOTE — ASSESSMENT & PLAN NOTE
Recent closed left ankle fracture, being followed by orthopedic surgery   Missed outpatient appt due to current hospitalizations   Last recommendation for NWB status  Inpatient ortho consult appreciated

## 2024-07-01 NOTE — UTILIZATION REVIEW
NOTIFICATION OF INPATIENT ADMISSION   AUTHORIZATION REQUEST   SERVICING FACILITY:   [place] [address]  Tax ID: [id number]  NPI: [id number] ATTENDING PROVIDER:  Attending Name and NPI#: Amanda Dukes Md [[id number]]  Address: [address] [place] [address]  Phone: [phone]   ADMISSION INFORMATION:  Place of Service: Inpatient Acute [facility]  Place of Service Code: 21  Inpatient Admission Date/Time: 6/28/24  1:05 AM  Discharge Date/Time: No discharge date for patient encounter.  Admitting Diagnosis Code/Description:  CHF (congestive heart failure) (HCC) [I50.9]  Paroxysmal atrial fibrillation (HCC) [I48.0]  SOB (shortness of breath) [R06.02]  Hypotension [I95.9]  Hypoxia [R09.02]  COPD exacerbation (HCC) [J44.1]  Acute on chronic diastolic congestive heart failure (HCC) [I50.33]  Atrial fibrillation with rapid ventricular response (HCC) [I48.91]  Acute hypoxic respiratory failure (HCC) [J96.01]     UTILIZATION REVIEW CONTACT:  Mirela Perkins, Utilization   Network Utilization Review Department  Phone: [phone]  Fax [phone]  Email: [email or web address]  Contact for approvals/pending authorizations, clinical reviews, and discharge.     PHYSICIAN ADVISORY SERVICES:  Medical Necessity Denial & Vzrk-jh-Tvxj Review  Phone: [phone]  Fax: [phone]  Email: [email or web address]     DISCHARGE SUPPORT TEAM:  For Patients Discharge Needs & Updates  Phone: [phone] opt. 2 Fax: [phone]  Email: [email or web address]

## 2024-07-01 NOTE — RESPIRATORY THERAPY NOTE
07/01/24 1705   Respiratory Assessment   Resp Comments PRN tx requested by nurse due to pt choking

## 2024-07-01 NOTE — QUICK NOTE
Patient noted with pruritus after administration of Rocephin. No respiratory complaints/distress. Treat with Benadryl, Pepcid. Added to allergy list & adjusted to Levaquin.

## 2024-07-01 NOTE — CONSULTS
Consult received for CHF Ed.     Pt reports good appetite currently and PTA. Reports knowledge of low Na diet though reports son provides majority of meals for pt and often relies on takeout due to his busy work schedule/inability to cook every night. Pt also with noted allergies to sugar substitutes, drinks seltzer water as primary drink at home or coffee sweetened with regular sugar. Reviewed importance of low Na diet with pt, says she plans to get set up with [employer] Lower Sodium meal plan as option when son unable to cook, provided with their contact information. Pt declined further diet instruction regarding low Na diet. Pt inquired about seltzer water use during hospitalization, do not have any from kitchen able to offer pt however able to have family bring in seltzer water (which is 0 g added sugar and low sodium) and give to RN who can allocate given fluid restriction.     Continue CCD 2, 2gm Na, fluid restriction per MD.

## 2024-07-01 NOTE — ASSESSMENT & PLAN NOTE
Lab Results   Component Value Date    HGBA1C 7.0 (H) 05/02/2024       Recent Labs     06/30/24  1629 06/30/24  2106 07/01/24  0733 07/01/24  1206   POCGLU 182* 176* 142* 199*         Blood Sugar Average: Last 72 hrs:  (P) 170.5896068076616372    Hold home diabetic medications.  SSI alg 4  ACHS fingersticks

## 2024-07-01 NOTE — PROGRESS NOTES
[facility]  Progress [facility]  Name: Tigist Hewitt I  MRN: [id number]  Unit/Bed#: -01 I Date of Admission: 6/27/2024   Date of Service: 7/1/2024 I Hospital Day: 3    Assessment & Plan   * Acute on chronic respiratory failure with hypoxia (HCC)  Assessment & Plan  Secondary to decompensated HF, pneumonia  CT c/a/p: diffuse GGO/mosaicism throughout the bilateral lung fields could be related to hypoinflation, air trapping/small airway disease, edema or pneumonia  Required CPAP since weaned back to baseline oxygen   Continue Rocephin to completed 7-day course   Continue IV Bumex 2mg IV bid   Speech eval       Acute on chronic diastolic congestive heart failure (HCC)  Assessment & Plan  Wt Readings from Last 3 Encounters:   06/30/24 (!) 175 kg (385 lb 5.8 oz)   06/10/24 (!) 157 kg (346 lb)   05/29/24 (!) 157 kg (346 lb 12.5 oz)     With acute decompensation, wt up 18kg   12/2023 ECHO: EF 55% with severe TR, RVSP 53  Repeat ECHO ordered  Appreciate cardiology recs   Continue with IV Bumex 2mg bid   Daily weights, I&Os, Na/fluid restriction        Atrial fibrillation with RVR (HCC)  Assessment & Plan  Hx of persistent atrial fibrillation, failed prior cardioversion/ablation in past   Developed A fib with RVR over admission secondary to septic shock requiring amiodarone gtt since weaned  Appreciate cardiology recs  BB has been on hold with relative hypotension, since improved with midodrine. Resumed metoprolol tartrate 12.5mg bid  Can use digoxin for rate control if needed   INR suptherapeutic, continue to hold Coumadin, trend daily     Closed left ankle fracture  Assessment & Plan  Recent closed left ankle fracture, being followed by orthopedic surgery   Missed outpatient appt due to current hospitalizations   Last recommendation for NWB status  Inpatient ortho consult appreciated     Sepsis (HCC)  Assessment & Plan  Met on admission with leukocytosis, tachycardia, tachypnea  Developed  hypotension over admission improved with albumin/midodrine. 30cc/kg bolus not given due to CHF exacerbation  CT chest with possible pneumonia in the appropriate setting  Was started on IV ceftriaxone, continue to completed 7-day course  Blood cultures x 3 negative. Initial blood cultures 1/2 positive, suspected contaminant.     Hypokalemia  Assessment & Plan  K 2.8  Given Kdur 40mg tid   Goal > 4   Telemetry     NISHI (obstructive sleep apnea)  Assessment & Plan  Continue CPAP HS     Morbid obesity (HCC)  Assessment & Plan  Consider dietary consult.   Encourage exercise and lifestyle changes.    Supratherapeutic INR  Assessment & Plan  Is maintained on warfarin 7.5mg qhs  INR 2.96 today  Continue to hold & trend daily    Type 2 diabetes mellitus with hyperglycemia, with long-term current use of insulin ([facility])  Assessment & Plan  Lab Results   Component Value Date    HGBA1C 7.0 (H) 05/02/2024       Recent Labs     06/30/24  1629 06/30/24  2106 07/01/24  0733 07/01/24  1206   POCGLU 182* 176* 142* 199*         Blood Sugar Average: Last 72 hrs:  (P) 170.1692126284842972    Hold home diabetic medications.  SSI alg 4  ACHS fingersticks    COPD (chronic obstructive pulmonary disease) ([facility])  Assessment & Plan  Less likely in exacerbation  At baseline O2 requirement of 3L NC  Continue home inhalers               VTE Pharmacologic Prophylaxis: VTE Score: 11 supratherapeutic INR    Mobility:   Basic Mobility Inpatient Raw Score: 6  JH-HLM Goal: 2: Bed activities/Dependent transfer  JH-HLM Achieved: 2: Bed activities/Dependent transfer  JH-HLM Goal achieved. Continue to encourage appropriate mobility.    Patient Centered Rounds: I performed bedside rounds with nursing staff today.   Discussions with Specialists or Other Care Team Provider: cardiology     Education and Discussions with Family / Patient: to update contact     Total Time Spent on Date of Encounter in care of patient:  mins. This time was spent on one or more of the  following: performing physical exam; counseling and coordination of care; obtaining or reviewing history; documenting in the medical record; reviewing/ordering tests, medications or procedures; communicating with other healthcare professionals and discussing with patient's family/caregivers.    Current Length of Stay: 3 day(s)  Current Patient Status: Inpatient   Certification Statement: The patient will continue to require additional inpatient hospital stay due to IV diuresis, IV abx, speech eval  Discharge Plan: Anticipate discharge in >72 hrs to discharge location to be determined pending rehab evaluations.    Code Status: Level 3 - DNAR and DNI    Subjective:   Patient seen and examined. Reports feeling improved today. Notes ongoing cough with productive sputum. No fevers, chills, chest pain.     Objective:     Vitals:   Temp (24hrs), Av.8 °F (36.6 °C), Min:96.4 °F (35.8 °C), Max:98.9 °F (37.2 °C)    Temp:  [96.4 °F (35.8 °C)-98.9 °F (37.2 °C)] 98.9 °F (37.2 °C)  HR:  [] 103  Resp:  [22-27] 27  BP: ()/(55-73) 102/55  SpO2:  [94 %-100 %] 98 %  Body mass index is 72.81 kg/m².     Input and Output Summary (last 24 hours):     Intake/Output Summary (Last 24 hours) at 2024 1343  Last data filed at 2024 1258  Gross per 24 hour   Intake 1880 ml   Output 2910 ml   Net -1030 ml       Physical Exam:   Physical Exam  Constitutional:       General: She is not in acute distress.  HENT:      Head: Normocephalic and atraumatic.   Cardiovascular:      Rate and Rhythm: Normal rate. Rhythm irregular.   Pulmonary:      Effort: Pulmonary effort is normal. No respiratory distress.      Breath sounds: No wheezing.   Abdominal:      General: Abdomen is flat. Bowel sounds are normal.      Palpations: Abdomen is soft.   Musculoskeletal:      Right lower leg: Edema present.      Left lower leg: Edema present.   Skin:     General: Skin is warm and dry.   Neurological:      General: No focal deficit present.       Mental Status: She is alert.          Additional Data:     Labs:  Results from last 7 days   Lab Units 07/01/24  0948 06/28/24  0552 06/27/24  2235   WBC Thousand/uL 13.27*   < > 23.90*   HEMOGLOBIN g/dL 11.2*   < > 12.8   HEMATOCRIT % 34.7*   < > 40.1   PLATELETS Thousands/uL 165   < > 228   BANDS PCT %  --   --  9*   SEGS PCT % 83*  --   --    LYMPHO PCT % 5*  --  2*   MONO PCT % 7  --  5   EOS PCT % 4  --  0    < > = values in this interval not displayed.     Results from last 7 days   Lab Units 07/01/24  0948   SODIUM mmol/L 138   POTASSIUM mmol/L 2.8*   CHLORIDE mmol/L 98   CO2 mmol/L 33*   BUN mg/dL 50*   CREATININE mg/dL 1.25   ANION GAP mmol/L 7   CALCIUM mg/dL 9.5   ALBUMIN g/dL 3.0*   TOTAL BILIRUBIN mg/dL 1.13*   ALK PHOS U/L 118*   ALT U/L 14   AST U/L 17   GLUCOSE RANDOM mg/dL 234*     Results from last 7 days   Lab Units 07/01/24  0948   INR  2.96*     Results from last 7 days   Lab Units 07/01/24  1206 07/01/24  0733 06/30/24  2106 06/30/24  1629 06/30/24  1115 06/30/24  0726 06/29/24  2124 06/29/24  1659 06/29/24  1131 06/29/24  0751 06/28/24  2118 06/28/24  1615   POC GLUCOSE mg/dl 199* 142* 176* 182* 195* 137 173* 162* 202* 159* 198* 138         Results from last 7 days   Lab Units 06/30/24  0529 06/29/24  0541 06/28/24  0552 06/27/24  2329 06/27/24  2320   LACTIC ACID mmol/L  --   --   --  1.4  --    PROCALCITONIN ng/ml 1.88* 3.13* 1.21*  --  0.31*       Lines/Drains:  Invasive Devices       Peripheral Intravenous Line  Duration             Peripheral IV 06/27/24 Left;Medial;Upper 3 days    Peripheral IV 06/27/24 Left;Ventral (anterior) 3 days    Long-Dwell Peripheral IV (Midline) 06/28/24 2 days              Drain  Duration             Urethral Catheter Straight-tip 16 Fr. 3 days                  Urinary Catheter:  Goal for removal: Voiding trial when ambulation improves           Telemetry:  Telemetry Orders (From admission, onward)               24 Hour Telemetry Monitoring  Continuous x 24  Hours (Telem)        Question:  Reason for 24 Hour Telemetry  Answer:  Metabolic/electrolyte disturbance with high probability of dysrhythmia. K level <3 or >6 OR KCL infusion >10mEq/hr                     Telemetry Reviewed: A fib  Indication for Continued Telemetry Use: Arrthymias requiring medical therapy Severe hypokalemia             Imaging: Reviewed radiology reports from this admission including: chest CT scan, abdominal/pelvic CT, and ultrasound(s)    Recent Cultures (last 7 days):   Results from last 7 days   Lab Units 06/29/24  0541 06/28/24  0146 06/27/24  2307   BLOOD CULTURE  No Growth at 24 hrs.  No Growth at 24 hrs.  --  No Growth at 72 hrs.  Staphylococcus epidermidis*   GRAM STAIN RESULT   --   --  Gram positive cocci in clusters*   URINE CULTURE   --  <10,000 cfu/ml  --        Last 24 Hours Medication List:   Current Facility-Administered Medications   Medication Dose Route Frequency Provider Last Rate    acetaminophen  650 mg Oral Q6H PRN CAMPBELL Ahuja      allopurinol  100 mg Oral Daily Gloria Sal, ROGELIONP      atorvastatin  10 mg Oral Daily Gloria Sal, ROGELIONP      bumetanide  2 mg Intravenous BID Ashly Vergara PA-C      cefTRIAXone  2,000 mg Intravenous Q24H CAMPBELL Ahuja Stopped (06/30/24 1547)    Cholecalciferol  2,000 Units Oral Daily CAMPBELL Ahuja      diphenhydrAMINE  25 mg Oral Q6H PRN CAMPBELL Ahuja      docusate sodium  100 mg Oral Daily CAMPBELL Ahuja      ferrous sulfate  325 mg Oral Daily With Breakfast CAMPBELL Ahuja      Fluticasone Furoate-Vilanterol  1 puff Inhalation Daily CAMPBELL Ahuja      guaiFENesin  600 mg Oral Q12H HALEY CAMPBELL Ahuja      insulin lispro  2-12 Units Subcutaneous 4x Daily (AC & HS) CAMPBELL Ahuja      ipratropium-albuterol  3 mL Nebulization Q6H While awake Amanda Dukes MD      levothyroxine  288 mcg Oral Daily CAMPBELL Ahuja      loratadine  5 mg Oral Daily CAMPBELL Ahuja      metoprolol  tartrate  12.5 mg Oral Q12H [facility] Desi [facility], CAMPBELL      midodrine  5 mg Oral TID AC CAMPBELL Ahuja      montelukast  10 mg Oral HS CAMPBELL Ahuja      multivitamin-minerals  1 tablet Oral Daily CAMPBELL Ahuja      pantoprazole  40 mg Oral Early Morning CAMPBELL Ahuja      polyethylene glycol  17 g Oral Daily PRN Abena Gregory PA-C      potassium chloride  40 mEq Oral TID With Meals Abena Gregory PA-C      traMADol  50 mg Oral Q12H PRN CAMPBELL Ahuja          Today, Patient Was Seen By: Aebna Gregory PA-C    **Please Note: This note may have been constructed using a voice recognition system.**

## 2024-07-01 NOTE — NURSING NOTE
Pt noted to have coughing episodes with drinking thin liquids. Questionable after solid foods. GENET Gregory, PAC for [facility] notified. Awaiting speech eval.

## 2024-07-01 NOTE — PLAN OF CARE
Pt UO continues to improve with IV Bumex pushes, see flowsheet   RN re-educated on increased fluid restriction  Pt LE continue to weep. Frequent skin care provided, pt frequently repositioned  Problem: CARDIOVASCULAR - ADULT  Goal: Maintains optimal cardiac output and hemodynamic stability  Description: INTERVENTIONS:  - Monitor I/O, vital signs and rhythm  - Monitor for S/S and trends of decreased cardiac output  - Administer and titrate ordered vasoactive medications to optimize hemodynamic stability  - Assess quality of pulses, skin color and temperature  - Assess for signs of decreased coronary artery perfusion  - Instruct patient to report change in severity of symptoms  Outcome: Progressing  Goal: Absence of cardiac dysrhythmias or at baseline rhythm  Description: INTERVENTIONS:  - Continuous cardiac monitoring, vital signs, obtain 12 lead EKG if ordered  - Administer antiarrhythmic and heart rate control medications as ordered  - Monitor electrolytes and administer replacement therapy as ordered  Outcome: Progressing     Problem: RESPIRATORY - ADULT  Goal: Achieves optimal ventilation and oxygenation  Description: INTERVENTIONS:  - Assess for changes in respiratory status  - Assess for changes in mentation and behavior  - Position to facilitate oxygenation and minimize respiratory effort  - Oxygen administered by appropriate delivery if ordered  - Initiate smoking cessation education as indicated  - Encourage broncho-pulmonary hygiene including cough, deep breathe, Incentive Spirometry  - Assess the need for suctioning and aspirate as needed  - Assess and instruct to report SOB or any respiratory difficulty  - Respiratory Therapy support as indicated  Outcome: Progressing     Problem: GENITOURINARY - ADULT  Goal: Maintains or returns to baseline urinary function  Description: INTERVENTIONS:  - Assess urinary function  - Encourage oral fluids to ensure adequate hydration if ordered  - Administer IV fluids as  ordered to ensure adequate hydration  - Administer ordered medications as needed  - Offer frequent toileting  - Follow urinary retention protocol if ordered  Outcome: Progressing  Goal: Urinary catheter remains patent  Description: INTERVENTIONS:  - Assess patency of urinary catheter  - If patient has a chronic frederick, consider changing catheter if non-functioning  - Follow guidelines for intermittent irrigation of non-functioning urinary catheter  Outcome: Progressing     Problem: METABOLIC, FLUID AND ELECTROLYTES - ADULT  Goal: Fluid balance maintained  Description: INTERVENTIONS:  - Monitor labs   - Monitor I/O and WT  - Instruct patient on fluid and nutrition as appropriate  - Assess for signs & symptoms of volume excess or deficit  Outcome: Progressing     Problem: Prexisting or High Potential for Compromised Skin Integrity  Goal: Skin integrity is maintained or improved  Description: INTERVENTIONS:  - Identify patients at risk for skin breakdown  - Assess and monitor skin integrity  - Assess and monitor nutrition and hydration status  - Monitor labs   - Assess for incontinence   - Turn and reposition patient  - Assist with mobility/ambulation  - Relieve pressure over bony prominences  - Avoid friction and shearing  - Provide appropriate hygiene as needed including keeping skin clean and dry  - Evaluate need for skin moisturizer/barrier cream  - Collaborate with interdisciplinary team   - Patient/family teaching  - Consider wound care consult   Outcome: Progressing

## 2024-07-01 NOTE — ASSESSMENT & PLAN NOTE
Met on admission with leukocytosis, tachycardia, tachypnea  Developed hypotension over admission improved with albumin/midodrine. 30cc/kg bolus not given due to CHF exacerbation  CT chest with possible pneumonia in the appropriate setting  Was started on IV ceftriaxone, continue to completed 7-day course  Blood cultures x 3 negative. Initial blood cultures 1/2 positive, suspected contaminant.

## 2024-07-01 NOTE — ASSESSMENT & PLAN NOTE
Wt Readings from Last 3 Encounters:   06/30/24 (!) 175 kg (385 lb 5.8 oz)   06/10/24 (!) 157 kg (346 lb)   05/29/24 (!) 157 kg (346 lb 12.5 oz)     History of HFpEF in the setting of morbid obesity, untreated NISHI, pneumonia/COPD.  Echo 12/24/2023 with LVEF 55%, mild AS, mild-mod MR, severe TR with PASP 53mmHg which is overall unchanged from prior.   Echo 7/1/2024 shows preserved LVEF with right sided heart failure.  Transitioned to Bumex during May admission.  She has undergone work up by pulmonology for pulmonary hypertension.  Currently diuresing well on Bumex IV 2 mg BID  Recommend increase in Bumex to 3 mg PO BID at discharge.  Added spironolactone 25 mg daily for K+ retention.  Very difficult to assess true volume status or weights due to body habitus and use of bed scales.  Continue CPAP  Would benefit from intense physical therapy and increased mobility but unfortunately she is lacking motivation for this.  Continue strict I's/O's, sodium/fluid restriction.  She should have BMP with 3-5 days of hospital discharge.

## 2024-07-01 NOTE — PROGRESS NOTES
Patient:  HANNA WASHINGTON    MRN:  [id number]    Aidin Request ID:  [id number]    Level of care reserved:  Home Health Agency    Partner Reserved:  [facility] [place] [address] [phone]    Clinical needs requested:    Geography searched:  10 miles around [id number]    Start of Service:    Request sent:  7:52am EDT on 6/29/2024 by Tamara Chin    Partner reserved:  12:40pm EDT on 7/1/2024 by Tamara Chin    Choice list shared:  12:40pm EDT on 7/1/2024 by Tamara Chin   - - -

## 2024-07-01 NOTE — ASSESSMENT & PLAN NOTE
History of longstanding persistent atrial fibrillation.   She was previously evaluated by Dr. Ferraro through [facility] Cardiology EP and failed 2 cardioversion and an atrial fibrillation ablation. Persistent a fib since at least 2020.  HR's mildly elevated this admission in the setting of septic shock.  Has been receiving amiodarone this admission. Ideally would not continue given futility of restoring sinus rhythm. This was discontinued and metoprolol tartrate resumed.   Increase metoprolol tartrate to 25 mg BID for improved HR control.  Anticoagulated with warfarin, INR goal 2-3  Consider digoxin as needed for HR control.  Optimize electrolytes for K+ > 4, Mag > 2.

## 2024-07-01 NOTE — CONSULTS
Consult Cardiology    Tigist Garcíakaty 1955, 69 y.o. female MRN: [id number]    Unit/Bed#: -01 Encounter: [id number]    Attending Provider: Amanda Dukes MD   Primary Care Provider: Marisa Haque MD   Date admitted to hospital: 6/27/2024       Inpatient consult to Cardiology  Consult performed by: CAMPBELL Bolton  Consult ordered by: CAMPBELL Ahuja          Acute on chronic diastolic congestive heart failure (HCC)  Assessment & Plan  Wt Readings from Last 3 Encounters:   06/30/24 (!) 175 kg (385 lb 5.8 oz)   06/10/24 (!) 157 kg (346 lb)   05/29/24 (!) 157 kg (346 lb 12.5 oz)     History of HFpEF in the setting of morbid obesity, untreated NISHI, pneumonia/COPD.  Echo 12/24/2023 with LVEF 55%, mild AS, mild-mod MR, severe TR with PASP 53mmHg which is overall unchanged from prior. Current echo pending today.  Transitioned to Bumex during May admission.  She has undergone work up by pulmonology for pulmonary hypertension.  Currently diuresing well with -2.2 L on Bumex IV 2 mg BID  Continue CPAP  Would benefit from intense physical therapy and increased mobility but unfortunately she is lacking motivation for this.  Continue strict I's/O's, sodium/fluid restriction.  Optimize electrolytes for K+ >4, Mag >2.    Atrial fibrillation with RVR ([facility])  Assessment & Plan  History of longstanding persistent atrial fibrillation.   She was previously evaluated by Dr. Ferraro through [facility] Cardiology EP and failed 2 cardioversion and an atrial fibrillation ablation. Persistent a fib since at least 2020.  HR's mildly elevated this admission in the setting of septic shock.  Has been receiving amiodarone this admission. Ideally would not continue given futility of restoring sinus rhythm.  Anticoagulated with warfarin, INR goal 2-3  Home metoprolol placed on hold due to hypotension, however this has improved with midodrine.  Will resume and discontinue amiodarone.  Consider digoxin as needed for HR  control.  Optimize electrolytes for K+ > 4, Mag > 2.    NISHI (obstructive sleep apnea)  Assessment & Plan  Continue CPAP    Morbid obesity (HCC)  Assessment & Plan  Contributing factor to current medical conditions and would benefit greatly from weight loss.    * Acute on chronic respiratory failure with hypoxia (HCC)  Assessment & Plan  Multi-factoral due to COPD, NISHI, obesity, HFpEF  Currently back on home O2 requirement of 3 L              Physician Requesting Consult: Amanda Dukes MD    Reason for Consult / Principal Problem: Acute on chronic HFpEF          HPI: Tigist Hewitt is a 69 y.o. year old female who has a history of HFpE with severe TR, permanent atrial fibrillation, NISHI on CPAP, COPD, T2DM, CKD, and obesity who follows with [facility] Cardiology.       Patient presented to [facility] 6/27/2024 with shortness of breath and respiratory failure. She initially required BIPAP and was placed under critical care services. ECG showed atrial fibrillation with RVR at 111 bpm. She met sepsis criteria. CBC showed leukocytosis. Procalcitonin was elevated. BNP elevated at 444. HS trop 40->50->91->247. Chest imaging showed CHF and pneumonia. She was given an amiodarone bolus to assist with HR control. Midodrine and crystalloid IV fluids started for treatment of hypotension. Started on antibiotics and systemic steroids for pneumonia. IV Bumex 2 mg BID started for diuresis. O2 requirements quickly improved.    At the time of my assessment she is sitting in bed eating breakfast on 3 L NC (home O2 requirement is typically 4 L). She states she has been very sedentary since her left ankle fracture in May. Her family has been ordering out food so not careful with sodium intake. She is taking her diuretics faithfully (Bumex 2 mg PO BID at home). She was not using her CPAP consistently. She does feel her shortness of breath has improved since admission. Her weight is increased by about 40 pounds per the bed scale since her  last hospital admission.      Review of Systems   Constitutional:  Negative for chills and fever.   HENT:  Negative for ear pain and sore throat.    Eyes:  Negative for pain and visual disturbance.   Respiratory:  Positive for cough and shortness of breath.    Cardiovascular:  Negative for chest pain and palpitations.   Gastrointestinal:  Negative for abdominal pain and vomiting.   Genitourinary:  Negative for dysuria and hematuria.   Musculoskeletal:  Negative for arthralgias and back pain.   Skin:  Negative for color change and rash.   Neurological:  Negative for seizures and syncope.   All other systems reviewed and are negative.       Historical Information     Past Medical History:   Diagnosis Date    Asthma     Atrial fibrillation (HCC)     COPD (chronic obstructive pulmonary disease) (HCC)     Diabetes mellitus (HCC)     Disease of thyroid gland     Heart failure (HCC)     Kidney failure     Morbid obesity due to excess calories (HCC)     NISHI (obstructive sleep apnea)     UTI (urinary tract infection)      Past Surgical History:   Procedure Laterality Date    CARDIAC ELECTROPHYSIOLOGY MAPPING AND ABLATION      by Dr. Ferraro at [facility]    CARDIOVERSION N/A     GALLBLADDER SURGERY      HERNIA REPAIR       Social History     Substance and Sexual Activity   Alcohol Use Never     Social History     Substance and Sexual Activity   Drug Use Never     Social History     Tobacco Use   Smoking Status Never    Passive exposure: Never   Smokeless Tobacco Never       Family History:   Family History   Problem Relation Age of Onset    Arthritis Mother     Hearing loss Mother     Heart disease Mother     Hypertension Mother     Stroke Mother     Alcohol abuse Father     Cancer Father     Diabetes Father     Arthritis Sister     Cancer Sister     Alcohol abuse Brother     Diabetes Son     Arthritis Daughter     Drug abuse Daughter     Heart disease Maternal Grandmother     Hypertension Maternal Grandmother     Stroke  Maternal Grandmother     Arthritis Maternal Aunt     Asthma Neg Hx     Birth defects Neg Hx     COPD Neg Hx     Depression Neg Hx     Early death Neg Hx     Hyperlipidemia Neg Hx     Kidney disease Neg Hx     Learning disabilities Neg Hx     Mental illness Neg Hx     Mental retardation Neg Hx     Miscarriages / Stillbirths Neg Hx     Vision loss Neg Hx        Meds/Allergies     current meds:   Current Facility-Administered Medications   Medication Dose Route Frequency    acetaminophen (TYLENOL) tablet 650 mg  650 mg Oral Q6H PRN    allopurinol (ZYLOPRIM) tablet 100 mg  100 mg Oral Daily    amiodarone tablet 200 mg  200 mg Oral Daily With Breakfast    atorvastatin (LIPITOR) tablet 10 mg  10 mg Oral Daily    bumetanide (BUMEX) injection 2 mg  2 mg Intravenous BID    cefTRIAXone (ROCEPHIN) IVPB (premix in dextrose) 2,000 mg 50 mL  2,000 mg Intravenous Q24H    Cholecalciferol (VITAMIN D3) tablet 2,000 Units  2,000 Units Oral Daily    diphenhydrAMINE (BENADRYL) tablet 25 mg  25 mg Oral Q6H PRN    docusate sodium (COLACE) capsule 100 mg  100 mg Oral Daily    ferrous sulfate tablet 325 mg  325 mg Oral Daily With Breakfast    Fluticasone Furoate-Vilanterol 100-25 mcg/actuation 1 puff  1 puff Inhalation Daily    guaiFENesin (MUCINEX) 12 hr tablet 600 mg  600 mg Oral Q12H HALEY    insulin lispro (HumALOG/ADMELOG) 100 units/mL subcutaneous injection 2-12 Units  2-12 Units Subcutaneous 4x Daily (AC & HS)    ipratropium-albuterol (DUO-NEB) 0.5-2.5 mg/3 mL inhalation solution 3 mL  3 mL Nebulization Q6H While awake    levothyroxine tablet 288 mcg  288 mcg Oral Daily    loratadine (CLARITIN) tablet 5 mg  5 mg Oral Daily    midodrine (PROAMATINE) tablet 5 mg  5 mg Oral TID AC    montelukast (SINGULAIR) tablet 10 mg  10 mg Oral HS    multivitamin-minerals (CENTRUM) tablet 1 tablet  1 tablet Oral Daily    pantoprazole (PROTONIX) EC tablet 40 mg  40 mg Oral Early Morning    traMADol (ULTRAM) tablet 50 mg  50 mg Oral Q12H PRN       "    Allergies   Allergen Reactions    Acesulfame Potassium - Food Allergy Anaphylaxis    Aspartame - Food Allergy Anaphylaxis    Saccharin - Food Allergy Anaphylaxis    Sucralose - Food Allergy Anaphylaxis    Bactrim [Sulfamethoxazole-Trimethoprim] Other (See Comments)     Makes blood pressure go up, fever, chills    Metformin GI Intolerance       Objective     Vitals: Blood pressure 93/57, pulse 85, temperature 98.9 °F (37.2 °C), temperature source Temporal, resp. rate 22, height 5' 1\" (1.549 m), weight (!) 175 kg (385 lb 5.8 oz), SpO2 100%., Body mass index is 72.81 kg/m².    Orthostatic Blood Pressures      Flowsheet Row Most Recent Value   Blood Pressure 93/57 filed at 2024 0734   Patient Position - Orthostatic VS Lying filed at 2024 0734            Systolic (24hrs), Av , Min:93 , Max:143     Diastolic (24hrs), Av, Min:56, Max:73        Intake/Output Summary (Last 24 hours) at 2024 0939  Last data filed at 2024 0801  Gross per 24 hour   Intake 1080 ml   Output 3235 ml   Net -2155 ml       Weight (last 2 days)       Date/Time Weight    24 0538 175 (385.36)    24 0400 173 (380.51)            Invasive Devices       Peripheral Intravenous Line  Duration             Peripheral IV 24 Left;Medial;Upper 3 days    Peripheral IV 24 Left;Ventral (anterior) 3 days    Long-Dwell Peripheral IV (Midline) 24 2 days              Drain  Duration             Urethral Catheter Straight-tip 16 Fr. 3 days                    Physical Exam  Vitals and nursing note reviewed.   Constitutional:       General: She is not in acute distress.     Appearance: She is well-developed. She is obese.   HENT:      Head: Normocephalic and atraumatic.   Eyes:      Conjunctiva/sclera: Conjunctivae normal.   Neck:      Vascular: JVD present.   Cardiovascular:      Rate and Rhythm: Normal rate and regular rhythm.      Heart sounds: No murmur heard.  Pulmonary:      Effort: Pulmonary effort is " normal. No respiratory distress.      Breath sounds: Normal breath sounds.   Abdominal:      Palpations: Abdomen is soft.      Tenderness: There is no abdominal tenderness.   Musculoskeletal:         General: No swelling.      Cervical back: Neck supple.   Skin:     General: Skin is warm and dry.      Capillary Refill: Capillary refill takes less than 2 seconds.   Neurological:      Mental Status: She is alert.   Psychiatric:         Mood and Affect: Mood normal.              Laboratory Results:          CBC with diff:   Results from last 7 days   Lab Units 06/30/24  0529 06/29/24  0541 06/28/24  0552 06/27/24  2235   WBC Thousand/uL 18.61* 19.90* 26.28* 23.90*   HEMOGLOBIN g/dL 11.4* 11.7 13.0 12.8   HEMATOCRIT % 35.7 36.5 40.0 40.1   MCV fL 94 95 95 94   PLATELETS Thousands/uL 173 197 197 228   RBC Million/uL 3.79* 3.83 4.21 4.26   MCH pg 30.1 30.5 30.9 30.0   MCHC g/dL 31.9 32.1 32.5 31.9   RDW % 17.8* 17.8* 18.4* 17.7*   MPV fL 10.9 10.6 12.4 10.6   NRBC /100 WBC  --   --   --  1         CMP:  Results from last 7 days   Lab Units 06/30/24  0529 06/29/24  0541 06/28/24  1153 06/27/24  2320 06/27/24  2235   POTASSIUM mmol/L 3.2* 4.6 5.1   < > 6.4*   CHLORIDE mmol/L 99 101 102   < > 101   CO2 mmol/L 30 28 24   < > 28   BUN mg/dL 45* 41* 35*   < > 30*   CREATININE mg/dL 1.23 1.24 1.16   < > 0.97   CALCIUM mg/dL 9.6 9.4 9.4   < > 9.8   AST U/L  --   --   --   --  45*   ALT U/L  --   --   --   --  16   ALK PHOS U/L  --   --   --   --  170*   EGFR ml/min/1.73sq m 44 43 47   < > 58    < > = values in this interval not displayed.       BMP:  Results from last 7 days   Lab Units 06/30/24  0529 06/29/24  0541 06/28/24  1153   POTASSIUM mmol/L 3.2* 4.6 5.1   CHLORIDE mmol/L 99 101 102   CO2 mmol/L 30 28 24   BUN mg/dL 45* 41* 35*   CREATININE mg/dL 1.23 1.24 1.16   CALCIUM mg/dL 9.6 9.4 9.4       BNP: 444    HS trop: 40->247    Magnesium:   Results from last 7 days   Lab Units 06/30/24  0529 06/29/24  0541 06/28/24  0552    MAGNESIUM mg/dL 2.0 2.0 2.1       Coags:   Results from last 7 days   Lab Units 06/30/24  0529 06/29/24  0541 06/28/24  0552 06/27/24  2250   PTT seconds  --   --   --  48*   INR  4.20* 4.66* 3.18* 2.80*       TSH:   2.7      Cardiac testing:     Reviewed echo from 12/2023.  Repeat echo pending.        Imaging: I have personally reviewed pertinent reports.      CT chest abdomen pelvis wo contrast    Result Date: 6/28/2024  Narrative: CT CHEST, ABDOMEN AND PELVIS WITHOUT IV CONTRAST INDICATION: Shortness of breath and altered mental status. COMPARISON: CTA chest/abdomen/pelvis 12/23/2023 TECHNIQUE: CT examination of the chest, abdomen and pelvis was performed without intravenous contrast. Multiplanar 2D reformatted images were created from the source data. This examination, like all CT scans performed in the [facility], was performed utilizing techniques to minimize radiation dose exposure, including the use of iterative reconstruction and automated exposure control. Radiation dose length product (DLP) for this visit: 2291 mGy-cm Enteric Contrast: Not administered. FINDINGS: CHEST LUNGS: No consolidation. No new or suspicious pulmonary nodules. Similar appearance of bilateral groundglass opacities/mosaicism throughout the lung fields which could be related to hypoinflation or air trapping/small airway disease, pneumonia could also  be considered in the appropriate clinical setting. Central airways are patent. There is a 4.4 x 4.3 cm cyst with internal septations in the right upper lobe with a 7 mm internal nodule along one of the septations, stable since 12/23/2023 (6:42). PLEURA: Unremarkable. HEART/GREAT VESSELS: Calcifications of the aortic arch. Moderate coronary artery calcification. Aortic valve calcification. Stable cardiomegaly. No pericardial effusion. No thoracic aortic aneurysm. Stable enlargement of the central pulmonary arterial tree, measuring up to 3.9 cm which can be seen with  pulmonary hypertension. MEDIASTINUM AND JIMMY: Scattered mediastinal lymph nodes, none measuring more than 1.0 cm in short axis. CHEST WALL AND LOWER NECK: Stable prominent collateral vessels throughout the anterior chest wall. ABDOMEN-Lack of IV contrast limits evaluation of solid viscera. LIVER/BILIARY TREE: Unremarkable. GALLBLADDER: Post cholecystectomy. SPLEEN: Unremarkable. PANCREAS: Unremarkable. ADRENAL GLANDS: Unremarkable. KIDNEYS/URETERS: Unremarkable. No hydronephrosis. STOMACH AND BOWEL: Colonic diverticulosis without findings of acute diverticulitis. Stomach is nearly completely decompressed, limiting evaluation. Small metallic density at the base of the cecum new from previous exam is nonspecific and may be correlated for any  interval invention. APPENDIX: No findings to suggest appendicitis. . ABDOMINOPELVIC CAVITY: Small amount of pelvic free fluid is nonspecific. No pneumoperitoneum. No lymphadenopathy. VESSELS: Atherosclerosis without abdominal aortic aneurysm. Recanalized umbilical vein, suggestive of underlying portal hypertension. PELVIS REPRODUCTIVE ORGANS: Unremarkable for patient's age. URINARY BLADDER: Collapsed and incompletely evaluated. ABDOMINAL WALL/INGUINAL REGIONS: Diffuse body wall edema. BONES: No acute fracture or suspicious osseous lesion. Spinal degenerative changes. Severe bilateral glenohumeral joint osteoarthritis.     Impression: 1.  No acute findings in the chest, abdomen, or pelvis. 2.  Similar appearance of diffuse groundglass opacities/mosaicism throughout the bilateral lung fields, which could be related to hypoinflation, air trapping/small airway disease, edema or pneumonia in the appropriate clinical setting. No new focal consolidations. 3.  Stable 4.4 cm right upper lobe cyst with internal septations and nodularity. Resident: JAYANT KENDRICK I, the attending radiologist, have reviewed the images and agree with the final report above. Workstation performed:  [id number]     CT head wo contrast    Result Date: 6/28/2024  Narrative: CT BRAIN - WITHOUT CONTRAST INDICATION:   altered mental status. COMPARISON:  None. TECHNIQUE:  CT examination of the brain was performed.  Multiplanar 2D reformatted images were created from the source data. Radiation dose length product (DLP) for this visit:  841 mGy-cm .  This examination, like all CT scans performed in the [facility], was performed utilizing techniques to minimize radiation dose exposure, including the use of iterative reconstruction and automated exposure control. IMAGE QUALITY:  Diagnostic. FINDINGS: PARENCHYMA: Decreased attenuation is noted in periventricular and subcortical white matter demonstrating an appearance that is statistically most likely to represent mild microangiopathic change. No CT signs of acute infarction.  No intracranial mass, mass effect or midline shift.  No acute parenchymal hemorrhage. VENTRICLES AND EXTRA-AXIAL SPACES:  Normal for the patient's age. VISUALIZED ORBITS: Normal visualized orbits. PARANASAL SINUSES: Partial opacification of the frontal sinus with some internal calcifications with no sinus expansion or air-fluid levels. CALVARIUM AND EXTRACRANIAL SOFT TISSUES: No acute osseous abnormality. Severe degenerative osteoarthritis of the left temporomandibular joint.     Impression: No acute intracranial abnormality. Minor white matter change suggestive of chronic microangiopathy. Chronic frontal sinus opacification. Workstation performed: [id number]     XR chest 1 view portable    Result Date: 6/28/2024  Narrative: XR CHEST PORTABLE INDICATION: Shortness of breath. COMPARISON: 5/8/2024 and 5/3/2024 FINDINGS: Heart is enlarged, even when accounting for portable technique. No pulmonary vascular cephalization. Left retrocardiac consolidation. No right-sided consolidation. No pneumothorax. Possible left pleural effusion. Bones are unremarkable for age. Normal upper abdomen.      Impression: Left retrocardiac consolidation may represent atelectasis associated with small effusion unless there is compelling clinical evidence for pneumonia. Workstation performed: [id number]     XR ankle 3+ vw left    Result Date: 6/10/2024  Narrative: LEFT ANKLE INDICATION:   Other fracture of left lower leg, subsequent encounter for closed fracture with routine healing. COMPARISON: May 25, 2024 VIEWS:  XR ANKLE 3+ VW LEFT Images: 3 FINDINGS: Nondisplaced fracture of the distal fibula is again identified with no significant interval healing. There is slight asymmetry of the ankle mortise. No significant degenerative changes. No lytic or blastic osseous lesion. Soft tissues are remarkable for diffuse vascular calcifications..     Impression: No significant interval healing of nondisplaced distal fibular fracture. Electronically signed: 06/10/2024 08:45 PM Missael Blount MD      EKG reviewed personally: EKG: Atrial fibrillation with RVR. RBBB. Inferolateral T wave abnormality, unchanged from prior ECG's.     Telemetry: ordered at time of consult.      Code Status: Level 3 - DNAR and DNI

## 2024-07-01 NOTE — CONSULTS
"Consultation - Orthopedics   Tigist Hewitt 69 y.o. female MRN: [id number]  Unit/Bed#: -01 Encounter: [id number]      Assessment & Plan     Assessment:  Fracture left ankle; ambulatory dysfunction; obesity  Plan:  Patient is permitted to bear weight as tolerated left lower extremity for stand to pivot which is all she really did prior to the injury which occurred approximately 5 1/2 weeks ago.  The patient has a cam boot at home and was instructed to have this brought in so that this may be worn when she is standing to pivot and get out of bed to chair and back from chair to bed.    History of Present Illness   Physician Requesting Consult: Amanda Dukes MD  Reason for Consult / Principal Problem: Fracture left ankle  HPI: Tigist Hewitt is a 69 y.o. year old female who presents with admission to the hospital for medical reasons.  She is currently being treated for a fracture of her distal left fibula and orthopedic consultation has been requested to determine the patient's weightbearing status.  She was initially seen during her hospitalization in May 2024, consultation completed on 5/26/2024 with an injury that occurred a couple days prior.  She states that she discontinued the \"soft cast\" applied at her last office visit.  She continues to complain of some mild lateral ankle pain but notes improvement.  Again, even prior to this injury, she was essentially a stand to pivot only and did not ambulate.    Inpatient consult to Orthopedic Surgery  Consult performed by: Westley Morse  Consult ordered by: Abena Gregory PA-C          Review of Systems: The patient's 10 organ system review is negative excluding the chief complaint and as is consistent with her past medical history.    Historical Information   Past Medical History:   Diagnosis Date    Asthma     Atrial fibrillation (HCC)     COPD (chronic obstructive pulmonary disease) (HCC)     Diabetes mellitus (HCC)     Disease of thyroid " "gland     Heart failure (HCC)     Kidney failure     Morbid obesity due to excess calories (HCC)     NISHI (obstructive sleep apnea)     UTI (urinary tract infection)      Past Surgical History:   Procedure Laterality Date    CARDIAC ELECTROPHYSIOLOGY MAPPING AND ABLATION      by Dr. Ferraro at [facility] [address]    CARDIOVERSION N/A     GALLBLADDER SURGERY      HERNIA REPAIR       Social History   Social History     Substance and Sexual Activity   Alcohol Use Never     Social History     Substance and Sexual Activity   Drug Use Never     E-Cigarette/Vaping    E-Cigarette Use Never User      E-Cigarette/Vaping Substances    Nicotine No     THC No     CBD No     Flavoring No     Other No     Unknown No      Social History     Tobacco Use   Smoking Status Never    Passive exposure: Never   Smokeless Tobacco Never     Family History: non-contributory    Meds/Allergies   all current active meds have been reviewed  Allergies   Allergen Reactions    Acesulfame Potassium - Food Allergy Anaphylaxis    Aspartame - Food Allergy Anaphylaxis    Saccharin - Food Allergy Anaphylaxis    Sucralose - Food Allergy Anaphylaxis    Bactrim [Sulfamethoxazole-Trimethoprim] Other (See Comments)     Makes blood pressure go up, fever, chills    Metformin GI Intolerance    Rocephin [Ceftriaxone] Itching       Objective   Vitals: Blood pressure 102/55, pulse (!) 117, temperature 98.9 °F (37.2 °C), temperature source Temporal, resp. rate (!) 37, height 5' 1\" (1.549 m), weight (!) 175 kg (385 lb 12.9 oz), SpO2 93%.,Body mass index is 72.9 kg/m².      Intake/Output Summary (Last 24 hours) at 7/1/2024 1607  Last data filed at 7/1/2024 1258  Gross per 24 hour   Intake 1320 ml   Output 2710 ml   Net -1390 ml     I/O last 24 hours:  In: 2000 [P.O.:1840; I.V.:60; IV Piggyback:100]  Out: 3860 [Urine:3860]    Invasive Devices       Peripheral Intravenous Line  Duration             Peripheral IV 06/27/24 Left;Medial;Upper 3 days    Peripheral IV 06/27/24 Left;Ventral " (anterior) 3 days    Long-Dwell Peripheral IV (Midline) 06/28/24 2 days              Drain  Duration             Urethral Catheter Straight-tip 16 Fr. 3 days                    Physical Exam: Patient's exam was limited to the left ankle/lower leg.  She continues to have dysvascular changes as well as generalized edema.  She denied any tenderness with palpation of the distal fibula or tibia.  Ankle ligaments are nontender.  Range of motion is limited consistent with her swelling.  Skin is intact.  Good color and capillary refill is noted although pulses are difficult to palpate.  Gross sensation is intact although somewhat decreased consistent with a history of Bittick neuropathy.    Lab Results: CBC:   Lab Results   Component Value Date    WBC 13.27 (H) 07/01/2024    HGB 11.2 (L) 07/01/2024    HCT 34.7 (L) 07/01/2024    MCV 94 07/01/2024     07/01/2024    RBC 3.68 (L) 07/01/2024    MCH 30.4 07/01/2024    MCHC 32.3 07/01/2024    RDW 17.6 (H) 07/01/2024    MPV 10.9 07/01/2024    NRBC 0 07/01/2024     CMP:   Lab Results   Component Value Date    SODIUM 138 07/01/2024    CL 98 07/01/2024    CO2 33 (H) 07/01/2024    BUN 50 (H) 07/01/2024    CREATININE 1.25 07/01/2024    CALCIUM 9.5 07/01/2024    AST 17 07/01/2024    ALT 14 07/01/2024    ALKPHOS 118 (H) 07/01/2024    EGFR 43 07/01/2024     Imaging Studies:  X-rays of the ankle obtained on 6/30/2024 demonstrate stable fracture alignment with early callus.  EKG, Pathology, and Other Studies: I have personally reviewed pertinent reports.

## 2024-07-01 NOTE — ASSESSMENT & PLAN NOTE
Wt Readings from Last 3 Encounters:   06/30/24 (!) 175 kg (385 lb 5.8 oz)   06/10/24 (!) 157 kg (346 lb)   05/29/24 (!) 157 kg (346 lb 12.5 oz)     With acute decompensation, wt up 18kg   12/2023 ECHO: EF 55% with severe TR, RVSP 53  Repeat ECHO ordered  Appreciate cardiology recs   Continue with IV Bumex 2mg bid   Daily weights, I&Os, Na/fluid restriction

## 2024-07-01 NOTE — ASSESSMENT & PLAN NOTE
Secondary to decompensated HF, pneumonia  CT c/a/p: diffuse GGO/mosaicism throughout the bilateral lung fields could be related to hypoinflation, air trapping/small airway disease, edema or pneumonia  Required CPAP since weaned back to baseline oxygen   Continue Rocephin to completed 7-day course   Continue IV Bumex 2mg IV bid   Speech eval

## 2024-07-01 NOTE — DISCHARGE INSTR - OTHER ORDERS
Skin care plans:  1-Calazime to sacrum, buttocks TID and PRN.  2-Hydraguard to bilateral heel BID and PRN.  3-Elevate heels to offload pressure.  4-Ehob cushion when out of bed.  5-Turn/reposition q2h or when medically stable for pressure re-distribution on skin.  6-Moisturize skin daily with skin nourishing cream.  7-Cleanse B/L LE skin folds with soap and water. Pat dry. Apply Interdry to skin folds (leave 2 inches exposed to wick away moisture). May leave in place for up to 5 days or can be changed sooner due to soilage. May wash, dry, and reuse for patient. DO NOT APPLY CREAMS OR POWDERS TO SKIN THAT INTERDRY WILL LAY IN.

## 2024-07-01 NOTE — WOUND OSTOMY CARE
Consult Note - Wound   Tigist Yenilaitus 69 y.o. female MRN: [id number]  Unit/Bed#: -01 Encounter: [id number]      History and Present Illness:  Admitted 6/27/24 Hypokalemia,sepsis,closed left ankle fracture. Pt has an extensive medical history significant for Morbid Obesity type 2 Dm  Chronic respiratory failure with hypoxia.    Assessment Findings:   Seen today for initial wound assessment for LE's,Pt seen in ICU alert and oriented, Max assist of 2-3 staff to turn patient, She is on a Low Air Loss Mattress. Dow catheter, using foam wedges to position off of sacrum .  1)Discussed with Rn. Posterior / medial upper thighs weeping, pt has chronic erythema edema. No open areas.recommended Interdry.  2)Right heel        Skin care plans:  1-Calazime to sacrum, buttocks TID and PRN.  2-Hydraguard to bilateral heel BID and PRN.  3-Elevate heels to offload pressure.  4-Ehob cushion when out of bed.  5-Turn/reposition q2h or when medically stable for pressure re-distribution on skin.  6-Moisturize skin daily with skin nourishing cream.  7-Cleanse B/L LE skin folds with soap and water. Pat dry. Apply Interdry to skin folds (leave 2 inches exposed to wick away moisture). May leave in place for up to 5 days or can be changed sooner due to soilage. May wash, dry, and reuse for patient. DO NOT APPLY CREAMS OR POWDERS TO SKIN THAT INTERDRY WILL LAY IN.    1)Right buttocks MASD partial thickness skin loss pink slow to annie           Call or tigertext with any questions  Wound Care will continue to follow weekly    Zainab CROFTN RN CWON

## 2024-07-01 NOTE — PLAN OF CARE
Problem: PAIN - ADULT  Goal: Verbalizes/displays adequate comfort level or baseline comfort level  Description: Interventions:  - Encourage patient to monitor pain and request assistance  - Assess pain using appropriate pain scale  - Administer analgesics based on type and severity of pain and evaluate response  - Implement non-pharmacological measures as appropriate and evaluate response  - Consider cultural and social influences on pain and pain management  - Notify physician/advanced practitioner if interventions unsuccessful or patient reports new pain  Outcome: Progressing     Problem: INFECTION - ADULT  Goal: Absence or prevention of progression during hospitalization  Description: INTERVENTIONS:  - Assess and monitor for signs and symptoms of infection  - Monitor lab/diagnostic results  - Monitor all insertion sites, i.e. indwelling lines, tubes, and drains  - Monitor endotracheal if appropriate and nasal secretions for changes in amount and color  - Howe appropriate cooling/warming therapies per order  - Administer medications as ordered  - Instruct and encourage patient and family to use good hand hygiene technique  - Identify and instruct in appropriate isolation precautions for identified infection/condition  Outcome: Progressing     Problem: SAFETY ADULT  Goal: Patient will remain free of falls  Description: INTERVENTIONS:  - Educate patient/family on patient safety including physical limitations  - Instruct patient to call for assistance with activity   - Consult OT/PT to assist with strengthening/mobility   - Keep Call bell within reach  - Keep bed low and locked with side rails adjusted as appropriate  - Keep care items and personal belongings within reach  - Initiate and maintain comfort rounds  - Make Fall Risk Sign visible to staff  - Offer Toileting every 2 Hours, in advance of need if unable to call for assistance  - Initiate/Maintain bed/chair alarm for confusion, impulsivity, or  noncompliance with calling for assistance  - Apply yellow socks and bracelet for high fall risk patients  - Consider moving patient to room near nurses station  Outcome: Progressing  Goal: Maintain or return to baseline ADL function  Description: INTERVENTIONS:  -  Assess patient's ability to carry out ADLs; assess patient's baseline for ADL function and identify physical deficits which impact ability to perform ADLs (bathing, care of mouth/teeth, toileting, grooming, dressing, etc.)  - Assess/evaluate cause of self-care deficits   - Assess range of motion  - Assess patient's mobility; develop plan if impaired  - Assess patient's need for assistive devices and provide as appropriate  - Encourage maximum independence but intervene and supervise when necessary  - Involve family in performance of ADLs  - Assess for home care needs following discharge   - Consider OT consult to assist with ADL evaluation and planning for discharge  - Provide patient education as appropriate  Outcome: Progressing  Goal: Maintains/Returns to pre admission functional level  Description: INTERVENTIONS:  - Perform AM-PAC 6 Click Basic Mobility/ Daily Activity assessment daily.  - Set and communicate daily mobility goal to care team and patient/family/caregiver.   - Collaborate with rehabilitation services on mobility goals if consulted  - Perform Range of Motion 3 times a day.  - Reposition patient every 2 hours if unable to reposition self  - Out of bed for toileting when medically appropriate  - Record patient progress and toleration of activity level   Outcome: Progressing     Problem: DISCHARGE PLANNING  Goal: Discharge to home or other facility with appropriate resources  Description: INTERVENTIONS:  - Identify barriers to discharge w/patient and caregiver  - Arrange for needed discharge resources and transportation as appropriate  - Identify discharge learning needs (meds, wound care, etc.)  - Arrange for interpretive services to  assist at discharge as needed  - Refer to Case Management Department for coordinating discharge planning if the patient needs post-hospital services based on physician/advanced practitioner order or complex needs related to functional status, cognitive ability, or social support system  Outcome: Progressing     Problem: CARDIOVASCULAR - ADULT  Goal: Maintains optimal cardiac output and hemodynamic stability  Description: INTERVENTIONS:  - Monitor I/O, vital signs and rhythm  - Monitor for S/S and trends of decreased cardiac output  - Administer and titrate ordered vasoactive medications to optimize hemodynamic stability  - Assess quality of pulses, skin color and temperature  - Assess for signs of decreased coronary artery perfusion  - Instruct patient to report change in severity of symptoms  Outcome: Progressing  Goal: Absence of cardiac dysrhythmias or at baseline rhythm  Description: INTERVENTIONS:  - Continuous cardiac monitoring, vital signs, obtain 12 lead EKG if ordered  - Administer antiarrhythmic and heart rate control medications as ordered  - Monitor electrolytes and administer replacement therapy as ordered  Outcome: Progressing     Problem: Knowledge Deficit  Goal: Patient/family/caregiver demonstrates understanding of disease process, treatment plan, medications, and discharge instructions  Description: Complete learning assessment and assess knowledge base.  Interventions:  - Provide teaching at level of understanding  - Provide teaching via preferred learning methods  Outcome: Progressing     Problem: RESPIRATORY - ADULT  Goal: Achieves optimal ventilation and oxygenation  Description: INTERVENTIONS:  - Assess for changes in respiratory status  - Assess for changes in mentation and behavior  - Position to facilitate oxygenation and minimize respiratory effort  - Oxygen administered by appropriate delivery if ordered  - Initiate smoking cessation education as indicated  - Encourage broncho-pulmonary  hygiene including cough, deep breathe, Incentive Spirometry  - Assess the need for suctioning and aspirate as needed  - Assess and instruct to report SOB or any respiratory difficulty  - Respiratory Therapy support as indicated  Outcome: Progressing     Problem: GENITOURINARY - ADULT  Goal: Maintains or returns to baseline urinary function  Description: INTERVENTIONS:  - Assess urinary function  - Encourage oral fluids to ensure adequate hydration if ordered  - Administer IV fluids as ordered to ensure adequate hydration  - Administer ordered medications as needed  - Offer frequent toileting  - Follow urinary retention protocol if ordered  Outcome: Progressing  Goal: Absence of urinary retention  Description: INTERVENTIONS:  - Assess patient’s ability to void and empty bladder  - Monitor I/O  - Bladder scan as needed  - Discuss with physician/AP medications to alleviate retention as needed  - Discuss catheterization for long term situations as appropriate  Outcome: Progressing  Goal: Urinary catheter remains patent  Description: INTERVENTIONS:  - Assess patency of urinary catheter  - If patient has a chronic frederick, consider changing catheter if non-functioning  - Follow guidelines for intermittent irrigation of non-functioning urinary catheter  Outcome: Progressing     Problem: METABOLIC, FLUID AND ELECTROLYTES - ADULT  Goal: Electrolytes maintained within normal limits  Description: INTERVENTIONS:  - Monitor labs and assess patient for signs and symptoms of electrolyte imbalances  - Administer electrolyte replacement as ordered  - Monitor response to electrolyte replacements, including repeat lab results as appropriate  - Instruct patient on fluid and nutrition as appropriate  Outcome: Progressing  Goal: Fluid balance maintained  Description: INTERVENTIONS:  - Monitor labs   - Monitor I/O and WT  - Instruct patient on fluid and nutrition as appropriate  - Assess for signs & symptoms of volume excess or  deficit  Outcome: Progressing     Problem: Prexisting or High Potential for Compromised Skin Integrity  Goal: Skin integrity is maintained or improved  Description: INTERVENTIONS:  - Identify patients at risk for skin breakdown  - Assess and monitor skin integrity  - Assess and monitor nutrition and hydration status  - Monitor labs   - Assess for incontinence   - Turn and reposition patient  - Assist with mobility/ambulation  - Relieve pressure over bony prominences  - Avoid friction and shearing  - Provide appropriate hygiene as needed including keeping skin clean and dry  - Evaluate need for skin moisturizer/barrier cream  - Collaborate with interdisciplinary team   - Patient/family teaching  - Consider wound care consult   Outcome: Progressing     Problem: Nutrition/Hydration-ADULT  Goal: Nutrient/Hydration intake appropriate for improving, restoring or maintaining nutritional needs  Description: Monitor and assess patient's nutrition/hydration status for malnutrition. Collaborate with interdisciplinary team and initiate plan and interventions as ordered.  Monitor patient's weight and dietary intake as ordered or per policy. Utilize nutrition screening tool and intervene as necessary. Determine patient's food preferences and provide high-protein, high-caloric foods as appropriate.     INTERVENTIONS:  - Monitor oral intake, urinary output, labs, and treatment plans  - Assess nutrition and hydration status and recommend course of action  - Evaluate amount of meals eaten  - Assist patient with eating if necessary   - Allow adequate time for meals  - Recommend/ encourage appropriate diets, oral nutritional supplements, and vitamin/mineral supplements  - Order, calculate, and assess calorie counts as needed  - Recommend, monitor, and adjust tube feedings and TPN/PPN based on assessed needs  - Assess need for intravenous fluids  - Provide specific nutrition/hydration education as appropriate  - Include  patient/family/caregiver in decisions related to nutrition  Outcome: Progressing

## 2024-07-02 LAB
ALBUMIN SERPL BCG-MCNC: 3 G/DL (ref 3.5–5)
ALP SERPL-CCNC: 110 U/L (ref 34–104)
ALT SERPL W P-5'-P-CCNC: 13 U/L (ref 7–52)
ANION GAP SERPL CALCULATED.3IONS-SCNC: 4 MMOL/L (ref 4–13)
ANION GAP SERPL CALCULATED.3IONS-SCNC: 8 MMOL/L (ref 4–13)
AORTIC ROOT: 3.1 CM
AORTIC VALVE MEAN VELOCITY: 9.8 M/S
ASCENDING AORTA: 2.9 CM
AST SERPL W P-5'-P-CCNC: 14 U/L (ref 13–39)
AV AREA BY CONTINUOUS VTI: 1.3 CM2
AV AREA PEAK VELOCITY: 1.3 CM2
AV LVOT MEAN GRADIENT: 1 MMHG
AV LVOT PEAK GRADIENT: 1 MMHG
AV MEAN GRADIENT: 4 MMHG
AV PEAK GRADIENT: 9 MMHG
AV VALVE AREA: 1.31 CM2
AV VELOCITY RATIO: 0.41
BASOPHILS # BLD AUTO: 0.04 THOUSANDS/ÂΜL (ref 0–0.1)
BASOPHILS NFR BLD AUTO: 0 % (ref 0–1)
BILIRUB SERPL-MCNC: 1.28 MG/DL (ref 0.2–1)
BSA FOR ECHO PROCEDURE: 2.5 M2
BUN SERPL-MCNC: 46 MG/DL (ref 5–25)
BUN SERPL-MCNC: 48 MG/DL (ref 5–25)
CALCIUM ALBUM COR SERPL-MCNC: 10.4 MG/DL (ref 8.3–10.1)
CALCIUM SERPL-MCNC: 9.6 MG/DL (ref 8.4–10.2)
CALCIUM SERPL-MCNC: 9.6 MG/DL (ref 8.4–10.2)
CHLORIDE SERPL-SCNC: 98 MMOL/L (ref 96–108)
CHLORIDE SERPL-SCNC: 99 MMOL/L (ref 96–108)
CO2 SERPL-SCNC: 35 MMOL/L (ref 21–32)
CO2 SERPL-SCNC: 37 MMOL/L (ref 21–32)
CREAT SERPL-MCNC: 1.28 MG/DL (ref 0.6–1.3)
CREAT SERPL-MCNC: 1.29 MG/DL (ref 0.6–1.3)
DOP CALC AO PEAK VEL: 1.48 M/S
DOP CALC AO VTI: 27.57 CM
DOP CALC LVOT AREA: 3.14 CM2
DOP CALC LVOT CARDIAC INDEX: 1.6 L/MIN/M2
DOP CALC LVOT CARDIAC OUTPUT: 4 L/MIN
DOP CALC LVOT DIAMETER: 2 CM
DOP CALC LVOT PEAK VEL VTI: 11.47 CM
DOP CALC LVOT PEAK VEL: 0.61 M/S
DOP CALC LVOT STROKE INDEX: 14.4 ML/M2
DOP CALC LVOT STROKE VOLUME: 36.02
EOSINOPHIL # BLD AUTO: 0.74 THOUSAND/ÂΜL (ref 0–0.61)
EOSINOPHIL NFR BLD AUTO: 7 % (ref 0–6)
ERYTHROCYTE [DISTWIDTH] IN BLOOD BY AUTOMATED COUNT: 17.4 % (ref 11.6–15.1)
FRACTIONAL SHORTENING: 7 (ref 28–44)
GFR SERPL CREATININE-BSD FRML MDRD: 41 ML/MIN/1.73SQ M
GFR SERPL CREATININE-BSD FRML MDRD: 42 ML/MIN/1.73SQ M
GLUCOSE SERPL-MCNC: 120 MG/DL (ref 65–140)
GLUCOSE SERPL-MCNC: 151 MG/DL (ref 65–140)
GLUCOSE SERPL-MCNC: 153 MG/DL (ref 65–140)
GLUCOSE SERPL-MCNC: 157 MG/DL (ref 65–140)
GLUCOSE SERPL-MCNC: 176 MG/DL (ref 65–140)
GLUCOSE SERPL-MCNC: 181 MG/DL (ref 65–140)
HCT VFR BLD AUTO: 34.7 % (ref 34.8–46.1)
HGB BLD-MCNC: 11.3 G/DL (ref 11.5–15.4)
IMM GRANULOCYTES # BLD AUTO: 0.16 THOUSAND/UL (ref 0–0.2)
IMM GRANULOCYTES NFR BLD AUTO: 1 % (ref 0–2)
INR PPP: 2.06 (ref 0.84–1.19)
INTERVENTRICULAR SEPTUM IN DIASTOLE (PARASTERNAL SHORT AXIS VIEW): 1.2 CM
INTERVENTRICULAR SEPTUM: 1.2 CM (ref 0.6–1.1)
LEFT ATRIUM SIZE: 4.6 CM
LEFT INTERNAL DIMENSION IN SYSTOLE: 4.3 CM (ref 2.1–4)
LEFT VENTRICULAR INTERNAL DIMENSION IN DIASTOLE: 4.6 CM (ref 3.5–6)
LEFT VENTRICULAR POSTERIOR WALL IN END DIASTOLE: 1.3 CM
LEFT VENTRICULAR STROKE VOLUME: 17 ML
LVSV (TEICH): 17 ML
LYMPHOCYTES # BLD AUTO: 0.62 THOUSANDS/ÂΜL (ref 0.6–4.47)
LYMPHOCYTES NFR BLD AUTO: 6 % (ref 14–44)
MAGNESIUM SERPL-MCNC: 2.1 MG/DL (ref 1.9–2.7)
MCH RBC QN AUTO: 30.9 PG (ref 26.8–34.3)
MCHC RBC AUTO-ENTMCNC: 32.6 G/DL (ref 31.4–37.4)
MCV RBC AUTO: 95 FL (ref 82–98)
MITRAL REGURGITATION PEAK VELOCITY: 4.15 M/S
MITRAL VALVE MEAN INFLOW VELOCITY: 3.09 M/S
MITRAL VALVE REGURGITANT PEAK GRADIENT: 69 MMHG
MONOCYTES # BLD AUTO: 1.12 THOUSAND/ÂΜL (ref 0.17–1.22)
MONOCYTES NFR BLD AUTO: 10 % (ref 4–12)
MV E'TISSUE VEL-LAT: 8 CM/S
MV E'TISSUE VEL-SEP: 8 CM/S
MV STENOSIS PRESSURE HALF TIME: 48 MS
MV VALVE AREA P 1/2 METHOD: 4.58
NEUTROPHILS # BLD AUTO: 8.53 THOUSANDS/ÂΜL (ref 1.85–7.62)
NEUTS SEG NFR BLD AUTO: 76 % (ref 43–75)
NRBC BLD AUTO-RTO: 0 /100 WBCS
PLATELET # BLD AUTO: 165 THOUSANDS/UL (ref 149–390)
PMV BLD AUTO: 10.8 FL (ref 8.9–12.7)
POTASSIUM SERPL-SCNC: 2.7 MMOL/L (ref 3.5–5.3)
POTASSIUM SERPL-SCNC: 3 MMOL/L (ref 3.5–5.3)
PROCALCITONIN SERPL-MCNC: 0.87 NG/ML
PROT SERPL-MCNC: 6.8 G/DL (ref 6.4–8.4)
PROTHROMBIN TIME: 23.5 SECONDS (ref 11.6–14.5)
RBC # BLD AUTO: 3.66 MILLION/UL (ref 3.81–5.12)
SL CV DOP CALC MV VTI RETROGRADE: 117.5 CM
SL CV MV MEAN GRADIENT RETROGRADE: 44 MMHG
SL CV PED ECHO LEFT VENTRICLE DIASTOLIC VOLUME (MOD BIPLANE) 2D: 98 ML
SL CV PED ECHO LEFT VENTRICLE SYSTOLIC VOLUME (MOD BIPLANE) 2D: 81 ML
SODIUM SERPL-SCNC: 140 MMOL/L (ref 135–147)
SODIUM SERPL-SCNC: 141 MMOL/L (ref 135–147)
TR MAX PG: 33 MMHG
TR PEAK VELOCITY: 2.9 M/S
TRICUSPID ANNULAR PLANE SYSTOLIC EXCURSION: 1 CM
TRICUSPID VALVE PEAK REGURGITATION VELOCITY: 2.87 M/S
WBC # BLD AUTO: 11.21 THOUSAND/UL (ref 4.31–10.16)

## 2024-07-02 PROCEDURE — 92610 EVALUATE SWALLOWING FUNCTION: CPT

## 2024-07-02 PROCEDURE — 99232 SBSQ HOSP IP/OBS MODERATE 35: CPT | Performed by: INTERNAL MEDICINE

## 2024-07-02 PROCEDURE — 80048 BASIC METABOLIC PNL TOTAL CA: CPT | Performed by: NURSE PRACTITIONER

## 2024-07-02 PROCEDURE — 94640 AIRWAY INHALATION TREATMENT: CPT

## 2024-07-02 PROCEDURE — 85610 PROTHROMBIN TIME: CPT

## 2024-07-02 PROCEDURE — 85025 COMPLETE CBC W/AUTO DIFF WBC: CPT

## 2024-07-02 PROCEDURE — 83735 ASSAY OF MAGNESIUM: CPT

## 2024-07-02 PROCEDURE — 94760 N-INVAS EAR/PLS OXIMETRY 1: CPT

## 2024-07-02 PROCEDURE — 84145 PROCALCITONIN (PCT): CPT

## 2024-07-02 PROCEDURE — 80053 COMPREHEN METABOLIC PANEL: CPT

## 2024-07-02 PROCEDURE — 92526 ORAL FUNCTION THERAPY: CPT

## 2024-07-02 PROCEDURE — 82948 REAGENT STRIP/BLOOD GLUCOSE: CPT

## 2024-07-02 PROCEDURE — 99232 SBSQ HOSP IP/OBS MODERATE 35: CPT | Performed by: FAMILY MEDICINE

## 2024-07-02 RX ORDER — WARFARIN SODIUM 5 MG/1
5 TABLET ORAL
Status: DISCONTINUED | OUTPATIENT
Start: 2024-07-02 | End: 2024-07-03

## 2024-07-02 RX ORDER — MIDODRINE HYDROCHLORIDE 5 MG/1
2.5 TABLET ORAL
Status: DISCONTINUED | OUTPATIENT
Start: 2024-07-02 | End: 2024-07-03

## 2024-07-02 RX ORDER — POTASSIUM CHLORIDE 14.9 MG/ML
20 INJECTION INTRAVENOUS
Status: COMPLETED | OUTPATIENT
Start: 2024-07-02 | End: 2024-07-02

## 2024-07-02 RX ADMIN — Medication 12.5 MG: at 22:28

## 2024-07-02 RX ADMIN — FERROUS SULFATE TAB 325 MG (65 MG ELEMENTAL FE) 325 MG: 325 (65 FE) TAB at 12:07

## 2024-07-02 RX ADMIN — LEVOTHYROXINE SODIUM 288 MCG: 88 TABLET ORAL at 05:43

## 2024-07-02 RX ADMIN — POTASSIUM CHLORIDE 20 MEQ: 14.9 INJECTION, SOLUTION INTRAVENOUS at 17:52

## 2024-07-02 RX ADMIN — GUAIFENESIN 600 MG: 600 TABLET ORAL at 22:28

## 2024-07-02 RX ADMIN — POTASSIUM CHLORIDE 20 MEQ: 14.9 INJECTION, SOLUTION INTRAVENOUS at 15:28

## 2024-07-02 RX ADMIN — LEVOFLOXACIN 750 MG: 750 INJECTION, SOLUTION INTRAVENOUS at 14:06

## 2024-07-02 RX ADMIN — ACETAMINOPHEN 325MG 650 MG: 325 TABLET ORAL at 22:28

## 2024-07-02 RX ADMIN — MIDODRINE HYDROCHLORIDE 2.5 MG: 5 TABLET ORAL at 17:07

## 2024-07-02 RX ADMIN — DIPHENHYDRAMINE HYDROCHLORIDE 25 MG: 25 TABLET ORAL at 14:10

## 2024-07-02 RX ADMIN — GUAIFENESIN 600 MG: 600 TABLET ORAL at 08:10

## 2024-07-02 RX ADMIN — MIDODRINE HYDROCHLORIDE 2.5 MG: 5 TABLET ORAL at 12:07

## 2024-07-02 RX ADMIN — IPRATROPIUM BROMIDE AND ALBUTEROL SULFATE 3 ML: 2.5; .5 SOLUTION RESPIRATORY (INHALATION) at 19:45

## 2024-07-02 RX ADMIN — BUMETANIDE 2 MG: 0.25 INJECTION INTRAMUSCULAR; INTRAVENOUS at 22:27

## 2024-07-02 RX ADMIN — LORATADINE 5 MG: 10 TABLET ORAL at 08:10

## 2024-07-02 RX ADMIN — Medication 2000 UNITS: at 08:10

## 2024-07-02 RX ADMIN — INSULIN LISPRO 2 UNITS: 100 INJECTION, SOLUTION INTRAVENOUS; SUBCUTANEOUS at 12:07

## 2024-07-02 RX ADMIN — POTASSIUM CHLORIDE 20 MEQ: 14.9 INJECTION, SOLUTION INTRAVENOUS at 11:06

## 2024-07-02 RX ADMIN — PANTOPRAZOLE SODIUM 40 MG: 40 TABLET, DELAYED RELEASE ORAL at 05:44

## 2024-07-02 RX ADMIN — ALLOPURINOL 100 MG: 100 TABLET ORAL at 08:10

## 2024-07-02 RX ADMIN — ACETAMINOPHEN 325MG 650 MG: 325 TABLET ORAL at 12:53

## 2024-07-02 RX ADMIN — MIDODRINE HYDROCHLORIDE 5 MG: 5 TABLET ORAL at 05:44

## 2024-07-02 RX ADMIN — FLUTICASONE FUROATE AND VILANTEROL TRIFENATATE 1 PUFF: 100; 25 POWDER RESPIRATORY (INHALATION) at 08:11

## 2024-07-02 RX ADMIN — INSULIN LISPRO 2 UNITS: 100 INJECTION, SOLUTION INTRAVENOUS; SUBCUTANEOUS at 07:41

## 2024-07-02 RX ADMIN — IPRATROPIUM BROMIDE AND ALBUTEROL SULFATE 3 ML: 2.5; .5 SOLUTION RESPIRATORY (INHALATION) at 09:25

## 2024-07-02 RX ADMIN — ATORVASTATIN CALCIUM 10 MG: 10 TABLET, FILM COATED ORAL at 08:10

## 2024-07-02 RX ADMIN — DOCUSATE SODIUM 100 MG: 100 CAPSULE, LIQUID FILLED ORAL at 08:10

## 2024-07-02 RX ADMIN — WARFARIN SODIUM 5 MG: 5 TABLET ORAL at 17:08

## 2024-07-02 RX ADMIN — POTASSIUM CHLORIDE 20 MEQ: 14.9 INJECTION, SOLUTION INTRAVENOUS at 08:19

## 2024-07-02 RX ADMIN — Medication 1 TABLET: at 08:10

## 2024-07-02 RX ADMIN — INSULIN LISPRO 2 UNITS: 100 INJECTION, SOLUTION INTRAVENOUS; SUBCUTANEOUS at 17:08

## 2024-07-02 RX ADMIN — IPRATROPIUM BROMIDE AND ALBUTEROL SULFATE 3 ML: 2.5; .5 SOLUTION RESPIRATORY (INHALATION) at 15:12

## 2024-07-02 RX ADMIN — MONTELUKAST 10 MG: 10 TABLET, FILM COATED ORAL at 22:27

## 2024-07-02 RX ADMIN — Medication 12.5 MG: at 08:10

## 2024-07-02 NOTE — ASSESSMENT & PLAN NOTE
Recent closed left ankle fracture, being followed by orthopedic surgery   Missed outpatient appt due to current hospitalizations   Last recommendation for NWB status  Orthopedic recommendation appreciated

## 2024-07-02 NOTE — ASSESSMENT & PLAN NOTE
Wt Readings from Last 3 Encounters:   07/02/24 (!) 167 kg (367 lb 11.6 oz)   06/10/24 (!) 157 kg (346 lb)   05/29/24 (!) 157 kg (346 lb 12.5 oz)     With acute decompensation, wt up 18kg   12/2023 ECHO: EF 55% with severe TR, RVSP 53  Repeat ECHO ordered  Appreciate cardiology recs   Continue with IV Bumex 2mg bid -remain on hold due to severe hypokalemia, potassium is 2.7-receiving supplement.  Daily weights, I&Os, Na/fluid restriction

## 2024-07-02 NOTE — PLAN OF CARE
Pt continues on IV diuretics for acute CHF.  Pt on 4L NC and also through CPAP at HS.  Pt coughs with every sip of thin liquids.  Pt given meds with applesauce without coughing.  Pt for speech consult today to evaluate patient's swallowing.  Will not give thin liquids until evaluated by speech.  Labs sent and results pending.  Pt remains in afib w/ PVCs.  Will continue to monitor and support as needed    Problem: PAIN - ADULT  Goal: Verbalizes/displays adequate comfort level or baseline comfort level  Description: Interventions:  - Encourage patient to monitor pain and request assistance  - Assess pain using appropriate pain scale  - Administer analgesics based on type and severity of pain and evaluate response  - Implement non-pharmacological measures as appropriate and evaluate response  - Consider cultural and social influences on pain and pain management  - Notify physician/advanced practitioner if interventions unsuccessful or patient reports new pain  Outcome: Progressing     Problem: INFECTION - ADULT  Goal: Absence or prevention of progression during hospitalization  Description: INTERVENTIONS:  - Assess and monitor for signs and symptoms of infection  - Monitor lab/diagnostic results  - Monitor all insertion sites, i.e. indwelling lines, tubes, and drains  - Monitor endotracheal if appropriate and nasal secretions for changes in amount and color  - [place] appropriate cooling/warming therapies per order  - Administer medications as ordered  - Instruct and encourage patient and family to use good hand hygiene technique  - Identify and instruct in appropriate isolation precautions for identified infection/condition  Outcome: Progressing     Problem: SAFETY ADULT  Goal: Patient will remain free of falls  Description: INTERVENTIONS:  - Educate patient/family on patient safety including physical limitations  - Instruct patient to call for assistance with activity   - Consult OT/PT to assist with  strengthening/mobility   - Keep Call bell within reach  - Keep bed low and locked with side rails adjusted as appropriate  - Keep care items and personal belongings within reach  - Initiate and maintain comfort rounds  - Make Fall Risk Sign visible to staff  - Offer Toileting every 2 Hours, in advance of need if unable to call for assistance  - Initiate/Maintain bed/chair alarm for confusion, impulsivity, or noncompliance with calling for assistance  - Apply yellow socks and bracelet for high fall risk patients  - Consider moving patient to room near nurses station  Outcome: Progressing  Goal: Maintain or return to baseline ADL function  Description: INTERVENTIONS:  -  Assess patient's ability to carry out ADLs; assess patient's baseline for ADL function and identify physical deficits which impact ability to perform ADLs (bathing, care of mouth/teeth, toileting, grooming, dressing, etc.)  - Assess/evaluate cause of self-care deficits   - Assess range of motion  - Assess patient's mobility; develop plan if impaired  - Assess patient's need for assistive devices and provide as appropriate  - Encourage maximum independence but intervene and supervise when necessary  - Involve family in performance of ADLs  - Assess for home care needs following discharge   - Consider OT consult to assist with ADL evaluation and planning for discharge  - Provide patient education as appropriate  Outcome: Progressing  Goal: Maintains/Returns to pre admission functional level  Description: INTERVENTIONS:  - Perform AM-PAC 6 Click Basic Mobility/ Daily Activity assessment daily.  - Set and communicate daily mobility goal to care team and patient/family/caregiver.   - Collaborate with rehabilitation services on mobility goals if consulted  - Perform Range of Motion 3 times a day.  - Reposition patient every 2 hours if unable to reposition self  - Out of bed for toileting when medically appropriate  - Record patient progress and toleration  of activity level   Outcome: Progressing     Problem: DISCHARGE PLANNING  Goal: Discharge to home or other facility with appropriate resources  Description: INTERVENTIONS:  - Identify barriers to discharge w/patient and caregiver  - Arrange for needed discharge resources and transportation as appropriate  - Identify discharge learning needs (meds, wound care, etc.)  - Arrange for interpretive services to assist at discharge as needed  - Refer to Case Management Department for coordinating discharge planning if the patient needs post-hospital services based on physician/advanced practitioner order or complex needs related to functional status, cognitive ability, or social support system  Outcome: Progressing     Problem: Knowledge Deficit  Goal: Patient/family/caregiver demonstrates understanding of disease process, treatment plan, medications, and discharge instructions  Description: Complete learning assessment and assess knowledge base.  Interventions:  - Provide teaching at level of understanding  - Provide teaching via preferred learning methods  Outcome: Progressing     Problem: CARDIOVASCULAR - ADULT  Goal: Maintains optimal cardiac output and hemodynamic stability  Description: INTERVENTIONS:  - Monitor I/O, vital signs and rhythm  - Monitor for S/S and trends of decreased cardiac output  - Administer and titrate ordered vasoactive medications to optimize hemodynamic stability  - Assess quality of pulses, skin color and temperature  - Assess for signs of decreased coronary artery perfusion  - Instruct patient to report change in severity of symptoms  Outcome: Progressing  Goal: Absence of cardiac dysrhythmias or at baseline rhythm  Description: INTERVENTIONS:  - Continuous cardiac monitoring, vital signs, obtain 12 lead EKG if ordered  - Administer antiarrhythmic and heart rate control medications as ordered  - Monitor electrolytes and administer replacement therapy as ordered  Outcome: Progressing      Problem: RESPIRATORY - ADULT  Goal: Achieves optimal ventilation and oxygenation  Description: INTERVENTIONS:  - Assess for changes in respiratory status  - Assess for changes in mentation and behavior  - Position to facilitate oxygenation and minimize respiratory effort  - Oxygen administered by appropriate delivery if ordered  - Initiate smoking cessation education as indicated  - Encourage broncho-pulmonary hygiene including cough, deep breathe, Incentive Spirometry  - Assess the need for suctioning and aspirate as needed  - Assess and instruct to report SOB or any respiratory difficulty  - Respiratory Therapy support as indicated  Outcome: Progressing     Problem: GENITOURINARY - ADULT  Goal: Maintains or returns to baseline urinary function  Description: INTERVENTIONS:  - Assess urinary function  - Encourage oral fluids to ensure adequate hydration if ordered  - Administer IV fluids as ordered to ensure adequate hydration  - Administer ordered medications as needed  - Offer frequent toileting  - Follow urinary retention protocol if ordered  Outcome: Progressing  Goal: Absence of urinary retention  Description: INTERVENTIONS:  - Assess patient’s ability to void and empty bladder  - Monitor I/O  - Bladder scan as needed  - Discuss with physician/AP medications to alleviate retention as needed  - Discuss catheterization for long term situations as appropriate  Outcome: Progressing  Goal: Urinary catheter remains patent  Description: INTERVENTIONS:  - Assess patency of urinary catheter  - If patient has a chronic frederick, consider changing catheter if non-functioning  - Follow guidelines for intermittent irrigation of non-functioning urinary catheter  Outcome: Progressing     Problem: METABOLIC, FLUID AND ELECTROLYTES - ADULT  Goal: Electrolytes maintained within normal limits  Description: INTERVENTIONS:  - Monitor labs and assess patient for signs and symptoms of electrolyte imbalances  - Administer electrolyte  replacement as ordered  - Monitor response to electrolyte replacements, including repeat lab results as appropriate  - Instruct patient on fluid and nutrition as appropriate  Outcome: Progressing  Goal: Fluid balance maintained  Description: INTERVENTIONS:  - Monitor labs   - Monitor I/O and WT  - Instruct patient on fluid and nutrition as appropriate  - Assess for signs & symptoms of volume excess or deficit  Outcome: Progressing  Goal: Glucose maintained within target range  Description: INTERVENTIONS:  - Monitor Blood Glucose as ordered  - Assess for signs and symptoms of hyperglycemia and hypoglycemia  - Administer ordered medications to maintain glucose within target range  - Assess nutritional intake and initiate nutrition service referral as needed  Outcome: Progressing     Problem: Prexisting or High Potential for Compromised Skin Integrity  Goal: Skin integrity is maintained or improved  Description: INTERVENTIONS:  - Identify patients at risk for skin breakdown  - Assess and monitor skin integrity  - Assess and monitor nutrition and hydration status  - Monitor labs   - Assess for incontinence   - Turn and reposition patient  - Assist with mobility/ambulation  - Relieve pressure over bony prominences  - Avoid friction and shearing  - Provide appropriate hygiene as needed including keeping skin clean and dry  - Evaluate need for skin moisturizer/barrier cream  - Collaborate with interdisciplinary team   - Patient/family teaching  - Consider wound care consult   Outcome: Progressing     Problem: Nutrition/Hydration-ADULT  Goal: Nutrient/Hydration intake appropriate for improving, restoring or maintaining nutritional needs  Description: Monitor and assess patient's nutrition/hydration status for malnutrition. Collaborate with interdisciplinary team and initiate plan and interventions as ordered.  Monitor patient's weight and dietary intake as ordered or per policy. Utilize nutrition screening tool and  intervene as necessary. Determine patient's food preferences and provide high-protein, high-caloric foods as appropriate.     INTERVENTIONS:  - Monitor oral intake, urinary output, labs, and treatment plans  - Assess nutrition and hydration status and recommend course of action  - Evaluate amount of meals eaten  - Assist patient with eating if necessary   - Allow adequate time for meals  - Recommend/ encourage appropriate diets, oral nutritional supplements, and vitamin/mineral supplements  - Order, calculate, and assess calorie counts as needed  - Recommend, monitor, and adjust tube feedings and TPN/PPN based on assessed needs  - Assess need for intravenous fluids  - Provide specific nutrition/hydration education as appropriate  - Include patient/family/caregiver in decisions related to nutrition  Outcome: Progressing     Problem: SKIN/TISSUE INTEGRITY - ADULT  Goal: Incision(s), wounds(s) or drain site(s) healing without S/S of infection  Description: INTERVENTIONS  - Assess and document dressing, incision, wound bed, drain sites and surrounding tissue  - Provide patient and family education  - Perform skin care/dressing changes per wound consult recommendations  Outcome: Progressing

## 2024-07-02 NOTE — ASSESSMENT & PLAN NOTE
1/2 blood cultures Staph epidermidis -likely contamination, coagulase-negative.  Repeat blood culture negative in 48 hours

## 2024-07-02 NOTE — SPEECH THERAPY NOTE
"Speech Language/Pathology    Speech/Language Pathology Progress Note    Patient Name: Tigist Hewitt  Today's Date: 7/2/2024       Assessment:  Followed up with patient for further mechanical soft trials during lunch meal. Pt slightly reclined in kreg bed. Pt reports she will sleep all afternoon because she is exhausted.   Seen w/ ground meats, chopped green beans and mashed potatoes w/ nectar thick via cup. Pt reported the soft foods are \"going down better.\" No overt s/s aspiration w/ solids or liquids.    Plan/Recommendations:  [facility]  Meds whole in puree  SLP will continue to follow for diet education returning home and possible VBS    Angeli Geiger MS CCC-SLP  7/2/2024       "

## 2024-07-02 NOTE — PROGRESS NOTES
[facility]  Progress Note  Name: Tigist Hewitt I  MRN: [id number]  Unit/Bed#: -01 I Date of Admission: 6/27/2024   Date of Service: 7/2/2024 I Hospital Day: 4    Assessment & Plan   Acute on chronic diastolic congestive heart failure (HCC)  Assessment & Plan  Wt Readings from Last 3 Encounters:   07/02/24 (!) 167 kg (367 lb 11.6 oz)   06/10/24 (!) 157 kg (346 lb)   05/29/24 (!) 157 kg (346 lb 12.5 oz)     With acute decompensation, wt up 18kg   12/2023 ECHO: EF 55% with severe TR, RVSP 53  Repeat ECHO ordered  Appreciate cardiology recs   Continue with IV Bumex 2mg bid -remain on hold due to severe hypokalemia, potassium is 2.7-receiving supplement.  Daily weights, I&Os, Na/fluid restriction        Type 2 diabetes mellitus with hyperglycemia, with long-term current use of insulin ([facility])  Assessment & Plan  Lab Results   Component Value Date    HGBA1C 7.0 (H) 05/02/2024       Recent Labs     07/01/24  1206 07/01/24  1625 07/01/24  2045 07/02/24  0728   POCGLU 199* 197* 146* 153*         Blood Sugar Average: Last 72 hrs:  (P) 171    Hold home diabetic medications.  SSI alg 4  ACHS fingersticks    Atrial fibrillation with RVR (HCC)  Assessment & Plan  Hx of persistent atrial fibrillation, failed prior cardioversion/ablation in past   Developed A fib with RVR over admission secondary to septic shock requiring amiodarone gtt since weaned  Appreciate cardiology recs  BB has been on hold with relative hypotension, since improved with midodrine. Resumed metoprolol tartrate 12.5mg bid  Can use digoxin for rate control if needed   INR is therapeutic 2.06, resume Coumadin-if patient remain n.p.o., consider heparin drip  Patient found hypokalemia most likely diuretic effect, potassium was 2.7, target potassium range to keep more than 4, magnesium more than 2    * Sepsis (HCC)  Assessment & Plan  Met on admission with leukocytosis, tachycardia, tachypnea  Developed hypotension over  admission improved with albumin/midodrine. 30cc/kg bolus not given due to CHF exacerbation  CT chest with possible pneumonia in the appropriate setting  Was started on IV ceftriaxone, continue to completed 7-day course  Blood cultures x 3 negative. Initial blood cultures 1/2 positive, suspected xfaunejzfzu-zlprwixvy-rzkqukze.  Patient developed pruritus required Benadryl and Pepcid with Rocephin.  Antibiotic switched to levofloxacin    COPD (chronic obstructive pulmonary disease) (HCC)  Assessment & Plan  Less likely in exacerbation  At baseline O2 requirement of 3L NC  Continue home inhalers    Closed left ankle fracture  Assessment & Plan  Recent closed left ankle fracture, being followed by orthopedic surgery   Missed outpatient appt due to current hospitalizations   Last recommendation for NWB status  Orthopedic recommendation appreciated    Positive blood culture  Assessment & Plan  1/2 blood cultures Staph epidermidis -likely contamination, coagulase-negative.  Repeat blood culture negative in 48 hours    Hypokalemia  Assessment & Plan  K 2.8  Continue IV supplement, recheck  Goal > 4   Telemetry     Acute on chronic respiratory failure with hypoxia (HCC)  Assessment & Plan  Secondary to decompensated HF, pneumonia  CT c/a/p: diffuse GGO/mosaicism throughout the bilateral lung fields could be related to hypoinflation, air trapping/small airway disease, edema or pneumonia  Required CPAP since weaned back to baseline oxygen   Continue Rocephin to completed 7-day course   Continue IV Bumex 2mg IV bid -remain on hold due to hypokalemia, patient is requiring IV supplemental potassium.  Speech eval       NISHI (obstructive sleep apnea)  Assessment & Plan  Continue CPAP HS     Morbid obesity (HCC)  Assessment & Plan  Consider dietary consult.   Encourage exercise and lifestyle changes.    Supratherapeutic INR  Assessment & Plan  Is maintained on warfarin 7.5mg qhs  INR is 2.06, resume Coumadin-patient remain n.p.o.,  consider heparin drip               VTE Pharmacologic Prophylaxis: VTE Score: 11 High Risk (Score >/= 5) - Pharmacological DVT Prophylaxis Ordered: warfarin (Coumadin). Sequential Compression Devices Ordered.    Mobility:   Basic Mobility Inpatient Raw Score: 7  JH-HLM Goal: 2: Bed activities/Dependent transfer  JH-HLM Achieved: 2: Bed activities/Dependent transfer  JH-HLM Goal achieved. Continue to encourage appropriate mobility.    Patient Centered Rounds: I performed bedside rounds with nursing staff today.   Discussions with Specialists or Other Care Team Provider: Cardiology    Education and Discussions with Family / Patient:  with patient.     Current Length of Stay: 4 day(s)  Current Patient Status: Inpatient   Certification Statement: The patient will continue to require additional inpatient hospital stay due to monitorable conditions  Discharge Plan: Anticipate discharge in >72 hrs to to be determined    Code Status: Level 3 - DNAR and DNI    Subjective:   Seen and evaluated during the rounds.  Patient is alert but is still weak.    Objective:     Vitals:   Temp (24hrs), Av.6 °F (36.4 °C), Min:96.9 °F (36.1 °C), Max:97.9 °F (36.6 °C)    Temp:  [96.9 °F (36.1 °C)-97.9 °F (36.6 °C)] 97.6 °F (36.4 °C)  HR:  [] 122  Resp:  [14-43] 39  BP: (101-127)/(55-69) 107/55  SpO2:  [91 %-100 %] 94 %  Body mass index is 69.48 kg/m².     Input and Output Summary (last 24 hours):     Intake/Output Summary (Last 24 hours) at 2024 0837  Last data filed at 2024 0742  Gross per 24 hour   Intake 890 ml   Output 3175 ml   Net -2285 ml       Physical Exam:   Physical Exam  Vitals and nursing note reviewed.   Constitutional:       Appearance: She is obese. She is ill-appearing. She is not diaphoretic.   Eyes:      General: No scleral icterus.        Left eye: No discharge.      Extraocular Movements: Extraocular movements intact.      Pupils: Pupils are equal, round, and reactive to light.   Cardiovascular:       Rate and Rhythm: Tachycardia present. Rhythm irregular.      Heart sounds:      No friction rub. No gallop.   Pulmonary:      Effort: Pulmonary effort is normal. No respiratory distress.      Breath sounds: No stridor. No wheezing or rhonchi.   Abdominal:      General: There is no distension.      Palpations: There is no mass.      Tenderness: There is no abdominal tenderness.      Hernia: No hernia is present.   Musculoskeletal:         General: Tenderness (left ankle) present.      Right lower leg: Edema present.      Left lower leg: Edema present.   Neurological:      Mental Status: She is alert and oriented to person, place, and time.      Cranial Nerves: No cranial nerve deficit.      Sensory: No sensory deficit.      Motor: No weakness.      Coordination: Coordination normal.       Additional Data:     Labs:  Results from last 7 days   Lab Units 07/02/24  0547 06/28/24  0552 06/27/24  2235   WBC Thousand/uL 11.21*   < > 23.90*   HEMOGLOBIN g/dL 11.3*   < > 12.8   HEMATOCRIT % 34.7*   < > 40.1   PLATELETS Thousands/uL 165   < > 228   BANDS PCT %  --   --  9*   SEGS PCT % 76*   < >  --    LYMPHO PCT % 6*   < > 2*   MONO PCT % 10   < > 5   EOS PCT % 7*   < > 0    < > = values in this interval not displayed.     Results from last 7 days   Lab Units 07/02/24  0547   SODIUM mmol/L 141   POTASSIUM mmol/L 2.7*   CHLORIDE mmol/L 98   CO2 mmol/L 35*   BUN mg/dL 48*   CREATININE mg/dL 1.29   ANION GAP mmol/L 8   CALCIUM mg/dL 9.6   ALBUMIN g/dL 3.0*   TOTAL BILIRUBIN mg/dL 1.28*   ALK PHOS U/L 110*   ALT U/L 13   AST U/L 14   GLUCOSE RANDOM mg/dL 157*     Results from last 7 days   Lab Units 07/02/24  0547   INR  2.06*     Results from last 7 days   Lab Units 07/02/24  0728 07/01/24  2045 07/01/24  1625 07/01/24  1206 07/01/24  0733 06/30/24  2106 06/30/24  1629 06/30/24  1115 06/30/24  0726 06/29/24  2124 06/29/24  1659 06/29/24  1131   POC GLUCOSE mg/dl 153* 146* 197* 199* 142* 176* 182* 195* 137 173* 162* 202*          Results from last 7 days   Lab Units 07/02/24  0547 06/30/24  0529 06/29/24  0541 06/28/24  0552 06/27/24  2329 06/27/24  2320   LACTIC ACID mmol/L  --   --   --   --  1.4  --    PROCALCITONIN ng/ml 0.87* 1.88* 3.13* 1.21*  --  0.31*       Lines/Drains:  Invasive Devices       Peripheral Intravenous Line  Duration             Long-Dwell Peripheral IV (Midline) 06/28/24 Left Cephalic Vein 3 days              Drain  Duration             Urethral Catheter Straight-tip 16 Fr. 4 days                  Urinary Catheter:  Goal for removal: N/A - Chronic Dow               Imaging: No pertinent imaging reviewed.    Recent Cultures (last 7 days):   Results from last 7 days   Lab Units 06/29/24  0541 06/28/24  0146 06/27/24  2307   BLOOD CULTURE  No Growth at 48 hrs.  No Growth at 48 hrs.  --  No Growth at 72 hrs.  Staphylococcus epidermidis*   GRAM STAIN RESULT   --   --  Gram positive cocci in clusters*   URINE CULTURE   --  <10,000 cfu/ml  --        Last 24 Hours Medication List:   Current Facility-Administered Medications   Medication Dose Route Frequency Provider Last Rate    acetaminophen  650 mg Oral Q6H PRN ROGELIO AhujaNP      albuterol  2.5 mg Nebulization Q6H PRN Abena Gregory PA-C      allopurinol  100 mg Oral Daily Gloria Sal, ROGELIONP      atorvastatin  10 mg Oral Daily Gloria Sal, ROGELIONP      bumetanide  2 mg Intravenous BID Ashly Vergara PA-C      Cholecalciferol  2,000 Units Oral Daily Gloria Sal, CAMPBELL      diphenhydrAMINE  25 mg Oral Q6H PRN Gloria Sal, ROGELIONP      docusate sodium  100 mg Oral Daily Gloria Sal, ROGELIONP      ferrous sulfate  325 mg Oral Daily With Breakfast CAMPBELL Ahuja      Fluticasone Furoate-Vilanterol  1 puff Inhalation Daily Gloria Sal, ROGELIONP      guaiFENesin  600 mg Oral Q12H HALEY CAMPBELL Ahuja      insulin lispro  2-12 Units Subcutaneous 4x Daily (AC & HS) CAMPBELL Ahuja      ipratropium-albuterol  3 mL Nebulization Q6H While awake  Amanda Dukes MD      levofloxacin  750 mg Intravenous Q24H Abena Gregory, JUAN ANTONIO      levothyroxine  288 mcg Oral Daily Gloria Sal, CRNP      loratadine  5 mg Oral Daily Gloria Sal, CRNP      metoprolol tartrate  12.5 mg Oral Q12H [facility] Desi Stack, CRNP      midodrine  5 mg Oral TID AC Gloria Sal, CRNP      montelukast  10 mg Oral HS Gloria Sal, CRNP      multivitamin-minerals  1 tablet Oral Daily Gloria Sal, CRNP      pantoprazole  40 mg Oral Early Morning Gloria Sal, CRNP      polyethylene glycol  17 g Oral Daily PRN Abena Gregory PA-C      potassium chloride  20 mEq Intravenous Q2H Desi Stack, CRNP 20 mEq (07/02/24 0819)    traMADol  50 mg Oral Q12H PRN Gloria Sal, CRNP      warfarin  5 mg Oral Daily (warfarin) Pedro Price MD          Today, Patient Was Seen By: Pedro Price MD    **Please Note: This note may have been constructed using a voice recognition system.**

## 2024-07-02 NOTE — ASSESSMENT & PLAN NOTE
Lab Results   Component Value Date    HGBA1C 7.0 (H) 05/02/2024       Recent Labs     07/01/24  1206 07/01/24  1625 07/01/24 2045 07/02/24  0728   POCGLU 199* 197* 146* 153*         Blood Sugar Average: Last 72 hrs:  (P) 171    Hold home diabetic medications.  SSI alg 4  ACHS fingersticks

## 2024-07-02 NOTE — ASSESSMENT & PLAN NOTE
Met on admission with leukocytosis, tachycardia, tachypnea  Developed hypotension over admission improved with albumin/midodrine. 30cc/kg bolus not given due to CHF exacerbation  CT chest with possible pneumonia in the appropriate setting  Was started on IV ceftriaxone, continue to completed 7-day course  Blood cultures x 3 negative. Initial blood cultures 1/2 positive, suspected slkyocmsioz-hmfjmcgyz-bmegedml.  Patient developed pruritus required Benadryl and Pepcid with Rocephin.  Antibiotic switched to levofloxacin

## 2024-07-02 NOTE — DISCHARGE INSTR - DIET
Level 2 mechanical soft/ nectar thick  Meds whole in puree    Recommend outpatient VBS at [facility] or similar facility that can accommodate bariatric patient

## 2024-07-02 NOTE — PLAN OF CARE
Problem: PAIN - ADULT  Goal: Verbalizes/displays adequate comfort level or baseline comfort level  Description: Interventions:  - Encourage patient to monitor pain and request assistance  - Assess pain using appropriate pain scale  - Administer analgesics based on type and severity of pain and evaluate response  - Implement non-pharmacological measures as appropriate and evaluate response  - Consider cultural and social influences on pain and pain management  - Notify physician/advanced practitioner if interventions unsuccessful or patient reports new pain  Outcome: Progressing     Problem: INFECTION - ADULT  Goal: Absence or prevention of progression during hospitalization  Description: INTERVENTIONS:  - Assess and monitor for signs and symptoms of infection  - Monitor lab/diagnostic results  - Monitor all insertion sites, i.e. indwelling lines, tubes, and drains  - Monitor endotracheal if appropriate and nasal secretions for changes in amount and color  - [place] appropriate cooling/warming therapies per order  - Administer medications as ordered  - Instruct and encourage patient and family to use good hand hygiene technique  - Identify and instruct in appropriate isolation precautions for identified infection/condition  Outcome: Progressing     Problem: SAFETY ADULT  Goal: Patient will remain free of falls  Description: INTERVENTIONS:  - Educate patient/family on patient safety including physical limitations  - Instruct patient to call for assistance with activity   - Consult OT/PT to assist with strengthening/mobility   - Keep Call bell within reach  - Keep bed low and locked with side rails adjusted as appropriate  - Keep care items and personal belongings within reach  - Initiate and maintain comfort rounds  - Make Fall Risk Sign visible to staff  - Offer Toileting every 2 Hours, in advance of need if unable to call for assistance  - Initiate/Maintain bed/chair alarm for confusion, impulsivity, or  noncompliance with calling for assistance  - Apply yellow socks and bracelet for high fall risk patients  - Consider moving patient to room near nurses station  Outcome: Progressing  Goal: Maintain or return to baseline ADL function  Description: INTERVENTIONS:  -  Assess patient's ability to carry out ADLs; assess patient's baseline for ADL function and identify physical deficits which impact ability to perform ADLs (bathing, care of mouth/teeth, toileting, grooming, dressing, etc.)  - Assess/evaluate cause of self-care deficits   - Assess range of motion  - Assess patient's mobility; develop plan if impaired  - Assess patient's need for assistive devices and provide as appropriate  - Encourage maximum independence but intervene and supervise when necessary  - Involve family in performance of ADLs  - Assess for home care needs following discharge   - Consider OT consult to assist with ADL evaluation and planning for discharge  - Provide patient education as appropriate  Outcome: Progressing  Goal: Maintains/Returns to pre admission functional level  Description: INTERVENTIONS:  - Perform AM-PAC 6 Click Basic Mobility/ Daily Activity assessment daily.  - Set and communicate daily mobility goal to care team and patient/family/caregiver.   - Collaborate with rehabilitation services on mobility goals if consulted  - Perform Range of Motion 3 times a day.  - Reposition patient every 2 hours if unable to reposition self  - Out of bed for toileting when medically appropriate  - Record patient progress and toleration of activity level   Outcome: Progressing     Problem: DISCHARGE PLANNING  Goal: Discharge to home or other facility with appropriate resources  Description: INTERVENTIONS:  - Identify barriers to discharge w/patient and caregiver  - Arrange for needed discharge resources and transportation as appropriate  - Identify discharge learning needs (meds, wound care, etc.)  - Arrange for interpretive services to  assist at discharge as needed  - Refer to Case Management Department for coordinating discharge planning if the patient needs post-hospital services based on physician/advanced practitioner order or complex needs related to functional status, cognitive ability, or social support system  Outcome: Progressing     Problem: Knowledge Deficit  Goal: Patient/family/caregiver demonstrates understanding of disease process, treatment plan, medications, and discharge instructions  Description: Complete learning assessment and assess knowledge base.  Interventions:  - Provide teaching at level of understanding  - Provide teaching via preferred learning methods  Outcome: Progressing     Problem: CARDIOVASCULAR - ADULT  Goal: Maintains optimal cardiac output and hemodynamic stability  Description: INTERVENTIONS:  - Monitor I/O, vital signs and rhythm  - Monitor for S/S and trends of decreased cardiac output  - Administer and titrate ordered vasoactive medications to optimize hemodynamic stability  - Assess quality of pulses, skin color and temperature  - Assess for signs of decreased coronary artery perfusion  - Instruct patient to report change in severity of symptoms  Outcome: Progressing  Goal: Absence of cardiac dysrhythmias or at baseline rhythm  Description: INTERVENTIONS:  - Continuous cardiac monitoring, vital signs, obtain 12 lead EKG if ordered  - Administer antiarrhythmic and heart rate control medications as ordered  - Monitor electrolytes and administer replacement therapy as ordered  Outcome: Progressing     Problem: RESPIRATORY - ADULT  Goal: Achieves optimal ventilation and oxygenation  Description: INTERVENTIONS:  - Assess for changes in respiratory status  - Assess for changes in mentation and behavior  - Position to facilitate oxygenation and minimize respiratory effort  - Oxygen administered by appropriate delivery if ordered  - Initiate smoking cessation education as indicated  - Encourage broncho-pulmonary  hygiene including cough, deep breathe, Incentive Spirometry  - Assess the need for suctioning and aspirate as needed  - Assess and instruct to report SOB or any respiratory difficulty  - Respiratory Therapy support as indicated  Outcome: Progressing     Problem: GENITOURINARY - ADULT  Goal: Maintains or returns to baseline urinary function  Description: INTERVENTIONS:  - Assess urinary function  - Encourage oral fluids to ensure adequate hydration if ordered  - Administer IV fluids as ordered to ensure adequate hydration  - Administer ordered medications as needed  - Offer frequent toileting  - Follow urinary retention protocol if ordered  Outcome: Progressing  Goal: Absence of urinary retention  Description: INTERVENTIONS:  - Assess patient’s ability to void and empty bladder  - Monitor I/O  - Bladder scan as needed  - Discuss with physician/AP medications to alleviate retention as needed  - Discuss catheterization for long term situations as appropriate  Outcome: Progressing  Goal: Urinary catheter remains patent  Description: INTERVENTIONS:  - Assess patency of urinary catheter  - If patient has a chronic frederick, consider changing catheter if non-functioning  - Follow guidelines for intermittent irrigation of non-functioning urinary catheter  Outcome: Progressing     Problem: METABOLIC, FLUID AND ELECTROLYTES - ADULT  Goal: Electrolytes maintained within normal limits  Description: INTERVENTIONS:  - Monitor labs and assess patient for signs and symptoms of electrolyte imbalances  - Administer electrolyte replacement as ordered  - Monitor response to electrolyte replacements, including repeat lab results as appropriate  - Instruct patient on fluid and nutrition as appropriate  Outcome: Progressing  Goal: Fluid balance maintained  Description: INTERVENTIONS:  - Monitor labs   - Monitor I/O and WT  - Instruct patient on fluid and nutrition as appropriate  - Assess for signs & symptoms of volume excess or  deficit  Outcome: Progressing  Goal: Glucose maintained within target range  Description: INTERVENTIONS:  - Monitor Blood Glucose as ordered  - Assess for signs and symptoms of hyperglycemia and hypoglycemia  - Administer ordered medications to maintain glucose within target range  - Assess nutritional intake and initiate nutrition service referral as needed  Outcome: Progressing     Problem: SKIN/TISSUE INTEGRITY - ADULT  Goal: Incision(s), wounds(s) or drain site(s) healing without S/S of infection  Description: INTERVENTIONS  - Assess and document dressing, incision, wound bed, drain sites and surrounding tissue  - Provide patient and family education  - Perform skin care/dressing changes per wound consult recommendations  Outcome: Progressing     Problem: Prexisting or High Potential for Compromised Skin Integrity  Goal: Skin integrity is maintained or improved  Description: INTERVENTIONS:  - Identify patients at risk for skin breakdown  - Assess and monitor skin integrity  - Assess and monitor nutrition and hydration status  - Monitor labs   - Assess for incontinence   - Turn and reposition patient  - Assist with mobility/ambulation  - Relieve pressure over bony prominences  - Avoid friction and shearing  - Provide appropriate hygiene as needed including keeping skin clean and dry  - Evaluate need for skin moisturizer/barrier cream  - Collaborate with interdisciplinary team   - Patient/family teaching  - Consider wound care consult   Outcome: Progressing     Problem: Nutrition/Hydration-ADULT  Goal: Nutrient/Hydration intake appropriate for improving, restoring or maintaining nutritional needs  Description: Monitor and assess patient's nutrition/hydration status for malnutrition. Collaborate with interdisciplinary team and initiate plan and interventions as ordered.  Monitor patient's weight and dietary intake as ordered or per policy. Utilize nutrition screening tool and intervene as necessary. Determine  patient's food preferences and provide high-protein, high-caloric foods as appropriate.     INTERVENTIONS:  - Monitor oral intake, urinary output, labs, and treatment plans  - Assess nutrition and hydration status and recommend course of action  - Evaluate amount of meals eaten  - Assist patient with eating if necessary   - Allow adequate time for meals  - Recommend/ encourage appropriate diets, oral nutritional supplements, and vitamin/mineral supplements  - Order, calculate, and assess calorie counts as needed  - Recommend, monitor, and adjust tube feedings and TPN/PPN based on assessed needs  - Assess need for intravenous fluids  - Provide specific nutrition/hydration education as appropriate  - Include patient/family/caregiver in decisions related to nutrition  Outcome: Progressing

## 2024-07-02 NOTE — PLAN OF CARE
Problem: PAIN - ADULT  Goal: Verbalizes/displays adequate comfort level or baseline comfort level  Description: Interventions:  - Encourage patient to monitor pain and request assistance  - Assess pain using appropriate pain scale  - Administer analgesics based on type and severity of pain and evaluate response  - Implement non-pharmacological measures as appropriate and evaluate response  - Consider cultural and social influences on pain and pain management  - Notify physician/advanced practitioner if interventions unsuccessful or patient reports new pain  7/2/2024 1143 by Rufina Medrano RN  Outcome: Progressing  7/2/2024 0654 by Rufina Medrano RN  Outcome: Progressing     Problem: INFECTION - ADULT  Goal: Absence or prevention of progression during hospitalization  Description: INTERVENTIONS:  - Assess and monitor for signs and symptoms of infection  - Monitor lab/diagnostic results  - Monitor all insertion sites, i.e. indwelling lines, tubes, and drains  - Monitor endotracheal if appropriate and nasal secretions for changes in amount and color  - [place] appropriate cooling/warming therapies per order  - Administer medications as ordered  - Instruct and encourage patient and family to use good hand hygiene technique  - Identify and instruct in appropriate isolation precautions for identified infection/condition  7/2/2024 1143 by Rufina Medrano RN  Outcome: Progressing  7/2/2024 0654 by Rufina Medrano RN  Outcome: Progressing     Problem: SAFETY ADULT  Goal: Patient will remain free of falls  Description: INTERVENTIONS:  - Educate patient/family on patient safety including physical limitations  - Instruct patient to call for assistance with activity   - Consult OT/PT to assist with strengthening/mobility   - Keep Call bell within reach  - Keep bed low and locked with side rails adjusted as appropriate  - Keep care items and personal belongings within reach  - Initiate and maintain comfort rounds  - Make  Fall Risk Sign visible to staff  - Offer Toileting every 2 Hours, in advance of need if unable to call for assistance  - Initiate/Maintain bed/chair alarm for confusion, impulsivity, or noncompliance with calling for assistance  - Apply yellow socks and bracelet for high fall risk patients  - Consider moving patient to room near nurses station  7/2/2024 1143 by Rufina Medrano RN  Outcome: Progressing  7/2/2024 0654 by Rufina Medrano RN  Outcome: Progressing  Goal: Maintain or return to baseline ADL function  Description: INTERVENTIONS:  -  Assess patient's ability to carry out ADLs; assess patient's baseline for ADL function and identify physical deficits which impact ability to perform ADLs (bathing, care of mouth/teeth, toileting, grooming, dressing, etc.)  - Assess/evaluate cause of self-care deficits   - Assess range of motion  - Assess patient's mobility; develop plan if impaired  - Assess patient's need for assistive devices and provide as appropriate  - Encourage maximum independence but intervene and supervise when necessary  - Involve family in performance of ADLs  - Assess for home care needs following discharge   - Consider OT consult to assist with ADL evaluation and planning for discharge  - Provide patient education as appropriate  7/2/2024 1143 by Rufina Medrano RN  Outcome: Progressing  7/2/2024 0654 by Rufina Medrano RN  Outcome: Progressing  Goal: Maintains/Returns to pre admission functional level  Description: INTERVENTIONS:  - Perform AM-PAC 6 Click Basic Mobility/ Daily Activity assessment daily.  - Set and communicate daily mobility goal to care team and patient/family/caregiver.   - Collaborate with rehabilitation services on mobility goals if consulted  - Perform Range of Motion 3 times a day.  - Reposition patient every 2 hours if unable to reposition self  - Out of bed for toileting when medically appropriate  - Record patient progress and toleration of activity level   7/2/2024 1143  by Rufina Medrano RN  Outcome: Progressing  7/2/2024 0654 by Rufina Medrano RN  Outcome: Progressing     Problem: DISCHARGE PLANNING  Goal: Discharge to home or other facility with appropriate resources  Description: INTERVENTIONS:  - Identify barriers to discharge w/patient and caregiver  - Arrange for needed discharge resources and transportation as appropriate  - Identify discharge learning needs (meds, wound care, etc.)  - Arrange for interpretive services to assist at discharge as needed  - Refer to Case Management Department for coordinating discharge planning if the patient needs post-hospital services based on physician/advanced practitioner order or complex needs related to functional status, cognitive ability, or social support system  7/2/2024 1143 by Rufina Medrano RN  Outcome: Progressing  7/2/2024 0654 by Rufina Medrano RN  Outcome: Progressing     Problem: Knowledge Deficit  Goal: Patient/family/caregiver demonstrates understanding of disease process, treatment plan, medications, and discharge instructions  Description: Complete learning assessment and assess knowledge base.  Interventions:  - Provide teaching at level of understanding  - Provide teaching via preferred learning methods  7/2/2024 1143 by Rufina Medrano RN  Outcome: Progressing  7/2/2024 0654 by Rufina Medrano RN  Outcome: Progressing     Problem: CARDIOVASCULAR - ADULT  Goal: Maintains optimal cardiac output and hemodynamic stability  Description: INTERVENTIONS:  - Monitor I/O, vital signs and rhythm  - Monitor for S/S and trends of decreased cardiac output  - Administer and titrate ordered vasoactive medications to optimize hemodynamic stability  - Assess quality of pulses, skin color and temperature  - Assess for signs of decreased coronary artery perfusion  - Instruct patient to report change in severity of symptoms  7/2/2024 1143 by Rufina Medrano RN  Outcome: Progressing  7/2/2024 0654 by Rufina Medrano RN  Outcome:  Progressing  Goal: Absence of cardiac dysrhythmias or at baseline rhythm  Description: INTERVENTIONS:  - Continuous cardiac monitoring, vital signs, obtain 12 lead EKG if ordered  - Administer antiarrhythmic and heart rate control medications as ordered  - Monitor electrolytes and administer replacement therapy as ordered  7/2/2024 1143 by Rufina Medrano RN  Outcome: Progressing  7/2/2024 0654 by Rufina Medrano RN  Outcome: Progressing     Problem: RESPIRATORY - ADULT  Goal: Achieves optimal ventilation and oxygenation  Description: INTERVENTIONS:  - Assess for changes in respiratory status  - Assess for changes in mentation and behavior  - Position to facilitate oxygenation and minimize respiratory effort  - Oxygen administered by appropriate delivery if ordered  - Initiate smoking cessation education as indicated  - Encourage broncho-pulmonary hygiene including cough, deep breathe, Incentive Spirometry  - Assess the need for suctioning and aspirate as needed  - Assess and instruct to report SOB or any respiratory difficulty  - Respiratory Therapy support as indicated  7/2/2024 1143 by Rufina Medrano RN  Outcome: Progressing  7/2/2024 0654 by Rufina Medrano RN  Outcome: Progressing     Problem: GENITOURINARY - ADULT  Goal: Maintains or returns to baseline urinary function  Description: INTERVENTIONS:  - Assess urinary function  - Encourage oral fluids to ensure adequate hydration if ordered  - Administer IV fluids as ordered to ensure adequate hydration  - Administer ordered medications as needed  - Offer frequent toileting  - Follow urinary retention protocol if ordered  7/2/2024 1143 by Rufina Medrano RN  Outcome: Progressing  7/2/2024 0654 by Rufina Medrano RN  Outcome: Progressing  Goal: Absence of urinary retention  Description: INTERVENTIONS:  - Assess patient’s ability to void and empty bladder  - Monitor I/O  - Bladder scan as needed  - Discuss with physician/AP medications to alleviate retention as  needed  - Discuss catheterization for long term situations as appropriate  7/2/2024 1143 by Rufina Medrano RN  Outcome: Progressing  7/2/2024 0654 by Rufina Medrano RN  Outcome: Progressing  Goal: Urinary catheter remains patent  Description: INTERVENTIONS:  - Assess patency of urinary catheter  - If patient has a chronic frederick, consider changing catheter if non-functioning  - Follow guidelines for intermittent irrigation of non-functioning urinary catheter  7/2/2024 1143 by Rufina Medrano RN  Outcome: Progressing  7/2/2024 0654 by Rufina Medrano RN  Outcome: Progressing     Problem: METABOLIC, FLUID AND ELECTROLYTES - ADULT  Goal: Electrolytes maintained within normal limits  Description: INTERVENTIONS:  - Monitor labs and assess patient for signs and symptoms of electrolyte imbalances  - Administer electrolyte replacement as ordered  - Monitor response to electrolyte replacements, including repeat lab results as appropriate  - Instruct patient on fluid and nutrition as appropriate  7/2/2024 1143 by Rufina Medrano RN  Outcome: Progressing  7/2/2024 0654 by Rufina Medrano RN  Outcome: Progressing  Goal: Fluid balance maintained  Description: INTERVENTIONS:  - Monitor labs   - Monitor I/O and WT  - Instruct patient on fluid and nutrition as appropriate  - Assess for signs & symptoms of volume excess or deficit  7/2/2024 1143 by Rufina Medrano RN  Outcome: Progressing  7/2/2024 0654 by Rufina Medrano RN  Outcome: Progressing  Goal: Glucose maintained within target range  Description: INTERVENTIONS:  - Monitor Blood Glucose as ordered  - Assess for signs and symptoms of hyperglycemia and hypoglycemia  - Administer ordered medications to maintain glucose within target range  - Assess nutritional intake and initiate nutrition service referral as needed  7/2/2024 1143 by Rufina Medrano RN  Outcome: Progressing  7/2/2024 0654 by Rufina Medrano RN  Outcome: Progressing     Problem: SKIN/TISSUE INTEGRITY -  ADULT  Goal: Incision(s), wounds(s) or drain site(s) healing without S/S of infection  Description: INTERVENTIONS  - Assess and document dressing, incision, wound bed, drain sites and surrounding tissue  - Provide patient and family education  - Perform skin care/dressing changes per wound consult recommendations  7/2/2024 1143 by Rufina Medrano RN  Outcome: Progressing  7/2/2024 0654 by Rufina Medrano RN  Outcome: Progressing

## 2024-07-02 NOTE — ASSESSMENT & PLAN NOTE
Secondary to decompensated HF, pneumonia  CT c/a/p: diffuse GGO/mosaicism throughout the bilateral lung fields could be related to hypoinflation, air trapping/small airway disease, edema or pneumonia  Required CPAP since weaned back to baseline oxygen   Continue Rocephin to completed 7-day course   Continue IV Bumex 2mg IV bid -remain on hold due to hypokalemia, patient is requiring IV supplemental potassium.  Speech eval

## 2024-07-02 NOTE — SEPSIS NOTE
Sepsis Note   Tigist Hewitt 69 y.o. female MRN: [id number]  Unit/Bed#: -01 Encounter: [id number]       Initial Sepsis Screening       Row Name 07/02/24 0728                Is the patient's history suggestive of a new or worsening infection? Yes (Proceed)  Infection present (pneumonia since day of admission), not worsening  -SS        Suspected source of infection pneumonia  -SS        Indicate SIRS criteria Tachypnea > 20 resp per min;Leukocytosis (WBC > 54585 IJL) OR Leukopenia (WBC <4000 IJL) OR Bandemia (WBC >10% bands);Tachycardia > 90 bpm  -SS        Are two or more of the above signs & symptoms of infection both present and new to the patient? No  -SS        Assess for evidence of organ dysfunction: Are any of the below criteria present within 6 hours of suspected infection and SIRS criteria that are NOT considered to be chronic conditions? --                  User Key  (r) = Recorded By, (t) = Taken By, (c) = Cosigned By      Initials Name Provider Type    SS Kerry Diaz PA-C Physician Assistant                  Met on day of admission with leukocytosis, tachycardia, tachypnea  Developed hypotension over admission improved with albumin/midodrine. 30cc/kg bolus not given due to CHF exacerbation  Blood pressures improved since then stabilized      Body mass index is 69.48 kg/m².  Wt Readings from Last 1 Encounters:   07/02/24 (!) 167 kg (367 lb 11.6 oz)     IBW (Ideal Body Weight): 47.8 kg    Ideal body weight: 47.8 kg (105 lb 6.1 oz)  Adjusted ideal body weight: 95.4 kg (210 lb 5.1 oz)

## 2024-07-02 NOTE — ASSESSMENT & PLAN NOTE
Is maintained on warfarin 7.5mg qhs  INR is 2.06, resume Coumadin-patient remain n.p.o., consider heparin drip

## 2024-07-02 NOTE — PROGRESS NOTES
Progress Note:Cardiology  Tigist Garcíakaty 1955, 69 y.o. female MRN: [id number]    Unit/Bed#: -01 Encounter: [id number]  Attending Physician: Pedro Price MD   Primary Care Provider: Marisa Haque MD   Date admitted to hospital: 6/27/2024  Length of stay: 4         Acute on chronic diastolic congestive heart failure (HCC)  Assessment & Plan  Wt Readings from Last 3 Encounters:   06/30/24 (!) 175 kg (385 lb 5.8 oz)   06/10/24 (!) 157 kg (346 lb)   05/29/24 (!) 157 kg (346 lb 12.5 oz)     History of HFpEF in the setting of morbid obesity, untreated NISHI, pneumonia/COPD.  Echo 12/24/2023 with LVEF 55%, mild AS, mild-mod MR, severe TR with PASP 53mmHg which is overall unchanged from prior.   Echo 7/1/2024 shows preserved LVEF with right sided heart failure.  Transitioned to Bumex during May admission.  She has undergone work up by pulmonology for pulmonary hypertension.  Currently diuresing well on Bumex IV 2 mg BID  Continue CPAP  Would benefit from intense physical therapy and increased mobility but unfortunately she is lacking motivation for this.  Continue strict I's/O's, sodium/fluid restriction.  Optimize electrolytes for K+ >4, Mag >2.    Atrial fibrillation with RVR (HCC)  Assessment & Plan  History of longstanding persistent atrial fibrillation.   She was previously evaluated by Dr. Ferraro through [facility] and failed 2 cardioversion and an atrial fibrillation ablation. Persistent a fib since at least 2020.  HR's mildly elevated this admission in the setting of septic shock.  Has been receiving amiodarone this admission. Ideally would not continue given futility of restoring sinus rhythm. This was discontinued and metoprolol tartrate 12.5 mg BID started with good BP control.  Anticoagulated with warfarin, INR goal 2-3  Consider digoxin as needed for HR control.  Optimize electrolytes for K+ > 4, Mag > 2.    Acute on chronic respiratory failure with hypoxia (HCC)  Assessment &  "Plan  Multi-factoral due to COPD, NISHI, obesity, HFpEF  Currently back on home O2 requirement of 3 L    NISHI (obstructive sleep apnea)  Assessment & Plan  Continue CPAP    Morbid obesity (HCC)  Assessment & Plan  Contributing factor to current medical conditions and would benefit greatly from weight loss.        Subjective:   Patient seen and examined. She is awaiting a speech evaluation for swallowing due to coughing and choking after taking PO potassium yesterday. Continues to diurese well.     Review of Systems   Constitutional: Positive for malaise/fatigue.   HENT: Negative.     Cardiovascular:  Positive for dyspnea on exertion and leg swelling. Negative for chest pain, irregular heartbeat, near-syncope, orthopnea and palpitations.   Respiratory:  Positive for shortness of breath. Negative for cough and snoring.    Endocrine: Negative.    Skin: Negative.    Musculoskeletal: Negative.    Gastrointestinal: Negative.    Genitourinary: Negative.    Neurological: Negative.    Psychiatric/Behavioral: Negative.           Objective:     Vitals: Blood pressure 107/55, pulse (!) 122, temperature 97.6 °F (36.4 °C), temperature source Temporal, resp. rate (!) 39, height 5' 1\" (1.549 m), weight (!) 167 kg (367 lb 11.6 oz), SpO2 94%., Body mass index is 69.48 kg/m².,     Orthostatic Blood Pressures      Flowsheet Row Most Recent Value   Blood Pressure 107/55 filed at 07/02/2024 0725   Patient Position - Orthostatic VS Lying filed at 07/02/2024 0725            Physical Exam  Vitals and nursing note reviewed.   Constitutional:       General: She is not in acute distress.     Appearance: She is well-developed. She is obese.   HENT:      Head: Normocephalic and atraumatic.   Eyes:      Conjunctiva/sclera: Conjunctivae normal.   Neck:      Vascular: JVD present.   Cardiovascular:      Rate and Rhythm: Normal rate. Rhythm irregularly irregular.      Heart sounds: No murmur heard.  Pulmonary:      Effort: Pulmonary effort is normal. " No respiratory distress.      Breath sounds: Examination of the right-lower field reveals decreased breath sounds. Examination of the left-lower field reveals decreased breath sounds. Decreased breath sounds present.      Comments: 4 L NC  Abdominal:      Palpations: Abdomen is soft.      Tenderness: There is no abdominal tenderness.   Musculoskeletal:         General: No swelling.      Cervical back: Neck supple.      Right lower le+ Edema present.      Left lower le+ Edema present.   Skin:     General: Skin is warm and dry.      Capillary Refill: Capillary refill takes less than 2 seconds.   Neurological:      Mental Status: She is alert.   Psychiatric:         Mood and Affect: Mood normal.            Intake/Output Summary (Last 24 hours) at 2024 09  Last data filed at 2024 0742  Gross per 24 hour   Intake 650 ml   Output 3175 ml   Net -2525 ml       Weight (last 2 days)       Date/Time Weight    24 0300 167 (367.73)    24 1355 175 (385.81)    24 0538 175 (385.36)               Medications:      Current Facility-Administered Medications:     acetaminophen (TYLENOL) tablet 650 mg, 650 mg, Oral, Q6H PRN, CAMPBELL Ahuja, 650 mg at 24 0600    albuterol inhalation solution 2.5 mg, 2.5 mg, Nebulization, Q6H PRN, Abena Gregory PA-C, 2.5 mg at 24 1704    allopurinol (ZYLOPRIM) tablet 100 mg, 100 mg, Oral, Daily, ROGELIO AhujaNP, 100 mg at 24 0810    atorvastatin (LIPITOR) tablet 10 mg, 10 mg, Oral, Daily, ROGELIO AhujaNP, 10 mg at 24 0810    bumetanide (BUMEX) injection 2 mg, 2 mg, Intravenous, BID, Ashly Vergara PA-C, 2 mg at 24    Cholecalciferol (VITAMIN D3) tablet 2,000 Units, 2,000 Units, Oral, Daily, CAMPBELL Ahuja, 2,000 Units at 24 0810    diphenhydrAMINE (BENADRYL) tablet 25 mg, 25 mg, Oral, Q6H PRN, CAMPBELL Ahuja, 25 mg at 24 1434    docusate sodium (COLACE) capsule 100 mg, 100 mg,  Oral, Daily, CAMPBELL Ahuja, 100 mg at 07/02/24 0810    ferrous sulfate tablet 325 mg, 325 mg, Oral, Daily With Breakfast, CAMPBELL Ahuja, 325 mg at 07/01/24 1228    Fluticasone Furoate-Vilanterol 100-25 mcg/actuation 1 puff, 1 puff, Inhalation, Daily, CAMPBELL Ahuja, 1 puff at 07/02/24 0811    guaiFENesin (MUCINEX) 12 hr tablet 600 mg, 600 mg, Oral, Q12H HALEY, CAMPBELL Ahuja, 600 mg at 07/02/24 0810    insulin lispro (HumALOG/ADMELOG) 100 units/mL subcutaneous injection 2-12 Units, 2-12 Units, Subcutaneous, 4x Daily (AC & HS), 2 Units at 07/02/24 0741 **AND** Fingerstick Glucose (POCT), , , 4x Daily AC and at bedtime, CAMPBELL Ahuja    ipratropium-albuterol (DUO-NEB) 0.5-2.5 mg/3 mL inhalation solution 3 mL, 3 mL, Nebulization, Q6H While awake, Amanda Dukes MD, 3 mL at 07/01/24 2047    levofloxacin (LEVAQUIN) IVPB (premix in dextrose) 750 mg 150 mL, 750 mg, Intravenous, Q24H, Abena Gregory PA-C    levothyroxine tablet 288 mcg, 288 mcg, Oral, Daily, CAMPBELL Ahuja, 288 mcg at 07/02/24 0543    loratadine (CLARITIN) tablet 5 mg, 5 mg, Oral, Daily, CAMPBELL Ahuja, 5 mg at 07/02/24 0810    metoprolol tartrate (LOPRESSOR) partial tablet 12.5 mg, 12.5 mg, Oral, Q12H HALEYDesi CRNP, 12.5 mg at 07/02/24 0810    midodrine (PROAMATINE) tablet 2.5 mg, 2.5 mg, Oral, TID ACDesi CRNP    montelukast (SINGULAIR) tablet 10 mg, 10 mg, Oral, HS, CAMPBELL Ahuja, 10 mg at 07/01/24 2113    multivitamin-minerals (CENTRUM) tablet 1 tablet, 1 tablet, Oral, Daily, CAMPBELL Ahuja, 1 tablet at 07/02/24 0810    pantoprazole (PROTONIX) EC tablet 40 mg, 40 mg, Oral, Early Morning, CAMPBELL Ahuja, 40 mg at 07/02/24 0544    polyethylene glycol (MIRALAX) packet 17 g, 17 g, Oral, Daily PRN, Abena rGegory PA-C    potassium chloride 20 mEq IVPB (premix), 20 mEq, Intravenous, Q2H, CAMPBELL Bolton, Last Rate: 50 mL/hr at 07/02/24 0819, 20 mEq at 07/02/24  0819    traMADol (ULTRAM) tablet 50 mg, 50 mg, Oral, Q12H PRN, Gloria Sal, CAMPBELL, 50 mg at 06/30/24 1817    warfarin (COUMADIN) tablet 5 mg, 5 mg, Oral, Daily (warfarin), Pedro Price MD     Labs & Results:        Results from last 7 days   Lab Units 07/02/24  0547 07/01/24  0948 06/30/24  0529   WBC Thousand/uL 11.21* 13.27* 18.61*   HEMOGLOBIN g/dL 11.3* 11.2* 11.4*   HEMATOCRIT % 34.7* 34.7* 35.7   PLATELETS Thousands/uL 165 165 173         Results from last 7 days   Lab Units 07/02/24  0547 07/01/24  0948 06/30/24  0529 06/27/24  2320 06/27/24  2235   POTASSIUM mmol/L 2.7* 2.8* 3.2*   < > 6.4*   CHLORIDE mmol/L 98 98 99   < > 101   CO2 mmol/L 35* 33* 30   < > 28   BUN mg/dL 48* 50* 45*   < > 30*   CREATININE mg/dL 1.29 1.25 1.23   < > 0.97   CALCIUM mg/dL 9.6 9.5 9.6   < > 9.8   ALK PHOS U/L 110* 118*  --   --  170*   ALT U/L 13 14  --   --  16   AST U/L 14 17  --   --  45*    < > = values in this interval not displayed.     Results from last 7 days   Lab Units 07/02/24  0547 07/01/24  0948 06/30/24  0529 06/28/24  0552 06/27/24  2250   INR  2.06* 2.96* 4.20*   < > 2.80*   PTT seconds  --   --   --   --  48*    < > = values in this interval not displayed.     Results from last 7 days   Lab Units 07/02/24  0547 07/01/24  0948 06/30/24  0529   MAGNESIUM mg/dL 2.1 1.8* 2.0     Results from last 7 days   Lab Units 06/27/24  2235   BNP pg/mL 444*        Telemetry: atrial fibrillation, rate primarily in the 80's

## 2024-07-02 NOTE — SPEECH THERAPY NOTE
Speech Language/Pathology  Speech-Language Pathology Bedside Swallow Evaluation      Patient Name: Tigist Hewitt    Today's Date: 7/2/2024       Summary   Consult received for bedside swallow assessment. Pt admitted w/ acute on chronic respiratory failure w/ hypoxia, acute on chronic CHF. PMHx includes AFIB w/ RVR, COPD, NISHI, morbid obesity, DM2, closed left ankle fracture (currently NWB). Pt previously on regular textures w/ thin liquids. RN reports frequent coughing w/ thin liquids and difficulty w/ roast beef last night. Pt reports intermittent coughing w/ thin liquids, attributes this to straw use. CT Chest reveals bilateral groundglass opacities, cannot exclude PNA. Pt is upright in kreg bed. Eats in hospital bed at home.   Currently on 4L which is baseline, SPO2 94%.  Assessed w/ thins via cup, nectar thick via cup, puree and regular textures. Pt demonstrates strong and frequent cough intermittently w/ speaking. Voice is hoarse from coughing.   Mastication/oral phase appears grossly WFL, suspected poor control/posterior spillage w/ thins via cup. Cough x1 during mastication of solids. Cough x3 w/ thins via cup, no overt s/s aspiration w/ nectar thick via cup.  Recommend VBS however unable to complete at this campus due to pt's body habitus. Discussed with pt and will discuss w/ SLP/radiology team about other options for outpatient studies at other campuses. Pt reports she is returning home, therefore a FEES at SNF is also not possible    Risk/s for Aspiration: Moderate     Recommended Diet: mechanically altered/level 2 diet and nectar thick liquids   Recommended Form of Meds: whole with puree   Aspiration precautions and swallowing strategies: upright posture  Other Recommendations: Continue frequent oral care        Current Medical Status  Tigist Hewitt is a 69 y.o. who presents with c/o SOB, increased work of breathing and family endorsed cyanotic changes that started earlier in the day.  Upon  arrival to the ED, she was noted to be in respiratory distress, hypoxic and lethargic.  She was placed on CPAP.  She was also noted to have developed afib with RVR while in the ED.  She was administered IV lopressor 5 mg and started on a cardizem gtt.  It was noted that the patient has baseline HoTN and takes midodrine 10 mg TID and was hypotensive in the ED.  She was given a dose of midodrine and a 250 mL bolus of crystalloid fluids given her h/o CHF.  Critical Care was consulted for admission.     Current Precautions:  Fall  Aspiration      Allergies:  No known food allergies    Past medical history:  Please see H&P for details    Special Studies:  CT Chest:  LUNGS: No consolidation. No new or suspicious pulmonary nodules. Similar appearance of bilateral groundglass opacities/mosaicism throughout the lung fields which could be related to hypoinflation or air trapping/small airway disease, pneumonia could also   be considered in the appropriate clinical setting. Central airways are patent.     There is a 4.4 x 4.3 cm cyst with internal septations in the right upper lobe with a 7 mm internal nodule along one of the septations, stable since 12/23/2023 (6:42).     PLEURA: Unremarkable.    Social/Education/Vocational Hx:  Pt lives with family    Swallow Information   Current Risks for Dysphagia & Aspiration: known history of dysphagia  Current Symptoms/Concerns: coughing with po  Current Diet: regular diet and thin liquids   Baseline Diet: regular diet and thin liquids      Baseline Assessment   Behavior/Cognition: alert  Speech/Language Status: able to participate in conversation  Patient Positioning: upright in bed  Pain Status/Interventions/Response to Interventions:   No report of or nonverbal indications of pain.       Swallow Mechanism Exam  Facial: symmetrical  Labial: WFL  Lingual: WFL  Velum: symmetrical  Mandible: adequate ROM  Dentition: adequate  Vocal quality:hoarse   Volitional Cough: strong/productive    Respiratory Status: on 4L      Consistencies Assessed and Performance   Consistencies Administered: thin liquids, nectar thick, puree, and hard solids    Oral Stage: mild  Mastication was adequate with the materials administered today.  Bolus formation and transfer were functional with no significant oral residue noted. ?reduced oral control w/ thin liquids.    Pharyngeal Stage: suspected  Swallow Mechanics:  Swallowing initiation appeared prompt.  Laryngeal rise was palpated and judged to be within functional limits.  Cough x3 w/ thins via cup, x1 w/ regular textures. No overt s/s aspiration w/ nectar thick via cup    Esophageal Concerns: none reported    Summary and Recommendations (see above)    Results Reviewed with: patient, RN, and MD     Treatment Recommended: Dysphagia therapy, possible VBS     Frequency of treatment: 2-3x/week    Dysphagia LTG  -Patient will demonstrate safe and effective oral intake (without overt s/s significant oral/pharyngeal dysphagia including s/s penetration or aspiration) for the highest appropriate diet level.     Short Term Goals:    -Pt will tolerate Dysphagia 2/mechanical soft diet and  nectar thick with no significant s/s oral or pharyngeal dysphagia across 1-3 diagnostic session/s.    -Patient will tolerate trials of upgraded food and/or liquid texture with no significant s/s of oral or pharyngeal dysphagia including aspiration across 1-3 diagnostic sessions     -Patient will comply with a Video/Modified Barium Swallow study for more complete assessment of swallowing anatomy/physiology/aspiration risk and to assess efficacy of treatment techniques so as to best guide treatment plan    Re: Compensatory Strategies    -Patient will demonstrate independent use of recommended safe swallowing strategies during a clinician assessed meal across 1-3 diagnostic sessions.        Speech Therapy Prognosis   Prognosis: fair    Prognosis Considerations: prior medical history and  respiratory status    Angeli Geiger, MS CCC-SLP  7/2/2024

## 2024-07-03 LAB
ALBUMIN SERPL BCG-MCNC: 2.9 G/DL (ref 3.5–5)
ALP SERPL-CCNC: 104 U/L (ref 34–104)
ALT SERPL W P-5'-P-CCNC: 12 U/L (ref 7–52)
ANION GAP SERPL CALCULATED.3IONS-SCNC: 4 MMOL/L (ref 4–13)
ANION GAP SERPL CALCULATED.3IONS-SCNC: 6 MMOL/L (ref 4–13)
ANISOCYTOSIS BLD QL SMEAR: PRESENT
AST SERPL W P-5'-P-CCNC: 14 U/L (ref 13–39)
BACTERIA BLD CULT: NORMAL
BASOPHILS # BLD MANUAL: 0 THOUSAND/UL (ref 0–0.1)
BASOPHILS NFR MAR MANUAL: 0 % (ref 0–1)
BILIRUB SERPL-MCNC: 1.45 MG/DL (ref 0.2–1)
BUN SERPL-MCNC: 46 MG/DL (ref 5–25)
BUN SERPL-MCNC: 47 MG/DL (ref 5–25)
CALCIUM ALBUM COR SERPL-MCNC: 10.4 MG/DL (ref 8.3–10.1)
CALCIUM SERPL-MCNC: 9.3 MG/DL (ref 8.4–10.2)
CALCIUM SERPL-MCNC: 9.5 MG/DL (ref 8.4–10.2)
CHLORIDE SERPL-SCNC: 99 MMOL/L (ref 96–108)
CHLORIDE SERPL-SCNC: 99 MMOL/L (ref 96–108)
CO2 SERPL-SCNC: 35 MMOL/L (ref 21–32)
CO2 SERPL-SCNC: 38 MMOL/L (ref 21–32)
CREAT SERPL-MCNC: 1.05 MG/DL (ref 0.6–1.3)
CREAT SERPL-MCNC: 1.14 MG/DL (ref 0.6–1.3)
EOSINOPHIL # BLD MANUAL: 0.55 THOUSAND/UL (ref 0–0.4)
EOSINOPHIL NFR BLD MANUAL: 5 % (ref 0–6)
ERYTHROCYTE [DISTWIDTH] IN BLOOD BY AUTOMATED COUNT: 17.6 % (ref 11.6–15.1)
GFR SERPL CREATININE-BSD FRML MDRD: 48 ML/MIN/1.73SQ M
GFR SERPL CREATININE-BSD FRML MDRD: 53 ML/MIN/1.73SQ M
GLUCOSE SERPL-MCNC: 144 MG/DL (ref 65–140)
GLUCOSE SERPL-MCNC: 145 MG/DL (ref 65–140)
GLUCOSE SERPL-MCNC: 154 MG/DL (ref 65–140)
GLUCOSE SERPL-MCNC: 209 MG/DL (ref 65–140)
GLUCOSE SERPL-MCNC: 211 MG/DL (ref 65–140)
GLUCOSE SERPL-MCNC: 222 MG/DL (ref 65–140)
HCT VFR BLD AUTO: 35 % (ref 34.8–46.1)
HGB BLD-MCNC: 11.2 G/DL (ref 11.5–15.4)
INR PPP: 1.84 (ref 0.84–1.19)
LG PLATELETS BLD QL SMEAR: PRESENT
LYMPHOCYTES # BLD AUTO: 1.65 THOUSAND/UL (ref 0.6–4.47)
LYMPHOCYTES # BLD AUTO: 14 % (ref 14–44)
MACROCYTES BLD QL AUTO: PRESENT
MAGNESIUM SERPL-MCNC: 1.9 MG/DL (ref 1.9–2.7)
MCH RBC QN AUTO: 30.5 PG (ref 26.8–34.3)
MCHC RBC AUTO-ENTMCNC: 32 G/DL (ref 31.4–37.4)
MCV RBC AUTO: 95 FL (ref 82–98)
METAMYELOCYTE ABSOLUTE CT: 0.11 THOUSAND/UL (ref 0–0.1)
METAMYELOCYTES NFR BLD MANUAL: 1 % (ref 0–1)
MICROCYTES BLD QL AUTO: PRESENT
MONOCYTES # BLD AUTO: 0.88 THOUSAND/UL (ref 0–1.22)
MONOCYTES NFR BLD: 8 % (ref 4–12)
MYELOCYTE ABSOLUTE CT: 0.11 THOUSAND/UL (ref 0–0.1)
MYELOCYTES NFR BLD MANUAL: 1 % (ref 0–1)
NEUTROPHILS # BLD MANUAL: 7.49 THOUSAND/UL (ref 1.85–7.62)
NEUTS BAND NFR BLD MANUAL: 2 % (ref 0–8)
NEUTS SEG NFR BLD AUTO: 66 % (ref 43–75)
PLATELET # BLD AUTO: 170 THOUSANDS/UL (ref 149–390)
PLATELET BLD QL SMEAR: ABNORMAL
PMV BLD AUTO: 10.9 FL (ref 8.9–12.7)
POTASSIUM SERPL-SCNC: 3 MMOL/L (ref 3.5–5.3)
POTASSIUM SERPL-SCNC: 3.2 MMOL/L (ref 3.5–5.3)
PROMYELOCYTE ABSOLUTE CT: 0.22 THOUSAND/UL (ref 0–0)
PROMYELOCYTES NFR BLD MANUAL: 2 % (ref 0–0)
PROT SERPL-MCNC: 6.8 G/DL (ref 6.4–8.4)
PROTHROMBIN TIME: 21.6 SECONDS (ref 11.6–14.5)
RBC # BLD AUTO: 3.67 MILLION/UL (ref 3.81–5.12)
RBC MORPH BLD: PRESENT
SODIUM SERPL-SCNC: 140 MMOL/L (ref 135–147)
SODIUM SERPL-SCNC: 141 MMOL/L (ref 135–147)
VARIANT LYMPHS # BLD AUTO: 1 %
WBC # BLD AUTO: 11.01 THOUSAND/UL (ref 4.31–10.16)

## 2024-07-03 PROCEDURE — 92526 ORAL FUNCTION THERAPY: CPT

## 2024-07-03 PROCEDURE — 83735 ASSAY OF MAGNESIUM: CPT | Performed by: FAMILY MEDICINE

## 2024-07-03 PROCEDURE — 94640 AIRWAY INHALATION TREATMENT: CPT

## 2024-07-03 PROCEDURE — 80053 COMPREHEN METABOLIC PANEL: CPT | Performed by: FAMILY MEDICINE

## 2024-07-03 PROCEDURE — 85007 BL SMEAR W/DIFF WBC COUNT: CPT | Performed by: FAMILY MEDICINE

## 2024-07-03 PROCEDURE — 99232 SBSQ HOSP IP/OBS MODERATE 35: CPT | Performed by: FAMILY MEDICINE

## 2024-07-03 PROCEDURE — 99232 SBSQ HOSP IP/OBS MODERATE 35: CPT | Performed by: INTERNAL MEDICINE

## 2024-07-03 PROCEDURE — 85610 PROTHROMBIN TIME: CPT | Performed by: FAMILY MEDICINE

## 2024-07-03 PROCEDURE — 82948 REAGENT STRIP/BLOOD GLUCOSE: CPT

## 2024-07-03 PROCEDURE — 85027 COMPLETE CBC AUTOMATED: CPT | Performed by: FAMILY MEDICINE

## 2024-07-03 PROCEDURE — 80048 BASIC METABOLIC PNL TOTAL CA: CPT | Performed by: FAMILY MEDICINE

## 2024-07-03 PROCEDURE — 94760 N-INVAS EAR/PLS OXIMETRY 1: CPT

## 2024-07-03 RX ORDER — SPIRONOLACTONE 25 MG/1
25 TABLET ORAL DAILY
Status: DISCONTINUED | OUTPATIENT
Start: 2024-07-03 | End: 2024-07-07 | Stop reason: HOSPADM

## 2024-07-03 RX ORDER — POTASSIUM CHLORIDE 14.9 MG/ML
20 INJECTION INTRAVENOUS
Status: COMPLETED | OUTPATIENT
Start: 2024-07-03 | End: 2024-07-03

## 2024-07-03 RX ORDER — MAGNESIUM SULFATE 1 G/100ML
1 INJECTION INTRAVENOUS ONCE
Status: COMPLETED | OUTPATIENT
Start: 2024-07-03 | End: 2024-07-03

## 2024-07-03 RX ORDER — WARFARIN SODIUM 7.5 MG/1
7.5 TABLET ORAL
Status: DISCONTINUED | OUTPATIENT
Start: 2024-07-03 | End: 2024-07-07

## 2024-07-03 RX ADMIN — WARFARIN SODIUM 7.5 MG: 7.5 TABLET ORAL at 17:36

## 2024-07-03 RX ADMIN — ALLOPURINOL 100 MG: 100 TABLET ORAL at 08:22

## 2024-07-03 RX ADMIN — LORATADINE 5 MG: 10 TABLET ORAL at 08:22

## 2024-07-03 RX ADMIN — TRAMADOL HYDROCHLORIDE 50 MG: 50 TABLET, COATED ORAL at 01:12

## 2024-07-03 RX ADMIN — GUAIFENESIN 600 MG: 600 TABLET ORAL at 08:22

## 2024-07-03 RX ADMIN — IPRATROPIUM BROMIDE AND ALBUTEROL SULFATE 3 ML: 2.5; .5 SOLUTION RESPIRATORY (INHALATION) at 13:20

## 2024-07-03 RX ADMIN — MONTELUKAST 10 MG: 10 TABLET, FILM COATED ORAL at 21:01

## 2024-07-03 RX ADMIN — IPRATROPIUM BROMIDE AND ALBUTEROL SULFATE 3 ML: 2.5; .5 SOLUTION RESPIRATORY (INHALATION) at 19:37

## 2024-07-03 RX ADMIN — MIDODRINE HYDROCHLORIDE 2.5 MG: 5 TABLET ORAL at 08:23

## 2024-07-03 RX ADMIN — METOPROLOL TARTRATE 25 MG: 25 TABLET, FILM COATED ORAL at 21:01

## 2024-07-03 RX ADMIN — LEVOTHYROXINE SODIUM 288 MCG: 88 TABLET ORAL at 06:25

## 2024-07-03 RX ADMIN — DOCUSATE SODIUM 100 MG: 100 CAPSULE, LIQUID FILLED ORAL at 08:23

## 2024-07-03 RX ADMIN — POTASSIUM CHLORIDE 20 MEQ: 14.9 INJECTION, SOLUTION INTRAVENOUS at 10:47

## 2024-07-03 RX ADMIN — Medication 1 TABLET: at 08:22

## 2024-07-03 RX ADMIN — FERROUS SULFATE TAB 325 MG (65 MG ELEMENTAL FE) 325 MG: 325 (65 FE) TAB at 12:07

## 2024-07-03 RX ADMIN — INSULIN LISPRO 4 UNITS: 100 INJECTION, SOLUTION INTRAVENOUS; SUBCUTANEOUS at 16:37

## 2024-07-03 RX ADMIN — POLYETHYLENE GLYCOL 3350 17 G: 17 POWDER, FOR SOLUTION ORAL at 16:01

## 2024-07-03 RX ADMIN — INSULIN LISPRO 2 UNITS: 100 INJECTION, SOLUTION INTRAVENOUS; SUBCUTANEOUS at 21:16

## 2024-07-03 RX ADMIN — ATORVASTATIN CALCIUM 10 MG: 10 TABLET, FILM COATED ORAL at 08:22

## 2024-07-03 RX ADMIN — MAGNESIUM SULFATE HEPTAHYDRATE 1 G: 1 INJECTION, SOLUTION INTRAVENOUS at 08:32

## 2024-07-03 RX ADMIN — POTASSIUM CHLORIDE 20 MEQ: 14.9 INJECTION, SOLUTION INTRAVENOUS at 08:33

## 2024-07-03 RX ADMIN — PANTOPRAZOLE SODIUM 40 MG: 40 TABLET, DELAYED RELEASE ORAL at 06:04

## 2024-07-03 RX ADMIN — BUMETANIDE 2 MG: 0.25 INJECTION INTRAMUSCULAR; INTRAVENOUS at 21:08

## 2024-07-03 RX ADMIN — BUMETANIDE 2 MG: 0.25 INJECTION INTRAMUSCULAR; INTRAVENOUS at 08:23

## 2024-07-03 RX ADMIN — GUAIFENESIN 600 MG: 600 TABLET ORAL at 21:01

## 2024-07-03 RX ADMIN — LEVOFLOXACIN 750 MG: 750 INJECTION, SOLUTION INTRAVENOUS at 14:58

## 2024-07-03 RX ADMIN — INSULIN LISPRO 4 UNITS: 100 INJECTION, SOLUTION INTRAVENOUS; SUBCUTANEOUS at 12:07

## 2024-07-03 RX ADMIN — Medication 12.5 MG: at 08:22

## 2024-07-03 RX ADMIN — Medication 2000 UNITS: at 08:22

## 2024-07-03 RX ADMIN — FLUTICASONE FUROATE AND VILANTEROL TRIFENATATE 1 PUFF: 100; 25 POWDER RESPIRATORY (INHALATION) at 08:23

## 2024-07-03 RX ADMIN — SPIRONOLACTONE 25 MG: 25 TABLET ORAL at 10:15

## 2024-07-03 RX ADMIN — IPRATROPIUM BROMIDE AND ALBUTEROL SULFATE 3 ML: 2.5; .5 SOLUTION RESPIRATORY (INHALATION) at 07:49

## 2024-07-03 NOTE — ASSESSMENT & PLAN NOTE
Wt Readings from Last 3 Encounters:   07/03/24 (!) 166 kg (365 lb 15.4 oz)   06/10/24 (!) 157 kg (346 lb)   05/29/24 (!) 157 kg (346 lb 12.5 oz)     With acute decompensation, wt up 18kg   12/2023 ECHO: EF 55% with severe TR, RVSP 53  Repeat ECHO ordered  Appreciate cardiology recs   IV Bumex resume since potassium was 3.0.  This morning potassium is still remain the same, will give IV potassium supplement.  Also correct magnesium.  So far patient is -6 L  Remain on 4 L of supplemental oxygen.

## 2024-07-03 NOTE — ASSESSMENT & PLAN NOTE
Is maintained on warfarin 7.5mg qhs  INR did down to 1.84, patient was receiving Coumadin 5 mg, will increase to 7.5 mg at nighttime

## 2024-07-03 NOTE — SPEECH THERAPY NOTE
Speech Language/Pathology    Speech/Language Pathology Progress Note    Patient Name: Tigist Hewitt  Today's Date: 7/3/2024      Assessment:  Pt seen for dysphagia therapy during breakfast meal. Currently on level 2 mechanical soft with nectar thick liquids. Pt reporting she is doing much better on current diet and prefers it. Denied coughing w/ intake last night.  Seen w/ breakfast meal of oatmeal and nectar thick via cup and straw. Pt w/ improved oral control and coordination of breathing swallowing. No coughing w/ intake. Observed w/ meds whole in puree, tolerated without difficulty.    Pt plans to dc home w/ family. Reports her son and  will be able to accommodate altered diet. Will complete diet education later this date    Completed pt/family training on thickening liquids and mech soft. Handout provided, demonstration completed and sample of gel thickener provided.    Plan/Recommendations:  Recommend to continue mech soft/ nectar thick  Meds whole in puree  If pt would like to pursue an OP VBS, it would need to be completed at [facility] as they have a C arm and can accommodate a bariatric patient. Pt is hesitant as she would need to pay for  van transport.     Angeli Geiger, MS CCC-SLP  7/3/2024

## 2024-07-03 NOTE — PROGRESS NOTES
Progress Note:Cardiology  Tigist Garcíakaty 1955, 69 y.o. female MRN: [id number]    Unit/Bed#: -01 Encounter: [id number]  Attending Physician: Pedro Price MD   Primary Care Provider: Marisa Haque MD   Date admitted to hospital: 6/27/2024  Length of stay: 5         Acute on chronic diastolic congestive heart failure (HCC)  Assessment & Plan  Wt Readings from Last 3 Encounters:   06/30/24 (!) 175 kg (385 lb 5.8 oz)   06/10/24 (!) 157 kg (346 lb)   05/29/24 (!) 157 kg (346 lb 12.5 oz)     History of HFpEF in the setting of morbid obesity, untreated NISHI, pneumonia/COPD.  Echo 12/24/2023 with LVEF 55%, mild AS, mild-mod MR, severe TR with PASP 53mmHg which is overall unchanged from prior.   Echo 7/1/2024 shows preserved LVEF with right sided heart failure.  Transitioned to Bumex during May admission.  She has undergone work up by pulmonology for pulmonary hypertension.  Currently diuresing well on Bumex IV 2 mg BID  Add spironolactone 25 mg daily for K+ retention.  Continue CPAP  Would benefit from intense physical therapy and increased mobility but unfortunately she is lacking motivation for this.  Continue strict I's/O's, sodium/fluid restriction.  Optimize electrolytes for K+ >4, Mag >2.    Atrial fibrillation with RVR (HCC)  Assessment & Plan  History of longstanding persistent atrial fibrillation.   She was previously evaluated by Dr. Ferraro through [facility] EP and failed 2 cardioversion and an atrial fibrillation ablation. Persistent a fib since at least 2020.  HR's mildly elevated this admission in the setting of septic shock.  Has been receiving amiodarone this admission. Ideally would not continue given futility of restoring sinus rhythm. This was discontinued and metoprolol tartrate resumed.   Increase metoprolol tartrate to 25 mg BID for improved HR control.  Anticoagulated with warfarin, INR goal 2-3  Consider digoxin as needed for HR control.  Optimize electrolytes for K+  "> 4, Mag > 2.    Acute on chronic respiratory failure with hypoxia (HCC)  Assessment & Plan  Multi-factoral due to COPD, NISHI, obesity, HFpEF  Currently back on home O2 requirement of 3 L    NISHI (obstructive sleep apnea)  Assessment & Plan  Continue CPAP    Morbid obesity (HCC)  Assessment & Plan  Contributing factor to current medical conditions and would benefit greatly from weight loss.        Subjective:   Patient seen and examined.  No significant events overnight. She is feeling better this morning. O2 requirements remain the same.    Review of Systems   Constitutional: Negative.   HENT: Negative.     Cardiovascular:  Positive for dyspnea on exertion and leg swelling. Negative for chest pain, irregular heartbeat, near-syncope, orthopnea and palpitations.   Respiratory:  Positive for cough. Negative for snoring.    Endocrine: Negative.    Skin: Negative.    Musculoskeletal: Negative.    Gastrointestinal: Negative.    Genitourinary: Negative.    Neurological: Negative.    Psychiatric/Behavioral: Negative.           Objective:     Vitals: Blood pressure 112/61, pulse 84, temperature (!) 97.3 °F (36.3 °C), temperature source Temporal, resp. rate 18, height 5' 1\" (1.549 m), weight (!) 166 kg (365 lb 15.4 oz), SpO2 100%., Body mass index is 69.15 kg/m².,     Orthostatic Blood Pressures      Flowsheet Row Most Recent Value   Blood Pressure 112/61 filed at 07/03/2024 0715   Patient Position - Orthostatic VS Lying filed at 07/03/2024 0715            Physical Exam  Vitals and nursing note reviewed.   Constitutional:       General: She is not in acute distress.     Appearance: She is well-developed. She is obese.   HENT:      Head: Normocephalic and atraumatic.   Eyes:      Conjunctiva/sclera: Conjunctivae normal.   Neck:      Vascular: No JVD.   Cardiovascular:      Rate and Rhythm: Normal rate. Rhythm irregularly irregular.      Heart sounds: No murmur heard.  Pulmonary:      Effort: Pulmonary effort is normal. No " respiratory distress.      Breath sounds: Decreased breath sounds present.      Comments: 4 L NC  Abdominal:      Palpations: Abdomen is soft.      Tenderness: There is no abdominal tenderness.   Musculoskeletal:         General: No swelling.      Cervical back: Neck supple.      Right lower le+ Edema present.      Left lower le+ Edema present.   Skin:     General: Skin is warm and dry.      Capillary Refill: Capillary refill takes less than 2 seconds.   Neurological:      Mental Status: She is alert.   Psychiatric:         Mood and Affect: Mood normal.            Intake/Output Summary (Last 24 hours) at 7/3/2024 0928  Last data filed at 7/3/2024 0901  Gross per 24 hour   Intake 1600 ml   Output 1975 ml   Net -375 ml       Weight (last 2 days)       Date/Time Weight    24 0300 166 (365.96)    24 0300 167 (367.73)    24 1355 175 (385.81)               Medications:      Current Facility-Administered Medications:     acetaminophen (TYLENOL) tablet 650 mg, 650 mg, Oral, Q6H PRN, CAMPBELL Ahuja, 650 mg at 24 2228    albuterol inhalation solution 2.5 mg, 2.5 mg, Nebulization, Q6H PRN, Abena Gregory PA-C, 2.5 mg at 24 1704    allopurinol (ZYLOPRIM) tablet 100 mg, 100 mg, Oral, Daily, ROGELIO AhujaNP, 100 mg at 24 0822    atorvastatin (LIPITOR) tablet 10 mg, 10 mg, Oral, Daily, CAMPBELL Ahuja, 10 mg at 24 0822    bumetanide (BUMEX) injection 2 mg, 2 mg, Intravenous, BID, Ashly Vergara PA-C, 2 mg at 24 0823    Cholecalciferol (VITAMIN D3) tablet 2,000 Units, 2,000 Units, Oral, Daily, CAMPBELL Ahuja, 2,000 Units at 24 0822    diphenhydrAMINE (BENADRYL) tablet 25 mg, 25 mg, Oral, Q6H PRN, CAMPBELL Ahuja, 25 mg at 24 1410    docusate sodium (COLACE) capsule 100 mg, 100 mg, Oral, Daily, CAMPBELL Ahuja, 100 mg at 24 0823    ferrous sulfate tablet 325 mg, 325 mg, Oral, Daily With Breakfast, Gloria Sal  CAMPBELL, 325 mg at 07/02/24 1207    Fluticasone Furoate-Vilanterol 100-25 mcg/actuation 1 puff, 1 puff, Inhalation, Daily, CAMPBELL Ahuja, 1 puff at 07/03/24 0823    guaiFENesin (MUCINEX) 12 hr tablet 600 mg, 600 mg, Oral, Q12H HALEY, CAMPBELL Ahuja, 600 mg at 07/03/24 0822    insulin lispro (HumALOG/ADMELOG) 100 units/mL subcutaneous injection 2-12 Units, 2-12 Units, Subcutaneous, 4x Daily (AC & HS), 2 Units at 07/02/24 1708 **AND** Fingerstick Glucose (POCT), , , 4x Daily AC and at bedtime, CAMPBELL Ahuja    ipratropium-albuterol (DUO-NEB) 0.5-2.5 mg/3 mL inhalation solution 3 mL, 3 mL, Nebulization, Q6H While awake, Amanda Dukes MD, 3 mL at 07/03/24 0749    levofloxacin (LEVAQUIN) IVPB (premix in dextrose) 750 mg 150 mL, 750 mg, Intravenous, Q24H, Abena Gregory PA-C, Stopped at 07/02/24 1536    levothyroxine tablet 288 mcg, 288 mcg, Oral, Daily, CAMPBELL Ahuja, 288 mcg at 07/03/24 0625    loratadine (CLARITIN) tablet 5 mg, 5 mg, Oral, Daily, CAMPBELL Ahuja, 5 mg at 07/03/24 0822    magnesium sulfate IVPB (premix) SOLN 1 g, 1 g, Intravenous, Once, Pedro Price MD, 1 g at 07/03/24 0832    metoprolol tartrate (LOPRESSOR) tablet 25 mg, 25 mg, Oral, Q12H HALEYDesi CRNP    midodrine (PROAMATINE) tablet 2.5 mg, 2.5 mg, Oral, TID AC, CAMPBELL Bolton, 2.5 mg at 07/03/24 0823    montelukast (SINGULAIR) tablet 10 mg, 10 mg, Oral, HS, CAMPBELL Ahuja, 10 mg at 07/02/24 2227    multivitamin-minerals (CENTRUM) tablet 1 tablet, 1 tablet, Oral, Daily, CAMPBELL Ahuja, 1 tablet at 07/03/24 0822    pantoprazole (PROTONIX) EC tablet 40 mg, 40 mg, Oral, Early Morning, CAMPBELL Ahuja, 40 mg at 07/03/24 0604    polyethylene glycol (MIRALAX) packet 17 g, 17 g, Oral, Daily PRN, Abena Gregory PA-C    potassium chloride 20 mEq IVPB (premix), 20 mEq, Intravenous, Q2H, Pedro Price MD, Last Rate: 50 mL/hr at 07/03/24 0833, 20 mEq at 07/03/24 0833     spironolactone (ALDACTONE) tablet 25 mg, 25 mg, Oral, Daily, CAMPBELL Bolton    traMADol (ULTRAM) tablet 50 mg, 50 mg, Oral, Q12H PRN, CAMPBELL Ahuja, 50 mg at 07/03/24 0112    warfarin (COUMADIN) tablet 7.5 mg, 7.5 mg, Oral, Daily (warfarin), Pedro Price MD     Labs & Results:        Results from last 7 days   Lab Units 07/03/24  0609 07/02/24  0547 07/01/24  0948   WBC Thousand/uL 11.01* 11.21* 13.27*   HEMOGLOBIN g/dL 11.2* 11.3* 11.2*   HEMATOCRIT % 35.0 34.7* 34.7*   PLATELETS Thousands/uL 170 165 165         Results from last 7 days   Lab Units 07/03/24  0609 07/02/24  1412 07/02/24  0547 07/01/24  0948   POTASSIUM mmol/L 3.0* 3.0* 2.7* 2.8*   CHLORIDE mmol/L 99 99 98 98   CO2 mmol/L 35* 37* 35* 33*   BUN mg/dL 47* 46* 48* 50*   CREATININE mg/dL 1.14 1.28 1.29 1.25   CALCIUM mg/dL 9.5 9.6 9.6 9.5   ALK PHOS U/L 104  --  110* 118*   ALT U/L 12  --  13 14   AST U/L 14  --  14 17     Results from last 7 days   Lab Units 07/03/24  0609 07/02/24  0547 07/01/24  0948 06/28/24  0552 06/27/24  2250   INR  1.84* 2.06* 2.96*   < > 2.80*   PTT seconds  --   --   --   --  48*    < > = values in this interval not displayed.     Results from last 7 days   Lab Units 07/03/24  0609 07/02/24  0547 07/01/24  0948   MAGNESIUM mg/dL 1.9 2.1 1.8*     Results from last 7 days   Lab Units 06/27/24  2235   BNP pg/mL 444*

## 2024-07-03 NOTE — PLAN OF CARE
Problem: PAIN - ADULT  Goal: Verbalizes/displays adequate comfort level or baseline comfort level  Description: Interventions:  - Encourage patient to monitor pain and request assistance  - Assess pain using appropriate pain scale  - Administer analgesics based on type and severity of pain and evaluate response  - Implement non-pharmacological measures as appropriate and evaluate response  - Consider cultural and social influences on pain and pain management  - Notify physician/advanced practitioner if interventions unsuccessful or patient reports new pain  Outcome: Progressing     Problem: INFECTION - ADULT  Goal: Absence or prevention of progression during hospitalization  Description: INTERVENTIONS:  - Assess and monitor for signs and symptoms of infection  - Monitor lab/diagnostic results  - Monitor all insertion sites, i.e. indwelling lines, tubes, and drains  - Monitor endotracheal if appropriate and nasal secretions for changes in amount and color  - [place] appropriate cooling/warming therapies per order  - Administer medications as ordered  - Instruct and encourage patient and family to use good hand hygiene technique  - Identify and instruct in appropriate isolation precautions for identified infection/condition  Outcome: Progressing     Problem: SAFETY ADULT  Goal: Patient will remain free of falls  Description: INTERVENTIONS:  - Educate patient/family on patient safety including physical limitations  - Instruct patient to call for assistance with activity   - Consult OT/PT to assist with strengthening/mobility   - Keep Call bell within reach  - Keep bed low and locked with side rails adjusted as appropriate  - Keep care items and personal belongings within reach  - Initiate and maintain comfort rounds  - Make Fall Risk Sign visible to staff  - Offer Toileting every 2 Hours, in advance of need if unable to call for assistance  - Initiate/Maintain bed/chair alarm for confusion, impulsivity, or  noncompliance with calling for assistance  - Apply yellow socks and bracelet for high fall risk patients  - Consider moving patient to room near nurses station  Outcome: Progressing  Goal: Maintain or return to baseline ADL function  Description: INTERVENTIONS:  -  Assess patient's ability to carry out ADLs; assess patient's baseline for ADL function and identify physical deficits which impact ability to perform ADLs (bathing, care of mouth/teeth, toileting, grooming, dressing, etc.)  - Assess/evaluate cause of self-care deficits   - Assess range of motion  - Assess patient's mobility; develop plan if impaired  - Assess patient's need for assistive devices and provide as appropriate  - Encourage maximum independence but intervene and supervise when necessary  - Involve family in performance of ADLs  - Assess for home care needs following discharge   - Consider OT consult to assist with ADL evaluation and planning for discharge  - Provide patient education as appropriate  Outcome: Progressing  Goal: Maintains/Returns to pre admission functional level  Description: INTERVENTIONS:  - Perform AM-PAC 6 Click Basic Mobility/ Daily Activity assessment daily.  - Set and communicate daily mobility goal to care team and patient/family/caregiver.   - Collaborate with rehabilitation services on mobility goals if consulted  - Perform Range of Motion 3 times a day.  - Reposition patient every 2 hours if unable to reposition self  - Out of bed for toileting when medically appropriate  - Record patient progress and toleration of activity level   Outcome: Progressing     Problem: DISCHARGE PLANNING  Goal: Discharge to home or other facility with appropriate resources  Description: INTERVENTIONS:  - Identify barriers to discharge w/patient and caregiver  - Arrange for needed discharge resources and transportation as appropriate  - Identify discharge learning needs (meds, wound care, etc.)  - Arrange for interpretive services to  assist at discharge as needed  - Refer to Case Management Department for coordinating discharge planning if the patient needs post-hospital services based on physician/advanced practitioner order or complex needs related to functional status, cognitive ability, or social support system  Outcome: Progressing     Problem: Knowledge Deficit  Goal: Patient/family/caregiver demonstrates understanding of disease process, treatment plan, medications, and discharge instructions  Description: Complete learning assessment and assess knowledge base.  Interventions:  - Provide teaching at level of understanding  - Provide teaching via preferred learning methods  Outcome: Progressing     Problem: CARDIOVASCULAR - ADULT  Goal: Maintains optimal cardiac output and hemodynamic stability  Description: INTERVENTIONS:  - Monitor I/O, vital signs and rhythm  - Monitor for S/S and trends of decreased cardiac output  - Administer and titrate ordered vasoactive medications to optimize hemodynamic stability  - Assess quality of pulses, skin color and temperature  - Assess for signs of decreased coronary artery perfusion  - Instruct patient to report change in severity of symptoms  Outcome: Progressing  Goal: Absence of cardiac dysrhythmias or at baseline rhythm  Description: INTERVENTIONS:  - Continuous cardiac monitoring, vital signs, obtain 12 lead EKG if ordered  - Administer antiarrhythmic and heart rate control medications as ordered  - Monitor electrolytes and administer replacement therapy as ordered  Outcome: Progressing     Problem: RESPIRATORY - ADULT  Goal: Achieves optimal ventilation and oxygenation  Description: INTERVENTIONS:  - Assess for changes in respiratory status  - Assess for changes in mentation and behavior  - Position to facilitate oxygenation and minimize respiratory effort  - Oxygen administered by appropriate delivery if ordered  - Initiate smoking cessation education as indicated  - Encourage broncho-pulmonary  hygiene including cough, deep breathe, Incentive Spirometry  - Assess the need for suctioning and aspirate as needed  - Assess and instruct to report SOB or any respiratory difficulty  - Respiratory Therapy support as indicated  Outcome: Progressing     Problem: GENITOURINARY - ADULT  Goal: Maintains or returns to baseline urinary function  Description: INTERVENTIONS:  - Assess urinary function  - Encourage oral fluids to ensure adequate hydration if ordered  - Administer IV fluids as ordered to ensure adequate hydration  - Administer ordered medications as needed  - Offer frequent toileting  - Follow urinary retention protocol if ordered  Outcome: Progressing  Goal: Absence of urinary retention  Description: INTERVENTIONS:  - Assess patient’s ability to void and empty bladder  - Monitor I/O  - Bladder scan as needed  - Discuss with physician/AP medications to alleviate retention as needed  - Discuss catheterization for long term situations as appropriate  Outcome: Progressing  Goal: Urinary catheter remains patent  Description: INTERVENTIONS:  - Assess patency of urinary catheter  - If patient has a chronic frederick, consider changing catheter if non-functioning  - Follow guidelines for intermittent irrigation of non-functioning urinary catheter  Outcome: Progressing     Problem: METABOLIC, FLUID AND ELECTROLYTES - ADULT  Goal: Electrolytes maintained within normal limits  Description: INTERVENTIONS:  - Monitor labs and assess patient for signs and symptoms of electrolyte imbalances  - Administer electrolyte replacement as ordered  - Monitor response to electrolyte replacements, including repeat lab results as appropriate  - Instruct patient on fluid and nutrition as appropriate  Outcome: Progressing  Goal: Fluid balance maintained  Description: INTERVENTIONS:  - Monitor labs   - Monitor I/O and WT  - Instruct patient on fluid and nutrition as appropriate  - Assess for signs & symptoms of volume excess or  deficit  Outcome: Progressing  Goal: Glucose maintained within target range  Description: INTERVENTIONS:  - Monitor Blood Glucose as ordered  - Assess for signs and symptoms of hyperglycemia and hypoglycemia  - Administer ordered medications to maintain glucose within target range  - Assess nutritional intake and initiate nutrition service referral as needed  Outcome: Progressing     Problem: SKIN/TISSUE INTEGRITY - ADULT  Goal: Incision(s), wounds(s) or drain site(s) healing without S/S of infection  Description: INTERVENTIONS  - Assess and document dressing, incision, wound bed, drain sites and surrounding tissue  - Provide patient and family education  - Perform skin care/dressing changes per wound consult recommendations  Outcome: Progressing     Problem: Prexisting or High Potential for Compromised Skin Integrity  Goal: Skin integrity is maintained or improved  Description: INTERVENTIONS:  - Identify patients at risk for skin breakdown  - Assess and monitor skin integrity  - Assess and monitor nutrition and hydration status  - Monitor labs   - Assess for incontinence   - Turn and reposition patient  - Assist with mobility/ambulation  - Relieve pressure over bony prominences  - Avoid friction and shearing  - Provide appropriate hygiene as needed including keeping skin clean and dry  - Evaluate need for skin moisturizer/barrier cream  - Collaborate with interdisciplinary team   - Patient/family teaching  - Consider wound care consult   Outcome: Progressing     Problem: Nutrition/Hydration-ADULT  Goal: Nutrient/Hydration intake appropriate for improving, restoring or maintaining nutritional needs  Description: Monitor and assess patient's nutrition/hydration status for malnutrition. Collaborate with interdisciplinary team and initiate plan and interventions as ordered.  Monitor patient's weight and dietary intake as ordered or per policy. Utilize nutrition screening tool and intervene as necessary. Determine  patient's food preferences and provide high-protein, high-caloric foods as appropriate.     INTERVENTIONS:  - Monitor oral intake, urinary output, labs, and treatment plans  - Assess nutrition and hydration status and recommend course of action  - Evaluate amount of meals eaten  - Assist patient with eating if necessary   - Allow adequate time for meals  - Recommend/ encourage appropriate diets, oral nutritional supplements, and vitamin/mineral supplements  - Order, calculate, and assess calorie counts as needed  - Recommend, monitor, and adjust tube feedings and TPN/PPN based on assessed needs  - Assess need for intravenous fluids  - Provide specific nutrition/hydration education as appropriate  - Include patient/family/caregiver in decisions related to nutrition  Outcome: Progressing

## 2024-07-03 NOTE — ASSESSMENT & PLAN NOTE
Hx of persistent atrial fibrillation, failed prior cardioversion/ablation in past   Developed A fib with RVR over admission secondary to septic shock requiring amiodarone gtt since weaned  Appreciate cardiology recs  BB has been on hold with relative hypotension, since improved with midodrine. Resumed metoprolol tartrate 12.5mg bid  Can use digoxin for rate control if needed   INR dipped down to 1.84, will increase Coumadin to 7.5 mg  Keep potassium more than 4, magnesium more than 2

## 2024-07-03 NOTE — PROGRESS NOTES
[facility]  [facility]  Name: Tigist Hewitt I  MRN: [id number]  Unit/Bed#: -01 I Date of Admission: 6/27/2024   Date of Service: 7/3/2024 I Hospital Day: 5    Assessment & Plan   Acute on chronic diastolic congestive heart failure (HCC)  Assessment & Plan  Wt Readings from Last 3 Encounters:   07/03/24 (!) 166 kg (365 lb 15.4 oz)   06/10/24 (!) 157 kg (346 lb)   05/29/24 (!) 157 kg (346 lb 12.5 oz)     With acute decompensation, wt up 18kg   12/2023 ECHO: EF 55% with severe TR, RVSP 53  Repeat ECHO ordered  Appreciate cardiology recs   IV Bumex resume since potassium was 3.0.  This morning potassium is still remain the same, will give IV potassium supplement.  Also correct magnesium.  So far patient is -6 L  Remain on 4 L of supplemental oxygen.        Type 2 diabetes mellitus with hyperglycemia, with long-term current use of insulin ([facility])  Assessment & Plan  Lab Results   Component Value Date    HGBA1C 7.0 (H) 05/02/2024       Recent Labs     07/02/24  1112 07/02/24  1618 07/02/24  2103 07/03/24  0721   POCGLU 151* 181* 120 144*         Blood Sugar Average: Last 72 hrs:  (P) [id number]    Hold home diabetic medications.  SSI alg 4  ACHS fingersticks    Atrial fibrillation with RVR ([facility])  Assessment & Plan  Hx of persistent atrial fibrillation, failed prior cardioversion/ablation in past   Developed A fib with RVR over admission secondary to septic shock requiring amiodarone gtt since weaned  Appreciate cardiology recs  BB has been on hold with relative hypotension, since improved with midodrine. Resumed metoprolol tartrate 12.5mg bid  Can use digoxin for rate control if needed   INR dipped down to 1.84, will increase Coumadin to 7.5 mg  Keep potassium more than 4, magnesium more than 2    * Sepsis ([facility])  Assessment & Plan  Met on admission with leukocytosis, tachycardia, tachypnea  Developed hypotension over admission improved with albumin/midodrine. 30cc/kg  bolus not given due to CHF exacerbation  CT chest with possible pneumonia in the appropriate setting  Was started on IV ceftriaxone, continue to completed 7-day course  Blood cultures x 3 negative. Initial blood cultures 1/2 positive, suspected [id number].  Patient developed pruritus required Benadryl and Pepcid with Rocephin.  Antibiotic switched to levofloxacin      COPD (chronic obstructive pulmonary disease) (HCC)  Assessment & Plan  Less likely in exacerbation  At baseline O2 requirement of 3L NC-currently using 4 L of oxygen  Continue home inhalers    Closed left ankle fracture  Assessment & Plan  Recent closed left ankle fracture, being followed by orthopedic surgery   Missed outpatient appt due to current hospitalizations   Last recommendation for NWB status  Orthopedic recommendation appreciated    Positive blood culture  Assessment & Plan  1/2 blood cultures Staph epidermidis -likely contamination, coagulase-negative.  Repeat blood culture negative in 48 hours    Hypokalemia  Assessment & Plan  Potassium is 3.0  Continue IV supplement, recheck  Goal > 4   Telemetry     Acute on chronic respiratory failure with hypoxia (HCC)  Assessment & Plan  Secondary to decompensated HF, pneumonia  CT c/a/p: diffuse GGO/mosaicism throughout the bilateral lung fields could be related to hypoinflation, air trapping/small airway disease, edema or pneumonia  Required CPAP since weaned back to baseline oxygen   Patient was on IV Rocephin, developed some reaction, IV Rocephin transition to IV levofloxacin.  Continue IV Bumex 2mg IV bid -remain on hold due to hypokalemia, patient is requiring IV supplemental potassium.  Speech eval   Currently using 4 L of oxygen      NISHI (obstructive sleep apnea)  Assessment & Plan  Continue CPAP HS     Morbid obesity (HCC)  Assessment & Plan  Consider dietary consult.   Encourage exercise and lifestyle changes.    Supratherapeutic INR  Assessment & Plan  Is maintained on  warfarin 7.5mg qhs  INR did down to 1.84, patient was receiving Coumadin 5 mg, will increase to 7.5 mg at nighttime             VTE Pharmacologic Prophylaxis: VTE Score: 11 High Risk (Score >/= 5) - Pharmacological DVT Prophylaxis Ordered: warfarin (Coumadin). Sequential Compression Devices Ordered.    Mobility:   Basic Mobility Inpatient Raw Score: 6  JH-HLM Goal: 2: Bed activities/Dependent transfer  JH-HLM Achieved: 2: Bed activities/Dependent transfer  JH-HLM Goal achieved. Continue to encourage appropriate mobility.    Patient Centered Rounds: I performed bedside rounds with nursing staff today.   Discussions with Specialists or Other Care Team Provider: Neurology    Education and Discussions with Family / Patient:  Left voice message for .     Current Length of Stay: 5 day(s)  Current Patient Status: Inpatient   Certification Statement: The patient will continue to require additional inpatient hospital stay due to monitorable conditions  Discharge Plan: Anticipate discharge in 48-72 hrs to rehab facility.    Code Status: Level 3 - DNAR and DNI    Subjective:   Seen and evaluated during the run.  Denies any significant complaint other than weakness.  Denies any nausea, vomiting, diarrhea.    Objective:     Vitals:   Temp (24hrs), Av.4 °F (36.3 °C), Min:97 °F (36.1 °C), Max:98.2 °F (36.8 °C)    Temp:  [97 °F (36.1 °C)-98.2 °F (36.8 °C)] 97.2 °F (36.2 °C)  HR:  [] 93  Resp:  [15-22] 15  BP: (106-115)/(57-70) 115/68  SpO2:  [81 %-100 %] 96 %  Body mass index is 69.15 kg/m².     Input and Output Summary (last 24 hours):     Intake/Output Summary (Last 24 hours) at 7/3/2024 1414  Last data filed at 7/3/2024 1252  Gross per 24 hour   Intake 1610 ml   Output 1975 ml   Net -365 ml       Physical Exam:   Physical Exam  Vitals and nursing note reviewed.   Constitutional:       Appearance: She is obese.   HENT:      Mouth/Throat:      Mouth: Mucous membranes are moist.   Eyes:      Extraocular Movements:  Extraocular movements intact.      Conjunctiva/sclera: Conjunctivae normal.      Pupils: Pupils are equal, round, and reactive to light.   Cardiovascular:      Rate and Rhythm: Normal rate.      Heart sounds:      No friction rub. No gallop.   Pulmonary:      Effort: Pulmonary effort is normal. No respiratory distress.      Breath sounds: No stridor. No wheezing or rhonchi.   Abdominal:      General: Abdomen is flat. Bowel sounds are normal. There is no distension.      Palpations: There is no mass.      Tenderness: There is no abdominal tenderness.      Hernia: No hernia is present.   Genitourinary:     Comments: Dow in place  Musculoskeletal:      Right lower leg: Edema present.      Left lower leg: Edema present.   Neurological:      Mental Status: She is alert and oriented to person, place, and time.      Cranial Nerves: No cranial nerve deficit.      Sensory: No sensory deficit.      Motor: No weakness.      Coordination: Coordination normal.         Additional Data:     Labs:  Results from last 7 days   Lab Units 07/03/24  0609 07/02/24  0547   WBC Thousand/uL 11.01* 11.21*   HEMOGLOBIN g/dL 11.2* 11.3*   HEMATOCRIT % 35.0 34.7*   PLATELETS Thousands/uL 170 165   BANDS PCT % 2  --    SEGS PCT %  --  76*   LYMPHO PCT % 14 6*   MONO PCT % 8 10   EOS PCT % 5 7*     Results from last 7 days   Lab Units 07/03/24  0609   SODIUM mmol/L 140   POTASSIUM mmol/L 3.0*   CHLORIDE mmol/L 99   CO2 mmol/L 35*   BUN mg/dL 47*   CREATININE mg/dL 1.14   ANION GAP mmol/L 6   CALCIUM mg/dL 9.5   ALBUMIN g/dL 2.9*   TOTAL BILIRUBIN mg/dL 1.45*   ALK PHOS U/L 104   ALT U/L 12   AST U/L 14   GLUCOSE RANDOM mg/dL 145*     Results from last 7 days   Lab Units 07/03/24  0609   INR  1.84*     Results from last 7 days   Lab Units 07/03/24  1130 07/03/24  0721 07/02/24  2103 07/02/24  1618 07/02/24  1112 07/02/24  0728 07/01/24  2045 07/01/24  1625 07/01/24  1206 07/01/24  0733 06/30/24  2106 06/30/24  1629   POC GLUCOSE mg/dl 211* 144*  120 181* 151* 153* 146* 197* 199* 142* 176* 182*         Results from last 7 days   Lab Units 07/02/24  0547 06/30/24  0529 06/29/24  0541 06/28/24  0552 06/27/24  2329 06/27/24  2320   LACTIC ACID mmol/L  --   --   --   --  1.4  --    PROCALCITONIN ng/ml 0.87* 1.88* 3.13* 1.21*  --  0.31*       Lines/Drains:  Invasive Devices       Peripheral Intravenous Line  Duration             Long-Dwell Peripheral IV (Midline) 06/28/24 Left Cephalic Vein 4 days              Drain  Duration             Urethral Catheter Straight-tip 16 Fr. 5 days                  Urinary Catheter:  Goal for removal: N/A - Chronic Dow               Imaging: No pertinent imaging reviewed.    Recent Cultures (last 7 days):   Results from last 7 days   Lab Units 06/29/24  0541 06/28/24  0146 06/27/24  2307   BLOOD CULTURE  No Growth at 72 hrs.  No Growth at 72 hrs.  --  No Growth After 5 Days.  Staphylococcus epidermidis*   GRAM STAIN RESULT   --   --  Gram positive cocci in clusters*   URINE CULTURE   --  <10,000 cfu/ml  --        Last 24 Hours Medication List:   Current Facility-Administered Medications   Medication Dose Route Frequency Provider Last Rate    acetaminophen  650 mg Oral Q6H PRN CAMPBELL Ahuja      albuterol  2.5 mg Nebulization Q6H PRN Abena Gregory PA-C      allopurinol  100 mg Oral Daily CAMPBELL Ahuja      atorvastatin  10 mg Oral Daily CAMPBELL Ahuja      bumetanide  2 mg Intravenous BID Ashly Vergara PA-C      Cholecalciferol  2,000 Units Oral Daily CAMPBELL Ahuja      diphenhydrAMINE  25 mg Oral Q6H PRN CAMPBELL Ahuja      docusate sodium  100 mg Oral Daily CAMPBELL Ahuja      ferrous sulfate  325 mg Oral Daily With Breakfast CAMPBELL Ahuja      Fluticasone Furoate-Vilanterol  1 puff Inhalation Daily CAMPBELL Ahuja      guaiFENesin  600 mg Oral Q12H HALEY CAMPBELL Ahuja      insulin lispro  2-12 Units Subcutaneous 4x Daily (AC & HS) CAMPBELL Ahuja       ipratropium-albuterol  3 mL Nebulization Q6H While awake Amanda Dukes MD      levofloxacin  750 mg Intravenous Q24H Abena Gregory PA-C Stopped (07/02/24 1536)    levothyroxine  288 mcg Oral Daily Gloria Sal, CRNP      loratadine  5 mg Oral Daily Gloria Sal, CRNP      metoprolol tartrate  25 mg Oral Q12H [facility] Desi Stack, CRNP      montelukast  10 mg Oral HS Gloria Sal, CRNP      multivitamin-minerals  1 tablet Oral Daily Gloria Sal, CRNP      pantoprazole  40 mg Oral Early Morning Gloria Sal, CRNP      polyethylene glycol  17 g Oral Daily PRN Abena Gregory PA-C      spironolactone  25 mg Oral Daily Desi Stack, CRNP      traMADol  50 mg Oral Q12H PRN Gloria Sal, CRNP      warfarin  7.5 mg Oral Daily (warfarin) Pedro Price MD          Today, Patient Was Seen By: Pedro Price MD    **Please Note: This note may have been constructed using a voice recognition system.**

## 2024-07-03 NOTE — ASSESSMENT & PLAN NOTE
Met on admission with leukocytosis, tachycardia, tachypnea  Developed hypotension over admission improved with albumin/midodrine. 30cc/kg bolus not given due to CHF exacerbation  CT chest with possible pneumonia in the appropriate setting  Was started on IV ceftriaxone, continue to completed 7-day course  Blood cultures x 3 negative. Initial blood cultures 1/2 positive, suspected [id number].  Patient developed pruritus required Benadryl and Pepcid with Rocephin.  Antibiotic switched to levofloxacin

## 2024-07-03 NOTE — ASSESSMENT & PLAN NOTE
Less likely in exacerbation  At baseline O2 requirement of 3L NC-currently using 4 L of oxygen  Continue home inhalers

## 2024-07-03 NOTE — ASSESSMENT & PLAN NOTE
Lab Results   Component Value Date    HGBA1C 7.0 (H) 05/02/2024       Recent Labs     07/02/24  1112 07/02/24  1618 07/02/24  2103 07/03/24  0721   POCGLU 151* 181* 120 144*         Blood Sugar Average: Last 72 hrs:  (P) 163.7561276976969353    Hold home diabetic medications.  SSI alg 4  ACHS fingersticks

## 2024-07-03 NOTE — ASSESSMENT & PLAN NOTE
Secondary to decompensated HF, pneumonia  CT c/a/p: diffuse GGO/mosaicism throughout the bilateral lung fields could be related to hypoinflation, air trapping/small airway disease, edema or pneumonia  Required CPAP since weaned back to baseline oxygen   Patient was on IV Rocephin, developed some reaction, IV Rocephin transition to IV levofloxacin.  Continue IV Bumex 2mg IV bid -remain on hold due to hypokalemia, patient is requiring IV supplemental potassium.  Speech eval   Currently using 4 L of oxygen

## 2024-07-04 LAB
ALBUMIN SERPL BCG-MCNC: 2.9 G/DL (ref 3.5–5)
ALP SERPL-CCNC: 100 U/L (ref 34–104)
ALT SERPL W P-5'-P-CCNC: 12 U/L (ref 7–52)
ANION GAP SERPL CALCULATED.3IONS-SCNC: 5 MMOL/L (ref 4–13)
AST SERPL W P-5'-P-CCNC: 17 U/L (ref 13–39)
BACTERIA BLD CULT: NORMAL
BACTERIA BLD CULT: NORMAL
BILIRUB SERPL-MCNC: 1.29 MG/DL (ref 0.2–1)
BUN SERPL-MCNC: 43 MG/DL (ref 5–25)
CALCIUM ALBUM COR SERPL-MCNC: 10.3 MG/DL (ref 8.3–10.1)
CALCIUM SERPL-MCNC: 9.4 MG/DL (ref 8.4–10.2)
CHLORIDE SERPL-SCNC: 100 MMOL/L (ref 96–108)
CO2 SERPL-SCNC: 37 MMOL/L (ref 21–32)
CREAT SERPL-MCNC: 1.04 MG/DL (ref 0.6–1.3)
ERYTHROCYTE [DISTWIDTH] IN BLOOD BY AUTOMATED COUNT: 17.6 % (ref 11.6–15.1)
GFR SERPL CREATININE-BSD FRML MDRD: 54 ML/MIN/1.73SQ M
GLUCOSE SERPL-MCNC: 155 MG/DL (ref 65–140)
GLUCOSE SERPL-MCNC: 162 MG/DL (ref 65–140)
GLUCOSE SERPL-MCNC: 170 MG/DL (ref 65–140)
GLUCOSE SERPL-MCNC: 183 MG/DL (ref 65–140)
GLUCOSE SERPL-MCNC: 212 MG/DL (ref 65–140)
HCT VFR BLD AUTO: 34.6 % (ref 34.8–46.1)
HGB BLD-MCNC: 11.2 G/DL (ref 11.5–15.4)
INR PPP: 1.84 (ref 0.84–1.19)
MAGNESIUM SERPL-MCNC: 1.9 MG/DL (ref 1.9–2.7)
MCH RBC QN AUTO: 30.8 PG (ref 26.8–34.3)
MCHC RBC AUTO-ENTMCNC: 32.4 G/DL (ref 31.4–37.4)
MCV RBC AUTO: 95 FL (ref 82–98)
PLATELET # BLD AUTO: 170 THOUSANDS/UL (ref 149–390)
PMV BLD AUTO: 10.8 FL (ref 8.9–12.7)
POTASSIUM SERPL-SCNC: 3.4 MMOL/L (ref 3.5–5.3)
PROT SERPL-MCNC: 6.7 G/DL (ref 6.4–8.4)
PROTHROMBIN TIME: 21.6 SECONDS (ref 11.6–14.5)
RBC # BLD AUTO: 3.64 MILLION/UL (ref 3.81–5.12)
SODIUM SERPL-SCNC: 142 MMOL/L (ref 135–147)
WBC # BLD AUTO: 11.26 THOUSAND/UL (ref 4.31–10.16)

## 2024-07-04 PROCEDURE — 82948 REAGENT STRIP/BLOOD GLUCOSE: CPT

## 2024-07-04 PROCEDURE — 99232 SBSQ HOSP IP/OBS MODERATE 35: CPT | Performed by: FAMILY MEDICINE

## 2024-07-04 PROCEDURE — 80053 COMPREHEN METABOLIC PANEL: CPT | Performed by: FAMILY MEDICINE

## 2024-07-04 PROCEDURE — 94640 AIRWAY INHALATION TREATMENT: CPT

## 2024-07-04 PROCEDURE — 85027 COMPLETE CBC AUTOMATED: CPT | Performed by: FAMILY MEDICINE

## 2024-07-04 PROCEDURE — 94760 N-INVAS EAR/PLS OXIMETRY 1: CPT

## 2024-07-04 PROCEDURE — 85610 PROTHROMBIN TIME: CPT | Performed by: FAMILY MEDICINE

## 2024-07-04 PROCEDURE — 83735 ASSAY OF MAGNESIUM: CPT | Performed by: FAMILY MEDICINE

## 2024-07-04 RX ORDER — POTASSIUM CHLORIDE 14.9 MG/ML
20 INJECTION INTRAVENOUS
Status: COMPLETED | OUTPATIENT
Start: 2024-07-04 | End: 2024-07-04

## 2024-07-04 RX ORDER — MAGNESIUM SULFATE 1 G/100ML
1 INJECTION INTRAVENOUS ONCE
Status: COMPLETED | OUTPATIENT
Start: 2024-07-04 | End: 2024-07-04

## 2024-07-04 RX ADMIN — ACETAMINOPHEN 325MG 650 MG: 325 TABLET ORAL at 11:11

## 2024-07-04 RX ADMIN — POTASSIUM CHLORIDE 20 MEQ: 14.9 INJECTION, SOLUTION INTRAVENOUS at 09:21

## 2024-07-04 RX ADMIN — MONTELUKAST 10 MG: 10 TABLET, FILM COATED ORAL at 21:23

## 2024-07-04 RX ADMIN — INSULIN LISPRO 2 UNITS: 100 INJECTION, SOLUTION INTRAVENOUS; SUBCUTANEOUS at 09:17

## 2024-07-04 RX ADMIN — BUMETANIDE 2 MG: 0.25 INJECTION INTRAMUSCULAR; INTRAVENOUS at 09:20

## 2024-07-04 RX ADMIN — POLYETHYLENE GLYCOL 3350 17 G: 17 POWDER, FOR SOLUTION ORAL at 16:27

## 2024-07-04 RX ADMIN — INSULIN LISPRO 4 UNITS: 100 INJECTION, SOLUTION INTRAVENOUS; SUBCUTANEOUS at 11:47

## 2024-07-04 RX ADMIN — GUAIFENESIN 600 MG: 600 TABLET ORAL at 21:23

## 2024-07-04 RX ADMIN — ALLOPURINOL 100 MG: 100 TABLET ORAL at 09:20

## 2024-07-04 RX ADMIN — MAGNESIUM SULFATE HEPTAHYDRATE 1 G: 1 INJECTION, SOLUTION INTRAVENOUS at 09:21

## 2024-07-04 RX ADMIN — IPRATROPIUM BROMIDE AND ALBUTEROL SULFATE 3 ML: 2.5; .5 SOLUTION RESPIRATORY (INHALATION) at 13:11

## 2024-07-04 RX ADMIN — IPRATROPIUM BROMIDE AND ALBUTEROL SULFATE 3 ML: 2.5; .5 SOLUTION RESPIRATORY (INHALATION) at 19:57

## 2024-07-04 RX ADMIN — LORATADINE 5 MG: 10 TABLET ORAL at 09:20

## 2024-07-04 RX ADMIN — BUMETANIDE 2 MG: 0.25 INJECTION INTRAMUSCULAR; INTRAVENOUS at 21:23

## 2024-07-04 RX ADMIN — WARFARIN SODIUM 7.5 MG: 7.5 TABLET ORAL at 16:47

## 2024-07-04 RX ADMIN — METOPROLOL TARTRATE 25 MG: 25 TABLET, FILM COATED ORAL at 21:24

## 2024-07-04 RX ADMIN — GUAIFENESIN 600 MG: 600 TABLET ORAL at 09:20

## 2024-07-04 RX ADMIN — METOPROLOL TARTRATE 25 MG: 25 TABLET, FILM COATED ORAL at 09:20

## 2024-07-04 RX ADMIN — INSULIN LISPRO 2 UNITS: 100 INJECTION, SOLUTION INTRAVENOUS; SUBCUTANEOUS at 22:00

## 2024-07-04 RX ADMIN — FERROUS SULFATE TAB 325 MG (65 MG ELEMENTAL FE) 325 MG: 325 (65 FE) TAB at 11:11

## 2024-07-04 RX ADMIN — INSULIN LISPRO 2 UNITS: 100 INJECTION, SOLUTION INTRAVENOUS; SUBCUTANEOUS at 16:17

## 2024-07-04 RX ADMIN — TRAMADOL HYDROCHLORIDE 50 MG: 50 TABLET, COATED ORAL at 13:54

## 2024-07-04 RX ADMIN — FLUTICASONE FUROATE AND VILANTEROL TRIFENATATE 1 PUFF: 100; 25 POWDER RESPIRATORY (INHALATION) at 09:18

## 2024-07-04 RX ADMIN — Medication 2000 UNITS: at 09:20

## 2024-07-04 RX ADMIN — SPIRONOLACTONE 25 MG: 25 TABLET ORAL at 09:21

## 2024-07-04 RX ADMIN — IPRATROPIUM BROMIDE AND ALBUTEROL SULFATE 3 ML: 2.5; .5 SOLUTION RESPIRATORY (INHALATION) at 07:34

## 2024-07-04 RX ADMIN — POTASSIUM CHLORIDE 20 MEQ: 14.9 INJECTION, SOLUTION INTRAVENOUS at 11:11

## 2024-07-04 RX ADMIN — DOCUSATE SODIUM 100 MG: 100 CAPSULE, LIQUID FILLED ORAL at 09:20

## 2024-07-04 RX ADMIN — Medication 1 TABLET: at 09:20

## 2024-07-04 RX ADMIN — ATORVASTATIN CALCIUM 10 MG: 10 TABLET, FILM COATED ORAL at 09:20

## 2024-07-04 RX ADMIN — PANTOPRAZOLE SODIUM 40 MG: 40 TABLET, DELAYED RELEASE ORAL at 05:30

## 2024-07-04 RX ADMIN — ACETAMINOPHEN 325MG 650 MG: 325 TABLET ORAL at 23:49

## 2024-07-04 RX ADMIN — LEVOTHYROXINE SODIUM 288 MCG: 88 TABLET ORAL at 05:29

## 2024-07-04 NOTE — ASSESSMENT & PLAN NOTE
Lab Results   Component Value Date    HGBA1C 7.0 (H) 05/02/2024       Recent Labs     07/03/24  1130 07/03/24  1623 07/03/24  2111 07/04/24  0710   POCGLU 211* 222* 154* 162*         Blood Sugar Average: Last 72 hrs:  (P) 167.7590196757343184    Hold home diabetic medications.  SSI alg 4  ACHS fingersticks

## 2024-07-04 NOTE — ASSESSMENT & PLAN NOTE
1/2 blood cultures Staph epidermidis -likely contamination, coagulase-negative.  Repeat blood culture negative in 48 hours  On top of that, patient completed antibiotic therapy for sepsis for

## 2024-07-04 NOTE — PLAN OF CARE
Problem: PAIN - ADULT  Goal: Verbalizes/displays adequate comfort level or baseline comfort level  Description: Interventions:  - Encourage patient to monitor pain and request assistance  - Assess pain using appropriate pain scale  - Administer analgesics based on type and severity of pain and evaluate response  - Implement non-pharmacological measures as appropriate and evaluate response  - Consider cultural and social influences on pain and pain management  - Notify physician/advanced practitioner if interventions unsuccessful or patient reports new pain  Outcome: Progressing     Problem: INFECTION - ADULT  Goal: Absence or prevention of progression during hospitalization  Description: INTERVENTIONS:  - Assess and monitor for signs and symptoms of infection  - Monitor lab/diagnostic results  - Monitor all insertion sites, i.e. indwelling lines, tubes, and drains  - Monitor endotracheal if appropriate and nasal secretions for changes in amount and color  - [place] appropriate cooling/warming therapies per order  - Administer medications as ordered  - Instruct and encourage patient and family to use good hand hygiene technique  - Identify and instruct in appropriate isolation precautions for identified infection/condition  Outcome: Progressing     Problem: SAFETY ADULT  Goal: Patient will remain free of falls  Description: INTERVENTIONS:  - Educate patient/family on patient safety including physical limitations  - Instruct patient to call for assistance with activity   - Consult OT/PT to assist with strengthening/mobility   - Keep Call bell within reach  - Keep bed low and locked with side rails adjusted as appropriate  - Keep care items and personal belongings within reach  - Initiate and maintain comfort rounds  - Make Fall Risk Sign visible to staff  - Offer Toileting every 2 Hours, in advance of need if unable to call for assistance  - Initiate/Maintain bed/chair alarm for confusion, impulsivity, or  noncompliance with calling for assistance  - Apply yellow socks and bracelet for high fall risk patients  - Consider moving patient to room near nurses station  Outcome: Progressing  Goal: Maintain or return to baseline ADL function  Description: INTERVENTIONS:  -  Assess patient's ability to carry out ADLs; assess patient's baseline for ADL function and identify physical deficits which impact ability to perform ADLs (bathing, care of mouth/teeth, toileting, grooming, dressing, etc.)  - Assess/evaluate cause of self-care deficits   - Assess range of motion  - Assess patient's mobility; develop plan if impaired  - Assess patient's need for assistive devices and provide as appropriate  - Encourage maximum independence but intervene and supervise when necessary  - Involve family in performance of ADLs  - Assess for home care needs following discharge   - Consider OT consult to assist with ADL evaluation and planning for discharge  - Provide patient education as appropriate  Outcome: Progressing  Goal: Maintains/Returns to pre admission functional level  Description: INTERVENTIONS:  - Perform AM-PAC 6 Click Basic Mobility/ Daily Activity assessment daily.  - Set and communicate daily mobility goal to care team and patient/family/caregiver.   - Collaborate with rehabilitation services on mobility goals if consulted  - Perform Range of Motion 3 times a day.  - Reposition patient every 2 hours if unable to reposition self  - Out of bed for toileting when medically appropriate  - Record patient progress and toleration of activity level   Outcome: Progressing     Problem: DISCHARGE PLANNING  Goal: Discharge to home or other facility with appropriate resources  Description: INTERVENTIONS:  - Identify barriers to discharge w/patient and caregiver  - Arrange for needed discharge resources and transportation as appropriate  - Identify discharge learning needs (meds, wound care, etc.)  - Arrange for interpretive services to  assist at discharge as needed  - Refer to Case Management Department for coordinating discharge planning if the patient needs post-hospital services based on physician/advanced practitioner order or complex needs related to functional status, cognitive ability, or social support system  Outcome: Progressing     Problem: Knowledge Deficit  Goal: Patient/family/caregiver demonstrates understanding of disease process, treatment plan, medications, and discharge instructions  Description: Complete learning assessment and assess knowledge base.  Interventions:  - Provide teaching at level of understanding  - Provide teaching via preferred learning methods  Outcome: Progressing     Problem: CARDIOVASCULAR - ADULT  Goal: Maintains optimal cardiac output and hemodynamic stability  Description: INTERVENTIONS:  - Monitor I/O, vital signs and rhythm  - Monitor for S/S and trends of decreased cardiac output  - Administer and titrate ordered vasoactive medications to optimize hemodynamic stability  - Assess quality of pulses, skin color and temperature  - Assess for signs of decreased coronary artery perfusion  - Instruct patient to report change in severity of symptoms  Outcome: Progressing  Goal: Absence of cardiac dysrhythmias or at baseline rhythm  Description: INTERVENTIONS:  - Continuous cardiac monitoring, vital signs, obtain 12 lead EKG if ordered  - Administer antiarrhythmic and heart rate control medications as ordered  - Monitor electrolytes and administer replacement therapy as ordered  Outcome: Progressing     Problem: RESPIRATORY - ADULT  Goal: Achieves optimal ventilation and oxygenation  Description: INTERVENTIONS:  - Assess for changes in respiratory status  - Assess for changes in mentation and behavior  - Position to facilitate oxygenation and minimize respiratory effort  - Oxygen administered by appropriate delivery if ordered  - Initiate smoking cessation education as indicated  - Encourage broncho-pulmonary  hygiene including cough, deep breathe, Incentive Spirometry  - Assess the need for suctioning and aspirate as needed  - Assess and instruct to report SOB or any respiratory difficulty  - Respiratory Therapy support as indicated  Outcome: Progressing     Problem: GENITOURINARY - ADULT  Goal: Maintains or returns to baseline urinary function  Description: INTERVENTIONS:  - Assess urinary function  - Encourage oral fluids to ensure adequate hydration if ordered  - Administer IV fluids as ordered to ensure adequate hydration  - Administer ordered medications as needed  - Offer frequent toileting  - Follow urinary retention protocol if ordered  Outcome: Progressing  Goal: Absence of urinary retention  Description: INTERVENTIONS:  - Assess patient’s ability to void and empty bladder  - Monitor I/O  - Bladder scan as needed  - Discuss with physician/AP medications to alleviate retention as needed  - Discuss catheterization for long term situations as appropriate  Outcome: Progressing  Goal: Urinary catheter remains patent  Description: INTERVENTIONS:  - Assess patency of urinary catheter  - If patient has a chronic frederick, consider changing catheter if non-functioning  - Follow guidelines for intermittent irrigation of non-functioning urinary catheter  Outcome: Progressing     Problem: METABOLIC, FLUID AND ELECTROLYTES - ADULT  Goal: Electrolytes maintained within normal limits  Description: INTERVENTIONS:  - Monitor labs and assess patient for signs and symptoms of electrolyte imbalances  - Administer electrolyte replacement as ordered  - Monitor response to electrolyte replacements, including repeat lab results as appropriate  - Instruct patient on fluid and nutrition as appropriate  Outcome: Progressing  Goal: Fluid balance maintained  Description: INTERVENTIONS:  - Monitor labs   - Monitor I/O and WT  - Instruct patient on fluid and nutrition as appropriate  - Assess for signs & symptoms of volume excess or  deficit  Outcome: Progressing  Goal: Glucose maintained within target range  Description: INTERVENTIONS:  - Monitor Blood Glucose as ordered  - Assess for signs and symptoms of hyperglycemia and hypoglycemia  - Administer ordered medications to maintain glucose within target range  - Assess nutritional intake and initiate nutrition service referral as needed  Outcome: Progressing     Problem: SKIN/TISSUE INTEGRITY - ADULT  Goal: Incision(s), wounds(s) or drain site(s) healing without S/S of infection  Description: INTERVENTIONS  - Assess and document dressing, incision, wound bed, drain sites and surrounding tissue  - Provide patient and family education  - Perform skin care/dressing changes per wound consult recommendations  Outcome: Progressing     Problem: Prexisting or High Potential for Compromised Skin Integrity  Goal: Skin integrity is maintained or improved  Description: INTERVENTIONS:  - Identify patients at risk for skin breakdown  - Assess and monitor skin integrity  - Assess and monitor nutrition and hydration status  - Monitor labs   - Assess for incontinence   - Turn and reposition patient  - Assist with mobility/ambulation  - Relieve pressure over bony prominences  - Avoid friction and shearing  - Provide appropriate hygiene as needed including keeping skin clean and dry  - Evaluate need for skin moisturizer/barrier cream  - Collaborate with interdisciplinary team   - Patient/family teaching  - Consider wound care consult   Outcome: Progressing     Problem: Nutrition/Hydration-ADULT  Goal: Nutrient/Hydration intake appropriate for improving, restoring or maintaining nutritional needs  Description: Monitor and assess patient's nutrition/hydration status for malnutrition. Collaborate with interdisciplinary team and initiate plan and interventions as ordered.  Monitor patient's weight and dietary intake as ordered or per policy. Utilize nutrition screening tool and intervene as necessary. Determine  patient's food preferences and provide high-protein, high-caloric foods as appropriate.     INTERVENTIONS:  - Monitor oral intake, urinary output, labs, and treatment plans  - Assess nutrition and hydration status and recommend course of action  - Evaluate amount of meals eaten  - Assist patient with eating if necessary   - Allow adequate time for meals  - Recommend/ encourage appropriate diets, oral nutritional supplements, and vitamin/mineral supplements  - Order, calculate, and assess calorie counts as needed  - Recommend, monitor, and adjust tube feedings and TPN/PPN based on assessed needs  - Assess need for intravenous fluids  - Provide specific nutrition/hydration education as appropriate  - Include patient/family/caregiver in decisions related to nutrition  Outcome: Progressing

## 2024-07-04 NOTE — PROGRESS NOTES
[facility]  Progress Note  Name: Tigist Hewitt I  MRN: [id number]  Unit/Bed#: [facility] I Date of Admission: 6/27/2024   Date of Service: 7/4/2024 I Hospital Day: 6    Assessment & Plan   Acute on chronic diastolic congestive heart failure (HCC)  Assessment & Plan  Wt Readings from Last 3 Encounters:   07/04/24 (!) 166 kg (366 lb 2.9 oz)   06/10/24 (!) 157 kg (346 lb)   05/29/24 (!) 157 kg (346 lb 12.5 oz)     With acute decompensation, wt up 18kg   12/2023 ECHO: EF 55% with severe TR, RVSP 53  Repeat ECHO ordered  Appreciate cardiology recs   IV Bumex resume since potassium was 3.0.  This morning potassium is still remain the same, will give IV potassium supplement.  Also correct magnesium.  So far patient is -6 L  Remain on 4 L of supplemental oxygen.        Type 2 diabetes mellitus with hyperglycemia, with long-term current use of insulin ([facility])  Assessment & Plan  Lab Results   Component Value Date    HGBA1C 7.0 (H) 05/02/2024       Recent Labs     07/03/24  1130 07/03/24  1623 07/03/24  2111 07/04/24  0710   POCGLU 211* 222* 154* 162*         Blood Sugar Average: Last 72 hrs:  (P) [id number]    Hold home diabetic medications.  SSI alg 4  ACHS fingersticks    Atrial fibrillation with RVR ([facility])  Assessment & Plan  Hx of persistent atrial fibrillation, failed prior cardioversion/ablation in past   Developed A fib with RVR over admission secondary to septic shock requiring amiodarone gtt since weaned  Appreciate cardiology recs  BB has been on hold with relative hypotension, since improved with midodrine. Resumed metoprolol tartrate 12.5mg bid  Can use digoxin for rate control if needed   INR 1.84, will increase Coumadin to 7.5 mg  Keep potassium more than 4, magnesium more than 2    * Sepsis ([facility])  Assessment & Plan  Met on admission with leukocytosis, tachycardia, tachypnea  Developed hypotension over admission improved with albumin/midodrine. 30cc/kg bolus not given due  to CHF exacerbation  CT chest with possible pneumonia in the appropriate setting  Was started on IV ceftriaxone, continue to completed 7-day course  Blood cultures x 3 negative. Initial blood cultures 1/2 positive, suspected [id number].  Patient completed adequate dose of antibiotic therapy.  Sepsis now resolved      COPD (chronic obstructive pulmonary disease) (HCC)  Assessment & Plan  Less likely in exacerbation  At baseline O2 requirement of 3L NC-currently using 5 L of oxygen  Continue home inhalers    Closed left ankle fracture  Assessment & Plan  Recent closed left ankle fracture, being followed by orthopedic surgery   Missed outpatient appt due to current hospitalizations   Last recommendation for NWB status  Orthopedic recommendation appreciated    Positive blood culture  Assessment & Plan  1/2 blood cultures Staph epidermidis -likely contamination, coagulase-negative.  Repeat blood culture negative in 48 hours  On top of that, patient completed antibiotic therapy for sepsis for    Hypokalemia  Assessment & Plan  Potassium is 3.0  Continue IV supplement, recheck  Goal > 4   Telemetry     Acute on chronic respiratory failure with hypoxia (HCC)  Assessment & Plan  Secondary to decompensated HF, pneumonia  CT c/a/p: diffuse GGO/mosaicism throughout the bilateral lung fields could be related to hypoinflation, air trapping/small airway disease, edema or pneumonia  Required CPAP since weaned back to baseline oxygen   Patient was on IV Rocephin, developed some reaction, IV Rocephin transition to IV levofloxacin.  Continue IV Bumex 2mg IV bid -remain on hold due to hypokalemia, patient is requiring IV supplemental potassium.  Speech eval   Currently using 5 L of oxygen      NISHI (obstructive sleep apnea)  Assessment & Plan  Continue CPAP HS     Morbid obesity (HCC)  Assessment & Plan  Consider dietary consult.   Encourage exercise and lifestyle changes.    Subtherapeutic international normalized  ratio (INR)  Assessment & Plan  Is maintained on warfarin 7.5mg qhs  INR did down to 1.84, patient was receiving Coumadin 5 mg, will increase to 7.5 mg at nighttime             VTE Pharmacologic Prophylaxis: VTE Score: 11 High Risk (Score >/= 5) - Pharmacological DVT Prophylaxis Ordered: warfarin (Coumadin). Sequential Compression Devices Ordered.    Mobility:   Basic Mobility Inpatient Raw Score: 6  JH-HLM Goal: 2: Bed activities/Dependent transfer  JH-HLM Achieved: 2: Bed activities/Dependent transfer  JH-HLM Goal achieved. Continue to encourage appropriate mobility.    Patient Centered Rounds: I performed bedside rounds with nursing staff today.   Discussions with Specialists or Other Care Team Provider: Cardiology note reviewed    Education and Discussions with Family / Patient: Updated  () via phone.    Current Length of Stay: 6 day(s)  Current Patient Status: Inpatient   Certification Statement: The patient will continue to require additional inpatient hospital stay due to monitorable conditions  Discharge Plan: Anticipate discharge in 48-72 hrs to rehab facility.    Code Status: Level 3 - DNAR and DNI    Subjective:   Seen and evaluated during the run.  Patient complaining of fatigue and tired, also having some trouble breathing    Objective:     Vitals:   Temp (24hrs), Av.2 °F (36.2 °C), Min:97 °F (36.1 °C), Max:97.8 °F (36.6 °C)    Temp:  [97 °F (36.1 °C)-97.8 °F (36.6 °C)] 97.2 °F (36.2 °C)  HR:  [] 87  Resp:  [15-16] 16  BP: (104-120)/(61-72) 104/64  SpO2:  [93 %-100 %] 100 %  Body mass index is 69.19 kg/m².     Input and Output Summary (last 24 hours):     Intake/Output Summary (Last 24 hours) at 2024 0854  Last data filed at 2024 0824  Gross per 24 hour   Intake 1316 ml   Output 1400 ml   Net -84 ml       Physical Exam:   Physical Exam  Vitals and nursing note reviewed.   Constitutional:       Appearance: She is obese. She is not ill-appearing or diaphoretic.    Eyes:      Extraocular Movements: Extraocular movements intact.      Conjunctiva/sclera: Conjunctivae normal.      Pupils: Pupils are equal, round, and reactive to light.   Cardiovascular:      Rate and Rhythm: Normal rate.      Heart sounds: No murmur heard.     No friction rub. No gallop.   Pulmonary:      Effort: Pulmonary effort is normal. No respiratory distress.      Breath sounds: Rales present. No wheezing or rhonchi.   Abdominal:      General: There is no distension.      Palpations: There is no mass.      Tenderness: There is no abdominal tenderness.      Hernia: No hernia is present.   Genitourinary:     Comments: Dow in place  Musculoskeletal:      Right lower leg: Edema present.      Left lower leg: Edema present.   Neurological:      Mental Status: She is alert and oriented to person, place, and time.      Cranial Nerves: No cranial nerve deficit.      Sensory: No sensory deficit.      Motor: No weakness.      Coordination: Coordination normal.         Additional Data:     Labs:  Results from last 7 days   Lab Units 07/04/24  0510 07/03/24  0609 07/02/24  0547   WBC Thousand/uL 11.26* 11.01* 11.21*   HEMOGLOBIN g/dL 11.2* 11.2* 11.3*   HEMATOCRIT % 34.6* 35.0 34.7*   PLATELETS Thousands/uL 170 170 165   BANDS PCT %  --  2  --    SEGS PCT %  --   --  76*   LYMPHO PCT %  --  14 6*   MONO PCT %  --  8 10   EOS PCT %  --  5 7*     Results from last 7 days   Lab Units 07/04/24  0510   SODIUM mmol/L 142   POTASSIUM mmol/L 3.4*   CHLORIDE mmol/L 100   CO2 mmol/L 37*   BUN mg/dL 43*   CREATININE mg/dL 1.04   ANION GAP mmol/L 5   CALCIUM mg/dL 9.4   ALBUMIN g/dL 2.9*   TOTAL BILIRUBIN mg/dL 1.29*   ALK PHOS U/L 100   ALT U/L 12   AST U/L 17   GLUCOSE RANDOM mg/dL 183*     Results from last 7 days   Lab Units 07/04/24  0510   INR  1.84*     Results from last 7 days   Lab Units 07/04/24  0710 07/03/24  2111 07/03/24  1623 07/03/24  1130 07/03/24  0721 07/02/24  2103 07/02/24  1618 07/02/24  1112  07/02/24  0728 07/01/24  2045 07/01/24  1625 07/01/24  1206   POC GLUCOSE mg/dl 162* 154* 222* 211* 144* 120 181* 151* 153* 146* 197* 199*         Results from last 7 days   Lab Units 07/02/24  0547 06/30/24  0529 06/29/24  0541 06/28/24  0552 06/27/24  2329 06/27/24  2320   LACTIC ACID mmol/L  --   --   --   --  1.4  --    PROCALCITONIN ng/ml 0.87* 1.88* 3.13* 1.21*  --  0.31*       Lines/Drains:  Invasive Devices       Peripheral Intravenous Line  Duration             Long-Dwell Peripheral IV (Midline) 06/28/24 Left Cephalic Vein 5 days              Drain  Duration             Urethral Catheter Straight-tip 16 Fr. 6 days                  Urinary Catheter:  Goal for removal: Remove after 48 hrs of I/O monitoring               Imaging: No pertinent imaging reviewed.    Recent Cultures (last 7 days):   Results from last 7 days   Lab Units 06/29/24  0541 06/28/24  0146 06/27/24  2307   BLOOD CULTURE  No Growth After 4 Days.  No Growth After 4 Days.  --  No Growth After 5 Days.  Staphylococcus epidermidis*   GRAM STAIN RESULT   --   --  Gram positive cocci in clusters*   URINE CULTURE   --  <10,000 cfu/ml  --        Last 24 Hours Medication List:   Current Facility-Administered Medications   Medication Dose Route Frequency Provider Last Rate    acetaminophen  650 mg Oral Q6H PRN Pedro Price MD      albuterol  2.5 mg Nebulization Q6H PRN Pedro Price MD      allopurinol  100 mg Oral Daily Pedro Price MD      atorvastatin  10 mg Oral Daily Pedro Price MD      bumetanide  2 mg Intravenous BID Pedro Price MD      Cholecalciferol  2,000 Units Oral Daily Pedro Price MD      diphenhydrAMINE  25 mg Oral Q6H PRN Pedro Price MD      docusate sodium  100 mg Oral Daily Pedro Price MD      ferrous sulfate  325 mg Oral Daily With Breakfast Pedro Price MD      Fluticasone Furoate-Vilanterol  1 puff Inhalation Daily Pedro Price MD      guaiFENesin  600 mg Oral Q12H HALEY  Pedro Price MD      insulin lispro  2-12 Units Subcutaneous 4x Daily (AC & HS) Pedro Price MD      ipratropium-albuterol  3 mL Nebulization Q6H While awake Pedro Price MD      levothyroxine  288 mcg Oral Daily Pedro Price MD      loratadine  5 mg Oral Daily Pedro Price MD      magnesium sulfate  1 g Intravenous Once Pedro Price MD      metoprolol tartrate  25 mg Oral Q12H HALEY Pedro Price MD      montelukast  10 mg Oral HS Pedro Price MD      multivitamin-minerals  1 tablet Oral Daily Pedro Price MD      pantoprazole  40 mg Oral Early Morning Pedro Price MD      polyethylene glycol  17 g Oral Daily PRN Pedro Price MD      potassium chloride  20 mEq Intravenous Q2H Pedro Price MD      spironolactone  25 mg Oral Daily Pedro Price MD      traMADol  50 mg Oral Q12H PRN Pedro Price MD      warfarin  7.5 mg Oral Daily (warfarin) Pedro Price MD          Today, Patient Was Seen By: Pedro Price MD    **Please Note: This note may have been constructed using a voice recognition system.**

## 2024-07-04 NOTE — ASSESSMENT & PLAN NOTE
Hx of persistent atrial fibrillation, failed prior cardioversion/ablation in past   Developed A fib with RVR over admission secondary to septic shock requiring amiodarone gtt since weaned  Appreciate cardiology recs  BB has been on hold with relative hypotension, since improved with midodrine. Resumed metoprolol tartrate 12.5mg bid  Can use digoxin for rate control if needed   INR 1.84, will increase Coumadin to 7.5 mg  Keep potassium more than 4, magnesium more than 2

## 2024-07-04 NOTE — ASSESSMENT & PLAN NOTE
Secondary to decompensated HF, pneumonia  CT c/a/p: diffuse GGO/mosaicism throughout the bilateral lung fields could be related to hypoinflation, air trapping/small airway disease, edema or pneumonia  Required CPAP since weaned back to baseline oxygen   Patient was on IV Rocephin, developed some reaction, IV Rocephin transition to IV levofloxacin.  Continue IV Bumex 2mg IV bid -remain on hold due to hypokalemia, patient is requiring IV supplemental potassium.  Speech eval   Currently using 5 L of oxygen

## 2024-07-04 NOTE — ASSESSMENT & PLAN NOTE
Met on admission with leukocytosis, tachycardia, tachypnea  Developed hypotension over admission improved with albumin/midodrine. 30cc/kg bolus not given due to CHF exacerbation  CT chest with possible pneumonia in the appropriate setting  Was started on IV ceftriaxone, continue to completed 7-day course  Blood cultures x 3 negative. Initial blood cultures 1/2 positive, suspected [id number].  Patient completed adequate dose of antibiotic therapy.  Sepsis now resolved

## 2024-07-04 NOTE — PLAN OF CARE
Problem: INFECTION - ADULT  Goal: Absence or prevention of progression during hospitalization  Description: INTERVENTIONS:  - Assess and monitor for signs and symptoms of infection  - Monitor lab/diagnostic results  - Monitor all insertion sites, i.e. indwelling lines, tubes, and drains  - Monitor endotracheal if appropriate and nasal secretions for changes in amount and color  - Cucumber appropriate cooling/warming therapies per order  - Administer medications as ordered  - Instruct and encourage patient and family to use good hand hygiene technique  - Identify and instruct in appropriate isolation precautions for identified infection/condition  Outcome: Progressing

## 2024-07-04 NOTE — ASSESSMENT & PLAN NOTE
Wt Readings from Last 3 Encounters:   07/04/24 (!) 166 kg (366 lb 2.9 oz)   06/10/24 (!) 157 kg (346 lb)   05/29/24 (!) 157 kg (346 lb 12.5 oz)     With acute decompensation, wt up 18kg   12/2023 ECHO: EF 55% with severe TR, RVSP 53  Repeat ECHO ordered  Appreciate cardiology recs   IV Bumex resume since potassium was 3.0.  This morning potassium is still remain the same, will give IV potassium supplement.  Also correct magnesium.  So far patient is -6 L  Remain on 4 L of supplemental oxygen.

## 2024-07-04 NOTE — ASSESSMENT & PLAN NOTE
Less likely in exacerbation  At baseline O2 requirement of 3L NC-currently using 5 L of oxygen  Continue home inhalers

## 2024-07-05 PROBLEM — R79.1 SUBTHERAPEUTIC INTERNATIONAL NORMALIZED RATIO (INR): Status: RESOLVED | Noted: 2020-04-15 | Resolved: 2024-07-05

## 2024-07-05 LAB
ALBUMIN SERPL BCG-MCNC: 3 G/DL (ref 3.5–5)
ALP SERPL-CCNC: 107 U/L (ref 34–104)
ALT SERPL W P-5'-P-CCNC: 13 U/L (ref 7–52)
ANION GAP SERPL CALCULATED.3IONS-SCNC: 4 MMOL/L (ref 4–13)
AST SERPL W P-5'-P-CCNC: 16 U/L (ref 13–39)
BILIRUB SERPL-MCNC: 1.16 MG/DL (ref 0.2–1)
BUN SERPL-MCNC: 42 MG/DL (ref 5–25)
CALCIUM ALBUM COR SERPL-MCNC: 10.2 MG/DL (ref 8.3–10.1)
CALCIUM SERPL-MCNC: 9.4 MG/DL (ref 8.4–10.2)
CHLORIDE SERPL-SCNC: 101 MMOL/L (ref 96–108)
CO2 SERPL-SCNC: 38 MMOL/L (ref 21–32)
CREAT SERPL-MCNC: 0.92 MG/DL (ref 0.6–1.3)
ERYTHROCYTE [DISTWIDTH] IN BLOOD BY AUTOMATED COUNT: 17.9 % (ref 11.6–15.1)
GFR SERPL CREATININE-BSD FRML MDRD: 62 ML/MIN/1.73SQ M
GLUCOSE SERPL-MCNC: 139 MG/DL (ref 65–140)
GLUCOSE SERPL-MCNC: 145 MG/DL (ref 65–140)
GLUCOSE SERPL-MCNC: 157 MG/DL (ref 65–140)
GLUCOSE SERPL-MCNC: 163 MG/DL (ref 65–140)
GLUCOSE SERPL-MCNC: 184 MG/DL (ref 65–140)
HCT VFR BLD AUTO: 35.9 % (ref 34.8–46.1)
HGB BLD-MCNC: 11.2 G/DL (ref 11.5–15.4)
INR PPP: 2.12 (ref 0.84–1.19)
MCH RBC QN AUTO: 30 PG (ref 26.8–34.3)
MCHC RBC AUTO-ENTMCNC: 31.2 G/DL (ref 31.4–37.4)
MCV RBC AUTO: 96 FL (ref 82–98)
PLATELET # BLD AUTO: 177 THOUSANDS/UL (ref 149–390)
PMV BLD AUTO: 10.8 FL (ref 8.9–12.7)
POTASSIUM SERPL-SCNC: 3.6 MMOL/L (ref 3.5–5.3)
PROT SERPL-MCNC: 6.9 G/DL (ref 6.4–8.4)
PROTHROMBIN TIME: 24.1 SECONDS (ref 11.6–14.5)
RBC # BLD AUTO: 3.73 MILLION/UL (ref 3.81–5.12)
SODIUM SERPL-SCNC: 143 MMOL/L (ref 135–147)
WBC # BLD AUTO: 8.7 THOUSAND/UL (ref 4.31–10.16)

## 2024-07-05 PROCEDURE — 94664 DEMO&/EVAL PT USE INHALER: CPT

## 2024-07-05 PROCEDURE — 82948 REAGENT STRIP/BLOOD GLUCOSE: CPT

## 2024-07-05 PROCEDURE — 85610 PROTHROMBIN TIME: CPT | Performed by: FAMILY MEDICINE

## 2024-07-05 PROCEDURE — 85027 COMPLETE CBC AUTOMATED: CPT | Performed by: FAMILY MEDICINE

## 2024-07-05 PROCEDURE — 94760 N-INVAS EAR/PLS OXIMETRY 1: CPT

## 2024-07-05 PROCEDURE — 80053 COMPREHEN METABOLIC PANEL: CPT | Performed by: FAMILY MEDICINE

## 2024-07-05 PROCEDURE — 94640 AIRWAY INHALATION TREATMENT: CPT

## 2024-07-05 PROCEDURE — 99232 SBSQ HOSP IP/OBS MODERATE 35: CPT | Performed by: FAMILY MEDICINE

## 2024-07-05 PROCEDURE — 99232 SBSQ HOSP IP/OBS MODERATE 35: CPT | Performed by: INTERNAL MEDICINE

## 2024-07-05 RX ORDER — BISACODYL 10 MG
10 SUPPOSITORY, RECTAL RECTAL DAILY PRN
Status: DISCONTINUED | OUTPATIENT
Start: 2024-07-06 | End: 2024-07-07 | Stop reason: HOSPADM

## 2024-07-05 RX ORDER — POLYETHYLENE GLYCOL 3350 17 G/17G
17 POWDER, FOR SOLUTION ORAL DAILY
Status: DISCONTINUED | OUTPATIENT
Start: 2024-07-06 | End: 2024-07-07 | Stop reason: HOSPADM

## 2024-07-05 RX ADMIN — GUAIFENESIN 600 MG: 600 TABLET ORAL at 09:15

## 2024-07-05 RX ADMIN — INSULIN LISPRO 2 UNITS: 100 INJECTION, SOLUTION INTRAVENOUS; SUBCUTANEOUS at 21:50

## 2024-07-05 RX ADMIN — GLYCERIN 1 SUPPOSITORY: 2 SUPPOSITORY RECTAL at 11:00

## 2024-07-05 RX ADMIN — SPIRONOLACTONE 25 MG: 25 TABLET ORAL at 09:15

## 2024-07-05 RX ADMIN — IPRATROPIUM BROMIDE AND ALBUTEROL SULFATE 3 ML: 2.5; .5 SOLUTION RESPIRATORY (INHALATION) at 14:20

## 2024-07-05 RX ADMIN — BUMETANIDE 2 MG: 0.25 INJECTION INTRAMUSCULAR; INTRAVENOUS at 09:15

## 2024-07-05 RX ADMIN — IPRATROPIUM BROMIDE AND ALBUTEROL SULFATE 3 ML: 2.5; .5 SOLUTION RESPIRATORY (INHALATION) at 08:26

## 2024-07-05 RX ADMIN — Medication 2000 UNITS: at 09:15

## 2024-07-05 RX ADMIN — MONTELUKAST 10 MG: 10 TABLET, FILM COATED ORAL at 21:50

## 2024-07-05 RX ADMIN — BUMETANIDE 2 MG: 0.25 INJECTION INTRAMUSCULAR; INTRAVENOUS at 21:50

## 2024-07-05 RX ADMIN — WARFARIN SODIUM 7.5 MG: 7.5 TABLET ORAL at 17:05

## 2024-07-05 RX ADMIN — LORATADINE 5 MG: 10 TABLET ORAL at 09:15

## 2024-07-05 RX ADMIN — GUAIFENESIN 600 MG: 600 TABLET ORAL at 21:50

## 2024-07-05 RX ADMIN — METOPROLOL TARTRATE 25 MG: 25 TABLET, FILM COATED ORAL at 09:15

## 2024-07-05 RX ADMIN — Medication 1 TABLET: at 09:15

## 2024-07-05 RX ADMIN — IPRATROPIUM BROMIDE AND ALBUTEROL SULFATE 3 ML: 2.5; .5 SOLUTION RESPIRATORY (INHALATION) at 20:15

## 2024-07-05 RX ADMIN — TRAMADOL HYDROCHLORIDE 50 MG: 50 TABLET, COATED ORAL at 20:38

## 2024-07-05 RX ADMIN — FERROUS SULFATE TAB 325 MG (65 MG ELEMENTAL FE) 325 MG: 325 (65 FE) TAB at 11:38

## 2024-07-05 RX ADMIN — INSULIN LISPRO 2 UNITS: 100 INJECTION, SOLUTION INTRAVENOUS; SUBCUTANEOUS at 17:06

## 2024-07-05 RX ADMIN — INSULIN LISPRO 2 UNITS: 100 INJECTION, SOLUTION INTRAVENOUS; SUBCUTANEOUS at 11:39

## 2024-07-05 RX ADMIN — ALLOPURINOL 100 MG: 100 TABLET ORAL at 09:15

## 2024-07-05 RX ADMIN — LEVOTHYROXINE SODIUM 288 MCG: 88 TABLET ORAL at 05:49

## 2024-07-05 RX ADMIN — METOPROLOL TARTRATE 25 MG: 25 TABLET, FILM COATED ORAL at 21:50

## 2024-07-05 RX ADMIN — ATORVASTATIN CALCIUM 10 MG: 10 TABLET, FILM COATED ORAL at 09:15

## 2024-07-05 RX ADMIN — PANTOPRAZOLE SODIUM 40 MG: 40 TABLET, DELAYED RELEASE ORAL at 05:35

## 2024-07-05 RX ADMIN — FLUTICASONE FUROATE AND VILANTEROL TRIFENATATE 1 PUFF: 100; 25 POWDER RESPIRATORY (INHALATION) at 09:21

## 2024-07-05 RX ADMIN — DOCUSATE SODIUM 100 MG: 100 CAPSULE, LIQUID FILLED ORAL at 09:15

## 2024-07-05 NOTE — PLAN OF CARE
Problem: PAIN - ADULT  Goal: Verbalizes/displays adequate comfort level or baseline comfort level  Description: Interventions:  - Encourage patient to monitor pain and request assistance  - Assess pain using appropriate pain scale  - Administer analgesics based on type and severity of pain and evaluate response  - Implement non-pharmacological measures as appropriate and evaluate response  - Consider cultural and social influences on pain and pain management  - Notify physician/advanced practitioner if interventions unsuccessful or patient reports new pain  Outcome: Progressing     Problem: INFECTION - ADULT  Goal: Absence or prevention of progression during hospitalization  Description: INTERVENTIONS:  - Assess and monitor for signs and symptoms of infection  - Monitor lab/diagnostic results  - Monitor all insertion sites, i.e. indwelling lines, tubes, and drains  - Monitor endotracheal if appropriate and nasal secretions for changes in amount and color  - [place] appropriate cooling/warming therapies per order  - Administer medications as ordered  - Instruct and encourage patient and family to use good hand hygiene technique  - Identify and instruct in appropriate isolation precautions for identified infection/condition  Outcome: Progressing     Problem: SAFETY ADULT  Goal: Patient will remain free of falls  Description: INTERVENTIONS:  - Educate patient/family on patient safety including physical limitations  - Instruct patient to call for assistance with activity   - Consult OT/PT to assist with strengthening/mobility   - Keep Call bell within reach  - Keep bed low and locked with side rails adjusted as appropriate  - Keep care items and personal belongings within reach  - Initiate and maintain comfort rounds  - Make Fall Risk Sign visible to staff  - Offer Toileting every 2 Hours, in advance of need if unable to call for assistance  - Initiate/Maintain bed/chair alarm for confusion, impulsivity, or  noncompliance with calling for assistance  - Apply yellow socks and bracelet for high fall risk patients  - Consider moving patient to room near nurses station  Outcome: Progressing  Goal: Maintain or return to baseline ADL function  Description: INTERVENTIONS:  -  Assess patient's ability to carry out ADLs; assess patient's baseline for ADL function and identify physical deficits which impact ability to perform ADLs (bathing, care of mouth/teeth, toileting, grooming, dressing, etc.)  - Assess/evaluate cause of self-care deficits   - Assess range of motion  - Assess patient's mobility; develop plan if impaired  - Assess patient's need for assistive devices and provide as appropriate  - Encourage maximum independence but intervene and supervise when necessary  - Involve family in performance of ADLs  - Assess for home care needs following discharge   - Consider OT consult to assist with ADL evaluation and planning for discharge  - Provide patient education as appropriate  Outcome: Progressing  Goal: Maintains/Returns to pre admission functional level  Description: INTERVENTIONS:  - Perform AM-PAC 6 Click Basic Mobility/ Daily Activity assessment daily.  - Set and communicate daily mobility goal to care team and patient/family/caregiver.   - Collaborate with rehabilitation services on mobility goals if consulted  - Perform Range of Motion 3 times a day.  - Reposition patient every 2 hours if unable to reposition self  - Out of bed for toileting when medically appropriate  - Record patient progress and toleration of activity level   Outcome: Progressing     Problem: DISCHARGE PLANNING  Goal: Discharge to home or other facility with appropriate resources  Description: INTERVENTIONS:  - Identify barriers to discharge w/patient and caregiver  - Arrange for needed discharge resources and transportation as appropriate  - Identify discharge learning needs (meds, wound care, etc.)  - Arrange for interpretive services to  assist at discharge as needed  - Refer to Case Management Department for coordinating discharge planning if the patient needs post-hospital services based on physician/advanced practitioner order or complex needs related to functional status, cognitive ability, or social support system  Outcome: Progressing     Problem: Knowledge Deficit  Goal: Patient/family/caregiver demonstrates understanding of disease process, treatment plan, medications, and discharge instructions  Description: Complete learning assessment and assess knowledge base.  Interventions:  - Provide teaching at level of understanding  - Provide teaching via preferred learning methods  Outcome: Progressing     Problem: CARDIOVASCULAR - ADULT  Goal: Maintains optimal cardiac output and hemodynamic stability  Description: INTERVENTIONS:  - Monitor I/O, vital signs and rhythm monitor fluid intake /fluid restriction  - Monitor for S/S and trends of decreased cardiac output  - Administer and titrate ordered vasoactive medications to optimize hemodynamic stability  - Assess quality of pulses, skin color and temperature  - Assess for signs of decreased coronary artery perfusion  - Instruct patient to report change in severity of symptoms  Outcome: Progressing  Goal: Absence of cardiac dysrhythmias or at baseline rhythm  Description: INTERVENTIONS:  - Continuous cardiac monitoring, vital signs, obtain 12 lead EKG if ordered  - Administer antiarrhythmic and heart rate control medications as ordered  - Monitor electrolytes and administer replacement therapy as ordered  Outcome: Progressing     Problem: RESPIRATORY - ADULT  Goal: Achieves optimal ventilation and oxygenation  Description: INTERVENTIONS:  - Assess for changes in respiratory status  - Assess for changes in mentation and behavior  - Position to facilitate oxygenation and minimize respiratory effort  - Oxygen administered by appropriate delivery if ordered  - Initiate smoking cessation education as  indicated  - Encourage broncho-pulmonary hygiene including cough, deep breathe, Incentive Spirometry  - Assess the need for suctioning and aspirate as needed  - Assess and instruct to report SOB or any respiratory difficulty  - Respiratory Therapy support as indicated  Outcome: Progressing     Problem: GENITOURINARY - ADULT  Goal: Maintains or returns to baseline urinary function  Description: INTERVENTIONS:  - Assess urinary function  - Encourage oral fluids to ensure adequate hydration if ordered  - Administer IV fluids as ordered to ensure adequate hydration  - Administer ordered medications as needed  - Offer frequent toileting  - Follow urinary retention protocol if ordered  Outcome: Progressing  Goal: Absence of urinary retention  Description: INTERVENTIONS:  - Assess patient’s ability to void and empty bladder  - Monitor I/O  - Bladder scan as needed  - Discuss with physician/AP medications to alleviate retention as needed  - Discuss catheterization for long term situations as appropriate  Outcome: Progressing  Goal: Urinary catheter remains patent  Description: INTERVENTIONS:  - Assess patency of urinary catheter  - If patient has a chronic frederick, consider changing catheter if non-functioning  - Follow guidelines for intermittent irrigation of non-functioning urinary catheter  Outcome: Progressing     Problem: METABOLIC, FLUID AND ELECTROLYTES - ADULT  Goal: Electrolytes maintained within normal limits  Description: INTERVENTIONS:  - Monitor labs and assess patient for signs and symptoms of electrolyte imbalances  - Administer electrolyte replacement as ordered  - Monitor response to electrolyte replacements, including repeat lab results as appropriate  - Instruct patient on fluid and nutrition as appropriate  Outcome: Progressing  Goal: Fluid balance maintained  Description: INTERVENTIONS:  - Monitor labs   - Monitor I/O and WT  - Instruct patient on fluid and nutrition as appropriate  - Assess for signs &  symptoms of volume excess or deficit  Outcome: Progressing  Goal: Glucose maintained within target range  Description: INTERVENTIONS:  - Monitor Blood Glucose as ordered  - Assess for signs and symptoms of hyperglycemia and hypoglycemia  - Administer ordered medications to maintain glucose within target range  - Assess nutritional intake and initiate nutrition service referral as needed  Outcome: Progressing     Problem: SKIN/TISSUE INTEGRITY - ADULT  Goal: Incision(s), wounds(s) or drain site(s) healing without S/S of infection  Description: INTERVENTIONS  - Assess and document dressing, incision, wound bed, drain sites and surrounding tissue  - Provide patient and family education  - Perform skin care/dressing changes per wound consult recommendations  Outcome: Progressing     Problem: Prexisting or High Potential for Compromised Skin Integrity  Goal: Skin integrity is maintained or improved  Description: INTERVENTIONS:  - Identify patients at risk for skin breakdown  - Assess and monitor skin integrity  - Assess and monitor nutrition and hydration status  - Monitor labs   - Assess for incontinence   - Turn and reposition patient  - Assist with mobility/ambulation  - Relieve pressure over bony prominences  - Avoid friction and shearing  - Provide appropriate hygiene as needed including keeping skin clean and dry  - Evaluate need for skin moisturizer/barrier cream  - Collaborate with interdisciplinary team   - Patient/family teaching  - Consider wound care consult   Outcome: Progressing     Problem: Nutrition/Hydration-ADULT  Goal: Nutrient/Hydration intake appropriate for improving, restoring or maintaining nutritional needs  Description: Monitor and assess patient's nutrition/hydration status for malnutrition. Collaborate with interdisciplinary team and initiate plan and interventions as ordered.  Monitor patient's weight and dietary intake as ordered or per policy. Utilize nutrition screening tool and intervene  as necessary. Determine patient's food preferences and provide high-protein, high-caloric foods as appropriate.     INTERVENTIONS:  - Monitor oral intake, urinary output, labs, and treatment plans  - Assess nutrition and hydration status and recommend course of action  - Evaluate amount of meals eaten  - Assist patient with eating if necessary   - Allow adequate time for meals  - Recommend/ encourage appropriate diets, oral nutritional supplements, and vitamin/mineral supplements  - Order, calculate, and assess calorie counts as needed  - Recommend, monitor, and adjust tube feedings and TPN/PPN based on assessed needs  - Assess need for intravenous fluids  - Provide specific nutrition/hydration education as appropriate  - Include patient/family/caregiver in decisions related to nutrition  Outcome: Progressing

## 2024-07-05 NOTE — ASSESSMENT & PLAN NOTE
Hx of persistent atrial fibrillation, failed prior cardioversion/ablation in past   Developed A fib with RVR over admission secondary to septic shock requiring amiodarone gtt since weaned  Appreciate cardiology recs  BB has been on hold with relative hypotension, since improved with midodrine. Resumed metoprolol tartrate 12.5mg bid  Can use digoxin for rate control if needed   INR is therapeutic, continue current treatment

## 2024-07-05 NOTE — ASSESSMENT & PLAN NOTE
Secondary to decompensated HF, pneumonia  CT c/a/p: diffuse GGO/mosaicism throughout the bilateral lung fields could be related to hypoinflation, air trapping/small airway disease, edema or pneumonia  Required CPAP since weaned back to baseline oxygen   Patient was on IV Rocephin, developed some reaction, IV Rocephin transition to IV levofloxacin.  Continue IV Bumex 2mg IV bid -remain on hold due to hypokalemia, patient is requiring IV supplemental potassium.  Currently using 4 L of oxygen which is baseline

## 2024-07-05 NOTE — ASSESSMENT & PLAN NOTE
Met on admission with leukocytosis, tachycardia, tachypnea  Developed hypotension over admission improved with albumin/midodrine. 30cc/kg bolus not given due to CHF exacerbation  CT chest with possible pneumonia in the appropriate setting  Was started on IV ceftriaxone, continue to completed 7-day course  Blood cultures x 3 negative. Initial blood cultures 1/2 positive, suspected [id number].  Patient completed adequate dose of antibiotic therapy.  Sepsis now resolved

## 2024-07-05 NOTE — ASSESSMENT & PLAN NOTE
Wt Readings from Last 3 Encounters:   07/05/24 (!) 169 kg (373 lb 7.4 oz)   06/10/24 (!) 157 kg (346 lb)   05/29/24 (!) 157 kg (346 lb 12.5 oz)     With acute decompensation, wt up 18kg   12/2023 ECHO: EF 55% with severe TR, RVSP 53  Repeat ECHO ordered  Appreciate cardiology recs   IV Bumex resume since potassium was 3.0.  This morning potassium is still remain the same, will give IV potassium supplement.  Also correct magnesium.  Continue supplemental oxygen, based on clinical exam, patient back to baseline.  Per cardiology recommendation, upon discharge, can transition to p.o. Bumex 2 mg twice daily with close monitoring and follow-up with patient's own cardiology

## 2024-07-05 NOTE — ASSESSMENT & PLAN NOTE
Lab Results   Component Value Date    HGBA1C 7.0 (H) 05/02/2024       Recent Labs     07/04/24  2100 07/05/24  0723 07/05/24  1104 07/05/24  1600   POCGLU 170* 145* 163* 157*         Blood Sugar Average: Last 72 hrs:  (P) [id number]    Hold home diabetic medications.  SSI alg 4  ACHS fingersticks

## 2024-07-05 NOTE — CASE MANAGEMENT
Case Management Discharge Planning Note    Patient name Tigist Hewitt  Location /-01 MRN [id number]  : 1955 Date 2024       Current Admission Date: 2024  Current Admission Diagnosis:Sepsis (Former[facility])   Patient Active Problem List    Diagnosis Date Noted Date Diagnosed    Closed left ankle fracture 2024     Hypokalemia 2024     Positive blood culture 2024     Sepsis (Formerly [facility]) 2024     Acute on chronic respiratory failure with hypoxia ([facility]) 2024     Ankle fracture 2024     Lymphangitis 2024     Gout 2024     HLD (hyperlipidemia) 2024     Metabolic alkalosis 2024     Acute pain of left knee 2024     Ambulatory dysfunction 2024     Hyperkalemia 2024     Lung cyst 2024     Abnormal CT scan, pelvis 2023     CKD (chronic kidney disease) stage 3, GFR 30-59 ml/min ([facility]) 2023     NISHI (obstructive sleep apnea) 2023     Intertrigo 10/29/2022     Vaginal bleeding 10/23/2022     Acute kidney injury superimposed on chronic kidney disease  ([facility]) 10/22/2022     Hypotension 10/22/2022     GERD (gastroesophageal reflux disease) 10/22/2022     Acute on chronic diastolic congestive heart failure ([facility]) 2022     IMTIAZ (acute kidney injury) ([facility]) 2022     Morbid obesity ([facility]) 2022     Hypothyroidism 2022     Subtherapeutic international normalized ratio (INR) 04/15/2020     Chronic respiratory failure with hypoxia ([facility]) 2020     Asthma 2020     Atrial fibrillation with RVR ([facility]) 2020     COPD (chronic obstructive pulmonary disease) ([facility]) 2020     Type 2 diabetes mellitus with hyperglycemia, with long-term current use of insulin ([facility]) 2020     Diastolic CHF ([facility]) 2020     Shortness of breath 2020     Anemia 2020       LOS (days): 7  Geometric Mean LOS (GMLOS) (days): 5.1  Days to GMLOS:-2.5     OBJECTIVE:  Risk of Unplanned Readmission Score:  40.35         Current admission status: Inpatient   Preferred Pharmacy:   [id number] - [place], [place] - [address] [place] [address]  [address] [facility] [address]  Phone: [phone] Fax: [phone]    Primary Care Provider: Marisa Haque MD    Primary Insurance: [employer] [facility]  Secondary Insurance:     DISCHARGE DETAILS:                                          Other Referral/Resources/Interventions Provided:  Interventions: HHC, Transportation  Referral Comments: HC    Would you like to participate in our [facility] service program?  : No - Declined    Treatment Team Recommendation: Home with Home Health Care  Discharge Destination Plan:: Home with Home Health Care  Transport at Discharge : [facility] Ambulance           ETA of Transport (Date): 07/06/24  ETA of Transport (Time): 1500              IMM Given (Date):: 07/05/24  IMM Given to:: Patient                        CM met with patient and discussed transportation tomorrow as patient is anticipated for medical stability. She is worried that her  cannot lift for a few days still and her friend that assists is on vacation. She said that she is not worried about Saturday as her son works until 6pm and requested a later transport. She said she has someone available Sunday but is worried about Monday. She can look into paying someone out of pocket to care for her and will also discuss with her .   CM reviewed IMM with pt; No questions or concerns. Pt in agreement with rights, and is aware of the right to appeal d/c once medically stable if desired. IMM signed.    CM requested BLS transportation via [employer] for 3pm.     CM updated [facility] on discharge timeframes.     CM to follow patient's care and discharge needs.

## 2024-07-05 NOTE — PROGRESS NOTES
Progress Note:Cardiology  Tigist Hewitt 1955, 69 y.o. female MRN: [id number]    Unit/Bed#: -Jonathan Encounter: [id number]  Attending Physician: Pedro Price MD   Primary Care Provider: Marisa Haque MD   Date admitted to hospital: 6/27/2024  Length of stay: 7         Acute on chronic diastolic congestive heart failure (HCC)  Assessment & Plan  Wt Readings from Last 3 Encounters:   06/30/24 (!) 175 kg (385 lb 5.8 oz)   06/10/24 (!) 157 kg (346 lb)   05/29/24 (!) 157 kg (346 lb 12.5 oz)     History of HFpEF in the setting of morbid obesity, untreated NISHI, pneumonia/COPD.  Echo 12/24/2023 with LVEF 55%, mild AS, mild-mod MR, severe TR with PASP 53mmHg which is overall unchanged from prior.   Echo 7/1/2024 shows preserved LVEF with right sided heart failure.  Transitioned to Bumex during May admission.  She has undergone work up by pulmonology for pulmonary hypertension.  Currently diuresing well on Bumex IV 2 mg BID  Recommend increase in Bumex to 3 mg PO BID at discharge.  Added spironolactone 25 mg daily for K+ retention.  Very difficult to assess true volume status or weights due to body habitus and use of bed scales.  Continue CPAP  Would benefit from intense physical therapy and increased mobility but unfortunately she is lacking motivation for this.  Continue strict I's/O's, sodium/fluid restriction.  She should have BMP with 3-5 days of hospital discharge.    Atrial fibrillation with RVR ([facility])  Assessment & Plan  History of longstanding persistent atrial fibrillation.   She was previously evaluated by Dr. Ferraro through [facility] EP and failed 2 cardioversion and an atrial fibrillation ablation. Persistent a fib since at least 2020.  HR's mildly elevated this admission in the setting of septic shock.  Has been receiving amiodarone this admission. Ideally would not continue given futility of restoring sinus rhythm. This was discontinued and metoprolol tartrate resumed.   Increase  "metoprolol tartrate to 25 mg BID for improved HR control.  Anticoagulated with warfarin, INR goal 2-3  Consider digoxin as needed for HR control.  Optimize electrolytes for K+ > 4, Mag > 2.    Acute on chronic respiratory failure with hypoxia (HCC)  Assessment & Plan  Multi-factoral due to COPD, NISHI, obesity, HFpEF  Currently back on home O2 requirement of 3 L    NISHI (obstructive sleep apnea)  Assessment & Plan  Continue CPAP    Morbid obesity (HCC)  Assessment & Plan  Contributing factor to current medical conditions and would benefit greatly from weight loss.        Subjective:   Patient seen and examined.  No significant events overnight. She remains on 4 L NC of supplemental O2. She reports fatigue but no other complaints.    Review of Systems   Constitutional: Positive for malaise/fatigue.   HENT: Negative.     Cardiovascular:  Positive for dyspnea on exertion and leg swelling. Negative for chest pain, irregular heartbeat, near-syncope, orthopnea and palpitations.   Respiratory:  Negative for cough and snoring.    Endocrine: Negative.    Skin: Negative.    Musculoskeletal: Negative.    Gastrointestinal: Negative.    Genitourinary: Negative.    Neurological: Negative.    Psychiatric/Behavioral: Negative.           Objective:     Vitals: Blood pressure 109/71, pulse 82, temperature (!) 97 °F (36.1 °C), resp. rate 20, height 5' 1\" (1.549 m), weight (!) 169 kg (373 lb 7.4 oz), SpO2 94%., Body mass index is 70.56 kg/m².,     Orthostatic Blood Pressures      Flowsheet Row Most Recent Value   Blood Pressure 109/71 filed at 07/05/2024 0725   Patient Position - Orthostatic VS Lying filed at 07/03/2024 1521            Physical Exam  Vitals and nursing note reviewed.   Constitutional:       General: She is not in acute distress.     Appearance: She is well-developed. She is obese.   HENT:      Head: Normocephalic and atraumatic.   Eyes:      Conjunctiva/sclera: Conjunctivae normal.   Cardiovascular:      Rate and Rhythm: " Normal rate. Rhythm irregularly irregular.      Heart sounds: No murmur heard.  Pulmonary:      Effort: Pulmonary effort is normal. No respiratory distress.      Breath sounds: Examination of the right-lower field reveals decreased breath sounds. Examination of the left-lower field reveals decreased breath sounds. Decreased breath sounds present.      Comments: 4 L NC supplemental O2  Abdominal:      Palpations: Abdomen is soft.      Tenderness: There is no abdominal tenderness.   Musculoskeletal:         General: No swelling.      Cervical back: Neck supple.      Right lower le+ Edema present.      Left lower le+ Edema present.   Skin:     General: Skin is warm and dry.      Capillary Refill: Capillary refill takes less than 2 seconds.   Neurological:      Mental Status: She is alert.   Psychiatric:         Mood and Affect: Mood normal.            Intake/Output Summary (Last 24 hours) at 2024 1101  Last data filed at 2024 0822  Gross per 24 hour   Intake 716 ml   Output 1700 ml   Net -984 ml       Weight (last 2 days)       Date/Time Weight    24 0537 169 (373.46)    24 0548 166 (366.18)    24 0300 166 (365.96)               Medications:      Current Facility-Administered Medications:     acetaminophen (TYLENOL) tablet 650 mg, 650 mg, Oral, Q6H PRN, Pedro Price MD, 650 mg at 24 2349    albuterol inhalation solution 2.5 mg, 2.5 mg, Nebulization, Q6H PRN, Pedro Price MD, 2.5 mg at 24 1704    allopurinol (ZYLOPRIM) tablet 100 mg, 100 mg, Oral, Daily, Pedro Price MD, 100 mg at 24 0915    atorvastatin (LIPITOR) tablet 10 mg, 10 mg, Oral, Daily, Pedro Price MD, 10 mg at 24 0915    bumetanide (BUMEX) injection 2 mg, 2 mg, Intravenous, BID, Pedro Price MD, 2 mg at 24 0915    Cholecalciferol (VITAMIN D3) tablet 2,000 Units, 2,000 Units, Oral, Daily, Pedro Price MD, 2,000 Units at 24 0915    diphenhydrAMINE (BENADRYL)  tablet 25 mg, 25 mg, Oral, Q6H PRN, Pedro Price MD, 25 mg at 07/02/24 1410    docusate sodium (COLACE) capsule 100 mg, 100 mg, Oral, Daily, Pedro Price MD, 100 mg at 07/05/24 0915    ferrous sulfate tablet 325 mg, 325 mg, Oral, Daily With Breakfast, Pedro Price MD, 325 mg at 07/04/24 1111    Fluticasone Furoate-Vilanterol 100-25 mcg/actuation 1 puff, 1 puff, Inhalation, Daily, Pedro Price MD, 1 puff at 07/05/24 0921    glycerin (adult) rectal suppository 1 suppository, 1 suppository, Rectal, Once, Pedro Price MD    guaiFENesin (MUCINEX) 12 hr tablet 600 mg, 600 mg, Oral, Q12H HALEY, Pedro Price MD, 600 mg at 07/05/24 0915    insulin lispro (HumALOG/ADMELOG) 100 units/mL subcutaneous injection 2-12 Units, 2-12 Units, Subcutaneous, 4x Daily (AC & HS), 2 Units at 07/04/24 2200 **AND** Fingerstick Glucose (POCT), , , 4x Daily AC and at bedtime, Pedro Price MD    ipratropium-albuterol (DUO-NEB) 0.5-2.5 mg/3 mL inhalation solution 3 mL, 3 mL, Nebulization, Q6H While awake, Pedro Price MD, 3 mL at 07/05/24 0826    levothyroxine tablet 288 mcg, 288 mcg, Oral, Daily, Pedro Price MD, 288 mcg at 07/05/24 0549    loratadine (CLARITIN) tablet 5 mg, 5 mg, Oral, Daily, Pedro Price MD, 5 mg at 07/05/24 0915    metoprolol tartrate (LOPRESSOR) tablet 25 mg, 25 mg, Oral, Q12H HALEY, Pedro Price MD, 25 mg at 07/05/24 0915    montelukast (SINGULAIR) tablet 10 mg, 10 mg, Oral, HS, Pedro Price MD, 10 mg at 07/04/24 2123    multivitamin-minerals (CENTRUM) tablet 1 tablet, 1 tablet, Oral, Daily, Pedro Price MD, 1 tablet at 07/05/24 0915    pantoprazole (PROTONIX) EC tablet 40 mg, 40 mg, Oral, Early Morning, Pedro Price MD, 40 mg at 07/05/24 0535    polyethylene glycol (MIRALAX) packet 17 g, 17 g, Oral, Daily PRN, Pedro Price MD, 17 g at 07/04/24 1627    spironolactone (ALDACTONE) tablet 25 mg, 25 mg, Oral, Daily, Pedro Price MD, 25 mg at 07/05/24  0915    traMADol (ULTRAM) tablet 50 mg, 50 mg, Oral, Q12H PRN, Pedro Price MD, 50 mg at 07/04/24 1354    warfarin (COUMADIN) tablet 7.5 mg, 7.5 mg, Oral, Daily (warfarin), Pedro Price MD, 7.5 mg at 07/04/24 1647     Labs & Results:        Results from last 7 days   Lab Units 07/05/24  0534 07/04/24  0510 07/03/24  0609   WBC Thousand/uL 8.70 11.26* 11.01*   HEMOGLOBIN g/dL 11.2* 11.2* 11.2*   HEMATOCRIT % 35.9 34.6* 35.0   PLATELETS Thousands/uL 177 170 170         Results from last 7 days   Lab Units 07/05/24  0534 07/04/24  0510 07/03/24  1605 07/03/24  0609   POTASSIUM mmol/L 3.6 3.4* 3.2* 3.0*   CHLORIDE mmol/L 101 100 99 99   CO2 mmol/L 38* 37* 38* 35*   BUN mg/dL 42* 43* 46* 47*   CREATININE mg/dL 0.92 1.04 1.05 1.14   CALCIUM mg/dL 9.4 9.4 9.3 9.5   ALK PHOS U/L 107* 100  --  104   ALT U/L 13 12  --  12   AST U/L 16 17  --  14     Results from last 7 days   Lab Units 07/05/24  0534 07/04/24  0510 07/03/24  0609   INR  2.12* 1.84* 1.84*     Results from last 7 days   Lab Units 07/04/24  0510 07/03/24  0609 07/02/24  0547   MAGNESIUM mg/dL 1.9 1.9 2.1

## 2024-07-06 LAB
ALBUMIN SERPL BCG-MCNC: 2.9 G/DL (ref 3.5–5)
ALP SERPL-CCNC: 107 U/L (ref 34–104)
ALT SERPL W P-5'-P-CCNC: 13 U/L (ref 7–52)
ANION GAP SERPL CALCULATED.3IONS-SCNC: 5 MMOL/L (ref 4–13)
AST SERPL W P-5'-P-CCNC: 24 U/L (ref 13–39)
BILIRUB SERPL-MCNC: 1.15 MG/DL (ref 0.2–1)
BUN SERPL-MCNC: 39 MG/DL (ref 5–25)
CALCIUM ALBUM COR SERPL-MCNC: 10.3 MG/DL (ref 8.3–10.1)
CALCIUM SERPL-MCNC: 9.4 MG/DL (ref 8.4–10.2)
CHLORIDE SERPL-SCNC: 101 MMOL/L (ref 96–108)
CO2 SERPL-SCNC: 38 MMOL/L (ref 21–32)
CREAT SERPL-MCNC: 0.84 MG/DL (ref 0.6–1.3)
ERYTHROCYTE [DISTWIDTH] IN BLOOD BY AUTOMATED COUNT: 17.9 % (ref 11.6–15.1)
GFR SERPL CREATININE-BSD FRML MDRD: 70 ML/MIN/1.73SQ M
GLUCOSE SERPL-MCNC: 140 MG/DL (ref 65–140)
GLUCOSE SERPL-MCNC: 151 MG/DL (ref 65–140)
GLUCOSE SERPL-MCNC: 170 MG/DL (ref 65–140)
GLUCOSE SERPL-MCNC: 189 MG/DL (ref 65–140)
GLUCOSE SERPL-MCNC: 196 MG/DL (ref 65–140)
HCT VFR BLD AUTO: 36.1 % (ref 34.8–46.1)
HGB BLD-MCNC: 11.5 G/DL (ref 11.5–15.4)
IMM GRANULOCYTES # BLD AUTO: >0.5 THOUSAND/UL (ref 0–0.2)
INR PPP: 2.26 (ref 0.84–1.19)
MCH RBC QN AUTO: 30.3 PG (ref 26.8–34.3)
MCHC RBC AUTO-ENTMCNC: 31.9 G/DL (ref 31.4–37.4)
MCV RBC AUTO: 95 FL (ref 82–98)
NRBC BLD AUTO-RTO: 0 /100 WBCS
PLATELET # BLD AUTO: 182 THOUSANDS/UL (ref 149–390)
PMV BLD AUTO: 10.9 FL (ref 8.9–12.7)
POTASSIUM SERPL-SCNC: 3.6 MMOL/L (ref 3.5–5.3)
PROT SERPL-MCNC: 6.9 G/DL (ref 6.4–8.4)
PROTHROMBIN TIME: 25.3 SECONDS (ref 11.6–14.5)
RBC # BLD AUTO: 3.79 MILLION/UL (ref 3.81–5.12)
SODIUM SERPL-SCNC: 144 MMOL/L (ref 135–147)
WBC # BLD AUTO: 7.99 THOUSAND/UL (ref 4.31–10.16)

## 2024-07-06 PROCEDURE — 94664 DEMO&/EVAL PT USE INHALER: CPT

## 2024-07-06 PROCEDURE — 99232 SBSQ HOSP IP/OBS MODERATE 35: CPT | Performed by: FAMILY MEDICINE

## 2024-07-06 PROCEDURE — 82948 REAGENT STRIP/BLOOD GLUCOSE: CPT

## 2024-07-06 PROCEDURE — 85610 PROTHROMBIN TIME: CPT | Performed by: FAMILY MEDICINE

## 2024-07-06 PROCEDURE — 94640 AIRWAY INHALATION TREATMENT: CPT

## 2024-07-06 PROCEDURE — 85027 COMPLETE CBC AUTOMATED: CPT | Performed by: FAMILY MEDICINE

## 2024-07-06 PROCEDURE — 80053 COMPREHEN METABOLIC PANEL: CPT | Performed by: FAMILY MEDICINE

## 2024-07-06 PROCEDURE — 94760 N-INVAS EAR/PLS OXIMETRY 1: CPT

## 2024-07-06 RX ADMIN — BUMETANIDE 2 MG: 0.25 INJECTION INTRAMUSCULAR; INTRAVENOUS at 21:32

## 2024-07-06 RX ADMIN — INSULIN LISPRO 2 UNITS: 100 INJECTION, SOLUTION INTRAVENOUS; SUBCUTANEOUS at 11:55

## 2024-07-06 RX ADMIN — PANTOPRAZOLE SODIUM 40 MG: 40 TABLET, DELAYED RELEASE ORAL at 05:08

## 2024-07-06 RX ADMIN — GUAIFENESIN 600 MG: 600 TABLET ORAL at 09:22

## 2024-07-06 RX ADMIN — METOPROLOL TARTRATE 25 MG: 25 TABLET, FILM COATED ORAL at 21:31

## 2024-07-06 RX ADMIN — METOPROLOL TARTRATE 25 MG: 25 TABLET, FILM COATED ORAL at 09:22

## 2024-07-06 RX ADMIN — Medication 2000 UNITS: at 09:22

## 2024-07-06 RX ADMIN — IPRATROPIUM BROMIDE AND ALBUTEROL SULFATE 3 ML: 2.5; .5 SOLUTION RESPIRATORY (INHALATION) at 14:50

## 2024-07-06 RX ADMIN — DOCUSATE SODIUM 100 MG: 100 CAPSULE, LIQUID FILLED ORAL at 09:22

## 2024-07-06 RX ADMIN — LORATADINE 5 MG: 10 TABLET ORAL at 09:22

## 2024-07-06 RX ADMIN — GUAIFENESIN 600 MG: 600 TABLET ORAL at 21:29

## 2024-07-06 RX ADMIN — IPRATROPIUM BROMIDE AND ALBUTEROL SULFATE 3 ML: 2.5; .5 SOLUTION RESPIRATORY (INHALATION) at 19:42

## 2024-07-06 RX ADMIN — SPIRONOLACTONE 25 MG: 25 TABLET ORAL at 09:22

## 2024-07-06 RX ADMIN — BUMETANIDE 2 MG: 0.25 INJECTION INTRAMUSCULAR; INTRAVENOUS at 09:21

## 2024-07-06 RX ADMIN — ATORVASTATIN CALCIUM 10 MG: 10 TABLET, FILM COATED ORAL at 09:22

## 2024-07-06 RX ADMIN — INSULIN LISPRO 2 UNITS: 100 INJECTION, SOLUTION INTRAVENOUS; SUBCUTANEOUS at 21:30

## 2024-07-06 RX ADMIN — TRAMADOL HYDROCHLORIDE 50 MG: 50 TABLET, COATED ORAL at 09:28

## 2024-07-06 RX ADMIN — ALLOPURINOL 100 MG: 100 TABLET ORAL at 09:22

## 2024-07-06 RX ADMIN — INSULIN LISPRO 2 UNITS: 100 INJECTION, SOLUTION INTRAVENOUS; SUBCUTANEOUS at 16:34

## 2024-07-06 RX ADMIN — Medication 1 TABLET: at 09:22

## 2024-07-06 RX ADMIN — ACETAMINOPHEN 325MG 650 MG: 325 TABLET ORAL at 14:32

## 2024-07-06 RX ADMIN — FERROUS SULFATE TAB 325 MG (65 MG ELEMENTAL FE) 325 MG: 325 (65 FE) TAB at 11:55

## 2024-07-06 RX ADMIN — IPRATROPIUM BROMIDE AND ALBUTEROL SULFATE 3 ML: 2.5; .5 SOLUTION RESPIRATORY (INHALATION) at 08:42

## 2024-07-06 RX ADMIN — WARFARIN SODIUM 7.5 MG: 7.5 TABLET ORAL at 16:34

## 2024-07-06 RX ADMIN — FLUTICASONE FUROATE AND VILANTEROL TRIFENATATE 1 PUFF: 100; 25 POWDER RESPIRATORY (INHALATION) at 09:28

## 2024-07-06 RX ADMIN — LEVOTHYROXINE SODIUM 288 MCG: 88 TABLET ORAL at 05:08

## 2024-07-06 RX ADMIN — MONTELUKAST 10 MG: 10 TABLET, FILM COATED ORAL at 21:30

## 2024-07-06 RX ADMIN — POLYETHYLENE GLYCOL 3350 17 G: 17 POWDER, FOR SOLUTION ORAL at 09:21

## 2024-07-06 NOTE — ASSESSMENT & PLAN NOTE
Lab Results   Component Value Date    HGBA1C 7.0 (H) 05/02/2024       Recent Labs     07/05/24  1104 07/05/24  1600 07/05/24  2117 07/06/24  0714   POCGLU 163* 157* 184* 140         Blood Sugar Average: Last 72 hrs:  (P) 170.5011835402866935    Hold home diabetic medications.  SSI alg 4  ACHS fingersticks

## 2024-07-06 NOTE — CASE MANAGEMENT
Case Management Discharge Planning Note    Patient name Tigist Hewitt  Location /-01 MRN [id number]  : 1955 Date 2024       Current Admission Date: 2024  Current Admission Diagnosis:Sepsis ([facility])   Patient Active Problem List    Diagnosis Date Noted Date Diagnosed    Closed left ankle fracture 2024     Hypokalemia 2024     Positive blood culture 2024     Sepsis ([facility]) 2024     Acute on chronic respiratory failure with hypoxia ([facility]) 2024     Ankle fracture 2024     Lymphangitis 2024     Gout 2024     HLD (hyperlipidemia) 2024     Metabolic alkalosis 2024     Acute pain of left knee 2024     Ambulatory dysfunction 2024     Hyperkalemia 2024     Lung cyst 2024     Abnormal CT scan, pelvis 2023     CKD (chronic kidney disease) stage 3, GFR 30-59 ml/min ([facility]) 2023     NISHI (obstructive sleep apnea) 2023     Intertrigo 10/29/2022     Vaginal bleeding 10/23/2022     Acute kidney injury superimposed on chronic kidney disease  ([facility]) 10/22/2022     Hypotension 10/22/2022     GERD (gastroesophageal reflux disease) 10/22/2022     Acute on chronic diastolic congestive heart failure ([facility]) 2022     IMTIAZ (acute kidney injury) ([facility]) 2022     Morbid obesity ([facility]) 2022     Hypothyroidism 2022     Chronic respiratory failure with hypoxia ([facility]) 2020     Asthma 2020     Atrial fibrillation with RVR ([facility]) 2020     COPD (chronic obstructive pulmonary disease) ([facility]) 2020     Type 2 diabetes mellitus with hyperglycemia, with long-term current use of insulin ([facility]) 2020     Diastolic CHF ([facility]) 2020     Shortness of breath 2020     Anemia 2020       LOS (days): 8  Geometric Mean LOS (GMLOS) (days): 5.1  Days to GMLOS:-3.2     OBJECTIVE:  Risk of Unplanned Readmission Score: 38.46         Current admission status: Inpatient   Preferred  Pharmacy:   [facility]/pharmacy #[id number] - [place], [place] - [address] [place] [id number]  Phone: [phone] Fax: [phone]    Primary Care Provider: Marisa Haque MD    Primary Insurance: [facility] REP  Secondary Insurance:     DISCHARGE DETAILS:                                 As per [facility], [facility] will  pt at 1530 via BLS to transport to home address       Pts dc has been cancelled for today.  CM placing a transportation request to [facility] for tomorrow, 7/7/24 in [employer]          As per [facility], [facility] will  pt tomorrow, 7/7/24, at 1300 to transport to home address via BLS

## 2024-07-06 NOTE — PLAN OF CARE
Problem: RESPIRATORY - ADULT  Goal: Achieves optimal ventilation and oxygenation  Description: INTERVENTIONS:  - Assess for changes in respiratory status confusion cyanosis  - Assess for changes in mentation and behavior  - Position to facilitate oxygenation and minimize respiratory effort  - Oxygen administered by appropriate delivery if ordered  - Initiate smoking cessation education as indicated  - Encourage broncho-pulmonary hygiene including cough, deep breathe, Incentive Spirometry  - Assess the need for suctioning and aspirate as needed  - Assess and instruct to report SOB or any respiratory difficulty  - Respiratory Therapy support as indicated  Outcome: Progressing

## 2024-07-06 NOTE — ASSESSMENT & PLAN NOTE
Met on admission with leukocytosis, tachycardia, tachypnea  Developed hypotension over admission improved with albumin/midodrine. 30cc/kg bolus not given due to CHF exacerbation  CT chest with possible pneumonia in the appropriate setting  Was started on IV ceftriaxone, continue to completed 7-day course  Blood cultures x 3 negative. Initial blood cultures 1/2 positive, suspected [id number].  Patient completed adequate dose of antibiotic therapy.  Sepsis now resolved

## 2024-07-06 NOTE — ASSESSMENT & PLAN NOTE
Wt Readings from Last 3 Encounters:   07/06/24 (!) 164 kg (362 lb 7 oz)   06/10/24 (!) 157 kg (346 lb)   05/29/24 (!) 157 kg (346 lb 12.5 oz)     With acute decompensation, wt up 18kg   12/2023 ECHO: EF 55% with severe TR, RVSP 53  Repeat ECHO ordered  Appreciate cardiology recs   IV Bumex resume since potassium was 3.0.  This morning potassium is still remain the same, will give IV potassium supplement.  Also correct magnesium.  Continue supplemental oxygen, based on clinical exam, patient back to baseline.  Continue Bumex IV 1 more day, and then will transition to p.o. in a.m. before discharge

## 2024-07-06 NOTE — PROGRESS NOTES
[facility]  Progress Note  Name: Tigist Hewitt I  MRN: [id number]  Unit/Bed#: MS [facility]ro I Date of Admission: 6/27/2024   Date of Service: 7/6/2024 I Hospital Day: 8    Assessment & Plan   Acute on chronic diastolic congestive heart failure (HCC)  Assessment & Plan  Wt Readings from Last 3 Encounters:   07/06/24 (!) 164 kg (362 lb 7 oz)   06/10/24 (!) 157 kg (346 lb)   05/29/24 (!) 157 kg (346 lb 12.5 oz)     With acute decompensation, wt up 18kg   12/2023 ECHO: EF 55% with severe TR, RVSP 53  Repeat ECHO ordered  Appreciate cardiology recs   IV Bumex resume since potassium was 3.0.  This morning potassium is still remain the same, will give IV potassium supplement.  Also correct magnesium.  Continue supplemental oxygen, based on clinical exam, patient back to baseline.  Continue Bumex IV 1 more day, and then will transition to p.o. in a.m. before discharge      Type 2 diabetes mellitus with hyperglycemia, with long-term current use of insulin ([facility])  Assessment & Plan  Lab Results   Component Value Date    HGBA1C 7.0 (H) 05/02/2024       Recent Labs     07/05/24  1104 07/05/24  1600 07/05/24  2117 07/06/24  0714   POCGLU 163* 157* 184* 140         Blood Sugar Average: Last 72 hrs:  (P) [id number]    Hold home diabetic medications.  SSI alg 4  ACHS fingersticks    Atrial fibrillation with RVR ([facility])  Assessment & Plan  Hx of persistent atrial fibrillation, failed prior cardioversion/ablation in past   Developed A fib with RVR over admission secondary to septic shock requiring amiodarone gtt since weaned  Appreciate cardiology recs  BB has been on hold with relative hypotension, since improved with midodrine. Resumed metoprolol tartrate 12.5mg bid  Can use digoxin for rate control if needed   INR is therapeutic, continue current treatment    * Sepsis ([facility])  Assessment & Plan  Met on admission with leukocytosis, tachycardia, tachypnea  Developed hypotension over admission  improved with albumin/midodrine. 30cc/kg bolus not given due to CHF exacerbation  CT chest with possible pneumonia in the appropriate setting  Was started on IV ceftriaxone, continue to completed 7-day course  Blood cultures x 3 negative. Initial blood cultures 1/2 positive, suspected [id number].  Patient completed adequate dose of antibiotic therapy.  Sepsis now resolved      COPD (chronic obstructive pulmonary disease) (HCC)  Assessment & Plan  Less likely in exacerbation  At baseline O2 requirement of 3L NC-currently using 4 L of oxygen  Continue home inhalers    Closed left ankle fracture  Assessment & Plan  Recent closed left ankle fracture, being followed by orthopedic surgery   Missed outpatient appt due to current hospitalizations   Reevaluated by orthopedic this time, recommendation:Patient is permitted to bear weight as tolerated left lower extremity for stand to pivot     Positive blood culture  Assessment & Plan  1/2 blood cultures Staph epidermidis -likely contamination, coagulase-negative.  Repeat blood culture negative in 48 hours  On top of that, patient completed antibiotic therapy for sepsis for    Hypokalemia  Assessment & Plan  Improved with supplement    Acute on chronic respiratory failure with hypoxia (HCC)  Assessment & Plan  Secondary to decompensated HF, pneumonia  CT c/a/p: diffuse GGO/mosaicism throughout the bilateral lung fields could be related to hypoinflation, air trapping/small airway disease, edema or pneumonia  Required CPAP since weaned back to baseline oxygen   Patient was on IV Rocephin, developed some reaction, IV Rocephin transition to IV levofloxacin.  Continue IV Bumex 2mg IV bid -remain on hold due to hypokalemia, patient is requiring IV supplemental potassium.  Currently using 4 L of oxygen which is baseline      NISHI (obstructive sleep apnea)  Assessment & Plan  Continue CPAP HS     Morbid obesity (HCC)  Assessment & Plan  Consider dietary consult.    Encourage exercise and lifestyle changes.             VTE Pharmacologic Prophylaxis: VTE Score: 11 High Risk (Score >/= 5) - Pharmacological DVT Prophylaxis Ordered: warfarin (Coumadin). Sequential Compression Devices Ordered.    Mobility:   Basic Mobility Inpatient Raw Score: 6  JH-HLM Goal: 2: Bed activities/Dependent transfer  JH-HLM Achieved: 2: Bed activities/Dependent transfer  JH-HLM Goal achieved. Continue to encourage appropriate mobility.    Patient Centered Rounds: I performed bedside rounds with nursing staff today.   Discussions with Specialists or Other Care Team Provider: None    Education and Discussions with Family / Patient: Updated  () via phone.      Current Length of Stay: 8 day(s)  Current Patient Status: Inpatient   Certification Statement: The patient will continue to require additional inpatient hospital stay due to monitorable conditions  Discharge Plan: Anticipate discharge tomorrow to home with home services.    Code Status: Level 3 - DNAR and DNI    Subjective:   Seen and evaluated during the event.  Patient is still having fatigue and tiredness.  Breathing was much better.  Does not have any bowel movement since came to the hospital.  Having mild pain in the left lower extremity.    Objective:     Vitals:   Temp (24hrs), Av.1 °F (36.2 °C), Min:97 °F (36.1 °C), Max:97.2 °F (36.2 °C)    Temp:  [97 °F (36.1 °C)-97.2 °F (36.2 °C)] 97 °F (36.1 °C)  HR:  [81-95] 95  Resp:  [20] 20  BP: (106-119)/(69-73) 119/71  SpO2:  [92 %-100 %] 100 %  Body mass index is 68.48 kg/m².     Input and Output Summary (last 24 hours):     Intake/Output Summary (Last 24 hours) at 2024 1502  Last data filed at 2024 1433  Gross per 24 hour   Intake 680 ml   Output 1474 ml   Net -794 ml       Physical Exam:   Physical Exam  Vitals and nursing note reviewed.   Constitutional:       Appearance: She is obese. She is not ill-appearing or diaphoretic.   Eyes:      Extraocular Movements:  Extraocular movements intact.      Conjunctiva/sclera: Conjunctivae normal.      Pupils: Pupils are equal, round, and reactive to light.   Cardiovascular:      Rate and Rhythm: Normal rate.      Heart sounds:      No friction rub. No gallop.   Pulmonary:      Effort: Pulmonary effort is normal. No respiratory distress.      Breath sounds: No stridor. No wheezing or rhonchi.   Abdominal:      General: There is no distension.      Palpations: There is no mass.      Tenderness: There is no abdominal tenderness.      Hernia: No hernia is present.   Musculoskeletal:         General: Tenderness (left lower extremity) present.   Skin:     Findings: No lesion or rash.   Neurological:      Mental Status: She is alert and oriented to person, place, and time.      Cranial Nerves: No cranial nerve deficit.      Sensory: No sensory deficit.      Motor: No weakness.      Coordination: Coordination normal.        Additional Data:     Labs:  Results from last 7 days   Lab Units 07/06/24  0508 07/04/24  0510 07/03/24  0609 07/02/24  0547   WBC Thousand/uL 7.99   < > 11.01* 11.21*   HEMOGLOBIN g/dL 11.5   < > 11.2* 11.3*   HEMATOCRIT % 36.1   < > 35.0 34.7*   PLATELETS Thousands/uL 182   < > 170 165   BANDS PCT %  --   --  2  --    SEGS PCT %  --   --   --  76*   LYMPHO PCT %  --   --  14 6*   MONO PCT %  --   --  8 10   EOS PCT %  --   --  5 7*    < > = values in this interval not displayed.     Results from last 7 days   Lab Units 07/06/24  0508   SODIUM mmol/L 144   POTASSIUM mmol/L 3.6   CHLORIDE mmol/L 101   CO2 mmol/L 38*   BUN mg/dL 39*   CREATININE mg/dL 0.84   ANION GAP mmol/L 5   CALCIUM mg/dL 9.4   ALBUMIN g/dL 2.9*   TOTAL BILIRUBIN mg/dL 1.15*   ALK PHOS U/L 107*   ALT U/L 13   AST U/L 24   GLUCOSE RANDOM mg/dL 151*     Results from last 7 days   Lab Units 07/06/24  0508   INR  2.26*     Results from last 7 days   Lab Units 07/06/24  1125 07/06/24  0714 07/05/24  2117 07/05/24  1600 07/05/24  1104 07/05/24  0723  07/04/24  2100 07/04/24  1616 07/04/24  1126 07/04/24  0710 07/03/24  2111 07/03/24  1623   POC GLUCOSE mg/dl 196* 140 184* 157* 163* 145* 170* 155* 212* 162* 154* 222*         Results from last 7 days   Lab Units 07/02/24  0547 06/30/24  0529   PROCALCITONIN ng/ml 0.87* 1.88*       Lines/Drains:  Invasive Devices       Peripheral Intravenous Line  Duration             Long-Dwell Peripheral IV (Midline) 06/28/24 Left Cephalic Vein 7 days                          Imaging: No pertinent imaging reviewed.    Recent Cultures (last 7 days):         Last 24 Hours Medication List:   Current Facility-Administered Medications   Medication Dose Route Frequency Provider Last Rate    acetaminophen  650 mg Oral Q6H PRN Pedro Price MD      albuterol  2.5 mg Nebulization Q6H PRN Pedro Price MD      allopurinol  100 mg Oral Daily Pedro Price MD      atorvastatin  10 mg Oral Daily Pedro Price MD      bisacodyl  10 mg Rectal Daily PRN CAMPBELL Munoz      bumetanide  2 mg Intravenous BID Pedro Price MD      Cholecalciferol  2,000 Units Oral Daily Pedro Price MD      diphenhydrAMINE  25 mg Oral Q6H PRN Pedro Price MD      docusate sodium  100 mg Oral Daily Pedro Price MD      ferrous sulfate  325 mg Oral Daily With Breakfast Pedro Price MD      Fluticasone Furoate-Vilanterol  1 puff Inhalation Daily Pedro Price MD      guaiFENesin  600 mg Oral Q12H [facility] Pedro Price MD      insulin lispro  2-12 Units Subcutaneous 4x Daily (AC & HS) Pedro Price MD      ipratropium-albuterol  3 mL Nebulization Q6H While awake Pedro Price MD      levothyroxine  288 mcg Oral Daily Pedro Price MD      loratadine  5 mg Oral Daily Pedro Price MD      metoprolol tartrate  25 mg Oral Q12H [facility] Pedro Price MD      montelukast  10 mg Oral HS Pedro Price MD      multivitamin-minerals  1 tablet Oral Daily Pedro Price MD      pantoprazole  40 mg Oral Early  Morning Pedro Price MD      polyethylene glycol  17 g Oral Daily Kaitlin Dinero, CRNP      spironolactone  25 mg Oral Daily Pedro Price MD      traMADol  50 mg Oral Q12H PRN Pedro Price MD      warfarin  7.5 mg Oral Daily (warfarin) Pedro Price MD          Today, Patient Was Seen By: Pedro Price MD    **Please Note: This note may have been constructed using a voice recognition system.**

## 2024-07-06 NOTE — ASSESSMENT & PLAN NOTE
Recent closed left ankle fracture, being followed by orthopedic surgery   Missed outpatient appt due to current hospitalizations   Reevaluated by orthopedic this time, recommendation:Patient is permitted to bear weight as tolerated left lower extremity for stand to pivot

## 2024-07-06 NOTE — PLAN OF CARE
Problem: PAIN - ADULT  Goal: Verbalizes/displays adequate comfort level or baseline comfort level  Description: Interventions:  - Encourage patient to monitor pain and request assistance  - Assess pain using appropriate pain scale  - Administer analgesics based on type and severity of pain and evaluate response  - Implement non-pharmacological measures as appropriate and evaluate response  - Consider cultural and social influences on pain and pain management  - Notify physician/advanced practitioner if interventions unsuccessful or patient reports new pain  Outcome: Progressing     Problem: INFECTION - ADULT  Goal: Absence or prevention of progression during hospitalization  Description: INTERVENTIONS:  - Assess and monitor for signs and symptoms of infection  - Monitor lab/diagnostic results  - Monitor all insertion sites, i.e. indwelling lines, tubes, and drains  - Monitor endotracheal if appropriate and nasal secretions for changes in amount and color  - [place] appropriate cooling/warming therapies per order  - Administer medications as ordered  - Instruct and encourage patient and family to use good hand hygiene technique  - Identify and instruct in appropriate isolation precautions for identified infection/condition  Outcome: Progressing     Problem: SAFETY ADULT  Goal: Patient will remain free of falls  Description: INTERVENTIONS:  - Educate patient/family on patient safety including physical limitations  - Instruct patient to call for assistance with activity   - Consult OT/PT to assist with strengthening/mobility   - Keep Call bell within reach  - Keep bed low and locked with side rails adjusted as appropriate  - Keep care items and personal belongings within reach  - Initiate and maintain comfort rounds  - Make Fall Risk Sign visible to staff  - Offer Toileting every 2 Hours, in advance of need if unable to call for assistance  - Initiate/Maintain bed/chair alarm for confusion, impulsivity, or  noncompliance with calling for assistance  - Apply yellow socks and bracelet for high fall risk patients  - Consider moving patient to room near nurses station  Outcome: Progressing  Goal: Maintain or return to baseline ADL function  Description: INTERVENTIONS:  -  Assess patient's ability to carry out ADLs; assess patient's baseline for ADL function and identify physical deficits which impact ability to perform ADLs (bathing, care of mouth/teeth, toileting, grooming, dressing, etc.)  - Assess/evaluate cause of self-care deficits   - Assess range of motion  - Assess patient's mobility; develop plan if impaired  - Assess patient's need for assistive devices and provide as appropriate  - Encourage maximum independence but intervene and supervise when necessary  - Involve family in performance of ADLs  - Assess for home care needs following discharge   - Consider OT consult to assist with ADL evaluation and planning for discharge  - Provide patient education as appropriate  Outcome: Progressing  Goal: Maintains/Returns to pre admission functional level  Description: INTERVENTIONS:  - Perform AM-PAC 6 Click Basic Mobility/ Daily Activity assessment daily.  - Set and communicate daily mobility goal to care team and patient/family/caregiver.   - Collaborate with rehabilitation services on mobility goals if consulted  - Perform Range of Motion 3 times a day.  - Reposition patient every 2 hours if unable to reposition self  - Out of bed for toileting when medically appropriate  - Record patient progress and toleration of activity level   Outcome: Progressing     Problem: DISCHARGE PLANNING  Goal: Discharge to home or other facility with appropriate resources  Description: INTERVENTIONS:  - Identify barriers to discharge w/patient and caregiver  - Arrange for needed discharge resources and transportation as appropriate  - Identify discharge learning needs (meds, wound care, etc.)  - Arrange for interpretive services to  assist at discharge as needed  - Refer to Case Management Department for coordinating discharge planning if the patient needs post-hospital services based on physician/advanced practitioner order or complex needs related to functional status, cognitive ability, or social support system  Outcome: Progressing     Problem: Knowledge Deficit  Goal: Patient/family/caregiver demonstrates understanding of disease process, treatment plan, medications, and discharge instructions  Description: Complete learning assessment and assess knowledge base.  Interventions:  - Provide teaching at level of understanding  - Provide teaching via preferred learning methods  Outcome: Progressing     Problem: CARDIOVASCULAR - ADULT  Goal: Maintains optimal cardiac output and hemodynamic stability  Description: INTERVENTIONS:  - Monitor I/O, vital signs and rhythm monitor fluid intake /fluid restriction  - Monitor for S/S and trends of decreased cardiac output  - Administer and titrate ordered vasoactive medications to optimize hemodynamic stability  - Assess quality of pulses, skin color and temperature  - Assess for signs of decreased coronary artery perfusion  - Instruct patient to report change in severity of symptoms  Outcome: Progressing  Goal: Absence of cardiac dysrhythmias or at baseline rhythm  Description: INTERVENTIONS:  - Continuous cardiac monitoring, vital signs, obtain 12 lead EKG if ordered  - Administer antiarrhythmic and heart rate control medications as ordered  - Monitor electrolytes and administer replacement therapy as ordered  Outcome: Progressing     Problem: RESPIRATORY - ADULT  Goal: Achieves optimal ventilation and oxygenation  Description: INTERVENTIONS:  - Assess for changes in respiratory status  - Assess for changes in mentation and behavior  - Position to facilitate oxygenation and minimize respiratory effort  - Oxygen administered by appropriate delivery if ordered  - Initiate smoking cessation education as  indicated  - Encourage broncho-pulmonary hygiene including cough, deep breathe, Incentive Spirometry  - Assess the need for suctioning and aspirate as needed  - Assess and instruct to report SOB or any respiratory difficulty  - Respiratory Therapy support as indicated  Outcome: Progressing     Problem: GENITOURINARY - ADULT  Goal: Maintains or returns to baseline urinary function  Description: INTERVENTIONS:  - Assess urinary function  - Encourage oral fluids to ensure adequate hydration if ordered  - Administer IV fluids as ordered to ensure adequate hydration  - Administer ordered medications as needed  - Offer frequent toileting  - Follow urinary retention protocol if ordered  Outcome: Progressing  Goal: Absence of urinary retention  Description: INTERVENTIONS:  - Assess patient’s ability to void and empty bladder  - Monitor I/O  - Bladder scan as needed  - Discuss with physician/AP medications to alleviate retention as needed  - Discuss catheterization for long term situations as appropriate  Outcome: Progressing  Goal: Urinary catheter remains patent  Description: INTERVENTIONS:  - Assess patency of urinary catheter  - If patient has a chronic [place], consider changing catheter if non-functioning  - Follow guidelines for intermittent irrigation of non-functioning urinary catheter  Outcome: Progressing     Problem: METABOLIC, FLUID AND ELECTROLYTES - ADULT  Goal: Electrolytes maintained within normal limits  Description: INTERVENTIONS:  - Monitor labs and assess patient for signs and symptoms of electrolyte imbalances  - Administer electrolyte replacement as ordered  - Monitor response to electrolyte replacements, including repeat lab results as appropriate  - Instruct patient on fluid and nutrition as appropriate  Outcome: Progressing  Goal: Fluid balance maintained  Description: INTERVENTIONS:  - Monitor labs   - Monitor I/O and WT  - Instruct patient on fluid and nutrition as appropriate  - Assess for signs &  symptoms of volume excess or deficit  Outcome: Progressing  Goal: Glucose maintained within target range  Description: INTERVENTIONS:  - Monitor Blood Glucose as ordered  - Assess for signs and symptoms of hyperglycemia and hypoglycemia  - Administer ordered medications to maintain glucose within target range  - Assess nutritional intake and initiate nutrition service referral as needed  Outcome: Progressing     Problem: Prexisting or High Potential for Compromised Skin Integrity  Goal: Skin integrity is maintained or improved  Description: INTERVENTIONS:  - Identify patients at risk for skin breakdown  - Assess and monitor skin integrity  - Assess and monitor nutrition and hydration status  - Monitor labs   - Assess for incontinence   - Turn and reposition patient  - Assist with mobility/ambulation  - Relieve pressure over bony prominences  - Avoid friction and shearing  - Provide appropriate hygiene as needed including keeping skin clean and dry  - Evaluate need for skin moisturizer/barrier cream  - Collaborate with interdisciplinary team   - Patient/family teaching  - Consider wound care consult   Outcome: Progressing     Problem: Nutrition/Hydration-ADULT  Goal: Nutrient/Hydration intake appropriate for improving, restoring or maintaining nutritional needs  Description: Monitor and assess patient's nutrition/hydration status for malnutrition. Collaborate with interdisciplinary team and initiate plan and interventions as ordered.  Monitor patient's weight and dietary intake as ordered or per policy. Utilize nutrition screening tool and intervene as necessary. Determine patient's food preferences and provide high-protein, high-caloric foods as appropriate.     INTERVENTIONS:  - Monitor oral intake, urinary output, labs, and treatment plans  - Assess nutrition and hydration status and recommend course of action  - Evaluate amount of meals eaten  - Assist patient with eating if necessary   - Allow adequate time for  meals  - Recommend/ encourage appropriate diets, oral nutritional supplements, and vitamin/mineral supplements  - Order, calculate, and assess calorie counts as needed  - Recommend, monitor, and adjust tube feedings and TPN/PPN based on assessed needs  - Assess need for intravenous fluids  - Provide specific nutrition/hydration education as appropriate  - Include patient/family/caregiver in decisions related to nutrition  Outcome: Progressing     Problem: SKIN/TISSUE INTEGRITY - ADULT  Goal: Incision(s), wounds(s) or drain site(s) healing without S/S of infection  Description: INTERVENTIONS  - Assess and document dressing, incision, wound bed, drain sites and surrounding tissue  - Provide patient and family education  - Perform skin care/dressing changes per wound consult recommendations  Outcome: Progressing

## 2024-07-06 NOTE — NURSING NOTE
Upon rolling and changing/cleaning pt, suppository that was given prior to my shift was found in pt bed, not given. Pt still has not had a BM since admission. CAMPBELL Cheung, aware of same. Miralax daily and PRN suppository ordered.

## 2024-07-06 NOTE — RESPIRATORY THERAPY NOTE
RT Protocol Note  Tigist Hewitt 69 y.o. female MRN: [id number]  Unit/Bed#: -01 Encounter: [id number]    Assessment    Principal Problem:    Sepsis (HCC)  Active Problems:    Atrial fibrillation with RVR (HCC)    COPD (chronic obstructive pulmonary disease) (HCC)    Type 2 diabetes mellitus with hyperglycemia, with long-term current use of insulin (HCC)    Acute on chronic diastolic congestive heart failure (HCC)    Morbid obesity (HCC)    NISHI (obstructive sleep apnea)    Acute on chronic respiratory failure with hypoxia (HCC)    Hypokalemia    Positive blood culture    Closed left ankle fracture      Home Pulmonary Medications:         Past Medical History:   Diagnosis Date    Asthma     Atrial fibrillation (HCC)     COPD (chronic obstructive pulmonary disease) (HCC)     Diabetes mellitus (HCC)     Disease of thyroid gland     Heart failure (HCC)     Kidney failure     Morbid obesity due to excess calories (HCC)     NISHI (obstructive sleep apnea)     UTI (urinary tract infection)      Social History     Socioeconomic History    Marital status: /Civil Union     Spouse name: None    Number of children: None    Years of education: None    Highest education level: None   Occupational History    None   Tobacco Use    Smoking status: Never     Passive exposure: Never    Smokeless tobacco: Never   Vaping Use    Vaping status: Never Used   Substance and Sexual Activity    Alcohol use: Never    Drug use: Never    Sexual activity: None   Other Topics Concern    None   Social History Narrative    None     Social Determinants of Health     Financial Resource Strain: Not on file   Food Insecurity: No Food Insecurity (6/28/2024)    Hunger Vital Sign     Worried About Running Out of Food in the Last Year: Never true     Ran Out of Food in the Last Year: Never true   Transportation Needs: No Transportation Needs (6/28/2024)    PRAPARE - Transportation     Lack of Transportation (Medical): No     Lack of  "Transportation (Non-Medical): No   Physical Activity: Not on file   Stress: Not on file   Social Connections: Not on file   Intimate Partner Violence: Not on file   Housing Stability: Low Risk  (6/28/2024)    Housing Stability Vital Sign     Unable to Pay for Housing in the Last Year: No     Number of Times Moved in the Last Year: 1     Homeless in the Last Year: No       Subjective         Objective    Physical Exam:   Assessment Type: During-treatment  General Appearance: Awake, Alert    Vitals:  Blood pressure 109/69, pulse 91, temperature (!) 97.2 °F (36.2 °C), resp. rate 20, height 5' 1\" (1.549 m), weight (!) 169 kg (373 lb 7.4 oz), SpO2 96%.          Imaging and other studies: I have personally reviewed pertinent reports.      O2 Device: 4LPM NC     Plan             Resp Comments: Pt placed on home cpap with 4lpm of 02 titrated into machine for hs   "

## 2024-07-07 VITALS
RESPIRATION RATE: 18 BRPM | OXYGEN SATURATION: 98 % | DIASTOLIC BLOOD PRESSURE: 58 MMHG | HEART RATE: 87 BPM | BODY MASS INDEX: 55.32 KG/M2 | SYSTOLIC BLOOD PRESSURE: 107 MMHG | WEIGHT: 293 LBS | TEMPERATURE: 97.2 F | HEIGHT: 61 IN

## 2024-07-07 PROBLEM — E87.6 HYPOKALEMIA: Status: RESOLVED | Noted: 2024-06-30 | Resolved: 2024-07-07

## 2024-07-07 LAB
GLUCOSE SERPL-MCNC: 147 MG/DL (ref 65–140)
GLUCOSE SERPL-MCNC: 199 MG/DL (ref 65–140)
INR PPP: 2.72 (ref 0.84–1.19)
PROTHROMBIN TIME: 29.2 SECONDS (ref 11.6–14.5)

## 2024-07-07 PROCEDURE — 85610 PROTHROMBIN TIME: CPT | Performed by: FAMILY MEDICINE

## 2024-07-07 PROCEDURE — 99239 HOSP IP/OBS DSCHRG MGMT >30: CPT | Performed by: FAMILY MEDICINE

## 2024-07-07 PROCEDURE — 94664 DEMO&/EVAL PT USE INHALER: CPT

## 2024-07-07 PROCEDURE — 94640 AIRWAY INHALATION TREATMENT: CPT

## 2024-07-07 PROCEDURE — 94760 N-INVAS EAR/PLS OXIMETRY 1: CPT

## 2024-07-07 PROCEDURE — 82948 REAGENT STRIP/BLOOD GLUCOSE: CPT

## 2024-07-07 RX ORDER — WARFARIN SODIUM 5 MG/1
5 TABLET ORAL
Status: DISCONTINUED | OUTPATIENT
Start: 2024-07-07 | End: 2024-07-07 | Stop reason: HOSPADM

## 2024-07-07 RX ORDER — DOCUSATE SODIUM 100 MG/1
100 CAPSULE, LIQUID FILLED ORAL DAILY
Qty: 15 CAPSULE | Refills: 0 | Status: SHIPPED | OUTPATIENT
Start: 2024-07-07

## 2024-07-07 RX ORDER — WARFARIN SODIUM 5 MG/1
TABLET ORAL
Qty: 30 TABLET | Refills: 0 | Status: SHIPPED | OUTPATIENT
Start: 2024-07-07

## 2024-07-07 RX ORDER — SPIRONOLACTONE 25 MG/1
25 TABLET ORAL DAILY
Qty: 30 TABLET | Refills: 0 | Status: SHIPPED | OUTPATIENT
Start: 2024-07-08 | End: 2024-08-07

## 2024-07-07 RX ORDER — BUMETANIDE 2 MG/1
3 TABLET ORAL 2 TIMES DAILY
Qty: 60 TABLET | Refills: 0 | Status: SHIPPED | OUTPATIENT
Start: 2024-07-07 | End: 2024-08-06

## 2024-07-07 RX ADMIN — DOCUSATE SODIUM 100 MG: 100 CAPSULE, LIQUID FILLED ORAL at 09:28

## 2024-07-07 RX ADMIN — Medication 2000 UNITS: at 09:28

## 2024-07-07 RX ADMIN — LEVOTHYROXINE SODIUM 288 MCG: 88 TABLET ORAL at 05:08

## 2024-07-07 RX ADMIN — IPRATROPIUM BROMIDE AND ALBUTEROL SULFATE 3 ML: 2.5; .5 SOLUTION RESPIRATORY (INHALATION) at 08:42

## 2024-07-07 RX ADMIN — SPIRONOLACTONE 25 MG: 25 TABLET ORAL at 09:28

## 2024-07-07 RX ADMIN — PANTOPRAZOLE SODIUM 40 MG: 40 TABLET, DELAYED RELEASE ORAL at 05:08

## 2024-07-07 RX ADMIN — POLYETHYLENE GLYCOL 3350 17 G: 17 POWDER, FOR SOLUTION ORAL at 09:28

## 2024-07-07 RX ADMIN — FLUTICASONE FUROATE AND VILANTEROL TRIFENATATE 1 PUFF: 100; 25 POWDER RESPIRATORY (INHALATION) at 09:29

## 2024-07-07 RX ADMIN — BUMETANIDE 2 MG: 0.25 INJECTION INTRAMUSCULAR; INTRAVENOUS at 09:29

## 2024-07-07 RX ADMIN — INSULIN LISPRO 2 UNITS: 100 INJECTION, SOLUTION INTRAVENOUS; SUBCUTANEOUS at 11:59

## 2024-07-07 RX ADMIN — ALLOPURINOL 100 MG: 100 TABLET ORAL at 09:28

## 2024-07-07 RX ADMIN — METOPROLOL TARTRATE 25 MG: 25 TABLET, FILM COATED ORAL at 09:28

## 2024-07-07 RX ADMIN — LORATADINE 5 MG: 10 TABLET ORAL at 09:28

## 2024-07-07 RX ADMIN — FERROUS SULFATE TAB 325 MG (65 MG ELEMENTAL FE) 325 MG: 325 (65 FE) TAB at 11:59

## 2024-07-07 RX ADMIN — ATORVASTATIN CALCIUM 10 MG: 10 TABLET, FILM COATED ORAL at 09:28

## 2024-07-07 RX ADMIN — Medication 1 TABLET: at 09:28

## 2024-07-07 RX ADMIN — GUAIFENESIN 600 MG: 600 TABLET ORAL at 09:28

## 2024-07-07 NOTE — ASSESSMENT & PLAN NOTE
Secondary to decompensated HF, pneumonia  CT c/a/p: diffuse GGO/mosaicism throughout the bilateral lung fields could be related to hypoinflation, air trapping/small airway disease, edema or pneumonia  Back to baseline, using a liter of oxygen during daytime, uses CPAP at nighttime.

## 2024-07-07 NOTE — PLAN OF CARE
Problem: PAIN - ADULT  Goal: Verbalizes/displays adequate comfort level or baseline comfort level  Description: Interventions:  - Encourage patient to monitor pain and request assistance  - Assess pain using appropriate pain scale  - Administer analgesics based on type and severity of pain and evaluate response  - Implement non-pharmacological measures as appropriate and evaluate response  - Consider cultural and social influences on pain and pain management  - Notify physician/advanced practitioner if interventions unsuccessful or patient reports new pain  Outcome: Progressing     Problem: INFECTION - ADULT  Goal: Absence or prevention of progression during hospitalization  Description: INTERVENTIONS:  - Assess and monitor for signs and symptoms of infection  - Monitor lab/diagnostic results  - Monitor all insertion sites, i.e. indwelling lines, tubes, and drains  - Monitor endotracheal if appropriate and nasal secretions for changes in amount and color  - [place] appropriate cooling/warming therapies per order  - Administer medications as ordered  - Instruct and encourage patient and family to use good hand hygiene technique  - Identify and instruct in appropriate isolation precautions for identified infection/condition  Outcome: Progressing     Problem: SAFETY ADULT  Goal: Patient will remain free of falls  Description: INTERVENTIONS:  - Educate patient/family on patient safety including physical limitations  - Instruct patient to call for assistance with activity   - Consult OT/PT to assist with strengthening/mobility   - Keep Call bell within reach  - Keep bed low and locked with side rails adjusted as appropriate  - Keep care items and personal belongings within reach  - Initiate and maintain comfort rounds  - Make Fall Risk Sign visible to staff  - Offer Toileting every 2 Hours, in advance of need if unable to call for assistance  - Initiate/Maintain bed/chair alarm for confusion, impulsivity, or  noncompliance with calling for assistance  - Apply yellow socks and bracelet for high fall risk patients  - Consider moving patient to room near nurses station  Outcome: Progressing  Goal: Maintain or return to baseline ADL function  Description: INTERVENTIONS:  -  Assess patient's ability to carry out ADLs; assess patient's baseline for ADL function and identify physical deficits which impact ability to perform ADLs (bathing, care of mouth/teeth, toileting, grooming, dressing, etc.)  - Assess/evaluate cause of self-care deficits   - Assess range of motion  - Assess patient's mobility; develop plan if impaired  - Assess patient's need for assistive devices and provide as appropriate  - Encourage maximum independence but intervene and supervise when necessary  - Involve family in performance of ADLs  - Assess for home care needs following discharge   - Consider OT consult to assist with ADL evaluation and planning for discharge  - Provide patient education as appropriate  Outcome: Progressing  Goal: Maintains/Returns to pre admission functional level  Description: INTERVENTIONS:  - Perform AM-PAC 6 Click Basic Mobility/ Daily Activity assessment daily.  - Set and communicate daily mobility goal to care team and patient/family/caregiver.   - Collaborate with rehabilitation services on mobility goals if consulted  - Perform Range of Motion 3 times a day.  - Reposition patient every 2 hours if unable to reposition self  - Out of bed for toileting when medically appropriate  - Record patient progress and toleration of activity level   Outcome: Progressing     Problem: DISCHARGE PLANNING  Goal: Discharge to home or other facility with appropriate resources  Description: INTERVENTIONS:  - Identify barriers to discharge w/patient and caregiver  - Arrange for needed discharge resources and transportation as appropriate  - Identify discharge learning needs (meds, wound care, etc.)  - Arrange for interpretive services to  assist at discharge as needed  - Refer to Case Management Department for coordinating discharge planning if the patient needs post-hospital services based on physician/advanced practitioner order or complex needs related to functional status, cognitive ability, or social support system  Outcome: Progressing     Problem: Knowledge Deficit  Goal: Patient/family/caregiver demonstrates understanding of disease process, treatment plan, medications, and discharge instructions  Description: Complete learning assessment and assess knowledge base.  Interventions:  - Provide teaching at level of understanding  - Provide teaching via preferred learning methods  Outcome: Progressing     Problem: CARDIOVASCULAR - ADULT  Goal: Maintains optimal cardiac output and hemodynamic stability  Description: INTERVENTIONS:  - Monitor I/O, vital signs and rhythm monitor fluid intake /fluid restriction  - Monitor for S/S and trends of decreased cardiac output  - Administer and titrate ordered vasoactive medications to optimize hemodynamic stability  - Assess quality of pulses, skin color and temperature  - Assess for signs of decreased coronary artery perfusion  - Instruct patient to report change in severity of symptoms  Outcome: Progressing  Goal: Absence of cardiac dysrhythmias or at baseline rhythm  Description: INTERVENTIONS:  - Continuous cardiac monitoring, vital signs, obtain 12 lead EKG if ordered  - Administer antiarrhythmic and heart rate control medications as ordered  - Monitor electrolytes and administer replacement therapy as ordered  Outcome: Progressing     Problem: RESPIRATORY - ADULT  Goal: Achieves optimal ventilation and oxygenation  Description: INTERVENTIONS:  - Assess for changes in respiratory status confusion cyanosis  - Assess for changes in mentation and behavior  - Position to facilitate oxygenation and minimize respiratory effort  - Oxygen administered by appropriate delivery if ordered  - Initiate smoking  cessation education as indicated  - Encourage broncho-pulmonary hygiene including cough, deep breathe, Incentive Spirometry  - Assess the need for suctioning and aspirate as needed  - Assess and instruct to report SOB or any respiratory difficulty  - Respiratory Therapy support as indicated  Outcome: Progressing     Problem: GENITOURINARY - ADULT  Goal: Maintains or returns to baseline urinary function  Description: INTERVENTIONS:  - Assess urinary function  - Encourage oral fluids to ensure adequate hydration if ordered  - Administer IV fluids as ordered to ensure adequate hydration  - Administer ordered medications as needed  - Offer frequent toileting  - Follow urinary retention protocol if ordered  Outcome: Progressing  Goal: Absence of urinary retention  Description: INTERVENTIONS:  - Assess patient’s ability to void and empty bladder  - Monitor I/O  - Bladder scan as needed  - Discuss with physician/AP medications to alleviate retention as needed  - Discuss catheterization for long term situations as appropriate  Outcome: Progressing  Goal: Urinary catheter remains patent  Description: INTERVENTIONS:  - Assess patency of urinary catheter  - If patient has a chronic frederick, consider changing catheter if non-functioning  - Follow guidelines for intermittent irrigation of non-functioning urinary catheter  Outcome: Progressing     Problem: METABOLIC, FLUID AND ELECTROLYTES - ADULT  Goal: Electrolytes maintained within normal limits  Description: INTERVENTIONS:  - Monitor labs and assess patient for signs and symptoms of electrolyte imbalances  - Administer electrolyte replacement as ordered  - Monitor response to electrolyte replacements, including repeat lab results as appropriate  - Instruct patient on fluid and nutrition as appropriate  Outcome: Progressing  Goal: Fluid balance maintained  Description: INTERVENTIONS:  - Monitor labs   - Monitor I/O and WT  - Instruct patient on fluid and nutrition as  appropriate  - Assess for signs & symptoms of volume excess or deficit  Outcome: Progressing  Goal: Glucose maintained within target range  Description: INTERVENTIONS:  - Monitor Blood Glucose as ordered  - Assess for signs and symptoms of hyperglycemia and hypoglycemia  - Administer ordered medications to maintain glucose within target range  - Assess nutritional intake and initiate nutrition service referral as needed  Outcome: Progressing     Problem: SKIN/TISSUE INTEGRITY - ADULT  Goal: Incision(s), wounds(s) or drain site(s) healing without S/S of infection  Description: INTERVENTIONS  - Assess and document dressing, incision, wound bed, drain sites and surrounding tissue  - Provide patient and family education  - Perform skin care/dressing changes per wound consult recommendations  Outcome: Progressing     Problem: Prexisting or High Potential for Compromised Skin Integrity  Goal: Skin integrity is maintained or improved  Description: INTERVENTIONS:  - Identify patients at risk for skin breakdown  - Assess and monitor skin integrity  - Assess and monitor nutrition and hydration status  - Monitor labs   - Assess for incontinence   - Turn and reposition patient  - Assist with mobility/ambulation  - Relieve pressure over bony prominences  - Avoid friction and shearing  - Provide appropriate hygiene as needed including keeping skin clean and dry  - Evaluate need for skin moisturizer/barrier cream  - Collaborate with interdisciplinary team   - Patient/family teaching  - Consider wound care consult   Outcome: Progressing     Problem: Nutrition/Hydration-ADULT  Goal: Nutrient/Hydration intake appropriate for improving, restoring or maintaining nutritional needs  Description: Monitor and assess patient's nutrition/hydration status for malnutrition. Collaborate with interdisciplinary team and initiate plan and interventions as ordered.  Monitor patient's weight and dietary intake as ordered or per policy. Utilize  nutrition screening tool and intervene as necessary. Determine patient's food preferences and provide high-protein, high-caloric foods as appropriate.     INTERVENTIONS:  - Monitor oral intake, urinary output, labs, and treatment plans  - Assess nutrition and hydration status and recommend course of action  - Evaluate amount of meals eaten  - Assist patient with eating if necessary   - Allow adequate time for meals  - Recommend/ encourage appropriate diets, oral nutritional supplements, and vitamin/mineral supplements  - Order, calculate, and assess calorie counts as needed  - Recommend, monitor, and adjust tube feedings and TPN/PPN based on assessed needs  - Assess need for intravenous fluids  - Provide specific nutrition/hydration education as appropriate  - Include patient/family/caregiver in decisions related to nutrition  Outcome: Progressing

## 2024-07-07 NOTE — RESPIRATORY THERAPY NOTE
RT Protocol Note  Tigist Hewitt 69 y.o. female MRN: [id number]  Unit/Bed#: -01 Encounter: [id number]    Assessment    Principal Problem:    Sepsis (HCC)  Active Problems:    Atrial fibrillation with RVR (HCC)    COPD (chronic obstructive pulmonary disease) (HCC)    Type 2 diabetes mellitus with hyperglycemia, with long-term current use of insulin (HCC)    Acute on chronic diastolic congestive heart failure (HCC)    Morbid obesity (HCC)    NISHI (obstructive sleep apnea)    Acute on chronic respiratory failure with hypoxia (HCC)    Hypokalemia    Positive blood culture    Closed left ankle fracture      Home Pulmonary Medications:         Past Medical History:   Diagnosis Date    Asthma     Atrial fibrillation (HCC)     COPD (chronic obstructive pulmonary disease) (HCC)     Diabetes mellitus (HCC)     Disease of thyroid gland     Heart failure (HCC)     Kidney failure     Morbid obesity due to excess calories (HCC)     NISHI (obstructive sleep apnea)     UTI (urinary tract infection)      Social History     Socioeconomic History    Marital status: /Civil Union     Spouse name: None    Number of children: None    Years of education: None    Highest education level: None   Occupational History    None   Tobacco Use    Smoking status: Never     Passive exposure: Never    Smokeless tobacco: Never   Vaping Use    Vaping status: Never Used   Substance and Sexual Activity    Alcohol use: Never    Drug use: Never    Sexual activity: None   Other Topics Concern    None   Social History Narrative    None     Social Determinants of Health     Financial Resource Strain: Not on file   Food Insecurity: No Food Insecurity (6/28/2024)    Hunger Vital Sign     Worried About Running Out of Food in the Last Year: Never true     Ran Out of Food in the Last Year: Never true   Transportation Needs: No Transportation Needs (6/28/2024)    PRAPARE - Transportation     Lack of Transportation (Medical): No     Lack of  "Transportation (Non-Medical): No   Physical Activity: Not on file   Stress: Not on file   Social Connections: Not on file   Intimate Partner Violence: Not on file   Housing Stability: Low Risk  (6/28/2024)    Housing Stability Vital Sign     Unable to Pay for Housing in the Last Year: No     Number of Times Moved in the Last Year: 1     Homeless in the Last Year: No       Subjective         Objective    Physical Exam:   Assessment Type: During-treatment  General Appearance: Awake, Alert  Respiratory Pattern: Dyspnea with exertion  Chest Assessment: Chest expansion symmetrical    Vitals:  Blood pressure 112/71, pulse 91, temperature (!) 96.8 °F (36 °C), resp. rate 20, height 5' 1\" (1.549 m), weight (!) 164 kg (362 lb 7 oz), SpO2 100%.          Imaging and other studies:     O2 Device: 4LPM NC     Plan             Resp Comments: Pt placed on home cpap machine with 4lpm of 02 titrated into machine   "

## 2024-07-07 NOTE — ASSESSMENT & PLAN NOTE
Wt Readings from Last 3 Encounters:   07/07/24 (!) 165 kg (363 lb 8.6 oz)   06/10/24 (!) 157 kg (346 lb)   05/29/24 (!) 157 kg (346 lb 12.5 oz)     With acute decompensation, wt up 18kg   12/2023 ECHO: EF 55% with severe TR, RVSP 53  Repeat ECHO ordered  Patient received IV diuretics with significant improvement.  Patient is getting discharged with p.o. Bumex 3 mg twice daily.  Recommendation to follow-up with PCP and cardiology as soon as possible.

## 2024-07-07 NOTE — ASSESSMENT & PLAN NOTE
Less likely in exacerbation  At baseline O2 requirement of 4 L-back to baseline  Continue home inhalers

## 2024-07-07 NOTE — DISCHARGE SUMMARY
[facility]  Discharge- Tigist Hewitt 1955, 69 y.o. female MRN: [id number]  Unit/Bed#: MS [facility] Encounter: [id number]  Primary Care Provider: Marisa Haque MD   Date and time admitted to hospital: 6/27/2024 10:07 PM    Acute on chronic diastolic congestive heart failure (HCC)  Assessment & Plan  Wt Readings from Last 3 Encounters:   07/07/24 (!) 165 kg (363 lb 8.6 oz)   06/10/24 (!) 157 kg (346 lb)   05/29/24 (!) 157 kg (346 lb 12.5 oz)     With acute decompensation, wt up 18kg   12/2023 ECHO: EF 55% with severe TR, RVSP 53  Repeat ECHO ordered  Patient received IV diuretics with significant improvement.  Patient is getting discharged with p.o. Bumex 3 mg twice daily.  Recommendation to follow-up with PCP and cardiology as soon as possible.      Type 2 diabetes mellitus with hyperglycemia, with long-term current use of insulin ([facility])  Assessment & Plan  Lab Results   Component Value Date    HGBA1C 7.0 (H) 05/02/2024       Recent Labs     07/06/24  1125 07/06/24  1542 07/06/24  2032 07/07/24  0734   POCGLU 196* 189* 170* 147*         Blood Sugar Average: Last 72 hrs:  (P) 168.7681845061352117    Resume home medication    Atrial fibrillation with RVR (HCC)  Assessment & Plan  Hx of persistent atrial fibrillation, failed prior cardioversion/ablation in past   Developed A fib with RVR over admission secondary to septic shock requiring amiodarone gtt since weaned  Continue home medication  INR is therapeutic,-patient placed on Coumadin 5 mg.    * Sepsis (HCC)  Assessment & Plan  Met on admission with leukocytosis, tachycardia, tachypnea  Developed hypotension over admission improved with albumin/midodrine. 30cc/kg bolus not given due to CHF exacerbation  CT chest with possible pneumonia in the appropriate setting  Was started on IV ceftriaxone, continue to completed 7-day course  Blood cultures x 3 negative. Initial blood cultures 1/2 positive, suspected  [id number]-ualxlqxo.  Patient completed adequate dose of antibiotic therapy.  Sepsis now resolved      COPD (chronic obstructive pulmonary disease) (HCC)  Assessment & Plan  Less likely in exacerbation  At baseline O2 requirement of 4 L-back to baseline  Continue home inhalers    Closed left ankle fracture  Assessment & Plan  Recent closed left ankle fracture, being followed by orthopedic surgery   Missed outpatient appt due to current hospitalizations   Reevaluated by orthopedic this time, recommendation:Patient is permitted to bear weight as tolerated left lower extremity for stand to pivot     Positive blood culture  Assessment & Plan  1/2 blood cultures Staph epidermidis -likely contamination, coagulase-negative.  Repeat blood culture negative in 48 hours  On top of that, patient completed antibiotic therapy for sepsis for    Acute on chronic respiratory failure with hypoxia (HCC)  Assessment & Plan  Secondary to decompensated HF, pneumonia  CT c/a/p: diffuse GGO/mosaicism throughout the bilateral lung fields could be related to hypoinflation, air trapping/small airway disease, edema or pneumonia  Back to baseline, using a liter of oxygen during daytime, uses CPAP at nighttime.      NISHI (obstructive sleep apnea)  Assessment & Plan  Continue CPAP HS     Morbid obesity (HCC)  Assessment & Plan  Consider dietary consult.   Encourage exercise and lifestyle changes.      Medical Problems       Resolved Problems  Date Reviewed: 7/5/2024            Resolved    Subtherapeutic international normalized ratio (INR) 7/5/2024     Resolved by  Pedro Price MD    Hypokalemia 7/7/2024     Resolved by  Pedro Price MD        Discharging Physician / Practitioner: Pedro Price MD  PCP: Marisa Haque MD  Admission Date:   Admission Orders (From admission, onward)       Ordered        06/28/24 0105  INPATIENT ADMISSION  Once                          Discharge Date: 07/07/24    Consultations During  Hospital Stay:  Cardiology, orthopedic, PT/OT/speech and swallow    Procedures Performed:    VAS VENOUS DUPLEX - LOWER LIMB BILATERAL   Final Result by Gustavo Watts MD (07/01 0914)      XR ankle 3+ vw left   Final Result by Hui Urias MD (07/01 1004)      Stable appearance of oblique distal fibular fracture. No significant healing response. Ankle mortise is congruent.            Computerized Assisted Algorithm (CAA) may have been used to analyze all applicable images.            Workstation performed: [id number]         CT head wo contrast   Final Result by Prosper Robbins DO (06/28 1332)      No acute intracranial abnormality. Minor white matter change suggestive of chronic microangiopathy.      Chronic frontal sinus opacification.                        Workstation performed: [id number]         CT chest abdomen pelvis wo contrast   Final Result by Gabriel Wild MD (06/28 1420)      1.  No acute findings in the chest, abdomen, or pelvis.      2.  Similar appearance of diffuse groundglass opacities/mosaicism throughout the bilateral lung fields, which could be related to hypoinflation, air trapping/small airway disease, edema or pneumonia in the appropriate clinical setting. No new focal    consolidations.      3.  Stable 4.4 cm right upper lobe cyst with internal septations and nodularity.      Resident: JAYANT KENDRICK I, the attending radiologist, have reviewed the images and agree with the final report above.      Workstation performed: [id number]         XR chest 1 view portable   Final Result by Taran Morales MD (06/28 1324)      Left retrocardiac consolidation may represent atelectasis associated with small effusion unless there is compelling clinical evidence for pneumonia.            Workstation performed: [id number]               Significant Findings / Test Results:   Lab Results   Component Value Date    WBC 7.99 07/06/2024    HGB 11.5 07/06/2024    HCT  36.1 07/06/2024    MCV 95 07/06/2024     07/06/2024     Lab Results   Component Value Date    SODIUM 144 07/06/2024    K 3.6 07/06/2024     07/06/2024    CO2 38 (H) 07/06/2024    AGAP 5 07/06/2024    BUN 39 (H) 07/06/2024    CREATININE 0.84 07/06/2024    GLUC 151 (H) 07/06/2024    CALCIUM 9.4 07/06/2024    AST 24 07/06/2024    ALT 13 07/06/2024    ALKPHOS 107 (H) 07/06/2024    TP 6.9 07/06/2024    TBILI 1.15 (H) 07/06/2024    EGFR 70 07/06/2024     ollected Updated Procedure Result Status Patient Facility Result Comment    06/29/2024 0541 07/04/2024 1601 Blood culture [[id number]]   Blood from Arm, Left    Final result [facility]  Component Value   Blood Culture No Growth After 5 Days.             06/29/2024 0541 07/04/2024 1601 Blood culture [[id number]]   Blood from Hand, Left    Final result [facility]  Component Value   Blood Culture No Growth After 5 Days.             06/28/2024 0146 06/29/2024 0812 Urine culture [[id number]]   Urine, Indwelling Dow Catheter    Final result [facility]  Component Value   Urine Culture <10,000 cfu/ml    Mixed Contaminants X2             06/27/2024 2307 07/03/2024 0901 Blood culture #1 [[id number]]   Blood    Final result [facility]  Component Value   Blood Culture No Growth After 5 Days.             06/27/2024 2307 07/01/2024 0810 Blood culture #2 [[id number]]    (Abnormal)   Blood    Final result [facility] Susceptibility testing will not be performed as this organism, when isolated from a single set of blood cultures, represents probable skin abdirizak contamination. Please call the Microbiology Laboratory within 5 days at [phone] if further workup is required. Component Value   Blood Culture Staphylococcus epidermidis Abnormal    Gram Stain Result Gram positive cocci in clusters Abnormal     Refer to Blood Culture  Identification Panel results    This is an appended report. These results have been appended to a previously preliminary verified report.             06/27/2024 2307 07/01/2024 0810 Blood Culture Identification Panel [[id number]]    (Abnormal)   Blood    Final result [facility] Routine culture and susceptiblity to follow for confirmation.   Film Array panel tests for 11 gram positive organisms, 15 gram negative organisms, 7 yeast species and 10 resistance genes.    Component Value   Staphylococcus epidermidis Detected Abnormal    mecA/C (Methicillin-resistance gene) Detected Abnormal     This organism is a coagulase-negative Staphylococcus    If only 1 set of blood cultures is positive, this may represent a contaminant.  Consider holding antibiotics or repeating cultures prior to starting antibiotics.      If >1 one set of blood cultures is positive, vancomycin is the preferred therapy.             06/27/2024 2235 06/27/2024 2324 FLU/RSV/COVID - if FLU/RSV clinically relevant [[id number]]    Nares from Nose    Final result [facility] - copy/paste COVID Guidelines URL to browser: [email or web address]   SARS-CoV-2 assay is a Nucleic Acid Amplification assay intended for the   qualitative detection of nucleic acid from SARS-CoV-2 in nasopharyngeal   swabs. Results are for the presumptive identification of SARS-CoV-2 RNA.   Positive results are indicative of infection with SARS-CoV-2, the virus   causing COVID-19, but do not rule out bacterial infection or co-infection   with other viruses. Laboratories within the [place] and its   territories are required to report all positive results to the appropriate   public health authorities. Negative results do not preclude SARS-CoV-2   infection and should not be used as the sole basis for treatment or other   patient management decisions.  Negative results must be combined with   clinical observations, patient history, and epidemiological information.   This test has not been FDA cleared or approved.   This test has been authorized by FDA under an Emergency Use Authorization   (EUA). This test is only authorized for the duration of time the   declaration that circumstances exist justifying the authorization of the   emergency use of an in vitro diagnostic tests for detection of SARS-CoV-2   virus and/or diagnosis of COVID-19 infection under section 564(b)(1) of   the Act, 21 U.S.C. 360bbb-3(b)(1), unless the authorization is terminated   or revoked sooner. The test has been validated but independent review by FDA   and CLIA is pending.   Test performed using [employer] GeneXpert: This RT-PCR assay targets N2,   a region unique to SARS-CoV-2. A conserved region in the E-gene was chosen   for pan-Sarbecovirus detection which includes SARS-CoV-2.   According to CMS-2020-01-R, this platform meets the definition of high-throughput technology.    Component Value   SARS-CoV-2 Negative   INFLUENZA A PCR Negative   INFLUENZA B PCR Negative   RSV PCR Negative          Incidental Findings:   As mentioned above  I reviewed the above mentioned incidental findings with the patient and/or family and they expressed understanding.    Test Results Pending at Discharge (will require follow up):   none     Outpatient Tests Requested:  Chest x-ray in 3 to 4 weeks with PCP  BMP, CBC and INR with PCP/cardiology on regular basis    Complications:  none    Reason for Admission: Increased work of breathing    Hospital Course:   Tigist Hewitt is a 69 y.o. female patient who originally presented to the hospital on 6/27/2024 due to patient initially admitted under critical care service for acute on chronic hypoxic respiratory failure secondary to CHF exacerbation as well as sepsis secondary to pneumonia.  Patient also found encephalopathy.  Received treatment for pneumonia and  "UTI with adequate amount of antibiotic.  Repeat blood culture shows no growth.  With the treatment, patient condition improved, patient downgraded to medicine service.  Patient also found A-fib with RVR, CHF exacerbation, cardiology consulted, medication adjusted.  Patient received IV Bumex with good response.  Cardiology recommending to increase Bumex 3 mg p.o. twice daily.  Continue Lopressor 25 mg twice daily, also added spironolactone.    Patient was on Coumadin, INR was therapeutic, discharging with Coumadin 5 mg with follow-up with PCP/cardiology for further management  Recommendation to follow-up with PCP/cardiology as soon as possible.    All lab results commending findings, treatment plan after discussing results with patient and family member at the bedside, daughter at the bedside.  Verbalizes to understand and agrees.      Please see above list of diagnoses and related plan for additional information.     Condition at Discharge: stable    Discharge Day Visit / Exam:   Subjective: Seen and evaluated during the event.  Resting comfortably.  Still feeling weak but remained stable.  Denies any nausea, vomiting.  Had bowel movement.  Vitals: Blood Pressure: 107/58 (07/07/24 0737)  Pulse: 87 (07/07/24 0737)  Temperature: (!) 97.2 °F (36.2 °C) (07/07/24 0737)  Temp Source: Temporal (07/05/24 2120)  Respirations: 18 (07/07/24 0737)  Height: 5' 1\" (154.9 cm) (07/01/24 1355)  Weight - Scale: (!) 165 kg (363 lb 8.6 oz) (07/07/24 0525)  SpO2: 98 % (07/07/24 0929)  Exam:   Physical Exam  Vitals and nursing note reviewed.   Constitutional:       Appearance: She is obese. She is not ill-appearing or diaphoretic.   HENT:      Mouth/Throat:      Mouth: Mucous membranes are moist.      Pharynx: No oropharyngeal exudate or posterior oropharyngeal erythema.   Eyes:      General: No scleral icterus.        Left eye: No discharge.      Extraocular Movements: Extraocular movements intact.      Conjunctiva/sclera: Conjunctivae " normal.      Pupils: Pupils are equal, round, and reactive to light.   Cardiovascular:      Rate and Rhythm: Tachycardia present.      Heart sounds: No murmur heard.     No friction rub. No gallop.   Pulmonary:      Effort: Pulmonary effort is normal. No respiratory distress.      Breath sounds: No stridor. No wheezing or rhonchi.   Abdominal:      General: There is no distension.      Palpations: There is no mass.      Tenderness: There is no abdominal tenderness.      Hernia: No hernia is present.   Musculoskeletal:      Right lower leg: No edema.      Left lower leg: No edema.   Neurological:      Mental Status: She is alert and oriented to person, place, and time.      Cranial Nerves: No cranial nerve deficit.      Sensory: No sensory deficit.      Motor: No weakness.      Coordination: Coordination normal.          Discussion with Family: Updated  (daughter) at bedside.    Discharge instructions/Information to patient and family:   See after visit summary for information provided to patient and family.      Provisions for Follow-Up Care:  See after visit summary for information related to follow-up care and any pertinent home health orders.      Mobility at time of Discharge:   Basic Mobility Inpatient Raw Score: 6  JH-HLM Goal: 2: Bed activities/Dependent transfer  JH-HLM Achieved: 2: Bed activities/Dependent transfer  HLM Goal achieved. Continue to encourage appropriate mobility.     Disposition:   Home    Planned Readmission: If condition get worse     Discharge Statement:  Greater than 50% of the total time was spent examining patient, answering all patient questions, arranging and discussing plan of care with patient as well as directly providing post-discharge instructions.  Additional time then spent on discharge activities.    Discharge Medications:  See after visit summary for reconciled discharge medications provided to patient and/or family.      **Please Note: This note may have been  constructed using a voice recognition system**

## 2024-07-07 NOTE — ASSESSMENT & PLAN NOTE
Met on admission with leukocytosis, tachycardia, tachypnea  Developed hypotension over admission improved with albumin/midodrine. 30cc/kg bolus not given due to CHF exacerbation  CT chest with possible pneumonia in the appropriate setting  Was started on IV ceftriaxone, continue to completed 7-day course  Blood cultures x 3 negative. Initial blood cultures 1/2 positive, suspected [id number].  Patient completed adequate dose of antibiotic therapy.  Sepsis now resolved

## 2024-07-07 NOTE — ASSESSMENT & PLAN NOTE
Hx of persistent atrial fibrillation, failed prior cardioversion/ablation in past   Developed A fib with RVR over admission secondary to septic shock requiring amiodarone gtt since weaned  Continue home medication  INR is therapeutic,-patient placed on Coumadin 5 mg.

## 2024-07-07 NOTE — ASSESSMENT & PLAN NOTE
Lab Results   Component Value Date    HGBA1C 7.0 (H) 05/02/2024       Recent Labs     07/06/24  1125 07/06/24  1542 07/06/24 2032 07/07/24  0734   POCGLU 196* 189* 170* 147*         Blood Sugar Average: Last 72 hrs:  (P) 168.1282079850331901    Resume home medication

## 2024-07-08 NOTE — UTILIZATION REVIEW
NOTIFICATION OF ADMISSION DISCHARGE   This is a Notification of Discharge from [facility]. Please be advised that this patient has been discharge from our facility. Below you will find the admission and discharge date and time including the patient’s disposition.   UTILIZATION REVIEW CONTACT:  Vesna Anaya  Utilization   Network Utilization Review Department  Phone: [phone] x carefully listen to the prompts. All voicemails are confidential.  Email: [email or web address]     ADMISSION INFORMATION  PRESENTATION DATE: 6/27/2024 10:07 PM  OBERVATION ADMISSION DATE: N/A  INPATIENT ADMISSION DATE: 6/28/24  1:05 AM   DISCHARGE DATE: 7/7/2024  2:20 PM   DISPOSITION:Home with Home Health Care    Network Utilization Review Department  ATTENTION: Please call with any questions or concerns to [phone] and carefully listen to the prompts so that you are directed to the right person. All voicemails are confidential.   For Discharge needs, contact Care Management DC Support Team at [phone] opt. 2  Send all requests for admission clinical reviews, approved or denied determinations and any other requests to dedicated fax number below belonging to the campus where the patient is receiving treatment. List of dedicated fax numbers for the Facilities:  FACILITY NAME UR FAX NUMBER   ADMISSION DENIALS (Administrative/Medical Necessity) [phone]   DISCHARGE SUPPORT TEAM ([facility]) [phone]   PARENT CHILD HEALTH (Maternity/NICU/Pediatrics) [phone]   [facility] [phone]   [facility] [phone]   [facility] [phone]   [facility] [phone]   [facility] [phone]   [facility] [phone]   General acute hospital [phone]   [facility]  [place] [phone]   [facility] [phone]   [facility] [phone]   [facility] [phone]   [facility] [phone]

## 2024-08-01 PROBLEM — A41.9 SEPSIS (HCC): Status: RESOLVED | Noted: 2024-06-30 | Resolved: 2024-08-01

## 2024-10-21 NOTE — ASSESSMENT & PLAN NOTE
Wt Readings from Last 3 Encounters:   12/27/23 (!) 165 kg (364 lb 13.8 oz)   10/29/22 (!) 151 kg (331 lb 12.7 oz)   10/03/22 (!) 171 kg (377 lb 6.8 oz)     Presents with dyspnea, orthopnea, anasarca and CTA chest with pulmonary edema, hypervolemic with CHF exacerbation.?>50lb over her weight at the time of last hospitalization.  Reports compliance with torsemide 50 mg twice daily  Seems since admission overall as a  everywhere but scale she lost 21 pounds she is -15 L  CHF pathway, intake output, daily weights, 2 g sodium diet with 1500 mL of fluid restriction  Previous echocardiogram with preserved ejection fraction but significant valvular heart disease, update echo  Continue metoprolol succinate 12.5 mg twice daily with close monitoring of blood pressure and hemodynamics  Monitor volume status  Continue bumex today will be given diamox and bmp q12    : Yes

## 2025-02-07 NOTE — ASSESSMENT & PLAN NOTE
Problem: Anxiety Signs/Symptoms  Goal: Improved Sleep (Anxiety Signs/Symptoms)  Outcome: Progressing   Goal Outcome Evaluation:    Patient was asleep at the start of the shift. Woke up and voided once. Went back to sleep right after voiding. No behavioral issues noted this shift. Slept for 7.5 hours.                          BMI 70  Intensive lifestyle modification required  Outpatient referral to bariatric medicine

## 2025-03-24 NOTE — ASSESSMENT & PLAN NOTE
A1c 6.4 (7.7). GFR 90.  If no hypoglycemia, then this is ideal A1c.   Placed on IV Rocephin.  UA and urine culture abnormal.  Urine Culture growing Proteus will switch to oral Keflex for 3 more days

## 2025-04-01 ENCOUNTER — DOCTOR'S OFFICE (OUTPATIENT)
Dept: URBAN - NONMETROPOLITAN AREA CLINIC 1 | Facility: CLINIC | Age: 70
Setting detail: OPHTHALMOLOGY
End: 2025-04-01
Payer: COMMERCIAL

## 2025-04-01 DIAGNOSIS — H25.12: ICD-10-CM

## 2025-04-01 DIAGNOSIS — H04.123: ICD-10-CM

## 2025-04-01 DIAGNOSIS — H18.793: ICD-10-CM

## 2025-04-01 DIAGNOSIS — H10.13: ICD-10-CM

## 2025-04-01 DIAGNOSIS — H35.373: ICD-10-CM

## 2025-04-01 DIAGNOSIS — E11.9: ICD-10-CM

## 2025-04-01 PROCEDURE — 99214 OFFICE O/P EST MOD 30 MIN: CPT | Performed by: OPHTHALMOLOGY

## 2025-04-01 PROCEDURE — 76512 OPH US DX B-SCAN: CPT | Mod: LT | Performed by: OPHTHALMOLOGY

## 2025-04-01 ASSESSMENT — REFRACTION_CURRENTRX
OS_CYLINDER: +1.00
OS_SPHERE: +3.00
OD_SPHERE: +2.50
OS_OVR_VA: 20/
OS_AXIS: 026
OD_OVR_VA: 20/
OD_AXIS: 164
OD_CYLINDER: +0.75

## 2025-04-01 ASSESSMENT — REFRACTION_AUTOREFRACTION
OD_AXIS: 076
OS_CYLINDER: -1.50
OS_SPHERE: +3.75
OD_CYLINDER: -1.50
OD_SPHERE: +2.25
OS_AXIS: 107

## 2025-04-01 ASSESSMENT — CORNEAL DYSTROPHY
OS_DYSTROPHY: ABMD
OD_DYSTROPHY: ABMD

## 2025-04-01 ASSESSMENT — DRY EYES - PHYSICIAN NOTES
OD_GENERALCOMMENTS: 1+ PEE
OS_GENERALCOMMENTS: 1+ PEE

## 2025-04-01 ASSESSMENT — KERATOMETRY
OD_AXISANGLE_DEGREES: 153
OD_K2POWER_DIOPTERS: 47.00
OS_K1POWER_DIOPTERS: 45.50
OS_AXISANGLE_DEGREES: 004
OD_K1POWER_DIOPTERS: 44.75
OS_K2POWER_DIOPTERS: 46.00

## 2025-04-01 ASSESSMENT — VISUAL ACUITY
OD_BCVA: 20/150
OS_BCVA: 20/80

## 2025-04-01 ASSESSMENT — CONFRONTATIONAL VISUAL FIELD TEST (CVF)
OS_FINDINGS: FULL
OD_FINDINGS: FULL

## 2025-05-05 DIAGNOSIS — I07.1 RHEUMATIC TRICUSPID INSUFFICIENCY: ICD-10-CM

## 2025-06-10 ENCOUNTER — HOSPITAL ENCOUNTER (OUTPATIENT)
Dept: NON INVASIVE DIAGNOSTICS | Facility: HOSPITAL | Age: 70
Discharge: HOME | End: 2025-06-10
Attending: INTERNAL MEDICINE
Payer: COMMERCIAL

## 2025-06-10 VITALS — HEART RATE: 80 BPM | DIASTOLIC BLOOD PRESSURE: 58 MMHG | SYSTOLIC BLOOD PRESSURE: 107 MMHG

## 2025-06-10 DIAGNOSIS — I07.1 RHEUMATIC TRICUSPID INSUFFICIENCY: ICD-10-CM

## 2025-06-10 LAB
AORTIC ROOT: 3.4 CM
ASCENDING AORTA: 2.9 CM
FRACTIONAL SHORTENING: 33 (ref 28–44)
INTERVENTRICULAR SEPTUM IN DIASTOLE (PARASTERNAL SHORT AXIS VIEW): 1.4 CM
INTERVENTRICULAR SEPTUM: 1.4 CM (ref 0.6–1.1)
LAAS-AP2: 20.7 CM2
LAAS-AP4: 27.7 CM2
LEFT ATRIUM SIZE: 4.6 CM
LEFT ATRIUM VOLUME (MOD BIPLANE): 81 ML
LEFT ATRIUM VOLUME INDEX (MOD BIPLANE): 33.3 ML/M2
LEFT INTERNAL DIMENSION IN SYSTOLE: 3.5 CM (ref 2.1–4)
LEFT VENTRICULAR INTERNAL DIMENSION IN DIASTOLE: 5.2 CM (ref 3.5–6)
LEFT VENTRICULAR POSTERIOR WALL IN END DIASTOLE: 1.3 CM
LEFT VENTRICULAR STROKE VOLUME: 79 ML
LV EF US.2D.A4C+ESTIMATED: 63 %
LVSV (TEICH): 79 ML
RA PRESSURE ESTIMATED: 8 MMHG
RIGHT ATRIUM AREA SYSTOLE A4C: 19.1 CM2
RV PSP: 65 MMHG
SL CV LEFT ATRIUM LENGTH A2C: 5.4 CM
SL CV LV EF: 55
SL CV PED ECHO LEFT VENTRICLE DIASTOLIC VOLUME (MOD BIPLANE) 2D: 130 ML
SL CV PED ECHO LEFT VENTRICLE SYSTOLIC VOLUME (MOD BIPLANE) 2D: 51 ML
TR MAX PG: 57 MMHG
TR PEAK VELOCITY: 3.8 M/S
TRICUSPID ANNULAR PLANE SYSTOLIC EXCURSION: 1.5 CM
TRICUSPID VALVE PEAK REGURGITATION VELOCITY: 3.77 M/S

## 2025-06-10 PROCEDURE — 93306 TTE W/DOPPLER COMPLETE: CPT
